# Patient Record
Sex: MALE | Race: WHITE | NOT HISPANIC OR LATINO | Employment: OTHER | URBAN - METROPOLITAN AREA
[De-identification: names, ages, dates, MRNs, and addresses within clinical notes are randomized per-mention and may not be internally consistent; named-entity substitution may affect disease eponyms.]

---

## 2017-01-24 ENCOUNTER — AMBULATORY - HEALTHEAST (OUTPATIENT)
Dept: CARDIOLOGY | Facility: CLINIC | Age: 62
End: 2017-01-24

## 2017-01-24 DIAGNOSIS — E78.00 HYPERCHOLESTEROLEMIA: ICD-10-CM

## 2017-01-24 LAB
CHOLEST SERPL-MCNC: 121 MG/DL
FASTING STATUS PATIENT QL REPORTED: YES
HDLC SERPL-MCNC: 44 MG/DL
LDLC SERPL CALC-MCNC: 51 MG/DL
TRIGL SERPL-MCNC: 129 MG/DL

## 2017-01-26 ENCOUNTER — COMMUNICATION - HEALTHEAST (OUTPATIENT)
Dept: FAMILY MEDICINE | Facility: CLINIC | Age: 62
End: 2017-01-26

## 2017-01-26 DIAGNOSIS — I10 ESSENTIAL HYPERTENSION: ICD-10-CM

## 2017-03-01 ENCOUNTER — OFFICE VISIT - HEALTHEAST (OUTPATIENT)
Dept: CARDIOLOGY | Facility: CLINIC | Age: 62
End: 2017-03-01

## 2017-03-01 DIAGNOSIS — I10 ESSENTIAL HYPERTENSION WITH GOAL BLOOD PRESSURE LESS THAN 140/90: ICD-10-CM

## 2017-03-01 DIAGNOSIS — E78.00 PURE HYPERCHOLESTEROLEMIA: ICD-10-CM

## 2017-03-01 DIAGNOSIS — I50.22 CHRONIC SYSTOLIC CONGESTIVE HEART FAILURE (H): ICD-10-CM

## 2017-03-01 DIAGNOSIS — I42.8 OTHER PRIMARY CARDIOMYOPATHIES: ICD-10-CM

## 2017-03-06 ENCOUNTER — COMMUNICATION - HEALTHEAST (OUTPATIENT)
Dept: FAMILY MEDICINE | Facility: CLINIC | Age: 62
End: 2017-03-06

## 2017-03-06 DIAGNOSIS — G47.00 INSOMNIA: ICD-10-CM

## 2017-04-03 ENCOUNTER — HOSPITAL ENCOUNTER (OUTPATIENT)
Dept: CARDIOLOGY | Facility: CLINIC | Age: 62
Discharge: HOME | End: 2017-04-03
Attending: INTERNAL MEDICINE

## 2017-04-03 ENCOUNTER — COMMUNICATION - HEALTHEAST (OUTPATIENT)
Dept: FAMILY MEDICINE | Facility: CLINIC | Age: 62
End: 2017-04-03

## 2017-04-03 DIAGNOSIS — I42.8 OTHER PRIMARY CARDIOMYOPATHIES: ICD-10-CM

## 2017-04-03 LAB
AORTIC ROOT: 3.4 CM
BSA FOR ECHO PROCEDURE: 2.41 M2
CV BLOOD PRESSURE: NORMAL MMHG
CV ECHO HEIGHT: 66 IN
CV ECHO WEIGHT: 275 LBS
DOP CALC LVOT AREA: 3.8 CM2
DOP CALC LVOT DIAMETER: 2.2 CM
DOP CALC LVOT PEAK VEL: 78.2 CM/S
DOP CALC LVOT STROKE VOLUME: 64.6 CM3
DOP CALCLVOT PEAK VEL VTI: 17 CM
EJECTION FRACTION: 52 % (ref 55–75)
FRACTIONAL SHORTENING: 32 % (ref 28–44)
INTERVENTRICULAR SEPTUM IN END DIASTOLE: 0.99 CM (ref 0.6–1)
IVS/PW RATIO: 0.7
LA AREA 1: 17 CM2
LA AREA 2: 21 CM2
LA AREA 2: 33.7 CM2
LEFT ATRIUM LENGTH: 5.54 CM
LEFT ATRIUM SIZE: 4.9 CM
LEFT ATRIUM TO AORTIC ROOT RATIO: 1.44 NO UNITS
LEFT VENTRICLE DIASTOLIC VOLUME INDEX: 62.7 CM3/M2 (ref 34–74)
LEFT VENTRICLE DIASTOLIC VOLUME: 151 CM3 (ref 62–150)
LEFT VENTRICLE MASS INDEX: 120.8 G/M2
LEFT VENTRICLE SYSTOLIC VOLUME INDEX: 30.3 CM3/M2 (ref 11–31)
LEFT VENTRICLE SYSTOLIC VOLUME: 73 CM3 (ref 21–61)
LEFT VENTRICULAR INTERNAL DIMENSION IN DIASTOLE: 5.6 CM (ref 4.2–5.8)
LEFT VENTRICULAR INTERNAL DIMENSION IN SYSTOLE: 3.81 CM (ref 2.5–4)
LEFT VENTRICULAR MASS: 291.1 G
LEFT VENTRICULAR OUTFLOW TRACT MEAN GRADIENT: 1 MMHG
LEFT VENTRICULAR OUTFLOW TRACT MEAN VELOCITY: 51.6 CM/S
LEFT VENTRICULAR OUTFLOW TRACT PEAK GRADIENT: 2 MMHG
LEFT VENTRICULAR POSTERIOR WALL IN END DIASTOLE: 1.48 CM (ref 0.6–1)
LV STROKE VOLUME INDEX: 26.8 ML/M2
MV E'TISSUE VEL-LAT: 9.16 CM/S
MV E'TISSUE VEL-MED: 7.12 CM/S
NUC REST DIASTOLIC VOLUME INDEX: 4400 LBS
NUC REST SYSTOLIC VOLUME INDEX: 66 IN
TRICUSPID REGURGITATION PEAK PRESSURE GRADIENT: 24 MMHG
TRICUSPID VALVE ANULAR PLANE SYSTOLIC EXCURSION: 2.9 CM
TRICUSPID VALVE PEAK REGURGITANT VELOCITY: 245 CM/S

## 2017-04-03 ASSESSMENT — MIFFLIN-ST. JEOR: SCORE: 1980.14

## 2017-04-05 ENCOUNTER — COMMUNICATION - HEALTHEAST (OUTPATIENT)
Dept: CARDIOLOGY | Facility: CLINIC | Age: 62
End: 2017-04-05

## 2017-04-05 DIAGNOSIS — E78.00 HYPERCHOLESTEROLEMIA: ICD-10-CM

## 2017-04-17 ENCOUNTER — OFFICE VISIT - HEALTHEAST (OUTPATIENT)
Dept: FAMILY MEDICINE | Facility: CLINIC | Age: 62
End: 2017-04-17

## 2017-04-17 DIAGNOSIS — Z01.818 PREOP GENERAL PHYSICAL EXAM: ICD-10-CM

## 2017-04-17 DIAGNOSIS — Z97.8 PRESENCE OF INTRATHECAL BACLOFEN PUMP: ICD-10-CM

## 2017-04-17 DIAGNOSIS — M54.50 LUMBAGO: ICD-10-CM

## 2017-04-17 ASSESSMENT — MIFFLIN-ST. JEOR: SCORE: 1988.99

## 2017-04-24 RX ORDER — DULOXETIN HYDROCHLORIDE 30 MG/1
30 CAPSULE, DELAYED RELEASE ORAL 2 TIMES DAILY
COMMUNITY
End: 2022-01-12

## 2017-04-24 RX ORDER — GABAPENTIN 300 MG/1
600 CAPSULE ORAL 3 TIMES DAILY
COMMUNITY
End: 2021-09-20

## 2017-04-24 RX ORDER — TRAZODONE HYDROCHLORIDE 100 MG/1
200 TABLET ORAL AT BEDTIME
COMMUNITY
End: 2021-10-06

## 2017-04-25 ENCOUNTER — HOSPITAL ENCOUNTER (OUTPATIENT)
Facility: CLINIC | Age: 62
Discharge: HOME | End: 2017-04-25
Attending: PHYSICAL MEDICINE & REHABILITATION | Admitting: PHYSICAL MEDICINE & REHABILITATION
Payer: OTHER MISCELLANEOUS

## 2017-04-25 ENCOUNTER — ANESTHESIA (OUTPATIENT)
Dept: SURGERY | Facility: CLINIC | Age: 62
End: 2017-04-25
Payer: OTHER MISCELLANEOUS

## 2017-04-25 ENCOUNTER — ANESTHESIA EVENT (OUTPATIENT)
Dept: SURGERY | Facility: CLINIC | Age: 62
End: 2017-04-25
Payer: OTHER MISCELLANEOUS

## 2017-04-25 ENCOUNTER — APPOINTMENT (OUTPATIENT)
Dept: GENERAL RADIOLOGY | Facility: CLINIC | Age: 62
End: 2017-04-25
Attending: PHYSICAL MEDICINE & REHABILITATION
Payer: OTHER MISCELLANEOUS

## 2017-04-25 VITALS
WEIGHT: 271.1 LBS | BODY MASS INDEX: 41.09 KG/M2 | OXYGEN SATURATION: 95 % | TEMPERATURE: 98.2 F | SYSTOLIC BLOOD PRESSURE: 120 MMHG | DIASTOLIC BLOOD PRESSURE: 68 MMHG | RESPIRATION RATE: 16 BRPM | HEIGHT: 68 IN

## 2017-04-25 PROCEDURE — 37000008 ZZH ANESTHESIA TECHNICAL FEE, 1ST 30 MIN: Performed by: PHYSICAL MEDICINE & REHABILITATION

## 2017-04-25 PROCEDURE — 27210794 ZZH OR GENERAL SUPPLY STERILE: Performed by: PHYSICAL MEDICINE & REHABILITATION

## 2017-04-25 PROCEDURE — C1772 INFUSION PUMP, PROGRAMMABLE: HCPCS | Performed by: PHYSICAL MEDICINE & REHABILITATION

## 2017-04-25 PROCEDURE — 25000128 H RX IP 250 OP 636: Performed by: NURSE ANESTHETIST, CERTIFIED REGISTERED

## 2017-04-25 PROCEDURE — 36000065 ZZH SURGERY LEVEL 4 W FLUORO 1ST 30 MIN: Performed by: PHYSICAL MEDICINE & REHABILITATION

## 2017-04-25 PROCEDURE — 25000125 ZZHC RX 250: Performed by: PHYSICAL MEDICINE & REHABILITATION

## 2017-04-25 PROCEDURE — 40000277 XR SURGERY CARM FLUORO LESS THAN 5 MIN W STILLS

## 2017-04-25 PROCEDURE — 71000013 ZZH RECOVERY PHASE 1 LEVEL 1 EA ADDTL HR: Performed by: PHYSICAL MEDICINE & REHABILITATION

## 2017-04-25 PROCEDURE — 71000012 ZZH RECOVERY PHASE 1 LEVEL 1 FIRST HR: Performed by: PHYSICAL MEDICINE & REHABILITATION

## 2017-04-25 PROCEDURE — 27210995 ZZH RX 272: Performed by: PHYSICAL MEDICINE & REHABILITATION

## 2017-04-25 PROCEDURE — 25000128 H RX IP 250 OP 636: Performed by: PHYSICAL MEDICINE & REHABILITATION

## 2017-04-25 PROCEDURE — 27810169 ZZH OR IMPLANT GENERAL: Performed by: PHYSICAL MEDICINE & REHABILITATION

## 2017-04-25 PROCEDURE — 37000009 ZZH ANESTHESIA TECHNICAL FEE, EACH ADDTL 15 MIN: Performed by: PHYSICAL MEDICINE & REHABILITATION

## 2017-04-25 PROCEDURE — 25000125 ZZHC RX 250: Performed by: NURSE ANESTHETIST, CERTIFIED REGISTERED

## 2017-04-25 PROCEDURE — 25000566 ZZH SEVOFLURANE, EA 15 MIN: Performed by: PHYSICAL MEDICINE & REHABILITATION

## 2017-04-25 PROCEDURE — C1755 CATHETER, INTRASPINAL: HCPCS | Performed by: PHYSICAL MEDICINE & REHABILITATION

## 2017-04-25 PROCEDURE — 40000170 ZZH STATISTIC PRE-PROCEDURE ASSESSMENT II: Performed by: PHYSICAL MEDICINE & REHABILITATION

## 2017-04-25 PROCEDURE — 36000063 ZZH SURGERY LEVEL 4 EA 15 ADDTL MIN: Performed by: PHYSICAL MEDICINE & REHABILITATION

## 2017-04-25 PROCEDURE — C1787 PATIENT PROGR, NEUROSTIM: HCPCS | Performed by: PHYSICAL MEDICINE & REHABILITATION

## 2017-04-25 PROCEDURE — 25800025 ZZH RX 258: Performed by: NURSE ANESTHETIST, CERTIFIED REGISTERED

## 2017-04-25 PROCEDURE — 71000027 ZZH RECOVERY PHASE 2 EACH 15 MINS: Performed by: PHYSICAL MEDICINE & REHABILITATION

## 2017-04-25 DEVICE — IMP CATH ASCENDA W/86CM SPINAL SEGMENT 8780: Type: IMPLANTABLE DEVICE | Site: BACK | Status: FUNCTIONAL

## 2017-04-25 DEVICE — IMP ENVELOPE MEDT TYRX ABS ANTIBACTERIAL LG NMRM6133: Type: IMPLANTABLE DEVICE | Site: BACK | Status: FUNCTIONAL

## 2017-04-25 DEVICE — PUMP SYNCHROMED II 40ML 8637-40: Type: IMPLANTABLE DEVICE | Site: BACK | Status: FUNCTIONAL

## 2017-04-25 RX ORDER — NALOXONE HYDROCHLORIDE 0.4 MG/ML
.1-.4 INJECTION, SOLUTION INTRAMUSCULAR; INTRAVENOUS; SUBCUTANEOUS
Status: DISCONTINUED | OUTPATIENT
Start: 2017-04-25 | End: 2017-04-25 | Stop reason: HOSPADM

## 2017-04-25 RX ORDER — LIDOCAINE HYDROCHLORIDE AND EPINEPHRINE 10; 10 MG/ML; UG/ML
INJECTION, SOLUTION INFILTRATION; PERINEURAL PRN
Status: DISCONTINUED | OUTPATIENT
Start: 2017-04-25 | End: 2017-04-25 | Stop reason: HOSPADM

## 2017-04-25 RX ORDER — PROPOFOL 10 MG/ML
INJECTION, EMULSION INTRAVENOUS PRN
Status: DISCONTINUED | OUTPATIENT
Start: 2017-04-25 | End: 2017-04-25

## 2017-04-25 RX ORDER — BUPIVACAINE HYDROCHLORIDE AND EPINEPHRINE 2.5; 5 MG/ML; UG/ML
INJECTION, SOLUTION INFILTRATION; PERINEURAL PRN
Status: DISCONTINUED | OUTPATIENT
Start: 2017-04-25 | End: 2017-04-25 | Stop reason: HOSPADM

## 2017-04-25 RX ORDER — CEFAZOLIN SODIUM 1 G/3ML
INJECTION, POWDER, FOR SOLUTION INTRAMUSCULAR; INTRAVENOUS PRN
Status: DISCONTINUED | OUTPATIENT
Start: 2017-04-25 | End: 2017-04-25

## 2017-04-25 RX ORDER — ONDANSETRON 4 MG/1
4 TABLET, ORALLY DISINTEGRATING ORAL EVERY 30 MIN PRN
Status: DISCONTINUED | OUTPATIENT
Start: 2017-04-25 | End: 2017-04-25 | Stop reason: HOSPADM

## 2017-04-25 RX ORDER — MEPERIDINE HYDROCHLORIDE 25 MG/ML
12.5 INJECTION INTRAMUSCULAR; INTRAVENOUS; SUBCUTANEOUS
Status: DISCONTINUED | OUTPATIENT
Start: 2017-04-25 | End: 2017-04-25 | Stop reason: HOSPADM

## 2017-04-25 RX ORDER — CEFAZOLIN SODIUM 1 G/50ML
3 SOLUTION INTRAVENOUS
Status: COMPLETED | OUTPATIENT
Start: 2017-04-25 | End: 2017-04-25

## 2017-04-25 RX ORDER — LIDOCAINE HYDROCHLORIDE AND EPINEPHRINE 10; 10 MG/ML; UG/ML
INJECTION, SOLUTION INFILTRATION; PERINEURAL
Status: DISCONTINUED
Start: 2017-04-25 | End: 2017-04-25 | Stop reason: HOSPADM

## 2017-04-25 RX ORDER — GLYCOPYRROLATE 0.2 MG/ML
INJECTION, SOLUTION INTRAMUSCULAR; INTRAVENOUS PRN
Status: DISCONTINUED | OUTPATIENT
Start: 2017-04-25 | End: 2017-04-25

## 2017-04-25 RX ORDER — FENTANYL CITRATE 50 UG/ML
25-50 INJECTION, SOLUTION INTRAMUSCULAR; INTRAVENOUS
Status: DISCONTINUED | OUTPATIENT
Start: 2017-04-25 | End: 2017-04-25 | Stop reason: HOSPADM

## 2017-04-25 RX ORDER — ONDANSETRON 2 MG/ML
INJECTION INTRAMUSCULAR; INTRAVENOUS PRN
Status: DISCONTINUED | OUTPATIENT
Start: 2017-04-25 | End: 2017-04-25

## 2017-04-25 RX ORDER — FENTANYL CITRATE 50 UG/ML
25-50 INJECTION, SOLUTION INTRAMUSCULAR; INTRAVENOUS EVERY 5 MIN PRN
Status: DISCONTINUED | OUTPATIENT
Start: 2017-04-25 | End: 2017-04-25 | Stop reason: HOSPADM

## 2017-04-25 RX ORDER — LIDOCAINE 40 MG/G
CREAM TOPICAL
Status: DISCONTINUED | OUTPATIENT
Start: 2017-04-25 | End: 2017-04-25 | Stop reason: HOSPADM

## 2017-04-25 RX ORDER — SODIUM CHLORIDE, SODIUM LACTATE, POTASSIUM CHLORIDE, CALCIUM CHLORIDE 600; 310; 30; 20 MG/100ML; MG/100ML; MG/100ML; MG/100ML
INJECTION, SOLUTION INTRAVENOUS CONTINUOUS PRN
Status: DISCONTINUED | OUTPATIENT
Start: 2017-04-25 | End: 2017-04-25

## 2017-04-25 RX ORDER — ONDANSETRON 2 MG/ML
4 INJECTION INTRAMUSCULAR; INTRAVENOUS EVERY 30 MIN PRN
Status: DISCONTINUED | OUTPATIENT
Start: 2017-04-25 | End: 2017-04-25 | Stop reason: HOSPADM

## 2017-04-25 RX ORDER — LIDOCAINE HYDROCHLORIDE 20 MG/ML
INJECTION, SOLUTION INFILTRATION; PERINEURAL PRN
Status: DISCONTINUED | OUTPATIENT
Start: 2017-04-25 | End: 2017-04-25

## 2017-04-25 RX ORDER — BUPIVACAINE HYDROCHLORIDE AND EPINEPHRINE 2.5; 5 MG/ML; UG/ML
INJECTION, SOLUTION EPIDURAL; INFILTRATION; INTRACAUDAL; PERINEURAL
Status: DISCONTINUED
Start: 2017-04-25 | End: 2017-04-25 | Stop reason: HOSPADM

## 2017-04-25 RX ORDER — SODIUM CHLORIDE, SODIUM LACTATE, POTASSIUM CHLORIDE, CALCIUM CHLORIDE 600; 310; 30; 20 MG/100ML; MG/100ML; MG/100ML; MG/100ML
INJECTION, SOLUTION INTRAVENOUS CONTINUOUS
Status: DISCONTINUED | OUTPATIENT
Start: 2017-04-25 | End: 2017-04-25 | Stop reason: HOSPADM

## 2017-04-25 RX ORDER — EPHEDRINE SULFATE 50 MG/ML
INJECTION, SOLUTION INTRAMUSCULAR; INTRAVENOUS; SUBCUTANEOUS PRN
Status: DISCONTINUED | OUTPATIENT
Start: 2017-04-25 | End: 2017-04-25

## 2017-04-25 RX ORDER — CEFAZOLIN SODIUM 1 G/3ML
1 INJECTION, POWDER, FOR SOLUTION INTRAMUSCULAR; INTRAVENOUS SEE ADMIN INSTRUCTIONS
Status: DISCONTINUED | OUTPATIENT
Start: 2017-04-25 | End: 2017-04-25 | Stop reason: HOSPADM

## 2017-04-25 RX ORDER — FENTANYL CITRATE 50 UG/ML
INJECTION, SOLUTION INTRAMUSCULAR; INTRAVENOUS PRN
Status: DISCONTINUED | OUTPATIENT
Start: 2017-04-25 | End: 2017-04-25

## 2017-04-25 RX ORDER — DEXAMETHASONE SODIUM PHOSPHATE 4 MG/ML
INJECTION, SOLUTION INTRA-ARTICULAR; INTRALESIONAL; INTRAMUSCULAR; INTRAVENOUS; SOFT TISSUE PRN
Status: DISCONTINUED | OUTPATIENT
Start: 2017-04-25 | End: 2017-04-25

## 2017-04-25 RX ADMIN — PROPOFOL 200 MG: 10 INJECTION, EMULSION INTRAVENOUS at 07:38

## 2017-04-25 RX ADMIN — PHENYLEPHRINE HYDROCHLORIDE 100 MCG: 10 INJECTION, SOLUTION INTRAMUSCULAR; INTRAVENOUS; SUBCUTANEOUS at 07:57

## 2017-04-25 RX ADMIN — LIDOCAINE HYDROCHLORIDE 60 MG: 20 INJECTION, SOLUTION INFILTRATION; PERINEURAL at 07:38

## 2017-04-25 RX ADMIN — DEXMEDETOMIDINE 8 MCG: 100 INJECTION, SOLUTION, CONCENTRATE INTRAVENOUS at 09:17

## 2017-04-25 RX ADMIN — Medication 5 MG: at 08:50

## 2017-04-25 RX ADMIN — DEXAMETHASONE SODIUM PHOSPHATE 4 MG: 4 INJECTION, SOLUTION INTRA-ARTICULAR; INTRALESIONAL; INTRAMUSCULAR; INTRAVENOUS; SOFT TISSUE at 07:59

## 2017-04-25 RX ADMIN — GLYCOPYRROLATE 0.2 MG: 0.2 INJECTION, SOLUTION INTRAMUSCULAR; INTRAVENOUS at 08:04

## 2017-04-25 RX ADMIN — Medication 1 G: at 08:30

## 2017-04-25 RX ADMIN — CEFAZOLIN 2 G: 1 INJECTION, POWDER, FOR SOLUTION INTRAMUSCULAR; INTRAVENOUS at 07:47

## 2017-04-25 RX ADMIN — SODIUM CHLORIDE, POTASSIUM CHLORIDE, SODIUM LACTATE AND CALCIUM CHLORIDE: 600; 310; 30; 20 INJECTION, SOLUTION INTRAVENOUS at 07:36

## 2017-04-25 RX ADMIN — ROCURONIUM BROMIDE 40 MG: 10 INJECTION INTRAVENOUS at 07:38

## 2017-04-25 RX ADMIN — DEXMEDETOMIDINE 8 MCG: 100 INJECTION, SOLUTION, CONCENTRATE INTRAVENOUS at 08:25

## 2017-04-25 RX ADMIN — FENTANYL CITRATE 50 MCG: 50 INJECTION, SOLUTION INTRAMUSCULAR; INTRAVENOUS at 07:36

## 2017-04-25 RX ADMIN — PHENYLEPHRINE HYDROCHLORIDE 200 MCG: 10 INJECTION, SOLUTION INTRAMUSCULAR; INTRAVENOUS; SUBCUTANEOUS at 08:17

## 2017-04-25 RX ADMIN — MIDAZOLAM HYDROCHLORIDE 2 MG: 1 INJECTION, SOLUTION INTRAMUSCULAR; INTRAVENOUS at 07:36

## 2017-04-25 RX ADMIN — SODIUM CHLORIDE, POTASSIUM CHLORIDE, SODIUM LACTATE AND CALCIUM CHLORIDE: 600; 310; 30; 20 INJECTION, SOLUTION INTRAVENOUS at 09:07

## 2017-04-25 RX ADMIN — Medication 5 MG: at 08:01

## 2017-04-25 RX ADMIN — Medication 5 MG: at 08:07

## 2017-04-25 RX ADMIN — Medication 5 MG: at 07:52

## 2017-04-25 RX ADMIN — PROPOFOL 30 MG: 10 INJECTION, EMULSION INTRAVENOUS at 08:26

## 2017-04-25 RX ADMIN — Medication 5 MG: at 08:17

## 2017-04-25 RX ADMIN — Medication 5 MG: at 07:57

## 2017-04-25 RX ADMIN — FENTANYL CITRATE 50 MCG: 50 INJECTION, SOLUTION INTRAMUSCULAR; INTRAVENOUS at 07:37

## 2017-04-25 RX ADMIN — PROPOFOL 30 MG: 10 INJECTION, EMULSION INTRAVENOUS at 09:22

## 2017-04-25 RX ADMIN — ROCURONIUM BROMIDE 10 MG: 10 INJECTION INTRAVENOUS at 08:02

## 2017-04-25 RX ADMIN — PHENYLEPHRINE HYDROCHLORIDE 100 MCG: 10 INJECTION, SOLUTION INTRAMUSCULAR; INTRAVENOUS; SUBCUTANEOUS at 08:01

## 2017-04-25 RX ADMIN — ONDANSETRON 4 MG: 2 INJECTION INTRAMUSCULAR; INTRAVENOUS at 09:12

## 2017-04-25 RX ADMIN — PHENYLEPHRINE HYDROCHLORIDE 200 MCG: 10 INJECTION, SOLUTION INTRAMUSCULAR; INTRAVENOUS; SUBCUTANEOUS at 08:07

## 2017-04-25 RX ADMIN — PHENYLEPHRINE HYDROCHLORIDE 100 MCG: 10 INJECTION, SOLUTION INTRAMUSCULAR; INTRAVENOUS; SUBCUTANEOUS at 08:50

## 2017-04-25 ASSESSMENT — ENCOUNTER SYMPTOMS: DYSRHYTHMIAS: 1

## 2017-04-25 NOTE — IP AVS SNAPSHOT
MRN:[id number]                      After Visit Summary   4/25/2017    Onur Barr    MRN: [id number]           Thank you!     Thank you for choosing [facility] for your care. Our goal is always to provide you with excellent care. Hearing back from our patients is one way we can continue to improve our services. Please take a few minutes to complete the written survey that you may receive in the mail after you visit with us. Thank you!        Patient Information     Date Of Birth          1955        About your hospital stay     You were admitted on:  April 25, 2017 You last received care in the:  [facility] Same Day [facility]    You were discharged on:  April 25, 2017       Who to Call     For medical emergencies, please call 911.  For non-urgent questions about your medical care, please call your primary care provider or clinic, [phone]  For questions related to your surgery, please call your surgery clinic        Attending Provider     Provider Specialty    Will, Anthony WEBER [facility]       Primary Care Provider Office Phone # Fax #    Ositadinma VIBHA Ab [phone]       [address]   [facility] [address]        Further instructions from your care team       Same Day Surgery Discharge Instructions for  Sedation and General Anesthesia       It's not unusual to feel dizzy, light-headed or faint for up to 24 hours after surgery or while taking pain medication.  If you have these symptoms: sit for a few minutes before standing and have someone assist you when you get up to walk or use the bathroom.      You should rest and relax for the next 24 hours. We recommend you make arrangements to have an adult stay with you for at least 24 hours after your discharge.  Avoid hazardous and strenuous activity.      DO NOT DRIVE any vehicle or operate mechanical equipment for 24 hours following the end of your surgery.  Even though you may feel normal,  your reactions may be affected by the medication you have received.      Do not drink alcoholic beverages for 24 hours following surgery.       Slowly progress to your regular diet as you feel able. It's not unusual to feel nauseated and/or vomit after receiving anesthesia.  If you develop these symptoms, drink clear liquids (apple juice, ginger ale, broth, 7-up, etc. ) until you feel better.  If your nausea and vomiting persists for 24 hours, please notify your surgeon.        All narcotic pain medications, along with inactivity and anesthesia, can cause constipation. Drinking plenty of liquids and increasing fiber intake will help.      For any questions of a medical nature, call your surgeon.      Do not make important decisions for 24 hours.      If you had general anesthesia, you may have a sore throat for a couple of days related to the breathing tube used during surgery.  You may use Cepacol lozenges to help with this discomfort.  If it worsens or if you develop a fever, contact your surgeon.       If you feel your pain is not well managed with the pain medications prescribed by your surgeon, please contact your surgeon's office to let them know so they can address your concerns.       [facility]  Opioid Pain Pump Implant  Discharge Instructions    Diet and Medications   * Continue with your regular diet   * Resume your regular pain medication as written in your medication discharge sheet   * You may notice a need to decrease your pain medications   * You will be taking an oral antibiotic (Keflex) for 10 days after the Implant   * DO NOT drink alcoholic beverage    Activity and Lead Site Care   * Please resume light activities as tolerated    Site Care   * Check your procedure site daily.  Keep sites clean and dry.  Leave the dressings in place.   * No shower, tub bath, swimming or whirlpools while catheter in place.  Sponge bath only.    Call Dr. Maloney if you develop   *  Sign of an infection:  "fevers, chills, increased pain, drainage, redness and swelling from the insertion site   *  Headache persisting for more than 48 hours   * Unusual changes in or stopping of sensation   * Painful sensations:  Call the Doctor's Office [phone] or call Dr. Maloney on his cell phone after hours [phone]    Emergency situations-Go to the Emergency Department or call 911:   *  Sudden severe back pain   *  Sudden onset of leg weakness and spasms   *  Loss of bladder control and/or bowel function      ___ [facility] [phone]    Pending Results     No orders found from 2017 to 2017.            Admission Information     Date & Time Provider Department Dept. Phone    2017 Anthony Maloney MD [facility] Same Day Surgery [phone]      Your Vitals Were     Blood Pressure Temperature Respirations Height Weight Pulse Oximetry    113/62 98.2  F (36.8  C) (Temporal) 12 1.727 m (5' 8\") 123 kg (271 lb 1.6 oz) 92%    BMI (Body Mass Index)                   41.22 kg/m2           [employer] Information     [employer] lets you send messages to your doctor, view your test results, renew your prescriptions, schedule appointments and more. To sign up, go to [email or web address] . Click on \"Log in\" on the left side of the screen, which will take you to the Welcome page. Then click on \"Sign up Now\" on the right side of the page.     You will be asked to enter the access code listed below, as well as some personal information. Please follow the directions to create your username and password.     Your access code is: [email or web address]  Expires: 2017 10:40 AM     Your access code will  in 90 days. If you need help or a new code, please call your [facility] clinic or [phone].        Care EveryWhere ID     This is your Care EveryWhere ID. This could be used by other organizations to access your [facility] medical records  [id number]           Review of your medicines      UNREVIEWED medicines. " Ask your doctor about these medicines        Dose / Directions    ALEVE 220 MG capsule   Generic drug:  naproxen sodium        Dose:  220 mg   Take 220 mg by mouth 2 times daily (with meals).   Refills:  0       ASPIRIN EC PO        Dose:  81 mg   Take 81 mg by mouth daily   Refills:  0       COREG CR PO        Dose:  20 mg   Take 20 mg by mouth daily   Refills:  0       CYMBALTA PO        Dose:  30 mg   Take 30 mg by mouth daily   Refills:  0       LASIX PO        Dose:  20 mg   Take 20 mg by mouth daily   Refills:  0       LIPITOR PO        Dose:  40 mg   Take 40 mg by mouth daily   Refills:  0       LISINOPRIL PO        Dose:  20 mg   Take 20 mg by mouth daily   Refills:  0       MORPHINE SULFATE        PUMP   Refills:  0       NEURONTIN PO        Dose:  300 mg   Take 300 mg by mouth 3 times daily   Refills:  0       PRILOSEC PO        Dose:  40 mg   Take 40 mg by mouth daily   Refills:  0       TRAZODONE HCL PO        Dose:  200 mg   Take 200 mg by mouth At Bedtime   Refills:  0                Protect others around you: Learn how to safely use, store and throw away your medicines at www.disposemymeds.org.             Medication List: This is a list of all your medications and when to take them. Check marks below indicate your daily home schedule. Keep this list as a reference.      Medications           Morning Afternoon Evening Bedtime As Needed    ALEVE 220 MG capsule   Take 220 mg by mouth 2 times daily (with meals).   Generic drug:  naproxen sodium                                ASPIRIN EC PO   Take 81 mg by mouth daily                                COREG CR PO   Take 20 mg by mouth daily                                CYMBALTA PO   Take 30 mg by mouth daily                                LASIX PO   Take 20 mg by mouth daily                                LIPITOR PO   Take 40 mg by mouth daily                                LISINOPRIL PO   Take 20 mg by mouth daily                                MORPHINE  SULFATE   PUMP                                NEURONTIN PO   Take 300 mg by mouth 3 times daily                                PRILOSEC PO   Take 40 mg by mouth daily                                TRAZODONE HCL PO   Take 200 mg by mouth At Bedtime

## 2017-04-25 NOTE — ANESTHESIA PREPROCEDURE EVALUATION
Anesthesia Evaluation     . Pt has had prior anesthetic. Type: General (post MI)           ROS/MED HX    ENT/Pulmonary:     (+)sleep apnea (rersolved after tonsillectomy and weight loss), doesn't use CPAP , . .    Neurologic:       Cardiovascular:     (+) hypertension--CAD, -past MI,-. : . CHF . . :. dysrhythmias (PMH of A. Fib; no evidence for in several years) a-fib, .      (-) angina and angina   METS/Exercise Tolerance:     Hematologic:         Musculoskeletal:   (+) , , other musculoskeletal- chronic low back pain      GI/Hepatic: Comment: S/p gastric bypass    (+) Other GI/Hepatic (S/P gastric bypass)       Renal/Genitourinary:     (+) Nephrolithiasis ,    (-) renal disease   Endo:     (+) type II DM (diabetes resolved S/P gastric bypass) Obesity, .   (-) Type I DM   Psychiatric:         Infectious Disease:         Malignancy:         Other: Comment: Intrathecal morphine pump   (+) H/O Chronic Pain,H/O chronic opiod use ,                    Physical Exam  Normal systems: cardiovascular, pulmonary and dental    Airway   Mallampati: III  TM distance: >3 FB  Neck ROM: limited    Dental     Cardiovascular   Rhythm and rate: regular and normal      Pulmonary    breath sounds clear to auscultation                    Anesthesia Plan      History & Physical Review  History and physical reviewed and following examination; no interval change.    ASA Status:  3 .    NPO Status:  > 8 hours    Plan for General and ETT with Propofol induction. Maintenance will be Inhalation.    PONV prophylaxis:  Ondansetron (or other 5HT-3) and Dexamethasone or Solumedrol  Additional equipment: Videolaryngoscope      Postoperative Care  Postoperative pain management:  IV analgesics and Oral pain medications.      Consents  Anesthetic plan, risks, benefits and alternatives discussed with:  Patient..                          .  DPreop diagnosis: POST LAMINECTOMY SYNDROME  Procedure(s):  REPLACE INTRATHECAL PAIN PUMP  Allergies   Allergen  Reactions     Hydrocodone-Acetaminophen        No current facility-administered medications on file prior to encounter.   Current Outpatient Prescriptions on File Prior to Encounter:  Naproxen Sodium (ALEVE) 220 MG capsule Take 220 mg by mouth 2 times daily (with meals).   MORPHINE SULFATE PUMP     No results found for: HGB, INR, POTASSIUM

## 2017-04-25 NOTE — DISCHARGE INSTRUCTIONS
Same Day Surgery Discharge Instructions for  Sedation and General Anesthesia       It's not unusual to feel dizzy, light-headed or faint for up to 24 hours after surgery or while taking pain medication.  If you have these symptoms: sit for a few minutes before standing and have someone assist you when you get up to walk or use the bathroom.      You should rest and relax for the next 24 hours. We recommend you make arrangements to have an adult stay with you for at least 24 hours after your discharge.  Avoid hazardous and strenuous activity.      DO NOT DRIVE any vehicle or operate mechanical equipment for 24 hours following the end of your surgery.  Even though you may feel normal, your reactions may be affected by the medication you have received.      Do not drink alcoholic beverages for 24 hours following surgery.       Slowly progress to your regular diet as you feel able. It's not unusual to feel nauseated and/or vomit after receiving anesthesia.  If you develop these symptoms, drink clear liquids (apple juice, ginger ale, broth, 7-up, etc. ) until you feel better.  If your nausea and vomiting persists for 24 hours, please notify your surgeon.        All narcotic pain medications, along with inactivity and anesthesia, can cause constipation. Drinking plenty of liquids and increasing fiber intake will help.      For any questions of a medical nature, call your surgeon.      Do not make important decisions for 24 hours.      If you had general anesthesia, you may have a sore throat for a couple of days related to the breathing tube used during surgery.  You may use Cepacol lozenges to help with this discomfort.  If it worsens or if you develop a fever, contact your surgeon.       If you feel your pain is not well managed with the pain medications prescribed by your surgeon, please contact your surgeon's office to let them know so they can address your concerns.       [facility]  Opioid Pain Pump  Implant  Discharge Instructions    Diet and Medications   * Continue with your regular diet   * Resume your regular pain medication as written in your medication discharge sheet   * You may notice a need to decrease your pain medications   * You will be taking an oral antibiotic (Keflex) for 10 days after the Implant   * DO NOT drink alcoholic beverage    Activity and Lead Site Care   * Please resume light activities as tolerated    Site Care   * Check your procedure site daily.  Keep sites clean and dry.  Leave the dressings in place.   * No shower, tub bath, swimming or whirlpools while catheter in place.  Sponge bath only.    Call Dr. Maloney if you develop   *  Sign of an infection: fevers, chills, increased pain, drainage, redness and swelling from the insertion site   *  Headache persisting for more than 48 hours   * Unusual changes in or stopping of sensation   * Painful sensations:  Call the Doctor's Office [phone] or call Dr. Maloney on his cell phone after hours [phone]    Emergency situations-Go to the Emergency Department or call 911:   *  Sudden severe back pain   *  Sudden onset of leg weakness and spasms   *  Loss of bladder control and/or bowel function      ___ [facility] [phone]

## 2017-04-25 NOTE — OR NURSING
Per Dr. Maloney  And the Medtronic Rep:  Medication removed from old pump was Morphine 45mg/ml, Clonidine 56mcg/ml, and Baclofen 112.5mcg/ml.  18ml of solution removed from old pump and injected into newly implanted pump.    The device was set to dispense Morphine 2.167mg/day, Clonipine 2.696mcg/day and Baclofen 5.42mcg/day

## 2017-04-25 NOTE — IP AVS SNAPSHOT
[facility] Same Day Surgery    [address]    [place] [address]    Phone:  [phone]    Fax:  [phone]                                       After Visit Summary   4/25/2017    Onur Barr    MRN: [id number]           After Visit Summary Signature Page     I have received my discharge instructions, and my questions have been answered. I have discussed any challenges I see with this plan with the nurse or doctor.    ..........................................................................................................................................  Patient/Patient Representative Signature      ..........................................................................................................................................  Patient Representative Print Name and Relationship to Patient    ..................................................               ................................................  Date                                            Time    ..........................................................................................................................................  Reviewed by Signature/Title    ...................................................              ..............................................  Date                                                            Time

## 2017-04-25 NOTE — ANESTHESIA CARE TRANSFER NOTE
Patient: Onur Barr    Procedure(s):  INTRATHECAL PAIN PUMP CATHETER REPLACEMENT AND PUMP REPLACEMENT - Wound Class: I-Clean   - Wound Class: I-Clean    Diagnosis: POST LAMINECTOMY SYNDROME  Diagnosis Additional Information: No value filed.    Anesthesia Type:   General, ETT     Note:  Airway :Face Mask  Patient transferred to:PACU  Comments: Pt exhibits spont resps, all monitors and alarms on in pacu, report given to RN, vss.      Vitals: (Last set prior to Anesthesia Care Transfer)    CRNA VITALS  4/25/2017 0900 - 4/25/2017 0937      4/25/2017             NIBP: (!)  149/98    Pulse: 76    NIBP Mean: 111    SpO2: 100 %    Resp Rate (observed): 18                Electronically Signed By: YAMILETH Faith CRNA  April 25, 2017  9:37 AM

## 2017-04-25 NOTE — ANESTHESIA POSTPROCEDURE EVALUATION
Patient: Onur Barr    Procedure(s):  INTRATHECAL PAIN PUMP CATHETER REPLACEMENT AND PUMP REPLACEMENT - Wound Class: I-Clean   - Wound Class: I-Clean    Diagnosis:POST LAMINECTOMY SYNDROME  Diagnosis Additional Information: No value filed.    Anesthesia Type:  General, ETT    Note:  Anesthesia Post Evaluation    Patient location during evaluation: PACU  Patient participation: Able to fully participate in evaluation  Level of consciousness: awake  Pain management: adequate  Airway patency: patent  Cardiovascular status: acceptable  Respiratory status: acceptable  Hydration status: acceptable  PONV: none     Anesthetic complications: None          Last vitals:  Vitals:    04/25/17 0950 04/25/17 1000 04/25/17 1015   BP: 142/83 129/76 113/62   Resp: 16 16 12   Temp:      SpO2: 100% 94% 92%         Electronically Signed By: Serafin Najera MD  April 25, 2017  10:50 AM

## 2017-05-03 ENCOUNTER — RECORDS - HEALTHEAST (OUTPATIENT)
Dept: ADMINISTRATIVE | Facility: OTHER | Age: 62
End: 2017-05-03

## 2017-05-17 ENCOUNTER — RECORDS - HEALTHEAST (OUTPATIENT)
Dept: ADMINISTRATIVE | Facility: OTHER | Age: 62
End: 2017-05-17

## 2017-05-30 NOTE — PROCEDURES
"Name:      Onur Barr   :     1955  Procedure date:   2017  Surgeon:    Anthony Maloney MD  Procedure:   Intrathecal Pump & Catheter Replacement- 40ml  Preoperative Diagnosis:  Postlaminectomy syndrome, not elsewhere classified,  M96.1      Anesthesia:   General    Implants: All implants were Medtronic products.  The catheter kit number used was [id number], and was lot number [id number]. The 40ml SynchroMed II pump was serial number: [id number].  A YOOSE TYRX envelope was used for this procedure and was lot number [id number], expiration date: 03/10/17.    Description of Procedure: The procedure, indications, risks and alternatives to therapy wee discussed with the patient.  Written informed consent was obtained.  The pocket sites and insertion site was marked in the preoperative area.  A prophylactic prescription was given for the patient to use for the next 10 days.  The patient was brought into the operating room and positioned supine on the operating table taking care to relieve all pressure points and place extremities in a comfortable position.  General anesthesia was induced.  The operative field was prepped and draped in normal sterile fashion. A \"pause for the cause\" was then initiated by Dr. Maloney stating the patients name, date of birth, procedure to be performed, and allergies. The skin was then anesthetized with Xylocaine over the existing scar on the right abdomen. The pump was then disconnected, and the existing catheter was tied off. The incision was then irrigated copious with normal saline. The subcutaneous tissue was then closed in the normal fashion with inverted mattress sutures using 0 Vicryl. The subcuticular layer was then closed with 4-0 Vicryl. Steri-strips and a sterile dressing was then applied. The patient was then flipped to prone position on the operating table.  The operative field was then re-prepped and draped in normal sterile fashion. A second \"pause for the cause\" " was then initiated by Dr. Maloney stating the patients name, date of birth, procedure to be performed, and allergies. The skin was anesthetized with Xylocaine at the appropriate spinal level as identified fluoroscopically.  A small incision was made and a Touhy needle was inserted into the intrathecal space under fluoroscopic guidance through the L2/L3 space with efflux of CSF.  The catheter was then passed to approximately the T7 level.  The needle and stylet were withdrawn as 1 piece.  The catheter position was then confirmed at approximately the T7 vertebral level.   An incision was then made in the left back and the pump was opened and primed.  The sterile saline was removed and filled with 18ml Morphine 45mg/ml, Clonidine 56mcg/ml, and Baclofen 112.5mcg/ml preservative free.  The pump incision was opened and a pouch was developed.  A tunneler was passed from the back incision to the gluteal incision and the catheter was passed forward.  The catheter was secured in the back fascia with a suture and a bi wing anchor.  A string loop was placed and the wound was irrigated with antibiotic saline solution and the subcutaneous tissue was closed with interrupted inverted 0 Vicryl and 4-0 Vicryl for the subcuticular layer. The skin was then closed with Steri-strips. The catheter was connected to the pump, which was set with a priming bolus over 19 minutes.   The starting dose was programmed to 2.167 mg per day of Morphine.  The pump was placed into the pocket and fit nicely.  It was turned medially and an additional catheter material was coiled behind the pump.  It was secured to the fascia with silks suture.  The wound was irrigated copiously with antibiotic saline solution.  The subcutaneous tissue was closed with interrupted inverted 0 and 4-0 continuous Vicryl and the skin with Steri-strips.  Sterile dressings were applied and patient was transferred to the recovery room in stable condition.  Catheter length is 77.8cm.        Anthony Maloney MD [facility]   April 25, 2017

## 2017-06-14 ENCOUNTER — RECORDS - HEALTHEAST (OUTPATIENT)
Dept: ADMINISTRATIVE | Facility: OTHER | Age: 62
End: 2017-06-14

## 2017-08-03 ENCOUNTER — COMMUNICATION - HEALTHEAST (OUTPATIENT)
Dept: FAMILY MEDICINE | Facility: CLINIC | Age: 62
End: 2017-08-03

## 2017-08-03 DIAGNOSIS — K21.9 ACID REFLUX DISEASE: ICD-10-CM

## 2017-08-09 ENCOUNTER — COMMUNICATION - HEALTHEAST (OUTPATIENT)
Dept: FAMILY MEDICINE | Facility: CLINIC | Age: 62
End: 2017-08-09

## 2017-08-09 DIAGNOSIS — I10 HYPERTENSION: ICD-10-CM

## 2017-08-31 ENCOUNTER — COMMUNICATION - HEALTHEAST (OUTPATIENT)
Dept: FAMILY MEDICINE | Facility: CLINIC | Age: 62
End: 2017-08-31

## 2017-08-31 DIAGNOSIS — G47.00 INSOMNIA: ICD-10-CM

## 2017-09-17 ENCOUNTER — COMMUNICATION - HEALTHEAST (OUTPATIENT)
Dept: FAMILY MEDICINE | Facility: CLINIC | Age: 62
End: 2017-09-17

## 2017-09-17 DIAGNOSIS — I10 ESSENTIAL HYPERTENSION: ICD-10-CM

## 2017-09-19 ENCOUNTER — OFFICE VISIT - HEALTHEAST (OUTPATIENT)
Dept: FAMILY MEDICINE | Facility: CLINIC | Age: 62
End: 2017-09-19

## 2017-09-19 DIAGNOSIS — M54.50 LUMBAGO: ICD-10-CM

## 2017-09-19 DIAGNOSIS — M25.559 HIP PAIN, CHRONIC, UNSPECIFIED LATERALITY: ICD-10-CM

## 2017-09-19 DIAGNOSIS — G89.29 HIP PAIN, CHRONIC, UNSPECIFIED LATERALITY: ICD-10-CM

## 2017-09-19 DIAGNOSIS — R00.1 BRADYCARDIA: ICD-10-CM

## 2017-09-19 DIAGNOSIS — G47.00 INSOMNIA, UNSPECIFIED TYPE: ICD-10-CM

## 2017-09-19 DIAGNOSIS — Z12.11 SCREEN FOR COLON CANCER: ICD-10-CM

## 2017-09-19 DIAGNOSIS — M54.2 NECK PAIN ON RIGHT SIDE: ICD-10-CM

## 2017-09-19 LAB
ATRIAL RATE - MUSE: 47 BPM
DIASTOLIC BLOOD PRESSURE - MUSE: NORMAL MMHG
INTERPRETATION ECG - MUSE: NORMAL
P AXIS - MUSE: 44 DEGREES
PR INTERVAL - MUSE: 224 MS
QRS DURATION - MUSE: 86 MS
QT - MUSE: 460 MS
QTC - MUSE: 407 MS
R AXIS - MUSE: 25 DEGREES
SYSTOLIC BLOOD PRESSURE - MUSE: NORMAL MMHG
T AXIS - MUSE: 40 DEGREES
VENTRICULAR RATE- MUSE: 47 BPM

## 2017-09-22 ENCOUNTER — AMBULATORY - HEALTHEAST (OUTPATIENT)
Dept: FAMILY MEDICINE | Facility: CLINIC | Age: 62
End: 2017-09-22

## 2017-09-25 ENCOUNTER — AMBULATORY - HEALTHEAST (OUTPATIENT)
Dept: CARDIOLOGY | Facility: CLINIC | Age: 62
End: 2017-09-25

## 2017-09-25 ENCOUNTER — RECORDS - HEALTHEAST (OUTPATIENT)
Dept: ADMINISTRATIVE | Facility: OTHER | Age: 62
End: 2017-09-25

## 2017-09-27 ENCOUNTER — OFFICE VISIT - HEALTHEAST (OUTPATIENT)
Dept: CARDIOLOGY | Facility: CLINIC | Age: 62
End: 2017-09-27

## 2017-09-27 DIAGNOSIS — E78.00 PURE HYPERCHOLESTEROLEMIA: ICD-10-CM

## 2017-09-27 DIAGNOSIS — R00.1 BRADYCARDIA: ICD-10-CM

## 2017-09-27 DIAGNOSIS — I42.9 CARDIOMYOPATHY (H): ICD-10-CM

## 2017-09-27 DIAGNOSIS — I25.10 ATHEROSCLEROSIS OF NATIVE CORONARY ARTERY OF NATIVE HEART WITHOUT ANGINA PECTORIS: ICD-10-CM

## 2017-09-27 DIAGNOSIS — I25.89 OTHER SPECIFIED FORMS OF CHRONIC ISCHEMIC HEART DISEASE: ICD-10-CM

## 2017-09-27 ASSESSMENT — MIFFLIN-ST. JEOR: SCORE: 2004.86

## 2017-10-03 ENCOUNTER — RECORDS - HEALTHEAST (OUTPATIENT)
Dept: ADMINISTRATIVE | Facility: OTHER | Age: 62
End: 2017-10-03

## 2017-10-04 ENCOUNTER — HOSPITAL ENCOUNTER (OUTPATIENT)
Dept: CARDIOLOGY | Facility: CLINIC | Age: 62
Discharge: HOME | End: 2017-10-04
Attending: INTERNAL MEDICINE

## 2017-10-04 DIAGNOSIS — R00.1 BRADYCARDIA: ICD-10-CM

## 2017-10-12 ENCOUNTER — COMMUNICATION - HEALTHEAST (OUTPATIENT)
Dept: CARDIOLOGY | Facility: CLINIC | Age: 62
End: 2017-10-12

## 2017-10-12 DIAGNOSIS — E78.00 HYPERCHOLESTEROLEMIA: ICD-10-CM

## 2017-10-30 ENCOUNTER — COMMUNICATION - HEALTHEAST (OUTPATIENT)
Dept: FAMILY MEDICINE | Facility: CLINIC | Age: 62
End: 2017-10-30

## 2017-10-30 DIAGNOSIS — K21.9 ACID REFLUX DISEASE: ICD-10-CM

## 2017-11-28 ENCOUNTER — RECORDS - HEALTHEAST (OUTPATIENT)
Dept: ADMINISTRATIVE | Facility: OTHER | Age: 62
End: 2017-11-28

## 2017-12-07 ENCOUNTER — COMMUNICATION - HEALTHEAST (OUTPATIENT)
Dept: FAMILY MEDICINE | Facility: CLINIC | Age: 62
End: 2017-12-07

## 2017-12-07 DIAGNOSIS — G47.00 INSOMNIA: ICD-10-CM

## 2018-02-05 ENCOUNTER — AMBULATORY - HEALTHEAST (OUTPATIENT)
Dept: FAMILY MEDICINE | Facility: CLINIC | Age: 63
End: 2018-02-05

## 2018-02-05 DIAGNOSIS — Z79.899 MEDICATION MANAGEMENT: ICD-10-CM

## 2018-02-13 ENCOUNTER — OFFICE VISIT - HEALTHEAST (OUTPATIENT)
Dept: NURSING | Facility: CLINIC | Age: 63
End: 2018-02-13

## 2018-02-13 DIAGNOSIS — M54.5 LOW BACK PAIN, UNSPECIFIED BACK PAIN LATERALITY, UNSPECIFIED CHRONICITY, WITH SCIATICA PRESENCE UNSPECIFIED: ICD-10-CM

## 2018-02-13 DIAGNOSIS — K21.9 GASTROESOPHAGEAL REFLUX DISEASE, ESOPHAGITIS PRESENCE NOT SPECIFIED: ICD-10-CM

## 2018-02-13 DIAGNOSIS — E78.00 PURE HYPERCHOLESTEROLEMIA: ICD-10-CM

## 2018-02-13 DIAGNOSIS — F51.01 PRIMARY INSOMNIA: ICD-10-CM

## 2018-02-13 DIAGNOSIS — I25.10 ATHEROSCLEROSIS OF NATIVE CORONARY ARTERY OF NATIVE HEART WITHOUT ANGINA PECTORIS: ICD-10-CM

## 2018-02-13 LAB
ALBUMIN SERPL-MCNC: 3.4 G/DL (ref 3.5–5)
ALP SERPL-CCNC: 77 U/L (ref 45–120)
ALT SERPL W P-5'-P-CCNC: 16 U/L (ref 0–45)
ANION GAP SERPL CALCULATED.3IONS-SCNC: 5 MMOL/L (ref 5–18)
AST SERPL W P-5'-P-CCNC: 25 U/L (ref 0–40)
BILIRUB SERPL-MCNC: 0.4 MG/DL (ref 0–1)
BUN SERPL-MCNC: 17 MG/DL (ref 8–22)
CALCIUM SERPL-MCNC: 9.2 MG/DL (ref 8.5–10.5)
CHLORIDE BLD-SCNC: 105 MMOL/L (ref 98–107)
CO2 SERPL-SCNC: 32 MMOL/L (ref 22–31)
CREAT SERPL-MCNC: 0.94 MG/DL (ref 0.7–1.3)
GFR SERPL CREATININE-BSD FRML MDRD: >60 ML/MIN/1.73M2
GLUCOSE BLD-MCNC: 85 MG/DL (ref 70–125)
HBA1C MFR BLD: 5.6 % (ref 3.5–6.1)
LDLC SERPL CALC-MCNC: 69 MG/DL
POTASSIUM BLD-SCNC: 4.6 MMOL/L (ref 3.5–5)
PROT SERPL-MCNC: 7.4 G/DL (ref 6–8)
SODIUM SERPL-SCNC: 142 MMOL/L (ref 136–145)

## 2018-02-14 ENCOUNTER — COMMUNICATION - HEALTHEAST (OUTPATIENT)
Dept: FAMILY MEDICINE | Facility: CLINIC | Age: 63
End: 2018-02-14

## 2018-02-19 ENCOUNTER — COMMUNICATION - HEALTHEAST (OUTPATIENT)
Dept: FAMILY MEDICINE | Facility: CLINIC | Age: 63
End: 2018-02-19

## 2018-02-21 ENCOUNTER — COMMUNICATION - HEALTHEAST (OUTPATIENT)
Dept: NURSING | Facility: CLINIC | Age: 63
End: 2018-02-21

## 2018-03-04 ENCOUNTER — COMMUNICATION - HEALTHEAST (OUTPATIENT)
Dept: FAMILY MEDICINE | Facility: CLINIC | Age: 63
End: 2018-03-04

## 2018-03-04 DIAGNOSIS — G47.00 INSOMNIA: ICD-10-CM

## 2018-03-14 ENCOUNTER — COMMUNICATION - HEALTHEAST (OUTPATIENT)
Dept: ADMINISTRATIVE | Facility: CLINIC | Age: 63
End: 2018-03-14

## 2018-04-23 ENCOUNTER — RECORDS - HEALTHEAST (OUTPATIENT)
Dept: ADMINISTRATIVE | Facility: OTHER | Age: 63
End: 2018-04-23

## 2018-05-02 ENCOUNTER — COMMUNICATION - HEALTHEAST (OUTPATIENT)
Dept: FAMILY MEDICINE | Facility: CLINIC | Age: 63
End: 2018-05-02

## 2018-05-02 DIAGNOSIS — I10 HYPERTENSION: ICD-10-CM

## 2018-05-08 ENCOUNTER — OFFICE VISIT - HEALTHEAST (OUTPATIENT)
Dept: CARDIOLOGY | Facility: CLINIC | Age: 63
End: 2018-05-08

## 2018-05-08 DIAGNOSIS — R40.0 SLEEPINESS: ICD-10-CM

## 2018-05-08 DIAGNOSIS — I25.10 ATHEROSCLEROSIS OF NATIVE CORONARY ARTERY OF NATIVE HEART WITHOUT ANGINA PECTORIS: ICD-10-CM

## 2018-05-08 DIAGNOSIS — I10 ESSENTIAL HYPERTENSION: ICD-10-CM

## 2018-05-08 DIAGNOSIS — Z98.84 BARIATRIC SURGERY STATUS: ICD-10-CM

## 2018-05-08 DIAGNOSIS — E78.00 PURE HYPERCHOLESTEROLEMIA: ICD-10-CM

## 2018-05-08 DIAGNOSIS — I48.0 PAROXYSMAL ATRIAL FIBRILLATION (H): ICD-10-CM

## 2018-05-08 ASSESSMENT — MIFFLIN-ST. JEOR: SCORE: 2160.45

## 2018-07-02 ENCOUNTER — OFFICE VISIT - HEALTHEAST (OUTPATIENT)
Dept: SLEEP MEDICINE | Facility: CLINIC | Age: 63
End: 2018-07-02

## 2018-07-02 DIAGNOSIS — G47.10 HYPERSOMNIA: ICD-10-CM

## 2018-07-02 DIAGNOSIS — G47.8 SLEEP DYSFUNCTION WITH SLEEP STAGE DISTURBANCE: ICD-10-CM

## 2018-07-02 DIAGNOSIS — R03.0 ELEVATED BLOOD PRESSURE READING: ICD-10-CM

## 2018-07-02 DIAGNOSIS — R06.83 SNORING: ICD-10-CM

## 2018-07-02 ASSESSMENT — MIFFLIN-ST. JEOR: SCORE: 2186.76

## 2018-07-05 ENCOUNTER — COMMUNICATION - HEALTHEAST (OUTPATIENT)
Dept: FAMILY MEDICINE | Facility: CLINIC | Age: 63
End: 2018-07-05

## 2018-07-05 DIAGNOSIS — I10 ESSENTIAL HYPERTENSION: ICD-10-CM

## 2018-07-09 ENCOUNTER — OFFICE VISIT - HEALTHEAST (OUTPATIENT)
Dept: FAMILY MEDICINE | Facility: CLINIC | Age: 63
End: 2018-07-09

## 2018-07-09 DIAGNOSIS — S31.109A OPEN WOUND OF ANTERIOR ABDOMINAL WALL, INITIAL ENCOUNTER: ICD-10-CM

## 2018-07-09 DIAGNOSIS — Z12.11 SCREEN FOR COLON CANCER: ICD-10-CM

## 2018-07-09 DIAGNOSIS — I73.9 CLAUDICATION OF LOWER EXTREMITY (H): ICD-10-CM

## 2018-07-09 DIAGNOSIS — I10 HYPERTENSION: ICD-10-CM

## 2018-07-10 ENCOUNTER — AMBULATORY - HEALTHEAST (OUTPATIENT)
Dept: VASCULAR SURGERY | Facility: CLINIC | Age: 63
End: 2018-07-10

## 2018-07-10 ENCOUNTER — AMBULATORY - HEALTHEAST (OUTPATIENT)
Dept: FAMILY MEDICINE | Facility: CLINIC | Age: 63
End: 2018-07-10

## 2018-07-10 DIAGNOSIS — I73.9 CLAUDICATION (H): ICD-10-CM

## 2018-07-10 DIAGNOSIS — S31.109A OPEN WOUND OF ANTERIOR ABDOMINAL WALL, INITIAL ENCOUNTER: ICD-10-CM

## 2018-07-10 DIAGNOSIS — M79.606 LEG PAIN: ICD-10-CM

## 2018-07-11 ENCOUNTER — COMMUNICATION - HEALTHEAST (OUTPATIENT)
Dept: FAMILY MEDICINE | Facility: CLINIC | Age: 63
End: 2018-07-11

## 2018-07-12 LAB — BACTERIA SPEC CULT: ABNORMAL

## 2018-07-16 ENCOUNTER — RECORDS - HEALTHEAST (OUTPATIENT)
Dept: ADMINISTRATIVE | Facility: OTHER | Age: 63
End: 2018-07-16

## 2018-07-16 ENCOUNTER — RECORDS - HEALTHEAST (OUTPATIENT)
Dept: SLEEP MEDICINE | Age: 63
End: 2018-07-16

## 2018-07-16 DIAGNOSIS — G47.8 OTHER SLEEP DISORDERS: ICD-10-CM

## 2018-07-16 DIAGNOSIS — R06.83 SNORING: ICD-10-CM

## 2018-07-16 DIAGNOSIS — G47.10 HYPERSOMNIA, UNSPECIFIED: ICD-10-CM

## 2018-07-17 ENCOUNTER — RECORDS - HEALTHEAST (OUTPATIENT)
Dept: ADMINISTRATIVE | Facility: OTHER | Age: 63
End: 2018-07-17

## 2018-07-23 ENCOUNTER — COMMUNICATION - HEALTHEAST (OUTPATIENT)
Dept: FAMILY MEDICINE | Facility: CLINIC | Age: 63
End: 2018-07-23

## 2018-07-23 ENCOUNTER — COMMUNICATION - HEALTHEAST (OUTPATIENT)
Dept: CARE COORDINATION | Facility: CLINIC | Age: 63
End: 2018-07-23

## 2018-07-23 DIAGNOSIS — Z12.11 SPECIAL SCREENING FOR MALIGNANT NEOPLASMS, COLON: ICD-10-CM

## 2018-07-24 ENCOUNTER — COMMUNICATION - HEALTHEAST (OUTPATIENT)
Dept: SLEEP MEDICINE | Facility: CLINIC | Age: 63
End: 2018-07-24

## 2018-07-24 DIAGNOSIS — G47.10 HYPERSOMNIA: ICD-10-CM

## 2018-07-24 DIAGNOSIS — G47.33 OSA (OBSTRUCTIVE SLEEP APNEA): ICD-10-CM

## 2018-07-24 DIAGNOSIS — G47.31 CENTRAL SLEEP APNEA: ICD-10-CM

## 2018-07-27 ENCOUNTER — COMMUNICATION - HEALTHEAST (OUTPATIENT)
Dept: SLEEP MEDICINE | Facility: CLINIC | Age: 63
End: 2018-07-27

## 2018-07-30 ENCOUNTER — RECORDS - HEALTHEAST (OUTPATIENT)
Dept: VASCULAR ULTRASOUND | Facility: CLINIC | Age: 63
End: 2018-07-30

## 2018-07-30 ENCOUNTER — OFFICE VISIT - HEALTHEAST (OUTPATIENT)
Dept: VASCULAR SURGERY | Facility: CLINIC | Age: 63
End: 2018-07-30

## 2018-07-30 DIAGNOSIS — I73.9 PERIPHERAL VASCULAR DISEASE, UNSPECIFIED (H): ICD-10-CM

## 2018-07-30 DIAGNOSIS — I73.9 CLAUDICATION OF LOWER EXTREMITY (H): ICD-10-CM

## 2018-07-30 ASSESSMENT — MIFFLIN-ST. JEOR: SCORE: 2188.79

## 2018-07-31 ENCOUNTER — COMMUNICATION - HEALTHEAST (OUTPATIENT)
Dept: FAMILY MEDICINE | Facility: CLINIC | Age: 63
End: 2018-07-31

## 2018-08-01 ENCOUNTER — OFFICE VISIT - HEALTHEAST (OUTPATIENT)
Dept: FAMILY MEDICINE | Facility: CLINIC | Age: 63
End: 2018-08-01

## 2018-08-01 DIAGNOSIS — M54.2 NECK PAIN ON RIGHT SIDE: ICD-10-CM

## 2018-08-01 DIAGNOSIS — E66.01 MORBID OBESITY (H): ICD-10-CM

## 2018-08-01 DIAGNOSIS — S31.109D OPEN WOUND OF ANTERIOR ABDOMINAL WALL, SUBSEQUENT ENCOUNTER: ICD-10-CM

## 2018-08-01 DIAGNOSIS — I73.9 CLAUDICATION OF LOWER EXTREMITY (H): ICD-10-CM

## 2018-08-02 ENCOUNTER — RECORDS - HEALTHEAST (OUTPATIENT)
Dept: ADMINISTRATIVE | Facility: OTHER | Age: 63
End: 2018-08-02

## 2018-08-02 ENCOUNTER — RECORDS - HEALTHEAST (OUTPATIENT)
Dept: SLEEP MEDICINE | Age: 63
End: 2018-08-02

## 2018-08-02 DIAGNOSIS — G47.10 HYPERSOMNIA, UNSPECIFIED: ICD-10-CM

## 2018-08-02 DIAGNOSIS — G47.31 PRIMARY CENTRAL SLEEP APNEA: ICD-10-CM

## 2018-08-02 DIAGNOSIS — G47.33 OBSTRUCTIVE SLEEP APNEA (ADULT) (PEDIATRIC): ICD-10-CM

## 2018-08-09 ENCOUNTER — COMMUNICATION - HEALTHEAST (OUTPATIENT)
Dept: FAMILY MEDICINE | Facility: CLINIC | Age: 63
End: 2018-08-09

## 2018-08-09 DIAGNOSIS — G47.00 INSOMNIA: ICD-10-CM

## 2018-08-10 ENCOUNTER — COMMUNICATION - HEALTHEAST (OUTPATIENT)
Dept: SLEEP MEDICINE | Facility: CLINIC | Age: 63
End: 2018-08-10

## 2018-08-10 DIAGNOSIS — G47.33 OSA (OBSTRUCTIVE SLEEP APNEA): ICD-10-CM

## 2018-08-10 DIAGNOSIS — G47.31 CENTRAL SLEEP APNEA: ICD-10-CM

## 2018-08-13 ENCOUNTER — COMMUNICATION - HEALTHEAST (OUTPATIENT)
Dept: SLEEP MEDICINE | Facility: CLINIC | Age: 63
End: 2018-08-13

## 2018-08-15 ENCOUNTER — COMMUNICATION - HEALTHEAST (OUTPATIENT)
Dept: SLEEP MEDICINE | Facility: CLINIC | Age: 63
End: 2018-08-15

## 2018-08-16 ENCOUNTER — AMBULATORY - HEALTHEAST (OUTPATIENT)
Dept: SLEEP MEDICINE | Facility: CLINIC | Age: 63
End: 2018-08-16

## 2018-08-20 ENCOUNTER — OFFICE VISIT - HEALTHEAST (OUTPATIENT)
Dept: VASCULAR SURGERY | Facility: CLINIC | Age: 63
End: 2018-08-20

## 2018-08-20 DIAGNOSIS — L30.4 INTERTRIGO: ICD-10-CM

## 2018-08-20 DIAGNOSIS — E66.9 OBESITY: ICD-10-CM

## 2018-08-20 ASSESSMENT — MIFFLIN-ST. JEOR: SCORE: 2143.43

## 2018-09-17 ENCOUNTER — AMBULATORY - HEALTHEAST (OUTPATIENT)
Dept: FAMILY MEDICINE | Facility: CLINIC | Age: 63
End: 2018-09-17

## 2018-09-17 ENCOUNTER — OFFICE VISIT - HEALTHEAST (OUTPATIENT)
Dept: VASCULAR SURGERY | Facility: CLINIC | Age: 63
End: 2018-09-17

## 2018-09-17 DIAGNOSIS — E66.9 OBESITY: ICD-10-CM

## 2018-09-17 DIAGNOSIS — L30.4 INTERTRIGO: ICD-10-CM

## 2018-09-17 DIAGNOSIS — L08.9 CHRONIC WOUND INFECTION OF ABDOMEN, INITIAL ENCOUNTER: ICD-10-CM

## 2018-09-17 DIAGNOSIS — S31.109A CHRONIC WOUND INFECTION OF ABDOMEN, INITIAL ENCOUNTER: ICD-10-CM

## 2018-09-17 ASSESSMENT — MIFFLIN-ST. JEOR: SCORE: 2188.79

## 2018-09-19 ENCOUNTER — RECORDS - HEALTHEAST (OUTPATIENT)
Dept: ADMINISTRATIVE | Facility: OTHER | Age: 63
End: 2018-09-19

## 2018-09-20 ENCOUNTER — RECORDS - HEALTHEAST (OUTPATIENT)
Dept: ADMINISTRATIVE | Facility: OTHER | Age: 63
End: 2018-09-20

## 2018-09-20 LAB
BACTERIA SPEC CULT: ABNORMAL
BACTERIA SPEC CULT: ABNORMAL
GRAM STAIN RESULT: ABNORMAL

## 2018-09-21 ENCOUNTER — AMBULATORY - HEALTHEAST (OUTPATIENT)
Dept: VASCULAR SURGERY | Facility: CLINIC | Age: 63
End: 2018-09-21

## 2018-09-21 ENCOUNTER — COMMUNICATION - HEALTHEAST (OUTPATIENT)
Dept: VASCULAR SURGERY | Facility: CLINIC | Age: 63
End: 2018-09-21

## 2018-09-21 DIAGNOSIS — B95.8 STAPHYLOCOCCAL INFECTION OF SKIN: ICD-10-CM

## 2018-09-21 DIAGNOSIS — L08.9 STAPHYLOCOCCAL INFECTION OF SKIN: ICD-10-CM

## 2018-10-02 ENCOUNTER — OFFICE VISIT - HEALTHEAST (OUTPATIENT)
Dept: SLEEP MEDICINE | Facility: CLINIC | Age: 63
End: 2018-10-02

## 2018-10-02 DIAGNOSIS — G47.8 SLEEP DYSFUNCTION WITH SLEEP STAGE DISTURBANCE: ICD-10-CM

## 2018-10-02 DIAGNOSIS — G47.31 CENTRAL SLEEP APNEA: ICD-10-CM

## 2018-10-02 DIAGNOSIS — G47.33 OBSTRUCTIVE SLEEP APNEA: ICD-10-CM

## 2018-10-02 DIAGNOSIS — G47.10 HYPERSOMNIA: ICD-10-CM

## 2018-10-20 ENCOUNTER — COMMUNICATION - HEALTHEAST (OUTPATIENT)
Dept: CARDIOLOGY | Facility: CLINIC | Age: 63
End: 2018-10-20

## 2018-10-20 DIAGNOSIS — I42.9 CARDIOMYOPATHY (H): ICD-10-CM

## 2018-11-13 ENCOUNTER — COMMUNICATION - HEALTHEAST (OUTPATIENT)
Dept: CARDIOLOGY | Facility: CLINIC | Age: 63
End: 2018-11-13

## 2018-11-13 DIAGNOSIS — E78.00 HYPERCHOLESTEROLEMIA: ICD-10-CM

## 2018-11-28 ENCOUNTER — OFFICE VISIT - HEALTHEAST (OUTPATIENT)
Dept: SLEEP MEDICINE | Facility: CLINIC | Age: 63
End: 2018-11-28

## 2018-11-28 DIAGNOSIS — R03.0 ELEVATED BLOOD PRESSURE READING: ICD-10-CM

## 2018-11-28 DIAGNOSIS — G47.33 OBSTRUCTIVE SLEEP APNEA: ICD-10-CM

## 2018-11-28 DIAGNOSIS — G47.10 HYPERSOMNIA: ICD-10-CM

## 2018-11-28 DIAGNOSIS — G47.31 CENTRAL SLEEP APNEA: ICD-10-CM

## 2019-01-31 ENCOUNTER — COMMUNICATION - HEALTHEAST (OUTPATIENT)
Dept: FAMILY MEDICINE | Facility: CLINIC | Age: 64
End: 2019-01-31

## 2019-01-31 DIAGNOSIS — G47.00 INSOMNIA: ICD-10-CM

## 2019-04-08 ENCOUNTER — COMMUNICATION - HEALTHEAST (OUTPATIENT)
Dept: FAMILY MEDICINE | Facility: CLINIC | Age: 64
End: 2019-04-08

## 2019-04-08 DIAGNOSIS — I10 HYPERTENSION: ICD-10-CM

## 2019-05-07 ENCOUNTER — COMMUNICATION - HEALTHEAST (OUTPATIENT)
Dept: CARDIOLOGY | Facility: CLINIC | Age: 64
End: 2019-05-07

## 2019-05-07 DIAGNOSIS — E78.00 HYPERCHOLESTEROLEMIA: ICD-10-CM

## 2019-07-03 ENCOUNTER — COMMUNICATION - HEALTHEAST (OUTPATIENT)
Dept: FAMILY MEDICINE | Facility: CLINIC | Age: 64
End: 2019-07-03

## 2019-07-03 DIAGNOSIS — I10 HYPERTENSION: ICD-10-CM

## 2019-07-07 ENCOUNTER — COMMUNICATION - HEALTHEAST (OUTPATIENT)
Dept: CARDIOLOGY | Facility: CLINIC | Age: 64
End: 2019-07-07

## 2019-07-07 DIAGNOSIS — I42.9 CARDIOMYOPATHY (H): ICD-10-CM

## 2019-07-08 ENCOUNTER — RECORDS - HEALTHEAST (OUTPATIENT)
Dept: CARDIOLOGY | Facility: CLINIC | Age: 64
End: 2019-07-08

## 2019-07-09 ENCOUNTER — COMMUNICATION - HEALTHEAST (OUTPATIENT)
Dept: ADMINISTRATIVE | Facility: CLINIC | Age: 64
End: 2019-07-09

## 2019-07-25 ENCOUNTER — COMMUNICATION - HEALTHEAST (OUTPATIENT)
Dept: FAMILY MEDICINE | Facility: CLINIC | Age: 64
End: 2019-07-25

## 2019-07-25 DIAGNOSIS — G47.00 INSOMNIA: ICD-10-CM

## 2019-08-05 ENCOUNTER — COMMUNICATION - HEALTHEAST (OUTPATIENT)
Dept: FAMILY MEDICINE | Facility: CLINIC | Age: 64
End: 2019-08-05

## 2019-08-05 DIAGNOSIS — E78.00 HYPERCHOLESTEROLEMIA: ICD-10-CM

## 2019-08-13 ENCOUNTER — RECORDS - HEALTHEAST (OUTPATIENT)
Dept: ADMINISTRATIVE | Facility: OTHER | Age: 64
End: 2019-08-13

## 2019-10-03 ENCOUNTER — COMMUNICATION - HEALTHEAST (OUTPATIENT)
Dept: FAMILY MEDICINE | Facility: CLINIC | Age: 64
End: 2019-10-03

## 2019-10-03 DIAGNOSIS — I10 HYPERTENSION: ICD-10-CM

## 2019-10-21 ENCOUNTER — COMMUNICATION - HEALTHEAST (OUTPATIENT)
Dept: FAMILY MEDICINE | Facility: CLINIC | Age: 64
End: 2019-10-21

## 2019-10-21 DIAGNOSIS — G47.00 INSOMNIA: ICD-10-CM

## 2019-11-02 ENCOUNTER — HEALTH MAINTENANCE LETTER (OUTPATIENT)
Age: 64
End: 2019-11-02

## 2019-11-03 ENCOUNTER — COMMUNICATION - HEALTHEAST (OUTPATIENT)
Dept: FAMILY MEDICINE | Facility: CLINIC | Age: 64
End: 2019-11-03

## 2019-11-03 DIAGNOSIS — E78.00 HYPERCHOLESTEROLEMIA: ICD-10-CM

## 2019-11-18 ENCOUNTER — OFFICE VISIT - HEALTHEAST (OUTPATIENT)
Dept: CARDIOLOGY | Facility: CLINIC | Age: 64
End: 2019-11-18

## 2019-11-18 DIAGNOSIS — I25.10 CORONARY ARTERY DISEASE INVOLVING NATIVE CORONARY ARTERY OF NATIVE HEART WITHOUT ANGINA PECTORIS: ICD-10-CM

## 2019-11-18 ASSESSMENT — MIFFLIN-ST. JEOR: SCORE: 2324.87

## 2019-11-20 ENCOUNTER — OFFICE VISIT - HEALTHEAST (OUTPATIENT)
Dept: FAMILY MEDICINE | Facility: CLINIC | Age: 64
End: 2019-11-20

## 2019-11-20 DIAGNOSIS — E78.00 PURE HYPERCHOLESTEROLEMIA: ICD-10-CM

## 2019-11-20 DIAGNOSIS — I10 BENIGN ESSENTIAL HYPERTENSION: ICD-10-CM

## 2019-11-20 DIAGNOSIS — Z23 NEED FOR TETANUS, DIPHTHERIA, AND ACELLULAR PERTUSSIS (TDAP) VACCINE IN PATIENT OF ADOLESCENT AGE OR OLDER: ICD-10-CM

## 2019-11-20 DIAGNOSIS — E66.01 MORBID OBESITY (H): ICD-10-CM

## 2019-11-20 DIAGNOSIS — I48.0 PAROXYSMAL ATRIAL FIBRILLATION (H): ICD-10-CM

## 2019-11-20 DIAGNOSIS — I73.9 CLAUDICATION OF LOWER EXTREMITY (H): ICD-10-CM

## 2019-11-20 LAB
ALBUMIN SERPL-MCNC: 3.6 G/DL (ref 3.5–5)
ALP SERPL-CCNC: 95 U/L (ref 45–120)
ALT SERPL W P-5'-P-CCNC: 16 U/L (ref 0–45)
ANION GAP SERPL CALCULATED.3IONS-SCNC: 9 MMOL/L (ref 5–18)
AST SERPL W P-5'-P-CCNC: 22 U/L (ref 0–40)
BILIRUB SERPL-MCNC: 0.5 MG/DL (ref 0–1)
BUN SERPL-MCNC: 12 MG/DL (ref 8–22)
CALCIUM SERPL-MCNC: 9.1 MG/DL (ref 8.5–10.5)
CHLORIDE BLD-SCNC: 104 MMOL/L (ref 98–107)
CHOLEST SERPL-MCNC: 131 MG/DL
CO2 SERPL-SCNC: 27 MMOL/L (ref 22–31)
CREAT SERPL-MCNC: 1.02 MG/DL (ref 0.7–1.3)
FASTING STATUS PATIENT QL REPORTED: YES
GFR SERPL CREATININE-BSD FRML MDRD: >60 ML/MIN/1.73M2
GLUCOSE BLD-MCNC: 96 MG/DL (ref 70–125)
HDLC SERPL-MCNC: 36 MG/DL
LDLC SERPL CALC-MCNC: 52 MG/DL
POTASSIUM BLD-SCNC: 4.3 MMOL/L (ref 3.5–5)
PROT SERPL-MCNC: 7.5 G/DL (ref 6–8)
SODIUM SERPL-SCNC: 140 MMOL/L (ref 136–145)
TRIGL SERPL-MCNC: 217 MG/DL

## 2019-11-26 ENCOUNTER — HOSPITAL ENCOUNTER (OUTPATIENT)
Dept: CARDIOLOGY | Facility: CLINIC | Age: 64
Discharge: HOME | End: 2019-11-26
Attending: INTERNAL MEDICINE

## 2019-11-26 ENCOUNTER — HOSPITAL ENCOUNTER (OUTPATIENT)
Dept: NUCLEAR MEDICINE | Facility: CLINIC | Age: 64
Discharge: HOME | End: 2019-11-26
Attending: INTERNAL MEDICINE

## 2019-11-26 DIAGNOSIS — I25.10 CORONARY ARTERY DISEASE INVOLVING NATIVE CORONARY ARTERY OF NATIVE HEART WITHOUT ANGINA PECTORIS: ICD-10-CM

## 2019-11-27 ENCOUNTER — HOSPITAL ENCOUNTER (OUTPATIENT)
Dept: NUCLEAR MEDICINE | Facility: CLINIC | Age: 64
Discharge: HOME | End: 2019-11-27
Attending: INTERNAL MEDICINE

## 2019-11-27 LAB
CV STRESS CURRENT BP HE: NORMAL
CV STRESS CURRENT HR HE: 65
CV STRESS CURRENT HR HE: 68
CV STRESS CURRENT HR HE: 69
CV STRESS CURRENT HR HE: 72
CV STRESS CURRENT HR HE: 72
CV STRESS CURRENT HR HE: 73
CV STRESS CURRENT HR HE: 74
CV STRESS CURRENT HR HE: 78
CV STRESS CURRENT HR HE: 79
CV STRESS CURRENT HR HE: 80
CV STRESS CURRENT HR HE: 81
CV STRESS CURRENT HR HE: 82
CV STRESS DEVIATION TIME HE: NORMAL
CV STRESS ECHO PERCENT HR HE: NORMAL
CV STRESS EXERCISE STAGE HE: NORMAL
CV STRESS FINAL RESTING BP HE: NORMAL
CV STRESS FINAL RESTING HR HE: 68
CV STRESS MAX HR HE: 83
CV STRESS MAX TREADMILL GRADE HE: 0
CV STRESS MAX TREADMILL SPEED HE: 0
CV STRESS PEAK DIA BP HE: NORMAL
CV STRESS PEAK SYS BP HE: NORMAL
CV STRESS PHASE HE: NORMAL
CV STRESS PROTOCOL HE: NORMAL
CV STRESS RESTING PT POSITION HE: NORMAL
CV STRESS ST DEVIATION AMOUNT HE: NORMAL
CV STRESS ST DEVIATION ELEVATION HE: NORMAL
CV STRESS ST EVELATION AMOUNT HE: NORMAL
CV STRESS TEST TYPE HE: NORMAL
CV STRESS TOTAL STAGE TIME MIN 1 HE: NORMAL
NUC REST EJECTION FRACTION: 61 %
RATE PRESSURE PRODUCT: NORMAL
STRESS ECHO BASELINE DIASTOLIC HE: 87
STRESS ECHO BASELINE HR: 68
STRESS ECHO BASELINE SYSTOLIC BP: 137
STRESS ECHO CALCULATED PERCENT HR: 53 %
STRESS ECHO LAST STRESS DIASTOLIC BP: 72
STRESS ECHO LAST STRESS HR: 81
STRESS ECHO LAST STRESS SYSTOLIC BP: 125
STRESS ECHO TARGET HR: 157

## 2019-12-03 ENCOUNTER — COMMUNICATION - HEALTHEAST (OUTPATIENT)
Dept: CARDIOLOGY | Facility: CLINIC | Age: 64
End: 2019-12-03

## 2020-01-16 ENCOUNTER — COMMUNICATION - HEALTHEAST (OUTPATIENT)
Dept: FAMILY MEDICINE | Facility: CLINIC | Age: 65
End: 2020-01-16

## 2020-01-16 DIAGNOSIS — G47.00 INSOMNIA: ICD-10-CM

## 2020-01-24 ENCOUNTER — OFFICE VISIT - HEALTHEAST (OUTPATIENT)
Dept: FAMILY MEDICINE | Facility: CLINIC | Age: 65
End: 2020-01-24

## 2020-01-24 DIAGNOSIS — M96.1 POSTLAMINECTOMY SYNDROME, LUMBAR REGION: ICD-10-CM

## 2020-01-24 DIAGNOSIS — I10 HYPERTENSION: ICD-10-CM

## 2020-01-24 DIAGNOSIS — B35.1 ONYCHOMYCOSIS: ICD-10-CM

## 2020-01-24 DIAGNOSIS — I10 ESSENTIAL HYPERTENSION: ICD-10-CM

## 2020-01-24 DIAGNOSIS — E66.01 MORBID OBESITY (H): ICD-10-CM

## 2020-01-24 DIAGNOSIS — R40.0 SLEEPINESS: ICD-10-CM

## 2020-01-29 ENCOUNTER — COMMUNICATION - HEALTHEAST (OUTPATIENT)
Dept: FAMILY MEDICINE | Facility: CLINIC | Age: 65
End: 2020-01-29

## 2020-01-29 DIAGNOSIS — E78.00 HYPERCHOLESTEROLEMIA: ICD-10-CM

## 2020-03-23 ENCOUNTER — COMMUNICATION - HEALTHEAST (OUTPATIENT)
Dept: CARDIOLOGY | Facility: CLINIC | Age: 65
End: 2020-03-23

## 2020-03-23 DIAGNOSIS — I42.9 CARDIOMYOPATHY (H): ICD-10-CM

## 2020-07-14 ENCOUNTER — RECORDS - HEALTHEAST (OUTPATIENT)
Dept: ADMINISTRATIVE | Facility: OTHER | Age: 65
End: 2020-07-14

## 2020-07-15 ENCOUNTER — OFFICE VISIT - HEALTHEAST (OUTPATIENT)
Dept: FAMILY MEDICINE | Facility: CLINIC | Age: 65
End: 2020-07-15

## 2020-07-15 ENCOUNTER — COMMUNICATION - HEALTHEAST (OUTPATIENT)
Dept: FAMILY MEDICINE | Facility: CLINIC | Age: 65
End: 2020-07-15

## 2020-07-15 ENCOUNTER — AMBULATORY - HEALTHEAST (OUTPATIENT)
Dept: FAMILY MEDICINE | Facility: CLINIC | Age: 65
End: 2020-07-15

## 2020-07-15 DIAGNOSIS — B35.1 ONYCHOMYCOSIS: ICD-10-CM

## 2020-08-05 ENCOUNTER — OFFICE VISIT - HEALTHEAST (OUTPATIENT)
Dept: PODIATRY | Facility: CLINIC | Age: 65
End: 2020-08-05

## 2020-08-05 DIAGNOSIS — L60.2 ONYCHAUXIS: ICD-10-CM

## 2020-08-05 DIAGNOSIS — B35.1 NAIL FUNGUS: ICD-10-CM

## 2020-09-14 ENCOUNTER — COMMUNICATION - HEALTHEAST (OUTPATIENT)
Dept: FAMILY MEDICINE | Facility: CLINIC | Age: 65
End: 2020-09-14

## 2020-09-14 ENCOUNTER — COMMUNICATION - HEALTHEAST (OUTPATIENT)
Dept: CARDIOLOGY | Facility: CLINIC | Age: 65
End: 2020-09-14

## 2020-09-14 DIAGNOSIS — I42.9 CARDIOMYOPATHY (H): ICD-10-CM

## 2020-09-14 DIAGNOSIS — I10 HYPERTENSION: ICD-10-CM

## 2020-09-25 ENCOUNTER — COMMUNICATION - HEALTHEAST (OUTPATIENT)
Dept: FAMILY MEDICINE | Facility: CLINIC | Age: 65
End: 2020-09-25

## 2020-09-25 ENCOUNTER — RECORDS - HEALTHEAST (OUTPATIENT)
Dept: ADMINISTRATIVE | Facility: OTHER | Age: 65
End: 2020-09-25

## 2020-09-28 ENCOUNTER — OFFICE VISIT - HEALTHEAST (OUTPATIENT)
Dept: FAMILY MEDICINE | Facility: CLINIC | Age: 65
End: 2020-09-28

## 2020-09-28 DIAGNOSIS — I73.9 CLAUDICATION OF LOWER EXTREMITY (H): ICD-10-CM

## 2020-09-28 DIAGNOSIS — E66.01 MORBID OBESITY (H): ICD-10-CM

## 2020-09-28 DIAGNOSIS — M96.1 POSTLAMINECTOMY SYNDROME, LUMBAR REGION: ICD-10-CM

## 2020-11-14 ENCOUNTER — HEALTH MAINTENANCE LETTER (OUTPATIENT)
Age: 65
End: 2020-11-14

## 2020-11-30 ENCOUNTER — COMMUNICATION - HEALTHEAST (OUTPATIENT)
Dept: SCHEDULING | Facility: CLINIC | Age: 65
End: 2020-11-30

## 2020-11-30 ENCOUNTER — OFFICE VISIT - HEALTHEAST (OUTPATIENT)
Dept: FAMILY MEDICINE | Facility: CLINIC | Age: 65
End: 2020-11-30

## 2020-11-30 DIAGNOSIS — K43.9 VENTRAL HERNIA WITHOUT OBSTRUCTION OR GANGRENE: ICD-10-CM

## 2020-11-30 DIAGNOSIS — L03.119 CELLULITIS OF LOWER EXTREMITY, UNSPECIFIED LATERALITY: ICD-10-CM

## 2020-12-01 ENCOUNTER — COMMUNICATION - HEALTHEAST (OUTPATIENT)
Dept: FAMILY MEDICINE | Facility: CLINIC | Age: 65
End: 2020-12-01

## 2020-12-06 ENCOUNTER — COMMUNICATION - HEALTHEAST (OUTPATIENT)
Dept: FAMILY MEDICINE | Facility: CLINIC | Age: 65
End: 2020-12-06

## 2020-12-06 DIAGNOSIS — G47.00 INSOMNIA: ICD-10-CM

## 2020-12-07 ENCOUNTER — OFFICE VISIT - HEALTHEAST (OUTPATIENT)
Dept: CARDIOLOGY | Facility: CLINIC | Age: 65
End: 2020-12-07

## 2020-12-07 DIAGNOSIS — E78.00 PURE HYPERCHOLESTEROLEMIA: ICD-10-CM

## 2020-12-07 DIAGNOSIS — E78.00 HYPERCHOLESTEREMIA: ICD-10-CM

## 2020-12-07 DIAGNOSIS — I25.10 CORONARY ATHEROSCLEROSIS: ICD-10-CM

## 2020-12-07 LAB
ALT SERPL W P-5'-P-CCNC: 15 U/L (ref 0–45)
CHOLEST SERPL-MCNC: 118 MG/DL
FASTING STATUS PATIENT QL REPORTED: YES
HDLC SERPL-MCNC: 35 MG/DL
LDLC SERPL CALC-MCNC: 48 MG/DL
TRIGL SERPL-MCNC: 174 MG/DL

## 2020-12-07 ASSESSMENT — MIFFLIN-ST. JEOR: SCORE: 2285.99

## 2020-12-11 ENCOUNTER — RECORDS - HEALTHEAST (OUTPATIENT)
Dept: ADMINISTRATIVE | Facility: OTHER | Age: 65
End: 2020-12-11

## 2020-12-13 ENCOUNTER — HOSPITAL ENCOUNTER (OUTPATIENT)
Dept: CT IMAGING | Facility: CLINIC | Age: 65
Discharge: HOME | End: 2020-12-13
Attending: SURGERY

## 2020-12-13 DIAGNOSIS — R10.12 LUQ PAIN: ICD-10-CM

## 2021-01-05 ENCOUNTER — OFFICE VISIT - HEALTHEAST (OUTPATIENT)
Dept: FAMILY MEDICINE | Facility: CLINIC | Age: 66
End: 2021-01-05

## 2021-01-05 DIAGNOSIS — Z98.84 BARIATRIC SURGERY STATUS: ICD-10-CM

## 2021-01-05 LAB
ANION GAP SERPL CALCULATED.3IONS-SCNC: 9 MMOL/L (ref 5–18)
BUN SERPL-MCNC: 11 MG/DL (ref 8–22)
CALCIUM SERPL-MCNC: 8.9 MG/DL (ref 8.5–10.5)
CHLORIDE BLD-SCNC: 105 MMOL/L (ref 98–107)
CO2 SERPL-SCNC: 26 MMOL/L (ref 22–31)
CREAT SERPL-MCNC: 0.94 MG/DL (ref 0.7–1.3)
ERYTHROCYTE [DISTWIDTH] IN BLOOD BY AUTOMATED COUNT: 17.2 % (ref 11–14.5)
FOLATE SERPL-MCNC: 5.4 NG/ML
GFR SERPL CREATININE-BSD FRML MDRD: >60 ML/MIN/1.73M2
GLUCOSE BLD-MCNC: 93 MG/DL (ref 70–125)
HCT VFR BLD AUTO: 33.4 % (ref 40–54)
HGB BLD-MCNC: 10.2 G/DL (ref 14–18)
MCH RBC QN AUTO: 20.6 PG (ref 27–34)
MCHC RBC AUTO-ENTMCNC: 30.5 G/DL (ref 32–36)
MCV RBC AUTO: 68 FL (ref 80–100)
PLATELET # BLD AUTO: 222 THOU/UL (ref 140–440)
PMV BLD AUTO: 8.7 FL (ref 7–10)
POTASSIUM BLD-SCNC: 4.3 MMOL/L (ref 3.5–5)
RBC # BLD AUTO: 4.94 MILL/UL (ref 4.4–6.2)
SODIUM SERPL-SCNC: 140 MMOL/L (ref 136–145)
VIT B12 SERPL-MCNC: 755 PG/ML (ref 213–816)
WBC: 5 THOU/UL (ref 4–11)

## 2021-01-06 ENCOUNTER — COMMUNICATION - HEALTHEAST (OUTPATIENT)
Dept: FAMILY MEDICINE | Facility: CLINIC | Age: 66
End: 2021-01-06

## 2021-01-06 LAB
25(OH)D3 SERPL-MCNC: 17.1 NG/ML (ref 30–80)
25(OH)D3 SERPL-MCNC: 17.1 NG/ML (ref 30–80)

## 2021-01-07 ENCOUNTER — AMBULATORY - HEALTHEAST (OUTPATIENT)
Dept: FAMILY MEDICINE | Facility: CLINIC | Age: 66
End: 2021-01-07

## 2021-01-07 ENCOUNTER — COMMUNICATION - HEALTHEAST (OUTPATIENT)
Dept: FAMILY MEDICINE | Facility: CLINIC | Age: 66
End: 2021-01-07

## 2021-01-07 DIAGNOSIS — D64.9 ANEMIA, UNSPECIFIED TYPE: ICD-10-CM

## 2021-01-07 DIAGNOSIS — E55.9 VITAMIN D DEFICIENCY: ICD-10-CM

## 2021-01-07 LAB — ZINC SERPL-MCNC: 78.2 UG/DL (ref 60–120)

## 2021-01-09 LAB — VIT B1 PYROPHOSHATE BLD-SCNC: 96 NMOL/L (ref 70–180)

## 2021-02-20 ENCOUNTER — COMMUNICATION - HEALTHEAST (OUTPATIENT)
Dept: CARDIOLOGY | Facility: CLINIC | Age: 66
End: 2021-02-20

## 2021-02-20 DIAGNOSIS — I42.9 CARDIOMYOPATHY (H): ICD-10-CM

## 2021-03-03 ENCOUNTER — COMMUNICATION - HEALTHEAST (OUTPATIENT)
Dept: FAMILY MEDICINE | Facility: CLINIC | Age: 66
End: 2021-03-03

## 2021-03-03 DIAGNOSIS — E55.9 VITAMIN D DEFICIENCY: ICD-10-CM

## 2021-03-19 ENCOUNTER — IMMUNIZATION (OUTPATIENT)
Dept: NURSING | Facility: CLINIC | Age: 66
End: 2021-03-19
Payer: COMMERCIAL

## 2021-03-19 PROCEDURE — 0011A PR COVID VAC MODERNA 100 MCG/0.5 ML IM: CPT

## 2021-03-19 PROCEDURE — 91301 PR COVID VAC MODERNA 100 MCG/0.5 ML IM: CPT

## 2021-04-16 ENCOUNTER — IMMUNIZATION (OUTPATIENT)
Dept: NURSING | Facility: CLINIC | Age: 66
End: 2021-04-16
Attending: INTERNAL MEDICINE
Payer: COMMERCIAL

## 2021-04-16 PROCEDURE — 0012A PR COVID VAC MODERNA 100 MCG/0.5 ML IM: CPT

## 2021-04-16 PROCEDURE — 91301 PR COVID VAC MODERNA 100 MCG/0.5 ML IM: CPT

## 2021-04-20 ENCOUNTER — COMMUNICATION - HEALTHEAST (OUTPATIENT)
Dept: ADMINISTRATIVE | Facility: CLINIC | Age: 66
End: 2021-04-20

## 2021-04-20 DIAGNOSIS — E78.00 HYPERCHOLESTEROLEMIA: ICD-10-CM

## 2021-05-14 ENCOUNTER — OFFICE VISIT - HEALTHEAST (OUTPATIENT)
Dept: FAMILY MEDICINE | Facility: CLINIC | Age: 66
End: 2021-05-14

## 2021-05-14 DIAGNOSIS — E66.01 MORBID OBESITY (H): ICD-10-CM

## 2021-05-14 DIAGNOSIS — M25.551 HIP PAIN, RIGHT: ICD-10-CM

## 2021-05-19 ENCOUNTER — RECORDS - HEALTHEAST (OUTPATIENT)
Dept: ADMINISTRATIVE | Facility: OTHER | Age: 66
End: 2021-05-19

## 2021-05-26 ENCOUNTER — RECORDS - HEALTHEAST (OUTPATIENT)
Dept: ADMINISTRATIVE | Facility: CLINIC | Age: 66
End: 2021-05-26

## 2021-05-27 ENCOUNTER — RECORDS - HEALTHEAST (OUTPATIENT)
Dept: ADMINISTRATIVE | Facility: CLINIC | Age: 66
End: 2021-05-27

## 2021-05-27 VITALS
DIASTOLIC BLOOD PRESSURE: 98 MMHG | OXYGEN SATURATION: 95 % | BODY MASS INDEX: 52.03 KG/M2 | SYSTOLIC BLOOD PRESSURE: 144 MMHG | HEART RATE: 82 BPM | WEIGHT: 315 LBS

## 2021-05-27 VITALS — RESPIRATION RATE: 18 BRPM

## 2021-05-27 NOTE — TELEPHONE ENCOUNTER
RN cannot approve Refill Request    RN can NOT refill this medication overdue for office visits and/or labs.    Pan Kang, [facility] Triage/Med Refill 4/9/2019    Requested Prescriptions   Pending Prescriptions Disp Refills     lisinopril (PRINIVIL,ZESTRIL) 20 MG tablet [Pharmacy Med Name: LISINOPRIL 20 MG TABLET] 90 tablet 2     Sig: TAKE 1 TABLET BY MOUTH EVERY DAY       [employer] Inhibitors Refill Protocol Failed - 4/8/2019  9:33 AM        Failed - Serum Potassium in past 12 months     No results found for: LN-POTASSIUM          Failed - Serum Creatinine in past 12 months     Creatinine   Date Value Ref Range Status   02/13/2018 0.94 0.70 - 1.30 mg/dL Final             Passed - PCP or prescribing provider visit in past 12 months       Last office visit with prescriber/PCP: 8/1/2018 Luann Berger MD OR same dept: 8/1/2018 Luann Berger MD OR same specialty: 8/1/2018 Luann Berger MD  Last physical: 4/17/2017 Last MTM visit: Visit date not found   Next visit within 3 mo: Visit date not found  Next physical within 3 mo: Visit date not found  Prescriber OR PCP: Luann Berger MD  Last diagnosis associated with med order: 1. Hypertension  - lisinopril (PRINIVIL,ZESTRIL) 20 MG tablet [Pharmacy Med Name: LISINOPRIL 20 MG TABLET]; TAKE 1 TABLET BY MOUTH EVERY DAY  Dispense: 90 tablet; Refill: 2    If protocol passes may refill for 12 months if within 3 months of last provider visit (or a total of 15 months).             Passed - Blood pressure filed in past 12 months     BP Readings from Last 1 Encounters:   11/28/18 134/72

## 2021-05-28 ENCOUNTER — RECORDS - HEALTHEAST (OUTPATIENT)
Dept: ADMINISTRATIVE | Facility: CLINIC | Age: 66
End: 2021-05-28

## 2021-05-29 ENCOUNTER — RECORDS - HEALTHEAST (OUTPATIENT)
Dept: ADMINISTRATIVE | Facility: CLINIC | Age: 66
End: 2021-05-29

## 2021-05-30 ENCOUNTER — RECORDS - HEALTHEAST (OUTPATIENT)
Dept: ADMINISTRATIVE | Facility: CLINIC | Age: 66
End: 2021-05-30

## 2021-05-30 VITALS — WEIGHT: 275 LBS | BODY MASS INDEX: 44.2 KG/M2 | HEIGHT: 66 IN

## 2021-05-30 VITALS — WEIGHT: 268.2 LBS | HEIGHT: 69 IN | BODY MASS INDEX: 39.72 KG/M2

## 2021-05-30 VITALS — BODY MASS INDEX: 34.37 KG/M2 | WEIGHT: 275 LBS

## 2021-05-30 NOTE — TELEPHONE ENCOUNTER
RN cannot approve Refill Request    RN can NOT refill this medication PCP messaged that patient is overdue for Labs. Last office visit: 8/1/2018 Luann Berger MD Last Physical: 4/17/2017 Last MTM visit: Visit date not found Last visit same specialty: 8/1/2018 Luann Berger MD.  Next visit within 3 mo: Visit date not found  Next physical within 3 mo: Visit date not found      Kimberly HERNANDEZ Wade, [facility] Triage/Med Refill 7/3/2019    Requested Prescriptions   Pending Prescriptions Disp Refills     lisinopril (PRINIVIL,ZESTRIL) 20 MG tablet [Pharmacy Med Name: LISINOPRIL 20 MG TABLET] 90 tablet 0     Sig: TAKE 1 TABLET BY MOUTH EVERY DAY *NEED FOLLOW UP FOR MORE REFILLS*       Ace Inhibitors Refill Protocol Failed - 7/3/2019  9:33 AM        Failed - Serum Potassium in past 12 months     No results found for: LN-POTASSIUM          Failed - Serum Creatinine in past 12 months     Creatinine   Date Value Ref Range Status   02/13/2018 0.94 0.70 - 1.30 mg/dL Final             Passed - PCP or prescribing provider visit in past 12 months       Last office visit with prescriber/PCP: 8/1/2018 Luann Berger MD OR same dept: 8/1/2018 Luann Berger MD OR same specialty: 8/1/2018 Luann Berger MD  Last physical: 4/17/2017 Last MTM visit: Visit date not found   Next visit within 3 mo: Visit date not found  Next physical within 3 mo: Visit date not found  Prescriber OR PCP: Luann Berger MD  Last diagnosis associated with med order: 1. Hypertension  - lisinopril (PRINIVIL,ZESTRIL) 20 MG tablet [Pharmacy Med Name: LISINOPRIL 20 MG TABLET]; TAKE 1 TABLET BY MOUTH EVERY DAY *NEED FOLLOW UP FOR MORE REFILLS*  Dispense: 90 tablet; Refill: 0    If protocol passes may refill for 12 months if within 3 months of last provider visit (or a total of 15 months).             Passed - Blood pressure filed in past 12 months     BP Readings from Last 1  Encounters:   11/28/18 134/72

## 2021-05-30 NOTE — TELEPHONE ENCOUNTER
----- Message from Charleecarmen Nicholson sent at 7/8/2019  1:23 PM CDT -----  1 YR F/U W WTZ W.ECHO 1 WK PRIOR PER 8-23-18 OV Pt   I will be needing an echo order for this patient Please.          Chart encounter created in error. -[facility] [place]

## 2021-05-30 NOTE — TELEPHONE ENCOUNTER
Refill Approved    Rx renewed per Medication Renewal Policy. Medication was last renewed on 1/31/2019.  Last OV 8/1/2018.    Kelly Salmon, [facility] Triage/Med Refill 7/25/2019     Requested Prescriptions   Pending Prescriptions Disp Refills     traZODone (DESYREL) 100 MG tablet [Pharmacy Med Name: TRAZODONE 100 MG TABLET] 180 tablet 1     Sig: TAKE 2 TABLETS (200 MG TOTAL) BY MOUTH AT BEDTIME.       Tricyclics/Misc Antidepressant/Antianxiety Meds Refill Protocol Passed - 7/25/2019  1:31 AM        Passed - PCP or prescribing provider visit in last year     Last office visit with prescriber/PCP: 8/1/2018 Luann Berger MD OR same dept: 8/1/2018 Luann Berger MD OR same specialty: 8/1/2018 Luann Berger MD  Last physical: 4/17/2017 Last MTM visit: Visit date not found   Next visit within 3 mo: Visit date not found  Next physical within 3 mo: Visit date not found  Prescriber OR PCP: Luann Berger MD  Last diagnosis associated with med order: 1. Insomnia  - traZODone (DESYREL) 100 MG tablet [Pharmacy Med Name: TRAZODONE 100 MG TABLET]; TAKE 2 TABLETS (200 MG TOTAL) BY MOUTH AT BEDTIME.  Dispense: 180 tablet; Refill: 1    If protocol passes may refill for 12 months if within 3 months of last provider visit (or a total of 15 months).

## 2021-05-31 VITALS — HEIGHT: 69 IN | WEIGHT: 271.7 LBS | BODY MASS INDEX: 40.24 KG/M2

## 2021-05-31 NOTE — TELEPHONE ENCOUNTER
RN cannot approve Refill Request    RN can NOT refill this medication PCP messaged that patient is overdue for Office Visit. Last office visit: 8/1/2018 Luann Berger MD Last Physical: 4/17/2017 Last MTM visit: Visit date not found Last visit same specialty: 8/1/2018 Luann Berger MD.  Next visit within 3 mo: Visit date not found  Next physical within 3 mo: Visit date not found      Fidel Cash, [facility] Triage/Med Refill 8/5/2019    Requested Prescriptions   Pending Prescriptions Disp Refills     atorvastatin (LIPITOR) 40 MG tablet 90 tablet 0     Sig: Take 1 tablet (40 mg total) by mouth at bedtime.       Statins Refill Protocol (Hmg CoA Reductase Inhibitors) Failed - 8/5/2019 10:22 AM        Failed - PCP or prescribing provider visit in past 12 months      Last office visit with prescriber/PCP: 8/1/2018 Luann Berger MD OR same dept: Visit date not found OR same specialty: 8/1/2018 Luann Berger MD  Last physical: 4/17/2017 Last MTM visit: Visit date not found   Next visit within 3 mo: Visit date not found  Next physical within 3 mo: Visit date not found  Prescriber OR PCP: Luann Berger MD  Last diagnosis associated with med order: 1. Hypercholesterolemia  - atorvastatin (LIPITOR) 40 MG tablet; Take 1 tablet (40 mg total) by mouth at bedtime.  Dispense: 90 tablet; Refill: 0    If protocol passes may refill for 12 months if within 3 months of last provider visit (or a total of 15 months).

## 2021-06-01 ENCOUNTER — RECORDS - HEALTHEAST (OUTPATIENT)
Dept: ADMINISTRATIVE | Facility: CLINIC | Age: 66
End: 2021-06-01

## 2021-06-01 VITALS — WEIGHT: 311.6 LBS | BODY MASS INDEX: 42.26 KG/M2

## 2021-06-01 VITALS — BODY MASS INDEX: 39.28 KG/M2 | WEIGHT: 290 LBS | HEIGHT: 72 IN

## 2021-06-01 VITALS — WEIGHT: 311.8 LBS | HEIGHT: 69 IN | BODY MASS INDEX: 46.18 KG/M2

## 2021-06-01 VITALS — WEIGHT: 309.9 LBS | BODY MASS INDEX: 46.43 KG/M2

## 2021-06-01 VITALS — HEIGHT: 69 IN | BODY MASS INDEX: 45.32 KG/M2 | WEIGHT: 306 LBS

## 2021-06-01 VITALS — BODY MASS INDEX: 40.63 KG/M2 | WEIGHT: 300 LBS | HEIGHT: 72 IN

## 2021-06-02 VITALS — WEIGHT: 296.5 LBS | BODY MASS INDEX: 40.21 KG/M2

## 2021-06-02 VITALS — WEIGHT: 300 LBS | HEIGHT: 72 IN | BODY MASS INDEX: 40.63 KG/M2

## 2021-06-02 VITALS — BODY MASS INDEX: 40.14 KG/M2 | WEIGHT: 296 LBS

## 2021-06-02 NOTE — TELEPHONE ENCOUNTER
Refill NOT  Given  Refill NOT Given> 15 months since last office visit  Refill given per Policy, patient informed they are overdue for Office Visit   OV 8/1/18    Jailene Whitmore, [facility] Triage/Med Refill 11/3/2019    Requested Prescriptions   Pending Prescriptions Disp Refills     atorvastatin (LIPITOR) 40 MG tablet [Pharmacy Med Name: ATORVASTATIN 40MG TABLETS] 90 tablet 0     Sig: TAKE 1 TABLET(40 MG) BY MOUTH AT BEDTIME       Statins Refill Protocol (Hmg CoA Reductase Inhibitors) Failed - 11/3/2019  5:10 AM        Failed - PCP or prescribing provider visit in past 12 months      Last office visit with prescriber/PCP: 8/1/2018 Luann Berger MD OR same dept: Visit date not found OR same specialty: 8/1/2018 Luann Berger MD  Last physical: 4/17/2017 Last MTM visit: Visit date not found   Next visit within 3 mo: Visit date not found  Next physical within 3 mo: Visit date not found  Prescriber OR PCP: Luann Berger MD  Last diagnosis associated with med order: 1. Hypercholesterolemia  - atorvastatin (LIPITOR) 40 MG tablet [Pharmacy Med Name: ATORVASTATIN 40MG TABLETS]; TAKE 1 TABLET(40 MG) BY MOUTH AT BEDTIME  Dispense: 90 tablet; Refill: 0    If protocol passes may refill for 12 months if within 3 months of last provider visit (or a total of 15 months).

## 2021-06-02 NOTE — TELEPHONE ENCOUNTER
RN cannot approve Refill Request    RN can NOT refill this medication Protocol failed and NO refill given.         Lilli Frank, [facility] Triage/Med Refill 10/21/2019    Requested Prescriptions   Pending Prescriptions Disp Refills     traZODone (DESYREL) 100 MG tablet [Pharmacy Med Name: TRAZODONE 100 MG TABLET] 180 tablet 3     Sig: TAKE 2 TABLETS (200 MG TOTAL) BY MOUTH AT BEDTIME. ** DUE FOR MED CHECK FOR FUTURE FILLS       Tricyclics/Misc Antidepressant/Antianxiety Meds Refill Protocol Failed - 10/21/2019  1:34 AM        Failed - PCP or prescribing provider visit in last year     Last office visit with prescriber/PCP: 8/1/2018 Luann Berger MD OR same dept: Visit date not found OR same specialty: 8/1/2018 Luann Berger MD  Last physical: 4/17/2017 Last MTM visit: Visit date not found   Next visit within 3 mo: Visit date not found  Next physical within 3 mo: Visit date not found  Prescriber OR PCP: Luann Berger MD  Last diagnosis associated with med order: 1. Insomnia  - traZODone (DESYREL) 100 MG tablet [Pharmacy Med Name: TRAZODONE 100 MG TABLET]; TAKE 2 TABLETS (200 MG TOTAL) BY MOUTH AT BEDTIME. ** DUE FOR MED CHECK FOR FUTURE FILLS  Dispense: 180 tablet; Refill: 0    If protocol passes may refill for 12 months if within 3 months of last provider visit (or a total of 15 months).

## 2021-06-02 NOTE — TELEPHONE ENCOUNTER
RN cannot approve Refill Request    RN can NOT refill this medication Protocol failed and NO refill given.         Lilli Frank, [facility] Triage/Med Refill 10/3/2019    Requested Prescriptions   Pending Prescriptions Disp Refills     lisinopril (PRINIVIL,ZESTRIL) 20 MG tablet [Pharmacy Med Name: LISINOPRIL 20 MG TABLET] 90 tablet 3     Sig: TAKE 1 TABLET BY MOUTH EVERY DAY *NEED FOLLOW UP FOR MORE REFILLS*       Ace Inhibitors Refill Protocol Failed - 10/3/2019  2:16 AM        Failed - PCP or prescribing provider visit in past 12 months       Last office visit with prescriber/PCP: 8/1/2018 Luann Berger MD OR same dept: Visit date not found OR same specialty: 8/1/2018 Luann Berger MD  Last physical: 4/17/2017 Last MTM visit: Visit date not found   Next visit within 3 mo: Visit date not found  Next physical within 3 mo: Visit date not found  Prescriber OR PCP: Luann Berger MD  Last diagnosis associated with med order: 1. Hypertension  - lisinopril (PRINIVIL,ZESTRIL) 20 MG tablet [Pharmacy Med Name: LISINOPRIL 20 MG TABLET]; TAKE 1 TABLET BY MOUTH EVERY DAY *NEED FOLLOW UP FOR MORE REFILLS*  Dispense: 90 tablet; Refill: 0    If protocol passes may refill for 12 months if within 3 months of last provider visit (or a total of 15 months).             Failed - Serum Potassium in past 12 months     No results found for: LN-POTASSIUM          Failed - Serum Creatinine in past 12 months     Creatinine   Date Value Ref Range Status   02/13/2018 0.94 0.70 - 1.30 mg/dL Final             Passed - Blood pressure filed in past 12 months     BP Readings from Last 1 Encounters:   11/28/18 134/72

## 2021-06-03 ENCOUNTER — RECORDS - HEALTHEAST (OUTPATIENT)
Dept: ADMINISTRATIVE | Facility: CLINIC | Age: 66
End: 2021-06-03

## 2021-06-03 VITALS
RESPIRATION RATE: 18 BRPM | HEIGHT: 72 IN | BODY MASS INDEX: 42.66 KG/M2 | DIASTOLIC BLOOD PRESSURE: 122 MMHG | WEIGHT: 315 LBS | HEART RATE: 68 BPM | SYSTOLIC BLOOD PRESSURE: 190 MMHG

## 2021-06-03 VITALS
HEART RATE: 70 BPM | WEIGHT: 315 LBS | OXYGEN SATURATION: 97 % | DIASTOLIC BLOOD PRESSURE: 90 MMHG | SYSTOLIC BLOOD PRESSURE: 140 MMHG | BODY MASS INDEX: 44.81 KG/M2

## 2021-06-03 NOTE — PROGRESS NOTES
ASSESSMENT:  1. Benign essential hypertension  - Comprehensive Metabolic Panel    2. Hypercholesterolemia  - Lipid Cascade    3. Claudication of lower extremity (H)    4. Paroxysmal atrial fibrillation (H)    5. Morbid obesity (H)  6. Need for tetanus, diphtheria, and acellular pertussis (Tdap) vaccine in patient of adolescent age or older  - Tdap vaccine,  6yo or older,  IM      PLAN:  His labs were ordered today.  He does not need any refills.  He does have a follow-up with the cardiologist as well as having a plan to get his nuclear stress test.  We will follow-up with that at this time.  Encouraged to continue to use his blood pressure medication and be active and watchful regarding his diet.  We will follow-up with him as needed.    Orders Placed This Encounter   Procedures     Tdap vaccine,  6yo or older,  IM     Lipid Cascade     Order Specific Question:   Fasting is required?     Answer:   No     Comprehensive Metabolic Panel     There are no discontinued medications.    No follow-ups on file.      CHIEF COMPLAINT:  Chief Complaint   Patient presents with     Hyperlipidemia     has stress test next week        HISTORY OF PRESENT ILLNESS:  Onur is a 64 y.o. male presenting to the clinic today for follow-up.  He has a history of hypertension for which he is on lisinopril and carvedilol.  He also takes furosemide.  He does have a history of coronary arthrosclerosis and has had prior cardiovascular stenting.  He also has a history of hyperlipidemia for which he takes medication.  He wants to have his lab drawn today though he is not fasted at his neurologist 2 days ago and is scheduled to have a stress test next week.  He noted having had intermittent chest pain/pressure and has been feeling weak and tired consistently.  Denied having any swelling to his lower extremity, does not have any cough, no shortness of breath though he is not very active.  He has a history of obstructive sleep apnea but is unable to  use the mask for CPAP.  Unfortunately also has low back/lumbar problems.  Which is making it difficult for him to be physically active exercise.    REVIEW OF SYSTEMS:   Review of systems as above.  He does have lower extremity pain as well as numbness and neuropathy his extremities.  He otherwise feels well and rest of review systems negative.  All other systems are negative.    PFSH:  Reviewed, as below.    Social History     Tobacco Use   Smoking Status Former Smoker     Years: 10.00     Types: Cigars   Smokeless Tobacco Never Used       Family History   Problem Relation Age of Onset     Stroke Mother      Heart disease Father      Hodgkin's lymphoma Brother        Social History     Socioeconomic History     Marital status:      Spouse name: Not on file     Number of children: Not on file     Years of education: Not on file     Highest education level: Not on file   Occupational History     Not on file   Social Needs     Financial resource strain: Not on file     Food insecurity:     Worry: Not on file     Inability: Not on file     Transportation needs:     Medical: Not on file     Non-medical: Not on file   Tobacco Use     Smoking status: Former Smoker     Years: 10.00     Types: Cigars     Smokeless tobacco: Never Used   Substance and Sexual Activity     Alcohol use: No     Drug use: No     Comment: formerly used     Sexual activity: Not on file   Lifestyle     Physical activity:     Days per week: Not on file     Minutes per session: Not on file     Stress: Not on file   Relationships     Social connections:     Talks on phone: Not on file     Gets together: Not on file     Attends Confucianist service: Not on file     Active member of club or organization: Not on file     Attends meetings of clubs or organizations: Not on file     Relationship status: Not on file     Intimate partner violence:     Fear of current or ex partner: Not on file     Emotionally abused: Not on file     Physically abused: Not on  file     Forced sexual activity: Not on file   Other Topics Concern     Not on file   Social History Narrative     Not on file       Past Surgical History:   Procedure Laterality Date     CT ARTHRODESIS ANT INTERBODY MIN DISCECTOMY,LUMBAR      Description: Lumbar Vertebral Fusion;  Recorded: 06/26/2009;  Comments: before 200 see [facility] consult under old records     CT EXCISE EXCESS SKIN TISSUE,ABDOMEN  11/13/2012    Description: Panniculectomy;  Proc Date: 11/13/2012;  Comments: 3.5 Lb Pannus was removed at the [facility] By Dr. Shon Green     CT GASTRIC BYPASS,OBESE<150CM LORA-EN-Y  2008    Description: Gastric Surgery For Morbid Obesity Bypass With Lora-en-Y;  Recorded: 06/26/2009;  Comments: dr green 2008     CT REMOVAL OF TONSILS,<12 Y/O      Description: Tonsillectomy;  Recorded: 03/23/2012;  Comments: for obstructive sleep apnea     CT REPAIR INCISIONAL HERNIA,REDUCIBLE  11/13/2012    Description: Incisional Hernia Repair;  Proc Date: 11/13/2012;  Comments: incisional hernia repair and abdominal panniculectomy by Dr Shon Green at the [facility].       Allergies   Allergen Reactions     Hydrocodone Other (See Comments)     SNEEZES       Active Ambulatory Problems     Diagnosis Date Noted     Osteoarthritis      Hypercholesterolemia      Obstructive Sleep Apnea      Hypertension      Coronary Arteriosclerosis      Lower Back Pain Chronic      Insomnia      Paroxysmal Atrial Fibrillation      Post-gastric Bypass For Obesity      Nephrolithiasis      Acid reflux disease 10/31/2017     Sleepiness 05/08/2018     Morbid obesity (H) 08/01/2018     Claudication of lower extremity (H) 11/20/2019     Resolved Ambulatory Problems     Diagnosis Date Noted     Classic Migraine (With Aura)      Congestive heart failure (H)      Ventral hernia      Ischemic Cardiomyopathy      Morbid obesity (H)      Acute myocardial infarction (H)      Lumbar Canal Stenosis With Neurogenic Claudication      Bradycardia 09/27/2017      No Additional Past Medical History       Current Outpatient Medications   Medication Sig Dispense Refill     aspirin 81 MG EC tablet Take 81 mg by mouth daily.       atorvastatin (LIPITOR) 40 MG tablet TAKE 1 TABLET(40 MG) BY MOUTH AT BEDTIME 90 tablet 0     carvedilol (COREG CR) 10 MG 24 hr capsule TAKE 1 CAPSULE (10 MG TOTAL) BY MOUTH DAILY. 90 capsule 2     DULoxetine (CYMBALTA) 30 MG capsule Take 90 mg by mouth daily.  1     furosemide (LASIX) 20 MG tablet TAKE 1 TABLET BY MOUTH EVERY DAY 90 tablet 0     gabapentin (NEURONTIN) 300 MG capsule 300 mg 3 (three) times a day.        lisinopril (PRINIVIL,ZESTRIL) 20 MG tablet TAKE 1 TABLET BY MOUTH EVERY DAY *NEED FOLLOW UP FOR MORE REFILLS* 90 tablet 3     nitroglycerin (NITROSTAT) 0.4 MG SL tablet Place 0.4 mg under the tongue.       omeprazole (PRILOSEC) 40 MG capsule Take 40 mg by mouth.       traZODone (DESYREL) 100 MG tablet TAKE 2 TABLETS (200 MG TOTAL) BY MOUTH AT BEDTIME. ** DUE FOR MED CHECK FOR FUTURE FILLS 180 tablet 0     UNABLE TO FIND Med Name: Morphine Sulfate COURTNEY       FLUZONE QUAD [id number], PF, 60 mcg (15 mcg x 4)/0.5 mL Syrg injection        No current facility-administered medications for this visit.        VITALS:  Vitals:    11/20/19 1121 11/20/19 1156   BP: (!) 142/92 140/90   Pulse: 70    SpO2: 97%    Weight: (!) 330 lb 6 oz (149.9 kg)      Wt Readings from Last 3 Encounters:   11/20/19 (!) 330 lb 6 oz (149.9 kg)   11/18/19 (!) 330 lb (149.7 kg)   11/28/18 (!) 296 lb (134.3 kg)     Body mass index is 44.81 kg/m .    PHYSICAL EXAM:  General Appearance: Alert, cooperative, no distress, appears stated age but morbidly obese.  HEENT: Pupils are equal and reactive, extraocular motions is normal.  Oropharynx is moist.  Neck is supple no notable thyromegaly.  External ears are normal.  Lungs: Clear to auscultation bilaterally, respirations unlabored  Heart: Regular rhythm and normal rate,S1 and S2 normal.  Abdomen: Soft  Musculoskeletal: Normal  lower extremities but he does walk hunched over.  Neurologic:  Alert and oriented times 3.  Psychiatric: Normal mood and affect.    MEDICATIONS:  Current Outpatient Medications   Medication Sig Dispense Refill     aspirin 81 MG EC tablet Take 81 mg by mouth daily.       atorvastatin (LIPITOR) 40 MG tablet TAKE 1 TABLET(40 MG) BY MOUTH AT BEDTIME 90 tablet 0     carvedilol (COREG CR) 10 MG 24 hr capsule TAKE 1 CAPSULE (10 MG TOTAL) BY MOUTH DAILY. 90 capsule 2     DULoxetine (CYMBALTA) 30 MG capsule Take 90 mg by mouth daily.  1     furosemide (LASIX) 20 MG tablet TAKE 1 TABLET BY MOUTH EVERY DAY 90 tablet 0     gabapentin (NEURONTIN) 300 MG capsule 300 mg 3 (three) times a day.        lisinopril (PRINIVIL,ZESTRIL) 20 MG tablet TAKE 1 TABLET BY MOUTH EVERY DAY *NEED FOLLOW UP FOR MORE REFILLS* 90 tablet 3     nitroglycerin (NITROSTAT) 0.4 MG SL tablet Place 0.4 mg under the tongue.       omeprazole (PRILOSEC) 40 MG capsule Take 40 mg by mouth.       traZODone (DESYREL) 100 MG tablet TAKE 2 TABLETS (200 MG TOTAL) BY MOUTH AT BEDTIME. ** DUE FOR MED CHECK FOR FUTURE FILLS 180 tablet 0     UNABLE TO FIND Med Name: Morphine Sulfate COURTNEY       FLUZONE QUAD [id number], PF, 60 mcg (15 mcg x 4)/0.5 mL Syrg injection        No current facility-administered medications for this visit.

## 2021-06-04 VITALS
WEIGHT: 315 LBS | SYSTOLIC BLOOD PRESSURE: 160 MMHG | HEART RATE: 77 BPM | BODY MASS INDEX: 44.89 KG/M2 | OXYGEN SATURATION: 96 % | DIASTOLIC BLOOD PRESSURE: 94 MMHG

## 2021-06-05 VITALS
DIASTOLIC BLOOD PRESSURE: 90 MMHG | SYSTOLIC BLOOD PRESSURE: 136 MMHG | HEART RATE: 72 BPM | BODY MASS INDEX: 45.03 KG/M2 | WEIGHT: 315 LBS

## 2021-06-05 VITALS
BODY MASS INDEX: 47.27 KG/M2 | HEART RATE: 88 BPM | SYSTOLIC BLOOD PRESSURE: 136 MMHG | DIASTOLIC BLOOD PRESSURE: 86 MMHG | WEIGHT: 315 LBS

## 2021-06-05 VITALS
DIASTOLIC BLOOD PRESSURE: 100 MMHG | HEART RATE: 92 BPM | RESPIRATION RATE: 12 BRPM | SYSTOLIC BLOOD PRESSURE: 150 MMHG | HEIGHT: 72 IN | WEIGHT: 315 LBS | BODY MASS INDEX: 42.66 KG/M2

## 2021-06-05 VITALS
BODY MASS INDEX: 45.6 KG/M2 | OXYGEN SATURATION: 97 % | HEART RATE: 91 BPM | SYSTOLIC BLOOD PRESSURE: 134 MMHG | WEIGHT: 315 LBS | DIASTOLIC BLOOD PRESSURE: 82 MMHG

## 2021-06-05 NOTE — PROGRESS NOTES
ASSESSMENT:  1. Morbid obesity (H)  2. Sleepiness    3. Postlaminectomy syndrome, lumbar region    4. Hypertension    5. Hypertension  - lisinopril (PRINIVIL,ZESTRIL) 20 MG tablet; Take 1.5 tablets (30 mg total) by mouth daily.  Dispense: 90 tablet; Refill: 3    6. Onychomycosis  - ciclopirox (PENLAC) 8 % solution; Apply over nail and surrounding skin. Apply daily over previous coat. After seven (6) days, may remove with alcohol and continue cycle.  Dispense: 6.6 mL; Refill: 3      PLAN:  I will treat with him different medications and modalities of treatment for insomnia, he is on a good dose of the trazodone and as well takes gabapentin I think that that could be helpful but will possibly consider hydroxyzine as an adjunct.  I also discussed with him that the need for him to be on his CPAP machine consistently.  He does not like to use the masks but I encouraged him to use the nasal prongs which he noted that he has.  He will try to use that.  I did explain to him the possible complications of not using his CPAP machine.  He will try to get the nasal prong to use them.  I did put in a solution for the fungal infection.  His blood pressure does seem to be high and a review of previous notes due to the it has been not very well controlled.  He is on medications that included carvedilol, furosemide and lisinopril.  I am going to have him increase the dose of lisinopril to 30 mg daily and if that does not control it we will have him consider other management modalities.  We will have him follow-up as needed.  He does need a follow-up with his pain physician and will discuss other modalities of insomnia and lower back pain.    No orders of the defined types were placed in this encounter.    Medications Discontinued During This Encounter   Medication Reason     lisinopril (PRINIVIL,ZESTRIL) 20 MG tablet Reorder       No follow-ups on file.      CHIEF COMPLAINT:  Chief Complaint   Patient presents with     Pain     back  and legs      Insomnia       HISTORY OF PRESENT ILLNESS:  Onur is a 64 y.o. male presenting to the clinic today for follow-up.  He is a patient with a history of chronic back pain with postlaminectomy syndrome of the lumbar region.  Because of this he is almost doubled over at his waist and does work like that.  He has had previous laminectomy and that was unsuccessful.  He is currently on morphine pump that was implanted and does use morphine.  He noted recently seen his pain physician.  Noted that he is having a lot more pain in his low back to the extent that it appears to be affecting him with sleep.  He does have a history of insomnia as well as obstructive sleep apnea, for the MARLEEN he has actually not been using his CPAP machine because he is uncomfortable with it.  Because of it it appears that he does not sleep well and feels very sleepy during the day.  He does use trazodone for sleep,  But noted that he still is unable to sleep well.  He has gabapentin as well.  He is hoping to get another medication that might be helpful for him for sleep.    REVIEW OF SYSTEMS:   Review of systems as in the history.  He denies having any other major concerns.  All other systems are negative.    PFSH:  Reviewed, as below.    Social History     Tobacco Use   Smoking Status Former Smoker     Years: 10.00     Types: Cigars   Smokeless Tobacco Never Used       Family History   Problem Relation Age of Onset     Stroke Mother      Heart disease Father      Hodgkin's lymphoma Brother        Social History     Socioeconomic History     Marital status:      Spouse name: Not on file     Number of children: Not on file     Years of education: Not on file     Highest education level: Not on file   Occupational History     Not on file   Social Needs     Financial resource strain: Not on file     Food insecurity:     Worry: Not on file     Inability: Not on file     Transportation needs:     Medical: Not on file     Non-medical:  Not on file   Tobacco Use     Smoking status: Former Smoker     Years: 10.00     Types: Cigars     Smokeless tobacco: Never Used   Substance and Sexual Activity     Alcohol use: No     Drug use: No     Comment: formerly used     Sexual activity: Not on file   Lifestyle     Physical activity:     Days per week: Not on file     Minutes per session: Not on file     Stress: Not on file   Relationships     Social connections:     Talks on phone: Not on file     Gets together: Not on file     Attends Synagogue service: Not on file     Active member of club or organization: Not on file     Attends meetings of clubs or organizations: Not on file     Relationship status: Not on file     Intimate partner violence:     Fear of current or ex partner: Not on file     Emotionally abused: Not on file     Physically abused: Not on file     Forced sexual activity: Not on file   Other Topics Concern     Not on file   Social History Narrative     Not on file       Past Surgical History:   Procedure Laterality Date     ME ARTHRODESIS ANT INTERBODY MIN DISCECTOMY,LUMBAR      Description: Lumbar Vertebral Fusion;  Recorded: 06/26/2009;  Comments: before 200 see [facility] consult under old records     ME EXCISE EXCESS SKIN TISSUE,ABDOMEN  11/13/2012    Description: Panniculectomy;  Proc Date: 11/13/2012;  Comments: 3.5 Lb Pannus was removed at the [facility] By Dr. Shon Green     ME GASTRIC BYPASS,OBESE<150CM LORA-EN-Y  2008    Description: Gastric Surgery For Morbid Obesity Bypass With Lora-en-Y;  Recorded: 06/26/2009;  Comments: dr green 2008     ME REMOVAL OF TONSILS,<14 Y/O      Description: Tonsillectomy;  Recorded: 03/23/2012;  Comments: for obstructive sleep apnea     ME REPAIR INCISIONAL HERNIA,REDUCIBLE  11/13/2012    Description: Incisional Hernia Repair;  Proc Date: 11/13/2012;  Comments: incisional hernia repair and abdominal panniculectomy by Dr Shon Green at the [facility].       Allergies   Allergen Reactions      Hydrocodone Other (See Comments)     SNEEZES       Active Ambulatory Problems     Diagnosis Date Noted     Osteoarthritis      Hypercholesterolemia      Obstructive Sleep Apnea      Hypertension      Coronary Arteriosclerosis      Lower Back Pain Chronic      Insomnia      Paroxysmal Atrial Fibrillation      Post-gastric Bypass For Obesity      Nephrolithiasis      Acid reflux disease 10/31/2017     Sleepiness 05/08/2018     Morbid obesity (H) 08/01/2018     Claudication of lower extremity (H) 11/20/2019     Postlaminectomy syndrome, lumbar region 01/24/2020     Resolved Ambulatory Problems     Diagnosis Date Noted     Classic Migraine (With Aura)      Congestive heart failure (H)      Ventral hernia      Ischemic Cardiomyopathy      Morbid obesity (H)      Acute myocardial infarction (H)      Lumbar Canal Stenosis With Neurogenic Claudication      Bradycardia 09/27/2017     No Additional Past Medical History       Current Outpatient Medications   Medication Sig Dispense Refill     aspirin 81 MG EC tablet Take 81 mg by mouth daily.       atorvastatin (LIPITOR) 40 MG tablet TAKE 1 TABLET(40 MG) BY MOUTH AT BEDTIME 90 tablet 0     carvedilol (COREG CR) 10 MG 24 hr capsule TAKE 1 CAPSULE (10 MG TOTAL) BY MOUTH DAILY. 90 capsule 2     DULoxetine (CYMBALTA) 30 MG capsule Take 90 mg by mouth daily.  1     furosemide (LASIX) 20 MG tablet TAKE 1 TABLET BY MOUTH EVERY DAY 90 tablet 0     gabapentin (NEURONTIN) 300 MG capsule 300 mg 3 (three) times a day.        lisinopril (PRINIVIL,ZESTRIL) 20 MG tablet Take 1.5 tablets (30 mg total) by mouth daily. 90 tablet 3     nitroglycerin (NITROSTAT) 0.4 MG SL tablet Place 0.4 mg under the tongue.       omeprazole (PRILOSEC) 40 MG capsule Take 40 mg by mouth.       traZODone (DESYREL) 100 MG tablet TAKE 2 TABLETS (200MG TOTAL) BY MOUTH AT BEDTIME 180 tablet 3     UNABLE TO FIND Med Name: Morphine Sulfate COURTNEY       ciclopirox (PENLAC) 8 % solution Apply over nail and surrounding skin.  Apply daily over previous coat. After seven (6) days, may remove with alcohol and continue cycle. 6.6 mL 3     FLUZONE QUAD 7080-4295, PF, 60 mcg (15 mcg x 4)/0.5 mL Syrg injection        No current facility-administered medications for this visit.        VITALS:  Vitals:    01/24/20 1426 01/24/20 1512   BP: (!) 164/98 (!) 160/94   Pulse: 77    SpO2: 96%    Weight: (!) 331 lb (150.1 kg)      Wt Readings from Last 3 Encounters:   01/24/20 (!) 331 lb (150.1 kg)   11/20/19 (!) 330 lb 6 oz (149.9 kg)   11/18/19 (!) 330 lb (149.7 kg)     Body mass index is 44.89 kg/m .    PHYSICAL EXAM:  General Appearance: Alert, cooperative, no distress, appears stated age and morbidly obese.  HEENT: Pupils are equal and reactive, extraocular motions is normal.  Oropharynx is moist.  Neck is supple no notable thyromegaly.  External ears are normal.  Lungs: Clear to auscultation bilaterally, respirations unlabored  Heart: Regular rhythm and normal rate,S1 and S2 normal, no murmur, rub, or gallop  Abdomen: Soft  Skin: He does have on the left big toe onychomycosis/fungal infection.  Musculoskeletal: She does have an antalgic gait, and walks stooped over on his waist.  I does seem to have a lot of discomfort with that walking.  Neurologic:  Alert and oriented times 3.   Psychiatric: He does not look depressed or anxious but does look slightly perea probably because of pain.  He also looks mildly drowsy.    MEDICATIONS:  Current Outpatient Medications   Medication Sig Dispense Refill     aspirin 81 MG EC tablet Take 81 mg by mouth daily.       atorvastatin (LIPITOR) 40 MG tablet TAKE 1 TABLET(40 MG) BY MOUTH AT BEDTIME 90 tablet 0     carvedilol (COREG CR) 10 MG 24 hr capsule TAKE 1 CAPSULE (10 MG TOTAL) BY MOUTH DAILY. 90 capsule 2     DULoxetine (CYMBALTA) 30 MG capsule Take 90 mg by mouth daily.  1     furosemide (LASIX) 20 MG tablet TAKE 1 TABLET BY MOUTH EVERY DAY 90 tablet 0     gabapentin (NEURONTIN) 300 MG capsule 300 mg 3 (three)  times a day.        lisinopril (PRINIVIL,ZESTRIL) 20 MG tablet Take 1.5 tablets (30 mg total) by mouth daily. 90 tablet 3     nitroglycerin (NITROSTAT) 0.4 MG SL tablet Place 0.4 mg under the tongue.       omeprazole (PRILOSEC) 40 MG capsule Take 40 mg by mouth.       traZODone (DESYREL) 100 MG tablet TAKE 2 TABLETS (200MG TOTAL) BY MOUTH AT BEDTIME 180 tablet 3     UNABLE TO FIND Med Name: Morphine Sulfate COURTNEY       ciclopirox (PENLAC) 8 % solution Apply over nail and surrounding skin. Apply daily over previous coat. After seven (6) days, may remove with alcohol and continue cycle. 6.6 mL 3     FLUZONE QUAD 4672-1542, PF, 60 mcg (15 mcg x 4)/0.5 mL Syrg injection        No current facility-administered medications for this visit.

## 2021-06-05 NOTE — TELEPHONE ENCOUNTER
Refill Approved    Rx renewed per Medication Renewal Policy. Medication was last renewed on 10/21/2019 for 180/0.  Last OV 11/20/2019  Jennifer Ace, [facility] Triage/Med Refill 1/18/2020     Requested Prescriptions   Pending Prescriptions Disp Refills     traZODone (DESYREL) 100 MG tablet [Pharmacy Med Name: TRAZODONE 100 MG TABLET] 180 tablet 0     Sig: TAKE 2 TABLETS (200 MG TOTAL) BY MOUTH AT BEDTIME. ** DUE FOR MED CHECK FOR FUTURE FILLS       Tricyclics/Misc Antidepressant/Antianxiety Meds Refill Protocol Passed - 1/16/2020  1:43 PM        Passed - PCP or prescribing provider visit in last year     Last office visit with prescriber/PCP: 11/20/2019 Luann Berger MD OR same dept: 11/20/2019 Luann Berger MD OR same specialty: 11/20/2019 Luann Berger MD  Last physical: 4/17/2017 Last MTM visit: Visit date not found   Next visit within 3 mo: Visit date not found  Next physical within 3 mo: Visit date not found  Prescriber OR PCP: Luann Berger MD  Last diagnosis associated with med order: 1. Insomnia  - traZODone (DESYREL) 100 MG tablet [Pharmacy Med Name: TRAZODONE 100 MG TABLET]; TAKE 2 TABLETS (200 MG TOTAL) BY MOUTH AT BEDTIME. ** DUE FOR MED CHECK FOR FUTURE FILLS  Dispense: 180 tablet; Refill: 0    If protocol passes may refill for 12 months if within 3 months of last provider visit (or a total of 15 months).

## 2021-06-05 NOTE — TELEPHONE ENCOUNTER
Refill Approved    Rx renewed per Medication Renewal Policy. Medication was last renewed on 11/3/19.    Lilli Frank, [facility] Triage/Med Refill 1/29/2020     Requested Prescriptions   Pending Prescriptions Disp Refills     atorvastatin (LIPITOR) 40 MG tablet [Pharmacy Med Name: ATORVASTATIN 40MG TABLETS] 90 tablet 0     Sig: TAKE 1 TABLET BY MOUTH AT BEDTIME       Statins Refill Protocol (Hmg CoA Reductase Inhibitors) Passed - 1/29/2020  5:41 AM        Passed - PCP or prescribing provider visit in past 12 months      Last office visit with prescriber/PCP: 1/24/2020 Luann Berger MD OR same dept: 1/24/2020 Luann Berger MD OR same specialty: 1/24/2020 Luann Berger MD  Last physical: 4/17/2017 Last MTM visit: Visit date not found   Next visit within 3 mo: Visit date not found  Next physical within 3 mo: Visit date not found  Prescriber OR PCP: Luann Berger MD  Last diagnosis associated with med order: 1. Hypercholesterolemia  - atorvastatin (LIPITOR) 40 MG tablet [Pharmacy Med Name: ATORVASTATIN 40MG TABLETS]; TAKE 1 TABLET BY MOUTH AT BEDTIME  Dispense: 90 tablet; Refill: 0    If protocol passes may refill for 12 months if within 3 months of last provider visit (or a total of 15 months).

## 2021-06-09 ENCOUNTER — RECORDS - HEALTHEAST (OUTPATIENT)
Dept: ADMINISTRATIVE | Facility: CLINIC | Age: 66
End: 2021-06-09

## 2021-06-09 NOTE — PROGRESS NOTES
"Onur Barr is a 64 y.o. male who is being evaluated via a billable telephone visit.      The patient has been notified of following:     \"This telephone visit will be conducted via a call between you and your physician/provider. We have found that certain health care needs can be provided without the need for a physical exam.  This service lets us provide the care you need with a short phone conversation.  If a prescription is necessary we can send it directly to your pharmacy.  If lab work is needed we can place an order for that and you can then stop by our lab to have the test done at a later time.    Telephone visits are billed at different rates depending on your insurance coverage. During this emergency period, for some insurers they may be billed the same as an in-person visit.  Please reach out to your insurance provider with any questions.    If during the course of the call the physician/provider feels a telephone visit is not appropriate, you will not be charged for this service.\"    Patient has given verbal consent to a Telephone visit? Yes    What phone number would you like to be contacted at? [phone]    Patient would like to receive their AVS by AVS Preference: Buck.    Additional provider notes:   He comes in today with concerns of having had toenail fungus last time that he was seen for which we had started him on ciclopirox.  He noted that he has been using that since January 2020 and has had no changes or improvement.  He noted that it appears to be getting a lot more painful at this time.  Because of that he is wondering what to do at this time.  He does have some pain  but no redness in the toe.  He is able to walk on it.  Denies having any other concerns regarding that.  Review of system: Negative.    Assessment/Plan:  1. Onychomycosis left big toe.  At this time it looks like the fungal infection had failed treatment with topical medication.  I will go ahead and have him sending a " picture of that so that we can review that if family is unable to send a picture or if there is worsening of the toenail fungus will probably have him come in or will consider sending in oral medication.  I did review the picture that was sent I think that it is necessary for him to be seen by podiatry for possible removal of the nail.  He did get a referral at this time.      Phone call duration:  5 minutes    Luann Berger MD

## 2021-06-09 NOTE — PROGRESS NOTES
[facility]   Outpatient Follow-up evaluation.     Current Outpatient Prescriptions:      aspirin 81 MG EC tablet, Take 81 mg by mouth daily., Disp: , Rfl:      carvedilol (COREG CR) 20 MG 24 hr capsule, Take 1 capsule (20 mg total) by mouth daily. (Patient taking differently: Take 10 mg by mouth daily. ), Disp: 90 capsule, Rfl: 2     furosemide (LASIX) 20 MG tablet, TAKE 1 TABLET BY MOUTH EVERY DAY, Disp: 90 tablet, Rfl: 0     gabapentin (NEURONTIN) 300 MG capsule, 300 mg 3 (three) times a day. , Disp: , Rfl:      lisinopril (PRINIVIL,ZESTRIL) 20 MG tablet, Take 1 tablet (20 mg total) by mouth daily., Disp: 90 tablet, Rfl: 3     sulfacetamide sodium-sulfur 10-5 % (w/w) Clsr, Wash by topical route every day on the affected area(s), Disp: 170 g, Rfl: 3     traZODone (DESYREL) 100 MG tablet, TAKE 2 TABLETS BY MOUTH AT BEDTIME, Disp: 180 tablet, Rfl: 1     UNABLE TO FIND, Med Name: Morphine Sulfate COURTNEY, Disp: , Rfl:      atorvastatin (LIPITOR) 40 MG tablet, TAKE 1 TABLET BY MOUTH AT BEDTIME, Disp: 90 tablet, Rfl: 1     clindamycin (CLINDAGEL) 1 % gel, , Disp: , Rfl:      DULoxetine (CYMBALTA) 30 MG capsule, TK 1 C PO QD, Disp: , Rfl: 1     omeprazole (PRILOSEC) 40 MG capsule, Take 1 capsule (40 mg total) by mouth daily., Disp: 90 capsule, Rfl: 3        Onur WEBER Cortney is a 61 y.o. Male    Chief Complaint   Patient presents with     Follow-up       Diagnoses: History of cardiomyopathy, coronary artery disease, hyperlipidemia, hypertension.    We will recommend that we repeat a cardiac echo at this time.  Is not having any symptomatic manifestations suggest recurrent ischemic heart disease.  For now continue with the current medical therapy.  Unable to perform significant exercise but will encourage this once his pain is under better control.      Subjective:   Patient returns for routine follow-up.  Continues to have significant lower back pain.  Apparently his morphine pump is not working as effectively  as it has in the past and is under consideration to have this repaired.  He is not having chest pain pressure or tightness heaviness or angina.  No significant change in breathing pattern.  Level physical activity is reduced he walks with a cane.  No dizziness syncope.  He has had a previous echo that showed severe cardiomyopathy but I most recent nuclear study had shown marked improvement.  He is tolerating all medical therapy.    No past medical history on file.  Past Surgical History:   Procedure Laterality Date     MS ARTHRODESIS ANT INTERBODY MIN DISCECTOMY,LUMBAR      Description: Lumbar Vertebral Fusion;  Recorded: 06/26/2009;  Comments: before 200 see [facility] consult under old records     MS EXCISE EXCESS SKIN TISSUE,ABDOMEN      Description: Panniculectomy;  Proc Date: 11/13/2012;  Comments: 3.5 Lb Pannus was removed at the [facility] By Dr. Shon Green     MS GASTRIC BYPASS,OBESE<150CM LORA-EN-Y  2008    Description: Gastric Surgery For Morbid Obesity Bypass With Lora-en-Y;  Recorded: 06/26/2009;  Comments: dr green 2008     MS REMOVAL OF TONSILS,<12 Y/O      Description: Tonsillectomy;  Recorded: 03/23/2012;  Comments: for obstructive sleep apnea     MS REPAIR INCISIONAL HERNIA,REDUCIBLE      Description: Incisional Hernia Repair;  Proc Date: 11/13/2012;  Comments: incisional hernia repair and  abdominal panniculectomy by Dr Shon Green at the [facility].     Allergies   Allergen Reactions     Hydrocodone      Family History   Problem Relation Age of Onset     Stroke Mother      Heart disease Father      Hodgkin's lymphoma Brother       Social History     Social History     Marital status:      Spouse name: N/A     Number of children: N/A     Years of education: N/A     Occupational History     Not on file.     Social History Main Topics     Smoking status: Never Smoker     Smokeless tobacco: Never Used     Alcohol use Not on file     Drug use: Not on file     Sexual activity: Not on file      Other Topics Concern     Not on file     Social History Narrative     Family history not pertinent to chief complaint or presenting problem    Current Outpatient Prescriptions:      aspirin 81 MG EC tablet, Take 81 mg by mouth daily., Disp: , Rfl:      carvedilol (COREG CR) 20 MG 24 hr capsule, Take 1 capsule (20 mg total) by mouth daily. (Patient taking differently: Take 10 mg by mouth daily. ), Disp: 90 capsule, Rfl: 2     furosemide (LASIX) 20 MG tablet, TAKE 1 TABLET BY MOUTH EVERY DAY, Disp: 90 tablet, Rfl: 0     gabapentin (NEURONTIN) 300 MG capsule, 300 mg 3 (three) times a day. , Disp: , Rfl:      lisinopril (PRINIVIL,ZESTRIL) 20 MG tablet, Take 1 tablet (20 mg total) by mouth daily., Disp: 90 tablet, Rfl: 3     sulfacetamide sodium-sulfur 10-5 % (w/w) Clsr, Wash by topical route every day on the affected area(s), Disp: 170 g, Rfl: 3     traZODone (DESYREL) 100 MG tablet, TAKE 2 TABLETS BY MOUTH AT BEDTIME, Disp: 180 tablet, Rfl: 1     UNABLE TO FIND, Med Name: Morphine Sulfate COURTNEY, Disp: , Rfl:      atorvastatin (LIPITOR) 40 MG tablet, TAKE 1 TABLET BY MOUTH AT BEDTIME, Disp: 90 tablet, Rfl: 1     clindamycin (CLINDAGEL) 1 % gel, , Disp: , Rfl:      DULoxetine (CYMBALTA) 30 MG capsule, TK 1 C PO QD, Disp: , Rfl: 1     omeprazole (PRILOSEC) 40 MG capsule, Take 1 capsule (40 mg total) by mouth daily., Disp: 90 capsule, Rfl: 3      Objective:   Visit Vitals     /86 (Patient Site: Left Arm, Patient Position: Sitting, Cuff Size: Adult Large)     Pulse 60     Resp 18     Wt (!) 275 lb (124.7 kg)     (!) 275 lb (124.7 kg)   Wt Readings from Last 3 Encounters:   03/01/17 (!) 275 lb (124.7 kg)   08/19/16 (!) 267 lb 6.4 oz (121.3 kg)   05/18/16 (!) 265 lb 6.4 oz (120.4 kg)     BP Readings from Last 3 Encounters:   03/01/17 140/86   08/19/16 138/88   05/18/16 138/90     Pulse Readings from Last 3 Encounters:   03/01/17 60   08/19/16 60   05/18/16 78     General appearance: alert, appears stated age and  cooperative  Head: Normocephalic, without obvious abnormality, atraumatic  Eyes: Normal external exam without jaundice.  Ears: Normal external auricular exam.  Nose: Normal external exam.  Lungs: clear to auscultation bilaterally  Chest wall: no tenderness  Heart: regular rate and rhythm, S1, S2 normal, no murmur, click, rub or gallop normal exam  Pulses: 2+ and symmetric  Skin: Skin color, texture, turgor normal.   Neurologic: Grossly normal, no focal neurologic findings.    Review of Systems:   General: Weight Gain  Cardiographics: Reviewed in clinic.    Lab Results:  Lab Results: Personally reviewed  Lab Results   Component Value Date    CHOL 121 01/24/2017    TRIG 129 01/24/2017    HDL 44 01/24/2017    LDLDIRECT 74.0 04/09/2013       Clinical evaluation time today including exam 30 minutes.  At least 50% of clinic evaluation time involved in assessment and patient counseling.  Part of this chart was created using a dictation software.  Typographic errors, word substitutions, and grammatical errors may unintentionally occur.    Claudio Stephens M.D.  [facility]

## 2021-06-10 NOTE — PATIENT INSTRUCTIONS - HE
POSTOPERATIVE INSTRUCTIONS AFTER CHEMICAL NAIL REMOVAL SURGERY    What to expect after surgery:  Your toe will be numb for around 2-8 hours after your nail procedure due to the shots that were given.  Expect some degree of soreness in your toe later today when the numbness wears off.  Rest, elevation and ice applied at the ankle will help ease the pain. Your bandage was wrapped snug to cut down on bleeding.    This may feel tight when the numbness wears off.  Please remove the bandage tomorrow morning and begin the foot soaks described below.  Warm water will feel good and helps to ease the pain  How to Care for Your Toe After Surgery  One daily foot soak will be necessary to heal properly.  Chemicals were used to kill the root of the nail.  Expect local redness, drainage and tenderness , this will last for 6-8 weeks.  Soaking helps loosen the scab and dried drainage.  Failure to soak leads to a hard scab that blocks drainage.  Back up drainage increases the pain and the scab may need to be removed with another office procedure.  You will heal much quicker and be more comfortable if you are consistent with local wound care and foot soaks.  Soak one time every day for 2 weeks.  Soaks should last 10 minutes.  Soak after taking a shower to get the germs out.  Soak in warm water with hydrogen peroxide 5 parts water to 1 part hydrogen peroxide.  A small amount of bleeding may be noted which is normal.   Clean the surgical site with a Q-tip during each soak.  Dip the Q-tip in the water and gently clean away any crusted drainage.  The area may be tender but you will not harm anything with the Q-tip.  Pat the area dry and allow a few minutes to air dry before applying any bandages.  Flexible fabric style band aids work best.  In general, the area will be wet from drainage.  A small dab of antibiotic ointment is okay but watch out for excessive moisture.  White and wrinkled skin is a sign of too much moisture and that the  skin needs to be dried out. It is ok to allow the toe some air by removing the band aid as needed.  Activity  Feel free to do normal activities as tolerated on the following day.  Wear open toe sandals if regular shoes are not comfortable.  Avoid shoes that are tight on the toe.  Medications   Finish any antibiotics if they have been prescribed.  Tylenol or ibuprofen may be used as needed for pain.  Icing and elevation also help with pain and swelling.  Risks  Watch for signs of infection.  It is normal to see redness, local tenderness, and drainage around the nail area for up to 8 weeks after permanent nail removal surgery.  Call the clinic at [phone] if you see red streaks spreading up the toe, foot, or leg.  Fever and chills are also concerning and you should notify the clinic if you are having these symptoms.  If these symptoms occur when the clinic is closed, please go to urgent care.  How Well Does Permanent Nail Surgery Work?  Permanent nail surgery means that we intend that the nail problem will not come back.  In a small percentage of patients, nail problems return in about 6 months to a year.  We would like to see you back in the office if you are experiencing problems in the future.  Please call [phone] with any additional questions.

## 2021-06-10 NOTE — PROGRESS NOTES
FOOT AND ANKLE SURGERY/PODIATRY CONSULT NOTE         ASSESSMENT:   Onychomycosis left great toenail  Onychauxis left great toenail      TREATMENT:  Performed total nail excision and matrixectomy of the left great toenail today under local anesthesia of 2% lidocaine plain via the phenol and alcohol technique.  The patient tolerated the procedure and anesthesia well and was discharged in good condition.  He was given both written and verbal postoperative instructions.  He is to return to the clinic as needed.        HPI: I was asked to see Onur Barr today to evaluate and treat a painful thick toenail on the left great toe.  The patient indicated that this toenail has been thick and discolored for several years.  It is no longer manageable.  It is aggravated by shoe gear.  He denies any trauma to the left great toe.  The pain is moderate to severe.  The pain is only relieved after removing his shoe gear.  He has not had any redness, swelling, drainage or bleeding surrounding the nail.  He denies any other previous treatment.  It is an aching type pain and pressure.  The patient was seen in consultation at the request of Luann Berger MD for evaluation and treatment of painful thick toenail left great toe.     Past Medical History:   Diagnosis Date     Acid reflux disease 10/31/2017     Coronary Arteriosclerosis     Created by Conversion  Replacement Utility updated for latest IMO load     Hypercholesterolemia     Created by Conversion      Hypertension     Created by [facility] Annotation: Apr 6 2012 10:16AM - Zack Gutierrez: Lisinipril,  coreg  Replacement Utility updated for latest IMO load     Insomnia     Created by [facility] Annotation: Sep 11 2013  9:56AM - Zack Gutierrez: Trouble  maintaining sleep-Trazodone-minimal helpzolpidem-      Lower Back Pain Chronic     Created by [facility] Annotation: Apr 6 2012 10:20AM - Anh Dickson: Morphine pump      Morbid  obesity (H) 8/1/2018     Nephrolithiasis      Obstructive Sleep Apnea     Created by Conversion      Osteoarthritis     Created by Conversion [facility] Annotation: Jun 26 2009  [id number] - Zack Gutierrez: failed lumbar  fusion/morphine pump dr putnam  Replacement Utility updated for latest IMO load     Paroxysmal Atrial Fibrillation     Created by Conversion      Post-gastric Bypass For Obesity     Created by Conversion      Sleepiness 5/8/2018       Past Surgical History:   Procedure Laterality Date     AL ARTHRODESIS ANT INTERBODY MIN DISCECTOMY,LUMBAR      Description: Lumbar Vertebral Fusion;  Recorded: 06/26/2009;  Comments: before 200 see [facility] consult under old records     AL EXCISE EXCESS SKIN TISSUE,ABDOMEN  11/13/2012    Description: Panniculectomy;  Proc Date: 11/13/2012;  Comments: 3.5 Lb Pannus was removed at the [facility] By Dr. Shon Green     AL GASTRIC BYPASS,OBESE<150CM SUSY-EN-Y  2008    Description: Gastric Surgery For Morbid Obesity Bypass With Susy-en-Y;  Recorded: 06/26/2009;  Comments: dr green 2008     AL REMOVAL OF TONSILS,<12 Y/O      Description: Tonsillectomy;  Recorded: 03/23/2012;  Comments: for obstructive sleep apnea     AL REPAIR INCISIONAL HERNIA,REDUCIBLE  11/13/2012    Description: Incisional Hernia Repair;  Proc Date: 11/13/2012;  Comments: incisional hernia repair and abdominal panniculectomy by Dr Shon Green at the [facility].       Allergies   Allergen Reactions     Hydrocodone Other (See Comments)     SNEEZES         Current Outpatient Medications:      aspirin 81 MG EC tablet, Take 81 mg by mouth daily., Disp: , Rfl:      atorvastatin (LIPITOR) 40 MG tablet, TAKE 1 TABLET BY MOUTH AT BEDTIME, Disp: 90 tablet, Rfl: 3     baclofen (LIORESAL) 10 MG tablet, Take 10 mg by mouth 3 (three) times a day., Disp: , Rfl:      carvedilol (COREG CR) 10 MG 24 hr capsule, TAKE 1 CAPSULE (10 MG TOTAL) BY MOUTH DAILY., Disp: 90 capsule, Rfl: 1     DULoxetine (CYMBALTA) 30 MG  capsule, Take 90 mg by mouth daily., Disp: , Rfl: 1     FLUZONE QUAD 2909-0032, PF, 60 mcg (15 mcg x 4)/0.5 mL Syrg injection, , Disp: , Rfl:      furosemide (LASIX) 20 MG tablet, TAKE 1 TABLET BY MOUTH EVERY DAY, Disp: 90 tablet, Rfl: 0     gabapentin (NEURONTIN) 300 MG capsule, 300 mg 3 (three) times a day. , Disp: , Rfl:      lisinopril (PRINIVIL,ZESTRIL) 20 MG tablet, Take 1.5 tablets (30 mg total) by mouth daily., Disp: 90 tablet, Rfl: 3     omeprazole (PRILOSEC) 40 MG capsule, Take 40 mg by mouth., Disp: , Rfl:      traZODone (DESYREL) 100 MG tablet, TAKE 2 TABLETS (200MG TOTAL) BY MOUTH AT BEDTIME, Disp: 180 tablet, Rfl: 3     UNABLE TO FIND, Med Name: Morphine Sulfate COURTNEY, Disp: , Rfl:      nitroglycerin (NITROSTAT) 0.4 MG SL tablet, Place 0.4 mg under the tongue., Disp: , Rfl:     Family History   Problem Relation Age of Onset     Stroke Mother      Heart disease Father      Hodgkin's lymphoma Brother        Social History     Socioeconomic History     Marital status:      Spouse name: Not on file     Number of children: Not on file     Years of education: Not on file     Highest education level: Not on file   Occupational History     Not on file   Social Needs     Financial resource strain: Not on file     Food insecurity     Worry: Not on file     Inability: Not on file     Transportation needs     Medical: Not on file     Non-medical: Not on file   Tobacco Use     Smoking status: Former Smoker     Years: 10.00     Types: Cigars     Smokeless tobacco: Never Used   Substance and Sexual Activity     Alcohol use: No     Drug use: No     Comment: formerly used     Sexual activity: Not on file   Lifestyle     Physical activity     Days per week: Not on file     Minutes per session: Not on file     Stress: Not on file   Relationships     Social connections     Talks on phone: Not on file     Gets together: Not on file     Attends Zoroastrianism service: Not on file     Active member of club or organization:  Not on file     Attends meetings of clubs or organizations: Not on file     Relationship status: Not on file     Intimate partner violence     Fear of current or ex partner: Not on file     Emotionally abused: Not on file     Physically abused: Not on file     Forced sexual activity: Not on file   Other Topics Concern     Not on file   Social History Narrative     Not on file       Review of Systems - Patient denies fever, chills, rash, wound, stiffness, limping, numbness, weakness, heart burn, blood in stool, chest pain with activity, calf pain when walking, shortness of breath with activity, chronic cough, easy bleeding/bruising, swelling of ankles, excessive thirst, fatigue, depression, anxiety.  Patient admits to painful thick left great toenail.      OBJECTIVE:  Appearance: alert, well appearing, and in no distress and oriented to person, place, and time.    Vitals:    08/05/20 1255   Resp: 18       BMI= There is no height or weight on file to calculate BMI.    General appearance: Patient is alert and fully cooperative with history & exam.  No sign of distress is noted during the visit.  Psychiatric: Affect is pleasant & appropriate.  Patient appears motivated to improve health.  Respiratory: Breathing is regular & unlabored while sitting.  HEENT: Hearing is intact to spoken word.  Speech is clear.  No gross evidence of visual impairment that would impact ambulation.    Vascular: Dorsalis pedis and posterior tibial pulses are palpable. There is no pedal hair growth bilaterally.  CFT < 3 sec from anterior tibial surface to distal digits bilaterally. There is no appreciable edema noted.  Dermatologic: Long thick discolored dystrophic left great toenail.  This particular nail is 3-4 times normal thickness.  There is pain on direct compression of the nail plate left great toe.  Turgor and texture are within normal limits. No coloration or temperature changes. No primary or secondary lesions noted.  Neurologic: All  epicritic and proprioceptive sensations are grossly intact bilaterally.  Musculoskeletal: All active and passive ankle, subtalar, midtarsal, and 1st MPJ range of motion are grossly intact without pain or crepitus, with the exception of none. Manual muscle strength is within normal limits bilaterally. All dorsiflexors, plantarflexors, invertors, evertors are intact bilaterally. Tenderness present to the left great toenail on palpation.  No tenderness to the left great toe with range of motion. Calf is soft/non-tender without warmth/induration    Imaging:     No results found.       Garrett Lowery; PAT  [facility]/Podiatry

## 2021-06-10 NOTE — PROGRESS NOTES
ASSESSMENT:  1. Preop general physical exam  - Basic Metabolic Panel  - Hemoglobin    2. Presence of intrathecal baclofen pump    3. Lower Back Pain Chronic       61 y.o. male with planned surgery for replacement of morphine pump used for chronic low back pain.  Known risk factors for perioperative complications: He has a history of chronic coronary artery disease but this is stable at this time.  He is on medications and last echocardiogram was good.  Difficulty with intubation is not anticipated.    Cardiac Risk Estimation: As noted he has a prior history of cardiovascular disease but he is on medications and this is stable.  He also has hypertension that is controlled.  Secondary to his chronic lower back pain he is not physically very active.  But the surgery is low to moderate risk, and he has no contraindications for that.  Surgery can be carried out with appropriate anesthesia.    Current medications which may produce withdrawal symptoms if withheld perioperatively: His medications were discussed, he will call surgeon with regards to aspirin which I encouraged him to stop, but I did encourage him to take his blood pressure medication including carvedilol and lisinopril the morning of his surgery with a sip of water.      PLAN:  There are no Patient Instructions on file for this visit.    Orders Placed This Encounter   Procedures     Basic Metabolic Panel     Hemoglobin     Medications Discontinued During This Encounter   Medication Reason     sulfacetamide sodium-sulfur 10-5 % (w/w) Clsr Therapy completed     clindamycin (CLINDAGEL) 1 % gel Therapy completed       No Follow-up on file.       1. Preoperative workup as follows: hemoglobin, Basic Metabolic Panel.   2. Change in medication regimen before surgery: This was discussed with patient..  3. Prophylaxis for cardiac events with perioperative beta-blockers: He is on carvedilol..  4. Invasive hemodynamic monitoring perioperatively: at the discretion of  anesthesiologist.  5. Deep vein thrombosis prophylaxis postoperatively:regimen to be chosen by surgical team if needed.     Mallampati score: III (soft and hard palate and base of uvula visible)    Dentition: No chipped, loose, or missing teeth.    CHIEF COMPLAINT:  Chief Complaint   Patient presents with     Pre-op Exam     DOS: 4/24/17, Replace Morphine pump, Dr Maloney, [facility]       HISTORY OF PRESENT ILLNESS:  Onur is a 61 y.o. male here for a pre-operative consultation. The exam is requested by Dr. Maloney in preparation for morphine pump replacement to be performed at [facility] on April 24, 2017. Today s examination on 4/17/2017 is done to review the underlying surgical condition of presence of intrathecal baclofen pump, clear for anesthesia, and review medical problems with appropriate changes in medications.    Onur has tolerated previous surgeries well without bleeding or anesthesia difficulty. He reports that he had a heart attack while having narcotic pump placement surgery on 3/29/2012.     He has had a morphine pump for about 10 years for lower back pain. His current morphine pump is not working properly. His lower extremities feel tingly often.     Sleep Apnea: He no longer sleeps with a CPAP because he feels as if he is suffocating when he uses it.  .     REVIEW OF SYSTEMS:   He does not have migraines. He denies hearing difficulty and dizziness. He does not have problems with swallowing. He has neck pain. He denies chest pain. He does not have abdominal pain. He denies lower extremity swelling. All other systems are negative.    PFSH:  Reviewed, as below.     History   Smoking Status     Never Smoker   Smokeless Tobacco     Never Used       Family History   Problem Relation Age of Onset     Stroke Mother      Heart disease Father      Hodgkin's lymphoma Brother        Past Surgical History:   Procedure Laterality Date     MD ARTHRODESIS ANT INTERBODY MIN DISCECTOMY,LUMBAR       Description: Lumbar Vertebral Fusion;  Recorded: 06/26/2009;  Comments: before 200 see [facility] consult under old records     NM EXCISE EXCESS SKIN TISSUE,ABDOMEN  11/13/2012    Description: Panniculectomy;  Proc Date: 11/13/2012;  Comments: 3.5 Lb Pannus was removed at the [facility] By Dr. Shon Green     NM GASTRIC BYPASS,OBESE<150CM LORA-EN-Y  2008    Description: Gastric Surgery For Morbid Obesity Bypass With Lora-en-Y;  Recorded: 06/26/2009;  Comments: dr green 2008     NM REMOVAL OF TONSILS,<14 Y/O      Description: Tonsillectomy;  Recorded: 03/23/2012;  Comments: for obstructive sleep apnea     NM REPAIR INCISIONAL HERNIA,REDUCIBLE  11/13/2012    Description: Incisional Hernia Repair;  Proc Date: 11/13/2012;  Comments: incisional hernia repair and abdominal panniculectomy by Dr Shon Green at the [facility].       Allergies   Allergen Reactions     Hydrocodone        Active Ambulatory Problems     Diagnosis Date Noted     Osteoarthritis      Hypercholesterolemia      Obstructive Sleep Apnea      Congestive Heart Failure      Ischemic Cardiomyopathy      Cardiomyopathy      Hypertension      Coronary Arteriosclerosis      Lower Back Pain Chronic      Insomnia      Paroxysmal Atrial Fibrillation      Post-gastric Bypass For Obesity      Nephrolithiasis      Resolved Ambulatory Problems     Diagnosis Date Noted     Classic Migraine (With Aura)      Ventral hernia      Type 2 Diabetes Mellitus With Complication      Morbid obesity      Acute myocardial infarction      Lumbar Canal Stenosis With Neurogenic Claudication      No Additional Past Medical History       Current Outpatient Prescriptions   Medication Sig Dispense Refill     aspirin 81 MG EC tablet Take 81 mg by mouth daily.       atorvastatin (LIPITOR) 40 MG tablet TAKE 1 TABLET BY MOUTH AT BEDTIME 90 tablet 1     carvedilol (COREG CR) 20 MG 24 hr capsule Take 1 capsule (20 mg total) by mouth daily. (Patient taking differently: Take 10 mg by  "mouth daily. ) 90 capsule 2     DULoxetine (CYMBALTA) 30 MG capsule TK 1 C PO QD  1     furosemide (LASIX) 20 MG tablet TAKE 1 TABLET BY MOUTH EVERY DAY 90 tablet 0     gabapentin (NEURONTIN) 300 MG capsule 300 mg 3 (three) times a day.        lisinopril (PRINIVIL,ZESTRIL) 20 MG tablet Take 1 tablet (20 mg total) by mouth daily. 90 tablet 3     omeprazole (PRILOSEC) 40 MG capsule Take 1 capsule (40 mg total) by mouth daily. 90 capsule 3     traZODone (DESYREL) 100 MG tablet Take 2 tablets (200 mg total) by mouth bedtime. 180 tablet 1     UNABLE TO FIND Med Name: Morphine Sulfate COURTNEY       No current facility-administered medications for this visit.        VITALS:  Vitals:    04/17/17 1120   BP: 124/84   Pulse: 82   Temp: 98  F (36.7  C)   TempSrc: Oral   SpO2: 98%   Weight: (!) 268 lb 3.2 oz (121.7 kg)   Height: 5' 8.5\" (1.74 m)     Wt Readings from Last 3 Encounters:   04/17/17 (!) 268 lb 3.2 oz (121.7 kg)   04/03/17 (!) 275 lb (124.7 kg)   03/01/17 (!) 275 lb (124.7 kg)     Body mass index is 40.19 kg/(m^2).    PHYSICAL EXAM:  General Appearance: Alert, cooperative, no distress, appears stated age  Head: Normocephalic, without obvious abnormality, atraumatic  Eyes: Pupils equal, symmetric  Ears: Normal TMs and external ear canals, both ears  Nose: Nares normal, septum midline, mucosa normal, no drainage  Throat: Lips, mucosa, and tongue normal; teeth and gums normal  Neck: Supple, symmetrical, trachea midline, no adenopathy;  thyroid: not enlarged, symmetric, no tenderness/mass/nodules  Lungs: Clear to auscultation bilaterally, respirations unlabored  Heart: Regular rate and rhythm, S1 and S2 normal, no murmur, rub, or gallop  Abdomen: Truncal obesity. Soft, non-tender, bowel sounds active all four quadrants, no masses, no organomegaly  Musculoskeletal: Hunched over at his waist and ambulates that way. Tenderness to lower back.   Extremities normal, atraumatic, no cyanosis or edema  Lymph nodes: Cervical nodes " normal  Neurologic:  Alert and oriented times 3. Normal reflexes. Cranial nerves II-XII intact.   Psychiatric: Normal mood and affect.      ADDITIONAL HISTORY SUMMARIZED (2): None.  DECISION TO OBTAIN EXTRA INFORMATION (1): None.   RADIOLOGY TESTS (1): None.  LABS (1): Ordered labs.   MEDICINE TESTS (1): Reviewed 4/3/2017 Echo report, indication: cardiomyopathy.   INDEPENDENT REVIEW (2 each): None.       The visit lasted a total of 18 minutes face to face with the patient. Over 50% of the time was spent counseling and educating the patient about baclofen pump and preoperative measures.     Arturo REBOLLAR, am scribing for and in the presence of, Dr. Berger.    IDr. Berger, personally performed the services described in this documentation, as scribed by Arturo Ann in my presence, and it is both accurate and complete.    MEDICATIONS:  Current Outpatient Prescriptions   Medication Sig Dispense Refill     aspirin 81 MG EC tablet Take 81 mg by mouth daily.       atorvastatin (LIPITOR) 40 MG tablet TAKE 1 TABLET BY MOUTH AT BEDTIME 90 tablet 1     carvedilol (COREG CR) 20 MG 24 hr capsule Take 1 capsule (20 mg total) by mouth daily. (Patient taking differently: Take 10 mg by mouth daily. ) 90 capsule 2     DULoxetine (CYMBALTA) 30 MG capsule TK 1 C PO QD  1     furosemide (LASIX) 20 MG tablet TAKE 1 TABLET BY MOUTH EVERY DAY 90 tablet 0     gabapentin (NEURONTIN) 300 MG capsule 300 mg 3 (three) times a day.        lisinopril (PRINIVIL,ZESTRIL) 20 MG tablet Take 1 tablet (20 mg total) by mouth daily. 90 tablet 3     omeprazole (PRILOSEC) 40 MG capsule Take 1 capsule (40 mg total) by mouth daily. 90 capsule 3     traZODone (DESYREL) 100 MG tablet Take 2 tablets (200 mg total) by mouth bedtime. 180 tablet 1     UNABLE TO FIND Med Name: Morphine Sulfate COURTNEY       No current facility-administered medications for this visit.        Total data points: 2

## 2021-06-11 NOTE — TELEPHONE ENCOUNTER
Per Dr. Berger, I called pt and let him know that he does need to be seen to address ppwk with Dr. Berger. Pt is scheduled for Monday 9/28/20 at 10:20am

## 2021-06-11 NOTE — TELEPHONE ENCOUNTER
Forms Request  Name of form/paperwork: Other:  Form for work  Have you been seen for this request: N/A  Do we have the form: Yes- Patient dropped off at clinic on 09/21/20  When is form needed by: As soon as possible   How would you like the form returned:    Patient Notified form requests are processed in 3-5 business days: Yes    Okay to leave a detailed message? No      FYI: Patient states he dropped off From for work to fill it out if it ready please call patient and inform .

## 2021-06-11 NOTE — PROGRESS NOTES
Assessment/Plan:        1. Postlaminectomy syndrome, lumbar region    2. Claudication of lower extremity (H)    3. Morbid obesity (H)  Discussed with him the necessity of the form. Form filled and copy made. He was given the original.          Subjective:    Patient ID: Onur Barr is a 64 y.o. male.    64 years old male who has a chronic history of lower back pain as well as a shoulder pain that is a following an injury that he sustained in late 1980s.  Since then he has undergone different surgeries and evaluations and management.  He has been on disability since 2000, but comes in today with disability form needing to be filled out.  He is currently having pain around the neck and shoulder, around the lower backs.  This makes him walk in a stooped position.  He does follow-up with pain clinic where he has a pain pump.  The form today is for but the recertification of disability.  He noted no major concerns today pain had not really been changed to that extent.  But he is having more pain he noted that he will be seeing his pain physician.  He has wanted to have the form filled out today.      The following portions of the patient's history were reviewed and updated as appropriate: allergies, current medications, past family history, past medical history, past social history, past surgical history and problem list.    Review of Systems   Constitutional: Negative.    HENT: Negative.    Respiratory: Negative.    Musculoskeletal: Positive for arthralgias, back pain, gait problem, neck pain and neck stiffness.   Neurological: Positive for headaches. Negative for light-headedness and numbness.   Psychiatric/Behavioral: Negative for dysphoric mood. The patient is not nervous/anxious.      Vitals:    09/28/20 1055   BP: 136/90   Patient Site: Left Arm   Patient Position: Sitting   Cuff Size: Adult Large   Pulse: 72   Weight: (!) 332 lb (150.6 kg)             Objective:    Physical Exam   Constitutional: He is oriented  to person, place, and time. He appears well-developed and well-nourished.   Cardiovascular: Intact distal pulses.   Pulmonary/Chest: Effort normal.   Musculoskeletal:      Comments: He sits and walks in a stooped position. Some discomfort to the neck region.   Neurological: He is alert and oriented to person, place, and time.   Psychiatric: He has a normal mood and affect. His behavior is normal.

## 2021-06-13 NOTE — TELEPHONE ENCOUNTER
Patient states his left leg has been swollen for a couple of weeks.  Initially was just at his ankle and now extends to just below his knee.  Transferred to [facility] for same day office visit.    Denise Perdomo RN  [facility] Triage Nurse Advisor    Reason for Disposition    Patient wants to be seen    Additional Information    Negative: Sounds like a life-threatening emergency to the triager    Negative: Chest pain    Negative: Small area of swelling and followed an insect bite to the area    Negative: Followed a knee injury    Negative: Ankle or foot injury    Negative: Pregnant with leg swelling or edema    Negative: Difficulty breathing at rest    Negative: Entire foot is cool or blue in comparison to other side    Negative: SEVERE swelling (e.g., swelling extends above knee, entire leg is swollen, weeping fluid)    Negative: Thigh or calf pain and only 1 side and present > 1 hour    Negative: Thigh, calf, or ankle swelling in only one leg    Negative: Thigh, calf, or ankle swelling in both legs, but one side is definitely more swollen    Negative: Cast on leg or ankle and has increasing pain    Negative: Can't walk or can barely stand (new onset)    Negative: Fever and red area (or area very tender to touch)    Negative: Patient sounds very sick or weak to the triager    Negative: Swelling of face, arm or hands (Exception: slight puffiness of fingers during hot weather)    Negative: Pregnant > 20 weeks and sudden weight gain (i.e., > 2 lbs, 1 kg in one week)    Negative: MODERATE swelling of both ankles (e.g., swelling extends up to the knees) AND new onset or worsening    Negative: Difficulty breathing with exertion AND worsening or new onset    Negative: Looks like a boil, infected sore, deep ulcer, or other infected rash (spreading redness, pus)    Protocols used: LEG SWELLING AND EDEMA-A-OH

## 2021-06-13 NOTE — TELEPHONE ENCOUNTER
Refill Approved    Rx renewed per Medication Renewal Policy. Medication was last renewed on 1/18/20.    Lilli Frank, [facility] Triage/Med Refill 12/7/2020     Requested Prescriptions   Pending Prescriptions Disp Refills     traZODone (DESYREL) 100 MG tablet [Pharmacy Med Name: TRAZODONE 100 MG TABLET] 180 tablet 3     Sig: TAKE 2 TABLETS (200MG TOTAL) BY MOUTH AT BEDTIME       Tricyclics/Misc Antidepressant/Antianxiety Meds Refill Protocol Passed - 12/6/2020  1:31 PM        Passed - PCP or prescribing provider visit in last year     Last office visit with prescriber/PCP: 9/28/2020 Luann Berger MD OR same dept: 11/30/2020 Rahul Harrington MD OR same specialty: 11/30/2020 Rahul Harrington MD  Last physical: 4/17/2017 Last MTM visit: Visit date not found   Next visit within 3 mo: Visit date not found  Next physical within 3 mo: Visit date not found  Prescriber OR PCP: Luann Berger MD  Last diagnosis associated with med order: 1. Insomnia  - traZODone (DESYREL) 100 MG tablet [Pharmacy Med Name: TRAZODONE 100 MG TABLET]; TAKE 2 TABLETS (200MG TOTAL) BY MOUTH AT BEDTIME  Dispense: 180 tablet; Refill: 3    If protocol passes may refill for 12 months if within 3 months of last provider visit (or a total of 15 months).

## 2021-06-13 NOTE — PROGRESS NOTES
ASSESSMENT:  1. Cellulitis of lower extremity, unspecified laterality    Antibiotics given as listed out below.  I think it is a mild cellulitis of anything and mostly it is some vascular congestion and edema.  I also asked him to increase his furosemide up to twice a day for a while, probably about 10 to 14 days, and then he can follow-up with his primary care provider after that to make sure that he is improving.  If his redness or swelling gets worse or progresses up the leg or if he gets fevers or chills or other symptoms he will be sure to let us know.      - cephalexin (KEFLEX) 500 MG capsule; Take 1 capsule (500 mg total) by mouth 3 (three) times a day for 7 days.  Dispense: 21 capsule; Refill: 0    2. Ventral hernia without obstruction or gangrene    Because he feels he has this ventral hernia there similar to when he had one before I will send him back to the surgeons for further evaluation on this.      - Ambulatory referral to General Surgery          PLAN:  There are no Patient Instructions on file for this visit.    Orders Placed This Encounter   Procedures     Ambulatory referral to General Surgery     Referral Priority:   Routine     Referral Type:   Surgical     Referral Reason:   Evaluation and Treatment     Requested Specialty:   General Surgery     Number of Visits Requested:   1     There are no discontinued medications.    No follow-ups on file.    CHIEF COMPLAINT:  Chief Complaint   Patient presents with     Leg Swelling     Lt leg swelling - started with ankle and now has moved up to just below the knee. Now Rt leg is a little red as well. Had gastric bypass and swelling went away but as he has gained wt is back. Has been to vein clinic for this as well.       HISTORY OF PRESENT ILLNESS:  Onur is a 65 y.o. male presenting to the clinic today was here today with some leg swelling and redness.  He is a patient of a partner of mine who is out.  He has some ongoing issues with his legs including  some swelling and vascular insufficiency.  He reports that the redness and swelling is gotten worse over the last couple of weeks.  He has more pain in them as well.  The left is a bit worse than the right is approaching the knee with the redness.  It is tender to touch as well.  It also hurts to walk on it at times.  He does take some furosemide already at 20 mg once daily.  We reviewed his other medications and problem list today.    REVIEW OF SYSTEMS:     All other systems are negative.    PFSH:    Reviewed      TOBACCO USE:  Social History     Tobacco Use   Smoking Status Former Smoker     Years: 10.00     Types: Cigars   Smokeless Tobacco Never Used       VITALS:  Vitals:    11/30/20 1011   BP: 134/82   Patient Site: Left Arm   Patient Position: Sitting   Cuff Size: Adult Large   Pulse: 91   SpO2: 97%   Weight: (!) 336 lb 3.2 oz (152.5 kg)     Wt Readings from Last 3 Encounters:   11/30/20 (!) 336 lb 3.2 oz (152.5 kg)   09/28/20 (!) 332 lb (150.6 kg)   01/24/20 (!) 331 lb (150.1 kg)     Body mass index is 45.6 kg/m .    PHYSICAL EXAM:  Constitutional:  Well appearing patient in no acute distress.  Vitals:  Per nursing notes.    HEENT:  Normocephalic, atraumatic.  Ears are clear bilaterally, with no fluid or redness, and landmarks visible.  Pupils are equal and reactive to light, extraocular muscles intact, visual fields are full.  Nose is normal, and oropharynx is clear without redness.    Neck is without lymphadenopathy.    Lungs:  Clear to auscultation bilaterally without wheezes, rales or rhonchi.   Cardiac:  Regular rate and rhythm without murmurs, rubs, or gallops.     Legs show swelling to about a 1-2+ of the mid calf bilaterally.  He has redness a little worse on the left than the right.  It is somewhat pitting edema.  There is certainly signs of vascular congestion with the skin color changes.      MEDICATIONS:  Current Outpatient Medications   Medication Sig Dispense Refill     aspirin 81 MG EC tablet  Take 81 mg by mouth daily.       atorvastatin (LIPITOR) 40 MG tablet TAKE 1 TABLET BY MOUTH AT BEDTIME 90 tablet 3     baclofen (LIORESAL) 10 MG tablet Take 10 mg by mouth 3 (three) times a day.       carvedilol (COREG CR) 10 MG 24 hr capsule TAKE 1 CAPSULE BY MOUTH EVERY DAY 90 capsule 1     DULoxetine (CYMBALTA) 30 MG capsule Take 90 mg by mouth daily.  1     FLUZONE QUAD 4060-5172, PF, 60 mcg (15 mcg x 4)/0.5 mL Syrg injection        furosemide (LASIX) 20 MG tablet TAKE 1 TABLET BY MOUTH EVERY DAY 90 tablet 0     gabapentin (NEURONTIN) 300 MG capsule 300 mg 3 (three) times a day.        lisinopriL (PRINIVIL,ZESTRIL) 20 MG tablet Take 1.5 tablets (30 mg total) by mouth daily. 135 tablet 3     nitroglycerin (NITROSTAT) 0.4 MG SL tablet Place 0.4 mg under the tongue.       omeprazole (PRILOSEC) 40 MG capsule Take 40 mg by mouth.       traZODone (DESYREL) 100 MG tablet TAKE 2 TABLETS (200MG TOTAL) BY MOUTH AT BEDTIME 180 tablet 3     UNABLE TO FIND Med Name: Morphine Sulfate COURTNEY       cephalexin (KEFLEX) 500 MG capsule Take 1 capsule (500 mg total) by mouth 3 (three) times a day for 7 days. 21 capsule 0     No current facility-administered medications for this visit.

## 2021-06-13 NOTE — PROGRESS NOTES
[facility]   Outpatient Follow-up evaluation.      Current Outpatient Prescriptions:      aspirin 81 MG EC tablet, Take 81 mg by mouth daily., Disp: , Rfl:      atorvastatin (LIPITOR) 40 MG tablet, TAKE 1 TABLET BY MOUTH AT BEDTIME, Disp: 90 tablet, Rfl: 1     carvedilol (COREG CR) 10 MG 24 hr capsule, Take 1 capsule (10 mg total) by mouth daily., Disp: 90 capsule, Rfl: 3     DULoxetine (CYMBALTA) 30 MG capsule, TK 1 C PO QD, Disp: , Rfl: 1     furosemide (LASIX) 20 MG tablet, TAKE 1 TABLET BY MOUTH EVERY DAY, Disp: 90 tablet, Rfl: 2     gabapentin (NEURONTIN) 300 MG capsule, 300 mg 3 (three) times a day. , Disp: , Rfl:      lisinopril (PRINIVIL,ZESTRIL) 20 MG tablet, Take 1 tablet (20 mg total) by mouth daily., Disp: 90 tablet, Rfl: 1     omeprazole (PRILOSEC) 40 MG capsule, Take 1 capsule (40 mg total) by mouth daily., Disp: 90 capsule, Rfl: 0     traZODone (DESYREL) 100 MG tablet, Take 2 tablets (200 mg total) by mouth at bedtime., Disp: 180 tablet, Rfl: 0     UNABLE TO FIND, Med Name: Morphine Sulfate COURTNEY, Disp: , Rfl:         Onur Barr is a 61 y.o. Male    Chief Complaint   Patient presents with     Follow-up       Diagnoses:  See Problem list     Recommendations:    We will arrange for a 24 hour Holter monitor in 1 week to assess his heart rate and any associate arrhythmia.  I have asked him to change to Coreg CR 10 mg daily from his 20 mg dose for now and in 1 week we will see what type of heart rate response he has.  At issue may be whether or not he will be able to continue with Coreg therapy and given his history of cardiomyopathy I would like to maintain this medication.  Whether not a pacemaker might be required however might change my view of this.  For now all of the medications be kept the same.  He is advised to monitor his blood pressure at home if we get higher readings then adjustment in the lisinopril can be made to improve control.      Subjective:   Over the past year he  has noticed increased fatigue and sluggishness.  He does remain active physically even though he is very limited by his back.  He mows the lawn does jobs around the house she is able to do these things generally well without dyspnea.  He has not had any dizziness or presyncopal events no chest pain or pressure.  ECG was obtained at his physician's office this week and found a heart rate of 47.  This is much lower than we have ever seen in the past.  His heart rate is 52 today.                    No past medical history on file.  Past Surgical History:   Procedure Laterality Date     IL ARTHRODESIS ANT INTERBODY MIN DISCECTOMY,LUMBAR      Description: Lumbar Vertebral Fusion;  Recorded: 06/26/2009;  Comments: before 200 see [facility] consult under old records     IL EXCISE EXCESS SKIN TISSUE,ABDOMEN  11/13/2012    Description: Panniculectomy;  Proc Date: 11/13/2012;  Comments: 3.5 Lb Pannus was removed at the [facility] By Dr. Shon Green     IL GASTRIC BYPASS,OBESE<150CM LORA-EN-Y  2008    Description: Gastric Surgery For Morbid Obesity Bypass With Lora-en-Y;  Recorded: 06/26/2009;  Comments: dr green 2008     IL REMOVAL OF TONSILS,<14 Y/O      Description: Tonsillectomy;  Recorded: 03/23/2012;  Comments: for obstructive sleep apnea     IL REPAIR INCISIONAL HERNIA,REDUCIBLE  11/13/2012    Description: Incisional Hernia Repair;  Proc Date: 11/13/2012;  Comments: incisional hernia repair and abdominal panniculectomy by Dr Shon Green at the [facility].     Allergies   Allergen Reactions     Hydrocodone      Family History   Problem Relation Age of Onset     Stroke Mother      Heart disease Father      Hodgkin's lymphoma Brother       Social History     Social History     Marital status:      Spouse name: N/A     Number of children: N/A     Years of education: N/A     Occupational History     Not on file.     Social History Main Topics     Smoking status: Never Smoker     Smokeless tobacco: Never Used      "Alcohol use Not on file     Drug use: Not on file     Sexual activity: Not on file     Other Topics Concern     Not on file     Social History Narrative     Family history not pertinent to chief complaint or presenting problem    Current Outpatient Prescriptions:      aspirin 81 MG EC tablet, Take 81 mg by mouth daily., Disp: , Rfl:      atorvastatin (LIPITOR) 40 MG tablet, TAKE 1 TABLET BY MOUTH AT BEDTIME, Disp: 90 tablet, Rfl: 1     carvedilol (COREG CR) 10 MG 24 hr capsule, Take 1 capsule (10 mg total) by mouth daily., Disp: 90 capsule, Rfl: 3     DULoxetine (CYMBALTA) 30 MG capsule, TK 1 C PO QD, Disp: , Rfl: 1     furosemide (LASIX) 20 MG tablet, TAKE 1 TABLET BY MOUTH EVERY DAY, Disp: 90 tablet, Rfl: 2     gabapentin (NEURONTIN) 300 MG capsule, 300 mg 3 (three) times a day. , Disp: , Rfl:      lisinopril (PRINIVIL,ZESTRIL) 20 MG tablet, Take 1 tablet (20 mg total) by mouth daily., Disp: 90 tablet, Rfl: 1     omeprazole (PRILOSEC) 40 MG capsule, Take 1 capsule (40 mg total) by mouth daily., Disp: 90 capsule, Rfl: 0     traZODone (DESYREL) 100 MG tablet, Take 2 tablets (200 mg total) by mouth at bedtime., Disp: 180 tablet, Rfl: 0     UNABLE TO FIND, Med Name: Morphine Sulfate COURTNEY, Disp: , Rfl:       Objective:   /90 (Patient Site: Left Arm, Patient Position: Sitting, Cuff Size: Adult Large)  Pulse (!) 52  Resp 16  Ht 5' 8.5\" (1.74 m)  Wt (!) 271 lb 11.2 oz (123.2 kg)  BMI 40.71 kg/m2  (!) 271 lb 11.2 oz (123.2 kg)   Wt Readings from Last 3 Encounters:   09/27/17 (!) 271 lb 11.2 oz (123.2 kg)   04/17/17 (!) 268 lb 3.2 oz (121.7 kg)   04/03/17 (!) 275 lb (124.7 kg)     BP Readings from Last 3 Encounters:   09/27/17 142/90   09/19/17 132/78   04/26/17 115/55     Pulse Readings from Last 3 Encounters:   09/27/17 (!) 52   09/19/17 (!) 52   04/26/17 60     General appearance: alert, appears stated age and cooperative  Head: Normocephalic, without obvious abnormality, atraumatic  Eyes: Normal external exam " without jaundice.  Ears: Normal external auricular exam.  Nose: Normal external exam.  Lungs: clear to auscultation bilaterally  Chest wall: no tenderness  Heart: regular rate and rhythm, S1, S2 normal, no murmur, click, rub or gallop heart rate as noted  Pulses: 2+ and symmetric  Skin: Skin color, texture, turgor normal.   Neurologic: Grossly normal, no focal neurologic findings.    Review of Systems:   General: Weight Gain  Cardiographics: Reviewed in clinic.    Lab Results:  Lab Results: Personally reviewed  Office Visit on 09/19/2017   Component Date Value     VENTRICULAR RATE 09/19/2017 47      ATRIAL RATE 09/19/2017 47      P-R INTERVAL 09/19/2017 224      QRS DURATION 09/19/2017 86      Q-T INTERVAL 09/19/2017 460      QTC CALCULATION (BEZET) 09/19/2017 407      P Axis 09/19/2017 44      R AXIS 09/19/2017 25      T AXIS 09/19/2017 40      MUSE DIAGNOSIS 09/19/2017                      Value:Sinus bradycardia with 1st degree A-V block  Otherwise normal ECG  When compared with ECG of 08-APR-2012 12:09,  VT interval has increased  T wave inversion no longer evident in Inferior leads  T wave inversion no longer evident in Lateral leads  QT has shortened  Confirmed by ROZINA GONZALEZ MD LOC:[facility] ([id number]) on 9/19/2017 4:25:51 PM         Clinical evaluation time today including exam 30 minutes.  At least 50% of clinic evaluation time involved in assessment and patient counseling.  Part of this chart was created using a dictation software.  Typographic errors, word substitutions, and grammatical errors may unintentionally occur.    Claudio Stephens M.D.  [facility]

## 2021-06-13 NOTE — PROGRESS NOTES
ASSESSMENT:  1. Screen for colon cancer   He may get a colonoscopy but wants to deal with the pain currently.  2. Insomnia, unspecified type  - Ambulatory referral to Sleep Medicine  He did deny having any snoring but I do think that because he is not getting enough sleep but is a reason why he feels so sleepy at this point.  It is possible that he might be having a lot of limb movement when he is sleeping which is making it difficult for him to have a restful sleep.  Unfortunately also he is on a lot of medications that would depress the CNS, so it is difficult to prescribe any other sleep aid.  I did refer him to sleep clinic hopefully he will get another sleep study.  3. Hip pain, chronic, unspecified laterality  At this point will hold off of doing any imaging of the hip pain because he thinks that it could be associated with the fact that he spends so much while he is ambulating this is because of his back.  4. Lower Back Pain Chronic  Form will be filled out for his chronic low back pain.  5. Neck pain on right side  - XR Cervical Spine 2 - 3 VWS; Future  I think that with the pain in the right hand as well as twitches this may be coming up from the neck.  Because of that we will consider getting an x-ray of the neck but he said that he will need to get an authorization from workers compensation such will follow up with the pain clinic who will do that for him.  The x-ray was ordered and he will do that in time.  6. Bradycardia  - Electrocardiogram Perform and Read   His EKG to my review does show bradycardia with first-degree AV block and we will await cadiologist read.  I think that because this is a symptomatic I will have him see the cardiologist again.  He did see them earlier this year.  He went need another echocardiogram at least a review.      PLAN:  Plan is as outlined with a different diagnosis.    Orders Placed This Encounter   Procedures     XR Cervical Spine 2 - 3 VWS     Standing Status:    Future     Standing Expiration Date:   9/19/2018     Order Specific Question:   Can the procedure be changed per Radiologist protocol?     Answer:   Yes     Ambulatory referral to Sleep Medicine     Referral Priority:   Routine     Referral Type:   Consultation     Referral Reason:   Evaluation and Treatment     Requested Specialty:   Sleep Medicine     Number of Visits Requested:   1     Electrocardiogram Perform and Read     There are no discontinued medications.    No Follow-up on file.      CHIEF COMPLAINT:  Chief Complaint   Patient presents with     Follow-up     back f/u needs paperwork filled out, pt states that he has been SOB, dropping things lately, falling asleep, but not sleeping at night       HISTORY OF PRESENT ILLNESS:  Onur is a 61 y.o. male presenting to the clinic today with concerns of having continuing low back pain for which he has had prior surgeries and currently being followed [facility].  He is currently on morphine pump which appears to control the pain up to about 40% according to the office visit notes.  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  Is also noting increasing sleepiness during the day.  Notes that he certainly will follow sleep and will be dropping what he is holding.  He noted that his leg to sleep very well at night usually wakes up in the middle of the night finding himself of the foot of his bed. He does have a history of sleep apnea but is unable to use the CPAP machine.  He no longer uses it now.  He denies snoring currently.  He does use trazodone 200 mg for sleep.  He is also noticing some weakness to his upper extremity as well as some muscle twitching.  He denied having any significant neck pain but noted intermittent pain in the neck.  He is also been having some shortness of breath that is noted with physical activity.  Denied any chest pain no dizziness, no swelling to his lower  extremity.  He does have the history of cardiovascular disease and has been on Coreg as well as lisinopril.  Heart rate was noted to be at 52 today.    REVIEW OF SYSTEMS:   As noted he does not have any dizziness and no lightheadedness.  He does not have any swelling of his lower extremity.  He denies having any ringing in his ear and does not have any headaches.  There is no numbness to extremities.  He denies having any palpitations.  Does not recheck his heart rate so she cannot tell if there is any irregularity.  He continues to have low back pain which appears to be controlled with morphine but not to the extent that he can tolerate.  All other systems are negative.    PFSH:  Reviewed, as below.    History   Smoking Status     Never Smoker   Smokeless Tobacco     Never Used       Family History   Problem Relation Age of Onset     Stroke Mother      Heart disease Father      Hodgkin's lymphoma Brother        Social History     Social History     Marital status:      Spouse name: N/A     Number of children: N/A     Years of education: N/A     Occupational History     Not on file.     Social History Main Topics     Smoking status: Never Smoker     Smokeless tobacco: Never Used     Alcohol use Not on file     Drug use: Not on file     Sexual activity: Not on file     Other Topics Concern     Not on file     Social History Narrative       Past Surgical History:   Procedure Laterality Date     MI ARTHRODESIS ANT INTERBODY MIN DISCECTOMY,LUMBAR      Description: Lumbar Vertebral Fusion;  Recorded: 06/26/2009;  Comments: before 200 see [facility] consult under old records     MI EXCISE EXCESS SKIN TISSUE,ABDOMEN  11/13/2012    Description: Panniculectomy;  Proc Date: 11/13/2012;  Comments: 3.5 Lb Pannus was removed at the [facility] By Dr. Shon Green     MI GASTRIC BYPASS,OBESE<150CM LORA-EN-Y  2008    Description: Gastric Surgery For Morbid Obesity Bypass With Lora-en-Y;  Recorded: 06/26/2009;  Comments:   sarah 2008     TN REMOVAL OF TONSILS,<11 Y/O      Description: Tonsillectomy;  Recorded: 03/23/2012;  Comments: for obstructive sleep apnea     TN REPAIR INCISIONAL HERNIA,REDUCIBLE  11/13/2012    Description: Incisional Hernia Repair;  Proc Date: 11/13/2012;  Comments: incisional hernia repair and abdominal panniculectomy by Dr Shon Green at the [facility].       Allergies   Allergen Reactions     Hydrocodone        Active Ambulatory Problems     Diagnosis Date Noted     Osteoarthritis      Hypercholesterolemia      Obstructive Sleep Apnea      Congestive Heart Failure      Ischemic Cardiomyopathy      Cardiomyopathy      Hypertension      Coronary Arteriosclerosis      Lower Back Pain Chronic      Insomnia      Paroxysmal Atrial Fibrillation      Post-gastric Bypass For Obesity      Nephrolithiasis      Resolved Ambulatory Problems     Diagnosis Date Noted     Classic Migraine (With Aura)      Ventral hernia      Type 2 Diabetes Mellitus With Complication      Morbid obesity      Acute myocardial infarction      Lumbar Canal Stenosis With Neurogenic Claudication      No Additional Past Medical History       Current Outpatient Prescriptions   Medication Sig Dispense Refill     aspirin 81 MG EC tablet Take 81 mg by mouth daily.       atorvastatin (LIPITOR) 40 MG tablet TAKE 1 TABLET BY MOUTH AT BEDTIME 90 tablet 1     carvedilol (COREG CR) 20 MG 24 hr capsule Take 1 capsule (20 mg total) by mouth daily. (Patient taking differently: Take 10 mg by mouth daily. ) 90 capsule 2     DULoxetine (CYMBALTA) 30 MG capsule TK 1 C PO QD  1     furosemide (LASIX) 20 MG tablet TAKE 1 TABLET BY MOUTH EVERY DAY 90 tablet 2     gabapentin (NEURONTIN) 300 MG capsule 300 mg 3 (three) times a day.        lisinopril (PRINIVIL,ZESTRIL) 20 MG tablet Take 1 tablet (20 mg total) by mouth daily. 90 tablet 1     omeprazole (PRILOSEC) 40 MG capsule Take 1 capsule (40 mg total) by mouth daily. 90 capsule 0     traZODone (DESYREL) 100 MG  tablet Take 2 tablets (200 mg total) by mouth at bedtime. 180 tablet 0     UNABLE TO FIND Med Name: Morphine Sulfate COURTNEY       No current facility-administered medications for this visit.        VITALS:  Vitals:    09/19/17 1253   BP: 132/78   Pulse: (!) 52     Wt Readings from Last 3 Encounters:   04/17/17 (!) 268 lb 3.2 oz (121.7 kg)   04/03/17 (!) 275 lb (124.7 kg)   03/01/17 (!) 275 lb (124.7 kg)     There is no height or weight on file to calculate BMI.    PHYSICAL EXAM:  General Appearance: Alert, cooperative, in pain distress, appears stated age but obese.  HEENT: Neck is held at a slight flexion, but there is no tenderness to palpation to appears to have some mild muscle spasm.  Back: He does have deep lordosis noted on sitting down as well as when he is walking and he is almost stooped over with ambulation.  He walks with a walking stick.  Lungs: Clear to auscultation bilaterally, respirations unlabored  Heart: Regular rate and rhythm, S1 and S2 normal, no murmur, rub, or gallop  Abdomen: Soft, non-tender  Musculoskeletal: He does have an antalgic gait with a walking stick and a very stooped over.  Neurologic:  Alert and oriented times 3.   Psychiatric: Normal mood and affect.        MEDICATIONS:  Current Outpatient Prescriptions   Medication Sig Dispense Refill     aspirin 81 MG EC tablet Take 81 mg by mouth daily.       atorvastatin (LIPITOR) 40 MG tablet TAKE 1 TABLET BY MOUTH AT BEDTIME 90 tablet 1     carvedilol (COREG CR) 20 MG 24 hr capsule Take 1 capsule (20 mg total) by mouth daily. (Patient taking differently: Take 10 mg by mouth daily. ) 90 capsule 2     DULoxetine (CYMBALTA) 30 MG capsule TK 1 C PO QD  1     furosemide (LASIX) 20 MG tablet TAKE 1 TABLET BY MOUTH EVERY DAY 90 tablet 2     gabapentin (NEURONTIN) 300 MG capsule 300 mg 3 (three) times a day.        lisinopril (PRINIVIL,ZESTRIL) 20 MG tablet Take 1 tablet (20 mg total) by mouth daily. 90 tablet 1     omeprazole (PRILOSEC) 40 MG  capsule Take 1 capsule (40 mg total) by mouth daily. 90 capsule 0     traZODone (DESYREL) 100 MG tablet Take 2 tablets (200 mg total) by mouth at bedtime. 180 tablet 0     UNABLE TO FIND Med Name: Morphine Sulfate COURTNEY       No current facility-administered medications for this visit.          ADDITIONAL HISTORY SUMMARIZED (2): 2 emergency room visit of April 2017.  DECISION TO OBTAIN EXTRA INFORMATION (1): None.   RADIOLOGY TESTS (1): 1  LABS (1): None.  MEDICINE TESTS (1): 1  INDEPENDENT REVIEW (2 each): 2    Total Data Points:6

## 2021-06-14 NOTE — PROGRESS NOTES
ASSESSMENT:  1. Post-gastric Bypass For Obesity  - Vitamin D, Total (25-Hydroxy)  - Folate, Serum  - Vitamin B12  - HM2(CBC w/o Differential)  - Basic Metabolic Panel  - Thiamin (Vitamin B1), Whole Blood  - Zinc, Serum      PLAN:  A review of his previous labs showed the labs that he has had in the past for his gastric bypass.  It also shows that he has had a low vitamin D levels in the past.  He has had anemia as well in the past.  Because of that we will check all his labs for him and inform him of the lab results.    Orders Placed This Encounter   Procedures     Vitamin D, Total (25-Hydroxy)     Folate, Serum     Vitamin B12     HM2(CBC w/o Differential)     Basic Metabolic Panel     Thiamin (Vitamin B1), Whole Blood     Zinc, Serum     There are no discontinued medications.    No follow-ups on file.      CHIEF COMPLAINT:  Chief Complaint   Patient presents with     Office Visit     would like blood work to check Vitamin levels, no other concerns       HISTORY OF PRESENT ILLNESS:  Onur is a 65 y.o. male presenting to the clinic today needing to have some lab done for him.  He is a patient with a past history of gastric bypass surgery.  He noted that he had continued to feel fatigued and tired which has been for a long time now.  He would like to have his vitamin levels checked as well as some other lab.      REVIEW OF SYSTEMS:   He does not have any fevers any chills.  Noted no acute symptoms at this visit.  All other systems are negative.    PFSH:  Reviewed, as below.    Social History     Tobacco Use   Smoking Status Former Smoker     Years: 10.00     Types: Cigars   Smokeless Tobacco Never Used       Family History   Problem Relation Age of Onset     Stroke Mother      Heart disease Father      Hodgkin's lymphoma Brother        Social History     Socioeconomic History     Marital status:      Spouse name: Not on file     Number of children: Not on file     Years of education: Not on file     Highest  education level: Not on file   Occupational History     Not on file   Social Needs     Financial resource strain: Not on file     Food insecurity     Worry: Not on file     Inability: Not on file     Transportation needs     Medical: Not on file     Non-medical: Not on file   Tobacco Use     Smoking status: Former Smoker     Years: 10.00     Types: Cigars     Smokeless tobacco: Never Used   Substance and Sexual Activity     Alcohol use: No     Drug use: No     Comment: formerly used     Sexual activity: Not on file   Lifestyle     Physical activity     Days per week: Not on file     Minutes per session: Not on file     Stress: Not on file   Relationships     Social connections     Talks on phone: Not on file     Gets together: Not on file     Attends Hoahaoism service: Not on file     Active member of club or organization: Not on file     Attends meetings of clubs or organizations: Not on file     Relationship status: Not on file     Intimate partner violence     Fear of current or ex partner: Not on file     Emotionally abused: Not on file     Physically abused: Not on file     Forced sexual activity: Not on file   Other Topics Concern     Not on file   Social History Narrative     Not on file       Past Surgical History:   Procedure Laterality Date     RI ARTHRODESIS ANT INTERBODY MIN DISCECTOMY,LUMBAR      Description: Lumbar Vertebral Fusion;  Recorded: 06/26/2009;  Comments: before 200 see [facility] consult under old records     RI EXCISE EXCESS SKIN TISSUE,ABDOMEN  11/13/2012    Description: Panniculectomy;  Proc Date: 11/13/2012;  Comments: 3.5 Lb Pannus was removed at the [facility] By Dr. Shon Green     RI GASTRIC BYPASS,OBESE<150CM LORA-EN-Y  2008    Description: Gastric Surgery For Morbid Obesity Bypass With Lora-en-Y;  Recorded: 06/26/2009;  Comments: dr green 2008     RI REMOVAL OF TONSILS,<12 Y/O      Description: Tonsillectomy;  Recorded: 03/23/2012;  Comments: for obstructive sleep apnea     RI  REPAIR INCISIONAL HERNIA,REDUCIBLE  11/13/2012    Description: Incisional Hernia Repair;  Proc Date: 11/13/2012;  Comments: incisional hernia repair and abdominal panniculectomy by Dr Shon Green at the [facility].       Allergies   Allergen Reactions     Hydrocodone Other (See Comments)     SNEEZES       Active Ambulatory Problems     Diagnosis Date Noted     Osteoarthritis      Hypercholesterolemia      Obstructive Sleep Apnea      Hypertension      Coronary Arteriosclerosis      Lower Back Pain Chronic      Insomnia      Paroxysmal Atrial Fibrillation      Post-gastric Bypass For Obesity      Nephrolithiasis      Acid reflux disease 10/31/2017     Sleepiness 05/08/2018     Morbid obesity (H) 08/01/2018     Claudication of lower extremity (H) 11/20/2019     Postlaminectomy syndrome, lumbar region 01/24/2020     Resolved Ambulatory Problems     Diagnosis Date Noted     Classic Migraine (With Aura)      Congestive heart failure (H)      Ventral hernia      Ischemic Cardiomyopathy      Morbid obesity (H)      Acute myocardial infarction (H)      Lumbar Canal Stenosis With Neurogenic Claudication      Bradycardia 09/27/2017     No Additional Past Medical History       Current Outpatient Medications   Medication Sig Dispense Refill     aspirin 81 MG EC tablet Take 81 mg by mouth daily.       baclofen (LIORESAL) 10 MG tablet Take 10 mg by mouth 3 (three) times a day.       carvedilol (COREG CR) 10 MG 24 hr capsule TAKE 1 CAPSULE BY MOUTH EVERY DAY 90 capsule 1     DULoxetine (CYMBALTA) 30 MG capsule Take 90 mg by mouth daily.  1     furosemide (LASIX) 20 MG tablet TAKE 1 TABLET BY MOUTH EVERY DAY 90 tablet 0     gabapentin (NEURONTIN) 300 MG capsule 300 mg 3 (three) times a day.        lisinopriL (PRINIVIL,ZESTRIL) 20 MG tablet Take 1.5 tablets (30 mg total) by mouth daily. 135 tablet 3     nitroglycerin (NITROSTAT) 0.4 MG SL tablet Place 0.4 mg under the tongue.       omeprazole (PRILOSEC) 40 MG capsule Take 40  mg by mouth.       traZODone (DESYREL) 100 MG tablet TAKE 2 TABLETS (200MG TOTAL) BY MOUTH AT BEDTIME 180 tablet 3     UNABLE TO FIND Med Name: Morphine Sulfate COURTNEY       atorvastatin (LIPITOR) 40 MG tablet TAKE 1 TABLET BY MOUTH AT BEDTIME 90 tablet 3     FLUZONE QUAD 4163-5789, PF, 60 mcg (15 mcg x 4)/0.5 mL Syrg injection        No current facility-administered medications for this visit.        VITALS:  Vitals:    01/05/21 1546   BP: 136/86   Patient Site: Left Arm   Patient Position: Sitting   Cuff Size: Adult Large   Pulse: 88   Weight: (!) 348 lb 9.6 oz (158.1 kg)     Wt Readings from Last 3 Encounters:   01/05/21 (!) 348 lb 9.6 oz (158.1 kg)   12/07/20 (!) 321 lb 6.4 oz (145.8 kg)   11/30/20 (!) 336 lb 3.2 oz (152.5 kg)     Body mass index is 47.27 kg/m .    PHYSICAL EXAM:  General Appearance: Alert, cooperative, no distress, appears stated age, and obese.  HEENT: Pupils are equal and reactive, extraocular motions is normal. Neck is supple no notable thyromegaly.  External ears are normal.  Lungs: Clear to auscultation bilaterally, respirations unlabored  Heart: Regular rhythm and normal rate,S1 and S2 normal,   Abdomen: Soft  Musculoskeletal: He does have mild swelling to lower extremity.  No joint swelling.  And he walks almost doubled over because of vertebral pain.  Neurologic:  Alert and oriented times 3.   Psychiatric: Normal mood and affect.    MEDICATIONS:  Current Outpatient Medications   Medication Sig Dispense Refill     aspirin 81 MG EC tablet Take 81 mg by mouth daily.       baclofen (LIORESAL) 10 MG tablet Take 10 mg by mouth 3 (three) times a day.       carvedilol (COREG CR) 10 MG 24 hr capsule TAKE 1 CAPSULE BY MOUTH EVERY DAY 90 capsule 1     DULoxetine (CYMBALTA) 30 MG capsule Take 90 mg by mouth daily.  1     furosemide (LASIX) 20 MG tablet TAKE 1 TABLET BY MOUTH EVERY DAY 90 tablet 0     gabapentin (NEURONTIN) 300 MG capsule 300 mg 3 (three) times a day.        lisinopriL  (PRINIVIL,ZESTRIL) 20 MG tablet Take 1.5 tablets (30 mg total) by mouth daily. 135 tablet 3     nitroglycerin (NITROSTAT) 0.4 MG SL tablet Place 0.4 mg under the tongue.       omeprazole (PRILOSEC) 40 MG capsule Take 40 mg by mouth.       traZODone (DESYREL) 100 MG tablet TAKE 2 TABLETS (200MG TOTAL) BY MOUTH AT BEDTIME 180 tablet 3     UNABLE TO FIND Med Name: Morphine Sulfate COURTNEY       atorvastatin (LIPITOR) 40 MG tablet TAKE 1 TABLET BY MOUTH AT BEDTIME 90 tablet 3     FLUZONE QUAD [id number], PF, 60 mcg (15 mcg x 4)/0.5 mL Syrg injection        No current facility-administered medications for this visit.

## 2021-06-15 NOTE — TELEPHONE ENCOUNTER
RN cannot approve Refill Request    RN can NOT refill this medication med is not covered by policy/route to provider. Last office visit: 1/5/2021 Lunan Berger MD Last Physical: 4/17/2017 Last MTM visit: Visit date not found Last visit same specialty: 1/5/2021 Luann Berger MD.  Next visit within 3 mo: Visit date not found  Next physical within 3 mo: Visit date not found      Aminata Zhao, [facility] [place] Connection Triage/Med Refill 3/3/2021    Requested Prescriptions   Pending Prescriptions Disp Refills     ergocalciferol (ERGOCALCIFEROL) 1,250 mcg (50,000 unit) capsule [Pharmacy Med Name: VITAMIN D2 1.25MG(50,000 UNIT)] 12 capsule 0     Sig: TAKE 1 CAPSULE (50,000 UNITS TOTAL) BY MOUTH ONCE A WEEK FOR 12 DOSES       There is no refill protocol information for this order

## 2021-06-16 NOTE — PROGRESS NOTES
MTM Encounter    ASSESSMENT AND PLAN    1. CAD/Cardiomyopathy  Blood pressure is well controlled and meeting goal of <140/90 mm Hg per JNC-8 hypertension guidelines. He does continue to have swelling and did agree with plan to try compression stockings. If that is not sufficient, could consider increase in dose of furosemide.     2. Hypercholesterolemia  Appropriately on a high intensity statin given cardiac history per ACC/AHA guidelines. Last LDL of 51 mg/dl.     3. Insomnia  Adequately controlled on trazodone.     4. Low back pain  Managed by [facility]. Possible opportunities for medication adjustment include optimizing dose of gabapentin, especially at bedtime for neuropathy symptoms in his feet, and also considering taper and discontinuation of duloxetine as he has not seen any significant benefit when it was added a year ago. Duloxetine could also potentially be contributing to his fatigue. He will discuss with his pain management provider at follow-up.       Because the patient was not familiar with what many of his medications were for, I created a simplified medication chart including indication for each medication.   Medication Instructions Reason   Coreg CR (carvedilol) 10 mg Take 1 capsule daily.  Blood pressure/heart rate   Lisinopril 20 mg Take 1 tablet daily. Blood pressure   Furosemide 20 mg Take 1 tablet every morning. Diuretic/Water pill   Aspirin 81 mg Take 1 tablet daily.  Heart health/blood thinner   Atorvastatin 40 mg  Take 1 tablet daily. Cholesterol   Trazodone 100 mg Take 2 tablets at bedtime.  Sleep   Gabapentin 300 mg  Take 1 tablet three times a day. Pain/Neuropathy   Cymbalta 30 mg Take 3 capsules daily. Pain/Depression   morphine 20 mg/ml, baclofen 42.5 mcg/ml, clonidine 21.1 mcg/ml Pain Pump         SUBJECTIVE AND OBJECTIVE  Onur Barr is a 62 y.o. male here for a medication therapy management (MTM) appointment. He does use a pillbox to help him organize his  "medications and denies any missed doses.     1. CAD/Cardiomyopathy  Onur is taking aspirin 81 mg daily, carvedilol CR 10mg daily, furosemide 20 mg daily, and lisinopril 20 mg daily. He tends to have more swelling in his lower right leg. He's looking into compression stockings. He does see a cardiologist. His dose of Coreg was lowered to see if that would help with his fatigue, but he didn't notice much change. Cardiology would like to keep him on a beta blocker given his cardiomyopathy. Last EF of 55%.     BP Readings from Last 3 Encounters:   02/13/18 122/82   09/27/17 142/90   09/19/17 132/78     Lab Results   Component Value Date    HGBA1C 5.1 11/19/2014       2. Hypercholesterolemia  Onur is taking atorvastatin 40 mg daily. He is not sure what this medication is for.     3. Insomnia  Onur is taking trazodone 200 mg at bedtime to help him fall asleep and stay asleep. He sleeps 4-5 hours a night, which he thinks is sufficient.     4. Low back pain  Onur is seen at the [facility]. He is taking gabapentin 300 mg TID. This also helps his restless feet. He has an intrathecal pain pump containing morphine 20 mg/ml, baclofen 42.5 mcg/ml, clonidine 21.1 mcg/ml. He's been on the pump for about 10 years. He says it helps \"a little bit.\" Denies constipation, but does feel fatigued during the day. He describes his pain as sharp, aching that can radiate down right leg.  He had an injury at work in the 80s and hurt his back in the 90s. He's had multiple surgeries. He is also taking duloxetine 90 mg daily, which was started last year. He hasn't noticed much change in his symptoms.           Onur's medication list was reviewed with them, discussing reason for use, directions for use, and potential side effects of each medication as needed. Indication, safety, efficacy, and convenience was assessed for all medications addressed above.  No environmental factors were noted currently affecting patient.  This care " plan was communicated via EMR with his primary care provider, Luann Berger MD, who is the authorizing prescriber for this visit.  Direct supervision was available by either the patient's PCP or other available provider.    Time Spent: 40 minutes  Time and complexity billing metrics are included in the doc-flowsheet linked to this visit.       Fabienne Casas, PharmD, BCACP    Current Outpatient Prescriptions   Medication Sig Dispense Refill     aspirin 81 MG EC tablet Take 81 mg by mouth daily.       atorvastatin (LIPITOR) 40 MG tablet TAKE 1 TABLET BY MOUTH AT BEDTIME 90 tablet 3     carvedilol (COREG CR) 10 MG 24 hr capsule Take 1 capsule (10 mg total) by mouth daily. 90 capsule 3     DULoxetine (CYMBALTA) 30 MG capsule TK 1 C PO QD  1     furosemide (LASIX) 20 MG tablet TAKE 1 TABLET BY MOUTH EVERY DAY 90 tablet 2     gabapentin (NEURONTIN) 300 MG capsule 300 mg 3 (three) times a day.        lisinopril (PRINIVIL,ZESTRIL) 20 MG tablet Take 1 tablet (20 mg total) by mouth daily. 90 tablet 1     omeprazole (PRILOSEC) 40 MG capsule TAKE 1 CAPSULE (40 MG TOTAL) BY MOUTH DAILY. 90 capsule 0     traZODone (DESYREL) 100 MG tablet Take 2 tablets (200 mg total) by mouth at bedtime. 180 tablet 0     UNABLE TO FIND Med Name: Morphine Sulfate COURTNEY       No current facility-administered medications for this visit.

## 2021-06-17 NOTE — PROGRESS NOTES
"    Assessment & Plan     Hip pain, right    He has had pretty significant arthritis there in that right hip joint a little bit worse than the left.  The x-ray is read by me showing this arthritis audiologist agrees.    We talked about plans of what to do for this and I would send him to the orthopedist as he may very well need either an injection or eventually some sort of a procedure but with his obesity that may be a bit of a challenge.    In the meantime he has chronic pain medication which should hopefully help some of his pain if he needs any changes in that he would have to talk to his pain clinic.    He has a history of claudication by his problem list, but I do not think that that is what this is as it does not really seem to be exertional and seems to be more coming from the hip radiating down the front of the leg rather than mostly into the calf as I would expect for claudication.      - XR Hip Right 2 or More VWS  - Ambulatory referral to Orthopedics    Morbid obesity (H)    Obviously needs to lose weight so we talked about trying even with this chronic pain, to get a bit of exercise whenever in however he can which would be beneficial.  We also talked about trying to change a bit weight needs which would be helpful as well.            Review of the result(s) of each unique test - xray hip  Ordering of each unique test  :[id number]}     BMI:   Estimated body mass index is 46.4 kg/m  as calculated from the following:    Height as of 12/7/20: 6' 0.01\" (1.829 m).    Weight as of this encounter: 342 lb 3.2 oz (155.2 kg).   The following high BMI interventions were performed this visit: encouragement to exercise, weight monitoring and lifestyle education regarding diet    No follow-ups on file.    Rahul Harrington MD  [facility]   Onur Barr is 65 y.o. and presents today for the following health issues   HPI     Patient here today with some right leg pain.  When " pressed really it seems to be coming more from the groin and the hip joint area rather than anywhere else.  Patient also obviously has chronic low back pain and chronic claudication which complicates the picture somewhat.  He has pain pump that he gets from the pain clinic but even with that, he is having pain in his groin especially when he moves his hip joint around or when he walks or goes up and down stairs.  The pain is really high rather than claudication pain which I would expect the lower into the leg.  He may certainly have some back issues as well but the pain that he describes in his exam would lead me to lead to think more about his hip rather than other etiologies at this time.    Review of Systems          Objective    BP (!) 144/98 (Patient Site: Left Arm, Patient Position: Sitting, Cuff Size: Adult Large)   Pulse 82   Wt (!) 342 lb 3.2 oz (155.2 kg)   SpO2 95%   BMI 46.40 kg/m    Body mass index is 46.4 kg/m .  Physical Exam    He walks slowly and with a wide-based arthritic type gait and seems to be having the more of a problem walking to use that right hip rather than the left hip.  He has pain with internal and external rotation of the hip and because of his body habitus he really cannot even do figure-of-four testing or any other significant hip lamination I can do with him today.    Xr Hip Right 2 Or More Vws    Result Date: 5/14/2021  EXAM: XR HIP RIGHT 2 OR MORE VWS LOCATION: [facility] DATE/TIME: 5/14/2021 10:50 AM INDICATION: Right hip pain x 2 months COMPARISON: CT 12/13/2020.     Narrowing right hip joint with osteophyte formation. Moderate-sized right femoral head subchondral cyst, also seen on the prior CT. These findings represent moderate to advanced osteoarthritis. No fracture or dislocation. A battery pack projects over the left buttock. Lower lumbar intervertebral disc prostheses. Caval filter.

## 2021-06-17 NOTE — PROGRESS NOTES
[facility]   Outpatient Follow-up evaluation.  Congestive heart failure hyperlipidemia    Current Outpatient Prescriptions:      aspirin 81 MG EC tablet, Take 81 mg by mouth daily., Disp: , Rfl:      atorvastatin (LIPITOR) 40 MG tablet, TAKE 1 TABLET BY MOUTH AT BEDTIME, Disp: 90 tablet, Rfl: 3     carvedilol (COREG CR) 10 MG 24 hr capsule, Take 1 capsule (10 mg total) by mouth daily., Disp: 90 capsule, Rfl: 3     DULoxetine (CYMBALTA) 30 MG capsule, Take 90 mg by mouth daily., Disp: , Rfl: 1     furosemide (LASIX) 20 MG tablet, TAKE 1 TABLET BY MOUTH EVERY DAY, Disp: 90 tablet, Rfl: 2     gabapentin (NEURONTIN) 300 MG capsule, 300 mg 3 (three) times a day. , Disp: , Rfl:      lisinopril (PRINIVIL,ZESTRIL) 20 MG tablet, Take 1 tablet (20 mg total) by mouth daily., Disp: 90 tablet, Rfl: 2     traZODone (DESYREL) 100 MG tablet, TAKE 2 TABLETS (200 MG TOTAL) BY MOUTH AT BEDTIME., Disp: 180 tablet, Rfl: 1     UNABLE TO FIND, Med Name: Morphine Sulfate COURTNEY, Disp: , Rfl:         Onur Barr is a 62 y.o. Male    Chief Complaint   Patient presents with     Follow-up       Diagnoses:(See Problem list)       Hypercholesterolemia  - Primary E78.00    Ischemic Cardiomyopathy  I25.89    Atherosclerosis of native coronary artery of native heart without angina pectoris  I25.10    Bradycardia  R00.1    Cardiomyopathy  I42.9            Recommendations:    For now continue with current medical therapy.  The adjustment Coreg seems to improve the bradycardia.  The sleepiness is less clear to me and I would suspect a possible sleep apnea.  Will refer to sleep clinic for further evaluation      Subjective:   Main symptom concern is difficulty with sleeping at night waking up and then daytime sleepiness.  Had been tested for sleep apnea many years ago at least 10 at the time no recommendations were made.  No chest pain or pressure breathing is otherwise stable mildly with right lower extremities but otherwise no edema  "weight continues to be elevated      .  LDL at 69.              No past medical history on file.  Past Surgical History:   Procedure Laterality Date     OR ARTHRODESIS ANT INTERBODY MIN DISCECTOMY,LUMBAR      Description: Lumbar Vertebral Fusion;  Recorded: 06/26/2009;  Comments: before 200 see [facility] consult under old records     OR EXCISE EXCESS SKIN TISSUE,ABDOMEN  11/13/2012    Description: Panniculectomy;  Proc Date: 11/13/2012;  Comments: 3.5 Lb Pannus was removed at the [facility] By Dr. Shon Green     OR GASTRIC BYPASS,OBESE<150CM LORA-EN-Y  2008    Description: Gastric Surgery For Morbid Obesity Bypass With Lora-en-Y;  Recorded: 06/26/2009;  Comments: dr green 2008     OR REMOVAL OF TONSILS,<11 Y/O      Description: Tonsillectomy;  Recorded: 03/23/2012;  Comments: for obstructive sleep apnea     OR REPAIR INCISIONAL HERNIA,REDUCIBLE  11/13/2012    Description: Incisional Hernia Repair;  Proc Date: 11/13/2012;  Comments: incisional hernia repair and abdominal panniculectomy by Dr Shon Green at the [facility].     Allergies   Allergen Reactions     Hydrocodone      Family History   Problem Relation Age of Onset     Stroke Mother      Heart disease Father      Hodgkin's lymphoma Brother       Social History     Social History     Marital status:      Spouse name: N/A     Number of children: N/A     Years of education: N/A     Occupational History     Not on file.     Social History Main Topics     Smoking status: Former Smoker     Types: Cigars     Smokeless tobacco: Never Used     Alcohol use Not on file     Drug use: No      Comment: formerly used     Sexual activity: Not on file     Other Topics Concern     Not on file     Social History Narrative         Objective:   /80 (Patient Site: Left Arm, Patient Position: Sitting, Cuff Size: Adult Large)  Pulse 72  Resp 16  Ht 5' 8.5\" (1.74 m)  Wt (!) 306 lb (138.8 kg)  BMI 45.85 kg/m2  (!) 306 lb (138.8 kg)   Wt Readings from Last 3 " Encounters:   05/08/18 (!) 306 lb (138.8 kg)   09/27/17 (!) 271 lb 11.2 oz (123.2 kg)   04/17/17 (!) 268 lb 3.2 oz (121.7 kg)     BP Readings from Last 3 Encounters:   05/08/18 138/80   02/13/18 122/82   09/27/17 142/90     Pulse Readings from Last 3 Encounters:   05/08/18 72   09/27/17 (!) 52   09/19/17 (!) 52     General appearance: alert, appears stated age and cooperative  Head: Normocephalic, without obvious abnormality, atraumatic  Eyes: Normal external exam without jaundice.  Ears: Normal external auricular exam.  Nose: Normal external exam.  Lungs: clear to auscultation bilaterally  Chest wall: no tenderness  Heart: regular rate and rhythm, S1, S2 normal, no murmur, click, rub or gallop   Pulses: 2+ and symmetric  Skin: Skin color, texture, turgor normal.   Neurologic: Grossly normal, no focal neurologic findings.    Review of Systems:   General: Weight Gain  Cardiographics: Reviewed in clinic.    Lab Results:  Lab Results: Personally reviewed  Lab Results   Component Value Date    CHOL 121 01/24/2017    TRIG 129 01/24/2017    HDL 44 01/24/2017    LDLDIRECT 69 02/13/2018       Clinical evaluation time today including exam 25 minutes.  At least 50% of clinic evaluation time involved in assessment and patient counseling.  Part of this chart was created using a dictation software.  Typographic errors, word substitutions, and grammatical errors may unintentionally occur.    Claudio Stephens M.D.  [facility]

## 2021-06-18 ENCOUNTER — OFFICE VISIT - HEALTHEAST (OUTPATIENT)
Dept: FAMILY MEDICINE | Facility: CLINIC | Age: 66
End: 2021-06-18

## 2021-06-18 DIAGNOSIS — M16.11 PRIMARY OSTEOARTHRITIS OF RIGHT HIP: ICD-10-CM

## 2021-06-18 DIAGNOSIS — I10 BENIGN ESSENTIAL HYPERTENSION: ICD-10-CM

## 2021-06-18 DIAGNOSIS — I87.2 VENOUS INSUFFICIENCY, PERIPHERAL: ICD-10-CM

## 2021-06-19 NOTE — PROGRESS NOTES
ASSESSMENT:  1. Open wound of anterior abdominal wall, subsequent encounter  - Ambulatory referral to Wound Clinic    2. Morbid obesity (H)  Unfortunately cannot exercise but discussed dietary changes.    3. Neck pain on right side    4. Claudication of lower extremity (H)  Diagnosed and nerve related and not vascular.May be associated with the lower back issues.      PLAN:  Wound was cleaned and dressed and I think he needs to be seen by the wound care clinic for continuing care. Referred to Wound care.Reviewed with the Vascular consult with him.Will have to wear compression stocking for the venous insufficiency.  Will await approval of the neck from the Labor department.  Meanwhile he will do the sleep study.      Orders Placed This Encounter   Procedures     Ambulatory referral to Wound Clinic     Referral Priority:   Routine     Referral Type:   Consultation     Referral Reason:   Evaluation and Treatment     Requested Specialty:   Wound Care     Number of Visits Requested:   1     There are no discontinued medications.    No Follow-up on file.      CHIEF COMPLAINT:  Chief Complaint   Patient presents with     Follow-up     f/u for sore in abdomen        HISTORY OF PRESENT ILLNESS:  Onur is a 62 y.o. male presenting to the clinic today for follow. He has an abdominal wall ulcer which was evaluated about 4 weeks ago.Was treated with antibiotics and recommended to do daily dressing.Noted some improvement after some time on the antibiotic and it seem to be drying up. But then started to ooze again.It is also smelly but not painful. Comes in for dressing and further dressing.  He was referred to see the Vascular doc for lower legs claudication.Reviewed the notes,he was told he has Venous insufficiency and the Claudication is probably nerve in origin.Will be doing another Sleep Study.Has an appointment for that.      REVIEW OF SYSTEMS:   Continues to have severe back pain and sees the Pain clinic.Also having neck  pain with popping noise and awaiting to have neck xray approved.He denied any fever or chills.Denied any other constitutional symptoms. All other systems are negative.    PFSH:  Reviewed, as below.    History   Smoking Status     Former Smoker     Types: Cigars   Smokeless Tobacco     Never Used       Family History   Problem Relation Age of Onset     Stroke Mother      Heart disease Father      Hodgkin's lymphoma Brother        Social History     Social History     Marital status:      Spouse name: N/A     Number of children: N/A     Years of education: N/A     Occupational History     Not on file.     Social History Main Topics     Smoking status: Former Smoker     Types: Cigars     Smokeless tobacco: Never Used     Alcohol use Not on file     Drug use: No      Comment: formerly used     Sexual activity: Not on file     Other Topics Concern     Not on file     Social History Narrative       Past Surgical History:   Procedure Laterality Date     [place] ARTHRODESIS ANT INTERBODY MIN DISCECTOMY,LUMBAR      Description: Lumbar Vertebral Fusion;  Recorded: 06/26/2009;  Comments: before 200 see [facility] consult under old records     [place] EXCISE EXCESS SKIN TISSUE,ABDOMEN  11/13/2012    Description: Panniculectomy;  Proc Date: 11/13/2012;  Comments: 3.5 Lb Pannus was removed at the [facility] By Dr. Shon Green     [place] GASTRIC BYPASS,OBESE<150CM LORA-EN-Y  2008    Description: Gastric Surgery For Morbid Obesity Bypass With Lora-en-Y;  Recorded: 06/26/2009;  Comments: dr green 2008     [place] REMOVAL OF TONSILS,<13 Y/O      Description: Tonsillectomy;  Recorded: 03/23/2012;  Comments: for obstructive sleep apnea     [place] REPAIR INCISIONAL HERNIA,REDUCIBLE  11/13/2012    Description: Incisional Hernia Repair;  Proc Date: 11/13/2012;  Comments: incisional hernia repair and abdominal panniculectomy by Dr Shon Green at the [facility].       Allergies   Allergen Reactions     Hydrocodone        Active Ambulatory Problems      Diagnosis Date Noted     Osteoarthritis      Hypercholesterolemia      Obstructive Sleep Apnea      Hypertension      Coronary Arteriosclerosis      Lower Back Pain Chronic      Insomnia      Paroxysmal Atrial Fibrillation      Post-gastric Bypass For Obesity      Nephrolithiasis      Acid reflux disease 10/31/2017     Sleepiness 05/08/2018     Morbid obesity (H) 08/01/2018     Resolved Ambulatory Problems     Diagnosis Date Noted     Classic Migraine (With Aura)      Congestive heart failure (H)      Ventral hernia      Ischemic Cardiomyopathy      Morbid obesity (H)      Acute myocardial infarction      Lumbar Canal Stenosis With Neurogenic Claudication      Bradycardia 09/27/2017     No Additional Past Medical History       Current Outpatient Prescriptions   Medication Sig Dispense Refill     aspirin 81 MG EC tablet Take 81 mg by mouth daily.       atorvastatin (LIPITOR) 40 MG tablet TAKE 1 TABLET BY MOUTH AT BEDTIME 90 tablet 3     carvedilol (COREG CR) 10 MG 24 hr capsule Take 1 capsule (10 mg total) by mouth daily. 90 capsule 3     DULoxetine (CYMBALTA) 30 MG capsule Take 90 mg by mouth daily.  1     furosemide (LASIX) 20 MG tablet TAKE 1 TABLET BY MOUTH EVERY DAY 90 tablet 0     gabapentin (NEURONTIN) 300 MG capsule 300 mg 3 (three) times a day.        lisinopril (PRINIVIL,ZESTRIL) 20 MG tablet Take 1 tablet (20 mg total) by mouth daily. 90 tablet 2     traZODone (DESYREL) 100 MG tablet TAKE 2 TABLETS (200 MG TOTAL) BY MOUTH AT BEDTIME. 180 tablet 1     UNABLE TO FIND Med Name: Morphine Sulfate COURTNEY       No current facility-administered medications for this visit.        VITALS:  Vitals:    08/01/18 1146 08/01/18 1215   BP: 148/80 132/88   Patient Site: Left Arm    Patient Position: Sitting    Cuff Size: Adult Large    Pulse: 76    Temp: 97.8  F (36.6  C)    TempSrc: Oral    Weight: (!) 311 lb 9.6 oz (141.3 kg)      Wt Readings from Last 3 Encounters:   08/01/18 (!) 311 lb 9.6 oz (141.3 kg)   07/30/18 (!)  300 lb (136.1 kg)   07/09/18 (!) 309 lb 14.4 oz (140.6 kg)     Body mass index is 42.26 kg/(m^2).    PHYSICAL EXAM:  General Appearance: Alert, cooperative, in obvious pain distress, appears stated age, walks with a stooped posture due to back pain and discomfort.  HEENT: Pupils are equal and reactive, extraocular motions is normal.  Oropharynx is moist.  Neck is supple no notable thyromegaly.  External ears are normal.  Lungs: Respiration is unlabored  Heart: Normal peripheral pulsation.    Abdomen: Soft obese but nontender.  He does have some pannus and has a lower abdominal wall midline ulcer. The Ulcer slightly red, not draining any abnormal fluid but does have an odor to it.  It is nontender.  This is located on the middle of his abdominal pannus.  Musculoskeletal: Normal range of motion. No joint swelling or deformity.   He does have evidence of venous insufficiency in the lower extremities.  Neurologic:  Alert and oriented times 3.   Psychiatric: Normal mood and affect.    MEDICATIONS:  Current Outpatient Prescriptions   Medication Sig Dispense Refill     aspirin 81 MG EC tablet Take 81 mg by mouth daily.       atorvastatin (LIPITOR) 40 MG tablet TAKE 1 TABLET BY MOUTH AT BEDTIME 90 tablet 3     carvedilol (COREG CR) 10 MG 24 hr capsule Take 1 capsule (10 mg total) by mouth daily. 90 capsule 3     DULoxetine (CYMBALTA) 30 MG capsule Take 90 mg by mouth daily.  1     furosemide (LASIX) 20 MG tablet TAKE 1 TABLET BY MOUTH EVERY DAY 90 tablet 0     gabapentin (NEURONTIN) 300 MG capsule 300 mg 3 (three) times a day.        lisinopril (PRINIVIL,ZESTRIL) 20 MG tablet Take 1 tablet (20 mg total) by mouth daily. 90 tablet 2     traZODone (DESYREL) 100 MG tablet TAKE 2 TABLETS (200 MG TOTAL) BY MOUTH AT BEDTIME. 180 tablet 1     UNABLE TO FIND Med Name: Morphine Sulfate COURTNEY       No current facility-administered medications for this visit.

## 2021-06-19 NOTE — PROGRESS NOTES
Dear Dr. Claudio Stephens Md  [address] [place] [address]    Thank you for the opportunity to participate in the care of Mr. Onur Barr.    He is a 62 y.o. male who comes to the clinic with a chief complaint of excessive daytime sleepiness that is been going on for approximately 10 years.  He actually was diagnosed with obstructive sleep apnea here in [facility] in 2008 and was initiated on CPAP therapy.  However he could not tolerate the mass that he was given and therefore discontinued it on his own.  As a result he continues to have frequent nocturnal awakenings followed by loud snoring.  He also feels excessively tired during the day despite adequate hours of sleep.  To complicate matters further the patient has a pain pump in his back and is in constant pain which may occasionally wake him up from sleep.  He has had family members tell him that he would wake up in his sleep and sleep while sitting up.  The patient's review of system is otherwise unremarkable.     Ideal Sleep-Wake Cycle(devoid of societal pressure):    Patient would try to initiate sleep at around 11 PM with a sleep latency of less than 5 minutes. The patient would have 5-6 nocturnal awakening. Final wake up time is around 5:30 AM.    Past Medical History  History reviewed. No pertinent past medical history.     Past Surgical History  Past Surgical History:   Procedure Laterality Date     [place] ARTHRODESIS ANT INTERBODY MIN DISCECTOMY,LUMBAR      Description: Lumbar Vertebral Fusion;  Recorded: 06/26/2009;  Comments: before 200 see [facility] consult under old records     OK EXCISE EXCESS SKIN TISSUE,ABDOMEN  11/13/2012    Description: Panniculectomy;  Proc Date: 11/13/2012;  Comments: 3.5 Lb Pannus was removed at the [facility] By Dr. Shon Green     OK GASTRIC BYPASS,OBESE<150CM SUSY-EN-Y  2008    Description: Gastric Surgery For Morbid Obesity Bypass With Susy-en-Y;  Recorded: 06/26/2009;  Comments: dr green 2008     OK REMOVAL OF  TONSILS,<14 Y/O      Description: Tonsillectomy;  Recorded: 03/23/2012;  Comments: for obstructive sleep apnea     [place] REPAIR INCISIONAL HERNIA,REDUCIBLE  11/13/2012    Description: Incisional Hernia Repair;  Proc Date: 11/13/2012;  Comments: incisional hernia repair and abdominal panniculectomy by Dr Shon Green at the [facility].        Meds  Current Outpatient Prescriptions   Medication Sig Dispense Refill     aspirin 81 MG EC tablet Take 81 mg by mouth daily.       atorvastatin (LIPITOR) 40 MG tablet TAKE 1 TABLET BY MOUTH AT BEDTIME 90 tablet 3     carvedilol (COREG CR) 10 MG 24 hr capsule Take 1 capsule (10 mg total) by mouth daily. 90 capsule 3     DULoxetine (CYMBALTA) 30 MG capsule Take 90 mg by mouth daily.  1     furosemide (LASIX) 20 MG tablet TAKE 1 TABLET BY MOUTH EVERY DAY 90 tablet 2     gabapentin (NEURONTIN) 300 MG capsule 300 mg 3 (three) times a day.        lisinopril (PRINIVIL,ZESTRIL) 20 MG tablet Take 1 tablet (20 mg total) by mouth daily. 90 tablet 2     traZODone (DESYREL) 100 MG tablet TAKE 2 TABLETS (200 MG TOTAL) BY MOUTH AT BEDTIME. 180 tablet 1     UNABLE TO FIND Med Name: Morphine Sulfate COURTNEY       No current facility-administered medications for this visit.         Allergies  Hydrocodone     Social History  Social History     Social History     Marital status:      Spouse name: N/A     Number of children: N/A     Years of education: N/A     Occupational History     Not on file.     Social History Main Topics     Smoking status: Former Smoker     Types: Cigars     Smokeless tobacco: Never Used     Alcohol use Not on file     Drug use: No      Comment: formerly used     Sexual activity: Not on file     Other Topics Concern     Not on file     Social History Narrative        Family History  Family History   Problem Relation Age of Onset     Stroke Mother      Heart disease Father      Hodgkin's lymphoma Brother         Review of Systems:  Constitutional: Negative except as  "noted in HPI.   Eyes: Negative except as noted in HPI.   ENT: Negative except as noted in HPI.   Cardiovascular: Negative except as noted in HPI.   Respiratory: Negative except as noted in HPI.   Gastrointestinal: Negative except as noted in HPI.   Genitourinary: Negative except as noted in HPI.   Musculoskeletal: Negative except as noted in HPI.   Integumentary: Negative except as noted in HPI.   Neurological: Negative except as noted in HPI.   Psychiatric: Negative except as noted in HPI.   Endocrine: Negative except as noted in HPI.   Hematologic/Lymphatic: Negative except as noted in HPI.      STOP BANG 7/2/2018   Do you snore loudly (louder than talking or loud enough to be heard through closed doors)? 0   Do you often feel tired, fatigued, or sleepy during daytime? 1   Has anyone observed you stop breathing in your sleep? 0   Do you have or are you being treated for high blood pressure? 1   BMI more than 35 kg/m2 1   Age over 50 years old? 1   Neck circumference greater than 16 inches? 1   Gender male? 1   Total Score 6   Epworths Sleepiness Scale 7/2/2018   Sitting and reading 3   Watching TV 3   Sitting, inactive in a public place (e.g. a theatre or a meeting) 3   As a passenger in a car for an hour without a break 2   Lying down to rest in the afternoon when circumstances permit 2   Sitting and talking to someone 1   Sitting quietly after a lunch without alcohol 0   In a car, while stopped for a few minutes in traffic 0   Total score 14   Rooming 7/2/2018   Usual bedtime 11 pm   Sleep Latency right away   Awakenings 5-6   Wake Up Time 530   Energy Drinks 0   Coffee 1-2   Cola 0   Difficulty falling asleep No   Difficulty staying asleep Yes   Excessive daytime tiredness Yes   Excessive daytime sleepiness Yes   Dozing off while driving No   Shift Worker No   Sleep Walking? No   Sleep Talking? No   Kicking or punching? No   Restless legs symptoms Yes       Physical Exam:  BP (!) 162/91  Pulse 62  Ht 5' 8.5\" " "(1.74 m)  Wt (!) 311 lb 12.8 oz (141.4 kg)  SpO2 95%  BMI 46.72 kg/m2  BMI:Body mass index is 46.72 kg/(m^2).   GEN: NAD, obese  Head: Normocephalic.  EYES: PERRLA, EOMI  ENT: Oropharynx is clear, mallampatti class 4+ airway.  Nasal mucosa is moist without erythema  Neck : Thyroid is within normal limits. Neck circ 19\"  CV: Regular rate and rhythm, S1 & S2 positive.  LUNGS: Bilateral breathsounds heard.   ABDOMEN: Positive bowel sounds in all quadrants, soft, no rebound or guarding  MUSCULOSKELETAL: Bilateral trace leg swelling  SKIN: warm, dry, no rashes  Neurological: Alert, oriented to time, place, and person.  Psych: normal mood, normal affect     Labs/Studies:     Lab Results   Component Value Date    WBC 4.2 05/18/2016    HGB 11.1 (L) 04/17/2017    HCT 36.1 (L) 05/18/2016    MCV 67 (L) 05/18/2016     05/18/2016         Chemistry        Component Value Date/Time     02/13/2018 1351    K 4.6 02/13/2018 1351     02/13/2018 1351    CO2 32 (H) 02/13/2018 1351    BUN 17 02/13/2018 1351    CREATININE 0.94 02/13/2018 1351    GLU 85 02/13/2018 1351        Component Value Date/Time    CALCIUM 9.2 02/13/2018 1351    ALKPHOS 77 02/13/2018 1351    AST 25 02/13/2018 1351    ALT 16 02/13/2018 1351    BILITOT 0.4 02/13/2018 1351            Lab Results   Component Value Date    FERRITIN 12 (L) 10/01/2012     No results found for: TSH      Assessment and Plan:  In summary Onur Barr is a 62 y.o. year old male here for sleep disturbance.  1.  Hypersomnia   Mr. Onur Barr has high risk for obstructive sleep apnea based on the history of hypersomnia, snoring and a crowded airway. I educated the patient on the underlying pathophysiology of obstructive sleep apnea. We reviewed the risks associated with sleep apnea, including increased cardiovascular risk and overall death. We talked about treatments briefly. I recommend getting an split-night nocturnal polysomnography. The patient should return to the " clinic to discuss results and treatment option in a patient-centered approach.  2.  Snoring  3.  Other sleep disturbance  4.  Elevated blood pressure reading  Please follow up with your primary care provider to check your blood pressure in one week if clinically indicated.    Patient verbalized understanding of these issues, agrees with the plan and all questions were answered today. Patient was given an opportuntity to voice any other symptoms or concerns not listed above. Patient did not have any other symptoms or concerns.      Patient told to return in one week after the sleep study is interpreted.      Jarvis Latham DO  Board Certified in Internal Medicine and Sleep Medicine  [facility].    (Note created with Dragon voice recognition and unintended spelling errors and word substitutions may occur)

## 2021-06-19 NOTE — LETTER
Letter by Claudio Stephens MD at      Author: Claudio Stephens MD Service: -- Author Type: --    Filed:  Encounter Date: 7/9/2019 Status: (Other)         Onur Barr  [address] [facility] [address]      July 9, 2019      Dear Onur,    This letter is to remind you that you will be due for your follow up appointment with Dr. Claudio Stephens . To help ensure you are in the best health possible, a regular follow-up with your cardiologist is essential.     Please call our Patient Scheduling Line at [phone] to schedule your appointment at your earliest convenience.  If you have recently scheduled an appointment, please disregard this letter.    We look forward to seeing you again. As always, we are available at the number  above for any questions or concerns you may have.      Sincerely,     The Physicians and Staff of [facility]

## 2021-06-19 NOTE — PROGRESS NOTES
ASSESSMENT:  1. Open wound of anterior abdominal wall, initial encounter  - Culture, Wound    2. Screen for colon cancer  - Ambulatory referral for Colonoscopy    3. Hypertension  - lisinopril (PRINIVIL,ZESTRIL) 20 MG tablet; Take 1 tablet (20 mg total) by mouth daily.  Dispense: 90 tablet; Refill: 2    4. Claudication of lower extremity (H)  - Ambulatory referral to Vascular Center      PLAN:  He does have a wound/ulcer on the mid part of the lower abdomen around where he has the redundant skin/pannus.  I did clean the ulcer wound and dressed with nonstick gauze.  Was covered with a Tagaderm.  He will continue to change dressing daily and if there is no improvement within the next week I think he will need to be seen by the wound care.  I did refill his medication for lisinopril.  I think that because of the pain in the lower extremity is a claudication and I did refer him to see the vascular physicians.  I discussed health maintenance he does not want to do colonoscopy but would rather do a colorguard which was ordered.    Orders Placed This Encounter   Procedures     Culture, Wound     Ambulatory referral for Colonoscopy     Referral Priority:   Routine     Referral Type:   Colonoscopy     Referral Reason:   Evaluation and Treatment     Requested Specialty:   Gastroenterology     Number of Visits Requested:   1     Ambulatory referral to Vascular Center     Referral Priority:   Routine     Referral Type:   Vascular     Referral Reason:   Evaluation and Treatment     Number of Visits Requested:   1     Medications Discontinued During This Encounter   Medication Reason     lisinopril (PRINIVIL,ZESTRIL) 20 MG tablet Reorder       No Follow-up on file.      CHIEF COMPLAINT:  Chief Complaint   Patient presents with     Follow-up     Tummy tuck in 2012- sore at incision site x6 months.      Medication Refill     Lisinopril- pt states that he has been out of medication x1 week.        HISTORY OF PRESENT ILLNESS:  Onur  is a 62 y.o. male presenting to the clinic today to have an evaluation done on the lower abdominal wall.  He had gastric bypass as well as having had a tummy tuck in 2012.  Noted having had some complications with that.  Comes in because he had noticed some mild bleeding and also that developed around the incision site in the middle.  This has been slightly painful appears to be increasing and the wife is very concerned about it.  He has had no constitutional symptoms.  He also wants to have a refill of his lisinopril for blood pressure he noted that he had missed taking medication for about 1 week.  He is also noticing some worsening of his back pain as well as some pain on walking on the upper legs and thighs.  He noted that when he walks for various brief period, he will have significant pain on the thigh bilaterally which improves on resting.  Noted that this appears to have been going on and he has also had an increase in his lower extremity swellings.    REVIEW OF SYSTEMS:   Review of systems as noted in the history.  Continues to have back pain.  Denies any chest pain or shortness of breath.  No dizziness.  He does not have any fevers or any chills and no urinary symptoms.  He denied any notable discoloration to his lower extremities.  All other systems are negative.    PFSH:  Reviewed, as below.    History   Smoking Status     Former Smoker     Types: Cigars   Smokeless Tobacco     Never Used       Family History   Problem Relation Age of Onset     Stroke Mother      Heart disease Father      Hodgkin's lymphoma Brother        Social History     Social History     Marital status:      Spouse name: N/A     Number of children: N/A     Years of education: N/A     Occupational History     Not on file.     Social History Main Topics     Smoking status: Former Smoker     Types: Cigars     Smokeless tobacco: Never Used     Alcohol use Not on file     Drug use: No      Comment: formerly used     Sexual  activity: Not on file     Other Topics Concern     Not on file     Social History Narrative       Past Surgical History:   Procedure Laterality Date     [place] ARTHRODESIS ANT INTERBODY MIN DISCECTOMY,LUMBAR      Description: Lumbar Vertebral Fusion;  Recorded: 06/26/2009;  Comments: before 200 see [facility] consult under old records     [place] EXCISE EXCESS SKIN TISSUE,ABDOMEN  11/13/2012    Description: Panniculectomy;  Proc Date: 11/13/2012;  Comments: 3.5 Lb Pannus was removed at the [facility] By Dr. Shon Green     [place] GASTRIC BYPASS,OBESE<150CM LORA-EN-Y  2008    Description: Gastric Surgery For Morbid Obesity Bypass With Lora-en-Y;  Recorded: 06/26/2009;  Comments: dr green 2008     [place] REMOVAL OF TONSILS,<12 Y/O      Description: Tonsillectomy;  Recorded: 03/23/2012;  Comments: for obstructive sleep apnea     [place] REPAIR INCISIONAL HERNIA,REDUCIBLE  11/13/2012    Description: Incisional Hernia Repair;  Proc Date: 11/13/2012;  Comments: incisional hernia repair and abdominal panniculectomy by Dr Shon Green at the [facility].       Allergies   Allergen Reactions     Hydrocodone        Active Ambulatory Problems     Diagnosis Date Noted     Osteoarthritis      Hypercholesterolemia      Obstructive Sleep Apnea      Hypertension      Coronary Arteriosclerosis      Lower Back Pain Chronic      Insomnia      Paroxysmal Atrial Fibrillation      Post-gastric Bypass For Obesity      Nephrolithiasis      Acid reflux disease 10/31/2017     Sleepiness 05/08/2018     Resolved Ambulatory Problems     Diagnosis Date Noted     Classic Migraine (With Aura)      Congestive heart failure (H)      Ventral hernia      Ischemic Cardiomyopathy      Type 2 Diabetes Mellitus With Complication      Morbid obesity (H)      Acute myocardial infarction      Lumbar Canal Stenosis With Neurogenic Claudication      Bradycardia 09/27/2017     No Additional Past Medical History       Current Outpatient Prescriptions   Medication Sig Dispense  Refill     aspirin 81 MG EC tablet Take 81 mg by mouth daily.       atorvastatin (LIPITOR) 40 MG tablet TAKE 1 TABLET BY MOUTH AT BEDTIME 90 tablet 3     carvedilol (COREG CR) 10 MG 24 hr capsule Take 1 capsule (10 mg total) by mouth daily. 90 capsule 3     DULoxetine (CYMBALTA) 30 MG capsule Take 90 mg by mouth daily.  1     furosemide (LASIX) 20 MG tablet TAKE 1 TABLET BY MOUTH EVERY DAY 90 tablet 0     gabapentin (NEURONTIN) 300 MG capsule 300 mg 3 (three) times a day.        lisinopril (PRINIVIL,ZESTRIL) 20 MG tablet Take 1 tablet (20 mg total) by mouth daily. 90 tablet 2     traZODone (DESYREL) 100 MG tablet TAKE 2 TABLETS (200 MG TOTAL) BY MOUTH AT BEDTIME. 180 tablet 1     UNABLE TO FIND Med Name: Morphine Sulfate COURTNEY       No current facility-administered medications for this visit.        VITALS:  Vitals:    07/09/18 1101 07/09/18 1151   BP: 160/90 130/80   Patient Site: Left Arm Right Arm   Patient Position: Sitting Sitting   Cuff Size: Adult Large Adult Large   Pulse: 63    SpO2: 95%    Weight: (!) 309 lb 14.4 oz (140.6 kg)      Wt Readings from Last 3 Encounters:   07/09/18 (!) 309 lb 14.4 oz (140.6 kg)   07/02/18 (!) 311 lb 12.8 oz (141.4 kg)   05/08/18 (!) 306 lb (138.8 kg)     Body mass index is 46.43 kg/(m^2).    PHYSICAL EXAM:  General Appearance: Alert, cooperative, in some pain distress, afebrile and not pale.  Obese and working stooped.  HEENT: Pupils are equal and reactive, extraocular motions is normal.  External ears are normal.  Lungs: Respiration is unlabored  Heart: Normal peripheral pulsation.  Abdomen: Soft obese with skin folds.  Abdominal wall on the lower suprapubic region when it was  did show a midline superficial ulcer this is notably on the upper aspect of a previous surgical scar that appears to extend into the wound.  Wound is fresh but not bleeding.  Mild discomfort.  There is no drainages of discharge is noted.  The ulcer is measures about the size of a quarter  dollar.  Musculoskeletal: As noted he is almost bent double because of back pain and back issues.  The lower extremity does edematous especially on the right side.  Neurologic:  Alert and oriented times 3.   Psychiatric: Normal mood and affect.    MEDICATIONS:  Current Outpatient Prescriptions   Medication Sig Dispense Refill     aspirin 81 MG EC tablet Take 81 mg by mouth daily.       atorvastatin (LIPITOR) 40 MG tablet TAKE 1 TABLET BY MOUTH AT BEDTIME 90 tablet 3     carvedilol (COREG CR) 10 MG 24 hr capsule Take 1 capsule (10 mg total) by mouth daily. 90 capsule 3     DULoxetine (CYMBALTA) 30 MG capsule Take 90 mg by mouth daily.  1     furosemide (LASIX) 20 MG tablet TAKE 1 TABLET BY MOUTH EVERY DAY 90 tablet 0     gabapentin (NEURONTIN) 300 MG capsule 300 mg 3 (three) times a day.        lisinopril (PRINIVIL,ZESTRIL) 20 MG tablet Take 1 tablet (20 mg total) by mouth daily. 90 tablet 2     traZODone (DESYREL) 100 MG tablet TAKE 2 TABLETS (200 MG TOTAL) BY MOUTH AT BEDTIME. 180 tablet 1     UNABLE TO FIND Med Name: Morphine Sulfate COURTNEY       No current facility-administered medications for this visit.

## 2021-06-19 NOTE — PROGRESS NOTES
"US BEFORE APPT-CONSULT Referred by Dr. Berger. Lower Extremity pain with walking improves with rest. Able to walk about 30 ft without pain. C/O \"pins and needles\" in LE's while laying. He has also had an increase in his RLE swelling. HTN. ASA, statin.  "

## 2021-06-19 NOTE — PROGRESS NOTES
VASCULAR SURGERY CLINIC CONSULTATION    VASCULAR SURGEON: Jere Robin MD    LOCATION:  [facility]    Onur Barr   Medical Record #:  [id number]  YOB: 1955  Age:  62 y.o.     Date of Service: 7/30/2018    PRIMARY CARE PROVIDER: Luann Berger MD      Reason for visit:  Bilateral anterior thigh pain and chronic low back issues.    IMPRESSION:  61 yo male with probable neurogenic claudication, vs musculoskeletal pain from poor posture, and venous insufficiency.    RECOMMENDATION:   I've recommended he wear compression hose, thigh high, 20-30 mmHg compression to help with swelling in both legs.  He also plans to see a spine specialist to discuss reoperation on his spine after 4 previous surgeries and severe kyphosis of his spine.  His ROHIT's are normal and he has no evidence of significant peripheral arterial disease.  He will see me back in clinic only PRN in the future if he develops issues.  We discussed for a long time that his disease process may be pectoral and a component of neurogenic claudication as well as venous insufficiency.  Review of his ROHIT suggests that this is not peripheral arterial disease related.    HPI:  Onur Barr is a 62 y.o. male who was seen today in consultation by Dr. Kong regarding bilateral lower extremity fatigue and weakness after walking short distances.  Patient walks with a cane and has severe postural issues including kyphosis of the spine with multiple back surgeries ×4 in the past.  He notices that he has to sit frequently to relieve the pain in the anterior thighs bilaterally and I think this is related to his postural issues and supporting the weight of his upper body.  He really has no discrete distance that he walks with cramping pain in lower extremities and this would be more consistent with claudication if he had the symptoms.  He is working to see a spine specialist as previous spine surgeons have offered him extensive  surgery with limited success of repair.  He also has some chronic swelling of bilateral lower extremities which is suspicious for venous disease and venous insufficiency.  This definitely gets worse with prolonged standing and better with elevation of his legs.    PHH:  No past medical history on file.     Past Surgical History:   Procedure Laterality Date     TN ARTHRODESIS ANT INTERBODY MIN DISCECTOMY,LUMBAR      Description: Lumbar Vertebral Fusion;  Recorded: 06/26/2009;  Comments: before 200 see [facility] consult under old records     TN EXCISE EXCESS SKIN TISSUE,ABDOMEN  11/13/2012    Description: Panniculectomy;  Proc Date: 11/13/2012;  Comments: 3.5 Lb Pannus was removed at the [facility] By Dr. Shon Green     TN GASTRIC BYPASS,OBESE<150CM LORA-EN-Y  2008    Description: Gastric Surgery For Morbid Obesity Bypass With Lora-en-Y;  Recorded: 06/26/2009;  Comments: dr green 2008     TN REMOVAL OF TONSILS,<11 Y/O      Description: Tonsillectomy;  Recorded: 03/23/2012;  Comments: for obstructive sleep apnea     TN REPAIR INCISIONAL HERNIA,REDUCIBLE  11/13/2012    Description: Incisional Hernia Repair;  Proc Date: 11/13/2012;  Comments: incisional hernia repair and abdominal panniculectomy by Dr Shon Green at the [facility].       ALLERGIES:  Hydrocodone    MEDS:    Current Outpatient Prescriptions:      aspirin 81 MG EC tablet, Take 81 mg by mouth daily., Disp: , Rfl:      atorvastatin (LIPITOR) 40 MG tablet, TAKE 1 TABLET BY MOUTH AT BEDTIME, Disp: 90 tablet, Rfl: 3     carvedilol (COREG CR) 10 MG 24 hr capsule, Take 1 capsule (10 mg total) by mouth daily., Disp: 90 capsule, Rfl: 3     DULoxetine (CYMBALTA) 30 MG capsule, Take 90 mg by mouth daily., Disp: , Rfl: 1     furosemide (LASIX) 20 MG tablet, TAKE 1 TABLET BY MOUTH EVERY DAY, Disp: 90 tablet, Rfl: 0     gabapentin (NEURONTIN) 300 MG capsule, 300 mg 3 (three) times a day. , Disp: , Rfl:      lisinopril (PRINIVIL,ZESTRIL) 20 MG tablet, Take 1  tablet (20 mg total) by mouth daily., Disp: 90 tablet, Rfl: 2     traZODone (DESYREL) 100 MG tablet, TAKE 2 TABLETS (200 MG TOTAL) BY MOUTH AT BEDTIME., Disp: 180 tablet, Rfl: 1     UNABLE TO FIND, Med Name: Morphine Sulfate COURTNEY, Disp: , Rfl:     SOCIAL HABITS:    History   Smoking Status     Former Smoker     Types: Cigars   Smokeless Tobacco     Never Used       History   Alcohol use Not on file       History   Drug Use No     Comment: formerly used       FAMILY HISTORY:    Family History   Problem Relation Age of Onset     Stroke Mother      Heart disease Father      Hodgkin's lymphoma Brother        REVIEW OF SYSTEMS:    A 12 point ROS was reviewed and except for what is listed in the HPI above, all others are negative    PE:  /70 (Patient Site: Right Arm, Patient Position: Sitting)  Pulse 60  Temp 98.5  F (36.9  C) (Oral)   Resp 18  Ht 6' (1.829 m)  Wt (!) 300 lb (136.1 kg)  BMI 40.69 kg/m2  Wt Readings from Last 1 Encounters:   07/30/18 (!) 300 lb (136.1 kg)     Body mass index is 40.69 kg/(m^2).    EXAM:  GENERAL: This is a well-developed 62 y.o. male who appears his stated age  EYES: Grossly normal.  MOUTH: Buccal mucosa normal   CARDIAC:  NS1 S2, No Murmur  CHEST/LUNG:  Clear lung fields bilaterally   GASTROINTESINAL (ABDOMEN): Soft, non-tender, B/S present, no pulsatile mass  MUSCULOSKELETAL: Grossly normal and both lower extremities are intact.  HEME/LYMPH: No lymphedema  NEUROLOGIC: Focally intact, Alert and oriented x 3.   PSYCH: appropriate affect  INTEGUMENT: Bilateral lower extremity swelling noted especially at the ankles.  He has got some small sores in the skin that look like bug bites that are slow to heal the anterior tibial region and below the knee the anterior surface of the lower legs bilaterally.  At the gaiter region bilaterally has some skin pigmentation changes consistent with chronic swelling and venous insufficiency.    Pulse Exam:     Radial: Left +2   Right   +2    Femoral: Left +2   Right  +2     DP: Left +2   Right  +2     PT:   Left +2   Right  +2          DIAGNOSTIC STUDIES:     Images:  No results found.    I personally reviewed the images and my interpretation is normal resting ROHIT's bilaterally with biphasic/triphasic lower leg waveforms.     LABS:      Sodium   Date Value Ref Range Status   02/13/2018 142 136 - 145 mmol/L Final   04/17/2017 140 136 - 145 mmol/L Final   05/18/2016 140 136 - 145 mmol/L Final     Potassium   Date Value Ref Range Status   02/13/2018 4.6 3.5 - 5.0 mmol/L Final   04/17/2017 4.1 3.5 - 5.0 mmol/L Final   05/18/2016 4.5 3.5 - 5.0 mmol/L Final     Chloride   Date Value Ref Range Status   02/13/2018 105 98 - 107 mmol/L Final   04/17/2017 107 98 - 107 mmol/L Final   05/18/2016 105 98 - 107 mmol/L Final     BUN   Date Value Ref Range Status   02/13/2018 17 8 - 22 mg/dL Final   04/17/2017 13 8 - 22 mg/dL Final   05/18/2016 14 8 - 22 mg/dL Final     Creatinine   Date Value Ref Range Status   02/13/2018 0.94 0.70 - 1.30 mg/dL Final   04/17/2017 0.98 0.70 - 1.30 mg/dL Final   05/18/2016 1.00 0.70 - 1.30 mg/dL Final     Hemoglobin   Date Value Ref Range Status   04/17/2017 11.1 (L) 14.0 - 18.0 g/dL Final   05/18/2016 11.5 (L) 14.0 - 18.0 g/dL Final   01/30/2013 11.5 (L) 14.0 - 18.0 g/dL Final     Platelets   Date Value Ref Range Status   05/18/2016 193 140 - 440 thou/uL Final   01/30/2013 174 140 - 440 thou/uL Final   11/06/2012 169 140 - 440 thou/uL Final     BNP   Date Value Ref Range Status   04/09/2012 120 (H) <47 pg/mL Final   04/03/2012 526 (H) <47 pg/mL Final   04/02/2012 3717 (H) <47 pg/mL Final     INR   Date Value Ref Range Status   04/10/2012 1.31 (H) 0.90 - 1.10 Final     Comment:                                                     INR Therapeutic Ranges                                                  Mech. Valve 2.5-3.5                                                  Post surg.  2.0-3.0                                                   DVT/PE      2.0-3.0   04/09/2012 2.09 (H) 0.90 - 1.10 Final     Comment:                                                     INR Therapeutic Ranges                                                  Mech. Valve 2.5-3.5                                                  Post surg.  2.0-3.0                                                  DVT/PE      2.0-3.0   04/08/2012 3.72 (H) 0.90 - 1.10 Final     Comment:                                                     INR Therapeutic Ranges                                                  Mech. Valve 2.5-3.5                                                  Post surg.  2.0-3.0                                                  DVT/PE      2.0-3.0         Jere Robin MD  VASCULAR SURGERY

## 2021-06-19 NOTE — PROGRESS NOTES
Order for Durable Medical Equipment was processed and equipment ordered.     DME provider: [facility]    Date Faxed: 8/16/18    Ordering Provider: Dr. Weiss    Equipment ordered: CPAP

## 2021-06-19 NOTE — PROGRESS NOTES
[facility] Consult Note    Date of Service:  8/20/2018    Requesting Provider: Dr. Luann Berger    Chief Complaint: abdominal wound    History of Present Illness: Onur Barr is being seen at the Vascular Clinic today regarding abdominal wound. They arrive to the clinic today alone. The patient reports that he underwent gastric bypass in 2008 initially lost 180 pounds; he has recently regained 45 pounds; believes this was JUDITH; just received his cpap 4 days ago; is hoping this will help. He noted the wound on the abdomen 6 months ago; this began to smell; he went to pcp and was prescribed antibiotics; Bactrim; culture grew out staph; this helped the odor. Was previously using bandaid to the area; no creams, ointments or powders. No itch. Reports pain of 0/10 in the wound areas; currently using nothing for wound pain; does have a morphine pump for back pain.  Denies any fevers, chills, or generalized ill feeling. Denies history of cancer. Sleeps in a bed with legs elevated.Never smoker; no longer working.     Review of Systems:   Constitutional:  negative  for fever, chills or night sweats  EENTM: negative for glasses;  negative [place]  GI:  negative for nausea/vomiting;  negative for constipation  negative diarrhea;  negative for fecal incontinence negative weight loss  :  negative dysuria, negative incontinence  MS: patient is ambulatory;  does use assistive devices  Cardiac:  positive afib; this is controlled with medications; no blood thinners currently  Respiratory:  negative shortness of breath; +judith; just recently being treated  Endocrine:  negative diabetes  Psych:  negative depression/anxiety    Past Medical History:    Past Medical History:   Diagnosis Date     Acid reflux disease 10/31/2017     Coronary Arteriosclerosis     Created by Conversion  Replacement Utility updated for latest IMO load     Hypercholesterolemia     Created by Conversion      Hypertension     Created by Conversion  [facility] Annotation: Apr 6 2012 10:16AM Zack Brewer: Lisinipril,  coreg  Replacement Utility updated for latest IMO load     Insomnia     Created by Conversion [facility] Annotation: Sep 11 2013  9:56AM Zack Brewer: Trouble  maintaining sleep-Trazodone-minimal helpzolpidem-      Lower Back Pain Chronic     Created by Conversion [facility] Annotation: Apr 6 2012 10:20AM Anh Ramos: Morphine pump      Morbid obesity (H) 8/1/2018     Nephrolithiasis      Obstructive Sleep Apnea     Created by Conversion      Osteoarthritis     Created by Conversion [facility] Annotation: Jun 26 2009  9:53AM Zack Brewer: failed lumbar  fusion/morphine pump dr putnam  Replacement Utility updated for latest IMO load     Paroxysmal Atrial Fibrillation     Created by Conversion      Post-gastric Bypass For Obesity     Created by Conversion      Sleepiness 5/8/2018        Surgical History:   Past Surgical History:   Procedure Laterality Date     AK ARTHRODESIS ANT INTERBODY MIN DISCECTOMY,LUMBAR      Description: Lumbar Vertebral Fusion;  Recorded: 06/26/2009;  Comments: before 200 see [facility] consult under old records     AK EXCISE EXCESS SKIN TISSUE,ABDOMEN  11/13/2012    Description: Panniculectomy;  Proc Date: 11/13/2012;  Comments: 3.5 Lb Pannus was removed at the [facility] By Dr. Shon Green     AK GASTRIC BYPASS,OBESE<150CM SUSY-EN-Y  2008    Description: Gastric Surgery For Morbid Obesity Bypass With Susy-en-Y;  Recorded: 06/26/2009;  Comments: dr green 2008     AK REMOVAL OF TONSILS,<12 Y/O      Description: Tonsillectomy;  Recorded: 03/23/2012;  Comments: for obstructive sleep apnea     AK REPAIR INCISIONAL HERNIA,REDUCIBLE  11/13/2012    Description: Incisional Hernia Repair;  Proc Date: 11/13/2012;  Comments: incisional hernia repair and abdominal panniculectomy by Dr Shon Green at the [facility].        Medications:    Current Outpatient Prescriptions:      aspirin 81 MG EC tablet,  Take 81 mg by mouth daily., Disp: , Rfl:      atorvastatin (LIPITOR) 40 MG tablet, TAKE 1 TABLET BY MOUTH AT BEDTIME, Disp: 90 tablet, Rfl: 3     carvedilol (COREG CR) 10 MG 24 hr capsule, Take 1 capsule (10 mg total) by mouth daily., Disp: 90 capsule, Rfl: 3     DULoxetine (CYMBALTA) 30 MG capsule, Take 90 mg by mouth daily., Disp: , Rfl: 1     furosemide (LASIX) 20 MG tablet, TAKE 1 TABLET BY MOUTH EVERY DAY, Disp: 90 tablet, Rfl: 0     gabapentin (NEURONTIN) 300 MG capsule, 300 mg 3 (three) times a day. , Disp: , Rfl:      lisinopril (PRINIVIL,ZESTRIL) 20 MG tablet, Take 1 tablet (20 mg total) by mouth daily., Disp: 90 tablet, Rfl: 2     traZODone (DESYREL) 100 MG tablet, TAKE 2 TABLETS (200 MG TOTAL) BY MOUTH AT BEDTIME., Disp: 180 tablet, Rfl: 1     UNABLE TO FIND, Med Name: Morphine Sulfate COURTNEY, Disp: , Rfl:     Current Facility-Administered Medications:      lidocaine 2 % jelly (XYLOCAINE), , Topical, PRN, Lilian Llanes NP, 1 application at 08/20/18 1127    Allergies:   Allergies   Allergen Reactions     Hydrocodone Other (See Comments)     SNEEZES       Family history:   Family History   Problem Relation Age of Onset     Stroke Mother      Heart disease Father      Hodgkin's lymphoma Brother         Social History:   Social History     Social History     Marital status:      Spouse name: N/A     Number of children: N/A     Years of education: N/A     Occupational History     Not on file.     Social History Main Topics     Smoking status: Former Smoker     Years: 10.00     Types: Cigars     Smokeless tobacco: Never Used     Alcohol use No     Drug use: No      Comment: formerly used     Sexual activity: Not on file     Other Topics Concern     Not on file     Social History Narrative        Physical Exam  Vitals: Blood pressure 128/78, pulse 60, temperature 97.5  F (36.4  C), temperature source Oral, resp. rate 18, height 6' (1.829 m), weight (!) 290 lb (131.5 kg).  General: This is a 62 y.o. male  who appears their reported age, not in acute distress; when walking into the clinic noted to be bent over; walking with cane this is his baseline  Head: normocephalic, Atraumatic; not wearing glasses; non-icteric sclera; no exudate; mild hearing loss   Respiratory: Clear throughout all lung fields; unlabored breathing; no cough   Cardiac: Regular, Rate and Rhythm, no murmurs appreciated   Skin: Uniformly warm and dry  Psych: Alert and oriented x4; calm and cooperative throughout exam  Abdomen: just under the umbilicus there is a 4x4cm area of scar tissue; skin intact; no induration; no erythema; large pannus    Circumferential volume measures:    No flowsheet data found.    Ulceration(s)/Wound(s):     Urethral Catheter Latex 16 Fr. (Active)       Wound 08/20/18 abdominal (Active)       Laboratory studies:   No results found for: SEDRATE  Lab Results   Component Value Date    CREATININE 0.94 02/13/2018     Lab Results   Component Value Date    HGBA1C 5.6 02/13/2018     Lab Results   Component Value Date    BUN 17 02/13/2018     Lab Results   Component Value Date    ALBUMIN 3.4 (L) 02/13/2018     Vitamin D, Total (25-Hydroxy)   Date Value Ref Range Status   10/01/2012 28.0 (L) 30.0 - 80.0 ng/mL Final     Comment:     Deficiency              <10.0 ng/mL               Insufficiency       10.0-29.9 ng/mL               Sufficiency         30.0-80.0 ng/mL               Toxicity (possible)    >100.0 ng/mL             Impression:   Intertrigo  Obesity with bmi 39      Assessment/Plan:  1. Excisional debridement of all the ulcer(s) was not recommended today as the skin was intact    2. Edema. na    3. Treatment educated him on the cause of intertrigo and treatment options; will provide him with a sample of InterDry AG; provided instructions on how to use; will send rx for nystatin powder to his pharmacy    4. Offloading: na    5. Nutrition: we spoke about his weight; weight gain; recommend follow up with bariatrics and excess  weight will make the intertrigo worse; hopefully the cpap will also help with this    Patient to return to clinic PRN  for re-evaluation. They were instructed to call the clinic sooner with any further questions or concerns. Answered all questions.    Lilian Llanes DNP, RN, CNP, [facility] [place]  [phone]      This note was electronically signed by Lilian Llanes

## 2021-06-20 NOTE — PROGRESS NOTES
Dear Dr. Jarvis Latham, DO  [address] [facility].  [place] [address] [place] [id number],    Thank you for the opportunity to participate in the care of Onur Barr.     He is a 63 y.o.  male patient who comes to the sleep medicine clinic for review of his sleep studies. The split-night study was completed on 07/16/18 which showed the the patient had severe obstructive sleep apnea with an apnea hypopnea index of 81.7 events per hour with the lowest O2 sat of 84%.  More than 50% of the patient's respiratory events were central in nature.  The patient was informed of the results and offered the option of getting in lab titration study which he agreed to.  On 08/02/18 to the patient's in lab titration study showed central sleep apnea as well as REM sleep without atonia.  Optimal pressure was obtained with adaptive servo ventilation.  The patient was offered the option of getting the paperwork started on receiving treatment and he agreed to.  Unfortunately he did not bring his machine today.  However he states that since starting pressure therapy again he feels less sleepy during the day and has slightly more energy.  However he still waking up every so often in the middle the night and has difficulty reinitiating sleep.    Current Outpatient Prescriptions   Medication Sig Dispense Refill     aspirin 81 MG EC tablet Take 81 mg by mouth daily.       atorvastatin (LIPITOR) 40 MG tablet TAKE 1 TABLET BY MOUTH AT BEDTIME 90 tablet 3     carvedilol (COREG CR) 10 MG 24 hr capsule Take 1 capsule (10 mg total) by mouth daily. 90 capsule 3     DULoxetine (CYMBALTA) 30 MG capsule Take 90 mg by mouth daily.  1     furosemide (LASIX) 20 MG tablet TAKE 1 TABLET BY MOUTH EVERY DAY 90 tablet 0     gabapentin (NEURONTIN) 300 MG capsule 300 mg 3 (three) times a day.        lisinopril (PRINIVIL,ZESTRIL) 20 MG tablet Take 1 tablet (20 mg total) by mouth daily. 90 tablet 2     mupirocin (BACTROBAN) 2 % ointment Apply topically to wound every day  22 g 0     nystatin (MYCOSTATIN) powder Apply topically 2 (two) times a day. Sprinkle the nystatin powder to the affected areas 30 g 3     traZODone (DESYREL) 100 MG tablet TAKE 2 TABLETS (200 MG TOTAL) BY MOUTH AT BEDTIME. 180 tablet 1     UNABLE TO FIND Med Name: Morphine Sulfate COURTNEY       Current Facility-Administered Medications   Medication Dose Route Frequency Provider Last Rate Last Dose     lidocaine 2 % jelly (XYLOCAINE)   Topical PRN Lilian Llanes, IDANIA   1 application at 09/17/18 0857       Allergies   Allergen Reactions     Hydrocodone Other (See Comments)     SNEEZES       Physical Exam:  /76  Pulse 62  Wt (!) 296 lb 8 oz (134.5 kg)  SpO2 96%  BMI 40.21 kg/m2  BMI:Body mass index is 40.21 kg/(m^2).   GEN: NAD, obese  Psych: normal mood, normal affect     Labs/Studies:  - We reviewed the results of the overnight PSG as described on the HPI.     Lab Results   Component Value Date    WBC 4.2 05/18/2016    HGB 11.1 (L) 04/17/2017    HCT 36.1 (L) 05/18/2016    MCV 67 (L) 05/18/2016     05/18/2016         Chemistry        Component Value Date/Time     02/13/2018 1351    K 4.6 02/13/2018 1351     02/13/2018 1351    CO2 32 (H) 02/13/2018 1351    BUN 17 02/13/2018 1351    CREATININE 0.94 02/13/2018 1351    GLU 85 02/13/2018 1351        Component Value Date/Time    CALCIUM 9.2 02/13/2018 1351    ALKPHOS 77 02/13/2018 1351    AST 25 02/13/2018 1351    ALT 16 02/13/2018 1351    BILITOT 0.4 02/13/2018 1351            Lab Results   Component Value Date    FERRITIN 12 (L) 10/01/2012     Assessment and Plan:  In summary Onur Barr is a 63 y.o. year old male here for review of his sleep study.  1.  Central sleep apnea/obstructive Sleep Apnea  I reviewed the results of the sleep studies with the patient in detail.  I will also write a prescription asked the patient's durable medical equipment company [employer] to assign his account over to me so I can review his download data.  I  also strongly recommended patient bring his machine with him on the next clinical visit in case he was not assigned to me.  2.  Hypersomnia  Slight improvement  3.  Other sleep disturbance       Patient verbalized understanding of these issues, agrees with the plan and all questions were answered today. Patient was given an opportuntity to voice any other symptoms or concerns not listed above. Patient did not have any other symptoms or concerns.        Jarvis Latham DO  Board Certified in Internal Medicine and Sleep Medicine  [facility].    We spent a total of 15 minutes of face-to-face encounter and more than 50% of the encounter was used for counseling or coordination of care.    (Note created with Dragon voice recognition and unintended spelling errors and word substitutions may occur)

## 2021-06-22 NOTE — PROGRESS NOTES
Dear Dr. Luann Berger MD  [address] [place] [address],    Thank you for the opportunity to participate in the care of Onur Barr.     He is a 63 y.o. y/o male patient who comes to the sleep medicine clinic for follow up.  Since the patient's last clinical visit with me he has not been using his CPAP machine as much as he should due to an upper respiratory viral infection.  He also states that he would rip his mask off during sleep and he does not know why he is doing so.    Compliance Download data for 30 days:  Pressure setting: ASV  Residual AHI: 33 events per hour  Leak: Large   compliance: 23.3%  Mask Tolerance: Good  Skin irritation: None      Past Medical History:   Diagnosis Date     Acid reflux disease 10/31/2017     Coronary Arteriosclerosis     Created by Conversion  Replacement Utility updated for latest IMO load     Hypercholesterolemia     Created by Conversion      Hypertension     Created by [facility] Annotation: Apr 6 2012 10:16Zack Harris: Lisinipril,  coreg  Replacement Utility updated for latest IMO load     Insomnia     Created by [facility] Annotation: Sep 11 2013  9:56Zack Harris: Trouble  maintaining sleep-Trazodone-minimal helpzolpidem-      Lower Back Pain Chronic     Created by [facility] Annotation: Apr 6 2012 10:20Anh Ford: Morphine pump      Morbid obesity (H) 8/1/2018     Nephrolithiasis      Obstructive Sleep Apnea     Created by Conversion      Osteoarthritis     Created by [facility] Annotation: Jun 26 2009  9:53Zack Harris: failed lumbar  fusion/morphine pump dr putnam  Replacement Utility updated for latest IMO load     Paroxysmal Atrial Fibrillation     Created by Conversion      Post-gastric Bypass For Obesity     Created by Conversion      Sleepiness 5/8/2018       Past Surgical History:   Procedure Laterality Date     DE ARTHRODESIS ANT INTERBODY MIN  DISCECTOMY,LUMBAR      Description: Lumbar Vertebral Fusion;  Recorded: 06/26/2009;  Comments: before 200 see [facility] consult under old records     DE EXCISE EXCESS SKIN TISSUE,ABDOMEN  11/13/2012    Description: Panniculectomy;  Proc Date: 11/13/2012;  Comments: 3.5 Lb Pannus was removed at the [facility] By Dr. Shon Green     DE GASTRIC BYPASS,OBESE<150CM LORA-EN-Y  2008    Description: Gastric Surgery For Morbid Obesity Bypass With Lora-en-Y;  Recorded: 06/26/2009;  Comments: dr green 2008     DE REMOVAL OF TONSILS,<14 Y/O      Description: Tonsillectomy;  Recorded: 03/23/2012;  Comments: for obstructive sleep apnea     DE REPAIR INCISIONAL HERNIA,REDUCIBLE  11/13/2012    Description: Incisional Hernia Repair;  Proc Date: 11/13/2012;  Comments: incisional hernia repair and abdominal panniculectomy by Dr Shon Green at the [facility].       Social History     Socioeconomic History     Marital status:      Spouse name: Not on file     Number of children: Not on file     Years of education: Not on file     Highest education level: Not on file   Social Needs     Financial resource strain: Not on file     Food insecurity - worry: Not on file     Food insecurity - inability: Not on file     Transportation needs - medical: Not on file     Transportation needs - non-medical: Not on file   Occupational History     Not on file   Tobacco Use     Smoking status: Former Smoker     Years: 10.00     Types: Cigars     Smokeless tobacco: Never Used   Substance and Sexual Activity     Alcohol use: No     Drug use: No     Comment: formerly used     Sexual activity: Not on file   Other Topics Concern     Not on file   Social History Narrative     Not on file         Current Outpatient Medications   Medication Sig Dispense Refill     aspirin 81 MG EC tablet Take 81 mg by mouth daily.       atorvastatin (LIPITOR) 40 MG tablet TAKE 1 TABLET BY MOUTH AT BEDTIME 90 tablet 1     carvedilol (COREG CR) 10 MG 24 hr capsule  TAKE 1 CAPSULE (10 MG TOTAL) BY MOUTH DAILY. 90 capsule 2     DULoxetine (CYMBALTA) 30 MG capsule Take 90 mg by mouth daily.  1     FLUZONE QUAD 7275-5824, PF, 60 mcg (15 mcg x 4)/0.5 mL Syrg injection        furosemide (LASIX) 20 MG tablet TAKE 1 TABLET BY MOUTH EVERY DAY 90 tablet 0     gabapentin (NEURONTIN) 300 MG capsule 300 mg 3 (three) times a day.        lisinopril (PRINIVIL,ZESTRIL) 20 MG tablet Take 1 tablet (20 mg total) by mouth daily. 90 tablet 2     nitroglycerin (NITROSTAT) 0.4 MG SL tablet Place 0.4 mg under the tongue.       omeprazole (PRILOSEC) 40 MG capsule Take 40 mg by mouth.       traZODone (DESYREL) 100 MG tablet TAKE 2 TABLETS (200 MG TOTAL) BY MOUTH AT BEDTIME. 180 tablet 1     UNABLE TO FIND Med Name: Morphine Sulfate COURTNEY       Current Facility-Administered Medications   Medication Dose Route Frequency Provider Last Rate Last Dose     lidocaine 2 % jelly (XYLOCAINE)   Topical PRN Lilian Llanes NP   1 application at 09/17/18 0857       Allergies   Allergen Reactions     Hydrocodone Other (See Comments)     SNEEZES       Physical Exam:  /72 (Patient Site: Right Arm, Patient Position: Sitting, Cuff Size: Adult Large)   Pulse 60   Wt (!) 296 lb (134.3 kg)   SpO2 97%   BMI 40.14 kg/m    BMI:Body mass index is 40.14 kg/m .   GEN: NAD, obese  Psych: normal mood, normal affect    Labs/Studies:     I reviewed the efficacy and compliance report from his device. Data summarized on the HPI and the compliance flow sheet.     Lab Results   Component Value Date    WBC 4.2 05/18/2016    HGB 11.1 (L) 04/17/2017    HCT 36.1 (L) 05/18/2016    MCV 67 (L) 05/18/2016     05/18/2016         Chemistry        Component Value Date/Time     02/13/2018 1351    K 4.6 02/13/2018 1351     02/13/2018 1351    CO2 32 (H) 02/13/2018 1351    BUN 17 02/13/2018 1351    CREATININE 0.94 02/13/2018 1351    GLU 85 02/13/2018 1351        Component Value Date/Time    CALCIUM 9.2 02/13/2018 1359     ALKPHOS 77 02/13/2018 1351    AST 25 02/13/2018 1351    ALT 16 02/13/2018 1351    BILITOT 0.4 02/13/2018 1351            Lab Results   Component Value Date    FERRITIN 12 (L) 10/01/2012            Assessment and Plan:  In summary Onur Barr is a 63 y.o. year old male who is here for compliance download.    1.  Obstructive/central sleep apnea  I will have the patient undergo pressure desensitization protocol.  I described the procedure and will give the patient handout on this topic.  There is not enough hours of usage for me to make any adjustments at a pressure setting at this point in time.  Strongly advised the patient to bring his ASV on the next visit.  2.  Hypersomnia  3.  Elevated blood pressure reading  I will have the patient's blood pressure readings repeated during this clinic visit. I strongly advised the patient to follow up with PCP in one week.       Patient verbalized understanding of these issues, agrees with the plan and all questions were answered today. Patient was given an opportuntity to voice any other symptoms or concerns not listed above. Patient did not have any other symptoms or concerns.      Patient told to return in 6 weeks. Patient instructed to stop at  to schedule appointment before leaving today.    Jarvis Latham DO  Board Certified in Internal Medicine and Sleep Medicine  [facility].    I spent a total of 5 minutes of face-to-face encounter and more than 50% of the encounter was used for counseling or coordination of care.    (Note created with Dragon voice recognition and unintended spelling errors and word substitutions may occur)

## 2021-06-23 NOTE — TELEPHONE ENCOUNTER
Refill Approved    Rx renewed per Medication Renewal Policy. Medication was last renewed on 8/12/18.    Lilli Frank, [facility] Triage/Med Refill 1/31/2019     Requested Prescriptions   Pending Prescriptions Disp Refills     traZODone (DESYREL) 100 MG tablet [Pharmacy Med Name: TRAZODONE 100 MG TABLET] 180 tablet 1     Sig: TAKE 2 TABLETS (200 MG TOTAL) BY MOUTH AT BEDTIME.    Tricyclics/Misc Antidepressant/Antianxiety Meds Refill Protocol Passed - 1/31/2019  7:31 AM       Passed - PCP or prescribing provider visit in last year    Last office visit with prescriber/PCP: 8/1/2018 Luann Berger MD OR same dept: 8/1/2018 Luann Berger MD OR same specialty: 8/1/2018 Luann Berger MD  Last physical: 4/17/2017 Last MTM visit: Visit date not found   Next visit within 3 mo: Visit date not found  Next physical within 3 mo: Visit date not found  Prescriber OR PCP: Luann Berger MD  Last diagnosis associated with med order: 1. Insomnia  - traZODone (DESYREL) 100 MG tablet [Pharmacy Med Name: TRAZODONE 100 MG TABLET]; TAKE 2 TABLETS (200 MG TOTAL) BY MOUTH AT BEDTIME.  Dispense: 180 tablet; Refill: 1    If protocol passes may refill for 12 months if within 3 months of last provider visit (or a total of 15 months).

## 2021-06-26 NOTE — PROGRESS NOTES
Progress Notes by Lilian Llanes NP at 9/17/2018  9:00 AM     Author: Lilian Llanes NP Service: -- Author Type: Nurse Practitioner    Filed: 9/17/2018  9:20 AM Encounter Date: 9/17/2018 Status: Signed    : Lilian Llanes NP (Nurse Practitioner)       Follow up Vascular Visit       Date of Service:9/17/2018    Date Last Seen: 8/20/2018; 8/20/2018    Chief Complaint: new abdominal wound    History:   Past Medical History:   Diagnosis Date   ? Acid reflux disease 10/31/2017   ? Coronary Arteriosclerosis     Created by Conversion  Replacement Utility updated for latest IMO load   ? Hypercholesterolemia     Created by Conversion    ? Hypertension     Created by [facility] Annotation: Apr 6 2012 10:16AM Zack Brewer: Lisinipril,  coreg  Replacement Utility updated for latest IMO load   ? Insomnia     Created by [facility] Annotation: Sep 11 2013  9:56AM Zack Brewer: Trouble  maintaining sleep-Trazodone-minimal helpzolpidem-    ? Lower Back Pain Chronic     Created by [facility] Annotation: Apr 6 2012 10:20AM Anh Ramos: Morphine pump    ? Morbid obesity (H) 8/1/2018   ? Nephrolithiasis    ? Obstructive Sleep Apnea     Created by Conversion    ? Osteoarthritis     Created by [facility] Annotation: Jun 26 2009  9:53AM Zack Brewer: failed lumbar  fusion/morphine pump dr putnam  Replacement Utility updated for latest IMO load   ? Paroxysmal Atrial Fibrillation     Created by Conversion    ? Post-gastric Bypass For Obesity     Created by Conversion    ? Sleepiness 5/8/2018       Pt returns to the Vascular clinic with regards to their new abdominal wound. Pt arrives alone today. He was seen 1 month ago and he had  No wounds at that time just evidence of Intertrigo; he used nystatin and interdry sheet and this helped minimally. Reports that he gets this wound every other day; then forms scab; then heals and then recurs. He did not call  bariatric to report his weight gain. He continues on his cpap; this is hard to get used to but he is using is not feeling more rested upon waking. Denies fevers, chills. Minimal pain in the area. Wife urged him to get seen because of the bleeding.      Allergies: Hydrocodone    Physical Exam:    /86  Pulse 68  Temp 98.3  F (36.8  C) (Oral)   Resp 16  Ht 6' (1.829 m) Comment: per pt report  Wt (!) 300 lb (136.1 kg) Comment: per pt report  BMI 40.69 kg/m2    General:  Patient presents to clinic in no apparent distress.  Head: normocephalic atraumatic  Psychiatric:  Alert and oriented x3.   Respiratory: unlabored breathing; no cough  Integumentary:  Skin is uniformly warm, dry and pink.    Wound #1 Location: right abdomen in skin fold  Size: 0.5L x 1.6W x 0.1depth.  No sinus tract present, Wound base: fibrinous covering this was debrided to reveal 100% viable wound; slightly mushy boggy wound bed  No undermining present. Periwound: no denudement, erythema, induration, maceration or warmth.      Circumferential volume measures:    No flowsheet data found.    Ulceration(s)/Wound(s):     Urethral Catheter Latex 16 Fr. (Active)       Wound 08/20/18 abdominal (Active)   Pre Size Length 0.5 9/17/2018  8:00 AM   Pre Size Width 1.6 9/17/2018  8:00 AM   Pre Size Depth 0.1 9/17/2018  8:00 AM   Pre Total Sq cm 0.8 9/17/2018  8:00 AM        Lab Values    No results found for: SEDRATE  Lab Results   Component Value Date    CREATININE 0.94 02/13/2018     Lab Results   Component Value Date    HGBA1C 5.6 02/13/2018     Lab Results   Component Value Date    BUN 17 02/13/2018     Lab Results   Component Value Date    ALBUMIN 3.4 (L) 02/13/2018     Vitamin D, Total (25-Hydroxy)   Date Value Ref Range Status   10/01/2012 28.0 (L) 30.0 - 80.0 ng/mL Final     Comment:     Deficiency              <10.0 ng/mL               Insufficiency       10.0-29.9 ng/mL               Sufficiency         30.0-80.0 ng/mL               Toxicity  (possible)    >100.0 ng/mL             Impression:  1. Intertrigo  Culture/Gram Stain: Wound   2. Obesity  Culture/Gram Stain: Wound   3. Chronic wound infection of abdomen, initial encounter  Culture/Gram Stain: Wound       9/17/18 right abdomen         8/20/18 right abdomen skin fold            Are any of these wounds new today: Yes; Location: abdomen    Assessment/Plan:          1. Debridement: Excisional debridement of the ulcer(s) was recommended today, after consent was obtained and 2% Xylocaine was applied using a sterile curet the epidermal, dermal and down into the subcutaneous tissue was sharply debrided for a total square cm of 0.8. The non-viable and necrotic tissue was removed and the wounds appeared much  afterwards.             2. Edema: na.            3.  Wound treatment: wound treatment will include irrigation and dressings to promote autolytic debridement which will include: silvercel; cover with mepilex border; change every 2 days; wife can assist we will order supplies; this wound will be difficult to heal and keep healed due to location and body habitus; if the skin fold is lifted to harshly this cause the fragile area to split open again; care needs to be taken when providing cares; there could be excessive bacterial overgrowth; I obtained an aerobic culture utlizing Childress technique; will await C&S before prescribing antibiotics; will attempt to treat topically as systemic antibiotics may increase the yeast. Issue. When the wound is healed will go back to nystatin or InterDry AG.            4. Nutrition: needs to follow up with Bariatrics for his recent 40 pound weight gain           5. Offloading: na     Patient will follow up with me in 3 weeks for reevaluation. They were instructed to call the clinic sooner with any signs or symptoms of infection or any further questions/concerns. Answered all questions.    Lilian Llanes DNP, RN, CNP, CWOCN  [facility]  [place]  [phone]        This note was electronically signed by Lilian Llanes

## 2021-06-26 NOTE — PROGRESS NOTES
Assessment & Plan     Benign essential hypertension  - lisinopriL-hydrochlorothiazide (PRINZIDE,ZESTORETIC) 20-25 mg per tablet  Dispense: 180 tablet; Refill: 1    Primary osteoarthritis of right hip  - nabumetone (RELAFEN) 500 MG tablet  Dispense: 180 tablet; Refill: 0    Venous insufficiency, peripheral  - sulfamethoxazole-trimethoprim (SEPTRA DS) 800-160 mg per tablet  Dispense: 20 tablet; Refill: 0  His blood pressure is really very high.  This could be as a result of the pain that he is going through pain of the right hip osteoarthritis.  He does still use morphine pump, but noted that he still has a lot of pain going from the hip to the small of the back into the lower extremities.  I did give him a prescription for nabumetone which hopefully will be helpful for the pain and had him add 1 more tablet of gabapentin in the evening making it 2 tablets of 300 mg gabapentin in the evening.  I did also change his blood pressure medicine to lisinopril hydrochlorothiazide which he can take to the same time.  He was on 45mg daily and does take some furosemide as needed.  I will have him follow-up in about 6 months or less and have him keep an eye on his blood pressure.    No follow-ups on file.    Luann Berger MD  [facility]   Onur Barr is 65 y.o. and presents today for the following health issues   HPI   65-year-old gentleman who comes in today for medication management and follow-up.  He has a history of hypertension.  He also has a history of postlaminectomy lumbar pain.  He takes morphine by pump.  He was recently seen for right-sided hip pain and was found to have moderate to severe osteoarthritis of the hip joint.  Was seen by the orthopedic doctors and did get a shot.  He noted that he did not have any relief of his pain from the shot.  He is having a lot of pain that extends from the hip into the leg and feet.  His wife is also worried about  the redness that is noted on his lower leg and feet with some swelling.  He did note that there has been not much of a change because he has had this in the past as it is.  He does not have any signs of infection though he has a small wound/ulcer on the right side.  Past Medical History:   Diagnosis Date     Acid reflux disease 10/31/2017     Coronary Arteriosclerosis     Created by Conversion  Replacement Utility updated for latest IMO load     Hypercholesterolemia     Created by Conversion      Hypertension     Created by [facility] Annotation: Apr 6 2012 10:16Zack Harris: Lisinipril,  coreg  Replacement Utility updated for latest IMO load     Insomnia     Created by [facility] Annotation: Sep 11 2013  9:56AM Zack Brewer: Trouble  maintaining sleep-Trazodone-minimal helpzolpidem-      Lower Back Pain Chronic     Created by [facility] Annotation: Apr 6 2012 10:20Anh Ford: Morphine pump      Morbid obesity (H) 8/1/2018     Nephrolithiasis      Obstructive Sleep Apnea     Created by Conversion      Osteoarthritis     Created by [facility] Annotation: Jun 26 2009  9:53aZck Harris: failed lumbar  fusion/morphine pump dr putnam  Replacement Utility updated for latest IMO load     Paroxysmal Atrial Fibrillation     Created by Conversion      Post-gastric Bypass For Obesity     Created by Conversion      Sleepiness 5/8/2018     Past Surgical History:   Procedure Laterality Date     [place] ARTHRODESIS ANT INTERBODY MIN DISCECTOMY,LUMBAR      Description: Lumbar Vertebral Fusion;  Recorded: 06/26/2009;  Comments: before 200 see [facility] consult under old records     [place] EXCISE EXCESS SKIN TISSUE,ABDOMEN  11/13/2012    Description: Panniculectomy;  Proc Date: 11/13/2012;  Comments: 3.5 Lb Pannus was removed at the [facility] By Dr. Shon Green     [place] GASTRIC BYPASS,OBESE<150CM SUSY-EN-Y  2008    Description: Gastric Surgery For Morbid Obesity  Bypass With Lora-en-Y;  Recorded: 06/26/2009;  Comments: dr green 2008     [place] REMOVAL OF TONSILS,<12 Y/O      Description: Tonsillectomy;  Recorded: 03/23/2012;  Comments: for obstructive sleep apnea     [place] REPAIR INCISIONAL HERNIA,REDUCIBLE  11/13/2012    Description: Incisional Hernia Repair;  Proc Date: 11/13/2012;  Comments: incisional hernia repair and abdominal panniculectomy by Dr Shon Green at the [facility].     Social History     Socioeconomic History     Marital status:      Spouse name: None     Number of children: None     Years of education: None     Highest education level: None   Occupational History     None   Social Needs     Financial resource strain: None     Food insecurity     Worry: None     Inability: None     Transportation needs     Medical: None     Non-medical: None   Tobacco Use     Smoking status: Former Smoker     Years: 10.00     Types: Cigars     Smokeless tobacco: Never Used   Substance and Sexual Activity     Alcohol use: No     Drug use: No     Comment: formerly used     Sexual activity: None   Lifestyle     Physical activity     Days per week: None     Minutes per session: None     Stress: None   Relationships     Social connections     Talks on phone: None     Gets together: None     Attends Worship service: None     Active member of club or organization: None     Attends meetings of clubs or organizations: None     Relationship status: None     Intimate partner violence     Fear of current or ex partner: None     Emotionally abused: None     Physically abused: None     Forced sexual activity: None   Other Topics Concern     None   Social History Narrative     None     Family History   Problem Relation Age of Onset     Stroke Mother      Heart disease Father      Hodgkin's lymphoma Brother      Allergies   Allergen Reactions     Hydrocodone Other (See Comments)     SNEEZES     Current Outpatient Medications   Medication Sig Dispense Refill     aspirin 81 MG EC  tablet Take 81 mg by mouth daily.       atorvastatin (LIPITOR) 40 MG tablet Take 1 tablet (40 mg total) by mouth at bedtime. 90 tablet 3     baclofen (LIORESAL) 10 MG tablet Take 10 mg by mouth 3 (three) times a day.       carvedilol (COREG CR) 10 MG 24 hr capsule TAKE 1 CAPSULE BY MOUTH EVERY DAY 90 capsule 3     DULoxetine (CYMBALTA) 30 MG capsule Take 90 mg by mouth daily.  1     ferrous gluconate 236 mg (27 mg iron) Tab Take 1 tablet by mouth daily. 90 tablet 2     FLUZONE QUAD 0377-1201, PF, 60 mcg (15 mcg x 4)/0.5 mL Syrg injection        furosemide (LASIX) 20 MG tablet TAKE 1 TABLET BY MOUTH EVERY DAY 90 tablet 0     gabapentin (NEURONTIN) 300 MG capsule 300 mg 3 (three) times a day.        nitroglycerin (NITROSTAT) 0.4 MG SL tablet Place 0.4 mg under the tongue.       omeprazole (PRILOSEC) 40 MG capsule Take 40 mg by mouth.       traZODone (DESYREL) 100 MG tablet TAKE 2 TABLETS (200MG TOTAL) BY MOUTH AT BEDTIME 180 tablet 3     UNABLE TO FIND Med Name: Morphine Sulfate COURTNEY       lisinopriL-hydrochlorothiazide (PRINZIDE,ZESTORETIC) 20-25 mg per tablet Take 2 tablets by mouth daily. 180 tablet 1     nabumetone (RELAFEN) 500 MG tablet Take 1 tablet (500 mg total) by mouth 2 (two) times a day. 180 tablet 0     sulfamethoxazole-trimethoprim (SEPTRA DS) 800-160 mg per tablet Take 1 tablet by mouth 2 (two) times a day for 10 days. 20 tablet 0     No current facility-administered medications for this visit.          Review of Systems   Constitutional: Positive for fatigue.        He does note getting fatigued and tired easily.  Sleeps and when he wakes up in the middle of the night he does not find it easy going back to sleep again.   Cardiovascular: Negative for chest pain.   Musculoskeletal: Positive for arthralgias, back pain and gait problem. Negative for myalgias and neck pain.         Objective    /84 (Patient Site: Right Arm, Patient Position: Sitting, Cuff Size: Adult Large)   Pulse 94   Wt (!) 338  lb (153.3 kg)   BMI 45.83 kg/m    Body mass index is 45.83 kg/m .  Physical Exam   Constitutional: He is oriented to person, place, and time. He appears well-developed and well-nourished. No distress.   Cardiovascular: Normal rate and regular rhythm.   Pulmonary/Chest: Effort normal and breath sounds normal.   Neurological: He is alert and oriented to person, place, and time.   Skin:   There is redness noted from the distal one third of the leg on both sides into the ankle joint as well as mildly into the feet.  Right side does show some pedal pitting edema.  There is no tenderness noted on the left side but mild discomfort on the right foot which could be from the hip.  He does have small ulceration noted on the medial aspect of the right lower leg.

## 2021-06-28 NOTE — PROGRESS NOTES
Progress Notes by Claudio Stephens MD at 11/18/2019 10:10 AM     Author: Claudio Stephens MD Service: -- Author Type: Physician    Filed: 11/18/2019 10:49 AM Encounter Date: 11/18/2019 Status: Signed    : Claudio Stephens MD (Physician)           Thank you, Dr. Berger, for asking the [facility] Care team to see Mr. Onur Barr to evaluate heartburn symptoms.      Assessment/Recommendations   Assessment:    1.  History of obesity status post gastric bypass surgery  2.  Paroxysmal atrial fibrillation  3.  Coronary atherosclerosis LAD stent 2012             NXT 2014 apical ischemia/infarct small EF 51%  4.  Essential hypertension  5.  Hypercholesterolemia  6.  Asymptomatic bradycardia  7.  Cardiomyopathy ischemic    Plan:  1.  In view of his heartburn symptoms we will arrange for nuclear test as it is been sometime to reassess whether ischemic disease is present.  2.  Reassess blood pressure during his stress test.  Pressure elevated today but in view of acute pain syndrome and difficulty getting into our clinic I suspect these are factors but if still elevated I think more aggressive blood pressure control would be in play  3.  Check lipids today       History of Present Illness/Subjective    Mr. Onur Barr is a 64 y.o. male with coronary artery disease history and history of multi-risk factors.  Patient denies any anginal type pains but says he has had chronic heartburn symptoms.  He does very little exertion because of back pain and leg pains.  He has been gaining weight again.  Does not check his blood pressure at home states he has been on his medications.  No dizziness or lightheadedness.  No palpitations or irregular heartbeats.    ECG: Holter monitor 2017 with average heart rate 59 no pauses or severe bradycardia.  No atrial fibrillation    ECHOCARDIOGRAM:      Physical Examination Review of Systems   There were no vitals filed for this visit.  There is no height or weight on file  to calculate BMI.  Wt Readings from Last 5 Encounters:   11/28/18 (!) 296 lb (134.3 kg)   10/02/18 (!) 296 lb 8 oz (134.5 kg)   09/17/18 (!) 300 lb (136.1 kg)   08/20/18 (!) 290 lb (131.5 kg)   08/01/18 (!) 311 lb 9.6 oz (141.3 kg)     BP Readings from Last 5 Encounters:   11/28/18 134/72   10/02/18 135/76   09/17/18 130/86   08/20/18 128/78   08/01/18 132/88     Pulse Readings from Last 5 Encounters:   11/28/18 60   10/02/18 62   09/17/18 68   08/20/18 60   08/01/18 76       General:   Alert, appears stated age and cooperative  normocephalic, without obvious abnormality   ENT/Mouth: Membranes moist, no oral lesions or bleeding gums.      EYES:  No scleral icterus, normal conjunctivae   Neck: No carotid bruits or thyromegaly   Chest/Lungs:   Lungs are clear to auscultation, no rales or wheezing,    Cardiovascular:   Regular. Normal first and second heart sounds with no murmurs, rubs, or gallops; No edema bilaterally    Abdomen:  Normal bowel tones   Extremities: No cyanosis or clubbing, pulses intact   Skin: Color, texture normal.    Neurologic: No focal neurologic deficits                                                    Medical History  Surgical History Family History Social History   Past Medical History:   Diagnosis Date   ? Acid reflux disease 10/31/2017   ? Coronary Arteriosclerosis     Created by Conversion  Replacement Utility updated for latest IMO load   ? Hypercholesterolemia     Created by Conversion    ? Hypertension     Created by [facility] Annotation: Apr 6 2012 10:16AM - Zack Gutierrez: Lisinipril,  coreg  Replacement Utility updated for latest IMO load   ? Insomnia     Created by [facility] Annotation: Sep 11 2013  9:56AM Zack Brewer: Trouble  maintaining sleep-Trazodone-minimal helpzolpidem-    ? Lower Back Pain Chronic     Created by [facility] Annotation: Apr 6 2012 10:20AM - Anh Dickson: Morphine pump    ? Morbid obesity (H) 8/1/2018   ?  Nephrolithiasis    ? Obstructive Sleep Apnea     Created by Conversion    ? Osteoarthritis     Created by Conversion [facility] Annotation: Jun 26 2009  [id number] - Zack Gutierrez: failed lumbar  fusion/morphine pump dr putnam  Replacement Utility updated for latest IMO load   ? Paroxysmal Atrial Fibrillation     Created by Conversion    ? Post-gastric Bypass For Obesity     Created by Conversion    ? Sleepiness 5/8/2018    Past Surgical History:   Procedure Laterality Date   ? [place] ARTHRODESIS ANT INTERBODY MIN DISCECTOMY,LUMBAR      Description: Lumbar Vertebral Fusion;  Recorded: 06/26/2009;  Comments: before 200 see [facility] consult under old records   ? FL EXCISE EXCESS SKIN TISSUE,ABDOMEN  11/13/2012    Description: Panniculectomy;  Proc Date: 11/13/2012;  Comments: 3.5 Lb Pannus was removed at the [facility] By Dr. Shon Green   ? [place] GASTRIC BYPASS,OBESE<150CM SUSY-EN-Y  2008    Description: Gastric Surgery For Morbid Obesity Bypass With Susy-en-Y;  Recorded: 06/26/2009;  Comments: dr green 2008   ? FL REMOVAL OF TONSILS,<12 Y/O      Description: Tonsillectomy;  Recorded: 03/23/2012;  Comments: for obstructive sleep apnea   ? FL REPAIR INCISIONAL HERNIA,REDUCIBLE  11/13/2012    Description: Incisional Hernia Repair;  Proc Date: 11/13/2012;  Comments: incisional hernia repair and abdominal panniculectomy by Dr Shon Green at the [facility].    Family History   Problem Relation Age of Onset   ? Stroke Mother    ? Heart disease Father    ? Hodgkin's lymphoma Brother     Social History     Socioeconomic History   ? Marital status:      Spouse name: Not on file   ? Number of children: Not on file   ? Years of education: Not on file   ? Highest education level: Not on file   Occupational History   ? Not on file   Social Needs   ? Financial resource strain: Not on file   ? Food insecurity:     Worry: Not on file     Inability: Not on file   ? Transportation needs:     Medical: Not on file     Non-medical:  Not on file   Tobacco Use   ? Smoking status: Former Smoker     Years: 10.00     Types: Cigars   ? Smokeless tobacco: Never Used   Substance and Sexual Activity   ? Alcohol use: No   ? Drug use: No     Comment: formerly used   ? Sexual activity: Not on file   Lifestyle   ? Physical activity:     Days per week: Not on file     Minutes per session: Not on file   ? Stress: Not on file   Relationships   ? Social connections:     Talks on phone: Not on file     Gets together: Not on file     Attends Taoist service: Not on file     Active member of club or organization: Not on file     Attends meetings of clubs or organizations: Not on file     Relationship status: Not on file   ? Intimate partner violence:     Fear of current or ex partner: Not on file     Emotionally abused: Not on file     Physically abused: Not on file     Forced sexual activity: Not on file   Other Topics Concern   ? Not on file   Social History Narrative   ? Not on file          Medications  Allergies   Current Outpatient Medications   Medication Sig Dispense Refill   ? aspirin 81 MG EC tablet Take 81 mg by mouth daily.     ? atorvastatin (LIPITOR) 40 MG tablet TAKE 1 TABLET(40 MG) BY MOUTH AT BEDTIME 90 tablet 0   ? carvedilol (COREG CR) 10 MG 24 hr capsule TAKE 1 CAPSULE (10 MG TOTAL) BY MOUTH DAILY. 90 capsule 2   ? DULoxetine (CYMBALTA) 30 MG capsule Take 90 mg by mouth daily.  1   ? FLUZONE QUAD [id number], PF, 60 mcg (15 mcg x 4)/0.5 mL Syrg injection      ? furosemide (LASIX) 20 MG tablet TAKE 1 TABLET BY MOUTH EVERY DAY 90 tablet 0   ? gabapentin (NEURONTIN) 300 MG capsule 300 mg 3 (three) times a day.      ? lisinopril (PRINIVIL,ZESTRIL) 20 MG tablet TAKE 1 TABLET BY MOUTH EVERY DAY *NEED FOLLOW UP FOR MORE REFILLS* 90 tablet 3   ? nitroglycerin (NITROSTAT) 0.4 MG SL tablet Place 0.4 mg under the tongue.     ? omeprazole (PRILOSEC) 40 MG capsule Take 40 mg by mouth.     ? traZODone (DESYREL) 100 MG tablet TAKE 2 TABLETS (200 MG TOTAL) BY  MOUTH AT BEDTIME. ** DUE FOR MED CHECK FOR FUTURE FILLS 180 tablet 0   ? UNABLE TO FIND Med Name: Morphine Sulfate COURTNEY       No current facility-administered medications for this visit.     Allergies   Allergen Reactions   ? Hydrocodone Other (See Comments)     SNEEZES         Lab Results    Chemistry/lipid CBC Cardiac Enzymes/BNP/TSH/INR   Lab Results   Component Value Date    CHOL 121 01/24/2017    HDL 44 01/24/2017    LDLCALC 51 01/24/2017    TRIG 129 01/24/2017    CREATININE 0.94 02/13/2018    BUN 17 02/13/2018    K 4.6 02/13/2018     02/13/2018     02/13/2018    CO2 32 (H) 02/13/2018    Lab Results   Component Value Date    WBC 4.2 05/18/2016    HGB 11.1 (L) 04/17/2017    HCT 36.1 (L) 05/18/2016    MCV 67 (L) 05/18/2016     05/18/2016    Lab Results   Component Value Date    CKMB 3 04/08/2012    TROPONINI 0.24 04/08/2012     (H) 04/09/2012    INR 1.31 (H) 04/10/2012         Clinical evaluation time today including exam 30 minutes.  At least 50% of clinic evaluation time involved in assessment and patient counseling.  Part of this chart was created using a dictation software.  Typographic errors, word substitutions, and grammatical errors may unintentionally occur.    Claudio Stephens M.D.  [facility]

## 2021-06-30 NOTE — PROGRESS NOTES
Progress Notes by Claudio Stephens MD at 12/7/2020 10:10 AM     Author: Claudio tSephens MD Service: -- Author Type: Physician    Filed: 12/7/2020 10:25 AM Encounter Date: 12/7/2020 Status: Signed    : Claudio Stephens MD (Physician)           Mr. Onur Barr is referred by Dr. Berger to the [facility] Heart Care team to evaluate Hx cad.      Assessment/Recommendations   Assessment:    1.  History of obesity status post gastric bypass surgery  2.  Paroxysmal atrial fibrillation  3.  Coronary atherosclerosis LAD stent 2012             NXT 2014 apical ischemia/infarct small EF 51%             NXT 2019 small NTMI no ischemia 61%  4.  Essential hypertension  5.  Hypercholesterolemia  6.  Asymptomatic bradycardia  7.  Cardiomyopathy ischemic    Plan:  1.  Continue with current medical therapy.  2.  We will check a lipid profile today as he is fasting.  3.  No additional studies at this time       History of Present Illness/Subjective    Mr. Onur Barr is a 65 y.o. male with history of hypercholesterolemia.  His level of activity has markedly decreased.  He is having severe low back pain which is limited his activity required use with a cane.  His weight continues to be high.  He is not able to do much in the way of activity other than short walks.  No orthopnea but he does have chronic edema of the lower extremities and is on a diuretic.  Believe this is most likely related to obesity and diminished return.  His cholesterol numbers last year were excellent with an LDL cholesterol of 52 will recheck today.  No chest pain pressure tightness or heaviness.  Mild elevation of blood pressure today continue to monitor this at home.      ECHOCARDIOGRAM:      Physical Examination Review of Systems   Vitals:    12/07/20 1008   BP: (!) 150/100   Pulse: 92   Resp: 12     Body mass index is 43.58 kg/m .  Wt Readings from Last 5 Encounters:   12/07/20 (!) 321 lb 6.4 oz (145.8 kg)   11/30/20 (!) 336 lb 3.2 oz  (152.5 kg)   09/28/20 (!) 332 lb (150.6 kg)   01/24/20 (!) 331 lb (150.1 kg)   11/20/19 (!) 330 lb 6 oz (149.9 kg)     BP Readings from Last 5 Encounters:   12/07/20 (!) 150/100   11/30/20 134/82   09/28/20 136/90   01/24/20 (!) 160/94   11/20/19 140/90     Pulse Readings from Last 5 Encounters:   12/07/20 92   11/30/20 91   09/28/20 72   01/24/20 77   11/20/19 70       General:   Alert, appears stated age and cooperative  normocephalic, without obvious abnormality   ENT/Mouth: Membranes moist, no oral lesions or bleeding gums.      EYES:  No scleral icterus, normal conjunctivae   Neck: No carotid bruits or thyromegaly   Chest/Lungs:   Lungs are clear to auscultation, no rales or wheezing,    Cardiovascular:   Regular. Normal first and second heart sounds with no murmurs, rubs, or gallops; No edema bilaterally    Abdomen:  Normal bowel tones   Extremities: No cyanosis or clubbing, pulses intact   Skin: Color, texture normal.    Neurologic: No focal neurologic deficits          General: Weight Gain  Eyes: WNL  Ears/Nose/Throat: WNL  Lungs: Shortness of Breath  Heart: Shortness of Breath with activity, Leg Swelling  Stomach: WNL  Bladder: WNL  Muscle/Joints: Joint Pain, Muscle Weakness, Muscle Pain  Skin: Poor Wound Healing  Nervous System: Daytime Sleepiness  Mental Health: Depression     Blood: Easy Bruising     Medical History  Surgical History Family History Social History   Past Medical History:   Diagnosis Date   ? Acid reflux disease 10/31/2017   ? Coronary Arteriosclerosis     Created by Conversion  Replacement Utility updated for latest IMO load   ? Hypercholesterolemia     Created by Conversion    ? Hypertension     Created by [facility] Annotation: Apr 6 2012 10:16Zack Harris: Lisinipril,  coreg  Replacement Utility updated for latest IMO load   ? Insomnia     Created by [facility] Annotation: Sep 11 2013  9:56AM Zack Brewer: Trouble  maintaining  sleep-Trazodone-minimal helpzolpidem-    ? Lower Back Pain Chronic     Created by Conversion [facility] Annotation: Apr 6 2012 10:20AM Anh Ramos: Morphine pump    ? Morbid obesity (H) 8/1/2018   ? Nephrolithiasis    ? Obstructive Sleep Apnea     Created by Conversion    ? Osteoarthritis     Created by Conversion [facility] Annotation: Jun 26 2009  9:53AM Zack Brewer: failed lumbar  fusion/morphine pump dr putnam  Replacement Utility updated for latest IMO load   ? Paroxysmal Atrial Fibrillation     Created by Conversion    ? Post-gastric Bypass For Obesity     Created by Conversion    ? Sleepiness 5/8/2018    Past Surgical History:   Procedure Laterality Date   ? [place] ARTHRODESIS ANT INTERBODY MIN DISCECTOMY,LUMBAR      Description: Lumbar Vertebral Fusion;  Recorded: 06/26/2009;  Comments: before 200 see [facility] consult under old records   ? SC EXCISE EXCESS SKIN TISSUE,ABDOMEN  11/13/2012    Description: Panniculectomy;  Proc Date: 11/13/2012;  Comments: 3.5 Lb Pannus was removed at the [facility] By Dr. Shon Green   ? [place] GASTRIC BYPASS,OBESE<150CM SUSY-EN-Y  2008    Description: Gastric Surgery For Morbid Obesity Bypass With Susy-en-Y;  Recorded: 06/26/2009;  Comments: dr green 2008   ? SC REMOVAL OF TONSILS,<14 Y/O      Description: Tonsillectomy;  Recorded: 03/23/2012;  Comments: for obstructive sleep apnea   ? SC REPAIR INCISIONAL HERNIA,REDUCIBLE  11/13/2012    Description: Incisional Hernia Repair;  Proc Date: 11/13/2012;  Comments: incisional hernia repair and abdominal panniculectomy by Dr Shon Green at the [facility].    Family History   Problem Relation Age of Onset   ? Stroke Mother    ? Heart disease Father    ? Hodgkin's lymphoma Brother     Social History     Socioeconomic History   ? Marital status:      Spouse name: Not on file   ? Number of children: Not on file   ? Years of education: Not on file   ? Highest education level: Not on file   Occupational History   ? Not  on file   Social Needs   ? Financial resource strain: Not on file   ? Food insecurity     Worry: Not on file     Inability: Not on file   ? Transportation needs     Medical: Not on file     Non-medical: Not on file   Tobacco Use   ? Smoking status: Former Smoker     Years: 10.00     Types: Cigars   ? Smokeless tobacco: Never Used   Substance and Sexual Activity   ? Alcohol use: No   ? Drug use: No     Comment: formerly used   ? Sexual activity: Not on file   Lifestyle   ? Physical activity     Days per week: Not on file     Minutes per session: Not on file   ? Stress: Not on file   Relationships   ? Social connections     Talks on phone: Not on file     Gets together: Not on file     Attends Oriental orthodox service: Not on file     Active member of club or organization: Not on file     Attends meetings of clubs or organizations: Not on file     Relationship status: Not on file   ? Intimate partner violence     Fear of current or ex partner: Not on file     Emotionally abused: Not on file     Physically abused: Not on file     Forced sexual activity: Not on file   Other Topics Concern   ? Not on file   Social History Narrative   ? Not on file          Medications  Allergies   Current Outpatient Medications   Medication Sig Dispense Refill   ? aspirin 81 MG EC tablet Take 81 mg by mouth daily.     ? atorvastatin (LIPITOR) 40 MG tablet TAKE 1 TABLET BY MOUTH AT BEDTIME 90 tablet 3   ? baclofen (LIORESAL) 10 MG tablet Take 10 mg by mouth 3 (three) times a day.     ? carvedilol (COREG CR) 10 MG 24 hr capsule TAKE 1 CAPSULE BY MOUTH EVERY DAY 90 capsule 1   ? cephalexin (KEFLEX) 500 MG capsule Take 1 capsule (500 mg total) by mouth 3 (three) times a day for 7 days. 21 capsule 0   ? DULoxetine (CYMBALTA) 30 MG capsule Take 90 mg by mouth daily.  1   ? furosemide (LASIX) 20 MG tablet TAKE 1 TABLET BY MOUTH EVERY DAY 90 tablet 0   ? gabapentin (NEURONTIN) 300 MG capsule 300 mg 3 (three) times a day.      ? lisinopriL  (PRINIVIL,ZESTRIL) 20 MG tablet Take 1.5 tablets (30 mg total) by mouth daily. 135 tablet 3   ? nitroglycerin (NITROSTAT) 0.4 MG SL tablet Place 0.4 mg under the tongue.     ? omeprazole (PRILOSEC) 40 MG capsule Take 40 mg by mouth.     ? traZODone (DESYREL) 100 MG tablet TAKE 2 TABLETS (200MG TOTAL) BY MOUTH AT BEDTIME 180 tablet 3   ? UNABLE TO FIND Med Name: Morphine Sulfate COURTNEY     ? FLUZONE QUAD [id number], PF, 60 mcg (15 mcg x 4)/0.5 mL Syrg injection        No current facility-administered medications for this visit.     Allergies   Allergen Reactions   ? Hydrocodone Other (See Comments)     SNEEZES         Lab Results    Chemistry/lipid CBC Cardiac Enzymes/BNP/TSH/INR   Lab Results   Component Value Date    CHOL 131 11/20/2019    HDL 36 (L) 11/20/2019    LDLCALC 52 11/20/2019    TRIG 217 (H) 11/20/2019    CREATININE 1.02 11/20/2019    BUN 12 11/20/2019    K 4.3 11/20/2019     11/20/2019     11/20/2019    CO2 27 11/20/2019    Lab Results   Component Value Date    WBC 4.2 05/18/2016    HGB 11.1 (L) 04/17/2017    HCT 36.1 (L) 05/18/2016    MCV 67 (L) 05/18/2016     05/18/2016    Lab Results   Component Value Date    CKMB 3 04/08/2012    TROPONINI 0.24 04/08/2012     (H) 04/09/2012    INR 1.31 (H) 04/10/2012         Clinical evaluation time today including exam 20 minutes.  At least 50% of clinic evaluation time involved in assessment and patient counseling.  Part of this chart was created using a dictation software.  Typographic errors, word substitutions, and grammatical errors may unintentionally occur.    Claudio Stephens M.D.  [facility]

## 2021-07-06 VITALS
HEART RATE: 94 BPM | WEIGHT: 315 LBS | DIASTOLIC BLOOD PRESSURE: 84 MMHG | SYSTOLIC BLOOD PRESSURE: 170 MMHG | BODY MASS INDEX: 45.83 KG/M2

## 2021-07-07 ENCOUNTER — COMMUNICATION - HEALTHEAST (OUTPATIENT)
Dept: FAMILY MEDICINE | Facility: CLINIC | Age: 66
End: 2021-07-07

## 2021-07-07 DIAGNOSIS — I87.2 VENOUS INSUFFICIENCY, PERIPHERAL: ICD-10-CM

## 2021-07-07 NOTE — TELEPHONE ENCOUNTER
Telephone Encounter by Jair Prasad RN at 2021  3:22 PM     Author: Jair Prasad RN Service: -- Author Type: Registered Nurse    Filed: 2021  3:23 PM Encounter Date: 2021 Status: Signed    : Jair Prasad RN (Registered Nurse)       sulfamethoxazole-trimethoprim (SEPTRA DS) 800-160 mg per tablet [[id number]]    Electronically signed by: Luann Berger MD on 21 Status:    Ordering user: Luann Berger MD 21 Authorized by: Luann Berger MD   Frequency: BID 21 - 10  days   Diagnoses   Venous insufficiency, peripheral [I87.2]     RN cannot approve Refill Request    RN can NOT refill this medication med is not covered by policy/route to provider and medication not on med list. Last office visit: 2021 Luann Berger MD Last Physical: Visit date not found Last MTM visit: Visit date not found Last visit same specialty: 2021 Luann Berger MD.  Next visit within 3 mo: Visit date not found  Next physical within 3 mo: Visit date not found      Jair Prasad, [facility] Connection Triage/Med Refill 2021    Requested Prescriptions   Pending Prescriptions Disp Refills   ? sulfamethoxazole-trimethoprim (SEPTRA DS) 800-160 mg per tablet [Pharmacy Med Name: SULFAMETHOXAZOLE-TMP DS TABLET] 20 tablet 0     Sig: TAKE 1 TABLET BY MOUTH 2 (TWO) TIMES A DAY FOR 10 DAYS.       There is no refill protocol information for this order

## 2021-07-12 ENCOUNTER — COMMUNICATION - HEALTHEAST (OUTPATIENT)
Dept: SCHEDULING | Facility: CLINIC | Age: 66
End: 2021-07-12

## 2021-07-12 ENCOUNTER — NURSE TRIAGE (OUTPATIENT)
Dept: NURSING | Facility: CLINIC | Age: 66
End: 2021-07-12

## 2021-07-12 NOTE — TELEPHONE ENCOUNTER
Duplicate charting , for this [facility] Pt = due to Epic merging from [facility] to [facility].   FNA triage call :  Fully Covid vaccined-  march 2021 .   Presenting problem :  Pt called. Seen on 6/18/21 , given Septra Dx and has been taking lasix previously . Using Morphine pump and increased  Gabapenin at this visit =  for chronic  back and shoulder pain for  20 years . Has bilateral leg swelling and redness for 6 months but getting worse from 6/18/21 visit with swelling up to Knees and water blisters , and had sore on R before but now about 10 sores on both legs .  Currently : 1&0 &  eating are ok , walk with cane and homebound activity of kitchen , bathroom and bed .   Guideline used : Leg swelling and edema A Oh.   Disposition and recommendations : FNA recommended someone drive Pt to ED but Pt refuses , only wants office visit  And sent to .   Caller verbalizes understanding and denies further questions and will call back if further symptoms to triage or questions  . Cristin Harrell RN  - [place] Nurse Advisor   COVID 19 Nurse Triage Plan/Patient Instructions    Please be aware that novel coronavirus (COVID-19) may be circulating in the community. If you develop symptoms such as fever, cough, or SOB or if you have concerns about the presence of another infection including coronavirus (COVID-19), please contact your health care provider or visit [email or web address].     Disposition/Instructions    ED Visit recommended. Follow protocol based instructions.     Bring Your Own Device:  Please also bring your smart device(s) (smart phones, tablets, laptops) and their charging cables for your personal use and to communicate with your care team during your visit.    Thank you for taking steps to prevent the spread of this virus.  o Limit your contact with others.  o Wear a simple mask to cover your cough.  o Wash your hands well and often.    Reason for Disposition    Entire foot is  cool or blue in comparison to other side    Additional Information    Negative: Sounds like a life-threatening emergency to the triager    Negative: Chest pain    Negative: Small area of swelling and followed an insect bite to the area    Negative: Followed a knee injury    Negative: Ankle or foot injury    Negative: Pregnant with leg swelling or edema    Negative: Difficulty breathing at rest    Protocols used: LEG SWELLING AND EDEMA-A-OH

## 2021-07-12 NOTE — TELEPHONE ENCOUNTER
Telephone Encounter by Erin Harrell RN at 7/12/2021 11:19 AM     Author: Erin Harrell RN Service: -- Author Type: Registered Nurse    Filed: 7/12/2021 11:28 AM Encounter Date: 7/12/2021 Status: Signed    : Erin Harrell RN (Registered Nurse)       [facility] triage call :  Fully Covid vaccined-  march 2021 .   Presenting problem :  Pt called. Seen on 6/18/21 , given Septra Dx and has been taking lasix previously . Using Morphine pump and increased  Gabapenin at this visit =  for chronic  back and shoulder pain for  20 years . Has bilateral leg swelling and redness for 6 months but getting worse from 6/18/21 visit with swelling up to Knees and water blisters , and had sore on R before but now about 10 sores on both legs .  Currently : 1&0 &  eating are ok , walk with cane and homebound activity of kitchen , bathroom and bed .   Guideline used : Leg swelling and edema A Oh.   Disposition and recommendations : [facility] recommended someone drive Pt to ED but Pt refuses , only wants office visit  And sent to .   Caller verbalizes understanding and denies further questions and will call back if further symptoms to triage or questions  . Cristin Harrell RN  - [facility] Nurse Advisor   COVID 19 Nurse Triage Plan/Patient Instructions    Please be aware that novel coronavirus (COVID-19) may be circulating in the community. If you develop symptoms such as fever, cough, or SOB or if you have concerns about the presence of another infection including coronavirus (COVID-19), please contact your health care provider or visit  [email or web address].    Disposition/Instructions    ED Visit recommended. Follow protocol based instructions.      Bring Your Own Device:  Please also bring your smart device(s) (smart phones, tablets, laptops) and their charging cables for your personal use and to communicate with your care team during your visit.        Reason for Disposition  ? Entire foot is cool or blue in comparison to other  side    Additional Information  ? Negative: Sounds like a life-threatening emergency to the triager  ? Negative: Chest pain  ? Negative: Small area of swelling and followed an insect bite to the area  ? Negative: Followed a knee injury  ? Negative: Ankle or foot injury  ? Negative: Pregnant with leg swelling or edema  ? Negative: Difficulty breathing at rest    Protocols used: LEG SWELLING AND EDEMA-A-OH

## 2021-07-13 ENCOUNTER — HOSPITAL ENCOUNTER (OUTPATIENT)
Facility: CLINIC | Age: 66
Setting detail: OBSERVATION
Discharge: HOME | End: 2021-07-14
Attending: EMERGENCY MEDICINE | Admitting: INTERNAL MEDICINE
Payer: COMMERCIAL

## 2021-07-13 ENCOUNTER — APPOINTMENT (OUTPATIENT)
Dept: ULTRASOUND IMAGING | Facility: CLINIC | Age: 66
End: 2021-07-13
Attending: EMERGENCY MEDICINE
Payer: COMMERCIAL

## 2021-07-13 ENCOUNTER — OFFICE VISIT (OUTPATIENT)
Dept: FAMILY MEDICINE | Facility: CLINIC | Age: 66
End: 2021-07-13
Payer: COMMERCIAL

## 2021-07-13 VITALS
TEMPERATURE: 98.3 F | DIASTOLIC BLOOD PRESSURE: 104 MMHG | WEIGHT: 315 LBS | SYSTOLIC BLOOD PRESSURE: 140 MMHG | HEART RATE: 53 BPM | BODY MASS INDEX: 47.49 KG/M2

## 2021-07-13 DIAGNOSIS — I87.2 VENOUS INSUFFICIENCY, PERIPHERAL: Primary | ICD-10-CM

## 2021-07-13 DIAGNOSIS — I51.89 DIASTOLIC DYSFUNCTION: ICD-10-CM

## 2021-07-13 DIAGNOSIS — I73.9 PERIPHERAL VASCULAR DISEASE (H): Primary | ICD-10-CM

## 2021-07-13 DIAGNOSIS — R60.0 BILATERAL LEG EDEMA: ICD-10-CM

## 2021-07-13 DIAGNOSIS — I50.32 CHRONIC DIASTOLIC HEART FAILURE (H): ICD-10-CM

## 2021-07-13 DIAGNOSIS — L03.115 BILATERAL CELLULITIS OF LOWER LEG: ICD-10-CM

## 2021-07-13 DIAGNOSIS — L03.116 BILATERAL CELLULITIS OF LOWER LEG: ICD-10-CM

## 2021-07-13 DIAGNOSIS — M79.89 LEG SWELLING: ICD-10-CM

## 2021-07-13 DIAGNOSIS — E66.01 MORBID OBESITY (H): ICD-10-CM

## 2021-07-13 LAB
ANION GAP SERPL CALCULATED.3IONS-SCNC: 3 MMOL/L (ref 3–14)
ANION GAP SERPL CALCULATED.3IONS-SCNC: 9 MMOL/L (ref 3–14)
BASOPHILS # BLD AUTO: 0.1 10E3/UL (ref 0–0.2)
BASOPHILS NFR BLD AUTO: 1 %
BUN SERPL-MCNC: 15 MG/DL (ref 7–30)
BUN SERPL-MCNC: 16 MG/DL (ref 7–30)
CALCIUM SERPL-MCNC: 8.9 MG/DL (ref 8.5–10.1)
CALCIUM SERPL-MCNC: 9 MG/DL (ref 8.5–10.1)
CHLORIDE BLD-SCNC: 101 MMOL/L (ref 94–109)
CHLORIDE BLD-SCNC: 105 MMOL/L (ref 94–109)
CO2 SERPL-SCNC: 22 MMOL/L (ref 20–32)
CO2 SERPL-SCNC: 32 MMOL/L (ref 20–32)
CREAT SERPL-MCNC: 1.1 MG/DL (ref 0.66–1.25)
CREAT SERPL-MCNC: 1.17 MG/DL (ref 0.66–1.25)
EOSINOPHIL # BLD AUTO: 0.2 10E3/UL (ref 0–0.7)
EOSINOPHIL NFR BLD AUTO: 3 %
ERYTHROCYTE [DISTWIDTH] IN BLOOD BY AUTOMATED COUNT: 15.9 % (ref 10–15)
GFR SERPL CREATININE-BSD FRML MDRD: 65 ML/MIN/1.73M2
GFR SERPL CREATININE-BSD FRML MDRD: 71 ML/MIN/1.73M2
GLUCOSE BLD-MCNC: 130 MG/DL (ref 70–99)
GLUCOSE BLD-MCNC: 94 MG/DL (ref 70–99)
HCT VFR BLD AUTO: 40.3 % (ref 40–53)
HGB BLD-MCNC: 12.5 G/DL (ref 13.3–17.7)
HOLD SPECIMEN: NORMAL
IMM GRANULOCYTES # BLD: 0 10E3/UL
IMM GRANULOCYTES NFR BLD: 0 %
LYMPHOCYTES # BLD AUTO: 2.1 10E3/UL (ref 0.8–5.3)
LYMPHOCYTES NFR BLD AUTO: 33 %
MCH RBC QN AUTO: 26.8 PG (ref 26.5–33)
MCHC RBC AUTO-ENTMCNC: 31 G/DL (ref 31.5–36.5)
MCV RBC AUTO: 86 FL (ref 78–100)
MONOCYTES # BLD AUTO: 0.7 10E3/UL (ref 0–1.3)
MONOCYTES NFR BLD AUTO: 10 %
NEUTROPHILS # BLD AUTO: 3.3 10E3/UL (ref 1.6–8.3)
NEUTROPHILS NFR BLD AUTO: 53 %
NRBC # BLD AUTO: 0 10E3/UL
NRBC BLD AUTO-RTO: 0 /100
NT-PROBNP SERPL-MCNC: 58 PG/ML (ref 0–900)
PLATELET # BLD AUTO: 163 10E3/UL (ref 150–450)
POTASSIUM BLD-SCNC: 4.1 MMOL/L (ref 3.4–5.3)
POTASSIUM BLD-SCNC: 5.7 MMOL/L (ref 3.4–5.3)
RBC # BLD AUTO: 4.67 10E6/UL (ref 4.4–5.9)
SARS-COV-2 RNA RESP QL NAA+PROBE: NEGATIVE
SODIUM SERPL-SCNC: 136 MMOL/L (ref 133–144)
SODIUM SERPL-SCNC: 136 MMOL/L (ref 133–144)
TROPONIN I SERPL-MCNC: <0.015 UG/L (ref 0–0.04)
WBC # BLD AUTO: 6.3 10E3/UL (ref 4–11)

## 2021-07-13 PROCEDURE — 99285 EMERGENCY DEPT VISIT HI MDM: CPT | Mod: 25

## 2021-07-13 PROCEDURE — G0378 HOSPITAL OBSERVATION PER HR: HCPCS

## 2021-07-13 PROCEDURE — 36592 COLLECT BLOOD FROM PICC: CPT | Performed by: EMERGENCY MEDICINE

## 2021-07-13 PROCEDURE — 250N000011 HC RX IP 250 OP 636: Performed by: EMERGENCY MEDICINE

## 2021-07-13 PROCEDURE — 87635 SARS-COV-2 COVID-19 AMP PRB: CPT | Performed by: EMERGENCY MEDICINE

## 2021-07-13 PROCEDURE — 250N000013 HC RX MED GY IP 250 OP 250 PS 637: Performed by: INTERNAL MEDICINE

## 2021-07-13 PROCEDURE — 99214 OFFICE O/P EST MOD 30 MIN: CPT | Performed by: FAMILY MEDICINE

## 2021-07-13 PROCEDURE — 36415 COLL VENOUS BLD VENIPUNCTURE: CPT | Performed by: INTERNAL MEDICINE

## 2021-07-13 PROCEDURE — 96365 THER/PROPH/DIAG IV INF INIT: CPT

## 2021-07-13 PROCEDURE — 93005 ELECTROCARDIOGRAM TRACING: CPT

## 2021-07-13 PROCEDURE — 80048 BASIC METABOLIC PNL TOTAL CA: CPT | Performed by: EMERGENCY MEDICINE

## 2021-07-13 PROCEDURE — 258N000003 HC RX IP 258 OP 636: Performed by: EMERGENCY MEDICINE

## 2021-07-13 PROCEDURE — 99220 PR INITIAL OBSERVATION CARE,LEVEL III: CPT | Performed by: INTERNAL MEDICINE

## 2021-07-13 PROCEDURE — 84484 ASSAY OF TROPONIN QUANT: CPT | Performed by: EMERGENCY MEDICINE

## 2021-07-13 PROCEDURE — 83880 ASSAY OF NATRIURETIC PEPTIDE: CPT | Performed by: EMERGENCY MEDICINE

## 2021-07-13 PROCEDURE — 85025 COMPLETE CBC W/AUTO DIFF WBC: CPT | Performed by: EMERGENCY MEDICINE

## 2021-07-13 PROCEDURE — 36415 COLL VENOUS BLD VENIPUNCTURE: CPT | Performed by: EMERGENCY MEDICINE

## 2021-07-13 PROCEDURE — 93970 EXTREMITY STUDY: CPT

## 2021-07-13 PROCEDURE — 84145 PROCALCITONIN (PCT): CPT | Performed by: INTERNAL MEDICINE

## 2021-07-13 PROCEDURE — C9803 HOPD COVID-19 SPEC COLLECT: HCPCS

## 2021-07-13 PROCEDURE — 96366 THER/PROPH/DIAG IV INF ADDON: CPT

## 2021-07-13 PROCEDURE — 80048 BASIC METABOLIC PNL TOTAL CA: CPT | Performed by: INTERNAL MEDICINE

## 2021-07-13 PROCEDURE — 96375 TX/PRO/DX INJ NEW DRUG ADDON: CPT

## 2021-07-13 RX ORDER — FUROSEMIDE 10 MG/ML
40 INJECTION INTRAMUSCULAR; INTRAVENOUS
Status: DISCONTINUED | OUTPATIENT
Start: 2021-07-14 | End: 2021-07-14 | Stop reason: HOSPADM

## 2021-07-13 RX ORDER — SULFAMETHOXAZOLE/TRIMETHOPRIM 800-160 MG
1 TABLET ORAL 2 TIMES DAILY
Status: ON HOLD | COMMUNITY
Start: 2021-07-09 | End: 2021-07-14

## 2021-07-13 RX ORDER — CARVEDILOL PHOSPHATE 10 MG/1
10 CAPSULE, EXTENDED RELEASE ORAL DAILY
Status: DISCONTINUED | OUTPATIENT
Start: 2021-07-14 | End: 2021-07-14 | Stop reason: HOSPADM

## 2021-07-13 RX ORDER — GABAPENTIN 600 MG/1
600 TABLET ORAL 3 TIMES DAILY
Status: DISCONTINUED | OUTPATIENT
Start: 2021-07-13 | End: 2021-07-14 | Stop reason: HOSPADM

## 2021-07-13 RX ORDER — LISINOPRIL AND HYDROCHLOROTHIAZIDE 20; 25 MG/1; MG/1
2 TABLET ORAL DAILY
Status: DISCONTINUED | OUTPATIENT
Start: 2021-07-14 | End: 2021-07-14 | Stop reason: HOSPADM

## 2021-07-13 RX ORDER — NABUMETONE 500 MG/1
500 TABLET, FILM COATED ORAL 2 TIMES DAILY
Status: DISCONTINUED | OUTPATIENT
Start: 2021-07-13 | End: 2021-07-14 | Stop reason: HOSPADM

## 2021-07-13 RX ORDER — POLYETHYLENE GLYCOL 3350 17 G/17G
17 POWDER, FOR SOLUTION ORAL DAILY PRN
Status: DISCONTINUED | OUTPATIENT
Start: 2021-07-13 | End: 2021-07-14 | Stop reason: HOSPADM

## 2021-07-13 RX ORDER — MORPHINE SULFATE 2 MG/ML
2 INJECTION, SOLUTION INTRAMUSCULAR; INTRAVENOUS
Status: DISCONTINUED | OUTPATIENT
Start: 2021-07-13 | End: 2021-07-14 | Stop reason: HOSPADM

## 2021-07-13 RX ORDER — ONDANSETRON 2 MG/ML
4 INJECTION INTRAMUSCULAR; INTRAVENOUS EVERY 6 HOURS PRN
Status: DISCONTINUED | OUTPATIENT
Start: 2021-07-13 | End: 2021-07-14 | Stop reason: HOSPADM

## 2021-07-13 RX ORDER — NABUMETONE 500 MG/1
500 TABLET, FILM COATED ORAL 2 TIMES DAILY
Status: ON HOLD | COMMUNITY
Start: 2021-06-18 | End: 2021-09-13

## 2021-07-13 RX ORDER — ONDANSETRON 4 MG/1
4 TABLET, ORALLY DISINTEGRATING ORAL EVERY 6 HOURS PRN
Status: DISCONTINUED | OUTPATIENT
Start: 2021-07-13 | End: 2021-07-14 | Stop reason: HOSPADM

## 2021-07-13 RX ORDER — LISINOPRIL AND HYDROCHLOROTHIAZIDE 20; 25 MG/1; MG/1
2 TABLET ORAL DAILY
Status: ON HOLD | COMMUNITY
Start: 2021-06-18 | End: 2021-07-14

## 2021-07-13 RX ORDER — TRAZODONE HYDROCHLORIDE 100 MG/1
200 TABLET ORAL AT BEDTIME
Status: DISCONTINUED | OUTPATIENT
Start: 2021-07-13 | End: 2021-07-14 | Stop reason: HOSPADM

## 2021-07-13 RX ORDER — NALOXONE HYDROCHLORIDE 0.4 MG/ML
0.4 INJECTION, SOLUTION INTRAMUSCULAR; INTRAVENOUS; SUBCUTANEOUS
Status: DISCONTINUED | OUTPATIENT
Start: 2021-07-13 | End: 2021-07-14 | Stop reason: HOSPADM

## 2021-07-13 RX ORDER — ATORVASTATIN CALCIUM 40 MG/1
40 TABLET, FILM COATED ORAL AT BEDTIME
Status: DISCONTINUED | OUTPATIENT
Start: 2021-07-13 | End: 2021-07-14 | Stop reason: HOSPADM

## 2021-07-13 RX ORDER — AMOXICILLIN 250 MG
1 CAPSULE ORAL 2 TIMES DAILY PRN
Status: DISCONTINUED | OUTPATIENT
Start: 2021-07-13 | End: 2021-07-14 | Stop reason: HOSPADM

## 2021-07-13 RX ORDER — FUROSEMIDE 10 MG/ML
40 INJECTION INTRAMUSCULAR; INTRAVENOUS ONCE
Status: COMPLETED | OUTPATIENT
Start: 2021-07-13 | End: 2021-07-13

## 2021-07-13 RX ORDER — FERROUS GLUCONATE 324(38)MG
324 TABLET ORAL DAILY
Status: DISCONTINUED | OUTPATIENT
Start: 2021-07-14 | End: 2021-07-14 | Stop reason: HOSPADM

## 2021-07-13 RX ORDER — NALOXONE HYDROCHLORIDE 0.4 MG/ML
0.2 INJECTION, SOLUTION INTRAMUSCULAR; INTRAVENOUS; SUBCUTANEOUS
Status: DISCONTINUED | OUTPATIENT
Start: 2021-07-13 | End: 2021-07-14 | Stop reason: HOSPADM

## 2021-07-13 RX ORDER — OMEPRAZOLE 40 MG/1
40 CAPSULE, DELAYED RELEASE ORAL DAILY
Status: ON HOLD | COMMUNITY
End: 2021-07-13

## 2021-07-13 RX ORDER — AMOXICILLIN 250 MG
2 CAPSULE ORAL 2 TIMES DAILY PRN
Status: DISCONTINUED | OUTPATIENT
Start: 2021-07-13 | End: 2021-07-14 | Stop reason: HOSPADM

## 2021-07-13 RX ADMIN — TRAZODONE HYDROCHLORIDE 200 MG: 100 TABLET ORAL at 22:05

## 2021-07-13 RX ADMIN — CEFTRIAXONE 3 G: 1 INJECTION, POWDER, FOR SOLUTION INTRAMUSCULAR; INTRAVENOUS at 17:08

## 2021-07-13 RX ADMIN — FUROSEMIDE 40 MG: 10 INJECTION, SOLUTION INTRAVENOUS at 17:07

## 2021-07-13 RX ADMIN — ATORVASTATIN CALCIUM 40 MG: 40 TABLET, FILM COATED ORAL at 22:05

## 2021-07-13 RX ADMIN — GABAPENTIN 600 MG: 600 TABLET, FILM COATED ORAL at 22:05

## 2021-07-13 ASSESSMENT — ENCOUNTER SYMPTOMS
SHORTNESS OF BREATH: 0
DIFFICULTY URINATING: 0
ABDOMINAL PAIN: 0
COLOR CHANGE: 1
UNEXPECTED WEIGHT CHANGE: 1
FEVER: 0
DYSURIA: 0
BACK PAIN: 1

## 2021-07-13 NOTE — H&P
[facility]  History and Physical  Hospitalist - Ej Craig DO       Date of Admission:  7/13/2021    Chief Complaint   Bilateral leg swelling    History is obtained from the patient, the patient's wife at bedside, the emergency department physician, as well as the electronic medical record.    History of Present Illness   Onur Barr is a 65 year old male with past medical history of hypertension, coronary artery disease status post PCI to LAD in 2012, ischemic cardiomyopathy, obstructive sleep apnea without CPAP, diet-controlled type 2 diabetes mellitus, hypercholesterolemia, lymphedema, morbid obesity status post gastric bypass, chronic low back pain with morphine for thecal pump who presented on 7/13/2021 with chief complaint of bilateral leg swelling.  The patient states that over the past several weeks he has noticed increasing swelling in his lower extremities.  He also reports redness on his lower extremities.  He has seen his primary care doctor several times and has been on several courses of Lasix and Bactrim without much improvement.  Most recently, the patient was provided Bactrim approximately 2 weeks ago for 10 days.  The patient then was represcribed Bactrim approximately 2 days ago.  The patient has also been on Lasix 20 mg once daily without much improvement in his swelling.  The patient denies any fevers or chills but does admit that his legs hurt.  He does admit to some shortness of breath with exertion.  He also admits to some intermittent lightheadedness.  He denies any chest pain.  He denies any orthopnea or paroxysmal nocturnal dyspnea.  He typically sleeps on his side with 2 pillows.  He has a history of obstructive sleep apnea and attempted a CPAP twice but states that he would always knock it off when he was sleeping so he stopped wearing it.  He has seen a lymphedema specialist before approximately 2 years ago.    In the emergency department the patient had a  proBNP of 58, troponin less than 0.015, WBC 6.3.  The patient had bilateral lower extremity Dopplers that were negative for acute DVT.  The patient was given 1 dose of ceftriaxone and 1 dose of 40 mg IV Lasix.  The patient was admitted to observation for lymphedema consult and echocardiogram.    ASSESSMENT/PLAN    1.  Bilateral Lower Extremity Edema  - Would suspect a large component of lymphedema given his history of such.  Consult Lymphedema.  - Doubt cellulitis as there has been minimal improvement with an extended course of bactrim and no leukocytosis or fevers.  Will hold off on antibiotics for now.  Check Procalcitonin.  - Possibly some component of CHF given pt's cardiac history of an MI with stent to LAD in 2012 and ischemic cardiomyopathy, hypertension, dyspnea with exertion.  Will check echocardiogram  - Lasix 40 mg IV BID - for 2 doses  - Daily weight, Strict I/O  - Telemetry  - Elevate BLE    2.  Coronary Artery Disease s/p PCI in 2012 to LAD  - Follows with Cardiology.  Last seen in 2020.  Normally not on lasix but was added approximatly 2 weeks ago.   - Resume ASA once confirmed by pharmacy    3.  Hypertension  - Resume home antihypertensives once confirmed by pharmacy    4.  Chronic Low Back Pain on Intrathecal Morphine Pump  - Continue home pain pump once confirmed by pharmacy    5.  Diet Controlled Type 2 Diabetes Mellitus  - Last A1C was 5.6 in 2/2018  - Controlled    6.  Ischemic Cardiomyopathy  - Last Echo was in 4/2017 and showed EF 55% with small area of distal anterior/apical hypokinesis, mild LVH.  - Lasix as above  - Check Echo    7.  Hypercholesterolemia  - Resume statin    8.  Mild Hyperkalemia  - K+ 5.7  - Repeat BMP    9.  Chronic Kidney Disease Stage 2  - Baseline Cr = 0.9-1.0  - Likely secondary to hypertension and DM2    Chronic Medical Problems:  Morbid Obesity s/p Gastric Bypass - BMI 47.49  Hx of Nephrolithiasis  Osteoarthritis  Hx of Paroxysmal Atrial Fibrillation - not on  OAC  MARLEEN without CPAP  GERD    PLAN: Resume home medications as appropriate once confirmed by pharmacy.     DVT Prophylaxis: Enoxaparin (Lovenox) SQ  Code Status: Full Code  Discharge Plan: Expected discharge: 1-2 days recommended to prior living arrangement once swelling improving.      Ej Craig DO    Primary Care Physician   Luann Berger    -----------------------------------------------------------------------------------------------------------------------------------------------------------------------------------------------------    Past Medical History    I have reviewed this patient's medical history and updated it with pertinent information if needed.   Past Medical History:   Diagnosis Date     Acid reflux disease 10/31/2017     Arrhythmia     paroxysmal atrial fibrillation     Arthritis      Atrial fibrillation (H)     Created by Conversion      Bariatric surgery status     Created by Conversion      CHF (congestive heart failure) (H)      Coronary artery disease      Coronary atherosclerosis     Created by Conversion  Replacement Utility updated for latest IMO load     Diabetes (H)     typr II resloved     Essential hypertension     Created by [facility] Annotation: Apr 6 2012 10:16AM - Zack Gutierrez: Lisinipril,  coreg  Replacement Utility updated for latest IMO load     H/O gastric bypass      Heart attack (H)      Hypercholesteremia      Hypertension      Insomnia      Insomnia, unspecified     Created by [facility] Annotation: Sep 11 2013  9:56AM Zack Brewer: Trouble  maintaining sleep-Trazodone-minimal helpzolpidem-      Ischemic cardiomyopathy      Low back pain      Lumbago     Created by [facility] Annotation: Apr 6 2012 10:20AM - Anh Dickson: Morphine pump      Morbid obesity (H) 8/1/2018     Nephrolithiasis      Nephrolithiasis      Obstructive sleep apnea (adult) (pediatric)     Created by Conversion      Osteoarthritis      Created by [facility] Annotation: Jun 26 2009  9:[id number] - Zack Gutierrez: failed lumbar  fusion/morphine pump dr putnam  Replacement Utility updated for latest IMO load     Pure hypercholesterolemia     Created by Conversion      Sleep apnea      Sleepiness 5/8/2018       Past Surgical History   I have reviewed this patient's surgical history and updated it with pertinent information if needed.  Past Surgical History:   Procedure Laterality Date     BACK SURGERY       BYPASS GASTRIC DUODENAL SWITCH       C GASTRIC BYPASS,OBESE<100CM SUSY-EN-Y  2008    Description: Gastric Surgery For Morbid Obesity Bypass With Susy-en-Y;  Recorded: 06/26/2009;  Comments: dr green 2008     COSMETIC SURGERY      pannicullectomy     ENT SURGERY      tonsillectomy     EXTRACORPOREAL SHOCK WAVE LITHOTRIPSY, CYSTOSCOPY, INSERT STENT URETER(S), COMBINED  8/23/11     HC REMOVAL OF TONSILS,<11 Y/O      Description: Tonsillectomy;  Recorded: 03/23/2012;  Comments: for obstructive sleep apnea     HC REPAIR INCISIONAL HERNIA,REDUCIBLE  11/13/2012    Description: Incisional Hernia Repair;  Proc Date: 11/13/2012;  Comments: incisional hernia repair and abdominal panniculectomy by Dr Shon Green at the [facility].     HERNIA REPAIR       IR MISCELLANEOUS PROCEDURE  7/29/2011     IR MISCELLANEOUS PROCEDURE  8/5/2011     IR MISCELLANEOUS PROCEDURE  8/23/2011     IR NEPHROSTOMY TUBE CHANGE BILATERAL  8/5/2011     ORTHOPEDIC SURGERY      arthrodesis ant discectomy, lumbar     SC ARTHRODESIS ANT INTERBODY MIN DISCECTOMY,LUMBAR      Description: Lumbar Vertebral Fusion;  Recorded: 06/26/2009;  Comments: before 200 see [facility] consult under old records     SC EXCISE EXCESS SKIN TISSUE,ABDOMEN  11/13/2012    Description: Panniculectomy;  Proc Date: 11/13/2012;  Comments: 3.5 Lb Pannus was removed at the [facility] By Dr. Shon Green     REPLACE INTRATHECAL PAIN PUMP N/A 4/25/2017    Procedure: REPLACE INTRATHECAL PAIN PUMP;  INTRATHECAL  PAIN PUMP CATHETER REPLACEMENT AND PUMP REPLACEMENT;  Surgeon: Anthony Maloney MD;  Location: [facility]     REVISE CATHETER INTRATHECAL N/A 4/25/2017    Procedure: REVISE CATHETER INTRATHECAL;;  Surgeon: Anthony Maloney MD;  Location: [facility]     SHOULDER SURGERY         Prior to Admission Medications   Prior to Admission Medications   Prescriptions Last Dose Informant Patient Reported? Taking?   ASPIRIN EC PO   Yes No   Sig: Take 81 mg by mouth daily   Atorvastatin Calcium (LIPITOR PO)   Yes No   Sig: Take 40 mg by mouth daily   Carvedilol Phosphate (COREG CR PO)   Yes No   Sig: Take 20 mg by mouth daily   DULoxetine HCl (CYMBALTA PO)   Yes No   Sig: Take 30 mg by mouth daily   Furosemide (LASIX PO)   Yes No   Sig: Take 20 mg by mouth daily   Gabapentin (NEURONTIN PO)   Yes No   Sig: Take 300 mg by mouth 3 times daily   LISINOPRIL PO   Yes No   Sig: Take 20 mg by mouth daily   MORPHINE SULFATE   Yes No   Sig: PUMP   Naproxen Sodium (ALEVE) 220 MG capsule   Yes No   Sig: Take 220 mg by mouth 2 times daily (with meals).   Omeprazole (PRILOSEC PO)   Yes No   Sig: Take 40 mg by mouth daily   TRAZODONE HCL PO   Yes No   Sig: Take 200 mg by mouth At Bedtime   sulfamethoxazole-trimethoprim (BACTRIM DS) 800-160 MG tablet   Yes Yes   Sig: Take 1 tablet by mouth      Facility-Administered Medications: None     Allergies   Allergies   Allergen Reactions     Hydrocodone-Acetaminophen        Social History   I have reviewed this patient's social history and updated it with pertinent information if needed. Onur GUS Barr  reports that he has quit smoking. His smoking use included cigars and cigars. He quit after 10.00 years of use. He has never used smokeless tobacco. He reports that he does not drink alcohol and does not use drugs.    Family History   I have reviewed this patient's family history and updated it with pertinent information if needed.   Family History   Problem Relation Age of Onset     Cerebrovascular Disease Mother       Heart Disease Father      Hodgkin's lymphoma Brother        -----------------------------------------------------------------------------------------------------------------------------------------------------------------------------------------------------    Review of Systems   The 10 point Review of Systems is negative other than noted in the HPI or here.     Physical Exam   Temp: 97.9  F (36.6  C) Temp src: Oral BP: (!) 153/84 Pulse: 81   Resp: 20 SpO2: 94 % O2 Device: None (Room air)    Vital Signs with Ranges  Temp:  [97.9  F (36.6  C)-98.3  F (36.8  C)] 97.9  F (36.6  C)  Pulse:  [53-81] 81  Resp:  [20] 20  BP: (140-196)/() 153/84  SpO2:  [94 %-99 %] 94 %  0 lbs 0 oz    Constitutional: Awake, alert, cooperative, no apparent distress.  Eyes: Conjunctiva and pupils examined and normal.  HEENT: Moist mucous membranes, normal dentition.  Respiratory: Clear to auscultation bilaterally, no crackles or wheezing.  Cardiovascular: Regular rate and rhythm, normal S1 and S2, and no murmur noted.  GI: Soft, non-distended, non-tender, normal bowel sounds.  Lymph/Hematologic: No anterior cervical or supraclavicular adenopathy.  Skin: Erythema and 2+ edema of BLE  Musculoskeletal: No joint swelling, erythema or tenderness.  Neurologic: Cranial nerves 2-12 intact, normal strength and sensation.  Psychiatric: Alert, oriented to person, place and time, no obvious anxiety or depression.     Data     Recent Labs   Lab 07/13/21  1536   WBC 6.3   HGB 12.5*   MCV 86         POTASSIUM 5.7*   CHLORIDE 105   CO2 22   BUN 16   CR 1.10   ANIONGAP 9   RATNA 8.9   GLC 94   TROPONIN <0.015       Recent Results (from the past 24 hour(s))   US Lower Extremity Venous Duplex Bilateral    Narrative    EXAM: US LOWER EXTREMITY VENOUS DUPLEX BILATERAL  LOCATION: [facility]  DATE/TIME: 7/13/2021 5:33 PM    INDICATION: Bilateral leg swelling and redness. Concern for cellulitis vs bilateral DVT vs CHF  COMPARISON:  04/21/2006  TECHNIQUE: Venous Duplex ultrasound of bilateral lower extremities with and without compression, augmentation and duplex. Color flow and spectral Doppler with waveform analysis performed.    FINDINGS: Exam includes the common femoral, femoral, popliteal veins as well as segmentally visualized deep calf veins and greater saphenous vein.     RIGHT: No deep vein thrombosis. No superficial thrombophlebitis. No popliteal cyst.    LEFT: No deep vein thrombosis. No superficial thrombophlebitis. No popliteal cyst.      Impression    IMPRESSION:  1.  No deep venous thrombosis in the bilateral lower extremities although portions of the calves and mid to distal femoral veins are obscured from body habitus.

## 2021-07-13 NOTE — ED PROVIDER NOTES
History   Chief Complaint:  Leg Pain       HPI   Onur Barr is a 65 year old male with history of CHF, CAD, MI, hypertension, diabetes, and morbid obesity who presents with leg swelling. He provides that for the last 3-4 months he has had redness on his legs and over the last 2 weeks he has developed bilateral leg edema. He has had leg edema before but says this is different. He provides that he takes Lasix everyday and denies any recent medication changes. He additionally believes he has gained weight recently. He also provides that he has chronic back pain that restricts him from laying flat and for which he has a morphine pump. He denies any recent fevers, chest pain, shortness of breath, abdominal pain, dysuria, difficulty urinating. He further denies any falls or traumas.    Review of Systems   Constitutional: Positive for unexpected weight change. Negative for fever.   Respiratory: Negative for shortness of breath.    Cardiovascular: Positive for leg swelling. Negative for chest pain.   Gastrointestinal: Negative for abdominal pain.   Genitourinary: Negative for difficulty urinating and dysuria.   Musculoskeletal: Positive for back pain.   Skin: Positive for color change (redness on lower legs).   All other systems reviewed and are negative.    Allergies:  Hydrocodone-Acetaminophen    Medications:  Bactrim  Lipitor  Coreg  Cymbalta  Lasix  Neurontin  Lisinopril  Morphine pump  Aleve  Prilosec  Trazodone  Septra FLORESITA    Past Medical History:    GERD  Arrhythmia  Arthritis  Atrial fibrillation  CHF  CAD  Coronary atherosclerosis  Diabetes  Hypertension  MI  Hypercholesterolemia  Insomnia  Lumbago  Morbid obesity  Nephrolithiasis  MARLEEN  Osteoarthritis     Past Surgical History:    Gastric bypass  Back surgery  Panniculectomy  Tonsillectomy  Lithotripsy  Incisional hernia repair  Nephrostomy  Arthrodesis and discectomy  Lumbar vertebral fusion  Shoulder surgery    Family History:    Stroke  Heart  disease  Hodgkin's lymphoma    Social History:  Patient came from home.  Patient was unaccompanied in the ED.    Physical Exam     Patient Vitals for the past 24 hrs:   BP Temp Temp src Pulse Resp SpO2 Height Weight   07/13/21 2309 112/41 98  F (36.7  C) Oral 64 20 93 % -- --   07/13/21 2016 (!) 164/93 98.1  F (36.7  C) Oral 55 20 97 % -- --   07/13/21 2011 -- -- -- -- -- -- 1.829 m (6') --   07/13/21 2008 -- -- -- -- -- -- -- (!) 163.8 kg (361 lb 3.2 oz)   07/13/21 1715 (!) 146/78 -- -- 58 20 98 % -- --   07/13/21 1712 -- -- -- -- -- 98 % -- --   07/13/21 1711 (!) 146/79 -- -- 57 -- -- -- --   07/13/21 1631 -- -- -- -- -- 99 % -- --   07/13/21 1630 (!) 151/84 -- -- 59 -- 97 % -- --   07/13/21 1620 (!) 153/84 -- -- -- -- 94 % -- --   07/13/21 1611 (!) 162/72 -- -- -- -- 96 % -- --   07/13/21 1346 (!) 196/116 97.9  F (36.6  C) Oral 81 20 99 % -- --       Physical Exam  General: Alert, appears well-developed and well-nourished. Cooperative.     In mild distress  HEENT:  Head:  Atraumatic  Ears:  External ears are normal  Mouth/Throat:  Oropharynx is without erythema or exudate and mucous membranes are moist.   Eyes:   Conjunctivae normal and EOM are normal. No scleral icterus.  CV:  Normal rate, regular rhythm, normal heart sounds and radial and dorsalis pedis pulses are 2+ and symmetric.  No murmur.  Resp:  Breath sounds are clear bilaterally    Non-labored, no retractions or accessory muscle use  GI:  Abdomen is soft, no distension, no tenderness. No rebound or guarding.  MS:  Normal range of motion. Bilateral LE 1+ pitting edema with overlying erythema and skin breakdown.    Normal strength in all 4 extremities.     Back atraumatic although chronic surgical scars and morphine pump.    Midline lumbar tenderness.  Skin:  Warm and dry.  Erythema and mild skin break down with blisters to bilateral lower extremities. Warm to touch.   Neuro: Alert. Normal strength.  Sensation intact in all 4 extremities. GCS:  15  Psych:  Normal mood and affect.    Emergency Department Course     ECG  ECG taken at 1652, ECG read at 1655  Sinus bradycardia with 1st degree AV block  Otherwise normal ECG   Rate 58 bpm. ME interval 214 ms. QRS duration 94 ms. QT/QTc 456/447 ms. P-R-T axes 76 7 39.     Imaging:    US Lower Extremity Venous Duplex Bilateral  1.  No deep venous thrombosis in the bilateral lower extremities although portions of the calves and mid to distal femoral veins are obscured from body habitus.  Reading per radiology    Laboratory:    CBC: WBC 6.3, HGB 12.5 (L),      BMP: Potassium 5.7 (H), o/w WNL (Creatinine 1.10)     Troponin (Collected 1536): <0.015    BNP: 58    Asymptomatic SARS-CoV-2 COVID-19 Virus by PCR: Negative    Emergency Department Course:    Reviewed:  I reviewed nursing notes, vitals, past medical history and care everywhere    Assessments:  1620 I obtained history and examined the patient as noted above.    I rechecked the patient and explained findings.     Consults:   1758 I consulted with Dr. Craig of the hospitalist service and discussed patient admission. They accepted care of the patient.    Interventions:  1707 Lasix 40 mg IV  1708 Rocephin 3 g IV    Disposition:  The patient was admitted to the hospital under the care of Dr. Craig.     Impression & Plan   Medical Decision Making:  Onur Barr is a 65-year-old male with a complex past medical history who presents with bilateral lower extremity swelling and erythema to the skin of the lower legs, worsening over recent days.  Patient does have a history of ischemic cardiomyopathy and CHF, as well as CAD.  He also has a history of diabetes and hypercholesterolemia.  Certainly, his lower leg swelling may be secondary to mild CHF exacerbation, but reassuringly he is having no shortness of breath or need for oxygen.  I do think he would benefit from diuresis with IV furosemide at this time.  He was given 40 mg IV here in the emergency department, as  he takes 20 mg orally daily.  Patient has a normal BNP.  Overlying erythema is concerning for potential cellulitis especially with skin breakdown.  Due to his chronic co-morbidities, concern that this will take significant time to heal, particularly with his history of diabetes.  I would treat for potential overlying cellulitis in the setting of bilateral lower extremity edema.  Thankfully, ultrasound shows no evidence of DVT or abscess.  There was morbid obesity, which makes some portions of the calves and the mid to distal femoral veins challenging to see on ultrasound.  Spoke with Dr. Craig of the Hospitalist Service who agreed to observation admission at this time for further diuresis and antibiosis.    Covid-19  Onur Barr was evaluated during a global COVID-19 pandemic, which necessitated consideration that the patient might be at risk for infection with the SARS-CoV-2 virus that causes COVID-19.   Applicable protocols for evaluation were followed during the patient's care.   COVID-19 was considered as part of the patient's evaluation. The plan for testing is:  a test was obtained during this visit.    Diagnosis:    ICD-10-CM    1. Bilateral leg edema  R60.0    2. Bilateral cellulitis of lower leg  L03.116     L03.115      Discharge Medications:  Current Discharge Medication List        Scribe Disclosure:  I, Mulugeta Bonds, am serving as a scribe at 4:03 PM on 7/13/2021 to document services personally performed by Jarod Olvera MD based on my observations and the provider's statements to me.          Jarod Olvera MD  07/13/21 2904

## 2021-07-13 NOTE — PROGRESS NOTES
"    Assessment & Plan     Venous insufficiency, peripheral    Morbid obesity (H)    Leg swelling    His legs are very swollen and quite red and tender and tender in the calf.  His weight is up from before, and, although I did see him the last visit, by looking at his last note from my partner, he is significantly worsening was the last time he was seen.  I am concerned he might have a DVT in 1 or both legs, or he may have significant CHF give him the swelling, or he could just be cellulitis or venous insufficiency but the differential is pretty long and include some things that I am not comfortable with treating him as an outpatient, somata send him to the emergency room.  He prefers to go to [facility] as he lives very nearby to their, and I am think he is completely safe to drive there, but he will be going straight there and I would anticipate that he may need some blood work, possibly IV possibly to IV diuretics, possibly an ultrasound but I will leave it to my esteemed colleagues in the ED at [facility] to determine what the best work-up for him would be, but I think he would be much better served by being seen in the ED that here.        30 minutes spent on the date of the encounter doing chart review, review of outside records, review of test results, interpretation of tests, patient visit and documentation        BMI:   Estimated body mass index is 47.49 kg/m  as calculated from the following:    Height as of 12/7/20: 1.829 m (6' 0.01\").    Weight as of this encounter: 158.8 kg (350 lb 3.2 oz).     Rahul Harrington MD  [facility]BRISSA Astudillo is a 65 year old who presents for the following health issues     HPI     Patient is here today with a recurrence of leg swelling and redness.  He was seen here by my partner a few weeks ago and treated with antibiotics but he has gotten worse again with increasing redness and swelling in his lower extremities.  It is much " more red than it was last time he was in he recorded the patient.  Swelling is worse and the redness and swelling is higher up onto the lower extremity than it was last time.  He now has pain swelling and redness and warmth up almost to the knee.  He has noticed it getting progressively worse for the last couple of days.  No fevers or chills.  He has been a bit short of breath recently.  It hurts to walk on it at this point.  He has tried elevating his feet which is not all that helpful.  He is concerned about a possible infection again.    Review of Systems         Objective    BP (!) 140/104 (BP Location: Left arm, Patient Position: Sitting, Cuff Size: Adult Large)   Pulse 53   Temp 98.3  F (36.8  C) (Oral)   Wt (!) 158.8 kg (350 lb 3.2 oz)   BMI 47.49 kg/m    Body mass index is 47.49 kg/m .  Physical Exam     He appears well and in no acute respiratory distress.  Vital signs as noted.  He is afebrile but his blood pressure is very elevated.  Chest clear to auscultation.  Heart regular rate and rhythm.  Extremities are significantly swollen and hot with redness and swelling and warmth almost all the way up to the popliteal fossae bilaterally.  Color is very warm and it is almost impossible even to grasp of the calf to see if there is a Homans' sign there or not.  His pulses are good distally.

## 2021-07-13 NOTE — ED NOTES
[facility]  ED Nurse Handoff Report    Onur Barr is a 65 year old male   ED Chief complaint: Leg Pain  . ED Diagnosis:   Final diagnoses:   Bilateral leg edema   Bilateral cellulitis of lower leg     Allergies:   Allergies   Allergen Reactions     Hydrocodone-Acetaminophen        Code Status: Full Code  Activity level - Baseline/Home:  Independent. Activity Level - Current:   Stand by Assist. Lift room needed: No. Bariatric: No   Needed: No   Isolation: No. Infection: Not Applicable.   Vital Signs:   Vitals:    07/13/21 1631 07/13/21 1711 07/13/21 1712 07/13/21 1715   BP:  (!) 146/79  (!) 146/78   Pulse:  57  58   Resp:    20   Temp:       TempSrc:       SpO2: 99%  98% 98%     Cardiac Rhythm:  ,      Pain level:    Patient confused: No. Patient Falls Risk: Yes.   Elimination Status: Has voided   Patient Report / Focused Assessment:    Musculoskeletal - Musculoskeletal WDL: .WDL except  Pain Body Location: back  CMS Intact: Yes  Musculoskeletal Comment: chronic hip & back pain, on morphine pump. Pain in bilateral LE unreleived with morphine at home.   Skin - Skin WDL: .WDL except  Skin Color: other (see comments) (reddness & swelling in bilateral calves x3-4 months, skin blistering the last few days, increased pain.) Skin Temperature: warm  Skin Inspection: Focused inspection (identify areas inspected)   Tests Performed / Abnormal Results:    US Lower Extremity Venous Duplex Bilateral   Final Result   IMPRESSION:   1.  No deep venous thrombosis in the bilateral lower extremities although portions of the calves and mid to distal femoral veins are obscured from body habitus.        Labs Ordered and Resulted from Time of ED Arrival Up to the Time of Departure from the ED   BASIC METABOLIC PANEL - Abnormal; Notable for the following components:       Result Value    Potassium 5.7 (*)     All other components within normal limits   CBC WITH PLATELETS AND DIFFERENTIAL - Abnormal; Notable for  the following components:    Hemoglobin 12.5 (*)     MCHC 31.0 (*)     RDW 15.9 (*)     All other components within normal limits   TROPONIN I - Normal   NT PROBNP INPATIENT - Normal   SARS-COV2 (COVID-19) VIRUS RT-PCR - Normal    Narrative:     Testing was performed using the katja  SARS-CoV-2 & Influenza A/B Assay on the katja  [employer]erie  System.  This test should be ordered for the detection of SARS-COV-2 in individuals who meet SARS-CoV-2 clinical and/or epidemiological criteria. Test performance is unknown in asymptomatic patients.  This test is for in vitro diagnostic use under the FDA EUA for laboratories certified under CLIA to perform moderate and/or high complexity testing. This test has not been FDA cleared or approved.  A negative test does not rule out the presence of PCR inhibitors in the specimen or target RNA in concentration below the limit of detection for the assay. The possibility of a false negative should be considered if the patient's recent exposure or clinical presentation suggests COVID-19.  [facility] are certified under the Clinical Laboratory Improvement Amendments of 1988 (CLIA-88) as qualified to perform moderate and/or high complexity laboratory testing.   EXTRA RED TOP TUBE   EXTRA GREEN TOP (LITHIUM HEPARIN) TUBE   EXTRA PURPLE TOP TUBE   EXTRA BLUE TOP TUBE   PROCALCITONIN   COVID-19 VIRUS (CORONAVIRUS) BY PCR    Narrative:     The following orders were created for panel order Asymptomatic COVID-19 Virus (Coronavirus) by PCR Nasopharyngeal.  Procedure                               Abnormality         Status                     ---------                               -----------         ------                     SARS-COV2 (COVID-19) Vir...[[id number]]  Normal              Final result                 Please view results for these tests on the individual orders.   CBC WITH PLATELETS & DIFFERENTIAL    Narrative:     The following orders were created for panel order CBC  + differential.  Procedure                               Abnormality         Status                     ---------                               -----------         ------                     CBC with platelets and d...[[id number]]  Abnormal            Final result                 Please view results for these tests on the individual orders.   EXTRA TUBE    Narrative:     The following orders were created for panel order [place] Draw.  Procedure                               Abnormality         Status                     ---------                               -----------         ------                     Extra Blue Top Tube[[id number]]                                                         Extra Red Top Tube[[id number]]                               Final result               Extra Green Top (Lithium...[[id number]]                      Final result               Extra Purple Top Tube[[id number]]                            Final result                 Please view results for these tests on the individual orders.     Treatments provided: PIV, LABS, US, MEDS, MONITORING, COVID, ABX  Family Comments: AT BEDSIDE  OBS brochure/video discussed/provided to patient:  No video available, gave pt observation paper.  ED Medications:   Medications   furosemide (LASIX) injection 40 mg (40 mg Intravenous Given 7/13/21 1707)   cefTRIAXone (ROCEPHIN) 3 g in sodium chloride 0.9 % 100 mL intermittent infusion (0 g Intravenous Stopped 7/13/21 1837)     Drips infusing:  No  For the majority of the shift, the patient's behavior Green. Interventions performed were yes.    Sepsis treatment initiated: No     Patient tested for COVID 19 prior to admission: YES    ED Nurse Name/Phone Number: Jatin Williamson RN, [phone]  6:38 PM    RECEIVING UNIT ED HANDOFF REVIEW    Above ED Nurse Handoff Report was reviewed: Yes  Reviewed by: Parvin Wolfe RN on July 13, 2021 at 7:45 PM

## 2021-07-14 ENCOUNTER — APPOINTMENT (OUTPATIENT)
Dept: CARDIOLOGY | Facility: CLINIC | Age: 66
End: 2021-07-14
Attending: INTERNAL MEDICINE
Payer: COMMERCIAL

## 2021-07-14 ENCOUNTER — TELEPHONE (OUTPATIENT)
Dept: OTHER | Facility: CLINIC | Age: 66
End: 2021-07-14

## 2021-07-14 ENCOUNTER — APPOINTMENT (OUTPATIENT)
Dept: OCCUPATIONAL THERAPY | Facility: CLINIC | Age: 66
End: 2021-07-14
Attending: INTERNAL MEDICINE
Payer: COMMERCIAL

## 2021-07-14 VITALS
SYSTOLIC BLOOD PRESSURE: 165 MMHG | BODY MASS INDEX: 42.66 KG/M2 | OXYGEN SATURATION: 95 % | WEIGHT: 315 LBS | RESPIRATION RATE: 16 BRPM | DIASTOLIC BLOOD PRESSURE: 85 MMHG | TEMPERATURE: 98.1 F | HEART RATE: 81 BPM | HEIGHT: 72 IN

## 2021-07-14 DIAGNOSIS — L97.509 ULCER OF FOOT DUE TO DIABETES (H): Primary | ICD-10-CM

## 2021-07-14 DIAGNOSIS — E11.621 ULCER OF FOOT DUE TO DIABETES (H): Primary | ICD-10-CM

## 2021-07-14 PROBLEM — R00.1 BRADYCARDIA: Status: RESOLVED | Noted: 2017-09-27 | Resolved: 2018-05-08

## 2021-07-14 LAB
ANION GAP SERPL CALCULATED.3IONS-SCNC: 2 MMOL/L (ref 3–14)
ATRIAL RATE - MUSE: 58 BPM
BUN SERPL-MCNC: 16 MG/DL (ref 7–30)
CALCIUM SERPL-MCNC: 8.9 MG/DL (ref 8.5–10.1)
CHLORIDE BLD-SCNC: 101 MMOL/L (ref 94–109)
CO2 SERPL-SCNC: 34 MMOL/L (ref 20–32)
CREAT SERPL-MCNC: 1.09 MG/DL (ref 0.66–1.25)
DIASTOLIC BLOOD PRESSURE - MUSE: NORMAL MMHG
ERYTHROCYTE [DISTWIDTH] IN BLOOD BY AUTOMATED COUNT: 15.9 % (ref 10–15)
GFR SERPL CREATININE-BSD FRML MDRD: 71 ML/MIN/1.73M2
GLUCOSE BLD-MCNC: 112 MG/DL (ref 70–99)
HCT VFR BLD AUTO: 39.4 % (ref 40–53)
HGB BLD-MCNC: 12.5 G/DL (ref 13.3–17.7)
INTERPRETATION ECG - MUSE: NORMAL
LVEF ECHO: NORMAL
MCH RBC QN AUTO: 27.1 PG (ref 26.5–33)
MCHC RBC AUTO-ENTMCNC: 31.7 G/DL (ref 31.5–36.5)
MCV RBC AUTO: 85 FL (ref 78–100)
P AXIS - MUSE: 76 DEGREES
PLATELET # BLD AUTO: 137 10E3/UL (ref 150–450)
POTASSIUM BLD-SCNC: 3.8 MMOL/L (ref 3.4–5.3)
PR INTERVAL - MUSE: 214 MS
PROCALCITONIN SERPL-MCNC: <0.05 NG/ML
QRS DURATION - MUSE: 94 MS
QT - MUSE: 456 MS
QTC - MUSE: 447 MS
R AXIS - MUSE: 7 DEGREES
RBC # BLD AUTO: 4.62 10E6/UL (ref 4.4–5.9)
SODIUM SERPL-SCNC: 137 MMOL/L (ref 133–144)
SYSTOLIC BLOOD PRESSURE - MUSE: NORMAL MMHG
T AXIS - MUSE: 39 DEGREES
VENTRICULAR RATE- MUSE: 58 BPM
WBC # BLD AUTO: 4.8 10E3/UL (ref 4–11)

## 2021-07-14 PROCEDURE — 93306 TTE W/DOPPLER COMPLETE: CPT | Mod: 26 | Performed by: INTERNAL MEDICINE

## 2021-07-14 PROCEDURE — 97165 OT EVAL LOW COMPLEX 30 MIN: CPT | Mod: GO

## 2021-07-14 PROCEDURE — 80048 BASIC METABOLIC PNL TOTAL CA: CPT | Performed by: INTERNAL MEDICINE

## 2021-07-14 PROCEDURE — 250N000013 HC RX MED GY IP 250 OP 250 PS 637: Performed by: INTERNAL MEDICINE

## 2021-07-14 PROCEDURE — G0378 HOSPITAL OBSERVATION PER HR: HCPCS

## 2021-07-14 PROCEDURE — 99217 PR OBSERVATION CARE DISCHARGE: CPT | Performed by: PHYSICIAN ASSISTANT

## 2021-07-14 PROCEDURE — 36415 COLL VENOUS BLD VENIPUNCTURE: CPT | Performed by: INTERNAL MEDICINE

## 2021-07-14 PROCEDURE — 85027 COMPLETE CBC AUTOMATED: CPT | Performed by: INTERNAL MEDICINE

## 2021-07-14 PROCEDURE — 999N000208 ECHOCARDIOGRAM COMPLETE

## 2021-07-14 PROCEDURE — 96376 TX/PRO/DX INJ SAME DRUG ADON: CPT

## 2021-07-14 PROCEDURE — 255N000002 HC RX 255 OP 636: Performed by: INTERNAL MEDICINE

## 2021-07-14 PROCEDURE — 250N000011 HC RX IP 250 OP 636: Performed by: INTERNAL MEDICINE

## 2021-07-14 RX ORDER — FUROSEMIDE 40 MG
40 TABLET ORAL 2 TIMES DAILY
Qty: 60 TABLET | Refills: 1 | Status: ON HOLD | OUTPATIENT
Start: 2021-07-14 | End: 2021-10-07

## 2021-07-14 RX ORDER — CARVEDILOL PHOSPHATE 20 MG/1
20 CAPSULE, EXTENDED RELEASE ORAL DAILY
Qty: 30 CAPSULE | Refills: 3 | Status: ON HOLD | OUTPATIENT
Start: 2021-07-14 | End: 2021-09-14

## 2021-07-14 RX ORDER — CARVEDILOL 3.12 MG/1
3.12 TABLET ORAL 2 TIMES DAILY WITH MEALS
Status: CANCELLED | OUTPATIENT
Start: 2021-07-14

## 2021-07-14 RX ORDER — LISINOPRIL 40 MG/1
40 TABLET ORAL DAILY
Qty: 30 TABLET | Refills: 3 | Status: ON HOLD | OUTPATIENT
Start: 2021-07-14 | End: 2021-09-14

## 2021-07-14 RX ORDER — CARVEDILOL 3.12 MG/1
1.56 TABLET, FILM COATED ORAL 2 TIMES DAILY WITH MEALS
Status: CANCELLED | OUTPATIENT
Start: 2021-07-14

## 2021-07-14 RX ORDER — POTASSIUM CHLORIDE 1500 MG/1
20 TABLET, EXTENDED RELEASE ORAL 2 TIMES DAILY
Qty: 60 TABLET | Refills: 1 | Status: ON HOLD | OUTPATIENT
Start: 2021-07-14 | End: 2021-09-14

## 2021-07-14 RX ADMIN — FUROSEMIDE 40 MG: 10 INJECTION, SOLUTION INTRAVENOUS at 08:48

## 2021-07-14 RX ADMIN — LISINOPRIL AND HYDROCHLOROTHIAZIDE 2 TABLET: 25; 20 TABLET ORAL at 08:48

## 2021-07-14 RX ADMIN — DULOXETINE HYDROCHLORIDE 90 MG: 60 CAPSULE, DELAYED RELEASE ORAL at 08:48

## 2021-07-14 RX ADMIN — NABUMETONE 500 MG: 500 TABLET ORAL at 10:17

## 2021-07-14 RX ADMIN — FERROUS GLUCONATE 324 MG: 324 TABLET ORAL at 08:48

## 2021-07-14 RX ADMIN — OMEPRAZOLE 40 MG: 20 CAPSULE, DELAYED RELEASE ORAL at 08:48

## 2021-07-14 RX ADMIN — HUMAN ALBUMIN MICROSPHERES AND PERFLUTREN 2 ML: 10; .22 INJECTION, SOLUTION INTRAVENOUS at 07:31

## 2021-07-14 RX ADMIN — GABAPENTIN 600 MG: 600 TABLET, FILM COATED ORAL at 08:48

## 2021-07-14 ASSESSMENT — MIFFLIN-ST. JEOR: SCORE: 2451.88

## 2021-07-14 NOTE — PLAN OF CARE
ROOM # 205    Living Situation (if not independent, order SW consult): thomas with wife  Facility name:  : [facility] [phone]    Activity level at baseline: IND with cane  Activity level on admit: SBA      Patient registered to observation; given Patient Bill of Rights; given the opportunity to ask questions about observation status and their plan of care.  Patient has been oriented to the observation room, bathroom and call light is in place.    Discussed discharge goals and expectations with patient/family.

## 2021-07-14 NOTE — DISCHARGE SUMMARY
[facility]  Discharge Summary  Hospitalist    Date of Admission:  7/13/2021  Date of Discharge:  7/14/2021    Discharging Provider: Kylie Kirkland PAC    Discharge Diagnoses   1. Bilateral lower extremity lymphedema and erythema.  Clinically suggestive of venous stasis with stasis dermatitis; low suspicion for cellulitis.  2. Diastolic CHF, chronic  3. MARLEEN, does not use CPAP  4. Suspected PVD  5. Hypertension  6. Dyslipidemia    Discharge Disposition     Discharged to home    Condition at Discharge:  Stable    History of Present Illness   Onur Barr is a 65 year old male with ASCVD s/p PCI to LAD in 2012, ICM, MARLEEN without CPAP, diet-controlled DM2, DLD, lymphedema, morbid obesity s/p gastric bypass, chronic low back pain with intrathecal morphine pump who presented to [facility] ED on 7/13/2021 with chief complaint of bilateral leg swelling.  The patient reported increased swelling in his lower extremities and redness over the past several weeks.  He has seen his primary care doctor several times and was placed on several courses of Lasix and Bactrim without much improvement.  He provided Bactrim 2 weeks ago for 10 days.  The patient then was represcribed Bactrim approximately 2 days ago.  The patient has also been on Lasix 20 mg once daily without much improvement in his swelling.  The patient denies any fevers, chills, chest pain, or PND but does admit to some shortness of breath with exertion and some intermittent lightheadedness.  He denies any chest pain.  He typically sleeps on his side with 2 pillows.  He has a history of obstructive sleep apnea and attempted a CPAP twice but states that he would always knock it off when he was sleeping so he stopped wearing it.  He has seen a lymphedema specialist before approximately 2 years ago.     In the ED, proBNP 58, troponin < 0.015, and WBC 6.3.  BLE Duplex U/S negative for acute DVT.  The patient was given 1 dose of ceftriaxone and 1 dose of 40  mg IV Lasix.  The patient was admitted to observation for lymphedema consult and echocardiogram.    Patient did well overnight.  Afebrile.  Physical exam argues against cellulitis or a soft tissue infection.  Rather, suspect redness related to lymphedema and chronic venous stasis dermatitis/changes.  Echocardiogram revealed EF 55-60% with G2LVDD, increased LV filling pressures, mildly dilated aortic root and ascending thoracic aorta, no significant valvular dysfunction, and inability to assess RV function.  Recommend improved BP control, daily diuretics, and evaluation for sleep apnea.  Patient asked to keep legs elevated as much as possible and wear compression stockings.  His wife just ordered some new compression stockings and they should be here in the next few days.  Lasix dose increased from 20 >> 40 BID.  HCTZ discontinued so had to send a new script for lisinopril as a stand-alone pill.  Coreg dose doubled.  Patient referred to Lymphedema therapy, Cardiology for heart failure, Vascular Medicine due to concerns for PVD, and for a Sleep Study.      Patient should stop all Abx.  He is agreeable to discharge at this time.     Kylie Kirkland, PAC      Code Status   Full Code       Primary Care Physician   Luann Berger    Consultations This Hospital Stay   LYMPHEDEMA THERAPY IP CONSULT  CARE MANAGEMENT / SOCIAL WORK IP CONSULT  PHARMACY IP CONSULT    Time Spent on this Encounter   I, MENDY Valdez, personally saw the patient today and spent greater than 30 minutes discharging this patient.    Allergies   Allergies   Allergen Reactions     Hydrocodone-Acetaminophen        Physical Exam   Temp: 97.8  F (36.6  C) Temp src: Oral BP: (!) 171/88 (MENDY dorsey aware- home medication(coreg) not stocked in our ) Pulse: 69   Resp: 16 SpO2: 96 % O2 Device: None (Room air)    Vitals:    07/13/21 2008 07/14/21 0842   Weight: (!) 163.8 kg (361 lb 3.2 oz) (!) 162.4 kg (358 lb)     Vital Signs with Ranges  Temp:   [97.7  F (36.5  C)-98.3  F (36.8  C)] 97.8  F (36.6  C)  Pulse:  [53-81] 69  Resp:  [16-20] 16  BP: (112-196)/() 171/88  SpO2:  [93 %-99 %] 96 %  I/O last 3 completed shifts:  In: -   Out: 1100 [Urine:1100]      GENERAL:  Pleasant, cooperative, alert. Well developed, well nourished.  Sitting on side of bed.  No distress.   HEENT: Normocephalic, atraumatic.  Extra occular mm intact.  Sclera clear. PERRL.  Mucous membranes moist.     PULMONOLOGY: Clear   CARDIAC: Regular rate and rhythm.  No appreciated murmur.     MUSCULOSKELETAL:  Moving x 4 spontaneously with CMS intact x4.  BLE venous stasis with anterior erythema, poorly demarcated.  No warmth, fluctuance, or tenderness.  No open wounds, sores, bullae, or exudate.  Feet are cool and purple bilaterally with palpable pedal pulses.   NEURO: Alert and oriented x3.  CN II-XII grossly intact and symmetric.  No ataxia or tremor.  Nonfocal    Discharge Medications   Current Discharge Medication List      START taking these medications    Details   lisinopril (ZESTRIL) 40 MG tablet Take 1 tablet (40 mg) by mouth daily (take in place of Zestoretic, stopping hydrochlorothiazide)  Qty: 30 tablet, Refills: 3    Associated Diagnoses: Diastolic dysfunction      potassium chloride ER (KLOR-CON M) 20 MEQ CR tablet Take 1 tablet (20 mEq) by mouth 2 times daily (take with furosemide/Lasix)  Qty: 60 tablet, Refills: 1    Associated Diagnoses: Diastolic dysfunction         CONTINUE these medications which have CHANGED    Details   carvedilol ER (COREG CR) 20 MG 24 hr capsule Take 1 capsule (20 mg) by mouth daily  Qty: 30 capsule, Refills: 3    Associated Diagnoses: Diastolic dysfunction      furosemide (LASIX) 40 MG tablet Take 1 tablet (40 mg) by mouth 2 times daily Take in morning and early afternoon.  Take with potassium.  Qty: 60 tablet, Refills: 1    Associated Diagnoses: Bilateral leg edema; Diastolic dysfunction         CONTINUE these medications which have NOT CHANGED     Details   ASPIRIN EC PO Take 81 mg by mouth daily      Atorvastatin Calcium (LIPITOR PO) Take 40 mg by mouth daily      DULoxetine HCl (CYMBALTA PO) Take 90 mg by mouth daily       Ferrous Gluconate (IRON) 240 (27 Fe) MG TABS Take 1 tablet by mouth daily      Gabapentin (NEURONTIN PO) Take 600 mg by mouth 3 times daily       MORPHINE SULFATE IT pump:    Medications in Pump:   Clinic Responsible for pump medications:   Conc:    Rate:   Pump Last Fill Date:  Pump Refill Date:      nabumetone (RELAFEN) 500 MG tablet Take 500 mg by mouth 2 times daily      Naproxen Sodium (ALEVE) 220 MG capsule Take 220 mg by mouth 2 times daily (with meals).      Omeprazole (PRILOSEC PO) Take 40 mg by mouth daily      TRAZODONE HCL PO Take 200 mg by mouth At Bedtime         STOP taking these medications       lisinopril-hydrochlorothiazide (ZESTORETIC) 20-25 MG tablet Comments:   Reason for Stopping:         sulfamethoxazole-trimethoprim (BACTRIM DS) 800-160 MG tablet Comments:   Reason for Stopping:               Data   Most Recent 3 CBC's:Recent Labs   Lab Test 07/14/21  0537 07/13/21  1536 01/05/21  1613   WBC 4.8 6.3 5.0   HGB 12.5* 12.5* 10.2*   MCV 85 86 68*   * 163 222      Most Recent 3 BMP's:  Recent Labs   Lab Test 07/14/21  0537 07/13/21 2013 07/13/21  1536    136 136   POTASSIUM 3.8 4.1 5.7*   CHLORIDE 101 101 105   CO2 34* 32 22   BUN 16 15 16   CR 1.09 1.17 1.10   ANIONGAP 2* 3 9   RATNA 8.9 9.0 8.9   * 130* 94     Most Recent 2 LFT's:  Recent Labs   Lab Test 12/07/20  1034 11/20/19  1202 02/13/18  1351   AST  --  22 25   ALT 15 16 16   ALKPHOS  --  95 77   BILITOTAL  --  0.5 0.4     Most Recent INR's and Anticoagulation Dosing History:  Anticoagulation Dose History    There is no flowsheet data to display.       Most Recent 3 Troponin's:  Recent Labs   Lab Test 07/13/21  1536   TROPONIN <0.015     Most Recent Cholesterol Panel:  Recent Labs   Lab Test 12/07/20  1034   CHOL 118   LDL 48   HDL 35*    TRIG 174*     Most Recent 6 Bacteria Isolates From Any Culture (See EPIC Reports for Culture Details):No lab results found.  Most Recent TSH, T4 and A1c Labs:  Recent Labs   Lab Test 18  1351   A1C 5.6     Results for orders placed or performed during the hospital encounter of 21   US Lower Extremity Venous Duplex Bilateral    Narrative    EXAM: US LOWER EXTREMITY VENOUS DUPLEX BILATERAL  LOCATION: [facility]  DATE/TIME: 2021 5:33 PM    INDICATION: Bilateral leg swelling and redness. Concern for cellulitis vs bilateral DVT vs CHF  COMPARISON: 2006  TECHNIQUE: Venous Duplex ultrasound of bilateral lower extremities with and without compression, augmentation and duplex. Color flow and spectral Doppler with waveform analysis performed.    FINDINGS: Exam includes the common femoral, femoral, popliteal veins as well as segmentally visualized deep calf veins and greater saphenous vein.     RIGHT: No deep vein thrombosis. No superficial thrombophlebitis. No popliteal cyst.    LEFT: No deep vein thrombosis. No superficial thrombophlebitis. No popliteal cyst.      Impression    IMPRESSION:  1.  No deep venous thrombosis in the bilateral lower extremities although portions of the calves and mid to distal femoral veins are obscured from body habitus.   Echocardiogram Complete     Value    LVEF  55-60%    Narrative    [id number]  [id number]  [id number]  [id number]^LIZ^HUBERT^ELIZA     [facility]  Echocardiography Laboratory  [address] [place] [address]     Name: KARMEN ERICKSON  MRN: [id number]  : 1955  Study Date: 2021 07:09 AM  Age: 65 yrs  Gender: Male  Patient Location: [facility]  Reason For Study: Edema  Ordering Physician: HUBERT HILL  Referring Physician: Luann Berger  Performed By: Altagracia Harrington RDCS     BSA: 2.7 m2  Height: 72 in  Weight: 361 lb  HR: 62  BP: 146/78  mmHg  ______________________________________________________________________________  Procedure  Complete Portable Echo Adult. Optison (NDC #[id number]) given intravenously.  ______________________________________________________________________________  Interpretation Summary     1.The study was technically limited.  2. The left ventricle is normal in size. Proximal septal thickening is noted.  Left ventricular systolic function is normal. The visual ejection fraction is  55-60%. Grade II or moderate diastolic dysfunction. Diastolic Doppler findings  (E/E' ratio and/or other parameters) suggest left ventricular filling  pressures are increased. No regional wall motion abnormalities noted. There is  no thrombus seen in the left ventricle.  3. Right ventricular function cannot be assessed due to poor image quality.  4. Trace mitral and tricuspid regurgitation.  5. Mild dilatation of the aortic root and ascending thoracic aorta.  6. No pericardial effusion.  7. No previous study for comparison.  ______________________________________________________________________________  Left Ventricle  The left ventricle is normal in size. Proximal septal thickening is noted.  Left ventricular systolic function is normal. The visual ejection fraction is  55-60%. Grade II or moderate diastolic dysfunction. Diastolic Doppler findings  (E/E' ratio and/or other parameters) suggest left ventricular filling  pressures are increased. No regional wall motion abnormalities noted. There is  no thrombus seen in the left ventricle.     Right Ventricle  Right ventricular function cannot be assessed due to poor image quality.     Atria  The left atrium is moderately dilated. Right atrium not well visualized. There  is no color Doppler evidence of an atrial shunt.     Mitral Valve  There is trace mitral regurgitation.     Tricuspid Valve  There is trace tricuspid regurgitation. Right ventricular systolic pressure  could not be approximated due to  inadequate tricuspid regurgitation.     Aortic Valve  There is mild trileaflet aortic sclerosis. No aortic regurgitation is present.  No aortic stenosis is present.     Pulmonic Valve  There is no pulmonic valvular stenosis.     Vessels  Mild aortic root dilatation. The ascending aorta is Mildly dilated. Descending  aortic velocity normal. Inferior vena cava not well visualized for estimation  of right atrial pressure.     Pericardium  There is no pericardial effusion.     Rhythm  Sinus rhythm was noted.  ______________________________________________________________________________  MMode/2D Measurements & Calculations  IVSd: 1.3 cm     LVIDd: 5.3 cm  LVIDs: 2.7 cm  LVPWd: 1.0 cm  FS: 48.4 %  LV mass(C)d: 245.0 grams  LV mass(C)dI: 89.5 grams/m2  Ao root diam: 3.9 cm  LA dimension: 4.4 cm  asc Aorta Diam: 4.0 cm  LA/Ao: 1.1  RWT: 0.39     Doppler Measurements & Calculations  MV E max elsie: 62.7 cm/sec  MV A max elsie: 64.8 cm/sec  MV E/A: 0.97  MV max P.6 mmHg  MV mean P.1 mmHg  MV V2 VTI: 27.0 cm  MV dec time: 0.23 sec  E/E' avg: 10.8  Lateral E/e': 6.0  Medial E/e': 15.5     ______________________________________________________________________________  Report approved by: Leyla Blanco 2021 11:00 AM               Discharge Orders      Physical Therapy Referral      Vascular Medicine Referral      Lymphedema Therapy Referral      Cardiology Eval Adult Referral      SLEEP EVALUATION & MANAGEMENT REFERRAL - ADULT -      Activity    Your activity upon discharge: Keep legs elevated. Wear compression stockings.     Reason for your hospital stay    You were brought in for bilateral lower extremity swelling and redness.  It does not appear to be a cellulitis, or a soft tissue infection, but rather redness related to increased water retention in the legs called lymphedema.  Lymphedema can have many causes including heart issues, obesity, positioning (lots of sitting which compresses the lymph drainage), etc.   While possible, it is rare to have a cellulitis in both legs.  Bilateral leg redness and swelling is more consistent with lymphedema.  Also, cellulitis usually is hot, painful, and causes a fever and other signs of infection.  Echocardiogram shows you have something called diastolic heart failure, a form of CHF.  We treat this with blood pressure control, diuretics, and evaluation for sleep apnea.  Please keep your legs elevated as much as possible and wear compression stockings.  Your lasix dose has been increased from 20 to 40 twice a day.  Please take your diuretics daily.  Also, because your lasix dose was increased, you were taken off of hydrochlorothiazide, which is another water pill and was in a combo pill with your lisinopril.  You should stay on Lisinopril, a new prescription was provided for you.  Your BP was elevated, so your Coreg dose was doubled and a new prescription was provided as well.  You are being referred to Lymphedema therapy (through the Physical Therapy department), to Cardiology for heart failure, and also to Vascular Medicine due to concerns for inadequate blood flow to your toes (they are purple and cool when you have your legs down on the floor).  Please follow closely with your primary provider, Cardiologist, and Vascular Medicine team.

## 2021-07-14 NOTE — PROGRESS NOTES
PRIMARY DIAGNOSIS: BILATERAL LEG EDEMA  OUTPATIENT/OBSERVATION GOALS TO BE MET BEFORE DISCHARGE:  1. Activity and level of assistance: Up with standby assistance.     2. Pain status: Improved-controlled with oral pain medications.     3. Return to near baseline physical activity: Yes                Discharge Planner Nurse   Safe discharge environment identified: Yes  Barriers to discharge: Yes       Entered by: Erica Moreno 0800   Please review provider order for any additional goals.   Nurse to notify provider when observation goals have been met and patient is ready for discharge.     Patient alert and oriented x4. Has low back pain and bilateral leg pain. Back pain worse than leg pain. Has chronic pain pump in back. PT saw for lymphedema. SL. On IV lasix. Daily weight done. Echo done.  Lives with spouse ind. Up SBA. On strict I and Os. On tele- SR.

## 2021-07-14 NOTE — PROGRESS NOTES
PRIMARY DIAGNOSIS: BILATERAL LEG EDEMA  OUTPATIENT/OBSERVATION GOALS TO BE MET BEFORE DISCHARGE:  1. Activity and level of assistance: Up with standby assistance.     2. Pain status: Improved-controlled with oral pain medications.     3. Return to near baseline physical activity: Yes               Discharge Planner Nurse   Safe discharge environment identified: Yes  Barriers to discharge: Yes       Entered by: Erica Moreno 0800   Please review provider order for any additional goals.   Nurse to notify provider when observation goals have been met and patient is ready for discharge.     Patient alert and oriented x4. Has low back pain and bilateral leg pain. Back pain worse than leg pain. Has chronic pain pump in back. PT to see for lymphedema. SL. On IV lasix. Daily weight done. Echo pending. Lives with spouse ind.

## 2021-07-14 NOTE — TELEPHONE ENCOUNTER
Referred to [facility] by MENDY Infante for bilateral leg edema and peripheral vascular disease.    Patient was seen at  ED 7/13/21 for bilateral edema and possible cellulitis.  Per Discharge Notes:    Referral to Vascular Medicine due to concerns for inadequate blood flow to your toes (they are purple and cool when you have your legs down on the floor).      Pt needs to be scheduled for ROHIT with exercise and consult with Zelda Ward PA-C.  Will route to scheduling to coordinate an appointment at next available.    Mckayla Zuluaga, DURGAN, RN  [facility]

## 2021-07-14 NOTE — PLAN OF CARE
PRIMARY DIAGNOSIS: BILATERAL LEG EDEMA  OUTPATIENT/OBSERVATION GOALS TO BE MET BEFORE DISCHARGE:  1. Activity and level of assistance: Up with standby assistance.    2. Pain status: Improved-controlled with oral pain medications.    3. Return to near baseline physical activity: Yes     /43 (BP Location: Right arm)   Pulse 67   Temp 97.7  F (36.5  C) (Oral)   Resp 16   Ht 1.829 m (6')   Wt (!) 163.8 kg (361 lb 3.2 oz)   SpO2 94%   BMI 48.99 kg/m      Discharge Planner Nurse   Safe discharge environment identified: Yes  Barriers to discharge: Yes       Entered by: Parvin Wolfe 07/14/2021 5:10 AM     Please review provider order for any additional goals.   Nurse to notify provider when observation goals have been met and patient is ready for discharge.    Patient alert and oriented x4. Reports some BLE burning/aching and chronic back pain - pain pump in place, BLE elevated, resting comfortably. Up SBA. IV SL. 2 gram sodium diet. Strict I&O. Telemetry monitoring, reading SB with HR 50s per telemetry tech. Lymphedema therapy and SW consulted. Will continue with plan of care.

## 2021-07-14 NOTE — PLAN OF CARE
PRIMARY DIAGNOSIS: BILATERAL LEG EDEMA  OUTPATIENT/OBSERVATION GOALS TO BE MET BEFORE DISCHARGE:  Activity and level of assistance: Up with standby assistance.    Pain status: Improved-controlled with oral pain medications.    Return to near baseline physical activity: Yes     /41 (BP Location: Right arm)   Pulse 64   Temp 98  F (36.7  C) (Oral)   Resp 20   Ht 1.829 m (6')   Wt (!) 163.8 kg (361 lb 3.2 oz)   SpO2 93%   BMI 48.99 kg/m      Discharge Planner Nurse   Safe discharge environment identified: Yes  Barriers to discharge: Yes       Entered by: Parvin Wolfe 07/14/2021 2:06 AM     Please review provider order for any additional goals.   Nurse to notify provider when observation goals have been met and patient is ready for discharge.    Patient alert and oriented x4. Reports some BLE burning/aching and chronic back pain - pain pump in place, BLE elevated, resting comfortably. Up SBA. IV SL. 2 gram sodium diet. Strict I&O. Telemetry monitoring, reading SB with HR 50s per telemetry tech. Lymphedema therapy and SW consulted. Will continue with plan of care.

## 2021-07-14 NOTE — PROGRESS NOTES
Patient's After Visit Summary was reviewed with patient   Patient verbalized understanding of After Visit Summary, recommended follow up and was given an opportunity to ask questions.   Discharge medications sent home with patient/family: No [facility] to [facility]  Discharged with spouse    OBSERVATION patient END time: 1207

## 2021-07-14 NOTE — PROGRESS NOTES
07/14/21 1100   Quick Adds   Quick Adds Edema   Living Environment   People in home spouse   Self-Care   Usual Activity Tolerance moderate   Current Activity Tolerance moderate   General Information   Onset of Illness/Injury or Date of Surgery 07/13/21   Referring Physician Ej Craig DO   Patient/Family Therapy Goal Statement (OT) Pt's goal is to d/c home   Additional Occupational Profile Info/Pertinent History of Current Problem Per chart: Pt is a 65 year old male admitted with Bilateral Lower Extremity Edema.    Performance Patterns (Routines, Roles, Habits) Indep in ADLs, mobility tasks at baseline    Existing Precautions/Restrictions fall   Pain Assessment   Patient Currently in Pain Yes, see Vital Sign flowsheet   Edema General Information   Onset of Edema   (~1 month ago)   Affected Body Part(s) Left LE;Right LE   Edema Etiology Unknown   Edema Examination/Assessment   Skin Condition Non-pitting;Hemosiderin deposits;Dryness   Stemmer Sign Positive   Transfers   Transfer Comments Independent transfers/mobility    Clinical Impression   Criteria for Skilled Therapeutic Interventions Met (OT) yes;meets criteria;skilled treatment is necessary   OT Diagnosis Impaired mobility    Edema: Patient Presentation Stage 2 Lymphedema   OT Problem List-Impairments impacting ADL problems related to;activity tolerance impaired;pain   Assessment of Occupational Performance 1-3 Performance Deficits   Planned Therapy Interventions (OT) ADL retraining;transfer training;home program guidelines;progressive activity/exercise   Edema: Planned Interventions Edema exercises;Education   Clinical Decision Making Complexity (OT) low complexity   Therapy Frequency (OT) 1x eval   Risk & Benefits of therapy have been explained evaluation/treatment results reviewed;care plan/treatment goals reviewed;risks/benefits reviewed;current/potential barriers reviewed;participants voiced agreement with care plan;participants  included;patient   OT Discharge Planning    OT Discharge Recommendation (DC Rec) home with outpatient occupational therapy  (OP lymphedema)   OT Rationale for DC Rec Recommend OP lymphedema evaluation for optimal mgmt of new onset of BLE edema. Educated pt/spouse on edema mgmt in home environment including elevation and exercises.    Total Evaluation Time (Minutes)   Total Evaluation Time (Minutes) 18

## 2021-07-14 NOTE — CONSULTS
Care Management Discharge Note    Discharge Date: 07/14/2021       Discharge Disposition: Home    Discharge Services:  Outpatient PT/OT    Handoff Referral Completed: No    Additional Information:  CM acknowledges consult. No discharge needs identified.  CM has completed consult at this time.       Kylie Rodriguez, RN      Kylie Rodriguez RN Case Manager  Inpatient Care Coordination  [facility]   [phone]

## 2021-07-14 NOTE — PHARMACY-ADMISSION MEDICATION HISTORY
Admission medication history interview status for this patient is complete. See [facility] admission navigator for allergy information, prior to admission medications and immunization status.     Medication history interview done, indicate source(s): Patient  Medication history resources (including written lists, pill bottles, clinic record):SureScripts, Care Everywhere  Pharmacy: [facility]/pharmacy #[id number] [place], [place] - [address]    Changes made to PTA medication list:  Added: nabumetone, lisinopril-hctz  Deleted: lisinopril 30 mg daily  Changed:   - Carvedilol 20 mg -> 10 mg daily  - Duloxetine 30 mg -> 90 mg daily  - Gabapentin 300 mg TID -> 600 mg TID  - Updated instructions for Bactrim DS    Actions taken by pharmacist (provider contacted, etc): left a sticky note for MD     Additional medication history information:None    Medication reconciliation/reorder completed by provider prior to medication history?  Y    Prior to Admission medications    Medication Sig Last Dose Taking? Auth Provider   ASPIRIN EC PO Take 81 mg by mouth daily 7/13/2021 at AM Yes Reported, Patient   Atorvastatin Calcium (LIPITOR PO) Take 40 mg by mouth daily 7/12/2021 at HS Yes Reported, Patient   Carvedilol Phosphate (COREG CR PO) Take 10 mg by mouth daily  7/13/2021 at AM Yes Reported, Patient   DULoxetine HCl (CYMBALTA PO) Take 90 mg by mouth daily  7/13/2021 at Unknown time Yes Reported, Patient   Ferrous Gluconate (IRON) 240 (27 Fe) MG TABS Take 1 tablet by mouth daily 7/13/2021 at Unknown time Yes Unknown, Entered By History   Furosemide (LASIX PO) Take 20 mg by mouth daily 7/13/2021 at Unknown time Yes Reported, Patient   Gabapentin (NEURONTIN PO) Take 600 mg by mouth 3 times daily  7/13/2021 at AM Yes Reported, Patient   lisinopril-hydrochlorothiazide (ZESTORETIC) 20-25 MG tablet Take 2 tablets by mouth daily 7/13/2021 at AM Yes Unknown, Entered By History   MORPHINE SULFATE IT pump:    Medications in Pump:   Clinic  Responsible for pump medications:   Conc:    Rate:   Pump Last Fill Date:  Pump Refill Date:  Yes Reported, Patient   nabumetone (RELAFEN) 500 MG tablet Take 500 mg by mouth 2 times daily 7/13/2021 at AM Yes Unknown, Entered By History   Naproxen Sodium (ALEVE) 220 MG capsule Take 220 mg by mouth 2 times daily (with meals). 7/13/2021 at AM Yes Reported, Patient   Omeprazole (PRILOSEC PO) Take 40 mg by mouth daily 7/13/2021 at AM Yes Reported, Patient   sulfamethoxazole-trimethoprim (BACTRIM DS) 800-160 MG tablet Take 1 tablet by mouth 2 times daily  7/13/2021 at AM Yes Reported, Patient   TRAZODONE HCL PO Take 200 mg by mouth At Bedtime 7/12/2021 at HS Yes Reported, Patient

## 2021-07-15 ENCOUNTER — PATIENT OUTREACH (OUTPATIENT)
Dept: CARE COORDINATION | Facility: CLINIC | Age: 66
End: 2021-07-15

## 2021-07-15 ENCOUNTER — TELEPHONE (OUTPATIENT)
Dept: OTHER | Facility: CLINIC | Age: 66
End: 2021-07-15

## 2021-07-15 DIAGNOSIS — Z71.89 OTHER SPECIFIED COUNSELING: ICD-10-CM

## 2021-07-15 NOTE — PROGRESS NOTES
Clinic Care Coordination Contact  Community Health Worker Initial Outreach            Patient accepts CC: No, Wants to think about it.   Will let Clinic know if they decide on CCC.      Priscilla MEEKS

## 2021-07-15 NOTE — TELEPHONE ENCOUNTER
Called Baudilio to let him know that the ROHIT with exercise for which he is scheduled is different from the Venous US done 7/13/21 at [facility].    Mckayla Zuluaga RN BSN  [facility]  [phone]

## 2021-07-15 NOTE — TELEPHONE ENCOUNTER
7/15/2021 LMTCB and schedule    Pt needs to be scheduled for ROHIT with exercise and consult with Zelda Ward PA-C.     Appt Notes: Referred to [facility] by MENDY Infante for bilateral leg edema and peripheral vascular disease.      Beronica WILKINS

## 2021-07-15 NOTE — TELEPHONE ENCOUNTER
[facility]    Who is the name of the provider?:  Sylvia      What is the location you see this provider at?: [place]    Reason for call:  US Scheduled 7/22.  Had US while inpt at [facility] 7/13.  Is this the same US?    Can we leave a detailed message on this number?  YES

## 2021-07-15 NOTE — PROGRESS NOTES
Clinic Care Coordination Contact  [facility]: Post-Discharge Note  SITUATION                                                      Admission:    Admission Date: 07/13/21   Reason for Admission: Bilateral lower extremity lymphedema and erythema.  Clinically suggestive of venous stasis with stasis dermatitis; low suspicion for cellulitis  Discharge:   Discharge Date: 07/14/21  Discharge Diagnosis: Bilateral lower extremity lymphedema and erythema.  Clinically suggestive of venous stasis with stasis dermatitis; low suspicion for cellulitis    BACKGROUND                                                      bilateral lower extremity swelling and  redness. It does not appear to be a cellulitis, or a soft tissue  infection, but rather redness related to increased water  retention in the legs called lymphedema. Lymphedema can  have many causes including heart issues, obesity, positioning  (lots of sitting which compresses the lymph drainage), etc.  While possible, it is rare to have a cellulitis in both legs.  Bilateral leg redness and swelling is more consistent with  lymphedema. Also, cellulitis usually is hot, painful, and  causes a fever and other signs of infection. Echocardiogram  shows you have something called diastolic heart failure, a  form of CHF. We treat this with blood pressure control,  diuretics, and evaluation for sleep apnea. Please keep your  legs elevated as much as possible and wear compression  stockings. Your lasix dose has been increased from 20 to 40  twice a day. Please take your diuretics daily. Also, because  your lasix dose was increased, you were taken off of  hydrochlorothiazide, which is another water pill and was in a  combo pill with your lisinopril. You should stay on Lisinopril,  a new prescription was provided for you. Your BP was  elevated, so your Coreg dose was doubled and a new  prescription was provided as well. You are being referred to  Lymphedema therapy (through the  Physical Therapy  department), to Cardiology for heart failure, and also to  Vascular Medicine due to concerns for inadequate blood flow  to your toes (they are purple and cool when you have your  legs down on the floor). Please follow closely with your  primary provider, Cardiologist, and Vascular Medicine team    ASSESSMENT      Discharge Assessment  How are you doing now that you are home?: feels better, swelling getting better  How are your symptoms? (Red Flag symptoms escalate to triage hotline per guidelines): Improved  Do you feel your condition is stable enough to be safe at home until your provider visit?: Yes  Does the patient have their discharge instructions? : Yes  Does the patient have questions regarding their discharge instructions? : No  Were you started on any new medications or were there changes to any of your previous medications? :  (Zestril)  Does the patient have all of their medications?: Yes  Do you have questions regarding any of your medications? : No  Discharge follow-up appointment scheduled within 14 calendar days? : Yes  Discharge Follow Up Appointment Scheduled with?: Specialty Care Provider    Post-op  Did the patient have surgery or a procedure: No  Fever: No  Chills: No  Eating & Drinking: eating and drinking without complaints/concerns  PO Intake: regular diet  Bowel Function: normal  Date of last BM: 07/15/21  Urinary Status: voiding without complaint/concerns        PLAN                                                      Outpatient Plan:  You are being referred to  Lymphedema therapy (through the Physical Therapy  department), to Cardiology for heart failure, and also to  Vascular Medicine due to concerns for inadequate blood flow  to your toes (they are purple and cool when you have your  legs down on the floor). Please follow closely with your  primary provider, Cardiologist, and Vascular Medicine team.    No future appointments.      For any urgent concerns, please contact  our 24 hour nurse triage line: [phone] ([phone])         Maci Blackwell MA

## 2021-07-15 NOTE — TELEPHONE ENCOUNTER
7/15/2021     ROHIT order not in. Please put in and route back to me for scheduling. Thank you!      Beronica WILKINS

## 2021-07-18 ENCOUNTER — HEALTH MAINTENANCE LETTER (OUTPATIENT)
Age: 66
End: 2021-07-18

## 2021-07-22 ENCOUNTER — HOSPITAL ENCOUNTER (OUTPATIENT)
Dept: ULTRASOUND IMAGING | Facility: CLINIC | Age: 66
End: 2021-07-22
Attending: PHYSICIAN ASSISTANT
Payer: COMMERCIAL

## 2021-07-22 ENCOUNTER — OFFICE VISIT (OUTPATIENT)
Dept: OTHER | Facility: CLINIC | Age: 66
End: 2021-07-22
Attending: PHYSICIAN ASSISTANT
Payer: COMMERCIAL

## 2021-07-22 VITALS
BODY MASS INDEX: 46.59 KG/M2 | DIASTOLIC BLOOD PRESSURE: 80 MMHG | OXYGEN SATURATION: 97 % | TEMPERATURE: 95.7 F | SYSTOLIC BLOOD PRESSURE: 110 MMHG | WEIGHT: 315 LBS | HEART RATE: 70 BPM

## 2021-07-22 DIAGNOSIS — I73.9 PERIPHERAL VASCULAR DISEASE (H): ICD-10-CM

## 2021-07-22 DIAGNOSIS — R60.0 BILATERAL LEG EDEMA: ICD-10-CM

## 2021-07-22 DIAGNOSIS — E11.621 ULCER OF FOOT DUE TO DIABETES (H): ICD-10-CM

## 2021-07-22 DIAGNOSIS — L97.509 ULCER OF FOOT DUE TO DIABETES (H): ICD-10-CM

## 2021-07-22 PROCEDURE — 93924 LWR XTR VASC STDY BILAT: CPT

## 2021-07-22 PROCEDURE — 99204 OFFICE O/P NEW MOD 45 MIN: CPT | Performed by: PHYSICIAN ASSISTANT

## 2021-07-22 PROCEDURE — G0463 HOSPITAL OUTPT CLINIC VISIT: HCPCS

## 2021-07-22 NOTE — PATIENT INSTRUCTIONS
1. Continue to utilize compression stockings.     2. Go see Lymphedema therapy. Call them and set up an appointment. If they say they are unable to see a referral, please call us at [phone] and we will send one through right away.     3. Your arterial supply to your legs looks good.     4. Follow up with us as needed. Please call us with any questions or concerns ([phone]).

## 2021-07-22 NOTE — PROGRESS NOTES
[facility]        Patient is here for a  consult  to discuss bilateral leg edema.    Pt is currently taking Aspirin and Statin.    Patient's condition is stable.    /80 (BP Location: Left arm, Patient Position: Chair, Cuff Size: Adult Large)   Pulse 70   Temp (!) 95.7  F (35.4  C) (Temporal)   Wt (!) 343 lb 8 oz (155.8 kg)   SpO2 97%   BMI 46.59 kg/m      The provider has been notified that the patient has no concerns.     Questions patient would like addressed today are: N/A.    Refills are needed: No    Has homecare services and agency name:  Dorinda Cheng MA

## 2021-07-22 NOTE — PROGRESS NOTES
[facility] INITIAL VASCULAR MEDICINE CONSULT      PRIMARY HEALTH CARE PROVIDER:  Luann Berger      REFERRING HEALTH CARE PROVIDER;  Kylie Kirkland      REASON FOR CONSULT:  Bilateral lower extremity edema, color changes, rule out PAD.       HPI: Onur Barr is a pleasant 65 year old non-smoking male with a history of CAD s/p coronary stenting, CHF, diabetes, hyperlipidemia, hypertension, chronic low back pain with morphine pump, MARLEEN unable to tolerate CPAP, and morbid obesity with prior history of gastric bypass who was evaluated back in 2018 by Dr. Robin of Vascular Surgery for bilateral lower extremity fatigue and weakness after walking. At that time, ROHIT's were normal and it was felt that he likely had a component of neurologic claudication as well as venous insufficiency. He was prescribed compression stockings and had planned to follow up with a spine specialist.     He has continued to wear compression stockings (knee-high). He feels the swelling in his legs has worsened over the last few months. He was just recently hospitalized at [facility] from 7/13/21-7/14/21 for lymphedema, venous stasis dermatitis and CHF. His diuretic was increased and he was referred to lymphedema therapy, Cardiology, sleep medicine for a sleep study, and us (Vascular Medicine) due to color changes in his feet and to rule out PAD.     He denies any claudication, non-healing wounds, or rest pain in his legs. His legs feel heavy and tired and are worse when in the dependent position. His symptoms significantly improve with leg elevation. His compression socks also help. In the past he has had some blisters, which just drain clear fluid. He presently does not have any blisters.  ABIs were undertaken today. These were normal. His left TBI is only mildly abnormal, indicating possible microvascular disease.     PAST MEDICAL HISTORY  Past Medical History:   Diagnosis Date     Acid reflux  disease 10/31/2017     Arrhythmia     paroxysmal atrial fibrillation     Arthritis      Atrial fibrillation (H)     Created by Conversion      Bariatric surgery status     Created by Conversion      CHF (congestive heart failure) (H)      Coronary artery disease      Coronary atherosclerosis     Created by Conversion  Replacement Utility updated for latest IMO load     Diabetes (H)     typr II resloved     Essential hypertension     Created by [facility] Annotation: Apr 6 2012 10:16Zack Harris: Lisinipril,  coreg  Replacement Utility updated for latest IMO load     H/O gastric bypass      Heart attack (H)      Hypercholesteremia      Hypertension      Insomnia      Insomnia, unspecified     Created by [facility] Annotation: Sep 11 2013  9:56AM Zack Brewer: Trouble  maintaining sleep-Trazodone-minimal helpzolpidem-      Ischemic cardiomyopathy      Low back pain      Lumbago     Created by [facility] Annotation: Apr 6 2012 10:20AM Anh Ramos: Morphine pump      Morbid obesity (H) 8/1/2018     Nephrolithiasis      Nephrolithiasis      Obstructive sleep apnea (adult) (pediatric)     Created by Conversion      Osteoarthritis     Created by [facility] Annotation: Jun 26 2009  9:53AM Zack Brewer: failed lumbar  fusion/morphine pump dr putnam  Replacement Utility updated for latest IMO load     Pure hypercholesterolemia     Created by Conversion      Sleep apnea      Sleepiness 5/8/2018       CURRENT MEDICATIONS  ASPIRIN EC PO, Take 81 mg by mouth daily  Atorvastatin Calcium (LIPITOR PO), Take 40 mg by mouth daily  carvedilol ER (COREG CR) 20 MG 24 hr capsule, Take 1 capsule (20 mg) by mouth daily  DULoxetine HCl (CYMBALTA PO), Take 90 mg by mouth daily   Ferrous Gluconate (IRON) 240 (27 Fe) MG TABS, Take 1 tablet by mouth daily  furosemide (LASIX) 40 MG tablet, Take 1 tablet (40 mg) by mouth 2 times daily Take in morning and early afternoon.   Take with potassium.  Gabapentin (NEURONTIN PO), Take 600 mg by mouth 3 times daily   lisinopril (ZESTRIL) 40 MG tablet, Take 1 tablet (40 mg) by mouth daily (take in place of Zestoretic, stopping hydrochlorothiazide)  MORPHINE SULFATE, IT pump:updated 7/14  Medications in Pump:   [facility] Responsible for pump medications:   Conc:  Morphine 20mg/ml(3.95 mg/day), clonidine 21.1mcg/ml (4.168 mcg/day), baclofen 42.5mcg/ml (8.395mcg/day)  Rate:   Pump Last Fill Date: 2/21  Pump Refill Date:8/20/21  nabumetone (RELAFEN) 500 MG tablet, Take 500 mg by mouth 2 times daily  Naproxen Sodium (ALEVE) 220 MG capsule, Take 220 mg by mouth 2 times daily (with meals).  Omeprazole (PRILOSEC PO), Take 40 mg by mouth daily  potassium chloride ER (KLOR-CON M) 20 MEQ CR tablet, Take 1 tablet (20 mEq) by mouth 2 times daily (take with furosemide/Lasix)  TRAZODONE HCL PO, Take 200 mg by mouth At Bedtime    No current facility-administered medications on file prior to visit.      PAST SURGICAL HISTORY:  Past Surgical History:   Procedure Laterality Date     BACK SURGERY       BYPASS GASTRIC DUODENAL SWITCH       C GASTRIC BYPASS,OBESE<100CM LORA-EN-Y  2008    Description: Gastric Surgery For Morbid Obesity Bypass With Lora-en-Y;  Recorded: 06/26/2009;  Comments: dr green 2008     COSMETIC SURGERY      pannicullectomy     ENT SURGERY      tonsillectomy     EXTRACORPOREAL SHOCK WAVE LITHOTRIPSY, CYSTOSCOPY, INSERT STENT URETER(S), COMBINED  8/23/11     HC REMOVAL OF TONSILS,<13 Y/O      Description: Tonsillectomy;  Recorded: 03/23/2012;  Comments: for obstructive sleep apnea     HC REPAIR INCISIONAL HERNIA,REDUCIBLE  11/13/2012    Description: Incisional Hernia Repair;  Proc Date: 11/13/2012;  Comments: incisional hernia repair and abdominal panniculectomy by Dr Shon Green at the [facility].     HERNIA REPAIR       IR MISCELLANEOUS PROCEDURE  7/29/2011     IR MISCELLANEOUS PROCEDURE  8/5/2011     IR MISCELLANEOUS PROCEDURE   8/23/2011     IR NEPHROSTOMY TUBE CHANGE BILATERAL  8/5/2011     ORTHOPEDIC SURGERY      arthrodesis ant discectomy, lumbar     DC ARTHRODESIS ANT INTERBODY MIN DISCECTOMY,LUMBAR      Description: Lumbar Vertebral Fusion;  Recorded: 06/26/2009;  Comments: before 200 see [facility] consult under old records     DC EXCISE EXCESS SKIN TISSUE,ABDOMEN  11/13/2012    Description: Panniculectomy;  Proc Date: 11/13/2012;  Comments: 3.5 Lb Pannus was removed at the [facility] By Dr. Shon Green     REPLACE INTRATHECAL PAIN PUMP N/A 4/25/2017    Procedure: REPLACE INTRATHECAL PAIN PUMP;  INTRATHECAL PAIN PUMP CATHETER REPLACEMENT AND PUMP REPLACEMENT;  Surgeon: Anthony Maloney MD;  Location: [facility]     REVISE CATHETER INTRATHECAL N/A 4/25/2017    Procedure: REVISE CATHETER INTRATHECAL;;  Surgeon: Anthony Maloney MD;  Location: [facility]     SHOULDER SURGERY         ALLERGIES     Allergies   Allergen Reactions     Hydrocodone-Acetaminophen        FAMILY HISTORY  Family History   Problem Relation Age of Onset     Cerebrovascular Disease Mother      Heart Disease Father      Hodgkin's lymphoma Brother        SOCIAL HISTORY  Former smoker. Does not drink.  and lives with wife.        ROS:   General: No change, sleep or appetite.  Normal energy.  No fever or chills. Has gained weight.   Eyes: Negative for vision changes or eye problems  ENT: No problems with ears, nose or throat.  No difficulty swallowing.  Resp: No coughing, wheezing. Some SOB with activity.   CV: No chest pains or palpitations  GI: No nausea, vomiting,  heartburn, abdominal pain, diarrhea, constipation or change in bowel habits  : No urinary frequency or dysuria, bladder or kidney problems  Musculoskeletal: No significant muscle or joint pains other than chronic back pain.   Neurologic: No headaches, numbness, tingling, weakness, problems with balance or coordination  Psychiatric: No problems with anxiety, depression or mental health  Heme/immune/allergy:  No history of bleeding or clotting problems or anemia.  No allergies or immune system problems  Endocrine: No history of thyroid disease, diabetes or other endocrine disorders  Skin: No rashes,worrisome lesions or skin problems  Vascular:  No claudication, lifestyle limiting or otherwise; no ischemic rest pain; no non-healing ulcers. No weakness, No loss of sensation     EXAM:  /80 (BP Location: Left arm, Patient Position: Chair, Cuff Size: Adult Large)   Pulse 70   Temp (!) 95.7  F (35.4  C) (Temporal)   Wt (!) 155.8 kg (343 lb 8 oz)   SpO2 97%   BMI 46.59 kg/m    In general, the patient is a pleasant male in no apparent distress.    HEENT: NC/AT.  PERRLA.  EOMI.  Sclerae white, not injected.   Neck: Carotids +2/2 bilaterally without bruits.    Heart: RRR. Normal S1, S2 splits physiologically. No murmur, rub, click, or gallop.  Lungs: CTA.  No ronchi, wheezes, rales.     Abdomen: Soft, nontender, nondistended. Obese  Extremities: + Kyphosis. No clubbing, cyanosis. + Stemmer sign. Bilateral lower extremity pitting edema with skin changes consistent with chronic venous stasis. No wounds but old scars from healed blisters on lower legs.       Labs:  LIPID RESULTS:  Lab Results   Component Value Date    CHOL 118 12/07/2020    HDL 35 (L) 12/07/2020    LDL 48 12/07/2020    LDL 69 02/13/2018    TRIG 174 (H) 12/07/2020       LIVER ENZYME RESULTS:  Lab Results   Component Value Date    AST 22 11/20/2019    ALT 15 12/07/2020       CBC RESULTS:  Lab Results   Component Value Date    WBC 4.8 07/14/2021    RBC 4.62 07/14/2021    HGB 12.5 (L) 07/14/2021    HCT 39.4 (L) 07/14/2021    MCV 85 07/14/2021    MCH 27.1 07/14/2021    MCHC 31.7 07/14/2021    RDW 15.9 (H) 07/14/2021     (L) 07/14/2021       BMP RESULTS:  Lab Results   Component Value Date     07/14/2021    POTASSIUM 3.8 07/14/2021    CHLORIDE 101 07/14/2021    CO2 34 (H) 07/14/2021    ANIONGAP 2 (L) 07/14/2021     (H) 07/14/2021    BUN 16  07/14/2021    CR 1.09 07/14/2021    GFRESTIMATED 71 07/14/2021    GFRESTIMATED >60 01/05/2021    GFRESTBLACK >60 01/05/2021    RATNA 8.9 07/14/2021        A1C RESULTS:  Lab Results   Component Value Date    A1C 5.6 02/13/2018         Procedures:   EXAM: US LOWER EXTREMITY VENOUS DUPLEX BILATERAL  LOCATION: [facility]  DATE/TIME: 7/13/2021 5:33 PM     INDICATION: Bilateral leg swelling and redness. Concern for cellulitis vs bilateral DVT vs CHF  COMPARISON: 04/21/2006  TECHNIQUE: Venous Duplex ultrasound of bilateral lower extremities with and without compression, augmentation and duplex. Color flow and spectral Doppler with waveform analysis performed.     FINDINGS: Exam includes the common femoral, femoral, popliteal veins as well as segmentally visualized deep calf veins and greater saphenous vein.      RIGHT: No deep vein thrombosis. No superficial thrombophlebitis. No popliteal cyst.     LEFT: No deep vein thrombosis. No superficial thrombophlebitis. No popliteal cyst.                                                                      IMPRESSION:  1.  No deep venous thrombosis in the bilateral lower extremities although portions of the calves and mid to distal femoral veins are obscured from body habitus.        US ROHIT DOPPLER WITH EXERCISE BILATERAL  7/22/2021 10:20 AM     CLINICAL HISTORY: with TBI; Ulcer of foot due to diabetes (H); Ulcer  of foot due to diabetes (H), bilateral edema, history of coronary  artery disease with concern for peripheral arterial disease     COMPARISON: 8/13/2018     ROHIT FINDINGS:      RIGHT(mmHg)  Brachial: 141  Ankle (PT): 152; Index 1.08  Ankle (DP): 155; Index 1.1  Digit: 92; Index 0.65     LEFT (mmHg)  Brachial: 138  Ankle (PT): 147; Index 1.04  Ankle (DP): 130; Index 0.92  Digit: 73; Index 0.52     The patient was exercised on a treadmill at 1 mph at a 10% incline for  5 minutes total. Throbbing legs after exercise.     Resting and immediate postexercise ABIs are  1.10 and 1.25 on the  right.     Resting and immediate postexercise ABIs are 1.04 and 1.14 on the left.     WAVEFORMS: The dorsalis pedis and posterior tibial arteries show  normal triphasic vascular waveforms bilaterally.                                                                      IMPRESSION:  1. RIGHT LOWER EXTREMITY: ROHIT at rest is normal with a normal response  to exercise.      2. LEFT LOWER EXTREMITY: ROHIT at rest is normal with a normal response  to exercise. TBI is mildly abnormal suggesting microvascular disease.      Assessment and Plan:   Secondary lymphedema with likely underlying venous insufficiency and known obesity    He has been troubled for a long time with bilateral lower extremity edema and dependent color changes to his feet and lower legs. He has a positive stemmer sign. Compression hosiery does help his symptoms.     He noted his lower extremity edema worsened with weight gain.     ABIs do not indicate any significant PAD as a cause of his color changes to his feet and he denies any claudication, rest pain, or non-healing wounds.     Plan:     He will benefit from going to Lymphedema therapy. A referral had already been placed for him while he was in the hospital, but he has not heard from them to schedule anything. I again provided their phone number and told him to given them a call. If they are unable to see the order for the referral, he was directed to call us so we could send a new referral through for him.     He should continue to wear his compression stockings for now and elevate his legs when sitting.     Advised that he work on losing weight as this has significantly impacted his symptoms.     No need for further follow-up with Vascular Medicine unless he develops claudication, rest pain, or non-healing wounds.           Zelda Ward PA-C      45 minutes spent on the date of the encounter doing chart review, history and exam, documentation, and further activities as noted  above.

## 2021-07-26 ENCOUNTER — ALLIED HEALTH/NURSE VISIT (OUTPATIENT)
Dept: CARDIOLOGY | Facility: CLINIC | Age: 66
End: 2021-07-26

## 2021-07-26 ENCOUNTER — OFFICE VISIT (OUTPATIENT)
Dept: CARDIOLOGY | Facility: CLINIC | Age: 66
End: 2021-07-26
Payer: COMMERCIAL

## 2021-07-26 VITALS
SYSTOLIC BLOOD PRESSURE: 128 MMHG | WEIGHT: 315 LBS | BODY MASS INDEX: 43.94 KG/M2 | RESPIRATION RATE: 20 BRPM | HEART RATE: 76 BPM | DIASTOLIC BLOOD PRESSURE: 76 MMHG

## 2021-07-26 DIAGNOSIS — G47.33 OSA (OBSTRUCTIVE SLEEP APNEA): ICD-10-CM

## 2021-07-26 DIAGNOSIS — I50.32 CHRONIC HEART FAILURE WITH PRESERVED EJECTION FRACTION (H): ICD-10-CM

## 2021-07-26 DIAGNOSIS — I25.119 CORONARY ARTERY DISEASE INVOLVING NATIVE CORONARY ARTERY OF NATIVE HEART WITH ANGINA PECTORIS (H): ICD-10-CM

## 2021-07-26 DIAGNOSIS — E66.01 MORBID OBESITY (H): Primary | ICD-10-CM

## 2021-07-26 DIAGNOSIS — I50.32 CHRONIC DIASTOLIC HEART FAILURE (H): Primary | ICD-10-CM

## 2021-07-26 LAB
ANION GAP SERPL CALCULATED.3IONS-SCNC: 10 MMOL/L (ref 5–18)
BUN SERPL-MCNC: 26 MG/DL (ref 8–22)
CALCIUM SERPL-MCNC: 9.3 MG/DL (ref 8.5–10.5)
CHLORIDE BLD-SCNC: 104 MMOL/L (ref 98–107)
CO2 SERPL-SCNC: 27 MMOL/L (ref 22–31)
CREAT SERPL-MCNC: 1.18 MG/DL (ref 0.7–1.3)
GFR SERPL CREATININE-BSD FRML MDRD: 65 ML/MIN/1.73M2
GLUCOSE BLD-MCNC: 108 MG/DL (ref 70–125)
POTASSIUM BLD-SCNC: 4.5 MMOL/L (ref 3.5–5)
SODIUM SERPL-SCNC: 141 MMOL/L (ref 136–145)

## 2021-07-26 PROCEDURE — 36415 COLL VENOUS BLD VENIPUNCTURE: CPT | Performed by: NURSE PRACTITIONER

## 2021-07-26 PROCEDURE — 80048 BASIC METABOLIC PNL TOTAL CA: CPT | Performed by: NURSE PRACTITIONER

## 2021-07-26 PROCEDURE — 99214 OFFICE O/P EST MOD 30 MIN: CPT | Performed by: NURSE PRACTITIONER

## 2021-07-26 PROCEDURE — 99207 PR NO CHARGE NURSE ONLY: CPT

## 2021-07-26 NOTE — NURSING NOTE
Talked with pt about using his meds doing daily wt. Keeping his log and taking BP and writing them in the log. Reviewed the Green, yellow and Red stop light page. He is to call the Heart Failure line with questions and concerns. He will monitor Na on labels and follow up as instructed.

## 2021-07-26 NOTE — PATIENT INSTRUCTIONS
Onur Barr,    It was a pleasure to see you today at [facility].     My recommendations after this visit include:  - Please follow up with Dr Stephens in 6 weeks  - Please follow up with Omayra Vallecillo in 3 months  - I have checked labs and will contact you with results  - Continue current medications    Omayra Vallecillo CNP      What is the [facility]?     The [facility] Failure Program is a heart failure specialty clinic within Heart Care.  You will work with your cardiologist, nurse practitioner, and nurses to carefully adjust medications and learn how to live with heart failure.  The Heart Failure Program will help you:      Better understand your chronic heart condition    Feel better and avoid hospital stays    Monitoring for Symptoms      Call the Heart Failure Phone Line ([phone]) if you have any of these symptoms:     Increased shortness of breath/shortness of breath at rest    Waking up at night with difficulty breathing    Unable to lie down for sleep due to symptoms or needing to sit upright for sleep    Weight gain of 2 pounds a day for 2 days in a row OR 5 pounds in 1 week    Increased swelling in your ankles or legs    Dizziness or lightheadedness    Medications       Take your medications as prescribed    Bring all your medications in their original bottles to every appointment    Avoid non-steroidal anti-inflammatory medications (Advil, Aleve, Ibuprofen, Naprosyn, Naproxen, Celebrex)    Do not stop taking your medications or begin taking over-the-counter or herbal medications without first talking to your doctor or nurse practitioner    Diet and Lifestyle       Limit sodium/salt to 2000 mg daily   o Read food labels for sodium content  o Do not add salt when cooking or add salt at the table    Weigh yourself every day and record in your daily weight log   o Call if you gain 2 pounds a day for 2 days in a row OR 5 pounds in 1  week  o Bring daily weight log to every appointment    Stay active, pace yourself, listen to your body, and rest when tired    Elevate your legs if they are swollen. Ask about using compression/support stockings    Stop smoking    Lose weight if you are overweight    Avoid drinking alcohol or limit amount    Stay updated on your immunizations including flu and pneumonia vaccines

## 2021-07-26 NOTE — LETTER
7/26/2021    Luann Berger MD  [id number] [facility] [id number]    RE: Onur Barr       Dear Colleague,    I had the pleasure of seeing Onur Barr in the [facility].          Assessment/Recommendations   Assessment:    1.  Heart failure with preserved ejection fraction, NYHA class III: Compensated.  His lower extremity edema has improved since hospitalization.  He continues to have fatigue and dyspnea on exertion.  He denies orthopnea or PND.  He met with the nurse clinician for further education.    2.  Morbid obesity: History of gastric bypass in the past.  He has a difficulty time exercising due to back pain and dyspnea on exertion.    Plan:  1.  BMP pending  2.  Continue current medications  3.  Start monitoring daily weights and low-sodium diet  4.  Continue compression socks    Onur Barr will follow up with Dr. Stephens in 6 weeks and in the heart failure clinic in 3 months.     History of Present Illness/Subjective    Mr. Onur Barr is a 65 year old male seen at [facility] heart failure clinic today for continued follow-up.  He follows up for heart failure with preserved ejection fraction.  He was recently hospitalized at [facility] July 13 to July 14 with edema.  He was given diuretics and it improved.  He also had an echocardiogram which showed ejection fraction of 55 to 60% with grade 2 diastolic dysfunction.  He has a past medical history significant for CAD with PCI to LAD in 2012, MARLEEN not on CPAP, morbid obesity, lymphedema.    Today, he states his symptoms have improved since hospitalization.  His edema has improved and he is wearing compression socks.  He has chronic fatigue and dyspnea on exertion.  He denies orthopnea, PND or chest pain.  He denies shortness of breath, orthopnea, PND, palpitations, chest pain and abdominal fullness/bloating.      He is not onitoring home weights on a daily basis. He is following a low  sodium diet.      ECHOCARDIOGRAM: 7/13/2021  Interpretation Summary     1.The study was technically limited.  2. The left ventricle is normal in size. Proximal septal thickening is noted.  Left ventricular systolic function is normal. The visual ejection fraction is  55-60%. Grade II or moderate diastolic dysfunction. Diastolic Doppler findings  (E/E' ratio and/or other parameters) suggest left ventricular filling  pressures are increased. No regional wall motion abnormalities noted. There is  no thrombus seen in the left ventricle.  3. Right ventricular function cannot be assessed due to poor image quality.  4. Trace mitral and tricuspid regurgitation.  5. Mild dilatation of the aortic root and ascending thoracic aorta.  6. No pericardial effusion.  7. No previous study for comparison.     Physical Examination Review of Systems   /76 (BP Location: Left arm, Patient Position: Sitting, Cuff Size: Adult Large)   Pulse 76   Resp 20   Wt 147 kg (324 lb)   BMI 43.94 kg/m    Body mass index is 43.94 kg/m .  Wt Readings from Last 3 Encounters:   07/26/21 147 kg (324 lb)   07/22/21 (!) 155.8 kg (343 lb 8 oz)   07/14/21 (!) 162.4 kg (358 lb)       General Appearance:   no acute distress   ENT/Mouth: membranes moist, no oral lesions or bleeding gums.      EYES:  no scleral icterus, normal conjunctivae   Neck: no carotid bruits or thyromegaly   Chest/Lungs:   lungs are clear to auscultation, no rales or wheezing, equal chest wall expansion    Cardiovascular:   Regular. Normal first and second heart sounds with no murmurs, rubs, or gallops; JVP is difficult to assess due to the patient's obesity and body habitus, trace edema bilaterally    Abdomen:  Obese, no organomegaly, masses, bruits, or tenderness; bowel sounds are present   Extremities: no cyanosis or clubbing   Skin: no xanthelasma, warm.    Neurologic: normal  bilateral, no tremors     Psychiatric: alert and oriented x3    Enc Vitals  BP: 128/76  Pulse:  76  Resp: 20  Weight: 147 kg (324 lb)                                         Medical History  Surgical History Family History Social History   Past Medical History:   Diagnosis Date     Acid reflux disease 10/31/2017     Arrhythmia     paroxysmal atrial fibrillation     Arthritis      Atrial fibrillation (H)     Created by Conversion      Bariatric surgery status     Created by Conversion      CHF (congestive heart failure) (H)      Coronary artery disease      Coronary atherosclerosis     Created by Conversion  Replacement Utility updated for latest IMO load     Diabetes (H)     typr II resloved     Essential hypertension     Created by [facility] Annotation: Apr 6 2012 10:16Zack Harris: Lisinipril,  coreg  Replacement Utility updated for latest IMO load     H/O gastric bypass      Heart attack (H)      Hypercholesteremia      Hypertension      Insomnia      Insomnia, unspecified     Created by [facility] Annotation: Sep 11 2013  9:56AM Zack Brewer: Trouble  maintaining sleep-Trazodone-minimal helpzolpidem-      Ischemic cardiomyopathy      Low back pain      Lumbago     Created by [facility] Annotation: Apr 6 2012 10:20Anh Ford: Morphine pump      Morbid obesity (H) 8/1/2018     Nephrolithiasis      Nephrolithiasis      Obstructive sleep apnea (adult) (pediatric)     Created by Conversion      Osteoarthritis     Created by [facility] Annotation: Jun 26 2009  9:53AM Zack Brewer: failed lumbar  fusion/morphine pump dr putnam  Replacement Utility updated for latest IMO load     Pure hypercholesterolemia     Created by Conversion      Sleep apnea      Sleepiness 5/8/2018    Past Surgical History:   Procedure Laterality Date     BACK SURGERY       BYPASS GASTRIC DUODENAL SWITCH       C GASTRIC BYPASS,OBESE<100CM LORA-EN-Y  2008    Description: Gastric Surgery For Morbid Obesity Bypass With Lora-en-Y;  Recorded: 06/26/2009;  Comments:   sarah 2008     COSMETIC SURGERY      pannicullectomy     ENT SURGERY      tonsillectomy     EXTRACORPOREAL SHOCK WAVE LITHOTRIPSY, CYSTOSCOPY, INSERT STENT URETER(S), COMBINED  8/23/11     HC REMOVAL OF TONSILS,<12 Y/O      Description: Tonsillectomy;  Recorded: 03/23/2012;  Comments: for obstructive sleep apnea     HC REPAIR INCISIONAL HERNIA,REDUCIBLE  11/13/2012    Description: Incisional Hernia Repair;  Proc Date: 11/13/2012;  Comments: incisional hernia repair and abdominal panniculectomy by Dr Shon Green at the [facility].     HERNIA REPAIR       IR MISCELLANEOUS PROCEDURE  7/29/2011     IR MISCELLANEOUS PROCEDURE  8/5/2011     IR MISCELLANEOUS PROCEDURE  8/23/2011     IR NEPHROSTOMY TUBE CHANGE BILATERAL  8/5/2011     ORTHOPEDIC SURGERY      arthrodesis ant discectomy, lumbar     NC ARTHRODESIS ANT INTERBODY MIN DISCECTOMY,LUMBAR      Description: Lumbar Vertebral Fusion;  Recorded: 06/26/2009;  Comments: before 200 see [facility] consult under old records     NC EXCISE EXCESS SKIN TISSUE,ABDOMEN  11/13/2012    Description: Panniculectomy;  Proc Date: 11/13/2012;  Comments: 3.5 Lb Pannus was removed at the [facility] By Dr. Shon Green     REPLACE INTRATHECAL PAIN PUMP N/A 4/25/2017    Procedure: REPLACE INTRATHECAL PAIN PUMP;  INTRATHECAL PAIN PUMP CATHETER REPLACEMENT AND PUMP REPLACEMENT;  Surgeon: Anthony Maloney MD;  Location: [facility]     REVISE CATHETER INTRATHECAL N/A 4/25/2017    Procedure: REVISE CATHETER INTRATHECAL;;  Surgeon: Anthony Maloney MD;  Location: [facility]     SHOULDER SURGERY      Family History   Problem Relation Age of Onset     Cerebrovascular Disease Mother      Heart Disease Father      Hodgkin's lymphoma Brother     Social History     Socioeconomic History     Marital status:      Spouse name: Not on file     Number of children: Not on file     Years of education: Not on file     Highest education level: Not on file   Occupational History     Not on file   Tobacco Use      Smoking status: Former Smoker     Years: 10.00     Types: Cigars, Cigars     Smokeless tobacco: Never Used   Substance and Sexual Activity     Alcohol use: No     Drug use: No     Comment: Drug use: formerly used     Sexual activity: Not on file   Other Topics Concern     Not on file   Social History Narrative     Not on file     Social Determinants of Health     Financial Resource Strain:      Difficulty of Paying Living Expenses:    Food Insecurity:      Worried About Running Out of Food in the Last Year:      Ran Out of Food in the Last Year:    Transportation Needs:      Lack of Transportation (Medical):      Lack of Transportation (Non-Medical):    Physical Activity:      Days of Exercise per Week:      Minutes of Exercise per Session:    Stress:      Feeling of Stress :    Social Connections:      Frequency of Communication with Friends and Family:      Frequency of Social Gatherings with Friends and Family:      Attends [employer] Services:      Active Member of Clubs or Organizations:      Attends Club or Organization Meetings:      Marital Status:    Intimate Partner Violence:      Fear of Current or Ex-Partner:      Emotionally Abused:      Physically Abused:      Sexually Abused:           Medications  Allergies   Current Outpatient Medications   Medication Sig Dispense Refill     ASPIRIN EC PO Take 81 mg by mouth daily       Atorvastatin Calcium (LIPITOR PO) Take 40 mg by mouth daily       carvedilol ER (COREG CR) 20 MG 24 hr capsule Take 1 capsule (20 mg) by mouth daily 30 capsule 3     DULoxetine HCl (CYMBALTA PO) Take 90 mg by mouth daily        Ferrous Gluconate (IRON) 240 (27 Fe) MG TABS Take 1 tablet by mouth daily       furosemide (LASIX) 40 MG tablet Take 1 tablet (40 mg) by mouth 2 times daily Take in morning and early afternoon.  Take with potassium. 60 tablet 1     Gabapentin (NEURONTIN PO) Take 600 mg by mouth 3 times daily        lisinopril (ZESTRIL) 40 MG tablet Take 1 tablet (40 mg) by  mouth daily (take in place of Zestoretic, stopping hydrochlorothiazide) 30 tablet 3     MORPHINE SULFATE IT pump:updated 7/14  Medications in Pump:   [facility] Responsible for pump medications:   Conc:  Morphine 20mg/ml(3.95 mg/day), clonidine 21.1mcg/ml (4.168 mcg/day), baclofen 42.5mcg/ml (8.395mcg/day)  Rate:   Pump Last Fill Date: 2/21  Pump Refill Date:8/20/21       nabumetone (RELAFEN) 500 MG tablet Take 500 mg by mouth 2 times daily       Naproxen Sodium (ALEVE) 220 MG capsule Take 220 mg by mouth 2 times daily (with meals).       Omeprazole (PRILOSEC PO) Take 40 mg by mouth daily       potassium chloride ER (KLOR-CON M) 20 MEQ CR tablet Take 1 tablet (20 mEq) by mouth 2 times daily (take with furosemide/Lasix) 60 tablet 1     TRAZODONE HCL PO Take 200 mg by mouth At Bedtime      Allergies   Allergen Reactions     Hydrocodone-Acetaminophen          Lab Results    Chemistry/lipid CBC Cardiac Enzymes/BNP/TSH/INR   Lab Results   Component Value Date    CHOL 118 12/07/2020    HDL 35 (L) 12/07/2020    TRIG 174 (H) 12/07/2020    BUN 16 07/14/2021     07/14/2021    CO2 34 (H) 07/14/2021    Lab Results   Component Value Date    WBC 4.8 07/14/2021    HGB 12.5 (L) 07/14/2021    HCT 39.4 (L) 07/14/2021    MCV 85 07/14/2021     (L) 07/14/2021    No results found for: CKTOTAL, CKMB, TROPONINI, BNP, TSH, INR          This note has been dictated using voice recognition software. Any grammatical, typographical, or context distortions are unintentional and inherent to the software    30 minutes spent on the date of encounter doing chart review, review of outside records, review of test results, interpretation with above tests, patient visit and documentation.                  Thank you for allowing me to participate in the care of your patient.      Sincerely,     Omayra Vallecillo, YAMILETH [facility]  cc:   Luann Berger MD  [id number]  [place] [address]

## 2021-07-26 NOTE — PROGRESS NOTES
Assessment/Recommendations   Assessment:    1.  Heart failure with preserved ejection fraction, NYHA class III: Compensated.  His lower extremity edema has improved since hospitalization.  He continues to have fatigue and dyspnea on exertion.  He denies orthopnea or PND.  He met with the nurse clinician for further education.    2.  Morbid obesity: History of gastric bypass in the past.  He has a difficulty time exercising due to back pain and dyspnea on exertion.    Plan:  1.  BMP pending  2.  Continue current medications  3.  Start monitoring daily weights and low-sodium diet  4.  Continue compression socks    Onur Barr will follow up with Dr. Stephens in 6 weeks and in the heart failure clinic in 3 months.     History of Present Illness/Subjective    Mr. Onur Barr is a 65 year old male seen at [facility] heart failure clinic today for continued follow-up.  He follows up for heart failure with preserved ejection fraction.  He was recently hospitalized at [facility] July 13 to July 14 with edema.  He was given diuretics and it improved.  He also had an echocardiogram which showed ejection fraction of 55 to 60% with grade 2 diastolic dysfunction.  He has a past medical history significant for CAD with PCI to LAD in 2012, MARLEEN not on CPAP, morbid obesity, lymphedema.    Today, he states his symptoms have improved since hospitalization.  His edema has improved and he is wearing compression socks.  He has chronic fatigue and dyspnea on exertion.  He denies orthopnea, PND or chest pain.  He denies shortness of breath, orthopnea, PND, palpitations, chest pain and abdominal fullness/bloating.      He is not onitoring home weights on a daily basis. He is following a low sodium diet.      ECHOCARDIOGRAM: 7/13/2021  Interpretation Summary     1.The study was technically limited.  2. The left ventricle is normal in size. Proximal septal thickening is noted.  Left ventricular systolic function is normal. The  visual ejection fraction is  55-60%. Grade II or moderate diastolic dysfunction. Diastolic Doppler findings  (E/E' ratio and/or other parameters) suggest left ventricular filling  pressures are increased. No regional wall motion abnormalities noted. There is  no thrombus seen in the left ventricle.  3. Right ventricular function cannot be assessed due to poor image quality.  4. Trace mitral and tricuspid regurgitation.  5. Mild dilatation of the aortic root and ascending thoracic aorta.  6. No pericardial effusion.  7. No previous study for comparison.     Physical Examination Review of Systems   /76 (BP Location: Left arm, Patient Position: Sitting, Cuff Size: Adult Large)   Pulse 76   Resp 20   Wt 147 kg (324 lb)   BMI 43.94 kg/m    Body mass index is 43.94 kg/m .  Wt Readings from Last 3 Encounters:   07/26/21 147 kg (324 lb)   07/22/21 (!) 155.8 kg (343 lb 8 oz)   07/14/21 (!) 162.4 kg (358 lb)       General Appearance:   no acute distress   ENT/Mouth: membranes moist, no oral lesions or bleeding gums.      EYES:  no scleral icterus, normal conjunctivae   Neck: no carotid bruits or thyromegaly   Chest/Lungs:   lungs are clear to auscultation, no rales or wheezing, equal chest wall expansion    Cardiovascular:   Regular. Normal first and second heart sounds with no murmurs, rubs, or gallops; JVP is difficult to assess due to the patient's obesity and body habitus, trace edema bilaterally    Abdomen:  Obese, no organomegaly, masses, bruits, or tenderness; bowel sounds are present   Extremities: no cyanosis or clubbing   Skin: no xanthelasma, warm.    Neurologic: normal  bilateral, no tremors     Psychiatric: alert and oriented x3    Enc Vitals  BP: 128/76  Pulse: 76  Resp: 20  Weight: 147 kg (324 lb)                                         Medical History  Surgical History Family History Social History   Past Medical History:   Diagnosis Date     Acid reflux disease 10/31/2017     Arrhythmia      paroxysmal atrial fibrillation     Arthritis      Atrial fibrillation (H)     Created by Conversion      Bariatric surgery status     Created by Conversion      CHF (congestive heart failure) (H)      Coronary artery disease      Coronary atherosclerosis     Created by Conversion  Replacement Utility updated for latest IMO load     Diabetes (H)     typr II resloved     Essential hypertension     Created by Conversion [facility] Annotation: Apr 6 2012 10:16Zack Harris: Lisinipril,  coreg  Replacement Utility updated for latest IMO load     H/O gastric bypass      Heart attack (H)      Hypercholesteremia      Hypertension      Insomnia      Insomnia, unspecified     Created by [facility] Annotation: Sep 11 2013  9:56AM Zack Brewer: Trouble  maintaining sleep-Trazodone-minimal helpzolpidem-      Ischemic cardiomyopathy      Low back pain      Lumbago     Created by [facility] Annotation: Apr 6 2012 10:20Anh Ford: Morphine pump      Morbid obesity (H) 8/1/2018     Nephrolithiasis      Nephrolithiasis      Obstructive sleep apnea (adult) (pediatric)     Created by Conversion      Osteoarthritis     Created by Conversion [facility] Annotation: Jun 26 2009  9:53Zack Harris: failed lumbar  fusion/morphine pump dr putnam  Replacement Utility updated for latest IMO load     Pure hypercholesterolemia     Created by Conversion      Sleep apnea      Sleepiness 5/8/2018    Past Surgical History:   Procedure Laterality Date     BACK SURGERY       BYPASS GASTRIC DUODENAL SWITCH       C GASTRIC BYPASS,OBESE<100CM LORA-EN-Y  2008    Description: Gastric Surgery For Morbid Obesity Bypass With Lora-en-Y;  Recorded: 06/26/2009;  Comments: dr smith 2008     COSMETIC SURGERY      pannicullectomy     ENT SURGERY      tonsillectomy     EXTRACORPOREAL SHOCK WAVE LITHOTRIPSY, CYSTOSCOPY, INSERT STENT URETER(S), COMBINED  8/23/11     HC REMOVAL OF TONSILS,<14 Y/O      Description:  Tonsillectomy;  Recorded: 03/23/2012;  Comments: for obstructive sleep apnea     HC REPAIR INCISIONAL HERNIA,REDUCIBLE  11/13/2012    Description: Incisional Hernia Repair;  Proc Date: 11/13/2012;  Comments: incisional hernia repair and abdominal panniculectomy by Dr Shon Green at the [facility].     HERNIA REPAIR       IR MISCELLANEOUS PROCEDURE  7/29/2011     IR MISCELLANEOUS PROCEDURE  8/5/2011     IR MISCELLANEOUS PROCEDURE  8/23/2011     IR NEPHROSTOMY TUBE CHANGE BILATERAL  8/5/2011     ORTHOPEDIC SURGERY      arthrodesis ant discectomy, lumbar     HI ARTHRODESIS ANT INTERBODY MIN DISCECTOMY,LUMBAR      Description: Lumbar Vertebral Fusion;  Recorded: 06/26/2009;  Comments: before 200 see [facility] consult under old records     HI EXCISE EXCESS SKIN TISSUE,ABDOMEN  11/13/2012    Description: Panniculectomy;  Proc Date: 11/13/2012;  Comments: 3.5 Lb Pannus was removed at the [facility] By Dr. Shon Green     REPLACE INTRATHECAL PAIN PUMP N/A 4/25/2017    Procedure: REPLACE INTRATHECAL PAIN PUMP;  INTRATHECAL PAIN PUMP CATHETER REPLACEMENT AND PUMP REPLACEMENT;  Surgeon: Anthony Maloney MD;  Location: [facility]     REVISE CATHETER INTRATHECAL N/A 4/25/2017    Procedure: REVISE CATHETER INTRATHECAL;;  Surgeon: Anthony Maloney MD;  Location: [facility]     SHOULDER SURGERY      Family History   Problem Relation Age of Onset     Cerebrovascular Disease Mother      Heart Disease Father      Hodgkin's lymphoma Brother     Social History     Socioeconomic History     Marital status:      Spouse name: Not on file     Number of children: Not on file     Years of education: Not on file     Highest education level: Not on file   Occupational History     Not on file   Tobacco Use     Smoking status: Former Smoker     Years: 10.00     Types: Cigars, Cigars     Smokeless tobacco: Never Used   Substance and Sexual Activity     Alcohol use: No     Drug use: No     Comment: Drug use: formerly used     Sexual activity:  Not on file   Other Topics Concern     Not on file   Social History Narrative     Not on file     Social Determinants of Health     Financial Resource Strain:      Difficulty of Paying Living Expenses:    Food Insecurity:      Worried About Running Out of Food in the Last Year:      Ran Out of Food in the Last Year:    Transportation Needs:      Lack of Transportation (Medical):      Lack of Transportation (Non-Medical):    Physical Activity:      Days of Exercise per Week:      Minutes of Exercise per Session:    Stress:      Feeling of Stress :    Social Connections:      Frequency of Communication with Friends and Family:      Frequency of Social Gatherings with Friends and Family:      Attends Protestant Services:      Active Member of Clubs or Organizations:      Attends Club or Organization Meetings:      Marital Status:    Intimate Partner Violence:      Fear of Current or Ex-Partner:      Emotionally Abused:      Physically Abused:      Sexually Abused:           Medications  Allergies   Current Outpatient Medications   Medication Sig Dispense Refill     ASPIRIN EC PO Take 81 mg by mouth daily       Atorvastatin Calcium (LIPITOR PO) Take 40 mg by mouth daily       carvedilol ER (COREG CR) 20 MG 24 hr capsule Take 1 capsule (20 mg) by mouth daily 30 capsule 3     DULoxetine HCl (CYMBALTA PO) Take 90 mg by mouth daily        Ferrous Gluconate (IRON) 240 (27 Fe) MG TABS Take 1 tablet by mouth daily       furosemide (LASIX) 40 MG tablet Take 1 tablet (40 mg) by mouth 2 times daily Take in morning and early afternoon.  Take with potassium. 60 tablet 1     Gabapentin (NEURONTIN PO) Take 600 mg by mouth 3 times daily        lisinopril (ZESTRIL) 40 MG tablet Take 1 tablet (40 mg) by mouth daily (take in place of Zestoretic, stopping hydrochlorothiazide) 30 tablet 3     MORPHINE SULFATE IT pump:updated 7/14  Medications in Pump:   [facility] Responsible for pump medications:   Conc:  Morphine 20mg/ml(3.95 mg/day),  clonidine 21.1mcg/ml (4.168 mcg/day), baclofen 42.5mcg/ml (8.395mcg/day)  Rate:   Pump Last Fill Date: 2/21  Pump Refill Date:8/20/21       nabumetone (RELAFEN) 500 MG tablet Take 500 mg by mouth 2 times daily       Naproxen Sodium (ALEVE) 220 MG capsule Take 220 mg by mouth 2 times daily (with meals).       Omeprazole (PRILOSEC PO) Take 40 mg by mouth daily       potassium chloride ER (KLOR-CON M) 20 MEQ CR tablet Take 1 tablet (20 mEq) by mouth 2 times daily (take with furosemide/Lasix) 60 tablet 1     TRAZODONE HCL PO Take 200 mg by mouth At Bedtime      Allergies   Allergen Reactions     Hydrocodone-Acetaminophen          Lab Results    Chemistry/lipid CBC Cardiac Enzymes/BNP/TSH/INR   Lab Results   Component Value Date    CHOL 118 12/07/2020    HDL 35 (L) 12/07/2020    TRIG 174 (H) 12/07/2020    BUN 16 07/14/2021     07/14/2021    CO2 34 (H) 07/14/2021    Lab Results   Component Value Date    WBC 4.8 07/14/2021    HGB 12.5 (L) 07/14/2021    HCT 39.4 (L) 07/14/2021    MCV 85 07/14/2021     (L) 07/14/2021    No results found for: CKTOTAL, CKMB, TROPONINI, BNP, TSH, INR          This note has been dictated using voice recognition software. Any grammatical, typographical, or context distortions are unintentional and inherent to the software    30 minutes spent on the date of encounter doing chart review, review of outside records, review of test results, interpretation with above tests, patient visit and documentation.

## 2021-07-28 ENCOUNTER — TELEPHONE (OUTPATIENT)
Dept: CARDIOLOGY | Facility: CLINIC | Age: 66
End: 2021-07-28

## 2021-07-28 NOTE — TELEPHONE ENCOUNTER
----- Message from YAMILETH Vizcaino CNP sent at 7/28/2021  8:54 AM CDT -----  Regarding: RE: Omayra Pt  Never gave him a list of clinics.   ----- Message -----  From: Caridad Chaudhary RN  Sent: 7/28/2021   8:49 AM CDT  To: YAMILETH Vizcaino CNP  Subject: FW: Omayra Cutler, do you remember this list?  ----- Message -----  From: Fallon Lama  Sent: 7/28/2021   8:39 AM CDT  To: Caridad Chaudhary RN  Subject: Omayra Cordova                                      General phone call:    Caller: Onur   Primary cardiologist: Omayra  Detailed reason for call: Omayra gave him a list of lymphedema clinics and he lost it.  Could he get ths list?    Best phone number: [phone]  Best time to contact: any  Ok to leave a detailed message? yes  Device? no    Additional Info:

## 2021-07-28 NOTE — TELEPHONE ENCOUNTER
Called and left detailed msg with number to call for lymphedema clinic locations [phone]. -kcl

## 2021-07-29 ENCOUNTER — HOSPITAL ENCOUNTER (OUTPATIENT)
Dept: OCCUPATIONAL THERAPY | Facility: CLINIC | Age: 66
Setting detail: THERAPIES SERIES
End: 2021-07-29
Attending: INTERNAL MEDICINE
Payer: COMMERCIAL

## 2021-07-29 DIAGNOSIS — R60.0 BILATERAL LEG EDEMA: ICD-10-CM

## 2021-07-29 PROCEDURE — 97140 MANUAL THERAPY 1/> REGIONS: CPT | Mod: GO

## 2021-07-29 PROCEDURE — 97165 OT EVAL LOW COMPLEX 30 MIN: CPT | Mod: GO

## 2021-07-30 NOTE — PROGRESS NOTES
07/29/21 1700   Rehab Discipline   Discipline OT   Type of Visit   Type of visit Initial Edema Evaluation   General Information   Start of care 07/29/21   Referring physician Ej Craig   Orders Evaluate and treat as indicated   Order date 07/22/21   Medical diagnosis lymphedema   Onset of illness / date of surgery 07/22/21  (chronic)   Edema onset 07/22/21  (chronic)   Affected body parts LLE;RLE  (RLE>LLE)   Edema etiology Chronic Venous Insufficiency  (cardiac; obese; inactive 2/2 back pain)   Edema etiology comments Pts BLE lymphedema  is related mainly to cardiac based swelling and CVI of BLE's. He also is obese and inactive 2/2 LBP and walks with a heavy flexion at waist bent over using SEC.   Pertinent history of current problem (PT: include personal factors and/or comorbidities that impact the POC; OT: include additional occupational profile info) PMH significant for CAD and stenting, CHF, HTN, hyperlipidemia, LBP on morphine pump, neuropathy, pt repports was diabetic and no longer is, and weakness/energy loss reported.   Surgical / medical history reviewed Yes   Prior level of functional mobility Mod I  (SEC)   Prior treatment Compression garments;Elevation   Community support Family / friend caregiver   Patient role / employment history Disabled   Living environment Apartment / condo   Current assistive devices   (SEC)   Fall Risk Screen   Fall screen completed by OT   Have you fallen 2 or more times in the past year? No   Have you fallen and had an injury in the past year? No   Is patient a fall risk? No   Abuse Screen (yes response referral indicated)   Feels Unsafe at Home or Work/School no   Feels Threatened by Someone no   Does Anyone Try to Keep You From Having Contact with Others or Doing Things Outside Your Home? no   Physical Signs of Abuse Present no   System Outcome Measures   Lymphedema Life Impact Scale (score range 0-72). A higher score indicates greater impairment.   (pt did not  cmplete; will when returns for POC)   Subjective Report   Patient report of symptoms blister; increased swelling;    Patient / Family Goals   Patient / family goals statement to reduce swelling in legs   Pain   Patient currently in pain Yes;No   Pain location LBP-chronic on morphine pump   Pain comments Pt states his pain pump masks other pains he may get   Cognitive Status   Orientation Orientation to person, place and time   Level of consciousness Alert   Follows commands and answers questions 100% of the time   Personal safety and judgement Intact   Edema Exam / Assessment   Skin condition Pitting;Dryness;Hemosiderin deposits   Skin condition comments RLE 2+ pitting with silver dollar sized blister medial/posterior calf   Pitting 1+;2+   Pitting location BLE; RLE 2+ and LLE 1+   Capillary refill Symmetrical   Dorsal pedal pulse comments unable to palpate-no signs ischemia   Stemmer sign Positive   Girth Measurements   Girth Measurements   (none taken-may obtain upon return)   Range of Motion   ROM comments WFL   Strength   Strength comments NT'd   Activities of Daily Living   Activities of Daily Living I-Min A   Bed Mobility   Bed mobility I   Transfers   Transfers I   Gait / Locomotion   Gait / Locomotion Mod I   Sensory   Sensory perception comments neuropathy reported BLE   Vascular Assessment   Vascular Assessment Comments known CVI   Coordination   Coordination Gross motor coordination appropriate   Muscle Tone   Muscle tone No deficits were identified   Planned Edema Interventions   Planned edema interventions Gradient compression bandaging;Fit for compression garment;Exercises;Precautions to prevent infection / exacerbation;Skin care / precautions;Home management program development   Clinical Impression   Criteria for skilled therapeutic intervention met Yes   Therapy diagnosis lymphedema   Influenced by the following impairments / conditions Stage 2;Phlebolymphedema   Assessment of Occupational  Performance 3-5 Performance Deficits   Identified Performance Deficits infection risk; decrease dfunctional mobility; decreased I and fit LB clothing; decreased stability on feet   Clinical Decision Making (Complexity) Low complexity   Treatment Frequency 3x/week   Treatment duration 3x/wk x 2 weeks, then 0x/wk x 3 weeks, then 1x/wk x 1 week   Patient / family and/or staff in agreement with plan of care Yes   Risks and benefits of therapy have been explained Yes   Clinical impression comments Pt will benefit from skilled lymphedema services tor educe BLE lymphedema with focus to RLE for infection risk reduciton, promote better fit LB clothing, and help heal and attend to blistering 2/2 stress on skin.   Goals   Edema Eval Goals 1;2;3;4;5   Goal 1   Goal identifier ED   Goal description Pt will report understanding s/s lymphedema and s/s skin infections for safety and I with home management BLE lymphedema   Target date 10/22/21   Goal 2   Goal identifier GCB Wearing   Goal description Tolerate gradient compression bandaging/wearing compression garments 23 hrs/day to prevent re-acccumulation of extracellular fluid for max reductions in BLE lymphedema neeeded to reduce risk skin infections and promote more ease with functional mobility   Target date 10/22/21   Goal 3   Goal identifier GCB Donning   Goal description Demonstrate independence in applying gradient compression bandages to build I with home management of BLE lymphedema needed to reduce risk skin infections and promote best fit LB clothing   Target date 10/22/21   Goal 4   Goal identifier HEP/MLD   Goal description Demonstrate independence in performing prescribed exercises to facilate the lymph system and muscle pumping systems for max reductions needed for infection prevention and reduce stress with walking   Target date 10/22/21   Goal 5   Goal identifier Garments   Goal description Be independent in donning/doffing, wearing schedule, and care of  compression garments to build I with home management of BLE lymphedema needed to reduce risk skin infections and promote best fit LB clothing    Target date 10/22/21   Total Evaluation Time   OT [place], Low Complexity Minutes (49249) 10

## 2021-08-09 ENCOUNTER — TRANSFERRED RECORDS (OUTPATIENT)
Dept: HEALTH INFORMATION MANAGEMENT | Facility: CLINIC | Age: 66
End: 2021-08-09

## 2021-08-09 LAB — PHQ9 SCORE: 11

## 2021-08-10 ENCOUNTER — NURSE TRIAGE (OUTPATIENT)
Dept: NURSING | Facility: CLINIC | Age: 66
End: 2021-08-10

## 2021-08-10 NOTE — TELEPHONE ENCOUNTER
Lmtcb: please relya below message and help schedule appointment.    Melissa ARNOLD CMA ([facility])

## 2021-08-10 NOTE — TELEPHONE ENCOUNTER
He has been on this medication for a long time. Not sure why he is having the symptoms now. Will need to be evaluated at the clinic to see what his blood pressure will be like.

## 2021-08-19 ENCOUNTER — HOSPITAL ENCOUNTER (OUTPATIENT)
Dept: OCCUPATIONAL THERAPY | Facility: CLINIC | Age: 66
Setting detail: THERAPIES SERIES
End: 2021-08-19
Attending: INTERNAL MEDICINE
Payer: COMMERCIAL

## 2021-08-19 PROCEDURE — 97140 MANUAL THERAPY 1/> REGIONS: CPT | Mod: GO

## 2021-08-20 ENCOUNTER — HOSPITAL ENCOUNTER (OUTPATIENT)
Dept: OCCUPATIONAL THERAPY | Facility: CLINIC | Age: 66
Setting detail: THERAPIES SERIES
End: 2021-08-20
Attending: INTERNAL MEDICINE
Payer: COMMERCIAL

## 2021-08-20 PROCEDURE — 97140 MANUAL THERAPY 1/> REGIONS: CPT | Mod: GO

## 2021-08-26 ENCOUNTER — HOSPITAL ENCOUNTER (OUTPATIENT)
Dept: OCCUPATIONAL THERAPY | Facility: CLINIC | Age: 66
Setting detail: THERAPIES SERIES
End: 2021-08-26
Attending: INTERNAL MEDICINE
Payer: COMMERCIAL

## 2021-08-26 DIAGNOSIS — I89.0 LYMPHEDEMA: Primary | ICD-10-CM

## 2021-08-26 PROCEDURE — 97140 MANUAL THERAPY 1/> REGIONS: CPT | Mod: GO

## 2021-08-26 NOTE — PROGRESS NOTES
Outpatient Occupational Therapy Discharge Note     Patient: Onur Barr  : 1955    Beginning/End Dates of Reporting Period:  21 to 21    Referring Provider: Ej Aguila Diagnosis: lymphedema    Client Self Report:      Objective Measurements: see flowsheet                                                        Outcome Measures (most recent score): not scored       Goals:   Goal Identifier ED   Goal Description Pt will report understanding s/s lymphedema and s/s skin infections for safety and I with home management BLE lymphedema   Target Date 10/22/21   Date Met  21   Progress (detail required for progress note):     Goal Identifier GCB Wearing   Goal Description Tolerate gradient compression bandaging/wearing compression garments 23 hrs/day to prevent re-acccumulation of extracellular fluid for max reductions in BLE lymphedema neeeded to reduce risk skin infections and promote more ease with functional mobility   Target Date 10/22/21   Date Met  21   Progress (detail required for progress note):     Goal Identifier GCB Donning   Goal Description Demonstrate independence in applying gradient compression bandages to build I with home management of BLE lymphedema needed to reduce risk skin infections and promote best fit LB clothing   Target Date 10/22/21   Date Met  21   Progress (detail required for progress note):     Goal Identifier HEP/MLD   Goal Description Demonstrate independence in performing prescribed exercises to facilate the lymph system and muscle pumping systems for max reductions needed for infection prevention and reduce stress with walking   Target Date 10/22/21   Date Met  21   Progress (detail required for progress note):     Goal Identifier Garments   Goal Description Be independent in donning/doffing, wearing schedule, and care of compression garments to build I with home management of BLE lymphedema needed to reduce risk skin infections and  promote best fit LB clothing    Target Date 10/22/21   Date Met      Progress (detail required for progress note):     Goal Identifier     Goal Description     Target Date     Date Met      Progress (detail required for progress note):     Goal Identifier     Goal Description     Target Date     Date Met      Progress (detail required for progress note):     Goal Identifier     Goal Description     Target Date     Date Met      Progress (detail required for progress note):       Plan:  Discharge from therapy. Pt and spouse I with GCB use and have reduced BLE lymphedema. Pt needs to wait for skin to fully heal before transitioning to stocking daily use and velcro nightly use. Pt has received all ed on what compression to obtain and how to use. Limitations within therapy regarding MLD and HEP 2/2 pts chronic LBP and extreme flexion at waist making positioning and ex execution unattainable. Pt I with home management.    Discharge:    Reason for Discharge: Patient has met all goals.  No further expectation of progress.    Equipment Issued:     Discharge Plan: Patient to continue home program.

## 2021-09-09 ENCOUNTER — OFFICE VISIT (OUTPATIENT)
Dept: CARDIOLOGY | Facility: CLINIC | Age: 66
End: 2021-09-09
Payer: COMMERCIAL

## 2021-09-09 VITALS
RESPIRATION RATE: 20 BRPM | SYSTOLIC BLOOD PRESSURE: 118 MMHG | WEIGHT: 315 LBS | DIASTOLIC BLOOD PRESSURE: 90 MMHG | HEART RATE: 76 BPM | BODY MASS INDEX: 43.81 KG/M2

## 2021-09-09 DIAGNOSIS — R07.89 CHEST TIGHTNESS: Primary | ICD-10-CM

## 2021-09-09 LAB
ANION GAP SERPL CALCULATED.3IONS-SCNC: 11 MMOL/L (ref 5–18)
BUN SERPL-MCNC: 20 MG/DL (ref 8–22)
CALCIUM SERPL-MCNC: 9.6 MG/DL (ref 8.5–10.5)
CHLORIDE BLD-SCNC: 106 MMOL/L (ref 98–107)
CHOLEST SERPL-MCNC: 171 MG/DL
CO2 SERPL-SCNC: 25 MMOL/L (ref 22–31)
CREAT SERPL-MCNC: 1.24 MG/DL (ref 0.7–1.3)
FASTING STATUS PATIENT QL REPORTED: NO
GFR SERPL CREATININE-BSD FRML MDRD: 61 ML/MIN/1.73M2
GLUCOSE BLD-MCNC: 103 MG/DL (ref 70–125)
HDLC SERPL-MCNC: 31 MG/DL
POTASSIUM BLD-SCNC: 4.2 MMOL/L (ref 3.5–5)
SODIUM SERPL-SCNC: 142 MMOL/L (ref 136–145)

## 2021-09-09 PROCEDURE — 99214 OFFICE O/P EST MOD 30 MIN: CPT | Performed by: INTERNAL MEDICINE

## 2021-09-09 PROCEDURE — 83718 ASSAY OF LIPOPROTEIN: CPT | Performed by: INTERNAL MEDICINE

## 2021-09-09 PROCEDURE — 82465 ASSAY BLD/SERUM CHOLESTEROL: CPT | Performed by: INTERNAL MEDICINE

## 2021-09-09 PROCEDURE — 36415 COLL VENOUS BLD VENIPUNCTURE: CPT | Performed by: INTERNAL MEDICINE

## 2021-09-09 PROCEDURE — 80048 BASIC METABOLIC PNL TOTAL CA: CPT | Performed by: INTERNAL MEDICINE

## 2021-09-09 RX ORDER — POTASSIUM CHLORIDE 750 MG/1
1 TABLET, EXTENDED RELEASE ORAL EVERY 24 HOURS
Status: ON HOLD | COMMUNITY
End: 2021-09-11 | Stop reason: ALTCHOICE

## 2021-09-09 NOTE — LETTER
2021    Luann Berger MD  [address] [facility] [place] [address]    RE: Onur Chavezandrei       Dear Colleague,    I had the pleasure of seeing Onur Barr in the [facility].           Onur Barr,  1955, MRN [id number]    PCP: Luann Berger, [phone]    Assessment:   1.  History of obesity status post gastric bypass surgery  2.  Paroxysmal atrial fibrillation  3.  Coronary atherosclerosis LAD stent 2012             NXT 2014 apical ischemia/infarct small EF 51%             NXT 2019 small NTMI no ischemia 61%  4.  Essential hypertension  5.  Hypercholesterolemia  6.  Asymptomatic bradycardia  7.  Hx Cardiomyopathy ischemic   Echo  EF 55 to 60% with diastolic impairment.  8.  Chronic lower extremity lymphedema.  Chronic venous stasis.    Recommendations: It is difficult to know if the symptoms represent angina but given his history I be inclined to arrange for pharmacologic nuclear scan to exclude ischemia.  Continue with all current medical therapy for now.  We will check electrolytes labs and cholesterol as is nonfasting.    Chief Complaint: Chest tightness    HPI:  We have been requested by Dr. Berger to evaluate Onur Barr for consultation who is a  65 year old year old male for above chief complaint.    Hx: Patient returns for follow-up.  Since her last evaluation he says that he has had a mild persistently recurring chest tightness.  He does seem to notice it with activity but can occur at other times.  Does strictly have an exertional component.  Some mild center of the chest light feeling.  Not mostly associated with dyspnea.  He does not have issues with dizziness or presyncope.  He had difficult problem with his lymphedema.  His level of activity is markedly reduced.  He cannot even walk 1 block up.  Struggles to go up the stairs but is able to do this.  We does this more vigorous activity he occasion does feel a  tightness it does not seem to be more prominent than at other times.    Ultrasound Doppler of the lower extremities.  Right lower extremity normal.  Left lower extremity normal with suggestion of microvascular disease    Current Outpatient Medications:      ASPIRIN EC PO, Take 81 mg by mouth daily, Disp: , Rfl:      Atorvastatin Calcium (LIPITOR PO), Take 40 mg by mouth daily, Disp: , Rfl:      carvedilol ER (COREG CR) 20 MG 24 hr capsule, Take 1 capsule (20 mg) by mouth daily, Disp: 30 capsule, Rfl: 3     DULoxetine HCl (CYMBALTA PO), Take 90 mg by mouth daily , Disp: , Rfl:      Ferrous Gluconate (IRON) 240 (27 Fe) MG TABS, Take 1 tablet by mouth daily, Disp: , Rfl:      furosemide (LASIX) 40 MG tablet, Take 1 tablet (40 mg) by mouth 2 times daily Take in morning and early afternoon.  Take with potassium., Disp: 60 tablet, Rfl: 1     Gabapentin (NEURONTIN PO), Take 600 mg by mouth 3 times daily , Disp: , Rfl:      lisinopril (ZESTRIL) 40 MG tablet, Take 1 tablet (40 mg) by mouth daily (take in place of Zestoretic, stopping hydrochlorothiazide), Disp: 30 tablet, Rfl: 3     MORPHINE SULFATE, IT pump:updated 7/14 Medications in Pump:  [facility] Responsible for pump medications:  Conc:  Morphine 20mg/ml(3.95 mg/day), clonidine 21.1mcg/ml (4.168 mcg/day), baclofen 42.5mcg/ml (8.395mcg/day) Rate:  Pump Last Fill Date: 2/21 Pump Refill Date:8/20/21, Disp: , Rfl:      nabumetone (RELAFEN) 500 MG tablet, Take 500 mg by mouth 2 times daily, Disp: , Rfl:      Naproxen Sodium (ALEVE) 220 MG capsule, Take 220 mg by mouth 2 times daily (with meals)., Disp: , Rfl:      Omeprazole (PRILOSEC PO), Take 40 mg by mouth daily, Disp: , Rfl:      potassium chloride ER (KLOR-CON M) 20 MEQ CR tablet, Take 1 tablet (20 mEq) by mouth 2 times daily (take with furosemide/Lasix), Disp: 60 tablet, Rfl: 1     potassium chloride ER (KLOR-CON) 10 MEQ CR tablet, Take 1 tablet by mouth every 24 hours, Disp: , Rfl:      TRAZODONE HCL PO, Take  200 mg by mouth At Bedtime, Disp: , Rfl:     Medical History  Past Medical History:   Diagnosis Date     Acid reflux disease 10/31/2017     Arrhythmia     paroxysmal atrial fibrillation     Arthritis      Atrial fibrillation (H)     Created by Conversion      Bariatric surgery status     Created by Conversion      CHF (congestive heart failure) (H)      Coronary artery disease      Coronary atherosclerosis     Created by Conversion  Replacement Utility updated for latest IMO load     Diabetes (H)     typr II resloved     Essential hypertension     Created by [facility] Annotation: Apr 6 2012 10:16Zack Harris: Lisinipril,  coreg  Replacement Utility updated for latest IMO load     H/O gastric bypass      Heart attack (H)      Hypercholesteremia      Hypertension      Insomnia      Insomnia, unspecified     Created by [facility] Annotation: Sep 11 2013  9:56AM Zack Brewer: Trouble  maintaining sleep-Trazodone-minimal helpzolpidem-      Ischemic cardiomyopathy      Low back pain      Lumbago     Created by [facility] Annotation: Apr 6 2012 10:20AM Anh Ramos: Morphine pump      Morbid obesity (H) 8/1/2018     Nephrolithiasis      Nephrolithiasis      Obstructive sleep apnea (adult) (pediatric)     Created by Conversion      Osteoarthritis     Created by [facility] Annotation: Jun 26 2009  9:53AM Zack Brewer: failed lumbar  fusion/morphine pump dr putnam  Replacement Utility updated for latest IMO load     Pure hypercholesterolemia     Created by Conversion      Sleep apnea      Sleepiness 5/8/2018      Past Medical History:   Diagnosis Date     Acid reflux disease 10/31/2017     Arrhythmia     paroxysmal atrial fibrillation     Arthritis      Atrial fibrillation (H)     Created by Conversion      Bariatric surgery status     Created by Conversion      CHF (congestive heart failure) (H)      Coronary artery disease      Coronary  atherosclerosis     Created by Conversion  Replacement Utility updated for latest IMO load     Diabetes (H)     typr II resloved     Essential hypertension     Created by [facility] Annotation: Apr 6 2012 10:16Zack Harris: Lisinipril,  coreg  Replacement Utility updated for latest IMO load     H/O gastric bypass      Heart attack (H)      Hypercholesteremia      Hypertension      Insomnia      Insomnia, unspecified     Created by [facility] Annotation: Sep 11 2013  9:56Zack Harris: Trouble  maintaining sleep-Trazodone-minimal helpzolpidem-      Ischemic cardiomyopathy      Low back pain      Lumbago     Created by [facility] Annotation: Apr 6 2012 10:20Anh Ford: Morphine pump      Morbid obesity (H) 8/1/2018     Nephrolithiasis      Nephrolithiasis      Obstructive sleep apnea (adult) (pediatric)     Created by Conversion      Osteoarthritis     Created by [facility] Annotation: Jun 26 2009  9:53AM Zack Brewer: failed lumbar  fusion/morphine pump dr putnam  Replacement Utility updated for latest IMO load     Pure hypercholesterolemia     Created by Conversion      Sleep apnea      Sleepiness 5/8/2018      PAST MEDICAL HISTORY:   Past Medical History:   Diagnosis Date     Acid reflux disease 10/31/2017     Arrhythmia     paroxysmal atrial fibrillation     Arthritis      Atrial fibrillation (H)     Created by Conversion      Bariatric surgery status     Created by Conversion      CHF (congestive heart failure) (H)      Coronary artery disease      Coronary atherosclerosis     Created by Conversion  Replacement Utility updated for latest IMO load     Diabetes (H)     typr II resloved     Essential hypertension     Created by [facility] Annotation: Apr 6 2012 10:16Zack Harris: Lisinipril,  coreg  Replacement Utility updated for latest IMO load     H/O gastric bypass      Heart attack (H)      Hypercholesteremia       Hypertension      Insomnia      Insomnia, unspecified     Created by [facility] Annotation: Sep 11 2013  9:56AM Zack Brewer: Trouble  maintaining sleep-Trazodone-minimal helpzolpidem-      Ischemic cardiomyopathy      Low back pain      Lumbago     Created by [facility] Annotation: Apr 6 2012 10:20AM Anh Ramos: Morphine pump      Morbid obesity (H) 8/1/2018     Nephrolithiasis      Nephrolithiasis      Obstructive sleep apnea (adult) (pediatric)     Created by Conversion      Osteoarthritis     Created by [facility] Annotation: Jun 26 2009  9:53AM Zack Brewer: failed lumbar  fusion/morphine pump dr putnam  Replacement Utility updated for latest IMO load     Pure hypercholesterolemia     Created by Conversion      Sleep apnea      Sleepiness 5/8/2018       PAST SURGICAL HISTORY:   Past Surgical History:   Procedure Laterality Date     BACK SURGERY       BYPASS GASTRIC DUODENAL SWITCH       C GASTRIC BYPASS,OBESE<100CM SUSY-EN-Y  2008    Description: Gastric Surgery For Morbid Obesity Bypass With Susy-en-Y;  Recorded: 06/26/2009;  Comments: dr green 2008     COSMETIC SURGERY      pannicullectomy     ENT SURGERY      tonsillectomy     EXTRACORPOREAL SHOCK WAVE LITHOTRIPSY, CYSTOSCOPY, INSERT STENT URETER(S), COMBINED  8/23/11     HC REMOVAL OF TONSILS,<13 Y/O      Description: Tonsillectomy;  Recorded: 03/23/2012;  Comments: for obstructive sleep apnea     HC REPAIR INCISIONAL HERNIA,REDUCIBLE  11/13/2012    Description: Incisional Hernia Repair;  Proc Date: 11/13/2012;  Comments: incisional hernia repair and abdominal panniculectomy by Dr Shon Green at the [facility].     HERNIA REPAIR       IR MISCELLANEOUS PROCEDURE  7/29/2011     IR MISCELLANEOUS PROCEDURE  8/5/2011     IR MISCELLANEOUS PROCEDURE  8/23/2011     IR NEPHROSTOMY TUBE CHANGE BILATERAL  8/5/2011     ORTHOPEDIC SURGERY      arthrodesis ant discectomy, lumbar     PA ARTHRODESIS ANT INTERBODY  MIN DISCECTOMY,LUMBAR      Description: Lumbar Vertebral Fusion;  Recorded: 06/26/2009;  Comments: before 200 see [facility] consult under old records     CO EXCISE EXCESS SKIN TISSUE,ABDOMEN  11/13/2012    Description: Panniculectomy;  Proc Date: 11/13/2012;  Comments: 3.5 Lb Pannus was removed at the [facility] By Dr. Shon Green     REPLACE INTRATHECAL PAIN PUMP N/A 4/25/2017    Procedure: REPLACE INTRATHECAL PAIN PUMP;  INTRATHECAL PAIN PUMP CATHETER REPLACEMENT AND PUMP REPLACEMENT;  Surgeon: Anthony Maloney MD;  Location: [facility]     REVISE CATHETER INTRATHECAL N/A 4/25/2017    Procedure: REVISE CATHETER INTRATHECAL;;  Surgeon: Anthony Maloney MD;  Location: [facility]     SHOULDER SURGERY         FAMILY HISTORY:   Family History   Problem Relation Age of Onset     Cerebrovascular Disease Mother      Heart Disease Father      Hodgkin's lymphoma Brother        SOCIAL HISTORY:   Social History     Tobacco Use     Smoking status: Former Smoker     Years: 10.00     Types: Cigars, Cigars     Smokeless tobacco: Never Used   Substance Use Topics     Alcohol use: No      Surgical History  He  has a past surgical history that includes Extracorporeal shock wave lithotripsy, cystoscopy, insert stent ureter(s), combined (8/23/11); Bypass gastric duodenal switch; back surgery; shoulder surgery; orthopedic surgery; Cosmetic surgery; ENT surgery; hernia repair; Replace Intrathecal Pain Pump (N/A, 4/25/2017); Revise catheter intrathecal (N/A, 4/25/2017); IR Miscellaneous Procedure (7/29/2011); IR Nephrostomy Tube Change Bilateral (8/5/2011); IR Miscellaneous Procedure (8/5/2011); IR Miscellaneous Procedure (8/23/2011); GASTRIC BYPASS,OBESE<100CM SUSY-EN-Y (2008); Pr Arthrodesis Ant Interbody Min Discectomy,Lumbar; REMOVAL OF TONSILS,<11 Y/O; Pr Excise Excess Skin Tissue,Abdomen (11/13/2012); and REPAIR INCISIONAL HERNIA,REDUCIBLE (11/13/2012).    Social History  Reviewed, and he  reports that he has quit smoking. His smoking use  included cigars and cigars. He quit after 10.00 years of use. He has never used smokeless tobacco. He reports that he does not drink alcohol and does not use drugs.  Smoking status reviewed.  Social history othrwise not contributory to HPI.  Allergies  Allergies   Allergen Reactions     Hydrocodone-Acetaminophen        Family History  Reviewed, and family history includes Cerebrovascular Disease in his mother; Heart Disease in his father; Hodgkin's lymphoma in his brother.  Extended Emergency Contact Information  Primary Emergency Contact: Krystal Brar  Address: [address] [place] [address] [facility]  Mobile Phone: [phone]  Relation: Spouse  Secondary Emergency Contact: Maxine De La O   [place]  Mobile Phone: [phone]  Relation: Daughter  Family history otherwise negative or not conributory to HPI.    Psychosocial Needs  Social History     Social History Narrative     Not on file     Additional psychosocial needs reviewed per nursing assessment.    Prior to Admission Medications  (Not in a hospital admission)      Review of Systems:  A comprehensive review of systems was negative.  Review of systems is negative except for HPI  Physical Exam:    BP Readings from Last 1 Encounters:   09/09/21 (!) 118/90     Pulse Readings from Last 1 Encounters:   09/09/21 76     Wt Readings from Last 1 Encounters:   09/09/21 146.5 kg (323 lb)     Ht Readings from Last 1 Encounters:   07/13/21 1.829 m (6')     Estimated body mass index is 43.81 kg/m  as calculated from the following:    Height as of 7/13/21: 1.829 m (6').    Weight as of this encounter: 146.5 kg (323 lb).    Head and neck without focal cranial neurologic defects.  JVD not distended.  Carotid upstroke normal without bruit.  External eye exam normal without icterus.  External ear exam normal.  Neck without cervical lymphadenopathy or thyromegaly.  Cardiac: S1-S2 distinct and regular without extra sounds  Lungs: Clear  Abdomen with normal  bowel tones.  Skin without rash, ecchymosis, lesions.  Neuromuscular tone normal.  Peripheral pulse intact and equal.  Joints without swelling or erythema.    Cholesterol   Date Value Ref Range Status   12/07/2020 118 <=199 mg/dL Final   11/20/2019 131 <=199 mg/dL Final     Direct Measure HDL   Date Value Ref Range Status   12/07/2020 35 (L) >=40 mg/dL Final   11/20/2019 36 (L) >=40 mg/dL Final     LDL Cholesterol Calculated   Date Value Ref Range Status   12/07/2020 48 <=129 mg/dL Final   11/20/2019 52 <=129 mg/dL Final     LDL Cholesterol Direct   Date Value Ref Range Status   02/13/2018 69 <=129 mg/dl Final   04/09/2013 74.0 <130.1 mg/dL Final     Triglycerides   Date Value Ref Range Status   12/07/2020 174 (H) <=149 mg/dL Final   11/20/2019 217 (H) <=149 mg/dL Final     No results found for: CHOLHDLRATIO Last Comprehensive Metabolic Panel:  Sodium   Date Value Ref Range Status   07/26/2021 141 136 - 145 mmol/L Final     Potassium   Date Value Ref Range Status   07/26/2021 4.5 3.5 - 5.0 mmol/L Final     Chloride   Date Value Ref Range Status   07/26/2021 104 98 - 107 mmol/L Final     Carbon Dioxide (CO2)   Date Value Ref Range Status   07/26/2021 27 22 - 31 mmol/L Final     Anion Gap   Date Value Ref Range Status   07/26/2021 10 5 - 18 mmol/L Final     Glucose   Date Value Ref Range Status   07/26/2021 108 70 - 125 mg/dL Final     Urea Nitrogen   Date Value Ref Range Status   07/26/2021 26 (H) 8 - 22 mg/dL Final     Creatinine   Date Value Ref Range Status   07/26/2021 1.18 0.70 - 1.30 mg/dL Final     GFR Estimate   Date Value Ref Range Status   07/26/2021 65 >60 mL/min/1.73m2 Final     Comment:     As of July 11, 2021, eGFR is calculated by the CKD-EPI creatinine equation, without race adjustment. eGFR can be influenced by muscle mass, exercise, and diet. The reported eGFR is an estimation only and is only applicable if the renal function is stable.   01/05/2021 >60 >60 mL/min/1.73m2 Final     Calcium   Date  Value Ref Range Status   07/26/2021 9.3 8.5 - 10.5 mg/dL Final                                  Thank you for allowing me to participate in the care of your patient.      Sincerely,     Claudio Stephens MD     [facility]  cc:   No referring provider defined for this encounter.

## 2021-09-09 NOTE — PROGRESS NOTES
Onur Barr,  1955, MRN [id number]    PCP: Luann Berger, [phone]    Assessment:   1.  History of obesity status post gastric bypass surgery  2.  Paroxysmal atrial fibrillation  3.  Coronary atherosclerosis LAD stent              NXT  apical ischemia/infarct small EF 51%             NXT 2019 small NTMI no ischemia 61%  4.  Essential hypertension  5.  Hypercholesterolemia  6.  Asymptomatic bradycardia  7.  Hx Cardiomyopathy ischemic   Echo  EF 55 to 60% with diastolic impairment.  8.  Chronic lower extremity lymphedema.  Chronic venous stasis.    Recommendations: It is difficult to know if the symptoms represent angina but given his history I be inclined to arrange for pharmacologic nuclear scan to exclude ischemia.  Continue with all current medical therapy for now.  We will check electrolytes labs and cholesterol as is nonfasting.    Chief Complaint: Chest tightness    HPI:  We have been requested by Dr. Berger to evaluate Onur Barr for consultation who is a  65 year old year old male for above chief complaint.    Hx: Patient returns for follow-up.  Since her last evaluation he says that he has had a mild persistently recurring chest tightness.  He does seem to notice it with activity but can occur at other times.  Does strictly have an exertional component.  Some mild center of the chest light feeling.  Not mostly associated with dyspnea.  He does not have issues with dizziness or presyncope.  He had difficult problem with his lymphedema.  His level of activity is markedly reduced.  He cannot even walk 1 block up.  Struggles to go up the stairs but is able to do this.  We does this more vigorous activity he occasion does feel a tightness it does not seem to be more prominent than at other times.    Ultrasound Doppler of the lower extremities.  Right lower extremity normal.  Left lower extremity normal with suggestion of microvascular disease    Current Outpatient  Medications:      ASPIRIN EC PO, Take 81 mg by mouth daily, Disp: , Rfl:      Atorvastatin Calcium (LIPITOR PO), Take 40 mg by mouth daily, Disp: , Rfl:      carvedilol ER (COREG CR) 20 MG 24 hr capsule, Take 1 capsule (20 mg) by mouth daily, Disp: 30 capsule, Rfl: 3     DULoxetine HCl (CYMBALTA PO), Take 90 mg by mouth daily , Disp: , Rfl:      Ferrous Gluconate (IRON) 240 (27 Fe) MG TABS, Take 1 tablet by mouth daily, Disp: , Rfl:      furosemide (LASIX) 40 MG tablet, Take 1 tablet (40 mg) by mouth 2 times daily Take in morning and early afternoon.  Take with potassium., Disp: 60 tablet, Rfl: 1     Gabapentin (NEURONTIN PO), Take 600 mg by mouth 3 times daily , Disp: , Rfl:      lisinopril (ZESTRIL) 40 MG tablet, Take 1 tablet (40 mg) by mouth daily (take in place of Zestoretic, stopping hydrochlorothiazide), Disp: 30 tablet, Rfl: 3     MORPHINE SULFATE, IT pump:updated 7/14 Medications in Pump:  [facility] Responsible for pump medications:  Conc:  Morphine 20mg/ml(3.95 mg/day), clonidine 21.1mcg/ml (4.168 mcg/day), baclofen 42.5mcg/ml (8.395mcg/day) Rate:  Pump Last Fill Date: 2/21 Pump Refill Date:8/20/21, Disp: , Rfl:      nabumetone (RELAFEN) 500 MG tablet, Take 500 mg by mouth 2 times daily, Disp: , Rfl:      Naproxen Sodium (ALEVE) 220 MG capsule, Take 220 mg by mouth 2 times daily (with meals)., Disp: , Rfl:      Omeprazole (PRILOSEC PO), Take 40 mg by mouth daily, Disp: , Rfl:      potassium chloride ER (KLOR-CON M) 20 MEQ CR tablet, Take 1 tablet (20 mEq) by mouth 2 times daily (take with furosemide/Lasix), Disp: 60 tablet, Rfl: 1     potassium chloride ER (KLOR-CON) 10 MEQ CR tablet, Take 1 tablet by mouth every 24 hours, Disp: , Rfl:      TRAZODONE HCL PO, Take 200 mg by mouth At Bedtime, Disp: , Rfl:     Medical History  Past Medical History:   Diagnosis Date     Acid reflux disease 10/31/2017     Arrhythmia     paroxysmal atrial fibrillation     Arthritis      Atrial fibrillation (H)     Created  by Conversion      Bariatric surgery status     Created by Conversion      CHF (congestive heart failure) (H)      Coronary artery disease      Coronary atherosclerosis     Created by Conversion  Replacement Utility updated for latest IMO load     Diabetes (H)     typr II resloved     Essential hypertension     Created by [facility] Annotation: Apr 6 2012 10:16Zack Harris: Lisinipril,  coreg  Replacement Utility updated for latest IMO load     H/O gastric bypass      Heart attack (H)      Hypercholesteremia      Hypertension      Insomnia      Insomnia, unspecified     Created by [facility] Annotation: Sep 11 2013  9:56Zack Harris: Trouble  maintaining sleep-Trazodone-minimal helpzolpidem-      Ischemic cardiomyopathy      Low back pain      Lumbago     Created by [facility] Annotation: Apr 6 2012 10:20nAh Ford: Morphine pump      Morbid obesity (H) 8/1/2018     Nephrolithiasis      Nephrolithiasis      Obstructive sleep apnea (adult) (pediatric)     Created by Conversion      Osteoarthritis     Created by [facility] Annotation: Jun 26 2009  9:53Zack Harris: failed lumbar  fusion/morphine pump dr [place]  Replacement Utility updated for latest IMO load     Pure hypercholesterolemia     Created by Conversion      Sleep apnea      Sleepiness 5/8/2018      Past Medical History:   Diagnosis Date     Acid reflux disease 10/31/2017     Arrhythmia     paroxysmal atrial fibrillation     Arthritis      Atrial fibrillation (H)     Created by Conversion      Bariatric surgery status     Created by Conversion      CHF (congestive heart failure) (H)      Coronary artery disease      Coronary atherosclerosis     Created by Conversion  Replacement Utility updated for latest IMO load     Diabetes (H)     typr II resloved     Essential hypertension     Created by [facility] Annotation: Apr 6 2012 10:16Zack Harris:  Lisinipril,  coreg  Replacement Utility updated for latest IMO load     H/O gastric bypass      Heart attack (H)      Hypercholesteremia      Hypertension      Insomnia      Insomnia, unspecified     Created by [facility] Annotation: Sep 11 2013  9:56Zack Harris: Trouble  maintaining sleep-Trazodone-minimal helpzolpidem-      Ischemic cardiomyopathy      Low back pain      Lumbago     Created by [facility] Annotation: Apr 6 2012 10:20AM Anh Ramos: Morphine pump      Morbid obesity (H) 8/1/2018     Nephrolithiasis      Nephrolithiasis      Obstructive sleep apnea (adult) (pediatric)     Created by Conversion      Osteoarthritis     Created by [facility] Annotation: Jun 26 2009  9:53AM Zack Brewer: failed lumbar  fusion/morphine pump dr [place]  Replacement Utility updated for latest IMO load     Pure hypercholesterolemia     Created by Conversion      Sleep apnea      Sleepiness 5/8/2018      PAST MEDICAL HISTORY:   Past Medical History:   Diagnosis Date     Acid reflux disease 10/31/2017     Arrhythmia     paroxysmal atrial fibrillation     Arthritis      Atrial fibrillation (H)     Created by Conversion      Bariatric surgery status     Created by Conversion      CHF (congestive heart failure) (H)      Coronary artery disease      Coronary atherosclerosis     Created by Conversion  Replacement Utility updated for latest IMO load     Diabetes (H)     typr II resloved     Essential hypertension     Created by [facility] Annotation: Apr 6 2012 10:16Zack Harris: Lisinipril,  coreg  Replacement Utility updated for latest IMO load     H/O gastric bypass      Heart attack (H)      Hypercholesteremia      Hypertension      Insomnia      Insomnia, unspecified     Created by [facility] Annotation: Sep 11 2013  9:56Zack Harris: Trouble  maintaining sleep-Trazodone-minimal helpzolpidem-      Ischemic cardiomyopathy      Low  back pain      Lumbago     Created by [facility] Annotation: Apr 6 2012 10:20AM - Anh Dickson: Morphine pump      Morbid obesity (H) 8/1/2018     Nephrolithiasis      Nephrolithiasis      Obstructive sleep apnea (adult) (pediatric)     Created by Conversion      Osteoarthritis     Created by [facility] Annotation: Jun 26 2009  9:53AM - Zack Gutierrez: failed lumbar  fusion/morphine pump dr putnam  Replacement Utility updated for latest IMO load     Pure hypercholesterolemia     Created by Conversion      Sleep apnea      Sleepiness 5/8/2018       PAST SURGICAL HISTORY:   Past Surgical History:   Procedure Laterality Date     BACK SURGERY       BYPASS GASTRIC DUODENAL SWITCH       C GASTRIC BYPASS,OBESE<100CM SUSY-EN-Y  2008    Description: Gastric Surgery For Morbid Obesity Bypass With Susy-en-Y;  Recorded: 06/26/2009;  Comments: dr green 2008     COSMETIC SURGERY      pannicullectomy     ENT SURGERY      tonsillectomy     EXTRACORPOREAL SHOCK WAVE LITHOTRIPSY, CYSTOSCOPY, INSERT STENT URETER(S), COMBINED  8/23/11     HC REMOVAL OF TONSILS,<11 Y/O      Description: Tonsillectomy;  Recorded: 03/23/2012;  Comments: for obstructive sleep apnea     HC REPAIR INCISIONAL HERNIA,REDUCIBLE  11/13/2012    Description: Incisional Hernia Repair;  Proc Date: 11/13/2012;  Comments: incisional hernia repair and abdominal panniculectomy by Dr Shon Green at the [facility].     HERNIA REPAIR       IR MISCELLANEOUS PROCEDURE  7/29/2011     IR MISCELLANEOUS PROCEDURE  8/5/2011     IR MISCELLANEOUS PROCEDURE  8/23/2011     IR NEPHROSTOMY TUBE CHANGE BILATERAL  8/5/2011     ORTHOPEDIC SURGERY      arthrodesis ant discectomy, lumbar     DE ARTHRODESIS ANT INTERBODY MIN DISCECTOMY,LUMBAR      Description: Lumbar Vertebral Fusion;  Recorded: 06/26/2009;  Comments: before 200 see [facility] consult under old records     DE EXCISE EXCESS SKIN TISSUE,ABDOMEN  11/13/2012    Description: Panniculectomy;  Proc Date:  11/13/2012;  Comments: 3.5 Lb Pannus was removed at the [facility] By Dr. Shon Green     REPLACE INTRATHECAL PAIN PUMP N/A 4/25/2017    Procedure: REPLACE INTRATHECAL PAIN PUMP;  INTRATHECAL PAIN PUMP CATHETER REPLACEMENT AND PUMP REPLACEMENT;  Surgeon: Anthony Maloney MD;  Location: [facility]     REVISE CATHETER INTRATHECAL N/A 4/25/2017    Procedure: REVISE CATHETER INTRATHECAL;;  Surgeon: Anthony Maloney MD;  Location: [facility]     SHOULDER SURGERY         FAMILY HISTORY:   Family History   Problem Relation Age of Onset     Cerebrovascular Disease Mother      Heart Disease Father      Hodgkin's lymphoma Brother        SOCIAL HISTORY:   Social History     Tobacco Use     Smoking status: Former Smoker     Years: 10.00     Types: Cigars, Cigars     Smokeless tobacco: Never Used   Substance Use Topics     Alcohol use: No      Surgical History  He  has a past surgical history that includes Extracorporeal shock wave lithotripsy, cystoscopy, insert stent ureter(s), combined (8/23/11); Bypass gastric duodenal switch; back surgery; shoulder surgery; orthopedic surgery; Cosmetic surgery; ENT surgery; hernia repair; Replace Intrathecal Pain Pump (N/A, 4/25/2017); Revise catheter intrathecal (N/A, 4/25/2017); IR Miscellaneous Procedure (7/29/2011); IR Nephrostomy Tube Change Bilateral (8/5/2011); IR Miscellaneous Procedure (8/5/2011); IR Miscellaneous Procedure (8/23/2011); GASTRIC BYPASS,OBESE<100CM SUSY-EN-Y (2008); Pr Arthrodesis Ant Interbody Min Discectomy,Lumbar; REMOVAL OF TONSILS,<11 Y/O; Pr Excise Excess Skin Tissue,Abdomen (11/13/2012); and REPAIR INCISIONAL HERNIA,REDUCIBLE (11/13/2012).    Social History  Reviewed, and he  reports that he has quit smoking. His smoking use included cigars and cigars. He quit after 10.00 years of use. He has never used smokeless tobacco. He reports that he does not drink alcohol and does not use drugs.  Smoking status reviewed.  Social history othrwise not contributory to  HPI.  Allergies  Allergies   Allergen Reactions     Hydrocodone-Acetaminophen        Family History  Reviewed, and family history includes Cerebrovascular Disease in his mother; Heart Disease in his father; Hodgkin's lymphoma in his brother.  Extended Emergency Contact Information  Primary Emergency Contact: Krystal Barr  Address: [address] [place] [address] [facility]  Mobile Phone: [phone]  Relation: Spouse  Secondary Emergency Contact: Maxine De La O   [place]  Mobile Phone: [phone]  Relation: Daughter  Family history otherwise negative or not conributory to HPI.    Psychosocial Needs  Social History     Social History Narrative     Not on file     Additional psychosocial needs reviewed per nursing assessment.    Prior to Admission Medications  (Not in a hospital admission)      Review of Systems:  A comprehensive review of systems was negative.  Review of systems is negative except for HPI  Physical Exam:    BP Readings from Last 1 Encounters:   09/09/21 (!) 118/90     Pulse Readings from Last 1 Encounters:   09/09/21 76     Wt Readings from Last 1 Encounters:   09/09/21 146.5 kg (323 lb)     Ht Readings from Last 1 Encounters:   07/13/21 1.829 m (6')     Estimated body mass index is 43.81 kg/m  as calculated from the following:    Height as of 7/13/21: 1.829 m (6').    Weight as of this encounter: 146.5 kg (323 lb).    Head and neck without focal cranial neurologic defects.  JVD not distended.  Carotid upstroke normal without bruit.  External eye exam normal without icterus.  External ear exam normal.  Neck without cervical lymphadenopathy or thyromegaly.  Cardiac: S1-S2 distinct and regular without extra sounds  Lungs: Clear  Abdomen with normal bowel tones.  Skin without rash, ecchymosis, lesions.  Neuromuscular tone normal.  Peripheral pulse intact and equal.  Joints without swelling or erythema.    Cholesterol   Date Value Ref Range Status   12/07/2020 118 <=199 mg/dL Final    11/20/2019 131 <=199 mg/dL Final     Direct Measure HDL   Date Value Ref Range Status   12/07/2020 35 (L) >=40 mg/dL Final   11/20/2019 36 (L) >=40 mg/dL Final     LDL Cholesterol Calculated   Date Value Ref Range Status   12/07/2020 48 <=129 mg/dL Final   11/20/2019 52 <=129 mg/dL Final     LDL Cholesterol Direct   Date Value Ref Range Status   02/13/2018 69 <=129 mg/dl Final   04/09/2013 74.0 <130.1 mg/dL Final     Triglycerides   Date Value Ref Range Status   12/07/2020 174 (H) <=149 mg/dL Final   11/20/2019 217 (H) <=149 mg/dL Final     No results found for: CHOLHDLRATIO Last Comprehensive Metabolic Panel:  Sodium   Date Value Ref Range Status   07/26/2021 141 136 - 145 mmol/L Final     Potassium   Date Value Ref Range Status   07/26/2021 4.5 3.5 - 5.0 mmol/L Final     Chloride   Date Value Ref Range Status   07/26/2021 104 98 - 107 mmol/L Final     Carbon Dioxide (CO2)   Date Value Ref Range Status   07/26/2021 27 22 - 31 mmol/L Final     Anion Gap   Date Value Ref Range Status   07/26/2021 10 5 - 18 mmol/L Final     Glucose   Date Value Ref Range Status   07/26/2021 108 70 - 125 mg/dL Final     Urea Nitrogen   Date Value Ref Range Status   07/26/2021 26 (H) 8 - 22 mg/dL Final     Creatinine   Date Value Ref Range Status   07/26/2021 1.18 0.70 - 1.30 mg/dL Final     GFR Estimate   Date Value Ref Range Status   07/26/2021 65 >60 mL/min/1.73m2 Final     Comment:     As of July 11, 2021, eGFR is calculated by the CKD-EPI creatinine equation, without race adjustment. eGFR can be influenced by muscle mass, exercise, and diet. The reported eGFR is an estimation only and is only applicable if the renal function is stable.   01/05/2021 >60 >60 mL/min/1.73m2 Final     Calcium   Date Value Ref Range Status   07/26/2021 9.3 8.5 - 10.5 mg/dL Final

## 2021-09-10 DIAGNOSIS — I25.119 CORONARY ARTERY DISEASE INVOLVING NATIVE CORONARY ARTERY OF NATIVE HEART WITH ANGINA PECTORIS (H): Primary | ICD-10-CM

## 2021-09-10 RX ORDER — ATORVASTATIN CALCIUM 80 MG/1
80 TABLET, FILM COATED ORAL DAILY
Qty: 30 TABLET | Refills: 6 | Status: ON HOLD | OUTPATIENT
Start: 2021-09-10 | End: 2021-09-14

## 2021-09-11 ENCOUNTER — HOSPITAL ENCOUNTER (EMERGENCY)
Facility: CLINIC | Age: 66
Discharge: ANOTHER HOSPITAL | End: 2021-09-11
Attending: EMERGENCY MEDICINE | Admitting: EMERGENCY MEDICINE
Payer: COMMERCIAL

## 2021-09-11 ENCOUNTER — ANESTHESIA (OUTPATIENT)
Dept: CARDIOLOGY | Facility: HOSPITAL | Age: 66
End: 2021-09-11

## 2021-09-11 ENCOUNTER — APPOINTMENT (OUTPATIENT)
Dept: RADIOLOGY | Facility: CLINIC | Age: 66
End: 2021-09-11
Attending: EMERGENCY MEDICINE
Payer: COMMERCIAL

## 2021-09-11 ENCOUNTER — HOSPITAL ENCOUNTER (EMERGENCY)
Facility: HOSPITAL | Age: 66
End: 2021-09-11
Attending: EMERGENCY MEDICINE
Payer: COMMERCIAL

## 2021-09-11 ENCOUNTER — HOSPITAL ENCOUNTER (INPATIENT)
Facility: HOSPITAL | Age: 66
LOS: 3 days | Discharge: HOME HEALTH | End: 2021-09-14
Attending: INTERNAL MEDICINE | Admitting: INTERNAL MEDICINE
Payer: COMMERCIAL

## 2021-09-11 ENCOUNTER — ANESTHESIA EVENT (OUTPATIENT)
Dept: CARDIOLOGY | Facility: HOSPITAL | Age: 66
End: 2021-09-11

## 2021-09-11 VITALS
SYSTOLIC BLOOD PRESSURE: 169 MMHG | DIASTOLIC BLOOD PRESSURE: 78 MMHG | HEART RATE: 62 BPM | RESPIRATION RATE: 15 BRPM | OXYGEN SATURATION: 97 %

## 2021-09-11 DIAGNOSIS — I21.19 ACUTE MYOCARDIAL INFARCTION OF INFERIOR WALL (H): ICD-10-CM

## 2021-09-11 DIAGNOSIS — I21.3 ST ELEVATION MI (STEMI) (H): ICD-10-CM

## 2021-09-11 DIAGNOSIS — K59.00 CONSTIPATION, UNSPECIFIED CONSTIPATION TYPE: ICD-10-CM

## 2021-09-11 DIAGNOSIS — M62.81 GENERALIZED MUSCLE WEAKNESS: ICD-10-CM

## 2021-09-11 DIAGNOSIS — M54.50 LOW BACK PAIN, UNSPECIFIED BACK PAIN LATERALITY, UNSPECIFIED CHRONICITY, UNSPECIFIED WHETHER SCIATICA PRESENT: ICD-10-CM

## 2021-09-11 DIAGNOSIS — I25.119 CORONARY ARTERY DISEASE INVOLVING NATIVE CORONARY ARTERY OF NATIVE HEART WITH ANGINA PECTORIS (H): Primary | ICD-10-CM

## 2021-09-11 DIAGNOSIS — I51.89 DIASTOLIC DYSFUNCTION: ICD-10-CM

## 2021-09-11 DIAGNOSIS — G47.00 INSOMNIA, UNSPECIFIED TYPE: ICD-10-CM

## 2021-09-11 DIAGNOSIS — I24.9: ICD-10-CM

## 2021-09-11 PROBLEM — I48.91 ATRIAL FIBRILLATION (H): Status: ACTIVE | Noted: 2021-09-11

## 2021-09-11 PROBLEM — Z98.61 PERCUTANEOUS TRANSLUMINAL CORONARY ANGIOPLASTY STATUS: Status: ACTIVE | Noted: 2021-09-11

## 2021-09-11 PROBLEM — K21.9 GASTROESOPHAGEAL REFLUX DISEASE WITHOUT ESOPHAGITIS: Status: ACTIVE | Noted: 2021-09-11

## 2021-09-11 LAB
ACT BLD: 405 SECONDS (ref 74–150)
ANION GAP SERPL CALCULATED.3IONS-SCNC: 11 MMOL/L (ref 5–18)
APTT PPP: 30 SECONDS (ref 22–38)
BASOPHILS # BLD AUTO: 0 10E3/UL (ref 0–0.2)
BASOPHILS NFR BLD AUTO: 0 %
BNP SERPL-MCNC: 134 PG/ML (ref 0–57)
BUN SERPL-MCNC: 23 MG/DL (ref 8–22)
CALCIUM SERPL-MCNC: 10 MG/DL (ref 8.5–10.5)
CHLORIDE BLD-SCNC: 102 MMOL/L (ref 98–107)
CO2 SERPL-SCNC: 28 MMOL/L (ref 22–31)
CREAT SERPL-MCNC: 1.29 MG/DL (ref 0.7–1.3)
EOSINOPHIL # BLD AUTO: 0.2 10E3/UL (ref 0–0.7)
EOSINOPHIL NFR BLD AUTO: 2 %
ERYTHROCYTE [DISTWIDTH] IN BLOOD BY AUTOMATED COUNT: 14.8 % (ref 10–15)
GFR SERPL CREATININE-BSD FRML MDRD: 58 ML/MIN/1.73M2
GLUCOSE BLD-MCNC: 110 MG/DL (ref 70–125)
HCT VFR BLD AUTO: 41.9 % (ref 40–53)
HGB BLD-MCNC: 13.8 G/DL (ref 13.3–17.7)
HOLD SPECIMEN: NORMAL
IMM GRANULOCYTES # BLD: 0 10E3/UL
IMM GRANULOCYTES NFR BLD: 0 %
INR PPP: 1 (ref 0.85–1.15)
LYMPHOCYTES # BLD AUTO: 3.6 10E3/UL (ref 0.8–5.3)
LYMPHOCYTES NFR BLD AUTO: 47 %
MCH RBC QN AUTO: 28.6 PG (ref 26.5–33)
MCHC RBC AUTO-ENTMCNC: 32.9 G/DL (ref 31.5–36.5)
MCV RBC AUTO: 87 FL (ref 78–100)
MONOCYTES # BLD AUTO: 0.7 10E3/UL (ref 0–1.3)
MONOCYTES NFR BLD AUTO: 9 %
NEUTROPHILS # BLD AUTO: 3.3 10E3/UL (ref 1.6–8.3)
NEUTROPHILS NFR BLD AUTO: 42 %
NRBC # BLD AUTO: 0 10E3/UL
NRBC BLD AUTO-RTO: 0 /100
PLATELET # BLD AUTO: 218 10E3/UL (ref 150–450)
POTASSIUM BLD-SCNC: 4.7 MMOL/L (ref 3.5–5)
RBC # BLD AUTO: 4.83 10E6/UL (ref 4.4–5.9)
SARS-COV-2 RNA RESP QL NAA+PROBE: NEGATIVE
SODIUM SERPL-SCNC: 141 MMOL/L (ref 136–145)
TROPONIN I SERPL-MCNC: 0.48 NG/ML (ref 0–0.29)
WBC # BLD AUTO: 7.9 10E3/UL (ref 4–11)

## 2021-09-11 PROCEDURE — 250N000011 HC RX IP 250 OP 636: Performed by: EMERGENCY MEDICINE

## 2021-09-11 PROCEDURE — 99223 1ST HOSP IP/OBS HIGH 75: CPT | Performed by: INTERNAL MEDICINE

## 2021-09-11 PROCEDURE — 92941 PRQ TRLML REVSC TOT OCCL AMI: CPT | Mod: RC | Performed by: INTERNAL MEDICINE

## 2021-09-11 PROCEDURE — 93458 L HRT ARTERY/VENTRICLE ANGIO: CPT | Performed by: INTERNAL MEDICINE

## 2021-09-11 PROCEDURE — 200N000001 HC R&B ICU

## 2021-09-11 PROCEDURE — C1887 CATHETER, GUIDING: HCPCS | Performed by: INTERNAL MEDICINE

## 2021-09-11 PROCEDURE — 96374 THER/PROPH/DIAG INJ IV PUSH: CPT

## 2021-09-11 PROCEDURE — 93005 ELECTROCARDIOGRAM TRACING: CPT | Performed by: INTERNAL MEDICINE

## 2021-09-11 PROCEDURE — 93458 L HRT ARTERY/VENTRICLE ANGIO: CPT | Mod: 26 | Performed by: INTERNAL MEDICINE

## 2021-09-11 PROCEDURE — 80048 BASIC METABOLIC PNL TOTAL CA: CPT | Performed by: EMERGENCY MEDICINE

## 2021-09-11 PROCEDURE — 84484 ASSAY OF TROPONIN QUANT: CPT | Performed by: EMERGENCY MEDICINE

## 2021-09-11 PROCEDURE — 250N000009 HC RX 250: Performed by: INTERNAL MEDICINE

## 2021-09-11 PROCEDURE — 255N000002 HC RX 255 OP 636: Performed by: INTERNAL MEDICINE

## 2021-09-11 PROCEDURE — 93010 ELECTROCARDIOGRAM REPORT: CPT | Performed by: INTERNAL MEDICINE

## 2021-09-11 PROCEDURE — 36415 COLL VENOUS BLD VENIPUNCTURE: CPT | Performed by: EMERGENCY MEDICINE

## 2021-09-11 PROCEDURE — 93005 ELECTROCARDIOGRAM TRACING: CPT

## 2021-09-11 PROCEDURE — 99152 MOD SED SAME PHYS/QHP 5/>YRS: CPT | Performed by: INTERNAL MEDICINE

## 2021-09-11 PROCEDURE — 85025 COMPLETE CBC W/AUTO DIFF WBC: CPT | Performed by: EMERGENCY MEDICINE

## 2021-09-11 PROCEDURE — C1757 CATH, THROMBECTOMY/EMBOLECT: HCPCS | Performed by: INTERNAL MEDICINE

## 2021-09-11 PROCEDURE — 85347 COAGULATION TIME ACTIVATED: CPT

## 2021-09-11 PROCEDURE — C1769 GUIDE WIRE: HCPCS | Performed by: INTERNAL MEDICINE

## 2021-09-11 PROCEDURE — 71045 X-RAY EXAM CHEST 1 VIEW: CPT

## 2021-09-11 PROCEDURE — 85730 THROMBOPLASTIN TIME PARTIAL: CPT | Performed by: EMERGENCY MEDICINE

## 2021-09-11 PROCEDURE — 87635 SARS-COV-2 COVID-19 AMP PRB: CPT | Performed by: EMERGENCY MEDICINE

## 2021-09-11 PROCEDURE — 250N000013 HC RX MED GY IP 250 OP 250 PS 637: Performed by: INTERNAL MEDICINE

## 2021-09-11 PROCEDURE — 99223 1ST HOSP IP/OBS HIGH 75: CPT | Mod: 25 | Performed by: INTERNAL MEDICINE

## 2021-09-11 PROCEDURE — 250N000013 HC RX MED GY IP 250 OP 250 PS 637: Performed by: EMERGENCY MEDICINE

## 2021-09-11 PROCEDURE — C1894 INTRO/SHEATH, NON-LASER: HCPCS | Performed by: INTERNAL MEDICINE

## 2021-09-11 PROCEDURE — 250N000011 HC RX IP 250 OP 636: Performed by: INTERNAL MEDICINE

## 2021-09-11 PROCEDURE — B211YZZ FLUOROSCOPY OF MULTIPLE CORONARY ARTERIES USING OTHER CONTRAST: ICD-10-PCS | Performed by: INTERNAL MEDICINE

## 2021-09-11 PROCEDURE — C1725 CATH, TRANSLUMIN NON-LASER: HCPCS | Performed by: INTERNAL MEDICINE

## 2021-09-11 PROCEDURE — 272N000001 HC OR GENERAL SUPPLY STERILE: Performed by: INTERNAL MEDICINE

## 2021-09-11 PROCEDURE — 258N000003 HC RX IP 258 OP 636: Performed by: INTERNAL MEDICINE

## 2021-09-11 PROCEDURE — 02C03ZZ EXTIRPATION OF MATTER FROM CORONARY ARTERY, ONE ARTERY, PERCUTANEOUS APPROACH: ICD-10-PCS | Performed by: INTERNAL MEDICINE

## 2021-09-11 PROCEDURE — 027035Z DILATION OF CORONARY ARTERY, ONE ARTERY WITH TWO DRUG-ELUTING INTRALUMINAL DEVICES, PERCUTANEOUS APPROACH: ICD-10-PCS | Performed by: INTERNAL MEDICINE

## 2021-09-11 PROCEDURE — 99291 CRITICAL CARE FIRST HOUR: CPT | Mod: 25

## 2021-09-11 PROCEDURE — 999N000157 HC STATISTIC RCP TIME EA 10 MIN

## 2021-09-11 PROCEDURE — C9803 HOPD COVID-19 SPEC COLLECT: HCPCS

## 2021-09-11 PROCEDURE — 85610 PROTHROMBIN TIME: CPT | Performed by: EMERGENCY MEDICINE

## 2021-09-11 PROCEDURE — 83880 ASSAY OF NATRIURETIC PEPTIDE: CPT | Performed by: EMERGENCY MEDICINE

## 2021-09-11 PROCEDURE — C1874 STENT, COATED/COV W/DEL SYS: HCPCS | Performed by: INTERNAL MEDICINE

## 2021-09-11 PROCEDURE — C9600 PERC DRUG-EL COR STENT SING: HCPCS | Performed by: INTERNAL MEDICINE

## 2021-09-11 PROCEDURE — C9606 PERC D-E COR REVASC W AMI S: HCPCS | Performed by: INTERNAL MEDICINE

## 2021-09-11 PROCEDURE — 99153 MOD SED SAME PHYS/QHP EA: CPT | Performed by: INTERNAL MEDICINE

## 2021-09-11 DEVICE — STENT CORONARY DES SYNERGY XD MR US 3.50X16MM H7493941816350: Type: IMPLANTABLE DEVICE | Site: CORONARY | Status: FUNCTIONAL

## 2021-09-11 DEVICE — IMPLANTABLE DEVICE: Type: IMPLANTABLE DEVICE | Site: CORONARY | Status: FUNCTIONAL

## 2021-09-11 RX ORDER — ONDANSETRON 4 MG/1
4 TABLET, ORALLY DISINTEGRATING ORAL EVERY 6 HOURS PRN
Status: DISCONTINUED | OUTPATIENT
Start: 2021-09-11 | End: 2021-09-11

## 2021-09-11 RX ORDER — FENTANYL CITRATE 50 UG/ML
INJECTION, SOLUTION INTRAMUSCULAR; INTRAVENOUS
Status: DISCONTINUED | OUTPATIENT
Start: 2021-09-11 | End: 2021-09-11 | Stop reason: HOSPADM

## 2021-09-11 RX ORDER — CARVEDILOL 12.5 MG/1
12.5 TABLET ORAL 2 TIMES DAILY WITH MEALS
Status: DISCONTINUED | OUTPATIENT
Start: 2021-09-11 | End: 2021-09-11

## 2021-09-11 RX ORDER — ROSUVASTATIN CALCIUM 40 MG/1
40 TABLET, COATED ORAL DAILY
Status: DISCONTINUED | OUTPATIENT
Start: 2021-09-11 | End: 2021-09-14 | Stop reason: HOSPADM

## 2021-09-11 RX ORDER — ASPIRIN 81 MG/1
81 TABLET ORAL DAILY
Status: DISCONTINUED | OUTPATIENT
Start: 2021-09-12 | End: 2021-09-14 | Stop reason: HOSPADM

## 2021-09-11 RX ORDER — LIDOCAINE 40 MG/G
CREAM TOPICAL
Status: DISCONTINUED | OUTPATIENT
Start: 2021-09-11 | End: 2021-09-14 | Stop reason: HOSPADM

## 2021-09-11 RX ORDER — OXYCODONE HYDROCHLORIDE 5 MG/1
5-10 TABLET ORAL EVERY 4 HOURS PRN
Status: DISCONTINUED | OUTPATIENT
Start: 2021-09-11 | End: 2021-09-14 | Stop reason: HOSPADM

## 2021-09-11 RX ORDER — FUROSEMIDE 40 MG
40 TABLET ORAL 2 TIMES DAILY
Status: DISCONTINUED | OUTPATIENT
Start: 2021-09-12 | End: 2021-09-14 | Stop reason: HOSPADM

## 2021-09-11 RX ORDER — METOPROLOL TARTRATE 1 MG/ML
5-10 INJECTION, SOLUTION INTRAVENOUS
Status: DISCONTINUED | OUTPATIENT
Start: 2021-09-11 | End: 2021-09-14 | Stop reason: HOSPADM

## 2021-09-11 RX ORDER — TRAZODONE HYDROCHLORIDE 50 MG/1
200 TABLET, FILM COATED ORAL AT BEDTIME
Status: DISCONTINUED | OUTPATIENT
Start: 2021-09-11 | End: 2021-09-14 | Stop reason: HOSPADM

## 2021-09-11 RX ORDER — AMOXICILLIN 250 MG
2 CAPSULE ORAL 2 TIMES DAILY PRN
Status: DISCONTINUED | OUTPATIENT
Start: 2021-09-11 | End: 2021-09-14 | Stop reason: HOSPADM

## 2021-09-11 RX ORDER — DULOXETIN HYDROCHLORIDE 30 MG/1
30 CAPSULE, DELAYED RELEASE ORAL EVERY MORNING
Status: DISCONTINUED | OUTPATIENT
Start: 2021-09-12 | End: 2021-09-14 | Stop reason: HOSPADM

## 2021-09-11 RX ORDER — BACLOFEN 10 MG/1
10 TABLET ORAL 2 TIMES DAILY PRN
Status: DISCONTINUED | OUTPATIENT
Start: 2021-09-11 | End: 2021-09-14 | Stop reason: HOSPADM

## 2021-09-11 RX ORDER — ROSUVASTATIN CALCIUM 40 MG/1
40 TABLET, COATED ORAL DAILY
Qty: 90 TABLET | Refills: 3 | Status: SHIPPED | OUTPATIENT
Start: 2021-09-11 | End: 2022-08-09

## 2021-09-11 RX ORDER — NITROGLYCERIN 0.4 MG/1
TABLET SUBLINGUAL
Status: DISCONTINUED | OUTPATIENT
Start: 2021-09-11 | End: 2021-09-11 | Stop reason: HOSPADM

## 2021-09-11 RX ORDER — NALOXONE HYDROCHLORIDE 0.4 MG/ML
0.4 INJECTION, SOLUTION INTRAMUSCULAR; INTRAVENOUS; SUBCUTANEOUS
Status: DISCONTINUED | OUTPATIENT
Start: 2021-09-11 | End: 2021-09-14 | Stop reason: HOSPADM

## 2021-09-11 RX ORDER — LISINOPRIL 20 MG/1
20 TABLET ORAL DAILY
Status: DISCONTINUED | OUTPATIENT
Start: 2021-09-12 | End: 2021-09-12

## 2021-09-11 RX ORDER — IODIXANOL 320 MG/ML
INJECTION, SOLUTION INTRAVASCULAR
Status: DISCONTINUED | OUTPATIENT
Start: 2021-09-11 | End: 2021-09-11 | Stop reason: HOSPADM

## 2021-09-11 RX ORDER — MORPHINE SULFATE 4 MG/ML
4 INJECTION, SOLUTION INTRAMUSCULAR; INTRAVENOUS
Status: DISCONTINUED | OUTPATIENT
Start: 2021-09-11 | End: 2021-09-11 | Stop reason: HOSPADM

## 2021-09-11 RX ORDER — SODIUM CHLORIDE 9 MG/ML
INJECTION, SOLUTION INTRAVENOUS CONTINUOUS
Status: ACTIVE | OUTPATIENT
Start: 2021-09-11 | End: 2021-09-11

## 2021-09-11 RX ORDER — NALOXONE HYDROCHLORIDE 0.4 MG/ML
0.2 INJECTION, SOLUTION INTRAMUSCULAR; INTRAVENOUS; SUBCUTANEOUS
Status: DISCONTINUED | OUTPATIENT
Start: 2021-09-11 | End: 2021-09-14 | Stop reason: HOSPADM

## 2021-09-11 RX ORDER — ACETAMINOPHEN 325 MG/1
650 TABLET ORAL EVERY 6 HOURS PRN
Status: DISCONTINUED | OUTPATIENT
Start: 2021-09-11 | End: 2021-09-14 | Stop reason: HOSPADM

## 2021-09-11 RX ORDER — AMOXICILLIN 250 MG
1 CAPSULE ORAL 2 TIMES DAILY PRN
Status: DISCONTINUED | OUTPATIENT
Start: 2021-09-11 | End: 2021-09-14 | Stop reason: HOSPADM

## 2021-09-11 RX ORDER — NITROGLYCERIN 0.4 MG/1
0.4 TABLET SUBLINGUAL EVERY 5 MIN PRN
Status: COMPLETED | OUTPATIENT
Start: 2021-09-11 | End: 2021-09-11

## 2021-09-11 RX ORDER — NITROGLYCERIN 0.4 MG/1
0.4 TABLET SUBLINGUAL EVERY 5 MIN PRN
Status: DISCONTINUED | OUTPATIENT
Start: 2021-09-11 | End: 2021-09-14 | Stop reason: HOSPADM

## 2021-09-11 RX ORDER — HYDRALAZINE HYDROCHLORIDE 20 MG/ML
10 INJECTION INTRAMUSCULAR; INTRAVENOUS EVERY 4 HOURS PRN
Status: DISCONTINUED | OUTPATIENT
Start: 2021-09-11 | End: 2021-09-14 | Stop reason: HOSPADM

## 2021-09-11 RX ORDER — BACLOFEN 10 MG/1
10 TABLET ORAL 4 TIMES DAILY PRN
Status: DISCONTINUED | OUTPATIENT
Start: 2021-09-11 | End: 2021-09-11

## 2021-09-11 RX ORDER — ONDANSETRON 4 MG/1
4 TABLET, ORALLY DISINTEGRATING ORAL EVERY 6 HOURS PRN
Status: DISCONTINUED | OUTPATIENT
Start: 2021-09-11 | End: 2021-09-14 | Stop reason: HOSPADM

## 2021-09-11 RX ORDER — CARVEDILOL 12.5 MG/1
12.5 TABLET ORAL 2 TIMES DAILY WITH MEALS
Status: DISCONTINUED | OUTPATIENT
Start: 2021-09-11 | End: 2021-09-13

## 2021-09-11 RX ORDER — ASPIRIN 81 MG/1
81 TABLET, CHEWABLE ORAL ONCE
Status: DISCONTINUED | OUTPATIENT
Start: 2021-09-11 | End: 2021-09-11 | Stop reason: CLARIF

## 2021-09-11 RX ORDER — OXYCODONE HYDROCHLORIDE 5 MG/1
5 TABLET ORAL EVERY 4 HOURS PRN
Status: DISCONTINUED | OUTPATIENT
Start: 2021-09-11 | End: 2021-09-11

## 2021-09-11 RX ORDER — DULOXETIN HYDROCHLORIDE 60 MG/1
60 CAPSULE, DELAYED RELEASE ORAL EVERY EVENING
Status: DISCONTINUED | OUTPATIENT
Start: 2021-09-11 | End: 2021-09-14 | Stop reason: HOSPADM

## 2021-09-11 RX ORDER — ONDANSETRON 2 MG/ML
4 INJECTION INTRAMUSCULAR; INTRAVENOUS EVERY 6 HOURS PRN
Status: DISCONTINUED | OUTPATIENT
Start: 2021-09-11 | End: 2021-09-11

## 2021-09-11 RX ORDER — EPTIFIBATIDE 2 MG/ML
INJECTION, SOLUTION INTRAVENOUS
Status: DISCONTINUED | OUTPATIENT
Start: 2021-09-11 | End: 2021-09-11 | Stop reason: HOSPADM

## 2021-09-11 RX ORDER — MORPHINE SULFATE 4 MG/ML
4 INJECTION, SOLUTION INTRAMUSCULAR; INTRAVENOUS
Status: DISCONTINUED | OUTPATIENT
Start: 2021-09-11 | End: 2021-09-14 | Stop reason: HOSPADM

## 2021-09-11 RX ORDER — ONDANSETRON 2 MG/ML
4 INJECTION INTRAMUSCULAR; INTRAVENOUS EVERY 6 HOURS PRN
Status: DISCONTINUED | OUTPATIENT
Start: 2021-09-11 | End: 2021-09-14 | Stop reason: HOSPADM

## 2021-09-11 RX ORDER — PANTOPRAZOLE SODIUM 20 MG/1
40 TABLET, DELAYED RELEASE ORAL
Status: DISCONTINUED | OUTPATIENT
Start: 2021-09-12 | End: 2021-09-14 | Stop reason: HOSPADM

## 2021-09-11 RX ORDER — ACETAMINOPHEN 650 MG/1
650 SUPPOSITORY RECTAL EVERY 6 HOURS PRN
Status: DISCONTINUED | OUTPATIENT
Start: 2021-09-11 | End: 2021-09-14 | Stop reason: HOSPADM

## 2021-09-11 RX ORDER — GABAPENTIN 300 MG/1
600 CAPSULE ORAL 3 TIMES DAILY
Status: DISCONTINUED | OUTPATIENT
Start: 2021-09-11 | End: 2021-09-14 | Stop reason: HOSPADM

## 2021-09-11 RX ORDER — LISINOPRIL 20 MG/1
40 TABLET ORAL DAILY
Status: DISCONTINUED | OUTPATIENT
Start: 2021-09-12 | End: 2021-09-11

## 2021-09-11 RX ORDER — ASPIRIN 81 MG/1
324 TABLET, CHEWABLE ORAL ONCE
Status: COMPLETED | OUTPATIENT
Start: 2021-09-11 | End: 2021-09-11

## 2021-09-11 RX ADMIN — TRAZODONE HYDROCHLORIDE 200 MG: 50 TABLET ORAL at 21:48

## 2021-09-11 RX ADMIN — SODIUM CHLORIDE: 9 INJECTION, SOLUTION INTRAVENOUS at 16:30

## 2021-09-11 RX ADMIN — ASPIRIN 324 MG: 81 TABLET, CHEWABLE ORAL at 13:26

## 2021-09-11 RX ADMIN — ROSUVASTATIN CALCIUM 40 MG: 40 TABLET, FILM COATED ORAL at 17:31

## 2021-09-11 RX ADMIN — NITROGLYCERIN 0.4 MG: 0.4 TABLET SUBLINGUAL at 13:33

## 2021-09-11 RX ADMIN — DULOXETINE HYDROCHLORIDE 60 MG: 60 CAPSULE, DELAYED RELEASE ORAL at 20:25

## 2021-09-11 RX ADMIN — NITROGLYCERIN 0.4 MG: 0.4 TABLET SUBLINGUAL at 13:25

## 2021-09-11 RX ADMIN — OXYCODONE HYDROCHLORIDE 5 MG: 5 TABLET ORAL at 20:25

## 2021-09-11 RX ADMIN — TICAGRELOR 180 MG: 90 TABLET ORAL at 13:30

## 2021-09-11 RX ADMIN — GABAPENTIN 600 MG: 300 CAPSULE ORAL at 20:25

## 2021-09-11 RX ADMIN — NITROGLYCERIN 0.4 MG: 0.4 TABLET SUBLINGUAL at 13:39

## 2021-09-11 RX ADMIN — ACETAMINOPHEN 650 MG: 325 TABLET ORAL at 20:25

## 2021-09-11 RX ADMIN — HEPARIN SODIUM 10000 UNITS: 1000 INJECTION INTRAVENOUS; SUBCUTANEOUS at 13:38

## 2021-09-11 RX ADMIN — TICAGRELOR 90 MG: 90 TABLET ORAL at 20:26

## 2021-09-11 ASSESSMENT — ACTIVITIES OF DAILY LIVING (ADL): EQUIPMENT_CURRENTLY_USED_AT_HOME: OTHER (SEE COMMENTS)

## 2021-09-11 ASSESSMENT — ENCOUNTER SYMPTOMS
SHORTNESS OF BREATH: 1
DIAPHORESIS: 1

## 2021-09-11 NOTE — ED PROVIDER NOTES
"Emergency Department Encounter      NAME: Onur Barr  AGE: 65 year old male  YOB: 1955  MRN: [id number]  EVALUATION DATE & TIME: 2021  1:23 PM    PCP: Luann Berger    ED PROVIDER: Abdoul Paige M.D.      Chief Complaint   Patient presents with     Chest Pain     Arm Pain         FINAL IMPRESSION:  1. Acute myocardial infarction of inferior wall (H)        MEDICAL DECISION MAKIN. PM I saw patient in triage  1:23 PM I called a STEMI.  1:25 PM I met with the patient, obtained history, performed an initial exam, and discussed options and plan for diagnostics and treatment here in the ED.   Pertinent Labs & Imaging studies reviewed. (See chart for details)     This patient is a 65-year-old male with a history of diabetes, hypertension, hypercholesterolemia, CHF, MI and coronary artery disease who presents with chest pain.  The patient has had intermittent chest pain since 10 PM last night which he thought was initially acid reflux.  However at 1 PM today he began to have severe worsening of the substernal chest pressure.  He rated the pain 10 out of 10 when he arrived in the ER.  He said the pain radiated to his left arm and was accompanied by diaphoresis and shortness of breath.  I was called emergently to the triage area because of the appearance of the patient and his symptoms.  An EKG was done initially that showed a inferior MI.  He was started on the code MI protocol.  His chest x-ray was reviewed and the ER and did not show any mediastinal widening or obvious sign of aortic dissection but there was the appearance of CHF.  A BNP was added to his lab work.  His lungs were clear and he did not have any abnormal breath sound characteristic of a CHF exacerbation. His calf and ankles were found with a Ace wrap for his \"lymphedema\".  He had a did not have any calf pain.  The patient was given morphine and nitro for his pain.  Received aspirin heparin and ticagrelor.  He was " transported to [facility] for treatment of his inferior MI.  The patient did coincidently see his cardiologist, Dr. Potts on Tuesday.    The importance of close follow up was discussed. We reviewed warning signs and symptoms, and I instructed Mr. Barr to return to the emergency department immediately if he develops any new or worsening symptoms. I provided additional verbal discharge instructions. Mr. Barr expressed understanding and agreement with this plan of care, his questions were answered, and he was discharged in stable condition.       35 minutes of critical care time    MEDICATIONS GIVEN IN THE EMERGENCY:  Medications   aspirin (ASA) chewable tablet 324 mg (324 mg Oral Given 9/11/21 1326)   ticagrelor (BRILINTA) tablet 180 mg (180 mg Oral Given 9/11/21 1330)   heparin (porcine) injection 1000 units/mL (rounds in 500 unit increments) (10,000 Units Intravenous Given 9/11/21 1338)   nitroGLYcerin (NITROSTAT) sublingual tablet 0.4 mg (0.4 mg Sublingual Given 9/11/21 1339)       NEW PRESCRIPTIONS STARTED AT TODAY'S ER VISIT:  Discharge Medication List as of 9/11/2021  1:49 PM             =================================================================    HPI    Patient information was obtained from: Wife    Use of : N/A      Onur Barr is a 65 year old male with a past medical history of diabetes, HTN, hypercholesterolemia, chronic back pain (on morphine)CAD, CHF, A-fib, heart attack (s/p catheterization) who presents for evaluation of chest pain and arm pain.    Wife reports last night around 2200 (~15 hours ago) patient had sudden onset of chest pain that he associated with heart burn. He took Tums and was able to fall asleep. However, when patient awoke this morning around 0400 (~9 hours ago) the pain was still present, so he took more TUMS. About 20 minutes prior to arrival (1300), patient and wife were driving when patient reported sudden worsening of pain with radiation to left  "arm, pain was a 10/10 in severity. He stated \"he couldn't move\", so they immediately presented to the ED. Patient mentions associated diaphoresis and shortness of breath.    Of note, patient was seen by cardiology earlier this week and they thought the heart looked good, but wanted the patient to get a stress test.       REVIEW OF SYSTEMS   Review of Systems   Constitutional: Positive for diaphoresis.   Respiratory: Positive for shortness of breath.    Cardiovascular: Positive for chest pain.   Musculoskeletal:        Positive for left arm pain.   All other systems reviewed and are negative.       PAST MEDICAL HISTORY:  Past Medical History:   Diagnosis Date     Acid reflux disease 10/31/2017     Arrhythmia     paroxysmal atrial fibrillation     Arthritis      Atrial fibrillation (H)     Created by Conversion      Bariatric surgery status     Created by Conversion      CHF (congestive heart failure) (H)      Coronary artery disease      Coronary atherosclerosis     Created by Conversion  Replacement Utility updated for latest IMO load     Diabetes (H)     typr II resloved     Essential hypertension     Created by [facility] Annotation: Apr 6 2012 10:16AM - Zack Gutierrez: Lisinipril,  coreg  Replacement Utility updated for latest IMO load     H/O gastric bypass      Heart attack (H)      Hypercholesteremia      Hypertension      Insomnia      Insomnia, unspecified     Created by [facility] Annotation: Sep 11 2013  9:56AM - Zack Gutierrez: Trouble  maintaining sleep-Trazodone-minimal helpzolpidem-      Ischemic cardiomyopathy      Low back pain      Lumbago     Created by [facility] Annotation: Apr 6 2012 10:20AM - Anh Dickson: Morphine pump      Morbid obesity (H) 8/1/2018     Nephrolithiasis      Nephrolithiasis      Obstructive sleep apnea (adult) (pediatric)     Created by Conversion      Osteoarthritis     Created by [facility] Annotation: Jun 26 2009  " 9:53Zack Harris: failed lumbar  fusion/morphine pump dr putnam  Replacement Utility updated for latest IMO load     Pure hypercholesterolemia     Created by Conversion      Sleep apnea      Sleepiness 5/8/2018       PAST SURGICAL HISTORY:  Past Surgical History:   Procedure Laterality Date     BACK SURGERY       BYPASS GASTRIC DUODENAL SWITCH       C GASTRIC BYPASS,OBESE<100CM SUSY-EN-Y  2008    Description: Gastric Surgery For Morbid Obesity Bypass With Susy-en-Y;  Recorded: 06/26/2009;  Comments: dr green 2008     COSMETIC SURGERY      pannicullectomy     ENT SURGERY      tonsillectomy     EXTRACORPOREAL SHOCK WAVE LITHOTRIPSY, CYSTOSCOPY, INSERT STENT URETER(S), COMBINED  8/23/11     HC REMOVAL OF TONSILS,<11 Y/O      Description: Tonsillectomy;  Recorded: 03/23/2012;  Comments: for obstructive sleep apnea     HC REPAIR INCISIONAL HERNIA,REDUCIBLE  11/13/2012    Description: Incisional Hernia Repair;  Proc Date: 11/13/2012;  Comments: incisional hernia repair and abdominal panniculectomy by Dr Shon Green at the [facility].     HERNIA REPAIR       IR MISCELLANEOUS PROCEDURE  7/29/2011     IR MISCELLANEOUS PROCEDURE  8/5/2011     IR MISCELLANEOUS PROCEDURE  8/23/2011     IR NEPHROSTOMY TUBE CHANGE BILATERAL  8/5/2011     ORTHOPEDIC SURGERY      arthrodesis ant discectomy, lumbar     CA ARTHRODESIS ANT INTERBODY MIN DISCECTOMY,LUMBAR      Description: Lumbar Vertebral Fusion;  Recorded: 06/26/2009;  Comments: before 200 see [facility] consult under old records     CA EXCISE EXCESS SKIN TISSUE,ABDOMEN  11/13/2012    Description: Panniculectomy;  Proc Date: 11/13/2012;  Comments: 3.5 Lb Pannus was removed at the [facility] By Dr. Shon Green     REPLACE INTRATHECAL PAIN PUMP N/A 4/25/2017    Procedure: REPLACE INTRATHECAL PAIN PUMP;  INTRATHECAL PAIN PUMP CATHETER REPLACEMENT AND PUMP REPLACEMENT;  Surgeon: Anthony Maloney MD;  Location: [facility]     REVISE CATHETER INTRATHECAL N/A 4/25/2017    Procedure:  REVISE CATHETER INTRATHECAL;;  Surgeon: Anthony Maloney MD;  Location: [facility]     SHOULDER SURGERY         CURRENT MEDICATIONS:    No current facility-administered medications for this encounter.  No current outpatient medications on file.    Facility-Administered Medications Ordered in Other Encounters:      eptifibatide (INTEGRILIN) 2 mg/mL loading dose, , , Once PRN, Noris Ozuna MD, 26,370 mcg at 09/11/21 1457     fentaNYL (PF) (SUBLIMAZE) injection, , , Once PRN, Noris Ozuna MD, 25 mcg at 09/11/21 1503     iodixanol (VISIPAQUE 320) injection, , , Once PRN, Noris Ozuna MD, 265 mL at 09/11/21 1514     lidocaine 1 %, , , Once PRN, Noris Ozuna MD, 2 mL at 09/11/21 1432     midazolam (VERSED) injection, , , Once PRN, Noris Ozuna MD, 0.5 mg at 09/11/21 1504     nitroGLYcerin (NITROSTAT) sublingual tablet, , , Once PRN, Noris Ozuna MD, 0.4 mg at 09/11/21 1430     radial artery cocktail for CV lab, , , Once PRN, Noris Ozuna MD, 5 mL at 09/11/21 1433    ALLERGIES:  Allergies   Allergen Reactions     Hydrocodone-Acetaminophen        FAMILY HISTORY:  Family History   Problem Relation Age of Onset     Cerebrovascular Disease Mother      Heart Disease Father      Hodgkin's lymphoma Brother        SOCIAL HISTORY:   Social History     Socioeconomic History     Marital status:      Spouse name: Not on file     Number of children: Not on file     Years of education: Not on file     Highest education level: Not on file   Occupational History     Not on file   Tobacco Use     Smoking status: Former Smoker     Years: 10.00     Types: Cigars, Cigars     Smokeless tobacco: Never Used   Substance and Sexual Activity     Alcohol use: No     Drug use: No     Comment: Drug use: formerly used     Sexual activity: Not on file   Other Topics Concern     Not on file   Social History Narrative     Not on file     Social Determinants of Health     Financial Resource Strain:      Difficulty of Paying  Living Expenses:    Food Insecurity:      Worried About Running Out of Food in the Last Year:      Ran Out of Food in the Last Year:    Transportation Needs:      Lack of Transportation (Medical):      Lack of Transportation (Non-Medical):    Physical Activity:      Days of Exercise per Week:      Minutes of Exercise per Session:    Stress:      Feeling of Stress :    Social Connections:      Frequency of Communication with Friends and Family:      Frequency of Social Gatherings with Friends and Family:      Attends Yazdanism Services:      Active Member of Clubs or Organizations:      Attends Club or Organization Meetings:      Marital Status:    Intimate Partner Violence:      Fear of Current or Ex-Partner:      Emotionally Abused:      Physically Abused:      Sexually Abused:        PHYSICAL EXAM:    Vitals: BP (!) 169/78   Pulse 62   Resp 15   SpO2 97%    Constitutional: Well developed, well nourished. Obese, diaphoretic male in severe distress.   HENT: Normocephalic, atraumatic, mucous membranes moist, nose normal.   Neck- Supple, gross ROM intact.  No JVD.  Eyes: Pupils mid-range, sclera white, no discharge  Respiratory: Clear to auscultation bilaterally, no respiratory distress, no wheezing, speaks full sentences easily.  Cardiovascular: Normal heart rate, regular rhythm, no murmurs. No lower extremity edema, 2+ DP pulses.   GI: Soft, no tenderness to deep palpation in all quadrants, no masses.  Musculoskeletal: Moving all 4 extremities intentionally and without pain. No obvious deformity. Lower legs are covered in ace wrap, no calf tenderness.  Skin: Warm, dry, no rash.  Neurologic: Alert & oriented x 3, speech clear, moving all extremities spontaneously   Psychiatric: Affect normal, cooperative.     LAB:  All pertinent labs reviewed and interpreted.  Labs Ordered and Resulted from Time of ED Arrival Up to the Time of Departure from the ED   CBC WITH PLATELETS & DIFFERENTIAL    Narrative:     The  following orders were created for panel order CBC with platelets differential.  Procedure                               Abnormality         Status                     ---------                               -----------         ------                     CBC with platelets and d...[[id number]]                      Final result                 Please view results for these tests on the individual orders.   CBC WITH PLATELETS AND DIFFERENTIAL   PULSE OXIMETRY NURSING   CARDIAC CONTINUOUS MONITORING   CALL   CALL   MEASURE WEIGHT   PATIENT CARE ORDER   PATIENT CARE ORDER   PERIPHERAL IV CATHETER   IV ACCESS   VITAL SIGNS       RADIOLOGY:  XR Chest Port 1 View   Final Result   IMPRESSION:       Shallow lung inflation. Left coronary stents. Left ventricular heart border is well-defined. Mildly tortuous descending thoracic aorta.      The lungs are symmetrically inflated. Vessels around the hilum are well-defined and there are a few subtle interstitial opacities in the lower lungs suggestive of lung edema. No findings to suggest alveolar edema. No pleural fluid.          EKG:   September 11, 2021 at 1:21 PM.  Normal sinus rhythm, 71 bpm.  There is ST elevation noted in the inferior leads.  There are PVCs noted.  QRS duration 122 ms.  QTC duration 436 ms.  There is 1 mm of ST depression noted in lead V2, possible right ventricular involvement with the acute inferior infarct.  I have independently reviewed and interpreted the EKG(s) documented above.     I, Omaira Luu, am serving as a scribe to document services personally performed by Dr. Abdoul Paige based on my observation and the provider's statements to me. IAbdoul M.D. attest that Omaira Luu is acting in a scribe capacity, has observed my performance of the services and has documented them in accordance with my direction.      Abdoul Paige M.D.  Emergency Medicine  [facility] EMERGENCY  ROOM  [address] [place] [id number]  [phone]  Dept: [phone]       Abdoul Paige MD  09/11/21 4415       Abdoul Paige MD  09/11/21 8278

## 2021-09-11 NOTE — Clinical Note
The first balloon was inserted into the right coronary artery and distal right coronary artery.Max pressure = 13 chaparro. Total duration = 20 seconds.     Max pressure = 13 chaparro. Total duration = 8 seconds.    Balloon reinflated a second time: Max pressure = 13 chaparro. Total duration = 8 seconds.  Balloon reinflated a third time: Max pressure = 15 chaparro. Total duration = 11 seconds.

## 2021-09-11 NOTE — CONSULTS
HEART CARE CONSULTATON NOTE        Assessment/Recommendations   Assessment/Plan:    Inferior ST elevation myocardial infarction: Status post PCI of the proximal and distal RCA with synergy drug-eluting stents.  Recommend dual antiplatelet therapy with aspirin and ticagrelor for a minimum of 1 year and longer if tolerated  Aggressive risk factor modification with high-dose statin therapy  Continue his current dose of carvedilol  Echocardiogram prior to discharge to assess left ventricular systolic function and rule out other structural heart disease    Coronary artery disease: Has residual disease in the proximal LAD, with 70% in-stent restenosis, which will likely benefit from staged PCI in the next 2 to 4 weeks.       History of Present Illness/Subjective    HPI: Onur Barr is a 65 year old male with established coronary artery disease having had PCI of his proximal LAD in 2012, and also a documented history of obesity status post prior gastric bypass surgery, hypertension, dyslipidemia, chronic lower extremity lymphedema.    He was seen recently by his primary cardiologist, Dr. Stephens because of symptoms suggestive of angina, and a stress test was scheduled.  However he had increasing chest pain today, prompting a visit to the emergency department where an EKG showed inferior ST elevation.  This led to activation of the STEMI protocol and urgent transfer to [facility].    Urgent coronary angiography showed thrombotic occlusion of the RCA, treated successfully with thrombectomy and drug-eluting stent deployment.  Please see Cath Lab report for details.  He does have residual stenosis in the LAD secondary to in-stent restenosis of his prior stent.           Physical Examination  Review of Systems   VITALS: Resp 14   BMI: There is no height or weight on file to calculate BMI.  Wt Readings from Last 3 Encounters:   09/09/21 146.5 kg (323 lb)   07/26/21 147 kg (324 lb)   07/22/21 (!) 155.8 kg (343 lb 8 oz)     No  intake or output data in the 24 hours ending 09/11/21 1542  General Appearance:   no distress, normal body habitus   ENT/Mouth: membranes moist, no oral lesions or bleeding gums.      EYES:  no scleral icterus, normal conjunctivae   Neck: no carotid bruits or thyromegaly   Chest/Lungs:   lungs are clear to auscultation, no rales or wheezing,    Cardiovascular:   Regular. Normal first and second heart sounds with no murmurs; the carotid, radial and posterior tibial pulses are intact, 2-3+ edema bilaterally    Abdomen:  no organomegaly, masses, bruits, or tenderness; bowel sounds are present   Extremities: no cyanosis or clubbing   Skin: no xanthelasma, warm.    Neurologic: normal  bilateral, no tremors     Psychiatric: alert and oriented x3, calm     Review Of Systems  Skin: negative  Eyes: negative  Ears/Nose/Throat: negative  Respiratory: No shortness of breath, dyspnea on exertion, cough, or hemoptysis  Cardiovascular: positive for chest pain  Gastrointestinal: negative  Genitourinary: negative  Musculoskeletal: negative  Neurologic: negative  Psychiatric: negative  Hematologic/Lymphatic/Immunologic: known lymphedema  Endocrine: negative          Lab Results    Chemistry/lipid CBC Cardiac Enzymes/BNP/TSH/INR   Recent Labs   Lab Test 09/09/21  1118 12/07/20  1034   CHOL 171 118   HDL 31* 35*   LDL  --  48   TRIG  --  174*     Recent Labs   Lab Test 12/07/20  1034 11/20/19  1202 02/13/18  1351   LDL 48 52 69     Recent Labs   Lab Test 09/11/21  1329      POTASSIUM 4.7   CHLORIDE 102   CO2 28      BUN 23*   CR 1.29   GFRESTIMATED 58*   RATNA 10.0     Recent Labs   Lab Test 09/11/21  1329 09/09/21  1118 07/26/21  1112   CR 1.29 1.24 1.18     Recent Labs   Lab Test 02/13/18  1351 11/19/14  1026 06/03/14  1218   A1C 5.6 5.1 5.1          Recent Labs   Lab Test 09/11/21  1329   WBC 7.9   HGB 13.8   HCT 41.9   MCV 87        Recent Labs   Lab Test 09/11/21  1329 07/14/21  0537 07/13/21  1536   HGB 13.8  12.5* 12.5*    Recent Labs   Lab Test 09/11/21  1329   TROPONINI 0.48*     Recent Labs   Lab Test 09/11/21  1329 07/13/21  1536   *  --    NTBNPI  --  58     No results for input(s): TSH in the last 75046 hours.  Recent Labs   Lab Test 09/11/21  1329   INR 1.00        Medical History  Surgical History Family History Social History   Past Medical History:   Diagnosis Date     Acid reflux disease 10/31/2017     Arrhythmia     paroxysmal atrial fibrillation     Arthritis      Atrial fibrillation (H)     Created by Conversion      Bariatric surgery status     Created by Conversion      CHF (congestive heart failure) (H)      Coronary artery disease      Coronary atherosclerosis     Created by Conversion  Replacement Utility updated for latest IMO load     Diabetes (H)     typr II resloved     Essential hypertension     Created by [facility] Annotation: Apr 6 2012 10:16Zack Harris: Lisinipril,  coreg  Replacement Utility updated for latest IMO load     H/O gastric bypass      Heart attack (H)      Hypercholesteremia      Hypertension      Insomnia      Insomnia, unspecified     Created by [facility] Annotation: Sep 11 2013  9:56Zack Harris: Trouble  maintaining sleep-Trazodone-minimal helpzolpidem-      Ischemic cardiomyopathy      Low back pain      Lumbago     Created by [facility] Annotation: Apr 6 2012 10:20Anh Ford: Morphine pump      Morbid obesity (H) 8/1/2018     Nephrolithiasis      Nephrolithiasis      Obstructive sleep apnea (adult) (pediatric)     Created by Conversion      Osteoarthritis     Created by [facility] Annotation: Jun 26 2009  9:53Zack Harris: failed lumbar  fusion/morphine pump dr [place]  Replacement Utility updated for latest IMO load     Pure hypercholesterolemia     Created by Conversion      Sleep apnea      Sleepiness 5/8/2018     Past Surgical History:   Procedure Laterality Date     BACK SURGERY        BYPASS GASTRIC DUODENAL SWITCH       C GASTRIC BYPASS,OBESE<100CM LORA-EN-Y  2008    Description: Gastric Surgery For Morbid Obesity Bypass With Lora-en-Y;  Recorded: 06/26/2009;  Comments: dr green 2008     COSMETIC SURGERY      pannicullectomy     ENT SURGERY      tonsillectomy     EXTRACORPOREAL SHOCK WAVE LITHOTRIPSY, CYSTOSCOPY, INSERT STENT URETER(S), COMBINED  8/23/11     HC REMOVAL OF TONSILS,<11 Y/O      Description: Tonsillectomy;  Recorded: 03/23/2012;  Comments: for obstructive sleep apnea     HC REPAIR INCISIONAL HERNIA,REDUCIBLE  11/13/2012    Description: Incisional Hernia Repair;  Proc Date: 11/13/2012;  Comments: incisional hernia repair and abdominal panniculectomy by Dr Shon Green at the [facility].     HERNIA REPAIR       IR MISCELLANEOUS PROCEDURE  7/29/2011     IR MISCELLANEOUS PROCEDURE  8/5/2011     IR MISCELLANEOUS PROCEDURE  8/23/2011     IR NEPHROSTOMY TUBE CHANGE BILATERAL  8/5/2011     ORTHOPEDIC SURGERY      arthrodesis ant discectomy, lumbar     WA ARTHRODESIS ANT INTERBODY MIN DISCECTOMY,LUMBAR      Description: Lumbar Vertebral Fusion;  Recorded: 06/26/2009;  Comments: before 200 see [facility] consult under old records     WA EXCISE EXCESS SKIN TISSUE,ABDOMEN  11/13/2012    Description: Panniculectomy;  Proc Date: 11/13/2012;  Comments: 3.5 Lb Pannus was removed at the [facility] By Dr. Shon Green     REPLACE INTRATHECAL PAIN PUMP N/A 4/25/2017    Procedure: REPLACE INTRATHECAL PAIN PUMP;  INTRATHECAL PAIN PUMP CATHETER REPLACEMENT AND PUMP REPLACEMENT;  Surgeon: Anthony Maloney MD;  Location: [facility]     REVISE CATHETER INTRATHECAL N/A 4/25/2017    Procedure: REVISE CATHETER INTRATHECAL;;  Surgeon: Anthony Maloney MD;  Location: [facility]     SHOULDER SURGERY       Family History   Problem Relation Age of Onset     Cerebrovascular Disease Mother      Heart Disease Father      Hodgkin's lymphoma Brother         Social History     Socioeconomic History     Marital status:       Spouse name: Not on file     Number of children: Not on file     Years of education: Not on file     Highest education level: Not on file   Occupational History     Not on file   Tobacco Use     Smoking status: Former Smoker     Years: 10.00     Types: Cigars, Cigars     Smokeless tobacco: Never Used   Substance and Sexual Activity     Alcohol use: No     Drug use: No     Comment: Drug use: formerly used     Sexual activity: Not on file   Other Topics Concern     Not on file   Social History Narrative     Not on file     Social Determinants of Health     Financial Resource Strain:      Difficulty of Paying Living Expenses:    Food Insecurity:      Worried About Running Out of Food in the Last Year:      Ran Out of Food in the Last Year:    Transportation Needs:      Lack of Transportation (Medical):      Lack of Transportation (Non-Medical):    Physical Activity:      Days of Exercise per Week:      Minutes of Exercise per Session:    Stress:      Feeling of Stress :    Social Connections:      Frequency of Communication with Friends and Family:      Frequency of Social Gatherings with Friends and Family:      Attends Buddhism Services:      Active Member of Clubs or Organizations:      Attends Club or Organization Meetings:      Marital Status:    Intimate Partner Violence:      Fear of Current or Ex-Partner:      Emotionally Abused:      Physically Abused:      Sexually Abused:          Medications  Allergies   No current outpatient medications on file.        Allergies   Allergen Reactions     Hydrocodone-Acetaminophen          Noris Ozuna MD

## 2021-09-11 NOTE — Clinical Note
Stent deployed in the distal right coronary artery. Max pressure = 15 chaparro. Total duration = 20 seconds.

## 2021-09-11 NOTE — H&P
"[facility]    History and Physical - Hospitalist Service       Date of Admission:  9/11/2021    Assessment & Plan   Summary: 65 year old male with hx of CAD, paroxysmal afib, MARLEEN, Htn, GERD, dyslipidemia, chronic diastolic CHF, morbid obesity, chronic back pain with implanted pain med pump, chronic LE lymphedema  presented with intermittent chest pain since last pm, increased severity on day of admission around 1 PM to 10/10 pain, substernal, radiating to left arm accompanied by diaphoresis and shortness of breath.     Principal Problem:    ST elevation MI (STEMI): acute inferior ST elevation MI, first troponin = 0.48, taken immediately to cath lab, \"RCA is large and dominant with CATINA 0 flow, thrombotically occluded in the proximal portion.  Following restoration of flow in the RCA with wire passage and thrombectomy, the proximal vessel was seen to have a 90% stenosis and there was another 90-95% distal RCA stenosis.   LVEDP 17 mmHg  Successful PCI of the proximal and distal RCA with thrombectomy, followed by stenting with a 4.5 x 24 mm Synergy drug-eluting stent to the proximal RCA, and 3.5 x 16 mm Synergy drug-eluting stent to the distal RCA.  The distal RCA stent was postdilated to 4 mm.  There was 0% residual stenosis with CATINA-3 flow.  There was diffuse atherosclerosis in the vessel, with ectatic segments.  Echo in AM, cardiac rehab in AM  Resume carvedilol tonight with holding parameters  Resume lisinopril tomorrow at half dose his previous dose with holding parameters and titrate up as tolerated    In-stent stenosis of proximal LAD stent: planning for staged PCI in next several weeks.  Active Problems:    Lumbago: Implanted pain pump in place, last refilled 2 weeks ago    Morbid obesity: History of gastric bypass, history of pannus resection    Bilateral leg edema, chronic: Leg wraps in place, patient states that his legs are in baseline healthy state.    Chronic diastolic heart " failure: Not in exacerbation.  Resume patient's home dose Lasix.    MARLEEN (obstructive sleep apnea: Resume CPAP at night    Coronary artery disease involving native coronary artery of native heart with angina pectoris (H)    Paroxysmal atrial fibrillation: Currently in normal sinus rhythm    Essential hypertension: Cardiology managing    Mixed hyperlipidemia: Rosuvastatin ordered    Gastroesophageal reflux disease without esophagitis: Resume PPI    Code Status:  Full Code      Diet: Orders Placed This Encounter      Low Saturated Fat Na <2400 mg      DVT prophylaxis:    Medical:  early ambulation    Mechanical:  Lower extremity condition precludes SCD's    Bran Catheter: Not present  Central Lines/Port-a-cath: Not present  Drains: Not present    Disposition Plan   Expected discharge: 09/12/2021   recommended to Home once cardiac treatment plan established.    The patient's care was discussed with the Bedside Nurse and Patient.    Evan Santoyo MD  [facility]  Securely message with the Vocera Web Console (learn more here)  Text page via [employer] Paging/Directory         Clinically Significant Risk Factors Present on Admission        _____________________________________________________________________    Chief Complaint   Chest pain    History is obtained from the patient    History of Present Illness   65 year old male with known CAD, previous PCI and stents, recent evaluation in cardiology clinic for atypical chest pain and scheduled for outpatient stress test. Noted onset of intermittent chest  Pain night prior to admission, was eating chilli, thought it was heart burn. Awakened am of admission day still with some intermittent discomfort but then suddenly 10/10 around 1 pm while driving his car with his wife.    Review of Systems    The 5 point Review of Systems is negative other than noted in the HPI.    Medical History  I have reviewed this patient's medical history and updated it with  pertinent information if needed.  Past Medical History:   Diagnosis Date     Acid reflux disease 10/31/2017     Arrhythmia     paroxysmal atrial fibrillation     Arthritis      Atrial fibrillation (H)     Created by Conversion      Bariatric surgery status     Created by Conversion      CHF (congestive heart failure) (H)      Coronary artery disease      Coronary atherosclerosis     Created by Conversion  Replacement Utility updated for latest IMO load     Diabetes (H)     typr II resloved     Essential hypertension     Created by [facility] Annotation: Apr 6 2012 10:16Zack Harris: Lisinipril,  coreg  Replacement Utility updated for latest IMO load     H/O gastric bypass      Heart attack (H)      Hypercholesteremia      Hypertension      Insomnia      Insomnia, unspecified     Created by [facility] Annotation: Sep 11 2013  9:56AM Zack Brewer: Trouble  maintaining sleep-Trazodone-minimal helpzolpidem-      Ischemic cardiomyopathy      Low back pain      Lumbago     Created by [facility] Annotation: Apr 6 2012 10:20AM Anh Ramos: Morphine pump      Morbid obesity (H) 8/1/2018     Nephrolithiasis      Nephrolithiasis      Obstructive sleep apnea (adult) (pediatric)     Created by Conversion      Osteoarthritis     Created by [facility] Annotation: Jun 26 2009  9:53AM Zack Brewer: failed lumbar  fusion/morphine pump dr putnam  Replacement Utility updated for latest IMO load     Pure hypercholesterolemia     Created by Conversion      Sleep apnea      Sleepiness 5/8/2018       Surgical History   I have reviewed this patient's surgical history and updated it with pertinent information if needed.  Past Surgical History:   Procedure Laterality Date     BACK SURGERY       BYPASS GASTRIC DUODENAL SWITCH       C GASTRIC BYPASS,OBESE<100CM LORA-EN-Y  2008    Description: Gastric Surgery For Morbid Obesity Bypass With Lora-en-Y;  Recorded: 06/26/2009;   Comments: dr green 2008     COSMETIC SURGERY      pannicullectomy     ENT SURGERY      tonsillectomy     EXTRACORPOREAL SHOCK WAVE LITHOTRIPSY, CYSTOSCOPY, INSERT STENT URETER(S), COMBINED  8/23/11     HC REMOVAL OF TONSILS,<11 Y/O      Description: Tonsillectomy;  Recorded: 03/23/2012;  Comments: for obstructive sleep apnea     HC REPAIR INCISIONAL HERNIA,REDUCIBLE  11/13/2012    Description: Incisional Hernia Repair;  Proc Date: 11/13/2012;  Comments: incisional hernia repair and abdominal panniculectomy by Dr Shon Green at the [facility].     HERNIA REPAIR       IR MISCELLANEOUS PROCEDURE  7/29/2011     IR MISCELLANEOUS PROCEDURE  8/5/2011     IR MISCELLANEOUS PROCEDURE  8/23/2011     IR NEPHROSTOMY TUBE CHANGE BILATERAL  8/5/2011     ORTHOPEDIC SURGERY      arthrodesis ant discectomy, lumbar     CT ARTHRODESIS ANT INTERBODY MIN DISCECTOMY,LUMBAR      Description: Lumbar Vertebral Fusion;  Recorded: 06/26/2009;  Comments: before 200 see [facility] consult under old records     CT EXCISE EXCESS SKIN TISSUE,ABDOMEN  11/13/2012    Description: Panniculectomy;  Proc Date: 11/13/2012;  Comments: 3.5 Lb Pannus was removed at the [facility] By Dr. Shon Green     REPLACE INTRATHECAL PAIN PUMP N/A 4/25/2017    Procedure: REPLACE INTRATHECAL PAIN PUMP;  INTRATHECAL PAIN PUMP CATHETER REPLACEMENT AND PUMP REPLACEMENT;  Surgeon: Anthony Maloney MD;  Location: [facility]     REVISE CATHETER INTRATHECAL N/A 4/25/2017    Procedure: REVISE CATHETER INTRATHECAL;;  Surgeon: Anhtony Maloney MD;  Location: [facility]     SHOULDER SURGERY         Social History   I have reviewed this patient's social history and updated it with pertinent information if needed.  Social History     Tobacco Use     Smoking status: Former Smoker     Years: 10.00     Types: Cigars, Cigars     Smokeless tobacco: Never Used   Substance Use Topics     Alcohol use: No     Drug use: No     Comment: Drug use: formerly used       Family History   I have reviewed  this patient's family history and updated it with pertinent information if needed.  Family History   Problem Relation Age of Onset     Cerebrovascular Disease Mother      Heart Disease Father      Hodgkin's lymphoma Brother        Prior to Admission Medications   [COMPLETED] aspirin (ASA) chewable tablet 324 mg  [COMPLETED] heparin (porcine) injection 1000 units/mL (rounds in 500 unit increments)  [COMPLETED] nitroGLYcerin (NITROSTAT) sublingual tablet 0.4 mg  [COMPLETED] ticagrelor (BRILINTA) tablet 180 mg    ASPIRIN EC PO, Take 81 mg by mouth daily  atorvastatin (LIPITOR) 80 MG tablet, Take 1 tablet (80 mg) by mouth daily  carvedilol ER (COREG CR) 20 MG 24 hr capsule, Take 1 capsule (20 mg) by mouth daily  DULoxetine (CYMBALTA) 30 MG capsule, Take by mouth 2 times daily 30 mg in AM  60 mg in PM  Ferrous Gluconate (IRON) 240 (27 Fe) MG TABS, Take 1 tablet by mouth daily  furosemide (LASIX) 40 MG tablet, Take 1 tablet (40 mg) by mouth 2 times daily Take in morning and early afternoon.  Take with potassium.  gabapentin (NEURONTIN) 300 MG capsule, Take 600 mg by mouth 3 times daily   lisinopril (ZESTRIL) 40 MG tablet, Take 1 tablet (40 mg) by mouth daily (take in place of Zestoretic, stopping hydrochlorothiazide)  MORPHINE SULFATE, IT pump:updated 7/14  Medications in Pump:   [facility] Responsible for pump medications:   Conc:  Morphine 20mg/ml(3.95 mg/day), clonidine 21.1mcg/ml (4.168 mcg/day), baclofen 42.5mcg/ml (8.395mcg/day)  Rate:   Pump Last Fill Date: 2/21  Pump Refill Date:8/20/21  nabumetone (RELAFEN) 500 MG tablet, Take 500 mg by mouth 2 times daily  Omeprazole (PRILOSEC PO), Take 40 mg by mouth daily  potassium chloride ER (KLOR-CON M) 20 MEQ CR tablet, Take 1 tablet (20 mEq) by mouth 2 times daily (take with furosemide/Lasix)  traZODone (DESYREL) 100 MG tablet, Take 200 mg by mouth At Bedtime         Allergies      Allergies   Allergen Reactions     Hydrocodone-Acetaminophen        Physical Exam    Vital signs:  Temp: 98.3  F (36.8  C) Temp src: Oral BP: 139/85 Pulse: 55   Resp: 14 SpO2: 98 % O2 Device: Nasal cannula Oxygen Delivery: 4 LPM      Estimated body mass index is 43.81 kg/m  as calculated from the following:    Height as of 7/13/21: 1.829 m (6').    Weight as of 9/9/21: 146.5 kg (323 lb).    Constitutional: awake, alert, cooperative, no apparent distress, and appears stated age  ENT: normocepalic, without obvious abnormality, atramatic  Respiratory: No increased work of breathing, good air exchange, clear to auscultation bilaterally, no crackles or wheezing  Cardiovascular: Normal apical impulse, regular rate and rhythm, normal S1 and S2, no S3 or S4, and no murmur noted  GI: normal bowel sounds, soft and non-distended  Neurologic: Mental Status Exam:  Level of Alertness:   awake  Orientation:   person, place, time  Memory:   normal  Motor Exam:  moves all extremities well and symmetrically  Sensory:  Sensory intact    Data   Data reviewed today: I reviewed all medications, new labs and imaging results over the last 24 hours.  Recent Labs   Lab 09/11/21  1329 09/09/21  1118   WBC 7.9  --    HGB 13.8  --    MCV 87  --      --    INR 1.00  --     142   POTASSIUM 4.7 4.2   CHLORIDE 102 106   CO2 28 25   BUN 23* 20   CR 1.29 1.24   ANIONGAP 11 11   RATNA 10.0 9.6    103     Recent Results (from the past 24 hour(s))   XR Chest Port 1 View    Narrative    EXAM: XR CHEST PORT 1 VIEW  LOCATION: [facility]  DATE/TIME: 9/11/2021 1:39 PM    INDICATION: Acute myocardial infarction  COMPARISON: Portable AP view of the chest 04/08/2012      Impression    IMPRESSION:     Shallow lung inflation. Left coronary stents. Left ventricular heart border is well-defined. Mildly tortuous descending thoracic aorta.    The lungs are symmetrically inflated. Vessels around the hilum are well-defined and there are a few subtle interstitial opacities in the lower lungs suggestive of  lung edema. No findings to suggest alveolar edema. No pleural fluid.   Cardiac Catheterization    Narrative    65-year-old male with established coronary disease, having had remote PCI   of the proximal LAD, presenting today with chest pain and an inferior ST   elevation myocardial infarction.    Urgent coronary angiography showed:    Left main with mild disease  LAD with 70 to 75% in-stent restenosis of the proximal LAD stent, mild mid   to distal LAD disease  Left circumflex gives 1 large obtuse marginal that has mild disease  RCA is large and dominant with CATINA 0 flow, thrombotically occluded in the   proximal portion.  Following restoration of flow in the RCA with wire   passage and thrombectomy, the proximal vessel was seen to have a 90%   stenosis and there was another 90-95% distal RCA stenosis.    LVEDP 17 mmHg    Successful PCI of the proximal and distal RCA with thrombectomy, followed   by stenting with a 4.5 x 24 mm Synergy drug-eluting stent to the proximal   RCA, and 3.5 x 16 mm Synergy drug-eluting stent to the distal RCA.  The   distal RCA stent was postdilated to 4 mm.    There was 0% residual stenosis with CATINA-3 flow.  There was diffuse   atherosclerosis in the vessel, with ectatic segments.    (Increased complexity of the case due to difficulty with visualization due   to body habitus, ectatic vessel and need for GuideLiner support for stent   delivery)

## 2021-09-11 NOTE — Clinical Note
Stent deployed in the proximal right coronary artery. Max pressure = 15 chaparro. Total duration = 21 seconds.

## 2021-09-11 NOTE — ED PROVIDER NOTES
EMERGENCY DEPARTMENT ENCOUNTER            IMPRESSION:  STEMI      MEDICAL DECISION MAKING:  Patient transferred from [facility] emergency department for acute coronary syndrome.  The Cath Lab has been notified.  Patient is having ongoing chest pain.  Level One activated.    Patient appears moderately ill.  He is diaphoretic.  Heart is tachycardic and lungs are clear    Patient placed in the resuscitation room.  He is placed on cardiac monitor    Cath Lab staff quickly arrived and transported patient to the angio suite      =================================================================  CHIEF COMPLAINT:  No chief complaint on file.        HPI  Onur Barr is a 65 year old male seen at [facility] earlier today for chest pain.  He was diagnosed with ST elevation MI and Level One initiated.  Patient transferred to [facility] for cardiac interventional care.        REVIEW OF SYSTEMS   Respiratory: shortness of breath    Cardiovascular: Chest      PAST MEDICAL HISTORY:  Past Medical History:   Diagnosis Date     Acid reflux disease 10/31/2017     Arrhythmia     paroxysmal atrial fibrillation     Arthritis      Atrial fibrillation (H)     Created by Conversion      Bariatric surgery status     Created by Conversion      CHF (congestive heart failure) (H)      Coronary artery disease      Coronary atherosclerosis     Created by Conversion  Replacement Utility updated for latest IMO load     Diabetes (H)     typr II resloved     Essential hypertension     Created by [facility] Annotation: Apr 6 2012 10:16AM Zack Brewer: Lisinipril,  coreg  Replacement Utility updated for latest IMO load     H/O gastric bypass      Heart attack (H)      Hypercholesteremia      Hypertension      Insomnia      Insomnia, unspecified     Created by [facility] Annotation: Sep 11 2013  9:56AM Zack Brewer: Trouble  maintaining sleep-Trazodone-minimal helpzolpidem-      Ischemic cardiomyopathy      Low back  pain      Lumbago     Created by [facility] Annotation: Apr 6 2012 10:20AM - Anh Dickson: Morphine pump      Morbid obesity (H) 8/1/2018     Nephrolithiasis      Nephrolithiasis      Obstructive sleep apnea (adult) (pediatric)     Created by Conversion      Osteoarthritis     Created by [facility] Annotation: Jun 26 2009  9:53AM - Zack Gutierrez: failed lumbar  fusion/morphine pump dr putnam  Replacement Utility updated for latest IMO load     Pure hypercholesterolemia     Created by Conversion      Sleep apnea      Sleepiness 5/8/2018       PAST SURGICAL HISTORY:  Past Surgical History:   Procedure Laterality Date     BACK SURGERY       BYPASS GASTRIC DUODENAL SWITCH       C GASTRIC BYPASS,OBESE<100CM SUSY-EN-Y  2008    Description: Gastric Surgery For Morbid Obesity Bypass With Susy-en-Y;  Recorded: 06/26/2009;  Comments: dr green 2008     COSMETIC SURGERY      pannicullectomy     ENT SURGERY      tonsillectomy     EXTRACORPOREAL SHOCK WAVE LITHOTRIPSY, CYSTOSCOPY, INSERT STENT URETER(S), COMBINED  8/23/11     HC REMOVAL OF TONSILS,<13 Y/O      Description: Tonsillectomy;  Recorded: 03/23/2012;  Comments: for obstructive sleep apnea     HC REPAIR INCISIONAL HERNIA,REDUCIBLE  11/13/2012    Description: Incisional Hernia Repair;  Proc Date: 11/13/2012;  Comments: incisional hernia repair and abdominal panniculectomy by Dr Shon Green at the [facility].     HERNIA REPAIR       IR MISCELLANEOUS PROCEDURE  7/29/2011     IR MISCELLANEOUS PROCEDURE  8/5/2011     IR MISCELLANEOUS PROCEDURE  8/23/2011     IR NEPHROSTOMY TUBE CHANGE BILATERAL  8/5/2011     ORTHOPEDIC SURGERY      arthrodesis ant discectomy, lumbar     PA ARTHRODESIS ANT INTERBODY MIN DISCECTOMY,LUMBAR      Description: Lumbar Vertebral Fusion;  Recorded: 06/26/2009;  Comments: before 200 see [facility] consult under old records     PA EXCISE EXCESS SKIN TISSUE,ABDOMEN  11/13/2012    Description: Panniculectomy;  Proc Date:  11/13/2012;  Comments: 3.5 Lb Pannus was removed at the [facility] By Dr. Shon Green     REPLACE INTRATHECAL PAIN PUMP N/A 4/25/2017    Procedure: REPLACE INTRATHECAL PAIN PUMP;  INTRATHECAL PAIN PUMP CATHETER REPLACEMENT AND PUMP REPLACEMENT;  Surgeon: Anthony Maloney MD;  Location: [facility]     REVISE CATHETER INTRATHECAL N/A 4/25/2017    Procedure: REVISE CATHETER INTRATHECAL;;  Surgeon: Anthony Maloney MD;  Location: [facility]     SHOULDER SURGERY           CURRENT MEDICATIONS:    No current outpatient medications on file.      ALLERGIES:  Allergies   Allergen Reactions     Hydrocodone-Acetaminophen        FAMILY HISTORY:  Family History   Problem Relation Age of Onset     Cerebrovascular Disease Mother      Heart Disease Father      Hodgkin's lymphoma Brother        SOCIAL HISTORY:   Social History     Socioeconomic History     Marital status:      Spouse name: Not on file     Number of children: Not on file     Years of education: Not on file     Highest education level: Not on file   Occupational History     Not on file   Tobacco Use     Smoking status: Former Smoker     Years: 10.00     Types: Cigars, Cigars     Smokeless tobacco: Never Used   Substance and Sexual Activity     Alcohol use: No     Drug use: No     Comment: Drug use: formerly used     Sexual activity: Not on file   Other Topics Concern     Not on file   Social History Narrative     Not on file     Social Determinants of Health     Financial Resource Strain:      Difficulty of Paying Living Expenses:    Food Insecurity:      Worried About Running Out of Food in the Last Year:      Ran Out of Food in the Last Year:    Transportation Needs:      Lack of Transportation (Medical):      Lack of Transportation (Non-Medical):    Physical Activity:      Days of Exercise per Week:      Minutes of Exercise per Session:    Stress:      Feeling of Stress :    Social Connections:      Frequency of Communication with Friends and Family:      Frequency  of Social Gatherings with Friends and Family:      Attends Worship Services:      Active Member of Clubs or Organizations:      Attends Club or Organization Meetings:      Marital Status:    Intimate Partner Violence:      Fear of Current or Ex-Partner:      Emotionally Abused:      Physically Abused:      Sexually Abused:        PHYSICAL EXAM:    There were no vitals taken for this visit.    Constitutional: He appears uncomfortable  Respiratory: Somewhat labored  Cardiovascular: Tachycardic  GI: Abdomen soft, non-tender to tenderness.    Neurologic: Alert & oriented x 3. Normal speech..      NEW PRESCRIPTIONS STARTED AT TODAY'S ER VISIT:  New Prescriptions    No medications on file          FINAL DIAGNOSIS:    ICD-10-CM    1. Acute coronary syndromes (H)  I24.9             At the conclusion of the encounter I discussed the results of all of the tests and the disposition. The questions were answered. The patient or family acknowledged understanding and was agreeable with the care plan.     NAME: Onur Barr  AGE: 65 year old male  YOB: 1955  MRN: [id number]  EVALUATION DATE & TIME: No admission date for patient encounter.    PCP: Luann Berger    ED PROVIDER: BLANK Hutchinson M.D.  Emergency Medicine  [facility] EMERGENCY DEPARTMENT  [facility] [facility] [id number] [phone]  Dept: [phone]  9/11/2021       Beto Rdz MD  09/11/21 1157

## 2021-09-11 NOTE — PHARMACY-ADMISSION MEDICATION HISTORY
"Pharmacy Note - Admission Medication History    Pertinent Provider Information: Per nurse triage note on 8/10/21, patient expressed concern about lisinopril and having \"episodes of blurry vision, increased forgetfulness and feeling out of it.\" Fill history shows that lisinopril 20 mg tablet was dispensed on 9/9/21. Called [facility] to clarify if this was picked up but they confirmed that this prescription is not seen in their system. Patient reports taking lisinopril as indicated below. ______________________________________________________________________    Prior To Admission (PTA) med list completed and updated in EMR.       PTA Med List   Medication Sig Last Dose     ASPIRIN EC PO Take 81 mg by mouth daily 9/11/2021 at Unknown time     atorvastatin (LIPITOR) 80 MG tablet Take 1 tablet (80 mg) by mouth daily 9/11/2021 at Unknown time     carvedilol ER (COREG CR) 20 MG 24 hr capsule Take 1 capsule (20 mg) by mouth daily 9/11/2021 at Unknown time     DULoxetine (CYMBALTA) 30 MG capsule Take by mouth 2 times daily 30 mg in AM  60 mg in PM 9/11/2021 at Unknown time     Ferrous Gluconate (IRON) 240 (27 Fe) MG TABS Take 1 tablet by mouth daily 9/11/2021 at Unknown time     furosemide (LASIX) 40 MG tablet Take 1 tablet (40 mg) by mouth 2 times daily Take in morning and early afternoon.  Take with potassium. 9/11/2021 at Unknown time     gabapentin (NEURONTIN) 300 MG capsule Take 600 mg by mouth 3 times daily  9/11/2021 at Unknown time     lisinopril (ZESTRIL) 40 MG tablet Take 1 tablet (40 mg) by mouth daily (take in place of Zestoretic, stopping hydrochlorothiazide) 9/11/2021 at Unknown time     MORPHINE SULFATE IT pump:updated 7/14  Medications in Pump:    [facility] Responsible for pump medications:   Conc:  Morphine 20mg/ml(3.95 mg/day), clonidine 21.1mcg/ml (4.168 mcg/day), baclofen 42.5mcg/ml (8.395mcg/day)  Rate:   Pump Last Fill Date: 2/21  Pump Refill Date:8/20/21 Unknown at Unknown time     nabumetone (RELAFEN) " 500 MG tablet Take 500 mg by mouth 2 times daily 9/11/2021 at Unknown time     Omeprazole (PRILOSEC PO) Take 40 mg by mouth daily 9/11/2021 at Unknown time     potassium chloride ER (KLOR-CON M) 20 MEQ CR tablet Take 1 tablet (20 mEq) by mouth 2 times daily (take with furosemide/Lasix) 9/11/2021 at Unknown time     rosuvastatin (CRESTOR) 40 MG tablet Take 1 tablet (40 mg) by mouth daily      [START ON 9/12/2021] ticagrelor (BRILINTA) 90 MG tablet Take 1 tablet (90 mg) by mouth 2 times daily Dose to start tomorrow morning.      traZODone (DESYREL) 100 MG tablet Take 200 mg by mouth At Bedtime  9/10/2021 at Unknown time       Information source(s): Patient and CareEverywhere/SureScripts  Method of interview communication: in-person    Summary of Changes to PTA Med List  New: None  Discontinued: naproxen, potassium 10 mEq  Changed: duloxetine dose and frequency    Patient was asked about OTC/herbal products specifically.  PTA med list reflects this.    In the past week, patient estimated taking medication this percent of the time:  greater than 90%.    Allergies were reviewed, assessed, and updated with the patient.      Patient does not use any multi-dose medications prior to admission.    The information provided in this note is only as accurate as the sources available at the time of the update(s).    Thank you for the opportunity to participate in the care of this patient.    Debbie Stevesnon [facility]  9/11/2021 5:34 PM

## 2021-09-12 ENCOUNTER — HEALTH MAINTENANCE LETTER (OUTPATIENT)
Age: 66
End: 2021-09-12

## 2021-09-12 ENCOUNTER — APPOINTMENT (OUTPATIENT)
Dept: CARDIOLOGY | Facility: HOSPITAL | Age: 66
End: 2021-09-12
Attending: INTERNAL MEDICINE
Payer: COMMERCIAL

## 2021-09-12 ENCOUNTER — APPOINTMENT (OUTPATIENT)
Dept: OCCUPATIONAL THERAPY | Facility: HOSPITAL | Age: 66
End: 2021-09-12
Attending: INTERNAL MEDICINE
Payer: COMMERCIAL

## 2021-09-12 ENCOUNTER — APPOINTMENT (OUTPATIENT)
Dept: OCCUPATIONAL THERAPY | Facility: HOSPITAL | Age: 66
End: 2021-09-12
Attending: HOSPITALIST
Payer: COMMERCIAL

## 2021-09-12 LAB
ANION GAP SERPL CALCULATED.3IONS-SCNC: 8 MMOL/L (ref 5–18)
ATRIAL RATE - MUSE: 57 BPM
BUN SERPL-MCNC: 18 MG/DL (ref 8–22)
CALCIUM SERPL-MCNC: 9.2 MG/DL (ref 8.5–10.5)
CHLORIDE BLD-SCNC: 107 MMOL/L (ref 98–107)
CHOLEST SERPL-MCNC: 155 MG/DL
CO2 SERPL-SCNC: 25 MMOL/L (ref 22–31)
CREAT SERPL-MCNC: 0.98 MG/DL (ref 0.7–1.3)
DIASTOLIC BLOOD PRESSURE - MUSE: NORMAL MMHG
GFR SERPL CREATININE-BSD FRML MDRD: 81 ML/MIN/1.73M2
GLUCOSE BLD-MCNC: 118 MG/DL (ref 70–125)
HDLC SERPL-MCNC: 26 MG/DL
HGB BLD-MCNC: 11.8 G/DL (ref 13.3–17.7)
INTERPRETATION ECG - MUSE: NORMAL
LDLC SERPL CALC-MCNC: 54 MG/DL
LVEF ECHO: NORMAL
P AXIS - MUSE: 23 DEGREES
POTASSIUM BLD-SCNC: 3.9 MMOL/L (ref 3.5–5)
PR INTERVAL - MUSE: 210 MS
QRS DURATION - MUSE: 86 MS
QT - MUSE: 416 MS
QTC - MUSE: 404 MS
R AXIS - MUSE: -13 DEGREES
SODIUM SERPL-SCNC: 140 MMOL/L (ref 136–145)
SYSTOLIC BLOOD PRESSURE - MUSE: NORMAL MMHG
T AXIS - MUSE: 9 DEGREES
TRIGL SERPL-MCNC: 374 MG/DL
TROPONIN I SERPL-MCNC: 15.04 NG/ML (ref 0–0.29)
VENTRICULAR RATE- MUSE: 57 BPM

## 2021-09-12 PROCEDURE — 93306 TTE W/DOPPLER COMPLETE: CPT | Mod: 26 | Performed by: INTERNAL MEDICINE

## 2021-09-12 PROCEDURE — 999N000208 ECHOCARDIOGRAM COMPLETE

## 2021-09-12 PROCEDURE — 36415 COLL VENOUS BLD VENIPUNCTURE: CPT | Performed by: INTERNAL MEDICINE

## 2021-09-12 PROCEDURE — 80048 BASIC METABOLIC PNL TOTAL CA: CPT | Performed by: INTERNAL MEDICINE

## 2021-09-12 PROCEDURE — 97165 OT EVAL LOW COMPLEX 30 MIN: CPT | Mod: GO

## 2021-09-12 PROCEDURE — 82465 ASSAY BLD/SERUM CHOLESTEROL: CPT | Performed by: INTERNAL MEDICINE

## 2021-09-12 PROCEDURE — 85018 HEMOGLOBIN: CPT | Performed by: INTERNAL MEDICINE

## 2021-09-12 PROCEDURE — 200N000001 HC R&B ICU

## 2021-09-12 PROCEDURE — 99233 SBSQ HOSP IP/OBS HIGH 50: CPT | Performed by: INTERNAL MEDICINE

## 2021-09-12 PROCEDURE — 99232 SBSQ HOSP IP/OBS MODERATE 35: CPT | Performed by: HOSPITALIST

## 2021-09-12 PROCEDURE — 97535 SELF CARE MNGMENT TRAINING: CPT | Mod: GO

## 2021-09-12 PROCEDURE — C8929 TTE W OR WO FOL WCON,DOPPLER: HCPCS

## 2021-09-12 PROCEDURE — 250N000013 HC RX MED GY IP 250 OP 250 PS 637: Performed by: INTERNAL MEDICINE

## 2021-09-12 PROCEDURE — 84484 ASSAY OF TROPONIN QUANT: CPT | Performed by: INTERNAL MEDICINE

## 2021-09-12 PROCEDURE — 93005 ELECTROCARDIOGRAM TRACING: CPT | Performed by: INTERNAL MEDICINE

## 2021-09-12 PROCEDURE — 255N000002 HC RX 255 OP 636: Performed by: INTERNAL MEDICINE

## 2021-09-12 RX ORDER — LISINOPRIL 20 MG/1
40 TABLET ORAL DAILY
Status: DISCONTINUED | OUTPATIENT
Start: 2021-09-13 | End: 2021-09-12

## 2021-09-12 RX ORDER — LISINOPRIL 20 MG/1
20 TABLET ORAL DAILY
Status: DISCONTINUED | OUTPATIENT
Start: 2021-09-13 | End: 2021-09-14 | Stop reason: HOSPADM

## 2021-09-12 RX ADMIN — ASPIRIN 81 MG: 81 TABLET, COATED ORAL at 09:20

## 2021-09-12 RX ADMIN — OXYCODONE HYDROCHLORIDE 10 MG: 5 TABLET ORAL at 16:23

## 2021-09-12 RX ADMIN — ROSUVASTATIN CALCIUM 40 MG: 40 TABLET, FILM COATED ORAL at 09:20

## 2021-09-12 RX ADMIN — GABAPENTIN 600 MG: 300 CAPSULE ORAL at 14:03

## 2021-09-12 RX ADMIN — PANTOPRAZOLE SODIUM 40 MG: 20 TABLET, DELAYED RELEASE ORAL at 09:21

## 2021-09-12 RX ADMIN — ACETAMINOPHEN 650 MG: 325 TABLET ORAL at 12:59

## 2021-09-12 RX ADMIN — DULOXETINE HYDROCHLORIDE 60 MG: 60 CAPSULE, DELAYED RELEASE ORAL at 20:20

## 2021-09-12 RX ADMIN — PANTOPRAZOLE SODIUM 40 MG: 20 TABLET, DELAYED RELEASE ORAL at 16:23

## 2021-09-12 RX ADMIN — TICAGRELOR 90 MG: 90 TABLET ORAL at 09:21

## 2021-09-12 RX ADMIN — DULOXETINE 30 MG: 30 CAPSULE, DELAYED RELEASE ORAL at 09:20

## 2021-09-12 RX ADMIN — PERFLUTREN 3 ML: 6.52 INJECTION, SUSPENSION INTRAVENOUS at 08:40

## 2021-09-12 RX ADMIN — LISINOPRIL 20 MG: 20 TABLET ORAL at 09:20

## 2021-09-12 RX ADMIN — FUROSEMIDE 40 MG: 40 TABLET ORAL at 20:21

## 2021-09-12 RX ADMIN — FUROSEMIDE 40 MG: 40 TABLET ORAL at 09:21

## 2021-09-12 RX ADMIN — GABAPENTIN 600 MG: 300 CAPSULE ORAL at 09:21

## 2021-09-12 RX ADMIN — TICAGRELOR 90 MG: 90 TABLET ORAL at 20:20

## 2021-09-12 RX ADMIN — TRAZODONE HYDROCHLORIDE 200 MG: 50 TABLET ORAL at 20:20

## 2021-09-12 ASSESSMENT — ACTIVITIES OF DAILY LIVING (ADL): DEPENDENT_IADLS:: INDEPENDENT

## 2021-09-12 NOTE — PLAN OF CARE
Problem: Adult Inpatient Plan of Care  Goal: Readiness for Transition of Care  Outcome: Improving     Problem: Arrhythmia/Dysrhythmia (Cardiac Catheterization)  Goal: Stable Heart Rate and Rhythm  Outcome: Improving

## 2021-09-12 NOTE — CONSULTS
"Care Management Initial Consult    General Information  Assessment completed with: Baudilio Martin via phone  Type of CM/SW Visit: Initial Assessment    Primary Care Provider verified and updated as needed: Yes   Readmission within the last 30 days: no previous admission in last 30 days      Reason for Consult: discharge planning  Advance Care Planning: Advance Care Planning Reviewed: other (comment) (\"I don't have one\")          Communication Assessment  Patient's communication style: spoken language (English or Bilingual)    Hearing Difficulty or Deaf: no   Wear Glasses or Blind: no    Cognitive  Cognitive/Neuro/Behavioral: WDL  Level of Consciousness: alert  Arousal Level: arouses to voice                Living Environment:   People in home: spouse  Krystal  Current living Arrangements: house      Able to return to prior arrangements: yes       Family/Social Support:  Care provided by: self  Provides care for: no one  Marital Status:   Wife  Krystal       Description of Support System: Supportive, Involved    Support Assessment: Adequate family and caregiver support, Adequate social supports, Patient communicates needs well met    Current Resources:   Patient receiving home care services: No     Community Resources: None  Equipment currently used at home: cane, straight  Supplies currently used at home: Oxygen Tubing/Supplies, Other (\"CPAP, pain pump\")    Employment/Financial:  Employment Status:          Financial Concerns:     Referral to Financial Counselor: No       Lifestyle & Psychosocial Needs:  Social Determinants of Health     Tobacco Use: Medium Risk     Smoking Tobacco Use: Former Smoker     Smokeless Tobacco Use: Never Used   Alcohol Use:      Frequency of Alcohol Consumption:      Average Number of Drinks:      Frequency of Binge Drinking:    Financial Resource Strain:      Difficulty of Paying Living Expenses:    Food Insecurity:      Worried About Running Out of Food in the Last Year:      Ran " Out of Food in the Last Year:    Transportation Needs:      Lack of Transportation (Medical):      Lack of Transportation (Non-Medical):    Physical Activity:      Days of Exercise per Week:      Minutes of Exercise per Session:    Stress:      Feeling of Stress :    Social Connections:      Frequency of Communication with Friends and Family:      Frequency of Social Gatherings with Friends and Family:      Attends Druze Services:      Active Member of Clubs or Organizations:      Attends Club or Organization Meetings:      Marital Status:    Intimate Partner Violence:      Fear of Current or Ex-Partner:      Emotionally Abused:      Physically Abused:      Sexually Abused:    Depression: Not at risk     PHQ-2 Score: 0   Housing Stability:      Unable to Pay for Housing in the Last Year:      Number of Places Lived in the Last Year:      Unstable Housing in the Last Year:        Functional Status:  Prior to admission patient needed assistance:   Dependent ADLs:: Ambulation-cane  Dependent IADLs:: Independent       Mental Health Status:          Chemical Dependency Status:          Values/Beliefs:  Spiritual, Cultural Beliefs, Druze Practices, Values that affect care:                 Additional Information:  Baudilio lives in a house with his wife. He is independent with ADLs at baseline. Likely no discharge needs at this time.    Walks with a cane for mobility.    Wife to transport at discharge.    Erin Wolfe RN

## 2021-09-12 NOTE — PROGRESS NOTES
Cardiac Rehab       09/12/21 1100   Quick Adds   Type of Visit Initial Occupational Therapy Evaluation   Living Environment   People in home spouse   Current Living Arrangements house   Home Accessibility stairs to enter home   Living Environment Comments Patient was independent prior.   Self-Care   Equipment Currently Used at Home cane, straight   General Information   Onset of Illness/Injury or Date of Surgery 09/11/21   Referring Physician Noris Ozuna   Patient/Family Therapy Goal Statement (OT) none stated   Cognitive Status Examination   Orientation Status orientation to person, place and time   Bed Mobility   Bed Mobility sit-supine   Sit-Supine [place] (Bed Mobility) minimum assist (75% patient effort)   Comment (Bed Mobility) Patient getting up with OT entered room, patient able to stand from chair with CGA, patient ambulated to bed with CGA. Patient needed Min A to get back into bed.    Transfers   Transfers bed-chair transfer   Transfer Skill: Bed to Chair/Chair to Bed   Bed-Chair [place] (Transfers) contact guard   Clinical Impression   Criteria for Skilled Therapeutic Interventions Met (OT) yes;meets criteria;skilled treatment is necessary   OT Diagnosis Decreasd endurance for ADLs   OT Problem List-Impairments impacting ADL problems related to;activity tolerance impaired;strength;post-surgical precautions   Assessment of Occupational Performance 1-3 Performance Deficits   Identified Performance Deficits Endurance for ADLs   Planned Therapy Interventions (OT) ADL retraining;home program guidelines;progressive activity/exercise   Clinical Decision Making Complexity (OT) low complexity   Therapy Frequency (OT) Daily   Predicted Duration of Therapy 4 days   Risk & Benefits of therapy have been explained evaluation/treatment results reviewed   OT Discharge Planning    OT Discharge Recommendation (DC Rec) home with outpatient cardiac rehab   Total Evaluation Time (Minutes)   Total Evaluation  Time (Minutes) 10

## 2021-09-12 NOTE — CONSULTS
CLINICAL NUTRITION SERVICES - EDUCATION NOTE    Received diet education consult for heart healthy diet     NUTRITION HISTORY:  Pt with hx of gastric bypass in 2009.  He lost 185 lb but has gained some back.  Pt reports that he eats small portion meals yet.  Pt lives with spouse, they eat a general diet.    ?  NUTRITION DIAGNOSIS:  Food- and nutrition-related knowledge deficit related to heart disease as evidenced by need for therapeutic diet      INTERVENTIONS:  Provided instruction on cardiac diet.  Encouraged pt to increase intake of vegetables at meals.  Also encouraged pt to look for low sugar coffee drink that he enjoys daily.  Pt does not drink soda.      Provided  the following handouts: Heart Health Guidelines.    Goals:  Patient participated in diet education.      Follow Up:  Patient to ask any further nutrition-related questions before discharge.   Nutrition education is also a part of cardiac rehab as outpatient.

## 2021-09-12 NOTE — PROGRESS NOTES
HEART CARE CONSULTATON NOTE        Assessment/Recommendations   Assessment:  1.  Inferior STEMI status post PCI to RCA with residual disease in his LAD  2.  Hypertension  3.  Dyslipidemia  4.  Morbid obesity  5.  Chronic back pain  6.  MARLEEN  7.  Lymphedema    Plan:  1.  Cardiac rehab  2.  Will return in 2 to 4 weeks for staged PCI to LAD  3.  Continue on carvedilol and lisinopril and high-dose statin therapy  4.  Dual antiplatelet therapy with aspirin and Brilinta 90 mg twice daily         History of Present Illness/Subjective    Patient feeling well without recurrent chest pain    Echocardiogram 9/11/2021  Interpretation Summary     Left ventricular function is normal.The ejection fraction is 55-60%.  Regional wall motion abnormalities cannot be excluded due to limited  visualization.  No significant valve abnormalities were seen.     Physical Examination  Review of Systems   VITALS: BP (!) 154/110   Pulse 61   Temp 98  F (36.7  C) (Oral)   Resp 18   SpO2 95%   BMI: There is no height or weight on file to calculate BMI.  Wt Readings from Last 3 Encounters:   09/09/21 146.5 kg (323 lb)   07/26/21 147 kg (324 lb)   07/22/21 (!) 155.8 kg (343 lb 8 oz)       Intake/Output Summary (Last 24 hours) at 9/12/2021 1155  Last data filed at 9/12/2021 0900  Gross per 24 hour   Intake 520 ml   Output 1950 ml   Net -1430 ml     General Appearance:   no distress, normal body habitus   ENT/Mouth: membranes moist, no oral lesions or bleeding gums.      EYES:  no scleral icterus, normal conjunctivae   Neck: no carotid bruits or thyromegaly   Chest/Lungs:   lungs are clear to auscultation    Cardiovascular:   Regular. Normal first and second heart sounds with no murmurs  ++ edema bilaterally    Abdomen:  no organomegaly, masses, bruits, or tenderness; bowel sounds are present   Extremities: no cyanosis or clubbing   Skin: no xanthelasma, warm.    Neurologic: normal  bilateral, no tremors     Psychiatric: alert and oriented  x3, calm     Review Of Systems  Skin: negative  Eyes: negative  Ears/Nose/Throat: negative  Respiratory: No shortness of breath, dyspnea on exertion, cough, or hemoptysis  Cardiovascular: negative  Gastrointestinal: negative  Genitourinary: negative  Musculoskeletal: negative  Neurologic: negative  Psychiatric: negative  Hematologic/Lymphatic/Immunologic: negative  Endocrine: negative          Lab Results    Chemistry/lipid CBC Cardiac Enzymes/BNP/TSH/INR   Recent Labs   Lab Test 09/12/21  0450   CHOL 155   HDL 26*   LDL 54   TRIG 374*     Recent Labs   Lab Test 09/12/21  0450 12/07/20  1034 11/20/19  1202   LDL 54 48 52     Recent Labs   Lab Test 09/12/21  0450      POTASSIUM 3.9   CHLORIDE 107   CO2 25      BUN 18   CR 0.98   GFRESTIMATED 81   RATNA 9.2     Recent Labs   Lab Test 09/12/21  0450 09/11/21  1329 09/09/21  1118   CR 0.98 1.29 1.24     Recent Labs   Lab Test 02/13/18  1351 11/19/14  1026 06/03/14  1218   A1C 5.6 5.1 5.1          Recent Labs   Lab Test 09/12/21  0450 09/11/21  1329   WBC  --  7.9   HGB 11.8* 13.8   HCT  --  41.9   MCV  --  87   PLT  --  218     Recent Labs   Lab Test 09/12/21  0450 09/11/21  1329 07/14/21  0537   HGB 11.8* 13.8 12.5*    Recent Labs   Lab Test 09/12/21  0450 09/11/21  1329   TROPONINI 15.04* 0.48*     Recent Labs   Lab Test 09/11/21  1329 07/13/21  1536   *  --    NTBNPI  --  58     No results for input(s): TSH in the last 29243 hours.  Recent Labs   Lab Test 09/11/21  1329   INR 1.00        Medical History  Surgical History Family History Social History   Past Medical History:   Diagnosis Date     Acid reflux disease 10/31/2017     Arrhythmia     paroxysmal atrial fibrillation     Arthritis      Atrial fibrillation (H)     Created by Conversion      Bariatric surgery status     Created by Conversion      CHF (congestive heart failure) (H)      Coronary artery disease      Coronary atherosclerosis     Created by Conversion  Replacement Utility updated  for latest IMO load     Diabetes (H)     typr II resloved     Essential hypertension     Created by [facility] Annotation: Apr 6 2012 10:16AM Zack Brewer: Lisinipril,  coreg  Replacement Utility updated for latest IMO load     H/O gastric bypass      Heart attack (H)      Hypercholesteremia      Hypertension      Insomnia      Insomnia, unspecified     Created by [facility] Annotation: Sep 11 2013  9:56AM Zack Brewer: Trouble  maintaining sleep-Trazodone-minimal helpzolpidem-      Ischemic cardiomyopathy      Low back pain      Lumbago     Created by [facility] Annotation: Apr 6 2012 10:20AM Anh Ramos: Morphine pump      Morbid obesity (H) 8/1/2018     Nephrolithiasis      Nephrolithiasis      Obstructive sleep apnea (adult) (pediatric)     Created by Conversion      Osteoarthritis     Created by [facility] Annotation: Jun 26 2009  9:53AM Zack Brewer: failed lumbar  fusion/morphine pump dr putnam  Replacement Utility updated for latest IMO load     Pure hypercholesterolemia     Created by Conversion      Sleep apnea      Sleepiness 5/8/2018     Past Surgical History:   Procedure Laterality Date     BACK SURGERY       BYPASS GASTRIC DUODENAL SWITCH       C GASTRIC BYPASS,OBESE<100CM SUSY-EN-Y  2008    Description: Gastric Surgery For Morbid Obesity Bypass With Susy-en-Y;  Recorded: 06/26/2009;  Comments: dr green 2008     COSMETIC SURGERY      pannicullectomy     ENT SURGERY      tonsillectomy     EXTRACORPOREAL SHOCK WAVE LITHOTRIPSY, CYSTOSCOPY, INSERT STENT URETER(S), COMBINED  8/23/11     HC REMOVAL OF TONSILS,<12 Y/O      Description: Tonsillectomy;  Recorded: 03/23/2012;  Comments: for obstructive sleep apnea     HC REPAIR INCISIONAL HERNIA,REDUCIBLE  11/13/2012    Description: Incisional Hernia Repair;  Proc Date: 11/13/2012;  Comments: incisional hernia repair and abdominal panniculectomy by Dr Shon Green at the [facility].      HERNIA REPAIR       IR MISCELLANEOUS PROCEDURE  7/29/2011     IR MISCELLANEOUS PROCEDURE  8/5/2011     IR MISCELLANEOUS PROCEDURE  8/23/2011     IR NEPHROSTOMY TUBE CHANGE BILATERAL  8/5/2011     ORTHOPEDIC SURGERY      arthrodesis ant discectomy, lumbar     ID ARTHRODESIS ANT INTERBODY MIN DISCECTOMY,LUMBAR      Description: Lumbar Vertebral Fusion;  Recorded: 06/26/2009;  Comments: before 200 see [facility] consult under old records     ID EXCISE EXCESS SKIN TISSUE,ABDOMEN  11/13/2012    Description: Panniculectomy;  Proc Date: 11/13/2012;  Comments: 3.5 Lb Pannus was removed at the [facility] By Dr. Shon Green     REPLACE INTRATHECAL PAIN PUMP N/A 4/25/2017    Procedure: REPLACE INTRATHECAL PAIN PUMP;  INTRATHECAL PAIN PUMP CATHETER REPLACEMENT AND PUMP REPLACEMENT;  Surgeon: Anthony Maloney MD;  Location: [facility]     REVISE CATHETER INTRATHECAL N/A 4/25/2017    Procedure: REVISE CATHETER INTRATHECAL;;  Surgeon: Anthony Maloney MD;  Location: [facility]     SHOULDER SURGERY       Family History   Problem Relation Age of Onset     Cerebrovascular Disease Mother      Heart Disease Father      Hodgkin's lymphoma Brother         Social History     Socioeconomic History     Marital status:      Spouse name: Not on file     Number of children: Not on file     Years of education: Not on file     Highest education level: Not on file   Occupational History     Not on file   Tobacco Use     Smoking status: Former Smoker     Years: 10.00     Types: Cigars, Cigars     Smokeless tobacco: Never Used   Substance and Sexual Activity     Alcohol use: No     Drug use: No     Comment: Drug use: formerly used     Sexual activity: Not on file   Other Topics Concern     Not on file   Social History Narrative     Not on file     Social Determinants of Health     Financial Resource Strain:      Difficulty of Paying Living Expenses:    Food Insecurity:      Worried About Running Out of Food in the Last Year:      Ran Out of Food in the  Last Year:    Transportation Needs:      Lack of Transportation (Medical):      Lack of Transportation (Non-Medical):    Physical Activity:      Days of Exercise per Week:      Minutes of Exercise per Session:    Stress:      Feeling of Stress :    Social Connections:      Frequency of Communication with Friends and Family:      Frequency of Social Gatherings with Friends and Family:      Attends Mosque Services:      Active Member of Clubs or Organizations:      Attends Club or Organization Meetings:      Marital Status:    Intimate Partner Violence:      Fear of Current or Ex-Partner:      Emotionally Abused:      Physically Abused:      Sexually Abused:          Medications  Allergies   Current Outpatient Medications   Medication Sig Dispense Refill     rosuvastatin (CRESTOR) 40 MG tablet Take 1 tablet (40 mg) by mouth daily 90 tablet 3     ticagrelor (BRILINTA) 90 MG tablet Take 1 tablet (90 mg) by mouth 2 times daily Dose to start tomorrow morning. 180 tablet 3        Allergies   Allergen Reactions     Hydrocodone-Acetaminophen          Paty Ochoa MD

## 2021-09-12 NOTE — PLAN OF CARE
Patient with stable VSS, on 4LNC sating in upper 90s. Denies chest pain but has chronic back pain for which the pt has an implanted pain med pump and the provider ordered PRN analgesics. Pt has frequent reperfusion ectopy but BP is stable. Pt had a small hematoma at the angio site, which has since resolved. Will keep monitoring pt. Ganga Harrington RN     Problem: Contrast-Induced Injury Risk (Cardiac Catheterization)  Goal: Absence of Contrast-Induced Injury  Outcome: No Change     Problem: Embolism (Cardiac Catheterization)  Goal: Absence of Embolism Signs and Symptoms  Outcome: No Change     Problem: Arrhythmia/Dysrhythmia (Cardiac Catheterization)  Goal: Stable Heart Rate and Rhythm  Outcome: Improving     Problem: Bleeding (Cardiac Catheterization)  Goal: Absence of Bleeding  Outcome: Improving     Problem: Pain (Cardiac Catheterization)  Goal: Acceptable Pain Control  Outcome: Improving     Problem: Vascular Access Protection (Cardiac Catheterization)  Goal: Absence of Vascular Access Complication  Outcome: Improving     Problem: Adjustment to Illness (Heart Failure)  Goal: Optimal Coping  Outcome: Improving     Problem: Cardiac Output Decreased (Heart Failure)  Goal: Optimal Cardiac Output  Outcome: Improving     Problem: Dysrhythmia (Heart Failure)  Goal: Stable Heart Rate and Rhythm  Outcome: Improving     Problem: Hypertension Acute  Goal: Blood Pressure Within Desired Range  Outcome: Improving

## 2021-09-12 NOTE — PROVIDER NOTIFICATION
Pt with chronic back pain. Tylenol & oxycodone given earlier but didn't much. Pt wondering if he can have Baclofen & something stronger like Morphine. Ganga Harrington RN

## 2021-09-12 NOTE — PROVIDER NOTIFICATION
Call into to Dr Ochoa regarding troponin 15.04 and HR gideon 39-42, BP stable, no complaints of chest pain.     Continue to monitor.    Arianna Bryant RN

## 2021-09-12 NOTE — PROGRESS NOTES
[facility] Hospitalist Progress Note  [facility]  Summary:    65M with hx of CAD s/p PCI LAD in 2012, paroxysmal afib, MARLEEN, Htn, GERD, dyslipidemia, chronic diastolic CHF, morbid obesity, chronic back pain with implanted pain med pump, chronic LE lymphedema who presented with chest pain and found to have inferior STEMI s/p CRYSTAL to RCA with residual prox LAD 70% in stent stenosis    A/P:    #ST elevation MI (STEMI) s/p CRYSTAL to RCA  #Residual In-stent stenosis of proximal LAD stent:   -planning for staged PCI in next several weeks.  -asa, brilinta, crestor    Essential hypertension:   -coreg (hasn't been getting due to bradycardia, resume as HR recovers from IMI), lisinopril increase to 40, lasix      Lumbago: Implanted pain pump in place, last refilled 2 weeks ago    Morbid obesity: History of gastric bypass, history of pannus resection    Bilateral leg edema, chronic: Leg wraps in place, patient states that his legs are in baseline healthy state.    Chronic diastolic heart failure: Not in exacerbation.  Resume patient's home dose Lasix.    MARLEEN (obstructive sleep apnea: Resume CPAP at night    Paroxysmal atrial fibrillation: Currently in normal sinus rhythm, coreg, no on AC prior to admission.    Mixed hyperlipidemia: Rosuvastatin ordered    Gastroesophageal reflux disease without esophagitis: Resume PPI      Checklist:  Code Status: Full Code    Diet: Low Saturated Fat Na <2400 mg    Bran Catheter: Not present  Central Lines: None      Overnight Events/Subjective/Notable results:    No CP or SOB  Discussed importance of DAPT compliance for stent patency    4 point ROS otherwise negative    Objective    I personally reviewed tele with devonte    Vital signs in last 24 hours  Temp:  [97.8  F (36.6  C)-98.6  F (37  C)] 97.8  F (36.6  C)  Pulse:  [52-97] 68  Resp:  [8-27] 10  BP: (119-179)/(58-96) 129/66  SpO2:  [89 %-99 %] 95 % O2 Device: None (Room air)    Weight:   0 lbs 0 oz    Intake/Output last 3  shifts  I/O last 3 completed shifts:  In: 520 [P.O.:520]  Out: 1500 [Urine:1500]  There is no height or weight on file to calculate BMI.    Physical Exam  General:  Alert, cooperative, no distress,  Appears stated age  Neurologic:  oriented, facialsymmetry preserved, fluent speech.   Psych: calm, mood and affect appropriate to situation  HEENT:  Anicteric, MMM, unremarkable dentition  CV: RRR no MRG, normal S1 and S2, no edema.  Wrist vascular access site with ecchymosis.  Per ICU RN hasn't increased since last night.  Good distal perfusion.  Lungs: CTAB.  Easyrespirations  Abd: soft, obese, NT, normoactive BS  Skin: no rashes noted on exposed skin. Color and turgor normal  Central Lines and Tubes: None (no lewis, CVC, feeding tubes)      I have reviewed all labs, medications, imaging studies in the last 24 hours.  Pertinent findings&changes discussed above.    Data     Discussed with: TAE Camarena MD  Internal Medicine Hospitalist

## 2021-09-13 ENCOUNTER — TELEPHONE (OUTPATIENT)
Dept: FAMILY MEDICINE | Facility: CLINIC | Age: 66
End: 2021-09-13

## 2021-09-13 ENCOUNTER — APPOINTMENT (OUTPATIENT)
Dept: OCCUPATIONAL THERAPY | Facility: HOSPITAL | Age: 66
End: 2021-09-13
Attending: INTERNAL MEDICINE
Payer: COMMERCIAL

## 2021-09-13 LAB
ANION GAP SERPL CALCULATED.3IONS-SCNC: 10 MMOL/L (ref 5–18)
ATRIAL RATE - MUSE: 182 BPM
BUN SERPL-MCNC: 21 MG/DL (ref 8–22)
CALCIUM SERPL-MCNC: 9.8 MG/DL (ref 8.5–10.5)
CHLORIDE BLD-SCNC: 105 MMOL/L (ref 98–107)
CO2 SERPL-SCNC: 25 MMOL/L (ref 22–31)
CREAT SERPL-MCNC: 1.21 MG/DL (ref 0.7–1.3)
DIASTOLIC BLOOD PRESSURE - MUSE: NORMAL MMHG
GFR SERPL CREATININE-BSD FRML MDRD: 63 ML/MIN/1.73M2
GLUCOSE BLD-MCNC: 138 MG/DL (ref 70–125)
HOLD SPECIMEN: NORMAL
INTERPRETATION ECG - MUSE: NORMAL
P AXIS - MUSE: NORMAL DEGREES
POTASSIUM BLD-SCNC: 3.7 MMOL/L (ref 3.5–5)
PR INTERVAL - MUSE: NORMAL MS
QRS DURATION - MUSE: 98 MS
QT - MUSE: 384 MS
QTC - MUSE: 456 MS
R AXIS - MUSE: -13 DEGREES
SODIUM SERPL-SCNC: 140 MMOL/L (ref 136–145)
SYSTOLIC BLOOD PRESSURE - MUSE: NORMAL MMHG
T AXIS - MUSE: -52 DEGREES
VENTRICULAR RATE- MUSE: 85 BPM

## 2021-09-13 PROCEDURE — 99233 SBSQ HOSP IP/OBS HIGH 50: CPT | Performed by: INTERNAL MEDICINE

## 2021-09-13 PROCEDURE — 97535 SELF CARE MNGMENT TRAINING: CPT | Mod: GO

## 2021-09-13 PROCEDURE — 99233 SBSQ HOSP IP/OBS HIGH 50: CPT | Performed by: HOSPITALIST

## 2021-09-13 PROCEDURE — 80048 BASIC METABOLIC PNL TOTAL CA: CPT | Performed by: HOSPITALIST

## 2021-09-13 PROCEDURE — 250N000013 HC RX MED GY IP 250 OP 250 PS 637: Performed by: INTERNAL MEDICINE

## 2021-09-13 PROCEDURE — 250N000013 HC RX MED GY IP 250 OP 250 PS 637: Performed by: HOSPITALIST

## 2021-09-13 PROCEDURE — 120N000013 HC R&B IMCU

## 2021-09-13 PROCEDURE — 93005 ELECTROCARDIOGRAM TRACING: CPT

## 2021-09-13 PROCEDURE — 36415 COLL VENOUS BLD VENIPUNCTURE: CPT | Performed by: HOSPITALIST

## 2021-09-13 PROCEDURE — 93005 ELECTROCARDIOGRAM TRACING: CPT | Performed by: HOSPITALIST

## 2021-09-13 PROCEDURE — 93010 ELECTROCARDIOGRAM REPORT: CPT | Performed by: INTERNAL MEDICINE

## 2021-09-13 PROCEDURE — 36415 COLL VENOUS BLD VENIPUNCTURE: CPT | Performed by: INTERNAL MEDICINE

## 2021-09-13 RX ORDER — METOPROLOL TARTRATE 25 MG/1
50 TABLET, FILM COATED ORAL 2 TIMES DAILY
Status: DISCONTINUED | OUTPATIENT
Start: 2021-09-13 | End: 2021-09-14

## 2021-09-13 RX ORDER — CLOPIDOGREL BISULFATE 75 MG/1
75 TABLET ORAL DAILY
Status: DISCONTINUED | OUTPATIENT
Start: 2021-09-14 | End: 2021-09-14 | Stop reason: HOSPADM

## 2021-09-13 RX ORDER — METOPROLOL TARTRATE 25 MG/1
25 TABLET, FILM COATED ORAL 2 TIMES DAILY
Status: DISCONTINUED | OUTPATIENT
Start: 2021-09-13 | End: 2021-09-13

## 2021-09-13 RX ORDER — CLOPIDOGREL 300 MG/1
300 TABLET, FILM COATED ORAL ONCE
Status: COMPLETED | OUTPATIENT
Start: 2021-09-13 | End: 2021-09-13

## 2021-09-13 RX ORDER — METOPROLOL TARTRATE 25 MG/1
25 TABLET, FILM COATED ORAL ONCE
Status: DISCONTINUED | OUTPATIENT
Start: 2021-09-13 | End: 2021-09-14

## 2021-09-13 RX ADMIN — DULOXETINE 30 MG: 30 CAPSULE, DELAYED RELEASE ORAL at 08:05

## 2021-09-13 RX ADMIN — FUROSEMIDE 40 MG: 40 TABLET ORAL at 09:11

## 2021-09-13 RX ADMIN — DULOXETINE HYDROCHLORIDE 60 MG: 60 CAPSULE, DELAYED RELEASE ORAL at 20:30

## 2021-09-13 RX ADMIN — PANTOPRAZOLE SODIUM 40 MG: 20 TABLET, DELAYED RELEASE ORAL at 08:01

## 2021-09-13 RX ADMIN — APIXABAN 5 MG: 5 TABLET, FILM COATED ORAL at 11:34

## 2021-09-13 RX ADMIN — ROSUVASTATIN CALCIUM 40 MG: 40 TABLET, FILM COATED ORAL at 09:12

## 2021-09-13 RX ADMIN — CLOPIDOGREL BISULFATE 300 MG: 300 TABLET, FILM COATED ORAL at 19:27

## 2021-09-13 RX ADMIN — CARVEDILOL 12.5 MG: 12.5 TABLET, FILM COATED ORAL at 08:04

## 2021-09-13 RX ADMIN — APIXABAN 5 MG: 5 TABLET, FILM COATED ORAL at 20:30

## 2021-09-13 RX ADMIN — Medication 1 MG: at 21:40

## 2021-09-13 RX ADMIN — LISINOPRIL 20 MG: 20 TABLET ORAL at 09:11

## 2021-09-13 RX ADMIN — TRAZODONE HYDROCHLORIDE 200 MG: 50 TABLET ORAL at 20:30

## 2021-09-13 RX ADMIN — PANTOPRAZOLE SODIUM 40 MG: 20 TABLET, DELAYED RELEASE ORAL at 17:26

## 2021-09-13 RX ADMIN — ASPIRIN 81 MG: 81 TABLET, COATED ORAL at 09:11

## 2021-09-13 RX ADMIN — TICAGRELOR 90 MG: 90 TABLET ORAL at 08:03

## 2021-09-13 RX ADMIN — FUROSEMIDE 40 MG: 40 TABLET ORAL at 20:30

## 2021-09-13 RX ADMIN — DOCUSATE SODIUM AND SENNOSIDES 1 TABLET: 8.6; 5 TABLET, FILM COATED ORAL at 11:28

## 2021-09-13 NOTE — TELEPHONE ENCOUNTER
Reason for Call:  Appointment today/WC Form     Detailed comments: Krystal, wife calling to let us know that Baudilio had a heart attack on Saturday. However, hoping to be released today to make appointment with Dr. Berger. However, if unable to make appointment would we be able to assist with WC form? Please call wife and advise as this is time sensitive.     Phone Number Patient can be reached at: [phone]    Best Time: ANY    Can we leave a detailed message on this number? YES    Call taken on 9/13/2021 at 9:12 AM by JOSHUA Salcedo

## 2021-09-13 NOTE — TELEPHONE ENCOUNTER
FORM DROPPED OFF AT , PUT IN CMT FOLDER AND PUT ON DR. HERNANDEZ'S DESK    NEEDS TO BE DONE WITHIN THE NEXT HOUR OR TWO.    CALL JUDE(WIFE) [phone]

## 2021-09-13 NOTE — PROGRESS NOTES
Acknowledge CPAP/Bipap order. Pt reports no use of CPAP device at home, and has decline use of hospital unit. RT will be available as needed.

## 2021-09-13 NOTE — PROGRESS NOTES
Care Management Follow Up    Length of Stay (days): 2        Patient plan of care discussed at interdisciplinary rounds: No     Expected Discharge Date:   9/13 or 9/14/21       Concerns to be Addressed / Barriers to Discharge: medical management of STEMI, new atrial fibrilation     Anticipated Discharge Disposition: home with outpatient cardiac rehab (OP CR)     Additional Information:    9/13/2021 .Per RN progress note 9/13/2021, patient went into atrial fibrillation and Cardiology was updated. Per cardiac rehab, patient cleared to discharge to home with outpatient cardiac rehab (OP CR).     Assessment History: From home with wife, Krystal. Was independent with Activities of daily living (ADLs).

## 2021-09-13 NOTE — TELEPHONE ENCOUNTER
Form has been completed and at the  for . Left a message for Krystal that the form is ready.  We cancelled his appointment for today since he is still inpatient at [facility] for a heart attack.

## 2021-09-13 NOTE — PROGRESS NOTES
HEART CARE CONSULTATON NOTE        Assessment/Recommendations   Assessment:  1.  Inferior STEMI status post PCI to RCA with residual disease in his LAD  2.  Hypertension  3.  Dyslipidemia  4.  Morbid obesity  5.  Chronic back pain  6.  MARLEEN  7.  Lymphedema  8.  Atrial fibrillation: History of paroxysmal atrial fibrillation.  Patient went into persistent atrial fibrillation overnight.  Rate is mildly elevated and asymptomatic.  9.  [id number] score of 3 for coronary disease, hypertension, heart failure and recommend anticoagulation     Plan:  1.  Cardiac rehab  2.  Will return in 2 to 4 weeks for staged PCI to LAD  3.  Continue on lisinopril and high-dose statin therapy  4.    Continue aspirin.  Patient's wife checked and Brilinta would be very expensive for them.  Will load with Plavix today and change to 75 mg of Plavix daily starting tomorrow.  We will continue aspirin for a month along with the Plavix and then will stop aspirin to avoid long-term triple therapy.  5.  Start Eliquis 5 mg twice daily for anticoagulation.  Possible discharge either today or tomorrow depending on clinical course today     History of Present Illness/Subjective    No further chest pain or breathing difficulty.  Patient went into atrial fibrillation persistent with mildly elevated ventricular response last night.       Physical Examination  Review of Systems   VITALS: /72   Pulse 105   Temp 97.8  F (36.6  C) (Oral)   Resp 17   SpO2 95%   BMI: There is no height or weight on file to calculate BMI.  Wt Readings from Last 3 Encounters:   09/09/21 146.5 kg (323 lb)   07/26/21 147 kg (324 lb)   07/22/21 (!) 155.8 kg (343 lb 8 oz)       Intake/Output Summary (Last 24 hours) at 9/13/2021 1236  Last data filed at 9/13/2021 1000  Gross per 24 hour   Intake 1140 ml   Output 2175 ml   Net -1035 ml     General Appearance:   no distress, normal body habitus   ENT/Mouth: membranes moist, no oral lesions or bleeding gums.      EYES:  no  scleral icterus, normal conjunctivae   Neck: no carotid bruits or thyromegaly   Chest/Lungs:   lungs are clear to auscultation   Cardiovascular:   irregular. Normal first and second heart sounds with no murmurs  + edema bilaterally    Abdomen:  no organomegaly, masses, bruits, or tenderness; bowel sounds are present   Extremities: no cyanosis or clubbing   Skin: no xanthelasma, warm.    Neurologic: normal  bilateral, no tremors     Psychiatric: alert and oriented x3, calm     Review Of Systems  Skin: negative  Eyes: negative  Ears/Nose/Throat: negative  Respiratory: No shortness of breath, dyspnea on exertion, cough, or hemoptysis  Cardiovascular: negative  Gastrointestinal: negative  Genitourinary: negative  Musculoskeletal: negative  Neurologic: negative  Psychiatric: negative  Hematologic/Lymphatic/Immunologic: negative  Endocrine: negative          Lab Results    Chemistry/lipid CBC Cardiac Enzymes/BNP/TSH/INR   Recent Labs   Lab Test 09/12/21  0450   CHOL 155   HDL 26*   LDL 54   TRIG 374*     Recent Labs   Lab Test 09/12/21  0450 12/07/20  1034 11/20/19  1202   LDL 54 48 52     Recent Labs   Lab Test 09/13/21  0833      POTASSIUM 3.7   CHLORIDE 105   CO2 25   *   BUN 21   CR 1.21   GFRESTIMATED 63   RATNA 9.8     Recent Labs   Lab Test 09/13/21  0833 09/12/21  0450 09/11/21  1329   CR 1.21 0.98 1.29     Recent Labs   Lab Test 02/13/18  1351 11/19/14  1026 06/03/14  1218   A1C 5.6 5.1 5.1          Recent Labs   Lab Test 09/12/21  0450 09/11/21  1329   WBC  --  7.9   HGB 11.8* 13.8   HCT  --  41.9   MCV  --  87   PLT  --  218     Recent Labs   Lab Test 09/12/21  0450 09/11/21  1329 07/14/21  0537   HGB 11.8* 13.8 12.5*    Recent Labs   Lab Test 09/12/21  0450 09/11/21  1329   TROPONINI 15.04* 0.48*     Recent Labs   Lab Test 09/11/21  1329 07/13/21  1536   *  --    NTBNPI  --  58     No results for input(s): TSH in the last 57737 hours.  Recent Labs   Lab Test 09/11/21  1329   INR 1.00         Medical History  Surgical History Family History Social History   Past Medical History:   Diagnosis Date     Acid reflux disease 10/31/2017     Arrhythmia     paroxysmal atrial fibrillation     Arthritis      Atrial fibrillation (H)     Created by Conversion      Bariatric surgery status     Created by Conversion      CHF (congestive heart failure) (H)      Coronary artery disease      Coronary atherosclerosis     Created by Conversion  Replacement Utility updated for latest IMO load     Diabetes (H)     typr II resloved     Essential hypertension     Created by [facility] Annotation: Apr 6 2012 10:16Zack Harris: Lisinipril,  coreg  Replacement Utility updated for latest IMO load     H/O gastric bypass      Heart attack (H)      Hypercholesteremia      Hypertension      Insomnia      Insomnia, unspecified     Created by [facility] Annotation: Sep 11 2013  9:56AM Zack Brewer: Trouble  maintaining sleep-Trazodone-minimal helpzolpidem-      Ischemic cardiomyopathy      Low back pain      Lumbago     Created by [facility] Annotation: Apr 6 2012 10:20Anh Ford: Morphine pump      Morbid obesity (H) 8/1/2018     Nephrolithiasis      Nephrolithiasis      Obstructive sleep apnea (adult) (pediatric)     Created by Conversion      Osteoarthritis     Created by [facility] Annotation: Jun 26 2009  9:53AM Zack Brewer: failed lumbar  fusion/morphine pump dr putnam  Replacement Utility updated for latest IMO load     Pure hypercholesterolemia     Created by Conversion      Sleep apnea      Sleepiness 5/8/2018     Past Surgical History:   Procedure Laterality Date     BACK SURGERY       BYPASS GASTRIC DUODENAL SWITCH       C GASTRIC BYPASS,OBESE<100CM LORA-EN-Y  2008    Description: Gastric Surgery For Morbid Obesity Bypass With Lora-en-Y;  Recorded: 06/26/2009;  Comments: dr smith 2008     COSMETIC SURGERY      pannicullectomy     ENT SURGERY       tonsillectomy     EXTRACORPOREAL SHOCK WAVE LITHOTRIPSY, CYSTOSCOPY, INSERT STENT URETER(S), COMBINED  8/23/11     HC REMOVAL OF TONSILS,<12 Y/O      Description: Tonsillectomy;  Recorded: 03/23/2012;  Comments: for obstructive sleep apnea     HC REPAIR INCISIONAL HERNIA,REDUCIBLE  11/13/2012    Description: Incisional Hernia Repair;  Proc Date: 11/13/2012;  Comments: incisional hernia repair and abdominal panniculectomy by Dr Shon Green at the [facility].     HERNIA REPAIR       IR MISCELLANEOUS PROCEDURE  7/29/2011     IR MISCELLANEOUS PROCEDURE  8/5/2011     IR MISCELLANEOUS PROCEDURE  8/23/2011     IR NEPHROSTOMY TUBE CHANGE BILATERAL  8/5/2011     ORTHOPEDIC SURGERY      arthrodesis ant discectomy, lumbar     LA ARTHRODESIS ANT INTERBODY MIN DISCECTOMY,LUMBAR      Description: Lumbar Vertebral Fusion;  Recorded: 06/26/2009;  Comments: before 200 see [facility] consult under old records     LA EXCISE EXCESS SKIN TISSUE,ABDOMEN  11/13/2012    Description: Panniculectomy;  Proc Date: 11/13/2012;  Comments: 3.5 Lb Pannus was removed at the [facility] By Dr. Shon Green     REPLACE INTRATHECAL PAIN PUMP N/A 4/25/2017    Procedure: REPLACE INTRATHECAL PAIN PUMP;  INTRATHECAL PAIN PUMP CATHETER REPLACEMENT AND PUMP REPLACEMENT;  Surgeon: Anthony Maloney MD;  Location: [facility]     REVISE CATHETER INTRATHECAL N/A 4/25/2017    Procedure: REVISE CATHETER INTRATHECAL;;  Surgeon: Anthony Maloney MD;  Location: [facility]     SHOULDER SURGERY       Family History   Problem Relation Age of Onset     Cerebrovascular Disease Mother      Heart Disease Father      Hodgkin's lymphoma Brother         Social History     Socioeconomic History     Marital status:      Spouse name: Not on file     Number of children: Not on file     Years of education: Not on file     Highest education level: Not on file   Occupational History     Not on file   Tobacco Use     Smoking status: Former Smoker     Years: 10.00     Types: Cigars,  Cigars     Smokeless tobacco: Never Used   Substance and Sexual Activity     Alcohol use: No     Drug use: No     Comment: Drug use: formerly used     Sexual activity: Not on file   Other Topics Concern     Not on file   Social History Narrative     Not on file     Social Determinants of Health     Financial Resource Strain:      Difficulty of Paying Living Expenses:    Food Insecurity:      Worried About Running Out of Food in the Last Year:      Ran Out of Food in the Last Year:    Transportation Needs:      Lack of Transportation (Medical):      Lack of Transportation (Non-Medical):    Physical Activity:      Days of Exercise per Week:      Minutes of Exercise per Session:    Stress:      Feeling of Stress :    Social Connections:      Frequency of Communication with Friends and Family:      Frequency of Social Gatherings with Friends and Family:      Attends [employer] Services:      Active Member of Clubs or Organizations:      Attends Club or Organization Meetings:      Marital Status:    Intimate Partner Violence:      Fear of Current or Ex-Partner:      Emotionally Abused:      Physically Abused:      Sexually Abused:          Medications  Allergies   Current Outpatient Medications   Medication Sig Dispense Refill     rosuvastatin (CRESTOR) 40 MG tablet Take 1 tablet (40 mg) by mouth daily 90 tablet 3        Allergies   Allergen Reactions     Hydrocodone-Acetaminophen          Paty Ochoa MD

## 2021-09-13 NOTE — PROGRESS NOTES
[facility]    Medicine Progress Note - Hospitalist Service       Date of Admission:  9/11/2021    Assessment & Plan           Onur Barr is a 65 year old male admitted on 9/11/2021. He has history of CAD s/p PCI LAD in 2012, paroxysmal afib, MARLEEN, Htn, GERD, dyslipidemia, chronic diastolic CHF, morbid obesity, chronic back pain with implanted pain med pump, chronic LE lymphedema who presented with chest pain and found to have inferior STEMI s/p CRYSTAL to RCA with residual prox LAD 70% in stent stenosis     A/P:     #ST elevation MI (STEMI) s/p CRYSTAL to RCA  #Residual In-stent stenosis of proximal LAD stent:   -planning for staged PCI in next 2-4 weeks.  -asa (DAPT x1 month then stop ASA), plavix (changed from initial Brilinta due to cost), crestor.   -Starting Eliquis today  -Cardiac rehab     Essential hypertension:   -home Coreg changed to metoprolol, lisinopril increased to 40, lasix    A fib RVR  Nonsustained V tach  Bigeminy and PVCs  Suspect excessive ectopy either reperfusion related or due to untreated MARLEEN  Continue monitor on tele for now       Lumbago: Implanted pain pump in place, last refilled 2 weeks ago. Continue home Cymbalta    Morbid obesity: History of gastric bypass, history of pannus resection    Bilateral leg edema, chronic: Leg wraps in place, chronic    Chronic diastolic heart failure: Not in exacerbation.  Resume patient's home dose Lasix.    MARLEEN (obstructive sleep apnea: Noncompliant CPAP. Would recommend see sleep clinic next able    Paroxysmal atrial fibrillation: A fib RVR at times on montior, home coreg changed to metoprolol, cardiology starting him on Eliquis    Mixed hyperlipidemia: Rosuvastatin ordered    Gastroesophageal reflux disease without esophagitis: Resume PPI         Diet: Low Saturated Fat Na <2400 mg    DVT Prophylaxis: High risk. Eliquis   Bran Catheter: Not present  Central Lines: None  Code Status: Full Code      Disposition Plan   Expected  discharge: 09/14/2021   recommended to prior living arrangement once cleared by cardiology, dispo concerns from nfamily addressed.     The patient's care was discussed with the Patient.    Luz Marina Smith MD  Hospitalist Service  [facility]  Text page via [facility] Paging/Directory      Clinically Significant Risk Factors Present on Admission               ____________        Physical Exam   Vital Signs: Temp: 98.2  F (36.8  C) Temp src: Oral BP: 107/74 Pulse: 71   Resp: 25 SpO2: (!) 86 % O2 Device: None (Room air)    Weight: 0 lbs 0 oz  General: in no apparent distress, non-toxic and alert morbidlyt obese male sitting in bedside chair oriented x3  HEENT: Head normocephalic atraumatic, oral mucosa moist. Sclerae anicteric  CV: Regular rhythm, normal rate, no murmurs  Resp: No wheezes, no rales or rhonchi, no focal consolidations  GI: Belly soft, nondistended, nontender, bowel sounds present  Skin: No rashes or lesions  Extremities: Legs in wraps  Psych: Normal affect, mood dysthymic  Neuro: CNII-XII grossly intact, moving all 4 extremities      Data   Recent Results (from the past 24 hour(s))   ECG 12-LEAD WITH MUSE (LHE)    Collection Time: 09/13/21  1:26 AM   Result Value Ref Range    Systolic Blood Pressure  mmHg    Diastolic Blood Pressure  mmHg    Ventricular Rate 85 BPM    Atrial Rate 182 BPM    ME Interval  ms    QRS Duration 98 ms     ms    QTc 456 ms    P Axis  degrees    R AXIS -13 degrees    T Axis -52 degrees    Interpretation ECG       Atrial fibrillation with premature ventricular or aberrantly conducted complexes  Inferior infarct (cited on or before 11-SEP-2021)  Possible Anterior infarct (cited on or before 11-SEP-2021)  Abnormal ECG  When compared with ECG of 11-SEP-2021 19:34,  Atrial fibrillation has replaced Sinus rhythm  Vent. rate has increased BY  28 BPM  Serial changes of Anterior infarct Present  Confirmed by GÓMEZ TIERNEY MD LOC:[facility] ([id number]) on 9/13/2021 2:31:40  PM     Basic metabolic panel    Collection Time: 09/13/21  8:33 AM   Result Value Ref Range    Sodium 140 136 - 145 mmol/L    Potassium 3.7 3.5 - 5.0 mmol/L    Chloride 105 98 - 107 mmol/L    Carbon Dioxide (CO2) 25 22 - 31 mmol/L    Anion Gap 10 5 - 18 mmol/L    Urea Nitrogen 21 8 - 22 mg/dL    Creatinine 1.21 0.70 - 1.30 mg/dL    Calcium 9.8 8.5 - 10.5 mg/dL    Glucose 138 (H) 70 - 125 mg/dL    GFR Estimate 63 >60 mL/min/1.73m2   Extra Purple Top Tube    Collection Time: 09/13/21  8:33 AM   Result Value Ref Range    Hold Specimen JIC      ____________  Interval History   Data reviewed today: I reviewed all medications, new labs and imaging results over the last 24 hours. I personally reviewed no images or EKG's today.  patient states doing fine today and would like to go home. denies chest pain or dyspnea. having a lot of ectopy on monitor, A fib RVR, bigeminy, PVCs, and nonsustained V tach. could be due to recent intervention or maybe untreated MARLEEN. cardiac rehab to see today. patient having issues affording meds that were sent to pharmacy. cardiology may need to choose alternate meds. (patient did not recall which one). patient declined for me to call and update any family members.

## 2021-09-14 ENCOUNTER — APPOINTMENT (OUTPATIENT)
Dept: OCCUPATIONAL THERAPY | Facility: HOSPITAL | Age: 66
End: 2021-09-14
Attending: INTERNAL MEDICINE
Payer: COMMERCIAL

## 2021-09-14 VITALS
HEART RATE: 50 BPM | RESPIRATION RATE: 9 BRPM | TEMPERATURE: 98 F | SYSTOLIC BLOOD PRESSURE: 107 MMHG | DIASTOLIC BLOOD PRESSURE: 69 MMHG | OXYGEN SATURATION: 94 %

## 2021-09-14 LAB
ANION GAP SERPL CALCULATED.3IONS-SCNC: 9 MMOL/L (ref 5–18)
BUN SERPL-MCNC: 25 MG/DL (ref 8–22)
CALCIUM SERPL-MCNC: 9.1 MG/DL (ref 8.5–10.5)
CHLORIDE BLD-SCNC: 105 MMOL/L (ref 98–107)
CO2 SERPL-SCNC: 27 MMOL/L (ref 22–31)
CREAT SERPL-MCNC: 1.18 MG/DL (ref 0.7–1.3)
GFR SERPL CREATININE-BSD FRML MDRD: 65 ML/MIN/1.73M2
GLUCOSE BLD-MCNC: 111 MG/DL (ref 70–125)
MAGNESIUM SERPL-MCNC: 2.1 MG/DL (ref 1.8–2.6)
POTASSIUM BLD-SCNC: 3.3 MMOL/L (ref 3.5–5)
POTASSIUM BLD-SCNC: 3.5 MMOL/L (ref 3.5–5)
SODIUM SERPL-SCNC: 141 MMOL/L (ref 136–145)

## 2021-09-14 PROCEDURE — 36415 COLL VENOUS BLD VENIPUNCTURE: CPT | Performed by: FAMILY MEDICINE

## 2021-09-14 PROCEDURE — 250N000013 HC RX MED GY IP 250 OP 250 PS 637: Performed by: FAMILY MEDICINE

## 2021-09-14 PROCEDURE — 97535 SELF CARE MNGMENT TRAINING: CPT | Mod: GO

## 2021-09-14 PROCEDURE — 250N000013 HC RX MED GY IP 250 OP 250 PS 637: Performed by: INTERNAL MEDICINE

## 2021-09-14 PROCEDURE — 99233 SBSQ HOSP IP/OBS HIGH 50: CPT | Performed by: INTERNAL MEDICINE

## 2021-09-14 PROCEDURE — 80048 BASIC METABOLIC PNL TOTAL CA: CPT | Performed by: HOSPITALIST

## 2021-09-14 PROCEDURE — 250N000013 HC RX MED GY IP 250 OP 250 PS 637: Performed by: HOSPITALIST

## 2021-09-14 PROCEDURE — 83735 ASSAY OF MAGNESIUM: CPT | Performed by: INTERNAL MEDICINE

## 2021-09-14 PROCEDURE — 36415 COLL VENOUS BLD VENIPUNCTURE: CPT | Performed by: INTERNAL MEDICINE

## 2021-09-14 PROCEDURE — 36415 COLL VENOUS BLD VENIPUNCTURE: CPT | Performed by: HOSPITALIST

## 2021-09-14 PROCEDURE — 99239 HOSP IP/OBS DSCHRG MGMT >30: CPT | Performed by: FAMILY MEDICINE

## 2021-09-14 PROCEDURE — 84132 ASSAY OF SERUM POTASSIUM: CPT | Performed by: FAMILY MEDICINE

## 2021-09-14 RX ORDER — AMOXICILLIN 250 MG
1 CAPSULE ORAL 2 TIMES DAILY PRN
Qty: 60 TABLET | Refills: 0 | Status: SHIPPED | OUTPATIENT
Start: 2021-09-14 | End: 2022-02-28

## 2021-09-14 RX ORDER — PANTOPRAZOLE SODIUM 40 MG/1
40 TABLET, DELAYED RELEASE ORAL
Status: CANCELLED | OUTPATIENT
Start: 2021-09-14

## 2021-09-14 RX ORDER — POTASSIUM CHLORIDE 1500 MG/1
40 TABLET, EXTENDED RELEASE ORAL ONCE
Status: COMPLETED | OUTPATIENT
Start: 2021-09-14 | End: 2021-09-14

## 2021-09-14 RX ORDER — CLOPIDOGREL BISULFATE 75 MG/1
75 TABLET ORAL DAILY
Qty: 30 TABLET | Refills: 0 | Status: SHIPPED | OUTPATIENT
Start: 2021-09-15 | End: 2022-02-04

## 2021-09-14 RX ORDER — LISINOPRIL 20 MG/1
20 TABLET ORAL DAILY
Qty: 30 TABLET | Refills: 0 | Status: SHIPPED | OUTPATIENT
Start: 2021-09-15 | End: 2022-01-12

## 2021-09-14 RX ORDER — BACLOFEN 10 MG/1
10 TABLET ORAL 2 TIMES DAILY PRN
Qty: 30 TABLET | Refills: 0 | Status: SHIPPED | OUTPATIENT
Start: 2021-09-14 | End: 2022-05-31

## 2021-09-14 RX ORDER — METOPROLOL SUCCINATE 50 MG/1
50 TABLET, EXTENDED RELEASE ORAL DAILY
Status: DISCONTINUED | OUTPATIENT
Start: 2021-09-15 | End: 2021-09-14 | Stop reason: HOSPADM

## 2021-09-14 RX ORDER — POTASSIUM CHLORIDE 1500 MG/1
20 TABLET, EXTENDED RELEASE ORAL DAILY
Qty: 60 TABLET | Refills: 1 | Status: ON HOLD | OUTPATIENT
Start: 2021-09-14 | End: 2022-01-25

## 2021-09-14 RX ORDER — METOPROLOL SUCCINATE 50 MG/1
50 TABLET, EXTENDED RELEASE ORAL DAILY
Qty: 30 TABLET | Refills: 0 | Status: SHIPPED | OUTPATIENT
Start: 2021-09-15 | End: 2021-10-19

## 2021-09-14 RX ADMIN — LISINOPRIL 20 MG: 20 TABLET ORAL at 08:00

## 2021-09-14 RX ADMIN — ASPIRIN 81 MG: 81 TABLET, COATED ORAL at 08:00

## 2021-09-14 RX ADMIN — METOPROLOL TARTRATE 50 MG: 25 TABLET, FILM COATED ORAL at 08:01

## 2021-09-14 RX ADMIN — CLOPIDOGREL BISULFATE 75 MG: 75 TABLET ORAL at 08:01

## 2021-09-14 RX ADMIN — ROSUVASTATIN CALCIUM 40 MG: 40 TABLET, FILM COATED ORAL at 08:00

## 2021-09-14 RX ADMIN — FUROSEMIDE 40 MG: 40 TABLET ORAL at 08:01

## 2021-09-14 RX ADMIN — POTASSIUM CHLORIDE 40 MEQ: 20 TABLET, EXTENDED RELEASE ORAL at 11:26

## 2021-09-14 RX ADMIN — DULOXETINE 30 MG: 30 CAPSULE, DELAYED RELEASE ORAL at 07:57

## 2021-09-14 RX ADMIN — PANTOPRAZOLE SODIUM 40 MG: 20 TABLET, DELAYED RELEASE ORAL at 06:37

## 2021-09-14 RX ADMIN — APIXABAN 5 MG: 5 TABLET, FILM COATED ORAL at 08:01

## 2021-09-14 NOTE — PLAN OF CARE
Occupational Therapy Discharge Summary    Reason for therapy discharge:    Discharged to home with outpatient therapy.    Progress towards therapy goal(s). See goals on Care Plan in [facility] electronic health record for goal details.  Goals partially met.  Barriers to achieving goals:   limited tolerance for therapy.    Therapy recommendation(s):    Continued therapy is recommended.  Rationale/Recommendations:  Outpt. cardiac rehab.

## 2021-09-14 NOTE — PLAN OF CARE
Lymphedema Discharge Summary    Reason for therapy discharge:    Discharged to home.    Progress towards therapy goal(s). See goals on Care Plan in [facility] electronic health record for goal details.  Goals met    Therapy recommendation(s):    No further therapy is recommended.

## 2021-09-14 NOTE — PLAN OF CARE
Problem: Risk for Delirium  Goal: Improved Sleep  Outcome: Improving     Problem: Pain (Cardiac Catheterization)  Goal: Acceptable Pain Control  Outcome: Improving  Intervention: Prevent or Manage Pain  Recent Flowsheet Documentation  Taken 9/13/2021 2336 by Zahira Hannah RN  Pain Management Interventions: declines     Bradycardic at rest, sats intermittently drop to upper 80's while asleep, short periods of apnea noted, initiated 2L O2 via nasal cannula.  Denies SOB or chest pain, BP's stable.  Tele: sinus gideon, frequent PVC's, bigeminy, asymptomatic.  Endorses exhaustion, cares clustered to promote adequate rest.

## 2021-09-14 NOTE — PROGRESS NOTES
Care Management Discharge Note    Discharge Date: 09/14/2021       Discharge Disposition:  home    Discharge Services:  Plan for home with outpatient cardiac rehab (OP CR)      Discharge Transportation: wife

## 2021-09-14 NOTE — DISCHARGE SUMMARY
[facility] MEDICINE  DISCHARGE SUMMARY     Primary Care Physician: Luann Berger  Admission Date: 9/11/2021   Discharge Provider: Donna La MD Discharge Date: 9/14/2021   Diet:   Active Diet and Nourishment Order   Procedures     Low Saturated Fat Na <2400 mg     Diet       Code Status: Full Code   Activity: activity per cardiology        Condition at Discharge: Stable     REASON FOR PRESENTATION(See Admission Note for Details)   Please refer to H&P    PRINCIPAL & ACTIVE DISCHARGE DIAGNOSES     Principal Problem:    ST elevation MI (STEMI) (H)  Active Problems:    Lumbago    Morbid obesity (H)    Bilateral leg edema    Chronic diastolic heart failure (H)    MARLEEN (obstructive sleep apnea)    Coronary artery disease involving native coronary artery of native heart with angina pectoris (H)    Percutaneous transluminal coronary angioplasty status    Paroxysmal atrial fibrillation (H)    Essential hypertension    Mixed hyperlipidemia    Gastroesophageal reflux disease without esophagitis      PENDING LABS     Unresulted Labs Ordered in the Past 30 Days of this Admission     No orders found from 8/12/2021 to 9/12/2021.            PROCEDURES ( this hospitalization only)      Procedure(s):  Coronary Angiogram  Left Heart Cath  Percutaneous Coronary Intervention  Percutaneous Coronary Intervention Aspiration Thrombectomy    RECOMMENDATIONS TO OUTPATIENT PROVIDER FOR F/U VISIT     Follow-up Appointments     Follow-up and recommended labs and tests       1. Follow-up with primary care doctor (Luann Berger) in 2-3 days   for hospital follow-up. Monitor potassium level, hemoglobin level.   2.  continue with Cardiac rehab on discharge  3.  Will return in 2 to 4 weeks for staged PCI to LAD with cardiology  4.  Continue aspirin. Continue aspirin for a month and then stop and once   Plavix treatment has ended after a year post stent may resume aspirin.       5. Follow-up in  post intervention clinic in about a week and may follow-up   with Dr. Paty Ochoa in 4 to 6 weeks                 DISPOSITION     Home    SUMMARY OF HOSPITAL COURSE:      #ST elevation MI (STEMI) s/p CRYSTAL to RCA  #Residual In-stent stenosis of proximal LAD stent:   -planning for staged PCI in next 2-4 weeks.  -Patient will be discharged with asa (DAPT x1 month then stop ASA and then continue plavix for the remainder of the year. After a year of plavix, stop plavix and resume aspirin.) and plavix (changed from initial Brilinta due to cost), and crestor. Patient will stop taking lipitor on discharge.   -started on eliquis during this admission which patient will be discharged with. Different AC options were discussed with patient and his wife prior to discharge including using warfarin which would be cheaper; however, wife and patient wanted patient to be on eliquis instead of warfarin.   -patient to have cardiac rehab on discharge  - Follow-up in post intervention clinic in about a week and may follow-up   with Dr. Paty Ochoa in 4 to 6 weeks        #Essential hypertension:   -home Coreg changed to metoprolol which patient will c/w on discharge  - lisinopril decreased to 20 mg PO every day which patient will c/w on discharge  -patient will c/w home lasix on discharge  -PCP should monitor BP in OP setting and adjust BP regimen PRN.        #A fib RVR  Nonsustained V tach  Bigeminy and PVCs  Suspect excessive ectopy either reperfusion related or due to untreated MARLEEN  C/w eliquis on discharge        #Lumbago: Implanted pain pump in place, last refilled 2 weeks ago. Started on baclofen during this admission which patient will c/w on discharge. C/w home cymbalta on discharge.       #Hypokalemia  -PCP should monitor K in OP setting.       #Anemia  -PCP should monitor Hgb in OP setting.       Patient was found to be medically stable for discharge on 9/14/2021 to home. Patient will continue with all his home meds  except for the medication changes listed above.       Discharge Medications with Med changes:     Current Discharge Medication List      START taking these medications    Details   apixaban ANTICOAGULANT (ELIQUIS) 5 MG tablet Take 1 tablet (5 mg) by mouth 2 times daily  Qty: 60 tablet, Refills: 0    Associated Diagnoses: Coronary artery disease involving native coronary artery of native heart with angina pectoris (H)      baclofen (LIORESAL) 10 MG tablet Take 1 tablet (10 mg) by mouth 2 times daily as needed for muscle spasms  Qty: 30 tablet, Refills: 0    Associated Diagnoses: Low back pain, unspecified back pain laterality, unspecified chronicity, unspecified whether sciatica present      clopidogrel (PLAVIX) 75 MG tablet Take 1 tablet (75 mg) by mouth daily  Qty: 30 tablet, Refills: 0    Associated Diagnoses: Coronary artery disease involving native coronary artery of native heart with angina pectoris (H)      melatonin 1 MG TABS tablet Take 1 tablet (1 mg) by mouth nightly as needed for sleep  Qty: 30 tablet, Refills: 0    Associated Diagnoses: Insomnia, unspecified type      metoprolol succinate ER (TOPROL-XL) 50 MG 24 hr tablet Take 1 tablet (50 mg) by mouth daily  Qty: 30 tablet, Refills: 0    Associated Diagnoses: Coronary artery disease involving native coronary artery of native heart with angina pectoris (H)      rosuvastatin (CRESTOR) 40 MG tablet Take 1 tablet (40 mg) by mouth daily  Qty: 90 tablet, Refills: 3    Associated Diagnoses: Coronary artery disease involving native coronary artery of native heart with angina pectoris (H)      senna-docusate (SENOKOT-S/PERICOLACE) 8.6-50 MG tablet Take 1 tablet by mouth 2 times daily as needed for constipation  Qty: 60 tablet, Refills: 0    Associated Diagnoses: Constipation, unspecified constipation type         CONTINUE these medications which have CHANGED    Details   lisinopril (ZESTRIL) 20 MG tablet Take 1 tablet (20 mg) by mouth daily  Qty: 30 tablet,  Refills: 0    Associated Diagnoses: Coronary artery disease involving native coronary artery of native heart with angina pectoris (H)      potassium chloride ER (KLOR-CON M) 20 MEQ CR tablet Take 1 tablet (20 mEq) by mouth daily (take with furosemide/Lasix)  Qty: 60 tablet, Refills: 1    Associated Diagnoses: Diastolic dysfunction         CONTINUE these medications which have NOT CHANGED    Details   ASPIRIN EC PO Take 81 mg by mouth daily      DULoxetine (CYMBALTA) 30 MG capsule Take by mouth 2 times daily 30 mg in AM  60 mg in PM      Ferrous Gluconate (IRON) 240 (27 Fe) MG TABS Take 1 tablet by mouth daily      furosemide (LASIX) 40 MG tablet Take 1 tablet (40 mg) by mouth 2 times daily Take in morning and early afternoon.  Take with potassium.  Qty: 60 tablet, Refills: 1    Associated Diagnoses: Bilateral leg edema; Diastolic dysfunction      gabapentin (NEURONTIN) 300 MG capsule Take 600 mg by mouth 3 times daily       MORPHINE SULFATE IT pump:updated 7/14  Medications in Pump:   [facility] Responsible for pump medications:   Conc:  Morphine 20mg/ml(3.95 mg/day), clonidine 21.1mcg/ml (4.168 mcg/day), baclofen 42.5mcg/ml (8.395mcg/day)  Rate:   Pump Last Fill Date: 2/21  Pump Refill Date:8/20/21      Omeprazole (PRILOSEC PO) Take 40 mg by mouth daily      traZODone (DESYREL) 100 MG tablet Take 200 mg by mouth At Bedtime          STOP taking these medications       atorvastatin (LIPITOR) 80 MG tablet Comments:   Reason for Stopping:         carvedilol ER (COREG CR) 20 MG 24 hr capsule Comments:   Reason for Stopping:                     Rationale for medication changes:      Please refer to Summary of Hospital Course section         Consults       NUTRITION SERVICES ADULT IP CONSULT  CARDIAC REHAB IP CONSULT  PHARMACY IP CONSULT  PHARMACY IP CONSULT  HOSPITALIST IP CONSULT  LYMPHEDEMA THERAPY IP CONSULT  SOCIAL WORK IP CONSULT  CARDIAC REHAB IP CONSULT  PHARMACY TO DOSE WARFARIN  CARDIAC REHAB IP  CONSULT  SMOKING CESSATION PROGRAM IP CONSULT    Immunizations given this encounter     Most Recent Immunizations   Administered Date(s) Administered     COVID-19,PF,Moderna 04/16/2021     FLU 6-35 months 09/24/2012     [id number] Novel Flu 09/24/2012     Influenza (H1N1) 09/24/2012     Influenza (IIV3) PF 10/23/2017     Influenza Vaccine IM > 6 months Valent IIV4 (Alfuria,Fluzone) 09/25/2020     Pneumococcal 23 valent 06/26/2009     TD (ADULT, 7+) 04/01/1999     Tdap (Adacel,Boostrix) 11/20/2019     Tdap (Adult) Unspecified Formulation 12/04/2008           Anticoagulation Information      Recent INR results:   Recent Labs   Lab 09/11/21  1329   INR 1.00     Warfarin doses (if applicable) or name of other anticoagulant: eliquis      SIGNIFICANT IMAGING FINDINGS     Results for orders placed or performed during the hospital encounter of 09/11/21   Echocardiogram Complete   Result Value Ref Range    LVEF  55-60%        SIGNIFICANT LABORATORY FINDINGS     Most Recent 3 BMP's:Recent Labs   Lab Test 09/14/21  1001 09/14/21  0433 09/13/21  0833 09/12/21  0450   NA  --  141 140 140   POTASSIUM 3.5 3.3* 3.7 3.9   CHLORIDE  --  105 105 107   CO2  --  27 25 25   BUN  --  25* 21 18   CR  --  1.18 1.21 0.98   ANIONGAP  --  9 10 8   RATNA  --  9.1 9.8 9.2   GLC  --  111 138* 118     Most Recent 3 Troponin's:Recent Labs   Lab Test 07/13/21  1536   TROPONIN <0.015         Discharge Orders        Ambulatory CARDIAC REHAB REFERRAL      Home Care PT Referral for Hospital Discharge      Home Care OT Referral for Hospital Discharge      Cardiac Rehab Referral      Reason for your hospital stay    Onur Barr is a 65 year old male admitted on 9/11/2021. He has history of CAD s/p PCI LAD in 2012, paroxysmal afib, MARLEEN, Htn, GERD, dyslipidemia, chronic diastolic CHF, morbid obesity, chronic back pain with implanted pain med pump, chronic LE lymphedema who presented with chest pain and found to have inferior STEMI s/p CRYSTAL to RCA with residual  prox LAD 70% in stent stenosis     Follow-up and recommended labs and tests     1. Follow-up with primary care doctor (Luann Berger) in 2-3 days for hospital follow-up. Monitor potassium level, hemoglobin level.   2.  continue with Cardiac rehab on discharge  3.  Will return in 2 to 4 weeks for staged PCI to LAD with cardiology  4.  Continue aspirin. Continue aspirin for a month and then stop and once Plavix treatment has ended after a year post stent may resume aspirin.       5. Follow-up in post intervention clinic in about a week and may follow-up with Dr. Paty Ochoa in 4 to 6 weeks     Activity    Your activity upon discharge: activity per cardiology recommendations     Diet    Follow this diet upon discharge: Orders Placed This Encounter      Low Saturated Fat Na <2400 mg       Examination   Physical Exam   Temp:  [98  F (36.7  C)-98.3  F (36.8  C)] 98  F (36.7  C)  Pulse:  [47-85] 71  Resp:  [9-20] 9  BP: (107-133)/(61-81) 112/67  SpO2:  [86 %-97 %] 94 %  Wt Readings from Last 1 Encounters:   09/09/21 146.5 kg (323 lb)       General Appearance:  Alert, cooperative, no distress, appears stated age. Satting at 94% on room air.    Head:  Normocephalic, without obvious abnormality, atraumatic   Eyes:  conjunctiva/corneas clear   Ears:  Normal both ears   Nose: Nares normal, septum midline    Throat: Lips and mucosa normal   Neck: Supple, symmetrical, trachea midline   Lungs:   Clear to auscultation bilaterally, respirations unlabored   Heart:  Regular rate and rhythm, S1 and S2 normal, no murmur, rub, or gallop   Abdomen:   Soft, non-tender, bowel sounds active all four quadrants   Extremities: Extremities normal, atraumatic           Please see EMR for more detailed significant labs, imaging, consultant notes etc.    IDonna MD, personally saw the patient today and spent greater than 30 minutes discharging this patient.    Donna La MD  [facility]  [facility]    CC:Luann Berger

## 2021-09-14 NOTE — PROGRESS NOTES
HEART CARE CONSULTATON NOTE        Assessment/Recommendations   Assessment:  1.  Inferior STEMI status post PCI to RCA with residual disease in his LAD  2.  Hypertension  3.  Dyslipidemia  4.  Morbid obesity  5.  Chronic back pain  6.  MARLEEN  7.  Lymphedema  8.  Atrial fibrillation: History of paroxysmal atrial fibrillation.    Paroxysmal atrial fibrillation converted to normal sinus rhythm yesterday.  Asymptomatic.  9.  [id number] score of 3 for coronary disease, hypertension, heart failure and recommend anticoagulation     Plan:  1.  Cardiac rehab  2.  Will return in 2 to 4 weeks for staged PCI to LAD  3.  Continue on lisinopril and high-dose statin therapy  4.  Continue aspirin.  Patient's wife has checked and Brilinta and Eliquis cost is high for them.  Changed to Plavix with a low since yesterday and will change Eliquis to Coumadin for anticoagulation.  Continue aspirin for a month and then stop and once Plavix treatment has ended after a year post stent may resume aspirin.  Coumadin will be long-term therapy.    Patient may be discharged today.  Follow-up in post intervention clinic in about a week and may follow-up with me in 4 to 6 weeks     History of Present Illness/Subjective    Feeling well today.  Converted to normal sinus rhythm since yesterday with periods of bradycardia.  No chest pain or breathing difficulty.       Physical Examination  Review of Systems   VITALS: /67   Pulse 71   Temp 98  F (36.7  C)   Resp 9   SpO2 94%   BMI: There is no height or weight on file to calculate BMI.  Wt Readings from Last 3 Encounters:   09/09/21 146.5 kg (323 lb)   07/26/21 147 kg (324 lb)   07/22/21 (!) 155.8 kg (343 lb 8 oz)       Intake/Output Summary (Last 24 hours) at 9/14/2021 1012  Last data filed at 9/14/2021 0900  Gross per 24 hour   Intake 880 ml   Output 1350 ml   Net -470 ml     General Appearance:   no distress, normal body habitus   ENT/Mouth: membranes moist, no oral lesions or bleeding  gums.      EYES:  no scleral icterus, normal conjunctivae   Neck: no carotid bruits or thyromegaly   Chest/Lungs:   lungs are clear to auscultation   Cardiovascular:   Regular. Normal first and second heart sounds with no murmurs   Abdomen:  no organomegaly, masses, bruits, or tenderness; bowel sounds are present   Extremities: no cyanosis or clubbing   Skin: no xanthelasma, warm.    Neurologic: normal  bilateral, no tremors     Psychiatric: alert and oriented x3, calm     Review Of Systems  Skin: negative  Eyes: negative  Ears/Nose/Throat: negative  Respiratory: No shortness of breath, dyspnea on exertion, cough, or hemoptysis  Cardiovascular: negative  Gastrointestinal: negative  Genitourinary: negative  Musculoskeletal: negative  Neurologic: negative  Psychiatric: negative  Hematologic/Lymphatic/Immunologic: negative  Endocrine: negative          Lab Results    Chemistry/lipid CBC Cardiac Enzymes/BNP/TSH/INR   Recent Labs   Lab Test 09/12/21  0450   CHOL 155   HDL 26*   LDL 54   TRIG 374*     Recent Labs   Lab Test 09/12/21  0450 12/07/20  1034 11/20/19  1202   LDL 54 48 52     Recent Labs   Lab Test 09/14/21  0433      POTASSIUM 3.3*   CHLORIDE 105   CO2 27      BUN 25*   CR 1.18   GFRESTIMATED 65   RATNA 9.1     Recent Labs   Lab Test 09/14/21  0433 09/13/21  0833 09/12/21  0450   CR 1.18 1.21 0.98     Recent Labs   Lab Test 02/13/18  1351 11/19/14  1026 06/03/14  1218   A1C 5.6 5.1 5.1          Recent Labs   Lab Test 09/12/21  0450 09/11/21  1329   WBC  --  7.9   HGB 11.8* 13.8   HCT  --  41.9   MCV  --  87   PLT  --  218     Recent Labs   Lab Test 09/12/21  0450 09/11/21  1329 07/14/21  0537   HGB 11.8* 13.8 12.5*    Recent Labs   Lab Test 09/12/21  0450 09/11/21  1329   TROPONINI 15.04* 0.48*     Recent Labs   Lab Test 09/11/21  1329 07/13/21  1536   *  --    NTBNPI  --  58     No results for input(s): TSH in the last 33789 hours.  Recent Labs   Lab Test 09/11/21  1329   INR 1.00         Medical History  Surgical History Family History Social History   Past Medical History:   Diagnosis Date     Acid reflux disease 10/31/2017     Arrhythmia     paroxysmal atrial fibrillation     Arthritis      Atrial fibrillation (H)     Created by Conversion      Bariatric surgery status     Created by Conversion      CHF (congestive heart failure) (H)      Coronary artery disease      Coronary atherosclerosis     Created by Conversion  Replacement Utility updated for latest IMO load     Diabetes (H)     typr II resloved     Essential hypertension     Created by [facility] Annotation: Apr 6 2012 10:16Zack Harris: Lisinipril,  coreg  Replacement Utility updated for latest IMO load     H/O gastric bypass      Heart attack (H)      Hypercholesteremia      Hypertension      Insomnia      Insomnia, unspecified     Created by [facility] Annotation: Sep 11 2013  9:56AM Zack Brewer: Trouble  maintaining sleep-Trazodone-minimal helpzolpidem-      Ischemic cardiomyopathy      Low back pain      Lumbago     Created by [facility] Annotation: Apr 6 2012 10:20Anh Ford: Morphine pump      Morbid obesity (H) 8/1/2018     Nephrolithiasis      Nephrolithiasis      Obstructive sleep apnea (adult) (pediatric)     Created by Conversion      Osteoarthritis     Created by [facility] Annotation: Jun 26 2009  9:53AM Zack Brewer: failed lumbar  fusion/morphine pump dr putnam  Replacement Utility updated for latest IMO load     Pure hypercholesterolemia     Created by Conversion      Sleep apnea      Sleepiness 5/8/2018     Past Surgical History:   Procedure Laterality Date     BACK SURGERY       BYPASS GASTRIC DUODENAL SWITCH       C GASTRIC BYPASS,OBESE<100CM LORA-EN-Y  2008    Description: Gastric Surgery For Morbid Obesity Bypass With Lora-en-Y;  Recorded: 06/26/2009;  Comments: dr smith 2008     COSMETIC SURGERY      pannicullectomy     ENT SURGERY       tonsillectomy     EXTRACORPOREAL SHOCK WAVE LITHOTRIPSY, CYSTOSCOPY, INSERT STENT URETER(S), COMBINED  8/23/11     HC REMOVAL OF TONSILS,<13 Y/O      Description: Tonsillectomy;  Recorded: 03/23/2012;  Comments: for obstructive sleep apnea     HC REPAIR INCISIONAL HERNIA,REDUCIBLE  11/13/2012    Description: Incisional Hernia Repair;  Proc Date: 11/13/2012;  Comments: incisional hernia repair and abdominal panniculectomy by Dr Shon Green at the [facility].     HERNIA REPAIR       IR MISCELLANEOUS PROCEDURE  7/29/2011     IR MISCELLANEOUS PROCEDURE  8/5/2011     IR MISCELLANEOUS PROCEDURE  8/23/2011     IR NEPHROSTOMY TUBE CHANGE BILATERAL  8/5/2011     ORTHOPEDIC SURGERY      arthrodesis ant discectomy, lumbar     MI ARTHRODESIS ANT INTERBODY MIN DISCECTOMY,LUMBAR      Description: Lumbar Vertebral Fusion;  Recorded: 06/26/2009;  Comments: before 200 see [facility] consult under old records     MI EXCISE EXCESS SKIN TISSUE,ABDOMEN  11/13/2012    Description: Panniculectomy;  Proc Date: 11/13/2012;  Comments: 3.5 Lb Pannus was removed at the [facility] By Dr. Shon Green     REPLACE INTRATHECAL PAIN PUMP N/A 4/25/2017    Procedure: REPLACE INTRATHECAL PAIN PUMP;  INTRATHECAL PAIN PUMP CATHETER REPLACEMENT AND PUMP REPLACEMENT;  Surgeon: Anthony Maloney MD;  Location: [facility]     REVISE CATHETER INTRATHECAL N/A 4/25/2017    Procedure: REVISE CATHETER INTRATHECAL;;  Surgeon: Anthony Maloney MD;  Location: [facility]     SHOULDER SURGERY       Family History   Problem Relation Age of Onset     Cerebrovascular Disease Mother      Heart Disease Father      Hodgkin's lymphoma Brother         Social History     Socioeconomic History     Marital status:      Spouse name: Not on file     Number of children: Not on file     Years of education: Not on file     Highest education level: Not on file   Occupational History     Not on file   Tobacco Use     Smoking status: Former Smoker     Years: 10.00     Types: Cigars,  Cigars     Smokeless tobacco: Never Used   Substance and Sexual Activity     Alcohol use: No     Drug use: No     Comment: Drug use: formerly used     Sexual activity: Not on file   Other Topics Concern     Not on file   Social History Narrative     Not on file     Social Determinants of Health     Financial Resource Strain:      Difficulty of Paying Living Expenses:    Food Insecurity:      Worried About Running Out of Food in the Last Year:      Ran Out of Food in the Last Year:    Transportation Needs:      Lack of Transportation (Medical):      Lack of Transportation (Non-Medical):    Physical Activity:      Days of Exercise per Week:      Minutes of Exercise per Session:    Stress:      Feeling of Stress :    Social Connections:      Frequency of Communication with Friends and Family:      Frequency of Social Gatherings with Friends and Family:      Attends Church Services:      Active Member of Clubs or Organizations:      Attends Club or Organization Meetings:      Marital Status:    Intimate Partner Violence:      Fear of Current or Ex-Partner:      Emotionally Abused:      Physically Abused:      Sexually Abused:          Medications  Allergies   Current Outpatient Medications   Medication Sig Dispense Refill     rosuvastatin (CRESTOR) 40 MG tablet Take 1 tablet (40 mg) by mouth daily 90 tablet 3        Allergies   Allergen Reactions     Hydrocodone-Acetaminophen          Paty Ochoa MD

## 2021-09-15 ENCOUNTER — PATIENT OUTREACH (OUTPATIENT)
Dept: CARE COORDINATION | Facility: CLINIC | Age: 66
End: 2021-09-15

## 2021-09-15 ENCOUNTER — TELEPHONE (OUTPATIENT)
Dept: FAMILY MEDICINE | Facility: CLINIC | Age: 66
End: 2021-09-15

## 2021-09-15 DIAGNOSIS — Z71.89 OTHER SPECIFIED COUNSELING: ICD-10-CM

## 2021-09-15 DIAGNOSIS — I25.119 CORONARY ARTERY DISEASE INVOLVING NATIVE CORONARY ARTERY OF NATIVE HEART WITH ANGINA PECTORIS (H): Primary | ICD-10-CM

## 2021-09-15 RX ORDER — NITROGLYCERIN 0.4 MG/1
TABLET SUBLINGUAL
Qty: 30 TABLET | Refills: 1 | Status: SHIPPED | OUTPATIENT
Start: 2021-09-15

## 2021-09-15 NOTE — TELEPHONE ENCOUNTER
Pt informed that med is not on med list and that PCP is checking with cardiology. He is fine with this plan and is fine waiting for meds to be filled.

## 2021-09-15 NOTE — TELEPHONE ENCOUNTER
Please inform the patient that I do not see any nitroglycerin in his medication list, and I am going to ask the cardiologist if you should be on nitroglycerin at this time.  Once the anxiety is gotten I will send in a prescription for him.

## 2021-09-15 NOTE — PROGRESS NOTES
"Clinic Care Coordination Contact  Community Health Worker Initial Outreach            Patient accepts CC: Patient stated he was doing good and had no question or concerns and not intersted in CC at this time. He stated the only thing he needed was his nitroglycerin Tabs for he didn't have any so routing request to PCP.    Anh Ward  [phone]  Care           [facility]: Post-Discharge Note  SITUATION                                                      Admission:    Admission Date: 09/11/21   Reason for Admission: ST elevation MI  Discharge:   Discharge Date: 09/14/21  Discharge Diagnosis: ST elevation MI    BACKGROUND                                                      Onur Barr is a 65 year old male admitted on 9/11/2021. He has history of CAD s/p PCI LAD in 2012, paroxysmal afib, MARLEEN, Htn, GERD, dyslipidemia, chronic diastolic CHF, morbid obesity, chronic back pain with implanted pain med pump, chronic LE lymphedema who presented with chest pain and found to have inferior STEMI s/p CRYSTAL to RCA with residual prox LAD 70% in stent stenosis    ASSESSMENT      Enrollment  Primary Care Care Coordination Status: Declined    Discharge Assessment  How are you doing now that you are home?: \" Good\"  How are your symptoms? (Red Flag symptoms escalate to triage hotline per guidelines): Improved  Do you feel your condition is stable enough to be safe at home until your provider visit?: Yes  Does the patient have their discharge instructions? : Yes  Does the patient have questions regarding their discharge instructions? : No  Were you started on any new medications or were there changes to any of your previous medications? : Yes  Does the patient have all of their medications?: No (see comment) (Nitroglycerin pills dont have any)  Do you have questions regarding any of your medications? : No  Do you have all of your needed medical supplies or equipment (DME)?  (i.e. oxygen tank, CPAP, " cane, etc.): Yes  Discharge follow-up appointment scheduled within 14 calendar days? : Yes  Discharge Follow Up Appointment Date: 09/17/21  Discharge Follow Up Appointment Scheduled with?: Specialty Care Provider    Post-op ([facility] Only)  If the patient had a surgery or procedure, do they have any questions for a nurse?: No             PLAN                                                      Outpatient Plan:  1. Follow-up with primary care doctor (Luann Berger) in 2-3 days for hospital follow-up. Monitor potassium level, hemoglobin level.   2.  continue with Cardiac rehab on discharge  3.  Will return in 2 to 4 weeks for staged PCI to LAD with cardiology  4.  Continue aspirin. Continue aspirin for a month and then stop and once Plavix treatment has ended after a year post stent may resume aspirin.       5. Follow-up in post intervention clinic in about a week and may follow-up with Dr. Paty Ochoa in 4 to 6 weeks          Activity     Your activity upon discharge: activity per cardiology recommendations          Diet     Follow this diet upon discharge: Orders Placed This Encounter      Low Saturated Fat Na <2400 mg         Future Appointments   Date Time Provider Department Center   9/20/2021 10:00 AM Luann Berger MD [id number]         For any urgent concerns, please contact our 24 hour nurse triage line: [phone] ([phone])         Anh Ward MA

## 2021-09-19 LAB
ATRIAL RATE - MUSE: 76 BPM
DIASTOLIC BLOOD PRESSURE - MUSE: 77 MMHG
INTERPRETATION ECG - MUSE: NORMAL
P AXIS - MUSE: 52 DEGREES
PR INTERVAL - MUSE: 188 MS
QRS DURATION - MUSE: 92 MS
QT - MUSE: 398 MS
QTC - MUSE: 447 MS
R AXIS - MUSE: 65 DEGREES
SYSTOLIC BLOOD PRESSURE - MUSE: 152 MMHG
T AXIS - MUSE: 80 DEGREES
VENTRICULAR RATE- MUSE: 76 BPM

## 2021-09-20 ENCOUNTER — OFFICE VISIT (OUTPATIENT)
Dept: FAMILY MEDICINE | Facility: CLINIC | Age: 66
End: 2021-09-20
Payer: COMMERCIAL

## 2021-09-20 VITALS
WEIGHT: 315 LBS | HEART RATE: 58 BPM | SYSTOLIC BLOOD PRESSURE: 126 MMHG | DIASTOLIC BLOOD PRESSURE: 80 MMHG | BODY MASS INDEX: 43.63 KG/M2 | OXYGEN SATURATION: 95 %

## 2021-09-20 DIAGNOSIS — Z09 HOSPITAL DISCHARGE FOLLOW-UP: Primary | ICD-10-CM

## 2021-09-20 DIAGNOSIS — M54.41 CHRONIC MIDLINE LOW BACK PAIN WITH BILATERAL SCIATICA: ICD-10-CM

## 2021-09-20 DIAGNOSIS — G89.29 CHRONIC MIDLINE LOW BACK PAIN WITH BILATERAL SCIATICA: ICD-10-CM

## 2021-09-20 DIAGNOSIS — I25.119 CORONARY ARTERY DISEASE INVOLVING NATIVE CORONARY ARTERY OF NATIVE HEART WITH ANGINA PECTORIS (H): ICD-10-CM

## 2021-09-20 DIAGNOSIS — M54.42 CHRONIC MIDLINE LOW BACK PAIN WITH BILATERAL SCIATICA: ICD-10-CM

## 2021-09-20 DIAGNOSIS — I25.2 HX OF ST ELEVATION MYOCARDIAL INFARCTION: ICD-10-CM

## 2021-09-20 LAB
ANION GAP SERPL CALCULATED.3IONS-SCNC: 12 MMOL/L (ref 5–18)
BUN SERPL-MCNC: 28 MG/DL (ref 8–22)
CALCIUM SERPL-MCNC: 9.1 MG/DL (ref 8.5–10.5)
CHLORIDE BLD-SCNC: 105 MMOL/L (ref 98–107)
CHOLEST SERPL-MCNC: 126 MG/DL
CO2 SERPL-SCNC: 23 MMOL/L (ref 22–31)
CREAT SERPL-MCNC: 1.3 MG/DL (ref 0.7–1.3)
ERYTHROCYTE [DISTWIDTH] IN BLOOD BY AUTOMATED COUNT: 13.4 % (ref 10–15)
FASTING STATUS PATIENT QL REPORTED: ABNORMAL
GFR SERPL CREATININE-BSD FRML MDRD: 58 ML/MIN/1.73M2
GLUCOSE BLD-MCNC: 107 MG/DL (ref 70–125)
HCT VFR BLD AUTO: 39.7 % (ref 40–53)
HDLC SERPL-MCNC: 29 MG/DL
HGB BLD-MCNC: 13.5 G/DL (ref 13.3–17.7)
LDLC SERPL CALC-MCNC: 49 MG/DL
MCH RBC QN AUTO: 29 PG (ref 26.5–33)
MCHC RBC AUTO-ENTMCNC: 34 G/DL (ref 31.5–36.5)
MCV RBC AUTO: 85 FL (ref 78–100)
PLATELET # BLD AUTO: 224 10E3/UL (ref 150–450)
POTASSIUM BLD-SCNC: 4.3 MMOL/L (ref 3.5–5)
RBC # BLD AUTO: 4.66 10E6/UL (ref 4.4–5.9)
SODIUM SERPL-SCNC: 140 MMOL/L (ref 136–145)
TRIGL SERPL-MCNC: 242 MG/DL
WBC # BLD AUTO: 6.4 10E3/UL (ref 4–11)

## 2021-09-20 PROCEDURE — 80061 LIPID PANEL: CPT | Performed by: FAMILY MEDICINE

## 2021-09-20 PROCEDURE — 99495 TRANSJ CARE MGMT MOD F2F 14D: CPT | Performed by: FAMILY MEDICINE

## 2021-09-20 PROCEDURE — 85027 COMPLETE CBC AUTOMATED: CPT | Performed by: FAMILY MEDICINE

## 2021-09-20 PROCEDURE — 80048 BASIC METABOLIC PNL TOTAL CA: CPT | Performed by: FAMILY MEDICINE

## 2021-09-20 PROCEDURE — 36415 COLL VENOUS BLD VENIPUNCTURE: CPT | Performed by: FAMILY MEDICINE

## 2021-09-20 NOTE — PROGRESS NOTES
Assessment & Plan     Hospital discharge follow-up    Hx of ST elevation myocardial infarction  - Basic metabolic panel  (Ca, Cl, CO2, Creat, Gluc, K, Na, BUN)  - CBC with platelets  - Basic metabolic panel  (Ca, Cl, CO2, Creat, Gluc, K, Na, BUN)  - CBC with platelets    Coronary artery disease involving native coronary artery of native heart with angina pectoris (H)  - Lipid panel reflex to direct LDL Fasting    Chronic midline low back pain with bilateral sciatica  I did order the necessary labs.  He noted that he has not started doing the cardiac rehabilitation.  I did print out the order for him and hope that they will be able to call him sooner.  I did put in labs for him to have done.  At this point he is doing well we will inform you of the lab results.     BMI:   Estimated body mass index is 43.63 kg/m  as calculated from the following:    Height as of 7/13/21: 1.829 m (6').    Weight as of this encounter: 145.9 kg (321 lb 11.2 oz).   Weight management plan: We discussed about diet.  Patient unable to exercise because of chronic low back pain.  But currently working on diet and having small success.        No follow-ups on file.    Luann Berger MD  [facility] JULIAN    Sushil Astudillo is a 65 year old who presents for the following health issues  HPI   He is here for hospital follow-up.  He was in the hospital and found to have ST elevated myocardial infarction.  Evaluated and managed.  He had a percutaneous coronary intervention with aspiration thrombectomy.  But he still does have residual proximal LAD 70% in stent stenosis.  He is go back to see the cardiologist for staged PCI.  He is currently on anticoagulation.  He comes in today for follow-up not having any concerns today.  He feels better.  Noted no chest pains with activity or without activity.  Noted no swellings although he has a peripheral edema being managed by the vascular physicians.      Hospital  Follow-up Visit:    Hospital/Nursing Home/IP Rehab Facility: [facility]  Date of Admission: September 11  Date of Discharge: September 14  Reason(s) for Admission: ST elevated MI.      Was your hospitalization related to COVID-19? No   Problems taking medications regularly:  None  Medication changes since discharge: None  Problems adhering to non-medication therapy:  None    Summary of hospitalization:  [facility] discharge summary reviewed  Diagnostic Tests/Treatments reviewed.  Follow up needed: none  Other Healthcare Providers Involved in Patient s Care:         Hemoglobin and potassium needs to be followed.  Update since discharge: improved.   Post Discharge Medication Reconciliation: discharge medications reconciled, continue medications without change.  Plan of care communicated with patient            Review of Systems   Constitutional, HEENT, cardiovascular, pulmonary, GI, , musculoskeletal, neuro, skin, endocrine and psych systems are negative, except as otherwise noted.      Objective    /80   Pulse 58   Wt 145.9 kg (321 lb 11.2 oz)   SpO2 95%   BMI 43.63 kg/m    Body mass index is 43.63 kg/m .  Physical Exam   GENERAL: He is morbidly obese.  But he is in no respiratory or pain distress.  NECK: no adenopathy, no asymmetry, masses, or scars and thyroid normal to palpation  RESP: lungs clear to auscultation - no rales, rhonchi or wheezes  CV: regular rate and rhythm, normal S1 S2, no S3 or S4, no murmur, click or rub, no peripheral edema and peripheral pulses strong  ABDOMEN: soft, nontender, no hepatosplenomegaly, no masses and bowel sounds normal  MS: Has a severe lower back problems and ambulates almost doubled over.  Currently not in any pain to this time.  He does have a bilateral lower extremity taping  NEURO: Mentation intact and speech normal  PSYCH: mentation appears normal, affect normal/bright

## 2021-09-24 ENCOUNTER — PREP FOR PROCEDURE (OUTPATIENT)
Dept: CARDIOLOGY | Facility: CLINIC | Age: 66
End: 2021-09-24

## 2021-09-24 ENCOUNTER — OFFICE VISIT (OUTPATIENT)
Dept: CARDIOLOGY | Facility: CLINIC | Age: 66
End: 2021-09-24
Payer: COMMERCIAL

## 2021-09-24 ENCOUNTER — TELEPHONE (OUTPATIENT)
Dept: CARDIOLOGY | Facility: CLINIC | Age: 66
End: 2021-09-24

## 2021-09-24 ENCOUNTER — HOSPITAL ENCOUNTER (OUTPATIENT)
Facility: HOSPITAL | Age: 66
End: 2021-09-24
Attending: INTERNAL MEDICINE | Admitting: INTERNAL MEDICINE
Payer: COMMERCIAL

## 2021-09-24 VITALS
DIASTOLIC BLOOD PRESSURE: 60 MMHG | RESPIRATION RATE: 18 BRPM | WEIGHT: 310 LBS | BODY MASS INDEX: 42.04 KG/M2 | SYSTOLIC BLOOD PRESSURE: 92 MMHG | HEART RATE: 50 BPM

## 2021-09-24 DIAGNOSIS — I10 ESSENTIAL HYPERTENSION: ICD-10-CM

## 2021-09-24 DIAGNOSIS — I48.0 PAROXYSMAL ATRIAL FIBRILLATION (H): ICD-10-CM

## 2021-09-24 DIAGNOSIS — E78.2 MIXED HYPERLIPIDEMIA: ICD-10-CM

## 2021-09-24 DIAGNOSIS — I21.3 ST ELEVATION MI (STEMI) (H): ICD-10-CM

## 2021-09-24 DIAGNOSIS — I25.119 CORONARY ARTERY DISEASE INVOLVING NATIVE CORONARY ARTERY OF NATIVE HEART WITH ANGINA PECTORIS (H): ICD-10-CM

## 2021-09-24 DIAGNOSIS — I25.119 CORONARY ARTERY DISEASE INVOLVING NATIVE CORONARY ARTERY OF NATIVE HEART WITH ANGINA PECTORIS (H): Primary | ICD-10-CM

## 2021-09-24 DIAGNOSIS — I21.3 ST ELEVATION MYOCARDIAL INFARCTION (STEMI), UNSPECIFIED ARTERY (H): ICD-10-CM

## 2021-09-24 DIAGNOSIS — I50.32 CHRONIC DIASTOLIC HEART FAILURE (H): Primary | ICD-10-CM

## 2021-09-24 DIAGNOSIS — Z11.59 ENCOUNTER FOR SCREENING FOR OTHER VIRAL DISEASES: ICD-10-CM

## 2021-09-24 DIAGNOSIS — I21.3 ST ELEVATION MI (STEMI) (H): Primary | ICD-10-CM

## 2021-09-24 PROCEDURE — 99214 OFFICE O/P EST MOD 30 MIN: CPT | Performed by: INTERNAL MEDICINE

## 2021-09-24 RX ORDER — LIDOCAINE 40 MG/G
CREAM TOPICAL
Status: CANCELLED | OUTPATIENT
Start: 2021-09-24

## 2021-09-24 RX ORDER — ASPIRIN 325 MG
325 TABLET ORAL ONCE
Status: CANCELLED | OUTPATIENT
Start: 2021-10-07 | End: 2021-09-24

## 2021-09-24 RX ORDER — DIAZEPAM 5 MG
10 TABLET ORAL
Status: CANCELLED | OUTPATIENT
Start: 2021-10-07

## 2021-09-24 RX ORDER — FENTANYL CITRATE 50 UG/ML
25 INJECTION, SOLUTION INTRAMUSCULAR; INTRAVENOUS
Status: DISCONTINUED | OUTPATIENT
Start: 2021-09-24 | End: 2021-09-24 | Stop reason: HOSPADM

## 2021-09-24 RX ORDER — ASPIRIN 81 MG/1
243 TABLET, CHEWABLE ORAL ONCE
Status: CANCELLED | OUTPATIENT
Start: 2021-10-07

## 2021-09-24 RX ORDER — SODIUM CHLORIDE 9 MG/ML
INJECTION, SOLUTION INTRAVENOUS CONTINUOUS
Status: CANCELLED | OUTPATIENT
Start: 2021-10-07

## 2021-09-24 NOTE — H&P (VIEW-ONLY)
Onur Barr,  1955, MRN [id number]    PCP: Luann Berger, [phone]    Assessment:   1.  History of obesity status post gastric bypass surgery  2.  Paroxysmal atrial fibrillation  3.  Coronary atherosclerosis LAD stent              NXT  apical ischemia/infarct small EF 51%             NXT 2019 small NTMI no ischemia 61%   Recent urgent PCI of occluded RCA peak troponin 15  4.  Essential hypertension  5.  Hypercholesterolemia  6.  Asymptomatic bradycardia  7.  Hx Cardiomyopathy ischemic              Echo  EF 55 to 60% with diastolic impairment.  8.  Chronic lower extremity lymphedema.  Chronic venous stasis.      cardiac echo with normal left ventricular function  atrial fibrillation on Eliquis, recent medication change from Coreg to metoprolol   hypercholesterolemia recent medication change from atorvastatin to rosuvastatin        Recommendations:    I discussed with him that given his supply of atorvastatin and Coreg at home that he could return to utilizing these medications and complete the prescription and then he could change to the new formulation.    The patient is a scheduled to return for a planned PCI of his LAD will see if we can go ahead and facilitate that arrangement.  Plan will be to continue with aspirin until 2 weeks after the second PCI.  Discontinue Eliquis 3 days before the procedure.  His heart rate is stable at this time and likely maintaining sinus rhythm.      Chief Complaint: Follow-up from recent admission and PCI    HPI:  We have been requested by Dr. Berger to evaluate Onur Barr for consultation who is a  65 year old year old male for above chief complaint.    Hx: Patient returns for reevaluation with seen recently for recurring chest pains and we arrange for stress test however the pain recurred again and persisted as per instructions he came to the ER for evaluation was brought to the cardiac catheterization lab where coronary intervention was  performed.      Current Outpatient Medications:      apixaban ANTICOAGULANT (ELIQUIS) 5 MG tablet, Take 1 tablet (5 mg) by mouth 2 times daily, Disp: 60 tablet, Rfl: 0     ASPIRIN EC PO, Take 81 mg by mouth daily, Disp: , Rfl:      baclofen (LIORESAL) 10 MG tablet, Take 1 tablet (10 mg) by mouth 2 times daily as needed for muscle spasms, Disp: 30 tablet, Rfl: 0     clopidogrel (PLAVIX) 75 MG tablet, Take 1 tablet (75 mg) by mouth daily, Disp: 30 tablet, Rfl: 0     DULoxetine (CYMBALTA) 30 MG capsule, Take by mouth 2 times daily 30 mg in AM 60 mg in PM, Disp: , Rfl:      Ferrous Gluconate (IRON) 240 (27 Fe) MG TABS, Take 1 tablet by mouth daily, Disp: , Rfl:      furosemide (LASIX) 40 MG tablet, Take 1 tablet (40 mg) by mouth 2 times daily Take in morning and early afternoon.  Take with potassium., Disp: 60 tablet, Rfl: 1     lisinopril (ZESTRIL) 20 MG tablet, Take 1 tablet (20 mg) by mouth daily, Disp: 30 tablet, Rfl: 0     melatonin 1 MG TABS tablet, Take 1 tablet (1 mg) by mouth nightly as needed for sleep, Disp: 30 tablet, Rfl: 0     metoprolol succinate ER (TOPROL-XL) 50 MG 24 hr tablet, Take 1 tablet (50 mg) by mouth daily, Disp: 30 tablet, Rfl: 0     MORPHINE SULFATE, IT pump:updated 7/14 Medications in Pump:   [facility] Responsible for pump medications:  Conc:  Morphine 20mg/ml(3.95 mg/day), clonidine 21.1mcg/ml (4.168 mcg/day), baclofen 42.5mcg/ml (8.395mcg/day) Rate:  Pump Last Fill Date: 2/21 Pump Refill Date:8/20/21, Disp: , Rfl:      nitroGLYcerin (NITROSTAT) 0.4 MG sublingual tablet, For chest pain place 1 tablet under the tongue every 5 minutes for 3 doses. If symptoms persist 5 minutes after 1st dose call 911., Disp: 30 tablet, Rfl: 1     Omeprazole (PRILOSEC PO), Take 40 mg by mouth daily, Disp: , Rfl:      potassium chloride ER (KLOR-CON M) 20 MEQ CR tablet, Take 1 tablet (20 mEq) by mouth daily (take with furosemide/Lasix), Disp: 60 tablet, Rfl: 1     rosuvastatin (CRESTOR) 40 MG tablet, Take 1  tablet (40 mg) by mouth daily, Disp: 90 tablet, Rfl: 3     senna-docusate (SENOKOT-S/PERICOLACE) 8.6-50 MG tablet, Take 1 tablet by mouth 2 times daily as needed for constipation, Disp: 60 tablet, Rfl: 0     traZODone (DESYREL) 100 MG tablet, Take 200 mg by mouth At Bedtime , Disp: , Rfl:     Medical History  Past Medical History:   Diagnosis Date     Acid reflux disease 10/31/2017     Arrhythmia     paroxysmal atrial fibrillation     Arthritis      Atrial fibrillation (H)     Created by Conversion      Bariatric surgery status     Created by Conversion      CHF (congestive heart failure) (H)      Coronary artery disease      Coronary atherosclerosis     Created by Conversion  Replacement Utility updated for latest IMO load     Diabetes (H)     typr II resloved     Essential hypertension     Created by [facility] Annotation: Apr 6 2012 10:16Zack Harris: Lisinipril,  coreg  Replacement Utility updated for latest IMO load     H/O gastric bypass      Heart attack (H)      Hypercholesteremia      Hypertension      Insomnia      Insomnia, unspecified     Created by [facility] Annotation: Sep 11 2013  9:56Zack Harris: Trouble  maintaining sleep-Trazodone-minimal helpzolpidem-      Ischemic cardiomyopathy      Low back pain      Lumbago     Created by [facility] Annotation: Apr 6 2012 10:20Anh Ford: Morphine pump      Morbid obesity (H) 8/1/2018     Nephrolithiasis      Nephrolithiasis      Obstructive sleep apnea (adult) (pediatric)     Created by Conversion      Osteoarthritis     Created by [facility] Annotation: Jun 26 2009  9:53Zack Harris: failed lumbar  fusion/morphine pump dr putnam  Replacement Utility updated for latest IMO load     Pure hypercholesterolemia     Created by Conversion      Sleep apnea      Sleepiness 5/8/2018      Past Medical History:   Diagnosis Date     Acid reflux disease 10/31/2017     Arrhythmia      paroxysmal atrial fibrillation     Arthritis      Atrial fibrillation (H)     Created by Conversion      Bariatric surgery status     Created by Conversion      CHF (congestive heart failure) (H)      Coronary artery disease      Coronary atherosclerosis     Created by Conversion  Replacement Utility updated for latest IMO load     Diabetes (H)     typr II resloved     Essential hypertension     Created by Conversion [facility] Annotation: Apr 6 2012 10:16Zack Harris: Lisinipril,  coreg  Replacement Utility updated for latest IMO load     H/O gastric bypass      Heart attack (H)      Hypercholesteremia      Hypertension      Insomnia      Insomnia, unspecified     Created by [facility] Annotation: Sep 11 2013  9:56AM Zack Brewer: Trouble  maintaining sleep-Trazodone-minimal helpzolpidem-      Ischemic cardiomyopathy      Low back pain      Lumbago     Created by [facility] Annotation: Apr 6 2012 10:20Anh Ford: Morphine pump      Morbid obesity (H) 8/1/2018     Nephrolithiasis      Nephrolithiasis      Obstructive sleep apnea (adult) (pediatric)     Created by Conversion      Osteoarthritis     Created by [facility] Annotation: Jun 26 2009  9:53Zack Harris: failed lumbar  fusion/morphine pump dr putnam  Replacement Utility updated for latest IMO load     Pure hypercholesterolemia     Created by Conversion      Sleep apnea      Sleepiness 5/8/2018      PAST MEDICAL HISTORY:   Past Medical History:   Diagnosis Date     Acid reflux disease 10/31/2017     Arrhythmia     paroxysmal atrial fibrillation     Arthritis      Atrial fibrillation (H)     Created by Conversion      Bariatric surgery status     Created by Conversion      CHF (congestive heart failure) (H)      Coronary artery disease      Coronary atherosclerosis     Created by Conversion  Replacement Utility updated for latest IMO load     Diabetes (H)     typr II resloved     Essential  hypertension     Created by [facility] Annotation: Apr 6 2012 10:16AM Zack Brewer: Lisinipril,  coreg  Replacement Utility updated for latest IMO load     H/O gastric bypass      Heart attack (H)      Hypercholesteremia      Hypertension      Insomnia      Insomnia, unspecified     Created by [facility] Annotation: Sep 11 2013  9:56AM Zack Brewer: Trouble  maintaining sleep-Trazodone-minimal helpzolpidem-      Ischemic cardiomyopathy      Low back pain      Lumbago     Created by [facility] Annotation: Apr 6 2012 10:20AM Anh Ramos: Morphine pump      Morbid obesity (H) 8/1/2018     Nephrolithiasis      Nephrolithiasis      Obstructive sleep apnea (adult) (pediatric)     Created by Conversion      Osteoarthritis     Created by [facility] Annotation: Jun 26 2009  9:53AM Zack Brewer: failed lumbar  fusion/morphine pump dr putnam  Replacement Utility updated for latest IMO load     Pure hypercholesterolemia     Created by Conversion      Sleep apnea      Sleepiness 5/8/2018   Clinical evaluation time is 22 minutes    PAST SURGICAL HISTORY:   Past Surgical History:   Procedure Laterality Date     BACK SURGERY       BYPASS GASTRIC DUODENAL SWITCH       C GASTRIC BYPASS,OBESE<100CM SUSY-EN-Y  2008    Description: Gastric Surgery For Morbid Obesity Bypass With Susy-en-Y;  Recorded: 06/26/2009;  Comments: dr smith 2008     COSMETIC SURGERY      pannicullectomy     CV CORONARY ANGIOGRAM N/A 9/11/2021    Procedure: Coronary Angiogram;  Surgeon: Noris Ozuna MD;  Location: [facility] CATH LAB CV     CV LEFT HEART CATH N/A 9/11/2021    Procedure: Left Heart Cath;  Surgeon: Noris Ozuna MD;  Location: [facility] CATH LAB CV     CV PCI N/A 9/11/2021    Procedure: Percutaneous Coronary Intervention;  Surgeon: Noris Ozuna MD;  Location: [facility] CATH LAB CV     CV PCI ASPIRATION THROMECTOMY N/A 9/11/2021    Procedure: Percutaneous Coronary  Intervention Aspiration Thrombectomy;  Surgeon: Noris Ozuna MD;  Location: [facility] LAB      ENT SURGERY      tonsillectomy     EXTRACORPOREAL SHOCK WAVE LITHOTRIPSY, CYSTOSCOPY, INSERT STENT URETER(S), COMBINED  8/23/11     HC REMOVAL OF TONSILS,<13 Y/O      Description: Tonsillectomy;  Recorded: 03/23/2012;  Comments: for obstructive sleep apnea     HC REPAIR INCISIONAL HERNIA,REDUCIBLE  11/13/2012    Description: Incisional Hernia Repair;  Proc Date: 11/13/2012;  Comments: incisional hernia repair and abdominal panniculectomy by Dr Shon Green at the [facility].     HERNIA REPAIR       IR MISCELLANEOUS PROCEDURE  7/29/2011     IR MISCELLANEOUS PROCEDURE  8/5/2011     IR MISCELLANEOUS PROCEDURE  8/23/2011     IR NEPHROSTOMY TUBE CHANGE BILATERAL  8/5/2011     ORTHOPEDIC SURGERY      arthrodesis ant discectomy, lumbar     IN ARTHRODESIS ANT INTERBODY MIN DISCECTOMY,LUMBAR      Description: Lumbar Vertebral Fusion;  Recorded: 06/26/2009;  Comments: before 200 see [facility] consult under old records     IN EXCISE EXCESS SKIN TISSUE,ABDOMEN  11/13/2012    Description: Panniculectomy;  Proc Date: 11/13/2012;  Comments: 3.5 Lb Pannus was removed at the [facility] By Dr. Shon Green     REPLACE INTRATHECAL PAIN PUMP N/A 4/25/2017    Procedure: REPLACE INTRATHECAL PAIN PUMP;  INTRATHECAL PAIN PUMP CATHETER REPLACEMENT AND PUMP REPLACEMENT;  Surgeon: Anthony Maloney MD;  Location: [facility]     REVISE CATHETER INTRATHECAL N/A 4/25/2017    Procedure: REVISE CATHETER INTRATHECAL;;  Surgeon: Anthony Maloney MD;  Location: [facility]     SHOULDER SURGERY         FAMILY HISTORY:   Family History   Problem Relation Age of Onset     Cerebrovascular Disease Mother      Heart Disease Father      Hodgkin's lymphoma Brother        SOCIAL HISTORY:   Social History     Tobacco Use     Smoking status: Former Smoker     Years: 10.00     Types: Cigars, Cigars     Smokeless tobacco: Never Used   Substance Use Topics     Alcohol  use: No      Surgical History  He  has a past surgical history that includes Extracorporeal shock wave lithotripsy, cystoscopy, insert stent ureter(s), combined (8/23/11); Bypass gastric duodenal switch; back surgery; shoulder surgery; orthopedic surgery; Cosmetic surgery; ENT surgery; hernia repair; Replace Intrathecal Pain Pump (N/A, 4/25/2017); Revise catheter intrathecal (N/A, 4/25/2017); IR Miscellaneous Procedure (7/29/2011); IR Nephrostomy Tube Change Bilateral (8/5/2011); IR Miscellaneous Procedure (8/5/2011); IR Miscellaneous Procedure (8/23/2011); GASTRIC BYPASS,OBESE<100CM SUSY-EN-Y (2008); Pr Arthrodesis Ant Interbody Min Discectomy,Lumbar; REMOVAL OF TONSILS,<13 Y/O; Pr Excise Excess Skin Tissue,Abdomen (11/13/2012); REPAIR INCISIONAL HERNIA,REDUCIBLE (11/13/2012); Coronary Angiogram (N/A, 9/11/2021); Left Heart Cath (N/A, 9/11/2021); Percutaneous Coronary Intervention (N/A, 9/11/2021); and Percutaneous Coronary Intervention Aspiration Thrombectomy (N/A, 9/11/2021).    Social History  Reviewed, and he  reports that he has quit smoking. His smoking use included cigars and cigars. He quit after 10.00 years of use. He has never used smokeless tobacco. He reports that he does not drink alcohol and does not use drugs.  Smoking status reviewed.  Social history othrwise not contributory to HPI.  Allergies  Allergies   Allergen Reactions     Hydrocodone-Acetaminophen        Family History  Reviewed, and family history includes Cerebrovascular Disease in his mother; Heart Disease in his father; Hodgkin's lymphoma in his brother.  Extended Emergency Contact Information  Primary Emergency Contact: Krystal Barr  Address: [address], [place] [id number] [place] [address] [employer]  Mobile Phone: [phone]  Relation: Spouse  Secondary Emergency Contact: Maxine De La O   [employer]  Mobile Phone: [phone]  Relation: Daughter  Family history otherwise negative or not conributory to HPI.    Psychosocial  Needs  Social History     Social History Narrative     Not on file     Additional psychosocial needs reviewed per nursing assessment.    Prior to Admission Medications  (Not in a hospital admission)      Review of Systems:  Pertinent items are noted in HPI.  Review of systems is negative except for HPI  Physical Exam:    BP Readings from Last 1 Encounters:   09/24/21 92/60     Pulse Readings from Last 1 Encounters:   09/24/21 50     Wt Readings from Last 1 Encounters:   09/24/21 140.6 kg (310 lb)     Ht Readings from Last 1 Encounters:   07/13/21 1.829 m (6')     Estimated body mass index is 42.04 kg/m  as calculated from the following:    Height as of 7/13/21: 1.829 m (6').    Weight as of this encounter: 140.6 kg (310 lb).    Head and neck without focal cranial neurologic defects.  JVD not distended.  Carotid upstroke normal without bruit.  External eye exam normal without icterus.  External ear exam normal.  Neck without cervical lymphadenopathy or thyromegaly.  Cardiac: S1-S2 distinct and regular without extra sound stable regular heart rhythm  Lungs: Clear  Abdomen with normal bowel tones.  Skin without rash, ecchymosis, lesions.  Neuromuscular tone normal.  Peripheral pulse intact and equal.  Joints without swelling or erythema.    Cholesterol   Date Value Ref Range Status   09/20/2021 126 <=199 mg/dL Final   09/12/2021 155 <=199 mg/dL Final     Direct Measure HDL   Date Value Ref Range Status   09/20/2021 29 (L) >=40 mg/dL Final     Comment:     HDL Cholesterol Reference Range:     0-2 years:   No reference ranges established for patients under 2 years old  at [employer] for lipid analytes.    2-8 years:  Greater than 45 mg/dL     18 years and older:   Female: Greater than or equal to 50 mg/dL   Male:   Greater than or equal to 40 mg/dL   09/12/2021 26 (L) >=40 mg/dL Final     Comment:     HDL Cholesterol Reference Range:     0-2 years:   No reference ranges established for patients under 2 years  old  at [email or web address] for lipid analytes.    2-8 years:  Greater than 45 mg/dL     18 years and older:   Female: Greater than or equal to 50 mg/dL   Male:   Greater than or equal to 40 mg/dL     LDL Cholesterol Calculated   Date Value Ref Range Status   09/20/2021 49 <=129 mg/dL Final   09/12/2021 54 <=129 mg/dL Final     LDL Cholesterol Direct   Date Value Ref Range Status   02/13/2018 69 <=129 mg/dl Final   04/09/2013 74.0 <130.1 mg/dL Final     Triglycerides   Date Value Ref Range Status   09/20/2021 242 (H) <=149 mg/dL Final   09/12/2021 374 (H) <=149 mg/dL Final     No results found for: CHOLHDLRATIO Last Comprehensive Metabolic Panel:  Sodium   Date Value Ref Range Status   09/20/2021 140 136 - 145 mmol/L Final     Potassium   Date Value Ref Range Status   09/20/2021 4.3 3.5 - 5.0 mmol/L Final     Chloride   Date Value Ref Range Status   09/20/2021 105 98 - 107 mmol/L Final     Carbon Dioxide (CO2)   Date Value Ref Range Status   09/20/2021 23 22 - 31 mmol/L Final     Anion Gap   Date Value Ref Range Status   09/20/2021 12 5 - 18 mmol/L Final     Glucose   Date Value Ref Range Status   09/20/2021 107 70 - 125 mg/dL Final     Urea Nitrogen   Date Value Ref Range Status   09/20/2021 28 (H) 8 - 22 mg/dL Final     Creatinine   Date Value Ref Range Status   09/20/2021 1.30 0.70 - 1.30 mg/dL Final     GFR Estimate   Date Value Ref Range Status   09/20/2021 58 (L) >60 mL/min/1.73m2 Final     Comment:     As of July 11, 2021, eGFR is calculated by the CKD-EPI creatinine equation, without race adjustment. eGFR can be influenced by muscle mass, exercise, and diet. The reported eGFR is an estimation only and is only applicable if the renal function is stable.   01/05/2021 >60 >60 mL/min/1.73m2 Final     Calcium   Date Value Ref Range Status   09/20/2021 9.1 8.5 - 10.5 mg/dL Final

## 2021-09-24 NOTE — TELEPHONE ENCOUNTER
Onur WEBER Skaar  [address] [facility] [address]  [phone] (home)     Primary cardiologist:  Dr. Stephens or Dr. Ochoa  PCP:  Luann Berger  Procedure:  Staged intervention to LAD  H&P completed by:  PTK 9/24/2021  Case MD:  ADAL or CRISTY  Admit date and time:  0630  Case start time:  TBD  Ordering MD:  Dr. Stephens  Diagnosis:  STEMI  Anticoagulation: Eliquis - hold 3 days prior   CPAP: No- but has Fabio  Bypass Grafts: No  Renal Issues: No  Allergies:   Allergies   Allergen Reactions     Hydrocodone-Acetaminophen      Diabetic?: No  Device?: No      Angiogram Teaching    Reason for Visit:  Patient seen for pre-procedure education in preparation for: staged percutaneous cardiac intervention       Procedure Prep:  Cardiologist note dated: 9/24  EKG results obtained, dated: on admission   Pertinent test results obtained - Viewable in Epic, dated: admission for STEMI 9/11  Hemogram results obtained: on admission   Basic Metabolic Panel results obtained: on admission   Lipid Profile results obtained: on admission   COVID 19 Test results obtained: 10/6 [facility]      Pre-procedure instructions  Patient instructed to be NPO after midnight.  Patient instructed to arrange for transportation home following procedure.  Patient instructed to have a responsible adult with them for 24 hours post-procedure.  Post-procedure follow up process.  Conscious sedation discussed.  The patient was sent the pre-procedure letter (If requested) on 9/24    Pre-procedure medication instructions  Patient instructed on antiplatelet medication.  Continue medications as scheduled, with a small amount of water on the day of the procedure unless indicated.  Patient instructed to take 325 mg of Aspirin am of procedure: yes  Other medication: instructed to hold all except 325 mg aspirin (x4 81 mg asa tabs), plavix cymbalta lisinopril and metoprolol the a.m. of the procedure. He was instructed to notify nurse if he took any prn meds.  Pt has an implanted pain pump in his back with morphine ; hold eliquis 3 days prior     *PATIENTS RECORDS AVAILABLE IN [facility] UNLESS OTHERWISE INDICATED*    *Order set was entered on this date: 9/24      Patient Active Problem List   Diagnosis     Lumbago     Morbid obesity (H)     Bilateral leg edema     Bilateral cellulitis of lower leg     Chronic diastolic heart failure (H)     MARLEEN (obstructive sleep apnea)     Coronary artery disease involving native coronary artery of native heart with angina pectoris (H)     ST elevation MI (STEMI) (H)     Percutaneous transluminal coronary angioplasty status     Paroxysmal atrial fibrillation (H)     Essential hypertension     Mixed hyperlipidemia     Gastroesophageal reflux disease without esophagitis       Current Outpatient Medications   Medication Sig Dispense Refill     apixaban ANTICOAGULANT (ELIQUIS) 5 MG tablet Take 1 tablet (5 mg) by mouth 2 times daily 60 tablet 0     ASPIRIN EC PO Take 81 mg by mouth daily       baclofen (LIORESAL) 10 MG tablet Take 1 tablet (10 mg) by mouth 2 times daily as needed for muscle spasms 30 tablet 0     clopidogrel (PLAVIX) 75 MG tablet Take 1 tablet (75 mg) by mouth daily 30 tablet 0     DULoxetine (CYMBALTA) 30 MG capsule Take by mouth 2 times daily 30 mg in AM  60 mg in PM       Ferrous Gluconate (IRON) 240 (27 Fe) MG TABS Take 1 tablet by mouth daily       furosemide (LASIX) 40 MG tablet Take 1 tablet (40 mg) by mouth 2 times daily Take in morning and early afternoon.  Take with potassium. 60 tablet 1     lisinopril (ZESTRIL) 20 MG tablet Take 1 tablet (20 mg) by mouth daily 30 tablet 0     melatonin 1 MG TABS tablet Take 1 tablet (1 mg) by mouth nightly as needed for sleep 30 tablet 0     metoprolol succinate ER (TOPROL-XL) 50 MG 24 hr tablet Take 1 tablet (50 mg) by mouth daily 30 tablet 0     MORPHINE SULFATE IT pump:updated 7/14  Medications in Pump:   [facility] Responsible for pump medications:   Conc:  Morphine  20mg/ml(3.95 mg/day), clonidine 21.1mcg/ml (4.168 mcg/day), baclofen 42.5mcg/ml (8.395mcg/day)  Rate:   Pump Last Fill Date: 2/21  Pump Refill Date:8/20/21       nitroGLYcerin (NITROSTAT) 0.4 MG sublingual tablet For chest pain place 1 tablet under the tongue every 5 minutes for 3 doses. If symptoms persist 5 minutes after 1st dose call 911. 30 tablet 1     Omeprazole (PRILOSEC PO) Take 40 mg by mouth daily       potassium chloride ER (KLOR-CON M) 20 MEQ CR tablet Take 1 tablet (20 mEq) by mouth daily (take with furosemide/Lasix) 60 tablet 1     rosuvastatin (CRESTOR) 40 MG tablet Take 1 tablet (40 mg) by mouth daily 90 tablet 3     senna-docusate (SENOKOT-S/PERICOLACE) 8.6-50 MG tablet Take 1 tablet by mouth 2 times daily as needed for constipation 60 tablet 0     traZODone (DESYREL) 100 MG tablet Take 200 mg by mouth At Bedtime          Allergies   Allergen Reactions     Hydrocodone-Acetaminophen        Plan  Pt's wife will be his ; He has chronic back pain and 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 He will need a bariatric bed if admitted overnight. -[facility]   Patient ready for procedure and verbalized understanding of instructions provided. Patient ready to proceed.     TAE Omalley RN

## 2021-09-24 NOTE — PROGRESS NOTES
Onur Barr,  1955, MRN [id number]    PCP: Luann Berger, [phone]    Assessment:   1.  History of obesity status post gastric bypass surgery  2.  Paroxysmal atrial fibrillation  3.  Coronary atherosclerosis LAD stent              NXT  apical ischemia/infarct small EF 51%             NXT 2019 small NTMI no ischemia 61%   Recent urgent PCI of occluded RCA peak troponin 15  4.  Essential hypertension  5.  Hypercholesterolemia  6.  Asymptomatic bradycardia  7.  Hx Cardiomyopathy ischemic              Echo  EF 55 to 60% with diastolic impairment.  8.  Chronic lower extremity lymphedema.  Chronic venous stasis.      cardiac echo with normal left ventricular function  atrial fibrillation on Eliquis, recent medication change from Coreg to metoprolol   hypercholesterolemia recent medication change from atorvastatin to rosuvastatin        Recommendations:    I discussed with him that given his supply of atorvastatin and Coreg at home that he could return to utilizing these medications and complete the prescription and then he could change to the new formulation.    The patient is a scheduled to return for a planned PCI of his LAD will see if we can go ahead and facilitate that arrangement.  Plan will be to continue with aspirin until 2 weeks after the second PCI.  Discontinue Eliquis 3 days before the procedure.  His heart rate is stable at this time and likely maintaining sinus rhythm.      Chief Complaint: Follow-up from recent admission and PCI    HPI:  We have been requested by Dr. Berger to evaluate Onur Barr for consultation who is a  65 year old year old male for above chief complaint.    Hx: Patient returns for reevaluation with seen recently for recurring chest pains and we arrange for stress test however the pain recurred again and persisted as per instructions he came to the ER for evaluation was brought to the cardiac catheterization lab where coronary intervention was  performed.      Current Outpatient Medications:      apixaban ANTICOAGULANT (ELIQUIS) 5 MG tablet, Take 1 tablet (5 mg) by mouth 2 times daily, Disp: 60 tablet, Rfl: 0     ASPIRIN EC PO, Take 81 mg by mouth daily, Disp: , Rfl:      baclofen (LIORESAL) 10 MG tablet, Take 1 tablet (10 mg) by mouth 2 times daily as needed for muscle spasms, Disp: 30 tablet, Rfl: 0     clopidogrel (PLAVIX) 75 MG tablet, Take 1 tablet (75 mg) by mouth daily, Disp: 30 tablet, Rfl: 0     DULoxetine (CYMBALTA) 30 MG capsule, Take by mouth 2 times daily 30 mg in AM 60 mg in PM, Disp: , Rfl:      Ferrous Gluconate (IRON) 240 (27 Fe) MG TABS, Take 1 tablet by mouth daily, Disp: , Rfl:      furosemide (LASIX) 40 MG tablet, Take 1 tablet (40 mg) by mouth 2 times daily Take in morning and early afternoon.  Take with potassium., Disp: 60 tablet, Rfl: 1     lisinopril (ZESTRIL) 20 MG tablet, Take 1 tablet (20 mg) by mouth daily, Disp: 30 tablet, Rfl: 0     melatonin 1 MG TABS tablet, Take 1 tablet (1 mg) by mouth nightly as needed for sleep, Disp: 30 tablet, Rfl: 0     metoprolol succinate ER (TOPROL-XL) 50 MG 24 hr tablet, Take 1 tablet (50 mg) by mouth daily, Disp: 30 tablet, Rfl: 0     MORPHINE SULFATE, IT pump:updated 7/14 Medications in Pump:   [facility] Responsible for pump medications:  Conc:  Morphine 20mg/ml(3.95 mg/day), clonidine 21.1mcg/ml (4.168 mcg/day), baclofen 42.5mcg/ml (8.395mcg/day) Rate:  Pump Last Fill Date: 2/21 Pump Refill Date:8/20/21, Disp: , Rfl:      nitroGLYcerin (NITROSTAT) 0.4 MG sublingual tablet, For chest pain place 1 tablet under the tongue every 5 minutes for 3 doses. If symptoms persist 5 minutes after 1st dose call 911., Disp: 30 tablet, Rfl: 1     Omeprazole (PRILOSEC PO), Take 40 mg by mouth daily, Disp: , Rfl:      potassium chloride ER (KLOR-CON M) 20 MEQ CR tablet, Take 1 tablet (20 mEq) by mouth daily (take with furosemide/Lasix), Disp: 60 tablet, Rfl: 1     rosuvastatin (CRESTOR) 40 MG tablet, Take 1  tablet (40 mg) by mouth daily, Disp: 90 tablet, Rfl: 3     senna-docusate (SENOKOT-S/PERICOLACE) 8.6-50 MG tablet, Take 1 tablet by mouth 2 times daily as needed for constipation, Disp: 60 tablet, Rfl: 0     traZODone (DESYREL) 100 MG tablet, Take 200 mg by mouth At Bedtime , Disp: , Rfl:     Medical History  Past Medical History:   Diagnosis Date     Acid reflux disease 10/31/2017     Arrhythmia     paroxysmal atrial fibrillation     Arthritis      Atrial fibrillation (H)     Created by Conversion      Bariatric surgery status     Created by Conversion      CHF (congestive heart failure) (H)      Coronary artery disease      Coronary atherosclerosis     Created by Conversion  Replacement Utility updated for latest IMO load     Diabetes (H)     typr II resloved     Essential hypertension     Created by [facility] Annotation: Apr 6 2012 10:16Zack Harris: Lisinipril,  coreg  Replacement Utility updated for latest IMO load     H/O gastric bypass      Heart attack (H)      Hypercholesteremia      Hypertension      Insomnia      Insomnia, unspecified     Created by [facility] Annotation: Sep 11 2013  9:56Zack Harris: Trouble  maintaining sleep-Trazodone-minimal helpzolpidem-      Ischemic cardiomyopathy      Low back pain      Lumbago     Created by [facility] Annotation: Apr 6 2012 10:20Anh Ford: Morphine pump      Morbid obesity (H) 8/1/2018     Nephrolithiasis      Nephrolithiasis      Obstructive sleep apnea (adult) (pediatric)     Created by Conversion      Osteoarthritis     Created by [facility] Annotation: Jun 26 2009  9:53Zack Harris: failed lumbar  fusion/morphine pump dr putnam  Replacement Utility updated for latest IMO load     Pure hypercholesterolemia     Created by Conversion      Sleep apnea      Sleepiness 5/8/2018      Past Medical History:   Diagnosis Date     Acid reflux disease 10/31/2017     Arrhythmia      paroxysmal atrial fibrillation     Arthritis      Atrial fibrillation (H)     Created by Conversion      Bariatric surgery status     Created by Conversion      CHF (congestive heart failure) (H)      Coronary artery disease      Coronary atherosclerosis     Created by Conversion  Replacement Utility updated for latest IMO load     Diabetes (H)     typr II resloved     Essential hypertension     Created by Conversion [facility] Annotation: Apr 6 2012 10:16Zack Harris: Lisinipril,  coreg  Replacement Utility updated for latest IMO load     H/O gastric bypass      Heart attack (H)      Hypercholesteremia      Hypertension      Insomnia      Insomnia, unspecified     Created by [facility] Annotation: Sep 11 2013  9:56AM Zack Brewer: Trouble  maintaining sleep-Trazodone-minimal helpzolpidem-      Ischemic cardiomyopathy      Low back pain      Lumbago     Created by [facility] Annotation: Apr 6 2012 10:20Anh Ford: Morphine pump      Morbid obesity (H) 8/1/2018     Nephrolithiasis      Nephrolithiasis      Obstructive sleep apnea (adult) (pediatric)     Created by Conversion      Osteoarthritis     Created by [facility] Annotation: Jun 26 2009  9:53Zack Harris: failed lumbar  fusion/morphine pump dr putnam  Replacement Utility updated for latest IMO load     Pure hypercholesterolemia     Created by Conversion      Sleep apnea      Sleepiness 5/8/2018      PAST MEDICAL HISTORY:   Past Medical History:   Diagnosis Date     Acid reflux disease 10/31/2017     Arrhythmia     paroxysmal atrial fibrillation     Arthritis      Atrial fibrillation (H)     Created by Conversion      Bariatric surgery status     Created by Conversion      CHF (congestive heart failure) (H)      Coronary artery disease      Coronary atherosclerosis     Created by Conversion  Replacement Utility updated for latest IMO load     Diabetes (H)     typr II resloved     Essential  hypertension     Created by [facility] Annotation: Apr 6 2012 10:16AM Zack Brewer: Lisinipril,  coreg  Replacement Utility updated for latest IMO load     H/O gastric bypass      Heart attack (H)      Hypercholesteremia      Hypertension      Insomnia      Insomnia, unspecified     Created by [facility] Annotation: Sep 11 2013  9:56AM Zack Brewer: Trouble  maintaining sleep-Trazodone-minimal helpzolpidem-      Ischemic cardiomyopathy      Low back pain      Lumbago     Created by [facility] Annotation: Apr 6 2012 10:20AM Anh Ramos: Morphine pump      Morbid obesity (H) 8/1/2018     Nephrolithiasis      Nephrolithiasis      Obstructive sleep apnea (adult) (pediatric)     Created by Conversion      Osteoarthritis     Created by [facility] Annotation: Jun 26 2009  9:53AM Zack Brewer: failed lumbar  fusion/morphine pump dr putnam  Replacement Utility updated for latest IMO load     Pure hypercholesterolemia     Created by Conversion      Sleep apnea      Sleepiness 5/8/2018   Clinical evaluation time is 22 minutes    PAST SURGICAL HISTORY:   Past Surgical History:   Procedure Laterality Date     BACK SURGERY       BYPASS GASTRIC DUODENAL SWITCH       C GASTRIC BYPASS,OBESE<100CM SUSY-EN-Y  2008    Description: Gastric Surgery For Morbid Obesity Bypass With Susy-en-Y;  Recorded: 06/26/2009;  Comments: dr smith 2008     COSMETIC SURGERY      pannicullectomy     CV CORONARY ANGIOGRAM N/A 9/11/2021    Procedure: Coronary Angiogram;  Surgeon: Nrois Ozuna MD;  Location: [facility] CATH LAB CV     CV LEFT HEART CATH N/A 9/11/2021    Procedure: Left Heart Cath;  Surgeon: Noris Ozuna MD;  Location: [facility] CATH LAB CV     CV PCI N/A 9/11/2021    Procedure: Percutaneous Coronary Intervention;  Surgeon: Noris Ozuna MD;  Location: [facility] CATH LAB CV     CV PCI ASPIRATION THROMECTOMY N/A 9/11/2021    Procedure: Percutaneous Coronary  Intervention Aspiration Thrombectomy;  Surgeon: Noris Ozuna MD;  Location: [facility] LAB      ENT SURGERY      tonsillectomy     EXTRACORPOREAL SHOCK WAVE LITHOTRIPSY, CYSTOSCOPY, INSERT STENT URETER(S), COMBINED  8/23/11     HC REMOVAL OF TONSILS,<13 Y/O      Description: Tonsillectomy;  Recorded: 03/23/2012;  Comments: for obstructive sleep apnea     HC REPAIR INCISIONAL HERNIA,REDUCIBLE  11/13/2012    Description: Incisional Hernia Repair;  Proc Date: 11/13/2012;  Comments: incisional hernia repair and abdominal panniculectomy by Dr Shon Green at the [facility].     HERNIA REPAIR       IR MISCELLANEOUS PROCEDURE  7/29/2011     IR MISCELLANEOUS PROCEDURE  8/5/2011     IR MISCELLANEOUS PROCEDURE  8/23/2011     IR NEPHROSTOMY TUBE CHANGE BILATERAL  8/5/2011     ORTHOPEDIC SURGERY      arthrodesis ant discectomy, lumbar     NH ARTHRODESIS ANT INTERBODY MIN DISCECTOMY,LUMBAR      Description: Lumbar Vertebral Fusion;  Recorded: 06/26/2009;  Comments: before 200 see [facility] consult under old records     NH EXCISE EXCESS SKIN TISSUE,ABDOMEN  11/13/2012    Description: Panniculectomy;  Proc Date: 11/13/2012;  Comments: 3.5 Lb Pannus was removed at the [facility] By Dr. Shon Green     REPLACE INTRATHECAL PAIN PUMP N/A 4/25/2017    Procedure: REPLACE INTRATHECAL PAIN PUMP;  INTRATHECAL PAIN PUMP CATHETER REPLACEMENT AND PUMP REPLACEMENT;  Surgeon: Anthony Maloney MD;  Location: [facility]     REVISE CATHETER INTRATHECAL N/A 4/25/2017    Procedure: REVISE CATHETER INTRATHECAL;;  Surgeon: Anthony Maloney MD;  Location: [facility]     SHOULDER SURGERY         FAMILY HISTORY:   Family History   Problem Relation Age of Onset     Cerebrovascular Disease Mother      Heart Disease Father      Hodgkin's lymphoma Brother        SOCIAL HISTORY:   Social History     Tobacco Use     Smoking status: Former Smoker     Years: 10.00     Types: Cigars, Cigars     Smokeless tobacco: Never Used   Substance Use Topics     Alcohol  use: No      Surgical History  He  has a past surgical history that includes Extracorporeal shock wave lithotripsy, cystoscopy, insert stent ureter(s), combined (8/23/11); Bypass gastric duodenal switch; back surgery; shoulder surgery; orthopedic surgery; Cosmetic surgery; ENT surgery; hernia repair; Replace Intrathecal Pain Pump (N/A, 4/25/2017); Revise catheter intrathecal (N/A, 4/25/2017); IR Miscellaneous Procedure (7/29/2011); IR Nephrostomy Tube Change Bilateral (8/5/2011); IR Miscellaneous Procedure (8/5/2011); IR Miscellaneous Procedure (8/23/2011); GASTRIC BYPASS,OBESE<100CM SUSY-EN-Y (2008); Pr Arthrodesis Ant Interbody Min Discectomy,Lumbar; REMOVAL OF TONSILS,<11 Y/O; Pr Excise Excess Skin Tissue,Abdomen (11/13/2012); REPAIR INCISIONAL HERNIA,REDUCIBLE (11/13/2012); Coronary Angiogram (N/A, 9/11/2021); Left Heart Cath (N/A, 9/11/2021); Percutaneous Coronary Intervention (N/A, 9/11/2021); and Percutaneous Coronary Intervention Aspiration Thrombectomy (N/A, 9/11/2021).    Social History  Reviewed, and he  reports that he has quit smoking. His smoking use included cigars and cigars. He quit after 10.00 years of use. He has never used smokeless tobacco. He reports that he does not drink alcohol and does not use drugs.  Smoking status reviewed.  Social history othrwise not contributory to HPI.  Allergies  Allergies   Allergen Reactions     Hydrocodone-Acetaminophen        Family History  Reviewed, and family history includes Cerebrovascular Disease in his mother; Heart Disease in his father; Hodgkin's lymphoma in his brother.  Extended Emergency Contact Information  Primary Emergency Contact: Krystal Barr  Address: [address], [place] [id number] [place] [address] [place]  Mobile Phone: [phone]  Relation: Spouse  Secondary Emergency Contact: Maxine De La O   [employer] [place]  Mobile Phone: [phone]  Relation: Daughter  Family history otherwise negative or not conributory to HPI.    Psychosocial  Needs  Social History     Social History Narrative     Not on file     Additional psychosocial needs reviewed per nursing assessment.    Prior to Admission Medications  (Not in a hospital admission)      Review of Systems:  Pertinent items are noted in HPI.  Review of systems is negative except for HPI  Physical Exam:    BP Readings from Last 1 Encounters:   09/24/21 92/60     Pulse Readings from Last 1 Encounters:   09/24/21 50     Wt Readings from Last 1 Encounters:   09/24/21 140.6 kg (310 lb)     Ht Readings from Last 1 Encounters:   07/13/21 1.829 m (6')     Estimated body mass index is 42.04 kg/m  as calculated from the following:    Height as of 7/13/21: 1.829 m (6').    Weight as of this encounter: 140.6 kg (310 lb).    Head and neck without focal cranial neurologic defects.  JVD not distended.  Carotid upstroke normal without bruit.  External eye exam normal without icterus.  External ear exam normal.  Neck without cervical lymphadenopathy or thyromegaly.  Cardiac: S1-S2 distinct and regular without extra sound stable regular heart rhythm  Lungs: Clear  Abdomen with normal bowel tones.  Skin without rash, ecchymosis, lesions.  Neuromuscular tone normal.  Peripheral pulse intact and equal.  Joints without swelling or erythema.    Cholesterol   Date Value Ref Range Status   09/20/2021 126 <=199 mg/dL Final   09/12/2021 155 <=199 mg/dL Final     Direct Measure HDL   Date Value Ref Range Status   09/20/2021 29 (L) >=40 mg/dL Final     Comment:     HDL Cholesterol Reference Range:     0-2 years:   No reference ranges established for patients under 2 years old  at [employer] for lipid analytes.    2-8 years:  Greater than 45 mg/dL     18 years and older:   Female: Greater than or equal to 50 mg/dL   Male:   Greater than or equal to 40 mg/dL   09/12/2021 26 (L) >=40 mg/dL Final     Comment:     HDL Cholesterol Reference Range:     0-2 years:   No reference ranges established for patients under 2 years  old  at [employer] for lipid analytes.    2-8 years:  Greater than 45 mg/dL     18 years and older:   Female: Greater than or equal to 50 mg/dL   Male:   Greater than or equal to 40 mg/dL     LDL Cholesterol Calculated   Date Value Ref Range Status   09/20/2021 49 <=129 mg/dL Final   09/12/2021 54 <=129 mg/dL Final     LDL Cholesterol Direct   Date Value Ref Range Status   02/13/2018 69 <=129 mg/dl Final   04/09/2013 74.0 <130.1 mg/dL Final     Triglycerides   Date Value Ref Range Status   09/20/2021 242 (H) <=149 mg/dL Final   09/12/2021 374 (H) <=149 mg/dL Final     No results found for: CHOLHDLRATIO Last Comprehensive Metabolic Panel:  Sodium   Date Value Ref Range Status   09/20/2021 140 136 - 145 mmol/L Final     Potassium   Date Value Ref Range Status   09/20/2021 4.3 3.5 - 5.0 mmol/L Final     Chloride   Date Value Ref Range Status   09/20/2021 105 98 - 107 mmol/L Final     Carbon Dioxide (CO2)   Date Value Ref Range Status   09/20/2021 23 22 - 31 mmol/L Final     Anion Gap   Date Value Ref Range Status   09/20/2021 12 5 - 18 mmol/L Final     Glucose   Date Value Ref Range Status   09/20/2021 107 70 - 125 mg/dL Final     Urea Nitrogen   Date Value Ref Range Status   09/20/2021 28 (H) 8 - 22 mg/dL Final     Creatinine   Date Value Ref Range Status   09/20/2021 1.30 0.70 - 1.30 mg/dL Final     GFR Estimate   Date Value Ref Range Status   09/20/2021 58 (L) >60 mL/min/1.73m2 Final     Comment:     As of July 11, 2021, eGFR is calculated by the CKD-EPI creatinine equation, without race adjustment. eGFR can be influenced by muscle mass, exercise, and diet. The reported eGFR is an estimation only and is only applicable if the renal function is stable.   01/05/2021 >60 >60 mL/min/1.73m2 Final     Calcium   Date Value Ref Range Status   09/20/2021 9.1 8.5 - 10.5 mg/dL Final

## 2021-09-24 NOTE — LETTER
2021    Luann Berger MD  [address] [facility] [address]    RE: Onur Barr       Dear Colleague,    I had the pleasure of seeing Onur Barr in the [facility].           Onur Barr,  1955, MRN [id number]    PCP: Luann Berger, [phone]    Assessment:   1.  History of obesity status post gastric bypass surgery  2.  Paroxysmal atrial fibrillation  3.  Coronary atherosclerosis LAD stent 2012             NXT 2014 apical ischemia/infarct small EF 51%             NXT 2019 small NTMI no ischemia 61%   Recent urgent PCI of occluded RCA peak troponin 15  4.  Essential hypertension  5.  Hypercholesterolemia  6.  Asymptomatic bradycardia  7.  Hx Cardiomyopathy ischemic              Echo  EF 55 to 60% with diastolic impairment.  8.  Chronic lower extremity lymphedema.  Chronic venous stasis.      cardiac echo with normal left ventricular function  atrial fibrillation on Eliquis, recent medication change from Coreg to metoprolol   hypercholesterolemia recent medication change from atorvastatin to rosuvastatin        Recommendations:    I discussed with him that given his supply of atorvastatin and Coreg at home that he could return to utilizing these medications and complete the prescription and then he could change to the new formulation.    The patient is a scheduled to return for a planned PCI of his LAD will see if we can go ahead and facilitate that arrangement.  Plan will be to continue with aspirin until 2 weeks after the second PCI.  Discontinue Eliquis 3 days before the procedure.  His heart rate is stable at this time and likely maintaining sinus rhythm.      Chief Complaint: Follow-up from recent admission and PCI    HPI:  We have been requested by Dr. Berger to evaluate Onur Barr for consultation who is a  65 year old year old male for above chief complaint.    Hx: Patient returns for reevaluation with seen  recently for recurring chest pains and we arrange for stress test however the pain recurred again and persisted as per instructions he came to the ER for evaluation was brought to the cardiac catheterization lab where coronary intervention was performed.      Current Outpatient Medications:      apixaban ANTICOAGULANT (ELIQUIS) 5 MG tablet, Take 1 tablet (5 mg) by mouth 2 times daily, Disp: 60 tablet, Rfl: 0     ASPIRIN EC PO, Take 81 mg by mouth daily, Disp: , Rfl:      baclofen (LIORESAL) 10 MG tablet, Take 1 tablet (10 mg) by mouth 2 times daily as needed for muscle spasms, Disp: 30 tablet, Rfl: 0     clopidogrel (PLAVIX) 75 MG tablet, Take 1 tablet (75 mg) by mouth daily, Disp: 30 tablet, Rfl: 0     DULoxetine (CYMBALTA) 30 MG capsule, Take by mouth 2 times daily 30 mg in AM 60 mg in PM, Disp: , Rfl:      Ferrous Gluconate (IRON) 240 (27 Fe) MG TABS, Take 1 tablet by mouth daily, Disp: , Rfl:      furosemide (LASIX) 40 MG tablet, Take 1 tablet (40 mg) by mouth 2 times daily Take in morning and early afternoon.  Take with potassium., Disp: 60 tablet, Rfl: 1     lisinopril (ZESTRIL) 20 MG tablet, Take 1 tablet (20 mg) by mouth daily, Disp: 30 tablet, Rfl: 0     melatonin 1 MG TABS tablet, Take 1 tablet (1 mg) by mouth nightly as needed for sleep, Disp: 30 tablet, Rfl: 0     metoprolol succinate ER (TOPROL-XL) 50 MG 24 hr tablet, Take 1 tablet (50 mg) by mouth daily, Disp: 30 tablet, Rfl: 0     MORPHINE SULFATE, IT pump:updated 7/14 Medications in Pump:  [facility] Responsible for pump medications:  Conc:  Morphine 20mg/ml(3.95 mg/day), clonidine 21.1mcg/ml (4.168 mcg/day), baclofen 42.5mcg/ml (8.395mcg/day) Rate:  Pump Last Fill Date: 2/21 Pump Refill Date:8/20/21, Disp: , Rfl:      nitroGLYcerin (NITROSTAT) 0.4 MG sublingual tablet, For chest pain place 1 tablet under the tongue every 5 minutes for 3 doses. If symptoms persist 5 minutes after 1st dose call 911., Disp: 30 tablet, Rfl: 1     Omeprazole  (PRILOSEC PO), Take 40 mg by mouth daily, Disp: , Rfl:      potassium chloride ER (KLOR-CON M) 20 MEQ CR tablet, Take 1 tablet (20 mEq) by mouth daily (take with furosemide/Lasix), Disp: 60 tablet, Rfl: 1     rosuvastatin (CRESTOR) 40 MG tablet, Take 1 tablet (40 mg) by mouth daily, Disp: 90 tablet, Rfl: 3     senna-docusate (SENOKOT-S/PERICOLACE) 8.6-50 MG tablet, Take 1 tablet by mouth 2 times daily as needed for constipation, Disp: 60 tablet, Rfl: 0     traZODone (DESYREL) 100 MG tablet, Take 200 mg by mouth At Bedtime , Disp: , Rfl:     Medical History  Past Medical History:   Diagnosis Date     Acid reflux disease 10/31/2017     Arrhythmia     paroxysmal atrial fibrillation     Arthritis      Atrial fibrillation (H)     Created by Conversion      Bariatric surgery status     Created by Conversion      CHF (congestive heart failure) (H)      Coronary artery disease      Coronary atherosclerosis     Created by Conversion  Replacement Utility updated for latest IMO load     Diabetes (H)     typr II resloved     Essential hypertension     Created by [facility] Annotation: Apr 6 2012 10:16Zack Harris: Lisinipril,  coreg  Replacement Utility updated for latest IMO load     H/O gastric bypass      Heart attack (H)      Hypercholesteremia      Hypertension      Insomnia      Insomnia, unspecified     Created by [facility] Annotation: Sep 11 2013  9:56Zack Harris: Trouble  maintaining sleep-Trazodone-minimal helpzolpidem-      Ischemic cardiomyopathy      Low back pain      Lumbago     Created by [facility] Annotation: Apr 6 2012 10:20Anh Ford: Morphine pump      Morbid obesity (H) 8/1/2018     Nephrolithiasis      Nephrolithiasis      Obstructive sleep apnea (adult) (pediatric)     Created by Conversion      Osteoarthritis     Created by [facility] Annotation: Jun 26 2009  9:53Zack Harris: failed lumbar  fusion/morphine pump   jer[place]  Replacement Utility updated for latest IMO load     Pure hypercholesterolemia     Created by Conversion      Sleep apnea      Sleepiness 5/8/2018      Past Medical History:   Diagnosis Date     Acid reflux disease 10/31/2017     Arrhythmia     paroxysmal atrial fibrillation     Arthritis      Atrial fibrillation (H)     Created by Conversion      Bariatric surgery status     Created by Conversion      CHF (congestive heart failure) (H)      Coronary artery disease      Coronary atherosclerosis     Created by Conversion  Replacement Utility updated for latest IMO load     Diabetes (H)     typr II resloved     Essential hypertension     Created by [facility] Annotation: Apr 6 2012 10:16Zack Harris: Lisinipril,  coreg  Replacement Utility updated for latest IMO load     H/O gastric bypass      Heart attack (H)      Hypercholesteremia      Hypertension      Insomnia      Insomnia, unspecified     Created by [facility] Annotation: Sep 11 2013  9:56AM Zack Brewer: Trouble  maintaining sleep-Trazodone-minimal helpzolpidem-      Ischemic cardiomyopathy      Low back pain      Lumbago     Created by [facility] Annotation: Apr 6 2012 10:20Anh Ford: Morphine pump      Morbid obesity (H) 8/1/2018     Nephrolithiasis      Nephrolithiasis      Obstructive sleep apnea (adult) (pediatric)     Created by Conversion      Osteoarthritis     Created by [facility] Annotation: Jun 26 2009  9:53Zack Harris: failed lumbar  fusion/morphine pump dr putnam  Replacement Utility updated for latest IMO load     Pure hypercholesterolemia     Created by Conversion      Sleep apnea      Sleepiness 5/8/2018      PAST MEDICAL HISTORY:   Past Medical History:   Diagnosis Date     Acid reflux disease 10/31/2017     Arrhythmia     paroxysmal atrial fibrillation     Arthritis      Atrial fibrillation (H)     Created by Conversion      Bariatric surgery status      Created by Conversion      CHF (congestive heart failure) (H)      Coronary artery disease      Coronary atherosclerosis     Created by Conversion  Replacement Utility updated for latest IMO load     Diabetes (H)     typr II resloved     Essential hypertension     Created by [facility] Annotation: Apr 6 2012 10:16Zack Harris: Lisinipril,  coreg  Replacement Utility updated for latest IMO load     H/O gastric bypass      Heart attack (H)      Hypercholesteremia      Hypertension      Insomnia      Insomnia, unspecified     Created by [facility] Annotation: Sep 11 2013  9:56AM Zack Brewer: Trouble  maintaining sleep-Trazodone-minimal helpzolpidem-      Ischemic cardiomyopathy      Low back pain      Lumbago     Created by [facility] Annotation: Apr 6 2012 10:20AM Anh Ramos: Morphine pump      Morbid obesity (H) 8/1/2018     Nephrolithiasis      Nephrolithiasis      Obstructive sleep apnea (adult) (pediatric)     Created by Conversion      Osteoarthritis     Created by [facility] Annotation: Jun 26 2009  9:53AM Zack Brewer: failed lumbar  fusion/morphine pump dr putnam  Replacement Utility updated for latest IMO load     Pure hypercholesterolemia     Created by Conversion      Sleep apnea      Sleepiness 5/8/2018   Clinical evaluation time is 22 minutes    PAST SURGICAL HISTORY:   Past Surgical History:   Procedure Laterality Date     BACK SURGERY       BYPASS GASTRIC DUODENAL SWITCH       C GASTRIC BYPASS,OBESE<100CM SUSY-EN-Y  2008    Description: Gastric Surgery For Morbid Obesity Bypass With Susy-en-Y;  Recorded: 06/26/2009;  Comments: dr smith 2008     COSMETIC SURGERY      pannicullectomy     CV CORONARY ANGIOGRAM N/A 9/11/2021    Procedure: Coronary Angiogram;  Surgeon: Noris Ozuna MD;  Location: [facility] CATH LAB CV     CV LEFT HEART CATH N/A 9/11/2021    Procedure: Left Heart Cath;  Surgeon: Noris Ozuna MD;  Location:   [facility] CV     CV PCI N/A 9/11/2021    Procedure: Percutaneous Coronary Intervention;  Surgeon: Noris Ozuna MD;  Location: [facility] CV     CV PCI ASPIRATION THROMECTOMY N/A 9/11/2021    Procedure: Percutaneous Coronary Intervention Aspiration Thrombectomy;  Surgeon: Noris Ozuna MD;  Location: [facility]     ENT SURGERY      tonsillectomy     EXTRACORPOREAL SHOCK WAVE LITHOTRIPSY, CYSTOSCOPY, INSERT STENT URETER(S), COMBINED  8/23/11     HC REMOVAL OF TONSILS,<11 Y/O      Description: Tonsillectomy;  Recorded: 03/23/2012;  Comments: for obstructive sleep apnea     HC REPAIR INCISIONAL HERNIA,REDUCIBLE  11/13/2012    Description: Incisional Hernia Repair;  Proc Date: 11/13/2012;  Comments: incisional hernia repair and abdominal panniculectomy by Dr Shon Green at the [facility].     HERNIA REPAIR       IR MISCELLANEOUS PROCEDURE  7/29/2011     IR MISCELLANEOUS PROCEDURE  8/5/2011     IR MISCELLANEOUS PROCEDURE  8/23/2011     IR NEPHROSTOMY TUBE CHANGE BILATERAL  8/5/2011     ORTHOPEDIC SURGERY      arthrodesis ant discectomy, lumbar     HI ARTHRODESIS ANT INTERBODY MIN DISCECTOMY,LUMBAR      Description: Lumbar Vertebral Fusion;  Recorded: 06/26/2009;  Comments: before 200 see [facility] consult under old records     HI EXCISE EXCESS SKIN TISSUE,ABDOMEN  11/13/2012    Description: Panniculectomy;  Proc Date: 11/13/2012;  Comments: 3.5 Lb Pannus was removed at the [facility] By Dr. Shon Green     REPLACE INTRATHECAL PAIN PUMP N/A 4/25/2017    Procedure: REPLACE INTRATHECAL PAIN PUMP;  INTRATHECAL PAIN PUMP CATHETER REPLACEMENT AND PUMP REPLACEMENT;  Surgeon: Anthony Maloney MD;  Location: [facility]     REVISE CATHETER INTRATHECAL N/A 4/25/2017    Procedure: REVISE CATHETER INTRATHECAL;;  Surgeon: Anthony Maloney MD;  Location: [facility]     SHOULDER SURGERY         FAMILY HISTORY:   Family History   Problem Relation Age of Onset     Cerebrovascular Disease Mother      Heart Disease  Father      Hodgkin's lymphoma Brother        SOCIAL HISTORY:   Social History     Tobacco Use     Smoking status: Former Smoker     Years: 10.00     Types: Cigars, Cigars     Smokeless tobacco: Never Used   Substance Use Topics     Alcohol use: No      Surgical History  He  has a past surgical history that includes Extracorporeal shock wave lithotripsy, cystoscopy, insert stent ureter(s), combined (8/23/11); Bypass gastric duodenal switch; back surgery; shoulder surgery; orthopedic surgery; Cosmetic surgery; ENT surgery; hernia repair; Replace Intrathecal Pain Pump (N/A, 4/25/2017); Revise catheter intrathecal (N/A, 4/25/2017); IR Miscellaneous Procedure (7/29/2011); IR Nephrostomy Tube Change Bilateral (8/5/2011); IR Miscellaneous Procedure (8/5/2011); IR Miscellaneous Procedure (8/23/2011); GASTRIC BYPASS,OBESE<100CM SUSY-EN-Y (2008); Pr Arthrodesis Ant Interbody Min Discectomy,Lumbar; REMOVAL OF TONSILS,<11 Y/O; Pr Excise Excess Skin Tissue,Abdomen (11/13/2012); REPAIR INCISIONAL HERNIA,REDUCIBLE (11/13/2012); Coronary Angiogram (N/A, 9/11/2021); Left Heart Cath (N/A, 9/11/2021); Percutaneous Coronary Intervention (N/A, 9/11/2021); and Percutaneous Coronary Intervention Aspiration Thrombectomy (N/A, 9/11/2021).    Social History  Reviewed, and he  reports that he has quit smoking. His smoking use included cigars and cigars. He quit after 10.00 years of use. He has never used smokeless tobacco. He reports that he does not drink alcohol and does not use drugs.  Smoking status reviewed.  Social history othrwise not contributory to HPI.  Allergies  Allergies   Allergen Reactions     Hydrocodone-Acetaminophen        Family History  Reviewed, and family history includes Cerebrovascular Disease in his mother; Heart Disease in his father; Hodgkin's lymphoma in his brother.  Extended Emergency Contact Information  Primary Emergency Contact: Krystal Barr  Address: [address]  [facility]  Mobile Phone: [phone]  Relation: Spouse  Secondary Emergency Contact: Maxine De La O   [place]  Mobile Phone: [phone]  Relation: Daughter  Family history otherwise negative or not conributory to HPI.    Psychosocial Needs  Social History     Social History Narrative     Not on file     Additional psychosocial needs reviewed per nursing assessment.    Prior to Admission Medications  (Not in a hospital admission)      Review of Systems:  Pertinent items are noted in HPI.  Review of systems is negative except for HPI  Physical Exam:    BP Readings from Last 1 Encounters:   09/24/21 92/60     Pulse Readings from Last 1 Encounters:   09/24/21 50     Wt Readings from Last 1 Encounters:   09/24/21 140.6 kg (310 lb)     Ht Readings from Last 1 Encounters:   07/13/21 1.829 m (6')     Estimated body mass index is 42.04 kg/m  as calculated from the following:    Height as of 7/13/21: 1.829 m (6').    Weight as of this encounter: 140.6 kg (310 lb).    Head and neck without focal cranial neurologic defects.  JVD not distended.  Carotid upstroke normal without bruit.  External eye exam normal without icterus.  External ear exam normal.  Neck without cervical lymphadenopathy or thyromegaly.  Cardiac: S1-S2 distinct and regular without extra sound stable regular heart rhythm  Lungs: Clear  Abdomen with normal bowel tones.  Skin without rash, ecchymosis, lesions.  Neuromuscular tone normal.  Peripheral pulse intact and equal.  Joints without swelling or erythema.    Cholesterol   Date Value Ref Range Status   09/20/2021 126 <=199 mg/dL Final   09/12/2021 155 <=199 mg/dL Final     Direct Measure HDL   Date Value Ref Range Status   09/20/2021 29 (L) >=40 mg/dL Final     Comment:     HDL Cholesterol Reference Range:     0-2 years:   No reference ranges established for patients under 2 years old  at [facility] for lipid analytes.    2-8 years:  Greater than 45 mg/dL     18 years and older:   Female:  Greater than or equal to 50 mg/dL   Male:   Greater than or equal to 40 mg/dL   09/12/2021 26 (L) >=40 mg/dL Final     Comment:     HDL Cholesterol Reference Range:     0-2 years:   No reference ranges established for patients under 2 years old  at [facility] for lipid analytes.    2-8 years:  Greater than 45 mg/dL     18 years and older:   Female: Greater than or equal to 50 mg/dL   Male:   Greater than or equal to 40 mg/dL     LDL Cholesterol Calculated   Date Value Ref Range Status   09/20/2021 49 <=129 mg/dL Final   09/12/2021 54 <=129 mg/dL Final     LDL Cholesterol Direct   Date Value Ref Range Status   02/13/2018 69 <=129 mg/dl Final   04/09/2013 74.0 <130.1 mg/dL Final     Triglycerides   Date Value Ref Range Status   09/20/2021 242 (H) <=149 mg/dL Final   09/12/2021 374 (H) <=149 mg/dL Final     No results found for: CHOLHDLRATIO Last Comprehensive Metabolic Panel:  Sodium   Date Value Ref Range Status   09/20/2021 140 136 - 145 mmol/L Final     Potassium   Date Value Ref Range Status   09/20/2021 4.3 3.5 - 5.0 mmol/L Final     Chloride   Date Value Ref Range Status   09/20/2021 105 98 - 107 mmol/L Final     Carbon Dioxide (CO2)   Date Value Ref Range Status   09/20/2021 23 22 - 31 mmol/L Final     Anion Gap   Date Value Ref Range Status   09/20/2021 12 5 - 18 mmol/L Final     Glucose   Date Value Ref Range Status   09/20/2021 107 70 - 125 mg/dL Final     Urea Nitrogen   Date Value Ref Range Status   09/20/2021 28 (H) 8 - 22 mg/dL Final     Creatinine   Date Value Ref Range Status   09/20/2021 1.30 0.70 - 1.30 mg/dL Final     GFR Estimate   Date Value Ref Range Status   09/20/2021 58 (L) >60 mL/min/1.73m2 Final     Comment:     As of July 11, 2021, eGFR is calculated by the CKD-EPI creatinine equation, without race adjustment. eGFR can be influenced by muscle mass, exercise, and diet. The reported eGFR is an estimation only and is only applicable if the renal function is stable.   01/05/2021  >60 >60 mL/min/1.73m2 Final     Calcium   Date Value Ref Range Status   09/20/2021 9.1 8.5 - 10.5 mg/dL Final                                  Thank you for allowing me to participate in the care of your patient.      Sincerely,     Claudio Stephens MD     [facility]  cc:   No referring provider defined for this encounter.

## 2021-10-05 ENCOUNTER — HOSPITAL ENCOUNTER (OUTPATIENT)
Dept: CARDIAC REHAB | Facility: CLINIC | Age: 66
End: 2021-10-05
Attending: FAMILY MEDICINE
Payer: COMMERCIAL

## 2021-10-05 ENCOUNTER — TELEPHONE (OUTPATIENT)
Dept: CARDIOLOGY | Facility: CLINIC | Age: 66
End: 2021-10-05

## 2021-10-05 DIAGNOSIS — I25.119 CORONARY ARTERY DISEASE INVOLVING NATIVE CORONARY ARTERY OF NATIVE HEART WITH ANGINA PECTORIS (H): ICD-10-CM

## 2021-10-05 PROCEDURE — 93798 PHYS/QHP OP CAR RHAB W/ECG: CPT | Performed by: OCCUPATIONAL THERAPIST

## 2021-10-05 PROCEDURE — 999N000109 HC STATISTIC OP CR VISIT: Performed by: OCCUPATIONAL THERAPIST

## 2021-10-05 PROCEDURE — 999N000108 HC STATISTIC OP CARDIAC VISIT #2: Performed by: OCCUPATIONAL THERAPIST

## 2021-10-05 PROCEDURE — 93797 PHYS/QHP OP CAR RHAB WO ECG: CPT | Mod: XU | Performed by: OCCUPATIONAL THERAPIST

## 2021-10-05 NOTE — TELEPHONE ENCOUNTER
Dr. Stephens, please review. Any recommendations? -ejb  --------------------------------------------------------------------------      Received call from cardiac rehab at [facility]. Patient had short run of NSVT today during a 6 min walk. Patient is currently asymptomatic at rest and vitals reported as stable. Staff if looking for guidance whether it is safe to send patient home. Advised caller it would be hard to provide advice as unable to physically assess patient and questioned what established protocols would dictate. Will forward to Dr. Stephens for review and advice.  Patient is scheduled for staged PCI on 10/7/21.

## 2021-10-06 ENCOUNTER — TELEPHONE (OUTPATIENT)
Dept: CARDIOLOGY | Facility: CLINIC | Age: 66
End: 2021-10-06

## 2021-10-06 ENCOUNTER — LAB (OUTPATIENT)
Dept: LAB | Facility: CLINIC | Age: 66
End: 2021-10-06
Payer: COMMERCIAL

## 2021-10-06 DIAGNOSIS — Z11.59 ENCOUNTER FOR SCREENING FOR OTHER VIRAL DISEASES: ICD-10-CM

## 2021-10-06 DIAGNOSIS — G47.00 INSOMNIA, UNSPECIFIED TYPE: ICD-10-CM

## 2021-10-06 DIAGNOSIS — G47.00 INSOMNIA: Primary | ICD-10-CM

## 2021-10-06 PROCEDURE — U0003 INFECTIOUS AGENT DETECTION BY NUCLEIC ACID (DNA OR RNA); SEVERE ACUTE RESPIRATORY SYNDROME CORONAVIRUS 2 (SARS-COV-2) (CORONAVIRUS DISEASE [COVID-19]), AMPLIFIED PROBE TECHNIQUE, MAKING USE OF HIGH THROUGHPUT TECHNOLOGIES AS DESCRIBED BY CMS-2020-01-R: HCPCS

## 2021-10-06 PROCEDURE — U0005 INFEC AGEN DETEC AMPLI PROBE: HCPCS

## 2021-10-06 NOTE — TELEPHONE ENCOUNTER
" Disp Refills Start End OSIRIS   traZODone (DESYREL) 100 MG tablet 180 tablet 3 12/7/2020  No   Sig: TAKE 2 TABLETS (200MG TOTAL) BY MOUTH AT BEDTIME   Sent to pharmacy as: traZODone 100 mg tablet (DESYREL)   E-Prescribing Status: Receipt confirmed by pharmacy (12/7/2020  1:27 PM CST)       traZODone (DESYREL) 100 MG tablet [[id number]]    Electronically signed by: Lilli Frank RN on 12/07/20 1327 Status: Active   Ordering user: Lilli Frank RN 12/07/20 1327 Ordering provider: Luann Berger MD   Authorized by: Luann Berger MD   Frequency:  12/07/20 - Until Discontinued Released by: Lilli Frank RN 12/07/20 1327   Diagnoses  Insomnia [G47.00]       Routing refill request to provider for review/approval because:  Drug interaction warning    Last Written Prescription Date:  12/7/2020  Last Fill Quantity: 180,  # refills: 3   Last office visit provider:  9/20/21     Requested Prescriptions   Pending Prescriptions Disp Refills     traZODone (DESYREL) 100 MG tablet [Pharmacy Med Name: TRAZODONE 100 MG TABLET] 180 tablet 3     Sig: TAKE 2 TABLETS (200MG TOTAL) BY MOUTH AT BEDTIME       Serotonin Modulators Passed - 10/6/2021  2:44 PM        Passed - Recent (12 mo) or future (30 days) visit within the authorizing provider's specialty     Patient has had an office visit with the authorizing provider or a provider within the authorizing providers department within the previous 12 mos or has a future within next 30 days. See \"Patient Info\" tab in inbasket, or \"Choose Columns\" in Meds & Orders section of the refill encounter.              Passed - Medication is active on med list        Passed - Patient is age 18 or older             Cornell Evans RN 10/06/21 3:49 PM  "

## 2021-10-07 ENCOUNTER — HOSPITAL ENCOUNTER (OUTPATIENT)
Facility: HOSPITAL | Age: 66
Discharge: HOME | End: 2021-10-07
Attending: INTERNAL MEDICINE | Admitting: INTERNAL MEDICINE
Payer: COMMERCIAL

## 2021-10-07 VITALS
OXYGEN SATURATION: 96 % | SYSTOLIC BLOOD PRESSURE: 131 MMHG | HEART RATE: 58 BPM | RESPIRATION RATE: 20 BRPM | BODY MASS INDEX: 42.66 KG/M2 | DIASTOLIC BLOOD PRESSURE: 78 MMHG | WEIGHT: 315 LBS | TEMPERATURE: 97.1 F | HEIGHT: 72 IN

## 2021-10-07 DIAGNOSIS — I21.3 ST ELEVATION MI (STEMI) (H): ICD-10-CM

## 2021-10-07 DIAGNOSIS — I25.119 CORONARY ARTERY DISEASE INVOLVING NATIVE CORONARY ARTERY OF NATIVE HEART WITH ANGINA PECTORIS (H): ICD-10-CM

## 2021-10-07 DIAGNOSIS — R60.0 BILATERAL LEG EDEMA: ICD-10-CM

## 2021-10-07 DIAGNOSIS — I51.89 DIASTOLIC DYSFUNCTION: ICD-10-CM

## 2021-10-07 LAB
ACT BLD: 284 SECONDS (ref 74–150)
ANION GAP SERPL CALCULATED.3IONS-SCNC: 7 MMOL/L (ref 5–18)
BUN SERPL-MCNC: 13 MG/DL (ref 8–22)
CALCIUM SERPL-MCNC: 8.7 MG/DL (ref 8.5–10.5)
CHLORIDE BLD-SCNC: 110 MMOL/L (ref 98–107)
CHOLEST SERPL-MCNC: 104 MG/DL
CO2 SERPL-SCNC: 26 MMOL/L (ref 22–31)
CREAT SERPL-MCNC: 0.9 MG/DL (ref 0.7–1.3)
ERYTHROCYTE [DISTWIDTH] IN BLOOD BY AUTOMATED COUNT: 13.2 % (ref 10–15)
FASTING STATUS PATIENT QL REPORTED: YES
GFR SERPL CREATININE-BSD FRML MDRD: 89 ML/MIN/1.73M2
GLUCOSE BLD-MCNC: 103 MG/DL (ref 70–125)
GLUCOSE BLDC GLUCOMTR-MCNC: 105 MG/DL (ref 70–99)
HCT VFR BLD AUTO: 33.9 % (ref 40–53)
HDLC SERPL-MCNC: 27 MG/DL
HGB BLD-MCNC: 11.3 G/DL (ref 13.3–17.7)
LDLC SERPL CALC-MCNC: 38 MG/DL
MCH RBC QN AUTO: 29.2 PG (ref 26.5–33)
MCHC RBC AUTO-ENTMCNC: 33.3 G/DL (ref 31.5–36.5)
MCV RBC AUTO: 88 FL (ref 78–100)
PLATELET # BLD AUTO: 124 10E3/UL (ref 150–450)
POTASSIUM BLD-SCNC: 3.8 MMOL/L (ref 3.5–5)
RBC # BLD AUTO: 3.87 10E6/UL (ref 4.4–5.9)
SARS-COV-2 RNA RESP QL NAA+PROBE: NEGATIVE
SODIUM SERPL-SCNC: 143 MMOL/L (ref 136–145)
TRIGL SERPL-MCNC: 196 MG/DL
WBC # BLD AUTO: 5 10E3/UL (ref 4–11)

## 2021-10-07 PROCEDURE — 250N000009 HC RX 250: Performed by: INTERNAL MEDICINE

## 2021-10-07 PROCEDURE — 80048 BASIC METABOLIC PNL TOTAL CA: CPT | Performed by: INTERNAL MEDICINE

## 2021-10-07 PROCEDURE — 999N000127 HC STATISTIC PERIPHERAL IV START W US GUIDANCE

## 2021-10-07 PROCEDURE — 258N000003 HC RX IP 258 OP 636: Performed by: INTERNAL MEDICINE

## 2021-10-07 PROCEDURE — 99152 MOD SED SAME PHYS/QHP 5/>YRS: CPT | Performed by: INTERNAL MEDICINE

## 2021-10-07 PROCEDURE — 250N000011 HC RX IP 250 OP 636: Performed by: INTERNAL MEDICINE

## 2021-10-07 PROCEDURE — 93010 ELECTROCARDIOGRAM REPORT: CPT | Performed by: GENERAL ACUTE CARE HOSPITAL

## 2021-10-07 PROCEDURE — 85027 COMPLETE CBC AUTOMATED: CPT | Performed by: INTERNAL MEDICINE

## 2021-10-07 PROCEDURE — 250N000013 HC RX MED GY IP 250 OP 250 PS 637: Performed by: INTERNAL MEDICINE

## 2021-10-07 PROCEDURE — C1725 CATH, TRANSLUMIN NON-LASER: HCPCS | Performed by: INTERNAL MEDICINE

## 2021-10-07 PROCEDURE — 93010 ELECTROCARDIOGRAM REPORT: CPT | Mod: 76 | Performed by: GENERAL ACUTE CARE HOSPITAL

## 2021-10-07 PROCEDURE — C1894 INTRO/SHEATH, NON-LASER: HCPCS | Performed by: INTERNAL MEDICINE

## 2021-10-07 PROCEDURE — C9600 PERC DRUG-EL COR STENT SING: HCPCS | Performed by: INTERNAL MEDICINE

## 2021-10-07 PROCEDURE — 255N000002 HC RX 255 OP 636: Performed by: INTERNAL MEDICINE

## 2021-10-07 PROCEDURE — 93005 ELECTROCARDIOGRAM TRACING: CPT

## 2021-10-07 PROCEDURE — 85347 COAGULATION TIME ACTIVATED: CPT

## 2021-10-07 PROCEDURE — 99153 MOD SED SAME PHYS/QHP EA: CPT | Performed by: INTERNAL MEDICINE

## 2021-10-07 PROCEDURE — C1874 STENT, COATED/COV W/DEL SYS: HCPCS | Performed by: INTERNAL MEDICINE

## 2021-10-07 PROCEDURE — 92928 PRQ TCAT PLMT NTRAC ST 1 LES: CPT | Mod: LD | Performed by: INTERNAL MEDICINE

## 2021-10-07 PROCEDURE — 272N000001 HC OR GENERAL SUPPLY STERILE: Performed by: INTERNAL MEDICINE

## 2021-10-07 PROCEDURE — 80061 LIPID PANEL: CPT | Performed by: INTERNAL MEDICINE

## 2021-10-07 PROCEDURE — C1769 GUIDE WIRE: HCPCS | Performed by: INTERNAL MEDICINE

## 2021-10-07 PROCEDURE — 999N000054 HC STATISTIC EKG NON-CHARGEABLE

## 2021-10-07 PROCEDURE — C1887 CATHETER, GUIDING: HCPCS | Performed by: INTERNAL MEDICINE

## 2021-10-07 PROCEDURE — 82962 GLUCOSE BLOOD TEST: CPT

## 2021-10-07 PROCEDURE — 36415 COLL VENOUS BLD VENIPUNCTURE: CPT | Performed by: INTERNAL MEDICINE

## 2021-10-07 DEVICE — STENT CORONARY DES SYNERGY XD MR US 3.50X38MM H7493941838350: Type: IMPLANTABLE DEVICE | Status: FUNCTIONAL

## 2021-10-07 RX ORDER — FUROSEMIDE 40 MG
40 TABLET ORAL 2 TIMES DAILY
Qty: 60 TABLET | Refills: 1 | Status: SHIPPED | OUTPATIENT
Start: 2021-10-07 | End: 2021-11-01

## 2021-10-07 RX ORDER — NITROGLYCERIN 0.4 MG/1
TABLET SUBLINGUAL
Qty: 25 TABLET | Refills: 3 | Status: SHIPPED | OUTPATIENT
Start: 2021-10-07 | End: 2021-10-19

## 2021-10-07 RX ORDER — IODIXANOL 320 MG/ML
INJECTION, SOLUTION INTRAVASCULAR
Status: DISCONTINUED | OUTPATIENT
Start: 2021-10-07 | End: 2021-10-07 | Stop reason: HOSPADM

## 2021-10-07 RX ORDER — CLOPIDOGREL BISULFATE 75 MG/1
TABLET ORAL
Status: DISCONTINUED | OUTPATIENT
Start: 2021-10-07 | End: 2021-10-07 | Stop reason: HOSPADM

## 2021-10-07 RX ORDER — NALOXONE HYDROCHLORIDE 0.4 MG/ML
0.4 INJECTION, SOLUTION INTRAMUSCULAR; INTRAVENOUS; SUBCUTANEOUS
Status: DISCONTINUED | OUTPATIENT
Start: 2021-10-07 | End: 2021-10-07 | Stop reason: HOSPADM

## 2021-10-07 RX ORDER — ONDANSETRON 4 MG/1
4 TABLET, ORALLY DISINTEGRATING ORAL EVERY 6 HOURS PRN
Status: DISCONTINUED | OUTPATIENT
Start: 2021-10-07 | End: 2021-10-07 | Stop reason: HOSPADM

## 2021-10-07 RX ORDER — HYDRALAZINE HYDROCHLORIDE 20 MG/ML
10 INJECTION INTRAMUSCULAR; INTRAVENOUS EVERY 4 HOURS PRN
Status: DISCONTINUED | OUTPATIENT
Start: 2021-10-07 | End: 2021-10-07 | Stop reason: HOSPADM

## 2021-10-07 RX ORDER — TRAZODONE HYDROCHLORIDE 100 MG/1
TABLET ORAL
Qty: 180 TABLET | Refills: 3 | Status: SHIPPED | OUTPATIENT
Start: 2021-10-07 | End: 2022-09-02

## 2021-10-07 RX ORDER — FLUMAZENIL 0.1 MG/ML
0.2 INJECTION, SOLUTION INTRAVENOUS
Status: DISCONTINUED | OUTPATIENT
Start: 2021-10-07 | End: 2021-10-07 | Stop reason: HOSPADM

## 2021-10-07 RX ORDER — ASPIRIN 81 MG/1
81 TABLET ORAL DAILY
Status: DISCONTINUED | OUTPATIENT
Start: 2021-10-08 | End: 2021-10-07 | Stop reason: HOSPADM

## 2021-10-07 RX ORDER — HEPARIN SODIUM 1000 [USP'U]/ML
INJECTION, SOLUTION INTRAVENOUS; SUBCUTANEOUS
Status: DISCONTINUED | OUTPATIENT
Start: 2021-10-07 | End: 2021-10-07 | Stop reason: HOSPADM

## 2021-10-07 RX ORDER — ASPIRIN 81 MG/1
243 TABLET, CHEWABLE ORAL ONCE
Status: COMPLETED | OUTPATIENT
Start: 2021-10-07 | End: 2021-10-07

## 2021-10-07 RX ORDER — ONDANSETRON 2 MG/ML
4 INJECTION INTRAMUSCULAR; INTRAVENOUS EVERY 6 HOURS PRN
Status: DISCONTINUED | OUTPATIENT
Start: 2021-10-07 | End: 2021-10-07 | Stop reason: HOSPADM

## 2021-10-07 RX ORDER — ACETAMINOPHEN 325 MG/1
650 TABLET ORAL EVERY 4 HOURS PRN
Status: DISCONTINUED | OUTPATIENT
Start: 2021-10-07 | End: 2021-10-07 | Stop reason: HOSPADM

## 2021-10-07 RX ORDER — NALOXONE HYDROCHLORIDE 0.4 MG/ML
0.2 INJECTION, SOLUTION INTRAMUSCULAR; INTRAVENOUS; SUBCUTANEOUS
Status: DISCONTINUED | OUTPATIENT
Start: 2021-10-07 | End: 2021-10-07 | Stop reason: HOSPADM

## 2021-10-07 RX ORDER — ATROPINE SULFATE 0.1 MG/ML
0.5 INJECTION INTRAVENOUS
Status: DISCONTINUED | OUTPATIENT
Start: 2021-10-07 | End: 2021-10-07 | Stop reason: HOSPADM

## 2021-10-07 RX ORDER — NITROGLYCERIN 0.4 MG/1
0.4 TABLET SUBLINGUAL EVERY 5 MIN PRN
Status: DISCONTINUED | OUTPATIENT
Start: 2021-10-07 | End: 2021-10-07 | Stop reason: HOSPADM

## 2021-10-07 RX ORDER — METOPROLOL TARTRATE 1 MG/ML
5-10 INJECTION, SOLUTION INTRAVENOUS
Status: DISCONTINUED | OUTPATIENT
Start: 2021-10-07 | End: 2021-10-07 | Stop reason: HOSPADM

## 2021-10-07 RX ORDER — FENTANYL CITRATE 50 UG/ML
INJECTION, SOLUTION INTRAMUSCULAR; INTRAVENOUS
Status: DISCONTINUED | OUTPATIENT
Start: 2021-10-07 | End: 2021-10-07 | Stop reason: HOSPADM

## 2021-10-07 RX ORDER — FENTANYL CITRATE 50 UG/ML
25 INJECTION, SOLUTION INTRAMUSCULAR; INTRAVENOUS
Status: DISCONTINUED | OUTPATIENT
Start: 2021-10-07 | End: 2021-10-07 | Stop reason: HOSPADM

## 2021-10-07 RX ORDER — SODIUM CHLORIDE 9 MG/ML
INJECTION, SOLUTION INTRAVENOUS CONTINUOUS
Status: DISCONTINUED | OUTPATIENT
Start: 2021-10-07 | End: 2021-10-07 | Stop reason: HOSPADM

## 2021-10-07 RX ORDER — ASPIRIN 325 MG
325 TABLET ORAL ONCE
Status: COMPLETED | OUTPATIENT
Start: 2021-10-07 | End: 2021-10-07

## 2021-10-07 RX ORDER — DIAZEPAM 5 MG
10 TABLET ORAL
Status: COMPLETED | OUTPATIENT
Start: 2021-10-07 | End: 2021-10-07

## 2021-10-07 RX ORDER — OXYCODONE HYDROCHLORIDE 5 MG/1
5 TABLET ORAL EVERY 4 HOURS PRN
Status: DISCONTINUED | OUTPATIENT
Start: 2021-10-07 | End: 2021-10-07 | Stop reason: HOSPADM

## 2021-10-07 RX ORDER — ASPIRIN 81 MG/1
81 TABLET, CHEWABLE ORAL ONCE
Status: DISCONTINUED | OUTPATIENT
Start: 2021-10-07 | End: 2021-10-07 | Stop reason: HOSPADM

## 2021-10-07 RX ORDER — NITROGLYCERIN 0.4 MG/1
TABLET SUBLINGUAL
Status: DISCONTINUED | OUTPATIENT
Start: 2021-10-07 | End: 2021-10-07 | Stop reason: HOSPADM

## 2021-10-07 RX ORDER — NITROGLYCERIN 5 MG/ML
VIAL (ML) INTRAVENOUS
Status: DISCONTINUED | OUTPATIENT
Start: 2021-10-07 | End: 2021-10-07 | Stop reason: HOSPADM

## 2021-10-07 RX ORDER — LIDOCAINE 40 MG/G
CREAM TOPICAL
Status: DISCONTINUED | OUTPATIENT
Start: 2021-10-07 | End: 2021-10-07 | Stop reason: HOSPADM

## 2021-10-07 RX ORDER — OXYCODONE HYDROCHLORIDE 5 MG/1
10 TABLET ORAL EVERY 4 HOURS PRN
Status: DISCONTINUED | OUTPATIENT
Start: 2021-10-07 | End: 2021-10-07 | Stop reason: HOSPADM

## 2021-10-07 RX ORDER — SODIUM CHLORIDE 9 MG/ML
INJECTION, SOLUTION INTRAVENOUS CONTINUOUS
Status: ACTIVE | OUTPATIENT
Start: 2021-10-07 | End: 2021-10-07

## 2021-10-07 RX ADMIN — DIAZEPAM 10 MG: 5 TABLET ORAL at 08:00

## 2021-10-07 RX ADMIN — SODIUM CHLORIDE: 9 INJECTION, SOLUTION INTRAVENOUS at 09:49

## 2021-10-07 RX ADMIN — ACETAMINOPHEN 650 MG: 325 TABLET ORAL at 10:40

## 2021-10-07 RX ADMIN — ASPIRIN 243 MG: 81 TABLET, CHEWABLE ORAL at 08:00

## 2021-10-07 RX ADMIN — SODIUM CHLORIDE: 9 INJECTION, SOLUTION INTRAVENOUS at 08:01

## 2021-10-07 ASSESSMENT — MIFFLIN-ST. JEOR: SCORE: 2278.13

## 2021-10-07 NOTE — PRE-PROCEDURE
GENERAL PRE-PROCEDURE:   Procedure:  Staged LAD PCI  Date/Time:  10/7/2021 7:22 AM    Written consent obtained?: Yes    Risks and benefits: Risks, benefits and alternatives were discussed    Consent given by:  Patient  Patient states understanding of procedure being performed: Yes    Patient's understanding of procedure matches consent: Yes    Procedure consent matches procedure scheduled: Yes    Expected level of sedation:  Moderate  Appropriately NPO:  Yes  ASA Class:  3 (CAD; s/p PCI, ICM, HTN, HLD, MARLEEN, Class III obesity; BMI 43.49kg/m2, hx of gastric bypass, PAF, bradycardia, chronic lymphadema)  Mallampati  :  Grade 3- soft palate visible, posterior pharyngeal wall not visible  Lungs:  Lungs clear with good breath sounds bilaterally  Heart:  Normal heart sounds and rate  History & Physical reviewed:  History and physical reviewed and no updates needed  Statement of review:  I have reviewed the lab findings, diagnostic data, medications, and the plan for sedation

## 2021-10-07 NOTE — Clinical Note
Stent deployed in the proximal left anterior descending. Max pressure = 12 chaparro. Total duration = 30 seconds.

## 2021-10-07 NOTE — Clinical Note
The first balloon was inserted into the left anterior descending and proximal left anterior descending.Max pressure = 18 chaparro. Total duration = 20 seconds.     Max pressure = 18 chaparro. Total duration = 20 seconds.    Balloon reinflated a second time: Max pressure = 18 chaparro. Total duration = 20 seconds.  Balloon reinflated a third time: Max pressure = 20 chaparro. Total duration = 30 seconds.

## 2021-10-07 NOTE — DISCHARGE INSTRUCTIONS
Interventional Cardiology  Coronary Angiogram/Angioplasty/Stent/Atherectomy Discharge  Instructions -   Radial (wrist) Approach     The instructions below are to help you understand how to take care of yourself. There is also information about when to call the doctor or emergency services.    **Do not stop your aspirin or platelet inhibitor unless directed by your Cardiologist.  These medications help to prevent platelets in your blood from sticking together and forming a clot.   Examples of these medications are: Ticagrelor (Brilinta), Clopidogrel (Plavix), Prasugrel (Effient)    For 24 hours after procedure:    Do not subject hand/arm to any forceful movements for 24 hours, such as supporting weight when rising from a chair or bed.    Do not drive a car for 24 hours.    The dressing on the puncture site may be removed after 24 hours and left open to air. If minor oozing, you may apply a Band-Aid and remove after 12 hours.     You may shower on the day after your procedure. Do not take a tub bath or wash dishes (no soaking wrist) with the puncture site in water for 3 days after the procedure.    For 48 hours following the procedure:    Do not operate a lawnmower, motorcycle, chainsaw or all-terrain vehicle.    Do not lift anything heavier than 5-10 pounds with affected arm for 5 days.    Avoid excessive bending (flexion/extension) wrist movement.    Do not engage in vigorous exercise (i.e. tennis, golf) using the affected arm for 5 days after discharge.    You may return to work in 72 hours if no complications and no heavy lifting.    If bleeding should occur following discharge:    Sit down and apply firm pressure with your thumb against the puncture site and fingers against back of wrist for 10 minutes.    If the bleeding stops, continue to rest, keeping your wrist still for 2 hours. Notify your doctor as soon as possible.    If bleeding does not stop after 10 minutes or if there is a large amount of bleeding or  spurting, call 911 immediately. Do not drive yourself to the hospital.           Contact the Heart Clinic at [phone] if you develop:    Fever over 100.4, that lasts more than one day    Redness, heat, or pus at the puncture site    Change in color or temperature of the hand or arm    Expect mild tingling of hand and tenderness at the puncture site for up to 3 days. You may take Tylenol or a pain medicine recommended by your doctor.                       Our Cardiac Rehab staff may visit briefly with you while your in the hospital.   If they miss you, someone will contact you after you are home.  You are encouraged to enroll in an Outpatient Cardiac Rehab Program     No elective dental work for 6 weeks after having a stent    If you are on metformin, ActoPlus Met , Glucophage , Glucovance , Avandamet , Fortamet , Glumetza , Janumet , Riomet , PrandiMet, OR Metaglip , resume this medication only after your kidney function test is done and you are told to do so by your primary care provider.      Your Procedural Physician was: Dr. Mckayla Dan  the phone number is: [phone]    [facility]:  [phone]  If you are calling after hours, please listen to the entire voicemail, a live  will answer at the end of the message

## 2021-10-07 NOTE — PLAN OF CARE
Patient was kept comfortable during post-procedure stay.VSS. Initially very sleepy upon arrival from his procedure. C/O back pain which is somewhat relieved by repositioning. Patient stated feeling better after sitting up on the recliner. Patient has chronic back pain. Radial site remains dry & free from signs of bleeding. Appointments made & included in AVS. Dr. Dan was able to speak with patient's wife during recovery. Post-op instructions given to patient. Able to ask questions. Verbalized no concerns. Belongings returned. Discharged in stable condition.

## 2021-10-09 LAB
ATRIAL RATE - MUSE: 50 BPM
ATRIAL RATE - MUSE: 52 BPM
DIASTOLIC BLOOD PRESSURE - MUSE: NORMAL MMHG
DIASTOLIC BLOOD PRESSURE - MUSE: NORMAL MMHG
INTERPRETATION ECG - MUSE: NORMAL
INTERPRETATION ECG - MUSE: NORMAL
P AXIS - MUSE: 30 DEGREES
P AXIS - MUSE: 44 DEGREES
PR INTERVAL - MUSE: 216 MS
PR INTERVAL - MUSE: 228 MS
QRS DURATION - MUSE: 86 MS
QRS DURATION - MUSE: 96 MS
QT - MUSE: 448 MS
QT - MUSE: 458 MS
QTC - MUSE: 408 MS
QTC - MUSE: 425 MS
R AXIS - MUSE: 2 DEGREES
R AXIS - MUSE: 9 DEGREES
SYSTOLIC BLOOD PRESSURE - MUSE: NORMAL MMHG
SYSTOLIC BLOOD PRESSURE - MUSE: NORMAL MMHG
T AXIS - MUSE: -14 DEGREES
T AXIS - MUSE: -21 DEGREES
VENTRICULAR RATE- MUSE: 50 BPM
VENTRICULAR RATE- MUSE: 52 BPM

## 2021-10-19 ENCOUNTER — OFFICE VISIT (OUTPATIENT)
Dept: CARDIOLOGY | Facility: CLINIC | Age: 66
End: 2021-10-19
Payer: COMMERCIAL

## 2021-10-19 ENCOUNTER — HOSPITAL ENCOUNTER (OUTPATIENT)
Dept: CARDIAC REHAB | Facility: CLINIC | Age: 66
End: 2021-10-19
Attending: FAMILY MEDICINE
Payer: COMMERCIAL

## 2021-10-19 ENCOUNTER — TELEPHONE (OUTPATIENT)
Dept: CARDIOLOGY | Facility: CLINIC | Age: 66
End: 2021-10-19

## 2021-10-19 VITALS
HEIGHT: 72 IN | HEART RATE: 100 BPM | WEIGHT: 310 LBS | SYSTOLIC BLOOD PRESSURE: 100 MMHG | DIASTOLIC BLOOD PRESSURE: 70 MMHG | BODY MASS INDEX: 41.99 KG/M2

## 2021-10-19 DIAGNOSIS — I48.0 PAROXYSMAL ATRIAL FIBRILLATION (H): ICD-10-CM

## 2021-10-19 DIAGNOSIS — I25.10 CORONARY ARTERY DISEASE INVOLVING NATIVE CORONARY ARTERY OF NATIVE HEART WITHOUT ANGINA PECTORIS: Primary | ICD-10-CM

## 2021-10-19 DIAGNOSIS — I49.3 PVC'S (PREMATURE VENTRICULAR CONTRACTIONS): ICD-10-CM

## 2021-10-19 DIAGNOSIS — E78.2 MIXED HYPERLIPIDEMIA: ICD-10-CM

## 2021-10-19 DIAGNOSIS — I50.32 CHRONIC DIASTOLIC HEART FAILURE (H): ICD-10-CM

## 2021-10-19 DIAGNOSIS — I25.119 CORONARY ARTERY DISEASE INVOLVING NATIVE CORONARY ARTERY OF NATIVE HEART WITH ANGINA PECTORIS (H): ICD-10-CM

## 2021-10-19 DIAGNOSIS — I10 ESSENTIAL HYPERTENSION: ICD-10-CM

## 2021-10-19 DIAGNOSIS — I21.3 ST ELEVATION MYOCARDIAL INFARCTION (STEMI), UNSPECIFIED ARTERY (H): ICD-10-CM

## 2021-10-19 LAB
ANION GAP SERPL CALCULATED.3IONS-SCNC: 12 MMOL/L (ref 5–18)
BUN SERPL-MCNC: 35 MG/DL (ref 8–22)
CALCIUM SERPL-MCNC: 10.9 MG/DL (ref 8.5–10.5)
CHLORIDE BLD-SCNC: 103 MMOL/L (ref 98–107)
CO2 SERPL-SCNC: 27 MMOL/L (ref 22–31)
CREAT SERPL-MCNC: 1.53 MG/DL (ref 0.7–1.3)
GFR SERPL CREATININE-BSD FRML MDRD: 47 ML/MIN/1.73M2
GLUCOSE BLD-MCNC: 117 MG/DL (ref 70–125)
MAGNESIUM SERPL-MCNC: 2.2 MG/DL (ref 1.8–2.6)
POTASSIUM BLD-SCNC: 3.5 MMOL/L (ref 3.5–5)
SODIUM SERPL-SCNC: 142 MMOL/L (ref 136–145)

## 2021-10-19 PROCEDURE — 83735 ASSAY OF MAGNESIUM: CPT | Performed by: NURSE PRACTITIONER

## 2021-10-19 PROCEDURE — 93798 PHYS/QHP OP CAR RHAB W/ECG: CPT

## 2021-10-19 PROCEDURE — 80048 BASIC METABOLIC PNL TOTAL CA: CPT | Performed by: NURSE PRACTITIONER

## 2021-10-19 PROCEDURE — 36415 COLL VENOUS BLD VENIPUNCTURE: CPT | Performed by: NURSE PRACTITIONER

## 2021-10-19 PROCEDURE — 99215 OFFICE O/P EST HI 40 MIN: CPT | Performed by: NURSE PRACTITIONER

## 2021-10-19 RX ORDER — LISINOPRIL AND HYDROCHLOROTHIAZIDE 20; 25 MG/1; MG/1
1 TABLET ORAL 2 TIMES DAILY
COMMUNITY
Start: 2021-10-07 | End: 2021-10-19

## 2021-10-19 RX ORDER — METOPROLOL SUCCINATE 50 MG/1
75 TABLET, EXTENDED RELEASE ORAL DAILY
Qty: 45 TABLET | Refills: 2 | Status: SHIPPED | OUTPATIENT
Start: 2021-10-19 | End: 2021-11-11

## 2021-10-19 RX ORDER — GABAPENTIN 300 MG/1
1 CAPSULE ORAL
COMMUNITY
Start: 2021-10-04 | End: 2021-10-19

## 2021-10-19 ASSESSMENT — MIFFLIN-ST. JEOR: SCORE: 2229.15

## 2021-10-19 NOTE — TELEPHONE ENCOUNTER
----- Message from Kylie Beaver sent at 10/19/2021  9:32 AM CDT -----  Regarding: María pt  General phone call:    Caller: Tameka with Cardiac Rehab  Primary cardiologist: María  Detailed reason for call: NAMRATA [place] Tameka is calling and states that the pt was there today and she would like María to listen to his lung sounds and look at his rhythm at his appt today. Tameka states that the pt was not having any symptoms. Tameka states that all of this should be in Epic. She also states that his PHQ-9 scored high  New or active symptoms? N/A  Best phone number: [phone]  Best time to contact: anytime  Ok to leave a detailedmessage? yes  Device? no    Additional Info:

## 2021-10-19 NOTE — LETTER
10/19/2021    Luann Berger MD  [address] [facility] [address]    RE: Onur Barr       Dear Colleague,    I had the pleasure of seeing Onur Barr in the [facility].    HEART CARE PROGRESS NOTE      [facility]  [phone]      Assessment/Recommendations   1.  Coronary artery disease: He had a STEMI September 11, 2021 with PCI to proximal and distal RCA.  He returned for staged PCI on October 7, 2021 with drug-eluting stent to proximal LAD in-stent restenosis.  He is on dual antiplatelet therapy with aspirin and Plavix.  Aspirin can be discontinued after 4 weeks and he will continue on Plavix and Eliquis.  Puncture site is soft with no hematoma.      Risk factor modification and lifestyle management topics were discussed including managing comorbidities, weight loss, heart healthy diet and exercise.  He is participating in cardiac rehab.    2.  Dyslipidemia: Onur Barr is on high intensity statin therapy with rosuvastatin 40 mg daily.  LDL is 38.  We discussed a diet low in saturated fat, weight loss, and exercise along with medication for better control of cholesterol.     3.  Hypertension: His blood pressure is 100/70.  He denies lightheadedness in clinic.    4.  Paroxysmal atrial fibrillation: Atrial fibrillation noted during cardiac rehab earlier today.  Heart rate remains irregular on exam today.  Initial heart rate 115 bpm but decreased to 100/min after sitting for 5 minutes.  He continues to take Eliquis.  Increase metoprolol to 75 mg daily.  I will discuss with Dr. Stephens.     5.  PVCs: PVCs and 5 beat run VT noted at cardiac rehab today.  Asymptomatic.  BMP and magnesium pending    6.  Depression: He has a history of depression and is on Cymbalta.  He feels more depressed recently due to health concerns.  He denies any suicidal thoughts.  I encouraged him to see his primary care provider.     Baudilio will follow up  with Dr. Stephens on November 23.        History of Present Illness/Subjective    Onur Barr is seen at [facility] for post coronary intervention follow up.  He has a history of paroxysmal atrial fibrillation, coronary artery disease, hypertension, dyslipidemia, ischemic cardiomyopathy heart failure with preserved ejection fraction, lymphedema, and obesity.  He had a STEMI September 11, 2021 with PCI to proximal and distal RCA.  He returned for staged PCI on October 7, 2021 with drug-eluting stent to proximal LAD in-stent restenosis.  He is on dual antiplatelet therapy with aspirin and Plavix.  Aspirin can be discontinued after 4 weeks and he will continue on Plavix and Eliquis.  Echocardiogram on September 12, 2021 showed ejection fraction of 55 to 60%.    He was at cardiac rehab today and report reviewed.  He had a 5 beat run of VT, PVCs, and noted to be in atrial fibrillation.  He has chronic back pain and lymphedema.  Dyspnea on exertion.    He continues to have shortness of breath with activity.  He denies any improvement since PCI.  He states that he has racing heart rates with activity.  A. fib today at cardiac rehab and now during clinic appointment.  He has no lower extremity edema and wears compression stockings daily.  He denies chest pain.        Echo 9/12/2021:   Interpretation Summary     Left ventricular function is normal.The ejection fraction is 55-60%.  Regional wall motion abnormalities cannot be excluded due to limited  visualization.  No significant valve abnormalities were seen.    Cardiac Catheterization    Addendum Date: 10/7/2021      Planned staged PCI post STEMI.    Proximal LAD severe ISR stented with a Synergy 3.5 CRYSTAL with good   angiographic results.       Physical Examination Review of Systems   /70 (BP Location: Left arm, Patient Position: Sitting, Cuff Size: Adult Large)   Pulse 100   Ht 1.829 m (6')   Wt 140.6 kg (310 lb)   BMI 42.04 kg/m    Body mass  index is 42.04 kg/m .  Wt Readings from Last 3 Encounters:   10/19/21 140.6 kg (310 lb)   10/07/21 145.5 kg (320 lb 12.3 oz)   09/24/21 140.6 kg (310 lb)       General Appearance:     Alert, cooperative and in no acute distress.   ENT/Mouth: membranes moist, no oral lesions or bleeding gums.      EYES:  no scleral icterus, normal conjunctivae   Chest/Lungs:   lungs are clear to auscultation, no rales or wheezing, respirations unlabored   Cardiovascular:   Irregular. Normal first and second heart sounds, no edema bilateral lower extremities    Abdomen:   Obese, soft, nontender, nondistended, bowel sounds present   Extremities: no cyanosis or clubbing   Skin:  Neurologic: warm, dry.  mood and affect are appropriate, alert and oriented x3     Puncture Site: Right radial site is soft with no bruising.  Radial pulses intact and symmetrical.  CMS intact.           Please refer above for cardiac ROS details.      Medical History  Surgical History Family History Social History   Past Medical History:   Diagnosis Date     Acid reflux disease 10/31/2017     Arrhythmia     paroxysmal atrial fibrillation     Arthritis      Atrial fibrillation (H)     Created by Conversion      Bariatric surgery status     Created by Conversion      CHF (congestive heart failure) (H)      Coronary artery disease      Coronary atherosclerosis     Created by Conversion  Replacement Utility updated for latest IMO load     Diabetes (H)     typr II resloved     Essential hypertension     Created by [facility] Annotation: Apr 6 2012 10:16AM Zack Brewer: Lisinipril,  coreg  Replacement Utility updated for latest IMO load     H/O gastric bypass      Heart attack (H)      Hypercholesteremia      Hypertension      Insomnia      Insomnia, unspecified     Created by [facility] Annotation: Sep 11 2013  9:56Zack Harris: Trouble  maintaining sleep-Trazodone-minimal helpzolpidem-      Ischemic cardiomyopathy      Low  back pain      Lumbago     Created by Conversion [facility] Annotation: Apr 6 2012 10:20AM Anh Ramos: Morphine pump      Morbid obesity (H) 8/1/2018     Nephrolithiasis      Nephrolithiasis      Obstructive sleep apnea (adult) (pediatric)     Created by Conversion      Osteoarthritis     Created by Conversion [facility] Annotation: Jun 26 2009  9:53AM Zack Brewer: failed lumbar  fusion/morphine pump dr putnam  Replacement Utility updated for latest IMO load     Pure hypercholesterolemia     Created by Conversion      Sleep apnea      Sleepiness 5/8/2018     Past Surgical History:   Procedure Laterality Date     BACK SURGERY       BYPASS GASTRIC DUODENAL SWITCH       C GASTRIC BYPASS,OBESE<100CM SUSY-EN-Y  2008    Description: Gastric Surgery For Morbid Obesity Bypass With Susy-en-Y;  Recorded: 06/26/2009;  Comments: dr smith 2008     COSMETIC SURGERY      pannicullectomy     CV CORONARY ANGIOGRAM N/A 9/11/2021    Procedure: Coronary Angiogram;  Surgeon: Noris Ozuna MD;  Location: [facility] CATH LAB CV     CV LEFT HEART CATH N/A 9/11/2021    Procedure: Left Heart Cath;  Surgeon: Noris Ozuna MD;  Location: [facility] CATH LAB CV     CV PCI N/A 9/11/2021    Procedure: Percutaneous Coronary Intervention;  Surgeon: Noris Ozuna MD;  Location: [facility] CATH LAB CV     CV PCI N/A 10/7/2021    Procedure: Percutaneous Coronary Intervention;  Surgeon: Mckayla Dan MD;  Location: [facility] CATH LAB CV     CV PCI ASPIRATION THROMECTOMY N/A 9/11/2021    Procedure: Percutaneous Coronary Intervention Aspiration Thrombectomy;  Surgeon: Noris Ozuna MD;  Location: [facility] CATH LAB CV     ENT SURGERY      tonsillectomy     EXTRACORPOREAL SHOCK WAVE LITHOTRIPSY, CYSTOSCOPY, INSERT STENT URETER(S), COMBINED  8/23/11     HC REMOVAL OF TONSILS,<13 Y/O      Description: Tonsillectomy;  Recorded: 03/23/2012;  Comments: for obstructive sleep apnea     HC REPAIR INCISIONAL HERNIA,REDUCIBLE  11/13/2012     Description: Incisional Hernia Repair;  Proc Date: 11/13/2012;  Comments: incisional hernia repair and abdominal panniculectomy by Dr Shon Green at the [facility].     HERNIA REPAIR       IR MISCELLANEOUS PROCEDURE  7/29/2011     IR MISCELLANEOUS PROCEDURE  8/5/2011     IR MISCELLANEOUS PROCEDURE  8/23/2011     IR NEPHROSTOMY TUBE CHANGE BILATERAL  8/5/2011     ORTHOPEDIC SURGERY      arthrodesis ant discectomy, lumbar     SC ARTHRODESIS ANT INTERBODY MIN DISCECTOMY,LUMBAR      Description: Lumbar Vertebral Fusion;  Recorded: 06/26/2009;  Comments: before 200 see [facility] consult under old records     SC EXCISE EXCESS SKIN TISSUE,ABDOMEN  11/13/2012    Description: Panniculectomy;  Proc Date: 11/13/2012;  Comments: 3.5 Lb Pannus was removed at the [facility] By Dr. Shon Green     REPLACE INTRATHECAL PAIN PUMP N/A 4/25/2017    Procedure: REPLACE INTRATHECAL PAIN PUMP;  INTRATHECAL PAIN PUMP CATHETER REPLACEMENT AND PUMP REPLACEMENT;  Surgeon: Anthony Maloney MD;  Location: [facility]     REVISE CATHETER INTRATHECAL N/A 4/25/2017    Procedure: REVISE CATHETER INTRATHECAL;;  Surgeon: Anthony Maloney MD;  Location: [facility]     SHOULDER SURGERY       Family History   Problem Relation Age of Onset     Cerebrovascular Disease Mother      Heart Disease Father      Hodgkin's lymphoma Brother     Social History     Socioeconomic History     Marital status:      Spouse name: Not on file     Number of children: Not on file     Years of education: Not on file     Highest education level: Not on file   Occupational History     Not on file   Tobacco Use     Smoking status: Former Smoker     Years: 10.00     Types: Cigars, Cigars     Smokeless tobacco: Never Used   Substance and Sexual Activity     Alcohol use: No     Drug use: No     Comment: Drug use: formerly used     Sexual activity: Not on file   Other Topics Concern     Not on file   Social History Narrative     Not on file     Social Determinants of Health      Financial Resource Strain:      Difficulty of Paying Living Expenses:    Food Insecurity:      Worried About Running Out of Food in the Last Year:      Ran Out of Food in the Last Year:    Transportation Needs:      Lack of Transportation (Medical):      Lack of Transportation (Non-Medical):    Physical Activity:      Days of Exercise per Week:      Minutes of Exercise per Session:    Stress:      Feeling of Stress :    Social Connections:      Frequency of Communication with Friends and Family:      Frequency of Social Gatherings with Friends and Family:      Attends Baptism Services:      Active Member of Clubs or Organizations:      Attends Club or Organization Meetings:      Marital Status:    Intimate Partner Violence:      Fear of Current or Ex-Partner:      Emotionally Abused:      Physically Abused:      Sexually Abused:           Medications  Allergies   Current Outpatient Medications   Medication Sig Dispense Refill     apixaban ANTICOAGULANT (ELIQUIS) 5 MG tablet Take 1 tablet (5 mg) by mouth 2 times daily 60 tablet 0     aspirin (ASA) 81 MG EC tablet Take 1 tablet (81 mg) by mouth daily Start tomorrow. 30 tablet 3     baclofen (LIORESAL) 10 MG tablet Take 1 tablet (10 mg) by mouth 2 times daily as needed for muscle spasms 30 tablet 0     clopidogrel (PLAVIX) 75 MG tablet Take 1 tablet (75 mg) by mouth daily 30 tablet 0     DULoxetine (CYMBALTA) 30 MG capsule Take 30 mg by mouth 2 times daily 30 mg in AM  60 mg in PM       Ferrous Gluconate (IRON) 240 (27 Fe) MG TABS Take 1 tablet by mouth daily       furosemide (LASIX) 40 MG tablet Take 1 tablet (40 mg) by mouth 2 times daily Take in morning and early afternoon.  Take with potassium. 60 tablet 1     lisinopril (ZESTRIL) 20 MG tablet Take 1 tablet (20 mg) by mouth daily 30 tablet 0     metoprolol succinate ER (TOPROL-XL) 50 MG 24 hr tablet Take 1.5 tablets (75 mg) by mouth daily 45 tablet 2     MORPHINE SULFATE IT pump:updated 7/14  Medications in  Pump:    [facility] Responsible for pump medications:   Conc:  Morphine 20mg/ml(3.95 mg/day), clonidine 21.1mcg/ml (4.168 mcg/day), baclofen 42.5mcg/ml (8.395mcg/day)  Rate:   Pump Last Fill Date: 2/21  Pump Refill Date:8/20/21       nitroGLYcerin (NITROSTAT) 0.4 MG sublingual tablet For chest pain place 1 tablet under the tongue every 5 minutes for 3 doses. If symptoms persist 5 minutes after 1st dose call 911. 30 tablet 1     Omeprazole (PRILOSEC PO) Take 40 mg by mouth daily       potassium chloride ER (KLOR-CON M) 20 MEQ CR tablet Take 1 tablet (20 mEq) by mouth daily (take with furosemide/Lasix) 60 tablet 1     rosuvastatin (CRESTOR) 40 MG tablet Take 1 tablet (40 mg) by mouth daily 90 tablet 3     senna-docusate (SENOKOT-S/PERICOLACE) 8.6-50 MG tablet Take 1 tablet by mouth 2 times daily as needed for constipation 60 tablet 0     traZODone (DESYREL) 100 MG tablet TAKE 2 TABLETS (200MG TOTAL) BY MOUTH AT BEDTIME 180 tablet 3    Allergies   Allergen Reactions     Hydrocodone-Acetaminophen          Lab Results    Chemistry/lipid CBC Cardiac Enzymes/BNP/TSH/INR   Recent Labs   Lab Test 10/07/21  0707   CHOL 104   HDL 27*   LDL 38   TRIG 196*     Recent Labs   Lab Test 10/07/21  0707 09/20/21  1030 09/12/21  0450   LDL 38 49 54     Recent Labs   Lab Test 10/07/21  1036 10/07/21  0707   NA  --  143   POTASSIUM  --  3.8   CHLORIDE  --  110*   CO2  --  26   * 103   BUN  --  13   CR  --  0.90   GFRESTIMATED  --  89   RATNA  --  8.7     Recent Labs   Lab Test 10/07/21  0707 09/20/21  1030 09/14/21  0433   CR 0.90 1.30 1.18     Recent Labs   Lab Test 02/13/18  1351 11/19/14  1026 06/03/14  1218   A1C 5.6 5.1 5.1    Recent Labs   Lab Test 10/07/21  0707   WBC 5.0   HGB 11.3*   HCT 33.9*   MCV 88   *     Recent Labs   Lab Test 10/07/21  0707 09/20/21  1030 09/12/21  0450   HGB 11.3* 13.5 11.8*    Recent Labs   Lab Test 09/12/21  0450 09/11/21  1329   TROPONINI 15.04* 0.48*     Recent Labs   Lab Test  09/11/21  1329 07/13/21  1536   *  --    NTBNPI  --  58     No results for input(s): TSH in the last 93526 hours.  Recent Labs   Lab Test 09/11/21  1329   INR 1.00        45 minutes spent on the date of encounter doing chart review, review of outside records, review of test results, interpretation with above tests, patient visit, documentation and discussion with other provider.                                             Thank you for allowing me to participate in the care of your patient.      Sincerely,     María Duval NP     [facility]  cc:   Mckayla Dan MD  [address] [place] [address]

## 2021-10-19 NOTE — PROGRESS NOTES
HEART CARE PROGRESS NOTE      [facility]  [phone]      Assessment/Recommendations   1.  Coronary artery disease: He had a STEMI September 11, 2021 with PCI to proximal and distal RCA.  He returned for staged PCI on October 7, 2021 with drug-eluting stent to proximal LAD in-stent restenosis.  He is on dual antiplatelet therapy with aspirin and Plavix.  Aspirin can be discontinued after 4 weeks and he will continue on Plavix and Eliquis.  Puncture site is soft with no hematoma.      Risk factor modification and lifestyle management topics were discussed including managing comorbidities, weight loss, heart healthy diet and exercise.  He is participating in cardiac rehab.    2.  Dyslipidemia: Onur Barr is on high intensity statin therapy with rosuvastatin 40 mg daily.  LDL is 38.  We discussed a diet low in saturated fat, weight loss, and exercise along with medication for better control of cholesterol.     3.  Hypertension: His blood pressure is 100/70.  He denies lightheadedness in clinic.    4.  Paroxysmal atrial fibrillation: Atrial fibrillation noted during cardiac rehab earlier today.  Heart rate remains irregular on exam today.  Initial heart rate 115 bpm but decreased to 100/min after sitting for 5 minutes.  He continues to take Eliquis.  Increase metoprolol to 75 mg daily.  I will discuss with Dr. Stephens.     5.  PVCs: PVCs and 5 beat run VT noted at cardiac rehab today.  Asymptomatic.  BMP and magnesium pending    6.  Depression: He has a history of depression and is on Cymbalta.  He feels more depressed recently due to health concerns.  He denies any suicidal thoughts.  I encouraged him to see his primary care provider.     Baudilio will follow up with Dr. Stephens on November 23.        History of Present Illness/Subjective    Onur Barr is seen at [facility] for post coronary intervention follow up.  He has a history of paroxysmal atrial fibrillation, coronary  artery disease, hypertension, dyslipidemia, ischemic cardiomyopathy heart failure with preserved ejection fraction, lymphedema, and obesity.  He had a STEMI September 11, 2021 with PCI to proximal and distal RCA.  He returned for staged PCI on October 7, 2021 with drug-eluting stent to proximal LAD in-stent restenosis.  He is on dual antiplatelet therapy with aspirin and Plavix.  Aspirin can be discontinued after 4 weeks and he will continue on Plavix and Eliquis.  Echocardiogram on September 12, 2021 showed ejection fraction of 55 to 60%.    He was at cardiac rehab today and report reviewed.  He had a 5 beat run of VT, PVCs, and noted to be in atrial fibrillation.  He has chronic back pain and lymphedema.  Dyspnea on exertion.    He continues to have shortness of breath with activity.  He denies any improvement since PCI.  He states that he has racing heart rates with activity.  A. fib today at cardiac rehab and now during clinic appointment.  He has no lower extremity edema and wears compression stockings daily.  He denies chest pain.        Echo 9/12/2021:   Interpretation Summary     Left ventricular function is normal.The ejection fraction is 55-60%.  Regional wall motion abnormalities cannot be excluded due to limited  visualization.  No significant valve abnormalities were seen.    Cardiac Catheterization    Addendum Date: 10/7/2021      Planned staged PCI post STEMI.    Proximal LAD severe ISR stented with a Synergy 3.5 CRYSTAL with good   angiographic results.       Physical Examination Review of Systems   /70 (BP Location: Left arm, Patient Position: Sitting, Cuff Size: Adult Large)   Pulse 100   Ht 1.829 m (6')   Wt 140.6 kg (310 lb)   BMI 42.04 kg/m    Body mass index is 42.04 kg/m .  Wt Readings from Last 3 Encounters:   10/19/21 140.6 kg (310 lb)   10/07/21 145.5 kg (320 lb 12.3 oz)   09/24/21 140.6 kg (310 lb)       General Appearance:     Alert, cooperative and in no acute distress.    ENT/Mouth: membranes moist, no oral lesions or bleeding gums.      EYES:  no scleral icterus, normal conjunctivae   Chest/Lungs:   lungs are clear to auscultation, no rales or wheezing, respirations unlabored   Cardiovascular:   Irregular. Normal first and second heart sounds, no edema bilateral lower extremities    Abdomen:   Obese, soft, nontender, nondistended, bowel sounds present   Extremities: no cyanosis or clubbing   Skin:  Neurologic: warm, dry.  mood and affect are appropriate, alert and oriented x3     Puncture Site: Right radial site is soft with no bruising.  Radial pulses intact and symmetrical.  CMS intact.           Please refer above for cardiac ROS details.      Medical History  Surgical History Family History Social History   Past Medical History:   Diagnosis Date     Acid reflux disease 10/31/2017     Arrhythmia     paroxysmal atrial fibrillation     Arthritis      Atrial fibrillation (H)     Created by Conversion      Bariatric surgery status     Created by Conversion      CHF (congestive heart failure) (H)      Coronary artery disease      Coronary atherosclerosis     Created by Conversion  Replacement Utility updated for latest IMO load     Diabetes (H)     typr II resloved     Essential hypertension     Created by [employer] [facility] Annotation: Apr 6 2012 10:16AM Zack Brewer: Lisinipril,  coreg  Replacement Utility updated for latest IMO load     H/O gastric bypass      Heart attack (H)      Hypercholesteremia      Hypertension      Insomnia      Insomnia, unspecified     Created by [employer] [facility] Annotation: Sep 11 2013  9:56AM Zack Brewer: Trouble  maintaining sleep-Trazodone-minimal helpzolpidem-      Ischemic cardiomyopathy      Low back pain      Lumbago     Created by [employer] [facility] Annotation: Apr 6 2012 10:20AM - Anh Dickson: Morphine pump      Morbid obesity (H) 8/1/2018     Nephrolithiasis      Nephrolithiasis      Obstructive sleep apnea  (adult) (pediatric)     Created by Conversion      Osteoarthritis     Created by [facility] Annotation: Jun 26 2009  9:MANISHA - Zack Gutierrez: failed lumbar  fusion/morphine pump dr putnam  Replacement Utility updated for latest IMO load     Pure hypercholesterolemia     Created by Conversion      Sleep apnea      Sleepiness 5/8/2018     Past Surgical History:   Procedure Laterality Date     BACK SURGERY       BYPASS GASTRIC DUODENAL SWITCH       C GASTRIC BYPASS,OBESE<100CM SUSY-EN-Y  2008    Description: Gastric Surgery For Morbid Obesity Bypass With Susy-en-Y;  Recorded: 06/26/2009;  Comments: dr green 2008     COSMETIC SURGERY      pannicullectomy     CV CORONARY ANGIOGRAM N/A 9/11/2021    Procedure: Coronary Angiogram;  Surgeon: Noris Ozuna MD;  Location: [facility] CATH LAB CV     CV LEFT HEART CATH N/A 9/11/2021    Procedure: Left Heart Cath;  Surgeon: Noris Ozuna MD;  Location: [facility] CATH LAB CV     CV PCI N/A 9/11/2021    Procedure: Percutaneous Coronary Intervention;  Surgeon: Noris Ozuna MD;  Location: [facility] CATH LAB CV     CV PCI N/A 10/7/2021    Procedure: Percutaneous Coronary Intervention;  Surgeon: Mckayla Dan MD;  Location: [facility] CATH LAB CV     CV PCI ASPIRATION THROMECTOMY N/A 9/11/2021    Procedure: Percutaneous Coronary Intervention Aspiration Thrombectomy;  Surgeon: Noris Ozuna MD;  Location: [facility] CATH LAB CV     ENT SURGERY      tonsillectomy     EXTRACORPOREAL SHOCK WAVE LITHOTRIPSY, CYSTOSCOPY, INSERT STENT URETER(S), COMBINED  8/23/11     HC REMOVAL OF TONSILS,<11 Y/O      Description: Tonsillectomy;  Recorded: 03/23/2012;  Comments: for obstructive sleep apnea     HC REPAIR INCISIONAL HERNIA,REDUCIBLE  11/13/2012    Description: Incisional Hernia Repair;  Proc Date: 11/13/2012;  Comments: incisional hernia repair and abdominal panniculectomy by Dr Shon Green at the [facility].     HERNIA REPAIR       IR MISCELLANEOUS PROCEDURE  7/29/2011      IR MISCELLANEOUS PROCEDURE  8/5/2011     IR MISCELLANEOUS PROCEDURE  8/23/2011     IR NEPHROSTOMY TUBE CHANGE BILATERAL  8/5/2011     ORTHOPEDIC SURGERY      arthrodesis ant discectomy, lumbar     DE ARTHRODESIS ANT INTERBODY MIN DISCECTOMY,LUMBAR      Description: Lumbar Vertebral Fusion;  Recorded: 06/26/2009;  Comments: before 200 see [facility] consult under old records     DE EXCISE EXCESS SKIN TISSUE,ABDOMEN  11/13/2012    Description: Panniculectomy;  Proc Date: 11/13/2012;  Comments: 3.5 Lb Pannus was removed at the [facility] By Dr. Shon Green     REPLACE INTRATHECAL PAIN PUMP N/A 4/25/2017    Procedure: REPLACE INTRATHECAL PAIN PUMP;  INTRATHECAL PAIN PUMP CATHETER REPLACEMENT AND PUMP REPLACEMENT;  Surgeon: Anthony Maloney MD;  Location: [facility]     REVISE CATHETER INTRATHECAL N/A 4/25/2017    Procedure: REVISE CATHETER INTRATHECAL;;  Surgeon: Anthony Maloney MD;  Location: [facility]     SHOULDER SURGERY       Family History   Problem Relation Age of Onset     Cerebrovascular Disease Mother      Heart Disease Father      Hodgkin's lymphoma Brother     Social History     Socioeconomic History     Marital status:      Spouse name: Not on file     Number of children: Not on file     Years of education: Not on file     Highest education level: Not on file   Occupational History     Not on file   Tobacco Use     Smoking status: Former Smoker     Years: 10.00     Types: Cigars, Cigars     Smokeless tobacco: Never Used   Substance and Sexual Activity     Alcohol use: No     Drug use: No     Comment: Drug use: formerly used     Sexual activity: Not on file   Other Topics Concern     Not on file   Social History Narrative     Not on file     Social Determinants of Health     Financial Resource Strain:      Difficulty of Paying Living Expenses:    Food Insecurity:      Worried About Running Out of Food in the Last Year:      Ran Out of Food in the Last Year:    Transportation Needs:      Lack of  Transportation (Medical):      Lack of Transportation (Non-Medical):    Physical Activity:      Days of Exercise per Week:      Minutes of Exercise per Session:    Stress:      Feeling of Stress :    Social Connections:      Frequency of Communication with Friends and Family:      Frequency of Social Gatherings with Friends and Family:      Attends [employer] Services:      Active Member of Clubs or Organizations:      Attends Club or Organization Meetings:      Marital Status:    Intimate Partner Violence:      Fear of Current or Ex-Partner:      Emotionally Abused:      Physically Abused:      Sexually Abused:           Medications  Allergies   Current Outpatient Medications   Medication Sig Dispense Refill     apixaban ANTICOAGULANT (ELIQUIS) 5 MG tablet Take 1 tablet (5 mg) by mouth 2 times daily 60 tablet 0     aspirin (ASA) 81 MG EC tablet Take 1 tablet (81 mg) by mouth daily Start tomorrow. 30 tablet 3     baclofen (LIORESAL) 10 MG tablet Take 1 tablet (10 mg) by mouth 2 times daily as needed for muscle spasms 30 tablet 0     clopidogrel (PLAVIX) 75 MG tablet Take 1 tablet (75 mg) by mouth daily 30 tablet 0     DULoxetine (CYMBALTA) 30 MG capsule Take 30 mg by mouth 2 times daily 30 mg in AM  60 mg in PM       Ferrous Gluconate (IRON) 240 (27 Fe) MG TABS Take 1 tablet by mouth daily       furosemide (LASIX) 40 MG tablet Take 1 tablet (40 mg) by mouth 2 times daily Take in morning and early afternoon.  Take with potassium. 60 tablet 1     lisinopril (ZESTRIL) 20 MG tablet Take 1 tablet (20 mg) by mouth daily 30 tablet 0     metoprolol succinate ER (TOPROL-XL) 50 MG 24 hr tablet Take 1.5 tablets (75 mg) by mouth daily 45 tablet 2     MORPHINE SULFATE IT pump:updated 7/14  Medications in Pump:   [facility] Responsible for pump medications:   Conc:  Morphine 20mg/ml(3.95 mg/day), clonidine 21.1mcg/ml (4.168 mcg/day), baclofen 42.5mcg/ml (8.395mcg/day)  Rate:   Pump Last Fill Date: 2/21  Pump Refill  Date:8/20/21       nitroGLYcerin (NITROSTAT) 0.4 MG sublingual tablet For chest pain place 1 tablet under the tongue every 5 minutes for 3 doses. If symptoms persist 5 minutes after 1st dose call 911. 30 tablet 1     Omeprazole (PRILOSEC PO) Take 40 mg by mouth daily       potassium chloride ER (KLOR-CON M) 20 MEQ CR tablet Take 1 tablet (20 mEq) by mouth daily (take with furosemide/Lasix) 60 tablet 1     rosuvastatin (CRESTOR) 40 MG tablet Take 1 tablet (40 mg) by mouth daily 90 tablet 3     senna-docusate (SENOKOT-S/PERICOLACE) 8.6-50 MG tablet Take 1 tablet by mouth 2 times daily as needed for constipation 60 tablet 0     traZODone (DESYREL) 100 MG tablet TAKE 2 TABLETS (200MG TOTAL) BY MOUTH AT BEDTIME 180 tablet 3    Allergies   Allergen Reactions     Hydrocodone-Acetaminophen          Lab Results    Chemistry/lipid CBC Cardiac Enzymes/BNP/TSH/INR   Recent Labs   Lab Test 10/07/21  0707   CHOL 104   HDL 27*   LDL 38   TRIG 196*     Recent Labs   Lab Test 10/07/21  0707 09/20/21  1030 09/12/21  0450   LDL 38 49 54     Recent Labs   Lab Test 10/07/21  1036 10/07/21  0707   NA  --  143   POTASSIUM  --  3.8   CHLORIDE  --  110*   CO2  --  26   * 103   BUN  --  13   CR  --  0.90   GFRESTIMATED  --  89   RATNA  --  8.7     Recent Labs   Lab Test 10/07/21  0707 09/20/21  1030 09/14/21  0433   CR 0.90 1.30 1.18     Recent Labs   Lab Test 02/13/18  1351 11/19/14  1026 06/03/14  1218   A1C 5.6 5.1 5.1    Recent Labs   Lab Test 10/07/21  0707   WBC 5.0   HGB 11.3*   HCT 33.9*   MCV 88   *     Recent Labs   Lab Test 10/07/21  0707 09/20/21  1030 09/12/21  0450   HGB 11.3* 13.5 11.8*    Recent Labs   Lab Test 09/12/21  0450 09/11/21  1329   TROPONINI 15.04* 0.48*     Recent Labs   Lab Test 09/11/21  1329 07/13/21  1536   *  --    NTBNPI  --  58     No results for input(s): TSH in the last 22181 hours.  Recent Labs   Lab Test 09/11/21  1329   INR 1.00        45 minutes spent on the date of encounter doing  chart review, review of outside records, review of test results, interpretation with above tests, patient visit, documentation and discussion with other provider.

## 2021-10-19 NOTE — PATIENT INSTRUCTIONS
Onur Barr,    It was a pleasure to see you today at the [facility].     My recommendations after this visit include:  - Increase metoprolol to 75 mg daily.    - Your labs will be on MyChart.   - I will discuss with Dr. Stephens and call if he has any further recommendations.   - If you have any questions or concerns, please call [phone] to talk with my nurse.    María Cedillo, CNP

## 2021-10-19 NOTE — TELEPHONE ENCOUNTER
Attempted to call Tameka from Cardiac rehab to get more details but was unable to reach her. Pt has appt with María today at 2:50 pm.     Routing to Maraí as FYI.     Melissa Borden RN

## 2021-10-20 ENCOUNTER — TELEPHONE (OUTPATIENT)
Dept: CARDIOLOGY | Facility: CLINIC | Age: 66
End: 2021-10-20

## 2021-10-20 DIAGNOSIS — I50.32 CHRONIC DIASTOLIC HEART FAILURE (H): Primary | ICD-10-CM

## 2021-10-20 NOTE — TELEPHONE ENCOUNTER
----- Message from María Duval NP sent at 10/20/2021  6:16 AM CDT -----  Renal function has worsened.  Please have him decrease lasix to 40 mg in the morning and 20 mg in the afternoon.  Please make sure he is drinking 60 ounces of fluid daily.  Recheck BMP in 1 week.

## 2021-10-20 NOTE — TELEPHONE ENCOUNTER
----- Message from YAMILETH Vizcaino CNP sent at 10/20/2021  9:47 AM CDT -----  Melissa,    Will you please inform patient of Dr Stephens's recommendations on María's patient about being seen in AFIB clinic. Thank you    Omayra  ----- Message -----  From: Claudio Stephens MD  Sent: 10/20/2021   9:44 AM CDT  To: María Duval NP    Unfortunately Mr. Mathis having a tough year with cardiac issues.  But yes would suggest having him seen atrial fibrillation clinic.  Agree with metoprolol adjustment.  ----- Message -----  From: María Duval NP  Sent: 10/19/2021   3:23 PM CDT  To: Claudio Stephens MD    I saw Baudilio today for post PCI today. He had a STEMI September 11, 2021 with PCI to proximal and distal RCA.  He returned for staged PCI on October 7, 2021 with drug-eluting stent to proximal LAD in-stent restenosis.    At cardiac rehab this morning, he was in afib (history of parox afib and on eliquis) and 5 beat run of VT.  He is still in afib during clinic visit today,  bpm at rest.  I increased metoprolol.  He says shortness of breath has not improved since PCI.     BMP and Mg pending.    Do you have any further recommendations for afib?  You see him on 11/23. Do you want him seen in afib clinic sooner?    Thanks

## 2021-10-20 NOTE — TELEPHONE ENCOUNTER
Called patient and left message to call back clinic to discuss being seen in Afib clinic sooner than scheduled 11-23-21 appt.     Melissa Borden RN

## 2021-10-20 NOTE — TELEPHONE ENCOUNTER
Called pt and left message to call back clinic to review lab results and medication changes.     Melissa Borden RN

## 2021-10-21 ENCOUNTER — HOSPITAL ENCOUNTER (OUTPATIENT)
Dept: CARDIAC REHAB | Facility: CLINIC | Age: 66
End: 2021-10-21
Attending: INTERNAL MEDICINE
Payer: COMMERCIAL

## 2021-10-21 PROCEDURE — 93798 PHYS/QHP OP CAR RHAB W/ECG: CPT

## 2021-10-21 NOTE — TELEPHONE ENCOUNTER
Called pt and reviewed labs along with María's recommendations. Pt will take Lasix 40 mg in the morning and 20 mg in the afternoon and increase fluid intake to full 60 oz per day. Pt will have repeat BMP drawn at [facility] walk in lab next Wednesday 10-27-21. Order placed.     Melissa Borden RN

## 2021-10-21 NOTE — TELEPHONE ENCOUNTER
Called pt and relayed María's message that he should see Dr. Stephens sooner than existing appointment on 11-23-21. Pt agreeable to being seen sooner; will have Fallon from scheduling reach out to patient to schedule first available appt with Dr. Stephens.     Melissa Borden RN

## 2021-10-25 ENCOUNTER — TELEPHONE (OUTPATIENT)
Dept: CARDIOLOGY | Facility: CLINIC | Age: 66
End: 2021-10-25

## 2021-10-25 NOTE — TELEPHONE ENCOUNTER
Called pt and left message to call heart clinic to schedule appointment this week with practitioner from Afib clinic.     Melissa Borden RN

## 2021-10-25 NOTE — TELEPHONE ENCOUNTER
----- Message from María Duval NP sent at 10/25/2021  8:26 AM CDT -----  I see that you had his appointment moved up with Dr. Stephens but Dr. Stephens wants referral to afib clinic to manage afib.  Can you please explain this to the patient and have him seen by Adonis Coates or Anni (APPs) this week if available?  If they are not available, the Dr. Stephens appointment next week will be ok.     Thanks   ----- Message -----  From: Claudio Stephens MD  Sent: 10/20/2021   9:44 AM CDT  To: María Duval NP    Unfortunately Mr. Mathis having a tough year with cardiac issues.  But yes would suggest having him seen atrial fibrillation clinic.  Agree with metoprolol adjustment.  ----- Message -----  From: María Duval NP  Sent: 10/19/2021   3:23 PM CDT  To: Claudio Stephens MD    I saw Baudilio today for post PCI today. He had a STEMI September 11, 2021 with PCI to proximal and distal RCA.  He returned for staged PCI on October 7, 2021 with drug-eluting stent to proximal LAD in-stent restenosis.    At cardiac rehab this morning, he was in afib (history of parox afib and on eliquis) and 5 beat run of VT.  He is still in afib during clinic visit today,  bpm at rest.  I increased metoprolol.  He says shortness of breath has not improved since PCI.     BMP and Mg pending.    Do you have any further recommendations for afib?  You see him on 11/23. Do you want him seen in afib clinic sooner?    Thanks

## 2021-10-26 ENCOUNTER — HOSPITAL ENCOUNTER (OUTPATIENT)
Dept: CARDIAC REHAB | Facility: CLINIC | Age: 66
End: 2021-10-26
Attending: INTERNAL MEDICINE
Payer: COMMERCIAL

## 2021-10-26 ENCOUNTER — TELEPHONE (OUTPATIENT)
Dept: CARDIOLOGY | Facility: CLINIC | Age: 66
End: 2021-10-26

## 2021-10-26 DIAGNOSIS — I25.119 CORONARY ARTERY DISEASE INVOLVING NATIVE CORONARY ARTERY OF NATIVE HEART WITH ANGINA PECTORIS (H): ICD-10-CM

## 2021-10-26 PROCEDURE — 93798 PHYS/QHP OP CAR RHAB W/ECG: CPT | Performed by: REHABILITATION PRACTITIONER

## 2021-10-26 NOTE — TELEPHONE ENCOUNTER
----- Message -----  From: April Sarmiento RN  Sent: 11/5/2021   9:20 AM CDT  To: Zahira Mehta RN  Subject: dental form                                      PTK filled out dental form as requested and gave it back to patient - did not get copy to scan to chart - sorry!  mg    ----- Message from Fallon Lama sent at 10/26/2021  8:28 AM CDT -----  Regarding: PTK pt  General phone call:    Caller: Onur  Primary cardiologist: DUANE pt  Detailed reason for call: His dentist said he has to have an okay to get a crown put on.  Not sure if he has to stop his blood thinner   Best phone number: [phone]   Best time to contact: any  Ok to leave a detailedmessage? yes  Device? no    Additional Info:

## 2021-10-27 DIAGNOSIS — I25.119 CORONARY ARTERY DISEASE INVOLVING NATIVE CORONARY ARTERY OF NATIVE HEART WITH ANGINA PECTORIS (H): ICD-10-CM

## 2021-10-28 ENCOUNTER — HOSPITAL ENCOUNTER (OUTPATIENT)
Dept: CARDIAC REHAB | Facility: CLINIC | Age: 66
End: 2021-10-28
Attending: INTERNAL MEDICINE
Payer: COMMERCIAL

## 2021-10-28 PROCEDURE — 93798 PHYS/QHP OP CAR RHAB W/ECG: CPT

## 2021-10-29 NOTE — TELEPHONE ENCOUNTER
No lab results noted from 10-27-21; called pt and left message to please have labs drawn at [facility] walk in lab asap. Call clinic for questions.     Melissa Borden RN

## 2021-10-29 NOTE — TELEPHONE ENCOUNTER
Multiple messages left for pt to call and schedule with Afib clinic; no call back. Pt has appt with Dr. Stephens on 11-5-21.     Routing to María OTT.     Melissa Borden RN

## 2021-11-01 NOTE — TELEPHONE ENCOUNTER
2nd attempt:     Called pt and left message to please have labs drawn ASAP at [facility] walk in lab. Call the clinic back with any questions.     Melissa Borden RN

## 2021-11-02 NOTE — TELEPHONE ENCOUNTER
3rd attempt    Called and left messages on both home and cell phones that pt needs to have repeat labs drawn at [facility] in lab. Please call the clinic back to confirm that he has received my messages.     Pt called right back; states he will go to [facility] today.     Routing to María as NAMRATA.     Melissa Borden RN

## 2021-11-03 ENCOUNTER — TELEPHONE (OUTPATIENT)
Dept: CARDIOLOGY | Facility: CLINIC | Age: 66
End: 2021-11-03

## 2021-11-03 ENCOUNTER — LAB (OUTPATIENT)
Dept: LAB | Facility: CLINIC | Age: 66
End: 2021-11-03
Payer: COMMERCIAL

## 2021-11-03 DIAGNOSIS — I51.89 DIASTOLIC DYSFUNCTION: ICD-10-CM

## 2021-11-03 DIAGNOSIS — I50.32 CHRONIC DIASTOLIC HEART FAILURE (H): ICD-10-CM

## 2021-11-03 DIAGNOSIS — R60.0 BILATERAL LEG EDEMA: ICD-10-CM

## 2021-11-03 LAB
ANION GAP SERPL CALCULATED.3IONS-SCNC: 6 MMOL/L (ref 3–14)
BUN SERPL-MCNC: 34 MG/DL (ref 7–30)
CALCIUM SERPL-MCNC: 10 MG/DL (ref 8.5–10.1)
CHLORIDE BLD-SCNC: 108 MMOL/L (ref 94–109)
CO2 SERPL-SCNC: 27 MMOL/L (ref 20–32)
CREAT SERPL-MCNC: 1.34 MG/DL (ref 0.66–1.25)
GFR SERPL CREATININE-BSD FRML MDRD: 55 ML/MIN/1.73M2
GLUCOSE BLD-MCNC: 139 MG/DL (ref 70–99)
POTASSIUM BLD-SCNC: 4.1 MMOL/L (ref 3.4–5.3)
SODIUM SERPL-SCNC: 141 MMOL/L (ref 133–144)

## 2021-11-03 PROCEDURE — 36415 COLL VENOUS BLD VENIPUNCTURE: CPT

## 2021-11-03 PROCEDURE — 80048 BASIC METABOLIC PNL TOTAL CA: CPT

## 2021-11-03 RX ORDER — FUROSEMIDE 20 MG
TABLET ORAL
Qty: 270 TABLET | Refills: 2 | Status: ON HOLD | OUTPATIENT
Start: 2021-11-03 | End: 2022-01-25

## 2021-11-03 NOTE — TELEPHONE ENCOUNTER
Pt called asking where to go to have labs drawn; reminded pt to go to [facility] walk in lab. Pt states he will go this morning.     Melissa Borden RN

## 2021-11-03 NOTE — TELEPHONE ENCOUNTER
----- Message from María Duval NP sent at 11/3/2021 11:29 AM CDT -----  Renal function has improved since decreasing diuretic.  Continue on lower dose as long as he is not having HF symptoms.

## 2021-11-03 NOTE — TELEPHONE ENCOUNTER
Called pt and relayed lab results; he denies any SOB, swelling or difficulty sleeping. Pt states his weight this AM was #319. His last weight in clinic was #310. Pt states his weight is always around #320; reviewed recent Cardiac rehab progress note and weight was #320.     Instructed pt to continue taking Lasix 40 mg in the AM and 20 mg in the afternoon; reviewed s/s of HF to call RN line with; pt verbalized understanding. Updated Lasix in med in Epic.     Melissa Borden RN

## 2021-11-05 ENCOUNTER — OFFICE VISIT (OUTPATIENT)
Dept: CARDIOLOGY | Facility: CLINIC | Age: 66
End: 2021-11-05
Payer: COMMERCIAL

## 2021-11-05 VITALS
BODY MASS INDEX: 42.04 KG/M2 | RESPIRATION RATE: 18 BRPM | HEART RATE: 92 BPM | DIASTOLIC BLOOD PRESSURE: 80 MMHG | SYSTOLIC BLOOD PRESSURE: 116 MMHG | WEIGHT: 310 LBS

## 2021-11-05 DIAGNOSIS — E66.01 MORBID OBESITY (H): Primary | ICD-10-CM

## 2021-11-05 DIAGNOSIS — I10 ESSENTIAL HYPERTENSION: ICD-10-CM

## 2021-11-05 DIAGNOSIS — I48.0 PAROXYSMAL ATRIAL FIBRILLATION (H): ICD-10-CM

## 2021-11-05 DIAGNOSIS — E78.2 MIXED HYPERLIPIDEMIA: ICD-10-CM

## 2021-11-05 DIAGNOSIS — I21.3 ST ELEVATION MYOCARDIAL INFARCTION (STEMI), UNSPECIFIED ARTERY (H): ICD-10-CM

## 2021-11-05 DIAGNOSIS — I50.32 CHRONIC DIASTOLIC HEART FAILURE (H): ICD-10-CM

## 2021-11-05 DIAGNOSIS — I25.10 CORONARY ARTERY DISEASE INVOLVING NATIVE CORONARY ARTERY OF NATIVE HEART WITHOUT ANGINA PECTORIS: ICD-10-CM

## 2021-11-05 PROCEDURE — 99214 OFFICE O/P EST MOD 30 MIN: CPT | Performed by: INTERNAL MEDICINE

## 2021-11-05 NOTE — CONFIDENTIAL NOTE
Onur GUS Barr,  1955, MRN [id number]    PCP: Luann Berger, [phone]    Assessment:   1.  History of obesity status post gastric bypass surgery  2.  Paroxysmal atrial fibrillation  3.  Coronary atherosclerosis LAD stent              NXT  apical ischemia/infarct small EF 51%             NXT 2019 small NTMI no ischemia 61%              Recent urgent PCI of occluded RCA peak troponin 15  4.  Essential hypertension  5.  Hypercholesterolemia  6.  Asymptomatic bradycardia  7.  Hx Cardiomyopathy ischemic              Echo  EF 55 to 60% with diastolic impairment.  8.  Chronic lower extremity lymphedema.  Chronic venous stasis.        Recommendations: Possible viral syndrome   History of obesity with gastric bypass surgery  Paroxysmal atrial fibrillation chronic persistent on Eliquis  Coronary artery disease.  LAD stent   Recent staged PCI of RCA and LAD   Essential hypertension  Hypercholesterolemia  Asymptomatic bradycardia  Diastolic LV impairment with preserved systolic function  Chronic lower extremity lymphedema chronic venous stasis     Chief Complaint: Fatigue    HPI:  We have been requested by  returns for follow-up from [facility] to evaluate Onur WEBER Cortney for consultation who is a  66 year old year old male for above chief complaint.    Hx and nonproductive cough.  Feels cold sweats at times.  Not clear if he has had fevers..  He patient states that since last weekend he has developed fatigue weakness try to go to cardiac rehab but felt very fatigued.  Believes he has the flu.  Did receive Covid vaccination.  Has not had influenza vaccination.  No chest pain pressure tightness heaviness or anginal symptoms.  Breathing generally has been stable.  He is not having productive cough.  Very fatigued but no more presyncopal or orthostatic change.  No significant edema.  Patient recently seen in clinic for follow-up after presentation with acute non-STEMI.  Patient  underwent successful coronary intervention.  Underwent staged PCI with success.  He is on Eliquis therapy for chronic atrial fibrillation.  Last visit was on September 24 of this year.  In October underwent PCI of the proximal LAD.  Currently on combination of therapy Plavix and Eliquis with aspirin being withheld.  Cardiac echo in September showed left atrial function to remain normal.       Current Outpatient Medications:      apixaban ANTICOAGULANT (ELIQUIS) 5 MG tablet, Take 1 tablet (5 mg) by mouth every 12 hours, Disp: 180 tablet, Rfl: 1     ASPIRIN LOW DOSE 81 MG EC tablet, TAKE 1 TABLET (81 MG) BY MOUTH DAILY START TOMORROW., Disp: 90 tablet, Rfl: 1     baclofen (LIORESAL) 10 MG tablet, Take 1 tablet (10 mg) by mouth 2 times daily as needed for muscle spasms, Disp: 30 tablet, Rfl: 0     clopidogrel (PLAVIX) 75 MG tablet, Take 1 tablet (75 mg) by mouth daily, Disp: 30 tablet, Rfl: 0     DULoxetine (CYMBALTA) 30 MG capsule, Take 30 mg by mouth 2 times daily 30 mg in AM 60 mg in PM, Disp: , Rfl:      Ferrous Gluconate (IRON) 240 (27 Fe) MG TABS, Take 1 tablet by mouth daily, Disp: , Rfl:      furosemide (LASIX) 20 MG tablet, TAKE 2 TABLET (40 MG) BY MOUTH  IN THE AM AND 1 TABLET (20 MG) IN EARLY AFTERNOON. TAKE WITH POTASSIUM, Disp: 270 tablet, Rfl: 2     lisinopril (ZESTRIL) 20 MG tablet, Take 1 tablet (20 mg) by mouth daily, Disp: 30 tablet, Rfl: 0     metoprolol succinate ER (TOPROL-XL) 50 MG 24 hr tablet, Take 1.5 tablets (75 mg) by mouth daily, Disp: 45 tablet, Rfl: 2     MORPHINE SULFATE, IT pump:updated 7/14 Medications in Pump:   [facility] Responsible for pump medications:  Conc:  Morphine 20mg/ml(3.95 mg/day), clonidine 21.1mcg/ml (4.168 mcg/day), baclofen 42.5mcg/ml (8.395mcg/day) Rate:  Pump Last Fill Date: 2/21 Pump Refill Date:8/20/21, Disp: , Rfl:      nitroGLYcerin (NITROSTAT) 0.4 MG sublingual tablet, For chest pain place 1 tablet under the tongue every 5 minutes for 3 doses. If symptoms  persist 5 minutes after 1st dose call 911., Disp: 30 tablet, Rfl: 1     Omeprazole (PRILOSEC PO), Take 40 mg by mouth daily, Disp: , Rfl:      potassium chloride ER (KLOR-CON M) 20 MEQ CR tablet, Take 1 tablet (20 mEq) by mouth daily (take with furosemide/Lasix), Disp: 60 tablet, Rfl: 1     rosuvastatin (CRESTOR) 40 MG tablet, Take 1 tablet (40 mg) by mouth daily, Disp: 90 tablet, Rfl: 3     senna-docusate (SENOKOT-S/PERICOLACE) 8.6-50 MG tablet, Take 1 tablet by mouth 2 times daily as needed for constipation, Disp: 60 tablet, Rfl: 0     traZODone (DESYREL) 100 MG tablet, TAKE 2 TABLETS (200MG TOTAL) BY MOUTH AT BEDTIME, Disp: 180 tablet, Rfl: 3    Medical History  Past Medical History:   Diagnosis Date     Acid reflux disease 10/31/2017     Arrhythmia     paroxysmal atrial fibrillation     Arthritis      Atrial fibrillation (H)     Created by Conversion      Bariatric surgery status     Created by Conversion      CHF (congestive heart failure) (H)      Coronary artery disease      Coronary atherosclerosis     Created by Conversion  Replacement Utility updated for latest IMO load     Diabetes (H)     typr II resloved     Essential hypertension     Created by [employer] [facility] Annotation: Apr 6 2012 10:16AM - Zack Gutierrez: Lisinipril,  coreg  Replacement Utility updated for latest IMO load     H/O gastric bypass      Heart attack (H)      Hypercholesteremia      Hypertension      Insomnia      Insomnia, unspecified     Created by [employer] [facility] Annotation: Sep 11 2013  9:56AM Zack Brewer: Trouble  maintaining sleep-Trazodone-minimal helpzolpidem-      Ischemic cardiomyopathy      Low back pain      Lumbago     Created by [employer] [facility] Annotation: Apr 6 2012 10:20AM - Anh Dickson: Morphine pump      Morbid obesity (H) 8/1/2018     Nephrolithiasis      Nephrolithiasis      Obstructive sleep apnea (adult) (pediatric)     Created by Conversion      Osteoarthritis     Created by  [facility] Annotation: Jun 26 2009  9:53Zack Harris: failed lumbar  fusion/morphine pump dr putnam  Replacement Utility updated for latest IMO load     Pure hypercholesterolemia     Created by Conversion      Sleep apnea      Sleepiness 5/8/2018      Past Medical History:   Diagnosis Date     Acid reflux disease 10/31/2017     Arrhythmia     paroxysmal atrial fibrillation     Arthritis      Atrial fibrillation (H)     Created by Conversion      Bariatric surgery status     Created by Conversion      CHF (congestive heart failure) (H)      Coronary artery disease      Coronary atherosclerosis     Created by Conversion  Replacement Utility updated for latest IMO load     Diabetes (H)     typr II resloved     Essential hypertension     Created by [facility] Annotation: Apr 6 2012 10:16Zack Harris: Lisinipril,  coreg  Replacement Utility updated for latest IMO load     H/O gastric bypass      Heart attack (H)      Hypercholesteremia      Hypertension      Insomnia      Insomnia, unspecified     Created by [facility] Annotation: Sep 11 2013  9:56Zack Harris: Trouble  maintaining sleep-Trazodone-minimal helpzolpidem-      Ischemic cardiomyopathy      Low back pain      Lumbago     Created by [facility] Annotation: Apr 6 2012 10:20Anh Ford: Morphine pump      Morbid obesity (H) 8/1/2018     Nephrolithiasis      Nephrolithiasis      Obstructive sleep apnea (adult) (pediatric)     Created by Conversion      Osteoarthritis     Created by [facility] Annotation: Jun 26 2009  9:53Zack Harris: failed lumbar  fusion/morphine pump dr putnam  Replacement Utility updated for latest IMO load     Pure hypercholesterolemia     Created by Conversion      Sleep apnea      Sleepiness 5/8/2018      PAST MEDICAL HISTORY:   Past Medical History:   Diagnosis Date     Acid reflux disease 10/31/2017     Arrhythmia     paroxysmal atrial  fibrillation     Arthritis      Atrial fibrillation (H)     Created by Conversion      Bariatric surgery status     Created by Conversion      CHF (congestive heart failure) (H)      Coronary artery disease      Coronary atherosclerosis     Created by Conversion  Replacement Utility updated for latest IMO load     Diabetes (H)     typr II resloved     Essential hypertension     Created by Conversion [facility] Annotation: Apr 6 2012 10:16Zack Harris: Lisinipril,  coreg  Replacement Utility updated for latest IMO load     H/O gastric bypass      Heart attack (H)      Hypercholesteremia      Hypertension      Insomnia      Insomnia, unspecified     Created by [facility] Annotation: Sep 11 2013  9:56AM Zack Brewer: Trouble  maintaining sleep-Trazodone-minimal helpzolpidem-      Ischemic cardiomyopathy      Low back pain      Lumbago     Created by Conversion [facility] Annotation: Apr 6 2012 10:20Anh Ford: Morphine pump      Morbid obesity (H) 8/1/2018     Nephrolithiasis      Nephrolithiasis      Obstructive sleep apnea (adult) (pediatric)     Created by Conversion      Osteoarthritis     Created by [facility] Annotation: Jun 26 2009  9:53Zack Harris: failed lumbar  fusion/morphine pump dr putnam  Replacement Utility updated for latest IMO load     Pure hypercholesterolemia     Created by Conversion      Sleep apnea      Sleepiness 5/8/2018       PAST SURGICAL HISTORY:   Past Surgical History:   Procedure Laterality Date     BACK SURGERY       BYPASS GASTRIC DUODENAL SWITCH       C GASTRIC BYPASS,OBESE<100CM SUSY-EN-Y  2008    Description: Gastric Surgery For Morbid Obesity Bypass With Susy-en-Y;  Recorded: 06/26/2009;  Comments: dr smith 2008     COSMETIC SURGERY      pannicullectomy     CV CORONARY ANGIOGRAM N/A 9/11/2021    Procedure: Coronary Angiogram;  Surgeon: Noris Ozuna MD;  Location: [facility] CATH LAB CV     CV LEFT HEART CATH N/A 9/11/2021     Procedure: Left Heart Cath;  Surgeon: Noris Ozuna MD;  Location: [facility] CV     CV PCI N/A 9/11/2021    Procedure: Percutaneous Coronary Intervention;  Surgeon: Noris Ozuna MD;  Location: [facility] CV     CV PCI N/A 10/7/2021    Procedure: Percutaneous Coronary Intervention;  Surgeon: Mckayla Dan MD;  Location: [facility]     CV PCI ASPIRATION THROMECTOMY N/A 9/11/2021    Procedure: Percutaneous Coronary Intervention Aspiration Thrombectomy;  Surgeon: Noris Ozuna MD;  Location: [facility]     ENT SURGERY      tonsillectomy     EXTRACORPOREAL SHOCK WAVE LITHOTRIPSY, CYSTOSCOPY, INSERT STENT URETER(S), COMBINED  8/23/11     HC REMOVAL OF TONSILS,<11 Y/O      Description: Tonsillectomy;  Recorded: 03/23/2012;  Comments: for obstructive sleep apnea     HC REPAIR INCISIONAL HERNIA,REDUCIBLE  11/13/2012    Description: Incisional Hernia Repair;  Proc Date: 11/13/2012;  Comments: incisional hernia repair and abdominal panniculectomy by Dr Shon Green at the [facility].     HERNIA REPAIR       IR MISCELLANEOUS PROCEDURE  7/29/2011     IR MISCELLANEOUS PROCEDURE  8/5/2011     IR MISCELLANEOUS PROCEDURE  8/23/2011     IR NEPHROSTOMY TUBE CHANGE BILATERAL  8/5/2011     ORTHOPEDIC SURGERY      arthrodesis ant discectomy, lumbar     ME ARTHRODESIS ANT INTERBODY MIN DISCECTOMY,LUMBAR      Description: Lumbar Vertebral Fusion;  Recorded: 06/26/2009;  Comments: before 200 see [facility] consult under old records     ME EXCISE EXCESS SKIN TISSUE,ABDOMEN  11/13/2012    Description: Panniculectomy;  Proc Date: 11/13/2012;  Comments: 3.5 Lb Pannus was removed at the [facility] By Dr. Shon Green     REPLACE INTRATHECAL PAIN PUMP N/A 4/25/2017    Procedure: REPLACE INTRATHECAL PAIN PUMP;  INTRATHECAL PAIN PUMP CATHETER REPLACEMENT AND PUMP REPLACEMENT;  Surgeon: Anthony Maloney MD;  Location: [facility]     REVISE CATHETER INTRATHECAL N/A 4/25/2017    Procedure: REVISE  CATHETER INTRATHECAL;;  Surgeon: Anthony Maloney MD;  Location: [facility]     SHOULDER SURGERY         FAMILY HISTORY:   Family History   Problem Relation Age of Onset     Cerebrovascular Disease Mother      Heart Disease Father      Hodgkin's lymphoma Brother        SOCIAL HISTORY:   Social History     Tobacco Use     Smoking status: Former Smoker     Years: 10.00     Types: Cigars, Cigars     Smokeless tobacco: Never Used   Substance Use Topics     Alcohol use: No      Surgical History  He  has a past surgical history that includes Extracorporeal shock wave lithotripsy, cystoscopy, insert stent ureter(s), combined (8/23/11); Bypass gastric duodenal switch; back surgery; shoulder surgery; orthopedic surgery; Cosmetic surgery; ENT surgery; hernia repair; Replace Intrathecal Pain Pump (N/A, 4/25/2017); Revise catheter intrathecal (N/A, 4/25/2017); IR Miscellaneous Procedure (7/29/2011); IR Nephrostomy Tube Change Bilateral (8/5/2011); IR Miscellaneous Procedure (8/5/2011); IR Miscellaneous Procedure (8/23/2011); GASTRIC BYPASS,OBESE<100CM SUSY-EN-Y (2008); Pr Arthrodesis Ant Interbody Min Discectomy,Lumbar; REMOVAL OF TONSILS,<13 Y/O; Pr Excise Excess Skin Tissue,Abdomen (11/13/2012); REPAIR INCISIONAL HERNIA,REDUCIBLE (11/13/2012); Coronary Angiogram (N/A, 9/11/2021); Left Heart Cath (N/A, 9/11/2021); Percutaneous Coronary Intervention (N/A, 9/11/2021); Percutaneous Coronary Intervention Aspiration Thrombectomy (N/A, 9/11/2021); and Percutaneous Coronary Intervention (N/A, 10/7/2021).    Social History  Reviewed, and he  reports that he has quit smoking. His smoking use included cigars and cigars. He quit after 10.00 years of use. He has never used smokeless tobacco. He reports that he does not drink alcohol and does not use drugs.  Smoking status reviewed.  Social history othrwise not contributory to HPI.  Allergies  Allergies   Allergen Reactions     Hydrocodone-Acetaminophen        Family History  Reviewed, and  family history includes Cerebrovascular Disease in his mother; Heart Disease in his father; Hodgkin's lymphoma in his brother.  Extended Emergency Contact Information  Primary Emergency Contact: Krystal Barr  Address: [address] [place] [id number] [facility]  Mobile Phone: [phone]  Relation: Spouse  Secondary Emergency Contact: Maxine De La O   [place]  Mobile Phone: [phone]  Relation: Daughter  Family history otherwise negative or not conributory to HPI.    Psychosocial Needs  Social History     Social History Narrative     Not on file     Additional psychosocial needs reviewed per nursing assessment.    Prior to Admission Medications  (Not in a hospital admission)      Review of Systems:  Pertinent items are noted in HPI.  Review of systems is negative except for HPI  Physical Exam:    BP Readings from Last 1 Encounters:   11/05/21 116/80     Pulse Readings from Last 1 Encounters:   11/05/21 92     Wt Readings from Last 1 Encounters:   11/05/21 140.6 kg (310 lb)     Ht Readings from Last 1 Encounters:   10/19/21 1.829 m (6')     Estimated body mass index is 42.04 kg/m  as calculated from the following:    Height as of 10/19/21: 1.829 m (6').    Weight as of this encounter: 140.6 kg (310 lb).    Head and neck without focal cranial neurologic defects.  JVD not distended.  Carotid upstroke normal without bruit.  External eye exam normal without icterus.  External ear exam normal.  Neck without cervical lymphadenopathy or thyromegaly.  Cardiac: S1-S2 distinct irregular rapid  Lungs: Clear in all fields  Abdomen with normal bowel tones.  Skin without rash, ecchymosis, lesions.  Neuromuscular tone normal.  Peripheral pulse intact and equal.  Joints without swelling or erythema.    Cholesterol   Date Value Ref Range Status   10/07/2021 104 <=199 mg/dL Final   09/20/2021 126 <=199 mg/dL Final     Direct Measure HDL   Date Value Ref Range Status   10/07/2021 27 (L) >=40 mg/dL Final      Comment:     HDL Cholesterol Reference Range:     0-2 years:   No reference ranges established for patients under 2 years old  at [facility] for lipid analytes.    2-8 years:  Greater than 45 mg/dL     18 years and older:   Female: Greater than or equal to 50 mg/dL   Male:   Greater than or equal to 40 mg/dL   09/20/2021 29 (L) >=40 mg/dL Final     Comment:     HDL Cholesterol Reference Range:     0-2 years:   No reference ranges established for patients under 2 years old  at [facility] for lipid analytes.    2-8 years:  Greater than 45 mg/dL     18 years and older:   Female: Greater than or equal to 50 mg/dL   Male:   Greater than or equal to 40 mg/dL     LDL Cholesterol Calculated   Date Value Ref Range Status   10/07/2021 38 <=129 mg/dL Final   09/20/2021 49 <=129 mg/dL Final     LDL Cholesterol Direct   Date Value Ref Range Status   02/13/2018 69 <=129 mg/dl Final   04/09/2013 74.0 <130.1 mg/dL Final     Triglycerides   Date Value Ref Range Status   10/07/2021 196 (H) <=149 mg/dL Final   09/20/2021 242 (H) <=149 mg/dL Final     No results found for: CHOLHDLRATIO Last Comprehensive Metabolic Panel:  Sodium   Date Value Ref Range Status   11/03/2021 141 133 - 144 mmol/L Final     Potassium   Date Value Ref Range Status   11/03/2021 4.1 3.4 - 5.3 mmol/L Final     Chloride   Date Value Ref Range Status   11/03/2021 108 94 - 109 mmol/L Final     Carbon Dioxide (CO2)   Date Value Ref Range Status   11/03/2021 27 20 - 32 mmol/L Final     Anion Gap   Date Value Ref Range Status   11/03/2021 6 3 - 14 mmol/L Final     Glucose   Date Value Ref Range Status   11/03/2021 139 (H) 70 - 99 mg/dL Final     Urea Nitrogen   Date Value Ref Range Status   11/03/2021 34 (H) 7 - 30 mg/dL Final     Creatinine   Date Value Ref Range Status   11/03/2021 1.34 (H) 0.66 - 1.25 mg/dL Final     GFR Estimate   Date Value Ref Range Status   11/03/2021 55 (L) >60 mL/min/1.73m2 Final     Comment:     As of July 11, 2021,  eGFR is calculated by the CKD-EPI creatinine equation, without race adjustment. eGFR can be influenced by muscle mass, exercise, and diet. The reported eGFR is an estimation only and is only applicable if the renal function is stable.   01/05/2021 >60 >60 mL/min/1.73m2 Final     Calcium   Date Value Ref Range Status   11/03/2021 10.0 8.5 - 10.1 mg/dL Final

## 2021-11-05 NOTE — LETTER
11/5/2021    Luann Berger MD  [address] [facility] [address]    RE: Onur Barr       Dear Colleague,    I had the pleasure of seeing Onur Barr in the [facility].    No notes on file    Thank you for allowing me to participate in the care of your patient.      Sincerely,     Claudio Stephens MD     [facility]  cc:   Mckayla Dan MD  [address] [place] [id number]

## 2021-11-07 ENCOUNTER — HEALTH MAINTENANCE LETTER (OUTPATIENT)
Age: 66
End: 2021-11-07

## 2021-11-08 ASSESSMENT — PATIENT HEALTH QUESTIONNAIRE - PHQ9
SUM OF ALL RESPONSES TO PHQ QUESTIONS 1-9: 14
SUM OF ALL RESPONSES TO PHQ QUESTIONS 1-9: 14
10. IF YOU CHECKED OFF ANY PROBLEMS, HOW DIFFICULT HAVE THESE PROBLEMS MADE IT FOR YOU TO DO YOUR WORK, TAKE CARE OF THINGS AT HOME, OR GET ALONG WITH OTHER PEOPLE: SOMEWHAT DIFFICULT

## 2021-11-09 ASSESSMENT — PATIENT HEALTH QUESTIONNAIRE - PHQ9: SUM OF ALL RESPONSES TO PHQ QUESTIONS 1-9: 14

## 2021-11-11 DIAGNOSIS — I25.119 CORONARY ARTERY DISEASE INVOLVING NATIVE CORONARY ARTERY OF NATIVE HEART WITH ANGINA PECTORIS (H): ICD-10-CM

## 2021-11-11 DIAGNOSIS — I48.0 PAROXYSMAL ATRIAL FIBRILLATION (H): ICD-10-CM

## 2021-11-11 RX ORDER — METOPROLOL SUCCINATE 50 MG/1
75 TABLET, EXTENDED RELEASE ORAL DAILY
Qty: 135 TABLET | Refills: 3 | Status: ON HOLD | OUTPATIENT
Start: 2021-11-11 | End: 2022-01-25

## 2021-11-15 ENCOUNTER — OFFICE VISIT (OUTPATIENT)
Dept: FAMILY MEDICINE | Facility: CLINIC | Age: 66
End: 2021-11-15
Payer: COMMERCIAL

## 2021-11-15 VITALS
SYSTOLIC BLOOD PRESSURE: 110 MMHG | WEIGHT: 309 LBS | OXYGEN SATURATION: 100 % | BODY MASS INDEX: 41.91 KG/M2 | HEART RATE: 69 BPM | DIASTOLIC BLOOD PRESSURE: 80 MMHG

## 2021-11-15 DIAGNOSIS — S30.811A ABDOMINAL WALL ABRASION, INITIAL ENCOUNTER: Primary | ICD-10-CM

## 2021-11-15 PROCEDURE — 87070 CULTURE OTHR SPECIMN AEROBIC: CPT | Performed by: FAMILY MEDICINE

## 2021-11-15 PROCEDURE — 90662 IIV NO PRSV INCREASED AG IM: CPT | Performed by: FAMILY MEDICINE

## 2021-11-15 PROCEDURE — 87077 CULTURE AEROBIC IDENTIFY: CPT | Performed by: FAMILY MEDICINE

## 2021-11-15 PROCEDURE — 87186 SC STD MICRODIL/AGAR DIL: CPT | Performed by: FAMILY MEDICINE

## 2021-11-15 PROCEDURE — 99213 OFFICE O/P EST LOW 20 MIN: CPT | Mod: 25 | Performed by: FAMILY MEDICINE

## 2021-11-15 PROCEDURE — 90471 IMMUNIZATION ADMIN: CPT | Performed by: FAMILY MEDICINE

## 2021-11-15 RX ORDER — SULFAMETHOXAZOLE/TRIMETHOPRIM 800-160 MG
1 TABLET ORAL 2 TIMES DAILY
Qty: 14 TABLET | Refills: 0 | Status: SHIPPED | OUTPATIENT
Start: 2021-11-15 | End: 2022-01-04

## 2021-11-15 NOTE — PROGRESS NOTES
Assessment & Plan     Abdominal wall abrasion, initial encounter  - sulfamethoxazole-trimethoprim (BACTRIM DS) 800-160 MG tablet  Dispense: 14 tablet; Refill: 0  - Swab Aerobic Bacterial Culture Routine  The cause of the drainage which is a slightly foul-smelling is like an abrasion/irritation caused by the friction between the skin of his pannus and scar of previous surgery. There is no ulcer noted but the area is slightly red and looks raw. I did take a swab for culture but started him on antibiotics prophylactically. Encouraged him to get back to powder that he can put on the area. If he continues to have any problems he will let us know. If there is a growth I will let you know if we need to change antibiotics to that.  Depression Screening Follow Up    PHQ 11/8/2021   PHQ-9 Total Score 14   Q9: Thoughts of better off dead/self-harm past 2 weeks Not at all     Last PHQ-9 11/8/2021   1.  Little interest or pleasure in doing things 2   2.  Feeling down, depressed, or hopeless 2   3.  Trouble falling or staying asleep, or sleeping too much 2   4.  Feeling tired or having little energy 2   5.  Poor appetite or overeating 2   6.  Feeling bad about yourself 2   7.  Trouble concentrating 0   8.  Moving slowly or restless 2   Q9: Thoughts of better off dead/self-harm past 2 weeks 0   PHQ-9 Total Score 14       Follow Up Actions Taken  PHQ-9 score is not current. He is doing well at this time.    No follow-ups on file.    Luann Berger MD  [facility]    Sushil Astudillo is a 66 year old who presents for the following health issues    HPI   He comes in because of notable drainage that he sees on the abdominal wall. Noted that time he wakes up in the morning and he will have some yellowish smelly discharge from around the abdominal wall in the middle. He noted no fevers and no chills. Noted some discomfort but not in overt pain. He is wondering if he has any infection and wanted to  get that checked.  He also wants to get his flu shot.    Family History   Problem Relation Age of Onset     Cerebrovascular Disease Mother      Heart Disease Father      Hodgkin's lymphoma Brother       Social History     Socioeconomic History     Marital status:      Spouse name: Not on file     Number of children: Not on file     Years of education: Not on file     Highest education level: Not on file   Occupational History     Not on file   Tobacco Use     Smoking status: Former Smoker     Years: 10.00     Types: Cigars, Cigars     Smokeless tobacco: Never Used   Substance and Sexual Activity     Alcohol use: No     Drug use: No     Comment: Drug use: formerly used     Sexual activity: Not on file   Other Topics Concern     Not on file   Social History Narrative     Not on file     Social Determinants of Health     Financial Resource Strain: Not on file   Food Insecurity: Not on file   Transportation Needs: Not on file   Physical Activity: Not on file   Stress: Not on file   Social Connections: Not on file   Intimate Partner Violence: Not on file   Housing Stability: Not on file      Past Surgical History:   Procedure Laterality Date     BACK SURGERY       BYPASS GASTRIC DUODENAL SWITCH       C GASTRIC BYPASS,OBESE<100CM LORA-EN-Y  2008    Description: Gastric Surgery For Morbid Obesity Bypass With Lora-en-Y;  Recorded: 06/26/2009;  Comments: dr smith 2008     COSMETIC SURGERY      pannicullectomy     CV CORONARY ANGIOGRAM N/A 9/11/2021    Procedure: Coronary Angiogram;  Surgeon: Noris Ozuna MD;  Location: [facility] CATH LAB CV     CV LEFT HEART CATH N/A 9/11/2021    Procedure: Left Heart Cath;  Surgeon: Noris Ozuna MD;  Location: [facility] CATH LAB CV     CV PCI N/A 9/11/2021    Procedure: Percutaneous Coronary Intervention;  Surgeon: Noris Ozuna MD;  Location: [facility] CATH LAB CV     CV PCI N/A 10/7/2021    Procedure: Percutaneous Coronary Intervention;  Surgeon: Mckayla Dan MD;   Location: [facility] CV     CV PCI ASPIRATION THROMECTOMY N/A 9/11/2021    Procedure: Percutaneous Coronary Intervention Aspiration Thrombectomy;  Surgeon: Noris Ozuna MD;  Location: [facility]     ENT SURGERY      tonsillectomy     EXTRACORPOREAL SHOCK WAVE LITHOTRIPSY, CYSTOSCOPY, INSERT STENT URETER(S), COMBINED  8/23/11     HC REMOVAL OF TONSILS,<11 Y/O      Description: Tonsillectomy;  Recorded: 03/23/2012;  Comments: for obstructive sleep apnea     HC REPAIR INCISIONAL HERNIA,REDUCIBLE  11/13/2012    Description: Incisional Hernia Repair;  Proc Date: 11/13/2012;  Comments: incisional hernia repair and abdominal panniculectomy by Dr Shon Green at the [facility].     HERNIA REPAIR       IR MISCELLANEOUS PROCEDURE  7/29/2011     IR MISCELLANEOUS PROCEDURE  8/5/2011     IR MISCELLANEOUS PROCEDURE  8/23/2011     IR NEPHROSTOMY TUBE CHANGE BILATERAL  8/5/2011     ORTHOPEDIC SURGERY      arthrodesis ant discectomy, lumbar     MN ARTHRODESIS ANT INTERBODY MIN DISCECTOMY,LUMBAR      Description: Lumbar Vertebral Fusion;  Recorded: 06/26/2009;  Comments: before 200 see [facility] consult under old records     MN EXCISE EXCESS SKIN TISSUE,ABDOMEN  11/13/2012    Description: Panniculectomy;  Proc Date: 11/13/2012;  Comments: 3.5 Lb Pannus was removed at the [facility] By Dr. Shon Green     REPLACE INTRATHECAL PAIN PUMP N/A 4/25/2017    Procedure: REPLACE INTRATHECAL PAIN PUMP;  INTRATHECAL PAIN PUMP CATHETER REPLACEMENT AND PUMP REPLACEMENT;  Surgeon: Anthony Maloney MD;  Location: [facility]     REVISE CATHETER INTRATHECAL N/A 4/25/2017    Procedure: REVISE CATHETER INTRATHECAL;;  Surgeon: Anthony Maloney MD;  Location: [facility]     SHOULDER SURGERY        Past Medical History:   Diagnosis Date     Acid reflux disease 10/31/2017     Arrhythmia     paroxysmal atrial fibrillation     Arthritis      Atrial fibrillation (H)     Created by Conversion      Bariatric surgery status     Created by  Conversion      CHF (congestive heart failure) (H)      Coronary artery disease      Coronary atherosclerosis     Created by Conversion  Replacement Utility updated for latest IMO load     Diabetes (H)     typr II resloved     Essential hypertension     Created by [facility] Annotation: Apr 6 2012 10:16Zack Harris: Lisinipril,  coreg  Replacement Utility updated for latest IMO load     H/O gastric bypass      Heart attack (H)      Hypercholesteremia      Hypertension      Insomnia      Insomnia, unspecified     Created by [facility] Annotation: Sep 11 2013  9:56AM Zack Brewer: Trouble  maintaining sleep-Trazodone-minimal helpzolpidem-      Ischemic cardiomyopathy      Low back pain      Lumbago     Created by [facility] Annotation: Apr 6 2012 10:20AM Anh Ramos: Morphine pump      Morbid obesity (H) 8/1/2018     Nephrolithiasis      Nephrolithiasis      Obstructive sleep apnea (adult) (pediatric)     Created by Conversion      Osteoarthritis     Created by [facility] Annotation: Jun 26 2009  9:53AM Zack Brewer: failed lumbar  fusion/morphine pump dr putnam  Replacement Utility updated for latest IMO load     Pure hypercholesterolemia     Created by Conversion      Sleep apnea      Sleepiness 5/8/2018        Review of Systems   Constitutional, HEENT, cardiovascular, pulmonary, gi and gu systems are negative, except as otherwise noted.      Objective    /80 (BP Location: Left arm, Patient Position: Sitting, Cuff Size: Adult Large)   Pulse 69   Wt 140.2 kg (309 lb)   SpO2 100%   BMI 41.91 kg/m    Body mass index is 41.91 kg/m .  Physical Exam   GENERAL: healthy, alert and no distress  EYES: Eyes grossly normal to inspection  NECK: no asymmetry, masses, or scars  RESP: lungs clear to auscultation - no rales, rhonchi or wheezes  CV: regular rate and rhythm, normal S1 S2, no S3 or S4, no murmur, click or rub, no peripheral edema and  peripheral pulses strong  ABDOMEN: soft, nontender, without hepatosplenomegaly or masses  SKIN: Skin that is underneath the pannus as well as is suprapubic region shows an old scalp surgery and surrounding the area is some skin abrasions. Skin is macerated, has some exudation of reddish blood as well as colorless fluid. It is.  PSYCH: mentation appears normal, affect normal/bright    Answers for HPI/ROS submitted by the patient on 11/8/2021  If you checked off any problems, how difficult have these problems made it for you to do your work, take care of things at home, or get along with other people?: Somewhat difficult  PHQ9 TOTAL SCORE: 14

## 2021-11-16 ENCOUNTER — HOSPITAL ENCOUNTER (OUTPATIENT)
Dept: CARDIAC REHAB | Facility: CLINIC | Age: 66
End: 2021-11-16
Attending: INTERNAL MEDICINE
Payer: COMMERCIAL

## 2021-11-16 PROCEDURE — 93798 PHYS/QHP OP CAR RHAB W/ECG: CPT

## 2021-11-18 ENCOUNTER — HOSPITAL ENCOUNTER (OUTPATIENT)
Dept: CARDIAC REHAB | Facility: CLINIC | Age: 66
End: 2021-11-18
Attending: INTERNAL MEDICINE
Payer: COMMERCIAL

## 2021-11-18 ENCOUNTER — MYC MEDICAL ADVICE (OUTPATIENT)
Dept: FAMILY MEDICINE | Facility: CLINIC | Age: 66
End: 2021-11-18
Payer: COMMERCIAL

## 2021-11-18 LAB
BACTERIA SPEC CULT: ABNORMAL

## 2021-11-18 PROCEDURE — 93798 PHYS/QHP OP CAR RHAB W/ECG: CPT

## 2021-11-18 NOTE — TELEPHONE ENCOUNTER
Left a message to call back. Please ask patient if he is having any discomfort and would like to start antibiotics. Than route to the covering provider.

## 2021-11-19 DIAGNOSIS — S30.811A ABDOMINAL WALL ABRASION, INITIAL ENCOUNTER: Primary | ICD-10-CM

## 2021-11-19 RX ORDER — AMOXICILLIN 500 MG/1
500 CAPSULE ORAL 2 TIMES DAILY
Qty: 20 CAPSULE | Refills: 0 | Status: SHIPPED | OUTPATIENT
Start: 2021-11-19 | End: 2022-01-04

## 2021-11-22 ENCOUNTER — HOSPITAL ENCOUNTER (OUTPATIENT)
Dept: CARDIAC REHAB | Facility: CLINIC | Age: 66
End: 2021-11-22
Attending: INTERNAL MEDICINE
Payer: COMMERCIAL

## 2021-11-22 PROCEDURE — 93798 PHYS/QHP OP CAR RHAB W/ECG: CPT

## 2021-11-24 ENCOUNTER — HOSPITAL ENCOUNTER (OUTPATIENT)
Dept: CARDIAC REHAB | Facility: CLINIC | Age: 66
End: 2021-11-24
Attending: INTERNAL MEDICINE
Payer: COMMERCIAL

## 2021-11-24 PROCEDURE — 93798 PHYS/QHP OP CAR RHAB W/ECG: CPT | Performed by: REHABILITATION PRACTITIONER

## 2021-11-29 ENCOUNTER — HOSPITAL ENCOUNTER (OUTPATIENT)
Dept: CARDIAC REHAB | Facility: CLINIC | Age: 66
End: 2021-11-29
Attending: INTERNAL MEDICINE
Payer: COMMERCIAL

## 2021-11-29 PROCEDURE — 93798 PHYS/QHP OP CAR RHAB W/ECG: CPT

## 2021-11-30 ENCOUNTER — TELEPHONE (OUTPATIENT)
Dept: CARDIOLOGY | Facility: CLINIC | Age: 66
End: 2021-11-30
Payer: COMMERCIAL

## 2021-11-30 NOTE — TELEPHONE ENCOUNTER
Return call to [facility]. Advised that form was received on 10/26 and given to Dr. Lewis yan 11/5 to address at office visit with patient. Form was not returned to writer after patient was seen in clinic. If new form needs to be completed it should be refaxed. -ejb    ----- Message from Fallon Lama sent at 11/30/2021  2:02 PM CST -----  Regarding: [facility] pt  General phone call:    Caller: Dr. Zack Rivera - [facility]   Primary cardiologist: [facility] pt  Detailed reason for call: They faxed a form for Dr. Stephens to fill out about one month ago to [phone] - patient needs more treatment and they would like a response   Best phone number: [phone] - dental office   Best time to contact: any  Ok to leave a detailedmessage? yes

## 2021-12-02 ENCOUNTER — TELEPHONE (OUTPATIENT)
Dept: CARDIOLOGY | Facility: CLINIC | Age: 66
End: 2021-12-02
Payer: COMMERCIAL

## 2021-12-02 NOTE — TELEPHONE ENCOUNTER
Return call to patient. Form was completed yesterday and faxed back to [facility]. Form is scanned into chart. Patient verbalized understanding. -ejb    ----- Message from Kylie Beaver sent at 12/2/2021  3:23 PM CST -----  Regarding: [facility]  General phone call:    Caller: [facility]  Primary cardiologist: [facility]  Detailed reason for call: Pt is calling and states he lost the letter from [facility] with the okay to have his teeth worked on. Pt is wondering if the letter can be faxed to [facility] at [phone]    Best phone number: [phone]  Best time to contact: anytime  Ok to leave a detailedmessage? yes  Device? no    Additional Info:

## 2021-12-29 ENCOUNTER — E-VISIT (OUTPATIENT)
Dept: FAMILY MEDICINE | Facility: CLINIC | Age: 66
End: 2021-12-29
Payer: COMMERCIAL

## 2021-12-29 DIAGNOSIS — Z53.9 ERRONEOUS ENCOUNTER--DISREGARD: Primary | ICD-10-CM

## 2022-01-04 ENCOUNTER — OFFICE VISIT (OUTPATIENT)
Dept: FAMILY MEDICINE | Facility: CLINIC | Age: 67
End: 2022-01-04
Payer: COMMERCIAL

## 2022-01-04 VITALS
SYSTOLIC BLOOD PRESSURE: 132 MMHG | WEIGHT: 304.31 LBS | BODY MASS INDEX: 41.27 KG/M2 | OXYGEN SATURATION: 98 % | DIASTOLIC BLOOD PRESSURE: 84 MMHG | HEART RATE: 89 BPM

## 2022-01-04 DIAGNOSIS — L30.4 INTERTRIGO: ICD-10-CM

## 2022-01-04 DIAGNOSIS — R22.0 TONGUE SWELLING: Primary | ICD-10-CM

## 2022-01-04 LAB
BASOPHILS # BLD AUTO: 0 10E3/UL (ref 0–0.2)
BASOPHILS NFR BLD AUTO: 1 %
DEPRECATED S PYO AG THROAT QL EIA: NEGATIVE
EOSINOPHIL # BLD AUTO: 0.2 10E3/UL (ref 0–0.7)
EOSINOPHIL NFR BLD AUTO: 3 %
ERYTHROCYTE [DISTWIDTH] IN BLOOD BY AUTOMATED COUNT: 12.8 % (ref 10–15)
ERYTHROCYTE [SEDIMENTATION RATE] IN BLOOD BY WESTERGREN METHOD: 26 MM/HR (ref 0–15)
HCT VFR BLD AUTO: 41.6 % (ref 40–53)
HGB BLD-MCNC: 14 G/DL (ref 13.3–17.7)
IMM GRANULOCYTES # BLD: 0 10E3/UL
IMM GRANULOCYTES NFR BLD: 0 %
LYMPHOCYTES # BLD AUTO: 2.4 10E3/UL (ref 0.8–5.3)
LYMPHOCYTES NFR BLD AUTO: 30 %
MCH RBC QN AUTO: 28.6 PG (ref 26.5–33)
MCHC RBC AUTO-ENTMCNC: 33.7 G/DL (ref 31.5–36.5)
MCV RBC AUTO: 85 FL (ref 78–100)
MONOCYTES # BLD AUTO: 0.7 10E3/UL (ref 0–1.3)
MONOCYTES NFR BLD AUTO: 9 %
NEUTROPHILS # BLD AUTO: 4.6 10E3/UL (ref 1.6–8.3)
NEUTROPHILS NFR BLD AUTO: 58 %
PLATELET # BLD AUTO: 180 10E3/UL (ref 150–450)
RBC # BLD AUTO: 4.89 10E6/UL (ref 4.4–5.9)
WBC # BLD AUTO: 8 10E3/UL (ref 4–11)

## 2022-01-04 PROCEDURE — 36415 COLL VENOUS BLD VENIPUNCTURE: CPT | Performed by: FAMILY MEDICINE

## 2022-01-04 PROCEDURE — 87651 STREP A DNA AMP PROBE: CPT | Performed by: FAMILY MEDICINE

## 2022-01-04 PROCEDURE — 99214 OFFICE O/P EST MOD 30 MIN: CPT | Performed by: FAMILY MEDICINE

## 2022-01-04 PROCEDURE — 85652 RBC SED RATE AUTOMATED: CPT | Performed by: FAMILY MEDICINE

## 2022-01-04 PROCEDURE — 85025 COMPLETE CBC W/AUTO DIFF WBC: CPT | Performed by: FAMILY MEDICINE

## 2022-01-04 RX ORDER — DEXAMETHASONE 2 MG/1
2 TABLET ORAL 2 TIMES DAILY WITH MEALS
Qty: 10 TABLET | Refills: 0 | Status: ON HOLD | OUTPATIENT
Start: 2022-01-04 | End: 2022-01-25

## 2022-01-04 RX ORDER — NYSTATIN 100000 [USP'U]/G
POWDER TOPICAL
Qty: 60 G | Refills: 1 | Status: SHIPPED | OUTPATIENT
Start: 2022-01-04 | End: 2022-06-28

## 2022-01-04 NOTE — PROGRESS NOTES
ASSESSMENT/PLAN:  Onur was seen today for swollen tongue.    Diagnoses and all orders for this visit:    Tongue swelling  Cause is unclear at this time  We will give him dexamethasone for 5 days  Check a CBC and sed rate  Check a rapid strep as well  Follow-up with primary care provider in 2 weeks  -     CBC with platelets and differential; Future  -     ESR: Erythrocyte sedimentation rate; Future  -     Streptococcus A Rapid Scr w Reflx to PCR - Lab Collect; Future  -     dexamethasone (DECADRON) 2 MG tablet; Take 1 tablet (2 mg) by mouth 2 times daily (with meals)    Intertrigo  Advised keeping the area clean and dry  Advised weightbearing activities  We will give him nystatin powder to treat the Candida infection  -     nystatin (MYCOSTATIN) 723250 UNIT/GM external powder; Apply to lower abdominal area twice daily      SUBJECTIVE:    Onur Barr is a 66 year old male who came in today     This is a pleasant 66-year-old gentleman who comes in today for tongue swelling of 2 weeks duration.  There is some discomfort along his tongue but no pain to swallowing or sore throat.  He states that gargling with salt water sometimes irritates the tongue.  There has been no new trigger.  There has been no new food.  He does not have any fever.  States that his appetite is good and his taste buds are okay.  He has no shortness of breath.  No facial swelling.  No rash elsewhere.  No swelling elsewhere in his body    He has longstanding intertrigo of the lower abdominal area.  He has been treated for this in the past.  States that it is wet at times down there.  He tried his best to keep the area clean and dry.  It is not itchy.    Review of Systems (except those mentioned above)  Constitutional: Negative.   HENT: Negative.   Eyes: Negative.   Respiratory: Negative.   Cardiovascular: Negative.   Gastrointestinal: Negative.   Endocrine: Negative.   Genitourinary: Negative.   Musculoskeletal: Negative.   Skin: Negative.    Allergic/Immunologic: Negative.   Neurological: Negative.   Hematological: Negative.   Psychiatric/Behavioral: Negative.     Patient Active Problem List    Diagnosis Date Noted     Percutaneous transluminal coronary angioplasty status 09/11/2021     Priority: Medium     Paroxysmal atrial fibrillation (H) 09/11/2021     Priority: Medium     Formatting of this note might be different from the original.  Created by Conversion       Gastroesophageal reflux disease without esophagitis 09/11/2021     Priority: Medium     ST elevation MI (STEMI) (H)      Priority: Medium     Chronic diastolic heart failure (H) 07/26/2021     Priority: Medium     MARLEEN (obstructive sleep apnea) 07/26/2021     Priority: Medium     Coronary artery disease involving native coronary artery of native heart without angina pectoris 07/26/2021     Priority: Medium     Morbid obesity (H) 07/13/2021     Priority: Medium     Bilateral leg edema 07/13/2021     Priority: Medium     Bilateral cellulitis of lower leg 07/13/2021     Priority: Medium     Lumbago 10/18/2011     Priority: Medium     Essential hypertension 06/23/2008     Priority: Medium     Formatting of this note might be different from the original.  Created by Conversion  [facility] Annotation: Apr 6 2012 10:Zack Cutler: Lisinipril,   coreg    Replacement Utility updated for latest IMO load       Mixed hyperlipidemia 06/23/2008     Priority: Medium     Allergies   Allergen Reactions     Hydrocodone-Acetaminophen      Current Outpatient Medications   Medication Sig Dispense Refill     dexamethasone (DECADRON) 2 MG tablet Take 1 tablet (2 mg) by mouth 2 times daily (with meals) 10 tablet 0     nystatin (MYCOSTATIN) 420582 UNIT/GM external powder Apply to lower abdominal area twice daily 60 g 1     apixaban ANTICOAGULANT (ELIQUIS) 5 MG tablet Take 1 tablet (5 mg) by mouth every 12 hours 180 tablet 1     ASPIRIN LOW DOSE 81 MG EC tablet TAKE 1 TABLET (81 MG) BY MOUTH DAILY START  TOMORROW. 90 tablet 1     baclofen (LIORESAL) 10 MG tablet Take 1 tablet (10 mg) by mouth 2 times daily as needed for muscle spasms 30 tablet 0     clopidogrel (PLAVIX) 75 MG tablet Take 1 tablet (75 mg) by mouth daily 30 tablet 0     DULoxetine (CYMBALTA) 30 MG capsule Take 30 mg by mouth 2 times daily 30 mg in AM  60 mg in PM       Ferrous Gluconate (IRON) 240 (27 Fe) MG TABS Take 1 tablet by mouth daily       furosemide (LASIX) 20 MG tablet TAKE 2 TABLET (40 MG) BY MOUTH  IN THE AM AND 1 TABLET (20 MG) IN EARLY AFTERNOON. TAKE WITH POTASSIUM 270 tablet 2     lisinopril (ZESTRIL) 20 MG tablet Take 1 tablet (20 mg) by mouth daily 30 tablet 0     metoprolol succinate ER (TOPROL-XL) 50 MG 24 hr tablet Take 1.5 tablets (75 mg) by mouth daily 135 tablet 3     MORPHINE SULFATE IT pump:updated 7/14  Medications in Pump:    [facility] Responsible for pump medications:   Conc:  Morphine 20mg/ml(3.95 mg/day), clonidine 21.1mcg/ml (4.168 mcg/day), baclofen 42.5mcg/ml (8.395mcg/day)  Rate:   Pump Last Fill Date: 2/21  Pump Refill Date:8/20/21       nitroGLYcerin (NITROSTAT) 0.4 MG sublingual tablet For chest pain place 1 tablet under the tongue every 5 minutes for 3 doses. If symptoms persist 5 minutes after 1st dose call 911. 30 tablet 1     Omeprazole (PRILOSEC PO) Take 40 mg by mouth daily       potassium chloride ER (KLOR-CON M) 20 MEQ CR tablet Take 1 tablet (20 mEq) by mouth daily (take with furosemide/Lasix) 60 tablet 1     rosuvastatin (CRESTOR) 40 MG tablet Take 1 tablet (40 mg) by mouth daily 90 tablet 3     senna-docusate (SENOKOT-S/PERICOLACE) 8.6-50 MG tablet Take 1 tablet by mouth 2 times daily as needed for constipation 60 tablet 0     traZODone (DESYREL) 100 MG tablet TAKE 2 TABLETS (200MG TOTAL) BY MOUTH AT BEDTIME 180 tablet 3     Past Medical History:   Diagnosis Date     Acid reflux disease 10/31/2017     Arrhythmia     paroxysmal atrial fibrillation     Arthritis      Atrial fibrillation (H)      Created by Conversion      Bariatric surgery status     Created by Conversion      CHF (congestive heart failure) (H)      Coronary artery disease      Coronary atherosclerosis     Created by Conversion  Replacement Utility updated for latest IMO load     Diabetes (H)     typr II resloved     Essential hypertension     Created by Conversion [facility] Annotation: Apr 6 2012 10:16Zack Harris: Lisinipril,  coreg  Replacement Utility updated for latest IMO load     H/O gastric bypass      Heart attack (H)      Hypercholesteremia      Hypertension      Insomnia      Insomnia, unspecified     Created by [facility] Annotation: Sep 11 2013  9:56AM Zack Brewer: Trouble  maintaining sleep-Trazodone-minimal helpzolpidem-      Ischemic cardiomyopathy      Low back pain      Lumbago     Created by [facility] Annotation: Apr 6 2012 10:20AM Anh Ramos: Morphine pump      Morbid obesity (H) 8/1/2018     Nephrolithiasis      Nephrolithiasis      Obstructive sleep apnea (adult) (pediatric)     Created by Conversion      Osteoarthritis     Created by Conversion [facility] Annotation: Jun 26 2009  9:53AM Zack Brewer: failed lumbar  fusion/morphine pump dr putnam  Replacement Utility updated for latest IMO load     Pure hypercholesterolemia     Created by Conversion      Sleep apnea      Sleepiness 5/8/2018     Past Surgical History:   Procedure Laterality Date     BACK SURGERY       BYPASS GASTRIC DUODENAL SWITCH       COSMETIC SURGERY      pannicullectomy     CV CORONARY ANGIOGRAM N/A 9/11/2021    Procedure: Coronary Angiogram;  Surgeon: Noris Ozuna MD;  Location: [facility] CATH LAB CV     CV LEFT HEART CATH N/A 9/11/2021    Procedure: Left Heart Cath;  Surgeon: Noris Ozuna MD;  Location: [facility] CATH LAB CV     CV PCI N/A 9/11/2021    Procedure: Percutaneous Coronary Intervention;  Surgeon: Noris Ozuna MD;  Location: [facility] CATH LAB CV     CV PCI N/A 10/7/2021     Procedure: Percutaneous Coronary Intervention;  Surgeon: Mckayla Dan MD;  Location: [facility]     CV PCI ASPIRATION THROMECTOMY N/A 9/11/2021    Procedure: Percutaneous Coronary Intervention Aspiration Thrombectomy;  Surgeon: Noris Ozuna MD;  Location: [facility]     ENT SURGERY      tonsillectomy     EXTRACORPOREAL SHOCK WAVE LITHOTRIPSY, CYSTOSCOPY, INSERT STENT URETER(S), COMBINED  8/23/11     HC REMOVAL OF TONSILS,<11 Y/O      Description: Tonsillectomy;  Recorded: 03/23/2012;  Comments: for obstructive sleep apnea     HC REPAIR INCISIONAL HERNIA,REDUCIBLE  11/13/2012    Description: Incisional Hernia Repair;  Proc Date: 11/13/2012;  Comments: incisional hernia repair and abdominal panniculectomy by Dr Shon Green at the [facility].     HERNIA REPAIR       IR MISCELLANEOUS PROCEDURE  7/29/2011     IR MISCELLANEOUS PROCEDURE  8/5/2011     IR MISCELLANEOUS PROCEDURE  8/23/2011     IR NEPHROSTOMY TUBE CHANGE BILATERAL  8/5/2011     ORTHOPEDIC SURGERY      arthrodesis ant discectomy, lumbar     MO ARTHRODESIS ANT INTERBODY MIN DISCECTOMY,LUMBAR      Description: Lumbar Vertebral Fusion;  Recorded: 06/26/2009;  Comments: before 200 see [facility] consult under old records     MO EXCISE EXCESS SKIN TISSUE,ABDOMEN  11/13/2012    Description: Panniculectomy;  Proc Date: 11/13/2012;  Comments: 3.5 Lb Pannus was removed at the [facility] By Dr. Shon Green     REPLACE INTRATHECAL PAIN PUMP N/A 4/25/2017    Procedure: REPLACE INTRATHECAL PAIN PUMP;  INTRATHECAL PAIN PUMP CATHETER REPLACEMENT AND PUMP REPLACEMENT;  Surgeon: Anthony Maloney MD;  Location: [facility]     REVISE CATHETER INTRATHECAL N/A 4/25/2017    Procedure: REVISE CATHETER INTRATHECAL;;  Surgeon: Anthony Maloney MD;  Location: [facility]     SHOULDER SURGERY       ZZC GASTRIC BYPASS,OBESE<100CM LORA-EN-Y  2008    Description: Gastric Surgery For Morbid Obesity Bypass With Lora-en-Y;  Recorded: 06/26/2009;  Comments: dr green  2008     Social History     Socioeconomic History     Marital status:      Spouse name: None     Number of children: None     Years of education: None     Highest education level: None   Occupational History     None   Tobacco Use     Smoking status: Former Smoker     Years: 10.00     Types: Cigars, Cigars     Smokeless tobacco: Never Used   Substance and Sexual Activity     Alcohol use: No     Drug use: No     Comment: Drug use: formerly used     Sexual activity: None   Other Topics Concern     None   Social History Narrative     None     Social Determinants of Health     Financial Resource Strain: Not on file   Food Insecurity: Not on file   Transportation Needs: Not on file   Physical Activity: Not on file   Stress: Not on file   Social Connections: Not on file   Intimate Partner Violence: Not on file   Housing Stability: Not on file     Family History   Problem Relation Age of Onset     Cerebrovascular Disease Mother      Heart Disease Father      Hodgkin's lymphoma Brother          OBJECTIVE:    Vitals:    01/04/22 0940   BP: (!) 144/92   Pulse: 89   SpO2: 98%   Weight: 138 kg (304 lb 5 oz)     Body mass index is 41.27 kg/m .    Physical Exam:  Constitutional: Patient is oriented to person, place, and time. Patient appears well-developed and well-nourished. No distress.   Head: Normocephalic and atraumatic.   Right Ear: External ear normal.   Left Ear: External ear normal.   Eyes: Conjunctivae and EOM are normal. Right eye exhibits no discharge. Left eye exhibits no discharge. No scleral icterus.   Tongue: Mildly elevated but no Candida or white patches.  No sores or lesion on his tongue, inside the mouth, or along the peritonsillar area  No cervical lymphadenopathy  Neurological: Patient is alert and oriented to person, place, and time.  Skin: Erythema along the intertrigo folds of his lower abdominal area

## 2022-01-05 LAB — GROUP A STREP BY PCR: NOT DETECTED

## 2022-01-10 DIAGNOSIS — I10 ESSENTIAL (PRIMARY) HYPERTENSION: ICD-10-CM

## 2022-01-10 DIAGNOSIS — M54.41 CHRONIC MIDLINE LOW BACK PAIN WITH BILATERAL SCIATICA: Primary | ICD-10-CM

## 2022-01-10 DIAGNOSIS — D50.8 OTHER IRON DEFICIENCY ANEMIA: Primary | ICD-10-CM

## 2022-01-10 DIAGNOSIS — G89.29 CHRONIC MIDLINE LOW BACK PAIN WITH BILATERAL SCIATICA: Primary | ICD-10-CM

## 2022-01-10 DIAGNOSIS — M54.42 CHRONIC MIDLINE LOW BACK PAIN WITH BILATERAL SCIATICA: Primary | ICD-10-CM

## 2022-01-12 ENCOUNTER — APPOINTMENT (OUTPATIENT)
Dept: CARDIOLOGY | Facility: CLINIC | Age: 67
End: 2022-01-12
Attending: EMERGENCY MEDICINE
Payer: COMMERCIAL

## 2022-01-12 ENCOUNTER — APPOINTMENT (OUTPATIENT)
Dept: GENERAL RADIOLOGY | Facility: CLINIC | Age: 67
End: 2022-01-12
Attending: EMERGENCY MEDICINE
Payer: COMMERCIAL

## 2022-01-12 ENCOUNTER — HOSPITAL ENCOUNTER (INPATIENT)
Facility: CLINIC | Age: 67
LOS: 13 days | Discharge: HOME HEALTH | End: 2022-01-25
Attending: EMERGENCY MEDICINE | Admitting: HOSPITALIST
Payer: COMMERCIAL

## 2022-01-12 DIAGNOSIS — R07.9 CHEST PAIN, UNSPECIFIED TYPE: ICD-10-CM

## 2022-01-12 DIAGNOSIS — K21.9 GASTROESOPHAGEAL REFLUX DISEASE WITHOUT ESOPHAGITIS: ICD-10-CM

## 2022-01-12 DIAGNOSIS — I47.29 PAROXYSMAL VENTRICULAR TACHYCARDIA (H): ICD-10-CM

## 2022-01-12 DIAGNOSIS — I24.9 ACS (ACUTE CORONARY SYNDROME) (H): ICD-10-CM

## 2022-01-12 DIAGNOSIS — I25.10 CORONARY ARTERY DISEASE INVOLVING NATIVE CORONARY ARTERY OF NATIVE HEART WITHOUT ANGINA PECTORIS: Primary | ICD-10-CM

## 2022-01-12 LAB
ANION GAP SERPL CALCULATED.3IONS-SCNC: 7 MMOL/L (ref 3–14)
ATRIAL RATE - MUSE: 267 BPM
ATRIAL RATE - MUSE: 357 BPM
BASOPHILS # BLD AUTO: 0.1 10E3/UL (ref 0–0.2)
BASOPHILS NFR BLD AUTO: 1 %
BUN SERPL-MCNC: 16 MG/DL (ref 7–30)
CALCIUM SERPL-MCNC: 7.5 MG/DL (ref 8.5–10.1)
CHLORIDE BLD-SCNC: 112 MMOL/L (ref 94–109)
CO2 SERPL-SCNC: 22 MMOL/L (ref 20–32)
CREAT SERPL-MCNC: 0.74 MG/DL (ref 0.66–1.25)
DIASTOLIC BLOOD PRESSURE - MUSE: NORMAL MMHG
DIASTOLIC BLOOD PRESSURE - MUSE: NORMAL MMHG
EOSINOPHIL # BLD AUTO: 0.2 10E3/UL (ref 0–0.7)
EOSINOPHIL NFR BLD AUTO: 1 %
ERYTHROCYTE [DISTWIDTH] IN BLOOD BY AUTOMATED COUNT: 13.1 % (ref 10–15)
GFR SERPL CREATININE-BSD FRML MDRD: >90 ML/MIN/1.73M2
GLUCOSE BLD-MCNC: 111 MG/DL (ref 70–99)
HCT VFR BLD AUTO: 45 % (ref 40–53)
HGB BLD-MCNC: 14.7 G/DL (ref 13.3–17.7)
IMM GRANULOCYTES # BLD: 0.1 10E3/UL
IMM GRANULOCYTES NFR BLD: 1 %
INTERPRETATION ECG - MUSE: NORMAL
INTERPRETATION ECG - MUSE: NORMAL
LVEF ECHO: NORMAL
LYMPHOCYTES # BLD AUTO: 3.7 10E3/UL (ref 0.8–5.3)
LYMPHOCYTES NFR BLD AUTO: 31 %
MCH RBC QN AUTO: 27.8 PG (ref 26.5–33)
MCHC RBC AUTO-ENTMCNC: 32.7 G/DL (ref 31.5–36.5)
MCV RBC AUTO: 85 FL (ref 78–100)
MONOCYTES # BLD AUTO: 0.9 10E3/UL (ref 0–1.3)
MONOCYTES NFR BLD AUTO: 7 %
NEUTROPHILS # BLD AUTO: 7.1 10E3/UL (ref 1.6–8.3)
NEUTROPHILS NFR BLD AUTO: 59 %
NRBC # BLD AUTO: 0 10E3/UL
NRBC BLD AUTO-RTO: 0 /100
P AXIS - MUSE: NORMAL DEGREES
P AXIS - MUSE: NORMAL DEGREES
PLATELET # BLD AUTO: 211 10E3/UL (ref 150–450)
POTASSIUM BLD-SCNC: 3.3 MMOL/L (ref 3.4–5.3)
PR INTERVAL - MUSE: NORMAL MS
PR INTERVAL - MUSE: NORMAL MS
QRS DURATION - MUSE: 92 MS
QRS DURATION - MUSE: 92 MS
QT - MUSE: 368 MS
QT - MUSE: 380 MS
QTC - MUSE: 462 MS
QTC - MUSE: 487 MS
R AXIS - MUSE: 24 DEGREES
R AXIS - MUSE: 44 DEGREES
RBC # BLD AUTO: 5.28 10E6/UL (ref 4.4–5.9)
SARS-COV-2 RNA RESP QL NAA+PROBE: NEGATIVE
SODIUM SERPL-SCNC: 141 MMOL/L (ref 133–144)
SYSTOLIC BLOOD PRESSURE - MUSE: NORMAL MMHG
SYSTOLIC BLOOD PRESSURE - MUSE: NORMAL MMHG
T AXIS - MUSE: 19 DEGREES
T AXIS - MUSE: 43 DEGREES
TROPONIN I SERPL HS-MCNC: 1874 NG/L
TROPONIN I SERPL HS-MCNC: 33 NG/L
VENTRICULAR RATE- MUSE: 95 BPM
VENTRICULAR RATE- MUSE: 99 BPM
WBC # BLD AUTO: 12 10E3/UL (ref 4–11)

## 2022-01-12 PROCEDURE — 255N000002 HC RX 255 OP 636: Performed by: EMERGENCY MEDICINE

## 2022-01-12 PROCEDURE — 71046 X-RAY EXAM CHEST 2 VIEWS: CPT

## 2022-01-12 PROCEDURE — C9803 HOPD COVID-19 SPEC COLLECT: HCPCS

## 2022-01-12 PROCEDURE — 99291 CRITICAL CARE FIRST HOUR: CPT | Mod: 25

## 2022-01-12 PROCEDURE — 36415 COLL VENOUS BLD VENIPUNCTURE: CPT | Performed by: EMERGENCY MEDICINE

## 2022-01-12 PROCEDURE — 93325 DOPPLER ECHO COLOR FLOW MAPG: CPT | Mod: 26 | Performed by: INTERNAL MEDICINE

## 2022-01-12 PROCEDURE — 80048 BASIC METABOLIC PNL TOTAL CA: CPT | Performed by: EMERGENCY MEDICINE

## 2022-01-12 PROCEDURE — 99292 CRITICAL CARE ADDL 30 MIN: CPT

## 2022-01-12 PROCEDURE — 210N000002 HC R&B HEART CARE

## 2022-01-12 PROCEDURE — 99222 1ST HOSP IP/OBS MODERATE 55: CPT | Mod: AI | Performed by: HOSPITALIST

## 2022-01-12 PROCEDURE — 96366 THER/PROPH/DIAG IV INF ADDON: CPT

## 2022-01-12 PROCEDURE — 250N000011 HC RX IP 250 OP 636: Performed by: HOSPITALIST

## 2022-01-12 PROCEDURE — 96368 THER/DIAG CONCURRENT INF: CPT

## 2022-01-12 PROCEDURE — 96365 THER/PROPH/DIAG IV INF INIT: CPT | Mod: 59

## 2022-01-12 PROCEDURE — 250N000013 HC RX MED GY IP 250 OP 250 PS 637: Performed by: EMERGENCY MEDICINE

## 2022-01-12 PROCEDURE — 85025 COMPLETE CBC W/AUTO DIFF WBC: CPT | Performed by: EMERGENCY MEDICINE

## 2022-01-12 PROCEDURE — 250N000011 HC RX IP 250 OP 636: Performed by: EMERGENCY MEDICINE

## 2022-01-12 PROCEDURE — 93005 ELECTROCARDIOGRAM TRACING: CPT | Mod: 76

## 2022-01-12 PROCEDURE — 93321 DOPPLER ECHO F-UP/LMTD STD: CPT | Mod: 26 | Performed by: INTERNAL MEDICINE

## 2022-01-12 PROCEDURE — 93005 ELECTROCARDIOGRAM TRACING: CPT

## 2022-01-12 PROCEDURE — 96375 TX/PRO/DX INJ NEW DRUG ADDON: CPT

## 2022-01-12 PROCEDURE — 93308 TTE F-UP OR LMTD: CPT | Mod: 26 | Performed by: INTERNAL MEDICINE

## 2022-01-12 PROCEDURE — 87635 SARS-COV-2 COVID-19 AMP PRB: CPT | Performed by: EMERGENCY MEDICINE

## 2022-01-12 PROCEDURE — 93325 DOPPLER ECHO COLOR FLOW MAPG: CPT

## 2022-01-12 PROCEDURE — 258N000003 HC RX IP 258 OP 636: Performed by: EMERGENCY MEDICINE

## 2022-01-12 PROCEDURE — 84484 ASSAY OF TROPONIN QUANT: CPT | Performed by: EMERGENCY MEDICINE

## 2022-01-12 RX ORDER — POTASSIUM CHLORIDE 1.5 G/1.58G
40 POWDER, FOR SOLUTION ORAL ONCE
Status: COMPLETED | OUTPATIENT
Start: 2022-01-12 | End: 2022-01-12

## 2022-01-12 RX ORDER — NITROGLYCERIN 0.4 MG/1
0.4 TABLET SUBLINGUAL ONCE
Status: COMPLETED | OUTPATIENT
Start: 2022-01-12 | End: 2022-01-12

## 2022-01-12 RX ORDER — HEPARIN SODIUM 10000 [USP'U]/100ML
0-5000 INJECTION, SOLUTION INTRAVENOUS CONTINUOUS
Status: DISCONTINUED | OUTPATIENT
Start: 2022-01-12 | End: 2022-01-12

## 2022-01-12 RX ORDER — MORPHINE SULFATE 2 MG/ML
2 INJECTION, SOLUTION INTRAMUSCULAR; INTRAVENOUS
Status: DISCONTINUED | OUTPATIENT
Start: 2022-01-12 | End: 2022-01-12

## 2022-01-12 RX ORDER — ASPIRIN 81 MG/1
81 TABLET ORAL DAILY
Status: DISCONTINUED | OUTPATIENT
Start: 2022-01-13 | End: 2022-01-19

## 2022-01-12 RX ORDER — MORPHINE SULFATE 2 MG/ML
2-4 INJECTION, SOLUTION INTRAMUSCULAR; INTRAVENOUS
Status: DISCONTINUED | OUTPATIENT
Start: 2022-01-12 | End: 2022-01-14

## 2022-01-12 RX ORDER — HEPARIN SODIUM 10000 [USP'U]/100ML
0-5000 INJECTION, SOLUTION INTRAVENOUS CONTINUOUS
Status: DISCONTINUED | OUTPATIENT
Start: 2022-01-12 | End: 2022-01-13

## 2022-01-12 RX ORDER — DEXAMETHASONE 2 MG/1
2 TABLET ORAL 2 TIMES DAILY WITH MEALS
Status: DISCONTINUED | OUTPATIENT
Start: 2022-01-13 | End: 2022-01-16

## 2022-01-12 RX ORDER — DULOXETIN HYDROCHLORIDE 30 MG/1
30 CAPSULE, DELAYED RELEASE ORAL EVERY MORNING
COMMUNITY
End: 2022-08-18

## 2022-01-12 RX ORDER — BACLOFEN 10 MG/1
10 TABLET ORAL 2 TIMES DAILY PRN
Status: DISCONTINUED | OUTPATIENT
Start: 2022-01-12 | End: 2022-01-19

## 2022-01-12 RX ORDER — DULOXETIN HYDROCHLORIDE 30 MG/1
60 CAPSULE, DELAYED RELEASE ORAL EVERY EVENING
COMMUNITY
End: 2023-08-23

## 2022-01-12 RX ORDER — TRAZODONE HYDROCHLORIDE 50 MG/1
200 TABLET, FILM COATED ORAL AT BEDTIME
Status: DISCONTINUED | OUTPATIENT
Start: 2022-01-12 | End: 2022-01-19

## 2022-01-12 RX ORDER — NITROGLYCERIN 20 MG/100ML
10-200 INJECTION INTRAVENOUS CONTINUOUS
Status: DISCONTINUED | OUTPATIENT
Start: 2022-01-12 | End: 2022-01-13

## 2022-01-12 RX ORDER — LISINOPRIL AND HYDROCHLOROTHIAZIDE 20; 25 MG/1; MG/1
TABLET ORAL
Qty: 180 TABLET | Refills: 1 | Status: ON HOLD | OUTPATIENT
Start: 2022-01-12 | End: 2022-01-25

## 2022-01-12 RX ORDER — NABUMETONE 500 MG/1
TABLET, FILM COATED ORAL
Qty: 180 TABLET | Refills: 1 | Status: ON HOLD | OUTPATIENT
Start: 2022-01-12 | End: 2022-01-25

## 2022-01-12 RX ORDER — DULOXETIN HYDROCHLORIDE 60 MG/1
60 CAPSULE, DELAYED RELEASE ORAL EVERY EVENING
Status: DISCONTINUED | OUTPATIENT
Start: 2022-01-12 | End: 2022-01-19

## 2022-01-12 RX ADMIN — POTASSIUM CHLORIDE 40 MEQ: 1.5 POWDER, FOR SOLUTION ORAL at 18:12

## 2022-01-12 RX ADMIN — NITROGLYCERIN 10 MCG/MIN: 20 INJECTION INTRAVENOUS at 18:16

## 2022-01-12 RX ADMIN — HUMAN ALBUMIN MICROSPHERES AND PERFLUTREN 9 ML: 10; .22 INJECTION, SOLUTION INTRAVENOUS at 21:08

## 2022-01-12 RX ADMIN — NITROGLYCERIN 0.4 MG: 0.4 TABLET SUBLINGUAL at 17:18

## 2022-01-12 RX ADMIN — MORPHINE SULFATE 2 MG: 2 INJECTION, SOLUTION INTRAMUSCULAR; INTRAVENOUS at 20:23

## 2022-01-12 RX ADMIN — HEPARIN SODIUM 1200 UNITS/HR: 1000 INJECTION INTRAVENOUS; SUBCUTANEOUS at 18:12

## 2022-01-12 ASSESSMENT — ENCOUNTER SYMPTOMS
CHILLS: 0
FEVER: 0
BACK PAIN: 0
ABDOMINAL PAIN: 0

## 2022-01-12 ASSESSMENT — ACTIVITIES OF DAILY LIVING (ADL)
ADLS_ACUITY_SCORE: 4

## 2022-01-12 NOTE — ED NOTES
MD Notification    Notified Person: MD    Notified Person Name: Shah    Notification Date/Time: January 12, 2022 5:34 PM    Notification Interaction: face to face    Purpose of Notification: K 3.3, bigeminal PVCs prior to nitroglycerin, increased pain post nitroglycerin pill,     Orders Received: 40 of oral K, nitroglycerin gtt, and heparin gtt    Comments:

## 2022-01-12 NOTE — ED TRIAGE NOTES
Pt BIBA from home. Pt went down stairs to bed and began to have chest pain. Pt laid down in bed there was no improvement. Went upstairs to sit on couch and called 911. Pt has hx of stemi and stents. Pt received 2 sl NTG and 324 asa. Pt diaphoretic and had ?syncopal episode with pain.

## 2022-01-12 NOTE — Clinical Note
Throughout case thus far pt having mild CP, but having moderate to severe acute on chronic back pain

## 2022-01-12 NOTE — ED NOTES
Bed: ED10  Expected date: 1/12/22  Expected time: 3:35 PM  Means of arrival: Ambulance  Comments:  YAJAIRA 66m CP, no stemi ETA 1555

## 2022-01-12 NOTE — Clinical Note
Tested the right ventricular lead. See vendor printout/MD dictation. 64CM RELIANCE DF4 LEAD, MODEL 0276, UNCOATED DUAL COIL, ACTIVE HELIX FIXATION Implanted  [id number]

## 2022-01-12 NOTE — Clinical Note
The first balloon was inserted into the left anterior descending.Max pressure = 6 chaparro. Total duration = 25 seconds.     Max pressure = 6 chaparro. Total duration = 25 seconds.    Balloon reinflated a second time: Max pressure = 6 chaparro. Total duration = 25 seconds.

## 2022-01-12 NOTE — PHARMACY-ADMISSION MEDICATION HISTORY
Pharmacy Medication History  Admission medication history interview status for the 1/12/2022  admission is complete. See EPIC admission navigator for prior to admission medications     Location of Interview: Patient room  Medication history sources: Patient and Patient's family/friend (Spouse)    Significant changes made to the medication list:  Removed: lisinopril    In the past week, patient estimated taking medication this percent of the time: greater than 90%    Additional medication history information:   Patient able to confirm morphine pump info. They believe his pump was last filled in September and is due in mid-February, but were not entirely confident. He fills the pump at [facility] if needing to confirm.    Medication reconciliation completed by provider prior to medication history? No    Time spent in this activity: 15 mins    Prior to Admission medications    Medication Sig Last Dose Taking? Auth Provider   apixaban ANTICOAGULANT (ELIQUIS) 5 MG tablet Take 1 tablet (5 mg) by mouth every 12 hours 1/12/2022 at AM Yes Claudio Stephens MD   ASPIRIN LOW DOSE 81 MG EC tablet TAKE 1 TABLET (81 MG) BY MOUTH DAILY START TOMORROW. 1/12/2022 at AM Yes Claudio Stephens MD   baclofen (LIORESAL) 10 MG tablet Take 1 tablet (10 mg) by mouth 2 times daily as needed for muscle spasms  at PRN Yes Donna La MD   clopidogrel (PLAVIX) 75 MG tablet Take 1 tablet (75 mg) by mouth daily 1/12/2022 at AM Yes Donna La MD   dexamethasone (DECADRON) 2 MG tablet Take 1 tablet (2 mg) by mouth 2 times daily (with meals) 1/10/2022 at PM Yes Lanny Sequeira MD   DULoxetine (CYMBALTA) 30 MG capsule Take 30 mg by mouth every morning 1/12/2022 at AM Yes Unknown, Entered By History   DULoxetine (CYMBALTA) 30 MG capsule Take 60 mg by mouth every evening 1/11/2022 at PM Yes Unknown, Entered By History   Ferrous Gluconate (IRON) 240 (27 Fe) MG TABS Take 1 tablet by mouth daily 1/12/2022 at  AM Yes Luann Berger MD   furosemide (LASIX) 20 MG tablet TAKE 2 TABLET (40 MG) BY MOUTH  IN THE AM AND 1 TABLET (20 MG) IN EARLY AFTERNOON. TAKE WITH POTASSIUM 1/12/2022 at AM Yes María Duval NP   lisinopril-hydrochlorothiazide (ZESTORETIC) 20-25 MG tablet TAKE 2 TABLETS BY MOUTH EVERY DAY 1/12/2022 at AM Yes Luann Berger MD   metoprolol succinate ER (TOPROL-XL) 50 MG 24 hr tablet Take 1.5 tablets (75 mg) by mouth daily 1/12/2022 at AM Yes Claudio Stephens MD   MORPHINE SULFATE IT pump:updated 1/12/22  Medications in Pump:   [facility] Responsible for pump medications:   Conc:  Morphine 20mg/ml(3.95 mg/day), clonidine 21.1mcg/ml (4.168 mcg/day), baclofen 42.5mcg/ml (8.395mcg/day)  Rate:   Pump Last Fill Date: 9/2021  Pump Refill Date: 2/2022 1/12/2022 at Unknown time Yes Reported, Patient   nabumetone (RELAFEN) 500 MG tablet TAKE 1 TABLET BY MOUTH TWICE A DAY 1/12/2022 at AM Yes Luann Berger MD   nitroGLYcerin (NITROSTAT) 0.4 MG sublingual tablet For chest pain place 1 tablet under the tongue every 5 minutes for 3 doses. If symptoms persist 5 minutes after 1st dose call 911.  at PRN Yes Luann Berger MD   nystatin (MYCOSTATIN) 078293 UNIT/GM external powder Apply to lower abdominal area twice daily 1/12/2022 at AM Yes Lanny Sequeira MD   Omeprazole (PRILOSEC PO) Take 40 mg by mouth daily 1/12/2022 at AM Yes Reported, Patient   potassium chloride ER (KLOR-CON M) 20 MEQ CR tablet Take 1 tablet (20 mEq) by mouth daily (take with furosemide/Lasix) 1/12/2022 at AM Yes Donna La MD   rosuvastatin (CRESTOR) 40 MG tablet Take 1 tablet (40 mg) by mouth daily 1/11/2022 at PM Yes Noris Ozuna MD senna-docusate (SENOKOT-S/PERICOLACE) 8.6-50 MG tablet Take 1 tablet by mouth 2 times daily as needed for constipation  at PRN Yes Donna La MD   traZODone (DESYREL) 100 MG tablet TAKE 2 TABLETS (200MG TOTAL) BY MOUTH AT BEDTIME 1/11/2022 at HS  Yes Luann Berger MD       The information provided in this note is only as accurate as the sources available at the time of update(s)     Kylie Galarza PharmD

## 2022-01-12 NOTE — Clinical Note
The first balloon was inserted into the left anterior descending.Max pressure = 10 chaparro. Total duration = 15 seconds.

## 2022-01-12 NOTE — Clinical Note
Potential access sites were evaluated for patency using ultrasound.   The left sunclavian vein was selected. Access was obtained under with Fluoroscopic guidance using a standard 18 guage needle with direct visualization of needle entry.

## 2022-01-12 NOTE — Clinical Note
The first balloon was inserted into the left anterior descending.Max pressure = 10 chaparro. Total duration = 20 seconds.     Max pressure = 10 chaparro. Total duration = 20 seconds.    Balloon reinflated a second time: Max pressure = 10 chaparro. Total duration = 20 seconds.

## 2022-01-12 NOTE — Clinical Note
The first balloon was inserted into the left anterior descending.Max pressure = 16 chaparro. Total duration = 13 seconds.

## 2022-01-12 NOTE — ED PROVIDER NOTES
History   Chief Complaint:  Chest Pain     The history is provided by the patient.      Onur Barr is a 66 year old male with history of complicated past medical history including atrial fibrillation, on Eliquis, who presents via EMS for evaluation of chest pain. The patient was seen in September and October of 2021 with STEMI and cath placement. He comes in today with new chest pain that started around 1445 after walking down the stairs. He describes this pain as being in the mid chest that feels like a pressure sensation that radiates into his left arm. He tried resting but this did not improve the pain. He rates the pain a 5/10 in severity at this time and states it feels like it is staying about the same. He called EMS and was thus brought here for further care. En route to ED he was given 2 nitroglycerin and one full dose aspirin. Upon evaluation here patient notes he has been feeling more sweaty the last few days and has been sleeping more than normal. He states this chest pressure pain does get a bit worse with deep breaths. He denies any jaw pain, back pain, fever, chills, abdominal pain, new leg swelling, and any other known symptoms at this time.     Review of Systems   Constitutional: Negative for chills and fever.   HENT:        Negative jaw pain   Cardiovascular: Positive for chest pain. Negative for leg swelling.   Gastrointestinal: Negative for abdominal pain.   Musculoskeletal: Negative for back pain.   All other systems reviewed and are negative.    Allergies:  Hydrocodone-Acetaminophen    Medications:  Eliquis  Aspirin 81 mg  Plavix  Duloxetine  Lasix  Lisinopril  Zestoretic  Metoprolol succinate  Nitrostat  Potassium Chloride  Prilosec  Rosuvastatin  Senna-docusate  Trazodone    Past Medical History:    Acid reflux disease   Arrhythmia   Arthritis   Atrial fibrillation   Bariatric surgery status   CHF   Coronary artery disease   Coronary atherosclerosis   Diabetes   Essential  hypertension   Heart attack    Hypercholesteremia   Hypertension   Insomnia   Insomnia, unspecified   Ischemic cardiomyopathy   Low back pain   Lumbago   Morbid obesity   Nephrolithiasis   Nephrolithiasis   Obstructive sleep apnea  Osteoarthritis   Pure hypercholesterolemia   Sleep apnea   Sleepiness   Percutaneous transluminal coronary angioplasty status  GERD     Past Surgical History:    Back Surgery  Bypass Gastric Duodenal Switch  Panniculectomy  CV Coronary Angiogram  CV Left heart Cath  CV PCI x3  Tonsillectomy  Lithotripsy  Hernia Repair  Nephorostomy Tube  Arthrodesis any discectomy, lumbar  RI arthrodesis ant interbody, discectomy  Excise excess skin tissue, abdomen  Replace intrathecal pain pump  Gastric bypass     Family History:    Heart Disease  Hodgkin's Lymphoma  Cerebrovascular disease    Social History:  The patient presents to the ED alone.    Physical Exam     Patient Vitals for the past 24 hrs:   BP Pulse Resp SpO2 Weight   01/12/22 2130 120/86 70 20 95 % --   01/12/22 2115 114/76 73 14 96 % --   01/12/22 2100 112/71 76 9 95 % --   01/12/22 2045 112/79 84 10 94 % --   01/12/22 2030 106/76 67 17 94 % --   01/12/22 2015 96/59 85 15 97 % --   01/12/22 2000 106/58 85 14 97 % --   01/12/22 1945 97/75 91 9 97 % --   01/12/22 1930 95/74 95 17 96 % --   01/12/22 1915 96/51 86 21 95 % --   01/12/22 1914 96/51 97 15 95 % --   01/12/22 1910 (!) 76/54 92 11 96 % --   01/12/22 1900 95/65 81 9 96 % --   01/12/22 1855 106/67 96 15 95 % --   01/12/22 1850 (!) 83/49 97 11 94 % --   01/12/22 1840 102/72 93 21 95 % --   01/12/22 1835 115/69 102 27 96 % --   01/12/22 1830 121/79 96 8 97 % --   01/12/22 1825 111/68 93 21 98 % --   01/12/22 1820 126/89 97 8 98 % --   01/12/22 1815 (!) 128/95 98 20 98 % --   01/12/22 1801 -- -- -- -- 142.2 kg (313 lb 6.4 oz)   01/12/22 1730 (!) 121/108 98 11 96 % --   01/12/22 1715 (!) 146/105 98 8 97 % --   01/12/22 1700 (!) 144/118 92 21 98 % --   01/12/22 1645 (!) 157/113 87 9 98 %  --   01/12/22 1630 (!) 145/109 85 11 98 % --   01/12/22 1615 (!) 158/127 93 10 100 % --   01/12/22 1600 (!) 146/119 96 11 100 % --       Physical Exam  SKIN:  Warm, dry.  HEMATOLOGIC/IMMUNOLOGIC/LYMPHATIC:  Bilateral lower extremity edema.  HENT: No jugular venous distention.  EYES:  Conjunctivae normal.  CARDIOVASCULAR:  Regular rate with irregularly irregular rhythm.  No murmur.  No rub.  RESPIRATORY:  No respiratory distress, breath sounds equal and normal.  GASTROINTESTINAL:  Soft, nontender abdomen.  MUSCULOSKELETAL: Obese body habitus.  NEUROLOGIC:  Alert, conversant.  PSYCHIATRIC:  Normal mood.    Emergency Department Course     ECG:  Indication: Chest Pain  Completed at 1602.  Read at 1610.   Atrial fibrillation. Anteroseptal infarct age undetermined. Abnormal ECG. No significant changes from prior ECG (10/7/21).   Rate 95 bpm. KS interval * . QRS duration 92. QT/QTc 368/462. P-R-T axes * 44 19.    Indication: Repeat  Completed at 1847.  Read at 1849.   Atrial fibrillation with PVCs or aberrantly conducted complexes. Anteroseptal infarct, age undetermined Abnormal ECG.   Rate 99 bpm. KS interval *. QRS duration 92. QT/QTc 380/487. P-R-T axes * 24 43.    Imaging:  Echocardiogram Limited   Final Result      XR Chest 2 Views   Final Result   IMPRESSION: No acute findings. The lungs are clear and there are no pleural effusions. Upper normal heart size. Stable central vascular prominence.      Report per radiology     Laboratory:  Labs Ordered and Resulted from Time of ED Arrival to Time of ED Departure   BASIC METABOLIC PANEL - Abnormal       Result Value    Sodium 141      Potassium 3.3 (*)     Chloride 112 (*)     Carbon Dioxide (CO2) 22      Anion Gap 7      Urea Nitrogen 16      Creatinine 0.74      Calcium 7.5 (*)     Glucose 111 (*)     GFR Estimate >90     CBC WITH PLATELETS AND DIFFERENTIAL - Abnormal    WBC Count 12.0 (*)     RBC Count 5.28      Hemoglobin 14.7      Hematocrit 45.0      MCV 85      MCH  27.8      MCHC 32.7      RDW 13.1      Platelet Count 211      % Neutrophils 59      % Lymphocytes 31      % Monocytes 7      % Eosinophils 1      % Basophils 1      % Immature Granulocytes 1      NRBCs per 100 WBC 0      Absolute Neutrophils 7.1      Absolute Lymphocytes 3.7      Absolute Monocytes 0.9      Absolute Eosinophils 0.2      Absolute Basophils 0.1      Absolute Immature Granulocytes 0.1      Absolute NRBCs 0.0     TROPONIN I - Abnormal    Troponin I High Sensitivity 1,874 (*)    TROPONIN I - Normal    Troponin I High Sensitivity 33     COVID-19 VIRUS (CORONAVIRUS) BY PCR - Normal    SARS CoV2 PCR Negative       Emergency Department Course:  Reviewed:  I reviewed nursing notes, vitals and past medical history    Assessments:  1604 I performed a physical exam of the patient. Findings as above.     1646 Patient rechecked.     1728 Patient rechecked and updated. Plan of care discussed and questions answered.     1841 Patient rechecked and updated.     2117 Updated on echocardiogram.     Consults:   1719 I spoke with Dr. Gao of the Hospitalist service regarding patient's presentation, findings, and plan of care.    1954 I spoke with Dr. Boyd of the Cardiology service regarding patient's presentation, findings, and plan of care.    2137 I spoke with Dr. Boyd of the Cardiology service regarding patient's presentation, findings, and plan of care.    Interventions:  1718 Nitrostat 0.4 mg Sublingual   1812 Heparin Infusion 1,200 units/hr IV  1812 Potassium Chloride 40 mEq Oral  1816 Nitroglycerin 50 mg in D5W 250 mL 10 mcg/min IV  1827 Nitroglycerin 50 mg in D5W 250 mL rate change 20 mcg/min IV  1832 Nitroglycerin 50 mg in D5W 250 mL rate change 30 mcg/min IV  1842 Nitroglycerin 50 mg in D5W 250 mL rate change 40 mcg/min IV  1853 Nitroglycerin 50 mg in D5W 250 mL rate change 20 mcg/min IV  1916 Nitroglycerin 50 mg in D5W 250 mL rate change 0 mcg/min IV  2023 Morphine 2 mg IV    Disposition:  The  patient was admitted to the hospital under the care of Dr. Gao.     Impression & Plan   Medical Decision Making:  Onur Barr is a 66 year old male who presents with chest pain.  The patient was quite concerned as this is very reminiscent of what he experienced before he suffered to his STEMI in September 2021.  Prearrival EMS EKGs did not reveal any obvious signs of ischemia.  Our EKGs also did not reveal ischemic findings.  Initial troponin was slightly elevated and repeat troponin ruled in for myocardial injury.  Clearly suffering NSTEMI.  Nitroglycerin drip was initiated to control the patient's pain but it was ineffective and his blood pressure dropped into the 70s so the drip was discontinued.  He was bolused a liter of saline and his blood pressure recovered.  I consulted cardiology who recommended bedside echocardiogram which did reveal wall motion abnormality.  The cardiologist is going to perform consultation at the bedside and he anticipates urgent cardiac catheterization.    Critical care time:    Critical care time 75 minutes given acute coronary syndrome requiring titratable nitroglycerin and urgent intervention.    Diagnosis:    ICD-10-CM    1. ACS (acute coronary syndrome) (H)  I24.9      Scribe Disclosure:  I, Mulugeta Colon, am serving as a scribe at 3:59 PM on 1/12/2022 to document services personally performed by Neri Zhang MD based on my observations and the provider's statements to me.     [facility]         Neri Zhang MD  01/12/22 [id number]

## 2022-01-12 NOTE — H&P
[facility]    History and Physical - Hospitalist Service       Date of Admission:  1/12/2022    Assessment & Plan      Onur Barr is a 66 year old male admitted on 1/12/2022.     Chest pain concerning for unstable angina  Coronary artery disease status post LAD stent in 2012, PCI of occluded RCA in 2021 with later staged intervention  Acute onset of chest pain exactly the same type of pain suffered during his last MI   Pain improved with nitroglycerin from 10/10 to 6/10 but did not resolve entirely so the ED is starting heparin and nitroglycerin infusions  Most likely related to CAD, but may be too early for tropoinin elevation (lab was 40 minutes after start of pain)  CXR with no acute abnormality  Plan  - admit to inpatient  - Continue nitroglycerin infusion  - agree with heparin without boluses  - Hold Eliquis  - continue home aspirin and plavix  - Continue home metoprolol  - Cardiology consultation  - Telemetry  - Echocardiogram  - Hemoglobin A1c and lipid panel  - Continue his statin  - prn morphine  - assuming ACS, may need plavix testing    Update:  Patient ruled in for MI. Did not tolerate nitroglycerin due to hypotension. Appreciate Dr. Zhang calling cardiology d/t uncontrolled pain despite efforts so far. Given morphine. Cardiology advising stat echo which showed new wall motion and 40% EF. Still with pain, cardiology is coming in to assess and may activate the cath lab.    Paroxysmal atrial fibrillation on Eliquis  Plan  - We will hold Eliquis  - continue metoprolol    Chronic medical conditions  Hypertension: continue lisinopril, hold HCTZ  Hyperlipidemia: on   History of diastolic impairment: continue home lasix  History of gastric bypass  Chronic pain: continue baclofen, has indwelling pain pump  Recent episode of tongue swelling: continue prednisone     Diet:   cardiac diet then npo at midnight  DVT Prophylaxis: Heparin SQ  Bran Catheter: Not present  Central Lines:  None  Code Status:   full code status    Clinically Significant Risk Factors Present on Admission             # Coagulation Defect: home medication list includes an anticoagulant medication  # Platelet Defect: home medication list includes an antiplatelet medication       Disposition Plan   Expected Discharge:  2-3 days   Anticipated discharge location:  Awaiting care coordination huddle  Delays:            The patient's care was discussed with the Patient and Patient's Family.    Buddy Coleman [facility]  Securely message with the Vocera Web Console (learn more here)  Text page via [employer] Paging/Directory        ______________________________________________________________________    Chief Complaint   Chest pain    History is obtained from the patient    History of Present Illness   Onur Barr is a 66 year old male with past medical history of coronary artery disease with LAD stenting in late 2021, morbid obesity status post gastric bypass, chronic pain with an indwelling morphine pump, hypertension, hyperlipidemia, obstructive sleep apnea who presents via EMS for acute onset of chest pain.    He reports chest pain that started around 2:45 PM this morning while sitting down.  The pain was worse after walking down some stairs.  The pain is located centrally in the chest and feels like a pressure that radiates into his left arm.  It is accompanied by sweating but no shortness of breath or nausea.  He states this is exact pain that happened during his heart attack last year.  He reports compliance with his medications including Plavix, Eliquis, and aspirin.    Apparently EMS was pretty much directly there and picked him up immediately after being called.  He was giving 2 nitroglycerin and 1 full dose aspirin.  Initially with nitroglycerin his pain worsened but then did improve from a 10 out of 10 down to 6 out of 10.  He is still complaining about 6 out of 10 pain.    In the  emergency department he is complaining of continued chest pain 6 out of 10 as well as sweating.  EKG and chest x-ray were done without obvious ST elevation.  His last dose of Eliquis was this morning but he is due for this evening's dose.    Review of Systems    The 10 point Review of Systems is negative other than noted in the HPI or here.     Past Medical History    I have reviewed this patient's medical history and updated it with pertinent information if needed.   Past Medical History:   Diagnosis Date     Acid reflux disease 10/31/2017     Arrhythmia     paroxysmal atrial fibrillation     Arthritis      Atrial fibrillation (H)     Created by Conversion      Bariatric surgery status     Created by Conversion      CHF (congestive heart failure) (H)      Coronary artery disease      Coronary atherosclerosis     Created by Conversion  Replacement Utility updated for latest IMO load     Diabetes (H)     typr II resloved     Essential hypertension     Created by [facility] Annotation: Apr 6 2012 10:16AM Zack Brewer: Lisinipril,  coreg  Replacement Utility updated for latest IMO load     H/O gastric bypass      Heart attack (H)      Hypercholesteremia      Hypertension      Insomnia      Insomnia, unspecified     Created by [facility] Annotation: Sep 11 2013  9:56AM Zack Brewer: Trouble  maintaining sleep-Trazodone-minimal helpzolpidem-      Ischemic cardiomyopathy      Low back pain      Lumbago     Created by [facility] Annotation: Apr 6 2012 10:20AM Anh Ramos: Morphine pump      Morbid obesity (H) 8/1/2018     Nephrolithiasis      Nephrolithiasis      Obstructive sleep apnea (adult) (pediatric)     Created by Conversion      Osteoarthritis     Created by [facility] Annotation: Jun 26 2009  9:53AM Zack Brewer: failed lumbar  fusion/morphine pump dr putnam  Replacement Utility updated for latest IMO load     Pure hypercholesterolemia      Created by Conversion      Sleep apnea      Sleepiness 5/8/2018       Past Surgical History   I have reviewed this patient's surgical history and updated it with pertinent information if needed.  Past Surgical History:   Procedure Laterality Date     BACK SURGERY       BYPASS GASTRIC DUODENAL SWITCH       COSMETIC SURGERY      pannicullectomy     CV CORONARY ANGIOGRAM N/A 9/11/2021    Procedure: Coronary Angiogram;  Surgeon: Noris Ozuna MD;  Location: [facility] CV     CV LEFT HEART CATH N/A 9/11/2021    Procedure: Left Heart Cath;  Surgeon: Noris Ozuna MD;  Location: [facility] CATH LAB CV     CV PCI N/A 9/11/2021    Procedure: Percutaneous Coronary Intervention;  Surgeon: Noris Ozuna MD;  Location: [facility] CATH LAB CV     CV PCI N/A 10/7/2021    Procedure: Percutaneous Coronary Intervention;  Surgeon: Mckayla Dan MD;  Location: [facility] CV     CV PCI ASPIRATION THROMECTOMY N/A 9/11/2021    Procedure: Percutaneous Coronary Intervention Aspiration Thrombectomy;  Surgeon: Noris Ozuna MD;  Location: [facility]     ENT SURGERY      tonsillectomy     EXTRACORPOREAL SHOCK WAVE LITHOTRIPSY, CYSTOSCOPY, INSERT STENT URETER(S), COMBINED  8/23/11     HC REMOVAL OF TONSILS,<13 Y/O      Description: Tonsillectomy;  Recorded: 03/23/2012;  Comments: for obstructive sleep apnea     HC REPAIR INCISIONAL HERNIA,REDUCIBLE  11/13/2012    Description: Incisional Hernia Repair;  Proc Date: 11/13/2012;  Comments: incisional hernia repair and abdominal panniculectomy by Dr Shon Green at the [facility].     HERNIA REPAIR       IR MISCELLANEOUS PROCEDURE  7/29/2011     IR MISCELLANEOUS PROCEDURE  8/5/2011     IR MISCELLANEOUS PROCEDURE  8/23/2011     IR NEPHROSTOMY TUBE CHANGE BILATERAL  8/5/2011     ORTHOPEDIC SURGERY      arthrodesis ant discectomy, lumbar     KS ARTHRODESIS ANT INTERBODY MIN DISCECTOMY,LUMBAR      Description: Lumbar Vertebral Fusion;  Recorded: 06/26/2009;   Comments: before 200 see [facility] consult under old records     IA EXCISE EXCESS SKIN TISSUE,ABDOMEN  11/13/2012    Description: Panniculectomy;  Proc Date: 11/13/2012;  Comments: 3.5 Lb Pannus was removed at the [facility] By Dr. Shon Green     REPLACE INTRATHECAL PAIN PUMP N/A 4/25/2017    Procedure: REPLACE INTRATHECAL PAIN PUMP;  INTRATHECAL PAIN PUMP CATHETER REPLACEMENT AND PUMP REPLACEMENT;  Surgeon: Anthony Maloney MD;  Location: [facility]     REVISE CATHETER INTRATHECAL N/A 4/25/2017    Procedure: REVISE CATHETER INTRATHECAL;;  Surgeon: Anthony Maloney MD;  Location: [facility]     SHOULDER SURGERY       ZZC GASTRIC BYPASS,OBESE<100CM LORA-EN-Y  2008    Description: Gastric Surgery For Morbid Obesity Bypass With Lora-en-Y;  Recorded: 06/26/2009;  Comments: dr green 2008       Social History   I have reviewed this patient's social history and updated it with pertinent information if needed.  Social History     Tobacco Use     Smoking status: Former Smoker     Years: 10.00     Types: Cigars, Cigars     Smokeless tobacco: Never Used   Substance Use Topics     Alcohol use: No     Drug use: No     Comment: Drug use: formerly used       Family History   I have reviewed this patient's family history and updated it with pertinent information if needed.  Family History   Problem Relation Age of Onset     Cerebrovascular Disease Mother      Heart Disease Father      Hodgkin's lymphoma Brother        Prior to Admission Medications   Prior to Admission Medications   Prescriptions Last Dose Informant Patient Reported? Taking?   ASPIRIN LOW DOSE 81 MG EC tablet 1/12/2022 at AM Self No Yes   Sig: TAKE 1 TABLET (81 MG) BY MOUTH DAILY START TOMORROW.   DULoxetine (CYMBALTA) 30 MG capsule 1/12/2022 at AM Self Yes Yes   Sig: Take 30 mg by mouth every morning   DULoxetine (CYMBALTA) 30 MG capsule 1/11/2022 at PM Self Yes Yes   Sig: Take 60 mg by mouth every evening   Ferrous Gluconate (IRON) 240 (27 Fe) MG TABS 1/12/2022 at AM Self  No Yes   Sig: Take 1 tablet by mouth daily   MORPHINE SULFATE 1/12/2022 at Unknown time Self Yes Yes   Sig: IT pump:updated 1/12/22  Medications in Pump:   [facility] Responsible for pump medications:   Conc:  Morphine 20mg/ml(3.95 mg/day), clonidine 21.1mcg/ml (4.168 mcg/day), baclofen 42.5mcg/ml (8.395mcg/day)  Rate:   Pump Last Fill Date: 9/2021  Pump Refill Date: 2/2022   Omeprazole (PRILOSEC PO) 1/12/2022 at AM Self Yes Yes   Sig: Take 40 mg by mouth daily   apixaban ANTICOAGULANT (ELIQUIS) 5 MG tablet 1/12/2022 at AM Self No Yes   Sig: Take 1 tablet (5 mg) by mouth every 12 hours   baclofen (LIORESAL) 10 MG tablet  at PRN Self No Yes   Sig: Take 1 tablet (10 mg) by mouth 2 times daily as needed for muscle spasms   clopidogrel (PLAVIX) 75 MG tablet 1/12/2022 at AM Self No Yes   Sig: Take 1 tablet (75 mg) by mouth daily   dexamethasone (DECADRON) 2 MG tablet 1/10/2022 at PM Self No Yes   Sig: Take 1 tablet (2 mg) by mouth 2 times daily (with meals)   furosemide (LASIX) 20 MG tablet 1/12/2022 at AM Self No Yes   Sig: TAKE 2 TABLET (40 MG) BY MOUTH  IN THE AM AND 1 TABLET (20 MG) IN EARLY AFTERNOON. TAKE WITH POTASSIUM   lisinopril-hydrochlorothiazide (ZESTORETIC) 20-25 MG tablet 1/12/2022 at AM Self No Yes   Sig: TAKE 2 TABLETS BY MOUTH EVERY DAY   metoprolol succinate ER (TOPROL-XL) 50 MG 24 hr tablet 1/12/2022 at AM Self No Yes   Sig: Take 1.5 tablets (75 mg) by mouth daily   nabumetone (RELAFEN) 500 MG tablet 1/12/2022 at AM Self No Yes   Sig: TAKE 1 TABLET BY MOUTH TWICE A DAY   nitroGLYcerin (NITROSTAT) 0.4 MG sublingual tablet  at PRN Self No Yes   Sig: For chest pain place 1 tablet under the tongue every 5 minutes for 3 doses. If symptoms persist 5 minutes after 1st dose call 911.   nystatin (MYCOSTATIN) 119171 UNIT/GM external powder 1/12/2022 at AM Self No Yes   Sig: Apply to lower abdominal area twice daily   potassium chloride ER (KLOR-CON M) 20 MEQ CR tablet 1/12/2022 at AM Self No Yes    Sig: Take 1 tablet (20 mEq) by mouth daily (take with furosemide/Lasix)   rosuvastatin (CRESTOR) 40 MG tablet 1/11/2022 at PM Self No Yes   Sig: Take 1 tablet (40 mg) by mouth daily   senna-docusate (SENOKOT-S/PERICOLACE) 8.6-50 MG tablet  at PRN Self No Yes   Sig: Take 1 tablet by mouth 2 times daily as needed for constipation   traZODone (DESYREL) 100 MG tablet 1/11/2022 at HS Self No Yes   Sig: TAKE 2 TABLETS (200MG TOTAL) BY MOUTH AT BEDTIME      Facility-Administered Medications: None     Allergies   Allergies   Allergen Reactions     Hydrocodone-Acetaminophen        Physical Exam   Vital Signs:     BP: (!) 145/109 Pulse: 85   Resp: 11 SpO2: 98 %      Weight: 0 lbs 0 oz  Gen: Mild distress appears diaphoretic.  Morbidly obese  SKIN:  Warm, dry.  HEMATOLOGIC/IMMUNOLOGIC/LYMPHATIC:  Bilateral lower extremity edema.  HENT: No jugular venous distention.  EYES:  Conjunctivae normal.  CARDIOVASCULAR:  Regular rate with irregularly irregular rhythm.  No murmur.  No rub.  RESPIRATORY:  No respiratory distress, breath sounds equal and normal.  GASTROINTESTINAL:  Soft, nontender abdomen.  MUSCULOSKELETAL: Obese body habitus.  Range of motion intact bilateral extremities  NEUROLOGIC:  Alert, conversant.  PSYCHIATRIC:  Normal mood.    Data   Data reviewed today: I reviewed all medications, new labs and imaging results over the last 24 hours. I personally reviewed   EKG: Atrial fibrillation with controlled ventricular rate  Chest x-ray: No acute change noted

## 2022-01-12 NOTE — TELEPHONE ENCOUNTER
Outpatient Medication Detail     Disp Refills Start End OSIRIS   lisinopriL-hydrochlorothiazide (PRINZIDE,ZESTORETIC) 20-25 mg per tablet 180 tablet 1 6/18/2021  --   Sig - Route: Take 2 tablets by mouth daily. - Oral   Sent to pharmacy as: lisinopril 20 mg-hydrochlorothiazide 25 mg tablet (PRINZIDE,ZESTORETIC)   E-Prescribing Status: Receipt confirmed by pharmacy (6/18/2021  2:36 PM CDT)       lisinopriL-hydrochlorothiazide (PRINZIDE,ZESTORETIC) 20-25 mg per tablet [[id number]]    Electronically signed by: Luann Berger MD on 06/18/21 1436 Status: Active   Ordering user: Luann Berger MD 06/18/21 1436 Authorized by: Luann Berger MD   Frequency: DAILY 06/18/21 - Until Discontinued   Diagnoses  Benign essential hypertension [I10]   Outpatient Medication Detail     Disp Refills Start End OSIRIS   nabumetone (RELAFEN) 500 MG tablet 180 tablet 0 6/18/2021  --   Sig - Route: Take 1 tablet (500 mg total) by mouth 2 (two) times a day. - Oral   Sent to pharmacy as: nabumetone 500 mg tablet (Relafen)   E-Prescribing Status: Receipt confirmed by pharmacy (6/18/2021  2:36 PM CDT)       nabumetone (RELAFEN) 500 MG tablet [[id number]]    Electronically signed by: Luann Berger MD on 06/18/21 1436 Status: Active   Ordering user: Luann Berger MD 06/18/21 1436 Authorized by: Luann Berger MD   Frequency: BID 06/18/21 - Until Discontinued   Diagnoses  Primary osteoarthritis of right hip [M16.11]       Routing refill request to provider for review/approval because:  Labs out of range:  Creatinine   A break in medication    Last office visit provider:  1/4/22     Requested Prescriptions   Pending Prescriptions Disp Refills     lisinopril-hydrochlorothiazide (ZESTORETIC) 20-25 MG tablet [Pharmacy Med Name: LISINOPRIL-HCTZ 20-25 MG TAB] 180 tablet 1     Sig: TAKE 2 TABLETS BY MOUTH EVERY DAY       Diuretics (Including Combos) Protocol Failed -  "1/10/2022 10:03 AM        Failed - Medication is active on med list        Failed - Normal serum creatinine on file in past 12 months     Recent Labs   Lab Test 11/03/21  1018   CR 1.34*              Passed - Blood pressure under 140/90 in past 12 months     BP Readings from Last 3 Encounters:   01/04/22 132/84   11/15/21 110/80   11/05/21 116/80                 Passed - Recent (12 mo) or future (30 days) visit within the authorizing provider's specialty     Patient has had an office visit with the authorizing provider or a provider within the authorizing providers department within the previous 12 mos or has a future within next 30 days. See \"Patient Info\" tab in inbasket, or \"Choose Columns\" in Meds & Orders section of the refill encounter.              Passed - Patient is age 18 or older        Passed - Normal serum potassium on file in past 12 months     Recent Labs   Lab Test 11/03/21  1018   POTASSIUM 4.1                    Passed - Normal serum sodium on file in past 12 months     Recent Labs   Lab Test 11/03/21  1018                ACE Inhibitors (Including Combos) Protocol Failed - 1/10/2022 10:03 AM        Failed - Medication is active on med list        Failed - Normal serum creatinine on file in past 12 months     Recent Labs   Lab Test 11/03/21  1018   CR 1.34*       Ok to refill medication if creatinine is low          Passed - Blood pressure under 140/90 in past 12 months     BP Readings from Last 3 Encounters:   01/04/22 132/84   11/15/21 110/80   11/05/21 116/80                 Passed - Recent (12 mo) or future (30 days) visit within the authorizing provider's specialty     Patient has had an office visit with the authorizing provider or a provider within the authorizing providers department within the previous 12 mos or has a future within next 30 days. See \"Patient Info\" tab in inbasket, or \"Choose Columns\" in Meds & Orders section of the refill encounter.              Passed - Patient is " "age 18 or older        Passed - Normal serum potassium on file in past 12 months     Recent Labs   Lab Test 11/03/21  1018   POTASSIUM 4.1                nabumetone (RELAFEN) 500 MG tablet [Pharmacy Med Name: NABUMETONE 500 MG TABLET] 180 tablet      Sig: TAKE 1 TABLET BY MOUTH TWICE A DAY       NSAID Medications Failed - 1/10/2022 10:03 AM        Failed - Normal ALT on file in past 12 months     Recent Labs   Lab Test 12/07/20  1034   ALT 15             Failed - Normal AST on file in past 12 months     Recent Labs   Lab Test 11/20/19  1202   AST 22             Failed - Patient is age 6-64 years        Failed - Medication is active on med list        Failed - Normal serum creatinine on file in past 12 months     Recent Labs   Lab Test 11/03/21  1018   CR 1.34*       Ok to refill medication if creatinine is low          Passed - Blood pressure under 140/90 in past 12 months     BP Readings from Last 3 Encounters:   01/04/22 132/84   11/15/21 110/80   11/05/21 116/80                 Passed - Recent (12 mo) or future (30 days) visit within the authorizing provider's specialty     Patient has had an office visit with the authorizing provider or a provider within the authorizing providers department within the previous 12 mos or has a future within next 30 days. See \"Patient Info\" tab in inbasket, or \"Choose Columns\" in Meds & Orders section of the refill encounter.              Passed - Normal CBC on file in past 12 months     Recent Labs   Lab Test 01/04/22  1027   WBC 8.0   RBC 4.89   HGB 14.0   HCT 41.6                         Jair Prasad RN 01/12/22 12:41 PM  "

## 2022-01-13 LAB
ACT BLD: 331 SECONDS (ref 74–150)
ACT BLD: 335 SECONDS (ref 74–150)
ACT BLD: 373 SECONDS (ref 74–150)
ALBUMIN SERPL-MCNC: 2.6 G/DL (ref 3.4–5)
ALP SERPL-CCNC: 68 U/L (ref 40–150)
ALT SERPL W P-5'-P-CCNC: 39 U/L (ref 0–70)
ANION GAP SERPL CALCULATED.3IONS-SCNC: 2 MMOL/L (ref 3–14)
APTT PPP: 41 SECONDS (ref 22–38)
APTT PPP: 43 SECONDS (ref 22–38)
AST SERPL W P-5'-P-CCNC: 66 U/L (ref 0–45)
BASOPHILS # BLD AUTO: 0 10E3/UL (ref 0–0.2)
BASOPHILS NFR BLD AUTO: 0 %
BILIRUB SERPL-MCNC: 0.8 MG/DL (ref 0.2–1.3)
BUN SERPL-MCNC: 16 MG/DL (ref 7–30)
CALCIUM SERPL-MCNC: 8.2 MG/DL (ref 8.5–10.1)
CHLORIDE BLD-SCNC: 109 MMOL/L (ref 94–109)
CO2 SERPL-SCNC: 28 MMOL/L (ref 20–32)
CREAT SERPL-MCNC: 0.89 MG/DL (ref 0.66–1.25)
EOSINOPHIL # BLD AUTO: 0.2 10E3/UL (ref 0–0.7)
EOSINOPHIL NFR BLD AUTO: 3 %
ERYTHROCYTE [DISTWIDTH] IN BLOOD BY AUTOMATED COUNT: 13.1 % (ref 10–15)
GFR SERPL CREATININE-BSD FRML MDRD: >90 ML/MIN/1.73M2
GLUCOSE BLD-MCNC: 96 MG/DL (ref 70–99)
GLUCOSE BLDC GLUCOMTR-MCNC: 119 MG/DL (ref 70–99)
GLUCOSE BLDC GLUCOMTR-MCNC: 130 MG/DL (ref 70–99)
GLUCOSE BLDC GLUCOMTR-MCNC: 96 MG/DL (ref 70–99)
HCT VFR BLD AUTO: 41.3 % (ref 40–53)
HGB BLD-MCNC: 13.4 G/DL (ref 13.3–17.7)
IMM GRANULOCYTES # BLD: 0 10E3/UL
IMM GRANULOCYTES NFR BLD: 0 %
LYMPHOCYTES # BLD AUTO: 3.1 10E3/UL (ref 0.8–5.3)
LYMPHOCYTES NFR BLD AUTO: 41 %
MCH RBC QN AUTO: 28.2 PG (ref 26.5–33)
MCHC RBC AUTO-ENTMCNC: 32.4 G/DL (ref 31.5–36.5)
MCV RBC AUTO: 87 FL (ref 78–100)
MONOCYTES # BLD AUTO: 0.6 10E3/UL (ref 0–1.3)
MONOCYTES NFR BLD AUTO: 8 %
NEUTROPHILS # BLD AUTO: 3.7 10E3/UL (ref 1.6–8.3)
NEUTROPHILS NFR BLD AUTO: 48 %
NRBC # BLD AUTO: 0 10E3/UL
NRBC BLD AUTO-RTO: 0 /100
NT-PROBNP SERPL-MCNC: 2463 PG/ML (ref 0–900)
PLAT MORPH BLD: NORMAL
PLATELET # BLD AUTO: 113 10E3/UL (ref 150–450)
POTASSIUM BLD-SCNC: 4.6 MMOL/L (ref 3.4–5.3)
PROT SERPL-MCNC: 6.3 G/DL (ref 6.8–8.8)
RBC # BLD AUTO: 4.75 10E6/UL (ref 4.4–5.9)
RBC MORPH BLD: NORMAL
SODIUM SERPL-SCNC: 139 MMOL/L (ref 133–144)
TROPONIN I SERPL HS-MCNC: 4155 NG/L
TROPONIN I SERPL HS-MCNC: ABNORMAL NG/L
TROPONIN I SERPL HS-MCNC: ABNORMAL NG/L
WBC # BLD AUTO: 7.7 10E3/UL (ref 4–11)

## 2022-01-13 PROCEDURE — 250N000011 HC RX IP 250 OP 636: Performed by: EMERGENCY MEDICINE

## 2022-01-13 PROCEDURE — 36415 COLL VENOUS BLD VENIPUNCTURE: CPT | Performed by: INTERNAL MEDICINE

## 2022-01-13 PROCEDURE — 36415 COLL VENOUS BLD VENIPUNCTURE: CPT | Performed by: EMERGENCY MEDICINE

## 2022-01-13 PROCEDURE — 02703ZZ DILATION OF CORONARY ARTERY, ONE ARTERY, PERCUTANEOUS APPROACH: ICD-10-PCS | Performed by: INTERNAL MEDICINE

## 2022-01-13 PROCEDURE — C1887 CATHETER, GUIDING: HCPCS | Performed by: INTERNAL MEDICINE

## 2022-01-13 PROCEDURE — 92920 PRQ TRLUML C ANGIOP 1ART&/BR: CPT | Mod: LD

## 2022-01-13 PROCEDURE — 80053 COMPREHEN METABOLIC PANEL: CPT | Performed by: HOSPITALIST

## 2022-01-13 PROCEDURE — 85730 THROMBOPLASTIN TIME PARTIAL: CPT | Performed by: INTERNAL MEDICINE

## 2022-01-13 PROCEDURE — 250N000013 HC RX MED GY IP 250 OP 250 PS 637: Performed by: HOSPITALIST

## 2022-01-13 PROCEDURE — 272N000001 HC OR GENERAL SUPPLY STERILE: Performed by: INTERNAL MEDICINE

## 2022-01-13 PROCEDURE — 93005 ELECTROCARDIOGRAM TRACING: CPT

## 2022-01-13 PROCEDURE — 99232 SBSQ HOSP IP/OBS MODERATE 35: CPT | Performed by: INTERNAL MEDICINE

## 2022-01-13 PROCEDURE — 99152 MOD SED SAME PHYS/QHP 5/>YRS: CPT | Performed by: INTERNAL MEDICINE

## 2022-01-13 PROCEDURE — 250N000011 HC RX IP 250 OP 636: Performed by: NURSE PRACTITIONER

## 2022-01-13 PROCEDURE — 99233 SBSQ HOSP IP/OBS HIGH 50: CPT | Mod: 25 | Performed by: INTERNAL MEDICINE

## 2022-01-13 PROCEDURE — 93454 CORONARY ARTERY ANGIO S&I: CPT

## 2022-01-13 PROCEDURE — 999N000128 HC STATISTIC PERIPHERAL IV START W/O US GUIDANCE

## 2022-01-13 PROCEDURE — 250N000009 HC RX 250: Performed by: INTERNAL MEDICINE

## 2022-01-13 PROCEDURE — B2111ZZ FLUOROSCOPY OF MULTIPLE CORONARY ARTERIES USING LOW OSMOLAR CONTRAST: ICD-10-PCS | Performed by: INTERNAL MEDICINE

## 2022-01-13 PROCEDURE — 258N000003 HC RX IP 258 OP 636: Performed by: INTERNAL MEDICINE

## 2022-01-13 PROCEDURE — 85347 COAGULATION TIME ACTIVATED: CPT

## 2022-01-13 PROCEDURE — 92921 HC PRQ TRLUML CORONARY ANGIOPLASTY ADDL BRANCH: CPT | Mod: LD

## 2022-01-13 PROCEDURE — 250N000011 HC RX IP 250 OP 636

## 2022-01-13 PROCEDURE — 92978 ENDOLUMINL IVUS OCT C 1ST: CPT | Mod: LD

## 2022-01-13 PROCEDURE — 250N000011 HC RX IP 250 OP 636: Performed by: HOSPITALIST

## 2022-01-13 PROCEDURE — C1753 CATH, INTRAVAS ULTRASOUND: HCPCS | Performed by: INTERNAL MEDICINE

## 2022-01-13 PROCEDURE — 83880 ASSAY OF NATRIURETIC PEPTIDE: CPT | Performed by: NURSE PRACTITIONER

## 2022-01-13 PROCEDURE — 250N000009 HC RX 250

## 2022-01-13 PROCEDURE — C1894 INTRO/SHEATH, NON-LASER: HCPCS | Performed by: INTERNAL MEDICINE

## 2022-01-13 PROCEDURE — 84484 ASSAY OF TROPONIN QUANT: CPT | Performed by: INTERNAL MEDICINE

## 2022-01-13 PROCEDURE — B240ZZ3 ULTRASONOGRAPHY OF SINGLE CORONARY ARTERY, INTRAVASCULAR: ICD-10-PCS | Performed by: INTERNAL MEDICINE

## 2022-01-13 PROCEDURE — 85025 COMPLETE CBC W/AUTO DIFF WBC: CPT | Performed by: HOSPITALIST

## 2022-01-13 PROCEDURE — 85730 THROMBOPLASTIN TIME PARTIAL: CPT | Performed by: EMERGENCY MEDICINE

## 2022-01-13 PROCEDURE — 36415 COLL VENOUS BLD VENIPUNCTURE: CPT | Performed by: HOSPITALIST

## 2022-01-13 PROCEDURE — C1769 GUIDE WIRE: HCPCS | Performed by: INTERNAL MEDICINE

## 2022-01-13 PROCEDURE — 210N000002 HC R&B HEART CARE

## 2022-01-13 PROCEDURE — 250N000013 HC RX MED GY IP 250 OP 250 PS 637

## 2022-01-13 PROCEDURE — 250N000011 HC RX IP 250 OP 636: Performed by: INTERNAL MEDICINE

## 2022-01-13 PROCEDURE — 258N000003 HC RX IP 258 OP 636: Performed by: NURSE PRACTITIONER

## 2022-01-13 PROCEDURE — 99153 MOD SED SAME PHYS/QHP EA: CPT | Performed by: INTERNAL MEDICINE

## 2022-01-13 PROCEDURE — 84484 ASSAY OF TROPONIN QUANT: CPT | Performed by: HOSPITALIST

## 2022-01-13 PROCEDURE — C1725 CATH, TRANSLUMIN NON-LASER: HCPCS | Performed by: INTERNAL MEDICINE

## 2022-01-13 PROCEDURE — 258N000003 HC RX IP 258 OP 636: Performed by: HOSPITALIST

## 2022-01-13 RX ORDER — METOPROLOL TARTRATE 1 MG/ML
5 INJECTION, SOLUTION INTRAVENOUS
Status: DISCONTINUED | OUTPATIENT
Start: 2022-01-13 | End: 2022-01-19

## 2022-01-13 RX ORDER — ASPIRIN 81 MG/1
243 TABLET, CHEWABLE ORAL ONCE
Status: DISCONTINUED | OUTPATIENT
Start: 2022-01-13 | End: 2022-01-13 | Stop reason: HOSPADM

## 2022-01-13 RX ORDER — HEPARIN SODIUM 10000 [USP'U]/100ML
0-5000 INJECTION, SOLUTION INTRAVENOUS CONTINUOUS
Status: DISCONTINUED | OUTPATIENT
Start: 2022-01-13 | End: 2022-01-19

## 2022-01-13 RX ORDER — NITROGLYCERIN 5 MG/ML
VIAL (ML) INTRAVENOUS
Status: DISCONTINUED | OUTPATIENT
Start: 2022-01-13 | End: 2022-01-13 | Stop reason: HOSPADM

## 2022-01-13 RX ORDER — CLOPIDOGREL BISULFATE 75 MG/1
TABLET ORAL
Status: DISCONTINUED | OUTPATIENT
Start: 2022-01-13 | End: 2022-01-13 | Stop reason: HOSPADM

## 2022-01-13 RX ORDER — NALOXONE HYDROCHLORIDE 0.4 MG/ML
0.2 INJECTION, SOLUTION INTRAMUSCULAR; INTRAVENOUS; SUBCUTANEOUS
Status: DISCONTINUED | OUTPATIENT
Start: 2022-01-13 | End: 2022-01-19

## 2022-01-13 RX ORDER — NALOXONE HYDROCHLORIDE 0.4 MG/ML
0.4 INJECTION, SOLUTION INTRAMUSCULAR; INTRAVENOUS; SUBCUTANEOUS
Status: DISCONTINUED | OUTPATIENT
Start: 2022-01-13 | End: 2022-01-13

## 2022-01-13 RX ORDER — FLUMAZENIL 0.1 MG/ML
0.2 INJECTION, SOLUTION INTRAVENOUS
Status: ACTIVE | OUTPATIENT
Start: 2022-01-13 | End: 2022-01-13

## 2022-01-13 RX ORDER — VERAPAMIL HYDROCHLORIDE 2.5 MG/ML
INJECTION, SOLUTION INTRAVENOUS
Status: DISCONTINUED | OUTPATIENT
Start: 2022-01-13 | End: 2022-01-13 | Stop reason: HOSPADM

## 2022-01-13 RX ORDER — HYDRALAZINE HYDROCHLORIDE 20 MG/ML
10 INJECTION INTRAMUSCULAR; INTRAVENOUS EVERY 4 HOURS PRN
Status: DISCONTINUED | OUTPATIENT
Start: 2022-01-13 | End: 2022-01-13

## 2022-01-13 RX ORDER — NYSTATIN 100000 [USP'U]/G
POWDER TOPICAL DAILY
Status: DISCONTINUED | OUTPATIENT
Start: 2022-01-13 | End: 2022-01-13

## 2022-01-13 RX ORDER — DULOXETIN HYDROCHLORIDE 30 MG/1
30 CAPSULE, DELAYED RELEASE ORAL EVERY MORNING
Status: DISCONTINUED | OUTPATIENT
Start: 2022-01-13 | End: 2022-01-19

## 2022-01-13 RX ORDER — ASPIRIN 81 MG/1
81 TABLET ORAL DAILY
Status: DISCONTINUED | OUTPATIENT
Start: 2022-01-14 | End: 2022-01-13

## 2022-01-13 RX ORDER — AMOXICILLIN 250 MG
1 CAPSULE ORAL 2 TIMES DAILY PRN
Status: DISCONTINUED | OUTPATIENT
Start: 2022-01-13 | End: 2022-01-16

## 2022-01-13 RX ORDER — HYDRALAZINE HYDROCHLORIDE 20 MG/ML
10 INJECTION INTRAMUSCULAR; INTRAVENOUS EVERY 4 HOURS PRN
Status: DISCONTINUED | OUTPATIENT
Start: 2022-01-13 | End: 2022-01-19

## 2022-01-13 RX ORDER — CLOPIDOGREL BISULFATE 75 MG/1
75 TABLET ORAL DAILY
Status: DISCONTINUED | OUTPATIENT
Start: 2022-01-13 | End: 2022-01-13

## 2022-01-13 RX ORDER — POTASSIUM CHLORIDE 1500 MG/1
20 TABLET, EXTENDED RELEASE ORAL
Status: DISCONTINUED | OUTPATIENT
Start: 2022-01-13 | End: 2022-01-13 | Stop reason: HOSPADM

## 2022-01-13 RX ORDER — NALOXONE HYDROCHLORIDE 0.4 MG/ML
0.2 INJECTION, SOLUTION INTRAMUSCULAR; INTRAVENOUS; SUBCUTANEOUS
Status: DISCONTINUED | OUTPATIENT
Start: 2022-01-13 | End: 2022-01-13

## 2022-01-13 RX ORDER — ASPIRIN 81 MG/1
81 TABLET, CHEWABLE ORAL ONCE
Status: DISCONTINUED | OUTPATIENT
Start: 2022-01-13 | End: 2022-01-13

## 2022-01-13 RX ORDER — LIDOCAINE 40 MG/G
CREAM TOPICAL
Status: DISCONTINUED | OUTPATIENT
Start: 2022-01-13 | End: 2022-01-19

## 2022-01-13 RX ORDER — HEPARIN SODIUM 1000 [USP'U]/ML
INJECTION, SOLUTION INTRAVENOUS; SUBCUTANEOUS
Status: DISCONTINUED | OUTPATIENT
Start: 2022-01-13 | End: 2022-01-13 | Stop reason: HOSPADM

## 2022-01-13 RX ORDER — NABUMETONE 500 MG/1
500 TABLET, FILM COATED ORAL 2 TIMES DAILY
Status: DISCONTINUED | OUTPATIENT
Start: 2022-01-13 | End: 2022-01-13

## 2022-01-13 RX ORDER — LIDOCAINE 40 MG/G
CREAM TOPICAL
Status: DISCONTINUED | OUTPATIENT
Start: 2022-01-13 | End: 2022-01-13

## 2022-01-13 RX ORDER — ACETAMINOPHEN 325 MG/1
650 TABLET ORAL EVERY 6 HOURS PRN
Status: DISCONTINUED | OUTPATIENT
Start: 2022-01-13 | End: 2022-01-19

## 2022-01-13 RX ORDER — SODIUM CHLORIDE 9 MG/ML
INJECTION, SOLUTION INTRAVENOUS CONTINUOUS
Status: ACTIVE | OUTPATIENT
Start: 2022-01-13 | End: 2022-01-13

## 2022-01-13 RX ORDER — ACETAMINOPHEN 650 MG/1
650 SUPPOSITORY RECTAL EVERY 6 HOURS PRN
Status: DISCONTINUED | OUTPATIENT
Start: 2022-01-13 | End: 2022-01-19

## 2022-01-13 RX ORDER — SODIUM CHLORIDE 9 MG/ML
INJECTION, SOLUTION INTRAVENOUS CONTINUOUS
Status: DISCONTINUED | OUTPATIENT
Start: 2022-01-13 | End: 2022-01-13 | Stop reason: HOSPADM

## 2022-01-13 RX ORDER — FUROSEMIDE 40 MG
40 TABLET ORAL DAILY
Status: DISCONTINUED | OUTPATIENT
Start: 2022-01-13 | End: 2022-01-19

## 2022-01-13 RX ORDER — LORAZEPAM 2 MG/ML
0.5 INJECTION INTRAMUSCULAR
Status: DISCONTINUED | OUTPATIENT
Start: 2022-01-13 | End: 2022-01-13 | Stop reason: HOSPADM

## 2022-01-13 RX ORDER — LORAZEPAM 0.5 MG/1
0.5 TABLET ORAL
Status: DISCONTINUED | OUTPATIENT
Start: 2022-01-13 | End: 2022-01-13 | Stop reason: HOSPADM

## 2022-01-13 RX ORDER — ASPIRIN 81 MG/1
81 TABLET, CHEWABLE ORAL DAILY
Qty: 30 TABLET | Refills: 3 | Status: SHIPPED | OUTPATIENT
Start: 2022-01-14 | End: 2022-01-25

## 2022-01-13 RX ORDER — ASPIRIN 325 MG
325 TABLET ORAL ONCE
Status: DISCONTINUED | OUTPATIENT
Start: 2022-01-13 | End: 2022-01-13 | Stop reason: HOSPADM

## 2022-01-13 RX ORDER — ATROPINE SULFATE 0.1 MG/ML
0.5 INJECTION INTRAVENOUS
Status: ACTIVE | OUTPATIENT
Start: 2022-01-13 | End: 2022-01-13

## 2022-01-13 RX ORDER — NITROGLYCERIN 0.4 MG/1
0.4 TABLET SUBLINGUAL EVERY 5 MIN PRN
Status: DISCONTINUED | OUTPATIENT
Start: 2022-01-13 | End: 2022-01-19

## 2022-01-13 RX ORDER — ONDANSETRON 2 MG/ML
4 INJECTION INTRAMUSCULAR; INTRAVENOUS EVERY 6 HOURS PRN
Status: DISCONTINUED | OUTPATIENT
Start: 2022-01-13 | End: 2022-01-19

## 2022-01-13 RX ORDER — ROSUVASTATIN CALCIUM 20 MG/1
40 TABLET, COATED ORAL DAILY
Status: DISCONTINUED | OUTPATIENT
Start: 2022-01-13 | End: 2022-01-25 | Stop reason: HOSPADM

## 2022-01-13 RX ORDER — NALOXONE HYDROCHLORIDE 0.4 MG/ML
0.4 INJECTION, SOLUTION INTRAMUSCULAR; INTRAVENOUS; SUBCUTANEOUS
Status: DISCONTINUED | OUTPATIENT
Start: 2022-01-13 | End: 2022-01-19

## 2022-01-13 RX ORDER — FENTANYL CITRATE 50 UG/ML
25 INJECTION, SOLUTION INTRAMUSCULAR; INTRAVENOUS
Status: DISCONTINUED | OUTPATIENT
Start: 2022-01-13 | End: 2022-01-19

## 2022-01-13 RX ORDER — METOPROLOL TARTRATE 1 MG/ML
INJECTION, SOLUTION INTRAVENOUS
Status: DISCONTINUED | OUTPATIENT
Start: 2022-01-13 | End: 2022-01-13 | Stop reason: HOSPADM

## 2022-01-13 RX ORDER — FENTANYL CITRATE 50 UG/ML
INJECTION, SOLUTION INTRAMUSCULAR; INTRAVENOUS
Status: DISCONTINUED | OUTPATIENT
Start: 2022-01-13 | End: 2022-01-13 | Stop reason: HOSPADM

## 2022-01-13 RX ORDER — ONDANSETRON 4 MG/1
4 TABLET, ORALLY DISINTEGRATING ORAL EVERY 6 HOURS PRN
Status: DISCONTINUED | OUTPATIENT
Start: 2022-01-13 | End: 2022-01-13

## 2022-01-13 RX ORDER — ONDANSETRON 2 MG/ML
4 INJECTION INTRAMUSCULAR; INTRAVENOUS EVERY 6 HOURS PRN
Status: DISCONTINUED | OUTPATIENT
Start: 2022-01-13 | End: 2022-01-13

## 2022-01-13 RX ORDER — ONDANSETRON 4 MG/1
4 TABLET, ORALLY DISINTEGRATING ORAL EVERY 6 HOURS PRN
Status: DISCONTINUED | OUTPATIENT
Start: 2022-01-13 | End: 2022-01-19

## 2022-01-13 RX ADMIN — NITROGLYCERIN 40 MCG/MIN: 20 INJECTION INTRAVENOUS at 12:15

## 2022-01-13 RX ADMIN — ASPIRIN 81 MG: 81 TABLET, COATED ORAL at 08:50

## 2022-01-13 RX ADMIN — METOPROLOL SUCCINATE 75 MG: 50 TABLET, EXTENDED RELEASE ORAL at 08:49

## 2022-01-13 RX ADMIN — NITROGLYCERIN 100 MCG/MIN: 20 INJECTION INTRAVENOUS at 12:29

## 2022-01-13 RX ADMIN — NITROGLYCERIN 60 MCG/MIN: 20 INJECTION INTRAVENOUS at 12:18

## 2022-01-13 RX ADMIN — SODIUM CHLORIDE: 9 INJECTION, SOLUTION INTRAVENOUS at 11:11

## 2022-01-13 RX ADMIN — TRAZODONE HYDROCHLORIDE 200 MG: 100 TABLET ORAL at 01:33

## 2022-01-13 RX ADMIN — MORPHINE SULFATE 2 MG: 2 INJECTION, SOLUTION INTRAMUSCULAR; INTRAVENOUS at 14:23

## 2022-01-13 RX ADMIN — NITROGLYCERIN 120 MCG/MIN: 20 INJECTION INTRAVENOUS at 12:35

## 2022-01-13 RX ADMIN — HEPARIN SODIUM 1350 UNITS/HR: 1000 INJECTION INTRAVENOUS; SUBCUTANEOUS at 09:07

## 2022-01-13 RX ADMIN — DEXAMETHASONE 2 MG: 2 TABLET ORAL at 08:50

## 2022-01-13 RX ADMIN — OMEPRAZOLE 40 MG: 20 CAPSULE, DELAYED RELEASE ORAL at 08:50

## 2022-01-13 RX ADMIN — DEXAMETHASONE 2 MG: 2 TABLET ORAL at 18:56

## 2022-01-13 RX ADMIN — TRAZODONE HYDROCHLORIDE 200 MG: 100 TABLET ORAL at 21:33

## 2022-01-13 RX ADMIN — ROSUVASTATIN CALCIUM 40 MG: 20 TABLET, FILM COATED ORAL at 08:50

## 2022-01-13 RX ADMIN — ACETAMINOPHEN 650 MG: 325 TABLET, FILM COATED ORAL at 03:51

## 2022-01-13 RX ADMIN — DULOXETINE 30 MG: 30 CAPSULE, DELAYED RELEASE ORAL at 08:50

## 2022-01-13 RX ADMIN — NITROGLYCERIN 80 MCG/MIN: 20 INJECTION INTRAVENOUS at 12:24

## 2022-01-13 RX ADMIN — HEPARIN SODIUM 1350 UNITS/HR: 1000 INJECTION INTRAVENOUS; SUBCUTANEOUS at 21:33

## 2022-01-13 RX ADMIN — CLOPIDOGREL BISULFATE 75 MG: 75 TABLET ORAL at 08:50

## 2022-01-13 RX ADMIN — SODIUM CHLORIDE: 9 INJECTION, SOLUTION INTRAVENOUS at 15:47

## 2022-01-13 RX ADMIN — DULOXETINE 60 MG: 60 CAPSULE, DELAYED RELEASE ORAL at 20:07

## 2022-01-13 RX ADMIN — HYDRALAZINE HYDROCHLORIDE 10 MG: 20 INJECTION INTRAMUSCULAR; INTRAVENOUS at 09:37

## 2022-01-13 RX ADMIN — DULOXETINE 60 MG: 60 CAPSULE, DELAYED RELEASE ORAL at 01:33

## 2022-01-13 ASSESSMENT — ACTIVITIES OF DAILY LIVING (ADL)
ADLS_ACUITY_SCORE: 4
ADLS_ACUITY_SCORE: 6
ADLS_ACUITY_SCORE: 4
ADLS_ACUITY_SCORE: 6
ADLS_ACUITY_SCORE: 4
ADLS_ACUITY_SCORE: 4
ADLS_ACUITY_SCORE: 6

## 2022-01-13 NOTE — PLAN OF CARE
A&OX4, RA, denies CP/SOB. Tele- Afib CVR/Flutter with PVCs. Up with assist of 1. Heparin infusing at 1350 units/hr. BG 96, NPO since arrival. Tylenol given for back pain. Pt slept between cares.

## 2022-01-13 NOTE — PROGRESS NOTES
[facility]    Cardiology Progress Note     Assessment   Onur Barr is a 66 year old male who was admitted on 1/12/2022 for non-STEMI    1. Non-STEMI troponin peaked at 20,000.    2. History of inferior MI September 2021 with PCI to the RCA and staged PCI to in-stent restenosis of the LAD in October 2021  3. Ischemic cardiomyopathy, newly reduced ejection fraction 40 to 45% with severe apical wall hypokinesis  4. Atrial fibrillation  5. Obesity  6. Dyslipidemia  7. Type 2 diabetes    Plan  Continue Crestor, metoprolol, daily aspirin, clopidogrel 75 mg p.o. daily  Hold apixaban for cath  Angiogram with possible PCI today      Risks and benefits of the procedure were discussed with the patient in detail that includes but not limited to the risk of stroke, heart attack, death, cardiac or vascular perforation, emergent stenting or bypass, contrast induced allergic reaction, renal dysfunction (including risks of temporary or permanent dialysis), risk of respiratory depression with sedation, and vascular complications (including bleeding and transfusion). Patient denies any major active bleeding issues and is willing to take and comply with antiplatelet therapy plus anticoagulant as he has been doing at home previously and understands associated bleeding risks. Patient understands the overall risks of the procedure and wishes to proceed.    Overnight his symptoms resolved and this morning he is comfortable.  He actually preferred to wait for his cath until today.  No chest pain or dyspnea this morning.    Liz Pearl MD    Interval History     Cath Lab was activated overnight for this patient for non-STEMI however he subsequently had resolution of his chest pain and angiogram was deferred until today.  He is on a heparin drip and apixaban is being held.    Physical Exam   Temp: 98.1  F (36.7  C) Temp src: Oral BP: 126/87 Pulse: 86   Resp: 16 SpO2: 95 % O2 Device: None (Room air)    Vitals:     01/12/22 1801 01/13/22 0314   Weight: 142.2 kg (313 lb 6.4 oz) 143.3 kg (316 lb)     Vital Signs with Ranges  Temp:  [98.1  F (36.7  C)] 98.1  F (36.7  C)  Pulse:  [] 86  Resp:  [8-32] 16  BP: ()/() 126/87  SpO2:  [92 %-100 %] 95 %  No intake/output data recorded.  Patient Active Problem List   Diagnosis     Lumbago     Morbid obesity (H)     Bilateral leg edema     Bilateral cellulitis of lower leg     Chronic diastolic heart failure (H)     MARLEEN (obstructive sleep apnea)     Coronary artery disease involving native coronary artery of native heart without angina pectoris     ST elevation MI (STEMI) (H)     Percutaneous transluminal coronary angioplasty status     Paroxysmal atrial fibrillation (H)     Essential hypertension     Mixed hyperlipidemia     Gastroesophageal reflux disease without esophagitis     Chest pain, unspecified type       Constitutional: Appears comfortable and nondistressed.  Obese male sitting up in bed  Eyes: Clear sclera  ENT: Moist membranes   Respiratory: clear to auscultation bilaterally  Cardiovascular: Irregularly irregular no murmur appreciated distant heart tones  GI: Obese bowel sounds present  Skin: No edema  Musculoskeletal: Grossly normal muscle bulk and tone  Neurologic: Awake alert moves all extremities face is grossly symmetric  Psychiatric: Normal affect    Medications     heparin 1,350 Units/hr (01/13/22 0318)     nitroGLYcerin Stopped (01/12/22 1916)       aspirin  81 mg Oral Daily     [Held by provider] clopidogrel  75 mg Oral Daily     dexamethasone  2 mg Oral BID w/meals     DULoxetine  30 mg Oral QAM     DULoxetine  60 mg Oral QPM     furosemide  40 mg Oral Daily     metoprolol succinate ER  75 mg Oral Daily     miconazole   Topical Daily     nabumetone  500 mg Oral BID     omeprazole  40 mg Oral Daily     rosuvastatin  40 mg Oral Daily     sodium chloride (PF)  3 mL Intracatheter Q8H     traZODone  200 mg Oral At Bedtime       Data   Results for  orders placed or performed during the hospital encounter of 01/12/22 (from the past 24 hour(s))   EKG 12-lead, tracing only   Result Value Ref Range    Systolic Blood Pressure  mmHg    Diastolic Blood Pressure  mmHg    Ventricular Rate 95 BPM    Atrial Rate 357 BPM    IA Interval  ms    QRS Duration 92 ms     ms    QTc 462 ms    P Axis  degrees    R AXIS 44 degrees    T Axis 19 degrees    Interpretation ECG       Atrial fibrillation  Anteroseptal infarct , age undetermined  Abnormal ECG  When compared with ECG of 07-OCT-2021 10:15,  Atrial fibrillation has replaced Sinus rhythm  Vent. rate has increased BY  43 BPM  Anteroseptal infarct is now Present  Nonspecific T wave abnormality no longer evident in Lateral leads  Confirmed by GENERATED REPORT, COMPUTER ([id number]),  NARESH TRINIDAD ([id number]) on 1/12/2022 4:08:51 PM     CBC with platelets differential    Narrative    The following orders were created for panel order CBC with platelets differential.  Procedure                               Abnormality         Status                     ---------                               -----------         ------                     CBC with platelets and d...[[id number]]  Abnormal            Final result                 Please view results for these tests on the individual orders.   Basic metabolic panel   Result Value Ref Range    Sodium 141 133 - 144 mmol/L    Potassium 3.3 (L) 3.4 - 5.3 mmol/L    Chloride 112 (H) 94 - 109 mmol/L    Carbon Dioxide (CO2) 22 20 - 32 mmol/L    Anion Gap 7 3 - 14 mmol/L    Urea Nitrogen 16 7 - 30 mg/dL    Creatinine 0.74 0.66 - 1.25 mg/dL    Calcium 7.5 (L) 8.5 - 10.1 mg/dL    Glucose 111 (H) 70 - 99 mg/dL    GFR Estimate >90 >60 mL/min/1.73m2   Troponin I   Result Value Ref Range    Troponin I High Sensitivity 33 <79 ng/L   CBC with platelets and differential   Result Value Ref Range    WBC Count 12.0 (H) 4.0 - 11.0 10e3/uL    RBC Count 5.28 4.40 - 5.90 10e6/uL    Hemoglobin 14.7 13.3 -  17.7 g/dL    Hematocrit 45.0 40.0 - 53.0 %    MCV 85 78 - 100 fL    MCH 27.8 26.5 - 33.0 pg    MCHC 32.7 31.5 - 36.5 g/dL    RDW 13.1 10.0 - 15.0 %    Platelet Count 211 150 - 450 10e3/uL    % Neutrophils 59 %    % Lymphocytes 31 %    % Monocytes 7 %    % Eosinophils 1 %    % Basophils 1 %    % Immature Granulocytes 1 %    NRBCs per 100 WBC 0 <1 /100    Absolute Neutrophils 7.1 1.6 - 8.3 10e3/uL    Absolute Lymphocytes 3.7 0.8 - 5.3 10e3/uL    Absolute Monocytes 0.9 0.0 - 1.3 10e3/uL    Absolute Eosinophils 0.2 0.0 - 0.7 10e3/uL    Absolute Basophils 0.1 0.0 - 0.2 10e3/uL    Absolute Immature Granulocytes 0.1 <=0.4 10e3/uL    Absolute NRBCs 0.0 10e3/uL   Asymptomatic COVID-19 Virus (Coronavirus) by PCR Nasopharyngeal    Specimen: Nasopharyngeal; Swab   Result Value Ref Range    SARS CoV2 PCR Negative Negative    Narrative    Testing was performed using the katja  SARS-CoV-2 & Influenza A/B Assay on the katja  Valerie  System.  This test should be ordered for the detection of SARS-COV-2 in individuals who meet SARS-CoV-2 clinical and/or epidemiological criteria. Test performance is unknown in asymptomatic patients.  This test is for in vitro diagnostic use under the FDA EUA for laboratories certified under CLIA to perform moderate and/or high complexity testing. This test has not been FDA cleared or approved.  A negative test does not rule out the presence of PCR inhibitors in the specimen or target RNA in concentration below the limit of detection for the assay. The possibility of a false negative should be considered if the patient's recent exposure or clinical presentation suggests COVID-19.  [facility] are certified under the Clinical Laboratory Improvement Amendments of 1988 (CLIA-88) as qualified to perform moderate and/or high complexity laboratory testing.   XR Chest 2 Views    Narrative    EXAM: XR CHEST 2 VW  LOCATION: [facility]  DATE/TIME: 1/12/2022 5:29  PM    INDICATION: chest pain  COMPARISON: Chest x-ray 2021      Impression    IMPRESSION: No acute findings. The lungs are clear and there are no pleural effusions. Upper normal heart size. Stable central vascular prominence.   EKG 12 lead   Result Value Ref Range    Systolic Blood Pressure  mmHg    Diastolic Blood Pressure  mmHg    Ventricular Rate 99 BPM    Atrial Rate 267 BPM    OR Interval  ms    QRS Duration 92 ms     ms    QTc 487 ms    P Axis  degrees    R AXIS 24 degrees    T Axis 43 degrees    Interpretation ECG       Atrial fibrillation with premature ventricular or aberrantly conducted complexes  Anteroseptal infarct (cited on or before 2022)  Abnormal ECG  When compared with ECG of 2022 16:02,  No significant change was found  Confirmed by GENERATED REPORT, COMPUTER ([id number]),  Aida Wyman ([id number]) on 2022 10:08:16 PM     Troponin I (now)   Result Value Ref Range    Troponin I High Sensitivity 1,874 (HH) <79 ng/L   Echocardiogram Limited   Result Value Ref Range    LVEF  40-45%     Narrative    [id number]  [id number]  [id number]  [id number]^LAURA^FAUSTINA^FRANKO     [facility]  Echocardiography Laboratory  [address] [place] [address]     Name: KARMEN ERICKSON  MRN: [id number]  : 1955  Study Date: 2022 08:52 PM  Age: 66 yrs  Gender: Male  Patient Location: [facility]  Reason For Study: Chest Pain  Ordering Physician: FAUSTINA SANCHEZ  Referring Physician: Luann Berger  Performed By: Jarod George RDCS     BSA: 2.6 m2  Height: 72 in  Weight: 313 lb  HR: 82  BP: 96/51 mmHg  ______________________________________________________________________________  Procedure  Limited Portable Echo Adult. Optison (NDC #[id number]) given intravenously.  ______________________________________________________________________________  Interpretation Summary     1. Left ventricular systolic function is mildly reduced. The Biplane ejection  fraction is 40-45%. There  is severe apical wall hypokinesis.  2. Valves not well seen on this technically limited study.  3. Rhythm is atrial fibrillation.  Compared to the prior study on 2021, there are new wall motion  abnormalities and the EF is lower.  Results discussed with Dr. Zhang from ED at 9.39pm.  ______________________________________________________________________________  Left Ventricle  The left ventricle is normal in size. Left ventricular systolic function is  mildly reduced. The visual ejection fraction is 40-45%. There is severe apical  wall hypokinesis.     Right Ventricle  The right ventricle is mildly dilated. RV not well seen but grossly normal RV  systolic function.     Pericardium  Trivial pericardial effusion.     Rhythm  The rhythm was atrial fibrillation.     ______________________________________________________________________________  MMode/2D Measurements & Calculations  IVSd: 1.5 cm  LVIDd: 4.5 cm  LVIDs: 2.7 cm  LVPWd: 1.5 cm  FS: 40.5 %     LV mass(C)d: 280.9 grams  LV mass(C)dI: 109.0 grams/m2  Ao root diam: 3.9 cm  LA dimension: 4.2 cm  asc Aorta Diam: 3.6 cm  LA/Ao: 1.1  LA Volume (BP): 106.0 ml  LA Volume Index (BP): 41.1 ml/m2  RWT: 0.67     Doppler Measurements & Calculations  MV E max elsie: 74.4 cm/sec     MV dec time: 0.22 sec  PA acc time: 0.13 sec  E/E' av.2  Lateral E/e': 5.8  Medial E/e': 10.5     ______________________________________________________________________________  Report approved by: Leyla Tipton 2022 09:40 PM         Cardiology IP Consult: Patient to be seen: Routine - within 24 hours; CP; Consultant may enter orders: Yes; Requesting provider? Attending physician    Narrative    Kendrick Boyd MD     2022 10:25 PM  [facility]  Cardiology Consultation     Date of Admission:  2022    Assessment & Plan   Onur Barr is a 66 year old male with    1.  Coronary disease status post PCI of proximal, distal RCA in   the setting of  inferior STEMI in 09/2021 followed by staged PCI   of in-stent restenosis of proximal LAD in 10/2021,   2.  NSTEMI  3.  Mild ischemic cardiomyopathy-EF 45%  4.  Type 2 diabetes  5.  Hyperlipidemia  6.  Obesity  7.  Atrial fibrillation-on apixaban, last dose this morning    Recommendation:   1.  We initially activated the Cath Lab because on my initial   evaluation the patient reported continued chest pain that has   been ongoing even after sublingual nitroglycerin and   nitroglycerin drip.  However when I went in again to obtain   consent for coronary angiogram the patient told me that the pain   has completely subsided and hence we deactivated the Cath Lab.  2.  Now that the pain has completely subsided and this is NSTEMI   I believe it is appropriate to wait until tomorrow morning to   perform coronary angiogram.  3.  I will continue the heparin and nitro drip to keep him   pain-free.  4.  Consent has been obtained for coronary angiogram tomorrow   morning.  Please keep NPO.  4.  Continue to hold apixaban.  Continue all other medications.    Kendrick Boyd    Code Status    Prior    Reason for Consult   Reason for consult: Chest pain    Primary Care Physician   Luann Berger    Chief Complaint   Chest pain    History of Present Illness   Onur Barr is a 66 year old male with past medical history of   diabetes, hypertension, hyperlipidemia and obesity and coronary   disease status post PCI of proximal and distal RCA after inferior   STEMI in 09/2021 followed by staged PCI of in-stent restenosis of   proximal LAD who presents to the hospital for complaints of chest   pain that is reminiscent of myocardial infarction 3 months ago.    The patient was in usual state of health until 3:30 PM this   afternoon when he is developed substernal, pressure-like, chest   pain which felt exactly like the pain he experienced in September   of last year at the time of inferior STEMI.  He quickly called   EMS and came to the  hospital.  He was given 2 sublingual   nitroglycerin during his way to the hospital which only reduce   the pain mildly.  In the hospital an EKG was performed which   showed atrial fibrillation but no ST elevation.  He was started   on a heparin and nitro drip but continued to have chest pain   although, the pain improved in severity from 8/10-3/10.  He then   developed hypotension and nitroglycerin drip had to be   de-escalated.  Initial troponin was 33 but the second 1 went up   significantly to 1800. Blood work and CBC were otherwise   unremarkable. Transthoracic echocardiogram showed ejection   fraction of 45% with apical wall motion abnormality which is new   when compared to the prior echocardiogram..    Patient Active Problem List   Diagnosis     Lumbago     Morbid obesity (H)     Bilateral leg edema     Bilateral cellulitis of lower leg     Chronic diastolic heart failure (H)     MARLEEN (obstructive sleep apnea)     Coronary artery disease involving native coronary artery of   native heart without angina pectoris     ST elevation MI (STEMI) (H)     Percutaneous transluminal coronary angioplasty status     Paroxysmal atrial fibrillation (H)     Essential hypertension     Mixed hyperlipidemia     Gastroesophageal reflux disease without esophagitis     Chest pain, unspecified type       Past Medical History   I have reviewed this patient's medical history and updated it   with pertinent information if needed.   Past Medical History:   Diagnosis Date     Acid reflux disease 10/31/2017     Arrhythmia     paroxysmal atrial fibrillation     Arthritis      Atrial fibrillation (H)     Created by Conversion      Bariatric surgery status     Created by Conversion      CHF (congestive heart failure) (H)      Coronary artery disease      Coronary atherosclerosis     Created by Conversion  Replacement Utility updated for latest   IMO load     Diabetes (H)     typr II resloved     Essential hypertension     Created by  [facility] Annotation: Apr 6 2012   10:16AM Zack Brewer: Lisinipril,  coreg  Replacement   Utility updated for latest IMO load     H/O gastric bypass      Heart attack (H)      Hypercholesteremia      Hypertension      Insomnia      Insomnia, unspecified     Created by [facility] Annotation: Sep 11 2013    9:56AM Zack Brewer: Trouble  maintaining   sleep-Trazodone-minimal helpzolpidem-      Ischemic cardiomyopathy      Low back pain      Lumbago     Created by [facility] Annotation: Apr 6 2012   10:20AM Anh Ramos: Morphine pump      Morbid obesity (H) 8/1/2018     Nephrolithiasis      Nephrolithiasis      Obstructive sleep apnea (adult) (pediatric)     Created by Conversion      Osteoarthritis     Created by [facility] Annotation: Jun 26 2009    9:53AM Zack Brewer: failed lumbar  fusion/morphine pump   dr jersey  Replacement Utility updated for latest IMO load     Pure hypercholesterolemia     Created by Conversion      Sleep apnea      Sleepiness 5/8/2018       Past Surgical History   I have reviewed this patient's surgical history and updated it   with pertinent information if needed.  Past Surgical History:   Procedure Laterality Date     BACK SURGERY       BYPASS GASTRIC DUODENAL SWITCH       COSMETIC SURGERY      pannicullectomy     CV CORONARY ANGIOGRAM N/A 9/11/2021    Procedure: Coronary Angiogram;  Surgeon: Noris Ozuna MD;    Location: [facility] CATH LAB CV     CV LEFT HEART CATH N/A 9/11/2021    Procedure: Left Heart Cath;  Surgeon: Noris Ozuna MD;    Location: [facility] CATH LAB CV     CV PCI N/A 9/11/2021    Procedure: Percutaneous Coronary Intervention;  Surgeon: Noris Ozuna MD;  Location: [facility] CATH LAB CV     CV PCI N/A 10/7/2021    Procedure: Percutaneous Coronary Intervention;  Surgeon:   Mckayla Dan MD;  Location: [facility] CATH LAB CV     CV PCI ASPIRATION THROMECTOMY N/A 9/11/2021    Procedure:  Percutaneous Coronary Intervention Aspiration   Thrombectomy;  Surgeon: Noris Ozuna MD;  Location: [facility]     ENT SURGERY      tonsillectomy     EXTRACORPOREAL SHOCK WAVE LITHOTRIPSY, CYSTOSCOPY, INSERT STENT   URETER(S), COMBINED  8/23/11     HC REMOVAL OF TONSILS,<13 Y/O      Description: Tonsillectomy;  Recorded: 03/23/2012;  Comments:   for obstructive sleep apnea     HC REPAIR INCISIONAL HERNIA,REDUCIBLE  11/13/2012    Description: Incisional Hernia Repair;  Proc Date: 11/13/2012;    Comments: incisional hernia repair and abdominal panniculectomy   by Dr Shon Green at the [facility].     HERNIA REPAIR       IR MISCELLANEOUS PROCEDURE  7/29/2011     IR MISCELLANEOUS PROCEDURE  8/5/2011     IR MISCELLANEOUS PROCEDURE  8/23/2011     IR NEPHROSTOMY TUBE CHANGE BILATERAL  8/5/2011     ORTHOPEDIC SURGERY      arthrodesis ant discectomy, lumbar     WY ARTHRODESIS ANT INTERBODY MIN DISCECTOMY,LUMBAR      Description: Lumbar Vertebral Fusion;  Recorded: 06/26/2009;    Comments: before 200 see [facility] consult under old records     WY EXCISE EXCESS SKIN TISSUE,ABDOMEN  11/13/2012    Description: Panniculectomy;  Proc Date: 11/13/2012;  Comments:   3.5 Lb Pannus was removed at the [facility] By Dr. Shon Green     REPLACE INTRATHECAL PAIN PUMP N/A 4/25/2017    Procedure: REPLACE INTRATHECAL PAIN PUMP;  INTRATHECAL PAIN PUMP   CATHETER REPLACEMENT AND PUMP REPLACEMENT;  Surgeon: Anthony Maloney MD;  Location: [facility]     REVISE CATHETER INTRATHECAL N/A 4/25/2017    Procedure: REVISE CATHETER INTRATHECAL;;  Surgeon: Anthony Maloney MD;  Location: [facility]     SHOULDER SURGERY       ZC GASTRIC BYPASS,OBESE<100CM SUSY-EN-Y  2008    Description: Gastric Surgery For Morbid Obesity Bypass With   Susy-en-Y;  Recorded: 06/26/2009;  Comments: dr green 2008       Prior to Admission Medications   Prior to Admission Medications   Prescriptions Last Dose Informant Patient Reported? Taking?   ASPIRIN LOW  DOSE 81 MG EC tablet 1/12/2022 at AM Self No Yes   Sig: TAKE 1 TABLET (81 MG) BY MOUTH DAILY START TOMORROW.   DULoxetine (CYMBALTA) 30 MG capsule 1/12/2022 at AM Self Yes Yes   Sig: Take 30 mg by mouth every morning   DULoxetine (CYMBALTA) 30 MG capsule 1/11/2022 at PM Self Yes Yes   Sig: Take 60 mg by mouth every evening   Ferrous Gluconate (IRON) 240 (27 Fe) MG TABS 1/12/2022 at AM Self   No Yes   Sig: Take 1 tablet by mouth daily   MORPHINE SULFATE 1/12/2022 at Unknown time Self Yes Yes   Sig: IT pump:updated 1/12/22  Medications in Pump:   [facility] Responsible for pump medications:   Conc:  Morphine 20mg/ml(3.95 mg/day), clonidine 21.1mcg/ml (4.168   mcg/day), baclofen 42.5mcg/ml (8.395mcg/day)  Rate:   Pump Last Fill Date: 9/2021  Pump Refill Date: 2/2022   Omeprazole (PRILOSEC PO) 1/12/2022 at AM Self Yes Yes   Sig: Take 40 mg by mouth daily   apixaban ANTICOAGULANT (ELIQUIS) 5 MG tablet 1/12/2022 at AM Self   No Yes   Sig: Take 1 tablet (5 mg) by mouth every 12 hours   baclofen (LIORESAL) 10 MG tablet  at PRN Self No Yes   Sig: Take 1 tablet (10 mg) by mouth 2 times daily as needed for   muscle spasms   clopidogrel (PLAVIX) 75 MG tablet 1/12/2022 at AM Self No Yes   Sig: Take 1 tablet (75 mg) by mouth daily   dexamethasone (DECADRON) 2 MG tablet 1/10/2022 at PM Self No Yes   Sig: Take 1 tablet (2 mg) by mouth 2 times daily (with meals)   furosemide (LASIX) 20 MG tablet 1/12/2022 at AM Self No Yes   Sig: TAKE 2 TABLET (40 MG) BY MOUTH  IN THE AM AND 1 TABLET (20   MG) IN EARLY AFTERNOON. TAKE WITH POTASSIUM   lisinopril-hydrochlorothiazide (ZESTORETIC) 20-25 MG tablet   1/12/2022 at AM Self No Yes   Sig: TAKE 2 TABLETS BY MOUTH EVERY DAY   metoprolol succinate ER (TOPROL-XL) 50 MG 24 hr tablet 1/12/2022   at AM Self No Yes   Sig: Take 1.5 tablets (75 mg) by mouth daily   nabumetone (RELAFEN) 500 MG tablet 1/12/2022 at AM Self No Yes   Sig: TAKE 1 TABLET BY MOUTH TWICE A DAY   nitroGLYcerin  (NITROSTAT) 0.4 MG sublingual tablet  at PRN Self   No Yes   Sig: For chest pain place 1 tablet under the tongue every 5   minutes for 3 doses. If symptoms persist 5 minutes after 1st dose   call 911.   nystatin (MYCOSTATIN) 760596 UNIT/GM external powder 1/12/2022 at   AM Self No Yes   Sig: Apply to lower abdominal area twice daily   potassium chloride ER (KLOR-CON M) 20 MEQ CR tablet 1/12/2022 at   AM Self No Yes   Sig: Take 1 tablet (20 mEq) by mouth daily (take with   furosemide/Lasix)   rosuvastatin (CRESTOR) 40 MG tablet 1/11/2022 at PM Self No Yes   Sig: Take 1 tablet (40 mg) by mouth daily   senna-docusate (SENOKOT-S/PERICOLACE) 8.6-50 MG tablet  at PRN   Self No Yes   Sig: Take 1 tablet by mouth 2 times daily as needed for   constipation   traZODone (DESYREL) 100 MG tablet 1/11/2022 at HS Self No Yes   Sig: TAKE 2 TABLETS (200MG TOTAL) BY MOUTH AT BEDTIME      Facility-Administered Medications: None     Current Facility-Administered Medications   Medication Dose Route Frequency     Current Facility-Administered Medications   Medication Last Rate     heparin 1,200 Units/hr (01/12/22 1812)     nitroGLYcerin Stopped (01/12/22 1916)     Allergies   Allergies   Allergen Reactions     Hydrocodone-Acetaminophen        Social History    reports that he has quit smoking. His smoking use included   cigars and cigars. He quit after 10.00 years of use. He has never   used smokeless tobacco. He reports that he does not drink alcohol   and does not use drugs.    Family History   Family History   Problem Relation Age of Onset     Cerebrovascular Disease Mother      Heart Disease Father      Hodgkin's lymphoma Brother        Review of Systems   The comprehensive 10 point Review of Systems is negative other   than noted in the HPI or here.     Physical Exam       BP: 127/73 Pulse: 93   Resp: 13 SpO2: 96 %      Vital Signs with Ranges  Pulse:  [] 93  Resp:  [8-27] 13  BP: ()/() 127/73  SpO2:  [94 %-100 %]  96 %  313 lbs 6.4 oz    Constitutional: Alert, mild distress initially but comfortable   later    Lungs: Normal bilateral breath sounds   Cardiovascular: irregular rate and rhythm, normal S1 and S2, and   no murmur   Lymphm node  Neck No enlargement  No jugular vein extension or carotid bruit   Skin: Normal   Extremity: No edema       Data   Results for orders placed or performed during the hospital   encounter of 01/12/22 (from the past 24 hour(s))   EKG 12-lead, tracing only   Result Value Ref Range    Systolic Blood Pressure  mmHg    Diastolic Blood Pressure  mmHg    Ventricular Rate 95 BPM    Atrial Rate 357 BPM    RI Interval  ms    QRS Duration 92 ms     ms    QTc 462 ms    P Axis  degrees    R AXIS 44 degrees    T Axis 19 degrees    Interpretation ECG       Atrial fibrillation  Anteroseptal infarct , age undetermined  Abnormal ECG  When compared with ECG of 07-OCT-2021 10:15,  Atrial fibrillation has replaced Sinus rhythm  Vent. rate has increased BY  43 BPM  Anteroseptal infarct is now Present  Nonspecific T wave abnormality no longer evident in Lateral leads  Confirmed by GENERATED REPORT, COMPUTER ([id number]),  NARESH TRINIDAD ([id number]) on 1/12/2022 4:08:51 PM     CBC with platelets differential    Narrative    The following orders were created for panel order CBC with   platelets differential.  Procedure                               Abnormality           Status                     ---------                               -----------           ------                     CBC with platelets and d...[[id number]]  Abnormal            Final   result                 Please view results for these tests on the individual orders.   Basic metabolic panel   Result Value Ref Range    Sodium 141 133 - 144 mmol/L    Potassium 3.3 (L) 3.4 - 5.3 mmol/L    Chloride 112 (H) 94 - 109 mmol/L    Carbon Dioxide (CO2) 22 20 - 32 mmol/L    Anion Gap 7 3 - 14 mmol/L    Urea Nitrogen 16 7 - 30 mg/dL    Creatinine 0.74 0.66 -  1.25 mg/dL    Calcium 7.5 (L) 8.5 - 10.1 mg/dL    Glucose 111 (H) 70 - 99 mg/dL    GFR Estimate >90 >60 mL/min/1.73m2   Troponin I   Result Value Ref Range    Troponin I High Sensitivity 33 <79 ng/L   CBC with platelets and differential   Result Value Ref Range    WBC Count 12.0 (H) 4.0 - 11.0 10e3/uL    RBC Count 5.28 4.40 - 5.90 10e6/uL    Hemoglobin 14.7 13.3 - 17.7 g/dL    Hematocrit 45.0 40.0 - 53.0 %    MCV 85 78 - 100 fL    MCH 27.8 26.5 - 33.0 pg    MCHC 32.7 31.5 - 36.5 g/dL    RDW 13.1 10.0 - 15.0 %    Platelet Count 211 150 - 450 10e3/uL    % Neutrophils 59 %    % Lymphocytes 31 %    % Monocytes 7 %    % Eosinophils 1 %    % Basophils 1 %    % Immature Granulocytes 1 %    NRBCs per 100 WBC 0 <1 /100    Absolute Neutrophils 7.1 1.6 - 8.3 10e3/uL    Absolute Lymphocytes 3.7 0.8 - 5.3 10e3/uL    Absolute Monocytes 0.9 0.0 - 1.3 10e3/uL    Absolute Eosinophils 0.2 0.0 - 0.7 10e3/uL    Absolute Basophils 0.1 0.0 - 0.2 10e3/uL    Absolute Immature Granulocytes 0.1 <=0.4 10e3/uL    Absolute NRBCs 0.0 10e3/uL   Asymptomatic COVID-19 Virus (Coronavirus) by PCR Nasopharyngeal    Specimen: Nasopharyngeal; Swab   Result Value Ref Range    SARS CoV2 PCR Negative Negative    Narrative    Testing was performed using the katja  SARS-CoV-2 & Influenza   A/B Assay on the katja  Valerie  System.  This test should be   ordered for the detection of SARS-COV-2 in individuals who meet   SARS-CoV-2 clinical and/or epidemiological criteria. Test   performance is unknown in asymptomatic patients.  This test is   for in vitro diagnostic use under the FDA EUA for laboratories   certified under CLIA to perform moderate and/or high complexity   testing. This test has not been FDA cleared or approved.  A   negative test does not rule out the presence of PCR inhibitors in   the specimen or target RNA in concentration below the limit of   detection for the assay. The possibility of a false negative   should be considered if the patient's  recent exposure or clinical   presentation suggests COVID-19.  [facility]   are certified under the Clinical Laboratory Improvement   Amendments of 1988 (CLIA-88) as qualified to perform moderate   and/or high complexity laboratory testing.   XR Chest 2 Views    Narrative    EXAM: XR CHEST 2 VW  LOCATION: [facility]  DATE/TIME: 2022 5:29 PM    INDICATION: chest pain  COMPARISON: Chest x-ray 2021      Impression    IMPRESSION: No acute findings. The lungs are clear and there are   no pleural effusions. Upper normal heart size. Stable central   vascular prominence.   EKG 12 lead   Result Value Ref Range    Systolic Blood Pressure  mmHg    Diastolic Blood Pressure  mmHg    Ventricular Rate 99 BPM    Atrial Rate 267 BPM    PA Interval  ms    QRS Duration 92 ms     ms    QTc 487 ms    P Axis  degrees    R AXIS 24 degrees    T Axis 43 degrees    Interpretation ECG       Atrial fibrillation with premature ventricular or aberrantly   conducted complexes  Anteroseptal infarct (cited on or before 2022)  Abnormal ECG  When compared with ECG of 2022 16:02,  No significant change was found  Confirmed by GENERATED REPORT, COMPUTER ([id number]),  Aida Wyman   ([id number]) on 2022 10:08:16 PM     Troponin I (now)   Result Value Ref Range    Troponin I High Sensitivity 1,874 (HH) <79 ng/L   Echocardiogram Limited   Result Value Ref Range    LVEF  40-45%     Narrative    [id number]  [id number]  [id number]  [id number]^LAURA^FAUSTINA^FRANKO     [facility]  Echocardiography Laboratory  [address] [place] [address]     Name: KARMEN ERICKSON  MRN: [id number]  : 1955  Study Date: 2022 08:52 PM  Age: 66 yrs  Gender: Male  Patient Location: [facility]  Reason For Study: Chest Pain  Ordering Physician: FAUSTINA SANCHEZ  Referring Physician: Luann Berger  Performed By: Jarod George RDCS     BSA: 2.6 m2  Height: 72 in  Weight: 313 lb  HR:  82  BP: 96/51 mmHg  __________________________________________________________________  ____________  Procedure  Limited Portable Echo Adult. [employer] (NDC #[id number]) given   intravenously.  __________________________________________________________________  ____________  Interpretation Summary     1. Left ventricular systolic function is mildly reduced. The   Biplane ejection  fraction is 40-45%. There is severe apical wall hypokinesis.  2. Valves not well seen on this technically limited study.  3. Rhythm is atrial fibrillation.  Compared to the prior study on 2021, there are new wall   motion  abnormalities and the EF is lower.  Results discussed with Dr. Zhang from ED at 9.39pm.  __________________________________________________________________  ____________  Left Ventricle  The left ventricle is normal in size. Left ventricular systolic   function is  mildly reduced. The visual ejection fraction is 40-45%. There is   severe apical  wall hypokinesis.     Right Ventricle  The right ventricle is mildly dilated. RV not well seen but   grossly normal RV  systolic function.     Pericardium  Trivial pericardial effusion.     Rhythm  The rhythm was atrial fibrillation.     __________________________________________________________________  ____________  MMode/2D Measurements & Calculations  IVSd: 1.5 cm  LVIDd: 4.5 cm  LVIDs: 2.7 cm  LVPWd: 1.5 cm  FS: 40.5 %     LV mass(C)d: 280.9 grams  LV mass(C)dI: 109.0 grams/m2  Ao root diam: 3.9 cm  LA dimension: 4.2 cm  asc Aorta Diam: 3.6 cm  LA/Ao: 1.1  LA Volume (BP): 106.0 ml  LA Volume Index (BP): 41.1 ml/m2  RWT: 0.67     Doppler Measurements & Calculations  MV E max elsie: 74.4 cm/sec     MV dec time: 0.22 sec  PA acc time: 0.13 sec  E/E' av.2  Lateral E/e': 5.8  Medial E/e': 10.5     __________________________________________________________________  ____________  Report approved by: Leyla Tipton 2022 09:40 PM              Partial thromboplastin time    Result Value Ref Range    aPTT 41 (H) 22 - 38 Seconds   Troponin I   Result Value Ref Range    Troponin I High Sensitivity 20,280 (HH) <79 ng/L   Glucose by meter   Result Value Ref Range    GLUCOSE BY METER POCT 96 70 - 99 mg/dL   Comprehensive metabolic panel   Result Value Ref Range    Sodium 139 133 - 144 mmol/L    Potassium 4.6 3.4 - 5.3 mmol/L    Chloride 109 94 - 109 mmol/L    Carbon Dioxide (CO2) 28 20 - 32 mmol/L    Anion Gap 2 (L) 3 - 14 mmol/L    Urea Nitrogen 16 7 - 30 mg/dL    Creatinine 0.89 0.66 - 1.25 mg/dL    Calcium 8.2 (L) 8.5 - 10.1 mg/dL    Glucose 96 70 - 99 mg/dL    Alkaline Phosphatase 68 40 - 150 U/L    AST 66 (H) 0 - 45 U/L    ALT 39 0 - 70 U/L    Protein Total 6.3 (L) 6.8 - 8.8 g/dL    Albumin 2.6 (L) 3.4 - 5.0 g/dL    Bilirubin Total 0.8 0.2 - 1.3 mg/dL    GFR Estimate >90 >60 mL/min/1.73m2   CBC with platelets differential    Narrative    The following orders were created for panel order CBC with platelets differential.  Procedure                               Abnormality         Status                     ---------                               -----------         ------                     CBC with platelets and d...[[id number]]  Abnormal            Final result               RBC and Platelet Morphology[[id number]]                      Final result                 Please view results for these tests on the individual orders.   Troponin I   Result Value Ref Range    Troponin I High Sensitivity 18,620 (HH) <79 ng/L   CBC with platelets and differential   Result Value Ref Range    WBC Count 7.7 4.0 - 11.0 10e3/uL    RBC Count 4.75 4.40 - 5.90 10e6/uL    Hemoglobin 13.4 13.3 - 17.7 g/dL    Hematocrit 41.3 40.0 - 53.0 %    MCV 87 78 - 100 fL    MCH 28.2 26.5 - 33.0 pg    MCHC 32.4 31.5 - 36.5 g/dL    RDW 13.1 10.0 - 15.0 %    Platelet Count 113 (L) 150 - 450 10e3/uL    % Neutrophils 48 %    % Lymphocytes 41 %    % Monocytes 8 %    % Eosinophils 3 %    % Basophils 0 %    % Immature  Granulocytes 0 %    NRBCs per 100 WBC 0 <1 /100    Absolute Neutrophils 3.7 1.6 - 8.3 10e3/uL    Absolute Lymphocytes 3.1 0.8 - 5.3 10e3/uL    Absolute Monocytes 0.6 0.0 - 1.3 10e3/uL    Absolute Eosinophils 0.2 0.0 - 0.7 10e3/uL    Absolute Basophils 0.0 0.0 - 0.2 10e3/uL    Absolute Immature Granulocytes 0.0 <=0.4 10e3/uL    Absolute NRBCs 0.0 10e3/uL   RBC and Platelet Morphology   Result Value Ref Range    Platelet Assessment  Automated Count Confirmed. Platelet morphology is normal.     Automated Count Confirmed. Platelet morphology is normal.    RBC Morphology Confirmed RBC Indices      Cath 10/7/21    Planned staged PCI post STEMI.    Proximal LAD severe ISR stented with a Synergy 3.5 CRYSTAL with good angiographic results.          Cath 9/11/21  Urgent coronary angiography showed:  Left main with mild disease  LAD with 70 to 75% in-stent restenosis of the proximal LAD stent, mild mid to distal LAD disease  Left circumflex gives 1 large obtuse marginal that has mild disease  RCA is large and dominant with CATINA 0 flow, thrombotically occluded in the proximal portion.  Following restoration of flow in the RCA with wire passage and thrombectomy, the proximal vessel was seen to have a 90% stenosis and there was another 90-95% distal RCA stenosis.     LVEDP 17 mmHg     Successful PCI of the proximal and distal RCA with thrombectomy, followed by stenting with a 4.5 x 24 mm Synergy drug-eluting stent to the proximal RCA, and 3.5 x 16 mm Synergy drug-eluting stent to the distal RCA.  The distal RCA stent was postdilated to 4 mm.        Echo yesterday  1. Left ventricular systolic function is mildly reduced. The Biplane ejection  fraction is 40-45%. There is severe apical wall hypokinesis.  2. Valves not well seen on this technically limited study.  3. Rhythm is atrial fibrillation.  Compared to the prior study on 9/12/2021, there are new wall motion  abnormalities and the EF is lower.  Results discussed with Dr. Zhang from  ED at 9.39pm.

## 2022-01-13 NOTE — PROGRESS NOTES
RECEIVING UNIT ED HANDOFF REVIEW    ED Nurse Handoff Report was reviewed by: Jamia Sy RN on January 13, 2022 at 2:21 AM

## 2022-01-13 NOTE — PHARMACY
Writer discussed heparin orders with Dr. Coleman. Plan to continue heparin infusion with PTT monitoring due to Eliquis PTA, without any boluses at this time due to triple anti-thrombotic therapy. Can re-assess in the morning if boluses are required.     Donna Rosen, PharmD, BCPS

## 2022-01-13 NOTE — PLAN OF CARE
Vitals: HTN and CP during shift. Became hypotensive on nitroglycerin gtt post angio. Stopped upon transfer to CCU.   Lungs: WNL  CV: Afib/flultter CVR. PVCs noted post Angio.   CP resolved post angio while nitroglycerin gtt infusion. Stopped due to pressures. Monitor closely for reoccurrence now that nitroglycerin gtt is off.   V.O. no heparin or plavix at this time due to perforation.   GI: Obese, rounded, taut.   Uro: Refused AM lasix. BNP elevated. Needs education post Angio for need for Lasix.   CMS: Weak dusky pedal pulses. Trace edema bilaterally.   Right radial site hematoma started to form. Informed for AV fistula perforated.   V.O. Dr. Pearl ice, elevated, and pressure manual first then with BP cuff. Hematoma resolving. Monitoring CMS closely   Neuro: no deficits noted.   Skin: Dusky/Ambrosio LE. Ecchymotic to arms.   Psych: flat.   MSK: SBA. Bedrest post Angio.   Pain: Chronic back pain. Large amount of sedation during procedure and IV morphine x1 without any relief. Improvement with reposition to recliner as pt. Kept setting off bed alarm to sit up.   Monitoring for CP reoccurrence post angio.    Labs: trop 20,000 K 4.6 BNP 2400   Tests/Procedures: TTE done. Angio done without stenting.   Other:  CV surgery consulted - pt. Drowsy due to pain and sedation medication. WIll meet again to go over risk/benefits tomorrow.  Transfer to CCU for closer monitoring.

## 2022-01-13 NOTE — CONSULTS
Cardiothoracic Surgery Consult Note    Onur Barr MRN# [id number]   Age: 66 year old YOB: 1955     Date of Admission:  1/12/2022    Date of Consult:   1/12/22    Reason for consult: CAD, in-stent restenosis of LAD               Chief Complaint:   Chest pain         History of Present Illness:   65 yo male who was brought in to [facility] ER by EMS for sudden onset chest pain yesterday and diagnosed to have NSTEMI on further evaluation. Patient hs a h/o CAD since 2012 when he underwent LAD stent placement. He had Inferior STEMI in Sept 2021 when he got PCI and RCA CRYSTAL placement followed by staged PCI and CRYSTAL placement fot In-stent restenosis of prior LAD stent. He underwent coronary angiogram today and was found to have ISRS of LAD. Cardiac surgery was consulted for consideration of CAB.  Patient denies any chest pain at this time. He is on Nitroglycerine drip. No c/o shortness of breath, altered sensorium. No GI or urinary complaints.  Of note, he has a small right forearm AV fistula from multiple prior radial interventions and small hematoma of right forearm post-angiography today. He denies any numbness of right hand or difficulty in hand movements.          Past Medical History:   CAD with multiple interventions as above  Ischemic cardiomyopathy  HTN  Hyperlipidemia  Morbid obesity s/p Gastric bypass in 2008, lost 185 lbs after bypass  DM, not on any meds after bypass  Paroxysmal Atrial fibrillation, on Apixaban  MARLEEN  Chronic back pain, has baclofen/morphine pump in lower back    Past Medical History:   Diagnosis Date     Acid reflux disease 10/31/2017     Arrhythmia     paroxysmal atrial fibrillation     Arthritis      Atrial fibrillation (H)     Created by Conversion      Bariatric surgery status     Created by Conversion      CHF (congestive heart failure) (H)      Coronary artery disease      Coronary atherosclerosis     Created by Conversion  Replacement Utility updated for latest IMO load      Diabetes (H)     typr II resloved     Essential hypertension     Created by [facility] Annotation: Apr 6 2012 10:16Zack Harris: Lisinipril,  coreg  Replacement Utility updated for latest IMO load     H/O gastric bypass      Heart attack (H)      Hypercholesteremia      Hypertension      Insomnia      Insomnia, unspecified     Created by [facility] Annotation: Sep 11 2013  9:56AM Zack Brewer: Trouble  maintaining sleep-Trazodone-minimal helpzolpidem-      Ischemic cardiomyopathy      Low back pain      Lumbago     Created by [facility] Annotation: Apr 6 2012 10:20AM Anh Ramos: Morphine pump      Morbid obesity (H) 8/1/2018     Nephrolithiasis      Nephrolithiasis      Obstructive sleep apnea (adult) (pediatric)     Created by Conversion      Osteoarthritis     Created by [facility] Annotation: Jun 26 2009  9:53AM Zack Brewer: failed lumbar  fusion/morphine pump dr putnam  Replacement Utility updated for latest IMO load     Pure hypercholesterolemia     Created by Conversion      Sleep apnea      Sleepiness 5/8/2018          Past Surgical History:   Gastric bypass  Back surgery    Past Surgical History:   Procedure Laterality Date     BACK SURGERY       BYPASS GASTRIC DUODENAL SWITCH       COSMETIC SURGERY      pannicullectomy     CV CORONARY ANGIOGRAM N/A 9/11/2021    Procedure: Coronary Angiogram;  Surgeon: Noris Ozuna MD;  Location: [facility] CATH LAB CV     CV LEFT HEART CATH N/A 9/11/2021    Procedure: Left Heart Cath;  Surgeon: Noris Ozuna MD;  Location: [facility] CATH LAB CV     CV PCI N/A 9/11/2021    Procedure: Percutaneous Coronary Intervention;  Surgeon: Noris Ozuna MD;  Location: [facility] CATH LAB CV     CV PCI N/A 10/7/2021    Procedure: Percutaneous Coronary Intervention;  Surgeon: Mckayla Dan MD;  Location: [facility] CATH LAB CV     CV PCI ASPIRATION THROMECTOMY N/A 9/11/2021    Procedure: Percutaneous  Coronary Intervention Aspiration Thrombectomy;  Surgeon: Noris Ozuna MD;  Location: [facility]     ENT SURGERY      tonsillectomy     EXTRACORPOREAL SHOCK WAVE LITHOTRIPSY, CYSTOSCOPY, INSERT STENT URETER(S), COMBINED  8/23/11     HC REMOVAL OF TONSILS,<13 Y/O      Description: Tonsillectomy;  Recorded: 03/23/2012;  Comments: for obstructive sleep apnea     HC REPAIR INCISIONAL HERNIA,REDUCIBLE  11/13/2012    Description: Incisional Hernia Repair;  Proc Date: 11/13/2012;  Comments: incisional hernia repair and abdominal panniculectomy by Dr Shon Green at the [facility].     HERNIA REPAIR       IR MISCELLANEOUS PROCEDURE  7/29/2011     IR MISCELLANEOUS PROCEDURE  8/5/2011     IR MISCELLANEOUS PROCEDURE  8/23/2011     IR NEPHROSTOMY TUBE CHANGE BILATERAL  8/5/2011     ORTHOPEDIC SURGERY      arthrodesis ant discectomy, lumbar     MN ARTHRODESIS ANT INTERBODY MIN DISCECTOMY,LUMBAR      Description: Lumbar Vertebral Fusion;  Recorded: 06/26/2009;  Comments: before 200 see [facility] consult under old records     MN EXCISE EXCESS SKIN TISSUE,ABDOMEN  11/13/2012    Description: Panniculectomy;  Proc Date: 11/13/2012;  Comments: 3.5 Lb Pannus was removed at the [facility] By Dr. Shon Green     REPLACE INTRATHECAL PAIN PUMP N/A 4/25/2017    Procedure: REPLACE INTRATHECAL PAIN PUMP;  INTRATHECAL PAIN PUMP CATHETER REPLACEMENT AND PUMP REPLACEMENT;  Surgeon: Anthony Maloney MD;  Location: [facility]     REVISE CATHETER INTRATHECAL N/A 4/25/2017    Procedure: REVISE CATHETER INTRATHECAL;;  Surgeon: Anthony Maloney MD;  Location: [facility]     SHOULDER SURGERY       Z GASTRIC BYPASS,OBESE<100CM SUSY-EN-Y  2008    Description: Gastric Surgery For Morbid Obesity Bypass With Susy-en-Y;  Recorded: 06/26/2009;  Comments: dr green 2008          Social History:   Former smoker    Social History     Tobacco Use     Smoking status: Former Smoker     Years: 10.00     Types: Cigars, Cigars     Smokeless tobacco: Never Used    Substance Use Topics     Alcohol use: No          Family History:     Family History   Problem Relation Age of Onset     Cerebrovascular Disease Mother      Heart Disease Father      Hodgkin's lymphoma Brother           Allergies:     Allergies   Allergen Reactions     Hydrocodone-Acetaminophen             Medications:     Current Facility-Administered Medications   Medication     acetaminophen (TYLENOL) tablet 650 mg    Or     acetaminophen (TYLENOL) Suppository 650 mg     aspirin EC tablet 81 mg     baclofen (LIORESAL) tablet 10 mg     clopidogrel (PLAVIX) tablet 75 mg     dexamethasone (DECADRON) tablet 2 mg     DULoxetine (CYMBALTA) DR capsule 30 mg     DULoxetine (CYMBALTA) DR capsule 60 mg     furosemide (LASIX) tablet 40 mg     heparin infusion 25,000 units in D5W 250 mL ANTICOAGULANT     hydrALAZINE (APRESOLINE) injection 10 mg     lidocaine (LMX4) cream     lidocaine 1 % 0.1-1 mL     melatonin tablet 1 mg     metoprolol succinate ER (TOPROL-XL) 24 hr tablet 75 mg     miconazole (MICATIN) 2 % powder     morphine (PF) injection 2-4 mg     naloxone (NARCAN) injection 0.2 mg    Or     naloxone (NARCAN) injection 0.4 mg    Or     naloxone (NARCAN) injection 0.2 mg    Or     naloxone (NARCAN) injection 0.4 mg     nitroGLYcerin 50 mg in D5W 250 mL (adult std) infusion CENTRAL     omeprazole (priLOSEC) CR capsule 40 mg     ondansetron (ZOFRAN-ODT) ODT tab 4 mg    Or     ondansetron (ZOFRAN) injection 4 mg     rosuvastatin (CRESTOR) tablet 40 mg     senna-docusate (SENOKOT-S/PERICOLACE) 8.6-50 MG per tablet 1 tablet     sodium chloride (PF) 0.9% PF flush 3 mL     sodium chloride (PF) 0.9% PF flush 3 mL     traZODone (DESYREL) tablet 200 mg          Review of Systems:    ROS: 10 point ROS neg other than the symptoms noted above in the HPI.           Physical Exam:   All vitals have been reviewed  Temp:  [97.7  F (36.5  C)-98.1  F (36.7  C)] 97.7  F (36.5  C)  Pulse:  [] 80  Resp:  [8-32] 20  BP:  ()/() 119/78  SpO2:  [92 %-100 %] 95 %    Physical Exam:  GENERAL: appears well, in no acute distress, obese  HEENT: within normal limits  NECK:  supple, no lymphadenopathy  CARDIOVASCULAR:  irregular rate and rhythm  LUNGS: Clear bilaterally  ABDOMEN:  Soft, nontender, non-distended  EXTREMITIES:  Negative for edema, cyanosis or clubbing, pedal pulses palpable; right forearm T-band in place, intact sensory and motor function of right hand  NEUROLOGIC:  Alert and oriented x 3 with no focal deficits         Data:   All laboratory data reviewed    Results:  BMP  Recent Labs   Lab 01/13/22  0546 01/13/22  0315 01/12/22  1622     --  141   POTASSIUM 4.6  --  3.3*   CHLORIDE 109  --  112*   CO2 28  --  22   BUN 16  --  16   CR 0.89  --  0.74   GLC 96 96 111*     CBC  Recent Labs   Lab 01/13/22  0546 01/12/22  1622   WBC 7.7 12.0*   HGB 13.4 14.7   * 211     LFT  Recent Labs   Lab 01/13/22  0546   AST 66*   ALT 39   ALKPHOS 68   BILITOTAL 0.8   ALBUMIN 2.6*     Recent Labs   Lab 01/13/22  0546 01/13/22  0315 01/12/22  1622   GLC 96 96 111*       Imaging:  ECHO (1/12/2022):  1. Left ventricular systolic function is mildly reduced. The Biplane ejection  fraction is 40-45%. There is severe apical wall hypokinesis.  2. Valves not well seen on this technically limited study.  3. Rhythm is atrial fibrillation.  Compared to the prior study on 9/12/2021, there are new wall motion  abnormalities and the EF is lower.    Coronary Angiogram (1/13/2022):  Left Anterior Descending   Prox LAD to Mid LAD lesion is 99% stenosed. The lesion is type C - high risk. The lesion was previously treated using a drug-eluting stent. Previous treatment took place 1-5 months ago. The stenosis was measured by a visual reading.   Left Circumflex   The vessel is moderate in size.   Right Coronary Artery   Prox RCA-1 lesion is 40% stenosed.   0% stenosed Prox RCA-2 lesion was previously treated. The lesion is type C - high risk  and thrombotic.   0% stenosed Dist RCA lesion was previously treated. The lesion is type C - high risk and thrombotic.   Right Posterior Descending Artery   RPDA lesion is 50% stenosed.   First Right Posterolateral Branch   1st RPL lesion is 50% stenosed.             Assessment and Plan:   Assessment:   67 yo male with CAD and h/o prior multiple catheter interventions with ISRS, failed PCI today. Patient also has Ischemic cardiomyopathy with reduced EF of 40-45%. He is on Eliquis for Paroxysmal Afib and Plavix for CRYSTAL, last dose of Plavix today AM, last dose of Eliquis yesterday AM. Other risk factors include Morbid obesity (BMI 42.8), HTN and Hyperlipidemia.    STS risk for CABG:  Risk of Mortality: 2.358%  Renal Failure: 2.019%  Permanent Stroke: 0.917%  Prolonged Ventilation: 13.040%  DSW Infection: 0.427%  Reoperation: 2.794%  Morbidity or Mortality: 18.484%  Short Length of Stay: 29.334%  Long Length of Stay: 7.766%      Plan:   The patient will benefit from coronary artery bypass grafting. He stated that he understood the need for surgical revascularization and agreed to proceed with CABG.  Will schedule him for CAB after Plavix washout, tentatively early next week, exact timing TBD based on OR and ICU bed availability. May need urgent CAB if chest pain occurs and not controlled with medical treatment.  Please continue medical management.  Please start Heparin drip once deemed safe with regards to right forearm hematoma.  Will order preop work up for CAB- CT chest, venous duplex lower extremities, Carotid duplex.  Cardiac surgery will follow.    Discussed with staff, Dr. Dumont.     Thanks for the opportunity to take care of this patient.    SUN GERMAIN MD  Fellow, Cardiothoracic Surgery  Pager # [phone]

## 2022-01-13 NOTE — PROVIDER NOTIFICATION
MD Notification    Notified Person: MD    Notified Person Name:Dr. Everett    Notification Date/Time:01/13/21 0436    Notification Interaction:text/page    Purpose of Notification:Troponin 20,280 denies CP. On heparin gtt infusing. Plan for angio today. Jamia    Orders Received:    Comments:     No

## 2022-01-13 NOTE — ED NOTES
Cardiologist in to see pt. Cardiac cath lab activated. Now telling MD his pain is 0. Cath lab deferred until morning.

## 2022-01-13 NOTE — CONSULTS
[facility]  Cardiology Consultation     Date of Admission:  1/12/2022    Assessment & Plan   Onur Barr is a 66 year old male with    1.  Coronary disease status post PCI of proximal, distal RCA in the setting of inferior STEMI in 09/2021 followed by staged PCI of in-stent restenosis of proximal LAD in 10/2021,   2.  NSTEMI  3.  Mild ischemic cardiomyopathy-EF 45%  4.  Type 2 diabetes  5.  Hyperlipidemia  6.  Obesity  7.  Atrial fibrillation-on apixaban, last dose this morning    Recommendation:   1.  We initially activated the Cath Lab because on my initial evaluation the patient reported continued chest pain that has been ongoing even after sublingual nitroglycerin and nitroglycerin drip.  However when I went in again to obtain consent for coronary angiogram the patient told me that the pain has completely subsided and hence we deactivated the Cath Lab.  2.  Now that the pain has completely subsided and this is NSTEMI I believe it is appropriate to wait until tomorrow morning to perform coronary angiogram.  3.  I will continue the heparin and nitro drip to keep him pain-free.  4.  Consent has been obtained for coronary angiogram tomorrow morning.  Please keep NPO.  4.  Continue to hold apixaban.  Continue all other medications.    Kendrick Boyd    Code Status    Prior    Reason for Consult   Reason for consult: Chest pain    Primary Care Physician   Luann Berger    Chief Complaint   Chest pain    History of Present Illness   Onur Barr is a 66 year old male with past medical history of diabetes, hypertension, hyperlipidemia and obesity and coronary disease status post PCI of proximal and distal RCA after inferior STEMI in 09/2021 followed by staged PCI of in-stent restenosis of proximal LAD who presents to the hospital for complaints of chest pain that is reminiscent of myocardial infarction 3 months ago.    The patient was in usual state of health until 3:30 PM this afternoon  when he is developed substernal, pressure-like, chest pain which felt exactly like the pain he experienced in September of last year at the time of inferior STEMI.  He quickly called EMS and came to the hospital.  He was given 2 sublingual nitroglycerin during his way to the hospital which only reduce the pain mildly.  In the hospital an EKG was performed which showed atrial fibrillation but no ST elevation.  He was started on a heparin and nitro drip but continued to have chest pain although, the pain improved in severity from 8/10-3/10.  He then developed hypotension and nitroglycerin drip had to be de-escalated.  Initial troponin was 33 but the second 1 went up significantly to 1800. Blood work and CBC were otherwise unremarkable. Transthoracic echocardiogram showed ejection fraction of 45% with apical wall motion abnormality which is new when compared to the prior echocardiogram..    Patient Active Problem List   Diagnosis     Lumbago     Morbid obesity (H)     Bilateral leg edema     Bilateral cellulitis of lower leg     Chronic diastolic heart failure (H)     MARLEEN (obstructive sleep apnea)     Coronary artery disease involving native coronary artery of native heart without angina pectoris     ST elevation MI (STEMI) (H)     Percutaneous transluminal coronary angioplasty status     Paroxysmal atrial fibrillation (H)     Essential hypertension     Mixed hyperlipidemia     Gastroesophageal reflux disease without esophagitis     Chest pain, unspecified type       Past Medical History   I have reviewed this patient's medical history and updated it with pertinent information if needed.   Past Medical History:   Diagnosis Date     Acid reflux disease 10/31/2017     Arrhythmia     paroxysmal atrial fibrillation     Arthritis      Atrial fibrillation (H)     Created by Conversion      Bariatric surgery status     Created by Conversion      CHF (congestive heart failure) (H)      Coronary artery disease      Coronary  atherosclerosis     Created by Conversion  Replacement Utility updated for latest IMO load     Diabetes (H)     typr II resloved     Essential hypertension     Created by [facility] Annotation: Apr 6 2012 10:16AM Zack Brewer: Lisinipril,  coreg  Replacement Utility updated for latest IMO load     H/O gastric bypass      Heart attack (H)      Hypercholesteremia      Hypertension      Insomnia      Insomnia, unspecified     Created by [facility] Annotation: Sep 11 2013  9:56AM Zack Brewer: Trouble  maintaining sleep-Trazodone-minimal helpzolpidem-      Ischemic cardiomyopathy      Low back pain      Lumbago     Created by [facility] Annotation: Apr 6 2012 10:20AM Anh Ramos: Morphine pump      Morbid obesity (H) 8/1/2018     Nephrolithiasis      Nephrolithiasis      Obstructive sleep apnea (adult) (pediatric)     Created by Conversion      Osteoarthritis     Created by [facility] Annotation: Jun 26 2009  9:53AM Zack Brewer: failed lumbar  fusion/morphine pump dr putnam  Replacement Utility updated for latest IMO load     Pure hypercholesterolemia     Created by Conversion      Sleep apnea      Sleepiness 5/8/2018       Past Surgical History   I have reviewed this patient's surgical history and updated it with pertinent information if needed.  Past Surgical History:   Procedure Laterality Date     BACK SURGERY       BYPASS GASTRIC DUODENAL SWITCH       COSMETIC SURGERY      pannicullectomy     CV CORONARY ANGIOGRAM N/A 9/11/2021    Procedure: Coronary Angiogram;  Surgeon: Noris Ozuna MD;  Location: [facility] CATH LAB CV     CV LEFT HEART CATH N/A 9/11/2021    Procedure: Left Heart Cath;  Surgeon: Noris Ozuna MD;  Location: [facility] CATH LAB CV     CV PCI N/A 9/11/2021    Procedure: Percutaneous Coronary Intervention;  Surgeon: Noris Ozuna MD;  Location: [facility] CATH LAB CV     CV PCI N/A 10/7/2021    Procedure: Percutaneous  Coronary Intervention;  Surgeon: Mckayla Dan MD;  Location: [facility]     CV PCI ASPIRATION THROMECTOMY N/A 9/11/2021    Procedure: Percutaneous Coronary Intervention Aspiration Thrombectomy;  Surgeon: Noris Ozuna MD;  Location: [facility]     ENT SURGERY      tonsillectomy     EXTRACORPOREAL SHOCK WAVE LITHOTRIPSY, CYSTOSCOPY, INSERT STENT URETER(S), COMBINED  8/23/11     HC REMOVAL OF TONSILS,<13 Y/O      Description: Tonsillectomy;  Recorded: 03/23/2012;  Comments: for obstructive sleep apnea     HC REPAIR INCISIONAL HERNIA,REDUCIBLE  11/13/2012    Description: Incisional Hernia Repair;  Proc Date: 11/13/2012;  Comments: incisional hernia repair and abdominal panniculectomy by Dr Shon Green at the [facility].     HERNIA REPAIR       IR MISCELLANEOUS PROCEDURE  7/29/2011     IR MISCELLANEOUS PROCEDURE  8/5/2011     IR MISCELLANEOUS PROCEDURE  8/23/2011     IR NEPHROSTOMY TUBE CHANGE BILATERAL  8/5/2011     ORTHOPEDIC SURGERY      arthrodesis ant discectomy, lumbar     ID ARTHRODESIS ANT INTERBODY MIN DISCECTOMY,LUMBAR      Description: Lumbar Vertebral Fusion;  Recorded: 06/26/2009;  Comments: before 200 see [facility] consult under old records     ID EXCISE EXCESS SKIN TISSUE,ABDOMEN  11/13/2012    Description: Panniculectomy;  Proc Date: 11/13/2012;  Comments: 3.5 Lb Pannus was removed at the [facility] By Dr. Shon Green     REPLACE INTRATHECAL PAIN PUMP N/A 4/25/2017    Procedure: REPLACE INTRATHECAL PAIN PUMP;  INTRATHECAL PAIN PUMP CATHETER REPLACEMENT AND PUMP REPLACEMENT;  Surgeon: Anthony Maloney MD;  Location: [facility]     REVISE CATHETER INTRATHECAL N/A 4/25/2017    Procedure: REVISE CATHETER INTRATHECAL;;  Surgeon: Anthony Maloney MD;  Location: [facility]     SHOULDER SURGERY       ZZC GASTRIC BYPASS,OBESE<100CM LORA-EN-Y  2008    Description: Gastric Surgery For Morbid Obesity Bypass With Lora-en-Y;  Recorded: 06/26/2009;  Comments: dr green 2008       Prior to Admission  Medications   Prior to Admission Medications   Prescriptions Last Dose Informant Patient Reported? Taking?   ASPIRIN LOW DOSE 81 MG EC tablet 1/12/2022 at AM Self No Yes   Sig: TAKE 1 TABLET (81 MG) BY MOUTH DAILY START TOMORROW.   DULoxetine (CYMBALTA) 30 MG capsule 1/12/2022 at AM Self Yes Yes   Sig: Take 30 mg by mouth every morning   DULoxetine (CYMBALTA) 30 MG capsule 1/11/2022 at PM Self Yes Yes   Sig: Take 60 mg by mouth every evening   Ferrous Gluconate (IRON) 240 (27 Fe) MG TABS 1/12/2022 at AM Self No Yes   Sig: Take 1 tablet by mouth daily   MORPHINE SULFATE 1/12/2022 at Unknown time Self Yes Yes   Sig: IT pump:updated 1/12/22  Medications in Pump:   [facility] Responsible for pump medications:   Conc:  Morphine 20mg/ml(3.95 mg/day), clonidine 21.1mcg/ml (4.168 mcg/day), baclofen 42.5mcg/ml (8.395mcg/day)  Rate:   Pump Last Fill Date: 9/2021  Pump Refill Date: 2/2022   Omeprazole (PRILOSEC PO) 1/12/2022 at AM Self Yes Yes   Sig: Take 40 mg by mouth daily   apixaban ANTICOAGULANT (ELIQUIS) 5 MG tablet 1/12/2022 at AM Self No Yes   Sig: Take 1 tablet (5 mg) by mouth every 12 hours   baclofen (LIORESAL) 10 MG tablet  at PRN Self No Yes   Sig: Take 1 tablet (10 mg) by mouth 2 times daily as needed for muscle spasms   clopidogrel (PLAVIX) 75 MG tablet 1/12/2022 at AM Self No Yes   Sig: Take 1 tablet (75 mg) by mouth daily   dexamethasone (DECADRON) 2 MG tablet 1/10/2022 at PM Self No Yes   Sig: Take 1 tablet (2 mg) by mouth 2 times daily (with meals)   furosemide (LASIX) 20 MG tablet 1/12/2022 at AM Self No Yes   Sig: TAKE 2 TABLET (40 MG) BY MOUTH  IN THE AM AND 1 TABLET (20 MG) IN EARLY AFTERNOON. TAKE WITH POTASSIUM   lisinopril-hydrochlorothiazide (ZESTORETIC) 20-25 MG tablet 1/12/2022 at AM Self No Yes   Sig: TAKE 2 TABLETS BY MOUTH EVERY DAY   metoprolol succinate ER (TOPROL-XL) 50 MG 24 hr tablet 1/12/2022 at AM Self No Yes   Sig: Take 1.5 tablets (75 mg) by mouth daily   nabumetone  (RELAFEN) 500 MG tablet 1/12/2022 at AM Self No Yes   Sig: TAKE 1 TABLET BY MOUTH TWICE A DAY   nitroGLYcerin (NITROSTAT) 0.4 MG sublingual tablet  at PRN Self No Yes   Sig: For chest pain place 1 tablet under the tongue every 5 minutes for 3 doses. If symptoms persist 5 minutes after 1st dose call 911.   nystatin (MYCOSTATIN) 991142 UNIT/GM external powder 1/12/2022 at AM Self No Yes   Sig: Apply to lower abdominal area twice daily   potassium chloride ER (KLOR-CON M) 20 MEQ CR tablet 1/12/2022 at AM Self No Yes   Sig: Take 1 tablet (20 mEq) by mouth daily (take with furosemide/Lasix)   rosuvastatin (CRESTOR) 40 MG tablet 1/11/2022 at PM Self No Yes   Sig: Take 1 tablet (40 mg) by mouth daily   senna-docusate (SENOKOT-S/PERICOLACE) 8.6-50 MG tablet  at PRN Self No Yes   Sig: Take 1 tablet by mouth 2 times daily as needed for constipation   traZODone (DESYREL) 100 MG tablet 1/11/2022 at HS Self No Yes   Sig: TAKE 2 TABLETS (200MG TOTAL) BY MOUTH AT BEDTIME      Facility-Administered Medications: None     Current Facility-Administered Medications   Medication Dose Route Frequency     Current Facility-Administered Medications   Medication Last Rate     heparin 1,200 Units/hr (01/12/22 1812)     nitroGLYcerin Stopped (01/12/22 1916)     Allergies   Allergies   Allergen Reactions     Hydrocodone-Acetaminophen        Social History    reports that he has quit smoking. His smoking use included cigars and cigars. He quit after 10.00 years of use. He has never used smokeless tobacco. He reports that he does not drink alcohol and does not use drugs.    Family History   Family History   Problem Relation Age of Onset     Cerebrovascular Disease Mother      Heart Disease Father      Hodgkin's lymphoma Brother        Review of Systems   The comprehensive 10 point Review of Systems is negative other than noted in the HPI or here.     Physical Exam       BP: 127/73 Pulse: 93   Resp: 13 SpO2: 96 %      Vital Signs with  Ranges  Pulse:  [] 93  Resp:  [8-27] 13  BP: ()/() 127/73  SpO2:  [94 %-100 %] 96 %  313 lbs 6.4 oz    Constitutional: Alert, mild distress initially but comfortable later    Lungs: Normal bilateral breath sounds   Cardiovascular: irregular rate and rhythm, normal S1 and S2, and no murmur   Lymphm node  Neck No enlargement  No jugular vein extension or carotid bruit   Skin: Normal   Extremity: No edema       Data   Results for orders placed or performed during the hospital encounter of 01/12/22 (from the past 24 hour(s))   EKG 12-lead, tracing only   Result Value Ref Range    Systolic Blood Pressure  mmHg    Diastolic Blood Pressure  mmHg    Ventricular Rate 95 BPM    Atrial Rate 357 BPM    AZ Interval  ms    QRS Duration 92 ms     ms    QTc 462 ms    P Axis  degrees    R AXIS 44 degrees    T Axis 19 degrees    Interpretation ECG       Atrial fibrillation  Anteroseptal infarct , age undetermined  Abnormal ECG  When compared with ECG of 07-OCT-2021 10:15,  Atrial fibrillation has replaced Sinus rhythm  Vent. rate has increased BY  43 BPM  Anteroseptal infarct is now Present  Nonspecific T wave abnormality no longer evident in Lateral leads  Confirmed by GENERATED REPORT, COMPUTER ([id number]),  NARESH TRINIDAD ([id number]) on 1/12/2022 4:08:51 PM     CBC with platelets differential    Narrative    The following orders were created for panel order CBC with platelets differential.  Procedure                               Abnormality         Status                     ---------                               -----------         ------                     CBC with platelets and d...[[id number]]  Abnormal            Final result                 Please view results for these tests on the individual orders.   Basic metabolic panel   Result Value Ref Range    Sodium 141 133 - 144 mmol/L    Potassium 3.3 (L) 3.4 - 5.3 mmol/L    Chloride 112 (H) 94 - 109 mmol/L    Carbon Dioxide (CO2) 22 20 - 32 mmol/L    Anion  Gap 7 3 - 14 mmol/L    Urea Nitrogen 16 7 - 30 mg/dL    Creatinine 0.74 0.66 - 1.25 mg/dL    Calcium 7.5 (L) 8.5 - 10.1 mg/dL    Glucose 111 (H) 70 - 99 mg/dL    GFR Estimate >90 >60 mL/min/1.73m2   Troponin I   Result Value Ref Range    Troponin I High Sensitivity 33 <79 ng/L   CBC with platelets and differential   Result Value Ref Range    WBC Count 12.0 (H) 4.0 - 11.0 10e3/uL    RBC Count 5.28 4.40 - 5.90 10e6/uL    Hemoglobin 14.7 13.3 - 17.7 g/dL    Hematocrit 45.0 40.0 - 53.0 %    MCV 85 78 - 100 fL    MCH 27.8 26.5 - 33.0 pg    MCHC 32.7 31.5 - 36.5 g/dL    RDW 13.1 10.0 - 15.0 %    Platelet Count 211 150 - 450 10e3/uL    % Neutrophils 59 %    % Lymphocytes 31 %    % Monocytes 7 %    % Eosinophils 1 %    % Basophils 1 %    % Immature Granulocytes 1 %    NRBCs per 100 WBC 0 <1 /100    Absolute Neutrophils 7.1 1.6 - 8.3 10e3/uL    Absolute Lymphocytes 3.7 0.8 - 5.3 10e3/uL    Absolute Monocytes 0.9 0.0 - 1.3 10e3/uL    Absolute Eosinophils 0.2 0.0 - 0.7 10e3/uL    Absolute Basophils 0.1 0.0 - 0.2 10e3/uL    Absolute Immature Granulocytes 0.1 <=0.4 10e3/uL    Absolute NRBCs 0.0 10e3/uL   Asymptomatic COVID-19 Virus (Coronavirus) by PCR Nasopharyngeal    Specimen: Nasopharyngeal; Swab   Result Value Ref Range    SARS CoV2 PCR Negative Negative    Narrative    Testing was performed using the katja  SARS-CoV-2 & Influenza A/B Assay on the katja  Valerie  System.  This test should be ordered for the detection of SARS-COV-2 in individuals who meet SARS-CoV-2 clinical and/or epidemiological criteria. Test performance is unknown in asymptomatic patients.  This test is for in vitro diagnostic use under the FDA EUA for laboratories certified under CLIA to perform moderate and/or high complexity testing. This test has not been FDA cleared or approved.  A negative test does not rule out the presence of PCR inhibitors in the specimen or target RNA in concentration below the limit of detection for the assay. The possibility of  a false negative should be considered if the patient's recent exposure or clinical presentation suggests COVID-19.  [facility] are certified under the Clinical Laboratory Improvement Amendments of 1988 (CLIA-88) as qualified to perform moderate and/or high complexity laboratory testing.   XR Chest 2 Views    Narrative    EXAM: XR CHEST 2 VW  LOCATION: [facility]  DATE/TIME: 2022 5:29 PM    INDICATION: chest pain  COMPARISON: Chest x-ray 2021      Impression    IMPRESSION: No acute findings. The lungs are clear and there are no pleural effusions. Upper normal heart size. Stable central vascular prominence.   EKG 12 lead   Result Value Ref Range    Systolic Blood Pressure  mmHg    Diastolic Blood Pressure  mmHg    Ventricular Rate 99 BPM    Atrial Rate 267 BPM    AR Interval  ms    QRS Duration 92 ms     ms    QTc 487 ms    P Axis  degrees    R AXIS 24 degrees    T Axis 43 degrees    Interpretation ECG       Atrial fibrillation with premature ventricular or aberrantly conducted complexes  Anteroseptal infarct (cited on or before 2022)  Abnormal ECG  When compared with ECG of 2022 16:02,  No significant change was found  Confirmed by GENERATED REPORT, COMPUTER ([id number]),  Aida Wyman ([id number]) on 2022 10:08:16 PM     Troponin I (now)   Result Value Ref Range    Troponin I High Sensitivity 1,874 (HH) <79 ng/L   Echocardiogram Limited   Result Value Ref Range    LVEF  40-45%     Narrative    [id number]  [id number]  [id number]  [id number]^LAURA^FAUSTINA^FRANKO     [facility]  Echocardiography Laboratory  [address] [place] [address]     Name: KARMEN ERICKSON  MRN: [id number]  : 1955  Study Date: 2022 08:52 PM  Age: 66 yrs  Gender: Male  Patient Location: [facility]  Reason For Study: Chest Pain  Ordering Physician: FAUSTINA SANCHEZ  Referring Physician: Luann Berger  Performed By: Jarod George RDCS     BSA: 2.6  m2  Height: 72 in  Weight: 313 lb  HR: 82  BP: 96/51 mmHg  ______________________________________________________________________________  Procedure  Limited Portable Echo Adult. Optison (NDC #[id number]) given intravenously.  ______________________________________________________________________________  Interpretation Summary     1. Left ventricular systolic function is mildly reduced. The Biplane ejection  fraction is 40-45%. There is severe apical wall hypokinesis.  2. Valves not well seen on this technically limited study.  3. Rhythm is atrial fibrillation.  Compared to the prior study on 2021, there are new wall motion  abnormalities and the EF is lower.  Results discussed with Dr. Zhang from ED at 9.39pm.  ______________________________________________________________________________  Left Ventricle  The left ventricle is normal in size. Left ventricular systolic function is  mildly reduced. The visual ejection fraction is 40-45%. There is severe apical  wall hypokinesis.     Right Ventricle  The right ventricle is mildly dilated. RV not well seen but grossly normal RV  systolic function.     Pericardium  Trivial pericardial effusion.     Rhythm  The rhythm was atrial fibrillation.     ______________________________________________________________________________  MMode/2D Measurements & Calculations  IVSd: 1.5 cm  LVIDd: 4.5 cm  LVIDs: 2.7 cm  LVPWd: 1.5 cm  FS: 40.5 %     LV mass(C)d: 280.9 grams  LV mass(C)dI: 109.0 grams/m2  Ao root diam: 3.9 cm  LA dimension: 4.2 cm  asc Aorta Diam: 3.6 cm  LA/Ao: 1.1  LA Volume (BP): 106.0 ml  LA Volume Index (BP): 41.1 ml/m2  RWT: 0.67     Doppler Measurements & Calculations  MV E max elsie: 74.4 cm/sec     MV dec time: 0.22 sec  PA acc time: 0.13 sec  E/E' av.2  Lateral E/e': 5.8  Medial E/e': 10.5     ______________________________________________________________________________  Report approved by: Leyla Tipton 2022 09:40 PM

## 2022-01-13 NOTE — PROGRESS NOTES
[facility]  Cardiology Progress Note    Date of Service (when I saw the patient): 01/13/2022     Assessment & Plan   Onur Barr is a 66 year old male who was admitted on 1/12/2022. He carries a PMH significant for diabetes, hypertension, hyperlipidemia, obesity and coronary disease status post PCI of proximal and distal RCA after inferior STEMI in 09/2021 followed by staged PCI of in-stent restenosis of proximal LAD (10/2021).     1.  : NSTEMI   - Troponin peaked at > 20,000, trending down   - Hx of remote PCI to LAD, PCI to proximal and distal RCA (9/2021), PCI to LAD severe ISR on 10/7/2021.   - Coronary angiogram today. Risks and benefits of coronary angiogram discussed today including, bleeding, bruising, infection, allergic reaction, kidney damage (including need for dialysis), stroke, heart attack, vascular damage, emergency open heart surgery, up to and including death.  Patient indicates understanding and is agreeable to proceed. Consent signed.   - Chest pain free  - Nitroglycerin drip off d/t hypotension  - Clopidogrel and aspirin  - Heparin drip  - PTA metoprolol  - PTA statin     2.  : Ischemic cardiomyopathy   -Echcardiogram showed mildly reduced EF 40-45%, down from previous 55-60% (9/2021). Severe new apical wall hypokinesis.   - Lasix 40 mg daily  - Metoprolol   - Daily weights  - Strict I/Os  - BNP      3.  : Paroxysmal Atrial Fibrillation   - Rate controlled  - PTA Eliquis ( on hold for cath)    4. Hypertension   - Elevated   - PRN hydralazine    5. Hyperlipidemia  - On statin   6. Diabetes  7. Obesity    Plan   1. Coronary angiogram today  2. EF reduced 40-45%, Wall motion abnormalities  3. Continue PTA metoprolol  4. Resume PTA lasix  5. Resume Anticoagulation post cath   6. Start ACE tomorrow  7. PRN hydralazine for blood pressure    YAMILETH Melton CNP  Text Page  (M-F, 7:30 am - 4:00 pm)    Interval History   Denies chest pain, shortness of breath,  palpitations, orthopnea, presyncope. Troponin trending down . A-fib with controlled rates. Hypertensive this AM, PRN hydralazine. Cath today.         I spent > 10 minutes face-to-face and/or coordinating care. Over 50% of our time on the unit was spent counseling the patient and/or coordinating care.     Physical Exam   Temp: 97.7  F (36.5  C) Temp src: Oral BP: (!) 153/103 (cardiology aware ) Pulse: 82   Resp: 16 SpO2: 95 % O2 Device: None (Room air)    Vitals:    01/12/22 1801 01/13/22 0314   Weight: 142.2 kg (313 lb 6.4 oz) 143.3 kg (316 lb)     Vital Signs with Ranges  Temp:  [97.7  F (36.5  C)-98.1  F (36.7  C)] 97.7  F (36.5  C)  Pulse:  [] 82  Resp:  [8-32] 16  BP: ()/() 153/103  SpO2:  [92 %-100 %] 95 %  No intake/output data recorded.    GEN:  In general, this is a obese male in no acute distress.  Patient ambulatory.  HEENT:  Pupils equal, round. Sclerae nonicteric. Clear oropharynx. Mucous membranes moist.  NECK: Supple, no masses appreciated. Trachea midline. Difficult to assess JVD   C/V:  Regular rate and irregular rhythm, no murmur, rub or gallop. No S3 or RV heave.   RESP: Respirations are unlabored. No use of accessory muscles. Clear to auscultation bilaterally without wheezing, rales, or rhonchi.  GI: Abdomen soft, nontender, nondistended. No HSM appreciated.   EXTREM: Trace bilalteral  LE edema. No cyanosis or clubbing.  NEURO: Alert and oriented, cooperative. . No obvious focal deficits.   PSYCH: Normal affect.  SKIN: Warm and dry. No rashes or petechiae appreciated.       Medications     heparin 1,350 Units/hr (01/13/22 0907)     nitroGLYcerin Stopped (01/12/22 1916)       aspirin  81 mg Oral Daily     clopidogrel  75 mg Oral Daily     dexamethasone  2 mg Oral BID w/meals     DULoxetine  30 mg Oral QAM     DULoxetine  60 mg Oral QPM     furosemide  40 mg Oral Daily     metoprolol succinate ER  75 mg Oral Daily     miconazole   Topical Daily     omeprazole  40 mg Oral Daily      rosuvastatin  40 mg Oral Daily     sodium chloride (PF)  3 mL Intracatheter Q8H     traZODone  200 mg Oral At Bedtime       Data   Reviewed       Lily Kapadia, APRN CNP 1/13/2022

## 2022-01-13 NOTE — PROGRESS NOTES
[facility]    Medicine Progress Note - Hospitalist Service       Date of Admission:  1/12/2022  Assessment & Plan   Onur Barr is a 66 year old male with hx of CAD s/p PCI, chronic a-fib on chronic anticoagulation with apixaban, CHF, HTN, and chronic back pain s/p indwelling pain pump who was admitted on 1/12/2022 for NSTEMI     NSTEMI  History of CAD s/p PCI (LAD, 2012; RCA with later staged intervention, 2021)  Essential hypertension  * PTA: aspirin 81 mg daily, Plavix, metoprolol XL 75 mg daily, lisinopril/HCTZ 40-50 mg daily, Lasix 40 mg daily, rosuvastatin 40 mg daily  * Presented with progressive chest pain reminiscent of previous MI; relieved with nitro. In the ED, he was initiated on IV heparin and nitro infusions, and Cardiology was consulted  * TTE (1/12): EF 40-45% with severe apical wall hypokinesis  - Coronary angiogram today, 1/13  - Continues on IV heparin  - Holding PTA lisinopril-HCTZ, but continues on other PTA meds  - Cardiac rehab  - Cardiology following    Chronic combined systolic and diastolic CHF  TTE findings noted above  - Continues on PTA Lasix    Chronic atrial fibrillation  - Rate-controlled on PTA metoprolol  - Holding PTA apixaban for angiogram; resume once cleared by Cardiology    GERD  - Continues on PTA omeprazole    Chronic low back pain  [PTA: morphine per indwelling pain pump, nabumetone 500 mg BID, duloxetine 30 mg qAM, 60 mg qhs; baclofen bid prn]  - Hold PTA nabumetone in setting of NSTEMI; will have Cardiology weight in on resumption  - Continues on other PTA meds with pain pump in place     Diet: NPO for Medical/Clinical Reasons Except for: Meds, Ice Chips    DVT Prophylaxis: IV heparin  Bran Catheter: Not present  Code Status: Full Code         Disposition: Expected discharge once cleared by Cardiology and cardiac rehab, likely tomorrow (1/14)    The patient's care was discussed with the Patient.    Coleen Concepcion MD  Hospitalist  [facility]  Contact information available via [facility] Paging/Directory    ______________________________________________________________________    Interval History   Admitted yesterday evening. Now off nitro gtt. Denies recurrent chest pain. Denies shortness of breath or dizziness/lightheadedness. Angiogram today    Data reviewed today: I reviewed all medications, new labs and imaging results over the last 24 hours. I personally reviewed the TTE image(s) showing EF 40-45% with severe apical wall hypokinesis.    Physical Exam   Vital Signs: Temp: 97.7  F (36.5  C) Temp src: Oral BP: (!) 132/106 Pulse: 82   Resp: 16 SpO2: 95 % O2 Device: None (Room air)    Weight: 316 lbs 0 oz  Constitutional: Appears comfortable  HEENT: Sclera white, MMM  Respiratory: Breathing non-labored. Lungs CTAB - no wheezes, crackles, or rhonchi  Cardiovascular: Heart irregular rhythm, normal rate. 1+ BLE edema  GI: +BS, abd obese but soft/NT  Skin/Integument: No rash  Musculoskeletal: Normal muscle bulk and tone  Neuro: Alert and appropriate, SLAUGHTER  Psych: Calm and cooperative    Data   Recent Labs   Lab 01/13/22  0546 01/13/22  0315 01/12/22  1622   WBC 7.7  --  12.0*   HGB 13.4  --  14.7   MCV 87  --  85   *  --  211     --  141   POTASSIUM 4.6  --  3.3*   CHLORIDE 109  --  112*   CO2 28  --  22   BUN 16  --  16   CR 0.89  --  0.74   ANIONGAP 2*  --  7   RATNA 8.2*  --  7.5*   GLC 96 96 111*   ALBUMIN 2.6*  --   --    PROTTOTAL 6.3*  --   --    BILITOTAL 0.8  --   --    ALKPHOS 68  --   --    ALT 39  --   --    AST 66*  --   --          Recent Results (from the past 24 hour(s))   XR Chest 2 Views    Narrative    EXAM: XR CHEST 2 VW  LOCATION: [facility]  DATE/TIME: 1/12/2022 5:29 PM    INDICATION: chest pain  COMPARISON: Chest x-ray 09/11/2021      Impression    IMPRESSION: No acute findings. The lungs are clear and there are no pleural effusions. Upper normal heart size.  Stable central vascular prominence.   Echocardiogram Limited   Result Value    LVEF  40-45%    Narrative    [id number]  [id number]  [id number]  [id number]^LAURA^FAUSTINA^FRANKO     [facility]  Echocardiography Laboratory  [address] [place] [address]     Name: KARMEN ERICKSON  MRN: [id number]  : 1955  Study Date: 2022 08:52 PM  Age: 66 yrs  Gender: Male  Patient Location: [facility]  Reason For Study: Chest Pain  Ordering Physician: FAUSTINA ZHANG  Referring Physician: Luann Berger  Performed By: Jarod George RDCS     BSA: 2.6 m2  Height: 72 in  Weight: 313 lb  HR: 82  BP: 96/51 mmHg  ______________________________________________________________________________  Procedure  Limited Portable Echo Adult. Optison (NDC #1105-8169) given intravenously.  ______________________________________________________________________________  Interpretation Summary     1. Left ventricular systolic function is mildly reduced. The Biplane ejection  fraction is 40-45%. There is severe apical wall hypokinesis.  2. Valves not well seen on this technically limited study.  3. Rhythm is atrial fibrillation.  Compared to the prior study on 2021, there are new wall motion  abnormalities and the EF is lower.  Results discussed with Dr. Zhang from ED at 9.39pm.  ______________________________________________________________________________  Left Ventricle  The left ventricle is normal in size. Left ventricular systolic function is  mildly reduced. The visual ejection fraction is 40-45%. There is severe apical  wall hypokinesis.     Right Ventricle  The right ventricle is mildly dilated. RV not well seen but grossly normal RV  systolic function.     Pericardium  Trivial pericardial effusion.     Rhythm  The rhythm was atrial fibrillation.     ______________________________________________________________________________  MMode/2D Measurements & Calculations  IVSd: 1.5 cm  LVIDd: 4.5 cm  LVIDs: 2.7 cm  LVPWd:  1.5 cm  FS: 40.5 %     LV mass(C)d: 280.9 grams  LV mass(C)dI: 109.0 grams/m2  Ao root diam: 3.9 cm  LA dimension: 4.2 cm  asc Aorta Diam: 3.6 cm  LA/Ao: 1.1  LA Volume (BP): 106.0 ml  LA Volume Index (BP): 41.1 ml/m2  RWT: 0.67     Doppler Measurements & Calculations  MV E max elsie: 74.4 cm/sec     MV dec time: 0.22 sec  PA acc time: 0.13 sec  E/E' av.2  Lateral E/e': 5.8  Medial E/e': 10.5     ______________________________________________________________________________  Report approved by: Leyla Tipton 2022 09:40 PM             Medications     heparin 1,350 Units/hr (22)     nitroGLYcerin Stopped (22)       aspirin  81 mg Oral Daily     clopidogrel  75 mg Oral Daily     dexamethasone  2 mg Oral BID w/meals     DULoxetine  30 mg Oral QAM     DULoxetine  60 mg Oral QPM     furosemide  40 mg Oral Daily     metoprolol succinate ER  75 mg Oral Daily     miconazole   Topical Daily     nabumetone  500 mg Oral BID     omeprazole  40 mg Oral Daily     rosuvastatin  40 mg Oral Daily     sodium chloride (PF)  3 mL Intracatheter Q8H     traZODone  200 mg Oral At Bedtime

## 2022-01-14 ENCOUNTER — PREP FOR PROCEDURE (OUTPATIENT)
Dept: CARDIOLOGY | Facility: CLINIC | Age: 67
End: 2022-01-14
Payer: COMMERCIAL

## 2022-01-14 ENCOUNTER — APPOINTMENT (OUTPATIENT)
Dept: ULTRASOUND IMAGING | Facility: CLINIC | Age: 67
End: 2022-01-14
Attending: SURGERY
Payer: COMMERCIAL

## 2022-01-14 ENCOUNTER — APPOINTMENT (OUTPATIENT)
Dept: CT IMAGING | Facility: CLINIC | Age: 67
End: 2022-01-14
Attending: SURGERY
Payer: COMMERCIAL

## 2022-01-14 DIAGNOSIS — I25.10 CAD (CORONARY ARTERY DISEASE): Primary | ICD-10-CM

## 2022-01-14 LAB
ANION GAP SERPL CALCULATED.3IONS-SCNC: 5 MMOL/L (ref 3–14)
APTT PPP: 37 SECONDS (ref 22–38)
APTT PPP: 42 SECONDS (ref 22–38)
APTT PPP: 55 SECONDS (ref 22–38)
BUN SERPL-MCNC: 14 MG/DL (ref 7–30)
CALCIUM SERPL-MCNC: 8.6 MG/DL (ref 8.5–10.1)
CHLORIDE BLD-SCNC: 110 MMOL/L (ref 94–109)
CHOLEST SERPL-MCNC: 105 MG/DL
CO2 SERPL-SCNC: 23 MMOL/L (ref 20–32)
CREAT SERPL-MCNC: 0.83 MG/DL (ref 0.66–1.25)
ERYTHROCYTE [DISTWIDTH] IN BLOOD BY AUTOMATED COUNT: 13 % (ref 10–15)
GFR SERPL CREATININE-BSD FRML MDRD: >90 ML/MIN/1.73M2
GLUCOSE BLD-MCNC: 142 MG/DL (ref 70–99)
GLUCOSE BLDC GLUCOMTR-MCNC: 109 MG/DL (ref 70–99)
GLUCOSE BLDC GLUCOMTR-MCNC: 153 MG/DL (ref 70–99)
HCT VFR BLD AUTO: 39.7 % (ref 40–53)
HDLC SERPL-MCNC: 44 MG/DL
HGB BLD-MCNC: 13 G/DL (ref 13.3–17.7)
LACTATE SERPL-SCNC: 1.5 MMOL/L (ref 0.7–2)
LDLC SERPL CALC-MCNC: 38 MG/DL
MCH RBC QN AUTO: 28.1 PG (ref 26.5–33)
MCHC RBC AUTO-ENTMCNC: 32.7 G/DL (ref 31.5–36.5)
MCV RBC AUTO: 86 FL (ref 78–100)
NONHDLC SERPL-MCNC: 61 MG/DL
PLATELET # BLD AUTO: 164 10E3/UL (ref 150–450)
POTASSIUM BLD-SCNC: 3.6 MMOL/L (ref 3.4–5.3)
RBC # BLD AUTO: 4.62 10E6/UL (ref 4.4–5.9)
SODIUM SERPL-SCNC: 138 MMOL/L (ref 133–144)
TRIGL SERPL-MCNC: 116 MG/DL
TROPONIN I SERPL HS-MCNC: 3270 NG/L
WBC # BLD AUTO: 9.1 10E3/UL (ref 4–11)

## 2022-01-14 PROCEDURE — 250N000013 HC RX MED GY IP 250 OP 250 PS 637: Performed by: HOSPITALIST

## 2022-01-14 PROCEDURE — 250N000011 HC RX IP 250 OP 636: Performed by: INTERNAL MEDICINE

## 2022-01-14 PROCEDURE — 85730 THROMBOPLASTIN TIME PARTIAL: CPT | Performed by: INTERNAL MEDICINE

## 2022-01-14 PROCEDURE — 83605 ASSAY OF LACTIC ACID: CPT | Performed by: HOSPITALIST

## 2022-01-14 PROCEDURE — 250N000011 HC RX IP 250 OP 636: Performed by: HOSPITALIST

## 2022-01-14 PROCEDURE — 210N000002 HC R&B HEART CARE

## 2022-01-14 PROCEDURE — 99232 SBSQ HOSP IP/OBS MODERATE 35: CPT | Mod: 25 | Performed by: INTERNAL MEDICINE

## 2022-01-14 PROCEDURE — 93005 ELECTROCARDIOGRAM TRACING: CPT

## 2022-01-14 PROCEDURE — 36415 COLL VENOUS BLD VENIPUNCTURE: CPT | Performed by: INTERNAL MEDICINE

## 2022-01-14 PROCEDURE — 93970 EXTREMITY STUDY: CPT

## 2022-01-14 PROCEDURE — 36415 COLL VENOUS BLD VENIPUNCTURE: CPT | Performed by: HOSPITALIST

## 2022-01-14 PROCEDURE — 99232 SBSQ HOSP IP/OBS MODERATE 35: CPT | Performed by: INTERNAL MEDICINE

## 2022-01-14 PROCEDURE — 80061 LIPID PANEL: CPT | Performed by: HOSPITALIST

## 2022-01-14 PROCEDURE — 71250 CT THORAX DX C-: CPT

## 2022-01-14 PROCEDURE — 84484 ASSAY OF TROPONIN QUANT: CPT | Performed by: INTERNAL MEDICINE

## 2022-01-14 PROCEDURE — 82310 ASSAY OF CALCIUM: CPT | Performed by: INTERNAL MEDICINE

## 2022-01-14 PROCEDURE — 85730 THROMBOPLASTIN TIME PARTIAL: CPT | Performed by: HOSPITALIST

## 2022-01-14 PROCEDURE — 85027 COMPLETE CBC AUTOMATED: CPT | Performed by: INTERNAL MEDICINE

## 2022-01-14 PROCEDURE — 93880 EXTRACRANIAL BILAT STUDY: CPT

## 2022-01-14 RX ORDER — MORPHINE SULFATE 15 MG/1
15 TABLET ORAL EVERY 8 HOURS PRN
Status: DISCONTINUED | OUTPATIENT
Start: 2022-01-14 | End: 2022-01-19

## 2022-01-14 RX ADMIN — METOPROLOL SUCCINATE 75 MG: 50 TABLET, EXTENDED RELEASE ORAL at 08:13

## 2022-01-14 RX ADMIN — TRAZODONE HYDROCHLORIDE 200 MG: 100 TABLET ORAL at 21:50

## 2022-01-14 RX ADMIN — ACETAMINOPHEN 650 MG: 325 TABLET, FILM COATED ORAL at 03:09

## 2022-01-14 RX ADMIN — DEXAMETHASONE 2 MG: 2 TABLET ORAL at 08:14

## 2022-01-14 RX ADMIN — HYDRALAZINE HYDROCHLORIDE 10 MG: 20 INJECTION INTRAMUSCULAR; INTRAVENOUS at 02:19

## 2022-01-14 RX ADMIN — OMEPRAZOLE 40 MG: 20 CAPSULE, DELAYED RELEASE ORAL at 08:13

## 2022-01-14 RX ADMIN — ASPIRIN 81 MG: 81 TABLET, COATED ORAL at 08:14

## 2022-01-14 RX ADMIN — DULOXETINE 60 MG: 60 CAPSULE, DELAYED RELEASE ORAL at 21:49

## 2022-01-14 RX ADMIN — MORPHINE SULFATE 2 MG: 2 INJECTION, SOLUTION INTRAMUSCULAR; INTRAVENOUS at 03:53

## 2022-01-14 RX ADMIN — DULOXETINE 30 MG: 30 CAPSULE, DELAYED RELEASE ORAL at 08:13

## 2022-01-14 RX ADMIN — Medication 1 MG: at 00:00

## 2022-01-14 RX ADMIN — ROSUVASTATIN CALCIUM 40 MG: 20 TABLET, FILM COATED ORAL at 08:13

## 2022-01-14 RX ADMIN — DEXAMETHASONE 2 MG: 2 TABLET ORAL at 18:42

## 2022-01-14 ASSESSMENT — ACTIVITIES OF DAILY LIVING (ADL)
ADLS_ACUITY_SCORE: 6

## 2022-01-14 NOTE — PLAN OF CARE
A/O x4, VSS RA., PRN hydralazine given for Elevated BP, Tele Afib CVR, denies chest pain. Headache and chronic back pain manged with tylenol and morphine, right radial site CMS intact, Ax1, CAB pending next week

## 2022-01-14 NOTE — PROGRESS NOTES
[facility]    Medicine Progress Note - Hospitalist Service       Date of Admission:  1/12/2022  Assessment & Plan   Onur Barr is a 66 year old male with hx of CAD s/p PCI, chronic a-fib on chronic anticoagulation with apixaban, CHF, HTN, and chronic back pain s/p indwelling pain pump who was admitted on 1/12/2022 for NSTEMI. He was found to have severe in-stent re-stenosis of mid-LAD and severe stenosis distal to previously stents, and is currently awaiting CABG    NSTEMI  History of CAD s/p PCI (LAD, 2012; RCA with later staged intervention, 2021)  Essential hypertension  * PTA: aspirin 81 mg daily, Plavix, metoprolol XL 75 mg daily, lisinopril/HCTZ 40-50 mg daily, Lasix 40 mg daily, rosuvastatin 40 mg daily  * Presented with progressive chest pain reminiscent of previous MI; relieved with nitro. In the ED, he was initiated on IV heparin and nitro infusions, and Cardiology was consulted  * TTE (1/12) showed EF 40-45% with severe apical wall hypokinesis  * Coronary angiogram (1/13) showed LAD disease with severe in-stent re-stenosis of mid-LAD and severe stenosis distal to previously stents; balloon angioplasties of stenoses were attempted, but with sub-optimal results  * CV Surgery was consulted following angiogram findings  - CABG tentatively planned on Mon, 1/17  - Continues on IV heparin  - Holding PTA Plavix for CABG   - Holding PTA lisinopril-HCTZ, but continues on other PTA meds  - Cardiology following    Chronic combined systolic and diastolic CHF  TTE findings noted above  - Continues on PTA Lasix    Chronic atrial fibrillation  - Rate-controlled on PTA metoprolol  - Holding PTA apixaban for CABG    GERD  - Continues on PTA omeprazole    Chronic low back pain  [PTA: morphine per indwelling pain pump, nabumetone 500 mg BID, duloxetine 30 mg qAM, 60 mg qhs; APAP prn; baclofen bid prn]  - Hold PTA nabumetone in setting of NSTEMI  - Continues on other PTA meds with pain pump in  place     Diet: Low Saturated Fat Na <2400 mg    DVT Prophylaxis: IV heparin  Bran Catheter: Not present  Code Status: Full Code         Disposition: Expected discharge once cleared by CV Surgery, 4-7 more days    The patient's care was discussed with the Patient.    Coleen Concepcion MD  Hospitalist Service  [facility]  Contact information available via [facility] Paging/Directory    ______________________________________________________________________    Interval History   No events overnight. Angiogram results noted. Denies recurrent chest pain. Denies shortness of breath or dizziness/lightheadedness. Awaiting CABG    Data reviewed today: I reviewed all medications, new labs and imaging results over the last 24 hours. I personally reviewed the coronary angiogram image(s) showing severe in-stent re-stenosis of mid-LAD and severe stenosis distal to previously stents.    Physical Exam   Vital Signs: Temp: 97.9  F (36.6  C) Temp src: Oral BP: (!) 133/97 Pulse: 73   Resp: 18 SpO2: 96 % O2 Device: None (Room air) Oxygen Delivery: 20 LPM  Weight: 313 lbs 8 oz  Constitutional: Appears comfortable  HEENT: Sclera white, MMM  Respiratory: Breathing non-labored. Lungs CTAB - no wheezes, crackles, or rhonchi  Cardiovascular: Heart irregular rhythm, normal rate. 1+ BLE edema  GI: +BS, abd obese but soft/NT  Skin/Integument: No rash  Musculoskeletal: Normal muscle bulk and tone  Neuro: Alert and appropriate, SLAUGHTER  Psych: Calm and cooperative    Data   Recent Labs   Lab 01/14/22  0731 01/14/22  0438 01/13/22  2109 01/13/22  1658 01/13/22  0546 01/13/22  0315 01/12/22  1622   WBC  --  9.1  --   --  7.7  --  12.0*   HGB  --  13.0*  --   --  13.4  --  14.7   MCV  --  86  --   --  87  --  85   PLT  --  164  --   --  113*  --  211   NA  --  138  --   --  139  --  141   POTASSIUM  --  3.6  --   --  4.6  --  3.3*   CHLORIDE  --  110*  --   --  109  --  112*   CO2  --  23  --   --  28  --  22   BUN  --  14  --    --  16  --  16   CR  --  0.83  --   --  0.89  --  0.74   ANIONGAP  --  5  --   --  2*  --  7   RATNA  --  8.6  --   --  8.2*  --  7.5*   * 142* 119*   < > 96   < > 111*   ALBUMIN  --   --   --   --  2.6*  --   --    PROTTOTAL  --   --   --   --  6.3*  --   --    BILITOTAL  --   --   --   --  0.8  --   --    ALKPHOS  --   --   --   --  68  --   --    ALT  --   --   --   --  39  --   --    AST  --   --   --   --  66*  --   --     < > = values in this interval not displayed.         Recent Results (from the past 24 hour(s))   Cardiac Catheterization    Narrative    1. Severe in-stent restenosis of the mid LAD and severe stenosis distal to   the previously placed stents.  2.  Patent stents in the right coronary artery with moderate stenosis of   the large RPDA and RPL a branches  3.  Balloon angioplasty and Cutting Balloon angioplasty of the in-stent   restenosis in mid LAD stenosis with suboptimal result due to very severe   stenosis with multiple layers of stent which were underexpanded due to   severe disease.    4.  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    This fistula was mildly disrupted causing a perforation in the forearm.    The radial artery was able to be accessed proximal to the lesion which was   compressed manually.  5..  Difficult procedure due to patient with severe back pain and   difficult imaging due to body habitus with morbid obesity.         Medications     - MEDICATION INSTRUCTIONS -       - MEDICATION INSTRUCTIONS -       heparin 1,650 Units/hr (01/14/22 0540)     nitroGLYcerin 50 mg in D5W 250 mL       Percutaneous Coronary Intervention orders placed (this is information for BPA alerting)       Reason antiplatelet medication not selected         aspirin  81 mg Oral Daily     dexamethasone  2 mg Oral BID w/meals     DULoxetine  30 mg Oral QAM     DULoxetine  60 mg Oral QPM     furosemide  40 mg Oral Daily     metoprolol succinate ER  75 mg Oral Daily      miconazole   Topical Daily     omeprazole  40 mg Oral Daily     rosuvastatin  40 mg Oral Daily     sodium chloride (PF)  3 mL Intracatheter Q8H     traZODone  200 mg Oral At Bedtime

## 2022-01-14 NOTE — CONSULTS
"Consult received - \"Dietitian to see for Heart Healthy Diet education\"  - Noted pt is awaiting CABG and recently received education in Sept 2021. Defer ed review as needed until post op.     Allison Colon RD, LD  [facility] [place], [facility], Ortho, Ortho Spine  Pager: [phone]  Weekend Pager: [phone]    "

## 2022-01-14 NOTE — PLAN OF CARE
Pt is A&Ox4, denies CP and Nitroglycerine gtt held, VSS and on tele controlled A-flutter with PVCs, Heparin gtt re-started at 1350 units/hr, on RA and LS clear and fine crackles in LLL noted, R radial site C/D/I and ecchymotic, up with assist of 1, plan for CAB next week - Plavix discontinued per Dr. Jamey MEZA.

## 2022-01-14 NOTE — PROGRESS NOTES
[facility]    Cardiology Progress Note    Assessment  Onur Barr is a 66 year old male who was admitted on 1/12/2022 with non-STEMI    1. Severe instent restenosis of LAD s/p temporizing balloon angioplasty   2. Ischemic cardiomyopathy ejection fraction 40 to 45% with severe apical ischemia prior to balloon angioplasty   3. right radial hematoma with small AV fistula (presumed due to multiple previous access in the right radial artery). Hematoma resolved. Mild tenderness on exam, but no bruit, forearm is soft.  4. Hypertension  5. Dyslipidemia on rosuvastatin  6. Afib, on BB and heparin (usually on apixaban)  7. DM2  8. Obesity  9. Reviewed chronic back pain    Plan  1. awaiting CABG which is tentatively planned for Wed 1/19/21  2. Continue heparin drip.   3. No additional imaging or evaluation of the right forearm recommended unless there is clinical change.  Reviewed procedural images with interventional radiology.  4. Continue aspirin, metoprolol, rosuvastatin, furosemide.  5. As needed nitroglycerin drip for recurrence of chest pain or significant hypertension.  6. Plavix discontinued prior to surgery.  7. Cangrelor drip: The patient was given a Plavix load within the last 24 hours so I do not believe this needs to be initiated today, but would recommend starting it tomorrow or for symptoms of recurrent ischemia. Dose is 0.75 mg/kg/h and should be discontinued 1 to 6 hours prior to CV surgery and there is an order built into epic.  Discussed with pharmacy.            Liz Pearl MD    Interval History   Did well overnight.  Heparin was not ordered postprocedure due to concern for forearm hematoma, but was resumed overnight by on-call physician and fortunately he has done well.  Nitroglycerin was discontinued due to a brief episode of asymptomatic hypotension also post procedure but a as needed order for nitroglycerin is in place as he was significantly hypertensive  yesterday.    Was seen by CV surgery and plan is for surgery next week.  I reviewed the plan and test results with the patient in detail and his questions were answered.  He is feeling well and has no chest pain.  He does have exertional dyspnea which is probably a bit worse than his baseline when walking in the hallway.  He has no edema and right forearm is tender but not painful.    Physical Exam   Temp: 98.1  F (36.7  C) Temp src: Oral BP: 119/76 Pulse: 67   Resp: 18 SpO2: 96 % O2 Device: None (Room air) Oxygen Delivery: 20 LPM  Vitals:    01/12/22 1801 01/13/22 0314 01/14/22 0335   Weight: 142.2 kg (313 lb 6.4 oz) 143.3 kg (316 lb) 142.2 kg (313 lb 8 oz)     Vital Signs with Ranges  Temp:  [96.6  F (35.9  C)-99.2  F (37.3  C)] 98.1  F (36.7  C)  Pulse:  [] 67  Resp:  [10-26] 18  BP: ()/() 119/76  SpO2:  [95 %-100 %] 96 %  I/O last 3 completed shifts:  In: 1240 [P.O.:240; I.V.:1000]  Out: 1675 [Urine:1675]      Constitutional: NAD  Eyes: clear sclera  ENT: Mucous membranes are moist.   Respiratory: clear bilat  Cardiovascular: regular, distant  GI: BS abdomen bowel sounds present  Skin: No edema.  Right forearm access site with tenderness to palpation but no bruit, no hematoma.  Right forearm appears mildly swollen with mild ecchymosis but is soft, clean, dry and intact.  Psychiatric: Well affect    Medications     - MEDICATION INSTRUCTIONS -       - MEDICATION INSTRUCTIONS -       heparin 1,650 Units/hr (01/14/22 1022)     nitroGLYcerin 50 mg in D5W 250 mL       Percutaneous Coronary Intervention orders placed (this is information for BPA alerting)       Reason antiplatelet medication not selected         aspirin  81 mg Oral Daily     dexamethasone  2 mg Oral BID w/meals     DULoxetine  30 mg Oral QAM     DULoxetine  60 mg Oral QPM     furosemide  40 mg Oral Daily     metoprolol succinate ER  75 mg Oral Daily     miconazole   Topical Daily     omeprazole  40 mg Oral Daily     rosuvastatin  40  "mg Oral Daily     sodium chloride (PF)  3 mL Intracatheter Q8H     traZODone  200 mg Oral At Bedtime       Data   Results for orders placed or performed during the hospital encounter of 01/12/22 (from the past 24 hour(s))   Activated clotting time celite, POCT   Result Value Ref Range    Activated Clotting Time (Celite) POCT 331 (H) 74 - 150 seconds   Cardiac Catheterization    Narrative    1. Severe in-stent restenosis of the mid LAD and severe stenosis distal to   the previously placed stents.  2.  Patent stents in the right coronary artery with moderate stenosis of   the large RPDA and RPL a branches  3.  Balloon angioplasty and Cutting Balloon angioplasty of the in-stent   restenosis in mid LAD stenosis with suboptimal result due to very severe   stenosis with multiple layers of stent which were underexpanded due to   severe disease.    4.  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    This fistula was mildly disrupted causing a perforation in the forearm.    The radial artery was able to be accessed proximal to the lesion which was   compressed manually.  5..  Difficult procedure due to patient with severe back pain and   difficult imaging due to body habitus with morbid obesity.     Nutrition Services Adult IP Consult    Narrative    Allison Colon RD, MATEUS     1/14/2022 10:29 AM  Consult received - \"Dietitian to see for Heart Healthy Diet   education\"  - Noted pt is awaiting CABG and recently received education in   Sept 2021. Defer ed review as needed until post op.     Allison Colon RD, MATEUS  [facility], [facility]o, Ortho Spine  Pager: [phone]  Weekend Pager: [phone]     EKG 12-lead, tracing only   Result Value Ref Range    Systolic Blood Pressure  mmHg    Diastolic Blood Pressure  mmHg    Ventricular Rate 83 BPM    Atrial Rate 79 BPM    ME Interval  ms    QRS Duration 96 ms     ms    QTc 561 ms    P Axis  degrees    R AXIS -74 degrees    T Axis 150 degrees    " Interpretation ECG       Atrial fibrillation with premature ventricular or aberrantly conducted complexes  Left anterior fascicular block  Possible Inferior infarct , age undetermined  Anteroseptal infarct (cited on or before 12-JAN-2022)  T wave abnormality, consider lateral ischemia  Prolonged QT  Abnormal ECG  When compared with ECG of 13-JAN-2022 10:29, (unconfirmed)  Left anterior fascicular block is now Present  Borderline criteria for Inferior infarct are now Present  Serial changes of Anteroseptal infarct Present     Glucose by meter   Result Value Ref Range    GLUCOSE BY METER POCT 130 (H) 70 - 99 mg/dL   Troponin I   Result Value Ref Range    Troponin I High Sensitivity 4,155 (HH) <79 ng/L   Glucose by meter   Result Value Ref Range    GLUCOSE BY METER POCT 119 (H) 70 - 99 mg/dL   CBC with platelets   Result Value Ref Range    WBC Count 9.1 4.0 - 11.0 10e3/uL    RBC Count 4.62 4.40 - 5.90 10e6/uL    Hemoglobin 13.0 (L) 13.3 - 17.7 g/dL    Hematocrit 39.7 (L) 40.0 - 53.0 %    MCV 86 78 - 100 fL    MCH 28.1 26.5 - 33.0 pg    MCHC 32.7 31.5 - 36.5 g/dL    RDW 13.0 10.0 - 15.0 %    Platelet Count 164 150 - 450 10e3/uL   Partial thromboplastin time   Result Value Ref Range    aPTT 37 22 - 38 Seconds   Basic metabolic panel   Result Value Ref Range    Sodium 138 133 - 144 mmol/L    Potassium 3.6 3.4 - 5.3 mmol/L    Chloride 110 (H) 94 - 109 mmol/L    Carbon Dioxide (CO2) 23 20 - 32 mmol/L    Anion Gap 5 3 - 14 mmol/L    Urea Nitrogen 14 7 - 30 mg/dL    Creatinine 0.83 0.66 - 1.25 mg/dL    Calcium 8.6 8.5 - 10.1 mg/dL    Glucose 142 (H) 70 - 99 mg/dL    GFR Estimate >90 >60 mL/min/1.73m2   Troponin I   Result Value Ref Range    Troponin I High Sensitivity 3,270 (HH) <79 ng/L   Lipid Profile   Result Value Ref Range    Cholesterol 105 <200 mg/dL    Triglycerides 116 <150 mg/dL    Direct Measure HDL 44 >=40 mg/dL    LDL Cholesterol Calculated 38 <=100 mg/dL    Non HDL Cholesterol 61 <130 mg/dL    Narrative     Cholesterol  Desirable:  <200 mg/dL    Triglycerides  Normal:  Less than 150 mg/dL  Borderline High:  150-199 mg/dL  High:  200-499 mg/dL  Very High:  Greater than or equal to 500 mg/dL    Direct Measure HDL  Female:  Greater than or equal to 50 mg/dL   Male:  Greater than or equal to 40 mg/dL    LDL Cholesterol  Desirable:  <100mg/dL  Above Desirable:  100-129 mg/dL   Borderline High:  130-159 mg/dL   High:  160-189 mg/dL   Very High:  >= 190 mg/dL    Non HDL Cholesterol  Desirable:  130 mg/dL  Above Desirable:  130-159 mg/dL  Borderline High:  160-189 mg/dL  High:  190-219 mg/dL  Very High:  Greater than or equal to 220 mg/dL   Lactic Acid STAT   Result Value Ref Range    Lactic Acid 1.5 0.7 - 2.0 mmol/L   EKG 12-lead, tracing only   Result Value Ref Range    Systolic Blood Pressure  mmHg    Diastolic Blood Pressure  mmHg    Ventricular Rate 81 BPM    Atrial Rate 250 BPM    SD Interval  ms    QRS Duration 100 ms     ms    QTc 580 ms    P Axis  degrees    R AXIS 0 degrees    T Axis 205 degrees    Interpretation ECG       Atrial fibrillation  Anteroseptal infarct (cited on or before 12-JAN-2022)  T wave abnormality, consider inferolateral ischemia  Prolonged QT  Abnormal ECG  When compared with ECG of 13-JAN-2022 14:53, (unconfirmed)  Left anterior fascicular block is no longer Present  Borderline criteria for Inferior infarct are no longer Present  Serial changes of Anteroseptal infarct Present     Glucose by meter   Result Value Ref Range    GLUCOSE BY METER POCT 109 (H) 70 - 99 mg/dL   Partial thromboplastin time   Result Value Ref Range    aPTT 55 (H) 22 - 38 Seconds   Glucose by meter   Result Value Ref Range    GLUCOSE BY METER POCT 153 (H) 70 - 99 mg/dL

## 2022-01-14 NOTE — PROGRESS NOTES
SPIRITUAL HEALTH SERVICES Progress Note    Introduced [facility] to patient, he declined visit at this time. Patient is aware of how to request a visit in the future, if desired.    Plan: [facility] remains available for support.    Marilyn Patton   Intern   Pager:[phone]

## 2022-01-14 NOTE — PROVIDER NOTIFICATION
Get TORB from Dr. Concepcion to restart heparin gtt at 1350 units/hr without bolus and order RN managed order set. Will continue to monitor.    2147: paged Dr. Concepcion due to pharmacy noted that pt has plan for CAB and order for Plavix, get TORB from Dr. Concepcion to discontinue Plavix.

## 2022-01-15 PROBLEM — R07.9 CHEST PAIN, UNSPECIFIED TYPE: Status: RESOLVED | Noted: 2022-01-12 | Resolved: 2022-01-15

## 2022-01-15 PROBLEM — I50.32 CHRONIC DIASTOLIC HEART FAILURE (H): Status: ACTIVE | Noted: 2021-07-26

## 2022-01-15 PROBLEM — I48.0 PAROXYSMAL ATRIAL FIBRILLATION (H): Status: ACTIVE | Noted: 2021-09-11

## 2022-01-15 PROBLEM — E66.01 MORBID OBESITY (H): Status: ACTIVE | Noted: 2021-07-13

## 2022-01-15 PROBLEM — G47.33 OSA (OBSTRUCTIVE SLEEP APNEA): Status: ACTIVE | Noted: 2021-07-26

## 2022-01-15 LAB
ALBUMIN UR-MCNC: NEGATIVE MG/DL
ANION GAP SERPL CALCULATED.3IONS-SCNC: 7 MMOL/L (ref 3–14)
APPEARANCE UR: CLEAR
APTT PPP: 52 SECONDS (ref 22–38)
APTT PPP: 58 SECONDS (ref 22–38)
APTT PPP: 58 SECONDS (ref 22–38)
BILIRUB UR QL STRIP: NEGATIVE
BUN SERPL-MCNC: 16 MG/DL (ref 7–30)
CALCIUM SERPL-MCNC: 8.8 MG/DL (ref 8.5–10.1)
CHLORIDE BLD-SCNC: 107 MMOL/L (ref 94–109)
CO2 SERPL-SCNC: 24 MMOL/L (ref 20–32)
COLOR UR AUTO: ABNORMAL
CREAT SERPL-MCNC: 0.9 MG/DL (ref 0.66–1.25)
ERYTHROCYTE [DISTWIDTH] IN BLOOD BY AUTOMATED COUNT: 13.2 % (ref 10–15)
GFR SERPL CREATININE-BSD FRML MDRD: >90 ML/MIN/1.73M2
GLUCOSE BLD-MCNC: 136 MG/DL (ref 70–99)
GLUCOSE UR STRIP-MCNC: NEGATIVE MG/DL
HBA1C MFR BLD: 5.4 % (ref 0–5.6)
HCT VFR BLD AUTO: 42 % (ref 40–53)
HGB BLD-MCNC: 13.3 G/DL (ref 13.3–17.7)
HGB UR QL STRIP: NEGATIVE
KETONES UR STRIP-MCNC: NEGATIVE MG/DL
LEUKOCYTE ESTERASE UR QL STRIP: NEGATIVE
MCH RBC QN AUTO: 28.2 PG (ref 26.5–33)
MCHC RBC AUTO-ENTMCNC: 31.7 G/DL (ref 31.5–36.5)
MCV RBC AUTO: 89 FL (ref 78–100)
MUCOUS THREADS #/AREA URNS LPF: PRESENT /LPF
NITRATE UR QL: NEGATIVE
PH UR STRIP: 6 [PH] (ref 5–7)
PLATELET # BLD AUTO: 166 10E3/UL (ref 150–450)
POTASSIUM BLD-SCNC: 3.9 MMOL/L (ref 3.4–5.3)
RBC # BLD AUTO: 4.71 10E6/UL (ref 4.4–5.9)
RBC URINE: <1 /HPF
SODIUM SERPL-SCNC: 138 MMOL/L (ref 133–144)
SP GR UR STRIP: 1.02 (ref 1–1.03)
TROPONIN I SERPL HS-MCNC: 1859 NG/L
UROBILINOGEN UR STRIP-MCNC: NORMAL MG/DL
WBC # BLD AUTO: 9.2 10E3/UL (ref 4–11)
WBC URINE: 1 /HPF

## 2022-01-15 PROCEDURE — 250N000011 HC RX IP 250 OP 636: Performed by: INTERNAL MEDICINE

## 2022-01-15 PROCEDURE — 85027 COMPLETE CBC AUTOMATED: CPT | Performed by: EMERGENCY MEDICINE

## 2022-01-15 PROCEDURE — 99233 SBSQ HOSP IP/OBS HIGH 50: CPT | Performed by: INTERNAL MEDICINE

## 2022-01-15 PROCEDURE — 36415 COLL VENOUS BLD VENIPUNCTURE: CPT | Performed by: INTERNAL MEDICINE

## 2022-01-15 PROCEDURE — 81001 URINALYSIS AUTO W/SCOPE: CPT | Performed by: SURGERY

## 2022-01-15 PROCEDURE — 93005 ELECTROCARDIOGRAM TRACING: CPT

## 2022-01-15 PROCEDURE — 85730 THROMBOPLASTIN TIME PARTIAL: CPT | Performed by: INTERNAL MEDICINE

## 2022-01-15 PROCEDURE — 250N000011 HC RX IP 250 OP 636: Performed by: HOSPITALIST

## 2022-01-15 PROCEDURE — 258N000003 HC RX IP 258 OP 636: Performed by: INTERNAL MEDICINE

## 2022-01-15 PROCEDURE — C9460 INJECTION, CANGRELOR: HCPCS | Performed by: INTERNAL MEDICINE

## 2022-01-15 PROCEDURE — 82310 ASSAY OF CALCIUM: CPT | Performed by: INTERNAL MEDICINE

## 2022-01-15 PROCEDURE — 83036 HEMOGLOBIN GLYCOSYLATED A1C: CPT | Performed by: SURGERY

## 2022-01-15 PROCEDURE — 84484 ASSAY OF TROPONIN QUANT: CPT | Performed by: INTERNAL MEDICINE

## 2022-01-15 PROCEDURE — 85730 THROMBOPLASTIN TIME PARTIAL: CPT | Performed by: HOSPITALIST

## 2022-01-15 PROCEDURE — 210N000002 HC R&B HEART CARE

## 2022-01-15 PROCEDURE — 250N000013 HC RX MED GY IP 250 OP 250 PS 637: Performed by: HOSPITALIST

## 2022-01-15 PROCEDURE — 36415 COLL VENOUS BLD VENIPUNCTURE: CPT | Performed by: HOSPITALIST

## 2022-01-15 PROCEDURE — 99232 SBSQ HOSP IP/OBS MODERATE 35: CPT | Performed by: INTERNAL MEDICINE

## 2022-01-15 RX ADMIN — DULOXETINE 60 MG: 60 CAPSULE, DELAYED RELEASE ORAL at 21:32

## 2022-01-15 RX ADMIN — OMEPRAZOLE 40 MG: 20 CAPSULE, DELAYED RELEASE ORAL at 09:02

## 2022-01-15 RX ADMIN — TRAZODONE HYDROCHLORIDE 200 MG: 100 TABLET ORAL at 21:32

## 2022-01-15 RX ADMIN — SENNOSIDES AND DOCUSATE SODIUM 1 TABLET: 50; 8.6 TABLET ORAL at 09:02

## 2022-01-15 RX ADMIN — HEPARIN SODIUM 1800 UNITS/HR: 1000 INJECTION INTRAVENOUS; SUBCUTANEOUS at 00:54

## 2022-01-15 RX ADMIN — SENNOSIDES AND DOCUSATE SODIUM 1 TABLET: 50; 8.6 TABLET ORAL at 16:41

## 2022-01-15 RX ADMIN — DEXAMETHASONE 2 MG: 2 TABLET ORAL at 16:41

## 2022-01-15 RX ADMIN — DEXAMETHASONE 2 MG: 2 TABLET ORAL at 09:02

## 2022-01-15 RX ADMIN — SENNOSIDES AND DOCUSATE SODIUM 1 TABLET: 50; 8.6 TABLET ORAL at 21:32

## 2022-01-15 RX ADMIN — METOPROLOL SUCCINATE 75 MG: 50 TABLET, EXTENDED RELEASE ORAL at 09:02

## 2022-01-15 RX ADMIN — ROSUVASTATIN CALCIUM 40 MG: 20 TABLET, FILM COATED ORAL at 09:02

## 2022-01-15 RX ADMIN — CANGRELOR 0.75 MCG/KG/MIN: 50 INJECTION, POWDER, LYOPHILIZED, FOR SOLUTION INTRAVENOUS at 21:32

## 2022-01-15 RX ADMIN — ASPIRIN 81 MG: 81 TABLET, COATED ORAL at 09:02

## 2022-01-15 RX ADMIN — HEPARIN SODIUM 1850 UNITS/HR: 1000 INJECTION INTRAVENOUS; SUBCUTANEOUS at 09:15

## 2022-01-15 RX ADMIN — CANGRELOR 0.75 MCG/KG/MIN: 50 INJECTION, POWDER, LYOPHILIZED, FOR SOLUTION INTRAVENOUS at 13:34

## 2022-01-15 RX ADMIN — HEPARIN SODIUM 1950 UNITS/HR: 1000 INJECTION INTRAVENOUS; SUBCUTANEOUS at 13:35

## 2022-01-15 RX ADMIN — DULOXETINE 30 MG: 30 CAPSULE, DELAYED RELEASE ORAL at 09:02

## 2022-01-15 ASSESSMENT — ACTIVITIES OF DAILY LIVING (ADL)
ADLS_ACUITY_SCORE: 6

## 2022-01-15 NOTE — PLAN OF CARE
Pt denies chest pain or SOB, endorses chronic back pain, up independently, showered today, good PO intake, VSS, Heparin gtt 1650 units/hr, Tele A-fib CVR. Plan for CABG on Tuesday.

## 2022-01-15 NOTE — PLAN OF CARE
Patient alert, steady with ind ambulation. VSS but diastolic BP more elevated this am. Continues in a controlled afib. Heparin drip at 1800u/hr, PTT at 0830 other labs to be drawn then too. Pt denies pain all night. Called nurse in at 0645 with mid sternal chest pressure 5/10 with 5/10 headache, O2 on VS done and chest pain subsided to 1/10 within minutes. EKG done, afib no sig change.Report to oncoming RN.

## 2022-01-15 NOTE — PROGRESS NOTES
[facility]    Medicine Progress Note - Hospitalist Service       Date of Admission:  1/12/2022  Assessment & Plan   66 year old male with hx of CAD s/p PCI, chronic a-fib on chronic anticoagulation with apixaban, CHF, HTN, and chronic back pain s/p indwelling pain pump who was admitted on 1/12/2022 for NSTEMI. He was found to have severe in-stent re-stenosis of mid-LAD and severe stenosis distal to previously stents, and is currently awaiting CABG:    NSTEMI  History of CAD s/p PCI (LAD, 2012; RCA with later staged intervention, 2021)  Essential hypertension  * PTA: aspirin 81 mg daily, Plavix, metoprolol XL 75 mg daily, lisinopril/HCTZ 40-50 mg daily, Lasix 40 mg daily, rosuvastatin 40 mg daily  * Presented with progressive chest pain reminiscent of previous MI; relieved with nitro. In the ED, he was initiated on IV heparin and nitro infusions, and Cardiology was consulted  * TTE (1/12) showed EF 40-45% with severe apical wall hypokinesis  * Coronary angiogram (1/13) showed LAD disease with severe in-stent re-stenosis of mid-LAD and severe stenosis distal to previously stents; balloon angioplasties of stenoses were attempted, but with sub-optimal results  * CV Surgery was consulted following angiogram findings  - CABG tentatively planned on Mon, 1/17, and continues on IV heparin    Chronic combined systolic and diastolic CHF  TTE findings noted above  - Continues on PTA Lasix    Chronic atrial fibrillation  - Rate-controlled on PTA metoprolol  - Holding PTA apixaban for CABG    GERD  - Continues on PTA omeprazole    Chronic low back pain  [PTA: morphine per indwelling pain pump, nabumetone 500 mg BID, duloxetine 30 mg qAM, 60 mg qhs; APAP prn; baclofen bid prn]  - Hold PTA nabumetone in setting of NSTEMI  - Continues on other PTA meds with pain pump in place     Diet: Low Saturated Fat Na <2400 mg    DVT Prophylaxis: IV heparin  Bran Catheter: Not present  Code Status: Full Code          Disposition: Expected discharge after CABG surgery -- probably home 1/23/22    The patient's care was discussed with the Patient.    Ej Keane MD  Hospitalist Service  [facility]  Contact information available via [facility] Paging/Directory    ______________________________________________________________________    Interval History   Slight chest pain yesterday, none today, continues on IV heparin.    Physical Exam   Vital Signs: Temp: 97.9  F (36.6  C) Temp src: Oral BP: (!) 145/89 Pulse: 73   Resp: 22 SpO2: 95 % O2 Device: None (Room air) Oxygen Delivery: 2 LPM  Weight: 309 lbs 14.4 oz  Constitutional: Appears comfortable  Respiratory: Breathing non-labored. Lungs CTAB - no wheezes, crackles, or rhonchi  Cardiovascular: Heart irregular rhythm, normal rate. 1+ BLE edema  GI: +BS, abd obese but soft/NT  Skin/Integument: No rash  Musculoskeletal: Normal muscle bulk and tone  Neuro: Alert and appropriate, no focal deficits    Data   Recent Labs   Lab 01/15/22  0832 01/14/22  1229 01/14/22  0731 01/14/22  0438 01/13/22  1658 01/13/22  0546   WBC 9.2  --   --  9.1  --  7.7   HGB 13.3  --   --  13.0*  --  13.4   MCV 89  --   --  86  --  87     --   --  164  --  113*     --   --  138  --  139   POTASSIUM 3.9  --   --  3.6  --  4.6   CHLORIDE 107  --   --  110*  --  109   CO2 24  --   --  23  --  28   BUN 16  --   --  14  --  16   CR 0.90  --   --  0.83  --  0.89   ANIONGAP 7  --   --  5  --  2*   RATNA 8.8  --   --  8.6  --  8.2*   * 153* 109* 142*   < > 96   ALBUMIN  --   --   --   --   --  2.6*   PROTTOTAL  --   --   --   --   --  6.3*   BILITOTAL  --   --   --   --   --  0.8   ALKPHOS  --   --   --   --   --  68   ALT  --   --   --   --   --  39   AST  --   --   --   --   --  66*    < > = values in this interval not displayed.         No results found for this or any previous visit (from the past 24 hour(s)).    Medications     cangrelor (KENGREAL) infusion ADULT  for BRIDGING 0.75 mcg/kg/min (01/15/22 9006)     - MEDICATION INSTRUCTIONS -       - MEDICATION INSTRUCTIONS -       heparin 1,950 Units/hr (01/15/22 1332)     nitroGLYcerin 50 mg in D5W 250 mL       Percutaneous Coronary Intervention orders placed (this is information for BPA alerting)       Reason antiplatelet medication not selected         aspirin  81 mg Oral Daily     dexamethasone  2 mg Oral BID w/meals     DULoxetine  30 mg Oral QAM     DULoxetine  60 mg Oral QPM     furosemide  40 mg Oral Daily     metoprolol succinate ER  75 mg Oral Daily     miconazole   Topical Daily     omeprazole  40 mg Oral Daily     rosuvastatin  40 mg Oral Daily     sodium chloride (PF)  3 mL Intracatheter Q8H     traZODone  200 mg Oral At Bedtime

## 2022-01-15 NOTE — PROGRESS NOTES
[facility]    Cardiology Progress Note    Assessment  Onur Barr is a 66 year old male who was admitted on 1/12/2022 with non-STEMI    1. Severe instent restenosis of LAD s/p temporizing balloon angioplasty   2. Ischemic cardiomyopathy ejection fraction 40 to 45% with severe apical ischemia prior to balloon angioplasty   3. right radial hematoma with small AV fistula (presumed due to multiple previous access in the right radial artery). Hematoma resolved. Mild tenderness on exam, but no bruit, forearm is soft.  4. Hypertension  5. Dyslipidemia on rosuvastatin  6. Afib, on BB and heparin (usually on apixaban)  7. DM2  8. Obesity  9. Reviewed chronic back pain    Plan  1. awaiting CABG which is tentatively planned for Wed 1/19/21  2. Continue heparin drip.   3. Stable is stable No additional imaging or evaluation of the right forearm recommended unless there is clinical change.  Reviewed procedural images with interventional radiology.  4. Continue aspirin, metoprolol, rosuvastatin, furosemide.  5. As needed nitroglycerin drip for recurrence of chest pain or significant hypertension.  6. Plavix discontinued prior to surgery.    Arm is stable will start Cangrelor drip (per Dr. Pearl note):  Dose is 0.75 mg/kg/h and should be discontinued 1 to 6 hours prior to CV surgery            Neri Osorio MD    Interval History   Did well overnight with only a brief episode of chest pain.  Arm is stable.  Awaiting CABG date    Physical Exam   Temp: 97.3  F (36.3  C) Temp src: Oral BP: 110/81 Pulse: 73   Resp: 22 SpO2: 95 % O2 Device: None (Room air) Oxygen Delivery: 2 LPM  Vitals:    01/13/22 0314 01/14/22 0335 01/15/22 0553   Weight: 143.3 kg (316 lb) 142.2 kg (313 lb 8 oz) 140.6 kg (309 lb 14.4 oz)     Vital Signs with Ranges  Temp:  [97  F (36.1  C)-98.1  F (36.7  C)] 97.3  F (36.3  C)  Pulse:  [56-73] 73  Resp:  [15-22] 22  BP: (110-149)/() 110/81  SpO2:  [95 %-100 %] 95 %  I/O  last 3 completed shifts:  In: 480 [P.O.:480]  Out: 1950 [Urine:1950]      Constitutional: NAD  Eyes: clear sclera  ENT: Mucous membranes are moist.   Respiratory: clear bilat  Cardiovascular: regular, distant  GI: BS abdomen bowel sounds present  Skin: No edema.  Right forearm access site with tenderness to palpation but no bruit, no hematoma.  Right forearm appears mildly swollen with mild ecchymosis but is soft, clean, dry and intact.  Neuro on hand is intact.  Psychiatric: Well affect    Medications     - MEDICATION INSTRUCTIONS -       - MEDICATION INSTRUCTIONS -       heparin 1,850 Units/hr (01/15/22 0915)     nitroGLYcerin 50 mg in D5W 250 mL       Percutaneous Coronary Intervention orders placed (this is information for BPA alerting)       Reason antiplatelet medication not selected         aspirin  81 mg Oral Daily     dexamethasone  2 mg Oral BID w/meals     DULoxetine  30 mg Oral QAM     DULoxetine  60 mg Oral QPM     furosemide  40 mg Oral Daily     metoprolol succinate ER  75 mg Oral Daily     miconazole   Topical Daily     omeprazole  40 mg Oral Daily     rosuvastatin  40 mg Oral Daily     sodium chloride (PF)  3 mL Intracatheter Q8H     traZODone  200 mg Oral At Bedtime       Data   Results for orders placed or performed during the hospital encounter of 01/12/22 (from the past 24 hour(s))   US Carotid Bilateral    Narrative    BILATERAL CAROTID ULTRASOUND   1/14/2022 12:47 PM     HISTORY: 66-year-old patient with coronary artery disease, request  made for evaluation of carotid arterial disease.    COMPARISON: None.    RIGHT CAROTID FINDINGS:  There is minimal atherosclerotic plaque at  the carotid bifurcation and proximal internal carotid artery.  Right ICA PSV:  81  cm/sec.  Right ICA EDV:  23 cm/sec.  Right ICA/CCA PSV Ratio:  0.77    These indicate less than 50% diameter stenosis of the right ICA.    Right Vertebral: Antegrade flow.   Right ECA: Antegrade flow.     LEFT CAROTID FINDINGS:  There is  minimal atherosclerotic plaque in the  carotid bifurcation and proximal internal carotid artery.  Left ICA PSV:  124  cm/sec.  Left ICA EDV:  42 cm/sec.  Left ICA/CCA PSV Ratio:  1.56    These indicate less than 50% diameter stenosis of the left ICA.    Left Vertebral: Antegrade flow.   Left ECA: Antegrade flow.     Causes of Decreased Accuracy:   None.       Impression    IMPRESSION:    1. Less than 50% diameter stenosis of the right ICA relative to the  distal ICA diameter.  2. Less than 50% diameter stenosis of the left ICA relative to the  distal ICA diameter.    CYNTHIA SMART MD         SYSTEM ID:  [id number]   US Lower Extremity Venous Mapping Bilateral    Narrative    US LOWER EXTREMITY VENOUS MAPPING BILATERAL 1/14/2022 12:47 PM    HISTORY: 66-year-old patient with coronary artery disease, request  made for preoperative evaluation prior to CABG.    COMPARISON: None    FINDINGS: The left greater saphenous vein ranges from 2.1 to 4.7 mm  throughout the thigh and 0.7 to 2.3 mm throughout the lower leg.    The right greater saphenous vein ranges from 1.8 to 4.8 mm throughout  the thigh and 1 to 1.9 mm below the knee.      Impression    IMPRESSION: Bilateral greater saphenous vein measurements. Veins are  small in caliber.    CYNTHIA SMART MD         SYSTEM ID:  [id number]   CT Chest w/o Contrast    Narrative    CT CHEST WITHOUT CONTRAST January 14, 2022 1:12 PM     HISTORY: Coronary artery disease.    TECHNIQUE: CT scan of the chest was performed without IV contrast.  Multiplanar reformats were obtained. Dose reduction techniques were  used.  CONTRAST: None.    COMPARISON: None.    FINDINGS:     LUNGS AND PLEURA: Eventration or elevation of the posterior left  hemidiaphragm with adjacent atelectasis. The lungs are otherwise  clear. No pleural effusion.    MEDIASTINUM/AXILLAE: Coronary artery calcification with stents in the  LAD and right coronary artery. Normal caliber thoracic aorta. No  ascending aortic  calcification. No lymphadenopathy.    UPPER ABDOMEN: Cholelithiasis.    MUSCULOSKELETAL: Mild compression fractures of L2, L1, and T12, likely  chronic.      Impression    IMPRESSION:  1.  Coronary artery calcification and stents.  2.  No acute findings in the chest.    YONATHAN MARTINEZ MD         SYSTEM ID:  [id number]   Partial thromboplastin time   Result Value Ref Range    aPTT 42 (H) 22 - 38 Seconds   Partial thromboplastin time   Result Value Ref Range    aPTT 58 (H) 22 - 38 Seconds   UA with Microscopic   Result Value Ref Range    Color Urine Light Yellow Colorless, Straw, Light Yellow, Yellow    Appearance Urine Clear Clear    Glucose Urine Negative Negative mg/dL    Bilirubin Urine Negative Negative    Ketones Urine Negative Negative mg/dL    Specific Gravity Urine 1.018 1.003 - 1.035    Blood Urine Negative Negative    pH Urine 6.0 5.0 - 7.0    Protein Albumin Urine Negative Negative mg/dL    Urobilinogen Urine Normal Normal, 2.0 mg/dL    Nitrite Urine Negative Negative    Leukocyte Esterase Urine Negative Negative    Mucus Urine Present (A) None Seen /LPF    RBC Urine <1 <=2 /HPF    WBC Urine 1 <=5 /HPF   EKG 12-lead, tracing only   Result Value Ref Range    Systolic Blood Pressure  mmHg    Diastolic Blood Pressure  mmHg    Ventricular Rate 71 BPM    Atrial Rate 75 BPM    CA Interval  ms    QRS Duration 88 ms     ms    QTc 508 ms    P Axis  degrees    R AXIS -19 degrees    T Axis 162 degrees    Interpretation ECG       Atrial fibrillation  Septal infarct (cited on or before 12-JAN-2022)  T wave abnormality, consider inferior ischemia  T wave abnormality, consider anterolateral ischemia  Prolonged QT  Abnormal ECG  When compared with ECG of 14-JAN-2022 07:10, (unconfirmed)  Serial changes of Septal infarct Present     Basic metabolic panel   Result Value Ref Range    Sodium 138 133 - 144 mmol/L    Potassium 3.9 3.4 - 5.3 mmol/L    Chloride 107 94 - 109 mmol/L    Carbon Dioxide (CO2) 24 20 - 32 mmol/L     Anion Gap 7 3 - 14 mmol/L    Urea Nitrogen 16 7 - 30 mg/dL    Creatinine 0.90 0.66 - 1.25 mg/dL    Calcium 8.8 8.5 - 10.1 mg/dL    Glucose 136 (H) 70 - 99 mg/dL    GFR Estimate >90 >60 mL/min/1.73m2   Hemoglobin A1c   Result Value Ref Range    Hemoglobin A1C 5.4 0.0 - 5.6 %   Troponin I   Result Value Ref Range    Troponin I High Sensitivity 1,859 (HH) <79 ng/L   Partial thromboplastin time   Result Value Ref Range    aPTT 52 (H) 22 - 38 Seconds   CBC with platelets   Result Value Ref Range    WBC Count 9.2 4.0 - 11.0 10e3/uL    RBC Count 4.71 4.40 - 5.90 10e6/uL    Hemoglobin 13.3 13.3 - 17.7 g/dL    Hematocrit 42.0 40.0 - 53.0 %    MCV 89 78 - 100 fL    MCH 28.2 26.5 - 33.0 pg    MCHC 31.7 31.5 - 36.5 g/dL    RDW 13.2 10.0 - 15.0 %    Platelet Count 166 150 - 450 10e3/uL

## 2022-01-16 PROBLEM — L03.116 BILATERAL CELLULITIS OF LOWER LEG: Status: RESOLVED | Noted: 2021-07-13 | Resolved: 2022-01-16

## 2022-01-16 PROBLEM — L03.115 BILATERAL CELLULITIS OF LOWER LEG: Status: RESOLVED | Noted: 2021-07-13 | Resolved: 2022-01-16

## 2022-01-16 PROBLEM — Z98.61 PERCUTANEOUS TRANSLUMINAL CORONARY ANGIOPLASTY STATUS: Status: RESOLVED | Noted: 2021-09-11 | Resolved: 2022-01-16

## 2022-01-16 PROBLEM — G89.29 CHRONIC PAIN: Status: ACTIVE | Noted: 2022-01-16

## 2022-01-16 PROBLEM — I25.10 CORONARY ARTERY DISEASE INVOLVING NATIVE CORONARY ARTERY OF NATIVE HEART WITHOUT ANGINA PECTORIS: Status: RESOLVED | Noted: 2021-07-26 | Resolved: 2022-01-16

## 2022-01-16 LAB
ANION GAP SERPL CALCULATED.3IONS-SCNC: 3 MMOL/L (ref 3–14)
APTT PPP: 61 SECONDS (ref 22–38)
BUN SERPL-MCNC: 18 MG/DL (ref 7–30)
CALCIUM SERPL-MCNC: 9 MG/DL (ref 8.5–10.1)
CHLORIDE BLD-SCNC: 106 MMOL/L (ref 94–109)
CO2 SERPL-SCNC: 27 MMOL/L (ref 20–32)
CREAT SERPL-MCNC: 0.96 MG/DL (ref 0.66–1.25)
ERYTHROCYTE [DISTWIDTH] IN BLOOD BY AUTOMATED COUNT: 13.2 % (ref 10–15)
GFR SERPL CREATININE-BSD FRML MDRD: 88 ML/MIN/1.73M2
GLUCOSE BLD-MCNC: 102 MG/DL (ref 70–99)
HCT VFR BLD AUTO: 43.7 % (ref 40–53)
HGB BLD-MCNC: 14.2 G/DL (ref 13.3–17.7)
MCH RBC QN AUTO: 28.4 PG (ref 26.5–33)
MCHC RBC AUTO-ENTMCNC: 32.5 G/DL (ref 31.5–36.5)
MCV RBC AUTO: 87 FL (ref 78–100)
PLATELET # BLD AUTO: 175 10E3/UL (ref 150–450)
POTASSIUM BLD-SCNC: 3.6 MMOL/L (ref 3.4–5.3)
RBC # BLD AUTO: 5 10E6/UL (ref 4.4–5.9)
SODIUM SERPL-SCNC: 136 MMOL/L (ref 133–144)
WBC # BLD AUTO: 11.1 10E3/UL (ref 4–11)

## 2022-01-16 PROCEDURE — 99233 SBSQ HOSP IP/OBS HIGH 50: CPT | Performed by: INTERNAL MEDICINE

## 2022-01-16 PROCEDURE — 258N000003 HC RX IP 258 OP 636: Performed by: INTERNAL MEDICINE

## 2022-01-16 PROCEDURE — 250N000013 HC RX MED GY IP 250 OP 250 PS 637: Performed by: INTERNAL MEDICINE

## 2022-01-16 PROCEDURE — 36415 COLL VENOUS BLD VENIPUNCTURE: CPT | Performed by: INTERNAL MEDICINE

## 2022-01-16 PROCEDURE — 250N000011 HC RX IP 250 OP 636: Performed by: INTERNAL MEDICINE

## 2022-01-16 PROCEDURE — 85014 HEMATOCRIT: CPT | Performed by: INTERNAL MEDICINE

## 2022-01-16 PROCEDURE — 93005 ELECTROCARDIOGRAM TRACING: CPT

## 2022-01-16 PROCEDURE — 210N000002 HC R&B HEART CARE

## 2022-01-16 PROCEDURE — 99232 SBSQ HOSP IP/OBS MODERATE 35: CPT | Performed by: INTERNAL MEDICINE

## 2022-01-16 PROCEDURE — C9460 INJECTION, CANGRELOR: HCPCS | Performed by: INTERNAL MEDICINE

## 2022-01-16 PROCEDURE — 250N000011 HC RX IP 250 OP 636: Performed by: HOSPITALIST

## 2022-01-16 PROCEDURE — 36415 COLL VENOUS BLD VENIPUNCTURE: CPT | Performed by: HOSPITALIST

## 2022-01-16 PROCEDURE — 250N000013 HC RX MED GY IP 250 OP 250 PS 637: Performed by: HOSPITALIST

## 2022-01-16 PROCEDURE — 82310 ASSAY OF CALCIUM: CPT | Performed by: INTERNAL MEDICINE

## 2022-01-16 PROCEDURE — 85730 THROMBOPLASTIN TIME PARTIAL: CPT | Performed by: HOSPITALIST

## 2022-01-16 RX ORDER — POLYETHYLENE GLYCOL 3350 17 G/17G
34 POWDER, FOR SOLUTION ORAL 2 TIMES DAILY PRN
Status: DISCONTINUED | OUTPATIENT
Start: 2022-01-16 | End: 2022-01-19

## 2022-01-16 RX ORDER — SENNOSIDES 8.6 MG
2 TABLET ORAL 2 TIMES DAILY
Status: DISCONTINUED | OUTPATIENT
Start: 2022-01-16 | End: 2022-01-19

## 2022-01-16 RX ORDER — METOPROLOL TARTRATE 50 MG
50 TABLET ORAL 2 TIMES DAILY
Status: DISCONTINUED | OUTPATIENT
Start: 2022-01-16 | End: 2022-01-18

## 2022-01-16 RX ADMIN — METOPROLOL TARTRATE 50 MG: 50 TABLET, FILM COATED ORAL at 21:11

## 2022-01-16 RX ADMIN — HEPARIN SODIUM 1950 UNITS/HR: 1000 INJECTION INTRAVENOUS; SUBCUTANEOUS at 03:18

## 2022-01-16 RX ADMIN — DEXAMETHASONE 2 MG: 2 TABLET ORAL at 08:50

## 2022-01-16 RX ADMIN — ROSUVASTATIN CALCIUM 40 MG: 20 TABLET, FILM COATED ORAL at 08:50

## 2022-01-16 RX ADMIN — CANGRELOR 0.75 MCG/KG/MIN: 50 INJECTION, POWDER, LYOPHILIZED, FOR SOLUTION INTRAVENOUS at 06:30

## 2022-01-16 RX ADMIN — DULOXETINE 60 MG: 60 CAPSULE, DELAYED RELEASE ORAL at 21:11

## 2022-01-16 RX ADMIN — METOPROLOL TARTRATE 50 MG: 50 TABLET, FILM COATED ORAL at 09:01

## 2022-01-16 RX ADMIN — CANGRELOR 0.75 MCG/KG/MIN: 50 INJECTION, POWDER, LYOPHILIZED, FOR SOLUTION INTRAVENOUS at 13:55

## 2022-01-16 RX ADMIN — MORPHINE SULFATE 15 MG: 15 TABLET ORAL at 19:24

## 2022-01-16 RX ADMIN — HEPARIN SODIUM 1950 UNITS/HR: 1000 INJECTION INTRAVENOUS; SUBCUTANEOUS at 17:57

## 2022-01-16 RX ADMIN — CANGRELOR 0.75 MCG/KG/MIN: 50 INJECTION, POWDER, LYOPHILIZED, FOR SOLUTION INTRAVENOUS at 22:42

## 2022-01-16 RX ADMIN — SENNOSIDES 2 TABLET: 8.6 TABLET, FILM COATED ORAL at 09:01

## 2022-01-16 RX ADMIN — OMEPRAZOLE 40 MG: 20 CAPSULE, DELAYED RELEASE ORAL at 08:50

## 2022-01-16 RX ADMIN — ASPIRIN 81 MG: 81 TABLET, COATED ORAL at 08:50

## 2022-01-16 RX ADMIN — TRAZODONE HYDROCHLORIDE 200 MG: 100 TABLET ORAL at 21:11

## 2022-01-16 RX ADMIN — DULOXETINE 30 MG: 30 CAPSULE, DELAYED RELEASE ORAL at 08:50

## 2022-01-16 RX ADMIN — MICONAZOLE NITRATE: 2 POWDER TOPICAL at 09:01

## 2022-01-16 ASSESSMENT — ACTIVITIES OF DAILY LIVING (ADL)
ADLS_ACUITY_SCORE: 6

## 2022-01-16 NOTE — PROGRESS NOTES
[facility]    Medicine Progress Note - Hospitalist Service       Date of Admission:  1/12/2022  Assessment & Plan   66 year old male with hx of CAD s/p PCI, chronic a-fib on chronic anticoagulation with apixaban, CHF, HTN, and chronic back pain s/p indwelling pain pump who was admitted on 1/12/2022 for NSTEMI. He was found to have severe in-stent re-stenosis of mid-LAD and severe stenosis distal to previously stents, and is currently awaiting CABG:    NSTEMI  History of CAD s/p PCI (LAD, 2012; RCA with later staged intervention, 2021)  Essential hypertension  * PTA: aspirin 81 mg daily, Plavix, metoprolol XL 75 mg daily, lisinopril/HCTZ 40-50 mg daily, Lasix 40 mg daily, rosuvastatin 40 mg daily  * Presented with progressive chest pain reminiscent of previous MI; relieved with nitro. In the ED, he was initiated on IV heparin and nitro infusions, and Cardiology was consulted  * TTE (1/12) showed EF 40-45% with severe apical wall hypokinesis  * Coronary angiogram (1/13) showed LAD disease with severe in-stent re-stenosis of mid-LAD and severe stenosis distal to previously stents; balloon angioplasties of stenoses were attempted, but with sub-optimal results  * CV Surgery was consulted following angiogram findings  - CABG tentatively planned on Mon, 1/17, and continues on IV heparin  - Will increase Metoprolol to 50 mg bid    Chronic combined systolic and diastolic CHF  TTE findings noted above  - Continues on PTA Lasix    Chronic atrial fibrillation  - Rate-controlled on PTA metoprolol  - Holding PTA apixaban for CABG    GERD  - Continues on PTA omeprazole    Chronic low back pain  [PTA: morphine per indwelling pain pump, nabumetone 500 mg BID, duloxetine 30 mg qAM, 60 mg qhs; APAP prn; baclofen bid prn]  - Hold PTA nabumetone in setting of NSTEMI  - Continues on other PTA meds with pain pump in place    Resolved Tongue swelling  -- stop Decadron     Diet: Low Saturated Fat Na <2400 mg    DVT  Prophylaxis: IV heparin  Bran Catheter: Not present  Code Status: Full Code         Disposition: Expected discharge after CABG surgery -- probably home 1/25/22    The patient's care was discussed with the Patient.    Ej Keane MD  Hospitalist Service  [facility]  Contact information available via [facility] Paging/Directory    ______________________________________________________________________    Interval History   No chest pain since yesterday.  His tongue swelled up 2 weeks ago, is back to normal, but still on Decadron 2 mg bid. .    Physical Exam   Vital Signs: Temp: 97.5  F (36.4  C) Temp src: Axillary BP: (!) 150/94 Pulse: 63   Resp: 18 SpO2: 99 % O2 Device: None (Room air)    Weight: 309 lbs 1.6 oz  Constitutional: Appears comfortable  Respiratory: Breathing non-labored. Lungs CTAB - no wheezes, crackles, or rhonchi  Cardiovascular: Heart irregular rhythm, normal rate. 1+ BLE edema  GI: +BS, abd obese but soft/NT  Skin/Integument: No rash  Musculoskeletal: Normal muscle bulk and tone  Neuro: Alert and appropriate, no focal deficits    Data   Recent Labs   Lab 01/16/22  0621 01/15/22  0832 01/14/22  1229 01/14/22  0731 01/14/22  0438 01/13/22  1658 01/13/22  0546   WBC 11.1* 9.2  --   --  9.1  --  7.7   HGB 14.2 13.3  --   --  13.0*  --  13.4   MCV 87 89  --   --  86  --  87    166  --   --  164  --  113*    138  --   --  138  --  139   POTASSIUM 3.6 3.9  --   --  3.6  --  4.6   CHLORIDE 106 107  --   --  110*  --  109   CO2 27 24  --   --  23  --  28   BUN 18 16  --   --  14  --  16   CR 0.96 0.90  --   --  0.83  --  0.89   ANIONGAP 3 7  --   --  5  --  2*   RATNA 9.0 8.8  --   --  8.6  --  8.2*   * 136* 153*   < > 142*   < > 96   ALBUMIN  --   --   --   --   --   --  2.6*   PROTTOTAL  --   --   --   --   --   --  6.3*   BILITOTAL  --   --   --   --   --   --  0.8   ALKPHOS  --   --   --   --   --   --  68   ALT  --   --   --   --   --   --  39   AST  --    --   --   --   --   --  66*    < > = values in this interval not displayed.         No results found for this or any previous visit (from the past 24 hour(s)).    Medications     cangrelor (KENGREAL) infusion ADULT for BRIDGING 0.75 mcg/kg/min (01/16/22 0630)     - MEDICATION INSTRUCTIONS -       - MEDICATION INSTRUCTIONS -       heparin 1,950 Units/hr (01/16/22 0318)     nitroGLYcerin 50 mg in D5W 250 mL       Percutaneous Coronary Intervention orders placed (this is information for BPA alerting)       Reason antiplatelet medication not selected         aspirin  81 mg Oral Daily     dexamethasone  2 mg Oral BID w/meals     DULoxetine  30 mg Oral QAM     DULoxetine  60 mg Oral QPM     furosemide  40 mg Oral Daily     metoprolol succinate ER  75 mg Oral Daily     miconazole   Topical Daily     omeprazole  40 mg Oral Daily     rosuvastatin  40 mg Oral Daily     sodium chloride (PF)  3 mL Intracatheter Q8H     traZODone  200 mg Oral At Bedtime

## 2022-01-16 NOTE — PROGRESS NOTES
CV Surgery Brief Progress Note    Onur Barr is a 66 year old male with a PMH of CAD with history of multiple PCI, including PCI of proximal, distal RCA in the setting of inferior STEMI in 09/2021 followed by staged PCI of in-stent restenosis of proximal LAD in 10/2021, atrial fibrillation, hypertension, morbid obesity s/p gastric bypass, chronic back pain with intrathecal pain pump, and chronic abdominal intertrigo who was admitted to [facility] 01/12 with NSTEMI and found to have severe in-stent re-stenosis of mid-LAD and severe stenosis distal to previously stents. Underwent balloon angioplasty of LAD in-stent stenoses on 01/12, but had residual 70% stenosis. CV surgery consulted for consideration of surgical revascularization.     - Chest pain free at present. Off IV nitroglycerin since 01/13.  - PTA Plavix and apixaban held for surgery. Continues on cangrelor and heparin infusion.   - Plan for CABG, PVI/JUSTO clip 01/19 after Plavix/apixaban washout.   - Note that was started on steroids 01/04 by PCP for tongue swelling of unknown etiology. If resolved would discontinue prior to surgery. Will discuss need for possible stress-dose steroids for surgery with anesthesia.   - Has intrathecal pain pump. Will touch base with [facility] tomorrow to see if special considerations for surgery.   - Met with patient. Questions answered.    Misti Jimenez PA-C  Cardiothoracic Surgery  Pager [phone]

## 2022-01-16 NOTE — PLAN OF CARE
Pt has denied chest pain, has complained of SOTOMAYOR with activity. Pt refused lasix today, up independently, good PO intake, Is on a Heparin and Cangrelor gtts, Tele A-fib CVR, Plan for CABG next week.

## 2022-01-16 NOTE — PROGRESS NOTES
[facility]    Cardiology Progress Note    Assessment  Onur Barr is a 66 year old male who was admitted on 1/12/2022 with non-STEMI    1. Severe instent restenosis of LAD s/p temporizing balloon angioplasty   2. Ischemic cardiomyopathy ejection fraction 40 to 45% with severe apical ischemia prior to balloon angioplasty   3. right radial hematoma with small AV fistula (presumed due to multiple previous access in the right radial artery). Hematoma resolved. Mild tenderness on exam, but no bruit, forearm is soft.  4. Hypertension  5. Dyslipidemia on rosuvastatin  6. Afib, on BB and heparin (usually on apixaban)  7. DM2  8. Obesity  9. Reviewed chronic back pain    Interval history: No chest pain, limited runs of NSVT    Plan  1. awaiting CABG which is tentatively planned for Wed 1/19/21  2. Continue heparin drip and cangrelor infusion.    3. Armis stable No additional imaging or evaluation of the right forearm recommended unless there is clinical change.   4. Continue aspirin, metoprolol, rosuvastatin, furosemide. (agree with increasing metoprolol dose today given elevated BP and NSVT)  5. As needed nitroglycerin drip for recurrence of chest pain or significant hypertension.  6. Plavix discontinued prior to surgery.    Started Cangrelor drip on 1/15/2022 (per Dr. Pearl note):  Dose is 0.75 mg/kg/h and should be discontinued 1 to 6 hours prior to CV surgery        Neri Osorio MD    Physical Exam   Temp: 97.5  F (36.4  C) Temp src: Axillary BP: (!) 150/94 Pulse: 63   Resp: 18 SpO2: 99 % O2 Device: None (Room air)    Vitals:    01/14/22 0335 01/15/22 0553 01/16/22 0522   Weight: 142.2 kg (313 lb 8 oz) 140.6 kg (309 lb 14.4 oz) 140.2 kg (309 lb 1.6 oz)     Vital Signs with Ranges  Temp:  [97.5  F (36.4  C)-97.9  F (36.6  C)] 97.5  F (36.4  C)  Pulse:  [63-78] 63  Resp:  [18-20] 18  BP: (132-150)/(89-98) 150/94  SpO2:  [97 %-99 %] 99 %  I/O last 3 completed shifts:  In: 1560  [P.O.:1020; I.V.:540]  Out: 1600 [Urine:1600]      Constitutional: NAD  Eyes: clear sclera  ENT: Mucous membranes are moist.   Respiratory: clear bilat  Cardiovascular: regular, distant  GI: BS abdomen bowel sounds present  Skin: No edema.  Right forearm access site with tenderness to palpation but no bruit, no hematoma.  Right forearm appears mildly swollen with mild ecchymosis but is soft, clean, dry and intact.  Neuro on hand is intact.  Psychiatric: Well affect    Medications     cangrelor (KENGREAL) infusion ADULT for BRIDGING 0.75 mcg/kg/min (01/16/22 0839)     - MEDICATION INSTRUCTIONS -       - MEDICATION INSTRUCTIONS -       heparin 1,950 Units/hr (01/16/22 0839)     nitroGLYcerin 50 mg in D5W 250 mL       Percutaneous Coronary Intervention orders placed (this is information for BPA alerting)       Reason antiplatelet medication not selected         aspirin  81 mg Oral Daily     DULoxetine  30 mg Oral QAM     DULoxetine  60 mg Oral QPM     furosemide  40 mg Oral Daily     metoprolol tartrate  50 mg Oral BID     miconazole   Topical Daily     omeprazole  40 mg Oral Daily     rosuvastatin  40 mg Oral Daily     sennosides  2 tablet Oral BID     sodium chloride (PF)  3 mL Intracatheter Q8H     traZODone  200 mg Oral At Bedtime       Data   Results for orders placed or performed during the hospital encounter of 01/12/22 (from the past 24 hour(s))   Partial thromboplastin time   Result Value Ref Range    aPTT 58 (H) 22 - 38 Seconds   Partial thromboplastin time   Result Value Ref Range    aPTT 61 (H) 22 - 38 Seconds   CBC with platelets   Result Value Ref Range    WBC Count 11.1 (H) 4.0 - 11.0 10e3/uL    RBC Count 5.00 4.40 - 5.90 10e6/uL    Hemoglobin 14.2 13.3 - 17.7 g/dL    Hematocrit 43.7 40.0 - 53.0 %    MCV 87 78 - 100 fL    MCH 28.4 26.5 - 33.0 pg    MCHC 32.5 31.5 - 36.5 g/dL    RDW 13.2 10.0 - 15.0 %    Platelet Count 175 150 - 450 10e3/uL   Basic metabolic panel   Result Value Ref Range    Sodium 136  133 - 144 mmol/L    Potassium 3.6 3.4 - 5.3 mmol/L    Chloride 106 94 - 109 mmol/L    Carbon Dioxide (CO2) 27 20 - 32 mmol/L    Anion Gap 3 3 - 14 mmol/L    Urea Nitrogen 18 7 - 30 mg/dL    Creatinine 0.96 0.66 - 1.25 mg/dL    Calcium 9.0 8.5 - 10.1 mg/dL    Glucose 102 (H) 70 - 99 mg/dL    GFR Estimate 88 >60 mL/min/1.73m2   EKG 12-lead, tracing only   Result Value Ref Range    Systolic Blood Pressure  mmHg    Diastolic Blood Pressure  mmHg    Ventricular Rate 87 BPM    Atrial Rate 87 BPM    ME Interval  ms    QRS Duration 100 ms     ms    QTc 519 ms    P Axis  degrees    R AXIS 41 degrees    T Axis 121 degrees    Interpretation ECG       Atrial fibrillation with premature ventricular or aberrantly conducted complexes  Septal infarct (cited on or before 12-JAN-2022)  T wave abnormality, consider lateral ischemia  Abnormal ECG  When compared with ECG of 15-SHANEL-2022 07:01, (unconfirmed)  Serial changes of Septal infarct Present

## 2022-01-16 NOTE — PLAN OF CARE
Pt denies chest pain or SOB, up independently, BM today, Heparin and Cangrelor gtts infusing, Tele S-fib CVR, Plan for CABG 1/19/22   Detail Level: Detailed Doxycycline Counseling:  Patient counseled regarding possible photosensitivity and increased risk for sunburn.  Patient instructed to avoid sunlight, if possible.  When exposed to sunlight, patients should wear protective clothing, sunglasses, and sunscreen.  The patient was instructed to call the office immediately if the following severe adverse effects occur:  hearing changes, easy bruising/bleeding, severe headache, or vision changes.  The patient verbalized understanding of the proper use and possible adverse effects of doxycycline.  All of the patient's questions and concerns were addressed. Detail Level: Zone Birth Control Pills Counseling: Birth Control Pill Counseling: I discussed with the patient the potential side effects of OCPs including but not limited to increased risk of stroke, heart attack, thrombophlebitis, deep venous thrombosis, hepatic adenomas, breast changes, GI upset, headaches, and depression.  The patient verbalized understanding of the proper use and possible adverse effects of OCPs. All of the patient's questions and concerns were addressed. Dapsone Counseling: I discussed with the patient the risks of dapsone including but not limited to hemolytic anemia, agranulocytosis, rashes, methemoglobinemia, kidney failure, peripheral neuropathy, headaches, GI upset, and liver toxicity.  Patients who start dapsone require monitoring including baseline LFTs and weekly CBCs for the first month, then every month thereafter.  The patient verbalized understanding of the proper use and possible adverse effects of dapsone.  All of the patient's questions and concerns were addressed. Benzoyl Peroxide Counseling: Patient counseled that medicine may cause skin irritation and bleach clothing.  In the event of skin irritation, the patient was advised to reduce the amount of the drug applied or use it less frequently.   The patient verbalized understanding of the proper use and possible adverse effects of benzoyl peroxide.  All of the patient's questions and concerns were addressed. Use Enhanced Medication Counseling?: No Azithromycin Pregnancy And Lactation Text: This medication is considered safe during pregnancy and is also secreted in breast milk. Topical Sulfur Applications Counseling: Topical Sulfur Counseling: Patient counseled that this medication may cause skin irritation or allergic reactions.  In the event of skin irritation, the patient was advised to reduce the amount of the drug applied or use it less frequently.   The patient verbalized understanding of the proper use and possible adverse effects of topical sulfur application.  All of the patient's questions and concerns were addressed. High Dose Vitamin A Counseling: Side effects reviewed, pt to contact office should one occur. Tetracycline Pregnancy And Lactation Text: This medication is Pregnancy Category D and not consider safe during pregnancy. It is also excreted in breast milk. High Dose Vitamin A Pregnancy And Lactation Text: High dose vitamin A therapy is contraindicated during pregnancy and breast feeding. Minocycline Counseling: Patient advised regarding possible photosensitivity and discoloration of the teeth, skin, lips, tongue and gums.  Patient instructed to avoid sunlight, if possible.  When exposed to sunlight, patients should wear protective clothing, sunglasses, and sunscreen.  The patient was instructed to call the office immediately if the following severe adverse effects occur:  hearing changes, easy bruising/bleeding, severe headache, or vision changes.  The patient verbalized understanding of the proper use and possible adverse effects of minocycline.  All of the patient's questions and concerns were addressed. Topical Clindamycin Counseling: Patient counseled that this medication may cause skin irritation or allergic reactions.  In the event of skin irritation, the patient was advised to reduce the amount of the drug applied or use it less frequently.   The patient verbalized understanding of the proper use and possible adverse effects of clindamycin.  All of the patient's questions and concerns were addressed. Topical Retinoid Pregnancy And Lactation Text: This medication is Pregnancy Category C. It is unknown if this medication is excreted in breast milk. Spironolactone Pregnancy And Lactation Text: This medication can cause feminization of the male fetus and should be avoided during pregnancy. The active metabolite is also found in breast milk. Bactrim Counseling:  I discussed with the patient the risks of sulfa antibiotics including but not limited to GI upset, allergic reaction, drug rash, diarrhea, dizziness, photosensitivity, and yeast infections.  Rarely, more serious reactions can occur including but not limited to aplastic anemia, agranulocytosis, methemoglobinemia, blood dyscrasias, liver or kidney failure, lung infiltrates or desquamative/blistering drug rashes. Doxycycline Pregnancy And Lactation Text: This medication is Pregnancy Category D and not consider safe during pregnancy. It is also excreted in breast milk but is considered safe for shorter treatment courses. Tazorac Counseling:  Patient advised that medication is irritating and drying.  Patient may need to apply sparingly and wash off after an hour before eventually leaving it on overnight.  The patient verbalized understanding of the proper use and possible adverse effects of tazorac.  All of the patient's questions and concerns were addressed. Topical Sulfur Applications Pregnancy And Lactation Text: This medication is Pregnancy Category C and has an unknown safety profile during pregnancy. It is unknown if this topical medication is excreted in breast milk. Dapsone Pregnancy And Lactation Text: This medication is Pregnancy Category C and is not considered safe during pregnancy or breast feeding. Benzoyl Peroxide Pregnancy And Lactation Text: This medication is Pregnancy Category C. It is unknown if benzoyl peroxide is excreted in breast milk. Erythromycin Pregnancy And Lactation Text: This medication is Pregnancy Category B and is considered safe during pregnancy. It is also excreted in breast milk. Birth Control Pills Pregnancy And Lactation Text: This medication should be avoided if pregnant and for the first 30 days post-partum. Tazorac Pregnancy And Lactation Text: This medication is not safe during pregnancy. It is unknown if this medication is excreted in breast milk. Topical Clindamycin Pregnancy And Lactation Text: This medication is Pregnancy Category B and is considered safe during pregnancy. It is unknown if it is excreted in breast milk. Topical Retinoid counseling:  Patient advised to apply a pea-sized amount only at bedtime and wait 30 minutes after washing their face before applying.  If too drying, patient may add a non-comedogenic moisturizer. The patient verbalized understanding of the proper use and possible adverse effects of retinoids.  All of the patient's questions and concerns were addressed. Isotretinoin Pregnancy And Lactation Text: This medication is Pregnancy Category X and is considered extremely dangerous during pregnancy. It is unknown if it is excreted in breast milk. Tetracycline Counseling: Patient counseled regarding possible photosensitivity and increased risk for sunburn.  Patient instructed to avoid sunlight, if possible.  When exposed to sunlight, patients should wear protective clothing, sunglasses, and sunscreen.  The patient was instructed to call the office immediately if the following severe adverse effects occur:  hearing changes, easy bruising/bleeding, severe headache, or vision changes.  The patient verbalized understanding of the proper use and possible adverse effects of tetracycline.  All of the patient's questions and concerns were addressed. Patient understands to avoid pregnancy while on therapy due to potential birth defects. Erythromycin Counseling:  I discussed with the patient the risks of erythromycin including but not limited to GI upset, allergic reaction, drug rash, diarrhea, increase in liver enzymes, and yeast infections. Spironolactone Counseling: Patient advised regarding risks of diarrhea, abdominal pain, hyperkalemia, birth defects (for female patients), liver toxicity and renal toxicity. The patient may need blood work to monitor liver and kidney function and potassium levels while on therapy. The patient verbalized understanding of the proper use and possible adverse effects of spironolactone.  All of the patient's questions and concerns were addressed. Azithromycin Counseling:  I discussed with the patient the risks of azithromycin including but not limited to GI upset, allergic reaction, drug rash, diarrhea, and yeast infections. Isotretinoin Counseling: Patient should get monthly blood tests, not donate blood, not drive at night if vision affected, not share medication, and not undergo elective surgery for 6 months after tx completed. Side effects reviewed, pt to contact office should one occur. Bactrim Pregnancy And Lactation Text: This medication is Pregnancy Category D and is known to cause fetal risk.  It is also excreted in breast milk. Sarecycline Counseling: Patient advised regarding possible photosensitivity and discoloration of the teeth, skin, lips, tongue and gums.  Patient instructed to avoid sunlight, if possible.  When exposed to sunlight, patients should wear protective clothing, sunglasses, and sunscreen.  The patient was instructed to call the office immediately if the following severe adverse effects occur:  hearing changes, easy bruising/bleeding, severe headache, or vision changes.  The patient verbalized understanding of the proper use and possible adverse effects of sarecycline.  All of the patient's questions and concerns were addressed.

## 2022-01-16 NOTE — PLAN OF CARE
Patient alert, ind with ambulation.No CP, some SOTOMAYOR, occ at rest. Sat's wnl on RA. LS clear, dim. Heparin and Cangrelor drips. Await CAB, early next week.

## 2022-01-17 LAB
ABO/RH(D): NORMAL
ALBUMIN SERPL-MCNC: 3.1 G/DL (ref 3.4–5)
ALP SERPL-CCNC: 70 U/L (ref 40–150)
ALT SERPL W P-5'-P-CCNC: 38 U/L (ref 0–70)
ANTIBODY SCREEN: NEGATIVE
APTT PPP: 61 SECONDS (ref 22–38)
AST SERPL W P-5'-P-CCNC: 22 U/L (ref 0–45)
ATRIAL RATE - MUSE: 250 BPM
ATRIAL RATE - MUSE: 79 BPM
ATRIAL RATE - MUSE: 96 BPM
BILIRUB DIRECT SERPL-MCNC: 0.1 MG/DL (ref 0–0.2)
BILIRUB SERPL-MCNC: 1 MG/DL (ref 0.2–1.3)
DIASTOLIC BLOOD PRESSURE - MUSE: NORMAL MMHG
INTERPRETATION ECG - MUSE: NORMAL
P AXIS - MUSE: NORMAL DEGREES
PR INTERVAL - MUSE: NORMAL MS
PROT SERPL-MCNC: 7.2 G/DL (ref 6.8–8.8)
QRS DURATION - MUSE: 100 MS
QRS DURATION - MUSE: 94 MS
QRS DURATION - MUSE: 96 MS
QT - MUSE: 462 MS
QT - MUSE: 478 MS
QT - MUSE: 500 MS
QTC - MUSE: 522 MS
QTC - MUSE: 561 MS
QTC - MUSE: 580 MS
R AXIS - MUSE: -74 DEGREES
R AXIS - MUSE: 0 DEGREES
R AXIS - MUSE: 26 DEGREES
SPECIMEN EXPIRATION DATE: NORMAL
SYSTOLIC BLOOD PRESSURE - MUSE: NORMAL MMHG
T AXIS - MUSE: 137 DEGREES
T AXIS - MUSE: 150 DEGREES
T AXIS - MUSE: 205 DEGREES
VENTRICULAR RATE- MUSE: 77 BPM
VENTRICULAR RATE- MUSE: 81 BPM
VENTRICULAR RATE- MUSE: 83 BPM

## 2022-01-17 PROCEDURE — 250N000013 HC RX MED GY IP 250 OP 250 PS 637: Performed by: INTERNAL MEDICINE

## 2022-01-17 PROCEDURE — 250N000013 HC RX MED GY IP 250 OP 250 PS 637: Performed by: HOSPITALIST

## 2022-01-17 PROCEDURE — 258N000003 HC RX IP 258 OP 636: Performed by: SURGERY

## 2022-01-17 PROCEDURE — 86923 COMPATIBILITY TEST ELECTRIC: CPT | Performed by: HOSPITALIST

## 2022-01-17 PROCEDURE — 250N000011 HC RX IP 250 OP 636: Performed by: INTERNAL MEDICINE

## 2022-01-17 PROCEDURE — 258N000003 HC RX IP 258 OP 636: Performed by: INTERNAL MEDICINE

## 2022-01-17 PROCEDURE — 99232 SBSQ HOSP IP/OBS MODERATE 35: CPT | Performed by: INTERNAL MEDICINE

## 2022-01-17 PROCEDURE — 210N000002 HC R&B HEART CARE

## 2022-01-17 PROCEDURE — 86901 BLOOD TYPING SEROLOGIC RH(D): CPT | Performed by: SURGERY

## 2022-01-17 PROCEDURE — 85730 THROMBOPLASTIN TIME PARTIAL: CPT | Performed by: INTERNAL MEDICINE

## 2022-01-17 PROCEDURE — 80076 HEPATIC FUNCTION PANEL: CPT | Performed by: SURGERY

## 2022-01-17 PROCEDURE — 250N000011 HC RX IP 250 OP 636: Performed by: SURGERY

## 2022-01-17 PROCEDURE — C9460 INJECTION, CANGRELOR: HCPCS | Performed by: SURGERY

## 2022-01-17 PROCEDURE — 36415 COLL VENOUS BLD VENIPUNCTURE: CPT | Performed by: SURGERY

## 2022-01-17 PROCEDURE — C9460 INJECTION, CANGRELOR: HCPCS | Performed by: INTERNAL MEDICINE

## 2022-01-17 RX ADMIN — CANGRELOR 0.75 MCG/KG/MIN: 50 INJECTION, POWDER, LYOPHILIZED, FOR SOLUTION INTRAVENOUS at 15:14

## 2022-01-17 RX ADMIN — METOPROLOL TARTRATE 50 MG: 50 TABLET, FILM COATED ORAL at 08:59

## 2022-01-17 RX ADMIN — DULOXETINE 30 MG: 30 CAPSULE, DELAYED RELEASE ORAL at 09:00

## 2022-01-17 RX ADMIN — SENNOSIDES 2 TABLET: 8.6 TABLET, FILM COATED ORAL at 20:45

## 2022-01-17 RX ADMIN — HEPARIN SODIUM 1950 UNITS/HR: 1000 INJECTION INTRAVENOUS; SUBCUTANEOUS at 09:14

## 2022-01-17 RX ADMIN — DULOXETINE 60 MG: 60 CAPSULE, DELAYED RELEASE ORAL at 20:45

## 2022-01-17 RX ADMIN — ASPIRIN 81 MG: 81 TABLET, COATED ORAL at 08:59

## 2022-01-17 RX ADMIN — HEPARIN SODIUM 1950 UNITS/HR: 1000 INJECTION INTRAVENOUS; SUBCUTANEOUS at 23:46

## 2022-01-17 RX ADMIN — OMEPRAZOLE 40 MG: 20 CAPSULE, DELAYED RELEASE ORAL at 09:00

## 2022-01-17 RX ADMIN — CANGRELOR 0.75 MCG/KG/MIN: 50 INJECTION, POWDER, LYOPHILIZED, FOR SOLUTION INTRAVENOUS at 23:46

## 2022-01-17 RX ADMIN — ROSUVASTATIN CALCIUM 40 MG: 20 TABLET, FILM COATED ORAL at 08:59

## 2022-01-17 RX ADMIN — CANGRELOR 0.75 MCG/KG/MIN: 50 INJECTION, POWDER, LYOPHILIZED, FOR SOLUTION INTRAVENOUS at 07:27

## 2022-01-17 RX ADMIN — MORPHINE SULFATE 15 MG: 15 TABLET ORAL at 23:47

## 2022-01-17 RX ADMIN — MORPHINE SULFATE 15 MG: 15 TABLET ORAL at 15:48

## 2022-01-17 RX ADMIN — MICONAZOLE NITRATE: 2 POWDER TOPICAL at 09:15

## 2022-01-17 RX ADMIN — TRAZODONE HYDROCHLORIDE 200 MG: 100 TABLET ORAL at 20:46

## 2022-01-17 RX ADMIN — MORPHINE SULFATE 15 MG: 15 TABLET ORAL at 07:26

## 2022-01-17 ASSESSMENT — ACTIVITIES OF DAILY LIVING (ADL)
ADLS_ACUITY_SCORE: 6

## 2022-01-17 NOTE — PROGRESS NOTES
Date: January 16, 2022  Shift: [id number]  Name: Onur Barr  Age: 66 year old  YOB: 1955    Reason for Admission: ACS (acute coronary syndrome) (H) [I24.9]  Chest pain, unspecified type [R07.9]     Cognitive/Mentation: A&Ox4  Neuros/CMS: intact    VS: Stable on RA  Cardiac: Afib CVR  GI: +BS, +flatus, BM today  : Voiding in BR  Pulmonary: LS clear  Pain: denies     Drains: L PIV  Skin: intact  Activity: Ind    Diet: cardiac diet     Stop Light: Green  Therapies recs: None  Discharge: pending     Shift summary: Tentative plan for CABG on 1/19. Heparin gtt going at 1950, recheck in AM. Cangrelor gtt infusing.

## 2022-01-17 NOTE — PLAN OF CARE
VSS on RA. Some SOTOMAYOR, denies SOB at rest. Chronic back pain, PRN morphine given X1 with improvement. Heparin and cangrelor drips infusing per order instructions. Awaiting CABG this week, possibly on Wednesday. No new complaints.

## 2022-01-17 NOTE — PLAN OF CARE
Pt is A&Ox4, independent in the room, tele Afib w/ CVR. Waiting for CABG and ICU bed availability for after procedure, will likely be Wednesday morning. Heparin running at 1,950 Hep 10a redraw scheduled for tomorrow AM. Morphine given x1 this AM for chronic back pain.

## 2022-01-17 NOTE — PROGRESS NOTES
[facility]    Medicine Progress Note - Hospitalist Service       Date of Admission:  1/12/2022  Assessment & Plan   66 year old male with hx of CAD s/p PCI, chronic a-fib on chronic anticoagulation with apixaban, CHF, HTN, and chronic back pain s/p indwelling pain pump who was admitted on 1/12/2022 for NSTEMI. He was found to have severe in-stent re-stenosis of mid-LAD and severe stenosis distal to previously stents, and is currently awaiting CABG:    NSTEMI  History of CAD s/p PCI (LAD, 2012; RCA with later staged intervention, 2021)  Essential hypertension  * PTA: aspirin 81 mg daily, Plavix, metoprolol XL 75 mg daily, lisinopril/HCTZ 40-50 mg daily, Lasix 40 mg daily, rosuvastatin 40 mg daily  * Presented with progressive chest pain reminiscent of previous MI; relieved with nitro. In the ED, he was initiated on IV heparin and nitro infusions, and Cardiology was consulted  * TTE (1/12) showed EF 40-45% with severe apical wall hypokinesis  * Coronary angiogram (1/13) showed LAD disease with severe in-stent re-stenosis of mid-LAD and severe stenosis distal to previously stents; balloon angioplasties of stenoses were attempted, but with sub-optimal results  * CV Surgery was consulted following angiogram findings  - CABG tentatively planned on Mon, 1/17, and continues on IV heparin  - Metoprolol to 50 mg bid and BP and HR better    Chronic combined systolic and diastolic CHF  TTE findings noted above  - Continues on PTA Lasix    Chronic atrial fibrillation -- remains in Afib on monitor  - Rate-controlled on PTA metoprolol  - Holding PTA apixaban for CABG    GERD  - Continues on PTA omeprazole    Chronic low back pain  [PTA: morphine per indwelling pain pump, nabumetone 500 mg BID, duloxetine 30 mg qAM, 60 mg qhs; APAP prn; baclofen bid prn]  - Hold PTA nabumetone in setting of NSTEMI  - Continues on other PTA meds with pain pump in place    Resolved Tongue swelling  -- stopped Decadron  yesterday, no problems noted.      Diet: Low Saturated Fat Na <2400 mg    DVT Prophylaxis: IV heparin  Bran Catheter: Not present  Code Status: Full Code         Disposition: Expected discharge after CABG surgery on Wed -- probably home on Monday,1/25/22    The patient's care was discussed with the Patient.    Ej Keane MD  Hospitalist Service  [facility]  Contact information available via [facility] Paging/Directory    ______________________________________________________________________    Interval History   Slight chest pain yesterday AM, no further pain since Metoprolol increased.     Physical Exam   Vital Signs: Temp: 98  F (36.7  C) Temp src: Oral BP: (!) 141/90 Pulse: 66   Resp: 16 SpO2: 98 % O2 Device: None (Room air)    Weight: 306 lbs 14.4 oz  Constitutional: Appears comfortable  Respiratory: Breathing non-labored. Lungs CTAB - no wheezes, crackles, or rhonchi  Cardiovascular: Heart irregular rhythm, trace bilateral ankle edema  GI: +BS, abd obese but soft/NT  Musculoskeletal: Normal muscle bulk and tone  Neuro: Alert and appropriate, no focal deficits    Data   Recent Labs   Lab 01/16/22  0621 01/15/22  0832 01/14/22  1229 01/14/22  0731 01/14/22  0438 01/13/22  1658 01/13/22  0546   WBC 11.1* 9.2  --   --  9.1  --  7.7   HGB 14.2 13.3  --   --  13.0*  --  13.4   MCV 87 89  --   --  86  --  87    166  --   --  164  --  113*    138  --   --  138  --  139   POTASSIUM 3.6 3.9  --   --  3.6  --  4.6   CHLORIDE 106 107  --   --  110*  --  109   CO2 27 24  --   --  23  --  28   BUN 18 16  --   --  14  --  16   CR 0.96 0.90  --   --  0.83  --  0.89   ANIONGAP 3 7  --   --  5  --  2*   RATNA 9.0 8.8  --   --  8.6  --  8.2*   * 136* 153*   < > 142*   < > 96   ALBUMIN  --   --   --   --   --   --  2.6*   PROTTOTAL  --   --   --   --   --   --  6.3*   BILITOTAL  --   --   --   --   --   --  0.8   ALKPHOS  --   --   --   --   --   --  68   ALT  --   --   --   --    --   --  39   AST  --   --   --   --   --   --  66*    < > = values in this interval not displayed.         No results found for this or any previous visit (from the past 24 hour(s)).    Medications     cangrelor (KENGREAL) infusion ADULT for BRIDGING 0.75 mcg/kg/min (01/17/22 0788)     - MEDICATION INSTRUCTIONS -       - MEDICATION INSTRUCTIONS -       heparin 1,950 Units/hr (01/17/22 09)     nitroGLYcerin 50 mg in D5W 250 mL       Percutaneous Coronary Intervention orders placed (this is information for BPA alerting)       Reason antiplatelet medication not selected         aspirin  81 mg Oral Daily     DULoxetine  30 mg Oral QAM     DULoxetine  60 mg Oral QPM     furosemide  40 mg Oral Daily     metoprolol tartrate  50 mg Oral BID     miconazole   Topical Daily     omeprazole  40 mg Oral Daily     rosuvastatin  40 mg Oral Daily     sennosides  2 tablet Oral BID     sodium chloride (PF)  3 mL Intracatheter Q8H     traZODone  200 mg Oral At Bedtime

## 2022-01-17 NOTE — PROGRESS NOTES
CV Surgery    Patient seen and examined this am with Dr. Dumont. Had some chest pain last night that did resolve eventually. Encouraged patient to discuss with nursing staff any further chest pain as wouldn't be elective anymore. Surgery tentatively discussed/planned for Wednesday at 0720 with Dr. Dumont. Will add PVI/JUSTO clip with CABG. Will discuss with our nurse to change consent. Stop Cangrelor 6 hrs before surgery.     Mckayla Armendariz PA-C

## 2022-01-17 NOTE — PROGRESS NOTES
[facility]  Cardiology Progress Note  Date of Service: 01/17/2022  Primary Cardiologist: Dr. Stephens ([facility])    Assessment & Plan    Onur Barr is a 66 year old male admitted on 1/12/2022 with NSTEMI.     Assessment:  1. NSTEMI - troponin HS peaked 20,280.   2. Coronary artery disease -  History of multiple PCI intervention, including PCI to proximal and distal RCA in setting of inferior STEMI 9/2021, followed by staged PCI of in-stent restenosis of proximal LAD in 10/2021.    - Now with severe in-stent restenosis of LAD s/p  Temporizing balloon angioplasty 1/13/2022  3. Ischemic cardiomyopathy/heart failure with reduced ejection fraction - EF 40-45%, severe apical wall hypokinesis.   4. Hypertension  5. Hyperlipidemia  6. Atrial fibrillation   7. DM type 2   8. Obesity  9. Right radial hematoma with small AV fistula (presumed due to multiple previous access in the right radial artery). Hematoma resolved.  10. Chronic back pain with intrathecal pain pump    I saw Baudilio at bedside. He is doing okay from cardiovascular standpoint. He shares that he had an mild episode of chest pain last night, no recurrence, no pain currently. He is tentatively schedule for CABG on Wednesday. From a heart failure standpoint he appears euvolemic, though his body habitus makes exam difficult. His weight is coming down since admission 313# -> 306# today. Kidney function stable, creatinine 0.96 yesterday.     Plan:   1. Awaiting CABG, PVI/JUSTO clip, tentatively scheduled for Wednesday 1/19/2021  2. Continue heparin and cangrelor infusion (Cangrelor to be stopped 6hrs before surgery).   3. Continue aspirin  4. Continue metoprolol tartrate 50mg BID  5. Continue furosemide 40mg daily  6. Continue rosuvastatin 40mg daily     YAMILETH Francois [facility]  Pager: [phone]    ATTESTATION:  Mr. Barr was seen and examined. Agree with note and plan of care.   CHECO Kendall MD     Interval History    Seen at bedside. No acute events overnight. Reports mild episode of chest pain last night, no recurrence and no chest pain currently. Complaints of chronic shortness of breath more prominent with exertion.     Physical Exam   Temp: 98  F (36.7  C) Temp src: Oral BP: (!) 141/90 Pulse: 66   Resp: 16 SpO2: 98 % O2 Device: None (Room air)    Vitals:    01/15/22 0553 01/16/22 0522 01/17/22 0503   Weight: 140.6 kg (309 lb 14.4 oz) 140.2 kg (309 lb 1.6 oz) 139.2 kg (306 lb 14.4 oz)   GEN: well nourished, in no acute distress.  HEENT:  Pupils equal, round. Sclerae nonicteric.   NECK: Supple, no masses appreciated. JVP appears normal.   C/V:  Irregularly irregular rate and rhythm, no murmur, rub or gallop.   RESP: Respirations are unlabored. Clear to auscultation bilaterally without wheezing, rales, or rhonchi.  GI: Abdomen soft,obese, nontender.  EXTREM: No LE edema. Right radial access with ecchymosis, no hematoma, nontender, no bruit.   NEURO: Alert and oriented, cooperative.  SKIN: Warm and dry.     Medications     cangrelor (KENGREAL) infusion ADULT for BRIDGING 0.75 mcg/kg/min (01/17/22 0727)     - MEDICATION INSTRUCTIONS -       - MEDICATION INSTRUCTIONS -       heparin 1,950 Units/hr (01/17/22 0914)     nitroGLYcerin 50 mg in D5W 250 mL       Percutaneous Coronary Intervention orders placed (this is information for BPA alerting)       Reason antiplatelet medication not selected         aspirin  81 mg Oral Daily     DULoxetine  30 mg Oral QAM     DULoxetine  60 mg Oral QPM     furosemide  40 mg Oral Daily     metoprolol tartrate  50 mg Oral BID     miconazole   Topical Daily     omeprazole  40 mg Oral Daily     rosuvastatin  40 mg Oral Daily     sennosides  2 tablet Oral BID     sodium chloride (PF)  3 mL Intracatheter Q8H     traZODone  200 mg Oral At Bedtime       Data   Last 24 hours labs reviewed     Imaging: CT chest 1/14/2022  1.  Coronary artery calcification and stents.  2.  No acute findings in the  chest.    Tele: atrial fibrillation     Echo: 1/12/2022  1. Left ventricular systolic function is mildly reduced. The Biplane ejection  fraction is 40-45%. There is severe apical wall hypokinesis.  2. Valves not well seen on this technically limited study.  3. Rhythm is atrial fibrillation.  Compared to the prior study on 9/12/2021, there are new wall motion  abnormalities and the EF is lower.  Results discussed with Dr. Zhang from ED at 9.39pm.    Last ischemic eval: cardiac catheterization 1/13/2022  1. Severe in-stent restenosis of the mid LAD and severe stenosis distal to the previously placed stents.  2.  Patent stents in the right coronary artery with moderate stenosis of the large RPDA and RPL a branches  3.  Balloon angioplasty and Cutting Balloon angioplasty of the in-stent restenosis in mid LAD stenosis with suboptimal result due to very severe stenosis with multiple layers of stent which were underexpanded due to severe disease.    4.  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  This fistula was mildly disrupted causing a perforation in the forearm.  The radial artery was able to be accessed proximal to the lesion which was compressed manually.  5..  Difficult procedure due to patient with severe back pain and difficult imaging due to body habitus with morbid obesity.

## 2022-01-18 ENCOUNTER — ANESTHESIA EVENT (OUTPATIENT)
Dept: SURGERY | Facility: CLINIC | Age: 67
End: 2022-01-18
Payer: COMMERCIAL

## 2022-01-18 LAB
ANION GAP SERPL CALCULATED.3IONS-SCNC: 6 MMOL/L (ref 3–14)
APTT PPP: 66 SECONDS (ref 22–38)
ATRIAL RATE - MUSE: 75 BPM
ATRIAL RATE - MUSE: 87 BPM
BLD PROD TYP BPU: NORMAL
BLOOD COMPONENT TYPE: NORMAL
BUN SERPL-MCNC: 14 MG/DL (ref 7–30)
CALCIUM SERPL-MCNC: 8.9 MG/DL (ref 8.5–10.1)
CHLORIDE BLD-SCNC: 108 MMOL/L (ref 94–109)
CO2 SERPL-SCNC: 23 MMOL/L (ref 20–32)
CODING SYSTEM: NORMAL
CREAT SERPL-MCNC: 0.89 MG/DL (ref 0.66–1.25)
CROSSMATCH: NORMAL
DIASTOLIC BLOOD PRESSURE - MUSE: NORMAL MMHG
DIASTOLIC BLOOD PRESSURE - MUSE: NORMAL MMHG
GFR SERPL CREATININE-BSD FRML MDRD: >90 ML/MIN/1.73M2
GLUCOSE BLD-MCNC: 106 MG/DL (ref 70–99)
INTERPRETATION ECG - MUSE: NORMAL
INTERPRETATION ECG - MUSE: NORMAL
ISSUE DATE AND TIME: NORMAL
ISSUE DATE AND TIME: NORMAL
MRSA DNA SPEC QL NAA+PROBE: NEGATIVE
P AXIS - MUSE: NORMAL DEGREES
P AXIS - MUSE: NORMAL DEGREES
POTASSIUM BLD-SCNC: 3.5 MMOL/L (ref 3.4–5.3)
PR INTERVAL - MUSE: NORMAL MS
PR INTERVAL - MUSE: NORMAL MS
QRS DURATION - MUSE: 100 MS
QRS DURATION - MUSE: 88 MS
QT - MUSE: 432 MS
QT - MUSE: 468 MS
QTC - MUSE: 508 MS
QTC - MUSE: 519 MS
R AXIS - MUSE: -19 DEGREES
R AXIS - MUSE: 41 DEGREES
SA TARGET DNA: NEGATIVE
SODIUM SERPL-SCNC: 137 MMOL/L (ref 133–144)
SYSTOLIC BLOOD PRESSURE - MUSE: NORMAL MMHG
SYSTOLIC BLOOD PRESSURE - MUSE: NORMAL MMHG
T AXIS - MUSE: 121 DEGREES
T AXIS - MUSE: 162 DEGREES
UNIT ABO/RH: NORMAL
UNIT NUMBER: NORMAL
UNIT STATUS: NORMAL
UNIT TYPE ISBT: 5100
VENTRICULAR RATE- MUSE: 71 BPM
VENTRICULAR RATE- MUSE: 87 BPM

## 2022-01-18 PROCEDURE — 99232 SBSQ HOSP IP/OBS MODERATE 35: CPT | Performed by: INTERNAL MEDICINE

## 2022-01-18 PROCEDURE — C9460 INJECTION, CANGRELOR: HCPCS | Performed by: SURGERY

## 2022-01-18 PROCEDURE — 250N000013 HC RX MED GY IP 250 OP 250 PS 637: Performed by: INTERNAL MEDICINE

## 2022-01-18 PROCEDURE — 87641 MR-STAPH DNA AMP PROBE: CPT | Performed by: SURGERY

## 2022-01-18 PROCEDURE — 250N000011 HC RX IP 250 OP 636: Performed by: SURGERY

## 2022-01-18 PROCEDURE — 250N000011 HC RX IP 250 OP 636: Performed by: INTERNAL MEDICINE

## 2022-01-18 PROCEDURE — 258N000003 HC RX IP 258 OP 636: Performed by: INTERNAL MEDICINE

## 2022-01-18 PROCEDURE — 80048 BASIC METABOLIC PNL TOTAL CA: CPT | Performed by: INTERNAL MEDICINE

## 2022-01-18 PROCEDURE — 36415 COLL VENOUS BLD VENIPUNCTURE: CPT | Performed by: INTERNAL MEDICINE

## 2022-01-18 PROCEDURE — 210N000002 HC R&B HEART CARE

## 2022-01-18 PROCEDURE — 258N000003 HC RX IP 258 OP 636: Performed by: SURGERY

## 2022-01-18 PROCEDURE — 999N000156 HC STATISTIC RCP CONSULT EA 30 MIN

## 2022-01-18 PROCEDURE — 999N000157 HC STATISTIC RCP TIME EA 10 MIN

## 2022-01-18 PROCEDURE — 85730 THROMBOPLASTIN TIME PARTIAL: CPT | Performed by: INTERNAL MEDICINE

## 2022-01-18 PROCEDURE — 250N000013 HC RX MED GY IP 250 OP 250 PS 637: Performed by: HOSPITALIST

## 2022-01-18 RX ORDER — FAMOTIDINE 20 MG/1
20 TABLET, FILM COATED ORAL
Status: COMPLETED | OUTPATIENT
Start: 2022-01-19 | End: 2022-01-19

## 2022-01-18 RX ORDER — CHLORHEXIDINE GLUCONATE ORAL RINSE 1.2 MG/ML
10 SOLUTION DENTAL ONCE
Status: COMPLETED | OUTPATIENT
Start: 2022-01-19 | End: 2022-01-19

## 2022-01-18 RX ORDER — ASPIRIN 81 MG/1
162 TABLET, CHEWABLE ORAL
Status: COMPLETED | OUTPATIENT
Start: 2022-01-19 | End: 2022-01-19

## 2022-01-18 RX ORDER — ASPIRIN 81 MG/1
81 TABLET, CHEWABLE ORAL
Status: COMPLETED | OUTPATIENT
Start: 2022-01-19 | End: 2022-01-19

## 2022-01-18 RX ORDER — CEFAZOLIN SODIUM IN 0.9 % NACL 3 G/100 ML
3 INTRAVENOUS SOLUTION, PIGGYBACK (ML) INTRAVENOUS SEE ADMIN INSTRUCTIONS
Status: DISCONTINUED | OUTPATIENT
Start: 2022-01-19 | End: 2022-01-19 | Stop reason: HOSPADM

## 2022-01-18 RX ORDER — LIDOCAINE 40 MG/G
CREAM TOPICAL
Status: DISCONTINUED | OUTPATIENT
Start: 2022-01-19 | End: 2022-01-19 | Stop reason: HOSPADM

## 2022-01-18 RX ORDER — METOPROLOL TARTRATE 50 MG
50 TABLET ORAL 2 TIMES DAILY
Status: DISCONTINUED | OUTPATIENT
Start: 2022-01-18 | End: 2022-01-19

## 2022-01-18 RX ORDER — CEFAZOLIN SODIUM IN 0.9 % NACL 3 G/100 ML
3 INTRAVENOUS SOLUTION, PIGGYBACK (ML) INTRAVENOUS
Status: COMPLETED | OUTPATIENT
Start: 2022-01-19 | End: 2022-01-19

## 2022-01-18 RX ORDER — POTASSIUM CHLORIDE 1500 MG/1
40 TABLET, EXTENDED RELEASE ORAL ONCE
Status: COMPLETED | OUTPATIENT
Start: 2022-01-18 | End: 2022-01-18

## 2022-01-18 RX ADMIN — POTASSIUM CHLORIDE 40 MEQ: 1500 TABLET, EXTENDED RELEASE ORAL at 10:10

## 2022-01-18 RX ADMIN — MORPHINE SULFATE 15 MG: 15 TABLET ORAL at 15:23

## 2022-01-18 RX ADMIN — MORPHINE SULFATE 15 MG: 15 TABLET ORAL at 07:06

## 2022-01-18 RX ADMIN — OMEPRAZOLE 40 MG: 20 CAPSULE, DELAYED RELEASE ORAL at 15:23

## 2022-01-18 RX ADMIN — CANGRELOR 0.75 MCG/KG/MIN: 50 INJECTION, POWDER, LYOPHILIZED, FOR SOLUTION INTRAVENOUS at 07:35

## 2022-01-18 RX ADMIN — CANGRELOR 0.75 MCG/KG/MIN: 50 INJECTION, POWDER, LYOPHILIZED, FOR SOLUTION INTRAVENOUS at 16:10

## 2022-01-18 RX ADMIN — FUROSEMIDE 40 MG: 40 TABLET ORAL at 08:04

## 2022-01-18 RX ADMIN — DULOXETINE 30 MG: 30 CAPSULE, DELAYED RELEASE ORAL at 08:03

## 2022-01-18 RX ADMIN — ACETAMINOPHEN 650 MG: 325 TABLET, FILM COATED ORAL at 20:59

## 2022-01-18 RX ADMIN — METOPROLOL TARTRATE 50 MG: 50 TABLET, FILM COATED ORAL at 08:04

## 2022-01-18 RX ADMIN — TRAZODONE HYDROCHLORIDE 200 MG: 100 TABLET ORAL at 20:59

## 2022-01-18 RX ADMIN — SENNOSIDES 1 TABLET: 8.6 TABLET, FILM COATED ORAL at 08:03

## 2022-01-18 RX ADMIN — ROSUVASTATIN CALCIUM 40 MG: 20 TABLET, FILM COATED ORAL at 08:03

## 2022-01-18 RX ADMIN — METOPROLOL TARTRATE 50 MG: 50 TABLET, FILM COATED ORAL at 20:58

## 2022-01-18 RX ADMIN — ASPIRIN 81 MG: 81 TABLET, COATED ORAL at 08:03

## 2022-01-18 RX ADMIN — HEPARIN SODIUM 1950 UNITS/HR: 1000 INJECTION INTRAVENOUS; SUBCUTANEOUS at 13:28

## 2022-01-18 RX ADMIN — SENNOSIDES 2 TABLET: 8.6 TABLET, FILM COATED ORAL at 20:58

## 2022-01-18 RX ADMIN — DULOXETINE 60 MG: 60 CAPSULE, DELAYED RELEASE ORAL at 20:58

## 2022-01-18 ASSESSMENT — ACTIVITIES OF DAILY LIVING (ADL)
ADLS_ACUITY_SCORE: 6
ADLS_ACUITY_SCORE: 5
ADLS_ACUITY_SCORE: 5
ADLS_ACUITY_SCORE: 6
ADLS_ACUITY_SCORE: 5
ADLS_ACUITY_SCORE: 5
ADLS_ACUITY_SCORE: 7
ADLS_ACUITY_SCORE: 5
ADLS_ACUITY_SCORE: 6
ADLS_ACUITY_SCORE: 6
ADLS_ACUITY_SCORE: 7
ADLS_ACUITY_SCORE: 6
ADLS_ACUITY_SCORE: 7
ADLS_ACUITY_SCORE: 5
ADLS_ACUITY_SCORE: 6
ADLS_ACUITY_SCORE: 6
ADLS_ACUITY_SCORE: 5

## 2022-01-18 ASSESSMENT — ENCOUNTER SYMPTOMS: DYSRHYTHMIAS: 1

## 2022-01-18 ASSESSMENT — LIFESTYLE VARIABLES: TOBACCO_USE: 1

## 2022-01-18 NOTE — PROGRESS NOTES
[facility]    Medicine Progress Note - Hospitalist Service       Date of Admission:  1/12/2022  Assessment & Plan   66 year old male with hx of CAD s/p PCI, chronic a-fib on chronic anticoagulation with apixaban, CHF, HTN, and chronic back pain s/p indwelling pain pump who was admitted on 1/12/2022 for NSTEMI. He was found to have severe in-stent re-stenosis of mid-LAD and severe stenosis distal to previously stents, and is currently awaiting CABG:    NSTEMI  History of CAD s/p PCI (LAD, 2012; RCA with later staged intervention, 2021)  Essential hypertension  * PTA: aspirin 81 mg daily, Plavix, metoprolol XL 75 mg daily, lisinopril/HCTZ 40-50 mg daily, Lasix 40 mg daily, rosuvastatin 40 mg daily  * Presented with progressive chest pain reminiscent of previous MI; relieved with nitro. In the ED, he was initiated on IV heparin and nitro infusions, and Cardiology was consulted  * TTE (1/12) showed EF 40-45% with severe apical wall hypokinesis  * Coronary angiogram (1/13) showed LAD disease with severe in-stent re-stenosis of mid-LAD and severe stenosis distal to previously stents; balloon angioplasties of stenoses were attempted, but with sub-optimal results  * CV Surgery was consulted following angiogram findings  - CABG tentatively planned on Mon, 1/17, and continues on IV heparin  - Metoprolol to 50 mg bid and BP and HR better -- except dose held last night and CP this AM     Chronic combined systolic and diastolic CHF  TTE findings noted above  - Continues on PTA Lasix    Chronic atrial fibrillation -- remains in Afib on monitor  - Rate-controlled on PTA metoprolol  - Holding PTA apixaban for CABG    GERD  - Continues on PTA omeprazole    Chronic low back pain  [PTA: morphine per indwelling pain pump, nabumetone 500 mg BID, duloxetine 30 mg qAM, 60 mg qhs; APAP prn; baclofen bid prn]  - Hold PTA nabumetone in setting of NSTEMI  - Continues on other PTA meds with pain pump in  place    Resolved Tongue swelling  -- stopped Decadron and no problems      Diet: Low Saturated Fat Na <2400 mg    DVT Prophylaxis: IV heparin  Bran Catheter: Not present  Code Status: Full Code         Disposition: Expected discharge after CABG surgery on Wed -- probably home on Monday,1/25/22    The patient's care was discussed with the Patient.    Ej Keane MD  Hospitalist Service  [facility]  Contact information available via [facility] Paging/Directory    ______________________________________________________________________    Interval History   Slight chest pain this AM, but Metoprolol held last night because HR 54.     Physical Exam   Vital Signs: Temp: 97.8  F (36.6  C) Temp src: Oral BP: (!) 158/84 Pulse: 103   Resp: 18 SpO2: 97 % O2 Device: None (Room air)    Weight: 305 lbs 6.4 oz  Constitutional: Appears comfortable  Respiratory: Breathing non-labored. Lungs CTAB - no wheezes, crackles, or rhonchi  Cardiovascular: Heart irregular rhythm, trace bilateral ankle edema  GI: +BS, abd obese but soft/NT  Musculoskeletal: trace ankle edema bilaterally  Neuro: Alert and appropriate, no focal deficits    Data   Recent Labs   Lab 01/18/22  0645 01/17/22  1123 01/16/22  0621 01/15/22  0832 01/14/22  0731 01/14/22  0438 01/13/22  1658 01/13/22  0546   WBC  --   --  11.1* 9.2  --  9.1  --  7.7   HGB  --   --  14.2 13.3  --  13.0*  --  13.4   MCV  --   --  87 89  --  86  --  87   PLT  --   --  175 166  --  164  --  113*     --  136 138  --  138  --  139   POTASSIUM 3.5  --  3.6 3.9  --  3.6  --  4.6   CHLORIDE 108  --  106 107  --  110*  --  109   CO2 23  --  27 24  --  23  --  28   BUN 14  --  18 16  --  14  --  16   CR 0.89  --  0.96 0.90  --  0.83  --  0.89   ANIONGAP 6  --  3 7  --  5  --  2*   RATNA 8.9  --  9.0 8.8  --  8.6  --  8.2*   *  --  102* 136*   < > 142*   < > 96   ALBUMIN  --  3.1*  --   --   --   --   --  2.6*   PROTTOTAL  --  7.2  --   --   --   --   --   6.3*   BILITOTAL  --  1.0  --   --   --   --   --  0.8   ALKPHOS  --  70  --   --   --   --   --  68   ALT  --  38  --   --   --   --   --  39   AST  --  22  --   --   --   --   --  66*    < > = values in this interval not displayed.         No results found for this or any previous visit (from the past 24 hour(s)).    Medications     cangrelor (KENGREAL) infusion ADULT for BRIDGING 0.75 mcg/kg/min (01/18/22 0735)     - MEDICATION INSTRUCTIONS -       - MEDICATION INSTRUCTIONS -       heparin 1,950 Units/hr (01/18/22 0736)     nitroGLYcerin 50 mg in D5W 250 mL       Percutaneous Coronary Intervention orders placed (this is information for BPA alerting)       Reason antiplatelet medication not selected         aspirin  81 mg Oral Daily     DULoxetine  30 mg Oral QAM     DULoxetine  60 mg Oral QPM     furosemide  40 mg Oral Daily     metoprolol tartrate  50 mg Oral BID     miconazole   Topical Daily     omeprazole  40 mg Oral Daily     rosuvastatin  40 mg Oral Daily     sennosides  2 tablet Oral BID     sodium chloride (PF)  3 mL Intracatheter Q8H     traZODone  200 mg Oral At Bedtime

## 2022-01-18 NOTE — ANESTHESIA PREPROCEDURE EVALUATION
Anesthesia Pre-Procedure Evaluation    Patient: Onur Barr   MRN: [id number] : 1955        Preoperative Diagnosis: CAD (coronary artery disease) [I25.10]    Procedure : Procedure(s):  CORONARY ARTERY BYPASS GRAFT (CABG)          Past Medical History:   Diagnosis Date     Acid reflux disease 10/31/2017     Arrhythmia     paroxysmal atrial fibrillation     Arthritis      Atrial fibrillation (H)     Created by Conversion      Bariatric surgery status     Created by Conversion      CHF (congestive heart failure) (H)      Coronary artery disease      Coronary atherosclerosis     Created by Conversion  Replacement Utility updated for latest IMO load     Diabetes (H)     typr II resloved     Essential hypertension     Created by [facility] Annotation: 2012 10:16Zack Harris: Lisinipril,  coreg  Replacement Utility updated for latest IMO load     H/O gastric bypass      Heart attack (H)      Hypercholesteremia      Hypertension      Insomnia      Insomnia, unspecified     Created by [facility] Annotation: Sep 11 2013  9:56AM Zack Brewer: Trouble  maintaining sleep-Trazodone-minimal helpzolpidem-      Ischemic cardiomyopathy      Low back pain      Lumbago     Created by [facility] Annotation: 2012 10:20Anh Ford: Morphine pump      Morbid obesity (H) 2018     Nephrolithiasis      Nephrolithiasis      Obstructive sleep apnea (adult) (pediatric)     Created by Conversion      Osteoarthritis     Created by [facility] Annotation: 2009  9:53Zack Harris: failed lumbar  fusion/morphine pump dr [place]  Replacement Utility updated for latest IMO load     Pure hypercholesterolemia     Created by Conversion      Sleep apnea      Sleepiness 2018      Past Surgical History:   Procedure Laterality Date     BACK SURGERY       BYPASS GASTRIC DUODENAL SWITCH       COSMETIC SURGERY      pannicullectomy     CV CORONARY  ANGIOGRAM N/A 9/11/2021    Procedure: Coronary Angiogram;  Surgeon: Noris Ozuna MD;  Location: [facility]     CV HEART CATHETERIZATION WITH POSSIBLE INTERVENTION N/A 1/13/2022    Procedure: Heart Catheterization with Possible Intervention;  Surgeon: Liz Pearl MD;  Location: [facility] CARDIAC CATH LAB     CV LEFT HEART CATH N/A 9/11/2021    Procedure: Left Heart Cath;  Surgeon: Noris Ozuna MD;  Location: [facility] CV     CV PCI N/A 9/11/2021    Procedure: Percutaneous Coronary Intervention;  Surgeon: Noris Ozuna MD;  Location: [facility] CV     CV PCI N/A 10/7/2021    Procedure: Percutaneous Coronary Intervention;  Surgeon: Mckayla Dan MD;  Location: [facility]     CV PCI ASPIRATION THROMECTOMY N/A 9/11/2021    Procedure: Percutaneous Coronary Intervention Aspiration Thrombectomy;  Surgeon: Noris Ozuna MD;  Location: [facility]     ENT SURGERY      tonsillectomy     EXTRACORPOREAL SHOCK WAVE LITHOTRIPSY, CYSTOSCOPY, INSERT STENT URETER(S), COMBINED  8/23/11     HC REMOVAL OF TONSILS,<11 Y/O      Description: Tonsillectomy;  Recorded: 03/23/2012;  Comments: for obstructive sleep apnea     HC REPAIR INCISIONAL HERNIA,REDUCIBLE  11/13/2012    Description: Incisional Hernia Repair;  Proc Date: 11/13/2012;  Comments: incisional hernia repair and abdominal panniculectomy by Dr Shon Green at the [facility].     HERNIA REPAIR       IR MISCELLANEOUS PROCEDURE  7/29/2011     IR MISCELLANEOUS PROCEDURE  8/5/2011     IR MISCELLANEOUS PROCEDURE  8/23/2011     IR NEPHROSTOMY TUBE CHANGE BILATERAL  8/5/2011     ORTHOPEDIC SURGERY      arthrodesis ant discectomy, lumbar     DC ARTHRODESIS ANT INTERBODY MIN DISCECTOMY,LUMBAR      Description: Lumbar Vertebral Fusion;  Recorded: 06/26/2009;  Comments: before 200 see [facility] consult under old records     DC EXCISE EXCESS SKIN TISSUE,ABDOMEN  11/13/2012    Description: Panniculectomy;  Proc Date:  11/13/2012;  Comments: 3.5 Lb Pannus was removed at the [facility] By Dr. Shon Green     REPLACE INTRATHECAL PAIN PUMP N/A 4/25/2017    Procedure: REPLACE INTRATHECAL PAIN PUMP;  INTRATHECAL PAIN PUMP CATHETER REPLACEMENT AND PUMP REPLACEMENT;  Surgeon: Anthony Maloney MD;  Location: [facility]     REVISE CATHETER INTRATHECAL N/A 4/25/2017    Procedure: REVISE CATHETER INTRATHECAL;;  Surgeon: Anthony Maloney MD;  Location: [facility]     SHOULDER SURGERY       ZZC GASTRIC BYPASS,OBESE<100CM LORA-EN-Y  2008    Description: Gastric Surgery For Morbid Obesity Bypass With Lora-en-Y;  Recorded: 06/26/2009;  Comments: dr green 2008      Allergies   Allergen Reactions     Hydrocodone-Acetaminophen       Social History     Tobacco Use     Smoking status: Former Smoker     Years: 10.00     Types: Cigars, Cigars     Smokeless tobacco: Never Used   Substance Use Topics     Alcohol use: No      Wt Readings from Last 1 Encounters:   01/18/22 138.5 kg (305 lb 6.4 oz)        Anesthesia Evaluation   Pt has had prior anesthetic.         ROS/MED HX  ENT/Pulmonary:     (+) sleep apnea, tobacco use, Past use,     Neurologic:       Cardiovascular:     (+) Dyslipidemia hypertension--CAD angina-change in symptoms. past MI -stent-2 CHF etiology: ICM dysrhythmias, a-fib, Previous cardiac testing   Echo: Date: Results:  1. Left ventricular systolic function is mildly reduced. The Biplane ejection  fraction is 40-45%. There is severe apical wall hypokinesis.  2. Valves not well seen on this technically limited study.  3. Rhythm is atrial fibrillation.  Compared to the prior study on 9/12/2021, there are new wall motion  abnormalities and the EF is lower.  Stress Test: Date: Results:    ECG Reviewed: Date: Results:    Cath: Date: Results:  1. Severe in-stent restenosis of the mid LAD and severe stenosis distal to the previously placed stents.  2.  Patent stents in the right coronary artery with moderate stenosis of the large RPDA and RPL a  branches  3.  Balloon angioplasty and Cutting Balloon angioplasty of the in-stent restenosis in mid LAD stenosis with suboptimal result due to very severe stenosis with multiple layers of stent which were underexpanded due to severe disease.    4.  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  This fistula was mildly disrupted causing a perforation in the forearm.  The radial artery was able to be accessed proximal to the lesion which was compressed manually.  5..  Difficult procedure due to patient with severe back pain and difficult imaging due to body habitus with morbid obesity      METS/Exercise Tolerance:     Hematologic:       Musculoskeletal:   (+) arthritis,     GI/Hepatic:     (+) GERD,     Renal/Genitourinary:     (+) Nephrolithiasis ,     Endo:     (+) type II DM, Obesity,     Psychiatric/Substance Use:     (+) H/O chronic opiod use .     Infectious Disease:       Malignancy:       Other:      (+) , H/O Chronic Pain,        Physical Exam    Airway        Mallampati: III   TM distance: > 3 FB   Neck ROM: full   Mouth opening: > 3 cm    Respiratory Devices and Support         Dental  no notable dental history         Cardiovascular   cardiovascular exam normal          Pulmonary   pulmonary exam normal                OUTSIDE LABS:  CBC:   Lab Results   Component Value Date    WBC 11.1 (H) 01/16/2022    WBC 9.2 01/15/2022    HGB 14.2 01/16/2022    HGB 13.3 01/15/2022    HCT 43.7 01/16/2022    HCT 42.0 01/15/2022     01/16/2022     01/15/2022     BMP:   Lab Results   Component Value Date     01/18/2022     01/16/2022    POTASSIUM 3.5 01/18/2022    POTASSIUM 3.6 01/16/2022    CHLORIDE 108 01/18/2022    CHLORIDE 106 01/16/2022    CO2 23 01/18/2022    CO2 27 01/16/2022    BUN 14 01/18/2022    BUN 18 01/16/2022    CR 0.89 01/18/2022    CR 0.96 01/16/2022     (H) 01/18/2022     (H) 01/16/2022     COAGS:   Lab Results   Component Value Date    PTT 66 (H)  01/18/2022    INR 1.00 09/11/2021     POC: No results found for: BGM, HCG, HCGS  HEPATIC:   Lab Results   Component Value Date    ALBUMIN 3.1 (L) 01/17/2022    PROTTOTAL 7.2 01/17/2022    ALT 38 01/17/2022    AST 22 01/17/2022    ALKPHOS 70 01/17/2022    BILITOTAL 1.0 01/17/2022     OTHER:   Lab Results   Component Value Date    LACT 1.5 01/14/2022    A1C 5.4 01/15/2022    RATNA 8.9 01/18/2022    MAG 2.2 10/19/2021    SED 26 (H) 01/04/2022       Anesthesia Plan    ASA Status:  4   NPO Status:  NPO Appropriate    Anesthesia Type: General.     - Airway: ETT   Induction: Intravenous.   Maintenance: Balanced.   Techniques and Equipment:     - Airway: Video-Laryngoscope     - Lines/Monitors: Arterial Line, Central Line, TATIANA, PAC, CVP            TATIANA Absolute Contra-indication: NONE            TATIANA Relative Contra-indication: NONE     - Blood: Blood in Room     - Drips/Meds: Dexmed. infusion     Consents    Anesthesia Plan(s) and associated risks, benefits, and realistic alternatives discussed. Questions answered and patient/representative(s) expressed understanding.     - Discussed: Risks, Benefits and Alternatives for the PROCEDURE were discussed     - Discussed with:  Patient      - Extended Intubation/Ventilatory Support Discussed: Yes.      - Patient is DNR/DNI Status: No    Use of blood products discussed: Yes.     - Discussed with: Patient.     - Consented: consented to blood products            Reason for refusal: other.     Postoperative Care    Pain management: Multi-modal analgesia.        Comments:    Other Comments: Induction with Lidocaine, Versed, Fentanyl, Propofol, and Ant  Precedex gtt after Induction  50mL bag of NS and micro-dripper for Protamine admin  Propofol gtt for transport            Rissa Simons MD

## 2022-01-18 NOTE — PROGRESS NOTES
Patient seen today.  Denies chest pain.  No new events.  Spoke to him about planned CAB, PVI and JUSTO ligation, answered all questions; patient demonstrated understanding and agreed with the plan.    Plan:  NPO after midnight.  Pre-op orders in place.  Stop Cangrelor 6 hrs prior to Surgery.  Hold Heparin on call to OR.  Continue current care.

## 2022-01-18 NOTE — PROGRESS NOTES
Pre-op heart/IS instruct completed. Pt achieved 1750 ml on IS with good pt effort.  Will cont to follow after surgery.  1/18/2022  Coral Ambrocio, RT

## 2022-01-18 NOTE — PROGRESS NOTES
[facility]  Cardiology Progress Note  Date of Service: 01/18/2022  Primary Cardiologist: Dr. Stephens ([facility])    Assessment & Plan    Onur Barr is a 66 year old male admitted on 1/12/2022 with NSTEMI.     Assessment:  1. NSTEMI - troponin HS peaked 20,280.   2. Coronary artery disease -  History of multiple PCI intervention, including PCI to proximal and distal RCA in setting of inferior STEMI 9/2021, followed by staged PCI of in-stent restenosis of proximal LAD in 10/2021.    - Now with severe in-stent restenosis of LAD s/p  Temporizing balloon angioplasty 1/13/2022  3. Ischemic cardiomyopathy/heart failure with reduced ejection fraction - EF 40-45%, severe apical wall hypokinesis.   4. Hypertension  5. Hyperlipidemia  6. Atrial fibrillation   7. DM type 2   8. Obesity  9. Right radial hematoma with small AV fistula (presumed due to multiple previous access in the right radial artery). Hematoma resolved.  10. Chronic back pain with intrathecal pain pump    I saw Baudilio at bedside. He is doing okay from cardiovascular standpoint. He shares that he continues to have some shortness of breath, he appears compensated and euvolemic on exam, weight continues to decrease since admission 313# -> 305# today. Kidney function stable, creatinine 0.89 yesterday. He is tentatively schedule for CABG on Wednesday.     Plan:   1. Awaiting CABG, PVI/JUSTO clip, tentatively scheduled for Wednesday 1/19/2021  2. Continue heparin and cangrelor infusion (Cangrelor to be stopped 6hrs before surgery).   3. Continue aspirin  4. Continue metoprolol tartrate 50mg BID  5. Continue furosemide 40mg daily  6. Continue rosuvastatin 40mg daily     YAMILETH Francois CNP  [facility]  Pager: [phone]    ATTESTATION:  Mr. Barr was seen and examined.  Agree with note and mutually determined plan of care.   CV surgery tomorrow.   JOSE Kendall MD     Interval History   Seen at bedside. No acute events overnight.  Denies chest pain. Continues to have complaints of chronic shortness of breath more prominent with exertion.     Physical Exam   Temp: 97.8  F (36.6  C) Temp src: Oral BP: (!) 158/84 Pulse: 103   Resp: 18 SpO2: 97 % O2 Device: None (Room air)    Vitals:    01/16/22 0522 01/17/22 0503 01/18/22 0600   Weight: 140.2 kg (309 lb 1.6 oz) 139.2 kg (306 lb 14.4 oz) 138.5 kg (305 lb 6.4 oz)   GEN: well nourished, in no acute distress.  HEENT:  Pupils equal, round. Sclerae nonicteric.   NECK: Supple, no masses appreciated. JVP appears normal.   C/V:  Irregularly irregular rate and rhythm, no murmur, rub or gallop.   RESP: Respirations are unlabored. Clear to auscultation bilaterally without wheezing, rales, or rhonchi.  GI: Abdomen soft,obese, nontender.  EXTREM: No LE edema. Right radial access with ecchymosis, no hematoma, nontender, no bruit.   NEURO: Alert and oriented, cooperative.  SKIN: Warm and dry.     Medications     cangrelor (KENGREAL) infusion ADULT for BRIDGING 0.75 mcg/kg/min (01/18/22 0752)     - MEDICATION INSTRUCTIONS -       - MEDICATION INSTRUCTIONS -       heparin 1,950 Units/hr (01/18/22 0736)     nitroGLYcerin 50 mg in D5W 250 mL       Percutaneous Coronary Intervention orders placed (this is information for BPA alerting)       Reason antiplatelet medication not selected         aspirin  81 mg Oral Daily     DULoxetine  30 mg Oral QAM     DULoxetine  60 mg Oral QPM     furosemide  40 mg Oral Daily     metoprolol tartrate  50 mg Oral BID     miconazole   Topical Daily     omeprazole  40 mg Oral Daily     potassium chloride  40 mEq Oral Once     rosuvastatin  40 mg Oral Daily     sennosides  2 tablet Oral BID     sodium chloride (PF)  3 mL Intracatheter Q8H     traZODone  200 mg Oral At Bedtime       Data   Last 24 hours labs reviewed     Imaging: CT chest 1/14/2022  1.  Coronary artery calcification and stents.  2.  No acute findings in the chest.    Tele: atrial fibrillation     Echo: 1/12/2022  1. Left  ventricular systolic function is mildly reduced. The Biplane ejection  fraction is 40-45%. There is severe apical wall hypokinesis.  2. Valves not well seen on this technically limited study.  3. Rhythm is atrial fibrillation.  Compared to the prior study on 9/12/2021, there are new wall motion  abnormalities and the EF is lower.  Results discussed with Dr. Zhang from ED at 9.39pm.    Last ischemic eval: cardiac catheterization 1/13/2022  1. Severe in-stent restenosis of the mid LAD and severe stenosis distal to the previously placed stents.  2.  Patent stents in the right coronary artery with moderate stenosis of the large RPDA and RPL a branches  3.  Balloon angioplasty and Cutting Balloon angioplasty of the in-stent restenosis in mid LAD stenosis with suboptimal result due to very severe stenosis with multiple layers of stent which were underexpanded due to severe disease.    4.  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  This fistula was mildly disrupted causing a perforation in the forearm.  The radial artery was able to be accessed proximal to the lesion which was compressed manually.  5..  Difficult procedure due to patient with severe back pain and difficult imaging due to body habitus with morbid obesity.

## 2022-01-18 NOTE — PLAN OF CARE
No events this shift. Denies chest pain/pressure. On heparin and cangrelor; 10a check in am.  Reports continued chronic back pain, receiving IR PO Morphine. Tele: a-fib with CVR/SVR with intermittent bigeminy PVCs. HS metoprolol held for slow heart rate. Right radial site is bruised, soft, tender. CMS intact. Waiting for CV surgery, possibly Wednesday.

## 2022-01-18 NOTE — PLAN OF CARE
0653-0413: A&Ox4. VSS on RA. Tele afib CVR. Denies CP. Reports chronic low back pain, given prn oral morphine x2. Cangrelor and heparin infusing. R radial site ecchymotic, tender, puffy, pulses palpable. Up independently. Plan for CAB possibly 1-19.

## 2022-01-19 ENCOUNTER — ANESTHESIA (OUTPATIENT)
Dept: SURGERY | Facility: CLINIC | Age: 67
End: 2022-01-19
Payer: COMMERCIAL

## 2022-01-19 ENCOUNTER — APPOINTMENT (OUTPATIENT)
Dept: GENERAL RADIOLOGY | Facility: CLINIC | Age: 67
End: 2022-01-19
Attending: INTERNAL MEDICINE
Payer: COMMERCIAL

## 2022-01-19 ENCOUNTER — APPOINTMENT (OUTPATIENT)
Dept: GENERAL RADIOLOGY | Facility: CLINIC | Age: 67
End: 2022-01-19
Attending: STUDENT IN AN ORGANIZED HEALTH CARE EDUCATION/TRAINING PROGRAM
Payer: COMMERCIAL

## 2022-01-19 LAB
ALBUMIN SERPL-MCNC: 2.5 G/DL (ref 3.4–5)
ALP SERPL-CCNC: 56 U/L (ref 40–150)
ALT SERPL W P-5'-P-CCNC: 25 U/L (ref 0–70)
ANION GAP SERPL CALCULATED.3IONS-SCNC: 5 MMOL/L (ref 3–14)
ANION GAP SERPL CALCULATED.3IONS-SCNC: 6 MMOL/L (ref 3–14)
APTT PPP: 37 SECONDS (ref 22–38)
APTT PPP: 51 SECONDS (ref 22–38)
APTT PPP: 70 SECONDS (ref 22–38)
AST SERPL W P-5'-P-CCNC: 26 U/L (ref 0–45)
BASE EXCESS BLDA CALC-SCNC: -0.6 MMOL/L (ref -9.6–2)
BASE EXCESS BLDA CALC-SCNC: -0.7 MMOL/L (ref -9.6–2)
BASE EXCESS BLDA CALC-SCNC: -2.5 MMOL/L (ref -9.6–2)
BASE EXCESS BLDA CALC-SCNC: -3.3 MMOL/L (ref -9–1.8)
BASE EXCESS BLDV CALC-SCNC: 0.6 MMOL/L (ref -8.1–1.9)
BILIRUB SERPL-MCNC: 0.8 MG/DL (ref 0.2–1.3)
BUN SERPL-MCNC: 14 MG/DL (ref 7–30)
BUN SERPL-MCNC: 16 MG/DL (ref 7–30)
CA-I BLD-MCNC: 4.2 MG/DL (ref 4.4–5.2)
CA-I BLD-MCNC: 4.4 MG/DL (ref 4.4–5.2)
CA-I BLD-MCNC: 4.5 MG/DL (ref 4.4–5.2)
CA-I BLD-MCNC: 4.8 MG/DL (ref 4.4–5.2)
CA-I BLD-MCNC: 5 MG/DL (ref 4.4–5.2)
CALCIUM SERPL-MCNC: 8.3 MG/DL (ref 8.5–10.1)
CALCIUM SERPL-MCNC: 8.9 MG/DL (ref 8.5–10.1)
CHLORIDE BLD-SCNC: 109 MMOL/L (ref 94–109)
CHLORIDE BLD-SCNC: 110 MMOL/L (ref 94–109)
CO2 SERPL-SCNC: 23 MMOL/L (ref 20–32)
CO2 SERPL-SCNC: 24 MMOL/L (ref 20–32)
CREAT SERPL-MCNC: 0.93 MG/DL (ref 0.66–1.25)
CREAT SERPL-MCNC: 0.94 MG/DL (ref 0.66–1.25)
ERYTHROCYTE [DISTWIDTH] IN BLOOD BY AUTOMATED COUNT: 13.2 % (ref 10–15)
ERYTHROCYTE [DISTWIDTH] IN BLOOD BY AUTOMATED COUNT: 13.2 % (ref 10–15)
FIBRINOGEN PPP-MCNC: 433 MG/DL (ref 170–490)
GFR SERPL CREATININE-BSD FRML MDRD: 90 ML/MIN/1.73M2
GFR SERPL CREATININE-BSD FRML MDRD: >90 ML/MIN/1.73M2
GLUCOSE BLD-MCNC: 126 MG/DL (ref 70–99)
GLUCOSE BLD-MCNC: 128 MG/DL (ref 70–99)
GLUCOSE BLD-MCNC: 138 MG/DL (ref 70–99)
GLUCOSE BLD-MCNC: 138 MG/DL (ref 70–99)
GLUCOSE BLD-MCNC: 147 MG/DL (ref 70–99)
GLUCOSE BLD-MCNC: 149 MG/DL (ref 70–99)
GLUCOSE BLDC GLUCOMTR-MCNC: 106 MG/DL (ref 70–99)
GLUCOSE BLDC GLUCOMTR-MCNC: 108 MG/DL (ref 70–99)
GLUCOSE BLDC GLUCOMTR-MCNC: 132 MG/DL (ref 70–99)
HCO3 BLD-SCNC: 23 MMOL/L (ref 21–28)
HCO3 BLDA-SCNC: 23 MMOL/L (ref 21–28)
HCO3 BLDA-SCNC: 24 MMOL/L (ref 21–28)
HCO3 BLDA-SCNC: 25 MMOL/L (ref 21–28)
HCO3 BLDV-SCNC: 28 MMOL/L (ref 21–28)
HCT VFR BLD AUTO: 33.8 % (ref 40–53)
HCT VFR BLD AUTO: 37.1 % (ref 40–53)
HGB BLD-MCNC: 10.7 G/DL (ref 13.3–17.7)
HGB BLD-MCNC: 11 G/DL (ref 13.3–17.7)
HGB BLD-MCNC: 11.1 G/DL (ref 13.3–17.7)
HGB BLD-MCNC: 11.4 G/DL (ref 13.3–17.7)
HGB BLD-MCNC: 12 G/DL (ref 13.3–17.7)
HGB BLD-MCNC: 14.2 G/DL (ref 13.3–17.7)
INR PPP: 1.31 (ref 0.85–1.15)
INR PPP: 1.57 (ref 0.85–1.15)
LACTATE BLD-SCNC: 0.7 MMOL/L
LACTATE BLD-SCNC: 1.2 MMOL/L
LACTATE SERPL-SCNC: 0.8 MMOL/L (ref 0.7–2)
MAGNESIUM SERPL-MCNC: 2.4 MG/DL (ref 1.6–2.3)
MCH RBC QN AUTO: 28.1 PG (ref 26.5–33)
MCH RBC QN AUTO: 28.4 PG (ref 26.5–33)
MCHC RBC AUTO-ENTMCNC: 32.3 G/DL (ref 31.5–36.5)
MCHC RBC AUTO-ENTMCNC: 32.5 G/DL (ref 31.5–36.5)
MCV RBC AUTO: 86 FL (ref 78–100)
MCV RBC AUTO: 88 FL (ref 78–100)
O2/TOTAL GAS SETTING VFR VENT: 100 %
O2/TOTAL GAS SETTING VFR VENT: 60 %
O2/TOTAL GAS SETTING VFR VENT: 60 %
O2/TOTAL GAS SETTING VFR VENT: 80 %
O2/TOTAL GAS SETTING VFR VENT: 80 %
OXYHGB MFR BLD: 98 % (ref 92–100)
PCO2 BLD: 48 MM HG (ref 35–45)
PCO2 BLDA: 35 MM HG (ref 35–45)
PCO2 BLDA: 46 MM HG (ref 35–45)
PCO2 BLDA: 47 MM HG (ref 35–45)
PCO2 BLDV: 54 MM HG (ref 40–50)
PH BLD: 7.3 [PH] (ref 7.35–7.45)
PH BLDA: 7.32 [PH] (ref 7.35–7.45)
PH BLDA: 7.35 [PH] (ref 7.35–7.45)
PH BLDA: 7.43 [PH] (ref 7.35–7.45)
PH BLDV: 7.32 [PH] (ref 7.32–7.43)
PHOSPHATE SERPL-MCNC: 4.2 MG/DL (ref 2.5–4.5)
PLATELET # BLD AUTO: 110 10E3/UL (ref 150–450)
PLATELET # BLD AUTO: 160 10E3/UL (ref 150–450)
PO2 BLD: 225 MM HG (ref 80–105)
PO2 BLDA: 297 MM HG (ref 80–105)
PO2 BLDA: 386 MM HG (ref 80–105)
PO2 BLDA: 96 MM HG (ref 80–105)
PO2 BLDV: 47 MM HG (ref 25–47)
POTASSIUM BLD-SCNC: 3.9 MMOL/L (ref 3.4–5.3)
POTASSIUM BLD-SCNC: 3.9 MMOL/L (ref 3.5–5)
POTASSIUM BLD-SCNC: 4.1 MMOL/L (ref 3.4–5.3)
POTASSIUM BLD-SCNC: 4.1 MMOL/L (ref 3.5–5)
POTASSIUM BLD-SCNC: 4.2 MMOL/L (ref 3.5–5)
POTASSIUM BLD-SCNC: 4.4 MMOL/L (ref 3.4–5.3)
POTASSIUM BLD-SCNC: 4.4 MMOL/L (ref 3.4–5.3)
POTASSIUM BLD-SCNC: 4.6 MMOL/L (ref 3.5–5)
PROT SERPL-MCNC: 5.9 G/DL (ref 6.8–8.8)
RBC # BLD AUTO: 3.92 10E6/UL (ref 4.4–5.9)
RBC # BLD AUTO: 4.23 10E6/UL (ref 4.4–5.9)
SODIUM BLD-SCNC: 139 MMOL/L (ref 133–144)
SODIUM SERPL-SCNC: 138 MMOL/L (ref 133–144)
SODIUM SERPL-SCNC: 139 MMOL/L (ref 133–144)
WBC # BLD AUTO: 16.2 10E3/UL (ref 4–11)
WBC # BLD AUTO: 17.4 10E3/UL (ref 4–11)

## 2022-01-19 PROCEDURE — 258N000003 HC RX IP 258 OP 636: Performed by: ANESTHESIOLOGY

## 2022-01-19 PROCEDURE — 99291 CRITICAL CARE FIRST HOUR: CPT | Mod: 24 | Performed by: INTERNAL MEDICINE

## 2022-01-19 PROCEDURE — 250N000011 HC RX IP 250 OP 636: Performed by: PHYSICIAN ASSISTANT

## 2022-01-19 PROCEDURE — 999N000141 HC STATISTIC PRE-PROCEDURE NURSING ASSESSMENT: Performed by: SURGERY

## 2022-01-19 PROCEDURE — 82803 BLOOD GASES ANY COMBINATION: CPT | Performed by: SURGERY

## 2022-01-19 PROCEDURE — 02570ZK DESTRUCTION OF LEFT ATRIAL APPENDAGE, OPEN APPROACH: ICD-10-PCS | Performed by: SURGERY

## 2022-01-19 PROCEDURE — 85027 COMPLETE CBC AUTOMATED: CPT | Performed by: STUDENT IN AN ORGANIZED HEALTH CARE EDUCATION/TRAINING PROGRAM

## 2022-01-19 PROCEDURE — 85730 THROMBOPLASTIN TIME PARTIAL: CPT | Performed by: SURGERY

## 2022-01-19 PROCEDURE — 250N000011 HC RX IP 250 OP 636: Performed by: INTERNAL MEDICINE

## 2022-01-19 PROCEDURE — 250N000013 HC RX MED GY IP 250 OP 250 PS 637: Performed by: INTERNAL MEDICINE

## 2022-01-19 PROCEDURE — 250N000011 HC RX IP 250 OP 636

## 2022-01-19 PROCEDURE — 250N000009 HC RX 250: Performed by: ANESTHESIOLOGY

## 2022-01-19 PROCEDURE — 250N000012 HC RX MED GY IP 250 OP 636 PS 637: Performed by: SURGERY

## 2022-01-19 PROCEDURE — 272N000001 HC OR GENERAL SUPPLY STERILE: Performed by: SURGERY

## 2022-01-19 PROCEDURE — 250N000013 HC RX MED GY IP 250 OP 250 PS 637: Performed by: SURGERY

## 2022-01-19 PROCEDURE — 93005 ELECTROCARDIOGRAM TRACING: CPT

## 2022-01-19 PROCEDURE — 410N000006: Performed by: SURGERY

## 2022-01-19 PROCEDURE — P9041 ALBUMIN (HUMAN),5%, 50ML: HCPCS

## 2022-01-19 PROCEDURE — 33257 ABLATE ATRIA LMTD ADD-ON: CPT | Mod: GC | Performed by: SURGERY

## 2022-01-19 PROCEDURE — 5A1221Z PERFORMANCE OF CARDIAC OUTPUT, CONTINUOUS: ICD-10-PCS | Performed by: SURGERY

## 2022-01-19 PROCEDURE — 250N000013 HC RX MED GY IP 250 OP 250 PS 637: Performed by: PHYSICIAN ASSISTANT

## 2022-01-19 PROCEDURE — 258N000003 HC RX IP 258 OP 636: Performed by: INTERNAL MEDICINE

## 2022-01-19 PROCEDURE — 83735 ASSAY OF MAGNESIUM: CPT | Performed by: STUDENT IN AN ORGANIZED HEALTH CARE EDUCATION/TRAINING PROGRAM

## 2022-01-19 PROCEDURE — 410N000005 HC PER-PERFUSION, SH ONLY, 1ST 30 MIN: Performed by: SURGERY

## 2022-01-19 PROCEDURE — 250N000024 HC ISOFLURANE, PER MIN: Performed by: SURGERY

## 2022-01-19 PROCEDURE — 999N000065 XR CHEST PORT 1 VIEW

## 2022-01-19 PROCEDURE — 250N000011 HC RX IP 250 OP 636: Performed by: ANESTHESIOLOGY

## 2022-01-19 PROCEDURE — 82330 ASSAY OF CALCIUM: CPT | Performed by: STUDENT IN AN ORGANIZED HEALTH CARE EDUCATION/TRAINING PROGRAM

## 2022-01-19 PROCEDURE — 94002 VENT MGMT INPAT INIT DAY: CPT

## 2022-01-19 PROCEDURE — 84295 ASSAY OF SERUM SODIUM: CPT | Performed by: STUDENT IN AN ORGANIZED HEALTH CARE EDUCATION/TRAINING PROGRAM

## 2022-01-19 PROCEDURE — 82330 ASSAY OF CALCIUM: CPT | Performed by: SURGERY

## 2022-01-19 PROCEDURE — 250N000011 HC RX IP 250 OP 636: Performed by: STUDENT IN AN ORGANIZED HEALTH CARE EDUCATION/TRAINING PROGRAM

## 2022-01-19 PROCEDURE — 83605 ASSAY OF LACTIC ACID: CPT | Performed by: STUDENT IN AN ORGANIZED HEALTH CARE EDUCATION/TRAINING PROGRAM

## 2022-01-19 PROCEDURE — 85610 PROTHROMBIN TIME: CPT | Performed by: STUDENT IN AN ORGANIZED HEALTH CARE EDUCATION/TRAINING PROGRAM

## 2022-01-19 PROCEDURE — 36415 COLL VENOUS BLD VENIPUNCTURE: CPT | Performed by: INTERNAL MEDICINE

## 2022-01-19 PROCEDURE — 999N000065 XR ABDOMEN PORT 1 VIEWS

## 2022-01-19 PROCEDURE — 250N000011 HC RX IP 250 OP 636: Performed by: SURGERY

## 2022-01-19 PROCEDURE — 84132 ASSAY OF SERUM POTASSIUM: CPT | Performed by: ANESTHESIOLOGY

## 2022-01-19 PROCEDURE — 250N000013 HC RX MED GY IP 250 OP 250 PS 637: Performed by: STUDENT IN AN ORGANIZED HEALTH CARE EDUCATION/TRAINING PROGRAM

## 2022-01-19 PROCEDURE — 200N000001 HC R&B ICU

## 2022-01-19 PROCEDURE — 278N000051 HC OR IMPLANT GENERAL: Performed by: SURGERY

## 2022-01-19 PROCEDURE — 250N000009 HC RX 250

## 2022-01-19 PROCEDURE — 85610 PROTHROMBIN TIME: CPT | Performed by: SURGERY

## 2022-01-19 PROCEDURE — 85730 THROMBOPLASTIN TIME PARTIAL: CPT | Performed by: INTERNAL MEDICINE

## 2022-01-19 PROCEDURE — 250N000009 HC RX 250: Performed by: SURGERY

## 2022-01-19 PROCEDURE — 82805 BLOOD GASES W/O2 SATURATION: CPT | Performed by: STUDENT IN AN ORGANIZED HEALTH CARE EDUCATION/TRAINING PROGRAM

## 2022-01-19 PROCEDURE — 85730 THROMBOPLASTIN TIME PARTIAL: CPT | Performed by: STUDENT IN AN ORGANIZED HEALTH CARE EDUCATION/TRAINING PROGRAM

## 2022-01-19 PROCEDURE — 02100Z9 BYPASS CORONARY ARTERY, ONE ARTERY FROM LEFT INTERNAL MAMMARY, OPEN APPROACH: ICD-10-PCS | Performed by: SURGERY

## 2022-01-19 PROCEDURE — 370N000017 HC ANESTHESIA TECHNICAL FEE, PER MIN: Performed by: SURGERY

## 2022-01-19 PROCEDURE — 84132 ASSAY OF SERUM POTASSIUM: CPT | Performed by: STUDENT IN AN ORGANIZED HEALTH CARE EDUCATION/TRAINING PROGRAM

## 2022-01-19 PROCEDURE — 84132 ASSAY OF SERUM POTASSIUM: CPT | Performed by: SURGERY

## 2022-01-19 PROCEDURE — 85384 FIBRINOGEN ACTIVITY: CPT | Performed by: SURGERY

## 2022-01-19 PROCEDURE — 99232 SBSQ HOSP IP/OBS MODERATE 35: CPT | Performed by: INTERNAL MEDICINE

## 2022-01-19 PROCEDURE — 258N000003 HC RX IP 258 OP 636: Performed by: STUDENT IN AN ORGANIZED HEALTH CARE EDUCATION/TRAINING PROGRAM

## 2022-01-19 PROCEDURE — 85027 COMPLETE CBC AUTOMATED: CPT | Performed by: SURGERY

## 2022-01-19 PROCEDURE — 3E043XZ INTRODUCTION OF VASOPRESSOR INTO CENTRAL VEIN, PERCUTANEOUS APPROACH: ICD-10-PCS | Performed by: SURGERY

## 2022-01-19 PROCEDURE — 360N000079 HC SURGERY LEVEL 6, PER MIN: Performed by: SURGERY

## 2022-01-19 PROCEDURE — 84100 ASSAY OF PHOSPHORUS: CPT | Performed by: STUDENT IN AN ORGANIZED HEALTH CARE EDUCATION/TRAINING PROGRAM

## 2022-01-19 PROCEDURE — 02L70CK OCCLUSION OF LEFT ATRIAL APPENDAGE WITH EXTRALUMINAL DEVICE, OPEN APPROACH: ICD-10-PCS | Performed by: SURGERY

## 2022-01-19 PROCEDURE — 258N000003 HC RX IP 258 OP 636

## 2022-01-19 PROCEDURE — 250N000009 HC RX 250: Performed by: NURSE ANESTHETIST, CERTIFIED REGISTERED

## 2022-01-19 PROCEDURE — 33533 CABG ARTERIAL SINGLE: CPT | Mod: GC | Performed by: SURGERY

## 2022-01-19 PROCEDURE — P9041 ALBUMIN (HUMAN),5%, 50ML: HCPCS | Performed by: STUDENT IN AN ORGANIZED HEALTH CARE EDUCATION/TRAINING PROGRAM

## 2022-01-19 PROCEDURE — 999N000009 HC STATISTIC AIRWAY CARE

## 2022-01-19 PROCEDURE — 999N000157 HC STATISTIC RCP TIME EA 10 MIN

## 2022-01-19 PROCEDURE — 258N000003 HC RX IP 258 OP 636: Performed by: SURGERY

## 2022-01-19 DEVICE — IMP ATRICLIP FLEX V DEVICE LAA EXLUSION 45MM ACHV45: Type: IMPLANTABLE DEVICE | Site: HEART | Status: FUNCTIONAL

## 2022-01-19 RX ORDER — LIDOCAINE HYDROCHLORIDE 10 MG/ML
INJECTION, SOLUTION INFILTRATION; PERINEURAL
Status: DISCONTINUED | OUTPATIENT
Start: 2022-01-19 | End: 2022-01-19

## 2022-01-19 RX ORDER — HEPARIN SODIUM 1000 [USP'U]/ML
INJECTION, SOLUTION INTRAVENOUS; SUBCUTANEOUS PRN
Status: DISCONTINUED | OUTPATIENT
Start: 2022-01-19 | End: 2022-01-19

## 2022-01-19 RX ORDER — AMOXICILLIN 250 MG
1 CAPSULE ORAL 2 TIMES DAILY
Status: DISCONTINUED | OUTPATIENT
Start: 2022-01-19 | End: 2022-01-25 | Stop reason: HOSPADM

## 2022-01-19 RX ORDER — PROPOFOL 10 MG/ML
INJECTION, EMULSION INTRAVENOUS
Status: COMPLETED
Start: 2022-01-19 | End: 2022-01-19

## 2022-01-19 RX ORDER — DEXTROSE MONOHYDRATE 25 G/50ML
25-50 INJECTION, SOLUTION INTRAVENOUS
Status: DISCONTINUED | OUTPATIENT
Start: 2022-01-19 | End: 2022-01-21

## 2022-01-19 RX ORDER — DEXTROSE MONOHYDRATE, SODIUM CHLORIDE, AND POTASSIUM CHLORIDE 50; 1.49; 4.5 G/1000ML; G/1000ML; G/1000ML
INJECTION, SOLUTION INTRAVENOUS CONTINUOUS
Status: DISCONTINUED | OUTPATIENT
Start: 2022-01-19 | End: 2022-01-21

## 2022-01-19 RX ORDER — CALCIUM GLUCONATE 20 MG/ML
2 INJECTION, SOLUTION INTRAVENOUS
Status: DISCONTINUED | OUTPATIENT
Start: 2022-01-19 | End: 2022-01-21

## 2022-01-19 RX ORDER — HYDROMORPHONE HCL IN WATER/PF 6 MG/30 ML
0.4 PATIENT CONTROLLED ANALGESIA SYRINGE INTRAVENOUS
Status: DISCONTINUED | OUTPATIENT
Start: 2022-01-19 | End: 2022-01-19

## 2022-01-19 RX ORDER — FENTANYL CITRATE 50 UG/ML
INJECTION, SOLUTION INTRAMUSCULAR; INTRAVENOUS PRN
Status: DISCONTINUED | OUTPATIENT
Start: 2022-01-19 | End: 2022-01-19

## 2022-01-19 RX ORDER — EPINEPHRINE IN 0.9 % SOD CHLOR 5 MG/250ML
.01-.1 PLASTIC BAG, INJECTION (ML) INTRAVENOUS CONTINUOUS PRN
Status: DISCONTINUED | OUTPATIENT
Start: 2022-01-19 | End: 2022-01-21

## 2022-01-19 RX ORDER — NITROGLYCERIN 20 MG/100ML
10-200 INJECTION INTRAVENOUS CONTINUOUS
Status: DISCONTINUED | OUTPATIENT
Start: 2022-01-19 | End: 2022-01-19

## 2022-01-19 RX ORDER — HYDRALAZINE HYDROCHLORIDE 20 MG/ML
10 INJECTION INTRAMUSCULAR; INTRAVENOUS EVERY 30 MIN PRN
Status: DISCONTINUED | OUTPATIENT
Start: 2022-01-19 | End: 2022-01-21

## 2022-01-19 RX ORDER — MUPIROCIN 20 MG/G
0.5 OINTMENT TOPICAL 2 TIMES DAILY
Status: DISCONTINUED | OUTPATIENT
Start: 2022-01-19 | End: 2022-01-19 | Stop reason: CLARIF

## 2022-01-19 RX ORDER — KETOROLAC TROMETHAMINE 15 MG/ML
30 INJECTION, SOLUTION INTRAMUSCULAR; INTRAVENOUS
Status: COMPLETED | OUTPATIENT
Start: 2022-01-19 | End: 2022-01-19

## 2022-01-19 RX ORDER — OXYCODONE HYDROCHLORIDE 5 MG/1
5-15 TABLET ORAL EVERY 4 HOURS PRN
Status: DISCONTINUED | OUTPATIENT
Start: 2022-01-19 | End: 2022-01-21

## 2022-01-19 RX ORDER — NITROGLYCERIN 20 MG/100ML
INJECTION INTRAVENOUS
Status: COMPLETED
Start: 2022-01-19 | End: 2022-01-19

## 2022-01-19 RX ORDER — NALOXONE HYDROCHLORIDE 0.4 MG/ML
0.2 INJECTION, SOLUTION INTRAMUSCULAR; INTRAVENOUS; SUBCUTANEOUS
Status: DISCONTINUED | OUTPATIENT
Start: 2022-01-19 | End: 2022-01-25 | Stop reason: HOSPADM

## 2022-01-19 RX ORDER — PAPAVERINE HYDROCHLORIDE 30 MG/ML
INJECTION INTRAMUSCULAR; INTRAVENOUS PRN
Status: DISCONTINUED | OUTPATIENT
Start: 2022-01-19 | End: 2022-01-19 | Stop reason: HOSPADM

## 2022-01-19 RX ORDER — OXYCODONE HYDROCHLORIDE 5 MG/1
10 TABLET ORAL EVERY 4 HOURS PRN
Status: DISCONTINUED | OUTPATIENT
Start: 2022-01-19 | End: 2022-01-19

## 2022-01-19 RX ORDER — POLYETHYLENE GLYCOL 3350 17 G/17G
17 POWDER, FOR SOLUTION ORAL DAILY
Status: DISCONTINUED | OUTPATIENT
Start: 2022-01-20 | End: 2022-01-25 | Stop reason: HOSPADM

## 2022-01-19 RX ORDER — PANTOPRAZOLE SODIUM 40 MG/1
40 TABLET, DELAYED RELEASE ORAL DAILY
Status: DISCONTINUED | OUTPATIENT
Start: 2022-01-19 | End: 2022-01-25 | Stop reason: HOSPADM

## 2022-01-19 RX ORDER — AMIODARONE HYDROCHLORIDE 200 MG/1
400 TABLET ORAL 2 TIMES DAILY
Status: DISCONTINUED | OUTPATIENT
Start: 2022-01-19 | End: 2022-01-20

## 2022-01-19 RX ORDER — ONDANSETRON 4 MG/1
4 TABLET, ORALLY DISINTEGRATING ORAL EVERY 6 HOURS PRN
Status: DISCONTINUED | OUTPATIENT
Start: 2022-01-19 | End: 2022-01-25 | Stop reason: HOSPADM

## 2022-01-19 RX ORDER — CEFAZOLIN SODIUM 2 G/100ML
2 INJECTION, SOLUTION INTRAVENOUS EVERY 8 HOURS
Status: COMPLETED | OUTPATIENT
Start: 2022-01-19 | End: 2022-01-20

## 2022-01-19 RX ORDER — SODIUM CHLORIDE, SODIUM LACTATE, POTASSIUM CHLORIDE, CALCIUM CHLORIDE 600; 310; 30; 20 MG/100ML; MG/100ML; MG/100ML; MG/100ML
INJECTION, SOLUTION INTRAVENOUS CONTINUOUS PRN
Status: DISCONTINUED | OUTPATIENT
Start: 2022-01-19 | End: 2022-01-19

## 2022-01-19 RX ORDER — SODIUM CHLORIDE 9 MG/ML
INJECTION, SOLUTION INTRAVENOUS CONTINUOUS PRN
Status: DISCONTINUED | OUTPATIENT
Start: 2022-01-19 | End: 2022-01-19

## 2022-01-19 RX ORDER — GABAPENTIN 100 MG/1
100 CAPSULE ORAL AT BEDTIME
Status: DISCONTINUED | OUTPATIENT
Start: 2022-01-19 | End: 2022-01-25 | Stop reason: HOSPADM

## 2022-01-19 RX ORDER — ALBUMIN, HUMAN INJ 5% 5 %
500-1000 SOLUTION INTRAVENOUS
Status: COMPLETED | OUTPATIENT
Start: 2022-01-19 | End: 2022-01-19

## 2022-01-19 RX ORDER — ASPIRIN 81 MG/1
162 TABLET, CHEWABLE ORAL DAILY
Status: DISCONTINUED | OUTPATIENT
Start: 2022-01-20 | End: 2022-01-23

## 2022-01-19 RX ORDER — NICOTINE POLACRILEX 4 MG
15-30 LOZENGE BUCCAL
Status: DISCONTINUED | OUTPATIENT
Start: 2022-01-19 | End: 2022-01-21

## 2022-01-19 RX ORDER — OXYCODONE HYDROCHLORIDE 5 MG/1
5 TABLET ORAL EVERY 4 HOURS PRN
Status: DISCONTINUED | OUTPATIENT
Start: 2022-01-19 | End: 2022-01-19

## 2022-01-19 RX ORDER — HYDROMORPHONE HCL IN WATER/PF 6 MG/30 ML
0.2 PATIENT CONTROLLED ANALGESIA SYRINGE INTRAVENOUS
Status: DISCONTINUED | OUTPATIENT
Start: 2022-01-19 | End: 2022-01-19

## 2022-01-19 RX ORDER — PROCHLORPERAZINE MALEATE 5 MG
5 TABLET ORAL EVERY 6 HOURS PRN
Status: DISCONTINUED | OUTPATIENT
Start: 2022-01-19 | End: 2022-01-25 | Stop reason: HOSPADM

## 2022-01-19 RX ORDER — NITROGLYCERIN 10 MG/100ML
INJECTION INTRAVENOUS PRN
Status: DISCONTINUED | OUTPATIENT
Start: 2022-01-19 | End: 2022-01-19

## 2022-01-19 RX ORDER — SODIUM CHLORIDE, SODIUM LACTATE, POTASSIUM CHLORIDE, CALCIUM CHLORIDE 600; 310; 30; 20 MG/100ML; MG/100ML; MG/100ML; MG/100ML
INJECTION, SOLUTION INTRAVENOUS CONTINUOUS
Status: DISCONTINUED | OUTPATIENT
Start: 2022-01-19 | End: 2022-01-19 | Stop reason: HOSPADM

## 2022-01-19 RX ORDER — NALOXONE HYDROCHLORIDE 0.4 MG/ML
0.4 INJECTION, SOLUTION INTRAMUSCULAR; INTRAVENOUS; SUBCUTANEOUS
Status: DISCONTINUED | OUTPATIENT
Start: 2022-01-19 | End: 2022-01-25 | Stop reason: HOSPADM

## 2022-01-19 RX ORDER — HEPARIN SODIUM 5000 [USP'U]/.5ML
5000 INJECTION, SOLUTION INTRAVENOUS; SUBCUTANEOUS EVERY 8 HOURS
Status: DISCONTINUED | OUTPATIENT
Start: 2022-01-20 | End: 2022-01-23

## 2022-01-19 RX ORDER — PROPOFOL 10 MG/ML
INJECTION, EMULSION INTRAVENOUS PRN
Status: DISCONTINUED | OUTPATIENT
Start: 2022-01-19 | End: 2022-01-19

## 2022-01-19 RX ORDER — BISACODYL 10 MG
10 SUPPOSITORY, RECTAL RECTAL DAILY PRN
Status: DISCONTINUED | OUTPATIENT
Start: 2022-01-19 | End: 2022-01-25 | Stop reason: HOSPADM

## 2022-01-19 RX ORDER — METHOCARBAMOL 500 MG/1
500 TABLET, FILM COATED ORAL EVERY 6 HOURS PRN
Status: DISCONTINUED | OUTPATIENT
Start: 2022-01-19 | End: 2022-01-25 | Stop reason: HOSPADM

## 2022-01-19 RX ORDER — PROTAMINE SULFATE 10 MG/ML
INJECTION, SOLUTION INTRAVENOUS PRN
Status: DISCONTINUED | OUTPATIENT
Start: 2022-01-19 | End: 2022-01-19

## 2022-01-19 RX ORDER — ACETAMINOPHEN 325 MG/1
975 TABLET ORAL EVERY 8 HOURS
Status: COMPLETED | OUTPATIENT
Start: 2022-01-19 | End: 2022-01-22

## 2022-01-19 RX ORDER — ONDANSETRON 2 MG/ML
4 INJECTION INTRAMUSCULAR; INTRAVENOUS EVERY 6 HOURS PRN
Status: DISCONTINUED | OUTPATIENT
Start: 2022-01-19 | End: 2022-01-25 | Stop reason: HOSPADM

## 2022-01-19 RX ORDER — LIDOCAINE 40 MG/G
CREAM TOPICAL
Status: DISCONTINUED | OUTPATIENT
Start: 2022-01-19 | End: 2022-01-25 | Stop reason: HOSPADM

## 2022-01-19 RX ORDER — PHENYLEPHRINE HCL IN 0.9% NACL 50MG/250ML
.1-4 PLASTIC BAG, INJECTION (ML) INTRAVENOUS CONTINUOUS PRN
Status: DISCONTINUED | OUTPATIENT
Start: 2022-01-19 | End: 2022-01-23

## 2022-01-19 RX ORDER — CALCIUM GLUCONATE 20 MG/ML
1 INJECTION, SOLUTION INTRAVENOUS
Status: DISCONTINUED | OUTPATIENT
Start: 2022-01-19 | End: 2022-01-21

## 2022-01-19 RX ORDER — ACETAMINOPHEN 325 MG/1
650 TABLET ORAL EVERY 4 HOURS PRN
Status: DISCONTINUED | OUTPATIENT
Start: 2022-01-22 | End: 2022-01-25 | Stop reason: HOSPADM

## 2022-01-19 RX ORDER — DEXMEDETOMIDINE HYDROCHLORIDE 4 UG/ML
.2-.7 INJECTION, SOLUTION INTRAVENOUS CONTINUOUS
Status: DISCONTINUED | OUTPATIENT
Start: 2022-01-19 | End: 2022-01-21

## 2022-01-19 RX ORDER — HYDROMORPHONE HCL IN WATER/PF 6 MG/30 ML
.2-.4 PATIENT CONTROLLED ANALGESIA SYRINGE INTRAVENOUS
Status: DISCONTINUED | OUTPATIENT
Start: 2022-01-19 | End: 2022-01-25 | Stop reason: HOSPADM

## 2022-01-19 RX ORDER — NITROGLYCERIN 20 MG/100ML
10-200 INJECTION INTRAVENOUS CONTINUOUS PRN
Status: DISCONTINUED | OUTPATIENT
Start: 2022-01-19 | End: 2022-01-21

## 2022-01-19 RX ADMIN — NITROGLYCERIN 0.4 MG: 0.4 TABLET SUBLINGUAL at 03:39

## 2022-01-19 RX ADMIN — PHENYLEPHRINE HYDROCHLORIDE 50 MCG: 10 INJECTION INTRAVENOUS at 09:23

## 2022-01-19 RX ADMIN — PHENYLEPHRINE HYDROCHLORIDE 50 MCG: 10 INJECTION INTRAVENOUS at 09:13

## 2022-01-19 RX ADMIN — PHENYLEPHRINE HYDROCHLORIDE 100 MCG: 10 INJECTION INTRAVENOUS at 09:27

## 2022-01-19 RX ADMIN — PHENYLEPHRINE HYDROCHLORIDE 50 MCG: 10 INJECTION INTRAVENOUS at 09:20

## 2022-01-19 RX ADMIN — METHOCARBAMOL 500 MG: 500 TABLET ORAL at 13:20

## 2022-01-19 RX ADMIN — PHENYLEPHRINE HYDROCHLORIDE 200 MCG: 10 INJECTION INTRAVENOUS at 09:26

## 2022-01-19 RX ADMIN — PHENYLEPHRINE HYDROCHLORIDE 100 MCG: 10 INJECTION INTRAVENOUS at 09:28

## 2022-01-19 RX ADMIN — HEPARIN SODIUM 50000 UNITS: 1000 INJECTION INTRAVENOUS; SUBCUTANEOUS at 09:06

## 2022-01-19 RX ADMIN — NITROGLYCERIN 10 MCG/MIN: 20 INJECTION INTRAVENOUS at 04:25

## 2022-01-19 RX ADMIN — PHENYLEPHRINE HYDROCHLORIDE 0.5 MCG/KG/MIN: 10 INJECTION INTRAVENOUS at 10:12

## 2022-01-19 RX ADMIN — DEXMEDETOMIDINE HYDROCHLORIDE 0.5 MCG/KG/HR: 100 INJECTION, SOLUTION INTRAVENOUS at 08:05

## 2022-01-19 RX ADMIN — OXYCODONE HYDROCHLORIDE 10 MG: 5 TABLET ORAL at 13:20

## 2022-01-19 RX ADMIN — MIDAZOLAM HYDROCHLORIDE 3 MG: 1 INJECTION, SOLUTION INTRAMUSCULAR; INTRAVENOUS at 07:49

## 2022-01-19 RX ADMIN — SODIUM CHLORIDE, POTASSIUM CHLORIDE, SODIUM LACTATE AND CALCIUM CHLORIDE: 600; 310; 30; 20 INJECTION, SOLUTION INTRAVENOUS at 07:42

## 2022-01-19 RX ADMIN — AMIODARONE HYDROCHLORIDE 400 MG: 200 TABLET ORAL at 13:20

## 2022-01-19 RX ADMIN — METOPROLOL TARTRATE 50 MG: 50 TABLET, FILM COATED ORAL at 05:32

## 2022-01-19 RX ADMIN — MIDAZOLAM HYDROCHLORIDE 1 MG: 1 INJECTION, SOLUTION INTRAMUSCULAR; INTRAVENOUS at 09:31

## 2022-01-19 RX ADMIN — NITROGLYCERIN 10 MCG: 10 INJECTION INTRAVENOUS at 07:47

## 2022-01-19 RX ADMIN — KETOROLAC TROMETHAMINE 30 MG: 15 INJECTION, SOLUTION INTRAMUSCULAR; INTRAVENOUS at 21:14

## 2022-01-19 RX ADMIN — PHENYLEPHRINE HYDROCHLORIDE 50 MCG: 10 INJECTION INTRAVENOUS at 09:18

## 2022-01-19 RX ADMIN — MIDAZOLAM HYDROCHLORIDE 2 MG: 1 INJECTION, SOLUTION INTRAMUSCULAR; INTRAVENOUS at 07:21

## 2022-01-19 RX ADMIN — Medication 40 MG: at 13:21

## 2022-01-19 RX ADMIN — NITROGLYCERIN 0.4 MG: 0.4 TABLET SUBLINGUAL at 03:51

## 2022-01-19 RX ADMIN — PROPOFOL: 10 INJECTION, EMULSION INTRAVENOUS at 14:30

## 2022-01-19 RX ADMIN — PHENYLEPHRINE HYDROCHLORIDE 100 MCG: 10 INJECTION INTRAVENOUS at 11:14

## 2022-01-19 RX ADMIN — FAMOTIDINE 20 MG: 20 TABLET ORAL at 05:28

## 2022-01-19 RX ADMIN — ASPIRIN 81 MG CHEWABLE TABLET 162 MG: 81 TABLET CHEWABLE at 05:27

## 2022-01-19 RX ADMIN — POTASSIUM CHLORIDE, DEXTROSE MONOHYDRATE AND SODIUM CHLORIDE: 150; 5; 450 INJECTION, SOLUTION INTRAVENOUS at 12:16

## 2022-01-19 RX ADMIN — SENNOSIDES AND DOCUSATE SODIUM 1 TABLET: 50; 8.6 TABLET ORAL at 19:55

## 2022-01-19 RX ADMIN — HYDROMORPHONE HYDROCHLORIDE 0.4 MG: 0.2 INJECTION, SOLUTION INTRAMUSCULAR; INTRAVENOUS; SUBCUTANEOUS at 13:17

## 2022-01-19 RX ADMIN — PHENYLEPHRINE HYDROCHLORIDE 100 MCG: 10 INJECTION INTRAVENOUS at 10:30

## 2022-01-19 RX ADMIN — ACETAMINOPHEN 975 MG: 325 TABLET, FILM COATED ORAL at 13:20

## 2022-01-19 RX ADMIN — PHENYLEPHRINE HYDROCHLORIDE 50 MCG: 10 INJECTION INTRAVENOUS at 08:40

## 2022-01-19 RX ADMIN — FENTANYL CITRATE 100 MCG: 50 INJECTION, SOLUTION INTRAMUSCULAR; INTRAVENOUS at 08:31

## 2022-01-19 RX ADMIN — ACETAMINOPHEN 975 MG: 325 TABLET, FILM COATED ORAL at 22:32

## 2022-01-19 RX ADMIN — HEPARIN SODIUM 1950 UNITS/HR: 1000 INJECTION INTRAVENOUS; SUBCUTANEOUS at 04:01

## 2022-01-19 RX ADMIN — HYDROMORPHONE HYDROCHLORIDE 0.4 MG: 0.2 INJECTION, SOLUTION INTRAMUSCULAR; INTRAVENOUS; SUBCUTANEOUS at 15:17

## 2022-01-19 RX ADMIN — FENTANYL CITRATE 250 MCG: 50 INJECTION, SOLUTION INTRAMUSCULAR; INTRAVENOUS at 07:37

## 2022-01-19 RX ADMIN — HYDROMORPHONE HYDROCHLORIDE 0.4 MG: 0.2 INJECTION, SOLUTION INTRAMUSCULAR; INTRAVENOUS; SUBCUTANEOUS at 17:12

## 2022-01-19 RX ADMIN — PHENYLEPHRINE HYDROCHLORIDE 100 MCG: 10 INJECTION INTRAVENOUS at 10:41

## 2022-01-19 RX ADMIN — CEFAZOLIN SODIUM 2 G: 2 INJECTION, SOLUTION INTRAVENOUS at 22:46

## 2022-01-19 RX ADMIN — SODIUM CHLORIDE: 9 INJECTION, SOLUTION INTRAVENOUS at 07:43

## 2022-01-19 RX ADMIN — SODIUM CHLORIDE, POTASSIUM CHLORIDE, SODIUM LACTATE AND CALCIUM CHLORIDE: 600; 310; 30; 20 INJECTION, SOLUTION INTRAVENOUS at 07:20

## 2022-01-19 RX ADMIN — MIDAZOLAM HYDROCHLORIDE 2 MG: 1 INJECTION, SOLUTION INTRAMUSCULAR; INTRAVENOUS at 08:31

## 2022-01-19 RX ADMIN — PHENYLEPHRINE HYDROCHLORIDE 100 MCG: 10 INJECTION INTRAVENOUS at 09:25

## 2022-01-19 RX ADMIN — NITROGLYCERIN 10 MCG: 10 INJECTION INTRAVENOUS at 09:19

## 2022-01-19 RX ADMIN — LIDOCAINE HYDROCHLORIDE 1 ML: 10 INJECTION, SOLUTION INFILTRATION; PERINEURAL at 14:31

## 2022-01-19 RX ADMIN — GABAPENTIN 100 MG: 100 CAPSULE ORAL at 22:32

## 2022-01-19 RX ADMIN — METHOCARBAMOL 500 MG: 500 TABLET ORAL at 19:55

## 2022-01-19 RX ADMIN — EPINEPHRINE 0.03 MCG/KG/MIN: 1 INJECTION INTRAMUSCULAR; INTRAVENOUS; SUBCUTANEOUS at 10:10

## 2022-01-19 RX ADMIN — HYDROMORPHONE HYDROCHLORIDE 0.4 MG: 0.2 INJECTION, SOLUTION INTRAMUSCULAR; INTRAVENOUS; SUBCUTANEOUS at 22:54

## 2022-01-19 RX ADMIN — NITROGLYCERIN 0.4 MG: 0.4 TABLET SUBLINGUAL at 03:45

## 2022-01-19 RX ADMIN — PHENYLEPHRINE HYDROCHLORIDE 100 MCG: 10 INJECTION INTRAVENOUS at 10:19

## 2022-01-19 RX ADMIN — ALBUMIN (HUMAN) 500 ML: 12.5 INJECTION, SOLUTION INTRAVENOUS at 15:16

## 2022-01-19 RX ADMIN — FENTANYL CITRATE 50 MCG: 50 INJECTION, SOLUTION INTRAMUSCULAR; INTRAVENOUS at 11:31

## 2022-01-19 RX ADMIN — OXYCODONE HYDROCHLORIDE 10 MG: 5 TABLET ORAL at 19:55

## 2022-01-19 RX ADMIN — CHLORHEXIDINE GLUCONATE 0.12% ORAL RINSE 10 ML: 1.2 LIQUID ORAL at 05:27

## 2022-01-19 RX ADMIN — FENTANYL CITRATE 50 MCG: 50 INJECTION, SOLUTION INTRAMUSCULAR; INTRAVENOUS at 07:49

## 2022-01-19 RX ADMIN — SODIUM CHLORIDE: 9 INJECTION, SOLUTION INTRAVENOUS at 10:35

## 2022-01-19 RX ADMIN — SUGAMMADEX 200 MG: 100 INJECTION, SOLUTION INTRAVENOUS at 11:32

## 2022-01-19 RX ADMIN — NITROGLYCERIN 10 MCG: 10 INJECTION INTRAVENOUS at 07:57

## 2022-01-19 RX ADMIN — AMINOCAPROIC ACID 5 G/HR: 250 INJECTION, SOLUTION INTRAVENOUS at 08:05

## 2022-01-19 RX ADMIN — PROTAMINE SULFATE 450 MG: 10 INJECTION, SOLUTION INTRAVENOUS at 10:22

## 2022-01-19 RX ADMIN — MIDAZOLAM HYDROCHLORIDE 2 MG: 1 INJECTION, SOLUTION INTRAMUSCULAR; INTRAVENOUS at 10:23

## 2022-01-19 RX ADMIN — ROCURONIUM BROMIDE 30 MG: 50 INJECTION, SOLUTION INTRAVENOUS at 09:37

## 2022-01-19 RX ADMIN — PHENYLEPHRINE HYDROCHLORIDE 100 MCG: 10 INJECTION INTRAVENOUS at 11:08

## 2022-01-19 RX ADMIN — PHENYLEPHRINE HYDROCHLORIDE 50 MCG: 10 INJECTION INTRAVENOUS at 09:09

## 2022-01-19 RX ADMIN — CEFAZOLIN SODIUM 2 G: 2 INJECTION, SOLUTION INTRAVENOUS at 15:26

## 2022-01-19 RX ADMIN — NITROGLYCERIN 10 MCG/MIN: 20 INJECTION INTRAVENOUS at 20:20

## 2022-01-19 RX ADMIN — AMIODARONE HYDROCHLORIDE 400 MG: 200 TABLET ORAL at 19:56

## 2022-01-19 RX ADMIN — FENTANYL CITRATE 50 MCG: 50 INJECTION, SOLUTION INTRAMUSCULAR; INTRAVENOUS at 10:48

## 2022-01-19 RX ADMIN — PROPOFOL 70 MG: 10 INJECTION, EMULSION INTRAVENOUS at 07:37

## 2022-01-19 RX ADMIN — Medication 3 G: at 07:23

## 2022-01-19 RX ADMIN — PHENYLEPHRINE HYDROCHLORIDE 50 MCG: 10 INJECTION INTRAVENOUS at 09:14

## 2022-01-19 RX ADMIN — HYDROMORPHONE HYDROCHLORIDE 0.4 MG: 0.2 INJECTION, SOLUTION INTRAMUSCULAR; INTRAVENOUS; SUBCUTANEOUS at 19:09

## 2022-01-19 RX ADMIN — ROCURONIUM BROMIDE 50 MG: 50 INJECTION, SOLUTION INTRAVENOUS at 07:37

## 2022-01-19 RX ADMIN — NITROGLYCERIN 30 MCG/MIN: 20 INJECTION INTRAVENOUS at 08:12

## 2022-01-19 ASSESSMENT — ACTIVITIES OF DAILY LIVING (ADL)
ADLS_ACUITY_SCORE: 8
ADLS_ACUITY_SCORE: 8
ADLS_ACUITY_SCORE: 7
ADLS_ACUITY_SCORE: 7
ADLS_ACUITY_SCORE: 8
ADLS_ACUITY_SCORE: 8
ADLS_ACUITY_SCORE: 7
ADLS_ACUITY_SCORE: 8
ADLS_ACUITY_SCORE: 7
ADLS_ACUITY_SCORE: 8
ADLS_ACUITY_SCORE: 7
ADLS_ACUITY_SCORE: 8
ADLS_ACUITY_SCORE: 7
ADLS_ACUITY_SCORE: 8

## 2022-01-19 NOTE — PROGRESS NOTES
[facility]    Medicine Progress Note - Hospitalist Service       Date of Admission:  1/12/2022  Assessment & Plan   66 year old male with hx of CAD s/p PCI, chronic a-fib on chronic anticoagulation with apixaban, CHF, HTN, and chronic back pain s/p indwelling pain pump who was admitted on 1/12/2022 for NSTEMI. He was found to have severe in-stent re-stenosis of mid-LAD and severe stenosis distal to previously stents, and underwent CABG today:    Principal Problem:  Principal Problem:    NSTEMI w Unstab Angina -- S/P CABG x 1 (LIMA to LAD) on 1/19/22    -- surgery this AM      Chronic diastolic heart failure (H)      Paroxysmal atrial fib -- S/P Pulm Vein Ablation 1/19/22    Active Problems:    Morbid obesity -- BMI 42.0       MARLEEN (doesn't tolerate CPAP)        Essential hypertension      Chronic Back Pain (IT Pump w Morphine, Clonidine, Baclofen)     Chronic combined systolic and diastolic CHF  TTE findings noted above  - Continues on PTA Lasix    Chronic atrial fibrillation -- remains in Afib on monitor  - Rate-controlled on PTA metoprolol  - Holding PTA apixaban for CABG    GERD  - Continues on PTA omeprazole    Chronic low back pain  [PTA: morphine per indwelling pain pump, nabumetone 500 mg BID, duloxetine 30 mg qAM, 60 mg qhs; APAP prn; baclofen bid prn]  - Hold PTA nabumetone in setting of NSTEMI  - Continues on other PTA meds with pain pump in place    Resolved Tongue swelling  -- stopped Decadron and no problems      Diet: NPO per Anesthesia Guidelines for Procedure/Surgery Except for: Meds    DVT Prophylaxis: IV heparin  Bran Catheter: Not present  Code Status: Full Code         Disposition: Continue postop care --  probably home on Monday,1/25/22    The patient's care was discussed with the Patient.    Ej Keane MD  Hospitalist Service  [facility]  Contact information available via [facility]  Maria [facility]adalupe/Directory    ______________________________________________________________________    Interval History   Intubated.     Physical Exam   Vital Signs: Temp: 97.9  F (36.6  C) Temp src: Oral BP: 103/68 Pulse: 77   Resp: 18 SpO2: 95 % O2 Device: Nasal cannula Oxygen Delivery: 3 LPM  Weight: 304 lbs 9.6 oz  Constitutional: Appears comfortable  Respiratory: Breathing non-labored. Lungs CTAB - no wheezes, crackles, or rhonchi  Cardiovascular: Heart irregular rhythm, trace bilateral ankle edema  GI: +BS, abd obese but soft/NT  Musculoskeletal: trace ankle edema bilaterally  Neuro: Alert and appropriate, no focal deficits    Data   Recent Labs   Lab 01/19/22  0422 01/18/22  0645 01/17/22  1123 01/16/22  0621 01/15/22  0832 01/14/22  0731 01/14/22  0438 01/13/22  1658 01/13/22  0546   WBC  --   --   --  11.1* 9.2  --  9.1  --  7.7   HGB  --   --   --  14.2 13.3  --  13.0*  --  13.4   MCV  --   --   --  87 89  --  86  --  87   PLT  --   --   --  175 166  --  164  --  113*   NA  --  137  --  136 138  --  138  --  139   POTASSIUM 3.9 3.5  --  3.6 3.9  --  3.6  --  4.6   CHLORIDE  --  108  --  106 107  --  110*  --  109   CO2  --  23  --  27 24  --  23  --  28   BUN  --  14  --  18 16  --  14  --  16   CR  --  0.89  --  0.96 0.90  --  0.83  --  0.89   ANIONGAP  --  6  --  3 7  --  5  --  2*   RATNA  --  8.9  --  9.0 8.8  --  8.6  --  8.2*   GLC  --  106*  --  102* 136*   < > 142*   < > 96   ALBUMIN  --   --  3.1*  --   --   --   --   --  2.6*   PROTTOTAL  --   --  7.2  --   --   --   --   --  6.3*   BILITOTAL  --   --  1.0  --   --   --   --   --  0.8   ALKPHOS  --   --  70  --   --   --   --   --  68   ALT  --   --  38  --   --   --   --   --  39   AST  --   --  22  --   --   --   --   --  66*    < > = values in this interval not displayed.         No results found for this or any previous visit (from the past 24 hour(s)).    Medications     BETA BLOCKER NOT PRESCRIBED       [Auto Hold] - MEDICATION INSTRUCTIONS -        [Auto Hold] - MEDICATION INSTRUCTIONS -       heparin 1,950 Units/hr (01/19/22 0401)     insulin regular       lactated ringers 25 mL/hr at 01/19/22 0720     nitroGLYcerin 30 mcg/min (01/19/22 0528)     [Auto Hold] Percutaneous Coronary Intervention orders placed (this is information for BPA alerting)       [Auto Hold] Reason antiplatelet medication not selected         [Auto Hold] aspirin  81 mg Oral Daily     ceFAZolin  3 g Intravenous Pre-Op/Pre-procedure x 1 dose     ceFAZolin  3 g Intravenous See Admin Instructions     [Auto Hold] DULoxetine  30 mg Oral QAM     [Auto Hold] DULoxetine  60 mg Oral QPM     [Auto Hold] furosemide  40 mg Oral Daily     pump prime ([facility] formula) solution   PERFUSION On Call to OR     heparin in saline (CELL SAVER) formula   PERFUSION On Call to OR     hot shot cardioplegia solution   PERFUSION On Call to OR     [Auto Hold] metoprolol tartrate  50 mg Oral BID     [Auto Hold] miconazole   Topical Daily     [Auto Hold] omeprazole  40 mg Oral Daily     low cardioplegia solution   PERFUSION On Call to OR     high cardioplegia solution   PERFUSION On Call to OR     [Auto Hold] rosuvastatin  40 mg Oral Daily     [Auto Hold] sennosides  2 tablet Oral BID     sodium chloride (PF)  3 mL Intracatheter Q8H     [Auto Hold] sodium chloride (PF)  3 mL Intracatheter Q8H     [Auto Hold] traZODone  200 mg Oral At Bedtime

## 2022-01-19 NOTE — OP NOTE
Procedure Date: 01/19/2022    REFERRING CARDIOLOGIST:  Kiera Pearl MD    PREOPERATIVE DIAGNOSES:  Severe in-stent restenosis of the left anterior descending, paroxysmal atrial fibrillation.    POSTOPERATIVE DIAGNOSES:  Severe in-stent restenosis of the left anterior descending, paroxysmal atrial fibrillation.    SURGEON:  William Dumont MD    ASSISTANTS:  Oliver Garcia MD and MENDY Baig    NAME OF OPERATIONS:  Coronary artery bypass grafting x1 with left internal mammary artery to the left anterior descending, bilateral pulmonary vein isolation with the AtriCure bipolar radiofrequency ablation device Synergy, exclusion of the left atrial appendage with the Atricure AtriClip, intraoperative transesophageal echocardiography.    ANESTHESIA:  General endotracheal.    INDICATIONS FOR PROCEDURE:  Mr. Barr is a very pleasant 66-year-old obese gentleman with diabetes, hyperlipidemia and significant coronary artery disease, status post PCI to the RCA for inferior STEMI in 09/2021, followed by staged PCI of in-stent restenosis of the proximal LAD, who was admitted to the hospital last week over recurrent chest pain and aggressive/early in-stent restenosis of the proximal LAD.  He was taken to the operating room today for coronary artery bypass grafting.    OPERATIVE FINDINGS:  The patient had a low normal LV function, although the TATIANA views were difficult to obtain given his history of gastric bypass.  His left internal mammary artery was a good quality conduit with good flow, measuring 2.5 mm in diameter.  The mid to distal LAD had mild disease and the probe size was 1.5 mm.    DESCRIPTION OF PROCEDURE:  After informed consent was obtained, the patient was brought down to the operating room and was placed on the OR table in a supine position.  Intravenous and intra-arterial lines were begun.  While monitoring his blood pressure and EKG tracing, he was anesthetized and intubated using a single lumen  endotracheal tube.  His entire chest, abdomen, both groins and legs were prepped down to the toes using multiple layers of DuraPrep.  He was draped in a sterile field.  A median sternotomy was performed and the left internal mammary artery was taken down.  Prior to clipping the LIMA distally, the patient was fully heparinized.  The sternal edges were retracted laterally and pericardium was opened to suspend the heart in a pericardial cradle.  The ascending aorta and the right atrial appendage were cannulated.  A retrograde cardioplegia catheter was placed into the coronary sinus without difficulty.  An antegrade needle/aortic root vent was placed in the ascending aorta as well.  After appropriate ACT level was achieved, cardiopulmonary bypass was established.  The patient was kept normothermic during the entire operation.  First, the right pulmonary veins were bluntly dissected out and using the Synergy device, 3 circumferential ablation lines were created around the right pulmonary veins.  The aorta was then crossclamped and antegrade cold blood cardioplegia was given to arrest the heart.  The patient went into good diastolic arrest without any LV distention.  Following this, intermittent retrograde cardioplegia doses were given on average of every 15 minutes for myocardial protection while the aorta was crossclamped.      The left pulmonary veins were then bluntly dissected out and the Synergy device was used to create 3 circumferential ablation lines around the left pulmonary veins.  The left atrial appendage was then excluded  using the AtriClip at its base.  Next, the LIMA to LAD anastomosis was performed.  This was done in an end-to-side fashion using running 7-0 Prolene.  This anastomosis was protected by tacking the LIMA pedicle down to the epicardium using interrupted 6-0 Prolene x2.  Retrograde hot shot was given and the aortic crossclamp was removed.  Aortic cross-clamp time was 28 minutes.  The patient  was then weaned off cardiopulmonary bypass with minimal inotropic and vasopressor support.  Total cardiopulmonary bypass time was 46 minutes.  Once the patient remained stable off bypass, venous cannula was removed and protamine was given.  The aortic cannula was subsequently removed as well.  Temporary ventricular pacer wire was placed in the RV muscle, 32-Azeri angled and straight chest tube were placed in mediastinum as well.  These were all brought out percutaneously below the sternotomy incision and secured to the skin using 2-0 Ethibond.  The right pleural space was slightly opened.  The mediastinum was irrigated with antibiotic saline and hemostasis was achieved.  The sternum was reapproximated using multiple interrupted single and double wires.  The incision was closed in layers of running Vicryl suture.  The skin was closed using 3-0 Vicryl and sealed using Dermabond.    There were no intraoperative complications and the patient tolerated the operation well.  No blood products were given intraoperatively.  All sponge counts, needle counts and instrument counts were correct x2 at the end of the operation.    ESTIMATED BLOOD LOSS:  Unknown.    SPECIMEN REMOVED:  None.    The patient was brought to the ICU in hemodynamically stable condition and remained intubated in normal sinus rhythm.    William Dumont MD        D: 2022   T: 2022   MT: [id number]    Name:     KRAMEN ERICKSON  MRN:      [id number]        Account:        [id number]   :      1955           Procedure Date: 2022     Document: [id number]

## 2022-01-19 NOTE — PLAN OF CARE
A/O VSS, no chest pain, up independently.  Surgical teaching done. Planned for 7:20 am.  Cangrelor and heparin infusing.  Cangrelor off at MN, hep off on call to OR.  Continue to monitor.

## 2022-01-19 NOTE — PROGRESS NOTES
Extubation Note    Successful completion of SBT: Yes  Extubation time: 1740    Patient assessment:  Lung sounds: Clear  Stridor Present: No  Patient tolerance: Good    Oxygen device:  Liter flow: 4L  SpO2: 98%     Robles Harrington, RT

## 2022-01-19 NOTE — PLAN OF CARE
Neuro- A&Ox4  Most Recent Vitals- Temp: 97.9  F (36.6  C) Temp src: Oral BP: 116/66 Pulse: 82   Resp: 18 SpO2: 98 % O2 Device: None (Room air)   Tele/Cardiac- A fib CVR with frequent PVC's, denies CP  Resp- Stable on RA, gets SOTOMAYOR/SOB at times, LS diminished  Activity- Independent  Pain- reports chronic back pain, PRN tylenol given x1  Drips- Heparin and Cangrelor   Drains/Tubes- PIV x2  Skin- R wrist very ecchymotic and swollen, good radial pulse and CMS   GI/- WDL  Aggression Color- Green  COVID status- Negative  Plan- 1st case CABG in the AM  Misc- Pre-surgery scrub shower completed tonight    Tomasa Bennett, RN

## 2022-01-19 NOTE — ANESTHESIA PROCEDURE NOTES
Central Line/PA Catheter Placement    Pre-Procedure   Staff -        Anesthesiologist:  Davy Gagnon MD       Performed By: Anesthesiologist       Location: OR       Pre-Anesthestic Checklist: patient identified, IV checked, site marked, risks and benefits discussed, informed consent, monitors and equipment checked, pre-op evaluation and at physician/surgeon's request  Timeout:       Correct Patient: Yes        Correct Procedure: Yes        Correct Site: Yes        Correct Position: Yes        Correct Laterality: Yes     Procedure   Procedure: central line, new line and elective       Diagnosis: CAD       Laterality: right       Insertion Site: right, internal jugular.       Patient Position: Trendelenburg  Sterile Prep        All elements of maximal sterile barrier technique followed       Patient Prep/Sterile Barriers: draped, hand hygiene, gloves , hat , mask , draped, gown, sterile gel and probe cover       Skin prep: Chloraprep  Insertion/Injection        Local skin infiltrated with 5 mL of 1% lidocaine.        Technique: ultrasound guided and Seldinger Technique        1. Ultrasound was used to evaluate the access site.       2. Vein evaluated via ultrasound for patency/adequacy.       3. Using real-time ultrasound the needle/catheter was observed entering the artery/vein.       4. Permanent image was captured and entered into the patient's record.       5. The visualized structures were anatomically normal.       6. There were no apparent abnormal pathologic findings.       Catheter size: 9 Fr Cordis.       PA Catheter Type: Thermodilution         Appropriate RV, RA and PA waveforms noted:  Yes            Withdrawn and Locked at cm: 49  Narrative         Secured by: suture       Tegaderm and Biopatch dressing used.       Complications: None apparent,        blood aspirated from all lumens,        All lumens flushed: Yes       Verification method: Ultrasound, Placement to be verified post-op and X-ray  (CXR in ICU)   Comments:  Ultrasound Interpretation, central venous    1. Ultrasound guidance was used to evaluate potential access sites.  2. Ultrasound was also used to verify the patency of the vessel specified above.   3. Ultrasound was used to visualize the needle entering the vessel.   4. The visualized structures were anatomically normal.  5. There were no apparent abnormal pathological findings.  6. A permanent ultrasound image was saved in the patient's record.

## 2022-01-19 NOTE — ANESTHESIA PROCEDURE NOTES
Airway       Patient location during procedure: OR       Procedure Start/Stop Times: 1/19/2022 7:39 AM  Staff -        Anesthesiologist:  Davy Gagnon MD       CRNA: Kylie Ann APRN CRNA       Other Anesthesia Staff: Onur Garvey RN       Performed By: SRNA  Consent for Airway        Urgency: elective  Indications and Patient Condition       Indications for airway management: jessica-procedural       Induction type:intravenous       Mask difficulty assessment: 1 - vent by mask    Final Airway Details       Final airway type: endotracheal airway       Successful airway: ETT - single  Endotracheal Airway Details        ETT size (mm): 8.0       Cuffed: yes       Successful intubation technique: video laryngoscopy       VL Blade Size: MAC 4       Grade View of Cords: 1       Adjucts: stylet       Position: Right       Measured from: gums/teeth       Secured at (cm): 25       Bite block used: None    Post intubation assessment        Placement verified by: capnometry, equal breath sounds and chest rise        Number of attempts at approach: 1       Number of other approaches attempted: 0       Secured with: pink tape       Ease of procedure: easy       Dentition: Intact and Unchanged

## 2022-01-19 NOTE — ANESTHESIA PROCEDURE NOTES
Arterial Line Procedure Note    Pre-Procedure   Staff -        Anesthesiologist:  Davy Gagnon MD       Performed By: Anesthesiologist       Location: OR       Pre-Anesthestic Checklist: patient identified, IV checked, site marked, risks and benefits discussed, informed consent, monitors and equipment checked, pre-op evaluation and at physician/surgeon's request  Timeout:       Correct Patient: Yes        Correct Procedure: Yes        Correct Site: Yes        Correct Position: Yes   Procedure   Procedure: arterial line       Diagnosis: CAD       Laterality: left       Insertion Site: radial (Radial).  Sterile Prep        All elements of maximal sterile barrier technique followed       Patient Prep/Sterile Barriers: hand hygiene, sterile gloves, hat, mask, draped, sterile gown, draped       Skin prep: Chloraprep  Insertion/Injection        Technique: Seldinger Technique        Catheter Type/Size: 20 gauge, 1.75 in/4.5 cm quick cath (integral wire)  Narrative         Secured by: suture       Tegaderm dressing used.       Arterial waveform: Yes        IBP within 10% of NIBP: Yes

## 2022-01-19 NOTE — PLAN OF CARE
Care plan note:      Recent Vitals:  Temp: 97.9  F (36.6  C) Temp src: Oral BP: 116/66 Pulse: 82   Resp: 18 SpO2: 98 % O2 Device: None (Room air)      Orientation/Neuro: Alert and Oriented x 4  Pain: The patient is not having any pain.   Tele: Atrial fibrillation - controlled, PVC's   IV medications: Heparin and cangrelor, Nitroglycerin   Mobility: St. by assist   Skin: Radial site: Right radial site; bruising   GI: WDL  : WDL     Diet: Tolerating diet:   Well  Orders Placed This Encounter      NPO per Anesthesia Guidelines for Procedure/Surgery Except for: Meds      Safety/Concerns:  None  Aggression Color: Green    Plan: Patient is currently NPO for a potential CABG procedure this AM. Patient Cangrelor drip was discontinued at midnight, and Heparin was left running at 1950 until OR. Patient started having chest pain rating an 8/10, BP was 190/123. Patient was dyspenic and was placed on 3L of O2 and 3 SL nitroglycerin was given 5 minutes apart. Patient continued complaining of chest pain 3/10. Cardiology was paged and a nitroglycerin drip and is now at 20 mcg/min. VSS. Still preparing for CABG procedure but it is unknown if any ICU beds will be available at this time.   Continue to monitor.      Lupe Lama RN

## 2022-01-19 NOTE — ANESTHESIA CARE TRANSFER NOTE
Patient: Onur Barr    Procedure: Procedure(s):  CORONARY ARTERY BYPASS GRAFT (CABG) X1; LIMA -LAD.  PULMONARY VEIN ISOLATION, AND ATRIAL APPENDAGE CLIPPING USING 45MM ACTICURE CLIP.       Diagnosis: CAD (coronary artery disease) [I25.10]  Diagnosis Additional Information: No value filed.    Anesthesia Type:   General     Note:    Oropharynx: endotracheal tube in place  Level of Consciousness: iatrogenic sedation        Dentition: dentition unchanged  Vital Signs Stable: post-procedure vital signs reviewed and stable  Report to RN Given: handoff report given  Patient transferred to: ICU  Comments: Patient connected to SpO2, EKG, and arterial blood pressure transport monitors and accompanied by CRNA, francisco RN, TG, surgeon (fellow) to ICU room. Patient ventilated by CRNA, SRNA with ambu via ETT with O2 at 15 liters per minute during transport.     On arrival to ICU, endotracheal tube position unchanged, equal, bilateral breath sounds auscultated in ICU room, patient placed on ICU ventilator by respiratory therapist, teeth and oral mucosa intact and unchanged at handoff of care. At anesthesia handoff of care, clinical monitors and alarms on and functioning, report on patient's clinical status given to ICU RN, ICU staff questions answered.Stop data collection on anesthesia machine; Continuous monitoring via portable transport monitor through transport and connection to bedside monitor in ICU.  ICU Handoff: Call for PAUSE to initiate/utilize ICU HANDOFF, Identified Patient, Identified Responsible Provider, Reviewed the Pertinent Medical History, Discussed Surgical Course, Reviewed Intra-OP Anesthesia Management and Issues during Anesthesia, Set Expectations for Post Procedure Period and Allowed Opportunity for Questions and Acknowledgement of Understanding      Vitals:  Vitals Value Taken Time   BP     Temp 35.9  C (96.62  F) 01/19/22 1142   Pulse 55 01/19/22 1142   Resp 13 01/19/22 1142   SpO2 100 %  01/19/22 1142   Vitals shown include unvalidated device data.    Electronically Signed By: YAMILETH Musa CRNA  January 19, 2022  11:44 AM

## 2022-01-19 NOTE — PROVIDER NOTIFICATION
Rash and skin excoriation at groin and abdominal creases. rash under scrotum. covered wound to right flank. scattered bruises. skin dry/cracked to bilat. feet.

## 2022-01-19 NOTE — BRIEF OP NOTE
[facility]    Brief Operative Note    Pre-operative diagnosis: CAD (coronary artery disease) [I25.10]  Post-operative diagnosis Same as pre-operative diagnosis    Procedure: Procedure(s):  CORONARY ARTERY BYPASS GRAFT (CABG) X1; LIMA -LAD.  PULMONARY VEIN ISOLATION, AND ATRIAL APPENDAGE CLIPPING USING 45MM ACTICURE CLIP.  Surgeon: Surgeon(s) and Role:     * William Dumont MD - Primary     * Mckayla Armendariz PA-C - Assisting     * Oliver Garcia MD - Fellow - Assisting  Anesthesia: General   Estimated Blood Loss: 500 ml  Drains: 1 med chest tube and 1 left pleural chest tube  Specimens:   ID Type Source Tests Collected by Time Destination   A : STAT HEART LABS  Blood Line, arterial CBC WITH PLATELETS, BASIC METABOLIC PANEL, FIBRINOGEN ACTIVITY, PARTIAL THROMBOPLASTIN TIME, INR Kylie Ann, YAMILETH CRNA 1/19/2022 10:40 AM      Findings:   see op note.  Complications: None.  Implants:   Implant Name Type Inv. Item Serial No.  Lot No. LRB No. Used Action   IMP ATRICLIP FLEX V DEVICE JUSTO EXLUSION 45MM [id number] - [id number] Clip IMP ATRICLIP FLEX V DEVICE JUSTO EXLUSION 45MM [id number]  [employer] [id number] N/A 1 Implanted

## 2022-01-19 NOTE — PLAN OF CARE
Neuro- A&Ox4  Most Recent Vitals- Temp: 97.9  F (36.6  C) Temp src: Oral BP: 116/66 Pulse: 82   Resp: 18 SpO2: 98 % O2 Device: None (Room air)   Tele/Cardiac- 100% A paced, denies CP  Resp- Stable on RA, LS clear, denies SOB  Activity- Independent  Pain- reports mild discomfort at pacemaker incision site, PRN tylenol given x1  Drips- none, SL  Drains/Tubes- PIVx2  Skin- L chest incision-dressing CDI  GI/- WDL  Aggression Color- Green  COVID status- Negative  Plan- CXR and interrogation in AM then likely discharge  Misc- NA    Tomasa Bennett, RN

## 2022-01-19 NOTE — PROGRESS NOTES
Pt was taken down for CABG procedure at 06:10. VSS. Heparin stopped. Nitroglycerin at 30 mcg/min. LR prepped.

## 2022-01-19 NOTE — CONSULTS
[facility]   CRITICAL CARE ADMISSION/CONSULTATION    Onur Barr MRN: [id number]  1955  Date of Admission:1/12/2022          HPI   Onur Barr IS a 66 year old male admitted on 1/12/2022 with significant history for CAD, Afib, RYGB, chronic pain, HTN, hyperlipidemia and MARLEEN who presented to ED with acute onset chest pain  - had LAD stent in 2012 and PCI to RCA in 2021. Remains on ASA and plavix. Has Afib on eliquis and rate control.   - CATH performed on 1/13 demonstrating instent restenosis of LAD stent s/p temporizing balloon angioplasty.   - CTS consulted and planned for CABx1. He is POD#0 for LAD-LIMA    Relevant history  1. Has chronic back pain controlled with intrathecal pump consisting of baclofen, clonidine and morphine  2. S/p RYGB.   3. Has MARLEEN on CPAP            Past Medical History:      Past Medical History:   Diagnosis Date     Acid reflux disease 10/31/2017     Arrhythmia     paroxysmal atrial fibrillation     Arthritis      Atrial fibrillation (H)     Created by Conversion      Bariatric surgery status     Created by Conversion      CHF (congestive heart failure) (H)      Coronary artery disease      Coronary atherosclerosis     Created by Conversion  Replacement Utility updated for latest IMO load     Diabetes (H)     typr II resloved     Essential hypertension     Created by [facility] Annotation: Apr 6 2012 10:16AM - Zack Gutierrez: Lisinipril,  coreg  Replacement Utility updated for latest IMO load     H/O gastric bypass      Heart attack (H)      Hypercholesteremia      Hypertension      Insomnia      Insomnia, unspecified     Created by [facility] Annotation: Sep 11 2013  9:56AM - Zack Gutierrez: Trouble  maintaining sleep-Trazodone-minimal helpzolpidem-      Ischemic cardiomyopathy      Low back pain      Lumbago     Created by [facility] Annotation: Apr 6 2012 10:20AM - Anh Dickson: Morphine pump      Morbid obesity (H)  8/1/2018     Nephrolithiasis      Nephrolithiasis      Obstructive sleep apnea (adult) (pediatric)     Created by Conversion      Osteoarthritis     Created by [facility] Annotation: Jun 26 2009  9:53Zack Harris: failed lumbar  fusion/morphine pump dr putnam  Replacement Utility updated for latest IMO load     Pure hypercholesterolemia     Created by Conversion      Sleep apnea      Sleepiness 5/8/2018             Past Surgical History:      Past Surgical History:   Procedure Laterality Date     BACK SURGERY       BYPASS GASTRIC DUODENAL SWITCH       COSMETIC SURGERY      pannicullectomy     CV CORONARY ANGIOGRAM N/A 9/11/2021    Procedure: Coronary Angiogram;  Surgeon: Noris Ozuna MD;  Location: [facility] CV     CV HEART CATHETERIZATION WITH POSSIBLE INTERVENTION N/A 1/13/2022    Procedure: Heart Catheterization with Possible Intervention;  Surgeon: Liz Pearl MD;  Location:  HEART CARDIAC CATH LAB     CV LEFT HEART CATH N/A 9/11/2021    Procedure: Left Heart Cath;  Surgeon: Noris Ozuna MD;  Location: [facility] LAB CV     CV PCI N/A 9/11/2021    Procedure: Percutaneous Coronary Intervention;  Surgeon: Noris Ozuna MD;  Location: [facility] LAB CV     CV PCI N/A 10/7/2021    Procedure: Percutaneous Coronary Intervention;  Surgeon: Mckayla Dan MD;  Location: [facility] LAB CV     CV PCI ASPIRATION THROMECTOMY N/A 9/11/2021    Procedure: Percutaneous Coronary Intervention Aspiration Thrombectomy;  Surgeon: Noris Ozuna MD;  Location: [facility] LAB CV     ENT SURGERY      tonsillectomy     EXTRACORPOREAL SHOCK WAVE LITHOTRIPSY, CYSTOSCOPY, INSERT STENT URETER(S), COMBINED  8/23/11     HC REMOVAL OF TONSILS,<11 Y/O      Description: Tonsillectomy;  Recorded: 03/23/2012;  Comments: for obstructive sleep apnea     HC REPAIR INCISIONAL HERNIA,REDUCIBLE  11/13/2012    Description: Incisional Hernia Repair;  Proc Date: 11/13/2012;  Comments: incisional  hernia repair and abdominal panniculectomy by Dr Shon Green at the [facility].     HERNIA REPAIR       IR MISCELLANEOUS PROCEDURE  7/29/2011     IR MISCELLANEOUS PROCEDURE  8/5/2011     IR MISCELLANEOUS PROCEDURE  8/23/2011     IR NEPHROSTOMY TUBE CHANGE BILATERAL  8/5/2011     ORTHOPEDIC SURGERY      arthrodesis ant discectomy, lumbar     MI ARTHRODESIS ANT INTERBODY MIN DISCECTOMY,LUMBAR      Description: Lumbar Vertebral Fusion;  Recorded: 06/26/2009;  Comments: before 200 see [facility] consult under old records     MI EXCISE EXCESS SKIN TISSUE,ABDOMEN  11/13/2012    Description: Panniculectomy;  Proc Date: 11/13/2012;  Comments: 3.5 Lb Pannus was removed at the [facility] By Dr. Shon Green     REPLACE INTRATHECAL PAIN PUMP N/A 4/25/2017    Procedure: REPLACE INTRATHECAL PAIN PUMP;  INTRATHECAL PAIN PUMP CATHETER REPLACEMENT AND PUMP REPLACEMENT;  Surgeon: Anthony Maloney MD;  Location: [facility]     REVISE CATHETER INTRATHECAL N/A 4/25/2017    Procedure: REVISE CATHETER INTRATHECAL;;  Surgeon: Anthony Maloney MD;  Location: [facility]     SHOULDER SURGERY       ZZC GASTRIC BYPASS,OBESE<100CM LORA-EN-Y  2008    Description: Gastric Surgery For Morbid Obesity Bypass With Lora-en-Y;  Recorded: 06/26/2009;  Comments: dr green 2008            Social History:     Social History     Tobacco Use     Smoking status: Former Smoker     Years: 10.00     Types: Cigars, Cigars     Smokeless tobacco: Never Used   Substance Use Topics     Alcohol use: No            Family History:     Family History   Problem Relation Age of Onset     Cerebrovascular Disease Mother      Heart Disease Father      Hodgkin's lymphoma Brother              Allergies:   Please see allergy list which was reviewed this admission.         Medications:       aspirin  81 mg Oral Daily     DULoxetine  30 mg Oral QAM     DULoxetine  60 mg Oral QPM     furosemide  40 mg Oral Daily     metoprolol tartrate  50 mg Oral BID     miconazole   Topical Daily      omeprazole  40 mg Oral Daily     rosuvastatin  40 mg Oral Daily     sennosides  2 tablet Oral BID     sodium chloride (PF)  3 mL Intracatheter Q8H     traZODone  200 mg Oral At Bedtime            Review of Systems:   Unable to assess due to critical illness            Physical Examination:   Temp:  [97.4  F (36.3  C)-97.9  F (36.6  C)] 97.9  F (36.6  C)  Pulse:  [56-90] 77  Resp:  [16-18] 18  BP: (103-192)/() 103/68  SpO2:  [94 %-99 %] 95 %    Intake/Output Summary (Last 24 hours) at 1/19/2022 1142  Last data filed at 1/19/2022 1109  Gross per 24 hour   Intake 2888 ml   Output 1235 ml   Net 1653 ml     Wt Readings from Last 4 Encounters:   01/19/22 138.2 kg (304 lb 9.6 oz)   01/04/22 138 kg (304 lb 5 oz)   11/15/21 140.2 kg (309 lb)   11/05/21 140.6 kg (310 lb)     BP - Mean:  [105-147] 105  Resp: 18    Recent Labs   Lab 01/19/22  1047 01/19/22  1003 01/19/22  0941 01/19/22  0825   PH 7.32* 7.35  --  7.43   PCO2 47* 46*  --  35   PO2 96 297*  --  386*   HCO3 24 25  --  23   O2PER 60.0 80.0 80.0 100.0       GEN: no acute distress   HEENT: head ncat, sclera anicteric, OP patent, trachea midline   PULM: unlabored synchronous with vent, clear anteriorly    CV/COR: RRR S1S2 no gallop,  No rub, no murmur  ABD: soft nontender, hypoactive bowel sounds, no mass  EXT:  Edema   warm  NEURO: grossly intact  SKIN: no obvious rash      Assessment and plan :     Neurology/Psychiatry/Pain/Sedation:   1. Sedation for vent synchrony. RASS -5 on propofol and precedex. SAT today   2. Chronic back pain. Pump still active. Will use precedex to help with pain control     Cardiovascular/Hemodynamics/Pulm/Vent/Renal/GI/ID/Endo:   1. Mechanically ventilated and POD#0 CABx1. Easy to oxygenate/ventilate. Wean pressors to MAP>60. On pathway. Trial SBT when awake. UOP non-oliguric   2. Afib. Hold eliquis for now. Metoprolol for rate control  3. CAD s/p PCI. Hold plavix for now. Cont statin, ASA and BB   4. ICU glucose protocol      Disposition/Code Status/Other  1. Critically ill   2. Code: Full    ICU Prophylaxis:   1. DVT: mechanical  2. VAP: HOB 30 degrees, chlorhexidine rinse  3. Stress Ulcer: famotidine  4. Restraints: Nonviolent soft two point restraints required and necessary for patient safety and continued cares and good effect as patient continues to pull at necessary lines, tubes despite education and distraction. Will readdress daily.   5. IV Access - central access required and necessary for continued patient cares  6. Feeding - NPO    I have personally reviewed the daily labs, imaging studies, cultures and discussed the case with referring physician and consulting physicians.     This patient is critically ill and I have provided 30 minutes of critical care time (excluding procedures) on January 19, 2022.     Gerardo Barnett MD, A   of Medicine  Interventional Pulmonary  Department of Pulmonary, Allergy, Critical Care and Sleep Medicine   [facility]  Pager: [phone]   Office: [phone]         Data:   All data and imaging reviewed     ROUTINE ICU LABS (Last four results)  CMP  Recent Labs   Lab 01/19/22  1047 01/19/22  1040 01/19/22  1003 01/19/22  0941 01/19/22  0422 01/18/22  0645 01/17/22  1123 01/16/22  0621 01/15/22  0832 01/13/22  1658 01/13/22  0546    139 139 139   < > 137  --  136 138   < > 139   POTASSIUM 4.1 4.1 4.6 3.9   < > 3.5  --  3.6 3.9   < > 4.6   CHLORIDE  --  109  --   --   --  108  --  106 107   < > 109   CO2  --  24  --   --   --  23  --  27 24   < > 28   ANIONGAP  --  6  --   --   --  6  --  3 7   < > 2*   * 149* 138* 138*   < > 106*  --  102* 136*   < > 96   BUN  --  16  --   --   --  14  --  18 16   < > 16   CR  --  0.94  --   --   --  0.89  --  0.96 0.90   < > 0.89   GFRESTIMATED  --  90  --   --   --  >90  --  88 >90   < > >90   RATNA  --  8.9  --   --   --  8.9  --  9.0 8.8   < > 8.2*   PROTTOTAL  --   --   --   --   --   --  7.2  --   --    --  6.3*   ALBUMIN  --   --   --   --   --   --  3.1*  --   --   --  2.6*   BILITOTAL  --   --   --   --   --   --  1.0  --   --   --  0.8   ALKPHOS  --   --   --   --   --   --  70  --   --   --  68   AST  --   --   --   --   --   --  22  --   --   --  66*   ALT  --   --   --   --   --   --  38  --   --   --  39    < > = values in this interval not displayed.     CBC  Recent Labs   Lab 01/19/22  1047 01/19/22  1040 01/19/22  1003 01/19/22  0941 01/19/22  0825 01/16/22  0621 01/15/22  0832 01/14/22  0438   WBC  --  16.2*  --   --   --  11.1* 9.2 9.1   RBC  --  3.92*  --   --   --  5.00 4.71 4.62   HGB 11.4* 11.0* 10.7* 11.1*   < > 14.2 13.3 13.0*   HCT  --  33.8*  --   --   --  43.7 42.0 39.7*   MCV  --  86  --   --   --  87 89 86   MCH  --  28.1  --   --   --  28.4 28.2 28.1   MCHC  --  32.5  --   --   --  32.5 31.7 32.7   RDW  --  13.2  --   --   --  13.2 13.2 13.0   PLT  --  110*  --   --   --  175 166 164    < > = values in this interval not displayed.     INR  Recent Labs   Lab 01/19/22  1040   INR 1.57*     Arterial Blood Gas  Recent Labs   Lab 01/19/22  1047 01/19/22  1003 01/19/22  0941 01/19/22  0825   PH 7.32* 7.35  --  7.43   PCO2 47* 46*  --  35   PO2 96 297*  --  386*   HCO3 24 25  --  23   O2PER 60.0 80.0 80.0 100.0       All cultures:  No results for input(s): CULT in the last 168 hours.  No results found for this or any previous visit (from the past 24 hour(s)).           Lines, Drains, Airway     .  Peripheral IV 01/12/22 Anterior;Right (Active)   Site Assessment [facility] 01/19/22 0717   Line Status Infusing 01/19/22 0717   Dressing Intervention New dressing  01/19/22 0717   Phlebitis Scale 0-->no symptoms 01/19/22 0717   Infiltration Scale 0 01/19/22 0717   Infiltration Site Treatment Method  None 01/14/22 0000   Number of days: 7       Peripheral IV 01/13/22 Anterior;Left Upper forearm (Active)   Site Assessment [facility] 01/19/22 0400   Line Status Infusing 01/19/22 0400   Dressing Intervention Dressing  reinforced 01/17/22 2347   Phlebitis Scale 0-->no symptoms 01/19/22 0400   Infiltration Scale 0 01/19/22 0400   Number of days: 6       Peripheral IV 01/19/22 Right Lower forearm (Active)   Number of days: 0       Peripheral IV 01/19/22 Left Wrist (Active)   Number of days: 0       Right Radial Interventional Procedure Access (Active)   Site Assessment Ecchymotic 01/19/22 0400   Hemostasis management Unchanged 01/19/22 0400   Radial device volume remaining 0 mL 01/19/22 0400   CMS Right Arm WDL 01/19/22 0400   Radial Pulse - Right Arm Normal 01/19/22 0400   Number of days: 6       ETT Cuffed Single 8 mm (Active)   Number of days: 0       Chest Tube Mediastinal 32 Latvian (Active)   Number of days: 0       Chest Tube Pleural 32 Latvian (Active)   Number of days: 0       Negative Pressure Wound Therapy Sternum Midline (Active)   Wound Type Surgical 01/19/22 1100   Dressing Pieces Applied (# of Each Type) 1 01/19/22 1100   Cycle Continuous 01/19/22 1100   Target Pressure (mmHg) 125 01/19/22 1100   Number of days: 0       Urethral Catheter Latex;Temperature probe;Triple-lumen 16 fr (Active)   Catheter Care Done;Catheter wipes;Soap and water/perineal cleanser 01/19/22 0745   Number of days: 0       Incision/Surgical Site 01/19/22 Midline Sternum (Active)   Incision Assessment L 01/19/22 1056   Closure Approximated;Sutures 01/19/22 1056   Incision Care Therapy - negative pressure 01/19/22 1056   Dressing Intervention Clean, dry, intact;New dressing applied 01/19/22 1056   Number of days: 0

## 2022-01-20 ENCOUNTER — APPOINTMENT (OUTPATIENT)
Dept: OCCUPATIONAL THERAPY | Facility: CLINIC | Age: 67
End: 2022-01-20
Attending: STUDENT IN AN ORGANIZED HEALTH CARE EDUCATION/TRAINING PROGRAM
Payer: COMMERCIAL

## 2022-01-20 ENCOUNTER — APPOINTMENT (OUTPATIENT)
Dept: GENERAL RADIOLOGY | Facility: CLINIC | Age: 67
End: 2022-01-20
Attending: STUDENT IN AN ORGANIZED HEALTH CARE EDUCATION/TRAINING PROGRAM
Payer: COMMERCIAL

## 2022-01-20 LAB
ALBUMIN SERPL-MCNC: 3 G/DL (ref 3.4–5)
ALP SERPL-CCNC: 49 U/L (ref 40–150)
ALT SERPL W P-5'-P-CCNC: 21 U/L (ref 0–70)
ANION GAP SERPL CALCULATED.3IONS-SCNC: 4 MMOL/L (ref 3–14)
AST SERPL W P-5'-P-CCNC: 37 U/L (ref 0–45)
ATRIAL RATE - MUSE: 60 BPM
ATRIAL RATE - MUSE: 89 BPM
BILIRUB SERPL-MCNC: 0.9 MG/DL (ref 0.2–1.3)
BUN SERPL-MCNC: 14 MG/DL (ref 7–30)
CA-I BLD-MCNC: 4.6 MG/DL (ref 4.4–5.2)
CALCIUM SERPL-MCNC: 8.1 MG/DL (ref 8.5–10.1)
CHLORIDE BLD-SCNC: 108 MMOL/L (ref 94–109)
CO2 SERPL-SCNC: 25 MMOL/L (ref 20–32)
CREAT SERPL-MCNC: 0.99 MG/DL (ref 0.66–1.25)
DIASTOLIC BLOOD PRESSURE - MUSE: NORMAL MMHG
DIASTOLIC BLOOD PRESSURE - MUSE: NORMAL MMHG
ERYTHROCYTE [DISTWIDTH] IN BLOOD BY AUTOMATED COUNT: 13.5 % (ref 10–15)
GFR SERPL CREATININE-BSD FRML MDRD: 85 ML/MIN/1.73M2
GLUCOSE BLD-MCNC: 122 MG/DL (ref 70–99)
GLUCOSE BLDC GLUCOMTR-MCNC: 112 MG/DL (ref 70–99)
GLUCOSE BLDC GLUCOMTR-MCNC: 115 MG/DL (ref 70–99)
GLUCOSE BLDC GLUCOMTR-MCNC: 119 MG/DL (ref 70–99)
GLUCOSE BLDC GLUCOMTR-MCNC: 155 MG/DL (ref 70–99)
HCT VFR BLD AUTO: 32.8 % (ref 40–53)
HGB BLD-MCNC: 10.6 G/DL (ref 13.3–17.7)
INTERPRETATION ECG - MUSE: NORMAL
INTERPRETATION ECG - MUSE: NORMAL
MAGNESIUM SERPL-MCNC: 2.2 MG/DL (ref 1.6–2.3)
MCH RBC QN AUTO: 28.1 PG (ref 26.5–33)
MCHC RBC AUTO-ENTMCNC: 32.3 G/DL (ref 31.5–36.5)
MCV RBC AUTO: 87 FL (ref 78–100)
P AXIS - MUSE: 67 DEGREES
P AXIS - MUSE: NORMAL DEGREES
PHOSPHATE SERPL-MCNC: 3.5 MG/DL (ref 2.5–4.5)
PLATELET # BLD AUTO: 139 10E3/UL (ref 150–450)
POTASSIUM BLD-SCNC: 4.2 MMOL/L (ref 3.4–5.3)
PR INTERVAL - MUSE: 218 MS
PR INTERVAL - MUSE: NORMAL MS
PROT SERPL-MCNC: 6 G/DL (ref 6.8–8.8)
QRS DURATION - MUSE: 110 MS
QRS DURATION - MUSE: 92 MS
QT - MUSE: 392 MS
QT - MUSE: 498 MS
QTC - MUSE: 449 MS
QTC - MUSE: 498 MS
R AXIS - MUSE: 76 DEGREES
R AXIS - MUSE: 8 DEGREES
RBC # BLD AUTO: 3.77 10E6/UL (ref 4.4–5.9)
SODIUM SERPL-SCNC: 137 MMOL/L (ref 133–144)
SYSTOLIC BLOOD PRESSURE - MUSE: NORMAL MMHG
SYSTOLIC BLOOD PRESSURE - MUSE: NORMAL MMHG
T AXIS - MUSE: -12 DEGREES
T AXIS - MUSE: 35 DEGREES
VENTRICULAR RATE- MUSE: 60 BPM
VENTRICULAR RATE- MUSE: 79 BPM
WBC # BLD AUTO: 10.1 10E3/UL (ref 4–11)

## 2022-01-20 PROCEDURE — 99232 SBSQ HOSP IP/OBS MODERATE 35: CPT | Performed by: INTERNAL MEDICINE

## 2022-01-20 PROCEDURE — P9041 ALBUMIN (HUMAN),5%, 50ML: HCPCS | Performed by: SURGERY

## 2022-01-20 PROCEDURE — 99291 CRITICAL CARE FIRST HOUR: CPT | Mod: 24 | Performed by: INTERNAL MEDICINE

## 2022-01-20 PROCEDURE — P9041 ALBUMIN (HUMAN),5%, 50ML: HCPCS

## 2022-01-20 PROCEDURE — 97535 SELF CARE MNGMENT TRAINING: CPT | Mod: GO | Performed by: OCCUPATIONAL THERAPIST

## 2022-01-20 PROCEDURE — 250N000013 HC RX MED GY IP 250 OP 250 PS 637: Performed by: PHYSICIAN ASSISTANT

## 2022-01-20 PROCEDURE — 250N000013 HC RX MED GY IP 250 OP 250 PS 637: Performed by: STUDENT IN AN ORGANIZED HEALTH CARE EDUCATION/TRAINING PROGRAM

## 2022-01-20 PROCEDURE — 83735 ASSAY OF MAGNESIUM: CPT | Performed by: STUDENT IN AN ORGANIZED HEALTH CARE EDUCATION/TRAINING PROGRAM

## 2022-01-20 PROCEDURE — 258N000003 HC RX IP 258 OP 636: Performed by: STUDENT IN AN ORGANIZED HEALTH CARE EDUCATION/TRAINING PROGRAM

## 2022-01-20 PROCEDURE — 82040 ASSAY OF SERUM ALBUMIN: CPT | Performed by: STUDENT IN AN ORGANIZED HEALTH CARE EDUCATION/TRAINING PROGRAM

## 2022-01-20 PROCEDURE — 200N000001 HC R&B ICU

## 2022-01-20 PROCEDURE — 97530 THERAPEUTIC ACTIVITIES: CPT | Mod: GO | Performed by: OCCUPATIONAL THERAPIST

## 2022-01-20 PROCEDURE — 80053 COMPREHEN METABOLIC PANEL: CPT | Performed by: STUDENT IN AN ORGANIZED HEALTH CARE EDUCATION/TRAINING PROGRAM

## 2022-01-20 PROCEDURE — 97110 THERAPEUTIC EXERCISES: CPT | Mod: GO | Performed by: OCCUPATIONAL THERAPIST

## 2022-01-20 PROCEDURE — 250N000011 HC RX IP 250 OP 636: Performed by: STUDENT IN AN ORGANIZED HEALTH CARE EDUCATION/TRAINING PROGRAM

## 2022-01-20 PROCEDURE — 250N000011 HC RX IP 250 OP 636: Performed by: PHYSICIAN ASSISTANT

## 2022-01-20 PROCEDURE — 99222 1ST HOSP IP/OBS MODERATE 55: CPT | Performed by: INTERNAL MEDICINE

## 2022-01-20 PROCEDURE — 97167 OT EVAL HIGH COMPLEX 60 MIN: CPT | Mod: GO | Performed by: OCCUPATIONAL THERAPIST

## 2022-01-20 PROCEDURE — 250N000013 HC RX MED GY IP 250 OP 250 PS 637: Performed by: SURGERY

## 2022-01-20 PROCEDURE — 85027 COMPLETE CBC AUTOMATED: CPT | Performed by: STUDENT IN AN ORGANIZED HEALTH CARE EDUCATION/TRAINING PROGRAM

## 2022-01-20 PROCEDURE — 82330 ASSAY OF CALCIUM: CPT | Performed by: STUDENT IN AN ORGANIZED HEALTH CARE EDUCATION/TRAINING PROGRAM

## 2022-01-20 PROCEDURE — 84100 ASSAY OF PHOSPHORUS: CPT | Performed by: STUDENT IN AN ORGANIZED HEALTH CARE EDUCATION/TRAINING PROGRAM

## 2022-01-20 PROCEDURE — 258N000003 HC RX IP 258 OP 636: Performed by: SURGERY

## 2022-01-20 PROCEDURE — 250N000011 HC RX IP 250 OP 636: Performed by: SURGERY

## 2022-01-20 PROCEDURE — 71045 X-RAY EXAM CHEST 1 VIEW: CPT

## 2022-01-20 PROCEDURE — 250N000011 HC RX IP 250 OP 636

## 2022-01-20 RX ORDER — ALBUMIN, HUMAN INJ 5% 5 %
SOLUTION INTRAVENOUS
Status: COMPLETED
Start: 2022-01-20 | End: 2022-01-20

## 2022-01-20 RX ORDER — AMIODARONE HYDROCHLORIDE 200 MG/1
200 TABLET ORAL 2 TIMES DAILY
Status: DISCONTINUED | OUTPATIENT
Start: 2022-01-20 | End: 2022-01-22

## 2022-01-20 RX ORDER — ALBUMIN, HUMAN INJ 5% 5 %
250 SOLUTION INTRAVENOUS ONCE
Status: COMPLETED | OUTPATIENT
Start: 2022-01-20 | End: 2022-01-20

## 2022-01-20 RX ORDER — KETOROLAC TROMETHAMINE 15 MG/ML
15 INJECTION, SOLUTION INTRAMUSCULAR; INTRAVENOUS EVERY 6 HOURS PRN
Status: DISPENSED | OUTPATIENT
Start: 2022-01-20 | End: 2022-01-22

## 2022-01-20 RX ADMIN — AMIODARONE HYDROCHLORIDE 150 MG: 1.5 INJECTION, SOLUTION INTRAVENOUS at 07:43

## 2022-01-20 RX ADMIN — SENNOSIDES AND DOCUSATE SODIUM 1 TABLET: 50; 8.6 TABLET ORAL at 21:21

## 2022-01-20 RX ADMIN — AMIODARONE HYDROCHLORIDE 200 MG: 200 TABLET ORAL at 21:22

## 2022-01-20 RX ADMIN — POLYETHYLENE GLYCOL 3350 17 G: 17 POWDER, FOR SOLUTION ORAL at 09:15

## 2022-01-20 RX ADMIN — HYDROMORPHONE HYDROCHLORIDE 0.4 MG: 0.2 INJECTION, SOLUTION INTRAMUSCULAR; INTRAVENOUS; SUBCUTANEOUS at 04:17

## 2022-01-20 RX ADMIN — ALBUMIN (HUMAN) 250 ML: 12.5 INJECTION, SOLUTION INTRAVENOUS at 09:15

## 2022-01-20 RX ADMIN — OXYCODONE HYDROCHLORIDE 10 MG: 5 TABLET ORAL at 00:01

## 2022-01-20 RX ADMIN — HYDROMORPHONE HYDROCHLORIDE 0.4 MG: 0.2 INJECTION, SOLUTION INTRAMUSCULAR; INTRAVENOUS; SUBCUTANEOUS at 18:00

## 2022-01-20 RX ADMIN — KETOROLAC TROMETHAMINE 15 MG: 15 INJECTION, SOLUTION INTRAMUSCULAR; INTRAVENOUS at 09:16

## 2022-01-20 RX ADMIN — ACETAMINOPHEN 975 MG: 325 TABLET, FILM COATED ORAL at 06:09

## 2022-01-20 RX ADMIN — ACETAMINOPHEN 975 MG: 325 TABLET, FILM COATED ORAL at 14:37

## 2022-01-20 RX ADMIN — ALBUMIN (HUMAN) 25 G: 12.5 INJECTION, SOLUTION INTRAVENOUS at 02:29

## 2022-01-20 RX ADMIN — METHOCARBAMOL 500 MG: 500 TABLET ORAL at 06:09

## 2022-01-20 RX ADMIN — HYDROMORPHONE HYDROCHLORIDE 0.4 MG: 0.2 INJECTION, SOLUTION INTRAMUSCULAR; INTRAVENOUS; SUBCUTANEOUS at 20:46

## 2022-01-20 RX ADMIN — HYDROMORPHONE HYDROCHLORIDE 0.4 MG: 0.2 INJECTION, SOLUTION INTRAMUSCULAR; INTRAVENOUS; SUBCUTANEOUS at 10:44

## 2022-01-20 RX ADMIN — HYDROMORPHONE HYDROCHLORIDE 0.4 MG: 0.2 INJECTION, SOLUTION INTRAMUSCULAR; INTRAVENOUS; SUBCUTANEOUS at 12:17

## 2022-01-20 RX ADMIN — OXYCODONE HYDROCHLORIDE 10 MG: 5 TABLET ORAL at 21:21

## 2022-01-20 RX ADMIN — HEPARIN SODIUM 5000 UNITS: 5000 INJECTION INTRAVENOUS; SUBCUTANEOUS at 14:39

## 2022-01-20 RX ADMIN — METHOCARBAMOL 500 MG: 500 TABLET ORAL at 12:17

## 2022-01-20 RX ADMIN — OXYCODONE HYDROCHLORIDE 10 MG: 5 TABLET ORAL at 09:17

## 2022-01-20 RX ADMIN — PANTOPRAZOLE SODIUM 40 MG: 40 TABLET, DELAYED RELEASE ORAL at 09:16

## 2022-01-20 RX ADMIN — METHOCARBAMOL 500 MG: 500 TABLET ORAL at 19:41

## 2022-01-20 RX ADMIN — ASPIRIN 81 MG CHEWABLE TABLET 162 MG: 81 TABLET CHEWABLE at 09:15

## 2022-01-20 RX ADMIN — KETOROLAC TROMETHAMINE 15 MG: 15 INJECTION, SOLUTION INTRAMUSCULAR; INTRAVENOUS at 19:41

## 2022-01-20 RX ADMIN — HYDROMORPHONE HYDROCHLORIDE 0.4 MG: 0.2 INJECTION, SOLUTION INTRAMUSCULAR; INTRAVENOUS; SUBCUTANEOUS at 02:19

## 2022-01-20 RX ADMIN — ACETAMINOPHEN 975 MG: 325 TABLET, FILM COATED ORAL at 21:22

## 2022-01-20 RX ADMIN — AMIODARONE HYDROCHLORIDE 1 MG/MIN: 50 INJECTION, SOLUTION INTRAVENOUS at 07:58

## 2022-01-20 RX ADMIN — PHENYLEPHRINE HYDROCHLORIDE 0.4 MCG/KG/MIN: 10 INJECTION INTRAVENOUS at 06:06

## 2022-01-20 RX ADMIN — HEPARIN SODIUM 5000 UNITS: 5000 INJECTION INTRAVENOUS; SUBCUTANEOUS at 21:16

## 2022-01-20 RX ADMIN — AMIODARONE HYDROCHLORIDE 200 MG: 200 TABLET ORAL at 10:44

## 2022-01-20 RX ADMIN — CEFAZOLIN SODIUM 2 G: 2 INJECTION, SOLUTION INTRAVENOUS at 06:28

## 2022-01-20 RX ADMIN — HYDROMORPHONE HYDROCHLORIDE 0.4 MG: 0.2 INJECTION, SOLUTION INTRAMUSCULAR; INTRAVENOUS; SUBCUTANEOUS at 07:51

## 2022-01-20 RX ADMIN — ROSUVASTATIN CALCIUM 40 MG: 20 TABLET, FILM COATED ORAL at 09:17

## 2022-01-20 RX ADMIN — OXYCODONE HYDROCHLORIDE 10 MG: 5 TABLET ORAL at 03:58

## 2022-01-20 RX ADMIN — HYDROMORPHONE HYDROCHLORIDE 0.4 MG: 0.2 INJECTION, SOLUTION INTRAMUSCULAR; INTRAVENOUS; SUBCUTANEOUS at 14:47

## 2022-01-20 RX ADMIN — SENNOSIDES AND DOCUSATE SODIUM 1 TABLET: 50; 8.6 TABLET ORAL at 09:17

## 2022-01-20 RX ADMIN — METOPROLOL TARTRATE 12.5 MG: 25 TABLET, FILM COATED ORAL at 21:22

## 2022-01-20 RX ADMIN — OXYCODONE HYDROCHLORIDE 10 MG: 5 TABLET ORAL at 14:37

## 2022-01-20 ASSESSMENT — ACTIVITIES OF DAILY LIVING (ADL)
ADLS_ACUITY_SCORE: 7
ADLS_ACUITY_SCORE: 8
ADLS_ACUITY_SCORE: 8
ADLS_ACUITY_SCORE: 11
ADLS_ACUITY_SCORE: 8
ADLS_ACUITY_SCORE: 8
ADLS_ACUITY_SCORE: 7
ADLS_ACUITY_SCORE: 8
ADLS_ACUITY_SCORE: 7
ADLS_ACUITY_SCORE: 9
ADLS_ACUITY_SCORE: 7
ADLS_ACUITY_SCORE: 8
ADLS_ACUITY_SCORE: 7
ADLS_ACUITY_SCORE: 11
ADLS_ACUITY_SCORE: 8
ADLS_ACUITY_SCORE: 8
PREVIOUS_RESPONSIBILITIES: DRIVING;FINANCES;MEDICATION MANAGEMENT;SHOPPING;LAUNDRY;HOUSEKEEPING;MEAL PREP
ADLS_ACUITY_SCORE: 11
ADLS_ACUITY_SCORE: 8
IADL_COMMENTS: PT IS RETIRED
ADLS_ACUITY_SCORE: 9

## 2022-01-20 NOTE — PROGRESS NOTES
01/20/22 0841   Quick Adds   Type of Visit Initial Occupational Therapy Evaluation   Living Environment   People in home spouse   Current Living Arrangements   (town house)   Home Accessibility stairs to enter home   Number of Stairs, Main Entrance 2   Transportation Anticipated family or friend will provide   Self-Care   Regular Exercise Yes  (did go to OP CR, walk)   Equipment Currently Used at Home cane, straight;grab bar, toilet;grab bar, tub/shower;raised toilet seat;shower chair   Activity/Exercise/Self-Care Comment walk in shower, reports has alot of back pain, thats why he uses a cane, has a hospital bed   Instrumental Activities of Daily Living (IADL)   Previous Responsibilities driving;finances;medication management;shopping;laundry;housekeeping;meal prep   IADL Comments pt is retired   Disability/Function   Change in Functional Status Since Onset of Current Illness/Injury yes   General Information   Onset of Illness/Injury or Date of Surgery 01/19/22   Referring Physician Oliver Garcia MD   Patient/Family Therapy Goal Statement (OT) home   Additional Occupational Profile Info/Pertinent History of Current Problem CORONARY ARTERY BYPASS GRAFT (CABG) X1; LIMA -LAD.  PULMONARY VEIN ISOLATION, AND ATRIAL APPENDAGE CLIPPING USING 45MM ACTICURE CLIP, hx of CAD s/p PCI, chronic a-fib on chronic anticoagulation with apixaban, CHF, HTN, and chronic back pain s/p indwelling pain pump    Existing Precautions/Restrictions fall;cardiac;oxygen therapy device and L/min;sternal  (wound vac, CT and catheter, 6 L O2 NC)   Heart Disease Risk Factors High blood pressure;Overweight;Medical history;Gender;Age   Cognitive Status Examination   Orientation Status orientation to person, place and time   Affect/Mental Status (Cognitive) WNL   Follows Commands WNL   Sensory   Sensory Quick Adds No deficits were identified   Pain Assessment   Patient Currently in Pain   (5/10 sternal pain. )   Range of Motion Comprehensive    Comment, General Range of Motion R shoulder reports limitations due to past shoulder surgeries, AAROM WFL, L shoulder AROM WFL   Strength Comprehensive (MMT)   Comment, General Manual Muscle Testing (MMT) Assessment B UE strength NT due to precautions.    Bed Mobility   Rolling Right [place] (Bed Mobility) moderate assist (50% patient effort);2 person assist   Supine-Sit [place] (Bed Mobility) moderate assist (50% patient effort);2 person assist   Transfer Skill: Bed to Chair/Chair to Bed   Bed-Chair [place] (Transfers) moderate assist (50% patient effort);2 person assist   Transfer Comments pt in hip flexion position, reports this is baseline.    Sit-Stand Transfer   Sit-Stand [place] (Transfers) moderate assist (50% patient effort);2 person assist   Lower Body Dressing Assessment   [place] Level (Lower Body Dressing) maximum assist (25% patient effort)   Clinical Impression   Criteria for Skilled Therapeutic Interventions Met (OT) yes   OT Diagnosis impaired I with ADL's and functional mobiltiy   OT Problem List-Impairments impacting ADL problems related to;activity tolerance impaired;balance;strength;pain;post-surgical precautions   Assessment of Occupational Performance 5 or more Performance Deficits   Identified Performance Deficits impaired I with dressing, toilet and shwoer transfer, driving, shopping, etc   Planned Therapy Interventions (OT) ADL retraining;transfer training;home program guidelines;progressive activity/exercise;risk factor education   Clinical Decision Making Complexity (OT) high complexity   Therapy Frequency (OT) 2x/day   Predicted Duration of Therapy 5 days   Risk & Benefits of therapy have been explained evaluation/treatment results reviewed;care plan/treatment goals reviewed;risks/benefits reviewed;current/potential barriers reviewed;participants voiced agreement with care plan;participants included;patient   OT Discharge Planning    OT Discharge Recommendation  (DC Rec) Transitional Care Facility;Home with assist;home with home care occupational therapy   OT Rationale for DC Rec pt limited due to baseline chronic back pain, impaired balance, strength, pain, sternal precautions and at this time recommend TCU for daily therapy to increase ADL and functional mobiltiy to PLOF, however, pt declines TCU and would like to return home, reports his wife is able to A, at this time would require 24 hr care and A with all ADL/IADL's and functional mobiltiy and home OT and PT.    OT Brief overview of current status  supine to sit with mod A x 2, sit <> stand and transfer to chair with MOD A x 2 and cues for tech. max A for LE ADL's.

## 2022-01-20 NOTE — PROGRESS NOTES
Patient seen and care plan discussed with Dr. Dumont      [facility]  Cardiovascular and Thoracic Surgery Daily Note          Assessment and Plan:   POD#1 s/p Coronary artery bypass grafting x1 with left internal mammary artery to the left anterior descending, bilateral pulmonary vein isolation with the AtriCure bipolar radiofrequency ablation device Synergy, exclusion of left atrial appendage, intraoperative transesophageal echocardiography by Dr. William Dumont  -CVS: HR: 80s-100s. SBP: 80s-110s. Remains on Jose- weaning as able. Telemetry with NSR, frequent PVCs often bigeminal with short nonsustained runs of VT- EP consulted. Pre op EF: 55-60%. ASA, BB on hold due pressor need- will resume when able, statin. Per EP continue amiodarone 200mg BID for one month and will need anticoagulation for paroxysmal AF prior to discharge. Pacing wires capped. Chest tubes to remain in today. Wound vac in place.   -Resp: Extubated within protocol. Saturating well on 6L. Wean oxygen as able. Continue to encourage IS, cough, deep breathing, ambulation.   -Neuro:  Grossly intact. Pain controlled.   -Renal: good UO, up about 5kg from preoperative weight. Will hold diuretics today d/t pressor need.  Creatinine   Date Value Ref Range Status   2022 0.99 0.66 - 1.25 mg/dL Final   ]  -GI:  Tolerating diet. No BM. Continue bowel regimen  -:  Bran in place, d/t limited mobility and need for accurate I&Os  -Endo: pre op A1c: 5.4. minimal insulin infusion requirements. Will transition to sliding scale insulin.   -FEN: replete lytes as needed, ADAT. Na: 137. K: 4.2  Orders Placed This Encounter      Low Saturated Fat Na <2400 mg    -ID: Temp (24hrs), Av.1  F (36.7  C), Min:95.9  F (35.5  C), Max:100.6  F (38.1  C)   Completing perioperative abx  WBC Count   Date Value Ref Range Status   2022 10.1 4.0 - 11.0 10e3/uL Final   ]  -Heme: plt: 139. Acute blood loss anemia and thrombocytopenia related to  surgery  Hemoglobin   Date Value Ref Range Status   01/20/2022 10.6 (L) 13.3 - 17.7 g/dL Final   ],   -Proph: PCD, ASA, statin, PPI, sub q heparin  -Dispo: Continue ICU cares. Continue to wean pressors. Initiate therapies. Continue to encourage IS, cough, deep breathing, ambulation.           Interval History:   Arrhythmia overnight. Extubated within protocol. Saturating well on 6L. Remains on armen. Pain controlled. Tolerating diet no BM.         Medications:       acetaminophen  975 mg Oral Q8H     albumin human  250 mL Intravenous Once     amiodarone  150 mg Intravenous Once     [Held by provider] amiodarone  400 mg Oral BID     aspirin  162 mg Oral or NG Tube Daily     gabapentin  100 mg Oral At Bedtime     heparin ANTICOAGULANT  5,000 Units Subcutaneous Q8H     pantoprazole  40 mg Oral or NG Tube Daily    Or     pantoprazole  40 mg Oral Daily     polyethylene glycol  17 g Oral Daily     rosuvastatin  40 mg Oral Daily     senna-docusate  1 tablet Oral BID     sodium chloride (PF)  3 mL Intracatheter Q8H     [START ON 1/22/2022] acetaminophen, bisacodyl, calcium gluconate, calcium gluconate, calcium gluconate, glucose **OR** dextrose **OR** glucagon, EPINEPHrine, hydrALAZINE, HYDROmorphone, ketorolac, lidocaine 4%, lidocaine (buffered or not buffered), magnesium hydroxide, methocarbamol, naloxone **OR** naloxone **OR** naloxone **OR** naloxone, nitroGLYcerin, ondansetron **OR** ondansetron, oxyCODONE, phenylephrine, prochlorperazine **OR** prochlorperazine          Physical Exam:   Vitals were reviewed  Blood pressure 98/79, pulse 91, temperature 100.4  F (38  C), temperature source Pulmonary Artery, resp. rate (!) 40, weight 143.6 kg (316 lb 9.3 oz), SpO2 94 %.  Rhythm: NSR with frequent PVCs, occasional Vtach    Lungs: diminished bases    Cardiovascular: RRR normal s1 and s2    Abdomen: soft NTND    Extremeties: minimal edema    Incision: wound in place    CT: to suction    Weight:   Vitals:    01/16/22 0522  01/17/22 0503 01/18/22 0600 01/19/22 0600   Weight: 140.2 kg (309 lb 1.6 oz) 139.2 kg (306 lb 14.4 oz) 138.5 kg (305 lb 6.4 oz) 138.2 kg (304 lb 9.6 oz)    01/20/22 0428   Weight: 143.6 kg (316 lb 9.3 oz)            Data:   Labs:   Lab Results   Component Value Date    WBC 10.1 01/20/2022     Lab Results   Component Value Date    RBC 3.77 01/20/2022     Lab Results   Component Value Date    HGB 10.6 01/20/2022     Lab Results   Component Value Date    HCT 32.8 01/20/2022     No components found for: MCT  Lab Results   Component Value Date    MCV 87 01/20/2022     Lab Results   Component Value Date    MCH 28.1 01/20/2022     Lab Results   Component Value Date    MCHC 32.3 01/20/2022     Lab Results   Component Value Date    RDW 13.5 01/20/2022     Lab Results   Component Value Date     01/20/2022       Last Basic Metabolic Panel:  Lab Results   Component Value Date     01/20/2022      Lab Results   Component Value Date    POTASSIUM 4.2 01/20/2022     Lab Results   Component Value Date    CHLORIDE 108 01/20/2022     Lab Results   Component Value Date    RATNA 8.1 01/20/2022     Lab Results   Component Value Date    CO2 25 01/20/2022     Lab Results   Component Value Date    BUN 14 01/20/2022     Lab Results   Component Value Date    CR 0.99 01/20/2022     Lab Results   Component Value Date     01/20/2022     01/20/2022       CXR: 1/20/22    IMPRESSION: Patient has been extubated and the enteric tube removed. Right internal jugular venous [place]-L[place]pez catheter remains with tip in the right main pulmonary artery. Left pleural drain unchanged in position. Poststernotomy. Stable cardiac   silhouette. Small left pleural effusion and bibasilar atelectasis. No pneumothorax.    Tomasa Harrington PA-C  CV Surgery  Pager #[phone]

## 2022-01-20 NOTE — PROGRESS NOTES
Reviewed with linda Alford seen and examined.  Post CABG day 1. Extubated and alert.  Consulted for frequent PVCs and nonsustained VT. Not apparently symptomatic. On low dose iv pressor.  Previous MI with recent EF 40-45%. History of paroxysmal AF.  History of GERD, gastric bypass, MARLEEN, kidney stone, type II diabetes.    Recommend po amiodarone 200 mg daily for 1 month.  Ok to stop iv amiodarone unless VT causes symptoms or becomes sustained.  Timing of iv pressor discontinuation up to the intensive care team.  Anticoagulation for paroxysmal AF can be addressed by the surgical team. Not urgent at this time.  Will follow.

## 2022-01-20 NOTE — PLAN OF CARE
Pt arrived to ICU at 1130. Tried to wean sedation around 1400 but patient was very agitated. Turned pt to his side due to his hx of back pain. Gave pain meds numerous times. Weaned sedation again at 1600. Pt cooperative and able to wean on vent. Extubated at 1740. Jose turned off after extubation. Small chest tube leak - CV surgery is aware. Good urine output with minimal chest tube output. VSS. Wife updated via phone.

## 2022-01-20 NOTE — PROGRESS NOTES
[facility]    Medicine Progress Note - Hospitalist Service       Date of Admission:  1/12/2022  Assessment & Plan   66 year old male with hx of CAD s/p PCI, chronic a-fib on chronic anticoagulation with apixaban, CHF, HTN, and chronic back pain s/p indwelling pain pump who was admitted on 1/12/2022 for NSTEMI. He was found to have severe in-stent re-stenosis of mid-LAD and severe stenosis distal to previously stents, and underwent CABG today:    Principal Problem:  Principal Problem:    NSTEMI w Unstab Angina -- S/P CABG x 1 (LIMA to LAD) on 1/19/22    -- POD #1, doing well       Chronic diastolic heart failure (H)      Paroxysmal atrial fib -- S/P Pulm Vein Ablation 1/19/22      Anemia, acute blood loss    Active Problems:    Morbid obesity -- BMI 42.0       MARLEEN (doesn't tolerate CPAP)        Essential hypertension      Chronic Back Pain (IT Pump w Morphine, Clonidine, Baclofen)     Chronic combined systolic and diastolic CHF  TTE findings noted above  - Continues on PTA Lasix    Chronic atrial fibrillation -- remains in Afib on monitor  - Rate-controlled on PTA metoprolol, on Amiodarone now as well  - on Heparin 5000 subcutaneous tid     GERD  - Continues on PTA omeprazole    Chronic low back pain  [PTA: morphine per indwelling pain pump, nabumetone 500 mg BID, duloxetine 30 mg qAM, 60 mg qhs; APAP prn; baclofen bid prn]  - Hold PTA nabumetone in setting of NSTEMI  - Continues on other PTA meds with pain pump in place    Resolved Tongue swelling  -- stopped Decadron and no problems      Diet: Low Saturated Fat Na <2400 mg    DVT Prophylaxis: IV heparin  Bran Catheter: PRESENT, indication: Strict 1-2 Hour I&O  Code Status: Full Code         Disposition: Continue postop care --  probably home on Monday,1/25/22    The patient's care was discussed with the Patient.    Ej Keane MD  Hospitalist Service  [facility]  Contact information available via  [facility] Paging/Directory    ______________________________________________________________________    Interval History   Intubated.     Physical Exam   Vital Signs: Temp: 99  F (37.2  C) Temp src: Oral BP: 102/70 Pulse: 91   Resp: 11 SpO2: 94 % O2 Device: Nasal cannula Oxygen Delivery: 6 LPM  Weight: 316 lbs 9.29 oz  Constitutional: Appears comfortable  Respiratory: Breathing non-labored. Lungs CTAB - no wheezes, crackles, or rhonchi  Cardiovascular: Heart irregular rhythm, trace bilateral ankle edema  GI: +BS, abd obese but soft/NT  Musculoskeletal: trace ankle edema bilaterally  Neuro: Alert and appropriate, no focal deficits    Data   Recent Labs   Lab 01/20/22  0407 01/20/22  0404 01/20/22  0012 01/19/22  1244 01/19/22  1235 01/19/22  1047 01/19/22  1040   WBC  --  10.1  --   --  17.4*  --  16.2*   HGB  --  10.6*  --   --  12.0* 11.4* 11.0*   MCV  --  87  --   --  88  --  86   PLT  --  139*  --   --  160  --  110*   INR  --   --   --   --  1.31*  --  1.57*   NA  --  137  --   --  138 139 139   POTASSIUM  --  4.2  --   --  4.4  4.4 4.1 4.1   CHLORIDE  --  108  --   --  110*  --  109   CO2  --  25  --   --  23  --  24   BUN  --  14  --   --  14  --  16   CR  --  0.99  --   --  0.93  --  0.94   ANIONGAP  --  4  --   --  5  --  6   RATNA  --  8.1*  --   --  8.3*  --  8.9   * 122* 119*   < > 126* 147* 149*   ALBUMIN  --  3.0*  --   --  2.5*  --   --    PROTTOTAL  --  6.0*  --   --  5.9*  --   --    BILITOTAL  --  0.9  --   --  0.8  --   --    ALKPHOS  --  49  --   --  56  --   --    ALT  --  21  --   --  25  --   --    AST  --  37  --   --  26  --   --     < > = values in this interval not displayed.         Recent Results (from the past 24 hour(s))   XR Chest Port 1 View    Narrative    CHEST ONE VIEW PORTABLE   1/19/2022 1:01 PM     HISTORY: Postop cardiovascular thoracic surgery.    COMPARISON: Chest CT on 1/14/2022      Impression    IMPRESSION: Single AP view of the chest was obtained. Endotracheal  tube tip  projects over lower thoracic trachea approximately 3 cm from  the lorri. Right IJ [place]-López catheter tip projects over main  pulmonary artery. Chest tube/mediastinal drain also noted. Enteric  tube distal portion is not well-seen, better seen on same day  abdominal x-ray. Stable cardiomediastinal silhouette. Bibasilar  pulmonary opacities, likely atelectasis. No significant pleural  effusion or pneumothorax.    CHUYITA ACOSTA MD         SYSTEM ID:  [id number]   XR Abdomen Port 1 View    Narrative    ABDOMEN PORTABLE ONE VIEW   1/19/2022 1:10 PM     HISTORY: OG placement.    COMPARISON: Chest x-ray on same day earlier.      Impression    IMPRESSION: Single supine view of the abdomen was obtained. Enteric  tube tip projects over proximal stomach and sideholes project over  distal esophagus, recommend advancement by at least 5 cm. An IVC  filter is noted.       CHUYITA ACOSTA MD         SYSTEM ID:  [id number]   XR Chest Port 1 View    Narrative    EXAM: XR CHEST PORT 1 VIEW  LOCATION: [facility]  DATE/TIME: 1/20/2022 4:19 AM    INDICATION: Post Op CVTS Surgery  COMPARISON: Yesterday.      Impression    IMPRESSION: Patient has been extubated and the enteric tube removed. Right internal jugular venous [place]-López catheter remains with tip in the right main pulmonary artery. Left pleural drain unchanged in position. Poststernotomy. Stable cardiac   silhouette. Small left pleural effusion and bibasilar atelectasis. No pneumothorax.       Medications     dexmedetomidine       dextrose 5% and 0.45% NaCl + KCl 20 mEq/L 30 mL/hr at 01/20/22 0800     EPINEPHrine       insulin regular       nitroGLYcerin Stopped (01/19/22 2051)     phenylephrine 0.3 mcg/kg/min (01/20/22 1045)       acetaminophen  975 mg Oral Q8H     amiodarone  200 mg Oral BID     aspirin  162 mg Oral or NG Tube Daily     gabapentin  100 mg Oral At Bedtime     heparin ANTICOAGULANT  5,000 Units Subcutaneous Q8H     pantoprazole   40 mg Oral or NG Tube Daily    Or     pantoprazole  40 mg Oral Daily     polyethylene glycol  17 g Oral Daily     rosuvastatin  40 mg Oral Daily     senna-docusate  1 tablet Oral BID     sodium chloride (PF)  3 mL Intracatheter Q8H

## 2022-01-20 NOTE — PROVIDER NOTIFICATION
Pt continues to have bigeminal PVC's with runs of V tac.  Call out to Dr. Garcia for further orders.  Dr. Garcia arrived to unit and discussed current hemodynamics and bedside ECG readings.

## 2022-01-20 NOTE — PLAN OF CARE
Pt A&O.  Pain located primarily in Back(Chronic), side lying only acceptable position but was able to tolerate back sitting upright for a.m. CXR but required 10mg Oxy and . 4 dilaudid to complete.  O2 6L Oxy mask w/ difficulty getting consistent Sat levels t/o shift.  HR 70-90's but freq PVC's to Bigeminal/Trigeminal PVC's with rare runs of V tac self limiting.  Pt hypertensive at start of shift then hypotensive requiring Phenylephrine drip.  Jose now at . 4 mcg/kg/min.  A total of 500ml 5% Albumin given for CVP's of 5. Pt dangled and OOB to chair with  SBA of 2.  I.S. to 750.  Making adequate urine.  Spouse updated via phone and also spoke with pt late evening.

## 2022-01-20 NOTE — PROGRESS NOTES
[facility]   CRITICAL CARE NOTE     Onur Brar MRN: [id number]  1955  Date of Admission:1/12/2022          Problem List:   1. CABx1 POd#1  2. MARLEEN  3. Afib     24-Hour Goals   1. Wean pressors  2. Keep in ICU given ectopy      Overnight Events   No issues overnight. Extubated 1/19       Lines/Tubes/Draines/Devices   .  Peripheral IV 01/12/22 Anterior;Right (Active)   Site Assessment WDL 01/20/22 0400   Line Status Saline locked 01/20/22 0400   Dressing Intervention New dressing  01/19/22 0717   Phlebitis Scale 0-->no symptoms 01/20/22 0400   Infiltration Scale 0 01/20/22 0400   Infiltration Site Treatment Method  None 01/14/22 0000   Number of days: 8       Peripheral IV 01/13/22 Anterior;Left Upper forearm (Active)   Site Assessment WDL 01/20/22 0400   Line Status Saline locked 01/20/22 0400   Dressing Intervention Dressing reinforced 01/17/22 2347   Phlebitis Scale 0-->no symptoms 01/20/22 0400   Infiltration Scale 0 01/20/22 0400   Number of days: 7       Peripheral IV 01/19/22 Right Lower forearm (Active)   Site Assessment WDL 01/20/22 0400   Line Status Saline locked 01/20/22 0400   Phlebitis Scale 0-->no symptoms 01/20/22 0400   Infiltration Scale 0 01/20/22 0400   Number of days: 1       Peripheral IV 01/19/22 Left Wrist (Active)   Site Assessment WDL 01/20/22 0400   Line Status Saline locked 01/20/22 0400   Phlebitis Scale 0-->no symptoms 01/20/22 0400   Infiltration Scale 0 01/20/22 0400   Number of days: 1       Introducer 01/19/22 Internal jugular Right (Active)   Specific Qualities Sullivan in place 01/19/22 2000   (Retired) Dressing Status Clean, dry, intact 01/20/22 0400   Dressing Intervention New dressing 01/20/22 0000   Number of days: 1       Arterial Line 01/19/22 (Active)   Site Assessment WDL Except;Positional 01/20/22 0400   Art Line Waveform Dampened;Square wave test performed 01/20/22 0400   Line Necessity Yes, meets criteria 01/20/22 0400   Dressing Type Transparent  01/20/22 0400   Dressing Status Clean, dry, intact 01/20/22 0000   Number of days: 1       Right Radial Interventional Procedure Access (Active)   Site Assessment Ecchymotic 01/19/22 2000   Hemostasis management Unchanged 01/19/22 1200   Radial device volume remaining 0 mL 01/19/22 0400   CMS Right Arm WDL 01/19/22 2000   Radial Pulse - Right Arm Normal 01/19/22 1200   Number of days: 7       Pulmonary Artery Catheter - Single Lumen 01/19/22 0900 Right internal jugular vein (Active)   Dressing other (see comments) 01/19/22 2000   Securement secured with sutures 01/20/22 0400   PA Catheter Markings (cm) 55 01/20/22 0400   Phlebitis 0-->no symptoms 01/20/22 0400   Infiltration 0-->no symptoms 01/20/22 0400   Waveform normal 01/19/22 2000   Number of days: 1       Chest Tube Mediastinal 32 Korean (Active)   Site Assessment WDL 01/20/22 0400   Suction -20 cm H2O 01/20/22 0400   Chest Tube Airleak Yes 01/20/22 0400   Drainage Description Serosanguinous 01/20/22 0400   Dressing Status Normal: Clean, Dry & Intact 01/20/22 0400   Patency Intervention Tip/Tilt 01/20/22 0400   Chest Tube Clamps at Bedside present 01/20/22 0400   Container Amount 670 01/20/22 0600   Output (ml) 70 ml 01/20/22 0600   Number of days: 1       Chest Tube Pleural 32 Korean (Active)   Site Assessment WDL 01/20/22 0400   Suction -20 cm H2O 01/20/22 0400   Chest Tube Airleak No 01/20/22 0400   Drainage Description Sanguinous 01/20/22 0400   Dressing Status Normal: Clean, Dry & Intact 01/20/22 0400   Number of days: 1       Negative Pressure Wound Therapy Sternum Midline (Active)   Wound Type Surgical 01/20/22 0400   Dressing Pieces Applied (# of Each Type) 1;Black foam 01/19/22 2000   Cycle Continuous;On 01/20/22 0400   Target Pressure (mmHg) 125 01/20/22 0400   Number of days: 1       Urethral Catheter Latex;Temperature probe;Triple-lumen 16 fr (Active)   Tube Description Positional 01/19/22 1600   Catheter Care Done;Catheter wipes 01/19/22 1200    Collection Container Standard 01/20/22 0400   Securement Method Securing device (Describe) 01/20/22 0400   Rationale for Continued Use Strict 1-2 Hour I&O 01/20/22 0400   Urine Output 75 mL 01/20/22 0600   Number of days: 1       Incision/Surgical Site 01/19/22 Midline Sternum (Active)   Incision Assessment UTV;WDL except 01/20/22 0400   Ml-Incision Assessment UTV 01/19/22 2000   Closure Other (Comment) 01/19/22 1800   Incision Drainage Amount None 01/20/22 0400   Incision Care Therapy - negative pressure 01/20/22 0400   Dressing Intervention Clean, dry, intact 01/20/22 0400   Number of days: 1          ICU Prophylaxis:   1. DVT: mechanical  2. VAP: HOB 30 degrees, chlorhexidine rinse  3. Stress Ulcer: famotidine  4. Restraints: Nonviolent soft two point restraints required and necessary for patient safety and continued cares and good effect as patient continues to pull at necessary lines, tubes despite education and distraction. Will readdress daily.   5. IV Access - central access required and necessary for continued patient cares  6. Feeding - NPO      Medications:       acetaminophen  975 mg Oral Q8H     albumin human  250 mL Intravenous Once     amiodarone  150 mg Intravenous Once     amiodarone  400 mg Oral BID     aspirin  162 mg Oral or NG Tube Daily     gabapentin  100 mg Oral At Bedtime     heparin ANTICOAGULANT  5,000 Units Subcutaneous Q8H     pantoprazole  40 mg Oral or NG Tube Daily    Or     pantoprazole  40 mg Oral Daily     polyethylene glycol  17 g Oral Daily     rosuvastatin  40 mg Oral Daily     senna-docusate  1 tablet Oral BID     sodium chloride (PF)  3 mL Intracatheter Q8H         Review of Systems:   Unable to obtain due to critical illness          Physical Exam:   Temp:  [95.9  F (35.5  C)-100.6  F (38.1  C)] 100.4  F (38  C)  Pulse:  [] 91  Resp:  [6-40] 40  BP: ()/(25-85) 98/79  MAP:  [60 mmHg-119 mmHg] 86 mmHg  Arterial Line BP: ()/() 99/73  FiO2 (%):  [3  %-60 %] 3 %  SpO2:  [77 %-100 %] 94 %    Intake/Output Summary (Last 24 hours) at 1/20/2022 0726  Last data filed at 1/20/2022 0700  Gross per 24 hour   Intake 5010.81 ml   Output 2575 ml   Net 2435.81 ml     Wt Readings from Last 4 Encounters:   01/20/22 143.6 kg (316 lb 9.3 oz)   01/04/22 138 kg (304 lb 5 oz)   11/15/21 140.2 kg (309 lb)   11/05/21 140.6 kg (310 lb)     Arterial Line BP: ()/() 99/73  MAP:  [60 mmHg-119 mmHg] 86 mmHg  BP - Mean:  [] 88  CVP:  [0 mmHg-24 mmHg] 5 mmHg  SVO2:  [67 %-75 %] 75 %  Ventilation Mode: CPAP/PS  (Continuous positive airway pressure with Pressure Support)  FiO2 (%): 3 %  Rate Set (breaths/minute): 16 breaths/min  Tidal Volume Set (mL): 470 mL  PEEP (cm H2O): 8 cmH2O  Pressure Support (cm H2O): 6 cmH2O  Oxygen Concentration (%): 40 %  Resp: (!) 40    Recent Labs   Lab 01/19/22  1237 01/19/22  1047 01/19/22  1003 01/19/22  0941 01/19/22  0825   PH 7.30* 7.32* 7.35  --  7.43   PCO2 48* 47* 46*  --  35   PO2 225* 96 297*  --  386*   HCO3 23 24 25  --  23   O2PER 60 60.0 80.0 80.0 100.0       GEN: no acute distress   HEENT: head ncat, sclera anicteric, OP patent, trachea midline   PULM: clear anteriorly . Chest tubes c/d/i  CV/COR: RRR S1S2 no gallop,  No rub, no murmur  ABD: soft nontender, hypoactive bowel sounds, no mass  EXT:  Edema   warm  NEURO: grossly intact  SKIN: no obvious rash      Assessment and plan :     Neurology/Psychiatry/Pain/Sedation:   1. Chronic back pain. Pump still active. Will use precedex to help with pain control      Cardiovascular/Hemodynamics/Pulm/Vent/Renal/GI/ID/Endo:   1. Mechanically ventilated and POD#0 CABx1. Extubated 1/19. Wean SHONA to MAP>60. Having frequent PVC's/bigeminy. Amiodarone being given today. Agree with EP consult. UOP non-oliguric   2. Afib. Hold eliquis for now. Metoprolol on hold.  3. CAD s/p PCI. Hold plavix for now. Cont statin, ASA and BB   4. ICU glucose protocol      Disposition/Code Status/Other  1.  Critically ill   2. Code: Full     I have personally reviewed the daily labs, imaging studies, cultures and discussed the case with referring physician and consulting physicians.     This patient is critically ill and I have provided 30 minutes of critical care time (excluding procedures) on January 20, 2022.     Gerardo Barnett MD, A   of Medicine  Interventional Pulmonary  Department of Pulmonary, Allergy, Critical Care and Sleep Medicine   [facility], [place]  Pager: [phone]   Office: [phone]      Data:   All data and imaging reviewed     ROUTINE ICU LABS (Last four results)  CMP  Recent Labs   Lab 01/20/22  0407 01/20/22  0404 01/20/22  0012 01/19/22 2005 01/19/22  1244 01/19/22  1235 01/19/22  1047 01/19/22  1040 01/19/22  0422 01/18/22  0645 01/17/22  1123   NA  --  137  --   --   --  138 139 139   < > 137  --    POTASSIUM  --  4.2  --   --   --  4.4  4.4 4.1 4.1   < > 3.5  --    CHLORIDE  --  108  --   --   --  110*  --  109  --  108  --    CO2  --  25  --   --   --  23  --  24  --  23  --    ANIONGAP  --  4  --   --   --  5  --  6  --  6  --    * 122* 119* 132*   < > 126* 147* 149*   < > 106*  --    BUN  --  14  --   --   --  14  --  16  --  14  --    CR  --  0.99  --   --   --  0.93  --  0.94  --  0.89  --    GFRESTIMATED  --  85  --   --   --  >90  --  90  --  >90  --    RATNA  --  8.1*  --   --   --  8.3*  --  8.9  --  8.9  --    MAG  --  2.2  --   --   --  2.4*  --   --   --   --   --    PHOS  --  3.5  --   --   --  4.2  --   --   --   --   --    PROTTOTAL  --  6.0*  --   --   --  5.9*  --   --   --   --  7.2   ALBUMIN  --  3.0*  --   --   --  2.5*  --   --   --   --  3.1*   BILITOTAL  --  0.9  --   --   --  0.8  --   --   --   --  1.0   ALKPHOS  --  49  --   --   --  56  --   --   --   --  70   AST  --  37  --   --   --  26  --   --   --   --  22   ALT  --  21  --   --   --  25  --   --   --   --  38    < > = values in this interval not displayed.      CBC  Recent Labs   Lab 01/20/22  0404 01/19/22  1235 01/19/22  1047 01/19/22  1040 01/19/22  0825 01/16/22  0621   WBC 10.1 17.4*  --  16.2*  --  11.1*   RBC 3.77* 4.23*  --  3.92*  --  5.00   HGB 10.6* 12.0* 11.4* 11.0*   < > 14.2   HCT 32.8* 37.1*  --  33.8*  --  43.7   MCV 87 88  --  86  --  87   MCH 28.1 28.4  --  28.1  --  28.4   MCHC 32.3 32.3  --  32.5  --  32.5   RDW 13.5 13.2  --  13.2  --  13.2   * 160  --  110*  --  175    < > = values in this interval not displayed.     INR  Recent Labs   Lab 01/19/22  1235 01/19/22  1040   INR 1.31* 1.57*     Arterial Blood Gas  Recent Labs   Lab 01/19/22  1237 01/19/22  1047 01/19/22  1003 01/19/22  0941 01/19/22  0825   PH 7.30* 7.32* 7.35  --  7.43   PCO2 48* 47* 46*  --  35   PO2 225* 96 297*  --  386*   HCO3 23 24 25  --  23   O2PER 60 60.0 80.0 80.0 100.0       All cultures:  No results for input(s): CULT in the last 168 hours.  Recent Results (from the past 24 hour(s))   XR Chest Port 1 View    Narrative    CHEST ONE VIEW PORTABLE   1/19/2022 1:01 PM     HISTORY: Postop cardiovascular thoracic surgery.    COMPARISON: Chest CT on 1/14/2022      Impression    IMPRESSION: Single AP view of the chest was obtained. Endotracheal  tube tip projects over lower thoracic trachea approximately 3 cm from  the lorri. Right IJ Washington-López catheter tip projects over main  pulmonary artery. Chest tube/mediastinal drain also noted. Enteric  tube distal portion is not well-seen, better seen on same day  abdominal x-ray. Stable cardiomediastinal silhouette. Bibasilar  pulmonary opacities, likely atelectasis. No significant pleural  effusion or pneumothorax.    AHMAD AL-SAMARAEE, MD         SYSTEM ID:  [id number]   XR Abdomen Port 1 View    Narrative    ABDOMEN PORTABLE ONE VIEW   1/19/2022 1:10 PM     HISTORY: OG placement.    COMPARISON: Chest x-ray on same day earlier.      Impression    IMPRESSION: Single supine view of the abdomen was obtained. Enteric  tube tip  projects over proximal stomach and sideholes project over  distal esophagus, recommend advancement by at least 5 cm. An IVC  filter is noted.       CHUYITA ACOSTA MD         SYSTEM ID:  [id number]   XR Chest Port 1 View    Narrative    EXAM: XR CHEST PORT 1 VIEW  LOCATION: [facility]  DATE/TIME: 1/20/2022 4:19 AM    INDICATION: Post Op CVTS Surgery  COMPARISON: Yesterday.      Impression    IMPRESSION: Patient has been extubated and the enteric tube removed. Right internal jugular venous Brady-López catheter remains with tip in the right main pulmonary artery. Left pleural drain unchanged in position. Poststernotomy. Stable cardiac   silhouette. Small left pleural effusion and bibasilar atelectasis. No pneumothorax.

## 2022-01-20 NOTE — CONSULTS
[facility]    Electrophysiology Consultation     Date of Admission:  1/12/2022  Date of Consult (When we saw the patient): 01/20/22    Assessment & Plan   Onur Barr is a 66 year old male who was admitted on 1/12/2022. We were asked to see the patient for PVC, VT.      1. Bigeminal PVCs, NSVT -   - EF had been wnl 55-60% in 9/2021, now down to 40-45% with WMA d/t ISR of LAD and stenosis distal to this.  - S/p  1v CABG (LIMA/LAD) with JUSTO Clipping and PVI 1/19/2022  - Had been on Metoprolol XL, then metoprolol tartrate; has been held d/t hypotension requiring phenylephrine gtt    - Started on amiodarone 400 mg BID 1/19. Got 2 doses, when switched to IV amiodarone bolus with gtt this AM  - Tele continues with frequent PVCs, often bigeminal with short nonsustained runs of VT.  - BPs 80s-110s on cuff  Component      Latest Ref Rng & Units 10/19/2021 1/19/2022 1/20/2022   Magnesium      1.6 - 2.3 mg/dL 2.2 2.4 (H) 2.2     Component      Latest Ref Rng & Units 1/19/2022 1/19/2022 1/20/2022          10:40 AM 12:35 PM    Sodium      133 - 144 mmol/L 139 138 137   Potassium      3.4 - 5.3 mmol/L 4.1 4.4 4.2   Chloride      94 - 109 mmol/L 109 110 (H) 108   Carbon Dioxide      20 - 32 mmol/L 24 23 25   Anion Gap      3 - 14 mmol/L 6 5 4   Urea Nitrogen      7 - 30 mg/dL 16 14 14   Creatinine      0.66 - 1.25 mg/dL 0.94 0.93 0.99   Calcium      8.5 - 10.1 mg/dL 8.9 8.3 (L) 8.1 (L)   Glucose      70 - 99 mg/dL 149 (H) 126 (H) 122 (H)        PLAN:   1. Reviewed ventricular ectopy not unexpected given recent CABG.    2. Continue to keep K >4.0 and Mg >2.0   3. Will use PO Amiodarone (200 mg BID) x 1 month and stop gtt and follow tele closely. Would restart IV amiodarone unless sxs with VT or sustained VT   4. Would add Metoprolol back once BP allows      2. CAD. EF now 40-45% (1/12/2022 echo) -   - S/p pLAD stent placed 2012  - Recently in 9/2021 presented with NSTEMI (peak trop 15) 9/2021. Had  thrombectomy and CRYSTAL to pRCA (4.5x24 mm Synergy) with another dRCA stent (3.5x15 mm Synergy) placed at that time with discovery of ISR of pLAD lesion treated with staged CRYSTAL (3.5x38mm Synergy) on 10/7/2021  - EF on echo 9/2021 was 55-60%    - Came back in with CP and cath 1/13 revealed another ISR of recently placed CRYSTAL to pLAD along with severe stenosis distal to previously placed stents. Had POBA/cutting balloon angioplasty with suboptimal result d/t severe stenosis with multiple layers of stent (underexpanded d/t severe dz). Limited echo 1/12 showed EF down to 40-45% with severe apical wall HK. hsTrop 1874--> 20,280 --> 1859    - Noted to have radial AV fistula likely present from previous access; had mild disruption causing perforation in forearm.    - Now s/p CABG x 1 (LIMA/LAD) with PVI and JUSTO Clipping with 45 mm Atricure Clip 1/19/2022 with Dr. Dumont & Dr. Garcia.     - PTA on Plavix 75 mg daily (for stents placed 9/2021 to RCA and 10/2021 to LAD), metoprolol XL 75 mg daily, lisinopril hydrochlorothiazide 40/50 daily, Lasix 40 mg in AM/20 mg PM & HIST Crestor 40 mg daily  - Had been back on metoprolol starting 1/13, ultimately stopped 1/19 AM d/t hypotension requiring phenylephrine    - LDL 38 mg/dL on 10/2021 and 1/2022  - Carotids <50% bilaterally 1/14/22     PLAN:   1. Per CV Surgery - back on ASA, HIST   2. It does not appear Plavix is to be restarted (based on Gen Card notes prior to CABG). Note now >3 months out from RCA stents placed 9/2021   3. Would recommend repeat echo ~2 months to assess EF post CABG    3. Paroxysmal AFib -   - PTA on Metoprolol XL 75 mg daily & Eliquis 5 mg BID  - Was in AFib on admit 1/12 until 1/19; has been SR since    - Now s/p PVI and JUSTO Clipping with Atricure on 1/19/202    - Currently in SR with frequent PVCs on telemetry on amiodarone (for ventricular ectopy)  - PTA on Eliquis 5mg BID for CHADSVASc 5 (CM, HTN, age, DM, CAD). Now on heparin     PLAN:   1. Resume  metoprolol when hemodynamically appropriate   2. Eliquis will likely be restarted before discharge. Would need imaging to confirm clipping of JUSTO is complete before stopping AC    Ifeoma Treviño PA-C MSPAS    Code Status    Full Code    Reason for Consult   Reason for consult: We were asked by CV Surgery to evaluate for PVCs, VT    Primary Care Physician   Luann Berger    Chief Complaint   NSTEMI, CAD  PVCs, VT    History is obtained from the patient and EPIC chart.    History of Present Illness   Onur Barr is a 66 year old male with h/o pAFib on Eliquis, and CAD (s/p LAD stent 2012 and NSTEMI 9/2021 treated with pRCA thrombectomy and stents and dRCA stents with staged LAD intervention 10/2021 for ISR), .     Past Medical History   I have reviewed this patient's medical history and updated it with pertinent information if needed.   Past Medical History:   Diagnosis Date     Acid reflux disease 10/31/2017     CHF (congestive heart failure) (H)      Coronary artery disease     CABG 1-     Diabetes (H)     typr II resloved     Essential hypertension     Created by [facility] Annotation: Apr 6 2012 10:16AM Zack Brewer: Lisinipril,  coreg  Replacement Utility updated for latest IMO load     H/O gastric bypass      Insomnia      Ischemic cardiomyopathy      Lumbago     Created by [facility] Annotation: Apr 6 2012 10:20AM Anh Ramos: Morphine pump      Morbid obesity (H) 08/01/2018     Nephrolithiasis      Obstructive sleep apnea (adult) (pediatric)     Created by Conversion      Osteoarthritis     Created by [facility] Annotation: Jun 26 2009  9:53AM Zack Brewer: failed lumbar  fusion/morphine pump dr putnam  Replacement Utility updated for latest IMO load     Paroxysmal atrial fibrillation (H)     Created by Conversion        Past Surgical History   I have reviewed this patient's surgical history and updated it with pertinent information if  needed.  Past Surgical History:   Procedure Laterality Date     BACK SURGERY       BYPASS GASTRIC DUODENAL SWITCH       COSMETIC SURGERY      pannicullectomy     CV CORONARY ANGIOGRAM N/A 9/11/2021    Procedure: Coronary Angiogram;  Surgeon: Noris Ozuna MD;  Location: [facility]     CV HEART CATHETERIZATION WITH POSSIBLE INTERVENTION N/A 1/13/2022    Procedure: Heart Catheterization with Possible Intervention;  Surgeon: Liz Pealr MD;  Location:  HEART CARDIAC CATH LAB     CV LEFT HEART CATH N/A 9/11/2021    Procedure: Left Heart Cath;  Surgeon: Noris Ozuna MD;  Location: [facility] CV     CV PCI N/A 9/11/2021    Procedure: Percutaneous Coronary Intervention;  Surgeon: Noris Ozuna MD;  Location: [facility] LAB CV     CV PCI N/A 10/7/2021    Procedure: Percutaneous Coronary Intervention;  Surgeon: Mckayla Dan MD;  Location: [facility]     CV PCI ASPIRATION THROMECTOMY N/A 9/11/2021    Procedure: Percutaneous Coronary Intervention Aspiration Thrombectomy;  Surgeon: Noris Ozuna MD;  Location: [facility]     ENT SURGERY      tonsillectomy     EXTRACORPOREAL SHOCK WAVE LITHOTRIPSY, CYSTOSCOPY, INSERT STENT URETER(S), COMBINED  8/23/11     HC REMOVAL OF TONSILS,<11 Y/O      Description: Tonsillectomy;  Recorded: 03/23/2012;  Comments: for obstructive sleep apnea     HC REPAIR INCISIONAL HERNIA,REDUCIBLE  11/13/2012    Description: Incisional Hernia Repair;  Proc Date: 11/13/2012;  Comments: incisional hernia repair and abdominal panniculectomy by Dr Shon Green at the [facility].     HERNIA REPAIR       IR MISCELLANEOUS PROCEDURE  7/29/2011     IR MISCELLANEOUS PROCEDURE  8/5/2011     IR MISCELLANEOUS PROCEDURE  8/23/2011     IR NEPHROSTOMY TUBE CHANGE BILATERAL  8/5/2011     ORTHOPEDIC SURGERY      arthrodesis ant discectomy, lumbar     IA ARTHRODESIS ANT INTERBODY MIN DISCECTOMY,LUMBAR      Description: Lumbar Vertebral Fusion;  Recorded:  06/26/2009;  Comments: before 200 see [facility] consult under old records     OH EXCISE EXCESS SKIN TISSUE,ABDOMEN  11/13/2012    Description: Panniculectomy;  Proc Date: 11/13/2012;  Comments: 3.5 Lb Pannus was removed at the [facility] By Dr. Shon Green     REPLACE INTRATHECAL PAIN PUMP N/A 4/25/2017    Procedure: REPLACE INTRATHECAL PAIN PUMP;  INTRATHECAL PAIN PUMP CATHETER REPLACEMENT AND PUMP REPLACEMENT;  Surgeon: Anthony Maloney MD;  Location: [facility]     REVISE CATHETER INTRATHECAL N/A 4/25/2017    Procedure: REVISE CATHETER INTRATHECAL;;  Surgeon: Anthony Maloney MD;  Location: [facility]     SHOULDER SURGERY       [facility] GASTRIC BYPASS,OBESE<100CM LORA-EN-Y  2008    Description: Gastric Surgery For Morbid Obesity Bypass With Lora-en-Y;  Recorded: 06/26/2009;  Comments: dr green 2008       Prior to Admission Medications   Prior to Admission Medications   Prescriptions Last Dose Informant Patient Reported? Taking?   ASPIRIN LOW DOSE 81 MG EC tablet 1/12/2022 at AM Self No Yes   Sig: TAKE 1 TABLET (81 MG) BY MOUTH DAILY START TOMORROW.   DULoxetine (CYMBALTA) 30 MG capsule 1/12/2022 at AM Self Yes Yes   Sig: Take 30 mg by mouth every morning   DULoxetine (CYMBALTA) 30 MG capsule 1/11/2022 at PM Self Yes Yes   Sig: Take 60 mg by mouth every evening   Ferrous Gluconate (IRON) 240 (27 Fe) MG TABS 1/12/2022 at AM Self No Yes   Sig: Take 1 tablet by mouth daily   MORPHINE SULFATE 1/12/2022 at Unknown time Self Yes Yes   Sig: IT pump:updated 1/12/22  Medications in Pump:   [facility] Responsible for pump medications:   Conc:  Morphine 20mg/ml(3.95 mg/day), clonidine 21.1mcg/ml (4.168 mcg/day), baclofen 42.5mcg/ml (8.395mcg/day)  Rate:   Pump Last Fill Date: 9/2021  Pump Refill Date: 2/2022   Omeprazole (PRILOSEC PO) 1/12/2022 at AM Self Yes Yes   Sig: Take 40 mg by mouth daily   apixaban ANTICOAGULANT (ELIQUIS) 5 MG tablet 1/12/2022 at AM Self No Yes   Sig: Take 1 tablet (5 mg) by mouth every 12 hours    baclofen (LIORESAL) 10 MG tablet  at PRN Self No Yes   Sig: Take 1 tablet (10 mg) by mouth 2 times daily as needed for muscle spasms   clopidogrel (PLAVIX) 75 MG tablet 1/12/2022 at AM Self No Yes   Sig: Take 1 tablet (75 mg) by mouth daily   dexamethasone (DECADRON) 2 MG tablet 1/10/2022 at PM Self No Yes   Sig: Take 1 tablet (2 mg) by mouth 2 times daily (with meals)   furosemide (LASIX) 20 MG tablet 1/12/2022 at AM Self No Yes   Sig: TAKE 2 TABLET (40 MG) BY MOUTH  IN THE AM AND 1 TABLET (20 MG) IN EARLY AFTERNOON. TAKE WITH POTASSIUM   lisinopril-hydrochlorothiazide (ZESTORETIC) 20-25 MG tablet 1/12/2022 at AM Self No Yes   Sig: TAKE 2 TABLETS BY MOUTH EVERY DAY   metoprolol succinate ER (TOPROL-XL) 50 MG 24 hr tablet 1/12/2022 at AM Self No Yes   Sig: Take 1.5 tablets (75 mg) by mouth daily   nabumetone (RELAFEN) 500 MG tablet 1/12/2022 at AM Self No Yes   Sig: TAKE 1 TABLET BY MOUTH TWICE A DAY   nitroGLYcerin (NITROSTAT) 0.4 MG sublingual tablet  at PRN Self No Yes   Sig: For chest pain place 1 tablet under the tongue every 5 minutes for 3 doses. If symptoms persist 5 minutes after 1st dose call 911.   nystatin (MYCOSTATIN) 193186 UNIT/GM external powder 1/12/2022 at AM Self No Yes   Sig: Apply to lower abdominal area twice daily   potassium chloride ER (KLOR-CON M) 20 MEQ CR tablet 1/12/2022 at AM Self No Yes   Sig: Take 1 tablet (20 mEq) by mouth daily (take with furosemide/Lasix)   rosuvastatin (CRESTOR) 40 MG tablet 1/11/2022 at PM Self No Yes   Sig: Take 1 tablet (40 mg) by mouth daily   senna-docusate (SENOKOT-S/PERICOLACE) 8.6-50 MG tablet  at PRN Self No Yes   Sig: Take 1 tablet by mouth 2 times daily as needed for constipation   traZODone (DESYREL) 100 MG tablet 1/11/2022 at HS Self No Yes   Sig: TAKE 2 TABLETS (200MG TOTAL) BY MOUTH AT BEDTIME      Facility-Administered Medications: None         Medications     amiodarone 1 mg/min (01/20/22 0758)     amiodarone       dexmedetomidine       dextrose  5% and 0.45% NaCl + KCl 20 mEq/L 20 mL/hr at 01/19/22 1941     EPINEPHrine       insulin regular       nitroGLYcerin Stopped (01/19/22 2051)     phenylephrine 0.4 mcg/kg/min (01/20/22 0606)       acetaminophen  975 mg Oral Q8H     albumin human  250 mL Intravenous Once     [Held by provider] amiodarone  400 mg Oral BID     aspirin  162 mg Oral or NG Tube Daily     gabapentin  100 mg Oral At Bedtime     heparin ANTICOAGULANT  5,000 Units Subcutaneous Q8H     pantoprazole  40 mg Oral or NG Tube Daily    Or     pantoprazole  40 mg Oral Daily     polyethylene glycol  17 g Oral Daily     rosuvastatin  40 mg Oral Daily     senna-docusate  1 tablet Oral BID     sodium chloride (PF)  3 mL Intracatheter Q8H       Allergies   Allergies   Allergen Reactions     Hydrocodone-Acetaminophen Other (See Comments)     sneezing       Social History   I have updated and reviewed the following Social History Narrative:   Social History     Social History Narrative     Not on file   Former smoker x 10 years      Family History   I have reviewed this patient's family history and updated it with pertinent information if needed.   Family History   Problem Relation Age of Onset     Cerebrovascular Disease Mother      Heart Disease Father      Hodgkin's lymphoma Brother        Review of Systems   The 5 point Review of Systems is negative other than noted in the HPI or here.       Physical Exam   Temp: 100.4  F (38  C) Temp src: Pulmonary Artery BP: (!) 112/95 (post transfer to chair OT/CR) Pulse: 114   Resp: (!) 40 SpO2: 95 % O2 Device: Nasal cannula Oxygen Delivery: 6 LPM  Vital Signs with Ranges  Temp:  [95.9  F (35.5  C)-100.6  F (38.1  C)] 100.4  F (38  C)  Pulse:  [] 114  Resp:  [6-40] 40  BP: ()/(25-95) 112/95  MAP:  [60 mmHg-119 mmHg] 86 mmHg  Arterial Line BP: ()/() 99/73  FiO2 (%):  [3 %-60 %] 3 %  SpO2:  [77 %-100 %] 95 %  316 lbs 9.29 oz    Telemetry:  SR, Frequent PVCs often in bigeminy    PHYSICAL  EXAM completed by Dr. Crook:  Constitutional: awake, alert, cooperative, no apparent distress, and appears stated age  Eyes: Sclera clear/normal  ENT: Normocephalic  Respiratory: CTA  Cardiovascular: Regular with frequent ectopy  GI: Soft  Skin: Scattered bruises  Musculoskeletal: Edema  Neurologic: Awake, alert    Data   I personally reviewed the EKG tracing showing SR 60 bpm. 1st degree AV Block.  Results for orders placed or performed during the hospital encounter of 01/12/22 (from the past 24 hour(s))   Venous Panel POCT (Lab Only)   Result Value Ref Range    pH Venous POCT 7.32 7.32 - 7.43    pCO2 Venous POCT 54 (H) 40 - 50 mm Hg    pO2 Venous POCT 47 25 - 47 mm Hg    Bicarbonate Venous POCT 28 21 - 28 mmol/L    Sodium POCT 139 133 - 144 mmol/L    Potassium POCT 3.9 3.5 - 5.0 mmol/L    Hemoglobin POCT 11.1 (L) 13.3 - 17.7 g/dL    Glucose Whole Blood POCT 138 (H) 70 - 99 mg/dL    Base Excess/Deficit (+/-) POCT 0.6 -8.1 - 1.9 mmol/L    Calcium, Ionized Whole Blood POCT 4.2 (L) 4.4 - 5.2 mg/dL    Lactic Acid POCT 0.7 <=2.0 mmol/L    FIO2 POCT 80.0 %   Arterial Panel POCT (Lab Only)   Result Value Ref Range    pH Arterial POCT 7.35 7.35 - 7.45    pCO2 Arterial POCT 46 (H) 35 - 45 mm Hg    pO2 Arterial POCT 297 (H) 80 - 105 mm Hg    Bicarbonate Arterial POCT 25 21 - 28 mmol/L    Sodium POCT 139 133 - 144 mmol/L    Potassium POCT 4.6 3.5 - 5.0 mmol/L    Hemoglobin POCT 10.7 (L) 13.3 - 17.7 g/dL    Glucose Whole Blood POCT 138 (H) 70 - 99 mg/dL    Calcium, Ionized Whole Blood POCT 4.4 4.4 - 5.2 mg/dL    Base Excess/Deficit (+/-) POCT -0.7 -9.6 - 2.0 mmol/L    FIO2 POCT 80.0 %    Lactic Acid POCT 0.7 <=2.0 mmol/L   CBC with platelets   Result Value Ref Range    WBC Count 16.2 (H) 4.0 - 11.0 10e3/uL    RBC Count 3.92 (L) 4.40 - 5.90 10e6/uL    Hemoglobin 11.0 (L) 13.3 - 17.7 g/dL    Hematocrit 33.8 (L) 40.0 - 53.0 %    MCV 86 78 - 100 fL    MCH 28.1 26.5 - 33.0 pg    MCHC 32.5 31.5 - 36.5 g/dL    RDW 13.2 10.0 - 15.0 %     Platelet Count 110 (L) 150 - 450 10e3/uL   Basic metabolic panel   Result Value Ref Range    Sodium 139 133 - 144 mmol/L    Potassium 4.1 3.4 - 5.3 mmol/L    Chloride 109 94 - 109 mmol/L    Carbon Dioxide (CO2) 24 20 - 32 mmol/L    Anion Gap 6 3 - 14 mmol/L    Urea Nitrogen 16 7 - 30 mg/dL    Creatinine 0.94 0.66 - 1.25 mg/dL    Calcium 8.9 8.5 - 10.1 mg/dL    Glucose 149 (H) 70 - 99 mg/dL    GFR Estimate 90 >60 mL/min/1.73m2   Fibrinogen activity   Result Value Ref Range    Fibrinogen Activity 433 170 - 490 mg/dL   Partial thromboplastin time   Result Value Ref Range    aPTT 37 22 - 38 Seconds   INR   Result Value Ref Range    INR 1.57 (H) 0.85 - 1.15   Arterial Panel POCT (Lab Only)   Result Value Ref Range    pH Arterial POCT 7.32 (L) 7.35 - 7.45    pCO2 Arterial POCT 47 (H) 35 - 45 mm Hg    pO2 Arterial POCT 96 80 - 105 mm Hg    Bicarbonate Arterial POCT 24 21 - 28 mmol/L    Sodium POCT 139 133 - 144 mmol/L    Potassium POCT 4.1 3.5 - 5.0 mmol/L    Hemoglobin POCT 11.4 (L) 13.3 - 17.7 g/dL    Glucose Whole Blood POCT 147 (H) 70 - 99 mg/dL    Calcium, Ionized Whole Blood POCT 5.0 4.4 - 5.2 mg/dL    Base Excess/Deficit (+/-) POCT -2.5 -9.6 - 2.0 mmol/L    FIO2 POCT 60.0 %    Lactic Acid POCT 1.2 <=2.0 mmol/L   Nutrition Services Adult IP Consult    Narrative    Julia Condon RD, LD     1/19/2022 12:19 PM  CARDIAC SURGERY NUTRITION CONSULT    Received standing order to assess and educate patient.    Will follow and complete assessment once patient is extubated   and/or is transferred to medical unit.    Patient will receive nutrition education during the Outpatient   Cardiac Rehab Program (nutrition classes/dietitian counseling).     EKG 12-lead, tracing only   Result Value Ref Range    Systolic Blood Pressure  mmHg    Diastolic Blood Pressure  mmHg    Ventricular Rate 60 BPM    Atrial Rate 60 BPM    NY Interval 218 ms    QRS Duration 110 ms     ms    QTc 498 ms    P Axis 67 degrees    R AXIS 76  degrees    T Axis 35 degrees    Interpretation ECG       Sinus rhythm with 1st degree A-V block with Premature atrial complexes  Low voltage QRS  T wave abnormality, consider anterior ischemia  Prolonged QT  Abnormal ECG  When compared with ECG of 19-JAN-2022 03:33, (unconfirmed)  Sinus rhythm has replaced Atrial fibrillation  Questionable change in QRS duration  Criteria for Septal infarct are no longer Present  Criteria for Inferior infarct are no longer Present     INR   Result Value Ref Range    INR 1.31 (H) 0.85 - 1.15   Partial thromboplastin time   Result Value Ref Range    aPTT 51 (H) 22 - 38 Seconds   CBC with platelets   Result Value Ref Range    WBC Count 17.4 (H) 4.0 - 11.0 10e3/uL    RBC Count 4.23 (L) 4.40 - 5.90 10e6/uL    Hemoglobin 12.0 (L) 13.3 - 17.7 g/dL    Hematocrit 37.1 (L) 40.0 - 53.0 %    MCV 88 78 - 100 fL    MCH 28.4 26.5 - 33.0 pg    MCHC 32.3 31.5 - 36.5 g/dL    RDW 13.2 10.0 - 15.0 %    Platelet Count 160 150 - 450 10e3/uL   Comprehensive metabolic panel   Result Value Ref Range    Sodium 138 133 - 144 mmol/L    Potassium 4.4 3.4 - 5.3 mmol/L    Chloride 110 (H) 94 - 109 mmol/L    Carbon Dioxide (CO2) 23 20 - 32 mmol/L    Anion Gap 5 3 - 14 mmol/L    Urea Nitrogen 14 7 - 30 mg/dL    Creatinine 0.93 0.66 - 1.25 mg/dL    Calcium 8.3 (L) 8.5 - 10.1 mg/dL    Glucose 126 (H) 70 - 99 mg/dL    Alkaline Phosphatase 56 40 - 150 U/L    AST 26 0 - 45 U/L    ALT 25 0 - 70 U/L    Protein Total 5.9 (L) 6.8 - 8.8 g/dL    Albumin 2.5 (L) 3.4 - 5.0 g/dL    Bilirubin Total 0.8 0.2 - 1.3 mg/dL    GFR Estimate >90 >60 mL/min/1.73m2   Lactic acid whole blood   Result Value Ref Range    Lactic Acid 0.8 0.7 - 2.0 mmol/L   Ionized Calcium   Result Value Ref Range    Calcium Ionized 4.5 4.4 - 5.2 mg/dL   Magnesium   Result Value Ref Range    Magnesium 2.4 (H) 1.6 - 2.3 mg/dL   Phosphorus   Result Value Ref Range    Phosphorus 4.2 2.5 - 4.5 mg/dL   Potassium   Result Value Ref Range    Potassium 4.4 3.4 - 5.3  mmol/L   Blood gas arterial with oxyhemoglobin   Result Value Ref Range    pH Arterial 7.30 (L) 7.35 - 7.45    pCO2 Arterial 48 (H) 35 - 45 mm Hg    pO2 Arterial 225 (H) 80 - 105 mm Hg    Bicarbonate Arterial 23 21 - 28 mmol/L    Oxyhemoglobin Arterial 98 92 - 100 %    Base Excess/Deficit (+/-) -3.3 -9.0 - 1.8 mmol/L    FIO2 60    Glucose by meter   Result Value Ref Range    GLUCOSE BY METER POCT 106 (H) 70 - 99 mg/dL   XR Chest Port 1 View    Narrative    CHEST ONE VIEW PORTABLE   1/19/2022 1:01 PM     HISTORY: Postop cardiovascular thoracic surgery.    COMPARISON: Chest CT on 1/14/2022      Impression    IMPRESSION: Single AP view of the chest was obtained. Endotracheal  tube tip projects over lower thoracic trachea approximately 3 cm from  the lorri. Right IJ [place]-López catheter tip projects over main  pulmonary artery. Chest tube/mediastinal drain also noted. Enteric  tube distal portion is not well-seen, better seen on same day  abdominal x-ray. Stable cardiomediastinal silhouette. Bibasilar  pulmonary opacities, likely atelectasis. No significant pleural  effusion or pneumothorax.    CHUYITA ACOSTA MD         SYSTEM ID:  [id number]   XR Abdomen Port 1 View    Narrative    ABDOMEN PORTABLE ONE VIEW   1/19/2022 1:10 PM     HISTORY: OG placement.    COMPARISON: Chest x-ray on same day earlier.      Impression    IMPRESSION: Single supine view of the abdomen was obtained. Enteric  tube tip projects over proximal stomach and sideholes project over  distal esophagus, recommend advancement by at least 5 cm. An IVC  filter is noted.       CHUYITA ACOSTA MD         SYSTEM ID:  [id number]   Glucose by meter   Result Value Ref Range    GLUCOSE BY METER POCT 108 (H) 70 - 99 mg/dL   Glucose by meter   Result Value Ref Range    GLUCOSE BY METER POCT 132 (H) 70 - 99 mg/dL   Glucose by meter   Result Value Ref Range    GLUCOSE BY METER POCT 119 (H) 70 - 99 mg/dL   Comprehensive metabolic panel   Result Value Ref Range     Sodium 137 133 - 144 mmol/L    Potassium 4.2 3.4 - 5.3 mmol/L    Chloride 108 94 - 109 mmol/L    Carbon Dioxide (CO2) 25 20 - 32 mmol/L    Anion Gap 4 3 - 14 mmol/L    Urea Nitrogen 14 7 - 30 mg/dL    Creatinine 0.99 0.66 - 1.25 mg/dL    Calcium 8.1 (L) 8.5 - 10.1 mg/dL    Glucose 122 (H) 70 - 99 mg/dL    Alkaline Phosphatase 49 40 - 150 U/L    AST 37 0 - 45 U/L    ALT 21 0 - 70 U/L    Protein Total 6.0 (L) 6.8 - 8.8 g/dL    Albumin 3.0 (L) 3.4 - 5.0 g/dL    Bilirubin Total 0.9 0.2 - 1.3 mg/dL    GFR Estimate 85 >60 mL/min/1.73m2   CBC with platelets   Result Value Ref Range    WBC Count 10.1 4.0 - 11.0 10e3/uL    RBC Count 3.77 (L) 4.40 - 5.90 10e6/uL    Hemoglobin 10.6 (L) 13.3 - 17.7 g/dL    Hematocrit 32.8 (L) 40.0 - 53.0 %    MCV 87 78 - 100 fL    MCH 28.1 26.5 - 33.0 pg    MCHC 32.3 31.5 - 36.5 g/dL    RDW 13.5 10.0 - 15.0 %    Platelet Count 139 (L) 150 - 450 10e3/uL   Magnesium   Result Value Ref Range    Magnesium 2.2 1.6 - 2.3 mg/dL   Ionized Calcium   Result Value Ref Range    Calcium Ionized 4.6 4.4 - 5.2 mg/dL   Phosphorus   Result Value Ref Range    Phosphorus 3.5 2.5 - 4.5 mg/dL   Glucose by meter   Result Value Ref Range    GLUCOSE BY METER POCT 112 (H) 70 - 99 mg/dL   XR Chest Port 1 View    Narrative    EXAM: XR CHEST PORT 1 VIEW  LOCATION: [facility]  DATE/TIME: 1/20/2022 4:19 AM    INDICATION: Post Op CVTS Surgery  COMPARISON: Yesterday.      Impression    IMPRESSION: Patient has been extubated and the enteric tube removed. Right internal jugular venous [place]-López catheter remains with tip in the right main pulmonary artery. Left pleural drain unchanged in position. Poststernotomy. Stable cardiac   silhouette. Small left pleural effusion and bibasilar atelectasis. No pneumothorax.

## 2022-01-21 ENCOUNTER — APPOINTMENT (OUTPATIENT)
Dept: OCCUPATIONAL THERAPY | Facility: CLINIC | Age: 67
End: 2022-01-21
Payer: COMMERCIAL

## 2022-01-21 LAB
ANION GAP SERPL CALCULATED.3IONS-SCNC: 4 MMOL/L (ref 3–14)
BUN SERPL-MCNC: 12 MG/DL (ref 7–30)
CA-I BLD-MCNC: 4.4 MG/DL (ref 4.4–5.2)
CALCIUM SERPL-MCNC: 8.4 MG/DL (ref 8.5–10.1)
CHLORIDE BLD-SCNC: 105 MMOL/L (ref 94–109)
CO2 SERPL-SCNC: 25 MMOL/L (ref 20–32)
CREAT SERPL-MCNC: 0.78 MG/DL (ref 0.66–1.25)
ERYTHROCYTE [DISTWIDTH] IN BLOOD BY AUTOMATED COUNT: 13.5 % (ref 10–15)
GFR SERPL CREATININE-BSD FRML MDRD: >90 ML/MIN/1.73M2
GLUCOSE BLD-MCNC: 125 MG/DL (ref 70–99)
GLUCOSE BLDC GLUCOMTR-MCNC: 109 MG/DL (ref 70–99)
GLUCOSE BLDC GLUCOMTR-MCNC: 121 MG/DL (ref 70–99)
GLUCOSE BLDC GLUCOMTR-MCNC: 129 MG/DL (ref 70–99)
GLUCOSE BLDC GLUCOMTR-MCNC: 130 MG/DL (ref 70–99)
HCT VFR BLD AUTO: 31 % (ref 40–53)
HGB BLD-MCNC: 9.9 G/DL (ref 13.3–17.7)
MAGNESIUM SERPL-MCNC: 2.1 MG/DL (ref 1.6–2.3)
MCH RBC QN AUTO: 28.8 PG (ref 26.5–33)
MCHC RBC AUTO-ENTMCNC: 31.9 G/DL (ref 31.5–36.5)
MCV RBC AUTO: 90 FL (ref 78–100)
PHOSPHATE SERPL-MCNC: 1.7 MG/DL (ref 2.5–4.5)
PLATELET # BLD AUTO: 122 10E3/UL (ref 150–450)
POTASSIUM BLD-SCNC: 4.3 MMOL/L (ref 3.4–5.3)
RBC # BLD AUTO: 3.44 10E6/UL (ref 4.4–5.9)
SARS-COV-2 RNA RESP QL NAA+PROBE: NEGATIVE
SODIUM SERPL-SCNC: 134 MMOL/L (ref 133–144)
WBC # BLD AUTO: 8.4 10E3/UL (ref 4–11)

## 2022-01-21 PROCEDURE — 250N000011 HC RX IP 250 OP 636: Performed by: SURGERY

## 2022-01-21 PROCEDURE — 250N000011 HC RX IP 250 OP 636: Performed by: PHYSICIAN ASSISTANT

## 2022-01-21 PROCEDURE — 250N000013 HC RX MED GY IP 250 OP 250 PS 637: Performed by: PHYSICIAN ASSISTANT

## 2022-01-21 PROCEDURE — 250N000011 HC RX IP 250 OP 636: Performed by: INTERNAL MEDICINE

## 2022-01-21 PROCEDURE — 250N000013 HC RX MED GY IP 250 OP 250 PS 637: Performed by: STUDENT IN AN ORGANIZED HEALTH CARE EDUCATION/TRAINING PROGRAM

## 2022-01-21 PROCEDURE — 85027 COMPLETE CBC AUTOMATED: CPT | Performed by: PHYSICIAN ASSISTANT

## 2022-01-21 PROCEDURE — 250N000013 HC RX MED GY IP 250 OP 250 PS 637: Performed by: INTERNAL MEDICINE

## 2022-01-21 PROCEDURE — 97530 THERAPEUTIC ACTIVITIES: CPT | Mod: GO | Performed by: OCCUPATIONAL THERAPIST

## 2022-01-21 PROCEDURE — 99233 SBSQ HOSP IP/OBS HIGH 50: CPT | Performed by: INTERNAL MEDICINE

## 2022-01-21 PROCEDURE — 97535 SELF CARE MNGMENT TRAINING: CPT | Mod: GO | Performed by: OCCUPATIONAL THERAPIST

## 2022-01-21 PROCEDURE — 80048 BASIC METABOLIC PNL TOTAL CA: CPT | Performed by: PHYSICIAN ASSISTANT

## 2022-01-21 PROCEDURE — 250N000013 HC RX MED GY IP 250 OP 250 PS 637: Performed by: SURGERY

## 2022-01-21 PROCEDURE — 87635 SARS-COV-2 COVID-19 AMP PRB: CPT | Performed by: PEDIATRICS

## 2022-01-21 PROCEDURE — 36415 COLL VENOUS BLD VENIPUNCTURE: CPT | Performed by: STUDENT IN AN ORGANIZED HEALTH CARE EDUCATION/TRAINING PROGRAM

## 2022-01-21 PROCEDURE — 82330 ASSAY OF CALCIUM: CPT | Performed by: STUDENT IN AN ORGANIZED HEALTH CARE EDUCATION/TRAINING PROGRAM

## 2022-01-21 PROCEDURE — 120N000001 HC R&B MED SURG/OB

## 2022-01-21 PROCEDURE — 84100 ASSAY OF PHOSPHORUS: CPT | Performed by: INTERNAL MEDICINE

## 2022-01-21 PROCEDURE — 99232 SBSQ HOSP IP/OBS MODERATE 35: CPT | Performed by: INTERNAL MEDICINE

## 2022-01-21 PROCEDURE — 83735 ASSAY OF MAGNESIUM: CPT | Performed by: INTERNAL MEDICINE

## 2022-01-21 PROCEDURE — 250N000011 HC RX IP 250 OP 636: Performed by: STUDENT IN AN ORGANIZED HEALTH CARE EDUCATION/TRAINING PROGRAM

## 2022-01-21 RX ORDER — TRAZODONE HYDROCHLORIDE 100 MG/1
200 TABLET ORAL AT BEDTIME
Status: DISCONTINUED | OUTPATIENT
Start: 2022-01-21 | End: 2022-01-25 | Stop reason: HOSPADM

## 2022-01-21 RX ORDER — HYDRALAZINE HYDROCHLORIDE 20 MG/ML
10 INJECTION INTRAMUSCULAR; INTRAVENOUS EVERY 6 HOURS PRN
Status: DISCONTINUED | OUTPATIENT
Start: 2022-01-21 | End: 2022-01-25 | Stop reason: HOSPADM

## 2022-01-21 RX ORDER — OXYCODONE HYDROCHLORIDE 5 MG/1
5-10 TABLET ORAL EVERY 4 HOURS PRN
Status: DISCONTINUED | OUTPATIENT
Start: 2022-01-21 | End: 2022-01-24

## 2022-01-21 RX ADMIN — AMIODARONE HYDROCHLORIDE 200 MG: 200 TABLET ORAL at 09:21

## 2022-01-21 RX ADMIN — POTASSIUM & SODIUM PHOSPHATES POWDER PACK 280-160-250 MG 2 PACKET: 280-160-250 PACK at 09:21

## 2022-01-21 RX ADMIN — SENNOSIDES AND DOCUSATE SODIUM 1 TABLET: 50; 8.6 TABLET ORAL at 09:21

## 2022-01-21 RX ADMIN — HEPARIN SODIUM 5000 UNITS: 5000 INJECTION INTRAVENOUS; SUBCUTANEOUS at 21:55

## 2022-01-21 RX ADMIN — ACETAMINOPHEN 975 MG: 325 TABLET, FILM COATED ORAL at 13:39

## 2022-01-21 RX ADMIN — PANTOPRAZOLE SODIUM 40 MG: 40 TABLET, DELAYED RELEASE ORAL at 09:22

## 2022-01-21 RX ADMIN — OXYCODONE HYDROCHLORIDE 10 MG: 5 TABLET ORAL at 05:50

## 2022-01-21 RX ADMIN — METOPROLOL TARTRATE 12.5 MG: 25 TABLET, FILM COATED ORAL at 20:15

## 2022-01-21 RX ADMIN — HYDROMORPHONE HYDROCHLORIDE 0.4 MG: 0.2 INJECTION, SOLUTION INTRAMUSCULAR; INTRAVENOUS; SUBCUTANEOUS at 00:04

## 2022-01-21 RX ADMIN — OXYCODONE HYDROCHLORIDE 10 MG: 5 TABLET ORAL at 10:40

## 2022-01-21 RX ADMIN — HEPARIN SODIUM 5000 UNITS: 5000 INJECTION INTRAVENOUS; SUBCUTANEOUS at 13:40

## 2022-01-21 RX ADMIN — ASPIRIN 81 MG CHEWABLE TABLET 162 MG: 81 TABLET CHEWABLE at 09:20

## 2022-01-21 RX ADMIN — AMIODARONE HYDROCHLORIDE 200 MG: 200 TABLET ORAL at 20:14

## 2022-01-21 RX ADMIN — ACETAMINOPHEN 975 MG: 325 TABLET, FILM COATED ORAL at 21:54

## 2022-01-21 RX ADMIN — KETOROLAC TROMETHAMINE 15 MG: 15 INJECTION, SOLUTION INTRAMUSCULAR; INTRAVENOUS at 08:12

## 2022-01-21 RX ADMIN — POLYETHYLENE GLYCOL 3350 17 G: 17 POWDER, FOR SOLUTION ORAL at 09:21

## 2022-01-21 RX ADMIN — HEPARIN SODIUM 5000 UNITS: 5000 INJECTION INTRAVENOUS; SUBCUTANEOUS at 05:51

## 2022-01-21 RX ADMIN — METOPROLOL TARTRATE 12.5 MG: 25 TABLET, FILM COATED ORAL at 09:21

## 2022-01-21 RX ADMIN — POTASSIUM & SODIUM PHOSPHATES POWDER PACK 280-160-250 MG 2 PACKET: 280-160-250 PACK at 16:22

## 2022-01-21 RX ADMIN — METHOCARBAMOL 500 MG: 500 TABLET ORAL at 02:23

## 2022-01-21 RX ADMIN — OXYCODONE HYDROCHLORIDE 15 MG: 5 TABLET ORAL at 01:43

## 2022-01-21 RX ADMIN — HYDROMORPHONE HYDROCHLORIDE 0.4 MG: 0.2 INJECTION, SOLUTION INTRAMUSCULAR; INTRAVENOUS; SUBCUTANEOUS at 04:57

## 2022-01-21 RX ADMIN — ACETAMINOPHEN 975 MG: 325 TABLET, FILM COATED ORAL at 05:51

## 2022-01-21 RX ADMIN — METHOCARBAMOL 500 MG: 500 TABLET ORAL at 20:14

## 2022-01-21 RX ADMIN — TRAZODONE HYDROCHLORIDE 200 MG: 100 TABLET ORAL at 21:55

## 2022-01-21 RX ADMIN — ROSUVASTATIN CALCIUM 40 MG: 20 TABLET, FILM COATED ORAL at 09:21

## 2022-01-21 RX ADMIN — POTASSIUM & SODIUM PHOSPHATES POWDER PACK 280-160-250 MG 2 PACKET: 280-160-250 PACK at 21:56

## 2022-01-21 RX ADMIN — METHOCARBAMOL 500 MG: 500 TABLET ORAL at 09:21

## 2022-01-21 RX ADMIN — SENNOSIDES AND DOCUSATE SODIUM 1 TABLET: 50; 8.6 TABLET ORAL at 20:15

## 2022-01-21 RX ADMIN — KETOROLAC TROMETHAMINE 15 MG: 15 INJECTION, SOLUTION INTRAMUSCULAR; INTRAVENOUS at 21:55

## 2022-01-21 ASSESSMENT — ACTIVITIES OF DAILY LIVING (ADL)
ADLS_ACUITY_SCORE: 10
ADLS_ACUITY_SCORE: 11
ADLS_ACUITY_SCORE: 10
ADLS_ACUITY_SCORE: 10
ADLS_ACUITY_SCORE: 11
ADLS_ACUITY_SCORE: 10
ADLS_ACUITY_SCORE: 11
ADLS_ACUITY_SCORE: 10
ADLS_ACUITY_SCORE: 11
ADLS_ACUITY_SCORE: 10
ADLS_ACUITY_SCORE: 10
ADLS_ACUITY_SCORE: 11
ADLS_ACUITY_SCORE: 10

## 2022-01-21 NOTE — PROGRESS NOTES
Cardiology Progress Note          Assessment and Plan:   No complaints.  Vitals stable.  Frequent PVCs. No VT.  Post CABG day 1. Extubated and alert.  Consulted for frequent PVCs and nonsustained VT. Not apparently symptomatic.   Previous MI with recent EF 40-45%. History of paroxysmal AF.  History of GERD, gastric bypass, MARLEEN, kidney stone, type II diabetes.  Recommend po amiodarone 200 mg daily for 1 month.  Anticoagulation for paroxysmal AF can be addressed by the surgical team. Not urgent at this time.  Sign off  1 month EP follow up ordered.  Ok to transfer out of ICU.  Jannette Crook MD  Cardiology   [phone]                  Physical Exam:   Blood pressure 134/74, pulse 92, temperature 97.9  F (36.6  C), temperature source Oral, resp. rate 17, weight 140.8 kg (310 lb 6.5 oz), SpO2 (!) 89 %.  Wt Readings from Last 4 Encounters:   01/21/22 140.8 kg (310 lb 6.5 oz)   01/04/22 138 kg (304 lb 5 oz)   11/15/21 140.2 kg (309 lb)   11/05/21 140.6 kg (310 lb)      I/O last 3 completed shifts:  In: 1716.67 [P.O.:440; I.V.:1026.67]  Out: 1540 [Urine:970; Chest Tube:570]    Constitutional: Alert, no apparent distress,      Lungs: No crackles or wheezing,    Cardiovascular: Regular rhythm, normal S1 and S2, and no murmur,    Lymphm node  Neck  ENT  Neurologic  Abdomen: No enlargement  No jugular vein extension or carotid bruit  No apparent abnormality  No focal deficit  Normal bowel sounds, soft, no distension, no tender   Skin: No rashes, no cyanosis   Extremity: No edema          Medications:     Current Facility-Administered Medications:      [START ON 1/22/2022] acetaminophen (TYLENOL) tablet 650 mg, 650 mg, Oral, Q4H PRN, Oliver Garcia MD     acetaminophen (TYLENOL) tablet 975 mg, 975 mg, Oral, Q8H, Oliver Garcia MD, 975 mg at 01/21/22 0551     amiodarone (PACERONE) tablet 200 mg, 200 mg, Oral, BID, Ifeoma Treviño PA-C, 200 mg at 01/20/22 2122     aspirin (ASA) chewable tablet 162 mg, 162 mg, Oral or NG  Tube, Daily, Oliver Garcia MD, 162 mg at 01/20/22 0915     bisacodyl (DULCOLAX) Suppository 10 mg, 10 mg, Rectal, Daily PRN, Oliver Garcia MD     calcium gluconate 1 g in 50 mL sodium chloride intermittent infusion (premix), 1 g, Intravenous, Once PRN, Oliver Garcia MD     calcium gluconate 2 g in 100 mL sodium chloride intermittent infusion (premix), 2 g, Intravenous, Once PRN, Oliver Garcia MD     calcium gluconate 3 g in sodium chloride 0.9 % 100 mL intermittent infusion, 3 g, Intravenous, Once PRN, Oliver Garcia MD     dexmedetomidine (PRECEDEX) 400 mcg in 0.9% sodium chloride 100 mL, 0.2-0.7 mcg/kg/hr, Intravenous, Continuous, Oliver Garcia MD     dextrose 5% and 0.45% NaCl + KCl 20 mEq/L infusion, , Intravenous, Continuous, Oliver Garcia MD, Last Rate: 20 mL/hr at 01/20/22 2116, Rate Verify at 01/20/22 2116     glucose gel 15-30 g, 15-30 g, Oral, Q15 Min PRN **OR** dextrose 50 % injection 25-50 mL, 25-50 mL, Intravenous, Q15 Min PRN **OR** glucagon injection 1 mg, 1 mg, Subcutaneous, Q15 Min PRN, Oliver Garcia MD     EPINEPHrine (ADRENALIN) 5 mg in  mL infusion, 0.01-0.1 mcg/kg/min, Intravenous, Continuous PRN, Oliver Garcia MD     gabapentin (NEURONTIN) capsule 100 mg, 100 mg, Oral, At Bedtime, Oliver Garcia MD, 100 mg at 01/19/22 2232     heparin ANTICOAGULANT injection 5,000 Units, 5,000 Units, Subcutaneous, Q8H, Oliver Garcia MD, 5,000 Units at 01/21/22 0551     hydrALAZINE (APRESOLINE) injection 10 mg, 10 mg, Intravenous, Q30 Min PRN, Oliver Garcia MD     HYDROmorphone (DILAUDID) injection 0.2-0.4 mg, 0.2-0.4 mg, Intravenous, Q1H PRN, Mckayla Armendariz PA-C, 0.4 mg at 01/21/22 0457     insulin aspart (NovoLOG) injection (RAPID ACTING), 1-7 Units, Subcutaneous, TID AC, Tomasa Harrington PA-C     insulin aspart (NovoLOG) injection (RAPID ACTING), 1-5 Units, Subcutaneous, At Bedtime, Tomasa Harrington PA-C     ketorolac (TORADOL) injection 15 mg, 15 mg, Intravenous, Q6H PRN, Gerry,  Shakeel Deras MD, 15 mg at 01/21/22 0812     lidocaine (LMX4) cream, , Topical, Q1H PRN, Oliver Garcia MD     lidocaine 1 % 0.1-1 mL, 0.1-1 mL, Other, Q1H PRN, Oliver Garcia MD     magnesium hydroxide (MILK OF MAGNESIA) suspension 30 mL, 30 mL, Oral, Daily PRN, Oliver Garcia MD     methocarbamol (ROBAXIN) tablet 500 mg, 500 mg, Oral, Q6H PRN, Oliver Garcia MD, 500 mg at 01/21/22 0223     metoprolol tartrate (LOPRESSOR) half-tab 12.5 mg, 12.5 mg, Oral, BID, William Dumont MD, 12.5 mg at 01/20/22 2122     naloxone (NARCAN) injection 0.2 mg, 0.2 mg, Intravenous, Q2 Min PRN **OR** naloxone (NARCAN) injection 0.4 mg, 0.4 mg, Intravenous, Q2 Min PRN **OR** naloxone (NARCAN) injection 0.2 mg, 0.2 mg, Intramuscular, Q2 Min PRN **OR** naloxone (NARCAN) injection 0.4 mg, 0.4 mg, Intramuscular, Q2 Min PRN, William Dumont MD     nitroGLYcerin 50 mg in D5W 250 mL (adult std) infusion CENTRAL,  mcg/min, Intravenous, Continuous PRN, Oliver Garcia MD, Stopped at 01/19/22 2051     ondansetron (ZOFRAN-ODT) ODT tab 4 mg, 4 mg, Oral, Q6H PRN **OR** ondansetron (ZOFRAN) injection 4 mg, 4 mg, Intravenous, Q6H PRN, Oliver Garcia MD     oxyCODONE (ROXICODONE) tablet 5-15 mg, 5-15 mg, Oral, Q4H PRN, Mckayla Armendariz PA-C, 10 mg at 01/21/22 0550     pantoprazole (PROTONIX) 2 mg/mL suspension 40 mg, 40 mg, Oral or NG Tube, Daily, 40 mg at 01/19/22 1321 **OR** pantoprazole (PROTONIX) EC tablet 40 mg, 40 mg, Oral, Daily, Oliver Garica MD, 40 mg at 01/20/22 0916     phenylephrine (SHONA-SYNEPHRINE) 50 mg in NaCl 0.9 % 250 mL infusion, 0.1-4 mcg/kg/min, Intravenous, Continuous PRN, Oliver Garcia MD, Stopped at 01/20/22 1545     polyethylene glycol (MIRALAX) Packet 17 g, 17 g, Oral, Daily, Oliver Garcia MD, 17 g at 01/20/22 0915     potassium & sodium phosphates (NEUTRA-PHOS) Packet 2 packet, 2 packet, Oral or Feeding Tube, TID, William Dumont MD     prochlorperazine (COMPAZINE) injection 5 mg, 5 mg,  Intravenous, Q6H PRN **OR** prochlorperazine (COMPAZINE) tablet 5 mg, 5 mg, Oral, Q6H PRN, Oliver Garcia MD     rosuvastatin (CRESTOR) tablet 40 mg, 40 mg, Oral, Daily, Oliver Garcia MD, 40 mg at 01/20/22 0917     senna-docusate (SENOKOT-S/PERICOLACE) 8.6-50 MG per tablet 1 tablet, 1 tablet, Oral, BID, Oliver Garcia MD, 1 tablet at 01/20/22 2121     sodium chloride (PF) 0.9% PF flush 3 mL, 3 mL, Intracatheter, Q8H, Oliver Garcia MD, 3 mL at 01/20/22 1218           Lab results:        Recent Labs   Lab Test 01/21/22  0759 01/21/22  0514 01/21/22  0349 01/20/22  0407 01/20/22  0404 01/19/22  1244 01/19/22  1235   NA  --  134  --   --  137  --  138   POTASSIUM  --  4.3  --   --  4.2  --  4.4  4.4   CHLORIDE  --  105  --   --  108  --  110*   RATNA  --  8.4*  --   --  8.1*  --  8.3*   CO2  --  25  --   --  25  --  23   BUN  --  12  --   --  14  --  14   CR  --  0.78  --   --  0.99  --  0.93   * 125* 130*   < > 122*   < > 126*    < > = values in this interval not displayed.     Recent Labs   Lab Test 01/21/22  0514 01/20/22  0404 01/19/22  1235   HGB 9.9* 10.6* 12.0*   WBC 8.4 10.1 17.4*   * 139* 160     Recent Labs   Lab Test 01/19/22  1235 01/19/22  1040 09/11/21  1329   INR 1.31* 1.57* 1.00     Recent Labs   Lab Test 07/13/21  1536   TROPONIN <0.015

## 2022-01-21 NOTE — PROGRESS NOTES
Iv's dc'd, Borderline output . Bran dc'd, VSS, pain level staying 6 back and chest.  Awaiting for room to transfer.  Minimal CT output.  Afebrile.

## 2022-01-21 NOTE — PLAN OF CARE
Neuro: A&Ox4 lethargic but neuro's intact.   CV: Overnight while sleeping rare PVCs and about 0500 started back with bi and trigeminy PVCs. Blood pressure remains stable.   Resp: weaned off of 5L NC to room air. Dyspnea on exertion.    GI/:  Bran in place, UOP stable. BS hypoactive.  Skin: generalized edema. Chest tube in to -20sx small air leak noted. Pacer wires capped. Wound vac 125 sx  Pain/Gtts: taking IV dilaudid, toradol, PO robaxin, oxycodone and tylenol for pain.

## 2022-01-21 NOTE — CONSULTS
"NUTRITION ASSESSMENT      REASON FOR ASSESSMENT:  Cardiac Surgery Nutrition Consult    CURRENT DIET / INTAKE:  Low Saturated Fat, Na < 2400 mg     Ate dinner last night of nisha food cake, banana, sandwich and skim milk. Breakfast this morning of Yoruba toast, apple juice and 1% milk. Pt states he ate ~75%. Appetite noted to be \"OK\". Declining lunch this afternoon but willing to try an Ensure. Flowsheets indicate 75% intake of dinner last night      ANTHROPOMETRICS:   Ht: 6' 0\"  Wt: 138.2 kg (preop)  BMI: 41.3  IBW: 81 kg   Weight Status: Obesity Grade III BMI >40  %IBW: 171%    MALNUTRITION:  Patient does not meet two of the following criteria necessary for diagnosing malnutrition:  significant weight loss, reduced intake, subcutaneous fat loss, muscle loss or fluid retention. Nutrition Focused Physical Assessment (NFPA) not appropriate at this time.    NUTRITION DIAGNOSIS:   Predicted inadequate nutrient intake (protein-calorie) related to potential for decreased appetite post op     INTERVENTIONS:    Nutrition Prescription:  Continue diet as ordered  Consider liberalizing to Regular pending intakes   Ensure (strawberry) once daily at 2 pm     Implementation:  Nutrition Education (Content):  Discussed the importance of adequate nutrition post-op for healing and energy, emphasizing protein foods, and encouraged patient to order a protein food at each meal.    Goals:  Patient to consume ~75% at meals in the next 3 - 5 days    Follow Up/Monitoring (InPatient):  Food and Fluid intake -  Monitor for adequacy    Follow Up/Monitoring (OutPatient):  Patient will participate in out-patient cardiac rehab and attend nutrition classes during the program    Jacquelyn Restrepo RD, LD    "

## 2022-01-21 NOTE — PROGRESS NOTES
[facility]  Cardiovascular and Thoracic Surgery Daily Note          Assessment and Plan:   POD#2 s/p Coronary artery bypass grafting x1 with left internal mammary artery to the left anterior descending, bilateral pulmonary vein isolation with the AtriCure bipolar radiofrequency ablation device Synergy, exclusion of left atrial appendage, intraoperative transesophageal echocardiography by Dr. William Dumont    -CVS: HR: 80s-100s. SBP: 80s-110s. Remains on Jose- weaning as able. Telemetry with NSR, frequent PVCs often bigeminal with short nonsustained runs of VT- EP consulted. Pre op EF: 55-60%. ASA, BB on hold due pressor need- will resume when able, statin. Per EP continue amiodarone 200mg BID for one month and will need anticoagulation for paroxysmal AF prior to discharge. Pacing wires capped. Chest tubes to remain in today. Wound vac in place.     -Resp: Extubated within protocol. Saturating well on 4L. Wean oxygen as able. Continue to encourage IS, cough, deep breathing, ambulation.     -Neuro:  Grossly intact. Pain controlled.     -Renal: good UO, down 2kg below pre-op weight.     -GI:  Tolerating diet. No BM. Continue bowel regimen    -: Remove Bran    -Endo: pre op A1c: 5.4. minimal insulin infusion requirements. Will transition to sliding scale insulin.     -FEN: replete lytes as needed, ADAT. Na: 134. K: 4.3  Orders Placed This Encounter      Low Saturated Fat Na <2400 mg     -ID: WBC: 8.4, Temp (24hrs), Av  F (36.7  C), Min:97.2  F (36.2  C), Max:98.7  F (37.1  C)   Completed perioperative abx    -Heme: Hgb: 9.9 plt: 122. Acute blood loss anemia and thrombocytopenia related to surgery    -Proph: PCD, ASA, statin, PPI, sub q heparin    -Dispo: Continue ICU cares. Continue to wean pressors. Continue therapies. Continue to encourage IS, cough, deep breathing, ambulation.     Seen and discussed with Dr. Garrett          Interval History:   Sitting up in bed, breathing stable, tolerating  diet, working with rehab, pain controlled         Medications:       acetaminophen  975 mg Oral Q8H     amiodarone  200 mg Oral BID     aspirin  162 mg Oral or NG Tube Daily     gabapentin  100 mg Oral At Bedtime     heparin ANTICOAGULANT  5,000 Units Subcutaneous Q8H     metoprolol tartrate  12.5 mg Oral BID     pantoprazole  40 mg Oral or NG Tube Daily    Or     pantoprazole  40 mg Oral Daily     polyethylene glycol  17 g Oral Daily     potassium & sodium phosphates  2 packet Oral or Feeding Tube TID     rosuvastatin  40 mg Oral Daily     senna-docusate  1 tablet Oral BID     sodium chloride (PF)  3 mL Intracatheter Q8H     [facility]@          Physical Exam:   Vitals were reviewed  Blood pressure 98/52, pulse 87, temperature 98.1  F (36.7  C), resp. rate 13, weight 140.8 kg (310 lb 6.5 oz), SpO2 96 %.  Rhythm: NSR    Lungs: course    Cardiovascular: s1/s2, no m/r/g    Abdomen: s/nt/nd    Extremeties: no LE edema    Incision: cdi    CT: to suction     Weight:   Vitals:    01/17/22 0503 01/18/22 0600 01/19/22 0600 01/20/22 0428   Weight: 139.2 kg (306 lb 14.4 oz) 138.5 kg (305 lb 6.4 oz) 138.2 kg (304 lb 9.6 oz) 143.6 kg (316 lb 9.3 oz)    01/21/22 0600   Weight: 140.8 kg (310 lb 6.5 oz)            Data:   Labs:   Lab Results   Component Value Date    WBC 8.4 01/21/2022     Lab Results   Component Value Date    RBC 3.44 01/21/2022     Lab Results   Component Value Date    HGB 9.9 01/21/2022     Lab Results   Component Value Date    HCT 31.0 01/21/2022     No components found for: MCT  Lab Results   Component Value Date    MCV 90 01/21/2022     Lab Results   Component Value Date    MCH 28.8 01/21/2022     Lab Results   Component Value Date    MCHC 31.9 01/21/2022     Lab Results   Component Value Date    RDW 13.5 01/21/2022     Lab Results   Component Value Date     01/21/2022       Last Basic Metabolic Panel:  Lab Results   Component Value Date     01/21/2022      Lab Results   Component Value Date     POTASSIUM 4.3 01/21/2022     Lab Results   Component Value Date    CHLORIDE 105 01/21/2022     Lab Results   Component Value Date    RATNA 8.4 01/21/2022     Lab Results   Component Value Date    CO2 25 01/21/2022     Lab Results   Component Value Date    BUN 12 01/21/2022     Lab Results   Component Value Date    CR 0.78 01/21/2022     Lab Results   Component Value Date     01/21/2022     01/21/2022       Mckayla Armendariz PA-C  Pager #: [phone]

## 2022-01-21 NOTE — PLAN OF CARE
Pt off of Jose this evening. Pacemaker capped. Up to chair for meals. Toradol, oxy, robaxin, and dilaudid used for pain. SR with PVC's. Amio gtt started this am and switched to Amio oral. EP consult. Will remain in ICU overnight for close monitoring.

## 2022-01-21 NOTE — PROGRESS NOTES
[facility]    Medicine Progress Note - Hospitalist Service       Date of Admission:  1/12/2022  Assessment & Plan   66 year old male with hx of CAD s/p PCI, chronic a-fib on chronic anticoagulation with apixaban, CHF, HTN, and chronic back pain s/p indwelling pain pump who was admitted on 1/12/2022 for NSTEMI. He was found to have severe in-stent re-stenosis of mid-LAD and severe stenosis distal to previously stents, and underwent CABG today:    Principal Problem:  Principal Problem:    NSTEMI w Unstab Angina -- S/P CABG x 1 (LIMA to LAD) on 1/19/22    -- POD #2, doing well       Chronic diastolic heart failure -- stable       Paroxysmal atrial fib -- S/P Pulm Vein Ablation 1/19/22   -- on metoprolol, Amiodarone added   -- in NSR with triplets which area Asymptomatic (had prior to CABG as well)      Anemia, acute blood loss    Thrombocytopenia -- 2nd to CAGB   -- stable     Active Problems:    Morbid obesity -- BMI 42.0    Hx of Gastric Bypass in 2008    Hx of DM type 2 -- now diet controlled since 2008   -- will stop glucometer checks since all <150       MARLEEN (doesn't tolerate CPAP)        Essential hypertension      Chronic Back Pain (IT Pump w Morphine, Clonidine, Baclofen)     Chronic combined systolic and diastolic CHF  TTE findings noted above  - Continues on PTA Lasix    GERD  - Continues on PTA omeprazole    Chronic low back pain  [PTA: morphine per indwelling pain pump, nabumetone 500 mg BID, duloxetine 30 mg qAM, 60 mg qhs; APAP prn; baclofen bid prn]  - Hold PTA nabumetone in setting of NSTEMI  - Continues on other PTA meds with pain pump in place    Resolved Tongue swelling  -- stopped Decadron 1/17/22 and no problems since      Diet: Low Saturated Fat Na <2400 mg    DVT Prophylaxis: IV heparin  Bran Catheter: PRESENT, indication: Strict 1-2 Hour I&O  Code Status: Full Code         Disposition: Continue postop care, will transfer to floor --  probably home on Monday,1/25/22 if  continues to do well.     The patient's care was discussed with the Patient.    Ej Keane MD  Hospitalist Service  [facility]  Contact information available via [facility] Paging/Directory    ______________________________________________________________________    Interval History   Awake, alert, feels OK, pain is manageable.      Physical Exam   Vital Signs: Temp: 98.1  F (36.7  C) Temp src: Oral BP: (!) 138/99 Pulse: 104   Resp: 13 SpO2: 94 % O2 Device: None (Room air) Oxygen Delivery: 2 LPM  Weight: 310 lbs 6.52 oz  Constitutional: Appears comfortable  Respiratory: Breathing non-labored. Lungs CTAB - no wheezes, crackles, or rhonchi  Cardiovascular: Heart irregular rhythm, trace bilateral ankle edema  GI: +BS, abd obese but soft/NT  Musculoskeletal: trace ankle edema bilaterally  Neuro: Alert and appropriate, no focal deficits    Data   Recent Labs   Lab 01/21/22  0759 01/21/22  0514 01/21/22  0349 01/20/22  0407 01/20/22  0404 01/19/22  1244 01/19/22  1235 01/19/22  1047 01/19/22  1040   WBC  --  8.4  --   --  10.1  --  17.4*  --  16.2*   HGB  --  9.9*  --   --  10.6*  --  12.0*   < > 11.0*   MCV  --  90  --   --  87  --  88  --  86   PLT  --  122*  --   --  139*  --  160  --  110*   INR  --   --   --   --   --   --  1.31*  --  1.57*   NA  --  134  --   --  137  --  138   < > 139   POTASSIUM  --  4.3  --   --  4.2  --  4.4  4.4   < > 4.1   CHLORIDE  --  105  --   --  108  --  110*  --  109   CO2  --  25  --   --  25  --  23  --  24   BUN  --  12  --   --  14  --  14  --  16   CR  --  0.78  --   --  0.99  --  0.93  --  0.94   ANIONGAP  --  4  --   --  4  --  5  --  6   RATNA  --  8.4*  --   --  8.1*  --  8.3*  --  8.9   * 125* 130*   < > 122*   < > 126*   < > 149*   ALBUMIN  --   --   --   --  3.0*  --  2.5*  --   --    PROTTOTAL  --   --   --   --  6.0*  --  5.9*  --   --    BILITOTAL  --   --   --   --  0.9  --  0.8  --   --    ALKPHOS  --   --   --   --  49  --  56  --    --    ALT  --   --   --   --  21  --  25  --   --    AST  --   --   --   --  37  --  26  --   --     < > = values in this interval not displayed.         No results found for this or any previous visit (from the past 24 hour(s)).    Medications     phenylephrine Stopped (01/20/22 2945)       acetaminophen  975 mg Oral Q8H     amiodarone  200 mg Oral BID     aspirin  162 mg Oral or NG Tube Daily     gabapentin  100 mg Oral At Bedtime     heparin ANTICOAGULANT  5,000 Units Subcutaneous Q8H     metoprolol tartrate  12.5 mg Oral BID     pantoprazole  40 mg Oral or NG Tube Daily    Or     pantoprazole  40 mg Oral Daily     polyethylene glycol  17 g Oral Daily     potassium & sodium phosphates  2 packet Oral or Feeding Tube TID     rosuvastatin  40 mg Oral Daily     senna-docusate  1 tablet Oral BID     sodium chloride (PF)  3 mL Intracatheter Q8H

## 2022-01-22 ENCOUNTER — APPOINTMENT (OUTPATIENT)
Dept: OCCUPATIONAL THERAPY | Facility: CLINIC | Age: 67
End: 2022-01-22
Payer: COMMERCIAL

## 2022-01-22 LAB
ANION GAP SERPL CALCULATED.3IONS-SCNC: 4 MMOL/L (ref 3–14)
BUN SERPL-MCNC: 13 MG/DL (ref 7–30)
CALCIUM SERPL-MCNC: 8.5 MG/DL (ref 8.5–10.1)
CHLORIDE BLD-SCNC: 104 MMOL/L (ref 94–109)
CO2 SERPL-SCNC: 27 MMOL/L (ref 20–32)
CREAT SERPL-MCNC: 0.84 MG/DL (ref 0.66–1.25)
ERYTHROCYTE [DISTWIDTH] IN BLOOD BY AUTOMATED COUNT: 13.7 % (ref 10–15)
GFR SERPL CREATININE-BSD FRML MDRD: >90 ML/MIN/1.73M2
GLUCOSE BLD-MCNC: 122 MG/DL (ref 70–99)
HCT VFR BLD AUTO: 31.3 % (ref 40–53)
HGB BLD-MCNC: 9.9 G/DL (ref 13.3–17.7)
MCH RBC QN AUTO: 28.5 PG (ref 26.5–33)
MCHC RBC AUTO-ENTMCNC: 31.6 G/DL (ref 31.5–36.5)
MCV RBC AUTO: 90 FL (ref 78–100)
PHOSPHATE SERPL-MCNC: 2.6 MG/DL (ref 2.5–4.5)
PLATELET # BLD AUTO: 127 10E3/UL (ref 150–450)
POTASSIUM BLD-SCNC: 4.1 MMOL/L (ref 3.4–5.3)
RBC # BLD AUTO: 3.47 10E6/UL (ref 4.4–5.9)
SODIUM SERPL-SCNC: 135 MMOL/L (ref 133–144)
WBC # BLD AUTO: 8 10E3/UL (ref 4–11)

## 2022-01-22 PROCEDURE — 99232 SBSQ HOSP IP/OBS MODERATE 35: CPT | Performed by: INTERNAL MEDICINE

## 2022-01-22 PROCEDURE — 93005 ELECTROCARDIOGRAM TRACING: CPT

## 2022-01-22 PROCEDURE — 97530 THERAPEUTIC ACTIVITIES: CPT | Mod: GO | Performed by: OCCUPATIONAL THERAPIST

## 2022-01-22 PROCEDURE — 250N000013 HC RX MED GY IP 250 OP 250 PS 637: Performed by: INTERNAL MEDICINE

## 2022-01-22 PROCEDURE — 84100 ASSAY OF PHOSPHORUS: CPT | Performed by: INTERNAL MEDICINE

## 2022-01-22 PROCEDURE — 250N000011 HC RX IP 250 OP 636: Performed by: INTERNAL MEDICINE

## 2022-01-22 PROCEDURE — 120N000001 HC R&B MED SURG/OB

## 2022-01-22 PROCEDURE — 80048 BASIC METABOLIC PNL TOTAL CA: CPT | Performed by: INTERNAL MEDICINE

## 2022-01-22 PROCEDURE — 250N000009 HC RX 250: Performed by: PHYSICIAN ASSISTANT

## 2022-01-22 PROCEDURE — 258N000003 HC RX IP 258 OP 636: Performed by: PHYSICIAN ASSISTANT

## 2022-01-22 PROCEDURE — 36415 COLL VENOUS BLD VENIPUNCTURE: CPT | Performed by: INTERNAL MEDICINE

## 2022-01-22 PROCEDURE — 97110 THERAPEUTIC EXERCISES: CPT | Mod: GO | Performed by: OCCUPATIONAL THERAPIST

## 2022-01-22 PROCEDURE — 250N000011 HC RX IP 250 OP 636: Performed by: PHYSICIAN ASSISTANT

## 2022-01-22 PROCEDURE — 85041 AUTOMATED RBC COUNT: CPT | Performed by: INTERNAL MEDICINE

## 2022-01-22 PROCEDURE — 250N000013 HC RX MED GY IP 250 OP 250 PS 637: Performed by: PHYSICIAN ASSISTANT

## 2022-01-22 RX ORDER — METOPROLOL SUCCINATE 25 MG/1
25 TABLET, EXTENDED RELEASE ORAL 2 TIMES DAILY
Status: DISCONTINUED | OUTPATIENT
Start: 2022-01-22 | End: 2022-01-23

## 2022-01-22 RX ORDER — METOPROLOL TARTRATE 1 MG/ML
5 INJECTION, SOLUTION INTRAVENOUS ONCE
Status: COMPLETED | OUTPATIENT
Start: 2022-01-22 | End: 2022-01-22

## 2022-01-22 RX ORDER — MAGNESIUM CARB/ALUMINUM HYDROX 105-160MG
148 TABLET,CHEWABLE ORAL ONCE
Status: DISCONTINUED | OUTPATIENT
Start: 2022-01-22 | End: 2022-01-23

## 2022-01-22 RX ORDER — FUROSEMIDE 40 MG
40 TABLET ORAL DAILY
Status: DISCONTINUED | OUTPATIENT
Start: 2022-01-22 | End: 2022-01-23

## 2022-01-22 RX ADMIN — METOPROLOL TARTRATE 12.5 MG: 25 TABLET, FILM COATED ORAL at 08:14

## 2022-01-22 RX ADMIN — METOPROLOL SUCCINATE 25 MG: 25 TABLET, EXTENDED RELEASE ORAL at 20:26

## 2022-01-22 RX ADMIN — SENNOSIDES AND DOCUSATE SODIUM 1 TABLET: 50; 8.6 TABLET ORAL at 20:26

## 2022-01-22 RX ADMIN — OXYCODONE HYDROCHLORIDE 10 MG: 5 TABLET ORAL at 17:14

## 2022-01-22 RX ADMIN — HEPARIN SODIUM 5000 UNITS: 5000 INJECTION INTRAVENOUS; SUBCUTANEOUS at 13:06

## 2022-01-22 RX ADMIN — AMIODARONE HYDROCHLORIDE 200 MG: 200 TABLET ORAL at 08:14

## 2022-01-22 RX ADMIN — ACETAMINOPHEN 975 MG: 325 TABLET, FILM COATED ORAL at 05:45

## 2022-01-22 RX ADMIN — METHOCARBAMOL 500 MG: 500 TABLET ORAL at 15:43

## 2022-01-22 RX ADMIN — METOPROLOL TARTRATE 5 MG: 5 INJECTION INTRAVENOUS at 09:34

## 2022-01-22 RX ADMIN — OXYCODONE HYDROCHLORIDE 5 MG: 5 TABLET ORAL at 08:14

## 2022-01-22 RX ADMIN — GABAPENTIN 100 MG: 100 CAPSULE ORAL at 21:18

## 2022-01-22 RX ADMIN — HYDROMORPHONE HYDROCHLORIDE 0.4 MG: 0.2 INJECTION, SOLUTION INTRAMUSCULAR; INTRAVENOUS; SUBCUTANEOUS at 04:22

## 2022-01-22 RX ADMIN — HEPARIN SODIUM 5000 UNITS: 5000 INJECTION INTRAVENOUS; SUBCUTANEOUS at 05:45

## 2022-01-22 RX ADMIN — HYDROMORPHONE HYDROCHLORIDE 0.4 MG: 0.2 INJECTION, SOLUTION INTRAMUSCULAR; INTRAVENOUS; SUBCUTANEOUS at 00:39

## 2022-01-22 RX ADMIN — PANTOPRAZOLE SODIUM 40 MG: 40 TABLET, DELAYED RELEASE ORAL at 08:14

## 2022-01-22 RX ADMIN — ROSUVASTATIN CALCIUM 40 MG: 20 TABLET, FILM COATED ORAL at 08:14

## 2022-01-22 RX ADMIN — POLYETHYLENE GLYCOL 3350 17 G: 17 POWDER, FOR SOLUTION ORAL at 08:13

## 2022-01-22 RX ADMIN — AMIODARONE HYDROCHLORIDE 150 MG: 1.5 INJECTION, SOLUTION INTRAVENOUS at 09:35

## 2022-01-22 RX ADMIN — METHOCARBAMOL 500 MG: 500 TABLET ORAL at 08:15

## 2022-01-22 RX ADMIN — OXYCODONE HYDROCHLORIDE 10 MG: 5 TABLET ORAL at 13:07

## 2022-01-22 RX ADMIN — TRAZODONE HYDROCHLORIDE 200 MG: 100 TABLET ORAL at 21:18

## 2022-01-22 RX ADMIN — HYDROMORPHONE HYDROCHLORIDE 0.4 MG: 0.2 INJECTION, SOLUTION INTRAMUSCULAR; INTRAVENOUS; SUBCUTANEOUS at 15:43

## 2022-01-22 RX ADMIN — SENNOSIDES AND DOCUSATE SODIUM 1 TABLET: 50; 8.6 TABLET ORAL at 08:14

## 2022-01-22 RX ADMIN — HEPARIN SODIUM 5000 UNITS: 5000 INJECTION INTRAVENOUS; SUBCUTANEOUS at 21:21

## 2022-01-22 RX ADMIN — ASPIRIN 81 MG CHEWABLE TABLET 162 MG: 81 TABLET CHEWABLE at 08:14

## 2022-01-22 RX ADMIN — FUROSEMIDE 40 MG: 40 TABLET ORAL at 13:07

## 2022-01-22 RX ADMIN — AMIODARONE HYDROCHLORIDE 1 MG/MIN: 50 INJECTION, SOLUTION INTRAVENOUS at 09:57

## 2022-01-22 RX ADMIN — HYDROMORPHONE HYDROCHLORIDE 0.4 MG: 0.2 INJECTION, SOLUTION INTRAMUSCULAR; INTRAVENOUS; SUBCUTANEOUS at 20:27

## 2022-01-22 ASSESSMENT — ACTIVITIES OF DAILY LIVING (ADL)
ADLS_ACUITY_SCORE: 11

## 2022-01-22 NOTE — PROGRESS NOTES
[facility]    Medicine Progress Note - Hospitalist Service       Date of Admission:  1/12/2022  Assessment & Plan   66 year old male with hx of CAD s/p PCI, chronic a-fib on chronic anticoagulation with apixaban, CHF, HTN, and chronic back pain s/p indwelling pain pump who was admitted on 1/12/2022 for NSTEMI. He was found to have severe in-stent re-stenosis of mid-LAD and severe stenosis distal to previously stents, and underwent CABG today:    Principal Problem:  Principal Problem:    NSTEMI w Unstab Angina -- S/P CABG x 1 (LIMA to LAD) on 1/19/22    -- POD #2, doing well       Chronic diastolic heart failure -- stable       Paroxysmal atrial fib -- S/P Pulm Vein Ablation 1/19/22    Afib with RVR last PM    -- switched to IV Amio, now in NSR again      Anemia, acute blood loss    Thrombocytopenia -- 2nd to CAGB   -- stable     Active Problems:    Morbid obesity -- BMI 42.0    Hx of Gastric Bypass in 2008    Hx of DM type 2 -- now diet controlled since 2008   -- will stop glucometer checks since all <150       MARLEEN (doesn't tolerate CPAP)        Essential hypertension      Chronic Back Pain (IT Pump w Morphine, Clonidine, Baclofen)     Chronic combined systolic and diastolic CHF  TTE findings noted above  - Continues on PTA Lasix    GERD  - Continues on PTA omeprazole    Chronic low back pain  [PTA: morphine per indwelling pain pump, nabumetone 500 mg BID, duloxetine 30 mg qAM, 60 mg qhs; APAP prn; baclofen bid prn]  - Hold PTA nabumetone in setting of NSTEMI  - Continues on other PTA meds with pain pump in place    Resolved Tongue swelling  -- stopped Decadron 1/17/22 and no problems since      Diet: Low Saturated Fat Na <2400 mg  Snacks/Supplements Adult: Ensure Enlive; Between Meals    DVT Prophylaxis: IV heparin  Bran Catheter: Not present  Code Status: Full Code         Disposition: Continue postop care, will transfer to floor --  probably home on Monday,1/25/22 if continues to do  well.     The patient's care was discussed with the Patient.    Ej Keane MD  Hospitalist Service  [facility]  Contact information available via [facility] Paging/Directory    ______________________________________________________________________    Interval History   Feels OK, moderate back pain, but doing well from surgery.     Physical Exam   Vital Signs: Temp: 97.7  F (36.5  C) Temp src: Oral BP: 139/79 Pulse: 76   Resp: 18 SpO2: 94 % O2 Device: None (Room air)    Weight: 300 lbs 0 oz  Constitutional: Appears comfortable  Respiratory: clear, chest tube out.   Cardiovascular: Heart irregular rhythm, trace bilateral ankle edema  GI: +BS, abd obese but soft/NT  Musculoskeletal: trace ankle edema bilaterally  Neuro: Alert and appropriate, no focal deficits    Data   Recent Labs   Lab 01/22/22  0750 01/21/22  2125 01/21/22  1758 01/21/22  0759 01/21/22  0514 01/20/22  0407 01/20/22  0404 01/19/22  1244 01/19/22  1235 01/19/22  1047 01/19/22  1040   WBC 8.0  --   --   --  8.4  --  10.1  --  17.4*  --  16.2*   HGB 9.9*  --   --   --  9.9*  --  10.6*  --  12.0*   < > 11.0*   MCV 90  --   --   --  90  --  87  --  88  --  86   *  --   --   --  122*  --  139*  --  160  --  110*   INR  --   --   --   --   --   --   --   --  1.31*  --  1.57*     --   --   --  134  --  137  --  138   < > 139   POTASSIUM 4.1  --   --   --  4.3  --  4.2  --  4.4  4.4   < > 4.1   CHLORIDE 104  --   --   --  105  --  108  --  110*  --  109   CO2 27  --   --   --  25  --  25  --  23  --  24   BUN 13  --   --   --  12  --  14  --  14  --  16   CR 0.84  --   --   --  0.78  --  0.99  --  0.93  --  0.94   ANIONGAP 4  --   --   --  4  --  4  --  5  --  6   RATNA 8.5  --   --   --  8.4*  --  8.1*  --  8.3*  --  8.9   * 109* 121*   < > 125*   < > 122*   < > 126*   < > 149*   ALBUMIN  --   --   --   --   --   --  3.0*  --  2.5*  --   --    PROTTOTAL  --   --   --   --   --   --  6.0*  --  5.9*  --   --     BILITOTAL  --   --   --   --   --   --  0.9  --  0.8  --   --    ALKPHOS  --   --   --   --   --   --  49  --  56  --   --    ALT  --   --   --   --   --   --  21  --  25  --   --    AST  --   --   --   --   --   --  37  --  26  --   --     < > = values in this interval not displayed.         No results found for this or any previous visit (from the past 24 hour(s)).    Medications     amiodarone 1 mg/min (01/22/22 0957)     amiodarone       phenylephrine Stopped (01/20/22 9855)       aspirin  162 mg Oral or NG Tube Daily     furosemide  40 mg Oral Daily     gabapentin  100 mg Oral At Bedtime     heparin ANTICOAGULANT  5,000 Units Subcutaneous Q8H     magnesium citrate  148 mL Oral Once     metoprolol succinate ER  25 mg Oral BID     pantoprazole  40 mg Oral or NG Tube Daily    Or     pantoprazole  40 mg Oral Daily     polyethylene glycol  17 g Oral Daily     rosuvastatin  40 mg Oral Daily     senna-docusate  1 tablet Oral BID     sodium chloride (PF)  3 mL Intracatheter Q8H     traZODone  200 mg Oral At Bedtime

## 2022-01-22 NOTE — PROGRESS NOTES
[facility]  Cardiovascular and Thoracic Surgery Daily Note          Assessment and Plan:   POD#3 s/p Coronary artery bypass grafting x1 with left internal mammary artery to the left anterior descending, bilateral pulmonary vein isolation with the AtriCure bipolar radiofrequency ablation device Synergy, exclusion of left atrial appendage, intraoperative transesophageal echocardiography by Dr. William Dumont     -CVS: HR: 80s-100s. SBP: 120s-140ss. Telemetry a.fib this am with conversion to NSR after IV metoprolol and IV amio bolus, prev NSR, frequent PVCs often bigeminal with short nonsustained runs of VT- EP consulted. Pre op EF: 55-60%. ASA, BB on hold due pressor need- will resume when able, statin. Per EP continue amiodarone 200mg BID for one month and will need anticoagulation for paroxysmal AF prior to discharge. Pacing wires capped. Chest tubes to remain in today. Wound vac in place.      -Resp: Extubated within protocol. Saturating well on RA. Continue to encourage IS, cough, deep breathing, ambulation. Chest tubes/wires removed this am, anticoagulation tomorrow     -Neuro:  Grossly intact. Pain controlled.      -Renal: good UO, up 6kg above pre-op weight. Lasix PO today     -GI:  Tolerating diet. No BM. Continue bowel regimen, mag citrate this am     -: voiding on own     -Endo: pre op A1c: 5.4. minimal insulin infusion requirements. Will transition to sliding scale insulin.      -FEN: replete lytes as needed, ADAT. Na: 135. K: 4.1  Orders Placed This Encounter      Low Saturated Fat Na <2400 mg     -ID: WBC: 8.0 .Temp (24hrs), Av.4  F (36.9  C), Min:97.6  F (36.4  C), Max:99.5  F (37.5  C)   Completed perioperative abx     -Heme: Hgb: 9.9 plt: 127. Acute blood loss anemia and thrombocytopenia related to surgery     -Proph: PCD, ASA, statin, PPI, sub q heparin     -Dispo: St 33. Continue therapies. Continue to encourage IS, cough, deep breathing, ambulation.      Seen and discussed  with Dr. Jackson          Interval History:   Sitting up in chair, eating breakfast, pain controlled, tolerating diet, working with therapies, CTs removed this am         Medications:       aspirin  162 mg Oral or NG Tube Daily     gabapentin  100 mg Oral At Bedtime     heparin ANTICOAGULANT  5,000 Units Subcutaneous Q8H     metoprolol tartrate  12.5 mg Oral BID     pantoprazole  40 mg Oral or NG Tube Daily    Or     pantoprazole  40 mg Oral Daily     polyethylene glycol  17 g Oral Daily     rosuvastatin  40 mg Oral Daily     senna-docusate  1 tablet Oral BID     sodium chloride (PF)  3 mL Intracatheter Q8H     traZODone  200 mg Oral At Bedtime     @MEDSPRN-@          Physical Exam:   Vitals were reviewed  Blood pressure 119/72, pulse 76, temperature 97.6  F (36.4  C), temperature source Oral, resp. rate 11, weight 136.1 kg (300 lb), SpO2 95 %.  Rhythm: currently NSR    Lungs: course    Cardiovascular: s1/s2, no m/r/g    Abdomen: s/nt/nd    Extremeties: no LE edema    Incision: cdi    CT: na    Weight:   Vitals:    01/19/22 0600 01/20/22 0428 01/21/22 0600 01/21/22 1738   Weight: 138.2 kg (304 lb 9.6 oz) 143.6 kg (316 lb 9.3 oz) 140.8 kg (310 lb 6.5 oz) 143.2 kg (315 lb 11.2 oz)    01/22/22 0436   Weight: 136.1 kg (300 lb)            Data:   Labs:   Lab Results   Component Value Date    WBC 8.0 01/22/2022     Lab Results   Component Value Date    RBC 3.47 01/22/2022     Lab Results   Component Value Date    HGB 9.9 01/22/2022     Lab Results   Component Value Date    HCT 31.3 01/22/2022     No components found for: MCT  Lab Results   Component Value Date    MCV 90 01/22/2022     Lab Results   Component Value Date    MCH 28.5 01/22/2022     Lab Results   Component Value Date    MCHC 31.6 01/22/2022     Lab Results   Component Value Date    RDW 13.7 01/22/2022     Lab Results   Component Value Date     01/22/2022       Last Basic Metabolic Panel:  Lab Results   Component Value Date     01/22/2022      Lab  Results   Component Value Date    POTASSIUM 4.1 01/22/2022     Lab Results   Component Value Date    CHLORIDE 104 01/22/2022     Lab Results   Component Value Date    RATNA 8.5 01/22/2022     Lab Results   Component Value Date    CO2 27 01/22/2022     Lab Results   Component Value Date    BUN 13 01/22/2022     Lab Results   Component Value Date    CR 0.84 01/22/2022     Lab Results   Component Value Date     01/22/2022         Mckayla Armendariz PA-C  Pager #: [phone]

## 2022-01-22 NOTE — PROGRESS NOTES
[facility]    Cardiology Progress Note     Assessment & Plan   Onur Barr is a 66 year old male who was admitted on 1/12/2022.     I have previously met Mr. Barr last week prior to his CABG.  He is now POD#3 s/p Coronary artery bypass grafting x1 with left internal mammary artery to the left anterior descending, bilateral pulmonary vein isolation with the AtriCure bipolar radiofrequency ablation device Synergy, exclusion of left atrial appendage.    The patient developed Afib around 9:30 am and CV surgery team started amiodarone bolus and infusion and the patient converted to NSR in less than an hour.    Cardiology is re-consulted for Afib.    The patient has a history of Afib and essentially this is a reoccurrence of Afib in the setting of the post-operative state.    Problems:  Recurrence of Afib (prior history of Afib)  POD3 from CABG lima-LAD  HTN  HLD  DM2   Obesity    Plan:  Agree with amiodarone infusion and then transition to oral amiodarone 200mg TID for 1 week and then 200mg daily for 1-2 months to suppress afib following CABG (would defer to CV surgery if they prefer a different duration of therapy).    Agree with ASA, metoprolol, crestor     Anticoagulation can be restarted when cleared by CV surgery (was on apixaban PTA)        Neri Osorio MD    Interval History   Developed Afib with RVR this AM that resolved with amiodarone    Physical Exam   Temp: 97.7  F (36.5  C) Temp src: Oral BP: 139/79 Pulse: 76   Resp: 18 SpO2: 94 % O2 Device: None (Room air)    Vitals:    01/21/22 0600 01/21/22 1738 01/22/22 0436   Weight: 140.8 kg (310 lb 6.5 oz) 143.2 kg (315 lb 11.2 oz) 136.1 kg (300 lb)     Vital Signs with Ranges  Temp:  [97.6  F (36.4  C)-99.5  F (37.5  C)] 97.7  F (36.5  C)  Pulse:  [] 76  Resp:  [7-32] 18  BP: ()/(56-80) 139/79  SpO2:  [92 %-96 %] 94 %  I/O last 3 completed shifts:  In: 240 [P.O.:240]  Out: 980 [Urine:900; Chest Tube:80]    GENERAL  APPEARANCE: Alert and oriented x3. No acute distress.  SKIN: Inspection of the skin reveals no rashes, ulcerations, warm, dry  NECK: Supple and symmetric.  normal JVP  LUNGS: Clear breath sounds throughout bilaterally, no wheezes, no rhonchi  CARDIOVASCULAR: S1, S2, regular rate and rhythm without any murmurs, gallops, rubs. Sternal site present  ABDOMEN: Soft, non-tender, non-distended with normal bowel sounds.  No ascites noted.  EXTREMITIES: 1+ edema.  NEUROLOGIC:  Normal mood and affect.  Sensation to touch was normal.    Medications     amiodarone 1 mg/min (01/22/22 0957)     amiodarone       phenylephrine Stopped (01/20/22 1545)       aspirin  162 mg Oral or NG Tube Daily     furosemide  40 mg Oral Daily     gabapentin  100 mg Oral At Bedtime     heparin ANTICOAGULANT  5,000 Units Subcutaneous Q8H     magnesium citrate  148 mL Oral Once     metoprolol succinate ER  25 mg Oral BID     pantoprazole  40 mg Oral or NG Tube Daily    Or     pantoprazole  40 mg Oral Daily     polyethylene glycol  17 g Oral Daily     rosuvastatin  40 mg Oral Daily     senna-docusate  1 tablet Oral BID     sodium chloride (PF)  3 mL Intracatheter Q8H     traZODone  200 mg Oral At Bedtime       Data   Results for orders placed or performed during the hospital encounter of 01/12/22 (from the past 24 hour(s))   Glucose by meter   Result Value Ref Range    GLUCOSE BY METER POCT 121 (H) 70 - 99 mg/dL   Glucose by meter   Result Value Ref Range    GLUCOSE BY METER POCT 109 (H) 70 - 99 mg/dL   Basic metabolic panel   Result Value Ref Range    Sodium 135 133 - 144 mmol/L    Potassium 4.1 3.4 - 5.3 mmol/L    Chloride 104 94 - 109 mmol/L    Carbon Dioxide (CO2) 27 20 - 32 mmol/L    Anion Gap 4 3 - 14 mmol/L    Urea Nitrogen 13 7 - 30 mg/dL    Creatinine 0.84 0.66 - 1.25 mg/dL    Calcium 8.5 8.5 - 10.1 mg/dL    Glucose 122 (H) 70 - 99 mg/dL    GFR Estimate >90 >60 mL/min/1.73m2   CBC with platelets   Result Value Ref Range    WBC Count 8.0 4.0 -  11.0 10e3/uL    RBC Count 3.47 (L) 4.40 - 5.90 10e6/uL    Hemoglobin 9.9 (L) 13.3 - 17.7 g/dL    Hematocrit 31.3 (L) 40.0 - 53.0 %    MCV 90 78 - 100 fL    MCH 28.5 26.5 - 33.0 pg    MCHC 31.6 31.5 - 36.5 g/dL    RDW 13.7 10.0 - 15.0 %    Platelet Count 127 (L) 150 - 450 10e3/uL   Phosphorus   Result Value Ref Range    Phosphorus 2.6 2.5 - 4.5 mg/dL   EKG 12-lead, tracing only   Result Value Ref Range    Systolic Blood Pressure  mmHg    Diastolic Blood Pressure  mmHg    Ventricular Rate 121 BPM    Atrial Rate 92 BPM    MI Interval  ms    QRS Duration 96 ms     ms    QTc 448 ms    P Axis  degrees    R AXIS -60 degrees    T Axis 96 degrees    Interpretation ECG       Atrial fibrillation with rapid ventricular response with premature ventricular or aberrantly conducted complexes  Low voltage QRS  Incomplete right bundle branch block  Left anterior fascicular block  Cannot rule out Anteroseptal infarct , age undetermined  Abnormal ECG  When compared with ECG of 19-JAN-2022 12:22,  Significant changes have occurred

## 2022-01-22 NOTE — PLAN OF CARE
A/Ox4. VSS. Tele: NSR with frequent PVCS, a few 3-6 beat runs of Vtach, CTM /EP note. LS: clear and diminished. Cardiac diet. Pt has chronic back pain along with chest incision pain. Pain managed with dilaudid, Robaxin, toradol. Up with 2. CT 2-1 to suction, no air leak, no crepitus.Voiding via urinal. Borderline low urine output. Bruising throughout body. Ct site WDL. Wound vac on sternal incision. Pacer wires capped.

## 2022-01-22 NOTE — PROGRESS NOTES
Pt went into A-fib RVR around 9:30 am. STAT EKG ordered and confirmed RVR. MENDY Block, notified and 5mg IV metoprolol given, still in RVR. Amio bolus and amio drip started @ 10 am, pt in A-Fib CVR around 10:15. Converted to NSR w/ PVCs @ 1036 (had been in this rhythm previously).

## 2022-01-22 NOTE — PLAN OF CARE
Pt arrived on floor from ICU @ 1730. A/Ox4, VSS on RA. TELE SR w/ frequent PVCs. C/o 6-7 out of 10 back pain, pt states his baseline is a 6 at home. Bariatric mattress ordered. 2 chest tubes to one atrium, no air leak noted. To suction. Sternal incision w/ continuous wound vac. LS dim, very SOTOMAYOR. Up with A2/GB/W. Small skin tear under R pannus, cleaned and interdry placed. Pacer wires capped, pacer box at bedside. Discharge pending progress.

## 2022-01-23 ENCOUNTER — APPOINTMENT (OUTPATIENT)
Dept: GENERAL RADIOLOGY | Facility: CLINIC | Age: 67
End: 2022-01-23
Attending: PHYSICIAN ASSISTANT
Payer: COMMERCIAL

## 2022-01-23 ENCOUNTER — APPOINTMENT (OUTPATIENT)
Dept: OCCUPATIONAL THERAPY | Facility: CLINIC | Age: 67
End: 2022-01-23
Payer: COMMERCIAL

## 2022-01-23 LAB
ANION GAP SERPL CALCULATED.3IONS-SCNC: 6 MMOL/L (ref 3–14)
BUN SERPL-MCNC: 12 MG/DL (ref 7–30)
CALCIUM SERPL-MCNC: 8.3 MG/DL (ref 8.5–10.1)
CHLORIDE BLD-SCNC: 100 MMOL/L (ref 94–109)
CO2 SERPL-SCNC: 26 MMOL/L (ref 20–32)
CREAT SERPL-MCNC: 0.86 MG/DL (ref 0.66–1.25)
ERYTHROCYTE [DISTWIDTH] IN BLOOD BY AUTOMATED COUNT: 14 % (ref 10–15)
GFR SERPL CREATININE-BSD FRML MDRD: >90 ML/MIN/1.73M2
GLUCOSE BLD-MCNC: 168 MG/DL (ref 70–99)
HCT VFR BLD AUTO: 33 % (ref 40–53)
HGB BLD-MCNC: 10.5 G/DL (ref 13.3–17.7)
MAGNESIUM SERPL-MCNC: 2 MG/DL (ref 1.6–2.3)
MCH RBC QN AUTO: 28.2 PG (ref 26.5–33)
MCHC RBC AUTO-ENTMCNC: 31.8 G/DL (ref 31.5–36.5)
MCV RBC AUTO: 89 FL (ref 78–100)
PLATELET # BLD AUTO: 173 10E3/UL (ref 150–450)
PLATELET # BLD AUTO: 173 10E3/UL (ref 150–450)
POTASSIUM BLD-SCNC: 3.5 MMOL/L (ref 3.4–5.3)
RBC # BLD AUTO: 3.72 10E6/UL (ref 4.4–5.9)
SODIUM SERPL-SCNC: 132 MMOL/L (ref 133–144)
WBC # BLD AUTO: 8.6 10E3/UL (ref 4–11)

## 2022-01-23 PROCEDURE — 85027 COMPLETE CBC AUTOMATED: CPT | Performed by: PHYSICIAN ASSISTANT

## 2022-01-23 PROCEDURE — 250N000013 HC RX MED GY IP 250 OP 250 PS 637: Performed by: INTERNAL MEDICINE

## 2022-01-23 PROCEDURE — 250N000011 HC RX IP 250 OP 636: Performed by: INTERNAL MEDICINE

## 2022-01-23 PROCEDURE — 97530 THERAPEUTIC ACTIVITIES: CPT | Mod: GO | Performed by: OCCUPATIONAL THERAPIST

## 2022-01-23 PROCEDURE — 99232 SBSQ HOSP IP/OBS MODERATE 35: CPT | Performed by: INTERNAL MEDICINE

## 2022-01-23 PROCEDURE — 97535 SELF CARE MNGMENT TRAINING: CPT | Mod: GO | Performed by: OCCUPATIONAL THERAPIST

## 2022-01-23 PROCEDURE — 36415 COLL VENOUS BLD VENIPUNCTURE: CPT | Performed by: PHYSICIAN ASSISTANT

## 2022-01-23 PROCEDURE — 250N000011 HC RX IP 250 OP 636: Performed by: PHYSICIAN ASSISTANT

## 2022-01-23 PROCEDURE — 250N000009 HC RX 250: Performed by: INTERNAL MEDICINE

## 2022-01-23 PROCEDURE — 80048 BASIC METABOLIC PNL TOTAL CA: CPT | Performed by: PHYSICIAN ASSISTANT

## 2022-01-23 PROCEDURE — 99231 SBSQ HOSP IP/OBS SF/LOW 25: CPT | Performed by: INTERNAL MEDICINE

## 2022-01-23 PROCEDURE — 120N000001 HC R&B MED SURG/OB

## 2022-01-23 PROCEDURE — 85049 AUTOMATED PLATELET COUNT: CPT | Performed by: INTERNAL MEDICINE

## 2022-01-23 PROCEDURE — 250N000013 HC RX MED GY IP 250 OP 250 PS 637: Performed by: PHYSICIAN ASSISTANT

## 2022-01-23 PROCEDURE — 36415 COLL VENOUS BLD VENIPUNCTURE: CPT | Performed by: INTERNAL MEDICINE

## 2022-01-23 PROCEDURE — 999N000065 XR CHEST 2 VW

## 2022-01-23 PROCEDURE — 83735 ASSAY OF MAGNESIUM: CPT | Performed by: PHYSICIAN ASSISTANT

## 2022-01-23 RX ORDER — METOPROLOL SUCCINATE 50 MG/1
50 TABLET, EXTENDED RELEASE ORAL 2 TIMES DAILY
Status: DISCONTINUED | OUTPATIENT
Start: 2022-01-23 | End: 2022-01-24

## 2022-01-23 RX ORDER — ASPIRIN 81 MG/1
81 TABLET, CHEWABLE ORAL DAILY
Status: DISCONTINUED | OUTPATIENT
Start: 2022-01-23 | End: 2022-01-25 | Stop reason: HOSPADM

## 2022-01-23 RX ORDER — METOPROLOL TARTRATE 1 MG/ML
5 INJECTION, SOLUTION INTRAVENOUS ONCE
Status: COMPLETED | OUTPATIENT
Start: 2022-01-23 | End: 2022-01-23

## 2022-01-23 RX ORDER — FUROSEMIDE 10 MG/ML
40 INJECTION INTRAMUSCULAR; INTRAVENOUS ONCE
Status: COMPLETED | OUTPATIENT
Start: 2022-01-23 | End: 2022-01-23

## 2022-01-23 RX ORDER — AMIODARONE HYDROCHLORIDE 200 MG/1
400 TABLET ORAL 2 TIMES DAILY
Status: DISCONTINUED | OUTPATIENT
Start: 2022-01-23 | End: 2022-01-25 | Stop reason: HOSPADM

## 2022-01-23 RX ADMIN — AMIODARONE HYDROCHLORIDE 400 MG: 200 TABLET ORAL at 20:52

## 2022-01-23 RX ADMIN — OXYCODONE HYDROCHLORIDE 10 MG: 5 TABLET ORAL at 09:17

## 2022-01-23 RX ADMIN — FUROSEMIDE 40 MG: 10 INJECTION, SOLUTION INTRAVENOUS at 09:23

## 2022-01-23 RX ADMIN — SENNOSIDES AND DOCUSATE SODIUM 1 TABLET: 50; 8.6 TABLET ORAL at 20:52

## 2022-01-23 RX ADMIN — METOPROLOL SUCCINATE 50 MG: 50 TABLET, EXTENDED RELEASE ORAL at 20:52

## 2022-01-23 RX ADMIN — SENNOSIDES AND DOCUSATE SODIUM 1 TABLET: 50; 8.6 TABLET ORAL at 09:08

## 2022-01-23 RX ADMIN — OXYCODONE HYDROCHLORIDE 10 MG: 5 TABLET ORAL at 17:56

## 2022-01-23 RX ADMIN — METHOCARBAMOL 500 MG: 500 TABLET ORAL at 23:06

## 2022-01-23 RX ADMIN — ROSUVASTATIN CALCIUM 40 MG: 20 TABLET, FILM COATED ORAL at 09:08

## 2022-01-23 RX ADMIN — TRAZODONE HYDROCHLORIDE 200 MG: 100 TABLET ORAL at 20:51

## 2022-01-23 RX ADMIN — APIXABAN 5 MG: 5 TABLET, FILM COATED ORAL at 20:52

## 2022-01-23 RX ADMIN — OXYCODONE HYDROCHLORIDE 10 MG: 5 TABLET ORAL at 13:36

## 2022-01-23 RX ADMIN — METOPROLOL TARTRATE 5 MG: 5 INJECTION INTRAVENOUS at 17:01

## 2022-01-23 RX ADMIN — OXYCODONE HYDROCHLORIDE 5 MG: 5 TABLET ORAL at 23:07

## 2022-01-23 RX ADMIN — METHOCARBAMOL 500 MG: 500 TABLET ORAL at 15:30

## 2022-01-23 RX ADMIN — METHOCARBAMOL 500 MG: 500 TABLET ORAL at 09:17

## 2022-01-23 RX ADMIN — ASPIRIN 81 MG CHEWABLE TABLET 81 MG: 81 TABLET CHEWABLE at 09:08

## 2022-01-23 RX ADMIN — PANTOPRAZOLE SODIUM 40 MG: 40 TABLET, DELAYED RELEASE ORAL at 09:08

## 2022-01-23 RX ADMIN — AMIODARONE HYDROCHLORIDE 400 MG: 200 TABLET ORAL at 09:08

## 2022-01-23 RX ADMIN — OXYCODONE HYDROCHLORIDE 10 MG: 5 TABLET ORAL at 00:14

## 2022-01-23 RX ADMIN — METOPROLOL SUCCINATE 50 MG: 50 TABLET, EXTENDED RELEASE ORAL at 09:08

## 2022-01-23 RX ADMIN — APIXABAN 5 MG: 5 TABLET, FILM COATED ORAL at 09:17

## 2022-01-23 RX ADMIN — POLYETHYLENE GLYCOL 3350 17 G: 17 POWDER, FOR SOLUTION ORAL at 09:07

## 2022-01-23 RX ADMIN — HEPARIN SODIUM 5000 UNITS: 5000 INJECTION INTRAVENOUS; SUBCUTANEOUS at 05:34

## 2022-01-23 ASSESSMENT — ACTIVITIES OF DAILY LIVING (ADL)
ADLS_ACUITY_SCORE: 9
ADLS_ACUITY_SCORE: 11
ADLS_ACUITY_SCORE: 9
ADLS_ACUITY_SCORE: 11
ADLS_ACUITY_SCORE: 9
ADLS_ACUITY_SCORE: 9
ADLS_ACUITY_SCORE: 11
ADLS_ACUITY_SCORE: 11
ADLS_ACUITY_SCORE: 9
ADLS_ACUITY_SCORE: 10
ADLS_ACUITY_SCORE: 11

## 2022-01-23 NOTE — PLAN OF CARE
POD #3 CABG x1. A/Ox4, VSS on RA. TELE SR w/ frequent PVCs, converted to A-Fib RVR, started on amio drip and converted back to NSR within an hour, see previous note. Amio drip currently running through PIV @ 0.5 mg/min. Pt's chronic back pain managed with PRN Robaxin, Oxy and IV dilaudid, very minimal relief. Chest tube and pacer wires removed this afternoon, bandage in place and CDI. Sternal incision w/ continuous wound vac @ 125, no output. UOP increased after dose of PO lasix. Pt had med loose BM, noted small streaks of red but pt states he has hemorrhoids. Up in chair for meals with A1/GB/W, good appetite. Discharge pending progress, continue to monitor.    **pt back in A-Fib CVR @ 1830, already on amio drip so no need for new intervention.

## 2022-01-23 NOTE — PLAN OF CARE
Pt is A&Ox4, VSS on RA ex. tachycardia at times. Lung sounds diminished on RA. Tele afib CVR with frequent PVCs. Up w/1-2, gait belt, and walker, voiding in urinal. Cardiac diet. CMS intact, chest tube removal site dressing CDI. Wound vac to sternal incision CDI. Pain managed with PRN oxycodone and robaxin. IV running amiodarone gtt at 0.5 mg/min. Will continue to follow plan of care.

## 2022-01-23 NOTE — PROGRESS NOTES
[facility]    Medicine Progress Note - Hospitalist Service       Date of Admission:  1/12/2022  Assessment & Plan   66 year old male with hx of CAD s/p PCI, chronic a-fib on chronic anticoagulation with apixaban, CHF, HTN, and chronic back pain s/p indwelling pain pump who was admitted on 1/12/2022 for NSTEMI. He was found to have severe in-stent re-stenosis of mid-LAD and severe stenosis distal to previously stents, and underwent CABG today:    Principal Problem:  Principal Problem:    NSTEMI w Unstab Angina -- S/P CABG x 1 (LIMA to LAD) on 1/19/22    -- does have wound vac on sternal incision      Chronic diastolic heart failure -- stable       Paroxysmal atrial fib -- S/P Pulm Vein Ablation 1/19/22    Afib with RVR last PM    -- switched to IV Amio, currently in afib with  to 110      Anemia, acute blood loss    Thrombocytopenia -- 2nd to CAGB   -- stable     Active Problems:    Morbid obesity -- BMI 42.0    Hx of Gastric Bypass in 2008    Hx of DM type 2 -- now diet controlled since 2008   -- will stop glucometer checks since all <150       MARLEEN (doesn't tolerate CPAP)        Essential hypertension      Chronic Back Pain (IT Pump w Morphine, Clonidine, Baclofen)     Chronic combined systolic and diastolic CHF  TTE findings noted above  - Continues on PTA Lasix    GERD  - Continues on PTA omeprazole    Chronic low back pain  [PTA: morphine per indwelling pain pump, nabumetone 500 mg BID, duloxetine 30 mg qAM, 60 mg qhs; APAP prn; baclofen bid prn]  - Hold PTA nabumetone in setting of NSTEMI  - Continues on other PTA meds with pain pump in place    Resolved Tongue swelling  -- stopped Decadron 1/17/22 and no problems since      Diet: Low Saturated Fat Na <2400 mg  Snacks/Supplements Adult: Ensure Enlive; Between Meals    DVT Prophylaxis: IV heparin  Bran Catheter: Not present  Code Status: Full Code         Disposition: Continue postop care, will transfer to floor --  probably home  in 1-2 days pending atrial fib and wound vac for sternal wound.     The patient's care was discussed with the Patient.    Ej Keane MD  Hospitalist Service  [facility]  Contact information available via [facility] Paging/Directory    ______________________________________________________________________    Interval History   Feels OK, moderate back pain, but doing well from surgery.     Physical Exam   Vital Signs: Temp: 97.5  F (36.4  C) Temp src: Oral BP: 119/72 Pulse: 82   Resp: 14 SpO2: 92 % O2 Device: None (Room air)    Weight: 299 lbs 13.21 oz  Constitutional: Appears comfortable  Respiratory: clear, chest tube out.   Cardiovascular: Heart irregular rhythm, trace bilateral ankle edema  GI: +BS, abd obese but soft/NT  Musculoskeletal: trace ankle edema bilaterally  Neuro: Alert and appropriate, no focal deficits    Data   Recent Labs   Lab 01/23/22  0603 01/22/22  0750 01/21/22  2125 01/21/22  1758 01/21/22  0759 01/21/22  0514 01/20/22  0407 01/20/22  0404 01/19/22  1244 01/19/22  1235 01/19/22  1047 01/19/22  1040   WBC 8.6 8.0  --   --   --  8.4  --  10.1  --  17.4*  --  16.2*   HGB 10.5* 9.9*  --   --   --  9.9*  --  10.6*  --  12.0*   < > 11.0*   MCV 89 90  --   --   --  90  --  87  --  88  --  86     173 127*  --   --   --  122*  --  139*  --  160  --  110*   INR  --   --   --   --   --   --   --   --   --  1.31*  --  1.57*   NA  --  135  --   --   --  134  --  137  --  138   < > 139   POTASSIUM  --  4.1  --   --   --  4.3  --  4.2  --  4.4  4.4   < > 4.1   CHLORIDE  --  104  --   --   --  105  --  108  --  110*  --  109   CO2  --  27  --   --   --  25  --  25  --  23  --  24   BUN  --  13  --   --   --  12  --  14  --  14  --  16   CR  --  0.84  --   --   --  0.78  --  0.99  --  0.93  --  0.94   ANIONGAP  --  4  --   --   --  4  --  4  --  5  --  6   RATNA  --  8.5  --   --   --  8.4*  --  8.1*  --  8.3*  --  8.9   GLC  --  122* 109* 121*   < > 125*   < > 122*    < > 126*   < > 149*   ALBUMIN  --   --   --   --   --   --   --  3.0*  --  2.5*  --   --    PROTTOTAL  --   --   --   --   --   --   --  6.0*  --  5.9*  --   --    BILITOTAL  --   --   --   --   --   --   --  0.9  --  0.8  --   --    ALKPHOS  --   --   --   --   --   --   --  49  --  56  --   --    ALT  --   --   --   --   --   --   --  21  --  25  --   --    AST  --   --   --   --   --   --   --  37  --  26  --   --     < > = values in this interval not displayed.         Recent Results (from the past 24 hour(s))   XR Chest 2 Views    Narrative    EXAM: XR CHEST 2 VIEW  LOCATION: [facility]  DATE/TIME: 01/23/2022, 8:26 AM    INDICATION: Status post CABG, chest tubes removed.  COMPARISON: 01/20/2022.      Impression    IMPRESSION: Trace right apical pneumothorax. No left pneumothorax. Minimal effusions and associated compressive atelectasis.         Medications       amiodarone  400 mg Oral BID     apixaban ANTICOAGULANT  5 mg Oral BID     aspirin  81 mg Oral or NG Tube Daily     gabapentin  100 mg Oral At Bedtime     metoprolol succinate ER  50 mg Oral BID     pantoprazole  40 mg Oral or NG Tube Daily    Or     pantoprazole  40 mg Oral Daily     polyethylene glycol  17 g Oral Daily     rosuvastatin  40 mg Oral Daily     senna-docusate  1 tablet Oral BID     sodium chloride (PF)  3 mL Intracatheter Q8H     traZODone  200 mg Oral At Bedtime

## 2022-01-23 NOTE — PROGRESS NOTES
[facility]  Cardiovascular and Thoracic Surgery Daily Note          Assessment and Plan:   POD#4 s/p Coronary artery bypass grafting x1 with left internal mammary artery to the left anterior descending, bilateral pulmonary vein isolation with the AtriCure bipolar radiofrequency ablation device Synergy, exclusion of left atrial appendage, intraoperative transesophageal echocardiography by Dr. William Dumont     -CVS: HR: 80s-100s. SBP: 120s-140s. A fib CVR, Pod#3 Telemetry a.fib this am with conversion to NSR after IV metoprolol and IV amio bolus, prev NSR, frequent PVCs often bigeminal with short nonsustained runs of VT- EP consulted. Pre op EF: 55-60%. ASA, BB on XL and inc to 50 mg bid, statin. Will transition to PO amiodarone today, start eliquis, wound vac removed.      -Resp: Extubated within protocol. Saturating well on RA. Continue to encourage IS, cough, deep breathing, ambulation. Chest tubes/wires removed this am, anticoagulation tomorrow     -Neuro:  Grossly intact. Pain controlled.      -Renal: good UO, up 6kg above pre-op weight. Lasix IVP 40 mg as no response to PO     -GI:  Tolerating diet. + BM. Continue bowel regimen      -: voiding on own     -Endo: pre op A1c: 5.4. minimal insulin infusion requirements. Will transition to sliding scale insulin.      -FEN: replete lytes as needed, ADAT. Na & K stable  Orders Placed This Encounter      Low Saturated Fat Na <2400 mg     -ID: WBC: stable 8.0, Temp (24hrs), Av  F (36.7  C), Min:97.6  F (36.4  C), Max:99.2  F (37.3  C),  Completed perioperative abx     -Heme: Hgb & plt stable. Acute blood loss anemia and thrombocytopenia related to surgery     -Proph: PCD, ASA, statin, PPI, sub q heparin     -Dispo: St 33. Continue therapies. Continue to encourage IS, cough, deep breathing, ambulation. Home with home care tomorrow.              Interval History:   Sitting up in chair, breathing stable, tolerating diet, deconditioned and encouraged  to ambulate-give good effort.           Medications:       aspirin  162 mg Oral or NG Tube Daily     furosemide  40 mg Oral Daily     gabapentin  100 mg Oral At Bedtime     heparin ANTICOAGULANT  5,000 Units Subcutaneous Q8H     magnesium citrate  148 mL Oral Once     metoprolol succinate ER  50 mg Oral BID     pantoprazole  40 mg Oral or NG Tube Daily    Or     pantoprazole  40 mg Oral Daily     polyethylene glycol  17 g Oral Daily     rosuvastatin  40 mg Oral Daily     senna-docusate  1 tablet Oral BID     sodium chloride (PF)  3 mL Intracatheter Q8H     traZODone  200 mg Oral At Bedtime     @MEDSPRN-@          Physical Exam:   Vitals were reviewed  Blood pressure 120/78, pulse 92, temperature 97.8  F (36.6  C), temperature source Axillary, resp. rate 18, weight 136 kg (299 lb 13.2 oz), SpO2 96 %.  Rhythm: NSR    Lungs: course    Cardiovascular: s1/s2, no m/r/g    Abdomen: s/nt/nd    Extremeties: no LE edema    Incision: cdi    CT: na    Weight:   Vitals:    01/20/22 0428 01/21/22 0600 01/21/22 1738 01/22/22 0436   Weight: 143.6 kg (316 lb 9.3 oz) 140.8 kg (310 lb 6.5 oz) 143.2 kg (315 lb 11.2 oz) 136.1 kg (300 lb)    01/23/22 0517   Weight: 136 kg (299 lb 13.2 oz)            Data:   Labs:   Lab Results   Component Value Date    WBC 8.0 01/22/2022     Lab Results   Component Value Date    RBC 3.47 01/22/2022     Lab Results   Component Value Date    HGB 9.9 01/22/2022     Lab Results   Component Value Date    HCT 31.3 01/22/2022     No components found for: MCT  Lab Results   Component Value Date    MCV 90 01/22/2022     Lab Results   Component Value Date    MCH 28.5 01/22/2022     Lab Results   Component Value Date    MCHC 31.6 01/22/2022     Lab Results   Component Value Date    RDW 13.7 01/22/2022     Lab Results   Component Value Date     01/23/2022       Last Basic Metabolic Panel:  Lab Results   Component Value Date     01/22/2022      Lab Results   Component Value Date    POTASSIUM 4.1  01/22/2022     Lab Results   Component Value Date    CHLORIDE 104 01/22/2022     Lab Results   Component Value Date    RATNA 8.5 01/22/2022     Lab Results   Component Value Date    CO2 27 01/22/2022     Lab Results   Component Value Date    BUN 13 01/22/2022     Lab Results   Component Value Date    CR 0.84 01/22/2022     Lab Results   Component Value Date     01/22/2022       Seen and discussed with Dr. Renetta Armendariz PAGAURANG  Pager #: [phone]

## 2022-01-23 NOTE — PROGRESS NOTES
Pt. Alert and oriented x4. Vital signs stable on RA. Assist of 1. Tolerating Low saturated fat Na diet. Lung sounds clear. Bowel sounds normactive. No BM on shift. Adequately voiding urine. Skin: sternal surgical incision clean and intact on continuous wound vac @125, no output. Chest tube removed. Dressing clean, dry & intact. Currently on Amio drip 0.5 mg/min. Tele: A-fib w/ frequent PVCs. Chronic back pain. Pain managed with Robaxin, Oxy, & Dilaudid. Denies nausea.

## 2022-01-23 NOTE — PROVIDER NOTIFICATION
*Pt had sustained run of V-Tach (77 beats) after ambulating around 100 feet into hallway. Asymptomatic, /73, HR 77. Mckayla, CV-PA notified and 5mg IV metoprolol ordered STAT along with BMP, Mag labs.    *Dr. Osorio, cardiology, also notified. Recommends pt be on bedrest with commode privileges until EP sees him tomorrow for possible defib placement. TELE strip printed and placed in pt's chart. Pt now back in SR after V-tach run.

## 2022-01-23 NOTE — PROGRESS NOTES
[facility]    Cardiology Progress Note     Assessment & Plan   Onur Barr is a 66 year old male who was admitted on 1/12/2022.     I have previously met Mr. Barr last week prior to his CABG.  He is now POD#3 s/p Coronary artery bypass grafting x1 with left internal mammary artery to the left anterior descending, bilateral pulmonary vein isolation with the AtriCure bipolar radiofrequency ablation device Synergy, exclusion of left atrial appendage.    The patient developed Afib around 9:30 am and CV surgery team started amiodarone bolus and infusion and the patient converted to NSR in less than an hour.    Cardiology is re-consulted for Afib.    The patient has a history of Afib and essentially this is a reoccurrence of Afib in the setting of the post-operative state.    Problems:  Recurrence of Afib (prior history of Afib)  POD3 from CABG lima-LAD  HTN  HLD  DM2   Obesity    Plan:  Agree with amiodarone infusion and then transition to oral amiodarone 200mg TID for 1 week and then 200mg daily for 2 months to suppress afib following CABG.    Agree with ASA, metoprolol, crestor     Anticoagulation can be restarted when cleared by CV surgery (was on apixaban PTA)    Cardiology will sign off for now.  Please call with questions.       Neri Osorio MD    Interval History   Developed Afib with RVR yesterday that resolved with amiodarone.  NSVT today but asymptomatic    Physical Exam   Temp: 97.8  F (36.6  C) Temp src: Axillary BP: 108/73 Pulse: 97   Resp: 13 SpO2: 94 % O2 Device: None (Room air)    Vitals:    01/21/22 1738 01/22/22 0436 01/23/22 0517   Weight: 143.2 kg (315 lb 11.2 oz) 136.1 kg (300 lb) 136 kg (299 lb 13.2 oz)     Vital Signs with Ranges  Temp:  [97.6  F (36.4  C)-99.2  F (37.3  C)] 97.8  F (36.6  C)  Pulse:  [] 97  Resp:  [9-27] 13  BP: (102-139)/(60-80) 108/73  SpO2:  [90 %-98 %] 94 %  I/O last 3 completed shifts:  In: 483 [P.O.:480; I.V.:3]  Out: 1494  [Urine:1565]    GENERAL APPEARANCE: Alert and oriented x3. No acute distress.  SKIN: Inspection of the skin reveals no rashes, ulcerations, warm, dry  NECK: Supple and symmetric.  normal JVP  LUNGS: Clear breath sounds throughout bilaterally, no wheezes, no rhonchi  CARDIOVASCULAR: S1, S2, regular rate and rhythm without any murmurs, gallops, rubs. Sternal site present  ABDOMEN: Soft, non-tender, non-distended with normal bowel sounds.  No ascites noted.  EXTREMITIES: 1+ edema.  NEUROLOGIC:  Normal mood and affect.  Sensation to touch was normal.    Medications       amiodarone  400 mg Oral BID     apixaban ANTICOAGULANT  5 mg Oral BID     aspirin  81 mg Oral or NG Tube Daily     gabapentin  100 mg Oral At Bedtime     metoprolol succinate ER  50 mg Oral BID     pantoprazole  40 mg Oral or NG Tube Daily    Or     pantoprazole  40 mg Oral Daily     polyethylene glycol  17 g Oral Daily     rosuvastatin  40 mg Oral Daily     senna-docusate  1 tablet Oral BID     sodium chloride (PF)  3 mL Intracatheter Q8H     traZODone  200 mg Oral At Bedtime       Data   Results for orders placed or performed during the hospital encounter of 01/12/22 (from the past 24 hour(s))   Platelet count   Result Value Ref Range    Platelet Count 173 150 - 450 10e3/uL   CBC with platelets   Result Value Ref Range    WBC Count 8.6 4.0 - 11.0 10e3/uL    RBC Count 3.72 (L) 4.40 - 5.90 10e6/uL    Hemoglobin 10.5 (L) 13.3 - 17.7 g/dL    Hematocrit 33.0 (L) 40.0 - 53.0 %    MCV 89 78 - 100 fL    MCH 28.2 26.5 - 33.0 pg    MCHC 31.8 31.5 - 36.5 g/dL    RDW 14.0 10.0 - 15.0 %    Platelet Count 173 150 - 450 10e3/uL   XR Chest 2 Views    Narrative    EXAM: XR CHEST 2 VIEW  LOCATION: [facility]  DATE/TIME: 01/23/2022, 8:26 AM    INDICATION: Status post CABG, chest tubes removed.  COMPARISON: 01/20/2022.      Impression    IMPRESSION: Trace right apical pneumothorax. No left pneumothorax. Minimal effusions and associated  compressive atelectasis.

## 2022-01-24 ENCOUNTER — APPOINTMENT (OUTPATIENT)
Dept: OCCUPATIONAL THERAPY | Facility: CLINIC | Age: 67
End: 2022-01-24
Payer: COMMERCIAL

## 2022-01-24 LAB
ANION GAP SERPL CALCULATED.3IONS-SCNC: 8 MMOL/L (ref 3–14)
ATRIAL RATE - MUSE: 92 BPM
BUN SERPL-MCNC: 13 MG/DL (ref 7–30)
CALCIUM SERPL-MCNC: 8.7 MG/DL (ref 8.5–10.1)
CHLORIDE BLD-SCNC: 98 MMOL/L (ref 94–109)
CO2 SERPL-SCNC: 25 MMOL/L (ref 20–32)
CREAT SERPL-MCNC: 0.88 MG/DL (ref 0.66–1.25)
DIASTOLIC BLOOD PRESSURE - MUSE: NORMAL MMHG
ERYTHROCYTE [DISTWIDTH] IN BLOOD BY AUTOMATED COUNT: 14.2 % (ref 10–15)
GFR SERPL CREATININE-BSD FRML MDRD: >90 ML/MIN/1.73M2
GLUCOSE BLD-MCNC: 142 MG/DL (ref 70–99)
HCT VFR BLD AUTO: 34.7 % (ref 40–53)
HGB BLD-MCNC: 11.1 G/DL (ref 13.3–17.7)
INTERPRETATION ECG - MUSE: NORMAL
MAGNESIUM SERPL-MCNC: 2.2 MG/DL (ref 1.6–2.3)
MCH RBC QN AUTO: 28 PG (ref 26.5–33)
MCHC RBC AUTO-ENTMCNC: 32 G/DL (ref 31.5–36.5)
MCV RBC AUTO: 87 FL (ref 78–100)
P AXIS - MUSE: NORMAL DEGREES
PHOSPHATE SERPL-MCNC: 2.8 MG/DL (ref 2.5–4.5)
PLATELET # BLD AUTO: 248 10E3/UL (ref 150–450)
POTASSIUM BLD-SCNC: 3.9 MMOL/L (ref 3.4–5.3)
PR INTERVAL - MUSE: NORMAL MS
QRS DURATION - MUSE: 96 MS
QT - MUSE: 316 MS
QTC - MUSE: 448 MS
R AXIS - MUSE: -60 DEGREES
RBC # BLD AUTO: 3.97 10E6/UL (ref 4.4–5.9)
SODIUM SERPL-SCNC: 131 MMOL/L (ref 133–144)
SYSTOLIC BLOOD PRESSURE - MUSE: NORMAL MMHG
T AXIS - MUSE: 96 DEGREES
VENTRICULAR RATE- MUSE: 121 BPM
WBC # BLD AUTO: 10.8 10E3/UL (ref 4–11)

## 2022-01-24 PROCEDURE — 99152 MOD SED SAME PHYS/QHP 5/>YRS: CPT | Performed by: INTERNAL MEDICINE

## 2022-01-24 PROCEDURE — 250N000013 HC RX MED GY IP 250 OP 250 PS 637: Performed by: INTERNAL MEDICINE

## 2022-01-24 PROCEDURE — 99232 SBSQ HOSP IP/OBS MODERATE 35: CPT | Performed by: INTERNAL MEDICINE

## 2022-01-24 PROCEDURE — 02HK3KZ INSERTION OF DEFIBRILLATOR LEAD INTO RIGHT VENTRICLE, PERCUTANEOUS APPROACH: ICD-10-PCS | Performed by: INTERNAL MEDICINE

## 2022-01-24 PROCEDURE — 84100 ASSAY OF PHOSPHORUS: CPT | Performed by: PHYSICIAN ASSISTANT

## 2022-01-24 PROCEDURE — 0JH608Z INSERTION OF DEFIBRILLATOR GENERATOR INTO CHEST SUBCUTANEOUS TISSUE AND FASCIA, OPEN APPROACH: ICD-10-PCS | Performed by: INTERNAL MEDICINE

## 2022-01-24 PROCEDURE — 272N000001 HC OR GENERAL SUPPLY STERILE: Performed by: INTERNAL MEDICINE

## 2022-01-24 PROCEDURE — 99153 MOD SED SAME PHYS/QHP EA: CPT | Performed by: INTERNAL MEDICINE

## 2022-01-24 PROCEDURE — 250N000011 HC RX IP 250 OP 636: Performed by: INTERNAL MEDICINE

## 2022-01-24 PROCEDURE — 02H63KZ INSERTION OF DEFIBRILLATOR LEAD INTO RIGHT ATRIUM, PERCUTANEOUS APPROACH: ICD-10-PCS | Performed by: INTERNAL MEDICINE

## 2022-01-24 PROCEDURE — C1894 INTRO/SHEATH, NON-LASER: HCPCS | Performed by: INTERNAL MEDICINE

## 2022-01-24 PROCEDURE — 85027 COMPLETE CBC AUTOMATED: CPT | Performed by: INTERNAL MEDICINE

## 2022-01-24 PROCEDURE — 120N000001 HC R&B MED SURG/OB

## 2022-01-24 PROCEDURE — 97530 THERAPEUTIC ACTIVITIES: CPT | Mod: GO | Performed by: OCCUPATIONAL THERAPIST

## 2022-01-24 PROCEDURE — C1769 GUIDE WIRE: HCPCS | Performed by: INTERNAL MEDICINE

## 2022-01-24 PROCEDURE — C1898 LEAD, PMKR, OTHER THAN TRANS: HCPCS | Performed by: INTERNAL MEDICINE

## 2022-01-24 PROCEDURE — 36415 COLL VENOUS BLD VENIPUNCTURE: CPT | Performed by: INTERNAL MEDICINE

## 2022-01-24 PROCEDURE — 99223 1ST HOSP IP/OBS HIGH 75: CPT | Performed by: INTERNAL MEDICINE

## 2022-01-24 PROCEDURE — 82310 ASSAY OF CALCIUM: CPT | Performed by: INTERNAL MEDICINE

## 2022-01-24 PROCEDURE — C1721 AICD, DUAL CHAMBER: HCPCS | Performed by: INTERNAL MEDICINE

## 2022-01-24 PROCEDURE — 250N000013 HC RX MED GY IP 250 OP 250 PS 637: Performed by: PHYSICIAN ASSISTANT

## 2022-01-24 PROCEDURE — 250N000009 HC RX 250: Performed by: INTERNAL MEDICINE

## 2022-01-24 PROCEDURE — 83735 ASSAY OF MAGNESIUM: CPT | Performed by: INTERNAL MEDICINE

## 2022-01-24 PROCEDURE — C1895 LEAD, AICD, ENDO DUAL COIL: HCPCS | Performed by: INTERNAL MEDICINE

## 2022-01-24 PROCEDURE — 33249 INSJ/RPLCMT DEFIB W/LEAD(S): CPT | Performed by: INTERNAL MEDICINE

## 2022-01-24 DEVICE — IMPLANTABLE DEVICE: Type: IMPLANTABLE DEVICE | Status: FUNCTIONAL

## 2022-01-24 DEVICE — LEAD INGEVITY+ AF IS1 7841 52CM: Type: IMPLANTABLE DEVICE | Status: FUNCTIONAL

## 2022-01-24 DEVICE — ICD RESONATE EL DR DF4 D433: Type: IMPLANTABLE DEVICE | Status: FUNCTIONAL

## 2022-01-24 RX ORDER — FENTANYL CITRATE 50 UG/ML
INJECTION, SOLUTION INTRAMUSCULAR; INTRAVENOUS
Status: DISCONTINUED | OUTPATIENT
Start: 2022-01-24 | End: 2022-01-24 | Stop reason: HOSPADM

## 2022-01-24 RX ORDER — METOPROLOL SUCCINATE 100 MG/1
100 TABLET, EXTENDED RELEASE ORAL 2 TIMES DAILY
Status: DISCONTINUED | OUTPATIENT
Start: 2022-01-24 | End: 2022-01-24

## 2022-01-24 RX ORDER — CEFAZOLIN SODIUM 2 G/100ML
INJECTION, SOLUTION INTRAVENOUS CONTINUOUS PRN
Status: COMPLETED | OUTPATIENT
Start: 2022-01-24 | End: 2022-01-24

## 2022-01-24 RX ORDER — BUPIVACAINE HYDROCHLORIDE 2.5 MG/ML
INJECTION, SOLUTION EPIDURAL; INFILTRATION; INTRACAUDAL
Status: DISCONTINUED | OUTPATIENT
Start: 2022-01-24 | End: 2022-01-24 | Stop reason: HOSPADM

## 2022-01-24 RX ORDER — LIDOCAINE 40 MG/G
CREAM TOPICAL
Status: CANCELLED | OUTPATIENT
Start: 2022-01-24

## 2022-01-24 RX ORDER — OXYCODONE AND ACETAMINOPHEN 5; 325 MG/1; MG/1
1 TABLET ORAL EVERY 4 HOURS PRN
Status: DISCONTINUED | OUTPATIENT
Start: 2022-01-24 | End: 2022-01-25 | Stop reason: HOSPADM

## 2022-01-24 RX ORDER — FUROSEMIDE 10 MG/ML
40 INJECTION INTRAMUSCULAR; INTRAVENOUS EVERY 12 HOURS
Status: DISCONTINUED | OUTPATIENT
Start: 2022-01-24 | End: 2022-01-25 | Stop reason: HOSPADM

## 2022-01-24 RX ORDER — SODIUM CHLORIDE 450 MG/100ML
INJECTION, SOLUTION INTRAVENOUS CONTINUOUS
Status: CANCELLED | OUTPATIENT
Start: 2022-01-24

## 2022-01-24 RX ADMIN — ASPIRIN 81 MG CHEWABLE TABLET 81 MG: 81 TABLET CHEWABLE at 08:21

## 2022-01-24 RX ADMIN — AMIODARONE HYDROCHLORIDE 400 MG: 200 TABLET ORAL at 20:29

## 2022-01-24 RX ADMIN — OXYCODONE HYDROCHLORIDE 5 MG: 5 TABLET ORAL at 03:35

## 2022-01-24 RX ADMIN — FUROSEMIDE 40 MG: 10 INJECTION, SOLUTION INTRAVENOUS at 21:56

## 2022-01-24 RX ADMIN — GABAPENTIN 100 MG: 100 CAPSULE ORAL at 21:55

## 2022-01-24 RX ADMIN — SENNOSIDES AND DOCUSATE SODIUM 1 TABLET: 50; 8.6 TABLET ORAL at 08:21

## 2022-01-24 RX ADMIN — POLYETHYLENE GLYCOL 3350 17 G: 17 POWDER, FOR SOLUTION ORAL at 08:21

## 2022-01-24 RX ADMIN — SENNOSIDES AND DOCUSATE SODIUM 1 TABLET: 50; 8.6 TABLET ORAL at 20:30

## 2022-01-24 RX ADMIN — TRAZODONE HYDROCHLORIDE 200 MG: 100 TABLET ORAL at 21:55

## 2022-01-24 RX ADMIN — PANTOPRAZOLE SODIUM 40 MG: 40 TABLET, DELAYED RELEASE ORAL at 08:21

## 2022-01-24 RX ADMIN — APIXABAN 5 MG: 5 TABLET, FILM COATED ORAL at 08:21

## 2022-01-24 RX ADMIN — MAGNESIUM HYDROXIDE 30 ML: 400 SUSPENSION ORAL at 12:37

## 2022-01-24 RX ADMIN — METOPROLOL TARTRATE 12.5 MG: 25 TABLET, FILM COATED ORAL at 09:20

## 2022-01-24 RX ADMIN — METHOCARBAMOL 500 MG: 500 TABLET ORAL at 08:21

## 2022-01-24 RX ADMIN — METOPROLOL SUCCINATE 50 MG: 50 TABLET, EXTENDED RELEASE ORAL at 08:21

## 2022-01-24 RX ADMIN — ROSUVASTATIN CALCIUM 40 MG: 20 TABLET, FILM COATED ORAL at 08:21

## 2022-01-24 RX ADMIN — FUROSEMIDE 40 MG: 10 INJECTION, SOLUTION INTRAVENOUS at 11:14

## 2022-01-24 RX ADMIN — METOPROLOL SUCCINATE 75 MG: 50 TABLET, EXTENDED RELEASE ORAL at 20:29

## 2022-01-24 RX ADMIN — AMIODARONE HYDROCHLORIDE 400 MG: 200 TABLET ORAL at 08:21

## 2022-01-24 ASSESSMENT — ACTIVITIES OF DAILY LIVING (ADL)
ADLS_ACUITY_SCORE: 8
ADLS_ACUITY_SCORE: 8
ADLS_ACUITY_SCORE: 10
ADLS_ACUITY_SCORE: 8
ADLS_ACUITY_SCORE: 10
ADLS_ACUITY_SCORE: 8
ADLS_ACUITY_SCORE: 10
ADLS_ACUITY_SCORE: 8
ADLS_ACUITY_SCORE: 12
ADLS_ACUITY_SCORE: 10
ADLS_ACUITY_SCORE: 8
ADLS_ACUITY_SCORE: 10
ADLS_ACUITY_SCORE: 10

## 2022-01-24 NOTE — PROGRESS NOTES
CV Surgery    S: Sitting up in bed, breathing stable, pain controlled, tolerating diet, had a few episodes of vtach yesterday, metoprolol up titrated, holding on discharge until EP sees patient    O: B/P: 112/64, T: 97.5, P: 70, R: 20  General: NAD  Heart: s1/s2, no m/r/g  Lungs: course  Abd: s/nt/nd  Sternum: cdi  Extremities: 1+ LE edema    Lab Results   Component Value Date    WBC 8.6 01/23/2022     Lab Results   Component Value Date    RBC 3.72 01/23/2022     Lab Results   Component Value Date    HGB 10.5 01/23/2022     Lab Results   Component Value Date    HCT 33.0 01/23/2022     No components found for: MCT  Lab Results   Component Value Date    MCV 89 01/23/2022     Lab Results   Component Value Date    MCH 28.2 01/23/2022     Lab Results   Component Value Date    MCHC 31.8 01/23/2022     Lab Results   Component Value Date    RDW 14.0 01/23/2022     Lab Results   Component Value Date     01/23/2022     01/23/2022       Last Basic Metabolic Panel:  Lab Results   Component Value Date     01/23/2022      Lab Results   Component Value Date    POTASSIUM 3.5 01/23/2022     Lab Results   Component Value Date    CHLORIDE 100 01/23/2022     Lab Results   Component Value Date    RATNA 8.3 01/23/2022     Lab Results   Component Value Date    CO2 26 01/23/2022     Lab Results   Component Value Date    BUN 12 01/23/2022     Lab Results   Component Value Date    CR 0.86 01/23/2022     Lab Results   Component Value Date     01/23/2022       A/P: POD#5 4 s/p Coronary artery bypass grafting x1 with left internal mammary artery to the left anterior descending, bilateral pulmonary vein isolation with the AtriCure bipolar radiofrequency ablation device Synergy, exclusion of left atrial appendage, intraoperative transesophageal echocardiography by Dr. William Dumont    Seen and discussed with Dr. Julia Armendariz, [facility]ANG  Pager [phone]

## 2022-01-24 NOTE — PLAN OF CARE
A/O X4. VSS. RA. Tele: A fib CVR with PVCs, has runs of vtach. LS: diminished. Cardiac diet. Chronic back pain/ incision pain managed with oxy and robaxin. Up with 1. Pt on bedrest ex commode until Ep sees him. Scattered bruising. No BM this shift , requests stool softener.  Sternal/CT incision CDI. EP will consult in the morning.

## 2022-01-24 NOTE — PROGRESS NOTES
"[facility]    Medicine Progress Note - Hospitalist Service       Date of Admission:  1/12/2022  Assessment & Plan   66 year old male with hx of CAD s/p PCI, chronic a-fib on chronic anticoagulation with apixaban, CHF, HTN, and chronic back pain s/p indwelling pain pump who was admitted on 1/12/2022 for NSTEMI. He was found to have severe in-stent re-stenosis of mid-LAD and severe stenosis distal to previously stents, and underwent CABG today:    Principal Problem:    NSTEMI w Unstab Angina -- S/P CABG x 1 (LIMA to LAD) on 1/19/22    Chronic combined systolic and diastolic CHF diastolic heart failure, compensated    Current regimen includes Lasix, aspirin, Apixaban, beta blocker, statin    Per EP, increase Lasix to 40 mg BID      Paroxysmal atrial fib -- S/P Pulm Vein Ablation 1/19/22    Afib with RVR last PM     Sustained monomorphic ventricular tachycardia    Now on oral Amiodarone, Metoprolol for ventricular rate control; most heart rates < 100 bpm    Continue Eliquis for thromboembolism prevention, hold tonight's dose for procedure tomorrow     Seen by EP this morning, Dr. Young recommends AICD for secondary prevention    Also per Dr. Young, \"obtain a CT in a month to reevaluate JUSTO exclusion, and consider coming off blood thinners.\"      Anemia, acute blood loss    Thrombocytopenia -- 2nd to CAGB    Hemoglobin is stable    Consider transfusion for hemoglobin < 7 g/dl    Active Problems:    Morbid obesity -- BMI 42.0    Hx of Gastric Bypass in 2008    Hx of DM type 2 -- now diet controlled since 2008     stopped glucometer checks since all <150       MARLEEN (doesn't tolerate CPAP)        Essential hypertension    Regimen includes beta blocker    Blood pressure readings are at goal    GERD    Continues on PTA omeprazole     Chronic Back Pain (IT Pump w Morphine, Clonidine, Baclofen)  [PTA: morphine per indwelling pain pump, nabumetone 500 mg BID, duloxetine 30 mg qAM, 60 mg qhs; APAP prn; " "baclofen bid prn]    Hold PTA nabumetone in setting of NSTEMI    Continues on other PTA meds with pain pump in place    Resolved Tongue swelling    stopped Decadron 1/17/22 and no problems since      Diet: Low Saturated Fat Na <2400 mg  Snacks/Supplements Adult: Ensure Enlive; Between Meals    DVT Prophylaxis: IV heparin  Bran Catheter: Not present  Code Status: Full Code         Disposition:  Depends on timing for AICD implant; likely Wednesday    The patient's care was discussed with the Patient and wife    Mamie Velasco MD  Hospitalist Service  [facility]  Contact information available via [facility] Paging/Directory    ______________________________________________________________________    Interval History   \"I need to get back to bed.\"  Baudilio complains of back pain, wants to go from chair back to bed.  He is frustrated by recommendation for device implant, \"did you know I have been here since the 12th?\"  He denies chest pain or shortness of breath, no GI complaints.    Physical Exam   Vital Signs: Temp: 97.5  F (36.4  C) Temp src: Oral BP: 112/64 Pulse: 70   Resp: 20 SpO2: 91 % O2 Device: None (Room air)    Weight: 295 lbs 3.14 oz  Constitutional:   Awake, alert  Respiratory: lungs are quite clear.  Sternotomy incision looks good, no drainage  Cardiovascular: Heart irregular rhythm, bilateral ankle edema and dependent rubor  GI: +BS, abd obese but soft/NT  Neuro: moves arms and legs, exam is nonfocal  Psych: mood is upset, tearful    Data   Recent Labs   Lab 01/23/22  1718 01/23/22  0603 01/22/22  0750 01/21/22  2125 01/21/22  0759 01/21/22  0514 01/20/22  0407 01/20/22  0404 01/19/22  1244 01/19/22  1235 01/19/22  1047 01/19/22  1040   WBC  --  8.6 8.0  --   --  8.4  --  10.1  --  17.4*  --  16.2*   HGB  --  10.5* 9.9*  --   --  9.9*  --  10.6*  --  12.0*   < > 11.0*   MCV  --  89 90  --   --  90  --  87  --  88  --  86   PLT  --  173  173 127*  --   --  122*  --  139*  --  160  --  " 110*   INR  --   --   --   --   --   --   --   --   --  1.31*  --  1.57*   *  --  135  --   --  134  --  137  --  138   < > 139   POTASSIUM 3.5  --  4.1  --   --  4.3  --  4.2  --  4.4  4.4   < > 4.1   CHLORIDE 100  --  104  --   --  105  --  108  --  110*  --  109   CO2 26  --  27  --   --  25  --  25  --  23  --  24   BUN 12  --  13  --   --  12  --  14  --  14  --  16   CR 0.86  --  0.84  --   --  0.78  --  0.99  --  0.93  --  0.94   ANIONGAP 6  --  4  --   --  4  --  4  --  5  --  6   RATNA 8.3*  --  8.5  --   --  8.4*  --  8.1*  --  8.3*  --  8.9   *  --  122* 109*   < > 125*   < > 122*   < > 126*   < > 149*   ALBUMIN  --   --   --   --   --   --   --  3.0*  --  2.5*  --   --    PROTTOTAL  --   --   --   --   --   --   --  6.0*  --  5.9*  --   --    BILITOTAL  --   --   --   --   --   --   --  0.9  --  0.8  --   --    ALKPHOS  --   --   --   --   --   --   --  49  --  56  --   --    ALT  --   --   --   --   --   --   --  21  --  25  --   --    AST  --   --   --   --   --   --   --  37  --  26  --   --     < > = values in this interval not displayed.         Recent Results (from the past 24 hour(s))   XR Chest 2 Views    Narrative    EXAM: XR CHEST 2 VIEW  LOCATION: [facility]  DATE/TIME: 01/23/2022, 8:26 AM    INDICATION: Status post CABG, chest tubes removed.  COMPARISON: 01/20/2022.      Impression    IMPRESSION: Trace right apical pneumothorax. No left pneumothorax. Minimal effusions and associated compressive atelectasis.         Medications       amiodarone  400 mg Oral BID     apixaban ANTICOAGULANT  5 mg Oral BID     aspirin  81 mg Oral or NG Tube Daily     gabapentin  100 mg Oral At Bedtime     metoprolol succinate ER  50 mg Oral BID     pantoprazole  40 mg Oral or NG Tube Daily    Or     pantoprazole  40 mg Oral Daily     polyethylene glycol  17 g Oral Daily     rosuvastatin  40 mg Oral Daily     senna-docusate  1 tablet Oral BID     sodium chloride (PF)  3 mL  Intracatheter Q8H     traZODone  200 mg Oral At Bedtime

## 2022-01-24 NOTE — PLAN OF CARE
A/Ox4, VSS on RA, chronic pain managed with PRN Robaxin and oxy. TELE A-Fib CVR this am, converted back to NSR @ 1000 and stayed in that rhythm until 1645 when pt had a sustained run of V-tach after ambulating in mary. MD notified, see previous note. EP will see pt tomorrow, pt to stay on bedrest with commode privileges until then. Amio drip discontinued, PO amio started. Wound vac removed, sternal incision CDI and DICKSON. Previous chest tube incision healing well, no drainage. Up to chair/commode with A1/GB/W. Voiding adequately, IV lasix x1. No BM today. Good appetite. Plan was for pt to discharge to home with HC tomorrow but may need defibrillator placed, will continue to monitor.

## 2022-01-24 NOTE — PLAN OF CARE
DATE & TIME: 1/24/2022 0700-1930    Cognitive Concerns/ Orientation : A&O x4   BEHAVIOR & AGGRESSION TOOL COLOR: green   CIWA SCORE: na    ABNL VS/O2: vss, on RA. Lungs clear.   MOBILITY: Ax1, with GB\WK. sternal precaution.   PAIN MANAGMENT: chronic back pain, internal pain pump. PRN robaxin once this am.   DIET: cardiac.   BOWEL/BLADDER: PRN MOM once with sm BM. Continent.   ABNL LAB/BG: Na 131.   DRAIN/DEVICES: PIV x1.   TELEMETRY RHYTHM: in and out of Afib, now NSR, with frequent PVCs. 8 beats of V tach this am. Pt is getting ICD placement in cath lab today.   SKIN: sternal incision CDI with 2 open areas. Meplix applied.   TESTS/PROCEDURES: ICD placement.   D/C DATE: possible in am.   Discharge Barriers:   OTHER IMPORTANT INFO: PT/OT recommends TCU, but declined by pt.   MD/RN ROUNDING SIGNED OFF D/E SHIFT:   COMMIT TO SIT DONE AND SIGNED OFF   IS THE PATIENT ON REMDESIVIR? DATE OF LAST SCHEDULED DOSE

## 2022-01-24 NOTE — CONSULTS
Electrophysiology/ Cardiology- Consult Note         H&P and Plan:     Reason for consult: Ventricular tachycardia.     History of present illness: 66-year old gentleman with a history of hypertension, hyperlipidemia, diabetes, paroxysmal A. fib and ischemic cardiomyopathy, who was admitted with non-STEMI and hospital stay was complicated by sustained monomorphic VT.      Patient has a long history of CAD and went PCI to LAD in 2012.  He also presented with inferior STEMI last year (9/2021 and underwent PCI to RCA.  He was re-admitted 1/12/2022 with non-STEMI.  He underwent CABG (LIMA to LAD, PVI, LA exclusion), and hospital stay was complicated by postop A. fib.  He was started on amiodarone.      Yesterday, he had an episode of monomorphic VT which lasted for about 30 seconds).  He seemed to tolerate the arrhythmia well and denies lightheadedness/near syncope or syncope.      Today, he appears to be fluid overload on physical exam.  He explains dyspnea on minimal exertion.      Previous studies:  -Echo (1/12/2022): Mild LV dysfunction.  EF of 40-45%.  Patient was in A. fib at the time of the study.    Plan:  1.  Monomorphic VT in the setting of ischemic cardiomyopathy.  Patient has mild LV dysfunction but he presents with episodes of sustained VT in the setting of ischemic cardiomyopathy.  He is high risk for sudden cardiac death.  I recommend acid implantation for secondary prevention.    Procedure was explained details.  He understand there is a 1-2% risk of cases or procedure.  This time he is unsure whether or not he wants to have procedure done.  He would like to think about the procedure.  We will reevaluate tomorrow morning and finalize plan.  In the meantime, we will continue therapy with amiodarone and metoprolol.    2. A. fib.  He is in normal sinus rhythm.  Plan as detailed above.   3.  Embolic prevention/ CHADS Vasc of 4-5.  He underwent left atrial appendage exclusion.  I agree with Eliquis.  We will  obtain a CT in a month to reevaluate JUSTO exclusion, and consider coming off blood thinners.  Please hold Eliquis tonight, due to the possibility of ICD implantation tomorrow.  4.  Heart failure.  He appears to be fluid overload on physical exam.  We will start Lasix 40 mg twice daily.      Mason Young MD    Physical Exam:  Vitals: /64 (BP Location: Right arm)   Pulse 70   Temp 97.5  F (36.4  C) (Oral)   Resp 20   Wt 133.9 kg (295 lb 3.1 oz)   SpO2 91%   BMI 40.04 kg/m        Intake/Output Summary (Last 24 hours) at 1/24/2022 1001  Last data filed at 1/24/2022 0821  Gross per 24 hour   Intake 360 ml   Output 2050 ml   Net -1690 ml     Vitals:    01/21/22 0600 01/21/22 1738 01/22/22 0436 01/23/22 0517   Weight: 140.8 kg (310 lb 6.5 oz) 143.2 kg (315 lb 11.2 oz) 136.1 kg (300 lb) 136 kg (299 lb 13.2 oz)    01/24/22 0600   Weight: 133.9 kg (295 lb 3.1 oz)       Constitutional:  AAO x3.  Pt is in NAD.  HEAD: Normocephalic.  SKIN: Skin normal color, texture and turgor with no lesions or eruptions.  Eyes: PERRL, EOMI.  ENT:  Supple, normal JVP. No lymphadenopathy or thyroid enlargement.  Chest:  Decrease breath sound in base B/L.  Cardiac:  RRR, normal  S1 and S2.  No murmurs rubs or gallop.   Abdomen:  Normal BS.  Soft, non-tender and non-distended.  No rebound or guarding.    Extremities:  Pedious pulses palpable B/L.  No LE edema noticed.   Neurological: Strength and sensation grossly symmetric and intact throughout.         Review of Systems:  Complete review of system is otherwise negative with the exception of what was described above.     CURRENT MEDICATIONS:    amiodarone  400 mg Oral BID     apixaban ANTICOAGULANT  5 mg Oral BID     aspirin  81 mg Oral or NG Tube Daily     gabapentin  100 mg Oral At Bedtime     metoprolol succinate ER  75 mg Oral BID     pantoprazole  40 mg Oral or NG Tube Daily    Or     pantoprazole  40 mg Oral Daily     polyethylene glycol  17 g Oral Daily     rosuvastatin  40 mg  Oral Daily     senna-docusate  1 tablet Oral BID     sodium chloride (PF)  3 mL Intracatheter Q8H     traZODone  200 mg Oral At Bedtime     PRN Meds: acetaminophen, bisacodyl, hydrALAZINE, HYDROmorphone, lidocaine 4%, lidocaine (buffered or not buffered), magnesium hydroxide, methocarbamol, naloxone **OR** naloxone **OR** naloxone **OR** naloxone, ondansetron **OR** ondansetron, oxyCODONE, prochlorperazine **OR** prochlorperazine    ALLERGIES     Allergies   Allergen Reactions     Hydrocodone-Acetaminophen Other (See Comments)     sneezing       PAST MEDICAL HISTORY:  Past Medical History:   Diagnosis Date     Acid reflux disease 10/31/2017     CHF (congestive heart failure) (H)      Coronary artery disease     CABG 1-     Essential hypertension     Created by [facility] Annotation: Apr 6 2012 10:16Zack Harris: Lisinipril,  coreg  Replacement Utility updated for latest IMO load     H/O gastric bypass 2008     Insomnia      Ischemic cardiomyopathy      Lumbago     Created by [facility] Annotation: Apr 6 2012 10:20Anh Ford: Morphine pump      Morbid obesity (H) 08/01/2018     Nephrolithiasis      Obstructive sleep apnea     Doesn't tolerate CPAP     Osteoarthritis     Created by [facility] Annotation: Jun 26 2009  9:53Zack Harris: failed lumbar  fusion/morphine pump dr putnam  Replacement Utility updated for latest IMO load     Paroxysmal atrial fibrillation (H)     Created by Conversion      Type 2 diabetes mellitus (H)     Diet controlled after Gastric Bypass in 2008       PAST SURGICAL HISTORY:  Past Surgical History:   Procedure Laterality Date     BACK SURGERY       BYPASS GASTRIC DUODENAL SWITCH       BYPASS GRAFT ARTERY CORONARY N/A 1/19/2022    Procedure: CORONARY ARTERY BYPASS GRAFT (CABG) X1; LIMA -LAD.  PULMONARY VEIN ISOLATION, AND ATRIAL APPENDAGE CLIPPING USING 45MM ACTICURE CLIP.;  Surgeon: William Dumont MD;  Location:  [facility] OR     COSMETIC SURGERY      pannicullectomy     CV CORONARY ANGIOGRAM N/A 9/11/2021    Procedure: Coronary Angiogram;  Surgeon: Noris Ozuna MD;  Location: [facility] CV     CV HEART CATHETERIZATION WITH POSSIBLE INTERVENTION N/A 1/13/2022    Procedure: Heart Catheterization with Possible Intervention;  Surgeon: Liz Pearl MD;  Location:  HEART CARDIAC CATH LAB     CV LEFT HEART CATH N/A 9/11/2021    Procedure: Left Heart Cath;  Surgeon: Noris Ozuna MD;  Location: [facility] CV     CV PCI N/A 9/11/2021    Procedure: Percutaneous Coronary Intervention;  Surgeon: Noris Ozuna MD;  Location: [facility] LAB CV     CV PCI N/A 10/7/2021    Procedure: Percutaneous Coronary Intervention;  Surgeon: Mckayla Dan MD;  Location: [facility] CV     CV PCI ASPIRATION THROMECTOMY N/A 9/11/2021    Procedure: Percutaneous Coronary Intervention Aspiration Thrombectomy;  Surgeon: Noris Ozuan MD;  Location: [facility]     ENT SURGERY      tonsillectomy     EXTRACORPOREAL SHOCK WAVE LITHOTRIPSY, CYSTOSCOPY, INSERT STENT URETER(S), COMBINED  8/23/11     HC REMOVAL OF TONSILS,<14 Y/O      Description: Tonsillectomy;  Recorded: 03/23/2012;  Comments: for obstructive sleep apnea     HC REPAIR INCISIONAL HERNIA,REDUCIBLE  11/13/2012    Description: Incisional Hernia Repair;  Proc Date: 11/13/2012;  Comments: incisional hernia repair and abdominal panniculectomy by Dr Shon Green at the [facility].     HERNIA REPAIR       IR MISCELLANEOUS PROCEDURE  7/29/2011     IR MISCELLANEOUS PROCEDURE  8/5/2011     IR MISCELLANEOUS PROCEDURE  8/23/2011     IR NEPHROSTOMY TUBE CHANGE BILATERAL  8/5/2011     ORTHOPEDIC SURGERY      arthrodesis ant discectomy, lumbar     TN ARTHRODESIS ANT INTERBODY MIN DISCECTOMY,LUMBAR      Description: Lumbar Vertebral Fusion;  Recorded: 06/26/2009;  Comments: before 200 see [facility] consult under old records     TN EXCISE EXCESS SKIN TISSUE,ABDOMEN   11/13/2012    Description: Panniculectomy;  Proc Date: 11/13/2012;  Comments: 3.5 Lb Pannus was removed at the [facility] By Dr. Shon Green     REPLACE INTRATHECAL PAIN PUMP N/A 4/25/2017    Procedure: REPLACE INTRATHECAL PAIN PUMP;  INTRATHECAL PAIN PUMP CATHETER REPLACEMENT AND PUMP REPLACEMENT;  Surgeon: Anthony Maloney MD;  Location: [facility]     REVISE CATHETER INTRATHECAL N/A 4/25/2017    Procedure: REVISE CATHETER INTRATHECAL;;  Surgeon: Anthony Maloney MD;  Location: [facility]     SHOULDER SURGERY       ZZC GASTRIC BYPASS,OBESE<100CM LORA-EN-Y  2008    Description: Gastric Surgery For Morbid Obesity Bypass With Lora-en-Y;  Recorded: 06/26/2009;  Comments: dr green 2008       FAMILY HISTORY:  Family History   Problem Relation Age of Onset     Cerebrovascular Disease Mother      Heart Disease Father      Hodgkin's lymphoma Brother        SOCIAL HISTORY:  Social History     Socioeconomic History     Marital status:      Spouse name: None     Number of children: None     Years of education: None     Highest education level: None   Occupational History     None   Tobacco Use     Smoking status: Former Smoker     Years: 10.00     Types: Cigars, Cigars     Smokeless tobacco: Never Used   Substance and Sexual Activity     Alcohol use: No     Drug use: No     Comment: Drug use: formerly used     Sexual activity: None   Other Topics Concern     None   Social History Narrative     None     Social Determinants of Health     Financial Resource Strain: Not on file   Food Insecurity: Not on file   Transportation Needs: Not on file   Physical Activity: Not on file   Stress: Not on file   Social Connections: Not on file   Intimate Partner Violence: Not on file   Housing Stability: Not on file         Recent Lab Results:  Recent Labs   Lab 01/23/22  1718 01/23/22  0603 01/22/22  0750 01/21/22  2125 01/21/22  0759 01/21/22  0514 01/20/22  0407 01/20/22  0404 01/19/22  1244 01/19/22  1235 01/19/22  1047 01/19/22  1040    WBC  --  8.6 8.0  --   --  8.4  --  10.1  --  17.4*  --  16.2*   HGB  --  10.5* 9.9*  --   --  9.9*  --  10.6*  --  12.0*   < > 11.0*   MCV  --  89 90  --   --  90  --  87  --  88  --  86   PLT  --  173  173 127*  --   --  122*  --  139*  --  160  --  110*   INR  --   --   --   --   --   --   --   --   --  1.31*  --  1.57*   *  --  135  --   --  134  --  137  --  138   < > 139   POTASSIUM 3.5  --  4.1  --   --  4.3  --  4.2  --  4.4  4.4   < > 4.1   CHLORIDE 100  --  104  --   --  105  --  108  --  110*  --  109   CO2 26  --  27  --   --  25  --  25  --  23  --  24   BUN 12  --  13  --   --  12  --  14  --  14  --  16   CR 0.86  --  0.84  --   --  0.78  --  0.99  --  0.93  --  0.94   ANIONGAP 6  --  4  --   --  4  --  4  --  5  --  6   RATNA 8.3*  --  8.5  --   --  8.4*  --  8.1*  --  8.3*  --  8.9   *  --  122* 109*   < > 125*   < > 122*   < > 126*   < > 149*   ALBUMIN  --   --   --   --   --   --   --  3.0*  --  2.5*  --   --    PROTTOTAL  --   --   --   --   --   --   --  6.0*  --  5.9*  --   --    BILITOTAL  --   --   --   --   --   --   --  0.9  --  0.8  --   --    ALKPHOS  --   --   --   --   --   --   --  49  --  56  --   --    ALT  --   --   --   --   --   --   --  21  --  25  --   --    AST  --   --   --   --   --   --   --  37  --  26  --   --     < > = values in this interval not displayed.

## 2022-01-25 ENCOUNTER — APPOINTMENT (OUTPATIENT)
Dept: GENERAL RADIOLOGY | Facility: CLINIC | Age: 67
End: 2022-01-25
Attending: INTERNAL MEDICINE
Payer: COMMERCIAL

## 2022-01-25 ENCOUNTER — APPOINTMENT (OUTPATIENT)
Dept: OCCUPATIONAL THERAPY | Facility: CLINIC | Age: 67
End: 2022-01-25
Payer: COMMERCIAL

## 2022-01-25 VITALS
WEIGHT: 305.56 LBS | TEMPERATURE: 97.7 F | HEART RATE: 100 BPM | SYSTOLIC BLOOD PRESSURE: 124 MMHG | DIASTOLIC BLOOD PRESSURE: 77 MMHG | RESPIRATION RATE: 16 BRPM | BODY MASS INDEX: 41.44 KG/M2 | OXYGEN SATURATION: 95 %

## 2022-01-25 DIAGNOSIS — Z95.810 ICD (IMPLANTABLE CARDIOVERTER-DEFIBRILLATOR) IN PLACE: ICD-10-CM

## 2022-01-25 DIAGNOSIS — I47.29 PAROXYSMAL VENTRICULAR TACHYCARDIA (H): Primary | ICD-10-CM

## 2022-01-25 PROBLEM — E87.70 FLUID OVERLOAD: Status: ACTIVE | Noted: 2022-01-25

## 2022-01-25 PROBLEM — Z95.1 S/P CABG (CORONARY ARTERY BYPASS GRAFT): Status: ACTIVE | Noted: 2022-01-25

## 2022-01-25 LAB
ANION GAP SERPL CALCULATED.3IONS-SCNC: 6 MMOL/L (ref 3–14)
BUN SERPL-MCNC: 13 MG/DL (ref 7–30)
CALCIUM SERPL-MCNC: 8.6 MG/DL (ref 8.5–10.1)
CHLORIDE BLD-SCNC: 99 MMOL/L (ref 94–109)
CO2 SERPL-SCNC: 28 MMOL/L (ref 20–32)
CREAT SERPL-MCNC: 0.88 MG/DL (ref 0.66–1.25)
GFR SERPL CREATININE-BSD FRML MDRD: >90 ML/MIN/1.73M2
GLUCOSE BLD-MCNC: 119 MG/DL (ref 70–99)
MAGNESIUM SERPL-MCNC: 2.2 MG/DL (ref 1.6–2.3)
PHOSPHATE SERPL-MCNC: 3.7 MG/DL (ref 2.5–4.5)
POTASSIUM BLD-SCNC: 3.5 MMOL/L (ref 3.4–5.3)
SODIUM SERPL-SCNC: 133 MMOL/L (ref 133–144)

## 2022-01-25 PROCEDURE — 250N000013 HC RX MED GY IP 250 OP 250 PS 637: Performed by: INTERNAL MEDICINE

## 2022-01-25 PROCEDURE — 84100 ASSAY OF PHOSPHORUS: CPT | Performed by: INTERNAL MEDICINE

## 2022-01-25 PROCEDURE — 99232 SBSQ HOSP IP/OBS MODERATE 35: CPT | Performed by: INTERNAL MEDICINE

## 2022-01-25 PROCEDURE — 83735 ASSAY OF MAGNESIUM: CPT | Performed by: INTERNAL MEDICINE

## 2022-01-25 PROCEDURE — 97110 THERAPEUTIC EXERCISES: CPT | Mod: GO | Performed by: OCCUPATIONAL THERAPIST

## 2022-01-25 PROCEDURE — 36415 COLL VENOUS BLD VENIPUNCTURE: CPT | Performed by: INTERNAL MEDICINE

## 2022-01-25 PROCEDURE — 999N000065 XR CHEST 2 VW

## 2022-01-25 PROCEDURE — 97530 THERAPEUTIC ACTIVITIES: CPT | Mod: GO | Performed by: OCCUPATIONAL THERAPIST

## 2022-01-25 PROCEDURE — 250N000011 HC RX IP 250 OP 636: Performed by: INTERNAL MEDICINE

## 2022-01-25 PROCEDURE — 97535 SELF CARE MNGMENT TRAINING: CPT | Mod: GO | Performed by: OCCUPATIONAL THERAPIST

## 2022-01-25 PROCEDURE — 93005 ELECTROCARDIOGRAM TRACING: CPT

## 2022-01-25 PROCEDURE — 250N000013 HC RX MED GY IP 250 OP 250 PS 637: Performed by: PHYSICIAN ASSISTANT

## 2022-01-25 PROCEDURE — 80048 BASIC METABOLIC PNL TOTAL CA: CPT | Performed by: INTERNAL MEDICINE

## 2022-01-25 RX ORDER — AMIODARONE HYDROCHLORIDE 200 MG/1
200 TABLET ORAL DAILY
Qty: 30 TABLET | Refills: 0 | Status: SHIPPED | OUTPATIENT
Start: 2022-01-31 | End: 2022-02-04

## 2022-01-25 RX ORDER — METHOCARBAMOL 500 MG/1
500 TABLET, FILM COATED ORAL EVERY 6 HOURS PRN
Qty: 60 TABLET | Refills: 0 | Status: SHIPPED | OUTPATIENT
Start: 2022-01-25 | End: 2022-05-31

## 2022-01-25 RX ORDER — ACETAMINOPHEN 325 MG/1
650 TABLET ORAL EVERY 4 HOURS PRN
Qty: 40 TABLET | Refills: 0 | Status: SHIPPED | OUTPATIENT
Start: 2022-01-25

## 2022-01-25 RX ORDER — AMIODARONE HYDROCHLORIDE 400 MG/1
400 TABLET ORAL 2 TIMES DAILY
Qty: 10 TABLET | Refills: 0 | Status: SHIPPED | OUTPATIENT
Start: 2022-01-25 | End: 2022-02-04

## 2022-01-25 RX ORDER — FAMOTIDINE 20 MG/1
20 TABLET, FILM COATED ORAL 2 TIMES DAILY
Qty: 28 TABLET | Refills: 0 | Status: SHIPPED | OUTPATIENT
Start: 2022-01-25 | End: 2022-02-08

## 2022-01-25 RX ORDER — OXYCODONE AND ACETAMINOPHEN 5; 325 MG/1; MG/1
1 TABLET ORAL EVERY 6 HOURS PRN
Qty: 40 TABLET | Refills: 0 | Status: SHIPPED | OUTPATIENT
Start: 2022-01-25 | End: 2022-02-04

## 2022-01-25 RX ORDER — POTASSIUM CHLORIDE 1500 MG/1
20 TABLET, EXTENDED RELEASE ORAL ONCE
Status: COMPLETED | OUTPATIENT
Start: 2022-01-25 | End: 2022-01-25

## 2022-01-25 RX ORDER — POLYETHYLENE GLYCOL 3350 17 G/17G
17 POWDER, FOR SOLUTION ORAL DAILY
Qty: 510 G | Refills: 0 | Status: SHIPPED | OUTPATIENT
Start: 2022-01-25 | End: 2022-02-28

## 2022-01-25 RX ORDER — METOPROLOL SUCCINATE 25 MG/1
75 TABLET, EXTENDED RELEASE ORAL 2 TIMES DAILY
Qty: 60 TABLET | Refills: 3 | Status: SHIPPED | OUTPATIENT
Start: 2022-01-25 | End: 2022-02-15

## 2022-01-25 RX ADMIN — METOPROLOL SUCCINATE 75 MG: 50 TABLET, EXTENDED RELEASE ORAL at 08:35

## 2022-01-25 RX ADMIN — FUROSEMIDE 40 MG: 10 INJECTION, SOLUTION INTRAVENOUS at 09:48

## 2022-01-25 RX ADMIN — ACETAMINOPHEN 650 MG: 325 TABLET, FILM COATED ORAL at 02:54

## 2022-01-25 RX ADMIN — ROSUVASTATIN CALCIUM 40 MG: 20 TABLET, FILM COATED ORAL at 08:35

## 2022-01-25 RX ADMIN — PANTOPRAZOLE SODIUM 40 MG: 40 TABLET, DELAYED RELEASE ORAL at 08:35

## 2022-01-25 RX ADMIN — ASPIRIN 81 MG CHEWABLE TABLET 81 MG: 81 TABLET CHEWABLE at 08:35

## 2022-01-25 RX ADMIN — POTASSIUM CHLORIDE 20 MEQ: 1500 TABLET, EXTENDED RELEASE ORAL at 06:39

## 2022-01-25 RX ADMIN — APIXABAN 5 MG: 5 TABLET, FILM COATED ORAL at 09:48

## 2022-01-25 RX ADMIN — AMIODARONE HYDROCHLORIDE 400 MG: 200 TABLET ORAL at 08:35

## 2022-01-25 ASSESSMENT — ACTIVITIES OF DAILY LIVING (ADL)
ADLS_ACUITY_SCORE: 12
DEPENDENT_IADLS:: INDEPENDENT
ADLS_ACUITY_SCORE: 11
ADLS_ACUITY_SCORE: 11
ADLS_ACUITY_SCORE: 12
ADLS_ACUITY_SCORE: 11
ADLS_ACUITY_SCORE: 12
ADLS_ACUITY_SCORE: 11
ADLS_ACUITY_SCORE: 12
ADLS_ACUITY_SCORE: 12
ADLS_ACUITY_SCORE: 11
ADLS_ACUITY_SCORE: 12

## 2022-01-25 NOTE — DISCHARGE SUMMARY
Discharge Summary    Onur Barr MRN# [id number]   YOB: 1955 Age: 66 year old     Date of Admission:  1/12/2022  Date of Discharge:  1/25/2022  Admitting Physician:  Buddy Coleman, DO  Discharge Physician:  William Dumont,*  Discharging Service:  Cardiovascular and Thoracic Surgery     Home clinic: [facility]  Primary Provider: Luann Berger          Admission Diagnoses:   ACS (acute coronary syndrome) (H) [I24.9]  Chest pain, unspecified type [R07.9]          Discharge Diagnosis:   Patient Active Problem List   Diagnosis     Lumbago     Morbid obesity -- BMI 42.0     Bilateral leg edema     Chronic diastolic heart failure (H)     MARLEEN (doesn't tolerate CPAP)     NSTEMI w Unstab Angina -- S/P CABG x 1 (LIMA to LAD) on 1/19/22     Paroxysmal atrial fib -- S/P Pulm Vein Ablation 1/19/22     Essential hypertension     Mixed hyperlipidemia     Gastroesophageal reflux disease without esophagitis     Chronic Back Pain (IT Pump w Morphine, Clonidine, Baclofen)     S/P CABG (coronary artery bypass graft)     Fluid overload     ICD (implantable cardioverter-defibrillator) in place                Discharge Disposition:   Discharged to home           Condition on Discharge:   Discharge condition: Stable   Discharge vitals: Blood pressure 130/70, pulse 68, temperature 97.7  F (36.5  C), temperature source Oral, resp. rate 16, weight 138.6 kg (305 lb 8.9 oz), SpO2 94 %.     Code status on discharge: Full Code           Procedures:   On 1/19/22 Mr Barr successfully underwent Coronary artery bypass grafting x1 with left internal mammary artery to the left anterior descending, bilateral pulmonary vein isolation with the AtriCure bipolar radiofrequency ablation device Synergy, exclusion of left atrial appendage, intraoperative transesophageal echocardiography by Dr. William Dumont.           Medications Prior to Admission:     Medications Prior to Admission   Medication Sig Dispense  Refill Last Dose     baclofen (LIORESAL) 10 MG tablet Take 1 tablet (10 mg) by mouth 2 times daily as needed for muscle spasms 30 tablet 0  at PRN     clopidogrel (PLAVIX) 75 MG tablet Take 1 tablet (75 mg) by mouth daily 30 tablet 0 1/12/2022 at AM     DULoxetine (CYMBALTA) 30 MG capsule Take 30 mg by mouth every morning   1/12/2022 at AM     DULoxetine (CYMBALTA) 30 MG capsule Take 60 mg by mouth every evening   1/11/2022 at PM     Ferrous Gluconate (IRON) 240 (27 Fe) MG TABS Take 1 tablet by mouth daily 120 tablet 0 1/12/2022 at AM     MORPHINE SULFATE IT pump:updated 1/12/22  Medications in Pump:   [facility] Responsible for pump medications:   Conc:  Morphine 20mg/ml(3.95 mg/day), clonidine 21.1mcg/ml (4.168 mcg/day), baclofen 42.5mcg/ml (8.395mcg/day)  Rate:   Pump Last Fill Date: 9/2021  Pump Refill Date: 2/2022 1/12/2022 at Unknown time     nitroGLYcerin (NITROSTAT) 0.4 MG sublingual tablet For chest pain place 1 tablet under the tongue every 5 minutes for 3 doses. If symptoms persist 5 minutes after 1st dose call 911. 30 tablet 1  at PRN     nystatin (MYCOSTATIN) 608354 UNIT/GM external powder Apply to lower abdominal area twice daily 60 g 1 1/12/2022 at AM     Omeprazole (PRILOSEC PO) Take 40 mg by mouth daily   1/12/2022 at AM     rosuvastatin (CRESTOR) 40 MG tablet Take 1 tablet (40 mg) by mouth daily 90 tablet 3 1/11/2022 at PM     senna-docusate (SENOKOT-S/PERICOLACE) 8.6-50 MG tablet Take 1 tablet by mouth 2 times daily as needed for constipation 60 tablet 0  at PRN     traZODone (DESYREL) 100 MG tablet TAKE 2 TABLETS (200MG TOTAL) BY MOUTH AT BEDTIME 180 tablet 3 1/11/2022 at HS     [DISCONTINUED] apixaban ANTICOAGULANT (ELIQUIS) 5 MG tablet Take 1 tablet (5 mg) by mouth every 12 hours 180 tablet 1 1/12/2022 at AM     [DISCONTINUED] ASPIRIN LOW DOSE 81 MG EC tablet TAKE 1 TABLET (81 MG) BY MOUTH DAILY START TOMORROW. 90 tablet 1 1/12/2022 at AM     [DISCONTINUED] dexamethasone  (DECADRON) 2 MG tablet Take 1 tablet (2 mg) by mouth 2 times daily (with meals) 10 tablet 0 1/10/2022 at PM     [DISCONTINUED] furosemide (LASIX) 20 MG tablet TAKE 2 TABLET (40 MG) BY MOUTH  IN THE AM AND 1 TABLET (20 MG) IN EARLY AFTERNOON. TAKE WITH POTASSIUM 270 tablet 2 1/12/2022 at AM     [DISCONTINUED] lisinopril-hydrochlorothiazide (ZESTORETIC) 20-25 MG tablet TAKE 2 TABLETS BY MOUTH EVERY  tablet 1 1/12/2022 at AM     [DISCONTINUED] metoprolol succinate ER (TOPROL-XL) 50 MG 24 hr tablet Take 1.5 tablets (75 mg) by mouth daily 135 tablet 3 1/12/2022 at AM     [DISCONTINUED] nabumetone (RELAFEN) 500 MG tablet TAKE 1 TABLET BY MOUTH TWICE A  tablet 1 1/12/2022 at AM     [DISCONTINUED] potassium chloride ER (KLOR-CON M) 20 MEQ CR tablet Take 1 tablet (20 mEq) by mouth daily (take with furosemide/Lasix) 60 tablet 1 1/12/2022 at AM             Discharge Medications:     Current Discharge Medication List      START taking these medications    Details   acetaminophen (TYLENOL) 325 MG tablet Take 2 tablets (650 mg) by mouth every 4 hours as needed for mild pain  Qty: 40 tablet, Refills: 0    Associated Diagnoses: Coronary artery disease involving native coronary artery of native heart without angina pectoris      !! amiodarone (PACERONE) 200 MG tablet Take 1 tablet (200 mg) by mouth daily  Qty: 30 tablet, Refills: 0    Associated Diagnoses: Coronary artery disease involving native coronary artery of native heart without angina pectoris      !! amiodarone (PACERONE) 400 MG tablet Take 1 tablet (400 mg) by mouth 2 times daily for 5 days  Qty: 10 tablet, Refills: 0    Associated Diagnoses: Coronary artery disease involving native coronary artery of native heart without angina pectoris      famotidine (PEPCID) 20 MG tablet Take 1 tablet (20 mg) by mouth 2 times daily for 14 days  Qty: 28 tablet, Refills: 0    Associated Diagnoses: Coronary artery disease involving native coronary artery of native heart  without angina pectoris      methocarbamol (ROBAXIN) 500 MG tablet Take 1 tablet (500 mg) by mouth every 6 hours as needed for muscle spasms  Qty: 60 tablet, Refills: 0    Associated Diagnoses: Coronary artery disease involving native coronary artery of native heart without angina pectoris      oxyCODONE-acetaminophen (PERCOCET) 5-325 MG tablet Take 1 tablet by mouth every 6 hours as needed for moderate to severe pain  Qty: 40 tablet, Refills: 0    Comments: Do not exceed 4,000 mg Tylenol in 24 hrs  Associated Diagnoses: Coronary artery disease involving native coronary artery of native heart without angina pectoris      polyethylene glycol (MIRALAX) 17 GM/Dose powder Take 17 g by mouth daily  Qty: 510 g, Refills: 0    Associated Diagnoses: Coronary artery disease involving native coronary artery of native heart without angina pectoris      rivaroxaban ANTICOAGULANT (XARELTO ANTICOAGULANT) 15 MG TABS tablet Take 1 tablet (15 mg) by mouth daily (with dinner)  Qty: 30 tablet, Refills: 3    Associated Diagnoses: Coronary artery disease involving native coronary artery of native heart without angina pectoris       !! - Potential duplicate medications found. Please discuss with provider.      CONTINUE these medications which have CHANGED    Details   metoprolol succinate ER (TOPROL-XL) 25 MG 24 hr tablet Take 3 tablets (75 mg) by mouth 2 times daily  Qty: 60 tablet, Refills: 3    Associated Diagnoses: Coronary artery disease involving native coronary artery of native heart without angina pectoris         CONTINUE these medications which have NOT CHANGED    Details   baclofen (LIORESAL) 10 MG tablet Take 1 tablet (10 mg) by mouth 2 times daily as needed for muscle spasms  Qty: 30 tablet, Refills: 0    Associated Diagnoses: Low back pain, unspecified back pain laterality, unspecified chronicity, unspecified whether sciatica present      clopidogrel (PLAVIX) 75 MG tablet Take 1 tablet (75 mg) by mouth daily  Qty: 30 tablet,  Refills: 0    Associated Diagnoses: Coronary artery disease involving native coronary artery of native heart with angina pectoris (H)      !! DULoxetine (CYMBALTA) 30 MG capsule Take 30 mg by mouth every morning      !! DULoxetine (CYMBALTA) 30 MG capsule Take 60 mg by mouth every evening      Ferrous Gluconate (IRON) 240 (27 Fe) MG TABS Take 1 tablet by mouth daily  Qty: 120 tablet, Refills: 0    Associated Diagnoses: Other iron deficiency anemia      MORPHINE SULFATE IT pump:updated 1/12/22  Medications in Pump:   [facility] Responsible for pump medications:   Conc:  Morphine 20mg/ml(3.95 mg/day), clonidine 21.1mcg/ml (4.168 mcg/day), baclofen 42.5mcg/ml (8.395mcg/day)  Rate:   Pump Last Fill Date: 9/2021  Pump Refill Date: 2/2022      nitroGLYcerin (NITROSTAT) 0.4 MG sublingual tablet For chest pain place 1 tablet under the tongue every 5 minutes for 3 doses. If symptoms persist 5 minutes after 1st dose call 911.  Qty: 30 tablet, Refills: 1    Associated Diagnoses: Coronary artery disease involving native coronary artery of native heart with angina pectoris (H)      nystatin (MYCOSTATIN) 278073 UNIT/GM external powder Apply to lower abdominal area twice daily  Qty: 60 g, Refills: 1    Associated Diagnoses: Intertrigo      Omeprazole (PRILOSEC PO) Take 40 mg by mouth daily      rosuvastatin (CRESTOR) 40 MG tablet Take 1 tablet (40 mg) by mouth daily  Qty: 90 tablet, Refills: 3    Associated Diagnoses: Coronary artery disease involving native coronary artery of native heart with angina pectoris (H)      senna-docusate (SENOKOT-S/PERICOLACE) 8.6-50 MG tablet Take 1 tablet by mouth 2 times daily as needed for constipation  Qty: 60 tablet, Refills: 0    Associated Diagnoses: Constipation, unspecified constipation type      traZODone (DESYREL) 100 MG tablet TAKE 2 TABLETS (200MG TOTAL) BY MOUTH AT BEDTIME  Qty: 180 tablet, Refills: 3    Associated Diagnoses: Insomnia, unspecified type       !! - Potential  duplicate medications found. Please discuss with provider.      STOP taking these medications       apixaban ANTICOAGULANT (ELIQUIS) 5 MG tablet Comments:   Reason for Stopping:         ASPIRIN LOW DOSE 81 MG EC tablet Comments:   Reason for Stopping:         dexamethasone (DECADRON) 2 MG tablet Comments:   Reason for Stopping:         furosemide (LASIX) 20 MG tablet Comments:   Reason for Stopping:         lisinopril-hydrochlorothiazide (ZESTORETIC) 20-25 MG tablet Comments:   Reason for Stopping:         nabumetone (RELAFEN) 500 MG tablet Comments:   Reason for Stopping:         potassium chloride ER (KLOR-CON M) 20 MEQ CR tablet Comments:   Reason for Stopping:                     Consultations:   Nutrition, Intensivist, cardiology EP, cardiology             Brief History of Illness:   Mr. Barr is a very pleasant 66-year-old obese gentleman with diabetes, hyperlipidemia and significant coronary artery disease, status post PCI to the RCA for inferior STEMI in 09/2021, followed by staged PCI of in-stent restenosis of the proximal LAD, who was admitted to the hospital last week over recurrent chest pain and aggressive/early in-stent restenosis of the proximal LAD.  He was taken to the operating room today for coronary artery bypass grafting.          Hospital Course:   On 1/19/22 Mr Barr successfully underwent Coronary artery bypass grafting x1 with left internal mammary artery to the left anterior descending, bilateral pulmonary vein isolation with the AtriCure bipolar radiofrequency ablation device Synergy, exclusion of left atrial appendage, intraoperative transesophageal echocardiography by Dr. William Dumont.   Was extubated within 24 hours of surgery. Once he was weaned off hemodynamic stabilizing gtt's, transitioned to surgical floor.   Had some transient hyperglycemia treated with an insulin gtt, transitioned to sliding scale insulin and has no need at discharge.  Had some fluid overload treated with  diuretic medication. At discharge he is 4 kg below his pre-op weight and is not sent with diuretics.   He has paroxsymal a.fib that was treated with amiodarone which he would have episodes of NSR but mostly a.fib CVR. He had episodes of v.tach, cards EP was consulted and an ICD was placed on pod#5. He is resumed on his PTA plavix and started on a lower dose Xarelto for his a.fib per cards EP.  He progressed well with cardiac rehab. He is discharged to home with home care on pod# 6 with the help of their family.              Significant Results:   Lab Results   Component Value Date    WBC 10.8 01/24/2022     Lab Results   Component Value Date    RBC 3.97 01/24/2022     Lab Results   Component Value Date    HGB 11.1 01/24/2022     Lab Results   Component Value Date    HCT 34.7 01/24/2022     No components found for: MCT  Lab Results   Component Value Date    MCV 87 01/24/2022     Lab Results   Component Value Date    MCH 28.0 01/24/2022     Lab Results   Component Value Date    MCHC 32.0 01/24/2022     Lab Results   Component Value Date    RDW 14.2 01/24/2022     Lab Results   Component Value Date     01/24/2022       Last Basic Metabolic Panel:  Lab Results   Component Value Date     01/25/2022      Lab Results   Component Value Date    POTASSIUM 3.5 01/25/2022     Lab Results   Component Value Date    CHLORIDE 99 01/25/2022     Lab Results   Component Value Date    RATNA 8.6 01/25/2022     Lab Results   Component Value Date    CO2 28 01/25/2022     Lab Results   Component Value Date    BUN 13 01/25/2022     Lab Results   Component Value Date    CR 0.88 01/25/2022     Lab Results   Component Value Date     01/25/2022                  Pending Results:   None           Discharge Instructions and Follow-Up:   Discharge diet: Regular   Discharge activity: Daily weights: Call if weight gain 2-3 lbs over 24 hours or if greater than 5 lbs in 1 week.  No lifting more than 10 lbs for 8-12 weeks.  No driving  for 1 month.  Call for pain medication refill.  Call for temperature greater than 101.0  Daily shower with antibacterial soap.   Discharge follow-up: *Follow up primary care provider in 7-10 days after discharge in order to review your medication, vital signs, obtain any necessary lab work your doctor may want, and to update them on your hospitalization and medical issues.   *Follow up with Raghu Worley with Dr Dumont, heart surgeon, at [facility] at [facility] [address] on 2/7/22 at 1:00 PM. If any questions or concerns call [phone].  You will see us once at this visit and then if everything is going well you will not need to see us again.  You will follow long term with your cardiologist.   *Follow up with April King, cardiology NP, on 2/28/22 at 10:30 PM. This is who you will follow with long term about your heart issues. [phone].    Outpatient therapy: Cardiac rehab   Home Care agency: None    Supplies and equipment: None   Lines and drains: None    Wound care: Wash incision daily with antibacterial soap   Other instructions: None

## 2022-01-25 NOTE — PROGRESS NOTES
"[facility]    Medicine Progress Note - Hospitalist Service       Date of Admission:  1/12/2022  Assessment & Plan   66 year old male with hx of CAD s/p PCI, chronic a-fib on chronic anticoagulation with apixaban, CHF, HTN, and chronic back pain s/p indwelling pain pump who was admitted on 1/12/2022 for NSTEMI. He was found to have severe in-stent re-stenosis of mid-LAD and severe stenosis distal to previously stents, and underwent CABG today:    Principal Problem:    NSTEMI w Unstab Angina -- S/P CABG x 1 (LIMA to LAD) on 1/19/22    Chronic combined systolic and diastolic CHF diastolic heart failure, compensated    Current regimen includes Lasix, aspirin, Apixaban, beta blocker, statin    Per EP, increase Lasix to 40 mg BID      Paroxysmal atrial fib -- S/P Pulm Vein Ablation 1/19/22    Afib with RVR last PM     Sustained monomorphic ventricular tachycardia    Now on oral Amiodarone, Metoprolol for ventricular rate control; most heart rates < 100 bpm    Continue Eliquis for thromboembolism prevention    Seen by EP, they recommended AICD for secondary prevention. Dual chamber ICD implanted 1/24, no complications     Also per Dr. Young, \"obtain a CT in a month to reevaluate JUSTO exclusion, and consider coming off blood thinners.\"      Anemia, acute blood loss    Thrombocytopenia -- 2nd to CAGB    Hemoglobin is stable    Consider transfusion for hemoglobin < 7 g/dl    Active Problems:    Morbid obesity -- BMI 42.0    Hx of Gastric Bypass in 2008    Hx of DM type 2 -- now diet controlled since 2008     stopped glucometer checks since all <150       MARLEEN (doesn't tolerate CPAP)        Essential hypertension    Regimen includes beta blocker    Blood pressure readings are at goal    GERD    Continues on PTA omeprazole     Chronic Back Pain (IT Pump w Morphine, Clonidine, Baclofen)  [PTA: morphine per indwelling pain pump, nabumetone 500 mg BID, duloxetine 30 mg qAM, 60 mg qhs; APAP prn; baclofen bid " "prn]    Hold PTA nabumetone in setting of NSTEMI    Continues on other PTA meds with pain pump in place    Resolved Tongue swelling    stopped Decadron 1/17/22 and no problems since      Diet: Snacks/Supplements Adult: Ensure Enlive; Between Meals  Low Saturated Fat Na <2400 mg  Diet    DVT Prophylaxis: Eliquis  Bran Catheter: Not present  Code Status: Full Code         Disposition:  No medical objection to discharge home today.  Medicines reconciled in anticipation of discharge, discussed need to discontinue Nabumetone while on Eliquis with patient    The patient's care was discussed with the Patient and wife    Mamie Velasco MD  Hospitalist Service  [facility]  Contact information available via [facility] Paging/Directory    ______________________________________________________________________    Interval History   \"I know it's there.\"  Patient says he has some discomfort from AICD implant.  He denies shortness of breath, no GI complaints.    Physical Exam   Vital Signs: Temp: 97.7  F (36.5  C) Temp src: Oral BP: 130/70 Pulse: 68   Resp: 16 SpO2: 94 % O2 Device: None (Room air)    Weight: 305 lbs 8.92 oz  Constitutional:   Awake, alert  Chest: lungs are quite clear.  Sternotomy incision looks good, no drainage.  Bandage covers AICD implant.  Has two small, unroofed blisters on anterior chest  Cardiovascular: RRR  GI: +BS, abd obese but soft/NT  Neuro: moves arms and legs, exam is nonfocal  Psych: mood is calm    Data   Recent Labs   Lab 01/25/22  0535 01/24/22  1150 01/23/22  1718 01/23/22  0603 01/22/22  0750 01/20/22  0407 01/20/22  0404 01/19/22  1244 01/19/22  1235 01/19/22  1047 01/19/22  1040   WBC  --  10.8  --  8.6 8.0   < > 10.1  --  17.4*  --  16.2*   HGB  --  11.1*  --  10.5* 9.9*   < > 10.6*  --  12.0*   < > 11.0*   MCV  --  87  --  89 90   < > 87  --  88  --  86   PLT  --  248  --  173  173 127*   < > 139*  --  160  --  110*   INR  --   --   --   --   --   --   --   --  " 1.31*  --  1.57*    131* 132*  --  135   < > 137  --  138   < > 139   POTASSIUM 3.5 3.9 3.5  --  4.1   < > 4.2  --  4.4  4.4   < > 4.1   CHLORIDE 99 98 100  --  104   < > 108  --  110*  --  109   CO2 28 25 26  --  27   < > 25  --  23  --  24   BUN 13 13 12  --  13   < > 14  --  14  --  16   CR 0.88 0.88 0.86  --  0.84   < > 0.99  --  0.93  --  0.94   ANIONGAP 6 8 6  --  4   < > 4  --  5  --  6   RATNA 8.6 8.7 8.3*  --  8.5   < > 8.1*  --  8.3*  --  8.9   * 142* 168*  --  122*   < > 122*   < > 126*   < > 149*   ALBUMIN  --   --   --   --   --   --  3.0*  --  2.5*  --   --    PROTTOTAL  --   --   --   --   --   --  6.0*  --  5.9*  --   --    BILITOTAL  --   --   --   --   --   --  0.9  --  0.8  --   --    ALKPHOS  --   --   --   --   --   --  49  --  56  --   --    ALT  --   --   --   --   --   --  21  --  25  --   --    AST  --   --   --   --   --   --  37  --  26  --   --     < > = values in this interval not displayed.         Recent Results (from the past 24 hour(s))   EP Device    Narrative    PROCEDURE REPORT     NAME OF THE PROCEDURE  1. Conscious sedation  2. Implantation of a dual-chamber implantable cardiac defibrillator  system    INDICATIONS FOR PROCEDURE: Mr. Barr is a 66-year-old white male with  a history of paroxysmal atrial fibrillation. He was admitted for now  ST elevation myocardial infarct and underwent bypass surgery a few  days ago. He had sustained VT. The LV ejection fraction is reduced at  40%.    PROCEDURE AND RESULTS: After the written informed consent was  obtained, the patient was transported to CV lab 4 in the fasting  state. The procedure was performed under local anesthesia and sterile  conditions. IV Versed and fentanyl were used for conscious sedation.  The heart rate, blood pressure and oxygen saturation were monitored by  the RN under my supervision. The left subclavian vein access was quite  difficult due to the tortuosity and the Glidewire was required to  enter the  SVC. The incision was made below the clavicle around the  guidewire. The implantable cardiac defibrillator pocket was created  subcutaneously. The second guidewire was inserted after the creation  of the pocket. The leads were introduced by using the guidewires and  peel-away sheaths. Under fluoroscopy, the ventricular lead was screwed  into the RV apex and atrial lead to the RA appendage. Both leads were  anchored at the insertion sites after the pacing and sensing  parameters were measured. The pocket was irrigated with normal saline  and complete hemostasis was obtained before the pulse generator  implantation. The incision was closed in layers and dressed in a  sterile fashion. There was no complication.    Implantable cardiac defibrillator information:  Pulse generator: [employer] model D433, serial #[id number].   Atrial lead: [employer] model 7841, serial #[id number]. P-wave 6.1  mV pacing threshold 0.6 V pace impedance 551 ohms.   Ventricular lead: [employer] model 0276 serial #[id number]. R-wave  4.2 mV pacing threshold 0.8 V pace impedance 557 ohms.    X-ray Chest 2 vws*    Narrative    XR CHEST 2 VW 1/25/2022 9:25 AM    HISTORY: Status post pacer/ICD    COMPARISON: 1/23/2022      Impression    IMPRESSION: Left subclavian transvenous automatic implantable cardiac  defibrillator placement. No pneumothorax. Median sternotomy and  mediastinal surgical clips. Left atrial appendage closure device.  Stable linear atelectasis in the mid lungs and retrocardiac  atelectasis. Stable small right pleural effusion. Right scapula  instrumented fixation.    HUGO GOEL MD         SYSTEM ID:  [id number]       Medications       amiodarone  400 mg Oral BID     apixaban ANTICOAGULANT  5 mg Oral BID     aspirin  81 mg Oral or NG Tube Daily     furosemide  40 mg Intravenous Q12H     gabapentin  100 mg Oral At Bedtime     metoprolol succinate ER  75 mg Oral BID     pantoprazole  40 mg Oral or NG Tube Daily    Or      pantoprazole  40 mg Oral Daily     polyethylene glycol  17 g Oral Daily     rosuvastatin  40 mg Oral Daily     senna-docusate  1 tablet Oral BID     sodium chloride (PF)  3 mL Intracatheter Q8H     traZODone  200 mg Oral At Bedtime

## 2022-01-25 NOTE — PROGRESS NOTES
CV Surgery    S: Seen eating in bed-needs to be in chair, had gideon cardia overnight, breathing stable, reports he hasn't walked much-2 rounds of rehab today before discharge if ok by EP-rhythm questionable overnight, likely due to sleep apnea?     O: B/P: 117/69, T: 97.4, P: 66, R: 17  General: NAD  Heart: s1/s2, no m/r/g  Lungs: course  Abd: s/nt/nd  Sternum: cdi  Extremities: no LE edema    Lab Results   Component Value Date    WBC 10.8 01/24/2022     Lab Results   Component Value Date    RBC 3.97 01/24/2022     Lab Results   Component Value Date    HGB 11.1 01/24/2022     Lab Results   Component Value Date    HCT 34.7 01/24/2022     No components found for: MCT  Lab Results   Component Value Date    MCV 87 01/24/2022     Lab Results   Component Value Date    MCH 28.0 01/24/2022     Lab Results   Component Value Date    MCHC 32.0 01/24/2022     Lab Results   Component Value Date    RDW 14.2 01/24/2022     Lab Results   Component Value Date     01/24/2022       Last Basic Metabolic Panel:  Lab Results   Component Value Date     01/25/2022      Lab Results   Component Value Date    POTASSIUM 3.5 01/25/2022     Lab Results   Component Value Date    CHLORIDE 99 01/25/2022     Lab Results   Component Value Date    RATNA 8.6 01/25/2022     Lab Results   Component Value Date    CO2 28 01/25/2022     Lab Results   Component Value Date    BUN 13 01/25/2022     Lab Results   Component Value Date    CR 0.88 01/25/2022     Lab Results   Component Value Date     01/25/2022       A/P: POD#6 s/p Coronary artery bypass grafting x1 with left internal mammary artery to the left anterior descending, bilateral pulmonary vein isolation with the AtriCure bipolar radiofrequency ablation device Synergy, exclusion of left atrial appendage, intraoperative transesophageal echocardiography by Dr. William Dumont    Seen and discussed by Dr. Gerry Armendariz, CYNTHIA  Pager [phone]

## 2022-01-25 NOTE — PROGRESS NOTES
Care Management Discharge Note    Discharge Date: 01/25/2022       Discharge Disposition: Home    Discharge Services: [facility] ([phone])    Discharge DME: NA     Discharge Transportation: family or friend will provide    Private pay costs discussed: Not applicable    PAS Confirmation Code:  NA  Patient/family educated on Medicare website which has current facility and service quality ratings: yes    Education Provided on the Discharge Plan:  yes  Persons Notified of Discharge Plans: Pt, Nsg  Patient/Family in Agreement with the Plan: yes    Handoff Referral Completed: Yes    Additional Information:  Pt will discharge home this afternoon  All appointments have been made.  [facility] is unable to accept due to SOC out 6 days.  [facility] will start care on 1/28/22.  Referral has been sent.      Lindsey Montesinos, RN   Inpatient Care Management  [phone]

## 2022-01-25 NOTE — PROGRESS NOTES
Device Discharge  Dressing:  Clean, dry, and intact or Drainage present:  scant  Chest XR reviewed:  Yes  Pneumo present?:  No  Lead Measurements per Device Rep:  WNL (R wave 4.2mV today, same as at implant)    Education Provided:  Avoid raising left arm above the level of the shoulder for 3 weeks.  Do not shower until outer occlusive dressing has been removed.  Remove outer dressing in 3 - 4 days.  Leave steri-strips in place, these will be removed at the 1 week check.   Call Device Clinic with increased swelling, drainage, or fever > 101 degrees.   Follow-up with the Device Clinic as scheduled.

## 2022-01-25 NOTE — CONSULTS
Care Management Initial Consult    General Information  Assessment completed with: Patient,    Type of CM/SW Visit: Offer D/C Planning    Primary Care Provider verified and updated as needed: Yes, DEISI Garza [facility]  Readmission within the last 30 days: no previous admission in last 30 days      Reason for Consult: discharge planning  Advance Care Planning: Advance Care Planning Reviewed: no concerns identified     General Information Comments: High readmission risk    Communication Assessment  Patient's communication style: spoken language (English or Bilingual)    Hearing Difficulty or Deaf: no   Wear Glasses or Blind: no    Cognitive  Cognitive/Neuro/Behavioral: WDL  Level of Consciousness: alert  Arousal Level: opens eyes spontaneously  Orientation: oriented x 4  Mood/Behavior: calm  Best Language: 0 - No aphasia  Speech: clear,logical    Living Environment:   People in home: spouse     Current living Arrangements: [facility]     Able to return to prior arrangements: yes, declines TCU  Living Arrangement Comments: pt will remain on main level of home until he is able to conquer steps    Family/Social Support:  Care provided by: self  Provides care for: no one  Marital Status:   Wife          Description of Support System: Supportive         Current Resources:   Patient receiving home care services: No     Community Resources: None  Equipment currently used at home: cane, straight  Supplies currently used at home: None    Employment/Financial:  Employment Status: retired     Employment/ Comments: Postal service  Financial Concerns: No concerns identified           Lifestyle & Psychosocial Needs:  Social Determinants of Health     Tobacco Use: Medium Risk     Smoking Tobacco Use: Former Smoker     Smokeless Tobacco Use: Never Used   Alcohol Use: Not on file   Financial Resource Strain: Not on file   Food Insecurity: Not on file   Transportation Needs: Not on file    Physical Activity: Not on file   Stress: Not on file   Social Connections: Not on file   Intimate Partner Violence: Not on file   Depression: At risk     PHQ-2 Score: 4   Housing Stability: Not on file       Functional Status:  Prior to admission patient needed assistance:   Dependent ADLs:: Independent  Dependent IADLs:: Independent  Assesssment of Functional Status: Not at baseline with mobility,Not at  functional baseline    Mental Health Status:  Mental Health Status: No Current Concerns       Chemical Dependency Status:  Chemical Dependency Status: No Current Concerns             Values/Beliefs:  Spiritual, Cultural Beliefs, Jain Practices, Values that affect care:    NA             Additional Information:  Pt is discharging home with his spouse this afternoon.    All follow-up appointments have been scheduled.  A referral for home care has been sent to [facility], will await their decision/capacity to accept.  Pt wanted information on a HCD, this has been provided.  Pt's spouse will be providing transportation home.    Lindsey Montesinos RN   Inpatient Care Management  [phone]

## 2022-01-25 NOTE — PLAN OF CARE
Occupational Therapy Discharge Summary    Reason for therapy discharge:    Discharged to home with home therapy.    Progress towards therapy goal(s). See goals on Care Plan in [facility] electronic health record for goal details.  Goals not met.  Barriers to achieving goals:   discharge from facility.    Therapy recommendation(s):    Continued therapy is recommended.  Rationale/Recommendations:  Pt continuing to require assist with mobility, particularly sit>stand transfers. TCU recommended however pt deciding to return home. Recommend home health OT/PT for progression of activity tolerance. Eventually pt would benefit from OP Cardiac Rehab.

## 2022-01-25 NOTE — PLAN OF CARE
VSS. Reviewed discharge instructions with the patient including new medication regimen, incision care and aftercare instructions with instructions for follow up appointments. All questions were answered at the time of instructions. Will discharge around 4pm to home with wife.

## 2022-01-25 NOTE — DISCHARGE INSTRUCTIONS
Discharge Instructions for Defibrillator Implantation  You have had a procedure to insert a defibrillator. Once inside your body, this small electronic device helps keep your heart from beating too slowly and will pace or shock you out of dangerous fast heart rhythms. A defibrillator can t fix existing heart problems. But it can help you feel better and have more energy. As you recover, follow all of the instructions you are given, including those below.  Activity    Follow the instructions you are given about limiting your activity.    Do not raise your arm on the incision side (Left arm) above shoulder level for 3 weeks. This gives the device lead wires time to attach securely inside your heart.    Ask your doctor when you can expect to return to work.    You can still exercise. It s good for your body and your heart. Talk with your doctor about an exercise plan.  Other Precautions    Follow your doctor's directions carefully for wound care. You may remove the outer dressing in 3 - 4 days. Leave the steri-strips in place; these will be removed at your 1 week follow-up. Never put any creams, lotions, or products like peroxide on an incision unless your doctor tells you to.     You may shower once the outer dressing has been removed.     Before you receive any treatment, tell all health care providers (including your dentist) that you have a defibrillator.    You will be given an ID card that contains information about your defibrillator. Always carry this card with you. You can show this card if your defibrillator sets off a metal detector. You should also show it to avoid screening with a hand-held security wand.    Keep your cell phone away from your defibrillator. Don t carry the phone in your shirt pocket, even when it s turned off.    Avoid strong magnets. Examples are those used in MRIs or in hand-held security wands.    Avoid strong electrical fields. Examples are those made by radio transmitting towers,   ham  radios, and heavy-duty electrical equipment.    Avoid leaning over the open nunez of a running car. A running engine creates an electrical field. Most household and yard appliances will not cause any problems. If you use any large power tools, such as an industrial , talk with your doctor.   Follow-Up    Follow up in the device clinic as scheduled.     1 week ICD check: Tuesday 2/1/2022, 9:00am    [facility], [place]    [address], [place] [address]    Park in the [address] (west of [facility]), walk across the skyway, stay on the 2nd floor, the heart clinic is to the left of the large staircase just past the elevators.     Make regular follow-up appointments with your device clinic. They will check the defibrillator to make sure it s working properly.  When to Call Your Device Clinic [phone]  Call your doctor immediately if you have any of the following:    Dizziness    Chest pain    Lack of energy    Fainting spells    Rapid pulse or pounding heartbeat    Shortness of breath    Increased pain around your defibrillator    Fever above 100.4 F (38 C) or other signs of infection (redness, swelling, drainage, or warmth at the incision site)     4180-2477 [employer]. [address] [place] [address]. All rights reserved. This information is not intended as a substitute for professional medical care. Always follow your healthcare professional's instructions.    [facility] in [place]: [phone]  Device Clinic (Monday to Friday, 8am-4pm): [phone]  *The device clinic is closed on weekends and holidays.  Any calls received during this time will be answered on the next business  day. For any urgent questions after hours, please call the main clinic number and you will be put in contact with the cardiologist on call.      Home Care is [facility].  They will call you for scheduling what time they will see you on 1/28/22.   Their phone number is [phone].

## 2022-01-25 NOTE — PROGRESS NOTES
[facility]  EP Cardiology Progress Note  Date of Service: 01/25/2022  Primary Cardiologist: Dr. Ryanne Villasenortaj is a 66 year old male with past medical history significant for CAD s/p PCI x 2, atrial fibrillation on chronic anticoagulation, morbid obesity s/p gastric bypass (2008) HF, HTN, chronic back pain s/p indwelling pain pump admitted on 1/12/2022 with NSTEMI and underwent a CABG x 1, pulmonary vein ablation, and  LA exclusion.  EP was asked to see pt due to VT.        Interval History  Received an ICD on 1/24/22  Blood pressure controlled, HR controlled, afebrile, on room air  Pt has no complaints today     Assessment  Sustained monomorphic ventricular tachycardia  Sustained VT for about 30 seconds when walking  S/p dual chamber [employer] scientific ICD implantation (1/24/22)  No apparent complications   ECG today shows a-paced with PVCs and ventricular rate of 76 bpm  CXR pending  Interrogation check showed stable thresholds.   Incision is covered with small area of old dried blood but intact dressing  Device RN education still pending before discharge     Paroxsymal Atrial fibrillation    PTA Eliquis, Plavix, lisinopril, hydrochlorothiazide 20/25 mg daily, metoprolol XL 70 mg daily, lasix 60 mg daily, potassium chloride 20 mg daily    Dx prior to admission and then occurred post operatively     For rhythm control was started on amiodarone.  Currently taking amiodarone 400 mg BID and metoprolol XL 75 mg daily      For anticoagulation for CHADS VASC 4 (age, CAD, HTN) he is taking Eliquis 5 mg BID.   Now with PVI and LA exclusion    Consider CT in 1 month before deciding to stop Eliquis    Hypertension    controlled    Coronary artery disease    PCI to LAD in 2012    Inferior STEMI (9/2021) and underwent PCI to RCA    S/p NSTEMI (1/12/2022) and underwent a CABG x1 (LIMA to LAD, PVI, LA exclusion)    Ischemic cardiomyopathy    ECHO (1/12/2022) revealed EF 40-45% with apical  wall hypokinesis    I&O shows -1.156 since admission    Weight down from 313 (1/12/2022) to 295 (1/24/2022)    Was started on lasix 40 mg BID on 1/24/22 and BMP normal today    Currently taking metoprolol XL     Was on lisinopril PTA     morbid obesity     s/p gastric bypass (2008)     Weight today 295    MARLEEN     Does not tolerate CPAP    Chronic Back Pain     In dwelling pain Pump w Morphine, Clonidine, Baclofen    Plan  1. Chest xray pending  2. Restart Eliquis  3. Follow up in device clinic in 1 week  4. Follow up with EP SULEMA in Feb 2022 with BMP prior  5. Follow up with Dr. Pearl in April 2022 with ECHO prior  6. In approximately 1 month, consider CT to evaluate left atrial exclusion     Final recommendations pending Dr. Young's documentation      I spent 40 minutes face-to-face or coordinating care of Onur Barr. Over 50% of our time on the unit was spent counseling the patient and/or coordinating care regarding VT in the setting of s/p CABG    April King, APRN CNP  [facility]  Text Page  (M-F 7:30 am - 4:00 pm)      Physical Exam   Temp: 97.4  F (36.3  C) Temp src: Oral BP: 117/69 Pulse: 66   Resp: 17 SpO2: 93 % O2 Device: None (Room air) Oxygen Delivery: 2 LPM  Vitals:    01/22/22 0436 01/23/22 0517 01/24/22 0600   Weight: 136.1 kg (300 lb) 136 kg (299 lb 13.2 oz) 133.9 kg (295 lb 3.1 oz)       GEN:  In general, this is a obese male in no acute distress.    HEENT:  Pupils normal size, equal, and round. Sclerae nonicteric. Clear oropharynx. Mucous membranes moist,  no cyanosis.  NECK: Supple, no asymmetry, masses, or scars appreciated. Trachea midline.   C/V:  Regular rate and rhythm.   RESP: Respirations are unlabored. No use of accessory muscles. Clear to auscultation bilaterally without wheezing, rales, or rhonchi.  GI: Abdomen soft, nontender, nondistended. bowel sounds normal.  EXTREM:  No LE edema. No cyanosis or clubbing.  MS: Large joints without obvious swelling or erythema.   VASC:  Radial and dorsalis pedis are normal in volumes and symmetric bilaterally.   NEURO: Alert and oriented, cooperative.    PSYCH: Normal affect.  SKIN: Warm and dry. Sternal incision open to air.  Device incision well healed on upper right chest incision covered with clean, dry and intact dressing. No hematoma, drainage, ecchymosis    Medications       amiodarone  400 mg Oral BID     [Held by provider] apixaban ANTICOAGULANT  5 mg Oral BID     aspirin  81 mg Oral or NG Tube Daily     furosemide  40 mg Intravenous Q12H     gabapentin  100 mg Oral At Bedtime     metoprolol succinate ER  75 mg Oral BID     pantoprazole  40 mg Oral or NG Tube Daily    Or     pantoprazole  40 mg Oral Daily     polyethylene glycol  17 g Oral Daily     rosuvastatin  40 mg Oral Daily     senna-docusate  1 tablet Oral BID     sodium chloride (PF)  3 mL Intracatheter Q8H     traZODone  200 mg Oral At Bedtime       Data   Most Recent 3 CBC's:Recent Labs   Lab Test 01/24/22  1150 01/23/22  0603 01/22/22  0750   WBC 10.8 8.6 8.0   HGB 11.1* 10.5* 9.9*   MCV 87 89 90    173  173 127*     Most Recent 3 BMP's:Recent Labs   Lab Test 01/25/22  0535 01/24/22  1150 01/23/22  1718    131* 132*   POTASSIUM 3.5 3.9 3.5   CHLORIDE 99 98 100   CO2 28 25 26   BUN 13 13 12   CR 0.88 0.88 0.86   ANIONGAP 6 8 6   RATNA 8.6 8.7 8.3*   * 142* 168*

## 2022-01-26 ENCOUNTER — PATIENT OUTREACH (OUTPATIENT)
Dept: NURSING | Facility: CLINIC | Age: 67
End: 2022-01-26
Payer: COMMERCIAL

## 2022-01-26 ENCOUNTER — TELEPHONE (OUTPATIENT)
Dept: CARDIOLOGY | Facility: CLINIC | Age: 67
End: 2022-01-26
Payer: COMMERCIAL

## 2022-01-26 DIAGNOSIS — Z95.1 S/P CABG (CORONARY ARTERY BYPASS GRAFT): Primary | ICD-10-CM

## 2022-01-26 LAB
ATRIAL RATE - MUSE: 61 BPM
DIASTOLIC BLOOD PRESSURE - MUSE: NORMAL MMHG
INTERPRETATION ECG - MUSE: NORMAL
P AXIS - MUSE: 47 DEGREES
PR INTERVAL - MUSE: 238 MS
QRS DURATION - MUSE: 90 MS
QT - MUSE: 386 MS
QTC - MUSE: 434 MS
R AXIS - MUSE: 16 DEGREES
SYSTOLIC BLOOD PRESSURE - MUSE: NORMAL MMHG
T AXIS - MUSE: 84 DEGREES
VENTRICULAR RATE- MUSE: 76 BPM

## 2022-01-26 RX ORDER — CLOPIDOGREL BISULFATE 75 MG/1
75 TABLET ORAL DAILY
Qty: 60 TABLET | Refills: 3 | Status: SHIPPED | OUTPATIENT
Start: 2022-01-26 | End: 2022-03-21

## 2022-01-26 NOTE — LETTER
[facility] COORDINATION  [address] [address]    January 26, 2022    Onur Barr  [address] [facility] [address]      Dear Onur,    I am a clinic community health worker who works with Luann Berger MD at [facility]. I wanted to thank you for spending the time to talk with me.  Below is a description of clinic care coordination and how I can further assist you.      The clinic care coordination team is made up of a registered nurse,  and community health worker who understand the health care system. The goal of clinic care coordination is to help you manage your health and improve access to the health care system in the most efficient manner. The team can assist you in meeting your health care goals by providing education, coordinating services, strengthening the communication among your providers and supporting you with any resource needs.    Please feel free to contact me at [phone] with any questions or concerns. We are focused on providing you with the highest-quality healthcare experience possible and that all starts with you.     Sincerely,     Latonia Ospina  Community Health Worker  [facility] Care Coordination     Office: [phone]

## 2022-01-26 NOTE — TELEPHONE ENCOUNTER
CV Surgery    Patient's wife called stating she needs Plavix. New Rx sent to pharmacy.     Mckayla Armendariz PA-C

## 2022-01-26 NOTE — PROGRESS NOTES
Clinic Care Coordination Contact  Community Health Worker Initial Outreach    Patient accepts CC: No, Enrolled in home care. Patient will be sent Care Coordination introduction letter for future reference.     [facility]: Post-Discharge Note  SITUATION                                                      Admission:    Admission Date: 01/12/22   Reason for Admission: Chest Pain  Discharge:   Discharge Date: 01/25/22  Discharge Diagnosis: Lumbago, Morbid obesity -- BMI 42.0, Bilateral leg edema, Chronic diastolic heart failure, MARLEEN (doesn't tolerate CPAP), NSTEMI w Unstab Angina -- S/P CABG x 1 (LIMA to LAD) on 1/19/22, Paroxysmal atrial fib -- S/P Pulm Vein Ablation 1/19/22, Essential hypertension, Mixed hyperlipidemia, Gastroesophageal reflux disease without esophagitis, Chronic Back Pain (IT Pump w Morphine, Clonidine, Baclofen), S/P CABG (coronary artery bypass graft), Fluid overload, ICD (implantable cardioverter-defibrillator) in place    BACKGROUND                                                      On 1/19/22 Mr Barr successfully underwent Coronary artery bypass grafting x1 with left internal mammary artery to the left anterior descending, bilateral pulmonary vein isolation with the AtriCure bipolar radiofrequency ablation device Synergy, exclusion of left atrial appendage, intraoperative transesophageal echocardiography by Dr. William Dumont.   Was extubated within 24 hours of surgery. Once he was weaned off hemodynamic stabilizing gtt's, transitioned to surgical floor.   Had some transient hyperglycemia treated with an insulin gtt, transitioned to sliding scale insulin and has no need at discharge.  Had some fluid overload treated with diuretic medication. At discharge he is 4 kg below his pre-op weight and is not sent with diuretics.   He has paroxsymal a.fib that was treated with amiodarone which he would have episodes of NSR but mostly a.fib CVR. He had episodes of v.tach, cards EP was consulted  and an ICD was placed on pod#5. He is resumed on his PTA plavix and started on a lower dose Xarelto for his a.fib per cards EP.  He progressed well with cardiac rehab. He is discharged to home with home care on pod# 6 with the help of their family.     ASSESSMENT      Discharge Assessment  How are you doing now that you are home?: Doing pretty good.  How are your symptoms? (Red Flag symptoms escalate to triage hotline per guidelines): Other (Hard to tell right now)  Do you feel your condition is stable enough to be safe at home until your provider visit?: Yes  Does the patient have their discharge instructions? : Yes  Does the patient have questions regarding their discharge instructions? : No  Were you started on any new medications or were there changes to any of your previous medications? : Yes  Does the patient have all of their medications?: Yes  Do you have questions regarding any of your medications? : No  Do you have all of your needed medical supplies or equipment (DME)?  (i.e. oxygen tank, CPAP, cane, etc.): Yes  Discharge follow-up appointment scheduled within 14 calendar days? : Yes  Discharge Follow Up Appointment Date: 02/04/22  Discharge Follow Up Appointment Scheduled with?: Primary Care Provider    Post-op ([facility] CTA Only)  If the patient had a surgery or procedure, do they have any questions for a nurse?: Yes (see comment) (Coronary artery bypass)    Post-op (Clinicians Only)  Did the patient have surgery or a procedure: Yes  Incision: healing  Drainage: No  Bleeding: none  Fever: No  Chills: Yes (Cold sweats)  Redness: No  Warmth: No  Swelling: No  Incision site pain: No  Closure: suture;dissolving  Eating & Drinking: eating and drinking without complaints/concerns  PO Intake: full liquids  Bowel Function: constipation  Date of last BM: 01/25/22  Urinary Status: voiding without complaint/concerns    Care Management   Community Health Worker Initial Outreach    Patient accepts CC: No, Enrolled in  home care. Patient will be sent Care Coordination introduction letter for future reference.       PLAN                                                      Outpatient Plan:    Discharge follow-up: *Follow up primary care provider in 7-10 days after discharge in order to review your medication, vital signs, obtain any necessary lab work your doctor may want, and to update them on your hospitalization and medical issues.   *Follow up with Raghu Worley with Dr Dumont, heart surgeon, at [facility] [address] on 2/7/22 at 1:00 PM. If any questions or concerns call [phone].  You will see us once at this visit and then if everything is going well you will not need to see us again.  You will follow long term with your cardiologist.   *Follow up with April King, cardiology NP, on 2/28/22 at 10:30 PM. This is who you will follow with long term about your heart issues. [phone].        Future Appointments   Date Time Provider Department Center   2/1/2022  9:00 AM BLUE DCR2 [facility] PSA CLIN   2/4/2022  1:00 PM Luann Berger MD [id number]   2/7/2022  1:00 PM [facility] CARDIOTHORACIC SURGERY, MENDY PABLO   2/28/2022 10:30 AM April King APRN [facility] PSA CLIN   4/7/2022  9:00 AM Liz Pearl MD [facility] PSA CLIN         For any urgent concerns, please contact our 24 hour nurse triage line: [phone] ([phone])         Latonia Ospina

## 2022-01-27 ENCOUNTER — TELEPHONE (OUTPATIENT)
Dept: OTHER | Facility: CLINIC | Age: 67
End: 2022-01-27
Payer: COMMERCIAL

## 2022-01-27 NOTE — TELEPHONE ENCOUNTER
48 hour post op call    ACTIVITY  How are you doing with activity? I am needing help getting up but I can walk around my home then.   Are you continuing to use your IS 3-4 times a day and do you have any shortness of breath. Yes, I get up to 1500 ml and have nos shortness of breath     Are you weighing yourself daily and has it been stable?. I am staying stable with my weight     PAIN  How is your pain, what are you doing for your pain? I am having pain in my right shoulder. Only taking tylenol 1 X a day and 1 oxycodone a day. Instructed to increase tylenol to 3000 mg a day, adding Robaxin up to 4 times a day, Oxycodone for unrelieved pain.     Are you still doing sternal precautions? Yes    Do you hear any clicking when you are moving or taking a deep breath? No , my shoulder has cracked.      INCISION:   It looks good, denies redness or drainage.     ASSISTANCE  Do you have someone at home to help you with your daily activities and transportation needs? My wife       MEDICATIONS  I would like to review your discharge medications and answer any questions you may have.   Reviewed      Are you on a blood thinner/Coumadin  Eliquis and Plavix     Who is managing your Coumadin dosing/INR? Na       FOLLOW UP       Scheduled for cardiac rehab? Homecare PT/OT   Scheduled to see our PA or Surgeon? 2/7  Scheduled to see your cardiologist ? Dr Freeman 4/7  Scheduled to see SULEMA/Core April 2/28 S-fib and Ef 40 %. Device check 2/1  Scheduled to see your primary care physician? Not yet   Do you have our contact information? Yes

## 2022-02-01 ENCOUNTER — ANCILLARY PROCEDURE (OUTPATIENT)
Dept: CARDIOLOGY | Facility: CLINIC | Age: 67
End: 2022-02-01
Attending: INTERNAL MEDICINE
Payer: COMMERCIAL

## 2022-02-01 DIAGNOSIS — Z95.810 ICD (IMPLANTABLE CARDIOVERTER-DEFIBRILLATOR) IN PLACE: ICD-10-CM

## 2022-02-01 DIAGNOSIS — I47.29 PAROXYSMAL VENTRICULAR TACHYCARDIA (H): ICD-10-CM

## 2022-02-01 PROCEDURE — 93283 PRGRMG EVAL IMPLANTABLE DFB: CPT | Performed by: INTERNAL MEDICINE

## 2022-02-02 LAB
MDC_IDC_LEAD_IMPLANT_DT: NORMAL
MDC_IDC_LEAD_IMPLANT_DT: NORMAL
MDC_IDC_LEAD_LOCATION: NORMAL
MDC_IDC_LEAD_LOCATION: NORMAL
MDC_IDC_LEAD_LOCATION_DETAIL_1: NORMAL
MDC_IDC_LEAD_LOCATION_DETAIL_1: NORMAL
MDC_IDC_LEAD_MFG: NORMAL
MDC_IDC_LEAD_MFG: NORMAL
MDC_IDC_LEAD_MODEL: NORMAL
MDC_IDC_LEAD_MODEL: NORMAL
MDC_IDC_LEAD_POLARITY_TYPE: NORMAL
MDC_IDC_LEAD_POLARITY_TYPE: NORMAL
MDC_IDC_LEAD_SERIAL: NORMAL
MDC_IDC_LEAD_SERIAL: NORMAL
MDC_IDC_MSMT_BATTERY_DTM: NORMAL
MDC_IDC_MSMT_BATTERY_REMAINING_LONGEVITY: 150 MO
MDC_IDC_MSMT_BATTERY_REMAINING_PERCENTAGE: 100 %
MDC_IDC_MSMT_BATTERY_STATUS: NORMAL
MDC_IDC_MSMT_CAP_CHARGE_DTM: NORMAL
MDC_IDC_MSMT_CAP_CHARGE_TIME: 9.3 S
MDC_IDC_MSMT_CAP_CHARGE_TYPE: NORMAL
MDC_IDC_MSMT_LEADCHNL_RA_IMPEDANCE_VALUE: 711 OHM
MDC_IDC_MSMT_LEADCHNL_RA_PACING_THRESHOLD_AMPLITUDE: 0.6 V
MDC_IDC_MSMT_LEADCHNL_RA_PACING_THRESHOLD_PULSEWIDTH: 0.4 MS
MDC_IDC_MSMT_LEADCHNL_RV_IMPEDANCE_VALUE: 483 OHM
MDC_IDC_MSMT_LEADCHNL_RV_PACING_THRESHOLD_AMPLITUDE: 0.9 V
MDC_IDC_MSMT_LEADCHNL_RV_PACING_THRESHOLD_PULSEWIDTH: 0.4 MS
MDC_IDC_PG_IMPLANT_DTM: NORMAL
MDC_IDC_PG_MFG: NORMAL
MDC_IDC_PG_MODEL: NORMAL
MDC_IDC_PG_SERIAL: NORMAL
MDC_IDC_PG_TYPE: NORMAL
MDC_IDC_SESS_CLINIC_NAME: NORMAL
MDC_IDC_SESS_DTM: NORMAL
MDC_IDC_SESS_TYPE: NORMAL
MDC_IDC_SET_BRADY_AT_MODE_SWITCH_MODE: NORMAL
MDC_IDC_SET_BRADY_AT_MODE_SWITCH_RATE: 170 {BEATS}/MIN
MDC_IDC_SET_BRADY_LOWRATE: 60 {BEATS}/MIN
MDC_IDC_SET_BRADY_MAX_SENSOR_RATE: 130 {BEATS}/MIN
MDC_IDC_SET_BRADY_MAX_TRACKING_RATE: 130 {BEATS}/MIN
MDC_IDC_SET_BRADY_MODE: NORMAL
MDC_IDC_SET_BRADY_PAV_DELAY_HIGH: 200 MS
MDC_IDC_SET_BRADY_PAV_DELAY_LOW: 300 MS
MDC_IDC_SET_BRADY_SAV_DELAY_HIGH: 200 MS
MDC_IDC_SET_BRADY_SAV_DELAY_LOW: 300 MS
MDC_IDC_SET_LEADCHNL_RA_PACING_AMPLITUDE: 2 V
MDC_IDC_SET_LEADCHNL_RA_PACING_CAPTURE_MODE: NORMAL
MDC_IDC_SET_LEADCHNL_RA_PACING_POLARITY: NORMAL
MDC_IDC_SET_LEADCHNL_RA_PACING_PULSEWIDTH: 0.4 MS
MDC_IDC_SET_LEADCHNL_RA_SENSING_ADAPTATION_MODE: NORMAL
MDC_IDC_SET_LEADCHNL_RA_SENSING_POLARITY: NORMAL
MDC_IDC_SET_LEADCHNL_RA_SENSING_SENSITIVITY: 0.25 MV
MDC_IDC_SET_LEADCHNL_RV_PACING_AMPLITUDE: 2 V
MDC_IDC_SET_LEADCHNL_RV_PACING_CAPTURE_MODE: NORMAL
MDC_IDC_SET_LEADCHNL_RV_PACING_POLARITY: NORMAL
MDC_IDC_SET_LEADCHNL_RV_PACING_PULSEWIDTH: 0.4 MS
MDC_IDC_SET_LEADCHNL_RV_SENSING_ADAPTATION_MODE: NORMAL
MDC_IDC_SET_LEADCHNL_RV_SENSING_POLARITY: NORMAL
MDC_IDC_SET_LEADCHNL_RV_SENSING_SENSITIVITY: 0.6 MV
MDC_IDC_SET_ZONE_DETECTION_INTERVAL: 250 MS
MDC_IDC_SET_ZONE_DETECTION_INTERVAL: 300 MS
MDC_IDC_SET_ZONE_DETECTION_INTERVAL: 353 MS
MDC_IDC_SET_ZONE_TYPE: NORMAL
MDC_IDC_SET_ZONE_VENDOR_TYPE: NORMAL
MDC_IDC_STAT_AT_BURDEN_PERCENT: 4 %
MDC_IDC_STAT_AT_DTM_END: NORMAL
MDC_IDC_STAT_AT_DTM_START: NORMAL
MDC_IDC_STAT_BRADY_DTM_END: NORMAL
MDC_IDC_STAT_BRADY_DTM_START: NORMAL
MDC_IDC_STAT_BRADY_RA_PERCENT_PACED: 35 %
MDC_IDC_STAT_BRADY_RV_PERCENT_PACED: 16 %
MDC_IDC_STAT_EPISODE_RECENT_COUNT: 0
MDC_IDC_STAT_EPISODE_RECENT_COUNT: 26
MDC_IDC_STAT_EPISODE_RECENT_COUNT_DTM_END: NORMAL
MDC_IDC_STAT_EPISODE_RECENT_COUNT_DTM_START: NORMAL
MDC_IDC_STAT_EPISODE_TYPE: NORMAL
MDC_IDC_STAT_EPISODE_VENDOR_TYPE: NORMAL
MDC_IDC_STAT_TACHYTHERAPY_ATP_DELIVERED_RECENT: 0
MDC_IDC_STAT_TACHYTHERAPY_ATP_DELIVERED_TOTAL: 0
MDC_IDC_STAT_TACHYTHERAPY_RECENT_DTM_END: NORMAL
MDC_IDC_STAT_TACHYTHERAPY_RECENT_DTM_START: NORMAL
MDC_IDC_STAT_TACHYTHERAPY_SHOCKS_ABORTED_RECENT: 0
MDC_IDC_STAT_TACHYTHERAPY_SHOCKS_ABORTED_TOTAL: 0
MDC_IDC_STAT_TACHYTHERAPY_SHOCKS_DELIVERED_RECENT: 0
MDC_IDC_STAT_TACHYTHERAPY_SHOCKS_DELIVERED_TOTAL: 0
MDC_IDC_STAT_TACHYTHERAPY_TOTAL_DTM_END: NORMAL
MDC_IDC_STAT_TACHYTHERAPY_TOTAL_DTM_START: NORMAL

## 2022-02-03 ENCOUNTER — MEDICAL CORRESPONDENCE (OUTPATIENT)
Dept: HEALTH INFORMATION MANAGEMENT | Facility: CLINIC | Age: 67
End: 2022-02-03
Payer: COMMERCIAL

## 2022-02-04 ENCOUNTER — OFFICE VISIT (OUTPATIENT)
Dept: FAMILY MEDICINE | Facility: CLINIC | Age: 67
End: 2022-02-04
Payer: COMMERCIAL

## 2022-02-04 VITALS
DIASTOLIC BLOOD PRESSURE: 72 MMHG | SYSTOLIC BLOOD PRESSURE: 124 MMHG | HEART RATE: 76 BPM | BODY MASS INDEX: 40.38 KG/M2 | WEIGHT: 297.7 LBS

## 2022-02-04 DIAGNOSIS — D50.8 OTHER IRON DEFICIENCY ANEMIA: ICD-10-CM

## 2022-02-04 DIAGNOSIS — Z95.810 ICD (IMPLANTABLE CARDIOVERTER-DEFIBRILLATOR) IN PLACE: ICD-10-CM

## 2022-02-04 DIAGNOSIS — Z95.1 S/P CABG (CORONARY ARTERY BYPASS GRAFT): ICD-10-CM

## 2022-02-04 DIAGNOSIS — Z09 HOSPITAL DISCHARGE FOLLOW-UP: Primary | ICD-10-CM

## 2022-02-04 DIAGNOSIS — I25.10 CORONARY ARTERY DISEASE INVOLVING NATIVE CORONARY ARTERY OF NATIVE HEART WITHOUT ANGINA PECTORIS: ICD-10-CM

## 2022-02-04 DIAGNOSIS — I48.0 PAROXYSMAL ATRIAL FIBRILLATION (H): ICD-10-CM

## 2022-02-04 LAB
ERYTHROCYTE [DISTWIDTH] IN BLOOD BY AUTOMATED COUNT: 14.4 % (ref 10–15)
HCT VFR BLD AUTO: 33.4 % (ref 40–53)
HGB BLD-MCNC: 10.7 G/DL (ref 13.3–17.7)
MCH RBC QN AUTO: 27.9 PG (ref 26.5–33)
MCHC RBC AUTO-ENTMCNC: 32 G/DL (ref 31.5–36.5)
MCV RBC AUTO: 87 FL (ref 78–100)
PLATELET # BLD AUTO: 352 10E3/UL (ref 150–450)
RBC # BLD AUTO: 3.83 10E6/UL (ref 4.4–5.9)
WBC # BLD AUTO: 7.3 10E3/UL (ref 4–11)

## 2022-02-04 PROCEDURE — 99495 TRANSJ CARE MGMT MOD F2F 14D: CPT | Performed by: FAMILY MEDICINE

## 2022-02-04 PROCEDURE — 36415 COLL VENOUS BLD VENIPUNCTURE: CPT | Performed by: FAMILY MEDICINE

## 2022-02-04 PROCEDURE — 85027 COMPLETE CBC AUTOMATED: CPT | Performed by: FAMILY MEDICINE

## 2022-02-04 RX ORDER — FUROSEMIDE 20 MG
TABLET ORAL
COMMUNITY
Start: 2022-02-01 | End: 2022-02-04

## 2022-02-04 RX ORDER — AMIODARONE HYDROCHLORIDE 200 MG/1
200 TABLET ORAL DAILY
Qty: 30 TABLET | Refills: 0 | Status: SHIPPED | OUTPATIENT
Start: 2022-02-04 | End: 2022-02-28

## 2022-02-04 RX ORDER — OXYCODONE AND ACETAMINOPHEN 5; 325 MG/1; MG/1
1 TABLET ORAL DAILY PRN
Qty: 30 TABLET | Refills: 0 | Status: SHIPPED | OUTPATIENT
Start: 2022-02-04 | End: 2022-08-18

## 2022-02-04 NOTE — PROGRESS NOTES
Assessment & Plan     Hospital discharge follow-up  - CBC with platelets  - CBC with platelets    Coronary artery disease involving native coronary artery of native heart without angina pectoris  - amiodarone (PACERONE) 200 MG tablet  Dispense: 30 tablet; Refill: 0  - oxyCODONE-acetaminophen (PERCOCET) 5-325 MG tablet  Dispense: 30 tablet; Refill: 0    S/P CABG (coronary artery bypass graft)    ICD (implantable cardioverter-defibrillator) in place    Paroxysmal atrial fib -- S/P Pulm Vein Ablation 1/19/22    Other iron deficiency anemia  - Ferrous Gluconate (IRON) 240 (27 Fe) MG TABS  Dispense: 120 tablet; Refill: 0  Medications were reviewed, questions were answered.  He is doing better at this time though still fatigued and tired.  Noted no chest pain or shortness of breath at this time.  His medications were reviewed fully.       No follow-ups on file.    Luann Berger MD  [facility]    Sushil Astudillo is a 66 year old who presents for the following health issues  accompanied by his spouse.    HPI   Had a history of myocardial infarction.  Did have an angiogram earlier with attempted stenting.  Stenting did not succeed and he ended up going in again to have a CABG.  He did have this successfully after stay in the hospital he was discharged.  Today comes in for follow-up and noted that he is doing well.  Noted no major concerns.    Post Discharge Outreach 1/26/2022   Admission Date 1/12/2022   Reason for Admission Chest Pain   Discharge Date 1/25/2022   Discharge Diagnosis  Primary diagnosis:  S/P CABG (coronary artery bypass graft),S/P CABG x 1 (LIMA to LAD)  Secondary diagnosis: Lumbago, morbid obesity -- BMI 42.0, Bilateral leg edema, Chronic diastolic heart failure, MARLEEN (doesn't tolerate CPAP), NSTEMI w Unstab Angina -- S/P CABG x 1 (LIMA to LAD) on 1/19/22, Paroxysmal atrial fib -- S/P Pulm Vein Ablation 1/19/22, Essential hypertension, Mixed hyperlipidemia,  Gastroesophageal reflux disease without esophagitis, Chronic Back Pain (IT Pump w Morphine, Clonidine, Baclofen),, Fluid overload, ICD (implantable cardioverter-defibrillator) in place   How are you doing now that you are home? Doing pretty good.   How are your symptoms? (Red Flag symptoms escalate to triage hotline per guidelines) Other   Do you feel your condition is stable enough to be safe at home until your provider visit? Yes   Does the patient have their discharge instructions?  Yes   Does the patient have questions regarding their discharge instructions?  No   Were you started on any new medications or were there changes to any of your previous medications?  Yes   Does the patient have all of their medications? Yes   Do you have questions regarding any of your medications?  No   Do you have all of your needed medical supplies or equipment (DME)?  (i.e. oxygen tank, CPAP, cane, etc.) Yes   Discharge follow-up appointment scheduled within 14 calendar days?  Yes   Discharge Follow Up Appointment Date 2/4/2022   Discharge Follow Up Appointment Scheduled with? Primary Care Provider     Hospital Follow-up Visit:    Hospital/Nursing Home/IP Rehab Facility: [facility]  Date of Admission: 1/12/22  Date of Discharge: 1/25/22  Reason(s) for Admission:   NSTEMI with Unstable Angina S/P CABG x1 (LIMA to LAD)  Was your hospitalization related to COVID-19? No   Problems taking medications regularly:  None  Medication changes since discharge: No longer taking 400 mg of amiodarone, has stopped taking Eliquis.  Medications were reviewed.  Problems adhering to non-medication therapy:  None    Summary of hospitalization:  [facility] discharge summary reviewed  Diagnostic Tests/Treatments reviewed.  Follow up needed: none  Other Healthcare Providers Involved in Patient s Care:         Cardiology, cardiovascular surgery.  Update since discharge: improved. Post Discharge Medication  Reconciliation: discharge medications reconciled, continue medications without change.  Plan of care communicated with patient and caregiver                Review of Systems   CONSTITUTIONAL:NEGATIVE for fever, chills, change in weight and POSITIVE  for fatigue  ENT/MOUTH: NEGATIVE for ear, mouth and throat problems  RESP: NEGATIVE for significant cough or SOB  CV: POSITIVE for chest pain mainly from the surgery.  MUSCULOSKELETAL: ContInuing lower back pain.  PSYCHIATRIC: NEGATIVE for changes in mood or affect  ROS otherwise negative      Objective    /72 (BP Location: Right arm, Patient Position: Sitting, Cuff Size: Adult Large)   Pulse 76   Wt 135 kg (297 lb 11.2 oz)   BMI 40.38 kg/m    Body mass index is 40.38 kg/m .  Physical Exam   GENERAL: healthy, alert, some pain distress and obese  NECK: no adenopathy, no asymmetry, masses, or scars and thyroid normal to palpation  RESP: lungs clear to auscultation - no rales, rhonchi or wheezes.  Chest wall examination showed midline incision of CABG with good apposition and good healing no signs of infection.  The other lower chest wall upper abdomen surgical scars are also showing no signs of infection.  Surgical scar on the left upper anterior chest wall site of insertion of the defibrillator also well opposed showing no signs of infection.  CV: occasional premature beats, normal S1 S2, no S3 or S4, no murmur, click or rub, peripheral pulses strong and no peripheral edema  ABDOMEN: soft, nontender, no hepatosplenomegaly, no masses and bowel sounds normal  MS: no gross musculoskeletal defects noted, no edema  PSYCH: mentation appears normal, affect normal/bright

## 2022-02-07 ENCOUNTER — OFFICE VISIT (OUTPATIENT)
Dept: CARDIOLOGY | Facility: CLINIC | Age: 67
End: 2022-02-07
Payer: COMMERCIAL

## 2022-02-07 VITALS
BODY MASS INDEX: 38.92 KG/M2 | DIASTOLIC BLOOD PRESSURE: 85 MMHG | HEIGHT: 75 IN | HEART RATE: 56 BPM | SYSTOLIC BLOOD PRESSURE: 136 MMHG | WEIGHT: 313 LBS

## 2022-02-07 DIAGNOSIS — Z95.1 S/P CABG (CORONARY ARTERY BYPASS GRAFT): Primary | ICD-10-CM

## 2022-02-07 PROCEDURE — 99024 POSTOP FOLLOW-UP VISIT: CPT | Performed by: NURSE PRACTITIONER

## 2022-02-07 ASSESSMENT — MIFFLIN-ST. JEOR: SCORE: 2290.39

## 2022-02-07 NOTE — PROGRESS NOTES
CARDIOTHORACIC SURGERY FOLLOW-UP VISIT     Onur Barr   1955   [id number]      Reason for visit: Post-Op CABG and bilateral pulmonary vein isolation and left atrial appendage exclusion with Dr. Dumont on 1/19/2022.    HPI:   Per discharge summary:  On 1/19/22 Mr Barr successfully underwent Coronary artery bypass grafting x1 with left internal mammary artery to the left anterior descending, bilateral pulmonary vein isolation with the AtriCure bipolar radiofrequency ablation device Synergy, exclusion of left atrial appendage, intraoperative transesophageal echocardiography by Dr. William Dumont.   Was extubated within 24 hours of surgery. Once he was weaned off hemodynamic stabilizing gtt's, transitioned to surgical floor.   Had some transient hyperglycemia treated with an insulin gtt, transitioned to sliding scale insulin and has no need at discharge.  Had some fluid overload treated with diuretic medication. At discharge he is 4 kg below his pre-op weight and is not sent with diuretics.   He has paroxsymal a.fib that was treated with amiodarone which he would have episodes of NSR but mostly a.fib CVR. He had episodes of v.tach, cards EP was consulted and an ICD was placed on pod#5. He is resumed on his PTA plavix and started on a lower dose Xarelto for his a.fib per cards EP.  He progressed well with cardiac rehab. He is discharged to home with home care on pod# 6 with the help of their family.     Since discharge, has been recovering well at home.    Anticoagulated with xarelto and antiplatelet: plavix      States pain is well controlled. Has chronic back pain and has utilized a morphine pump for the past 15 years. Currently using tylenol, robaxin and oxycodone in addition with good relief. Sleep is poor related to his chronic back pain, using a recliner. Participating in therapy with home care, plans to start cardiac rehab once a little stronger.  Breathing has been stable/improving. Continues to work on  C &DB, able to reach 1750 mL on IS. Denies SOB at rest, PND, orthopnea. No cough. Today in clinic patient sounds to be in an  irregular rhythm with HR <100 bpm. Patient denies any fever, chills, chest pain, palpitations, edema, SOB, lightheadedness, nausea and vomiting.  Discharge weight 305 lbs; weight today 305 lbs at home; Appears  euvolemic upon examinination with no appreciable JVD, BLE edema, abdominal bloating. LS are CTA. Has had a fairly good appetite. Discussed the importance of nutrition in healing and staying adequately hydrated. Having regular bowel movements and voiding without difficulty. Midline sternal incision healing well with no noted erythema or drainage. Denies sternal popping or clicking. No psychiatric concerns.    PAST MEDICAL HISTORY:  Past Medical History:   Diagnosis Date     Acid reflux disease 10/31/2017     CHF (congestive heart failure) (H)      Coronary artery disease     CABG 1-     Essential hypertension     Created by [facility] Annotation: Apr 6 2012 10:16Zack Harris: Lisinipril,  coreg  Replacement Utility updated for latest IMO load     H/O gastric bypass 2008     Insomnia      Ischemic cardiomyopathy      Lumbago     Created by [facility] Annotation: Apr 6 2012 10:20Anh Ford: Morphine pump      Morbid obesity (H) 08/01/2018     Nephrolithiasis      Obstructive sleep apnea     Doesn't tolerate CPAP     Osteoarthritis     Created by [facility] Annotation: Jun 26 2009  9:53Zack Harris: failed lumbar  fusion/morphine pump dr putnam  Replacement Utility updated for latest IMO load     Paroxysmal atrial fibrillation (H)     Created by Conversion      Paroxysmal ventricular tachycardia (H)     dual chamber ICD 1/24/2022     Type 2 diabetes mellitus (H)     Diet controlled after Gastric Bypass in 2008       PAST SURGICAL HISTORY:  Past Surgical History:   Procedure Laterality Date     BACK SURGERY       BYPASS  GASTRIC DUODENAL SWITCH       BYPASS GRAFT ARTERY CORONARY N/A 1/19/2022    Procedure: CORONARY ARTERY BYPASS GRAFT (CABG) X1; LIMA -LAD.  PULMONARY VEIN ISOLATION, AND ATRIAL APPENDAGE CLIPPING USING 45MM ACTICURE CLIP.;  Surgeon: William Dumont MD;  Location:  OR     COSMETIC SURGERY      pannicullectomy     CV CORONARY ANGIOGRAM N/A 9/11/2021    Procedure: Coronary Angiogram;  Surgeon: Noris Ozuna MD;  Location: [facility] CV     CV HEART CATHETERIZATION WITH POSSIBLE INTERVENTION N/A 1/13/2022    Procedure: Heart Catheterization with Possible Intervention;  Surgeon: Liz Pearl MD;  Location:  HEART CARDIAC CATH LAB     CV LEFT HEART CATH N/A 9/11/2021    Procedure: Left Heart Cath;  Surgeon: Noris Ozuna MD;  Location: [facility] CV     CV PCI N/A 9/11/2021    Procedure: Percutaneous Coronary Intervention;  Surgeon: Noris Ozuna MD;  Location: [facility] LAB CV     CV PCI N/A 10/7/2021    Procedure: Percutaneous Coronary Intervention;  Surgeon: Mckayla Dan MD;  Location: [facility] CV     CV PCI ASPIRATION THROMECTOMY N/A 9/11/2021    Procedure: Percutaneous Coronary Intervention Aspiration Thrombectomy;  Surgeon: Noris Ozuna MD;  Location: [facility]     ENT SURGERY      tonsillectomy     EP ICD N/A 1/24/2022    Procedure: EP ICD;  Surgeon: Jannette Crook MD;  Location:  HEART CARDIAC CATH LAB     EXTRACORPOREAL SHOCK WAVE LITHOTRIPSY, CYSTOSCOPY, INSERT STENT URETER(S), COMBINED  8/23/11     HC REMOVAL OF TONSILS,<12 Y/O      Description: Tonsillectomy;  Recorded: 03/23/2012;  Comments: for obstructive sleep apnea     HC REPAIR INCISIONAL HERNIA,REDUCIBLE  11/13/2012    Description: Incisional Hernia Repair;  Proc Date: 11/13/2012;  Comments: incisional hernia repair and abdominal panniculectomy by Dr Shon Green at the [facility].     HERNIA REPAIR       IR MISCELLANEOUS PROCEDURE  7/29/2011     IR MISCELLANEOUS PROCEDURE   8/5/2011     IR MISCELLANEOUS PROCEDURE  8/23/2011     IR NEPHROSTOMY TUBE CHANGE BILATERAL  8/5/2011     ORTHOPEDIC SURGERY      arthrodesis ant discectomy, lumbar     AL ARTHRODESIS ANT INTERBODY MIN DISCECTOMY,LUMBAR      Description: Lumbar Vertebral Fusion;  Recorded: 06/26/2009;  Comments: before 200 see [facility] consult under old records     AL EXCISE EXCESS SKIN TISSUE,ABDOMEN  11/13/2012    Description: Panniculectomy;  Proc Date: 11/13/2012;  Comments: 3.5 Lb Pannus was removed at the [facility] By Dr. Shon Green     REPLACE INTRATHECAL PAIN PUMP N/A 4/25/2017    Procedure: REPLACE INTRATHECAL PAIN PUMP;  INTRATHECAL PAIN PUMP CATHETER REPLACEMENT AND PUMP REPLACEMENT;  Surgeon: Anthony Maloney MD;  Location: [facility]     REVISE CATHETER INTRATHECAL N/A 4/25/2017    Procedure: REVISE CATHETER INTRATHECAL;;  Surgeon: Anthony Maloney MD;  Location: [facility]     SHOULDER SURGERY       ZZC GASTRIC BYPASS,OBESE<100CM LORA-EN-Y  2008    Description: Gastric Surgery For Morbid Obesity Bypass With Lora-en-Y;  Recorded: 06/26/2009;  Comments: dr green 2008       CURRENT MEDICATIONS:   Current Outpatient Medications   Medication     acetaminophen (TYLENOL) 325 MG tablet     amiodarone (PACERONE) 200 MG tablet     baclofen (LIORESAL) 10 MG tablet     clopidogrel (PLAVIX) 75 MG tablet     DULoxetine (CYMBALTA) 30 MG capsule     DULoxetine (CYMBALTA) 30 MG capsule     famotidine (PEPCID) 20 MG tablet     Ferrous Gluconate (IRON) 240 (27 Fe) MG TABS     methocarbamol (ROBAXIN) 500 MG tablet     metoprolol succinate ER (TOPROL-XL) 25 MG 24 hr tablet     MORPHINE SULFATE     nitroGLYcerin (NITROSTAT) 0.4 MG sublingual tablet     nystatin (MYCOSTATIN) 923474 UNIT/GM external powder     Omeprazole (PRILOSEC PO)     oxyCODONE-acetaminophen (PERCOCET) 5-325 MG tablet     polyethylene glycol (MIRALAX) 17 GM/Dose powder     rivaroxaban ANTICOAGULANT (XARELTO ANTICOAGULANT) 15 MG TABS tablet     rivaroxaban ANTICOAGULANT (XARELTO  "ANTICOAGULANT) 15 MG TABS tablet     rosuvastatin (CRESTOR) 40 MG tablet     senna-docusate (SENOKOT-S/PERICOLACE) 8.6-50 MG tablet     traZODone (DESYREL) 100 MG tablet     No current facility-administered medications for this visit.       ALLERGIES:      Allergies   Allergen Reactions     Hydrocodone-Acetaminophen Other (See Comments)     sneezing       ROS:  Review of symptoms otherwise negative unless commented about in HPI.     LABS:  Last Basic Metabolic Panel:  Lab Results   Component Value Date     01/25/2022      Lab Results   Component Value Date    POTASSIUM 3.5 01/25/2022     Lab Results   Component Value Date    CHLORIDE 99 01/25/2022     Lab Results   Component Value Date    RATNA 8.6 01/25/2022     Lab Results   Component Value Date    CO2 28 01/25/2022     Lab Results   Component Value Date    BUN 13 01/25/2022     Lab Results   Component Value Date    CR 0.88 01/25/2022     Lab Results   Component Value Date     01/25/2022       Last CBC:   Lab Results   Component Value Date    WBC 7.3 02/04/2022     Lab Results   Component Value Date    RBC 3.83 02/04/2022     Lab Results   Component Value Date    HGB 10.7 02/04/2022     Lab Results   Component Value Date    HCT 33.4 02/04/2022     No components found for: MCT  Lab Results   Component Value Date    MCV 87 02/04/2022     Lab Results   Component Value Date    MCH 27.9 02/04/2022     Lab Results   Component Value Date    MCHC 32.0 02/04/2022     Lab Results   Component Value Date    RDW 14.4 02/04/2022     Lab Results   Component Value Date     02/04/2022       INR:  Lab Results   Component Value Date    INR 1.31 01/19/2022    INR 1.57 01/19/2022    INR 1.00 09/11/2021         IMAGING:  None    PHYSICAL EXAM:   Blood Pressure 136/85   Pulse 56   Height 1.905 m (6' 3\")   Weight 142 kg (313 lb)   Body Mass Index 39.12 kg/m    General: alert and oriented x 3, pleasant, no acute distress, normal mood and affect  Neuro: Intact, no " focal deficits   CV: Irregularly irregular, no murmurs, rubs or gallops, regular rate and rhythm, no peripheral edema  Pulm: bilateral breath sounds, clear to auscultation, easy work of breathing  Incision: incisions clean dry and intact without erythema, swelling or drainage    PROCEDURES: None       ASSESSMENT/PLAN:  Onur Barr is a 66 year old year old male status post CABG, bilateral PVI and left atrial appendage exclusion who returns to clinic for postop visit.     NSTEMI and Unstable Angina, previous staged PCI with in-stent stenosis S/P LIMA to LAD  Paroxysmal atrial fibrillation s/p bilateral pulmonary vein isolation and left atrial appendage exclusion  NSVT in hospital s/p ICD placement  Anticoagulation: Xarelto  Antiplatelet: Plavix    1. Surgically doing well overall.  Incisions are healing well with no signs of infection. Increasing activity and strength overall.   2. Hemodynamics are stable. No medication changes were needed today.  3. Follow up with your cardiologist as scheduled (EP and general Cardiology).  4. Continue Therapy / Outpatient Cardiac Rehab until completed.   5. Continue sternal precautions for 12 weeks from surgery date.   6. No driving for 4 weeks from surgery date.     Postoperative restrictions have been reviewed and all of the patient's questions were answered. Our contact information was given to the patient if he should have any further questions/concerns. No further follow up is needed with us.  The total time spent with the patient was 25 minutes, > 50% of which was spent in counseling and coordination of care.    YAMILETH Ragsdale, ACNPC-AG, CCRN  Nurse Practitioner  Cardiothoracic Surgery  Pager: [phone]    CC  Luann Berger

## 2022-02-07 NOTE — PATIENT INSTRUCTIONS
Your surgery was on 1/19/2022    From the date of surgery:  No lifting greater than 10 pounds for eight weeks, then no greater than 20 pounds for an additional 4 weeks.    No driving for 4 weeks.    Rocío Herron RN Coordinator   [phone]

## 2022-02-08 ENCOUNTER — TRANSFERRED RECORDS (OUTPATIENT)
Dept: HEALTH INFORMATION MANAGEMENT | Facility: CLINIC | Age: 67
End: 2022-02-08
Payer: COMMERCIAL

## 2022-02-11 ENCOUNTER — TELEPHONE (OUTPATIENT)
Dept: FAMILY MEDICINE | Facility: CLINIC | Age: 67
End: 2022-02-11
Payer: COMMERCIAL

## 2022-02-11 NOTE — TELEPHONE ENCOUNTER
Fabienne, RN Case Manager calling and states that she saw patient today for his weekly visit and states that patient told her that he noticed fluid on his right leg so he took 20mg of lasix and 20MEQ of potassium.  Fabienne states that patient was on Lasix before being in the hospital but did not get discharged on it.  Fabienne is wondering if there is a reason patient is not on Lasix anymore.  Fabienne states that if patient should be on PRN Lasix, she needs an order for it.      Ok to leave detailed message.    Zelda Meng

## 2022-02-14 NOTE — TELEPHONE ENCOUNTER
Called and LM for Fabienne STRONG case manager with Dr Berger adviscarmen on Furosemide. GENE COLLIER on 2/14/2022 at 2:44 PM

## 2022-02-14 NOTE — TELEPHONE ENCOUNTER
Please inform the registered nurse  that the patient probably does not need to be on furosemide after the discharge.  If there is an increase in his weight, or there is pedal pitting edema that is accessed by a professional and not what the patient reports then we can consider prescription for furosemide.  So I will encourage that the home health patient now check him for edema and let us know.

## 2022-02-28 ENCOUNTER — OFFICE VISIT (OUTPATIENT)
Dept: CARDIOLOGY | Facility: CLINIC | Age: 67
End: 2022-02-28
Payer: COMMERCIAL

## 2022-02-28 VITALS
SYSTOLIC BLOOD PRESSURE: 146 MMHG | HEIGHT: 75 IN | HEART RATE: 80 BPM | BODY MASS INDEX: 38.4 KG/M2 | DIASTOLIC BLOOD PRESSURE: 90 MMHG | WEIGHT: 308.8 LBS

## 2022-02-28 DIAGNOSIS — Z98.890 STATUS POST LIGATION OF LEFT ATRIAL APPENDAGE: ICD-10-CM

## 2022-02-28 DIAGNOSIS — I48.0 PAROXYSMAL ATRIAL FIBRILLATION (H): ICD-10-CM

## 2022-02-28 DIAGNOSIS — I50.32 CHRONIC DIASTOLIC HEART FAILURE (H): ICD-10-CM

## 2022-02-28 DIAGNOSIS — I25.10 CORONARY ARTERY DISEASE INVOLVING NATIVE CORONARY ARTERY OF NATIVE HEART WITHOUT ANGINA PECTORIS: ICD-10-CM

## 2022-02-28 DIAGNOSIS — Z95.810 ICD (IMPLANTABLE CARDIOVERTER-DEFIBRILLATOR) IN PLACE: ICD-10-CM

## 2022-02-28 DIAGNOSIS — Z95.1 S/P CABG (CORONARY ARTERY BYPASS GRAFT): ICD-10-CM

## 2022-02-28 DIAGNOSIS — I10 ESSENTIAL HYPERTENSION: ICD-10-CM

## 2022-02-28 DIAGNOSIS — I47.29 PAROXYSMAL VENTRICULAR TACHYCARDIA (H): Primary | ICD-10-CM

## 2022-02-28 DIAGNOSIS — R60.0 BILATERAL LEG EDEMA: ICD-10-CM

## 2022-02-28 DIAGNOSIS — E66.01 MORBID OBESITY (H): ICD-10-CM

## 2022-02-28 PROCEDURE — 99215 OFFICE O/P EST HI 40 MIN: CPT | Mod: 24 | Performed by: NURSE PRACTITIONER

## 2022-02-28 NOTE — PROGRESS NOTES
Cardiology Clinic Progress Note    Service Date: 02/28/22    Primary Cardiologist: Dr. Pearl      Reason for Visit: Follow-up after hospitalization    HPI:   I had the pleasure of seeing Mr. Onur Barr in the clinic today and he is a very pleasant 66 year old male with a past medical history notable for    1. Coronary artery disease.  PCI to LAD (2012).  PCI to RCA (9/2021).   CABG x 1 (LIMA to LAD, PVI, exclusion of left atrial appendage) on 1/19/2022   2. Paroxysmal atrial fibrillation.  S/p PVI and exclusion left atrial appendage (1/2022)  3. Sustained monomorphic ventricular tachycardia  4. Cardiomyopathy.  5. Morbid obesity  6. MARLEEN.  Not tolerate CPAP  7. Chronic back pain.      Diagnostics  I have personally reviewed the following reports    Device check (2/2022) revealed  A-paced  35% .   : 16% .     Stable leads.  Battery life 13 years.   Atrial Arrhythmia: 26 episodes of AF since implant with duration 1 second to 1.5 hours.  Controlled ventricular rates.  Ventricular Arrhythmia: None.    ATP: None.    Shocks: None.    In January 2022, patient underwent a coronary artery bypass graft x1 with the LIMA to the LAD, pulmonary vein isolation, exclusion of the left atrial appendage, intraoperative TATIANA.  He had paroxysmal atrial fibrillation and treated with amiodarone.  He had an episode of 30 seconds of sustained monomorphic ventricular tachycardia and an ICD was placed on postop day 5    Today, he presents with his wife and they state he is improving on a daily basis.  He is using a walker as he has chronic back pain.   He recently started sleeping in his own bed again after sleeping in a recliner.  He denies any heart failure symptoms including orthopnea, PND, lower extremity edema.   He is taking his medications as prescribed including his Xarelto and Plavix and denies any bleeding or signs and symptoms of stroke.   He will reach out to cardiac rehab in the next week to start  therapies.    ASSESSMENT AND PLAN:    Sustained monomorphic ventricular tachycardia    After surgery patient had sustained ventricular tachycardia while exercising.  He subsequently received a dual-chamber [employer] ICD which was implanted on 1/24/2022)    I have personally reviewed the last device check dated (2/2022) which revealed normal device function and stable leads.     He has not had any recurrent ventricular tachycardia.    He is currently taking amiodarone 200 mg daily.  Will discontinue this today and follow with the device check.  Patient will follow with the device clinic on a quarterly basis.     Paroxsymal Atrial fibrillation    For rhythm control he underwent a left atrial exclusion, ablation while having a CABG he did have recurrent postoperative atrial fibrillation.  Patient is currently taking amiodarone and metoprolol succinate 75 mg twice daily.  Will discontinue amiodarone today.  Patient will continue with metoprolol succinate 75 mg twice daily.  Will follow with the device check which can reassess his atrial fibrillation burden.      For anticoagulation for CHADS VASC 4 (age, CAD, HTN) he is taking Xarelto 15 mg daily.     He did undergo an exclusion of left atrial appendage and will have a CT completed within the next month to determine if his Xarelto 15 mg a day can be discontinued.     Hypertension    Controlled     Coronary artery disease    PCI to LAD in 2012    Inferior STEMI (9/2021) and underwent PCI to RCA    S/p NSTEMI (1/12/2022) and underwent a CABG x1 (LIMA to LAD, PVI, LA exclusion)    Currently taking Plavix 75 mg daily, rosuvastatin 40 mg daily, metoprolol succinate 75 mg twice daily.  His last LDL 38 (1/2022)    Denies any chest pain     Ischemic cardiomyopathy    ECHO (1/12/2022) revealed EF 40-45% with apical wall hypokinesis    Patient is overall euvolemic on exam.  He does have some lower extremity edema.  Otherwise denies any heart failure symptoms    Repeat  echo prior to seeing Dr. Pearl       morbid obesity     s/p gastric bypass (2008)     Patient's BMI 38.6     MARLEEN     Does not tolerate CPAP     Chronic Back Pain     In dwelling pain Pump w Morphine, Clonidine, Baclofen      Plan:    Stop amiodarone     Follow with device clinic on a quarterly basis    CT to evaluate left exclusion of the left atrial appendage.     Repeat hemoglobin at the time of CT If no leakage consider stopping Xarelto 15 mg daily     Follow-up with Dr. Pearl with ECHO and hgb prior.  Patient    Is curious when he can move his arms fully and start driving.  He is currently taking narcotics therefore driving is not advised.  We will ask surgery to reach out to patient regarding range of motion    Patient will reach out to cardiac rehab to start therapies please call the clinic if questions or problems arise      Thank you for the opportunity to participate in this pleasant patient's care. We would be happy to see him sooner if needed for any concerns in the meantime.     Today 45 minutes was spent reviewing the medical record, reviewing test results, face-to-face with the patient and wife, and documentation.        YAMILETH Pratt [facility]  Text Page  (M-F 8:00 am - 4:30 pm)    Orders this Visit:  Orders Placed This Encounter   Procedures     CT Angiogram heart     Hemoglobin     Echocardiogram Complete     Orders Placed This Encounter   Medications     Ferrous Sulfate (IRON PO)     Sig: Take by mouth daily Dosage is unknown     Medications Discontinued During This Encounter   Medication Reason     rivaroxaban ANTICOAGULANT (XARELTO ANTICOAGULANT) 15 MG TABS tablet Medication Reconciliation Clean Up     amiodarone (PACERONE) 200 MG tablet      Ferrous Gluconate (IRON) 240 (27 Fe) MG TABS Medication Reconciliation Clean Up     senna-docusate (SENOKOT-S/PERICOLACE) 8.6-50 MG tablet Medication Reconciliation Clean Up     polyethylene glycol (MIRALAX) 17 GM/Dose powder  Medication Reconciliation Clean Up     Encounter Diagnoses   Name Primary?     Paroxysmal ventricular tachycardia (H)      Status post ligation of left atrial appendage Yes     Paroxysmal atrial fib -- S/P Pulm Vein Ablation 1/19/22      Chronic diastolic heart failure (H)        CURRENT MEDICATIONS:  Current Outpatient Medications   Medication Sig Dispense Refill     acetaminophen (TYLENOL) 325 MG tablet Take 2 tablets (650 mg) by mouth every 4 hours as needed for mild pain 40 tablet 0     baclofen (LIORESAL) 10 MG tablet Take 1 tablet (10 mg) by mouth 2 times daily as needed for muscle spasms 30 tablet 0     clopidogrel (PLAVIX) 75 MG tablet Take 1 tablet (75 mg) by mouth daily 60 tablet 3     DULoxetine (CYMBALTA) 30 MG capsule Take 30 mg by mouth every morning       DULoxetine (CYMBALTA) 30 MG capsule Take 60 mg by mouth every evening       Ferrous Sulfate (IRON PO) Take by mouth daily Dosage is unknown       methocarbamol (ROBAXIN) 500 MG tablet Take 1 tablet (500 mg) by mouth every 6 hours as needed for muscle spasms 60 tablet 0     metoprolol succinate ER (TOPROL-XL) 25 MG 24 hr tablet Take 3 tablets (75 mg) by mouth 2 times daily 180 tablet 3     MORPHINE SULFATE IT pump:updated 1/12/22  Medications in Pump:   [facility] Responsible for pump medications:   Conc:  Morphine 20mg/ml(3.95 mg/day), clonidine 21.1mcg/ml (4.168 mcg/day), baclofen 42.5mcg/ml (8.395mcg/day)  Rate:   Pump Last Fill Date: 9/2021  Pump Refill Date: 2/2022       nitroGLYcerin (NITROSTAT) 0.4 MG sublingual tablet For chest pain place 1 tablet under the tongue every 5 minutes for 3 doses. If symptoms persist 5 minutes after 1st dose call 911. 30 tablet 1     nystatin (MYCOSTATIN) 688165 UNIT/GM external powder Apply to lower abdominal area twice daily 60 g 1     Omeprazole (PRILOSEC PO) Take 40 mg by mouth daily as needed        oxyCODONE-acetaminophen (PERCOCET) 5-325 MG tablet Take 1 tablet by mouth daily as needed for  "moderate to severe pain 30 tablet 0     rivaroxaban ANTICOAGULANT (XARELTO ANTICOAGULANT) 15 MG TABS tablet Take 1 tablet (15 mg) by mouth daily (with dinner) 60 tablet 3     rosuvastatin (CRESTOR) 40 MG tablet Take 1 tablet (40 mg) by mouth daily 90 tablet 3     traZODone (DESYREL) 100 MG tablet TAKE 2 TABLETS (200MG TOTAL) BY MOUTH AT BEDTIME 180 tablet 3       ALLERGIES  Allergies   Allergen Reactions     Hydrocodone-Acetaminophen Other (See Comments)     sneezing       PAST MEDICAL, SURGICAL, SOCIAL FAMILY HISTORY:  History was reviewed and updated as needed, see medical record.    Review of Systems:  Skin:  Positive for rash;bruising   Eyes:  Negative    ENT:  Negative    Respiratory:  Positive for dyspnea on exertion;sleep apnea  Cardiovascular:    Positive for;fatigue  Gastroenterology: Negative    Genitourinary:  Negative    Musculoskeletal:  Positive for back pain;joint pain  Neurologic:  Negative    Psychiatric:  Negative    Heme/Lymph/Imm:  Positive for allergies  Endocrine:  Negative       Physical Exam:  Vitals: BP (!) 146/90   Pulse 80   Ht 1.905 m (6' 3\")   Wt 140.1 kg (308 lb 12.8 oz)   BMI 38.60 kg/m     Wt Readings from Last 4 Encounters:   02/28/22 140.1 kg (308 lb 12.8 oz)   02/07/22 142 kg (313 lb)   02/04/22 135 kg (297 lb 11.2 oz)   01/25/22 138.6 kg (305 lb 8.9 oz)     CONSTITUTIONAL: Appears his stated age, obese, and in no acute distress.  HEENT: Pupils equal, round. Sclerae nonicteric.    NECK: Supple, no masses appreciated.   C/V:  Regular rate and rhythm, normal S1 and S2, no S3 or S4, no murmur, rub or gallop.  RESP: Respirations are unlabored. Lungs are clear to auscultation bilaterally without wheezing, rales, or rhonchi.  GI: Abdomen soft, non-tender, non-distended.  EXTREM:  No clubbing, cyanosis, or 1+ lower extremity edema bilaterally.   NEURO: Alert and oriented, cooperative. Gait steady. No gross focal deficits.   PSYCH: Affect appropriate. Mentation normal. Responds to " questions appropriately.  SKIN: Warm and dry. No apparent rashes or bruising.  Healing sternal incision and ICD incision    Recent Lab Results:  LIPID RESULTS:  Lab Results   Component Value Date    CHOL 105 01/14/2022    HDL 44 01/14/2022    LDL 38 01/14/2022    LDL 69 02/13/2018    TRIG 116 01/14/2022     LIVER ENZYME RESULTS:  Lab Results   Component Value Date    AST 37 01/20/2022    ALT 21 01/20/2022     CBC RESULTS:  Lab Results   Component Value Date    WBC 7.3 02/04/2022    RBC 3.83 (L) 02/04/2022    HGB 10.7 (L) 02/04/2022    HCT 33.4 (L) 02/04/2022    MCV 87 02/04/2022    MCH 27.9 02/04/2022    MCHC 32.0 02/04/2022    RDW 14.4 02/04/2022     02/04/2022     BMP RESULTS:  Lab Results   Component Value Date     01/25/2022    POTASSIUM 3.5 01/25/2022    CHLORIDE 99 01/25/2022    CO2 28 01/25/2022    ANIONGAP 6 01/25/2022     (H) 01/25/2022    BUN 13 01/25/2022    CR 0.88 01/25/2022    GFRESTIMATED >90 01/25/2022    GFRESTIMATED >60 01/05/2021    GFRESTBLACK >60 01/05/2021    RATNA 8.6 01/25/2022      CC  Jannette Crook MD  [id number]  [id number]  [place] [id number]    This note was completed in part using Dragon voice recognition software. Although reviewed after completion, some word and grammatical errors may occur.

## 2022-02-28 NOTE — PATIENT INSTRUCTIONS
Stop amiodarone  Establish with cardiac rehab    Have a CT to assess your left atrial appendage then we might be able to stop Xarelto  Have hgb drawn at the CT time  Have an ECHO prior Seeing Dr. Pearl    See Dr. Pearl in April       If you have questions or concerns please call the device clinic at [phone]

## 2022-02-28 NOTE — LETTER
2/28/2022    Luann Berger MD  [id number] [facility] [address]    RE: Onur Barr       Dear Colleague,     I had the pleasure of seeing Onur Barr in the [facility].      Cardiology Clinic Progress Note    Service Date: 02/28/22    Primary Cardiologist: Dr. Pearl      Reason for Visit: Follow-up after hospitalization    HPI:   I had the pleasure of seeing Mr. Onur Brar in the clinic today and he is a very pleasant 66 year old male with a past medical history notable for    1. Coronary artery disease.  PCI to LAD (2012).  PCI to RCA (9/2021).   CABG x 1 (LIMA to LAD, PVI, exclusion of left atrial appendage) on 1/19/2022   2. Paroxysmal atrial fibrillation.  S/p PVI and exclusion left atrial appendage (1/2022)  3. Sustained monomorphic ventricular tachycardia  4. Cardiomyopathy.  5. Morbid obesity  6. MARLEEN.  Not tolerate CPAP  7. Chronic back pain.      Diagnostics  I have personally reviewed the following reports    Device check (2/2022) revealed  A-paced  35% .   : 16% .     Stable leads.  Battery life 13 years.   Atrial Arrhythmia: 26 episodes of AF since implant with duration 1 second to 1.5 hours.  Controlled ventricular rates.  Ventricular Arrhythmia: None.    ATP: None.    Shocks: None.    In January 2022, patient underwent a coronary artery bypass graft x1 with the LIMA to the LAD, pulmonary vein isolation, exclusion of the left atrial appendage, intraoperative TATIANA.  He had paroxysmal atrial fibrillation and treated with amiodarone.  He had an episode of 30 seconds of sustained monomorphic ventricular tachycardia and an ICD was placed on postop day 5    Today, he presents with his wife and they state he is improving on a daily basis.  He is using a walker as he has chronic back pain.   He recently started sleeping in his own bed again after sleeping in a recliner.  He denies any heart failure symptoms including orthopnea, PND, lower extremity edema.   He is taking  his medications as prescribed including his Xarelto and Plavix and denies any bleeding or signs and symptoms of stroke.   He will reach out to cardiac rehab in the next week to start therapies.    ASSESSMENT AND PLAN:    Sustained monomorphic ventricular tachycardia    After surgery patient had sustained ventricular tachycardia while exercising.  He subsequently received a dual-chamber [employer] ICD which was implanted on 1/24/2022)    I have personally reviewed the last device check dated (2/2022) which revealed normal device function and stable leads.     He has not had any recurrent ventricular tachycardia.    He is currently taking amiodarone 200 mg daily.  Will discontinue this today and follow with the device check.  Patient will follow with the device clinic on a quarterly basis.     Paroxsymal Atrial fibrillation    For rhythm control he underwent a left atrial exclusion, ablation while having a CABG he did have recurrent postoperative atrial fibrillation.  Patient is currently taking amiodarone and metoprolol succinate 75 mg twice daily.  Will discontinue amiodarone today.  Patient will continue with metoprolol succinate 75 mg twice daily.  Will follow with the device check which can reassess his atrial fibrillation burden.      For anticoagulation for CHADS VASC 4 (age, CAD, HTN) he is taking Xarelto 15 mg daily.     He did undergo an exclusion of left atrial appendage and will have a CT completed within the next month to determine if his Xarelto 15 mg a day can be discontinued.     Hypertension    Controlled     Coronary artery disease    PCI to LAD in 2012    Inferior STEMI (9/2021) and underwent PCI to RCA    S/p NSTEMI (1/12/2022) and underwent a CABG x1 (LIMA to LAD, PVI, LA exclusion)    Currently taking Plavix 75 mg daily, rosuvastatin 40 mg daily, metoprolol succinate 75 mg twice daily.  His last LDL 38 (1/2022)    Denies any chest pain     Ischemic cardiomyopathy    ECHO (1/12/2022)  revealed EF 40-45% with apical wall hypokinesis    Patient is overall euvolemic on exam.  He does have some lower extremity edema.  Otherwise denies any heart failure symptoms    Repeat echo prior to seeing Dr. Pearl       morbid obesity     s/p gastric bypass (2008)     Patient's BMI 38.6     MARLEEN     Does not tolerate CPAP     Chronic Back Pain     In dwelling pain Pump w Morphine, Clonidine, Baclofen      Plan:    Stop amiodarone     Follow with device clinic on a quarterly basis    CT to evaluate left exclusion of the left atrial appendage.     Repeat hemoglobin at the time of CT If no leakage consider stopping Xarelto 15 mg daily     Follow-up with Dr. Pearl with ECHO and hgb prior.  Patient    Is curious when he can move his arms fully and start driving.  He is currently taking narcotics therefore driving is not advised.  We will ask surgery to reach out to patient regarding range of motion    Patient will reach out to cardiac rehab to start therapies please call the clinic if questions or problems arise      Thank you for the opportunity to participate in this pleasant patient's care. We would be happy to see him sooner if needed for any concerns in the meantime.     Today 45 minutes was spent reviewing the medical record, reviewing test results, face-to-face with the patient and wife, and documentation.        YAMILETH Pratt CNP  [facility]  Text Page  (M-F 8:00 am - 4:30 pm)    Orders this Visit:  Orders Placed This Encounter   Procedures     CT Angiogram heart     Hemoglobin     Echocardiogram Complete     Orders Placed This Encounter   Medications     Ferrous Sulfate (IRON PO)     Sig: Take by mouth daily Dosage is unknown     Medications Discontinued During This Encounter   Medication Reason     rivaroxaban ANTICOAGULANT (XARELTO ANTICOAGULANT) 15 MG TABS tablet Medication Reconciliation Clean Up     amiodarone (PACERONE) 200 MG tablet      Ferrous Gluconate (IRON) 240 (27 Fe) MG TABS  Medication Reconciliation Clean Up     senna-docusate (SENOKOT-S/PERICOLACE) 8.6-50 MG tablet Medication Reconciliation Clean Up     polyethylene glycol (MIRALAX) 17 GM/Dose powder Medication Reconciliation Clean Up     Encounter Diagnoses   Name Primary?     Paroxysmal ventricular tachycardia (H)      Status post ligation of left atrial appendage Yes     Paroxysmal atrial fib -- S/P Pulm Vein Ablation 1/19/22      Chronic diastolic heart failure (H)        CURRENT MEDICATIONS:  Current Outpatient Medications   Medication Sig Dispense Refill     acetaminophen (TYLENOL) 325 MG tablet Take 2 tablets (650 mg) by mouth every 4 hours as needed for mild pain 40 tablet 0     baclofen (LIORESAL) 10 MG tablet Take 1 tablet (10 mg) by mouth 2 times daily as needed for muscle spasms 30 tablet 0     clopidogrel (PLAVIX) 75 MG tablet Take 1 tablet (75 mg) by mouth daily 60 tablet 3     DULoxetine (CYMBALTA) 30 MG capsule Take 30 mg by mouth every morning       DULoxetine (CYMBALTA) 30 MG capsule Take 60 mg by mouth every evening       Ferrous Sulfate (IRON PO) Take by mouth daily Dosage is unknown       methocarbamol (ROBAXIN) 500 MG tablet Take 1 tablet (500 mg) by mouth every 6 hours as needed for muscle spasms 60 tablet 0     metoprolol succinate ER (TOPROL-XL) 25 MG 24 hr tablet Take 3 tablets (75 mg) by mouth 2 times daily 180 tablet 3     MORPHINE SULFATE IT pump:updated 1/12/22  Medications in Pump:   [facility] Responsible for pump medications:   Conc:  Morphine 20mg/ml(3.95 mg/day), clonidine 21.1mcg/ml (4.168 mcg/day), baclofen 42.5mcg/ml (8.395mcg/day)  Rate:   Pump Last Fill Date: 9/2021  Pump Refill Date: 2/2022       nitroGLYcerin (NITROSTAT) 0.4 MG sublingual tablet For chest pain place 1 tablet under the tongue every 5 minutes for 3 doses. If symptoms persist 5 minutes after 1st dose call 911. 30 tablet 1     nystatin (MYCOSTATIN) 700572 UNIT/GM external powder Apply to lower abdominal area twice  "daily 60 g 1     Omeprazole (PRILOSEC PO) Take 40 mg by mouth daily as needed        oxyCODONE-acetaminophen (PERCOCET) 5-325 MG tablet Take 1 tablet by mouth daily as needed for moderate to severe pain 30 tablet 0     rivaroxaban ANTICOAGULANT (XARELTO ANTICOAGULANT) 15 MG TABS tablet Take 1 tablet (15 mg) by mouth daily (with dinner) 60 tablet 3     rosuvastatin (CRESTOR) 40 MG tablet Take 1 tablet (40 mg) by mouth daily 90 tablet 3     traZODone (DESYREL) 100 MG tablet TAKE 2 TABLETS (200MG TOTAL) BY MOUTH AT BEDTIME 180 tablet 3       ALLERGIES  Allergies   Allergen Reactions     Hydrocodone-Acetaminophen Other (See Comments)     sneezing       PAST MEDICAL, SURGICAL, SOCIAL FAMILY HISTORY:  History was reviewed and updated as needed, see medical record.    Review of Systems:  Skin:  Positive for rash;bruising   Eyes:  Negative    ENT:  Negative    Respiratory:  Positive for dyspnea on exertion;sleep apnea  Cardiovascular:    Positive for;fatigue  Gastroenterology: Negative    Genitourinary:  Negative    Musculoskeletal:  Positive for back pain;joint pain  Neurologic:  Negative    Psychiatric:  Negative    Heme/Lymph/Imm:  Positive for allergies  Endocrine:  Negative       Physical Exam:  Vitals: BP (!) 146/90   Pulse 80   Ht 1.905 m (6' 3\")   Wt 140.1 kg (308 lb 12.8 oz)   BMI 38.60 kg/m     Wt Readings from Last 4 Encounters:   02/28/22 140.1 kg (308 lb 12.8 oz)   02/07/22 142 kg (313 lb)   02/04/22 135 kg (297 lb 11.2 oz)   01/25/22 138.6 kg (305 lb 8.9 oz)     CONSTITUTIONAL: Appears his stated age, obese, and in no acute distress.  HEENT: Pupils equal, round. Sclerae nonicteric.    NECK: Supple, no masses appreciated.   C/V:  Regular rate and rhythm, normal S1 and S2, no S3 or S4, no murmur, rub or gallop.  RESP: Respirations are unlabored. Lungs are clear to auscultation bilaterally without wheezing, rales, or rhonchi.  GI: Abdomen soft, non-tender, non-distended.  EXTREM:  No clubbing, cyanosis, or 1+ " lower extremity edema bilaterally.   NEURO: Alert and oriented, cooperative. Gait steady. No gross focal deficits.   PSYCH: Affect appropriate. Mentation normal. Responds to questions appropriately.  SKIN: Warm and dry. No apparent rashes or bruising.  Healing sternal incision and ICD incision    Recent Lab Results:  LIPID RESULTS:  Lab Results   Component Value Date    CHOL 105 01/14/2022    HDL 44 01/14/2022    LDL 38 01/14/2022    LDL 69 02/13/2018    TRIG 116 01/14/2022     LIVER ENZYME RESULTS:  Lab Results   Component Value Date    AST 37 01/20/2022    ALT 21 01/20/2022     CBC RESULTS:  Lab Results   Component Value Date    WBC 7.3 02/04/2022    RBC 3.83 (L) 02/04/2022    HGB 10.7 (L) 02/04/2022    HCT 33.4 (L) 02/04/2022    MCV 87 02/04/2022    MCH 27.9 02/04/2022    MCHC 32.0 02/04/2022    RDW 14.4 02/04/2022     02/04/2022     BMP RESULTS:  Lab Results   Component Value Date     01/25/2022    POTASSIUM 3.5 01/25/2022    CHLORIDE 99 01/25/2022    CO2 28 01/25/2022    ANIONGAP 6 01/25/2022     (H) 01/25/2022    BUN 13 01/25/2022    CR 0.88 01/25/2022    GFRESTIMATED >90 01/25/2022    GFRESTIMATED >60 01/05/2021    GFRESTBLACK >60 01/05/2021    RATNA 8.6 01/25/2022      CC  Jannette Crook MD  [id number]  [id number]  [place] [id number]    This note was completed in part using Dragon voice recognition software. Although reviewed after completion, some word and grammatical errors may occur.      Thank you for allowing me to participate in the care of your patient.      Sincerely,     YAMILETH Pratt [facility]  cc:   Jannette Crook MD  [address]  [id number]  [place] [id number]

## 2022-03-04 ENCOUNTER — OFFICE VISIT (OUTPATIENT)
Dept: FAMILY MEDICINE | Facility: CLINIC | Age: 67
End: 2022-03-04
Payer: COMMERCIAL

## 2022-03-04 VITALS
BODY MASS INDEX: 38.3 KG/M2 | HEART RATE: 60 BPM | WEIGHT: 308 LBS | SYSTOLIC BLOOD PRESSURE: 130 MMHG | DIASTOLIC BLOOD PRESSURE: 80 MMHG | HEIGHT: 75 IN | OXYGEN SATURATION: 98 %

## 2022-03-04 DIAGNOSIS — L30.4 INTERTRIGO: Primary | ICD-10-CM

## 2022-03-04 DIAGNOSIS — E66.01 MORBID OBESITY (H): ICD-10-CM

## 2022-03-04 PROCEDURE — 99213 OFFICE O/P EST LOW 20 MIN: CPT | Performed by: FAMILY MEDICINE

## 2022-03-04 ASSESSMENT — PATIENT HEALTH QUESTIONNAIRE - PHQ9
SUM OF ALL RESPONSES TO PHQ QUESTIONS 1-9: 6
10. IF YOU CHECKED OFF ANY PROBLEMS, HOW DIFFICULT HAVE THESE PROBLEMS MADE IT FOR YOU TO DO YOUR WORK, TAKE CARE OF THINGS AT HOME, OR GET ALONG WITH OTHER PEOPLE: SOMEWHAT DIFFICULT
SUM OF ALL RESPONSES TO PHQ QUESTIONS 1-9: 6

## 2022-03-04 NOTE — PROGRESS NOTES
imnAssessment/plan   Onur Barr is a 66 year old male who is a patient of Luann Berger    Onur was seen today for derm problem.    Diagnoses and all orders for this visit:    Intertrigo  We reviewed risk factors and etiology for the mild abdominal intertrigo that is noted beneath his pannus. A1c is well controlled.  Patient is understanding and given how well controlled it is currently I recommended to continue the current cares.  He is using every other day nystatin powder along with an interdry Ag cloth which helps to reduce moisture.  I have offered a miconazole cream that he could alternate with occasionally but he declines at this time and would like to continue the course.  No evidence for secondary infection nor cellulitis. Would not need wound culture at this time.   Encouraged weight loss       Follow up: Return in about 4 weeks (around 4/1/2022) for Annual wellness exam.      Nubia Almendarez MD    Subjective:      HPI: Onur Barr is a 66 year old male who is here for:    Chief Complaint   Patient presents with     Derm Problem     Rash in fold of his stomach. Has been seen previously for this, uses powder to try and dry st up but doesn't seem to go away.       Rash: His wife is concerned that the rash on his abdomen is not getting better.  He has had a rash in the fold of his stomach, history of previous gastric bypass in 2008 with 180lb weight loss and regained some of that weight back (!50lbs).     Wound clinic note from 8/2018 was reviewed which indicates he had 6 months of a rash/wound that was cultured and grew staph epidermidis. He was prescribed antibiotics.   His diagnosis at the time of the wound visit was intertrigo and he was given nystatin powder and asampel of InterDry AG.    He indicates now again for the past 6 months he uses the nystatin powder and the InterDry AG every other day.     Seems to be getting more red now but hard to see it so he is not really sure.  His wife  wanted him to come in today.    Review of Systems:  He was recently in the hospital for chest pain. He has a history of MI and angiogram with attempted stenting so he had to have a CABG.  12 point comprehensive review of systems was negative except as noted and HPI     Social History:  Social History     Social History Narrative     Not on file       Medical History:  Patient Active Problem List   Diagnosis     Lumbago     Morbid obesity -- BMI 42.0     Bilateral leg edema     Chronic diastolic heart failure (H)     MARLEEN (doesn't tolerate CPAP)     NSTEMI w Unstab Angina -- S/P CABG x 1 (LIMA to LAD) on 1/19/22     Paroxysmal atrial fib -- S/P Pulm Vein Ablation 1/19/22     Essential hypertension     Mixed hyperlipidemia     Gastroesophageal reflux disease without esophagitis     Chronic Back Pain (IT Pump w Morphine, Clonidine, Baclofen)     S/P CABG (coronary artery bypass graft)     Fluid overload     ICD (implantable cardioverter-defibrillator) in place     Past Medical History:   Diagnosis Date     Acid reflux disease 10/31/2017     CHF (congestive heart failure) (H)      Coronary artery disease     CABG 1-     Essential hypertension     Created by [facility] Annotation: Apr 6 2012 10:16Zack Harris: Lisinipril,  coreg  Replacement Utility updated for latest IMO load     H/O gastric bypass 2008     Insomnia      Ischemic cardiomyopathy      Lumbago     Created by [facility] Annotation: Apr 6 2012 10:20Anh Ford: Morphine pump      Morbid obesity (H) 08/01/2018     Nephrolithiasis      NSTEMI (non-ST elevated myocardial infarction) (H)      Obstructive sleep apnea     Doesn't tolerate CPAP     Osteoarthritis     Created by [facility] Annotation: Jun 26 2009  9:53Zack Harris: failed lumbar  fusion/morphine pump dr putnam  Replacement Utility updated for latest IMO load     Paroxysmal atrial fibrillation (H)     Created by [facility]       Paroxysmal ventricular tachycardia (H)     dual chamber ICD 1/24/2022     Type 2 diabetes mellitus (H)     Diet controlled after Gastric Bypass in 2008     Past Surgical History:   Procedure Laterality Date     BACK SURGERY       BYPASS GASTRIC DUODENAL SWITCH       BYPASS GRAFT ARTERY CORONARY N/A 1/19/2022    Procedure: CORONARY ARTERY BYPASS GRAFT (CABG) X1; LIMA -LAD.  PULMONARY VEIN ISOLATION, AND ATRIAL APPENDAGE CLIPPING USING 45MM ACTICURE CLIP.;  Surgeon: William Dumont MD;  Location:  OR     COSMETIC SURGERY      pannicullectomy     CV CORONARY ANGIOGRAM N/A 9/11/2021    Procedure: Coronary Angiogram;  Surgeon: Noris Ozuna MD;  Location: [facility] CATH LAB CV     CV HEART CATHETERIZATION WITH POSSIBLE INTERVENTION N/A 1/13/2022    Procedure: Heart Catheterization with Possible Intervention;  Surgeon: Liz Pearl MD;  Location:  HEART CARDIAC CATH LAB     CV LEFT HEART CATH N/A 9/11/2021    Procedure: Left Heart Cath;  Surgeon: Noris Ozuna MD;  Location: [facility] CATH LAB CV     CV PCI N/A 9/11/2021    Procedure: Percutaneous Coronary Intervention;  Surgeon: Noris Ozuna MD;  Location: [facility] LAB CV     CV PCI N/A 10/7/2021    Procedure: Percutaneous Coronary Intervention;  Surgeon: Mckayla Dan MD;  Location: [facility] LAB CV     CV PCI ASPIRATION THROMECTOMY N/A 9/11/2021    Procedure: Percutaneous Coronary Intervention Aspiration Thrombectomy;  Surgeon: Noris Ozuna MD;  Location: [facility] CV     ENT SURGERY      tonsillectomy     EP ICD N/A 1/24/2022    Procedure: EP ICD;  Surgeon: Jannette Crook MD;  Location:  HEART CARDIAC CATH LAB     EXTRACORPOREAL SHOCK WAVE LITHOTRIPSY, CYSTOSCOPY, INSERT STENT URETER(S), COMBINED  8/23/11     HC REMOVAL OF TONSILS,<14 Y/O      Description: Tonsillectomy;  Recorded: 03/23/2012;  Comments: for obstructive sleep apnea     HC REPAIR INCISIONAL HERNIA,REDUCIBLE  11/13/2012    Description: Incisional Hernia  Repair;  Proc Date: 11/13/2012;  Comments: incisional hernia repair and abdominal panniculectomy by Dr Shon Green at the [facility].     HERNIA REPAIR       IR MISCELLANEOUS PROCEDURE  7/29/2011     IR MISCELLANEOUS PROCEDURE  8/5/2011     IR MISCELLANEOUS PROCEDURE  8/23/2011     IR NEPHROSTOMY TUBE CHANGE BILATERAL  8/5/2011     ORTHOPEDIC SURGERY      arthrodesis ant discectomy, lumbar     [place] ARTHRODESIS ANT INTERBODY MIN DISCECTOMY,LUMBAR      Description: Lumbar Vertebral Fusion;  Recorded: 06/26/2009;  Comments: before 200 see [facility] consult under old records     WV EXCISE EXCESS SKIN TISSUE,ABDOMEN  11/13/2012    Description: Panniculectomy;  Proc Date: 11/13/2012;  Comments: 3.5 Lb Pannus was removed at the [facility] By Dr. Shon Green     REPLACE INTRATHECAL PAIN PUMP N/A 4/25/2017    Procedure: REPLACE INTRATHECAL PAIN PUMP;  INTRATHECAL PAIN PUMP CATHETER REPLACEMENT AND PUMP REPLACEMENT;  Surgeon: Anthony Maloney MD;  Location: [facility]     REVISE CATHETER INTRATHECAL N/A 4/25/2017    Procedure: REVISE CATHETER INTRATHECAL;;  Surgeon: Anthony Maloney MD;  Location: [facility]     SHOULDER SURGERY       ZZC GASTRIC BYPASS,OBESE<100CM LORA-EN-Y  2008    Description: Gastric Surgery For Morbid Obesity Bypass With Lora-en-Y;  Recorded: 06/26/2009;  Comments: dr green 2008     Hydrocodone-acetaminophen  Family History   Problem Relation Age of Onset     Cerebrovascular Disease Mother      Heart Disease Father      Hodgkin's lymphoma Brother        Medications:  Current Outpatient Medications   Medication     acetaminophen (TYLENOL) 325 MG tablet     baclofen (LIORESAL) 10 MG tablet     clopidogrel (PLAVIX) 75 MG tablet     DULoxetine (CYMBALTA) 30 MG capsule     DULoxetine (CYMBALTA) 30 MG capsule     Ferrous Sulfate (IRON PO)     methocarbamol (ROBAXIN) 500 MG tablet     metoprolol succinate ER (TOPROL-XL) 25 MG 24 hr tablet     MORPHINE SULFATE     nitroGLYcerin (NITROSTAT) 0.4 MG sublingual tablet      "nystatin (MYCOSTATIN) 850156 UNIT/GM external powder     Omeprazole (PRILOSEC PO)     oxyCODONE-acetaminophen (PERCOCET) 5-325 MG tablet     rivaroxaban ANTICOAGULANT (XARELTO ANTICOAGULANT) 15 MG TABS tablet     rosuvastatin (CRESTOR) 40 MG tablet     traZODone (DESYREL) 100 MG tablet     No current facility-administered medications for this visit.         Imaging & Labs reviewed this visit: none      Objective:      Vitals:    03/04/22 1452   BP: 130/80   Pulse: 60   SpO2: 98%   Weight: 139.7 kg (308 lb)   Height: 1.905 m (6' 3\")       Physical Exam:     General: Alert, no acute distress.   Abdomen: soft and nontender. Obese.   Skin: Large pannus, The Interdry Ag and nystatin powder were in place of these were removed for better visualization. and under this area there is hyperpigmentation consistent with previous intertrigo diagnosis, no extending satellite lesions today, no bright beefy red lesions, overall looks to be healing.  No signs of swelling, pain or warmth  Neuro: alert, interactive moving all extremities equally, normal muscle tone in all 4 extremities    Nubia Almendarez MD          Answers for HPI/ROS submitted by the patient on 3/4/2022  If you checked off any problems, how difficult have these problems made it for you to do your work, take care of things at home, or get along with other people?: Somewhat difficult  PHQ9 TOTAL SCORE: 6  How many servings of fruits and vegetables do you eat daily?: 4 or more  On average, how many sweetened beverages do you drink each day (Examples: soda, juice, sweet tea, etc.  Do NOT count diet or artificially sweetened beverages)?: 1  How many minutes a day do you exercise enough to make your heart beat faster?: 30 to 60  How many days a week do you exercise enough to make your heart beat faster?: 7  How many days per week do you miss taking your medication?: 0  What is the reason for your visit today?: Rash in lower stomach not going away.  Inflammed  When did " your symptoms begin?: More than a month  What are your symptoms?: Rash spreading and bruising on right side  How would you describe these symptoms?: Moderate  Are your symptoms:: Worsening  Have you had these symptoms before?: Yes  Have you tried or received treatment for these symptoms before?: Yes  Did that treatment work? : Yes  Please describe the treatment you had:: I was put on Nystatin and Steroids for Staph  Is there anything that makes you feel better?: It got better when I was put on Steriods.  Staph maybe back

## 2022-03-05 ASSESSMENT — PATIENT HEALTH QUESTIONNAIRE - PHQ9: SUM OF ALL RESPONSES TO PHQ QUESTIONS 1-9: 6

## 2022-03-08 ENCOUNTER — HOSPITAL ENCOUNTER (OUTPATIENT)
Dept: CARDIAC REHAB | Facility: CLINIC | Age: 67
End: 2022-03-08
Attending: STUDENT IN AN ORGANIZED HEALTH CARE EDUCATION/TRAINING PROGRAM
Payer: COMMERCIAL

## 2022-03-08 DIAGNOSIS — I25.10 CORONARY ARTERY DISEASE INVOLVING NATIVE CORONARY ARTERY OF NATIVE HEART WITHOUT ANGINA PECTORIS: ICD-10-CM

## 2022-03-08 PROCEDURE — 93798 PHYS/QHP OP CAR RHAB W/ECG: CPT

## 2022-03-08 PROCEDURE — 93797 PHYS/QHP OP CAR RHAB WO ECG: CPT

## 2022-03-16 ENCOUNTER — LAB (OUTPATIENT)
Dept: LAB | Facility: CLINIC | Age: 67
End: 2022-03-16
Payer: COMMERCIAL

## 2022-03-16 ENCOUNTER — HOSPITAL ENCOUNTER (OUTPATIENT)
Dept: CARDIOLOGY | Facility: CLINIC | Age: 67
Discharge: HOME | End: 2022-03-16
Attending: NURSE PRACTITIONER | Admitting: NURSE PRACTITIONER
Payer: COMMERCIAL

## 2022-03-16 DIAGNOSIS — I48.0 PAROXYSMAL ATRIAL FIBRILLATION (H): ICD-10-CM

## 2022-03-16 DIAGNOSIS — I50.32 CHRONIC DIASTOLIC HEART FAILURE (H): ICD-10-CM

## 2022-03-16 LAB
BI-PLANE LVEF ECHO: NORMAL
HGB BLD-MCNC: 11.3 G/DL (ref 13.3–17.7)

## 2022-03-16 PROCEDURE — 93325 DOPPLER ECHO COLOR FLOW MAPG: CPT | Mod: 26 | Performed by: INTERNAL MEDICINE

## 2022-03-16 PROCEDURE — 255N000002 HC RX 255 OP 636: Performed by: NURSE PRACTITIONER

## 2022-03-16 PROCEDURE — 93321 DOPPLER ECHO F-UP/LMTD STD: CPT | Mod: 26 | Performed by: INTERNAL MEDICINE

## 2022-03-16 PROCEDURE — 93308 TTE F-UP OR LMTD: CPT | Mod: 26 | Performed by: INTERNAL MEDICINE

## 2022-03-16 PROCEDURE — 999N000208 ECHOCARDIOGRAM LIMITED

## 2022-03-16 PROCEDURE — 85018 HEMOGLOBIN: CPT | Performed by: NURSE PRACTITIONER

## 2022-03-16 RX ADMIN — HUMAN ALBUMIN MICROSPHERES AND PERFLUTREN 3 ML: 10; .22 INJECTION, SOLUTION INTRAVENOUS at 11:19

## 2022-03-17 ENCOUNTER — HOSPITAL ENCOUNTER (OUTPATIENT)
Dept: CARDIAC REHAB | Facility: CLINIC | Age: 67
Discharge: HOME | End: 2022-03-17
Attending: SURGERY
Payer: COMMERCIAL

## 2022-03-17 PROCEDURE — 93798 PHYS/QHP OP CAR RHAB W/ECG: CPT | Performed by: REHABILITATION PRACTITIONER

## 2022-03-18 ENCOUNTER — TELEPHONE (OUTPATIENT)
Dept: CARDIOLOGY | Facility: CLINIC | Age: 67
End: 2022-03-18
Payer: COMMERCIAL

## 2022-03-18 DIAGNOSIS — Z95.1 S/P CABG (CORONARY ARTERY BYPASS GRAFT): ICD-10-CM

## 2022-03-21 DIAGNOSIS — Z95.1 S/P CABG (CORONARY ARTERY BYPASS GRAFT): ICD-10-CM

## 2022-03-21 RX ORDER — CLOPIDOGREL BISULFATE 75 MG/1
75 TABLET ORAL DAILY
Qty: 90 TABLET | Refills: 1 | Status: SHIPPED | OUTPATIENT
Start: 2022-03-21 | End: 2022-11-07

## 2022-03-22 ENCOUNTER — HOSPITAL ENCOUNTER (OUTPATIENT)
Dept: CARDIAC REHAB | Facility: CLINIC | Age: 67
Discharge: HOME | End: 2022-03-22
Attending: SURGERY
Payer: COMMERCIAL

## 2022-03-22 PROCEDURE — 93798 PHYS/QHP OP CAR RHAB W/ECG: CPT

## 2022-03-23 ENCOUNTER — MEDICAL CORRESPONDENCE (OUTPATIENT)
Dept: HEALTH INFORMATION MANAGEMENT | Facility: CLINIC | Age: 67
End: 2022-03-23
Payer: COMMERCIAL

## 2022-03-23 DIAGNOSIS — Z53.9 DIAGNOSIS NOT YET DEFINED: Primary | ICD-10-CM

## 2022-03-23 PROCEDURE — G0180 MD CERTIFICATION HHA PATIENT: HCPCS | Performed by: FAMILY MEDICINE

## 2022-03-24 ENCOUNTER — HOSPITAL ENCOUNTER (OUTPATIENT)
Dept: CARDIAC REHAB | Facility: CLINIC | Age: 67
Discharge: HOME | End: 2022-03-24
Attending: SURGERY
Payer: COMMERCIAL

## 2022-03-24 PROCEDURE — 93798 PHYS/QHP OP CAR RHAB W/ECG: CPT | Performed by: REHABILITATION PRACTITIONER

## 2022-03-29 ENCOUNTER — HOSPITAL ENCOUNTER (OUTPATIENT)
Dept: CARDIAC REHAB | Facility: CLINIC | Age: 67
Discharge: HOME | End: 2022-03-29
Attending: SURGERY
Payer: COMMERCIAL

## 2022-03-29 PROCEDURE — 93798 PHYS/QHP OP CAR RHAB W/ECG: CPT

## 2022-03-31 ENCOUNTER — HOSPITAL ENCOUNTER (OUTPATIENT)
Dept: CARDIAC REHAB | Facility: CLINIC | Age: 67
Discharge: HOME | End: 2022-03-31
Attending: SURGERY
Payer: COMMERCIAL

## 2022-03-31 PROCEDURE — 93798 PHYS/QHP OP CAR RHAB W/ECG: CPT

## 2022-04-01 DIAGNOSIS — Z53.9 DIAGNOSIS NOT YET DEFINED: Primary | ICD-10-CM

## 2022-04-01 PROCEDURE — G0180 MD CERTIFICATION HHA PATIENT: HCPCS | Performed by: FAMILY MEDICINE

## 2022-04-04 ENCOUNTER — HOSPITAL ENCOUNTER (OUTPATIENT)
Dept: CARDIAC REHAB | Facility: CLINIC | Age: 67
Discharge: HOME | End: 2022-04-04
Attending: SURGERY
Payer: COMMERCIAL

## 2022-04-04 ENCOUNTER — MEDICAL CORRESPONDENCE (OUTPATIENT)
Dept: HEALTH INFORMATION MANAGEMENT | Facility: CLINIC | Age: 67
End: 2022-04-04

## 2022-04-04 PROCEDURE — 93798 PHYS/QHP OP CAR RHAB W/ECG: CPT | Performed by: REHABILITATION PRACTITIONER

## 2022-04-05 NOTE — PROGRESS NOTES
A&O x4, VSS, bradycardic at times in high 40's low 50's. Tele: SR/A-fib with frequent PVCs. ICD pressure dressing CDI. Sternal incision WDL. Prn Tylenol administered for pain.  Interdry in abdominal folds. LS: diminished. BS: audible. CMS intact. Generalized  edema +2. +void, -BM. +flatus. Up A-1.    Topical Clindamycin Counseling: Patient counseled that this medication may cause skin irritation or allergic reactions.  In the event of skin irritation, the patient was advised to reduce the amount of the drug applied or use it less frequently.   The patient verbalized understanding of the proper use and possible adverse effects of clindamycin.  All of the patient's questions and concerns were addressed.

## 2022-04-06 ENCOUNTER — HOSPITAL ENCOUNTER (OUTPATIENT)
Dept: CARDIAC REHAB | Facility: CLINIC | Age: 67
Discharge: HOME | End: 2022-04-06
Attending: SURGERY
Payer: COMMERCIAL

## 2022-04-06 PROCEDURE — 93798 PHYS/QHP OP CAR RHAB W/ECG: CPT

## 2022-04-07 ENCOUNTER — OFFICE VISIT (OUTPATIENT)
Dept: CARDIOLOGY | Facility: CLINIC | Age: 67
End: 2022-04-07
Payer: COMMERCIAL

## 2022-04-07 VITALS
SYSTOLIC BLOOD PRESSURE: 152 MMHG | HEIGHT: 75 IN | HEART RATE: 62 BPM | BODY MASS INDEX: 38.79 KG/M2 | WEIGHT: 312 LBS | DIASTOLIC BLOOD PRESSURE: 100 MMHG | OXYGEN SATURATION: 99 %

## 2022-04-07 DIAGNOSIS — E78.2 MIXED HYPERLIPIDEMIA: Primary | ICD-10-CM

## 2022-04-07 DIAGNOSIS — I25.10 CORONARY ARTERY DISEASE INVOLVING NATIVE CORONARY ARTERY OF NATIVE HEART WITHOUT ANGINA PECTORIS: ICD-10-CM

## 2022-04-07 DIAGNOSIS — I48.0 PAROXYSMAL ATRIAL FIBRILLATION (H): ICD-10-CM

## 2022-04-07 DIAGNOSIS — I10 ESSENTIAL HYPERTENSION: ICD-10-CM

## 2022-04-07 DIAGNOSIS — G47.33 OSA (OBSTRUCTIVE SLEEP APNEA): ICD-10-CM

## 2022-04-07 DIAGNOSIS — E66.01 MORBID OBESITY (H): ICD-10-CM

## 2022-04-07 PROCEDURE — 99214 OFFICE O/P EST MOD 30 MIN: CPT | Performed by: INTERNAL MEDICINE

## 2022-04-07 NOTE — PROGRESS NOTES
[facility] Progress note    Assessment:  1. Coronary artery disease.  PCI to LAD (2012).  PCI to RCA (9/2021).   CABG x 1 (LIMA to LAD due to severe ISR, multiple layers of stent, PVI, exclusion of left atrial appendage) on 1/19/2022   2. Paroxysmal atrial fibrillation.  S/p PVI and exclusion left atrial appendage (1/2022).  Most recent device check does show episodes of atrial fibrillation with controlled ventricular response.  3. Sustained monomorphic ventricular tachycardia. No VT on device check 2/1/22.  4. Cardiomyopathy. EF 45% 3/16/22  5. HTN, blood pressure elevated in clinic today but reviewed his recent note from cardiac rehab and he was hypertensive on arrival but blood pressure in the low teens systolic after therapy.  6. Morbid obesity  7. MARLEEN.  Historically intolerant of CPAP  8. Chronic back pain.  Has a pain pump  9. Deconditioning, considering enrolling in pool therapy after completing cardiac rehab.     Plan:  1. Reschedule cardiac CT.  Our nurse manager has contacted the CT department to facilitate additional assistance for transfer for this patient to enable him to have the CT scan.  Please note he is able to lie flat but needs assist with transfer.  2. Continue cardiac rehab   3. Continue to monitor blood pressure.  BP regimen was not changed today given low normal BP after cardiac rehab.    4. He had balloon angioplasty of his LAD prior to surgery in January.  I would advocate for 1 year of clopidogrel therapy if tolerated given his complex CAD.  5. Follow-up in 1 to 2 months with April King after the CT scan.  If there is no residual left atrial appendage the Eliquis can be discontinued.    HPI:   Onur Barr is a very nice 66 year old here to follow-up on coronary artery disease status post previous PCI's and a single-vessel coronary artery bypass with a LIMA to the LAD in January 2022 followed by placement of a defibrillator due to sustained monomorphic VT after the bypass and a  mild ischemic cardiomyopathy with an ejection fraction of 45% as well as hypertension, dyslipidemia, morbid obesity and paroxysmal atrial fibrillation.    He is here for routine follow-up.  He is gradually regaining strength and going to cardiac rehab.  He still has fatigue and some exertional dyspnea but this is improving.  He has very minimal exertional angina.    He was scheduled for a CT scan to see if he has a residual left atrial appendage after his malignant surgery but unfortunately the CT was canceled because he could not transfer to the table to have the test done.  He is able to lie flat and we discussed rescheduling the CT versus transesophageal echo electively scheduled for CT scan.  I did return to work clinic nurse lead to help facilitate getting the CT done as scheduled at [facility] definitely need assist with transfer.    He is interested in attending pool therapy after he completes cardiac rehab and I would support this idea as he is obese and deconditioned.    CURRENT MEDICATIONS:  Current Outpatient Medications   Medication Sig Dispense Refill     acetaminophen (TYLENOL) 325 MG tablet Take 2 tablets (650 mg) by mouth every 4 hours as needed for mild pain 40 tablet 0     baclofen (LIORESAL) 10 MG tablet Take 1 tablet (10 mg) by mouth 2 times daily as needed for muscle spasms 30 tablet 0     clopidogrel (PLAVIX) 75 MG tablet Take 1 tablet (75 mg) by mouth daily 90 tablet 1     DULoxetine (CYMBALTA) 30 MG capsule Take 30 mg by mouth every morning       DULoxetine (CYMBALTA) 30 MG capsule Take 60 mg by mouth every evening       Ferrous Sulfate (IRON PO) Take by mouth daily Dosage is unknown       methocarbamol (ROBAXIN) 500 MG tablet Take 1 tablet (500 mg) by mouth every 6 hours as needed for muscle spasms 60 tablet 0     metoprolol succinate ER (TOPROL-XL) 25 MG 24 hr tablet Take 3 tablets (75 mg) by mouth 2 times daily 180 tablet 3     MORPHINE SULFATE IT pump:updated 1/12/22  Medications in Pump:    [facility] Responsible for pump medications:   Conc:  Morphine 20mg/ml(3.95 mg/day), clonidine 21.1mcg/ml (4.168 mcg/day), baclofen 42.5mcg/ml (8.395mcg/day)  Rate:   Pump Last Fill Date: 9/2021  Pump Refill Date: 2/2022       nitroGLYcerin (NITROSTAT) 0.4 MG sublingual tablet For chest pain place 1 tablet under the tongue every 5 minutes for 3 doses. If symptoms persist 5 minutes after 1st dose call 911. 30 tablet 1     nystatin (MYCOSTATIN) 666577 UNIT/GM external powder Apply to lower abdominal area twice daily 60 g 1     Omeprazole (PRILOSEC PO) Take 40 mg by mouth daily as needed        oxyCODONE-acetaminophen (PERCOCET) 5-325 MG tablet Take 1 tablet by mouth daily as needed for moderate to severe pain 30 tablet 0     rivaroxaban ANTICOAGULANT (XARELTO ANTICOAGULANT) 15 MG TABS tablet Take 1 tablet (15 mg) by mouth daily (with dinner) 60 tablet 3     rosuvastatin (CRESTOR) 40 MG tablet Take 1 tablet (40 mg) by mouth daily 90 tablet 3     traZODone (DESYREL) 100 MG tablet TAKE 2 TABLETS (200MG TOTAL) BY MOUTH AT BEDTIME 180 tablet 3       ALLERGIES     Allergies   Allergen Reactions     Hydrocodone-Acetaminophen Other (See Comments)     sneezing       PAST MEDICAL, SURGICAL, FAMILY, SOCIAL HISTORY:  History was reviewed and updated as needed, see medical record.    REVIEW OF SYSTEMS:  Skin:  Positive for rash;bruising   Eyes:  Negative    ENT:  Negative    Respiratory:  Positive for dyspnea on exertion;sleep apnea  Cardiovascular:    Positive for;fatigue  Gastroenterology: Negative    Genitourinary:  Negative    Musculoskeletal:  Positive for back pain;joint pain  Neurologic:  Negative    Psychiatric:  Negative    Heme/Lymph/Imm:  Positive for allergies  Endocrine:  Negative      PHYSICAL EXAM:      BP: (!) 152/100 Pulse: 62     SpO2: 99 %      Vital Signs with Ranges  Pulse:  [62] 62  BP: (152)/(100) 152/100  SpO2:  [99 %] 99 %  312 lbs 0 oz    Constitutional: awake, alert, no distress  Eyes:  sclera nonicteric  ENT: trachea midline  Respiratory: To auscultation bilaterally  Cardiovascular: Regular rate and rhythm no significant murmur  GI: nondistended, nontender, bowel sounds present  Skin: dry, no rash trace bilateral pretibial edema  Neurologic: Face is grossly symmetric but he is wearing a mask.  Shuffling gait, uses a walker.  Neuropsychiatric: Normal affect    Recent Lab Results:  Echo 3/16/22  Left ventricular systolic function is mildly reduced.Biplane LVEF is 45%.  Mildly decreased right ventricular systolic function  There is mild (1+) mitral regurgitation.  Mild aortic root dilatation.  The inferior vena cava was normal in size with preserved respiratory  variability.     Compared to echo dated 01/12/2022 no significant changes.      Cath 1/12/22  1. Severe in-stent restenosis of the mid LAD and severe stenosis distal to the previously placed stents.  2.  Patent stents in the right coronary artery with moderate stenosis of the large RPDA and RPL a branches  3.  Balloon angioplasty and Cutting Balloon angioplasty of the in-stent restenosis in mid LAD stenosis with suboptimal result due to very severe stenosis with multiple layers of stent which were underexpanded due to severe disease.    4.  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  This fistula was mildly disrupted causing a perforation in the forearm.  The radial artery was able to be accessed proximal to the lesion which was compressed manually.  5..  Difficult procedure due to patient with severe back pain and difficult imaging due to body habitus with morbid obesity.          Plan      Follow bedrest per protocol    Continued medical management and lifestyle modifications for cardiovascular risk factor optimizations.    Arterial sheath removed from radial artery with TR band placement.    Return to the primary inpatient team for further evaluation and managmenet      Cardiovascular surgery was consulted for CABG.   Primary target is the LAD.  There are moderate plus lesions in the RPDA and RPLA.  Plan to bridge to surgery with heparin and cangrelor but will need to monitor the right forearm for hematoma due to fistula and perforation.  Stop clopidogrel  Continue to hold apixaban which she takes for atrial fibrillation.       LIPID RESULTS:  Lab Results   Component Value Date    CHOL 105 01/14/2022    HDL 44 01/14/2022    LDL 38 01/14/2022    LDL 69 02/13/2018    TRIG 116 01/14/2022       LIVER ENZYME RESULTS:  Lab Results   Component Value Date    AST 37 01/20/2022    ALT 21 01/20/2022       CBC RESULTS:  Lab Results   Component Value Date    WBC 7.3 02/04/2022    RBC 3.83 (L) 02/04/2022    HGB 11.3 (L) 03/16/2022    HCT 33.4 (L) 02/04/2022    MCV 87 02/04/2022    MCH 27.9 02/04/2022    MCHC 32.0 02/04/2022    RDW 14.4 02/04/2022     02/04/2022       BMP RESULTS:  Lab Results   Component Value Date     01/25/2022    POTASSIUM 3.5 01/25/2022    CHLORIDE 99 01/25/2022    CO2 28 01/25/2022    ANIONGAP 6 01/25/2022     (H) 01/25/2022    BUN 13 01/25/2022    CR 0.88 01/25/2022    GFRESTIMATED >90 01/25/2022    GFRESTIMATED >60 01/05/2021    GFRESTBLACK >60 01/05/2021    RATNA 8.6 01/25/2022        A1C RESULTS:  Lab Results   Component Value Date    A1C 5.4 01/15/2022       INR RESULTS:  Lab Results   Component Value Date    INR 1.31 (H) 01/19/2022    INR 1.57 (H) 01/19/2022

## 2022-04-07 NOTE — LETTER
4/7/2022    Luann Berger MD  [address] [facility] [address]    RE: Onur Barr       Dear Colleague,     I had the pleasure of seeing Onur Barr in the [facility].  [facility] Progress note    Assessment:  1. Coronary artery disease.  PCI to LAD (2012).  PCI to RCA (9/2021).   CABG x 1 (LIMA to LAD due to severe ISR, multiple layers of stent, PVI, exclusion of left atrial appendage) on 1/19/2022   2. Paroxysmal atrial fibrillation.  S/p PVI and exclusion left atrial appendage (1/2022).  Most recent device check does show episodes of atrial fibrillation with controlled ventricular response.  3. Sustained monomorphic ventricular tachycardia. No VT on device check 2/1/22.  4. Cardiomyopathy. EF 45% 3/16/22  5. HTN, blood pressure elevated in clinic today but reviewed his recent note from cardiac rehab and he was hypertensive on arrival but blood pressure in the low teens systolic after therapy.  6. Morbid obesity  7. MARLEEN.  Historically intolerant of CPAP  8. Chronic back pain.  Has a pain pump  9. Deconditioning, considering enrolling in pool therapy after completing cardiac rehab.     Plan:  1. Reschedule cardiac CT.  Our nurse manager has contacted the CT department to facilitate additional assistance for transfer for this patient to enable him to have the CT scan.  Please note he is able to lie flat but needs assist with transfer.  2. Continue cardiac rehab   3. Continue to monitor blood pressure.  BP regimen was not changed today given low normal BP after cardiac rehab.    4. He had balloon angioplasty of his LAD prior to surgery in January.  I would advocate for 1 year of clopidogrel therapy if tolerated given his complex CAD.  5. Follow-up in 1 to 2 months with April King after the CT scan.  If there is no residual left atrial appendage the Eliquis can be discontinued.    HPI:   Onur Barr is a very nice 66 year old here to follow-up on coronary artery disease  status post previous PCI's and a single-vessel coronary artery bypass with a LIMA to the LAD in January 2022 followed by placement of a defibrillator due to sustained monomorphic VT after the bypass and a mild ischemic cardiomyopathy with an ejection fraction of 45% as well as hypertension, dyslipidemia, morbid obesity and paroxysmal atrial fibrillation.    He is here for routine follow-up.  He is gradually regaining strength and going to cardiac rehab.  He still has fatigue and some exertional dyspnea but this is improving.  He has very minimal exertional angina.    He was scheduled for a CT scan to see if he has a residual left atrial appendage after his malignant surgery but unfortunately the CT was canceled because he could not transfer to the table to have the test done.  He is able to lie flat and we discussed rescheduling the CT versus transesophageal echo electively scheduled for CT scan.  I did return to work clinic nurse lead to help facilitate getting the CT done as scheduled at [facility] definitely need assist with transfer.    He is interested in attending pool therapy after he completes cardiac rehab and I would support this idea as he is obese and deconditioned.    CURRENT MEDICATIONS:  Current Outpatient Medications   Medication Sig Dispense Refill     acetaminophen (TYLENOL) 325 MG tablet Take 2 tablets (650 mg) by mouth every 4 hours as needed for mild pain 40 tablet 0     baclofen (LIORESAL) 10 MG tablet Take 1 tablet (10 mg) by mouth 2 times daily as needed for muscle spasms 30 tablet 0     clopidogrel (PLAVIX) 75 MG tablet Take 1 tablet (75 mg) by mouth daily 90 tablet 1     DULoxetine (CYMBALTA) 30 MG capsule Take 30 mg by mouth every morning       DULoxetine (CYMBALTA) 30 MG capsule Take 60 mg by mouth every evening       Ferrous Sulfate (IRON PO) Take by mouth daily Dosage is unknown       methocarbamol (ROBAXIN) 500 MG tablet Take 1 tablet (500 mg) by mouth every 6 hours as needed for muscle  spasms 60 tablet 0     metoprolol succinate ER (TOPROL-XL) 25 MG 24 hr tablet Take 3 tablets (75 mg) by mouth 2 times daily 180 tablet 3     MORPHINE SULFATE IT pump:updated 1/12/22  Medications in Pump:   [facility] Responsible for pump medications:   Conc:  Morphine 20mg/ml(3.95 mg/day), clonidine 21.1mcg/ml (4.168 mcg/day), baclofen 42.5mcg/ml (8.395mcg/day)  Rate:   Pump Last Fill Date: 9/2021  Pump Refill Date: 2/2022       nitroGLYcerin (NITROSTAT) 0.4 MG sublingual tablet For chest pain place 1 tablet under the tongue every 5 minutes for 3 doses. If symptoms persist 5 minutes after 1st dose call 911. 30 tablet 1     nystatin (MYCOSTATIN) 104459 UNIT/GM external powder Apply to lower abdominal area twice daily 60 g 1     Omeprazole (PRILOSEC PO) Take 40 mg by mouth daily as needed        oxyCODONE-acetaminophen (PERCOCET) 5-325 MG tablet Take 1 tablet by mouth daily as needed for moderate to severe pain 30 tablet 0     rivaroxaban ANTICOAGULANT (XARELTO ANTICOAGULANT) 15 MG TABS tablet Take 1 tablet (15 mg) by mouth daily (with dinner) 60 tablet 3     rosuvastatin (CRESTOR) 40 MG tablet Take 1 tablet (40 mg) by mouth daily 90 tablet 3     traZODone (DESYREL) 100 MG tablet TAKE 2 TABLETS (200MG TOTAL) BY MOUTH AT BEDTIME 180 tablet 3       ALLERGIES     Allergies   Allergen Reactions     Hydrocodone-Acetaminophen Other (See Comments)     sneezing       PAST MEDICAL, SURGICAL, FAMILY, SOCIAL HISTORY:  History was reviewed and updated as needed, see medical record.    REVIEW OF SYSTEMS:  Skin:  Positive for rash;bruising   Eyes:  Negative    ENT:  Negative    Respiratory:  Positive for dyspnea on exertion;sleep apnea  Cardiovascular:    Positive for;fatigue  Gastroenterology: Negative    Genitourinary:  Negative    Musculoskeletal:  Positive for back pain;joint pain  Neurologic:  Negative    Psychiatric:  Negative    Heme/Lymph/Imm:  Positive for allergies  Endocrine:  Negative      PHYSICAL EXAM:       BP: (!) 152/100 Pulse: 62     SpO2: 99 %      Vital Signs with Ranges  Pulse:  [62] 62  BP: (152)/(100) 152/100  SpO2:  [99 %] 99 %  312 lbs 0 oz    Constitutional: awake, alert, no distress  Eyes: sclera nonicteric  ENT: trachea midline  Respiratory: To auscultation bilaterally  Cardiovascular: Regular rate and rhythm no significant murmur  GI: nondistended, nontender, bowel sounds present  Skin: dry, no rash trace bilateral pretibial edema  Neurologic: Face is grossly symmetric but he is wearing a mask.  Shuffling gait, uses a walker.  Neuropsychiatric: Normal affect    Recent Lab Results:  Echo 3/16/22  Left ventricular systolic function is mildly reduced.Biplane LVEF is 45%.  Mildly decreased right ventricular systolic function  There is mild (1+) mitral regurgitation.  Mild aortic root dilatation.  The inferior vena cava was normal in size with preserved respiratory  variability.     Compared to echo dated 01/12/2022 no significant changes.      Cath 1/12/22  1. Severe in-stent restenosis of the mid LAD and severe stenosis distal to the previously placed stents.  2.  Patent stents in the right coronary artery with moderate stenosis of the large RPDA and RPL a branches  3.  Balloon angioplasty and Cutting Balloon angioplasty of the in-stent restenosis in mid LAD stenosis with suboptimal result due to very severe stenosis with multiple layers of stent which were underexpanded due to severe disease.    4.  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  This fistula was mildly disrupted causing a perforation in the forearm.  The radial artery was able to be accessed proximal to the lesion which was compressed manually.  5..  Difficult procedure due to patient with severe back pain and difficult imaging due to body habitus with morbid obesity.          Plan      Follow bedrest per protocol    Continued medical management and lifestyle modifications for cardiovascular risk factor  optimizations.    Arterial sheath removed from radial artery with TR band placement.    Return to the primary inpatient team for further evaluation and [facility]      Cardiovascular surgery was consulted for CABG.  Primary target is the LAD.  There are moderate plus lesions in the RPDA and RPLA.  Plan to bridge to surgery with heparin and cangrelor but will need to monitor the right forearm for hematoma due to fistula and perforation.  Stop clopidogrel  Continue to hold apixaban which she takes for atrial fibrillation.       LIPID RESULTS:  Lab Results   Component Value Date    CHOL 105 01/14/2022    HDL 44 01/14/2022    LDL 38 01/14/2022    LDL 69 02/13/2018    TRIG 116 01/14/2022       LIVER ENZYME RESULTS:  Lab Results   Component Value Date    AST 37 01/20/2022    ALT 21 01/20/2022       CBC RESULTS:  Lab Results   Component Value Date    WBC 7.3 02/04/2022    RBC 3.83 (L) 02/04/2022    HGB 11.3 (L) 03/16/2022    HCT 33.4 (L) 02/04/2022    MCV 87 02/04/2022    MCH 27.9 02/04/2022    MCHC 32.0 02/04/2022    RDW 14.4 02/04/2022     02/04/2022       BMP RESULTS:  Lab Results   Component Value Date     01/25/2022    POTASSIUM 3.5 01/25/2022    CHLORIDE 99 01/25/2022    CO2 28 01/25/2022    ANIONGAP 6 01/25/2022     (H) 01/25/2022    BUN 13 01/25/2022    CR 0.88 01/25/2022    GFRESTIMATED >90 01/25/2022    GFRESTIMATED >60 01/05/2021    GFRESTBLACK >60 01/05/2021    RATNA 8.6 01/25/2022        A1C RESULTS:  Lab Results   Component Value Date    A1C 5.4 01/15/2022       INR RESULTS:  Lab Results   Component Value Date    INR 1.31 (H) 01/19/2022    INR 1.57 (H) 01/19/2022       Thank you for allowing me to participate in the care of your patient.      Sincerely,     Liz Pearl MD     [facility]  cc:   Claudio Stephens MD  [address] [place] [id number]

## 2022-04-11 ENCOUNTER — MEDICAL CORRESPONDENCE (OUTPATIENT)
Dept: HEALTH INFORMATION MANAGEMENT | Facility: CLINIC | Age: 67
End: 2022-04-11
Payer: COMMERCIAL

## 2022-04-12 ENCOUNTER — HOSPITAL ENCOUNTER (OUTPATIENT)
Dept: CARDIAC REHAB | Facility: CLINIC | Age: 67
Discharge: HOME | End: 2022-04-12
Attending: SURGERY
Payer: COMMERCIAL

## 2022-04-12 PROCEDURE — 93798 PHYS/QHP OP CAR RHAB W/ECG: CPT | Performed by: REHABILITATION PRACTITIONER

## 2022-04-13 ENCOUNTER — ANCILLARY PROCEDURE (OUTPATIENT)
Dept: CARDIOLOGY | Facility: CLINIC | Age: 67
End: 2022-04-13
Attending: INTERNAL MEDICINE
Payer: COMMERCIAL

## 2022-04-13 DIAGNOSIS — Z95.810 ICD (IMPLANTABLE CARDIOVERTER-DEFIBRILLATOR) IN PLACE: ICD-10-CM

## 2022-04-13 DIAGNOSIS — I47.29 PAROXYSMAL VENTRICULAR TACHYCARDIA (H): ICD-10-CM

## 2022-04-13 PROCEDURE — 93283 PRGRMG EVAL IMPLANTABLE DFB: CPT | Performed by: INTERNAL MEDICINE

## 2022-04-14 ENCOUNTER — HOSPITAL ENCOUNTER (OUTPATIENT)
Dept: CARDIAC REHAB | Facility: CLINIC | Age: 67
Discharge: HOME | End: 2022-04-14
Attending: SURGERY
Payer: COMMERCIAL

## 2022-04-14 PROCEDURE — 93798 PHYS/QHP OP CAR RHAB W/ECG: CPT

## 2022-04-15 ENCOUNTER — HOSPITAL ENCOUNTER (OUTPATIENT)
Dept: CARDIOLOGY | Facility: CLINIC | Age: 67
Discharge: HOME | End: 2022-04-15
Attending: INTERNAL MEDICINE | Admitting: INTERNAL MEDICINE
Payer: COMMERCIAL

## 2022-04-15 VITALS — SYSTOLIC BLOOD PRESSURE: 159 MMHG | HEART RATE: 60 BPM | DIASTOLIC BLOOD PRESSURE: 87 MMHG

## 2022-04-15 DIAGNOSIS — I48.0 PAROXYSMAL ATRIAL FIBRILLATION (H): ICD-10-CM

## 2022-04-15 PROCEDURE — 250N000011 HC RX IP 250 OP 636: Performed by: INTERNAL MEDICINE

## 2022-04-15 PROCEDURE — 75572 CT HRT W/3D IMAGE: CPT | Mod: 26 | Performed by: INTERNAL MEDICINE

## 2022-04-15 PROCEDURE — 75572 CT HRT W/3D IMAGE: CPT

## 2022-04-15 RX ORDER — IOPAMIDOL 755 MG/ML
500 INJECTION, SOLUTION INTRAVASCULAR ONCE
Status: COMPLETED | OUTPATIENT
Start: 2022-04-15 | End: 2022-04-15

## 2022-04-15 RX ADMIN — IOPAMIDOL 120 ML: 755 INJECTION, SOLUTION INTRAVENOUS at 11:33

## 2022-04-19 ENCOUNTER — HOSPITAL ENCOUNTER (OUTPATIENT)
Dept: CARDIAC REHAB | Facility: CLINIC | Age: 67
Discharge: HOME | End: 2022-04-19
Attending: SURGERY
Payer: COMMERCIAL

## 2022-04-19 PROCEDURE — 93798 PHYS/QHP OP CAR RHAB W/ECG: CPT

## 2022-04-21 LAB
MDC_IDC_LEAD_IMPLANT_DT: NORMAL
MDC_IDC_LEAD_IMPLANT_DT: NORMAL
MDC_IDC_LEAD_LOCATION: NORMAL
MDC_IDC_LEAD_LOCATION: NORMAL
MDC_IDC_LEAD_LOCATION_DETAIL_1: NORMAL
MDC_IDC_LEAD_LOCATION_DETAIL_1: NORMAL
MDC_IDC_LEAD_MFG: NORMAL
MDC_IDC_LEAD_MFG: NORMAL
MDC_IDC_LEAD_MODEL: NORMAL
MDC_IDC_LEAD_MODEL: NORMAL
MDC_IDC_LEAD_POLARITY_TYPE: NORMAL
MDC_IDC_LEAD_POLARITY_TYPE: NORMAL
MDC_IDC_LEAD_SERIAL: NORMAL
MDC_IDC_LEAD_SERIAL: NORMAL
MDC_IDC_MSMT_BATTERY_DTM: NORMAL
MDC_IDC_MSMT_BATTERY_REMAINING_LONGEVITY: 150 MO
MDC_IDC_MSMT_BATTERY_REMAINING_PERCENTAGE: 100 %
MDC_IDC_MSMT_BATTERY_STATUS: NORMAL
MDC_IDC_MSMT_CAP_CHARGE_DTM: NORMAL
MDC_IDC_MSMT_CAP_CHARGE_TIME: 9.3 S
MDC_IDC_MSMT_CAP_CHARGE_TYPE: NORMAL
MDC_IDC_MSMT_LEADCHNL_RA_IMPEDANCE_VALUE: 658 OHM
MDC_IDC_MSMT_LEADCHNL_RA_PACING_THRESHOLD_AMPLITUDE: 0.7 V
MDC_IDC_MSMT_LEADCHNL_RA_PACING_THRESHOLD_PULSEWIDTH: 0.4 MS
MDC_IDC_MSMT_LEADCHNL_RV_IMPEDANCE_VALUE: 438 OHM
MDC_IDC_MSMT_LEADCHNL_RV_PACING_THRESHOLD_AMPLITUDE: 0.7 V
MDC_IDC_MSMT_LEADCHNL_RV_PACING_THRESHOLD_PULSEWIDTH: 0.4 MS
MDC_IDC_PG_IMPLANT_DTM: NORMAL
MDC_IDC_PG_MFG: NORMAL
MDC_IDC_PG_MODEL: NORMAL
MDC_IDC_PG_SERIAL: NORMAL
MDC_IDC_PG_TYPE: NORMAL
MDC_IDC_SESS_CLINIC_NAME: NORMAL
MDC_IDC_SESS_DTM: NORMAL
MDC_IDC_SESS_TYPE: NORMAL
MDC_IDC_SET_BRADY_AT_MODE_SWITCH_MODE: NORMAL
MDC_IDC_SET_BRADY_AT_MODE_SWITCH_RATE: 170 {BEATS}/MIN
MDC_IDC_SET_BRADY_LOWRATE: 60 {BEATS}/MIN
MDC_IDC_SET_BRADY_MAX_SENSOR_RATE: 130 {BEATS}/MIN
MDC_IDC_SET_BRADY_MAX_TRACKING_RATE: 130 {BEATS}/MIN
MDC_IDC_SET_BRADY_MODE: NORMAL
MDC_IDC_SET_BRADY_PAV_DELAY_HIGH: 200 MS
MDC_IDC_SET_BRADY_PAV_DELAY_LOW: 300 MS
MDC_IDC_SET_BRADY_SAV_DELAY_HIGH: 200 MS
MDC_IDC_SET_BRADY_SAV_DELAY_LOW: 300 MS
MDC_IDC_SET_LEADCHNL_RA_PACING_AMPLITUDE: 2 V
MDC_IDC_SET_LEADCHNL_RA_PACING_CAPTURE_MODE: NORMAL
MDC_IDC_SET_LEADCHNL_RA_PACING_POLARITY: NORMAL
MDC_IDC_SET_LEADCHNL_RA_PACING_PULSEWIDTH: 0.4 MS
MDC_IDC_SET_LEADCHNL_RA_SENSING_ADAPTATION_MODE: NORMAL
MDC_IDC_SET_LEADCHNL_RA_SENSING_POLARITY: NORMAL
MDC_IDC_SET_LEADCHNL_RA_SENSING_SENSITIVITY: 0.25 MV
MDC_IDC_SET_LEADCHNL_RV_PACING_AMPLITUDE: 2 V
MDC_IDC_SET_LEADCHNL_RV_PACING_CAPTURE_MODE: NORMAL
MDC_IDC_SET_LEADCHNL_RV_PACING_POLARITY: NORMAL
MDC_IDC_SET_LEADCHNL_RV_PACING_PULSEWIDTH: 0.4 MS
MDC_IDC_SET_LEADCHNL_RV_SENSING_ADAPTATION_MODE: NORMAL
MDC_IDC_SET_LEADCHNL_RV_SENSING_POLARITY: NORMAL
MDC_IDC_SET_LEADCHNL_RV_SENSING_SENSITIVITY: 0.6 MV
MDC_IDC_SET_ZONE_DETECTION_INTERVAL: 250 MS
MDC_IDC_SET_ZONE_DETECTION_INTERVAL: 300 MS
MDC_IDC_SET_ZONE_DETECTION_INTERVAL: 353 MS
MDC_IDC_SET_ZONE_TYPE: NORMAL
MDC_IDC_SET_ZONE_VENDOR_TYPE: NORMAL
MDC_IDC_STAT_AT_BURDEN_PERCENT: 1 %
MDC_IDC_STAT_AT_DTM_END: NORMAL
MDC_IDC_STAT_AT_DTM_START: NORMAL
MDC_IDC_STAT_BRADY_DTM_END: NORMAL
MDC_IDC_STAT_BRADY_DTM_START: NORMAL
MDC_IDC_STAT_BRADY_RA_PERCENT_PACED: 53 %
MDC_IDC_STAT_BRADY_RV_PERCENT_PACED: 16 %
MDC_IDC_STAT_EPISODE_RECENT_COUNT: 0
MDC_IDC_STAT_EPISODE_RECENT_COUNT: 7
MDC_IDC_STAT_EPISODE_RECENT_COUNT_DTM_END: NORMAL
MDC_IDC_STAT_EPISODE_RECENT_COUNT_DTM_START: NORMAL
MDC_IDC_STAT_EPISODE_TYPE: NORMAL
MDC_IDC_STAT_EPISODE_VENDOR_TYPE: NORMAL
MDC_IDC_STAT_TACHYTHERAPY_ATP_DELIVERED_RECENT: 0
MDC_IDC_STAT_TACHYTHERAPY_ATP_DELIVERED_TOTAL: 0
MDC_IDC_STAT_TACHYTHERAPY_RECENT_DTM_END: NORMAL
MDC_IDC_STAT_TACHYTHERAPY_RECENT_DTM_START: NORMAL
MDC_IDC_STAT_TACHYTHERAPY_SHOCKS_ABORTED_RECENT: 0
MDC_IDC_STAT_TACHYTHERAPY_SHOCKS_ABORTED_TOTAL: 0
MDC_IDC_STAT_TACHYTHERAPY_SHOCKS_DELIVERED_RECENT: 0
MDC_IDC_STAT_TACHYTHERAPY_SHOCKS_DELIVERED_TOTAL: 0
MDC_IDC_STAT_TACHYTHERAPY_TOTAL_DTM_END: NORMAL
MDC_IDC_STAT_TACHYTHERAPY_TOTAL_DTM_START: NORMAL

## 2022-04-26 ENCOUNTER — TELEPHONE (OUTPATIENT)
Dept: CARDIAC REHAB | Facility: CLINIC | Age: 67
End: 2022-04-26
Payer: COMMERCIAL

## 2022-04-26 ENCOUNTER — HOSPITAL ENCOUNTER (OUTPATIENT)
Dept: CARDIAC REHAB | Facility: CLINIC | Age: 67
Discharge: HOME | End: 2022-04-26
Attending: SURGERY
Payer: COMMERCIAL

## 2022-04-26 PROCEDURE — 93798 PHYS/QHP OP CAR RHAB W/ECG: CPT

## 2022-04-26 NOTE — TELEPHONE ENCOUNTER
"Ray Chen,     Pt had an update in cardiac rehab today. To date Pt has not been engaging in any home exercise due to back pain. Pt expressed an interest in pool workouts but stated \"cardiology needed a note from cardiac rehab regarding this.\" Therapist asked if Pt was talking about specific pool physical therapy and Pt stated no this was just to exercise. I encouraged Pt to look into [employer] as I believe he'd qualify for a free or reduced gym membership.     You see this patient on May 6th. Please let me know if you require any additional information on this topic. It's my understanding that Pt wouldn't need any referral to attend a pool at a gym.    Thank you for your attention to this matter.     Sincerely,     Lambert Ramirez (Rachel) MS CEP  Cardiac Rehab Therapist   "

## 2022-04-26 NOTE — TELEPHONE ENCOUNTER
Called pt today and discussed swimming.  He was encouraged to swim as his incision is healed.     YAMILETH Pratt CNP on 4/26/2022 at 2:06 PM

## 2022-04-28 ENCOUNTER — HOSPITAL ENCOUNTER (OUTPATIENT)
Dept: CARDIAC REHAB | Facility: CLINIC | Age: 67
Discharge: HOME | End: 2022-04-28
Attending: SURGERY
Payer: COMMERCIAL

## 2022-04-28 PROCEDURE — 93798 PHYS/QHP OP CAR RHAB W/ECG: CPT

## 2022-04-29 ENCOUNTER — HOSPITAL ENCOUNTER (OUTPATIENT)
Dept: CARDIAC REHAB | Facility: CLINIC | Age: 67
Discharge: HOME | End: 2022-04-29
Attending: SURGERY
Payer: COMMERCIAL

## 2022-04-29 PROCEDURE — 93798 PHYS/QHP OP CAR RHAB W/ECG: CPT

## 2022-05-01 ENCOUNTER — MYC MEDICAL ADVICE (OUTPATIENT)
Dept: FAMILY MEDICINE | Facility: CLINIC | Age: 67
End: 2022-05-01
Payer: COMMERCIAL

## 2022-05-02 ASSESSMENT — PATIENT HEALTH QUESTIONNAIRE - PHQ9
SUM OF ALL RESPONSES TO PHQ QUESTIONS 1-9: 4
SUM OF ALL RESPONSES TO PHQ QUESTIONS 1-9: 4
10. IF YOU CHECKED OFF ANY PROBLEMS, HOW DIFFICULT HAVE THESE PROBLEMS MADE IT FOR YOU TO DO YOUR WORK, TAKE CARE OF THINGS AT HOME, OR GET ALONG WITH OTHER PEOPLE: NOT DIFFICULT AT ALL

## 2022-05-02 NOTE — TELEPHONE ENCOUNTER
Patient is scheduled to see Dr. Berger for Tuesday 5/3/22 at 12:20 pm for ongoing abdominal pain.

## 2022-05-03 ENCOUNTER — OFFICE VISIT (OUTPATIENT)
Dept: FAMILY MEDICINE | Facility: CLINIC | Age: 67
End: 2022-05-03
Payer: COMMERCIAL

## 2022-05-03 ENCOUNTER — HOSPITAL ENCOUNTER (OUTPATIENT)
Dept: CARDIAC REHAB | Facility: CLINIC | Age: 67
Discharge: HOME | End: 2022-05-03
Attending: SURGERY
Payer: COMMERCIAL

## 2022-05-03 VITALS
RESPIRATION RATE: 21 BRPM | DIASTOLIC BLOOD PRESSURE: 82 MMHG | HEART RATE: 69 BPM | OXYGEN SATURATION: 99 % | BODY MASS INDEX: 39.43 KG/M2 | SYSTOLIC BLOOD PRESSURE: 138 MMHG | WEIGHT: 315 LBS

## 2022-05-03 DIAGNOSIS — I25.10 CORONARY ARTERY DISEASE INVOLVING NATIVE CORONARY ARTERY OF NATIVE HEART WITHOUT ANGINA PECTORIS: ICD-10-CM

## 2022-05-03 DIAGNOSIS — I73.9 PERIPHERAL VASCULAR DISEASE (H): ICD-10-CM

## 2022-05-03 DIAGNOSIS — Z12.11 SCREEN FOR COLON CANCER: ICD-10-CM

## 2022-05-03 DIAGNOSIS — S30.811D ABRASION OF ABDOMINAL WALL, SUBSEQUENT ENCOUNTER: Primary | ICD-10-CM

## 2022-05-03 DIAGNOSIS — Z11.59 NEED FOR HEPATITIS C SCREENING TEST: ICD-10-CM

## 2022-05-03 DIAGNOSIS — I25.119 CORONARY ARTERY DISEASE INVOLVING NATIVE CORONARY ARTERY OF NATIVE HEART WITH ANGINA PECTORIS (H): ICD-10-CM

## 2022-05-03 DIAGNOSIS — R53.83 OTHER FATIGUE: ICD-10-CM

## 2022-05-03 DIAGNOSIS — E65 ABDOMINAL PANNUS: ICD-10-CM

## 2022-05-03 LAB
ERYTHROCYTE [DISTWIDTH] IN BLOOD BY AUTOMATED COUNT: 14.1 % (ref 10–15)
HCT VFR BLD AUTO: 38 % (ref 40–53)
HGB BLD-MCNC: 12.2 G/DL (ref 13.3–17.7)
MCH RBC QN AUTO: 26.1 PG (ref 26.5–33)
MCHC RBC AUTO-ENTMCNC: 32.1 G/DL (ref 31.5–36.5)
MCV RBC AUTO: 81 FL (ref 78–100)
PLATELET # BLD AUTO: 176 10E3/UL (ref 150–450)
RBC # BLD AUTO: 4.68 10E6/UL (ref 4.4–5.9)
WBC # BLD AUTO: 7.9 10E3/UL (ref 4–11)

## 2022-05-03 PROCEDURE — 93798 PHYS/QHP OP CAR RHAB W/ECG: CPT | Performed by: REHABILITATION PRACTITIONER

## 2022-05-03 PROCEDURE — 96127 BRIEF EMOTIONAL/BEHAV ASSMT: CPT | Performed by: FAMILY MEDICINE

## 2022-05-03 PROCEDURE — 36415 COLL VENOUS BLD VENIPUNCTURE: CPT | Performed by: FAMILY MEDICINE

## 2022-05-03 PROCEDURE — 99214 OFFICE O/P EST MOD 30 MIN: CPT | Performed by: FAMILY MEDICINE

## 2022-05-03 PROCEDURE — 85027 COMPLETE CBC AUTOMATED: CPT | Performed by: FAMILY MEDICINE

## 2022-05-03 RX ORDER — SULFAMETHOXAZOLE/TRIMETHOPRIM 800-160 MG
1 TABLET ORAL 2 TIMES DAILY
Qty: 14 TABLET | Refills: 0 | Status: SHIPPED | OUTPATIENT
Start: 2022-05-03 | End: 2022-05-31

## 2022-05-03 ASSESSMENT — PATIENT HEALTH QUESTIONNAIRE - PHQ9
10. IF YOU CHECKED OFF ANY PROBLEMS, HOW DIFFICULT HAVE THESE PROBLEMS MADE IT FOR YOU TO DO YOUR WORK, TAKE CARE OF THINGS AT HOME, OR GET ALONG WITH OTHER PEOPLE: NOT DIFFICULT AT ALL
SUM OF ALL RESPONSES TO PHQ QUESTIONS 1-9: 4

## 2022-05-03 ASSESSMENT — PAIN SCALES - GENERAL: PAINLEVEL: SEVERE PAIN (6)

## 2022-05-03 NOTE — PROGRESS NOTES
"  Assessment & Plan     Abrasion of abdominal wall, subsequent encounter  - Wound Care Referral  - CBC with platelets  - sulfamethoxazole-trimethoprim (BACTRIM DS) 800-160 MG tablet  Dispense: 14 tablet; Refill: 0  - CBC with platelets    Abdominal pannus    Need for hepatitis C screening test    Screen for colon cancer  - Adult Gastro Ref - Procedure Only    Coronary artery disease involving native coronary artery of native heart without angina pectoris  - REVIEW OF HEALTH MAINTENANCE PROTOCOL ORDERS    Peripheral vascular disease (H)    Coronary artery disease involving native coronary artery of native heart with angina pectoris (H)    Other fatigue    Because of his pain and discomfort is actually skin tears/abrasion noted on the skin beneath the pannus.  There appears to be mild irritation noted on that area.  He does have some mild discomfort based on that I am going to go ahead and have him treated with antibiotic as noted above.  I do think that it is necessary that he also get seen by the wound care so that he can be able to help him.  Said he put in a referral for him.  Also referred him to gastroenterology for follow-up of his colonoscopy.  At this point he continues to see the cardiologist for his heart disease as well as doing cardiac rehabilitation.  I am not sure if his fatigue is as a result of the cardiac issues, and he has had swelling to lower extremity especially on the right side which is not new.  He will discuss with his wife if he snores.  Because snoring can also be part of what is causing his symptoms.  But we will get CBC as part of evaluation.             BMI:   Estimated body mass index is 39.43 kg/m  as calculated from the following:    Height as of 4/7/22: 1.905 m (6' 3\").    Weight as of this encounter: 143.1 kg (315 lb 8 oz).   Weight management plan: Has chronic lumbar radiculopathy and will be unable to exercise.        No follow-ups on file.    Luann Berger MD  [facility] " [facility] AMPARO Astudillo is a 66 year old who presents for the following health issues     History of Present Illness       Mental Health Follow-up:                    Today's PHQ-9         PHQ-9 Total Score: 4  PHQ-9 Q9 Thoughts of better off dead/self-harm past 2 weeks :   (P) Not at all    How difficult have these problems made it for you to do your work, take care of things at home, or get along with other people: Not difficult at all        Reason for visit:  He has been seen on 1 or 2hoccasions with abdominal area and groin skin concerns.  He has a moderate amount of pannus noted on his lower abdomen, and he has had chronic lumbar pain that made him to be almost doubled over while walking or sitting.  He gets skin irritation as well as abrasions noted on the skin in that area.  He has been treated with antibiotics in the past.  At this time he is having pain as well as tenderness as he noted.  This is in the same area as well as a going down into the groin.  Symptom onset:  More than a month  Symptoms include:  Tender to touch, redness, rash spreading - not clearing up.  They have been putting some powder to the area but is not been effective to clear it.  Symptom intensity:  Moderate  Symptom progression:  Worsening  Had these symptoms before:  Yes  Has tried/received treatment for these symptoms:  Yes  Previous treatment was successful:  No    He eats 2-3 servings of fruits and vegetables daily.He consumes 1 sweetened beverage(s) daily.He exercises with enough effort to increase his heart rate 20 to 29 minutes per day.  He exercises with enough effort to increase his heart rate 3 or less days per week.   He is taking medications regularly.  Does have a recent diagnosis of coronary artery disease and had CABG not long ago.  Today he is feeling fatigued and tired and noted that he sleeps, but thinks that he needs to sleep more.  He does not snore.  He is not sure why he is  fatigued or tired.  Noted no new increase in his weight or swelling in the extremity.    Family History   Problem Relation Age of Onset     Cerebrovascular Disease Mother      Heart Disease Father      Hodgkin's lymphoma Brother       Social History     Socioeconomic History     Marital status:      Spouse name: Not on file     Number of children: Not on file     Years of education: Not on file     Highest education level: Not on file   Occupational History     Not on file   Tobacco Use     Smoking status: Former Smoker     Years: 10.00     Types: Cigars, Cigars     Smokeless tobacco: Never Used   Substance and Sexual Activity     Alcohol use: No     Drug use: No     Comment: Drug use: formerly used     Sexual activity: Not on file   Other Topics Concern     Not on file   Social History Narrative     Not on file     Social Determinants of Health     Financial Resource Strain: Not on file   Food Insecurity: Not on file   Transportation Needs: Not on file   Physical Activity: Not on file   Stress: Not on file   Social Connections: Not on file   Intimate Partner Violence: Not on file   Housing Stability: Not on file      Past Surgical History:   Procedure Laterality Date     BACK SURGERY       BYPASS GASTRIC DUODENAL SWITCH       BYPASS GRAFT ARTERY CORONARY N/A 1/19/2022    Procedure: CORONARY ARTERY BYPASS GRAFT (CABG) X1; LIMA -LAD.  PULMONARY VEIN ISOLATION, AND ATRIAL APPENDAGE CLIPPING USING 45MM ACTICURE CLIP.;  Surgeon: William Dumont MD;  Location:  OR     COSMETIC SURGERY      pannicullectomy     CV CORONARY ANGIOGRAM N/A 9/11/2021    Procedure: Coronary Angiogram;  Surgeon: Noris Ozuna MD;  Location: [facility] CATH LAB CV     CV HEART CATHETERIZATION WITH POSSIBLE INTERVENTION N/A 1/13/2022    Procedure: Heart Catheterization with Possible Intervention;  Surgeon: Liz Pearl MD;  Location:  HEART CARDIAC CATH LAB     CV LEFT HEART CATH N/A 9/11/2021    Procedure: Left Heart  Cath;  Surgeon: Noris Ozuna MD;  Location: [facility] CV     CV PCI N/A 9/11/2021    Procedure: Percutaneous Coronary Intervention;  Surgeon: Noris Ozuna MD;  Location: [facility] CV     CV PCI N/A 10/7/2021    Procedure: Percutaneous Coronary Intervention;  Surgeon: Mckayla Dan MD;  Location: [facility] CV     CV PCI ASPIRATION THROMECTOMY N/A 9/11/2021    Procedure: Percutaneous Coronary Intervention Aspiration Thrombectomy;  Surgeon: Noris Ozuna MD;  Location: [facility]     ENT SURGERY      tonsillectomy     EP ICD N/A 1/24/2022    Procedure: EP ICD;  Surgeon: Jannette Crook MD;  Location:  HEART CARDIAC CATH LAB     EXTRACORPOREAL SHOCK WAVE LITHOTRIPSY, CYSTOSCOPY, INSERT STENT URETER(S), COMBINED  8/23/11     HC REMOVAL OF TONSILS,<12 Y/O      Description: Tonsillectomy;  Recorded: 03/23/2012;  Comments: for obstructive sleep apnea     HC REPAIR INCISIONAL HERNIA,REDUCIBLE  11/13/2012    Description: Incisional Hernia Repair;  Proc Date: 11/13/2012;  Comments: incisional hernia repair and abdominal panniculectomy by Dr Shon Green at the [facility].     HERNIA REPAIR       IR MISCELLANEOUS PROCEDURE  7/29/2011     IR MISCELLANEOUS PROCEDURE  8/5/2011     IR MISCELLANEOUS PROCEDURE  8/23/2011     IR NEPHROSTOMY TUBE CHANGE BILATERAL  8/5/2011     ORTHOPEDIC SURGERY      arthrodesis ant discectomy, lumbar     NH ARTHRODESIS ANT INTERBODY MIN DISCECTOMY,LUMBAR      Description: Lumbar Vertebral Fusion;  Recorded: 06/26/2009;  Comments: before 200 see [facility] consult under old records     NH EXCISE EXCESS SKIN TISSUE,ABDOMEN  11/13/2012    Description: Panniculectomy;  Proc Date: 11/13/2012;  Comments: 3.5 Lb Pannus was removed at the [facility] By Dr. Shon Green     REPLACE INTRATHECAL PAIN PUMP N/A 4/25/2017    Procedure: REPLACE INTRATHECAL PAIN PUMP;  INTRATHECAL PAIN PUMP CATHETER REPLACEMENT AND PUMP REPLACEMENT;  Surgeon: Anthony Maloney MD;   Location: [facility]     REVISE CATHETER INTRATHECAL N/A 4/25/2017    Procedure: REVISE CATHETER INTRATHECAL;;  Surgeon: Anthony Maloney MD;  Location: [facility]     SHOULDER SURGERY       ZZC GASTRIC BYPASS,OBESE<100CM SUSY-EN-Y  2008    Description: Gastric Surgery For Morbid Obesity Bypass With Susy-en-Y;  Recorded: 06/26/2009;  Comments: dr smith 2008      Past Medical History:   Diagnosis Date     Acid reflux disease 10/31/2017     CHF (congestive heart failure) (H)      Coronary artery disease     CABG 1-     Essential hypertension     Created by [facility] Annotation: Apr 6 2012 10:16Zack Harris: Lisinipril,  coreg  Replacement Utility updated for latest IMO load     H/O gastric bypass 2008     Insomnia      Ischemic cardiomyopathy      Lumbago     Created by [facility] Annotation: Apr 6 2012 10:20Anh Ford: Morphine pump      Morbid obesity (H) 08/01/2018     Nephrolithiasis      NSTEMI (non-ST elevated myocardial infarction) (H)      Obstructive sleep apnea     Doesn't tolerate CPAP     Osteoarthritis     Created by [facility] Annotation: Jun 26 2009  9:53Zack Harris: failed lumbar  fusion/morphine pump dr putnam  Replacement Utility updated for latest IMO load     Paroxysmal atrial fibrillation (H)     Created by Conversion      Paroxysmal ventricular tachycardia (H)     dual chamber ICD 1/24/2022     Type 2 diabetes mellitus (H)     Diet controlled after Gastric Bypass in 2008              Review of Systems   CONSTITUTIONAL: NEGATIVE for fever, chills, change in weight  INTEGUMENTARY/SKIN: He does not have any major rash.  ENT/MOUTH: NEGATIVE for ear, mouth and throat problems  RESP: NEGATIVE for significant cough or SOB  CV: NEGATIVE for chest pain, palpitations or peripheral edema  MUSCULOSKELETAL: back pain  PSYCHIATRIC: NEGATIVE for changes in mood or affect      Objective    /82   Pulse 69   Resp 21   Wt 143.1 kg (315 lb 8 oz)    SpO2 99%   BMI 39.43 kg/m    Body mass index is 39.43 kg/m .  Physical Exam   GENERAL: healthy, alert and no distress  NECK: no adenopathy, no asymmetry, masses, or scars and thyroid normal to palpation  RESP: lungs clear to auscultation - no rales, rhonchi or wheezes  CV: regular rate and rhythm, normal S1 S2, no S3 or S4, no murmur, click or rub, no peripheral edema and peripheral pulses strong  ABDOMEN: He does have a big abdominal pannus, lifting that, he does have on the left side area of skin abrasion/skin tears which is fresh to he has diabetes that he has been using some lotion or cream around that.  Also on the groin both sides there are dry skin that will tear if stretched. All are tender but no erythema.  MS: He is almost doubled over due to chronic lumbar degenerative disc disease.  SKIN: As noted in abdominal examination

## 2022-05-05 ENCOUNTER — HOSPITAL ENCOUNTER (OUTPATIENT)
Dept: CARDIAC REHAB | Facility: CLINIC | Age: 67
Discharge: HOME | End: 2022-05-05
Attending: SURGERY
Payer: COMMERCIAL

## 2022-05-05 PROCEDURE — 93798 PHYS/QHP OP CAR RHAB W/ECG: CPT

## 2022-05-05 NOTE — PROGRESS NOTES
Cardiology Clinic Progress Note    Service Date: 05/06/22    Primary Cardiologist: Dr. Pearl      Reason for Visit: follow up     HPI:   I had the pleasure of seeing Mr. Onur Barr in the clinic today and he is a very pleasant 66 year old male with a past medical history notable for    1. Coronary artery disease.  PCI to LAD (2012).  PCI to RCA (9/2021).   CABG x 1 (LIMA to LAD, PVI, exclusion of left atrial appendage) on 1/19/2022   2. Paroxysmal atrial fibrillation.  S/p PVI and exclusion left atrial appendage (1/2022)  3. Sustained monomorphic ventricular tachycardia.  S/p [employer] dual-chamber ICD (1/2022)  4. Ischemic cardiomyopathy.  5. Morbid obesity  6. MARLEEN.  Not tolerate CPAP  7. Chronic back pain.   Has a pain pump  8. Right middle lobe pulmonary nodule.  Measuring 4 mm per CT (4/2022)      Diagnostics  I have personally reviewed the following reports    CT angiogram heart (4/2022) revealed normal pulmonary venous anatomy with all pulmonary veins draining into the left atrium, AtriCure device noted at the ostium of the JUSTO no flow was identified into the JUSTO, aortic root and ascending aorta and descending thoracic aorta are normal.  Coronary calcifications in LAD and RCA.    Device check (4/2022) revealed  A-paced  53% .   : 16% .     Stable leads.  Battery life 13 years.   Atrial Arrhythmia: 7 episodes recorded, 3 with EGMs for review. All 3 EGMs show brief atrial arrhythmia <10 seconds. Longest episode recorded in logbook is 41 seconds. AF burden <1%     Ventricular Arrhythmia: None.    Echo (3/2022) demonstrated Left ventricular systolic function is mildly reduced.Biplane LVEF is 45%. Mildly decreased right ventricular systolic function There is mild (1+) mitral regurgitation. Mild aortic root dilatation. The inferior vena cava was normal in size with preserved respiratoryvariability.    In January 2022, patient underwent a coronary artery bypass graft x1 with the LIMA to the LAD,  pulmonary vein isolation, exclusion of the left atrial appendage, intraoperative TATIANA.  He had paroxysmal atrial fibrillation and treated with amiodarone.  He had an episode of 30 seconds of sustained monomorphic ventricular tachycardia and an ICD was placed on postop day 5    In February 2022, he was seen in clinic he was using a walker for support his amiodarone was discontinued and was in cardiac rehab.  A CT was ordered to evaluate for his ligation left atrial appendage.   The CT had to be rescheduled as patient needed help transferring and was ultimately completed showing no flow into the left atrial appendage.    Today, he presents with his wife and reports having recurrent tremors in hands worse when he is fatigued.  His fatigue is improving and he continues to have good days and bad days unsure of triggers.  He has chronic shortness of breath and can only walk 40 feet and needs to stop due to back pain.  His weight is increasing and his lower extremity edema is worsening despite wearing compression stockings.   He denies chest pain, orthopnea, PND, palpitations, dizziness.   Reports taking medications as prescribed including Xarelto and Plavix and denies bleeding and sign/symptoms of stroke.     ASSESSMENT AND PLAN:    Sustained monomorphic ventricular tachycardia    After surgery patient had sustained ventricular tachycardia while exercising.  He subsequently received a dual-chamber [employer] ICD which was implanted on (1/24/2022)    I have personally reviewed the last device check dated (4/2022) which revealed normal device function and stable leads.     His amiodarone was discontinued in February 2022.  He has not had any recurrent ventricular tachycardia on his device checks    Patient will follow with the device clinic on a quarterly basis.      Paroxsymal Atrial fibrillation    For rhythm control he underwent a PVI ablation while having a CABG he did have recurrent postoperative atrial  fibrillation.    Postoperatively patient received amiodarone which was discontinued 2/2022.  On his February 2022 device check he did have recurrence of atrial fibrillation longest lasting 1.5 hours.  His most recent device check did not show any sustained atrial fibrillation.        For rate control he is currently taking metoprolol succinate 75 mg twice daily    For anticoagulation for CHADS VASC 4 (age, CAD, HTN) he is taking Xarelto 15 mg daily.     He did undergo an exclusion of left atrial appendage and his CT did show no flow in his left atrial appendage.  Will discontinue Xarelto - family prefers to finish bottle.     Hypertension    Controlled    Will add lisinopril to optimize HF medications     Coronary artery disease    PCI to LAD in 2012    Inferior STEMI (9/2021) and underwent PCI to RCA    S/p NSTEMI (1/12/2022) and underwent a CABG x1 (LIMA to LAD, PVI, LA exclusion)    Currently taking Plavix 75 mg daily, rosuvastatin 40 mg daily, metoprolol succinate 75 mg twice daily.  His last LDL 38 (1/2022)    Dr. Pearl recommends Plavix for 1 year     Ischemic cardiomyopathy    ECHO (3/2022) revealed EF 40-45% with apical wall hypokinesis    He is currently taking metoprolol succinate 75 mg twice daily     Having weight gain and worsening lower extremity edema.  We will start furosemide 20 mg daily.    To optimize medications Will start lisinopril 5 mg daily.    Follow-up with a BMP in 7 to 10 days.    Follow-up in clinic       morbid obesity     s/p gastric bypass (2008)     Patient's BMI 38.6     MARLEEN     Does not tolerate CPAP     Chronic Back Pain     In dwelling pain Pump w Morphine, Clonidine, Baclofen    Is working on obtaining insurance coverage for swim therapy      Plan:    Stop Xarelto due to ligation of left atrial appendage    Start lisinopril 5 mg daily    Start furosemide 20 mg daily    Follow-up with BMP in 7 to 14 days    Follow-up in clinic    Please call the clinic if questions or  problems arise      Thank you for the opportunity to participate in this pleasant patient's care. We would be happy to see him sooner if needed for any concerns in the meantime.        YAMILETH Pratt [facility] Heart  Text Page  (M-F 8:00 am - 4:30 pm)    Orders this Visit:  Orders Placed This Encounter   Procedures     Basic metabolic panel     Follow-Up with Cardiology- SULEMA     Orders Placed This Encounter   Medications     rivaroxaban ANTICOAGULANT (XARELTO ANTICOAGULANT) 15 MG TABS tablet     Sig: Take 1 tablet (15 mg) by mouth daily (with dinner)     Dispense:  60 tablet     Refill:  3     lisinopril (ZESTRIL) 5 MG tablet     Sig: Take 1 tablet (5 mg) by mouth daily     Dispense:  30 tablet     Refill:  11     furosemide (LASIX) 20 MG tablet     Sig: Take 1 tablet (20 mg) by mouth daily     Dispense:  30 tablet     Refill:  11     Medications Discontinued During This Encounter   Medication Reason     rivaroxaban ANTICOAGULANT (XARELTO ANTICOAGULANT) 15 MG TABS tablet      Encounter Diagnoses   Name Primary?     Coronary artery disease involving native coronary artery of native heart without angina pectoris      Mixed hyperlipidemia      Morbid obesity -- BMI 42.0      MARLEEN (doesn't tolerate CPAP)      Paroxysmal atrial fib -- S/P Pulm Vein Ablation 1/19/22      Essential hypertension      S/P CABG (coronary artery bypass graft)      Chronic systolic congestive heart failure (H) Yes       CURRENT MEDICATIONS:  Current Outpatient Medications   Medication Sig Dispense Refill     acetaminophen (TYLENOL) 325 MG tablet Take 2 tablets (650 mg) by mouth every 4 hours as needed for mild pain 40 tablet 0     baclofen (LIORESAL) 10 MG tablet Take 1 tablet (10 mg) by mouth 2 times daily as needed for muscle spasms 30 tablet 0     clopidogrel (PLAVIX) 75 MG tablet Take 1 tablet (75 mg) by mouth daily 90 tablet 1     DULoxetine (CYMBALTA) 30 MG capsule Take 30 mg by mouth every morning       DULoxetine (CYMBALTA) 30 MG  capsule Take 60 mg by mouth every evening       Ferrous Sulfate (IRON PO) Take by mouth daily Dosage is unknown       furosemide (LASIX) 20 MG tablet Take 1 tablet (20 mg) by mouth daily 30 tablet 11     lisinopril (ZESTRIL) 5 MG tablet Take 1 tablet (5 mg) by mouth daily 30 tablet 11     methocarbamol (ROBAXIN) 500 MG tablet Take 1 tablet (500 mg) by mouth every 6 hours as needed for muscle spasms 60 tablet 0     metoprolol succinate ER (TOPROL-XL) 25 MG 24 hr tablet Take 3 tablets (75 mg) by mouth 2 times daily 180 tablet 3     MORPHINE SULFATE IT pump:updated 1/12/22  Medications in Pump:   [facility] Responsible for pump medications:   Conc:  Morphine 20mg/ml(3.95 mg/day), clonidine 21.1mcg/ml (4.168 mcg/day), baclofen 42.5mcg/ml (8.395mcg/day)  Rate:   Pump Last Fill Date: 9/2021  Pump Refill Date: 2/2022       nitroGLYcerin (NITROSTAT) 0.4 MG sublingual tablet For chest pain place 1 tablet under the tongue every 5 minutes for 3 doses. If symptoms persist 5 minutes after 1st dose call 911. 30 tablet 1     nystatin (MYCOSTATIN) 624937 UNIT/GM external powder Apply to lower abdominal area twice daily 60 g 1     Omeprazole (PRILOSEC PO) Take 40 mg by mouth daily as needed        oxyCODONE-acetaminophen (PERCOCET) 5-325 MG tablet Take 1 tablet by mouth daily as needed for moderate to severe pain 30 tablet 0     rivaroxaban ANTICOAGULANT (XARELTO ANTICOAGULANT) 15 MG TABS tablet Take 1 tablet (15 mg) by mouth daily (with dinner) 60 tablet 3     rosuvastatin (CRESTOR) 40 MG tablet Take 1 tablet (40 mg) by mouth daily 90 tablet 3     sulfamethoxazole-trimethoprim (BACTRIM DS) 800-160 MG tablet Take 1 tablet by mouth 2 times daily 14 tablet 0     traZODone (DESYREL) 100 MG tablet TAKE 2 TABLETS (200MG TOTAL) BY MOUTH AT BEDTIME 180 tablet 3       ALLERGIES  Allergies   Allergen Reactions     Hydrocodone-Acetaminophen Other (See Comments)     sneezing       PAST MEDICAL, SURGICAL, SOCIAL FAMILY  "HISTORY:  History was reviewed and updated as needed, see medical record.    Review of Systems:  Skin:  Positive for rash;bruising   Eyes:  Negative    ENT:  Negative    Respiratory:  Positive for dyspnea on exertion;sleep apnea  Cardiovascular:    Positive for;fatigue  Gastroenterology: Negative    Genitourinary:  Negative    Musculoskeletal:  Positive for back pain;joint pain  Neurologic:  Positive for tremors  Psychiatric:  Negative    Heme/Lymph/Imm:  Positive for allergies  Endocrine:  Negative       Physical Exam:  Vitals: BP (!) 134/90 (BP Location: Right arm, Patient Position: Sitting, Cuff Size: Adult Large)   Pulse 64   Ht 1.905 m (6' 3\")   Wt 142.8 kg (314 lb 14.4 oz)   SpO2 96%   BMI 39.36 kg/m     Wt Readings from Last 4 Encounters:   05/06/22 142.8 kg (314 lb 14.4 oz)   05/03/22 143.1 kg (315 lb 8 oz)   04/07/22 141.5 kg (312 lb)   03/04/22 139.7 kg (308 lb)     CONSTITUTIONAL: Appears his stated age, well nourished, and in no acute distress.  HEENT: Pupils equal, round. Sclerae nonicteric.    NECK: Supple, no masses appreciated.  C/V:  Regular rate and rhythm, normal S1 and S2, no S3 or S4, no murmur, rub or gallop.   RESP: Respirations are unlabored. Lungs are clear to auscultation bilaterally without wheezing, rales, or rhonchi.  GI: Abdomen soft, non-tender, non-distended.  EXTREM:  No clubbing, cyanosis, or 2+ lower extremity edema bilaterally.   NEURO: Alert and oriented, cooperative. Gait steady. No gross focal deficits.   PSYCH: Affect appropriate. Mentation normal. Responds to questions appropriately.  SKIN: Warm and dry. No apparent rashes or bruising.     Recent Lab Results:  LIPID RESULTS:  Lab Results   Component Value Date    CHOL 105 01/14/2022    HDL 44 01/14/2022    LDL 38 01/14/2022    LDL 69 02/13/2018    TRIG 116 01/14/2022     LIVER ENZYME RESULTS:  Lab Results   Component Value Date    AST 37 01/20/2022    ALT 21 01/20/2022     CBC RESULTS:  Lab Results   Component Value Date "    WBC 7.9 05/03/2022    RBC 4.68 05/03/2022    HGB 12.2 (L) 05/03/2022    HCT 38.0 (L) 05/03/2022    MCV 81 05/03/2022    MCH 26.1 (L) 05/03/2022    MCHC 32.1 05/03/2022    RDW 14.1 05/03/2022     05/03/2022     BMP RESULTS:  Lab Results   Component Value Date     01/25/2022    POTASSIUM 3.5 01/25/2022    CHLORIDE 99 01/25/2022    CO2 28 01/25/2022    ANIONGAP 6 01/25/2022     (H) 01/25/2022    BUN 13 01/25/2022    CR 0.88 01/25/2022    GFRESTIMATED >90 01/25/2022    GFRESTIMATED >60 01/05/2021    GFRESTBLACK >60 01/05/2021    RATNA 8.6 01/25/2022      A1C RESULTS:  Lab Results   Component Value Date    A1C 5.4 01/15/2022     CC  Liz Pearl MD  [id number] [id number]    This note was completed in part using Dragon voice recognition software. Although reviewed after completion, some word and grammatical errors may occur.

## 2022-05-06 ENCOUNTER — TELEPHONE (OUTPATIENT)
Dept: CARDIOLOGY | Facility: CLINIC | Age: 67
End: 2022-05-06

## 2022-05-06 ENCOUNTER — OFFICE VISIT (OUTPATIENT)
Dept: CARDIOLOGY | Facility: CLINIC | Age: 67
End: 2022-05-06
Attending: INTERNAL MEDICINE
Payer: COMMERCIAL

## 2022-05-06 VITALS
HEIGHT: 75 IN | BODY MASS INDEX: 39.15 KG/M2 | DIASTOLIC BLOOD PRESSURE: 90 MMHG | WEIGHT: 314.9 LBS | HEART RATE: 64 BPM | SYSTOLIC BLOOD PRESSURE: 134 MMHG | OXYGEN SATURATION: 96 %

## 2022-05-06 DIAGNOSIS — I48.0 PAROXYSMAL ATRIAL FIBRILLATION (H): ICD-10-CM

## 2022-05-06 DIAGNOSIS — Z98.890 S/P LEFT ATRIAL APPENDAGE LIGATION: ICD-10-CM

## 2022-05-06 DIAGNOSIS — E66.01 MORBID OBESITY (H): ICD-10-CM

## 2022-05-06 DIAGNOSIS — I50.22 CHRONIC SYSTOLIC CONGESTIVE HEART FAILURE (H): Primary | ICD-10-CM

## 2022-05-06 DIAGNOSIS — Z79.01 ON CONTINUOUS ORAL ANTICOAGULATION: ICD-10-CM

## 2022-05-06 DIAGNOSIS — Z95.1 S/P CABG (CORONARY ARTERY BYPASS GRAFT): ICD-10-CM

## 2022-05-06 DIAGNOSIS — G47.33 OSA (OBSTRUCTIVE SLEEP APNEA): ICD-10-CM

## 2022-05-06 DIAGNOSIS — I10 ESSENTIAL HYPERTENSION: ICD-10-CM

## 2022-05-06 DIAGNOSIS — I25.10 CORONARY ARTERY DISEASE INVOLVING NATIVE CORONARY ARTERY OF NATIVE HEART WITHOUT ANGINA PECTORIS: ICD-10-CM

## 2022-05-06 PROCEDURE — 99215 OFFICE O/P EST HI 40 MIN: CPT | Performed by: NURSE PRACTITIONER

## 2022-05-06 RX ORDER — LISINOPRIL 5 MG/1
5 TABLET ORAL DAILY
Qty: 30 TABLET | Refills: 11 | Status: SHIPPED | OUTPATIENT
Start: 2022-05-06 | End: 2022-06-03

## 2022-05-06 RX ORDER — FUROSEMIDE 20 MG
20 TABLET ORAL DAILY
Qty: 30 TABLET | Refills: 11 | Status: SHIPPED | OUTPATIENT
Start: 2022-05-06 | End: 2022-05-31

## 2022-05-06 NOTE — LETTER
5/6/2022    Luann Berger MD  [address] [facility] [address]    RE: Onur Barr       Dear Colleague,     I had the pleasure of seeing Onur Barr in the [facility].      Cardiology Clinic Progress Note    Service Date: 05/06/22    Primary Cardiologist: Dr. Pearl      Reason for Visit: follow up     HPI:   I had the pleasure of seeing Mr. Onur Barr in the clinic today and he is a very pleasant 66 year old male with a past medical history notable for    1. Coronary artery disease.  PCI to LAD (2012).  PCI to RCA (9/2021).   CABG x 1 (LIMA to LAD, PVI, exclusion of left atrial appendage) on 1/19/2022   2. Paroxysmal atrial fibrillation.  S/p PVI and exclusion left atrial appendage (1/2022)  3. Sustained monomorphic ventricular tachycardia.  S/p [employer] dual-chamber ICD (1/2022)  4. Ischemic cardiomyopathy.  5. Morbid obesity  6. MARLEEN.  Not tolerate CPAP  7. Chronic back pain.   Has a pain pump  8. Right middle lobe pulmonary nodule.  Measuring 4 mm per CT (4/2022)      Diagnostics  I have personally reviewed the following reports    CT angiogram heart (4/2022) revealed normal pulmonary venous anatomy with all pulmonary veins draining into the left atrium, AtriCure device noted at the ostium of the JUSTO no flow was identified into the JUSTO, aortic root and ascending aorta and descending thoracic aorta are normal.  Coronary calcifications in LAD and RCA.    Device check (4/2022) revealed  A-paced  53% .   : 16% .     Stable leads.  Battery life 13 years.   Atrial Arrhythmia: 7 episodes recorded, 3 with EGMs for review. All 3 EGMs show brief atrial arrhythmia <10 seconds. Longest episode recorded in logbook is 41 seconds. AF burden <1%     Ventricular Arrhythmia: None.    Echo (3/2022) demonstrated Left ventricular systolic function is mildly reduced.Biplane LVEF is 45%. Mildly decreased right ventricular systolic function There is mild (1+) mitral regurgitation. Mild  aortic root dilatation. The inferior vena cava was normal in size with preserved respiratoryvariability.    In January 2022, patient underwent a coronary artery bypass graft x1 with the LIMA to the LAD, pulmonary vein isolation, exclusion of the left atrial appendage, intraoperative TATIANA.  He had paroxysmal atrial fibrillation and treated with amiodarone.  He had an episode of 30 seconds of sustained monomorphic ventricular tachycardia and an ICD was placed on postop day 5    In February 2022, he was seen in clinic he was using a walker for support his amiodarone was discontinued and was in cardiac rehab.  A CT was ordered to evaluate for his ligation left atrial appendage.   The CT had to be rescheduled as patient needed help transferring and was ultimately completed showing no flow into the left atrial appendage.    Today, he presents with his wife and reports having recurrent tremors in hands worse when he is fatigued.  His fatigue is improving and he continues to have good days and bad days unsure of triggers.  He has chronic shortness of breath and can only walk 40 feet and needs to stop due to back pain.  His weight is increasing and his lower extremity edema is worsening despite wearing compression stockings.   He denies chest pain, orthopnea, PND, palpitations, dizziness.   Reports taking medications as prescribed including Xarelto and Plavix and denies bleeding and sign/symptoms of stroke.     ASSESSMENT AND PLAN:    Sustained monomorphic ventricular tachycardia    After surgery patient had sustained ventricular tachycardia while exercising.  He subsequently received a dual-chamber [employer] ICD which was implanted on (1/24/2022)    I have personally reviewed the last device check dated (4/2022) which revealed normal device function and stable leads.     His amiodarone was discontinued in February 2022.  He has not had any recurrent ventricular tachycardia on his device checks    Patient will follow  with the device clinic on a quarterly basis.      Paroxsymal Atrial fibrillation    For rhythm control he underwent a PVI ablation while having a CABG he did have recurrent postoperative atrial fibrillation.    Postoperatively patient received amiodarone which was discontinued 2/2022.  On his February 2022 device check he did have recurrence of atrial fibrillation longest lasting 1.5 hours.  His most recent device check did not show any sustained atrial fibrillation.        For rate control he is currently taking metoprolol succinate 75 mg twice daily    For anticoagulation for CHADS VASC 4 (age, CAD, HTN) he is taking Xarelto 15 mg daily.     He did undergo an exclusion of left atrial appendage and his CT did show no flow in his left atrial appendage.  Will discontinue Xarelto - family prefers to finish bottle.     Hypertension    Controlled    Will add lisinopril to optimize HF medications     Coronary artery disease    PCI to LAD in 2012    Inferior STEMI (9/2021) and underwent PCI to RCA    S/p NSTEMI (1/12/2022) and underwent a CABG x1 (LIMA to LAD, PVI, LA exclusion)    Currently taking Plavix 75 mg daily, rosuvastatin 40 mg daily, metoprolol succinate 75 mg twice daily.  His last LDL 38 (1/2022)    Dr. Pearl recommends Plavix for 1 year     Ischemic cardiomyopathy    ECHO (3/2022) revealed EF 40-45% with apical wall hypokinesis    He is currently taking metoprolol succinate 75 mg twice daily     Having weight gain and worsening lower extremity edema.  We will start furosemide 20 mg daily.    To optimize medications Will start lisinopril 5 mg daily.    Follow-up with a BMP in 7 to 10 days.    Follow-up in clinic       morbid obesity     s/p gastric bypass (2008)     Patient's BMI 38.6     MARLEEN     Does not tolerate CPAP     Chronic Back Pain     In dwelling pain Pump w Morphine, Clonidine, Baclofen    Is working on obtaining insurance coverage for swim therapy      Plan:    Stop Xarelto due to ligation of  left atrial appendage    Start lisinopril 5 mg daily    Start furosemide 20 mg daily    Follow-up with BMP in 7 to 14 days    Follow-up in clinic    Please call the clinic if questions or problems arise      Thank you for the opportunity to participate in this pleasant patient's care. We would be happy to see him sooner if needed for any concerns in the meantime.        YAMILETH Pratt CNP  [facility]  Text Page  (M-F 8:00 am - 4:30 pm)    Orders this Visit:  Orders Placed This Encounter   Procedures     Basic metabolic panel     Follow-Up with Cardiology- SULEMA     Orders Placed This Encounter   Medications     rivaroxaban ANTICOAGULANT (XARELTO ANTICOAGULANT) 15 MG TABS tablet     Sig: Take 1 tablet (15 mg) by mouth daily (with dinner)     Dispense:  60 tablet     Refill:  3     lisinopril (ZESTRIL) 5 MG tablet     Sig: Take 1 tablet (5 mg) by mouth daily     Dispense:  30 tablet     Refill:  11     furosemide (LASIX) 20 MG tablet     Sig: Take 1 tablet (20 mg) by mouth daily     Dispense:  30 tablet     Refill:  11     Medications Discontinued During This Encounter   Medication Reason     rivaroxaban ANTICOAGULANT (XARELTO ANTICOAGULANT) 15 MG TABS tablet      Encounter Diagnoses   Name Primary?     Coronary artery disease involving native coronary artery of native heart without angina pectoris      Mixed hyperlipidemia      Morbid obesity -- BMI 42.0      MARLEEN (doesn't tolerate CPAP)      Paroxysmal atrial fib -- S/P Pulm Vein Ablation 1/19/22      Essential hypertension      S/P CABG (coronary artery bypass graft)      Chronic systolic congestive heart failure (H) Yes       CURRENT MEDICATIONS:  Current Outpatient Medications   Medication Sig Dispense Refill     acetaminophen (TYLENOL) 325 MG tablet Take 2 tablets (650 mg) by mouth every 4 hours as needed for mild pain 40 tablet 0     baclofen (LIORESAL) 10 MG tablet Take 1 tablet (10 mg) by mouth 2 times daily as needed for muscle spasms 30 tablet 0      clopidogrel (PLAVIX) 75 MG tablet Take 1 tablet (75 mg) by mouth daily 90 tablet 1     DULoxetine (CYMBALTA) 30 MG capsule Take 30 mg by mouth every morning       DULoxetine (CYMBALTA) 30 MG capsule Take 60 mg by mouth every evening       Ferrous Sulfate (IRON PO) Take by mouth daily Dosage is unknown       furosemide (LASIX) 20 MG tablet Take 1 tablet (20 mg) by mouth daily 30 tablet 11     lisinopril (ZESTRIL) 5 MG tablet Take 1 tablet (5 mg) by mouth daily 30 tablet 11     methocarbamol (ROBAXIN) 500 MG tablet Take 1 tablet (500 mg) by mouth every 6 hours as needed for muscle spasms 60 tablet 0     metoprolol succinate ER (TOPROL-XL) 25 MG 24 hr tablet Take 3 tablets (75 mg) by mouth 2 times daily 180 tablet 3     MORPHINE SULFATE IT pump:updated 1/12/22  Medications in Pump:   [facility] Responsible for pump medications:   Conc:  Morphine 20mg/ml(3.95 mg/day), clonidine 21.1mcg/ml (4.168 mcg/day), baclofen 42.5mcg/ml (8.395mcg/day)  Rate:   Pump Last Fill Date: 9/2021  Pump Refill Date: 2/2022       nitroGLYcerin (NITROSTAT) 0.4 MG sublingual tablet For chest pain place 1 tablet under the tongue every 5 minutes for 3 doses. If symptoms persist 5 minutes after 1st dose call 911. 30 tablet 1     nystatin (MYCOSTATIN) 186274 UNIT/GM external powder Apply to lower abdominal area twice daily 60 g 1     Omeprazole (PRILOSEC PO) Take 40 mg by mouth daily as needed        oxyCODONE-acetaminophen (PERCOCET) 5-325 MG tablet Take 1 tablet by mouth daily as needed for moderate to severe pain 30 tablet 0     rivaroxaban ANTICOAGULANT (XARELTO ANTICOAGULANT) 15 MG TABS tablet Take 1 tablet (15 mg) by mouth daily (with dinner) 60 tablet 3     rosuvastatin (CRESTOR) 40 MG tablet Take 1 tablet (40 mg) by mouth daily 90 tablet 3     sulfamethoxazole-trimethoprim (BACTRIM DS) 800-160 MG tablet Take 1 tablet by mouth 2 times daily 14 tablet 0     traZODone (DESYREL) 100 MG tablet TAKE 2 TABLETS (200MG TOTAL) BY MOUTH  "AT BEDTIME 180 tablet 3       ALLERGIES  Allergies   Allergen Reactions     Hydrocodone-Acetaminophen Other (See Comments)     sneezing       PAST MEDICAL, SURGICAL, SOCIAL FAMILY HISTORY:  History was reviewed and updated as needed, see medical record.    Review of Systems:  Skin:  Positive for rash;bruising   Eyes:  Negative    ENT:  Negative    Respiratory:  Positive for dyspnea on exertion;sleep apnea  Cardiovascular:    Positive for;fatigue  Gastroenterology: Negative    Genitourinary:  Negative    Musculoskeletal:  Positive for back pain;joint pain  Neurologic:  Positive for tremors  Psychiatric:  Negative    Heme/Lymph/Imm:  Positive for allergies  Endocrine:  Negative       Physical Exam:  Vitals: BP (!) 134/90 (BP Location: Right arm, Patient Position: Sitting, Cuff Size: Adult Large)   Pulse 64   Ht 1.905 m (6' 3\")   Wt 142.8 kg (314 lb 14.4 oz)   SpO2 96%   BMI 39.36 kg/m     Wt Readings from Last 4 Encounters:   05/06/22 142.8 kg (314 lb 14.4 oz)   05/03/22 143.1 kg (315 lb 8 oz)   04/07/22 141.5 kg (312 lb)   03/04/22 139.7 kg (308 lb)     CONSTITUTIONAL: Appears his stated age, well nourished, and in no acute distress.  HEENT: Pupils equal, round. Sclerae nonicteric.    NECK: Supple, no masses appreciated.  C/V:  Regular rate and rhythm, normal S1 and S2, no S3 or S4, no murmur, rub or gallop.   RESP: Respirations are unlabored. Lungs are clear to auscultation bilaterally without wheezing, rales, or rhonchi.  GI: Abdomen soft, non-tender, non-distended.  EXTREM:  No clubbing, cyanosis, or 2+ lower extremity edema bilaterally.   NEURO: Alert and oriented, cooperative. Gait steady. No gross focal deficits.   PSYCH: Affect appropriate. Mentation normal. Responds to questions appropriately.  SKIN: Warm and dry. No apparent rashes or bruising.     Recent Lab Results:  LIPID RESULTS:  Lab Results   Component Value Date    CHOL 105 01/14/2022    HDL 44 01/14/2022    LDL 38 01/14/2022    LDL 69 02/13/2018 "    TRIG 116 01/14/2022     LIVER ENZYME RESULTS:  Lab Results   Component Value Date    AST 37 01/20/2022    ALT 21 01/20/2022     CBC RESULTS:  Lab Results   Component Value Date    WBC 7.9 05/03/2022    RBC 4.68 05/03/2022    HGB 12.2 (L) 05/03/2022    HCT 38.0 (L) 05/03/2022    MCV 81 05/03/2022    MCH 26.1 (L) 05/03/2022    MCHC 32.1 05/03/2022    RDW 14.1 05/03/2022     05/03/2022     BMP RESULTS:  Lab Results   Component Value Date     01/25/2022    POTASSIUM 3.5 01/25/2022    CHLORIDE 99 01/25/2022    CO2 28 01/25/2022    ANIONGAP 6 01/25/2022     (H) 01/25/2022    BUN 13 01/25/2022    CR 0.88 01/25/2022    GFRESTIMATED >90 01/25/2022    GFRESTIMATED >60 01/05/2021    GFRESTBLACK >60 01/05/2021    RATNA 8.6 01/25/2022      A1C RESULTS:  Lab Results   Component Value Date    A1C 5.4 01/15/2022     CC  Liz Pearl MD  [address] [id number]    This note was completed in part using Dragon voice recognition software. Although reviewed after completion, some word and grammatical errors may occur.    Thank you for allowing me to participate in the care of your patient.      Sincerely,     April King, YAMILETH [facility]  cc:   Liz Pearl MD  [address] [id number]

## 2022-05-06 NOTE — PATIENT INSTRUCTIONS
Stop Xarelto when you finish the bottle  Start lisinopril 5 mg daily  Start furosemide 20 mg daily   Have your labs taken 7-14 days after starting the medication    Follow up with April     I will ask the RNs to obtain the records from primary care.      If you have problems or questions please call the RNs (Makenzie Loera, & Erin) at [phone]

## 2022-05-09 PROBLEM — R40.0 SLEEPINESS: Status: ACTIVE | Noted: 2018-05-08

## 2022-05-09 PROBLEM — I73.9 CLAUDICATION OF LOWER EXTREMITY (H): Status: ACTIVE | Noted: 2019-11-20

## 2022-05-09 PROBLEM — I25.10 CORONARY ARTERIOSCLEROSIS: Status: ACTIVE | Noted: 2022-05-09

## 2022-05-09 PROBLEM — M19.90 OSTEOARTHROSIS: Status: ACTIVE | Noted: 2022-05-09

## 2022-05-09 PROBLEM — G47.00 INSOMNIA: Status: ACTIVE | Noted: 2022-05-09

## 2022-05-09 PROBLEM — N20.0 NEPHROLITHIASIS: Status: ACTIVE | Noted: 2022-05-09

## 2022-05-09 RX ORDER — FUROSEMIDE 40 MG
TABLET ORAL EVERY 24 HOURS
COMMUNITY
End: 2022-05-31

## 2022-05-09 RX ORDER — FERROUS GLUCONATE 240(27)MG
TABLET ORAL
COMMUNITY
Start: 2022-04-19 | End: 2022-08-16

## 2022-05-09 RX ORDER — LISINOPRIL 40 MG/1
TABLET ORAL EVERY 24 HOURS
COMMUNITY
End: 2022-05-31

## 2022-05-10 ENCOUNTER — HOSPITAL ENCOUNTER (OUTPATIENT)
Dept: CARDIAC REHAB | Facility: CLINIC | Age: 67
Discharge: HOME | End: 2022-05-10
Attending: SURGERY
Payer: COMMERCIAL

## 2022-05-10 PROCEDURE — 93798 PHYS/QHP OP CAR RHAB W/ECG: CPT | Performed by: REHABILITATION PRACTITIONER

## 2022-05-12 ENCOUNTER — HOSPITAL ENCOUNTER (OUTPATIENT)
Dept: CARDIAC REHAB | Facility: CLINIC | Age: 67
Discharge: HOME | End: 2022-05-12
Attending: SURGERY
Payer: COMMERCIAL

## 2022-05-12 PROCEDURE — 93798 PHYS/QHP OP CAR RHAB W/ECG: CPT

## 2022-05-17 ENCOUNTER — HOSPITAL ENCOUNTER (OUTPATIENT)
Dept: CARDIAC REHAB | Facility: CLINIC | Age: 67
Discharge: HOME | End: 2022-05-17
Attending: SURGERY
Payer: COMMERCIAL

## 2022-05-17 PROCEDURE — 93798 PHYS/QHP OP CAR RHAB W/ECG: CPT | Performed by: REHABILITATION PRACTITIONER

## 2022-05-19 ENCOUNTER — HOSPITAL ENCOUNTER (OUTPATIENT)
Dept: CARDIAC REHAB | Facility: CLINIC | Age: 67
Discharge: HOME | End: 2022-05-19
Attending: SURGERY
Payer: COMMERCIAL

## 2022-05-19 PROCEDURE — 93798 PHYS/QHP OP CAR RHAB W/ECG: CPT

## 2022-05-20 ENCOUNTER — OFFICE VISIT (OUTPATIENT)
Dept: VASCULAR SURGERY | Facility: CLINIC | Age: 67
End: 2022-05-20
Attending: FAMILY MEDICINE
Payer: COMMERCIAL

## 2022-05-20 VITALS
RESPIRATION RATE: 16 BRPM | DIASTOLIC BLOOD PRESSURE: 70 MMHG | HEART RATE: 60 BPM | TEMPERATURE: 97.4 F | SYSTOLIC BLOOD PRESSURE: 110 MMHG

## 2022-05-20 DIAGNOSIS — I50.32 CHRONIC DIASTOLIC HEART FAILURE (H): ICD-10-CM

## 2022-05-20 DIAGNOSIS — E66.01 MORBID OBESITY (H): Primary | ICD-10-CM

## 2022-05-20 DIAGNOSIS — Z98.890 HISTORY OF VEIN STRIPPING: ICD-10-CM

## 2022-05-20 DIAGNOSIS — I87.303 VENOUS HYPERTENSION OF BOTH LOWER EXTREMITIES: ICD-10-CM

## 2022-05-20 DIAGNOSIS — I87.2 VENOUS (PERIPHERAL) INSUFFICIENCY: ICD-10-CM

## 2022-05-20 DIAGNOSIS — I73.9 PERIPHERAL VASCULAR DISEASE (H): ICD-10-CM

## 2022-05-20 DIAGNOSIS — S30.811D ABRASION OF ABDOMINAL WALL, SUBSEQUENT ENCOUNTER: ICD-10-CM

## 2022-05-20 PROCEDURE — 99203 OFFICE O/P NEW LOW 30 MIN: CPT | Performed by: NURSE PRACTITIONER

## 2022-05-20 PROCEDURE — G0463 HOSPITAL OUTPT CLINIC VISIT: HCPCS | Performed by: NURSE PRACTITIONER

## 2022-05-20 ASSESSMENT — PAIN SCALES - GENERAL: PAINLEVEL: NO PAIN (0)

## 2022-05-20 NOTE — PATIENT INSTRUCTIONS
Abdominal skin cares    You have a condition known as Intertrigo  It is important to have really good skin hygeine  Cleanse with antibacterial soap daily such as dial  Thoroughly dry the skin gently after bathing  Can use hair dryer on cool setting  Continue to apply antifungal powder to the open areas and rash area  Can Use InterDry wicking fabric        Legs  You have a condition known as venous insufficiency with secondary lymphedema  Continue to wear velcro wraps daily  Elevate the legs  Continue to walk and move as tolerated  Low sodium diet    We will get you scheduled for updated ultrasounds on the legs      If for some reason you are not able to get your dressing(s) changed as outlined above (due to illness, lack of supplies, lack of help) please do the following: remove old, soiled dressings; wash the wounds with saline; pat dry; apply ABD pad or other absorbant pad and secure with rolled gauze; avoid tape directly on your skin; Call the clinic as soon as possible to let us know what the current issues are in receiving wound care [phone].      SEEK MEDICAL CARE IF:  You have an increase in swelling, pain, or redness around the wound.  You have an increase in the amount of pus coming from the wound.  There is a bad smell coming from the wound.  The wound appears to be worsening/enlarging  You have a fever greater than 101.5 F      It is ok to continue current wound care treatment/products for the next 2-3 days until new wound care supplies are ordered and arrive. If longer than this please contact our office at [phone].    If you fail to show up to 3 scheduled clinic appointments you will be dismissed from our clinic.              We want to hear from you!  In the next few weeks, you should receive a call or email to complete a survey about your visit at [facility]. Please help us improve your appointment experience by letting us know how we did today. We strive to make your  experience good and value any ways in which we could do better.      We value your input and suggestions.    Thank you for choosing the [facility]!

## 2022-05-20 NOTE — PROGRESS NOTES
[facility] Consult Note    Date of Service: May 20, 2022     Requesting Provider: Dr. Luann Berger    Chief Complaint: abdominal wound and BLE swelling    History of Present Illness: Onur Barr is being seen at [facility] today regarding abdominal wound. History of gastric bypass 10 years ago with tummy yoni; has had recurrent issues with the abdominal incision site since. He has also been dealing with leg swelling for 5 years; has history of venous stasis ulcers; has history of vein stripping bilaterally. They arrive to the clinic today alone.  Was currently/previously using powder and silver alginate. Reports pain of 4/10 in the abdominal wound; currently using morphine pump and apap for pain. Has used compression stockings and velcro as compression in the past, is currently using nothing for compression. Denies any fevers, chills, or generalized ill feeling. Denies history of cancer. Sleeps in a bed/recliner with legs elevated. Last ultrasound of the legs was 4 years ago.  Denies history of DVT, Joint Replacement and Cellulitis. Positive history of Vein Procedures. I personally reviewed outside imaging, lab work, and progress noted through Care Everywhere and outside records.      Review of Systems:   Constitutional:  Negative   EENTM: negative for glasses;  negative [place]  GI:  negative for nausea/vomiting;   negative for constipation   negative diarrhea;   negative for fecal incontinence  negative weight loss; history of gastric bypass; lost over 100 pounds  :   negative dysuria,  negative incontinence  MS: patient is ambulatory;  does use assistive devices  Cardiac: positive chf; debib; cabg  Respiratory:  positive SOB  Endocrine:  negative  diabetes  Psych: negative  depression/anxiety    Past Medical History:   Past Medical History:   Diagnosis Date     Abdominal panniculus 11/13/2012    Formatting of this note might be different from the original. S/p  panniculectomy 11/13/2012     Acid reflux disease 10/31/2017     Bariatric surgery status 6/23/2008    Formatting of this note might be different from the original. Created by Conversion     Bilateral leg edema 7/13/2021     CHF (congestive heart failure) (H)      Chronic Back Pain (IT Pump w Morphine, Clonidine, Baclofen) 1/16/2022     Chronic diastolic heart failure (H) 7/26/2021     Claudication of lower extremity (H) 11/20/2019     Coronary arteriosclerosis 5/9/2022    Formatting of this note might be different from the original. 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     Ischemic cardiomyopathy      Lumbago     Created by [facility] Annotation: Apr 6 2012 10:20Anh Ford: Morphine pump      Mixed hyperlipidemia 6/23/2008     Morbid obesity (H) 08/01/2018     Nephrolithiasis      NSTEMI (non-ST elevated myocardial infarction) (H)      Obstructive sleep apnea     Doesn't tolerate CPAP     MARLEEN (doesn't tolerate CPAP) 7/26/2021     Osteoarthritis     Created by [facility] Annotation: Jun 26 2009  9:53Zack Harris: failed lumbar  fusion/morphine pump dr putnam  Replacement Utility updated for latest IMO load     Osteoarthrosis 5/9/2022    Formatting of this note might be different from the original. Created by [facility] [facility] Annotation: Jun 26 2009  9:53Zack Harris: failed lumbar  fusion/morphine pump dr putnam   Replacement Utility updated for latest IMO load     Paroxysmal atrial fib -- S/P Pulm Vein Ablation 1/19/22 9/11/2021    Formatting of this note might be different from the original. Created by Conversion     Paroxysmal atrial fibrillation (H)     Created by Conversion      Paroxysmal ventricular tachycardia (H)     dual chamber ICD 1/24/2022     Peripheral vascular disease (H) 5/3/2022     Post-laminectomy syndrome 11/25/2015     S/P CABG (coronary artery bypass graft) 1/25/2022     Sleepiness 5/8/2018     Type 2 diabetes mellitus (H)     Diet controlled after Gastric Bypass in 2008        Surgical History:   Past Surgical History:   Procedure Laterality Date     BACK SURGERY       BYPASS GASTRIC DUODENAL SWITCH       BYPASS GRAFT ARTERY CORONARY N/A 1/19/2022    Procedure: CORONARY ARTERY BYPASS GRAFT (CABG) X1; LIMA -LAD.  PULMONARY VEIN ISOLATION, AND ATRIAL APPENDAGE CLIPPING USING 45MM ACTICURE CLIP.;  Surgeon: William Dumont MD;  Location:  OR     COSMETIC SURGERY      pannicullectomy     CV CORONARY ANGIOGRAM N/A 9/11/2021    Procedure: Coronary Angiogram;  Surgeon: Noris Ozuna MD;  Location: [facility] CV     CV HEART CATHETERIZATION WITH POSSIBLE INTERVENTION N/A 1/13/2022    Procedure: Heart Catheterization with Possible Intervention;  Surgeon: Liz Pearl MD;  Location:  HEART CARDIAC CATH LAB     CV LEFT HEART CATH N/A 9/11/2021    Procedure: Left Heart Cath;  Surgeon: Noris Ozuna MD;  Location: [facility] LAB CV     CV PCI N/A 9/11/2021    Procedure: Percutaneous Coronary Intervention;  Surgeon: Noris zOuna MD;  Location: [facility] CV     CV PCI N/A 10/7/2021    Procedure: Percutaneous Coronary Intervention;  Surgeon: Mckayla Dan MD;  Location: [facility] CV     CV PCI ASPIRATION THROMECTOMY N/A 9/11/2021    Procedure: Percutaneous Coronary Intervention Aspiration Thrombectomy;  Surgeon: Noris Ozuna MD;  Location: [facility]  CV     ENT SURGERY      tonsillectomy     EP ICD N/A 1/24/2022    Procedure: EP ICD;  Surgeon: Jannette Crook MD;  Location:  HEART CARDIAC CATH LAB     EXTRACORPOREAL SHOCK WAVE LITHOTRIPSY, CYSTOSCOPY, INSERT STENT URETER(S), COMBINED  8/23/11     HC REMOVAL OF TONSILS,<14 Y/O      Description: Tonsillectomy;  Recorded: 03/23/2012;  Comments: for obstructive sleep apnea     HC REPAIR INCISIONAL HERNIA,REDUCIBLE  11/13/2012    Description: Incisional Hernia Repair;  Proc Date: 11/13/2012;  Comments: incisional hernia repair and abdominal panniculectomy by Dr Shon Green at the [facility].     HERNIA REPAIR       IR MISCELLANEOUS PROCEDURE  7/29/2011     IR MISCELLANEOUS PROCEDURE  8/5/2011     IR MISCELLANEOUS PROCEDURE  8/23/2011     IR NEPHROSTOMY TUBE CHANGE BILATERAL  8/5/2011     ORTHOPEDIC SURGERY      arthrodesis ant discectomy, lumbar     MS ARTHRODESIS ANT INTERBODY MIN DISCECTOMY,LUMBAR      Description: Lumbar Vertebral Fusion;  Recorded: 06/26/2009;  Comments: before 200 see [facility] consult under old records     MS EXCISE EXCESS SKIN TISSUE,ABDOMEN  11/13/2012    Description: Panniculectomy;  Proc Date: 11/13/2012;  Comments: 3.5 Lb Pannus was removed at the [facility] By Dr. Shon Green     REPLACE INTRATHECAL PAIN PUMP N/A 4/25/2017    Procedure: REPLACE INTRATHECAL PAIN PUMP;  INTRATHECAL PAIN PUMP CATHETER REPLACEMENT AND PUMP REPLACEMENT;  Surgeon: Anthony Maloney MD;  Location: [facility]     REVISE CATHETER INTRATHECAL N/A 4/25/2017    Procedure: REVISE CATHETER INTRATHECAL;;  Surgeon: Anthony Maloney MD;  Location: [facility]     SHOULDER SURGERY       ZZC GASTRIC BYPASS,OBESE<100CM SUSY-EN-Y  2008    Description: Gastric Surgery For Morbid Obesity Bypass With Susy-en-Y;  Recorded: 06/26/2009;  Comments: dr green 2008        Medications:   Current Outpatient Medications   Medication Sig     acetaminophen (TYLENOL) 325 MG tablet Take 2 tablets (650 mg) by mouth every 4 hours as needed for mild pain      baclofen (LIORESAL) 10 MG tablet Take 1 tablet (10 mg) by mouth 2 times daily as needed for muscle spasms     clopidogrel (PLAVIX) 75 MG tablet Take 1 tablet (75 mg) by mouth daily     DULoxetine (CYMBALTA) 30 MG capsule Take 30 mg by mouth every morning     DULoxetine (CYMBALTA) 30 MG capsule Take 60 mg by mouth every evening     FERATE 240 (27 Fe) MG TABS TAKE 1 TABLET BY MOUTH EVERY DAY *OTC NOT COV*     Ferrous Sulfate (IRON PO) Take by mouth daily Dosage is unknown     furosemide (LASIX) 20 MG tablet Take 1 tablet (20 mg) by mouth daily     furosemide (LASIX) 40 MG tablet Take by mouth every 24 hours     lisinopril (ZESTRIL) 40 MG tablet Take by mouth every 24 hours     lisinopril (ZESTRIL) 5 MG tablet Take 1 tablet (5 mg) by mouth daily     methocarbamol (ROBAXIN) 500 MG tablet Take 1 tablet (500 mg) by mouth every 6 hours as needed for muscle spasms     metoprolol succinate ER (TOPROL-XL) 25 MG 24 hr tablet Take 3 tablets (75 mg) by mouth 2 times daily     MORPHINE SULFATE IT pump:updated 1/12/22  Medications in Pump:   [facility] Responsible for pump medications:   Conc:  Morphine 20mg/ml(3.95 mg/day), clonidine 21.1mcg/ml (4.168 mcg/day), baclofen 42.5mcg/ml (8.395mcg/day)  Rate:   Pump Last Fill Date: 9/2021  Pump Refill Date: 2/2022     nitroGLYcerin (NITROSTAT) 0.4 MG sublingual tablet For chest pain place 1 tablet under the tongue every 5 minutes for 3 doses. If symptoms persist 5 minutes after 1st dose call 911.     nystatin (MYCOSTATIN) 819552 UNIT/GM external powder Apply to lower abdominal area twice daily     Omeprazole (PRILOSEC PO) Take 40 mg by mouth daily as needed      oxyCODONE-acetaminophen (PERCOCET) 5-325 MG tablet Take 1 tablet by mouth daily as needed for moderate to severe pain     rivaroxaban ANTICOAGULANT (XARELTO) 15 MG TABS tablet Take 1 tablet (15 mg) by mouth daily (with dinner)     rosuvastatin (CRESTOR) 40 MG tablet Take 1 tablet (40 mg) by mouth daily      sulfamethoxazole-trimethoprim (BACTRIM DS) 800-160 MG tablet Take 1 tablet by mouth 2 times daily     traZODone (DESYREL) 100 MG tablet TAKE 2 TABLETS (200MG TOTAL) BY MOUTH AT BEDTIME     No current facility-administered medications for this visit.       Allergies:   Allergies   Allergen Reactions     Hydrocodone-Acetaminophen Other (See Comments)     sneezing       Family history:   Family History   Problem Relation Age of Onset     Cerebrovascular Disease Mother      Heart Disease Father      Hodgkin's lymphoma Brother         Social History:   Social History     Socioeconomic History     Marital status:      Spouse name: Not on file     Number of children: Not on file     Years of education: Not on file     Highest education level: Not on file   Occupational History     Not on file   Tobacco Use     Smoking status: Former Smoker     Years: 10.00     Types: Cigars, Cigars     Smokeless tobacco: Never Used   Substance and Sexual Activity     Alcohol use: No     Drug use: No     Comment: Drug use: formerly used     Sexual activity: Not on file   Other Topics Concern     Not on file   Social History Narrative     Not on file     Social Determinants of Health     Financial Resource Strain: Not on file   Food Insecurity: Not on file   Transportation Needs: Not on file   Physical Activity: Not on file   Stress: Not on file   Social Connections: Not on file   Intimate Partner Violence: Not on file   Housing Stability: Not on file        Physical Exam  Vitals: /70   Pulse 60   Temp 97.4  F (36.3  C)   Resp 16   Weight is 0 lbs 0 oz          There is no height or weight on file to calculate BMI.  General: This is a 66 year old male who appears their reported age, not in acute distress  Head: normocephalic, Atraumatic; not wearing glasses; non-icteric sclera; no exudate; mild hearing loss   Respiratory: Clear throughout all lung fields; unlabored breathing; no cough   Cardiac: Irregular, Rate and Rhythm, no  murmurs appreciated   Skin: Uniformly warm and dry  Psych: Alert and oriented x4; calm and cooperative throughout exam;flat affect  Abdomen: pannus; in the deep skin fold there is evidence of old surgical incision; there is scattered partial thickness openings along this old incision; skin fold appears overly moist  Extremities: BLE with trace edema; +hemosiderosis; no weeping; no wounds; erythema to the legs dissipates with elevation; tissues are somewhat firm and fibrotic; strength testing revealed 2/4 to BLEs.    Sensation: Intact to pinprick and light touch throughout lower extremities bilaterally     Peripheral Vascular: normal dorsalis pedis, posterior tibial pulses to bilateral feet , using a handheld doppler these were strong; and biphasic in nature.  Good capillary refill. No unusual venous distention. Positive for spider veins, telangiectasias, hemosiderin deposition or hyperpigmentation and fibrosis or scarring         Circumferential volume measures:        Circumferential Measures 5/20/2022   Right just above MTP 25.2   Right Ankle 27   Right Widest Calf 38.1   Right Knee to Ankle 47   Left - just above MTP 25.7   Left Ankle 26.1   Left Widest Calf 40.6   Left Knee to Ankle 47       Ulceration(s)/Wound(s):     VASC Wound left abd (Active)   Pre Size Length 1 05/20/22 1000   Pre Size Width 0.2 05/20/22 1000   Pre Size Depth 0.1 05/20/22 1000   Pre Total Sq cm 0.2 05/20/22 1000       VASC Wound right abd (Active)   Pre Size Length 0.5 05/20/22 1000   Pre Size Width 0.3 05/20/22 1000   Pre Size Depth 0.1 05/20/22 1000   Pre Total Sq cm 0.15 05/20/22 1000       Laboratory studies:     I personally reviewed the following lab results today and those on care everywhere, if indicated     No results found for: CRP   Erythrocyte Sedimentation Rate   Date Value Ref Range Status   01/04/2022 26 (H) 0 - 15 mm/hr Final      Last Renal Panel:  Sodium   Date Value Ref Range Status   01/25/2022 133 133 - 144 mmol/L  Final     Potassium   Date Value Ref Range Status   01/25/2022 3.5 3.4 - 5.3 mmol/L Final     Chloride   Date Value Ref Range Status   01/25/2022 99 94 - 109 mmol/L Final     Carbon Dioxide (CO2)   Date Value Ref Range Status   01/25/2022 28 20 - 32 mmol/L Final     Anion Gap   Date Value Ref Range Status   01/25/2022 6 3 - 14 mmol/L Final     Glucose   Date Value Ref Range Status   01/25/2022 119 (H) 70 - 99 mg/dL Final     Urea Nitrogen   Date Value Ref Range Status   01/25/2022 13 7 - 30 mg/dL Final     Creatinine   Date Value Ref Range Status   01/25/2022 0.88 0.66 - 1.25 mg/dL Final     GFR Estimate   Date Value Ref Range Status   01/25/2022 >90 >60 mL/min/1.73m2 Final     Comment:     Effective December 21, 2021 eGFRcr in adults is calculated using the 2021 CKD-EPI creatinine equation which includes age and gender (Jayson et al., NEJ, DOI: 10.1056/RQBAsc9758532)   01/05/2021 >60 >60 mL/min/1.73m2 Final     Calcium   Date Value Ref Range Status   01/25/2022 8.6 8.5 - 10.1 mg/dL Final     Phosphorus   Date Value Ref Range Status   01/25/2022 3.7 2.5 - 4.5 mg/dL Final     Albumin   Date Value Ref Range Status   01/20/2022 3.0 (L) 3.4 - 5.0 g/dL Final      Lab Results   Component Value Date    WBC 7.9 05/03/2022     Lab Results   Component Value Date    RBC 4.68 05/03/2022     Lab Results   Component Value Date    HGB 12.2 05/03/2022     Lab Results   Component Value Date    HCT 38.0 05/03/2022     No components found for: MCT  Lab Results   Component Value Date    MCV 81 05/03/2022     Lab Results   Component Value Date    MCH 26.1 05/03/2022     Lab Results   Component Value Date    MCHC 32.1 05/03/2022     Lab Results   Component Value Date    RDW 14.1 05/03/2022     Lab Results   Component Value Date     05/03/2022      Lab Results   Component Value Date    A1C 5.4 01/15/2022    A1C 5.6 02/13/2018    A1C 5.1 11/19/2014    A1C 5.1 06/03/2014    A1C 5.0 12/11/2013      No results found for: TSH   No  results found for: ROSALINDA       Impression:    Encounter Diagnoses   Name Primary?     Morbid obesity -- BMI 42.0 Yes     Chronic diastolic heart failure (H)      Peripheral vascular disease (H)      Abrasion of abdominal wall, subsequent encounter      Venous (peripheral) insufficiency      History of vein stripping      Venous hypertension of both lower extremities                                      Assessment/Plan:  1. Debridement: na    2. Treatment: wound treatment will include irrigation and dressings to promote autolytic debridement which will include: for the abdominal skin fold region we discussed that this is most likley intertrigo; a build of yeast and bacteria; needs to have really good hygeine practices; washing with dial soap to decrease bacterial load; thoroughly but gently dry the area with hair dryer on cool setting; continue with AFP and interdry; provided sample.     If for some reason the patient is not able to get your dressing(s) changed as outlined above (due to illness, lack of supplies, lack of help) please do the following: remove old, soiled dressings; wash the wounds with saline; pat dry; apply ABD pad or other absorbant pad and secure with rolled gauze; avoid tape directly on your skin; Patient instructed to call the clinic as soon as possible to let us know what the current issues are in receiving wound care.    3. Edema. We spoke at length regarding their swelling, potential causes, and treatment options. Answered all questions. Their swelling is likely multifactorial including but not limited to the following: their weight, dependency of the limbs for most hours of the day, reduced activity with reduced calf pump mechanism, congestive heart failure, kidney disease, venous hypertension, valvular incompetence and side effect of certain medications. Encrouaged pt to continue compression stockings or velcro wraps. The patient should continue to elevate their legs periodically throughout  the day. Continue to take their diuretics per their PMD recommendations. Will update his venous insufficiency studies and call with results will refer to Dr. Siddiqui as needed.      If a 2 layer or 4 layer compression wrap is being used; these are safe to have on for ONLY 7 days. If for some reason the patient is not able to get the wrap(s) changed (due to illness; lack of supplies, lack of help, lack of transportation) please do the following: unwrap the old 2 or 4 layer compression wrap; avoid using scissors as you could cut your skin and cause wounds; use tubular compression when available. Call to reschedule your home care or clinic visit appointment as soon as possible.    4. Offloading: na    5. Nutrition: s/p gastric bypass    Patient to return to clinic PRN week(s) for re-evaluation. They were instructed to call the clinic sooner with any further questions or concerns. Answered all questions.          Lilian Llanes NP   [facility]  [phone]      This note was electronically signed by Lilian Llanes NP

## 2022-05-24 ENCOUNTER — HOSPITAL ENCOUNTER (OUTPATIENT)
Dept: CARDIAC REHAB | Facility: CLINIC | Age: 67
Discharge: HOME | End: 2022-05-24
Attending: SURGERY
Payer: COMMERCIAL

## 2022-05-24 ENCOUNTER — LAB (OUTPATIENT)
Dept: LAB | Facility: CLINIC | Age: 67
End: 2022-05-24
Payer: COMMERCIAL

## 2022-05-24 DIAGNOSIS — I50.22 CHRONIC SYSTOLIC CONGESTIVE HEART FAILURE (H): ICD-10-CM

## 2022-05-24 LAB
ANION GAP SERPL CALCULATED.3IONS-SCNC: 3 MMOL/L (ref 3–14)
BUN SERPL-MCNC: 21 MG/DL (ref 7–30)
CALCIUM SERPL-MCNC: 9 MG/DL (ref 8.5–10.1)
CHLORIDE BLD-SCNC: 108 MMOL/L (ref 94–109)
CO2 SERPL-SCNC: 29 MMOL/L (ref 20–32)
CREAT SERPL-MCNC: 0.74 MG/DL (ref 0.66–1.25)
GFR SERPL CREATININE-BSD FRML MDRD: >90 ML/MIN/1.73M2
GLUCOSE BLD-MCNC: 112 MG/DL (ref 70–99)
POTASSIUM BLD-SCNC: 3.8 MMOL/L (ref 3.4–5.3)
SODIUM SERPL-SCNC: 140 MMOL/L (ref 133–144)

## 2022-05-24 PROCEDURE — 93798 PHYS/QHP OP CAR RHAB W/ECG: CPT

## 2022-05-24 PROCEDURE — 80048 BASIC METABOLIC PNL TOTAL CA: CPT | Performed by: NURSE PRACTITIONER

## 2022-05-24 PROCEDURE — 36415 COLL VENOUS BLD VENIPUNCTURE: CPT | Performed by: NURSE PRACTITIONER

## 2022-05-26 ENCOUNTER — HOSPITAL ENCOUNTER (OUTPATIENT)
Dept: CARDIAC REHAB | Facility: CLINIC | Age: 67
Discharge: HOME | End: 2022-05-26
Attending: SURGERY
Payer: COMMERCIAL

## 2022-05-26 PROCEDURE — 93798 PHYS/QHP OP CAR RHAB W/ECG: CPT | Performed by: REHABILITATION PRACTITIONER

## 2022-05-27 ENCOUNTER — HOSPITAL ENCOUNTER (OUTPATIENT)
Dept: CARDIOLOGY | Facility: CLINIC | Age: 67
Discharge: HOME | End: 2022-05-27
Attending: NURSE PRACTITIONER | Admitting: NURSE PRACTITIONER
Payer: COMMERCIAL

## 2022-05-27 DIAGNOSIS — I49.3 PVC'S (PREMATURE VENTRICULAR CONTRACTIONS): ICD-10-CM

## 2022-05-27 PROCEDURE — 93227 XTRNL ECG REC<48 HR R&I: CPT | Performed by: INTERNAL MEDICINE

## 2022-05-27 PROCEDURE — 93225 XTRNL ECG REC<48 HRS REC: CPT

## 2022-05-29 DIAGNOSIS — I25.10 CORONARY ARTERY DISEASE INVOLVING NATIVE CORONARY ARTERY OF NATIVE HEART WITHOUT ANGINA PECTORIS: ICD-10-CM

## 2022-05-30 RX ORDER — METOPROLOL SUCCINATE 25 MG/1
75 TABLET, EXTENDED RELEASE ORAL 2 TIMES DAILY
Qty: 180 TABLET | Refills: 11 | Status: SHIPPED | OUTPATIENT
Start: 2022-05-30 | End: 2022-05-31

## 2022-05-31 ENCOUNTER — OFFICE VISIT (OUTPATIENT)
Dept: CARDIOLOGY | Facility: CLINIC | Age: 67
End: 2022-05-31
Attending: NURSE PRACTITIONER
Payer: COMMERCIAL

## 2022-05-31 ENCOUNTER — HOSPITAL ENCOUNTER (OUTPATIENT)
Dept: CARDIAC REHAB | Facility: CLINIC | Age: 67
Discharge: HOME | End: 2022-05-31
Attending: SURGERY
Payer: COMMERCIAL

## 2022-05-31 VITALS
HEIGHT: 75 IN | WEIGHT: 315 LBS | SYSTOLIC BLOOD PRESSURE: 123 MMHG | BODY MASS INDEX: 39.17 KG/M2 | DIASTOLIC BLOOD PRESSURE: 68 MMHG | HEART RATE: 61 BPM

## 2022-05-31 DIAGNOSIS — Z95.810 ICD (IMPLANTABLE CARDIOVERTER-DEFIBRILLATOR) IN PLACE: ICD-10-CM

## 2022-05-31 DIAGNOSIS — I10 ESSENTIAL HYPERTENSION: ICD-10-CM

## 2022-05-31 DIAGNOSIS — R53.82 CHRONIC FATIGUE: ICD-10-CM

## 2022-05-31 DIAGNOSIS — G47.33 OSA (OBSTRUCTIVE SLEEP APNEA): ICD-10-CM

## 2022-05-31 DIAGNOSIS — E66.01 MORBID OBESITY (H): ICD-10-CM

## 2022-05-31 DIAGNOSIS — I49.3 PVC'S (PREMATURE VENTRICULAR CONTRACTIONS): ICD-10-CM

## 2022-05-31 DIAGNOSIS — I25.10 CORONARY ARTERY DISEASE INVOLVING NATIVE CORONARY ARTERY OF NATIVE HEART WITHOUT ANGINA PECTORIS: ICD-10-CM

## 2022-05-31 DIAGNOSIS — I48.0 PAROXYSMAL ATRIAL FIBRILLATION (H): ICD-10-CM

## 2022-05-31 DIAGNOSIS — I50.22 CHRONIC SYSTOLIC CONGESTIVE HEART FAILURE (H): Primary | ICD-10-CM

## 2022-05-31 PROCEDURE — 93798 PHYS/QHP OP CAR RHAB W/ECG: CPT

## 2022-05-31 PROCEDURE — 99214 OFFICE O/P EST MOD 30 MIN: CPT | Performed by: NURSE PRACTITIONER

## 2022-05-31 RX ORDER — METOPROLOL SUCCINATE 25 MG/1
75 TABLET, EXTENDED RELEASE ORAL 2 TIMES DAILY
Qty: 540 TABLET | Refills: 3 | Status: SHIPPED | OUTPATIENT
Start: 2022-05-31 | End: 2022-11-28

## 2022-05-31 RX ORDER — FUROSEMIDE 40 MG
40 TABLET ORAL DAILY
Qty: 90 TABLET | Refills: 3 | Status: SHIPPED | OUTPATIENT
Start: 2022-05-31 | End: 2023-03-15

## 2022-05-31 NOTE — TELEPHONE ENCOUNTER
"Last Written Prescription Date:  2/15/22  Last Fill Quantity: 180,  # refills: 3   Last office visit provider:  5/3/22     Requested Prescriptions   Pending Prescriptions Disp Refills     metoprolol succinate ER (TOPROL XL) 25 MG 24 hr tablet [Pharmacy Med Name: METOPROLOL SUCC ER 25 MG TAB] 180 tablet 3     Sig: TAKE 3 TABLETS (75 MG) BY MOUTH 2 TIMES DAILY       Beta-Blockers Protocol Passed - 5/29/2022 10:51 AM        Passed - Blood pressure under 140/90 in past 12 months     BP Readings from Last 3 Encounters:   05/20/22 110/70   05/06/22 (!) 134/90   05/03/22 138/82                 Passed - Patient is age 6 or older        Passed - Recent (12 mo) or future (30 days) visit within the authorizing provider's specialty     Patient has had an office visit with the authorizing provider or a provider within the authorizing providers department within the previous 12 mos or has a future within next 30 days. See \"Patient Info\" tab in inbasket, or \"Choose Columns\" in Meds & Orders section of the refill encounter.              Passed - Medication is active on med list             Lilli Frank RN 05/30/22 7:09 PM  "

## 2022-05-31 NOTE — PROGRESS NOTES
Cardiology Clinic Progress Note    Service Date: 05/31/22    Primary Cardiologist: Dr. Pearl      Reason for Visit: Follow up     HPI:   I had the pleasure of seeing Mr. Onur Barr in the clinic today and he is a very pleasant 66 year old male with a past medical history notable for    1. Coronary artery disease.  PCI to LAD (2012).  PCI to RCA (9/2021).   CABG x 1 (LIMA to LAD, PVI, exclusion of left atrial appendage) on 1/19/2022   2. Paroxysmal atrial fibrillation.  S/p PVI and exclusion left atrial appendage (1/2022)  3. Sustained monomorphic ventricular tachycardia.  S/p [employer] dual-chamber ICD (1/2022)  4. Ischemic cardiomyopathy.  5. Morbid obesity  6. MARLEEN.  Not tolerate CPAP  7. Chronic back pain.   Has a pain pump  8. Right middle lobe pulmonary nodule.  Measuring 4 mm per CT (4/2022)        Diagnostics  I have personally reviewed the following reports    CT angiogram heart (4/2022) revealed normal pulmonary venous anatomy with all pulmonary veins draining into the left atrium, AtriCure device noted at the ostium of the JUSTO no flow was identified into the JUSTO, aortic root and ascending aorta and descending thoracic aorta are normal.  Coronary calcifications in LAD and RCA.    Device check (4/2022) revealed  A-paced  53% .   : 16% .     Stable leads.  Battery life 13 years.   Atrial Arrhythmia: 7 episodes recorded, 3 with EGMs for review. All 3 EGMs show brief atrial arrhythmia <10 seconds. Longest episode recorded in logbook is 41 seconds. AF burden <1%     Ventricular Arrhythmia: None.    Echo (3/2022) demonstrated Left ventricular systolic function is mildly reduced.Biplane LVEF is 45%. Mildly decreased right ventricular systolic function There is mild (1+) mitral regurgitation. Mild aortic root dilatation. The inferior vena cava was normal in size with preserved respiratory variability.     In January 2022, patient underwent a coronary artery bypass graft x1 with the LIMA to the  LAD, pulmonary vein isolation, exclusion of the left atrial appendage, intraoperative TATIANA.  He had paroxysmal atrial fibrillation and treated with amiodarone.  He had an episode of 30 seconds of sustained monomorphic ventricular tachycardia and an ICD was placed on postop day 5     In February 2022, he was seen in clinic he was using a walker for support his amiodarone was discontinued and was in cardiac rehab.  A CT was ordered to evaluate for his ligation left atrial appendage.   The CT had to be rescheduled as patient needed help transferring and was ultimately completed showing no flow into the left atrial appendage.    Today, he presents with his wife and reports being fatigued but not worse than usual.   He has significant back pain and could not stand up straight when leaving the room.  He denies symptoms associated with PVCs and denies chest pain, shortness of breath, dyspnea on exertion, orthopnea, PND, lower extremity edema, palpitations, dizziness.   Reports taking medications as prescribed     ASSESSMENT AND PLAN:    Sustained monomorphic ventricular tachycardia    After surgery patient had sustained ventricular tachycardia while exercising.  He subsequently received a dual-chamber [employer] ICD which was implanted on (1/24/2022)    I have personally reviewed the last device check dated (4/2022) which revealed normal device function and stable leads.       His amiodarone was discontinued in February 2022.  He has not had any recurrence of VT but is having PVCs and is wearing a monitor to determine burden    Patient will follow with the device clinic on a quarterly basis.      Paroxsymal Atrial fibrillation    For rhythm control he underwent a PVI ablation while having a CABG he did have recurrent postoperative atrial fibrillation.    Postoperatively patient received amiodarone which was discontinued 2/2022.  On his February 2022 device check he did have recurrence of atrial fibrillation longest  lasting 1.5 hours.  His most recent device check did not show any sustained atrial fibrillation.        For rate control he is currently taking metoprolol succinate 75 mg twice daily    For anticoagulation for CHADS VASC 4 (age, CAD, HTN) he is taking Xarelto 15 mg daily.     He did undergo an exclusion of left atrial appendage and his CT did show no flow in his left atrial appendage.  His Xarelto was discontinued     Hypertension    Controlled    Weight loss encouraged       Coronary artery disease    PCI to LAD in 2012    Inferior STEMI (9/2021) and underwent PCI to RCA    S/p NSTEMI (1/12/2022) and underwent a CABG x1 (LIMA to LAD, PVI, LA exclusion)    Currently taking Plavix 75 mg daily, rosuvastatin 40 mg daily, metoprolol succinate 75 mg twice daily.  His last LDL 38 (1/2022)    Dr. Pearl recommends Plavix for 1 year    Denies angina     Ischemic cardiomyopathy    ECHO (3/2022) revealed EF 40-45% with apical wall hypokinesis.      He is currently taking metoprolol succinate 75 mg twice daily and lisinopril 5 mg daily     Currently taking furosemide 40 mg daily and BMP was normal     He wears compression stockings.       Weight is stable.      Has a monitor pending for PVC burden    Denies HF symptoms.        morbid obesity     s/p gastric bypass (2008)     Patient's BMI 39.3     MARLEEN     Does not tolerate CPAP    Discussed this is likely the cause of fatigue but he is not interested in following up with sleep medicine.      Chronic Back Pain     In dwelling pain Pump w Morphine, Clonidine, Baclofen    Is working on obtaining insurance coverage for swim therapy    PVC    Having PVC bigeminy and trigeminy at therapy    Taking metoprolol     wearing a monitor    Asymptomatic     Plan:    Will call with monitor results.  If he is having more than 15% PVC burden will send to EP and if less will update Dr. Pearl    Follow up with device clinic quarterly    Follow up with Dr. Pearl in November 2022 with  ECHO and labs prior    Please call the clinic if questions or problems arise      Thank you for the opportunity to participate in this pleasant patient's care. We would be happy to see him sooner if needed for any concerns in the meantime.          YAMILETH [facility]  Text Page  (M-F 8:00 am - 4:30 pm)    Orders this Visit:  Orders Placed This Encounter   Procedures     Basic metabolic panel     Follow-Up with Cardiology     Echocardiogram Complete     Orders Placed This Encounter   Medications     furosemide (LASIX) 40 MG tablet     Sig: Take 1 tablet (40 mg) by mouth daily     Dispense:  90 tablet     Refill:  3     Pt will call for refills     metoprolol succinate ER (TOPROL XL) 25 MG 24 hr tablet     Sig: Take 3 tablets (75 mg) by mouth 2 times daily     Dispense:  540 tablet     Refill:  3     Medications Discontinued During This Encounter   Medication Reason     furosemide (LASIX) 40 MG tablet      sulfamethoxazole-trimethoprim (BACTRIM DS) 800-160 MG tablet Medication Reconciliation Clean Up     rivaroxaban ANTICOAGULANT (XARELTO) 15 MG TABS tablet Medication Reconciliation Clean Up     methocarbamol (ROBAXIN) 500 MG tablet Therapy completed     baclofen (LIORESAL) 10 MG tablet Medication Reconciliation Clean Up     Omeprazole (PRILOSEC PO) Medication Reconciliation Clean Up     lisinopril (ZESTRIL) 40 MG tablet Medication Reconciliation Clean Up     furosemide (LASIX) 20 MG tablet Reorder     metoprolol succinate ER (TOPROL XL) 25 MG 24 hr tablet Reorder     Encounter Diagnoses   Name Primary?     Chronic systolic congestive heart failure (H)      Coronary artery disease involving native coronary artery of native heart without angina pectoris        CURRENT MEDICATIONS:  Current Outpatient Medications   Medication Sig Dispense Refill     acetaminophen (TYLENOL) 325 MG tablet Take 2 tablets (650 mg) by mouth every 4 hours as needed for mild pain 40 tablet 0     clopidogrel (PLAVIX) 75 MG  tablet Take 1 tablet (75 mg) by mouth daily 90 tablet 1     DULoxetine (CYMBALTA) 30 MG capsule Take 30 mg by mouth every morning       DULoxetine (CYMBALTA) 30 MG capsule Take 60 mg by mouth every evening       FERATE 240 (27 Fe) MG TABS TAKE 1 TABLET BY MOUTH EVERY DAY *OTC NOT COV*       furosemide (LASIX) 40 MG tablet Take 1 tablet (40 mg) by mouth daily 90 tablet 3     lisinopril (ZESTRIL) 5 MG tablet Take 1 tablet (5 mg) by mouth daily 30 tablet 11     metoprolol succinate ER (TOPROL XL) 25 MG 24 hr tablet Take 3 tablets (75 mg) by mouth 2 times daily 540 tablet 3     MORPHINE SULFATE IT pump:updated 1/12/22  Medications in Pump:   [facility] Responsible for pump medications:   Conc:  Morphine 20mg/ml(3.95 mg/day), clonidine 21.1mcg/ml (4.168 mcg/day), baclofen 42.5mcg/ml (8.395mcg/day)  Rate:   Pump Last Fill Date: 9/2021  Pump Refill Date: 2/2022       nitroGLYcerin (NITROSTAT) 0.4 MG sublingual tablet For chest pain place 1 tablet under the tongue every 5 minutes for 3 doses. If symptoms persist 5 minutes after 1st dose call 911. 30 tablet 1     nystatin (MYCOSTATIN) 271502 UNIT/GM external powder Apply to lower abdominal area twice daily 60 g 1     oxyCODONE-acetaminophen (PERCOCET) 5-325 MG tablet Take 1 tablet by mouth daily as needed for moderate to severe pain 30 tablet 0     rosuvastatin (CRESTOR) 40 MG tablet Take 1 tablet (40 mg) by mouth daily 90 tablet 3     traZODone (DESYREL) 100 MG tablet TAKE 2 TABLETS (200MG TOTAL) BY MOUTH AT BEDTIME 180 tablet 3     Ferrous Sulfate (IRON PO) Take by mouth daily Dosage is unknown (Patient not taking: Reported on 5/31/2022)         ALLERGIES  Allergies   Allergen Reactions     Hydrocodone-Acetaminophen Other (See Comments)     sneezing       PAST MEDICAL, SURGICAL, SOCIAL FAMILY HISTORY:  History was reviewed and updated as needed, see medical record.    Review of Systems:  Skin:        Eyes:       ENT:       Respiratory:  Positive for dyspnea on  "exertion  Cardiovascular:    Positive for  Gastroenterology:      Genitourinary:       Musculoskeletal:       Neurologic:       Psychiatric:       Heme/Lymph/Imm:       Endocrine:          Physical Exam:  Vitals: /68   Pulse 61   Ht 1.905 m (6' 3\")   Wt 142.9 kg (315 lb)   BMI 39.37 kg/m     Wt Readings from Last 4 Encounters:   05/31/22 142.9 kg (315 lb)   05/06/22 142.8 kg (314 lb 14.4 oz)   05/03/22 143.1 kg (315 lb 8 oz)   04/07/22 141.5 kg (312 lb)     CONSTITUTIONAL: Appears his stated age, well nourished, and in no acute distress.  HEENT: Pupils equal, round. Sclerae nonicteric.    NECK: Supple, no masses appreciated.  C/V:  Regular rate and rhythm,  RESP: Respirations are unlabored. Lungs are clear to auscultation bilaterally without wheezing, rales, or rhonchi.  GI: Abdomen soft, non-tender, non-distended.  EXTREM:  No clubbing, cyanosis, or lower extremity edema bilaterally.   NEURO: Alert and oriented, cooperative. Cannot stand straight.  No gross focal deficits.   PSYCH: Affect appropriate. Mentation normal. Responds to questions appropriately.  SKIN: Warm and dry. No apparent rashes or bruising.     Recent Lab Results:  LIPID RESULTS:  Lab Results   Component Value Date    CHOL 105 01/14/2022    HDL 44 01/14/2022    LDL 38 01/14/2022    LDL 69 02/13/2018    TRIG 116 01/14/2022     LIVER ENZYME RESULTS:  Lab Results   Component Value Date    AST 37 01/20/2022    ALT 21 01/20/2022     CBC RESULTS:  Lab Results   Component Value Date    WBC 7.9 05/03/2022    RBC 4.68 05/03/2022    HGB 12.2 (L) 05/03/2022    HCT 38.0 (L) 05/03/2022    MCV 81 05/03/2022    MCH 26.1 (L) 05/03/2022    MCHC 32.1 05/03/2022    RDW 14.1 05/03/2022     05/03/2022     BMP RESULTS:  Lab Results   Component Value Date     05/24/2022    POTASSIUM 3.8 05/24/2022    CHLORIDE 108 05/24/2022    CO2 29 05/24/2022    ANIONGAP 3 05/24/2022     (H) 05/24/2022    BUN 21 05/24/2022    CR 0.74 05/24/2022    " GFRESTIMATED >90 05/24/2022    GFRESTIMATED >60 01/05/2021    GFRESTBLACK >60 01/05/2021    RATNA 9.0 05/24/2022        CC  April King, APRN CNP  [id number]  [place] [address]    This note was completed in part using Dragon voice recognition software. Although reviewed after completion, some word and grammatical errors may occur.

## 2022-05-31 NOTE — LETTER
5/31/2022    Luann Berger MD  [address] [facility] [address]    RE: Onur Barr       Dear Colleague,     I had the pleasure of seeing Onur Barr in the [facility].      Cardiology Clinic Progress Note    Service Date: 05/31/22    Primary Cardiologist: Dr. Pearl      Reason for Visit: Follow up     HPI:   I had the pleasure of seeing Mr. Onur Barr in the clinic today and he is a very pleasant 66 year old male with a past medical history notable for    1. Coronary artery disease.  PCI to LAD (2012).  PCI to RCA (9/2021).   CABG x 1 (LIMA to LAD, PVI, exclusion of left atrial appendage) on 1/19/2022   2. Paroxysmal atrial fibrillation.  S/p PVI and exclusion left atrial appendage (1/2022)  3. Sustained monomorphic ventricular tachycardia.  S/p [employer] dual-chamber ICD (1/2022)  4. Ischemic cardiomyopathy.  5. Morbid obesity  6. MARLEEN.  Not tolerate CPAP  7. Chronic back pain.   Has a pain pump  8. Right middle lobe pulmonary nodule.  Measuring 4 mm per CT (4/2022)        Diagnostics  I have personally reviewed the following reports    CT angiogram heart (4/2022) revealed normal pulmonary venous anatomy with all pulmonary veins draining into the left atrium, AtriCure device noted at the ostium of the JUSTO no flow was identified into the JUSTO, aortic root and ascending aorta and descending thoracic aorta are normal.  Coronary calcifications in LAD and RCA.    Device check (4/2022) revealed  A-paced  53% .   : 16% .     Stable leads.  Battery life 13 years.   Atrial Arrhythmia: 7 episodes recorded, 3 with EGMs for review. All 3 EGMs show brief atrial arrhythmia <10 seconds. Longest episode recorded in logbook is 41 seconds. AF burden <1%     Ventricular Arrhythmia: None.    Echo (3/2022) demonstrated Left ventricular systolic function is mildly reduced.Biplane LVEF is 45%. Mildly decreased right ventricular systolic function There is mild (1+) mitral regurgitation. Mild  aortic root dilatation. The inferior vena cava was normal in size with preserved respiratory variability.     In January 2022, patient underwent a coronary artery bypass graft x1 with the LIMA to the LAD, pulmonary vein isolation, exclusion of the left atrial appendage, intraoperative TATIANA.  He had paroxysmal atrial fibrillation and treated with amiodarone.  He had an episode of 30 seconds of sustained monomorphic ventricular tachycardia and an ICD was placed on postop day 5     In February 2022, he was seen in clinic he was using a walker for support his amiodarone was discontinued and was in cardiac rehab.  A CT was ordered to evaluate for his ligation left atrial appendage.   The CT had to be rescheduled as patient needed help transferring and was ultimately completed showing no flow into the left atrial appendage.    Today, he presents with his wife and reports being fatigued but not worse than usual.   He has significant back pain and could not stand up straight when leaving the room.  He denies symptoms associated with PVCs and denies chest pain, shortness of breath, dyspnea on exertion, orthopnea, PND, lower extremity edema, palpitations, dizziness.   Reports taking medications as prescribed     ASSESSMENT AND PLAN:    Sustained monomorphic ventricular tachycardia    After surgery patient had sustained ventricular tachycardia while exercising.  He subsequently received a dual-chamber [employer] ICD which was implanted on (1/24/2022)    I have personally reviewed the last device check dated (4/2022) which revealed normal device function and stable leads.       His amiodarone was discontinued in February 2022.  He has not had any recurrence of VT but is having PVCs and is wearing a monitor to determine burden    Patient will follow with the device clinic on a quarterly basis.      Paroxsymal Atrial fibrillation    For rhythm control he underwent a PVI ablation while having a CABG he did have recurrent  postoperative atrial fibrillation.    Postoperatively patient received amiodarone which was discontinued 2/2022.  On his February 2022 device check he did have recurrence of atrial fibrillation longest lasting 1.5 hours.  His most recent device check did not show any sustained atrial fibrillation.        For rate control he is currently taking metoprolol succinate 75 mg twice daily    For anticoagulation for CHADS VASC 4 (age, CAD, HTN) he is taking Xarelto 15 mg daily.     He did undergo an exclusion of left atrial appendage and his CT did show no flow in his left atrial appendage.  His Xarelto was discontinued     Hypertension    Controlled    Weight loss encouraged       Coronary artery disease    PCI to LAD in 2012    Inferior STEMI (9/2021) and underwent PCI to RCA    S/p NSTEMI (1/12/2022) and underwent a CABG x1 (LIMA to LAD, PVI, LA exclusion)    Currently taking Plavix 75 mg daily, rosuvastatin 40 mg daily, metoprolol succinate 75 mg twice daily.  His last LDL 38 (1/2022)    Dr. Pearl recommends Plavix for 1 year    Denies angina     Ischemic cardiomyopathy    ECHO (3/2022) revealed EF 40-45% with apical wall hypokinesis.      He is currently taking metoprolol succinate 75 mg twice daily and lisinopril 5 mg daily     Currently taking furosemide 40 mg daily and BMP was normal     He wears compression stockings.       Weight is stable.      Has a monitor pending for PVC burden    Denies HF symptoms.        morbid obesity     s/p gastric bypass (2008)     Patient's BMI 39.3     MARLEEN     Does not tolerate CPAP    Discussed this is likely the cause of fatigue but he is not interested in following up with sleep medicine.      Chronic Back Pain     In dwelling pain Pump w Morphine, Clonidine, Baclofen    Is working on obtaining insurance coverage for swim therapy    PVC    Having PVC bigeminy and trigeminy at therapy    Taking metoprolol     wearing a monitor    Asymptomatic     Plan:    Will call with monitor  results.  If he is having more than 15% PVC burden will send to EP and if less will update Dr. Pearl    Follow up with device clinic quarterly    Follow up with Dr. Pearl in November 2022 with ECHO and labs prior    Please call the clinic if questions or problems arise      Thank you for the opportunity to participate in this pleasant patient's care. We would be happy to see him sooner if needed for any concerns in the meantime.          YAMILETH Pratt [facility]  Text Page  (M-F 8:00 am - 4:30 pm)    Orders this Visit:  Orders Placed This Encounter   Procedures     Basic metabolic panel     Follow-Up with Cardiology     Echocardiogram Complete     Orders Placed This Encounter   Medications     furosemide (LASIX) 40 MG tablet     Sig: Take 1 tablet (40 mg) by mouth daily     Dispense:  90 tablet     Refill:  3     Pt will call for refills     metoprolol succinate ER (TOPROL XL) 25 MG 24 hr tablet     Sig: Take 3 tablets (75 mg) by mouth 2 times daily     Dispense:  540 tablet     Refill:  3     Medications Discontinued During This Encounter   Medication Reason     furosemide (LASIX) 40 MG tablet      sulfamethoxazole-trimethoprim (BACTRIM DS) 800-160 MG tablet Medication Reconciliation Clean Up     rivaroxaban ANTICOAGULANT (XARELTO) 15 MG TABS tablet Medication Reconciliation Clean Up     methocarbamol (ROBAXIN) 500 MG tablet Therapy completed     baclofen (LIORESAL) 10 MG tablet Medication Reconciliation Clean Up     Omeprazole (PRILOSEC PO) Medication Reconciliation Clean Up     lisinopril (ZESTRIL) 40 MG tablet Medication Reconciliation Clean Up     furosemide (LASIX) 20 MG tablet Reorder     metoprolol succinate ER (TOPROL XL) 25 MG 24 hr tablet Reorder     Encounter Diagnoses   Name Primary?     Chronic systolic congestive heart failure (H)      Coronary artery disease involving native coronary artery of native heart without angina pectoris        CURRENT MEDICATIONS:  Current Outpatient  Medications   Medication Sig Dispense Refill     acetaminophen (TYLENOL) 325 MG tablet Take 2 tablets (650 mg) by mouth every 4 hours as needed for mild pain 40 tablet 0     clopidogrel (PLAVIX) 75 MG tablet Take 1 tablet (75 mg) by mouth daily 90 tablet 1     DULoxetine (CYMBALTA) 30 MG capsule Take 30 mg by mouth every morning       DULoxetine (CYMBALTA) 30 MG capsule Take 60 mg by mouth every evening       FERATE 240 (27 Fe) MG TABS TAKE 1 TABLET BY MOUTH EVERY DAY *OTC NOT COV*       furosemide (LASIX) 40 MG tablet Take 1 tablet (40 mg) by mouth daily 90 tablet 3     lisinopril (ZESTRIL) 5 MG tablet Take 1 tablet (5 mg) by mouth daily 30 tablet 11     metoprolol succinate ER (TOPROL XL) 25 MG 24 hr tablet Take 3 tablets (75 mg) by mouth 2 times daily 540 tablet 3     MORPHINE SULFATE IT pump:updated 1/12/22  Medications in Pump:   [facility] Responsible for pump medications:   Conc:  Morphine 20mg/ml(3.95 mg/day), clonidine 21.1mcg/ml (4.168 mcg/day), baclofen 42.5mcg/ml (8.395mcg/day)  Rate:   Pump Last Fill Date: 9/2021  Pump Refill Date: 2/2022       nitroGLYcerin (NITROSTAT) 0.4 MG sublingual tablet For chest pain place 1 tablet under the tongue every 5 minutes for 3 doses. If symptoms persist 5 minutes after 1st dose call 911. 30 tablet 1     nystatin (MYCOSTATIN) 527362 UNIT/GM external powder Apply to lower abdominal area twice daily 60 g 1     oxyCODONE-acetaminophen (PERCOCET) 5-325 MG tablet Take 1 tablet by mouth daily as needed for moderate to severe pain 30 tablet 0     rosuvastatin (CRESTOR) 40 MG tablet Take 1 tablet (40 mg) by mouth daily 90 tablet 3     traZODone (DESYREL) 100 MG tablet TAKE 2 TABLETS (200MG TOTAL) BY MOUTH AT BEDTIME 180 tablet 3     Ferrous Sulfate (IRON PO) Take by mouth daily Dosage is unknown (Patient not taking: Reported on 5/31/2022)         ALLERGIES  Allergies   Allergen Reactions     Hydrocodone-Acetaminophen Other (See Comments)     sneezing       PAST  "MEDICAL, SURGICAL, SOCIAL FAMILY HISTORY:  History was reviewed and updated as needed, see medical record.    Review of Systems:  Skin:        Eyes:       ENT:       Respiratory:  Positive for dyspnea on exertion  Cardiovascular:    Positive for  Gastroenterology:      Genitourinary:       Musculoskeletal:       Neurologic:       Psychiatric:       Heme/Lymph/Imm:       Endocrine:          Physical Exam:  Vitals: /68   Pulse 61   Ht 1.905 m (6' 3\")   Wt 142.9 kg (315 lb)   BMI 39.37 kg/m     Wt Readings from Last 4 Encounters:   05/31/22 142.9 kg (315 lb)   05/06/22 142.8 kg (314 lb 14.4 oz)   05/03/22 143.1 kg (315 lb 8 oz)   04/07/22 141.5 kg (312 lb)     CONSTITUTIONAL: Appears his stated age, well nourished, and in no acute distress.  HEENT: Pupils equal, round. Sclerae nonicteric.    NECK: Supple, no masses appreciated.  C/V:  Regular rate and rhythm,  RESP: Respirations are unlabored. Lungs are clear to auscultation bilaterally without wheezing, rales, or rhonchi.  GI: Abdomen soft, non-tender, non-distended.  EXTREM:  No clubbing, cyanosis, or lower extremity edema bilaterally.   NEURO: Alert and oriented, cooperative. Cannot stand straight.  No gross focal deficits.   PSYCH: Affect appropriate. Mentation normal. Responds to questions appropriately.  SKIN: Warm and dry. No apparent rashes or bruising.     Recent Lab Results:  LIPID RESULTS:  Lab Results   Component Value Date    CHOL 105 01/14/2022    HDL 44 01/14/2022    LDL 38 01/14/2022    LDL 69 02/13/2018    TRIG 116 01/14/2022     LIVER ENZYME RESULTS:  Lab Results   Component Value Date    AST 37 01/20/2022    ALT 21 01/20/2022     CBC RESULTS:  Lab Results   Component Value Date    WBC 7.9 05/03/2022    RBC 4.68 05/03/2022    HGB 12.2 (L) 05/03/2022    HCT 38.0 (L) 05/03/2022    MCV 81 05/03/2022    MCH 26.1 (L) 05/03/2022    MCHC 32.1 05/03/2022    RDW 14.1 05/03/2022     05/03/2022     BMP RESULTS:  Lab Results   Component Value " Date     05/24/2022    POTASSIUM 3.8 05/24/2022    CHLORIDE 108 05/24/2022    CO2 29 05/24/2022    ANIONGAP 3 05/24/2022     (H) 05/24/2022    BUN 21 05/24/2022    CR 0.74 05/24/2022    GFRESTIMATED >90 05/24/2022    GFRESTIMATED >60 01/05/2021    GFRESTBLACK >60 01/05/2021    RATNA 9.0 05/24/2022        CC  YAMILETH Murilol CNP  [address]  [place] [address]    This note was completed in part using Dragon voice recognition software. Although reviewed after completion, some word and grammatical errors may occur.    Thank you for allowing me to participate in the care of your patient.      Sincerely,     YAMILETH Pratt CNP     [facility]

## 2022-05-31 NOTE — PATIENT INSTRUCTIONS
I will call you with results on monitor.       See Dr. Pearl in November with ECHO and labs prior.        If you have pacemaker or ICD and have questions or concerns please call the device clinic at [phone]

## 2022-06-02 ENCOUNTER — HOSPITAL ENCOUNTER (OUTPATIENT)
Dept: CARDIAC REHAB | Facility: CLINIC | Age: 67
Discharge: HOME | End: 2022-06-02
Attending: SURGERY
Payer: COMMERCIAL

## 2022-06-02 PROCEDURE — 93798 PHYS/QHP OP CAR RHAB W/ECG: CPT | Performed by: REHABILITATION PRACTITIONER

## 2022-06-03 DIAGNOSIS — I50.22 CHRONIC SYSTOLIC CONGESTIVE HEART FAILURE (H): ICD-10-CM

## 2022-06-03 RX ORDER — LISINOPRIL 5 MG/1
5 TABLET ORAL DAILY
Qty: 90 TABLET | Refills: 3 | Status: SHIPPED | OUTPATIENT
Start: 2022-06-03 | End: 2023-03-15

## 2022-06-06 ENCOUNTER — TELEPHONE (OUTPATIENT)
Dept: CARDIOLOGY | Facility: CLINIC | Age: 67
End: 2022-06-06
Payer: COMMERCIAL

## 2022-06-06 NOTE — TELEPHONE ENCOUNTER
[facility] Call Center    Phone Message    May a detailed message be left on voicemail: yes     Reason for Call: Requesting Results   Name/type of test:Recent test results  Date of test:   Was test done at a location other than [facility] (Please fill in the location if not [facility])?: No      Action Taken: Other: Cardiology    Travel Screening: Not Applicable

## 2022-06-06 NOTE — TELEPHONE ENCOUNTER
6/6/22 Called pt and informed him that results have not been released yet. Will route to April Jones 317 pm

## 2022-06-07 ENCOUNTER — HOSPITAL ENCOUNTER (OUTPATIENT)
Dept: CARDIAC REHAB | Facility: CLINIC | Age: 67
Discharge: HOME | End: 2022-06-07
Attending: SURGERY
Payer: COMMERCIAL

## 2022-06-07 PROCEDURE — 93798 PHYS/QHP OP CAR RHAB W/ECG: CPT | Performed by: REHABILITATION PRACTITIONER

## 2022-06-09 ENCOUNTER — HOSPITAL ENCOUNTER (OUTPATIENT)
Dept: CARDIAC REHAB | Facility: CLINIC | Age: 67
Discharge: HOME | End: 2022-06-09
Attending: SURGERY
Payer: COMMERCIAL

## 2022-06-09 PROCEDURE — 93798 PHYS/QHP OP CAR RHAB W/ECG: CPT | Performed by: REHABILITATION PRACTITIONER

## 2022-06-10 ENCOUNTER — ANCILLARY PROCEDURE (OUTPATIENT)
Dept: VASCULAR ULTRASOUND | Facility: CLINIC | Age: 67
End: 2022-06-10
Attending: NURSE PRACTITIONER
Payer: COMMERCIAL

## 2022-06-10 DIAGNOSIS — S30.811D ABRASION OF ABDOMINAL WALL, SUBSEQUENT ENCOUNTER: ICD-10-CM

## 2022-06-10 DIAGNOSIS — I50.32 CHRONIC DIASTOLIC HEART FAILURE (H): ICD-10-CM

## 2022-06-10 DIAGNOSIS — Z98.890 HISTORY OF VEIN STRIPPING: ICD-10-CM

## 2022-06-10 DIAGNOSIS — I87.2 VENOUS (PERIPHERAL) INSUFFICIENCY: ICD-10-CM

## 2022-06-10 DIAGNOSIS — E66.01 MORBID OBESITY (H): ICD-10-CM

## 2022-06-10 DIAGNOSIS — I87.303 VENOUS HYPERTENSION OF BOTH LOWER EXTREMITIES: ICD-10-CM

## 2022-06-10 DIAGNOSIS — I73.9 PERIPHERAL VASCULAR DISEASE (H): ICD-10-CM

## 2022-06-10 PROCEDURE — 93970 EXTREMITY STUDY: CPT | Mod: 26 | Performed by: SURGERY

## 2022-06-10 PROCEDURE — 93970 EXTREMITY STUDY: CPT

## 2022-06-13 NOTE — TELEPHONE ENCOUNTER
----- Message from Lilian Llanes NP sent at 6/10/2022  4:26 PM CDT -----  Can you call the patient and let him know that he had some areas in the deep system in both legs where the valves in the veins are not working not a candidate for ablation at this time

## 2022-06-13 NOTE — TELEPHONE ENCOUNTER
Called Baudilio and discussed US results. Baudilio has no further questions at this time and will continue with compression and elevation.

## 2022-06-14 ENCOUNTER — HOSPITAL ENCOUNTER (OUTPATIENT)
Dept: CARDIAC REHAB | Facility: CLINIC | Age: 67
Discharge: HOME | End: 2022-06-14
Attending: SURGERY
Payer: COMMERCIAL

## 2022-06-14 PROCEDURE — 93798 PHYS/QHP OP CAR RHAB W/ECG: CPT | Performed by: REHABILITATION PRACTITIONER

## 2022-06-16 ENCOUNTER — HOSPITAL ENCOUNTER (OUTPATIENT)
Dept: CARDIAC REHAB | Facility: CLINIC | Age: 67
Discharge: HOME | End: 2022-06-16
Attending: SURGERY
Payer: COMMERCIAL

## 2022-06-16 PROCEDURE — 93798 PHYS/QHP OP CAR RHAB W/ECG: CPT

## 2022-06-17 ENCOUNTER — HOSPITAL ENCOUNTER (OUTPATIENT)
Dept: CARDIAC REHAB | Facility: CLINIC | Age: 67
Discharge: HOME | End: 2022-06-17
Attending: SURGERY
Payer: COMMERCIAL

## 2022-06-17 PROCEDURE — 93798 PHYS/QHP OP CAR RHAB W/ECG: CPT

## 2022-06-17 PROCEDURE — 93797 PHYS/QHP OP CAR RHAB WO ECG: CPT

## 2022-06-18 ENCOUNTER — MYC MEDICAL ADVICE (OUTPATIENT)
Dept: CARDIOLOGY | Facility: CLINIC | Age: 67
End: 2022-06-18

## 2022-06-18 DIAGNOSIS — I49.3 PVC (PREMATURE VENTRICULAR CONTRACTION): Primary | ICD-10-CM

## 2022-06-27 DIAGNOSIS — L30.4 INTERTRIGO: ICD-10-CM

## 2022-06-28 RX ORDER — NYSTATIN 100000 [USP'U]/G
POWDER TOPICAL
Qty: 60 G | Refills: 4 | Status: SHIPPED | OUTPATIENT
Start: 2022-06-28 | End: 2022-12-19

## 2022-06-29 NOTE — TELEPHONE ENCOUNTER
"Last Written Prescription Date:  1/4/22  Last Fill Quantity: 60,  # refills: 1   Last office visit provider:  5/3/22     Requested Prescriptions   Pending Prescriptions Disp Refills     nystatin (MYCOSTATIN) 453822 UNIT/GM external powder [Pharmacy Med Name: NYSTATIN 100,000 UNIT/GM POWD] 60 g 1     Sig: APPLY TO LOWER ABDOMINAL AREA TWICE DAILY       Antifungal Agents Passed - 6/27/2022  6:01 PM        Passed - Recent (12 mo) or future (30 days) visit within the authorizing provider's specialty     Patient has had an office visit with the authorizing provider or a provider within the authorizing providers department within the previous 12 mos or has a future within next 30 days. See \"Patient Info\" tab in inbasket, or \"Choose Columns\" in Meds & Orders section of the refill encounter.              Passed - Not Fluconazole or Terconazole      If oral Fluconazole or Terconazole, may refill if indicated in progress notes.           Passed - Medication is active on med list             Lilli Frank RN 06/28/22 7:05 PM  "

## 2022-06-30 NOTE — TELEPHONE ENCOUNTER
Repeat holter monitor in 6 months to assess PVC burden.     YAMILETH Pratt CNP on 6/29/2022 at 9:44 PM

## 2022-07-13 ENCOUNTER — TELEPHONE (OUTPATIENT)
Dept: FAMILY MEDICINE | Facility: CLINIC | Age: 67
End: 2022-07-13

## 2022-07-13 NOTE — TELEPHONE ENCOUNTER
Call to pt. He states it is a hard bump, about 1/2 inch long. Starting to go down some. It is on the tongue towards the back, in the middle.   This is the second time he had it. His previous doctor gave him steroids before for this.   Didn't know what it was. No biopsy done.     No color to it. Gets sore when bigger. Hurts when he eats.   Towards the back of his throat.     Doesn't smoke. Doesn't use chewing tobacco.     Was in the hospital in January for heart attack, and then it came back after this.     For 4-5 months was gone.     Has not seen a throat doctor.     He will keep his appt tomorrow AM. He needs to find a new doctor since his doctor left.    NAMRATA for Dr Siu.

## 2022-07-13 NOTE — TELEPHONE ENCOUNTER
"This patient is on my schedule tomorrow for \"abscess on throat/tongue\"   He should be triaged, if these is truly an abscess I would not treat it, he would need ED or ENT urgently.     "

## 2022-07-14 ENCOUNTER — OFFICE VISIT (OUTPATIENT)
Dept: INTERNAL MEDICINE | Facility: CLINIC | Age: 67
End: 2022-07-14
Payer: COMMERCIAL

## 2022-07-14 VITALS
SYSTOLIC BLOOD PRESSURE: 128 MMHG | BODY MASS INDEX: 39.17 KG/M2 | HEIGHT: 75 IN | HEART RATE: 71 BPM | RESPIRATION RATE: 18 BRPM | DIASTOLIC BLOOD PRESSURE: 79 MMHG | WEIGHT: 315 LBS | TEMPERATURE: 98.2 F | OXYGEN SATURATION: 96 %

## 2022-07-14 DIAGNOSIS — K14.8 TONGUE LUMP: Primary | ICD-10-CM

## 2022-07-14 PROBLEM — R22.0 TONGUE SWELLING: Status: ACTIVE | Noted: 2022-07-14

## 2022-07-14 PROBLEM — R22.0 TONGUE SWELLING: Status: RESOLVED | Noted: 2022-07-14 | Resolved: 2022-07-14

## 2022-07-14 PROCEDURE — 99213 OFFICE O/P EST LOW 20 MIN: CPT | Performed by: INTERNAL MEDICINE

## 2022-07-14 RX ORDER — DEXAMETHASONE 2 MG/1
2 TABLET ORAL 2 TIMES DAILY WITH MEALS
Qty: 10 TABLET | Refills: 0 | Status: SHIPPED | OUTPATIENT
Start: 2022-07-14 | End: 2022-08-18

## 2022-07-14 NOTE — PROGRESS NOTES
Assessment & Plan     Tongue lump  Overall etiology is unclear to me.  This is happened a few times.  It does not seem to be a true mass. It is localized which would be atypical for angioedema though he is on an ACE inhibitor.  We will treat again with the Decadron since that seemed to help and refer to ENT for assistance.  - dexamethasone (DECADRON) 2 MG tablet; Take 1 tablet (2 mg) by mouth 2 times daily (with meals)  - Adult ENT  Referral; Future      Return in about 1 month (around 8/14/2022) for With primary provider..    Denise Siu MD  [facility] is a 66 year old, presenting for the following health issues:      He presents with localized swelling of his tongue.  This is in the middle posterior tongue.  He can feel firmness.  This has been coming and going, he was seen for it in January, treated with Decadron and it went away for quite some time.  It has been stable now for a few weeks.  It is not really painful but it does feel like it is making it a little more difficult to swallow.    Mass    History of Present Illness       Reason for visit:  Lump on back of tongue    He eats 2-3 servings of fruits and vegetables daily.He consumes 1 sweetened beverage(s) daily.He exercises with enough effort to increase his heart rate 20 to 29 minutes per day.  He exercises with enough effort to increase his heart rate 5 days per week.   He is taking medications regularly.       Patient Active Problem List   Diagnosis     Lumbago     Morbid obesity -- BMI 42.0     Bilateral leg edema     Chronic diastolic heart failure (H)     MARLEEN (doesn't tolerate CPAP)     NSTEMI w Unstab Angina -- S/P CABG x 1 (LIMA to LAD) on 1/19/22     Paroxysmal atrial fib -- S/P Pulm Vein Ablation 1/19/22     Essential hypertension     Mixed hyperlipidemia     Gastroesophageal reflux disease without esophagitis     Chronic Back Pain (IT Pump w Morphine, Clonidine, Baclofen)     S/P CABG  (coronary artery bypass graft)     Fluid overload     ICD (implantable cardioverter-defibrillator) in place     Peripheral vascular disease (H)     Abdominal panniculus     Bariatric surgery status     Claudication of lower extremity (H)     Insomnia     Intestinal disaccharidase deficiencies and disaccharide malabsorption     Nephrolithiasis     Osteoarthrosis     Post-laminectomy syndrome     Sleepiness     Coronary arteriosclerosis     Current Outpatient Medications   Medication Sig Dispense Refill     acetaminophen (TYLENOL) 325 MG tablet Take 2 tablets (650 mg) by mouth every 4 hours as needed for mild pain 40 tablet 0     clopidogrel (PLAVIX) 75 MG tablet Take 1 tablet (75 mg) by mouth daily 90 tablet 1     dexamethasone (DECADRON) 2 MG tablet Take 1 tablet (2 mg) by mouth 2 times daily (with meals) 10 tablet 0     DULoxetine (CYMBALTA) 30 MG capsule Take 30 mg by mouth every morning       DULoxetine (CYMBALTA) 30 MG capsule Take 60 mg by mouth every evening       FERATE 240 (27 Fe) MG TABS TAKE 1 TABLET BY MOUTH EVERY DAY *OTC NOT COV*       Ferrous Sulfate (IRON PO) Take by mouth daily Dosage is unknown       furosemide (LASIX) 40 MG tablet Take 1 tablet (40 mg) by mouth daily 90 tablet 3     lisinopril (ZESTRIL) 5 MG tablet Take 1 tablet (5 mg) by mouth daily 90 tablet 3     metoprolol succinate ER (TOPROL XL) 25 MG 24 hr tablet Take 3 tablets (75 mg) by mouth 2 times daily 540 tablet 3     MORPHINE SULFATE IT pump:updated 1/12/22  Medications in Pump:   [facility] Responsible for pump medications:   Conc:  Morphine 20mg/ml(3.95 mg/day), clonidine 21.1mcg/ml (4.168 mcg/day), baclofen 42.5mcg/ml (8.395mcg/day)  Rate:   Pump Last Fill Date: 9/2021  Pump Refill Date: 2/2022       nitroGLYcerin (NITROSTAT) 0.4 MG sublingual tablet For chest pain place 1 tablet under the tongue every 5 minutes for 3 doses. If symptoms persist 5 minutes after 1st dose call 911. 30 tablet 1     nystatin (MYCOSTATIN)  "174019 UNIT/GM external powder APPLY TO LOWER ABDOMINAL AREA TWICE DAILY 60 g 4     rosuvastatin (CRESTOR) 40 MG tablet Take 1 tablet (40 mg) by mouth daily 90 tablet 3     traZODone (DESYREL) 100 MG tablet TAKE 2 TABLETS (200MG TOTAL) BY MOUTH AT BEDTIME 180 tablet 3     oxyCODONE-acetaminophen (PERCOCET) 5-325 MG tablet Take 1 tablet by mouth daily as needed for moderate to severe pain (Patient not taking: Reported on 7/14/2022) 30 tablet 0          Review of Systems   No fever, chills      Objective    /79   Pulse 71   Temp 98.2  F (36.8  C) (Tympanic)   Resp 18   Ht 1.905 m (6' 3\")   Wt 142.9 kg (315 lb)   SpO2 96%   BMI 39.37 kg/m    Body mass index is 39.37 kg/m .  Physical Exam     Tongue with firmness, slightly raised area approximately 1.5 to 2 cm across in the mid posterior tongue.  With palpation is does not feel to be an infiltrating hard mass.  No erythema.  There is no swelling of the palate or uvula.  No other lesions in the mouth.  No adenopathy or masses of the neck.                .  ..  "

## 2022-07-14 NOTE — NURSING NOTE
"/79 (BP Location: Right arm, Patient Position: Sitting, Cuff Size: Adult Large)   Pulse 71   Temp 98.2  F (36.8  C) (Tympanic)   Resp 18   Ht 1.905 m (6' 3\")   Wt 142.9 kg (315 lb)   SpO2 96%   BMI 39.37 kg/m      "

## 2022-07-14 NOTE — PROGRESS NOTES
"  {PROVIDER CHARTING PREFERENCE:457667}    Sushil Astudillo is a 66 year old, presenting for the following health issues:  Mass (Patient is being seen for an lump on her tongue.)      Mass    History of Present Illness       Reason for visit:  Lump on back of tongue    He eats 2-3 servings of fruits and vegetables daily.He consumes 1 sweetened beverage(s) daily.He exercises with enough effort to increase his heart rate 20 to 29 minutes per day.  He exercises with enough effort to increase his heart rate 5 days per week.   He is taking medications regularly.       {SUPERLIST (Optional):594335}  {additonal problems for provider to add (Optional):910943}    Review of Systems   {ROS COMP (Optional):022484}      Objective    /79   Pulse 71   Temp 98.2  F (36.8  C) (Tympanic)   Resp 18   Ht 1.905 m (6' 3\")   Wt 142.9 kg (315 lb)   SpO2 96%   BMI 39.37 kg/m    Body mass index is 39.37 kg/m .  Physical Exam   {Exam List (Optional):217056}    {Diagnostic Test Results (Optional):607014}    {AMBULATORY ATTESTATION (Optional):896695}            .  ..  "

## 2022-08-05 ENCOUNTER — TELEPHONE (OUTPATIENT)
Dept: FAMILY MEDICINE | Facility: CLINIC | Age: 67
End: 2022-08-05

## 2022-08-05 NOTE — TELEPHONE ENCOUNTER
Attempted to contact patient. Left voice message to call back.     Please advise patient that Dr Feliciano will be out of the office most of next week. However, we could schedule in-person appointment on Monday 8/8 with MENDY Waters using one of her [facility] Acute appointments or schedule Video visit with another provider for rash.     Coleen Greer RN  [facility]

## 2022-08-05 NOTE — TELEPHONE ENCOUNTER
Patient is calling trying to get an earlier appointment.  Has an appointment 8/18/22 to establish care with Dr Feliciano.  Had been a patient at [facility], but his provider left and patient moved to [place].    Patient has a sore in the crease on his abdomen that wife believes started as yeast infection.  They have been using a hair dryer to dry area and have been applying Nystatin Cream but area seems to be getting larger.  Have also been putting a cloth in the crease to wick moisture away    Open Area with red all around is about 1 inch in length.  Dark red in open area.  Per wife, in the past has gotten a streptococcal infection that required antibiotics. Concerned it may be getting infected again    I tried Cathi Yoon, [place],  [facility], Larry and [facility].  Please advise.  They really do not want to go to Urgent Care but will see how their insurance covers and may try tomorrow morning   Clair Ward RN  [facility]

## 2022-08-08 DIAGNOSIS — I25.119 CORONARY ARTERY DISEASE INVOLVING NATIVE CORONARY ARTERY OF NATIVE HEART WITH ANGINA PECTORIS (H): ICD-10-CM

## 2022-08-09 RX ORDER — ROSUVASTATIN CALCIUM 40 MG/1
TABLET, COATED ORAL
Qty: 90 TABLET | Refills: 3 | Status: SHIPPED | OUTPATIENT
Start: 2022-08-09 | End: 2023-08-03

## 2022-08-12 NOTE — TELEPHONE ENCOUNTER
Per Care Everywhere, patient was seen in Urgency Room on 8/5 for wound.     Coleen Greer RN  [facility]

## 2022-08-14 ENCOUNTER — HEALTH MAINTENANCE LETTER (OUTPATIENT)
Age: 67
End: 2022-08-14

## 2022-08-16 DIAGNOSIS — D64.9 ANEMIA, UNSPECIFIED TYPE: Primary | ICD-10-CM

## 2022-08-17 ENCOUNTER — TRANSFERRED RECORDS (OUTPATIENT)
Dept: HEALTH INFORMATION MANAGEMENT | Facility: CLINIC | Age: 67
End: 2022-08-17

## 2022-08-17 NOTE — TELEPHONE ENCOUNTER
"Outpatient Medication Detail     Disp Refills Start End OSIRIS   ferrous gluconate 236 mg (27 mg iron) Tab 90 tablet 2 1/7/2021  --   Sig - Route: Take 1 tablet by mouth daily. - Oral   Sent to pharmacy as: ferrous gluconate 236 mg (27 mg iron) tablet   E-Prescribing Status: Receipt confirmed by pharmacy (1/7/2021  6:30 PM CST)       ferrous gluconate 236 mg (27 mg iron) Tab [[id number]]    Electronically signed by: Luann Berger MD on 01/07/21 1829 Status: Active   Ordering user: Luann Berger MD 01/07/21 1829 Authorized by: Luann Berger MD   Frequency: DAILY 01/07/21 - Until Discontinued   Diagnoses  Anemia, unspecified type [D64.9]     Routing refill request to provider for review/approval because:  A break in medication  No PCP    Last office visit provider:  5/3/22     Requested Prescriptions   Pending Prescriptions Disp Refills     FERATE 240 (27 Fe) MG TABS         Iron Supplements Passed - 8/17/2022 10:28 AM        Passed - Patient is 12 years of age or older        Passed - Recent (12 mo) or future (30 days) visit within the authorizing provider's specialty     Patient has had an office visit with the authorizing provider or a provider within the authorizing providers department within the previous 12 mos or has a future within next 30 days. See \"Patient Info\" tab in inbasket, or \"Choose Columns\" in Meds & Orders section of the refill encounter.              Passed - Hgb OR Hct on record within the past 12 mos.     Patient need only have had a HGB or HCT on file in the past 12 mos. That result does not need to be normal.    Recent Labs   Lab Test 05/03/22  1254 03/16/22  1109 02/04/22  1131   HGB 12.2* 11.3* 10.7*     Recent Labs   Lab Test 05/03/22  1254 02/04/22  1131 01/24/22  1150   HCT 38.0* 33.4* 34.7*       Please verify a HGB or HCT has been checked SINCE THE LAST DOSE CHANGE.            Passed - Medication is active on med list             Jair Prasad RN " 08/17/22 10:29 AM

## 2022-08-18 ENCOUNTER — OFFICE VISIT (OUTPATIENT)
Dept: INTERNAL MEDICINE | Facility: CLINIC | Age: 67
End: 2022-08-18
Payer: COMMERCIAL

## 2022-08-18 VITALS
HEART RATE: 58 BPM | BODY MASS INDEX: 35.93 KG/M2 | TEMPERATURE: 97.9 F | HEIGHT: 75 IN | RESPIRATION RATE: 22 BRPM | WEIGHT: 289 LBS | DIASTOLIC BLOOD PRESSURE: 79 MMHG | OXYGEN SATURATION: 96 % | SYSTOLIC BLOOD PRESSURE: 131 MMHG

## 2022-08-18 DIAGNOSIS — Z11.59 NEED FOR HEPATITIS C SCREENING TEST: ICD-10-CM

## 2022-08-18 DIAGNOSIS — E11.9 TYPE 2 DIABETES MELLITUS WITHOUT COMPLICATION, WITHOUT LONG-TERM CURRENT USE OF INSULIN (H): ICD-10-CM

## 2022-08-18 DIAGNOSIS — Z11.4 SCREENING FOR HIV (HUMAN IMMUNODEFICIENCY VIRUS): ICD-10-CM

## 2022-08-18 DIAGNOSIS — Z79.899 ENCOUNTER FOR LONG-TERM (CURRENT) USE OF MEDICATIONS: ICD-10-CM

## 2022-08-18 DIAGNOSIS — Z13.29 SCREENING FOR THYROID DISORDER: ICD-10-CM

## 2022-08-18 DIAGNOSIS — G89.4 CHRONIC PAIN SYNDROME: Primary | ICD-10-CM

## 2022-08-18 DIAGNOSIS — I50.22 CHRONIC SYSTOLIC CONGESTIVE HEART FAILURE (H): ICD-10-CM

## 2022-08-18 LAB — HBA1C MFR BLD: 5.5 % (ref 0–5.6)

## 2022-08-18 PROCEDURE — 82043 UR ALBUMIN QUANTITATIVE: CPT | Performed by: INTERNAL MEDICINE

## 2022-08-18 PROCEDURE — 80307 DRUG TEST PRSMV CHEM ANLYZR: CPT | Performed by: INTERNAL MEDICINE

## 2022-08-18 PROCEDURE — 84443 ASSAY THYROID STIM HORMONE: CPT | Performed by: INTERNAL MEDICINE

## 2022-08-18 PROCEDURE — 99207 PR FOOT EXAM NO CHARGE: CPT | Performed by: INTERNAL MEDICINE

## 2022-08-18 PROCEDURE — 83036 HEMOGLOBIN GLYCOSYLATED A1C: CPT | Performed by: INTERNAL MEDICINE

## 2022-08-18 PROCEDURE — 90677 PCV20 VACCINE IM: CPT | Performed by: INTERNAL MEDICINE

## 2022-08-18 PROCEDURE — 99214 OFFICE O/P EST MOD 30 MIN: CPT | Mod: 25 | Performed by: INTERNAL MEDICINE

## 2022-08-18 PROCEDURE — 90471 IMMUNIZATION ADMIN: CPT | Performed by: INTERNAL MEDICINE

## 2022-08-18 PROCEDURE — 36415 COLL VENOUS BLD VENIPUNCTURE: CPT | Performed by: INTERNAL MEDICINE

## 2022-08-18 PROCEDURE — 86803 HEPATITIS C AB TEST: CPT | Performed by: INTERNAL MEDICINE

## 2022-08-18 PROCEDURE — 87389 HIV-1 AG W/HIV-1&-2 AB AG IA: CPT | Performed by: INTERNAL MEDICINE

## 2022-08-18 PROCEDURE — 80048 BASIC METABOLIC PNL TOTAL CA: CPT | Performed by: INTERNAL MEDICINE

## 2022-08-18 ASSESSMENT — ENCOUNTER SYMPTOMS
GASTROINTESTINAL NEGATIVE: 1
BACK PAIN: 1
NEUROLOGICAL NEGATIVE: 1
RESPIRATORY NEGATIVE: 1
FATIGUE: 1

## 2022-08-18 NOTE — PROGRESS NOTES
Assessment & Plan     Chronic pain syndrome  At this time, patient continues have issues with persistent back pain related to his previous work-related injury.  I did discuss with the patient that I will be able to assist him when the time comes for renewal of his Workmen's Comp. paperwork.  Patient will continue his current medication regimen for management of his back pain.  Patient had no further questions or concerns in this regard.  - [id number] - Urine Drug Confirmation Panel (Comprehensive)    Type 2 diabetes mellitus without complication, without long-term current use of insulin (H)  Patient's last hemoglobin A1c looked excellent at 5.1.  He is not currently taking medications for management of his blood sugar.  He did wish to proceed with a repeat A1c at this time.  Patient will also attempt to submit a urine sample for urine microalbumin level.  Diabetic foot examination was completed today with no abnormalities noted.  Patient was encouraged to get his annual eye exam completed.  He will be contacted with results of his labs once available for review.  - Albumin Random Urine Quantitative with Creat Ratio  - HEMOGLOBIN A1C  - FOOT EXAM    Screening for HIV (human immunodeficiency virus)  - HIV Antigen Antibody Combo    Need for hepatitis C screening test  - Hepatitis C Screen Reflex to HCV RNA Quant and Genotype    Screening for thyroid disorder  - TSH WITH FREE T4 REFLEX    Need for pneumococcal vaccination  Patient will receive his PCV 20 vaccination today.      Ordering of each unique test  30 minutes spent on the date of the encounter doing chart review, history and exam, documentation and further activities per the note       See Patient Instructions    Return in about 3 months (around 11/18/2022) for Routine preventive.    Roge Feliciano MD  [facility] YAMILETH Astudillo is a 66 year old, presenting for the following health issues:  Establish Care      Patient is a  66-year-old  male who presents to the clinic to establish care.  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 Patient is an employee of the [employer].  He did suffer an injury involving his back approximately 2 to 3 years prior to presentation.  Patient has had persistent issues with chronic low back pain.  He has been placed on several medications for management of his discomfort.  Patient has also had undergo surgical intervention for his ongoing back pain.  He does report that this paperwork needs to be updated approximately once per year.  Patient reports that he has not been at work since 2020.  He does report a history of diabetes, but he is not currently taking any medications for management of his blood sugar.  His last hemoglobin A1c was noted to be 5.1.  Patient would like to reassess his A1c at this time.     History of Present Illness       Reason for visit:  New primary visit    He eats 2-3 servings of fruits and vegetables daily.He consumes 2 sweetened beverage(s) daily.He exercises with enough effort to increase his heart rate 9 or less minutes per day.  He exercises with enough effort to increase his heart rate 3 or less days per week.   He is taking medications regularly.       Hypertension Follow-up        Do you check your blood pressure regularly outside of the clinic? Yes, sometimes    Are you following a low salt diet? Yes    Are your blood pressures ever more than 140 on the top number (systolic) OR more   than 90 on the bottom number (diastolic), for example 140/90? No        Review of Systems   Constitutional: Positive for fatigue.   HENT: Negative.    Respiratory: Negative.    Cardiovascular: Positive for peripheral edema.   Gastrointestinal: Negative.    Genitourinary: Negative.    Musculoskeletal: Positive for back pain.   Skin: Negative.    Neurological: Negative.           Objective   "  Blood pressure 131/79, pulse 58, temperature 97.9  F (36.6  C), resp. rate 22, height 1.905 m (6' 3\"), weight 131.1 kg (289 lb), SpO2 96 %.      Physical Exam  Vitals reviewed.   Constitutional:       General: He is not in acute distress.     Appearance: He is well-developed.   HENT:      Right Ear: Tympanic membrane and external ear normal.      Left Ear: Tympanic membrane and external ear normal.      Nose: Nose normal.      Mouth/Throat:      Mouth: Mucous membranes are moist. No oral lesions.      Pharynx: Oropharynx is clear. No oropharyngeal exudate.   Eyes:      General:         Right eye: No discharge.         Left eye: No discharge.      Conjunctiva/sclera: Conjunctivae normal.      Pupils: Pupils are equal, round, and reactive to light.   Neck:      Thyroid: No thyromegaly.      Trachea: No tracheal deviation.   Cardiovascular:      Rate and Rhythm: Normal rate and regular rhythm.      Pulses: Normal pulses.      Heart sounds: Normal heart sounds, S1 normal and S2 normal. No murmur heard.    No S3 or S4 sounds.   Pulmonary:      Effort: Pulmonary effort is normal. No respiratory distress.      Breath sounds: Normal breath sounds. No wheezing or rales.   Abdominal:      General: Bowel sounds are normal.      Palpations: Abdomen is soft. There is no mass.      Tenderness: There is no abdominal tenderness.   Musculoskeletal:         General: No deformity. Normal range of motion.      Cervical back: Neck supple.      Right lower leg: Edema present.      Left lower leg: Edema present.   Lymphadenopathy:      Cervical: No cervical adenopathy.   Skin:     General: Skin is warm and dry.      Findings: No lesion or rash.   Neurological:      Mental Status: He is alert and oriented to person, place, and time.      Motor: No abnormal muscle tone.      Deep Tendon Reflexes: Reflexes are normal and symmetric.   Psychiatric:         Speech: Speech normal.         Thought Content: Thought content normal.         " Judgment: Judgment normal.        Diagnostic Test Results: Hemoglobin A1c, TSH with reflex free T4, urine microalbumin, and urine drug screen are pending.        .  ..

## 2022-08-19 LAB
ANION GAP SERPL CALCULATED.3IONS-SCNC: 7 MMOL/L (ref 3–14)
BUN SERPL-MCNC: 19 MG/DL (ref 7–30)
CALCIUM SERPL-MCNC: 9.2 MG/DL (ref 8.5–10.1)
CHLORIDE BLD-SCNC: 106 MMOL/L (ref 94–109)
CO2 SERPL-SCNC: 25 MMOL/L (ref 20–32)
CREAT SERPL-MCNC: 0.96 MG/DL (ref 0.66–1.25)
CREAT UR-MCNC: 181 MG/DL
CREAT UR-MCNC: 195 MG/DL
GFR SERPL CREATININE-BSD FRML MDRD: 88 ML/MIN/1.73M2
GLUCOSE BLD-MCNC: 95 MG/DL (ref 70–99)
HCV AB SERPL QL IA: NONREACTIVE
HIV 1+2 AB+HIV1 P24 AG SERPL QL IA: NONREACTIVE
MICROALBUMIN UR-MCNC: 31 MG/L
MICROALBUMIN/CREAT UR: 15.9 MG/G CR (ref 0–17)
POTASSIUM BLD-SCNC: 3.8 MMOL/L (ref 3.4–5.3)
SODIUM SERPL-SCNC: 138 MMOL/L (ref 133–144)
TSH SERPL DL<=0.005 MIU/L-ACNC: 1.16 MU/L (ref 0.4–4)

## 2022-08-22 RX ORDER — FERROUS GLUCONATE 240(27)MG
TABLET ORAL
Qty: 90 TABLET | Refills: 0 | Status: SHIPPED | OUTPATIENT
Start: 2022-08-22 | End: 2022-11-23

## 2022-08-23 LAB
MORPHINE UR CFM-MCNC: 2400 NG/ML
MORPHINE/CREAT UR: 1326 NG/MG {CREAT}

## 2022-09-05 ENCOUNTER — MYC MEDICAL ADVICE (OUTPATIENT)
Dept: INTERNAL MEDICINE | Facility: CLINIC | Age: 67
End: 2022-09-05

## 2022-09-07 NOTE — TELEPHONE ENCOUNTER
Message sent via [employer].      Shila Bryant CMA  [facility]  [place]

## 2022-09-26 ENCOUNTER — TELEPHONE (OUTPATIENT)
Dept: FAMILY MEDICINE | Facility: CLINIC | Age: 67
End: 2022-09-26

## 2022-09-26 NOTE — TELEPHONE ENCOUNTER
[place] [employer] * workmans comp received at .     Patient asks that when it is completed to call him at [phone]. He will pick it up    Forms in Dr. mail box for review and signature.

## 2022-10-03 ENCOUNTER — HOSPITAL ENCOUNTER (OUTPATIENT)
Facility: CLINIC | Age: 67
End: 2022-10-03
Attending: PHYSICAL MEDICINE & REHABILITATION | Admitting: PHYSICAL MEDICINE & REHABILITATION
Payer: COMMERCIAL

## 2022-10-12 ENCOUNTER — OFFICE VISIT (OUTPATIENT)
Dept: OTOLARYNGOLOGY | Facility: CLINIC | Age: 67
End: 2022-10-12
Attending: INTERNAL MEDICINE
Payer: COMMERCIAL

## 2022-10-12 DIAGNOSIS — K14.6 TONGUE PAIN: Primary | ICD-10-CM

## 2022-10-12 PROCEDURE — 99203 OFFICE O/P NEW LOW 30 MIN: CPT | Performed by: OTOLARYNGOLOGY

## 2022-10-12 NOTE — LETTER
10/12/2022         RE: Onur Barr  [id number] [facility] [id number]        Dear Colleague,    Thank you for referring your patient, Onur Barr, to the [facility]. Please see a copy of my visit note below.    HPI: This patient is a 68yo M who presents to clinic for evaluation of a tongue soreness. He was intubated on 3 separate occasions in the last year subsequent to heart issues and bypass surgery. The soreness is midline on the top/back of the tongue. He states that it has been swollen with a sore on a few occasions, but not in the last 4 months or so. He still has some occasional tenderness, but hasn't seen a sore for quite some time.  The patient has noticed slow growth, but no pain. Denies fevers, unintentional weight loss, odynophagia, dysphagia, hemoptysis, voice changes, and shortness of breath.     Past medical history, surgical history, social history, family history, medications, and allergies have been reviewed with the patient and are documented above.    Review of Systems: a 10-system review was performed. Pertinent positives are noted in the HPI and on a separate scanned document in the chart.    PHYSICAL EXAMINATION:  GEN: no acute distress, normocephalic. Obese.   EYES: extraocular movements are intact, pupils are equal and round. Sclera clear.   EARS: auricles are normally formed. The external auditory canals are clear with minimal to no cerumen. Tympanic membranes are intact bilaterally with no signs of infection, effusion, retractions, or perforations.  NOSE: anterior nares are patent. There are no masses or lesions. The septum is non-obstructing.  OC/OP: clear, dentition is in good repair. The tongue and palate are fully mobile and symmetric. No visible tongue lesions, sores, or masses. No palpable tongue lesions or masses. The floor of mouth, base of tongue, and tonsils are soft and symmetric.  HP/L (scope): nasopharynx, base of tongue, vallecula,  epiglottis, and pyriform sinuses are clear. The bilateral vocal folds are mobile and without lesion  NECK: soft and supple. No lymphadenopathy or masses. Airway is midline.  NEURO: CN VII and XII symmetric. alert and oriented. No spontaneous nystagmus. Walks bent over with a cane.   PULM: breathing comfortably on room air, normal chest expansion with respiration  CARDS: no cyanosis or clubbing, normal carotid pulses    FLEXIBLE LARYNGOSCOPY: Scope inserted bilaterally to examine nasal tissue, nasopharynx, posterior oropharynx, hypopharynx, and larynx. See exam notes for exam finding details. Patient tolerated the procedure well.    MEDICAL DECISION-MAKING: This patient is a 66yo M with some persistent midline dorsal tongue sensitivity following several intubations. No masses or lesions are appreciated. Reassured him that the nerves may take quite a bit of time to recover from pressure injuries possibly sustained from the tubes. They are welcome to return for re-eval if he has swelling or a lesion.        Again, thank you for allowing me to participate in the care of your patient.        Sincerely,        Jailene Fraire MD

## 2022-10-12 NOTE — PROGRESS NOTES
HPI: This patient is a 68yo M who presents to clinic for evaluation of a tongue soreness. He was intubated on 3 separate occasions in the last year subsequent to heart issues and bypass surgery. The soreness is midline on the top/back of the tongue. He states that it has been swollen with a sore on a few occasions, but not in the last 4 months or so. He still has some occasional tenderness, but hasn't seen a sore for quite some time.  The patient has noticed slow growth, but no pain. Denies fevers, unintentional weight loss, odynophagia, dysphagia, hemoptysis, voice changes, and shortness of breath.     Past medical history, surgical history, social history, family history, medications, and allergies have been reviewed with the patient and are documented above.    Review of Systems: a 10-system review was performed. Pertinent positives are noted in the HPI and on a separate scanned document in the chart.    PHYSICAL EXAMINATION:  GEN: no acute distress, normocephalic. Obese.   EYES: extraocular movements are intact, pupils are equal and round. Sclera clear.   EARS: auricles are normally formed. The external auditory canals are clear with minimal to no cerumen. Tympanic membranes are intact bilaterally with no signs of infection, effusion, retractions, or perforations.  NOSE: anterior nares are patent. There are no masses or lesions. The septum is non-obstructing.  OC/OP: clear, dentition is in good repair. The tongue and palate are fully mobile and symmetric. No visible tongue lesions, sores, or masses. No palpable tongue lesions or masses. The floor of mouth, base of tongue, and tonsils are soft and symmetric.  HP/L (scope): nasopharynx, base of tongue, vallecula, epiglottis, and pyriform sinuses are clear. The bilateral vocal folds are mobile and without lesion  NECK: soft and supple. No lymphadenopathy or masses. Airway is midline.  NEURO: CN VII and XII symmetric. alert and oriented. No spontaneous nystagmus.  Walks bent over with a cane.   PULM: breathing comfortably on room air, normal chest expansion with respiration  CARDS: no cyanosis or clubbing, normal carotid pulses    FLEXIBLE LARYNGOSCOPY: Scope inserted bilaterally to examine nasal tissue, nasopharynx, posterior oropharynx, hypopharynx, and larynx. See exam notes for exam finding details. Patient tolerated the procedure well.    MEDICAL DECISION-MAKING: This patient is a 68yo M with some persistent midline dorsal tongue sensitivity following several intubations. No masses or lesions are appreciated. Reassured him that the nerves may take quite a bit of time to recover from pressure injuries possibly sustained from the tubes. They are welcome to return for re-eval if he has swelling or a lesion.

## 2022-10-13 NOTE — TELEPHONE ENCOUNTER
Per patient's most recent [employer] message below, needs refill on Nystatin powder.    Last fill 6/28/22 60g with 4 refills.    Per [place] at [facility] pharmacy, patient has refills remaining.    Patient informed via [employer] to follow up with the pharmacy.

## 2022-10-17 ENCOUNTER — TELEPHONE (OUTPATIENT)
Dept: CARDIOLOGY | Facility: CLINIC | Age: 67
End: 2022-10-17

## 2022-10-17 ENCOUNTER — ANCILLARY PROCEDURE (OUTPATIENT)
Dept: CARDIOLOGY | Facility: CLINIC | Age: 67
End: 2022-10-17
Attending: INTERNAL MEDICINE
Payer: COMMERCIAL

## 2022-10-17 DIAGNOSIS — Z95.810 ICD (IMPLANTABLE CARDIOVERTER-DEFIBRILLATOR) IN PLACE: ICD-10-CM

## 2022-10-17 DIAGNOSIS — I47.29 PAROXYSMAL VENTRICULAR TACHYCARDIA (H): ICD-10-CM

## 2022-10-17 PROCEDURE — 93296 REM INTERROG EVL PM/IDS: CPT | Performed by: INTERNAL MEDICINE

## 2022-10-17 PROCEDURE — 93295 DEV INTERROG REMOTE 1/2/MLT: CPT | Performed by: INTERNAL MEDICINE

## 2022-10-17 NOTE — TELEPHONE ENCOUNTER
Remote device findings show pt has had 16 NSVT episodes logged with 2 EGM's available. EGM's show 12-20 secs of  NSVT in the VT-1 zone at a rate of 175. Although the onset of these rhythms is not recorded.  These are the first NSVT episodes logged since implant in January 2022. Pt is on Toprol XL 25 mg daily. Amiadarone was stopped in Feb 2022. EF 3/2022 was 45% . Pt denies symptoms with these episodes.   Hugo STRONG

## 2022-10-20 NOTE — TELEPHONE ENCOUNTER
Received response from Dr. Pearl:     Liz Pearl MD  P [facility] Heart Team 1; Promise Llamas, RN  Caller: Unspecified (3 days ago,  1:55 PM)  Please increase metoprolol succinate to 50mg.   Please call patient. If he is symptomatic let us try to move up his f/upt which is scheduled for next month with me with echo prior.     Liz Pearl MD on 10/19/2022 at 4:40 PM     Called and spoke with pt, pt confirmed he has not been having any symptoms. Pt is currently taking metoprolol succinate 75 mg twice daily. Will clarify with Dr. Pearl what dose she would like. Pt also wanted to let Dr. Pearl know that he still has pain in area of his device, described as a  pulling feeling that mainly happens when he turns his head to the right and wondering if he should be concerned about this.     Reviewed upcoming appointments, pt is scheduled for a 24 hour Holter on 11/1/22, echo and labs on 11/21/22 and follow up with Dr. Pearl on 11/28/22.

## 2022-10-20 NOTE — TELEPHONE ENCOUNTER
Received [employer] message from pt's wife with additional information:     Sk[facility]audilio [facility] Heart Team 1  Phone Number: [phone]     Dr. Pearl, this is Onur's wife.  As you may know, we have recently received calls regarding several episodes of Baudilio having irregular arrhythmia showing up on his defibrillator.  Baudilio said he didn't feel anything but has had pain where his defibrillator is when he turns his head to the right.  Not sure if this would cause the issue or if he is still healing from the implant.  I do have a question that he did not tell the nurse who called.  He has chronic pain in his back and ever since they legalized THC Gummies, he has been taking 2 every other day which is 10 mg.  Could this have caused the irregular arrhythmia he has currently experienced?  I have asked him to stop taking them.  It has been since the 17th.  I would appreciate your input on this.  Thank you for your time.

## 2022-10-28 LAB
MDC_IDC_EPISODE_DTM: NORMAL
MDC_IDC_EPISODE_DURATION: 1 S
MDC_IDC_EPISODE_DURATION: 1 S
MDC_IDC_EPISODE_DURATION: 14 S
MDC_IDC_EPISODE_DURATION: 15 S
MDC_IDC_EPISODE_DURATION: 15 S
MDC_IDC_EPISODE_DURATION: 16 S
MDC_IDC_EPISODE_DURATION: 16 S
MDC_IDC_EPISODE_DURATION: 18 S
MDC_IDC_EPISODE_DURATION: 19 S
MDC_IDC_EPISODE_DURATION: 2 S
MDC_IDC_EPISODE_DURATION: 21 S
MDC_IDC_EPISODE_DURATION: 21 S
MDC_IDC_EPISODE_DURATION: 4 S
MDC_IDC_EPISODE_DURATION: 7 S
MDC_IDC_EPISODE_DURATION: 9 S
MDC_IDC_EPISODE_ID: NORMAL
MDC_IDC_EPISODE_TYPE: NORMAL
MDC_IDC_LEAD_IMPLANT_DT: NORMAL
MDC_IDC_LEAD_IMPLANT_DT: NORMAL
MDC_IDC_LEAD_LOCATION: NORMAL
MDC_IDC_LEAD_LOCATION: NORMAL
MDC_IDC_LEAD_LOCATION_DETAIL_1: NORMAL
MDC_IDC_LEAD_LOCATION_DETAIL_1: NORMAL
MDC_IDC_LEAD_MFG: NORMAL
MDC_IDC_LEAD_MFG: NORMAL
MDC_IDC_LEAD_MODEL: NORMAL
MDC_IDC_LEAD_MODEL: NORMAL
MDC_IDC_LEAD_POLARITY_TYPE: NORMAL
MDC_IDC_LEAD_POLARITY_TYPE: NORMAL
MDC_IDC_LEAD_SERIAL: NORMAL
MDC_IDC_LEAD_SERIAL: NORMAL
MDC_IDC_MSMT_BATTERY_DTM: NORMAL
MDC_IDC_MSMT_BATTERY_REMAINING_LONGEVITY: 156 MO
MDC_IDC_MSMT_BATTERY_REMAINING_PERCENTAGE: 100 %
MDC_IDC_MSMT_BATTERY_STATUS: NORMAL
MDC_IDC_MSMT_CAP_CHARGE_DTM: NORMAL
MDC_IDC_MSMT_CAP_CHARGE_TIME: 10 S
MDC_IDC_MSMT_CAP_CHARGE_TYPE: NORMAL
MDC_IDC_MSMT_LEADCHNL_RA_IMPEDANCE_VALUE: 636 OHM
MDC_IDC_MSMT_LEADCHNL_RA_PACING_THRESHOLD_AMPLITUDE: 0.4 V
MDC_IDC_MSMT_LEADCHNL_RA_PACING_THRESHOLD_PULSEWIDTH: 0.4 MS
MDC_IDC_MSMT_LEADCHNL_RV_IMPEDANCE_VALUE: 438 OHM
MDC_IDC_MSMT_LEADCHNL_RV_PACING_THRESHOLD_AMPLITUDE: 0.7 V
MDC_IDC_MSMT_LEADCHNL_RV_PACING_THRESHOLD_PULSEWIDTH: 0.4 MS
MDC_IDC_PG_IMPLANT_DTM: NORMAL
MDC_IDC_PG_MFG: NORMAL
MDC_IDC_PG_MODEL: NORMAL
MDC_IDC_PG_SERIAL: NORMAL
MDC_IDC_PG_TYPE: NORMAL
MDC_IDC_SESS_CLINIC_NAME: NORMAL
MDC_IDC_SESS_DTM: NORMAL
MDC_IDC_SESS_TYPE: NORMAL
MDC_IDC_SET_BRADY_AT_MODE_SWITCH_MODE: NORMAL
MDC_IDC_SET_BRADY_AT_MODE_SWITCH_RATE: 170 {BEATS}/MIN
MDC_IDC_SET_BRADY_LOWRATE: 60 {BEATS}/MIN
MDC_IDC_SET_BRADY_MAX_SENSOR_RATE: 130 {BEATS}/MIN
MDC_IDC_SET_BRADY_MAX_TRACKING_RATE: 130 {BEATS}/MIN
MDC_IDC_SET_BRADY_MODE: NORMAL
MDC_IDC_SET_BRADY_PAV_DELAY_HIGH: 200 MS
MDC_IDC_SET_BRADY_PAV_DELAY_LOW: 300 MS
MDC_IDC_SET_BRADY_SAV_DELAY_HIGH: 200 MS
MDC_IDC_SET_BRADY_SAV_DELAY_LOW: 300 MS
MDC_IDC_SET_LEADCHNL_RA_PACING_AMPLITUDE: 2 V
MDC_IDC_SET_LEADCHNL_RA_PACING_CAPTURE_MODE: NORMAL
MDC_IDC_SET_LEADCHNL_RA_PACING_POLARITY: NORMAL
MDC_IDC_SET_LEADCHNL_RA_PACING_PULSEWIDTH: 0.4 MS
MDC_IDC_SET_LEADCHNL_RA_SENSING_ADAPTATION_MODE: NORMAL
MDC_IDC_SET_LEADCHNL_RA_SENSING_POLARITY: NORMAL
MDC_IDC_SET_LEADCHNL_RA_SENSING_SENSITIVITY: 0.25 MV
MDC_IDC_SET_LEADCHNL_RV_PACING_AMPLITUDE: 2 V
MDC_IDC_SET_LEADCHNL_RV_PACING_CAPTURE_MODE: NORMAL
MDC_IDC_SET_LEADCHNL_RV_PACING_POLARITY: NORMAL
MDC_IDC_SET_LEADCHNL_RV_PACING_PULSEWIDTH: 0.4 MS
MDC_IDC_SET_LEADCHNL_RV_SENSING_ADAPTATION_MODE: NORMAL
MDC_IDC_SET_LEADCHNL_RV_SENSING_POLARITY: NORMAL
MDC_IDC_SET_LEADCHNL_RV_SENSING_SENSITIVITY: 0.6 MV
MDC_IDC_SET_ZONE_DETECTION_INTERVAL: 250 MS
MDC_IDC_SET_ZONE_DETECTION_INTERVAL: 300 MS
MDC_IDC_SET_ZONE_DETECTION_INTERVAL: 353 MS
MDC_IDC_SET_ZONE_TYPE: NORMAL
MDC_IDC_SET_ZONE_VENDOR_TYPE: NORMAL
MDC_IDC_STAT_AT_BURDEN_PERCENT: 1 %
MDC_IDC_STAT_AT_DTM_END: NORMAL
MDC_IDC_STAT_AT_DTM_START: NORMAL
MDC_IDC_STAT_BRADY_DTM_END: NORMAL
MDC_IDC_STAT_BRADY_DTM_START: NORMAL
MDC_IDC_STAT_BRADY_RA_PERCENT_PACED: 67 %
MDC_IDC_STAT_BRADY_RV_PERCENT_PACED: 3 %
MDC_IDC_STAT_EPISODE_RECENT_COUNT: 0
MDC_IDC_STAT_EPISODE_RECENT_COUNT: 10
MDC_IDC_STAT_EPISODE_RECENT_COUNT: 16
MDC_IDC_STAT_EPISODE_RECENT_COUNT_DTM_END: NORMAL
MDC_IDC_STAT_EPISODE_RECENT_COUNT_DTM_START: NORMAL
MDC_IDC_STAT_EPISODE_TYPE: NORMAL
MDC_IDC_STAT_EPISODE_VENDOR_TYPE: NORMAL
MDC_IDC_STAT_TACHYTHERAPY_ATP_DELIVERED_RECENT: 0
MDC_IDC_STAT_TACHYTHERAPY_ATP_DELIVERED_TOTAL: 0
MDC_IDC_STAT_TACHYTHERAPY_RECENT_DTM_END: NORMAL
MDC_IDC_STAT_TACHYTHERAPY_RECENT_DTM_START: NORMAL
MDC_IDC_STAT_TACHYTHERAPY_SHOCKS_ABORTED_RECENT: 0
MDC_IDC_STAT_TACHYTHERAPY_SHOCKS_ABORTED_TOTAL: 0
MDC_IDC_STAT_TACHYTHERAPY_SHOCKS_DELIVERED_RECENT: 0
MDC_IDC_STAT_TACHYTHERAPY_SHOCKS_DELIVERED_TOTAL: 0
MDC_IDC_STAT_TACHYTHERAPY_TOTAL_DTM_END: NORMAL
MDC_IDC_STAT_TACHYTHERAPY_TOTAL_DTM_START: NORMAL

## 2022-10-30 ENCOUNTER — MYC MEDICAL ADVICE (OUTPATIENT)
Dept: CARDIOLOGY | Facility: CLINIC | Age: 67
End: 2022-10-30

## 2022-10-30 DIAGNOSIS — I10 ESSENTIAL HYPERTENSION: Primary | ICD-10-CM

## 2022-11-01 ENCOUNTER — HOSPITAL ENCOUNTER (OUTPATIENT)
Dept: CARDIOLOGY | Facility: CLINIC | Age: 67
Discharge: HOME | End: 2022-11-01
Attending: NURSE PRACTITIONER | Admitting: NURSE PRACTITIONER
Payer: COMMERCIAL

## 2022-11-01 DIAGNOSIS — I49.3 PVC (PREMATURE VENTRICULAR CONTRACTION): ICD-10-CM

## 2022-11-01 PROCEDURE — 93227 XTRNL ECG REC<48 HR R&I: CPT | Performed by: INTERNAL MEDICINE

## 2022-11-01 PROCEDURE — 93226 XTRNL ECG REC<48 HR SCAN A/R: CPT

## 2022-11-02 DIAGNOSIS — Z95.1 S/P CABG (CORONARY ARTERY BYPASS GRAFT): ICD-10-CM

## 2022-11-02 RX ORDER — CLOPIDOGREL BISULFATE 75 MG/1
TABLET ORAL
Qty: 90 TABLET | Refills: 1 | OUTPATIENT
Start: 2022-11-02

## 2022-11-07 RX ORDER — CLOPIDOGREL BISULFATE 75 MG/1
75 TABLET ORAL DAILY
Qty: 90 TABLET | Refills: 0 | Status: SHIPPED | OUTPATIENT
Start: 2022-11-07 | End: 2022-11-28

## 2022-11-07 NOTE — TELEPHONE ENCOUNTER
[facility] Refill Guideline reviewed.  Medication meets criteria for refill. Follow up scheduled on 11/28/22 with Dr. Pearl.

## 2022-11-21 ENCOUNTER — LAB (OUTPATIENT)
Dept: LAB | Facility: CLINIC | Age: 67
End: 2022-11-21
Payer: COMMERCIAL

## 2022-11-21 ENCOUNTER — HOSPITAL ENCOUNTER (OUTPATIENT)
Dept: CARDIOLOGY | Facility: CLINIC | Age: 67
Discharge: HOME | End: 2022-11-21
Attending: NURSE PRACTITIONER | Admitting: NURSE PRACTITIONER
Payer: COMMERCIAL

## 2022-11-21 DIAGNOSIS — I50.22 CHRONIC SYSTOLIC CONGESTIVE HEART FAILURE (H): ICD-10-CM

## 2022-11-21 DIAGNOSIS — E78.2 MIXED HYPERLIPIDEMIA: Primary | ICD-10-CM

## 2022-11-21 DIAGNOSIS — I10 ESSENTIAL HYPERTENSION: ICD-10-CM

## 2022-11-21 DIAGNOSIS — E78.2 MIXED HYPERLIPIDEMIA: ICD-10-CM

## 2022-11-21 LAB
ALT SERPL W P-5'-P-CCNC: 25 U/L (ref 10–50)
ANION GAP SERPL CALCULATED.3IONS-SCNC: 8 MMOL/L (ref 7–15)
BUN SERPL-MCNC: 17.9 MG/DL (ref 8–23)
CALCIUM SERPL-MCNC: 8.8 MG/DL (ref 8.8–10.2)
CHLORIDE SERPL-SCNC: 105 MMOL/L (ref 98–107)
CHOLEST SERPL-MCNC: 139 MG/DL
CREAT SERPL-MCNC: 0.88 MG/DL (ref 0.67–1.17)
DEPRECATED HCO3 PLAS-SCNC: 28 MMOL/L (ref 22–29)
GFR SERPL CREATININE-BSD FRML MDRD: >90 ML/MIN/1.73M2
GLUCOSE SERPL-MCNC: 105 MG/DL (ref 70–99)
HDLC SERPL-MCNC: 33 MG/DL
LDLC SERPL CALC-MCNC: 39 MG/DL
LVEF ECHO: NORMAL
NONHDLC SERPL-MCNC: 106 MG/DL
POTASSIUM SERPL-SCNC: 4 MMOL/L (ref 3.4–5.3)
SODIUM SERPL-SCNC: 141 MMOL/L (ref 136–145)
TRIGL SERPL-MCNC: 335 MG/DL

## 2022-11-21 PROCEDURE — 80048 BASIC METABOLIC PNL TOTAL CA: CPT | Performed by: INTERNAL MEDICINE

## 2022-11-21 PROCEDURE — 84460 ALANINE AMINO (ALT) (SGPT): CPT | Performed by: INTERNAL MEDICINE

## 2022-11-21 PROCEDURE — 255N000002 HC RX 255 OP 636: Performed by: NURSE PRACTITIONER

## 2022-11-21 PROCEDURE — 999N000208 ECHOCARDIOGRAM COMPLETE

## 2022-11-21 PROCEDURE — 93306 TTE W/DOPPLER COMPLETE: CPT | Mod: 26 | Performed by: INTERNAL MEDICINE

## 2022-11-21 PROCEDURE — 80061 LIPID PANEL: CPT | Performed by: INTERNAL MEDICINE

## 2022-11-21 RX ADMIN — HUMAN ALBUMIN MICROSPHERES AND PERFLUTREN 7 ML: 10; .22 INJECTION, SOLUTION INTRAVENOUS at 08:56

## 2022-11-22 ENCOUNTER — TELEPHONE (OUTPATIENT)
Dept: INTERNAL MEDICINE | Facility: CLINIC | Age: 67
End: 2022-11-22

## 2022-11-22 NOTE — TELEPHONE ENCOUNTER
[place] [employer] medical information request received via fax     Forms in [employer] mail box for review and signature.

## 2022-11-23 DIAGNOSIS — D64.9 ANEMIA, UNSPECIFIED TYPE: ICD-10-CM

## 2022-11-23 RX ORDER — FERROUS GLUCONATE 240(27)MG
TABLET ORAL
Qty: 90 TABLET | Refills: 0 | Status: SHIPPED | OUTPATIENT
Start: 2022-11-23 | End: 2023-02-27

## 2022-11-23 NOTE — TELEPHONE ENCOUNTER
A medical report is attached to the letter, and it is available for submission to the Department of Labor.

## 2022-11-23 NOTE — TELEPHONE ENCOUNTER
Pending Prescriptions:                       Disp   Refills    FERATE 240 (27 Fe) MG TABS                90 tab*0            Sig: TAKE 1 TABLET BY MOUTH EVERY DAY *OTC NOT COV*    Last seen by Braden on 8/18/22.  GENE COLLIER on 11/23/2022 at 8:40 AM

## 2022-11-28 ENCOUNTER — OFFICE VISIT (OUTPATIENT)
Dept: CARDIOLOGY | Facility: CLINIC | Age: 67
End: 2022-11-28
Attending: NURSE PRACTITIONER
Payer: COMMERCIAL

## 2022-11-28 VITALS
WEIGHT: 313.7 LBS | DIASTOLIC BLOOD PRESSURE: 86 MMHG | OXYGEN SATURATION: 99 % | HEIGHT: 75 IN | BODY MASS INDEX: 39 KG/M2 | SYSTOLIC BLOOD PRESSURE: 128 MMHG | HEART RATE: 64 BPM

## 2022-11-28 DIAGNOSIS — Z95.810 ICD (IMPLANTABLE CARDIOVERTER-DEFIBRILLATOR) IN PLACE: Primary | ICD-10-CM

## 2022-11-28 DIAGNOSIS — Z95.1 S/P CABG (CORONARY ARTERY BYPASS GRAFT): ICD-10-CM

## 2022-11-28 DIAGNOSIS — I48.0 PAROXYSMAL ATRIAL FIBRILLATION (H): ICD-10-CM

## 2022-11-28 DIAGNOSIS — I50.22 CHRONIC SYSTOLIC CONGESTIVE HEART FAILURE (H): ICD-10-CM

## 2022-11-28 PROCEDURE — 99214 OFFICE O/P EST MOD 30 MIN: CPT | Performed by: INTERNAL MEDICINE

## 2022-11-28 RX ORDER — CLOPIDOGREL BISULFATE 75 MG/1
75 TABLET ORAL DAILY
Qty: 90 TABLET | Refills: 0 | Status: SHIPPED | OUTPATIENT
Start: 2022-11-28 | End: 2022-11-28

## 2022-11-28 RX ORDER — METOPROLOL SUCCINATE 100 MG/1
100 TABLET, EXTENDED RELEASE ORAL 2 TIMES DAILY
Qty: 180 TABLET | Refills: 3 | Status: SHIPPED | OUTPATIENT
Start: 2022-11-28 | End: 2023-10-27

## 2022-11-28 NOTE — PATIENT INSTRUCTIONS
Increase metoprolol succinate to 100mg by mouth twice daily.  After you complete your last clopidogrel prescription in January 2023 stop clopidogrel and and start aspirin 81mg by mouth daily.   Schedule follow up with cardiac electrophysiology to discuss your device pocket discomfort and PVCs.

## 2022-11-28 NOTE — LETTER
11/28/2022    Roge Feliciano MD  [address] [facility] [address]    RE: Onur Chavezandrei       Dear Colleague,     I had the pleasure of seeing Onur Barr in the [facility].  [facility] Progress note    Assessment:  1. Coronary artery disease.  PCI to LAD (2012).  PCI to RCA (9/2021).   CABG x 1 (LIMA to LAD, we discussed that PVI, exclusion of left atrial appendage) on 1/19/2022   2. Paroxysmal atrial fibrillation.  S/p PVI and exclusion left atrial appendage (1/2022)  3. NSVT and h/o Sustained monomorphic ventricular tachycardia.  S/p [employer] dual-chamber ICD (1/2022). Was temporarily on amiodarone, now on 75 mg of metoprolol succinate twice daily.  Increase in ventricular ectopy may be correlated with taking THC Gummies.  4. Ischemic cardiomyopathy. EF 45-50%  5. Dyslipidemia, LDL well controlled on rosuvastatin.  Low HDL.  Elevated triglycerides.  6. Morbid obesity  7. MARLEEN.  Not tolerate CPAP  8. Chronic back pain.   Has a pain pump 1  9. Right middle lobe pulmonary nodule.  Measuring 4 mm per CT (4/2022). No history of smoking.  10. Device pocket tenderness.    Plan:  1. Increase metoprolol succinate to 100mg by mouth twice daily.  2. Stop  Clopidogrel after January 2023 and and start aspirin 81mg by mouth daily.   3. Schedule follow up with cardiac electrophysiology to discuss your device pocket discomfort and PVCs.   4. Agree with stopping THC Gummies.    5. Follow up with cardiology SULEMA in about 6 months.   6. He was reminded to continue routine follow-up with his PCP regarding diabetic screening etc.    HPI: Is    Onur Barr is a very nice 67 year old here today with his wife to to follow up ischemic cardiomyopathy, h/o PCIs in 2012 and 2021, CABG Jan 2022, PVCs and VT s/p ICD, dyslipidemia.  Overall he is feeling relatively well and has improving exercise tolerance.  He is morbidly obese and has chronic back pain which limits his activity but he is  able to go up and down 14 stairs at home.  He has chronic dyspnea which is improving with activity.      His main concerns today are discomfort at his ICD generator site which has been present since implantation and also last month and very short episodes up to 5 beats of NSVT on Holter monitor.  Of note he started taking 10 mg daily of THC Gummies which was associated with episodes of sweating and probably correlates with the increase in ventricular ectopy.  It seems the sweating has improved since he is stopped taking the THC Gummies recently.    A device pocket some times can be revised but this would be a new wound which would have to heal and may not be advisable.  He may just have some awareness at the pocket site which may not completely resolve.      Recent CV tests which I reviewed  Echo 11/21/22   1. The left ventricle is normal in size. The visual ejection fraction is 45-  50%. Mild global hypokinesis, very limited image quality even with contrast,  cannot comment on any subtle wall motion abnormalities.  2. The right ventricle is normal in structure, function and size.  3. No valve disease.  4. The ascending aorta is Mildly dilated. 3.9cm.  No changes from echo 3/2022.  Holter 24 hr  Sinus with first degree AV block,  Intermittent pacing, 14% PVCs, 2 5 bet runs of NSVT  Last device check 10/17/22  AP: 67%  : 3%  Mode: DDDR 60/130  Presenting Rhythm:  AP/VS with a PVC  Heart Rate: Stable with minimal variability- matches low activity levels  Sensing: WNL  Pacing Threshold: WNL  Impedance: WNL  Battery Status: estimated at 13 years remaining longevity  Atrial Arrhythmia: 7 ATR events logged with 3 EGM's showing A's > V's for aflutter with V rates 81-97 lasting 01-09 seconds. Pt has history of PAF and is on xarelto.                                                                          Ventricular Arrhythmia: 16 NSVT episodes logged with 2 EGM's available. EGM's show 12-20 secs of  NSVT in the VT-1 zone  at a rate of 175. Although the onset of these rhythms is not recorded. Will route message to MD as these are the first NSVT episodes logged since implant in January. pt is on Toprol XL 25 mg daily. Amiadarone was stopped in Feb 2022. EF 3/2022 was 45% . Pt denies symptoms.  ATP: 0  Shocks: 0    Encounter Diagnoses   Name Primary?     Chronic systolic congestive heart failure (H)      S/P CABG (coronary artery bypass graft)        CURRENT MEDICATIONS:  Current Outpatient Medications   Medication Sig Dispense Refill     acetaminophen (TYLENOL) 325 MG tablet Take 2 tablets (650 mg) by mouth every 4 hours as needed for mild pain 40 tablet 0     clopidogrel (PLAVIX) 75 MG tablet Take 1 tablet (75 mg) by mouth daily 90 tablet 0     DULoxetine (CYMBALTA) 30 MG capsule Take 60 mg by mouth every evening       FERATE 240 (27 Fe) MG TABS TAKE 1 TABLET BY MOUTH EVERY DAY *OTC NOT COV* 90 tablet 0     Ferrous Sulfate (IRON PO) Take by mouth daily Dosage is unknown       furosemide (LASIX) 40 MG tablet Take 1 tablet (40 mg) by mouth daily 90 tablet 3     lisinopril (ZESTRIL) 5 MG tablet Take 1 tablet (5 mg) by mouth daily 90 tablet 3     metoprolol succinate ER (TOPROL XL) 25 MG 24 hr tablet Take 3 tablets (75 mg) by mouth 2 times daily 540 tablet 3     MORPHINE SULFATE IT pump:updated 1/12/22  Medications in Pump:   [facility] Responsible for pump medications:   Conc:  Morphine 20mg/ml(3.95 mg/day), clonidine 21.1mcg/ml (4.168 mcg/day), baclofen 42.5mcg/ml (8.395mcg/day)  Rate:   Pump Last Fill Date: 9/2021  Pump Refill Date: 2/2022       nitroGLYcerin (NITROSTAT) 0.4 MG sublingual tablet For chest pain place 1 tablet under the tongue every 5 minutes for 3 doses. If symptoms persist 5 minutes after 1st dose call 911. 30 tablet 1     nystatin (MYCOSTATIN) 315503 UNIT/GM external powder APPLY TO LOWER ABDOMINAL AREA TWICE DAILY 60 g 4     rosuvastatin (CRESTOR) 40 MG tablet TAKE 1 TABLET BY MOUTH EVERY DAY 90 tablet 3      traZODone (DESYREL) 100 MG tablet TAKE 2 TABLETS (200MG TOTAL) BY MOUTH AT BEDTIME 180 tablet 0       ALLERGIES     Allergies   Allergen Reactions     Hydrocodone-Acetaminophen Other (See Comments)     sneezing       PAST MEDICAL, SURGICAL, FAMILY, SOCIAL HISTORY:  History was reviewed and updated as needed, see medical record.    REVIEW OF SYSTEMS:  Skin:        Eyes:       ENT:       Respiratory:  Positive for dyspnea on exertion  Cardiovascular:    Positive for  Gastroenterology:      Genitourinary:       Musculoskeletal:       Neurologic:       Psychiatric:       Heme/Lymph/Imm:       Endocrine:         PHYSICAL EXAM:      BP: 128/86 Pulse: 64     SpO2: 99 %      Vital Signs with Ranges  Pulse:  [64] 64  BP: (128)/(86) 128/86  SpO2:  [99 %] 99 %  313 lbs 11.2 oz    Constitutional: awake, alert, no distress  Eyes: sclera nonicteric  ENT: trachea midline  Respiratory: Clear to auscultation bilaterally  Cardiovascular: Regular rate and rhythm distant, no murmur rub or gallop\: Sternotomy scar and pocket incisions appear well-healed.  He reports some deep tenderness with palpation at his ICD generator implant site other than that exam seems normal.  GI: nondistended, nontender, bowel sounds present  Lymph/Hematologic: no lymphadenopathy  Skin: dry, no rash trace bilateral pitting nontender pretibial edema, wearing compression stockings.  Musculoskeletal: grossly normal muscle bulk and tone  Neurologic: no focal deficits  Neuropsychiatric: Normal on affect    Recent Lab Results:  LIPID RESULTS:  Lab Results   Component Value Date    CHOL 139 11/21/2022    HDL 33 (L) 11/21/2022    LDL 39 11/21/2022    LDL 69 02/13/2018    TRIG 335 (H) 11/21/2022       LIVER ENZYME RESULTS:  Lab Results   Component Value Date    AST 37 01/20/2022    ALT 25 11/21/2022       CBC RESULTS:  Lab Results   Component Value Date    WBC 7.9 05/03/2022    RBC 4.68 05/03/2022    HGB 12.2 (L) 05/03/2022    HCT 38.0 (L) 05/03/2022    MCV 81  05/03/2022    MCH 26.1 (L) 05/03/2022    MCHC 32.1 05/03/2022    RDW 14.1 05/03/2022     05/03/2022       BMP RESULTS:  Lab Results   Component Value Date     11/21/2022    POTASSIUM 4.0 11/21/2022    POTASSIUM 3.8 08/18/2022    CHLORIDE 105 11/21/2022    CHLORIDE 106 08/18/2022    CO2 28 11/21/2022    CO2 25 08/18/2022    ANIONGAP 8 11/21/2022    ANIONGAP 7 08/18/2022     (H) 11/21/2022    GLC 95 08/18/2022    BUN 17.9 11/21/2022    BUN 19 08/18/2022    CR 0.88 11/21/2022    GFRESTIMATED >90 11/21/2022    GFRESTIMATED >60 01/05/2021    GFRESTBLACK >60 01/05/2021    RATNA 8.8 11/21/2022        A1C RESULTS:  Lab Results   Component Value Date    A1C 5.5 08/18/2022       INR RESULTS:  Lab Results   Component Value Date    INR 1.31 (H) 01/19/2022    INR 1.57 (H) 01/19/2022     Thank you for allowing me to participate in the care of your patient.      Sincerely,     Liz Pearl MD     [facility]  cc:   April King, APRN CNP  [address]  [place] [id number]

## 2022-11-28 NOTE — PROGRESS NOTES
[facility] Progress note    Assessment:  1. Coronary artery disease.  PCI to LAD (2012).  PCI to RCA (9/2021).   CABG x 1 (LIMA to LAD, we discussed that PVI, exclusion of left atrial appendage) on 1/19/2022   2. Paroxysmal atrial fibrillation.  S/p PVI and exclusion left atrial appendage (1/2022)  3. NSVT and h/o Sustained monomorphic ventricular tachycardia.  S/p [employer] dual-chamber ICD (1/2022). Was temporarily on amiodarone, now on 75 mg of metoprolol succinate twice daily.  Increase in ventricular ectopy may be correlated with taking THC Gummies.  4. Ischemic cardiomyopathy. EF 45-50%  5. Dyslipidemia, LDL well controlled on rosuvastatin.  Low HDL.  Elevated triglycerides.  6. Morbid obesity  7. MARLEEN.  Not tolerate CPAP  8. Chronic back pain.   Has a pain pump 1  9. Right middle lobe pulmonary nodule.  Measuring 4 mm per CT (4/2022). No history of smoking.  10. Device pocket tenderness.    Plan:  1. Increase metoprolol succinate to 100mg by mouth twice daily.  2. Stop  Clopidogrel after January 2023 and and start aspirin 81mg by mouth daily.   3. Schedule follow up with cardiac electrophysiology to discuss your device pocket discomfort and PVCs.   4. Agree with stopping THC Gummies.    5. Follow up with cardiology SULE[facility] in about 6 months.   6. He was reminded to continue routine follow-up with his PCP regarding diabetic screening etc.    HPI: Is    Onur Barr is a very nice 67 year old here today with his wife to to follow up ischemic cardiomyopathy, h/o PCIs in 2012 and 2021, CABG Jan 2022, PVCs and VT s/p ICD, dyslipidemia.  Overall he is feeling relatively well and has improving exercise tolerance.  He is morbidly obese and has chronic back pain which limits his activity but he is able to go up and down 14 stairs at home.  He has chronic dyspnea which is improving with activity.      His main concerns today are discomfort at his ICD generator site which has been present since implantation  and also last month and very short episodes up to 5 beats of NSVT on Holter monitor.  Of note he started taking 10 mg daily of THC Gummies which was associated with episodes of sweating and probably correlates with the increase in ventricular ectopy.  It seems the sweating has improved since he is stopped taking the THC Gummies recently.    A device pocket some times can be revised but this would be a new wound which would have to heal and may not be advisable.  He may just have some awareness at the pocket site which may not completely resolve.      Recent CV tests which I reviewed  Echo 11/21/22   1. The left ventricle is normal in size. The visual ejection fraction is 45-  50%. Mild global hypokinesis, very limited image quality even with contrast,  cannot comment on any subtle wall motion abnormalities.  2. The right ventricle is normal in structure, function and size.  3. No valve disease.  4. The ascending aorta is Mildly dilated. 3.9cm.  No changes from echo 3/2022.  Holter 24 hr  Sinus with first degree AV block,  Intermittent pacing, 14% PVCs, 2 5 bet runs of NSVT  Last device check 10/17/22  AP: 67%  : 3%  Mode: DDDR 60/130  Presenting Rhythm:  AP/VS with a PVC  Heart Rate: Stable with minimal variability- matches low activity levels  Sensing: WNL  Pacing Threshold: WNL  Impedance: WNL  Battery Status: estimated at 13 years remaining longevity  Atrial Arrhythmia: 7 ATR events logged with 3 EGM's showing A's > V's for aflutter with V rates 81-97 lasting 01-09 seconds. Pt has history of PAF and is on xarelto.                                                                          Ventricular Arrhythmia: 16 NSVT episodes logged with 2 EGM's available. EGM's show 12-20 secs of  NSVT in the VT-1 zone at a rate of 175. Although the onset of these rhythms is not recorded. Will route message to MD as these are the first NSVT episodes logged since implant in January. pt is on Toprol XL 25 mg daily. Amiadarone  was stopped in Feb 2022. EF 3/2022 was 45% . Pt denies symptoms.  ATP: 0  Shocks: 0    Encounter Diagnoses   Name Primary?     Chronic systolic congestive heart failure (H)      S/P CABG (coronary artery bypass graft)        CURRENT MEDICATIONS:  Current Outpatient Medications   Medication Sig Dispense Refill     acetaminophen (TYLENOL) 325 MG tablet Take 2 tablets (650 mg) by mouth every 4 hours as needed for mild pain 40 tablet 0     clopidogrel (PLAVIX) 75 MG tablet Take 1 tablet (75 mg) by mouth daily 90 tablet 0     DULoxetine (CYMBALTA) 30 MG capsule Take 60 mg by mouth every evening       FERATE 240 (27 Fe) MG TABS TAKE 1 TABLET BY MOUTH EVERY DAY *OTC NOT COV* 90 tablet 0     Ferrous Sulfate (IRON PO) Take by mouth daily Dosage is unknown       furosemide (LASIX) 40 MG tablet Take 1 tablet (40 mg) by mouth daily 90 tablet 3     lisinopril (ZESTRIL) 5 MG tablet Take 1 tablet (5 mg) by mouth daily 90 tablet 3     metoprolol succinate ER (TOPROL XL) 25 MG 24 hr tablet Take 3 tablets (75 mg) by mouth 2 times daily 540 tablet 3     MORPHINE SULFATE IT pump:updated 1/12/22  Medications in Pump:   [facility] Responsible for pump medications:   Conc:  Morphine 20mg/ml(3.95 mg/day), clonidine 21.1mcg/ml (4.168 mcg/day), baclofen 42.5mcg/ml (8.395mcg/day)  Rate:   Pump Last Fill Date: 9/2021  Pump Refill Date: 2/2022       nitroGLYcerin (NITROSTAT) 0.4 MG sublingual tablet For chest pain place 1 tablet under the tongue every 5 minutes for 3 doses. If symptoms persist 5 minutes after 1st dose call 911. 30 tablet 1     nystatin (MYCOSTATIN) 398503 UNIT/GM external powder APPLY TO LOWER ABDOMINAL AREA TWICE DAILY 60 g 4     rosuvastatin (CRESTOR) 40 MG tablet TAKE 1 TABLET BY MOUTH EVERY DAY 90 tablet 3     traZODone (DESYREL) 100 MG tablet TAKE 2 TABLETS (200MG TOTAL) BY MOUTH AT BEDTIME 180 tablet 0       ALLERGIES     Allergies   Allergen Reactions     Hydrocodone-Acetaminophen Other (See Comments)      sneezing       PAST MEDICAL, SURGICAL, FAMILY, SOCIAL HISTORY:  History was reviewed and updated as needed, see medical record.    REVIEW OF SYSTEMS:  Skin:        Eyes:       ENT:       Respiratory:  Positive for dyspnea on exertion  Cardiovascular:    Positive for  Gastroenterology:      Genitourinary:       Musculoskeletal:       Neurologic:       Psychiatric:       Heme/Lymph/Imm:       Endocrine:         PHYSICAL EXAM:      BP: 128/86 Pulse: 64     SpO2: 99 %      Vital Signs with Ranges  Pulse:  [64] 64  BP: (128)/(86) 128/86  SpO2:  [99 %] 99 %  313 lbs 11.2 oz    Constitutional: awake, alert, no distress  Eyes: sclera nonicteric  ENT: trachea midline  Respiratory: Clear to auscultation bilaterally  Cardiovascular: Regular rate and rhythm distant, no murmur rub or gallop\  Chest: Sternotomy scar and pocket incisions appear well-healed.  He reports some deep tenderness with palpation at his ICD generator implant site other than that exam seems normal.  GI: nondistended, nontender, bowel sounds present  Lymph/Hematologic: no lymphadenopathy  Skin: dry, no rash trace bilateral pitting nontender pretibial edema, wearing compression stockings.  Musculoskeletal: grossly normal muscle bulk and tone  Neurologic: no focal deficits  Neuropsychiatric: Normal on affect    Recent Lab Results:  LIPID RESULTS:  Lab Results   Component Value Date    CHOL 139 11/21/2022    HDL 33 (L) 11/21/2022    LDL 39 11/21/2022    LDL 69 02/13/2018    TRIG 335 (H) 11/21/2022       LIVER ENZYME RESULTS:  Lab Results   Component Value Date    AST 37 01/20/2022    ALT 25 11/21/2022       CBC RESULTS:  Lab Results   Component Value Date    WBC 7.9 05/03/2022    RBC 4.68 05/03/2022    HGB 12.2 (L) 05/03/2022    HCT 38.0 (L) 05/03/2022    MCV 81 05/03/2022    MCH 26.1 (L) 05/03/2022    MCHC 32.1 05/03/2022    RDW 14.1 05/03/2022     05/03/2022       BMP RESULTS:  Lab Results   Component Value Date     11/21/2022    POTASSIUM 4.0  11/21/2022    POTASSIUM 3.8 08/18/2022    CHLORIDE 105 11/21/2022    CHLORIDE 106 08/18/2022    CO2 28 11/21/2022    CO2 25 08/18/2022    ANIONGAP 8 11/21/2022    ANIONGAP 7 08/18/2022     (H) 11/21/2022    GLC 95 08/18/2022    BUN 17.9 11/21/2022    BUN 19 08/18/2022    CR 0.88 11/21/2022    GFRESTIMATED >90 11/21/2022    GFRESTIMATED >60 01/05/2021    GFRESTBLACK >60 01/05/2021    RATNA 8.8 11/21/2022        A1C RESULTS:  Lab Results   Component Value Date    A1C 5.5 08/18/2022       INR RESULTS:  Lab Results   Component Value Date    INR 1.31 (H) 01/19/2022    INR 1.57 (H) 01/19/2022

## 2022-12-12 ENCOUNTER — MYC REFILL (OUTPATIENT)
Dept: FAMILY MEDICINE | Facility: CLINIC | Age: 67
End: 2022-12-12

## 2022-12-12 DIAGNOSIS — G47.00 INSOMNIA, UNSPECIFIED TYPE: ICD-10-CM

## 2022-12-13 ENCOUNTER — MYC MEDICAL ADVICE (OUTPATIENT)
Dept: INTERNAL MEDICINE | Facility: CLINIC | Age: 67
End: 2022-12-13

## 2022-12-13 DIAGNOSIS — L30.4 INTERTRIGO: ICD-10-CM

## 2022-12-13 DIAGNOSIS — G47.00 INSOMNIA, UNSPECIFIED TYPE: ICD-10-CM

## 2022-12-13 NOTE — TELEPHONE ENCOUNTER
Routing refill request to provider for review/approval because:  Trazodone previously ordered by another provider.      Coleen Greer RN  [facility]

## 2022-12-14 RX ORDER — TRAZODONE HYDROCHLORIDE 100 MG/1
200 TABLET ORAL AT BEDTIME
Qty: 180 TABLET | Refills: 3 | Status: SHIPPED | OUTPATIENT
Start: 2022-12-14 | End: 2022-12-21

## 2022-12-18 ENCOUNTER — HEALTH MAINTENANCE LETTER (OUTPATIENT)
Age: 67
End: 2022-12-18

## 2022-12-19 NOTE — TELEPHONE ENCOUNTER
Please review MyChart message and advise regarding Nystatin. Patient may need directions or quantity adjusted as pharmacy was telling patient too early to refill even though he had refills on file.     Coleen Greer RN  [facility]

## 2022-12-21 RX ORDER — TRAZODONE HYDROCHLORIDE 100 MG/1
200 TABLET ORAL AT BEDTIME
Qty: 180 TABLET | Refills: 3 | Status: SHIPPED | OUTPATIENT
Start: 2022-12-21 | End: 2024-01-22

## 2022-12-21 RX ORDER — NYSTATIN 100000 [USP'U]/G
POWDER TOPICAL 3 TIMES DAILY
Qty: 60 G | Refills: 4 | Status: SHIPPED | OUTPATIENT
Start: 2022-12-21 | End: 2023-12-12

## 2023-01-03 ENCOUNTER — TRANSFERRED RECORDS (OUTPATIENT)
Dept: HEALTH INFORMATION MANAGEMENT | Facility: CLINIC | Age: 68
End: 2023-01-03

## 2023-01-19 ENCOUNTER — ANCILLARY PROCEDURE (OUTPATIENT)
Dept: CARDIOLOGY | Facility: CLINIC | Age: 68
End: 2023-01-19
Attending: INTERNAL MEDICINE
Payer: COMMERCIAL

## 2023-01-19 DIAGNOSIS — I47.29 PAROXYSMAL VENTRICULAR TACHYCARDIA (H): ICD-10-CM

## 2023-01-19 DIAGNOSIS — Z95.810 ICD (IMPLANTABLE CARDIOVERTER-DEFIBRILLATOR) IN PLACE: ICD-10-CM

## 2023-01-19 PROCEDURE — 93296 REM INTERROG EVL PM/IDS: CPT | Performed by: INTERNAL MEDICINE

## 2023-01-19 PROCEDURE — 93295 DEV INTERROG REMOTE 1/2/MLT: CPT | Performed by: INTERNAL MEDICINE

## 2023-01-30 ENCOUNTER — NURSE TRIAGE (OUTPATIENT)
Dept: INTERNAL MEDICINE | Facility: CLINIC | Age: 68
End: 2023-01-30
Payer: COMMERCIAL

## 2023-01-30 NOTE — TELEPHONE ENCOUNTER
"S-(situation): abdominal pain    B-(background): Patient with history of bariatric surgery and history of heart disease, hyperlipidemia, hypertension    A-(assessment): Patient complains of right and central upper abdominal pain one episode last week and again today.  Patient's voice sounds strained as if in a great deal of pain.  Patient reports pain as sharp and rates pain 8/10.  Pain radiates through to back.  He denies pain in chest, neck, arms or jaw. He did force himself to vomit in hopes it would relieve some of his pain.     R-(recommendations): go to ER now.  Patient agrees and will have wife take him. LYN Reece R.N.      Reason for Disposition    SEVERE abdominal pain (e.g., excruciating)    Pain lasting > 10 minutes and over 50 years old    Pain lasting > 10 minutes and over 40 years old and associated chest, arm, neck, upper back, or jaw pain    Pain lasting > 10 minutes and over 35 years old and at least one cardiac risk factor (i.e., hypertension, diabetes, obesity, smoker or strong family history of heart disease)    Pain lasting > 10 minutes and history of heart disease (i.e., heart attack, bypass surgery, angina, angioplasty, CHF)    Additional Information    Negative: Passed out (i.e., fainted, collapsed and was not responding)    Negative: Shock suspected (e.g., cold/pale/clammy skin, too weak to stand, low BP, rapid pulse)    Negative: Visible sweat on face or sweat is dripping down    Negative: Chest pain    Negative: Recent injury to the abdomen    Negative: Vomiting red blood or black (coffee ground) material    Negative: Bloody, black, or tarry bowel movements  (Exception: Chronic-unchanged black-grey bowel movements and is taking iron pills or Pepto-Bismol.)    Answer Assessment - Initial Assessment Questions  1. LOCATION: \"Where does it hurt?\"       Mid to right upper abdomen  2. RADIATION: \"Does the pain shoot anywhere else?\" (e.g., chest, back)      Radiates through to back  3. ONSET: " "\"When did the pain begin?\" (e.g., minutes, hours or days ago)       Had an episode a week ago and now this episode this afternoon  4. SUDDEN: \"Gradual or sudden onset?\"      sudden  5. PATTERN \"Does the pain come and go, or is it constant?\"     - If constant: \"Is it getting better, staying the same, or worsening?\"       (Note: Constant means the pain never goes away completely; most serious pain is constant and it progresses)      - If intermittent: \"How long does it last?\" \"Do you have pain now?\"      (Note: Intermittent means the pain goes away completely between bouts)      constant  6. SEVERITY: \"How bad is the pain?\"  (e.g., Scale 1-10; mild, moderate, or severe)     - MILD (1-3): doesn't interfere with normal activities, abdomen soft and not tender to touch      - MODERATE (4-7): interferes with normal activities or awakens from sleep, abdomen tender to touch      - SEVERE (8-10): excruciating pain, doubled over, unable to do any normal activities        Rates 8/10, sharp  7. RECURRENT SYMPTOM: \"Have you ever had this type of stomach pain before?\" If Yes, ask: \"When was the last time?\" and \"What happened that time?\"       Once before, last week.  Pain was worse that episode  8. AGGRAVATING FACTORS: \"Does anything seem to cause this pain?\" (e.g., foods, stress, alcohol)      Eating or drinking has set it off  9. CARDIAC SYMPTOMS: \"Do you have any of the following symptoms: chest pain, difficulty breathing, sweating, nausea?\"      Breathing is a little bit harder than ususal, no sweating.  Patient forced himself to vomit in hopes it would relieve some of the pain.  10. OTHER SYMPTOMS: \"Do you have any other symptoms?\" (e.g., back pain, diarrhea, fever, urination pain, vomiting)        No diarrhea, fever, urinary symptoms.    11. PREGNANCY: \"Is there any chance you are pregnant?\" \"When was your last menstrual period?\"        NA    Protocols used: ABDOMINAL PAIN - UPPER-A-OH      "

## 2023-01-31 ENCOUNTER — OFFICE VISIT (OUTPATIENT)
Dept: INTERNAL MEDICINE | Facility: CLINIC | Age: 68
End: 2023-01-31
Payer: COMMERCIAL

## 2023-01-31 VITALS
DIASTOLIC BLOOD PRESSURE: 82 MMHG | WEIGHT: 315 LBS | RESPIRATION RATE: 20 BRPM | SYSTOLIC BLOOD PRESSURE: 132 MMHG | HEIGHT: 75 IN | HEART RATE: 54 BPM | TEMPERATURE: 98.4 F | OXYGEN SATURATION: 98 % | BODY MASS INDEX: 39.17 KG/M2

## 2023-01-31 DIAGNOSIS — B37.2 CANDIDAL INTERTRIGO: Primary | ICD-10-CM

## 2023-01-31 DIAGNOSIS — E66.01 MORBID OBESITY (H): ICD-10-CM

## 2023-01-31 DIAGNOSIS — Z98.84 GASTRIC BYPASS STATUS FOR OBESITY: ICD-10-CM

## 2023-01-31 PROCEDURE — 99214 OFFICE O/P EST MOD 30 MIN: CPT | Performed by: INTERNAL MEDICINE

## 2023-01-31 RX ORDER — FLUCONAZOLE 100 MG/1
100 TABLET ORAL DAILY
Qty: 15 TABLET | Refills: 0 | Status: SHIPPED | OUTPATIENT
Start: 2023-01-31 | End: 2023-02-18

## 2023-01-31 RX ORDER — KETOCONAZOLE 20 MG/G
CREAM TOPICAL 2 TIMES DAILY
Qty: 60 G | Refills: 4 | Status: SHIPPED | OUTPATIENT
Start: 2023-01-31 | End: 2023-08-23

## 2023-01-31 ASSESSMENT — PATIENT HEALTH QUESTIONNAIRE - PHQ9
10. IF YOU CHECKED OFF ANY PROBLEMS, HOW DIFFICULT HAVE THESE PROBLEMS MADE IT FOR YOU TO DO YOUR WORK, TAKE CARE OF THINGS AT HOME, OR GET ALONG WITH OTHER PEOPLE: VERY DIFFICULT
SUM OF ALL RESPONSES TO PHQ QUESTIONS 1-9: 11
SUM OF ALL RESPONSES TO PHQ QUESTIONS 1-9: 11

## 2023-01-31 NOTE — PATIENT INSTRUCTIONS
Comes in for follow-up visit with internal medicine, Baudilio recently started working with Dr. Braden mejia at [facility] in [place], close to where he lives.  Retired employee of the [employer]  Baudilio  comes in today to see me because of skin eruption in his lower abdomen and groins, which I am diagnosing as    Intertrigo, with excoriated skin  in the context of a rather complex situation of morbid obesity with thoracolumbar spine curvature and forward posture, which exacerbates the skin folds and pressure, making skin care more difficult.    Baudilio's wife helps him with skin care, and they have been using powders and also having tucking gauze and cloth into the skin folds    On examination today January 31, 2023, Baudilio has characteristic erythematous skin, in a moist environment, with a few areas of excoriation  I do not think there is bacterial infection to a great degree.  Baudilio told me that the skin does not itch.    I would like to start Baudilio on an intensive regimen to treat fungal intertrigo  We will have him use ketoconazole cream which has both antifungal and antibacterial effects, use 3 times a day.    I want Baudilio to get into a routine of cleaning the area 2 times a day, before each application of ketoconazole cream    Cleanse the area with an antibacterial soap, and then make sure the skin is dry before applying any creams or lotions.    He will probably need to lie on his back in order to open up the skin folds.  After washing, pat the skin dry with a clean towel.  Then let it air dry.  He can also use a hair dryer set on air only (no heat, or lowest heat setting    Then apply the ketoconazole cream  Then apply absorbent pads to try to alleviate skin to skin contact    Multiple times throughout the day, he will need to change the pads.  That will be lying down to open up skin folds, and change the pads    I am also going to give Baudilio 2 weeks of fluconazole, 100 mg daily.    I told Baudilio that the  management of intertrigo with his situation can be challenging, and he may need the help of a dermatologist.    Baudilio had one other symptom to mention to me today:  Right-sided chest and upper abdominal pain, which is relieved by vomiting, and tends to occur after eating, which I believe likely relates to his gastric bypass status (2008).    I told Baudilio that it is a well-known side effect after gastric bypass that over distention of the gastric remnant causes pain, nausea, and vomiting.    For gastric bypass status, Baudilio takes supplemental iron    Chronic pain syndrome  Long history of kyphotic posture (since mid-1990s) so altered body mechanics are likely contributing to his pain    Chayo Lama and Anthony Maloney  [facility]    Intrathecal pump  IT pump:updated 1/12/22 Medications in Pump: [facility] Responsible for pump medications: Conc:  Morphine 20mg/ml(3.95 mg/day), clonidine 21.1mcg/ml (4.168 mcg/day), baclofen 42.5mcg/ml (8.395mcg/day) Rate: Pump Last Fill Date: 9/2021 Pump Refill Date: 2/2022    Also takes duloxetine 30 mg daily    Type 2 diabetes mellitus, managed without medication    BP Readings from Last 6 Encounters:   01/31/23 132/82   11/28/22 128/86   08/18/22 131/79   07/14/22 128/79   05/31/22 123/68   05/20/22 110/70     8-  Hemoglobin A1C 0.0 - 5.6 % 5.5      TSH 0.40 - 4.00 mU/L 1.16      Hypertension in the context of type 2 diabetes and obesity, takes lisinopril 5 mg daily and also metoprolol succinate 100 mg twice a day    Mixed dyslipidemia, on high intensity rosuvastatin, with elevated triglycerides and low HDL in the context of type 2 diabetes  11-  Triglycerides <150 mg/dL 335 High      Direct Measure HDL >=40 mg/dL 33 Low      LDL Cholesterol Calculated <=100 mg/dL 39      Leg edema which he manages with furosemide 40 mg tablet taken daily    Insomnia, for which he takes trazodone

## 2023-01-31 NOTE — PROGRESS NOTES
Office Visit - Follow Up   Onur Barr   67 year old male    Date of Visit: 1/31/2023    No chief complaint on file.       -------------------------------------------------------------------------------------------------------------------------  Assessment and Plan    Comes in for follow-up visit with internal medicine, Baudilio recently started working with Dr. Braden mejia at [facility] in [place], close to where he lives.  Retired employee of the [employer]  Baudilio  comes in today to see me because of skin eruption in his lower abdomen and groins, which I am diagnosing as    Intertrigo, with excoriated skin  in the context of a rather complex situation of morbid obesity with thoracolumbar spine curvature and forward posture, which exacerbates the skin folds and pressure, making skin care more difficult.    Baudilio's wife helps him with skin care, and they have been using powders and also having tucking gauze and cloth into the skin folds    On examination today January 31, 2023, Baudilio has characteristic erythematous skin, in a moist environment, with a few areas of excoriation  I do not think there is bacterial infection to a great degree.  Baudilio told me that the skin does not itch.    I would like to start Baudilio on an intensive regimen to treat fungal intertrigo  We will have him use ketoconazole cream which has both antifungal and antibacterial effects, use 3 times a day.    I want Baudilio to get into a routine of cleaning the area 2 times a day, before each application of ketoconazole cream    Cleanse the area with an antibacterial soap, and then make sure the skin is dry before applying any creams or lotions.    He will probably need to lie on his back in order to open up the skin folds.  After washing, pat the skin dry with a clean towel.  Then let it air dry.  He can also use a hair dryer set on air only (no heat, or lowest heat setting    Then apply the ketoconazole cream  Then apply absorbent pads to  try to alleviate skin to skin contact    Multiple times throughout the day, he will need to change the pads.  That will be lying down to open up skin folds, and change the pads    I am also going to give Baudilio 2 weeks of fluconazole, 100 mg daily.    I told Baudilio that the management of intertrigo with his situation can be challenging, and he may need the help of a dermatologist.    Baudilio had one other symptom to mention to me today:  Right-sided chest and upper abdominal pain, which is relieved by vomiting, and tends to occur after eating, which I believe likely relates to his gastric bypass status (2008).    I told Baudilio that it is a well-known side effect after gastric bypass that over distention of the gastric remnant causes pain, nausea, and vomiting.    For gastric bypass status, Baudilio takes supplemental iron    Chronic pain syndrome  Long history of kyphotic posture (since mid-1990s) so altered body mechanics are likely contributing to his pain    Chayo Lmaa and Anthony Maloney  [facility]    Intrathecal pump  IT pump:updated 1/12/22 Medications in Pump: [facility] Responsible for pump medications: Conc:  Morphine 20mg/ml(3.95 mg/day), clonidine 21.1mcg/ml (4.168 mcg/day), baclofen 42.5mcg/ml (8.395mcg/day) Rate: Pump Last Fill Date: 9/2021 Pump Refill Date: 2/2022    Also takes duloxetine 30 mg daily    Type 2 diabetes mellitus, managed without medication    BP Readings from Last 6 Encounters:   01/31/23 132/82   11/28/22 128/86   08/18/22 131/79   07/14/22 128/79   05/31/22 123/68   05/20/22 110/70     8-  Hemoglobin A1C 0.0 - 5.6 % 5.5      TSH 0.40 - 4.00 mU/L 1.16      Hypertension in the context of type 2 diabetes and obesity, takes lisinopril 5 mg daily and also metoprolol succinate 100 mg twice a day    Mixed dyslipidemia, on high intensity rosuvastatin, with elevated triglycerides and low HDL in the context of type 2 diabetes  11-  Triglycerides <150 mg/dL  335 High      Direct Measure HDL >=40 mg/dL 33 Low      LDL Cholesterol Calculated <=100 mg/dL 39      Leg edema which he manages with furosemide 40 mg tablet taken daily    Insomnia, for which he takes trazodone    --------------------------------------------------------------------------------------------------------------------------  History of Present Illness  This 67 year old old    Reason for visit:  Stomach rash     He eats 2-3 servings of fruits and vegetables daily.He consumes 2 sweetened beverage(s) daily.He exercises with enough effort to increase his heart rate 9 or less minutes per day.  He exercises with enough effort to increase his heart rate 3 or less days per week.   He is taking medications regularly.     Today's PHQ-9         PHQ-9 Total Score: 11     PHQ-9 Q9 Thoughts of better off dead/self-harm past 2 weeks :   Not at all     How difficult have these problems made it for you to do your work, take care of things at home, or get along with other people: Very difficult    Comes in for follow-up visit with internal medicine, Baudilio recently started working with Dr. Feliciano over at [facility] in [place], close to where he lives.  Retired employee of [employer]  Baudilio  comes in today to see me because of skin eruption in his lower abdomen and groins, which I am diagnosing as    Intertrigo, with excoriated skin  in the context of a rather complex situation of morbid obesity with thoracolumbar spine curvature and forward posture, which exacerbates the skin folds and pressure, making skin care more difficult.    Baudilio's wife helps him with skin care, and they have been using powders and also having tucking gauze and cloth into the skin folds      Wt Readings from Last 3 Encounters:   01/31/23 145.6 kg (321 lb)   11/28/22 142.3 kg (313 lb 11.2 oz)   08/18/22 131.1 kg (289 lb)     BP Readings from Last 3 Encounters:   01/31/23 132/82   11/28/22 128/86   08/18/22 131/79      ---------------------------------------------------------------------------------------------------------------------------    Medications, Allergies, Social, and Problem List   Current Outpatient Medications   Medication Sig Dispense Refill     acetaminophen (TYLENOL) 325 MG tablet Take 2 tablets (650 mg) by mouth every 4 hours as needed for mild pain 40 tablet 0     DULoxetine (CYMBALTA) 30 MG capsule Take 60 mg by mouth every evening       FERATE 240 (27 Fe) MG TABS TAKE 1 TABLET BY MOUTH EVERY DAY *OTC NOT COV* 90 tablet 0     Ferrous Sulfate (IRON PO) Take by mouth daily Dosage is unknown       furosemide (LASIX) 40 MG tablet Take 1 tablet (40 mg) by mouth daily 90 tablet 3     lisinopril (ZESTRIL) 5 MG tablet Take 1 tablet (5 mg) by mouth daily 90 tablet 3     metoprolol succinate ER (TOPROL XL) 100 MG 24 hr tablet Take 1 tablet (100 mg) by mouth 2 times daily 180 tablet 3     MORPHINE SULFATE IT pump:updated 1/12/22  Medications in Pump:   [facility] Responsible for pump medications:   Conc:  Morphine 20mg/ml(3.95 mg/day), clonidine 21.1mcg/ml (4.168 mcg/day), baclofen 42.5mcg/ml (8.395mcg/day)  Rate:   Pump Last Fill Date: 9/2021  Pump Refill Date: 2/2022       nitroGLYcerin (NITROSTAT) 0.4 MG sublingual tablet For chest pain place 1 tablet under the tongue every 5 minutes for 3 doses. If symptoms persist 5 minutes after 1st dose call 911. 30 tablet 1     nystatin (MYCOSTATIN) 952819 UNIT/GM external powder Apply topically 3 times daily 60 g 4     rosuvastatin (CRESTOR) 40 MG tablet TAKE 1 TABLET BY MOUTH EVERY DAY 90 tablet 3     traZODone (DESYREL) 100 MG tablet Take 2 tablets (200 mg) by mouth At Bedtime TAKE 2 TABLETS (200MG TOTAL) BY MOUTH AT BEDTIME Strength: 100 mg 180 tablet 3     Allergies   Allergen Reactions     Hydrocodone-Acetaminophen Other (See Comments)     sneezing     Social History     Tobacco Use     Smoking status: Former     Types: Cigars     Smokeless tobacco: Never  "  Vaping Use     Vaping Use: Never used   Substance Use Topics     Alcohol use: No     Drug use: No     Comment: Drug use: formerly used     Patient Active Problem List   Diagnosis     Lumbago     Morbid obesity -- BMI 42.0     Bilateral leg edema     Chronic diastolic heart failure (H)     MARLEEN (doesn't tolerate CPAP)     NSTEMI w Unstab Angina -- S/P CABG x 1 (LIMA to LAD) on 1/19/22     Paroxysmal atrial fib -- S/P Pulm Vein Ablation 1/19/22     Essential hypertension     Mixed hyperlipidemia     Gastroesophageal reflux disease without esophagitis     Chronic Back Pain (IT Pump w Morphine, Clonidine, Baclofen)     S/P CABG (coronary artery bypass graft)     Fluid overload     ICD (implantable cardioverter-defibrillator) in place     Peripheral vascular disease (H)     Abdominal panniculus     Bariatric surgery status     Claudication of lower extremity (H)     Insomnia     Intestinal disaccharidase deficiencies and disaccharide malabsorption     Nephrolithiasis     Osteoarthrosis     Post-laminectomy syndrome     Sleepiness     Coronary arteriosclerosis        Reviewed, reconciled and updated       Physical Exam   General Appearance:      /82 (BP Location: Right arm, Patient Position: Sitting, Cuff Size: Adult Large)   Pulse 54   Temp 98.4  F (36.9  C)   Resp 20   Ht 1.905 m (6' 3\")   Wt 145.6 kg (321 lb)   SpO2 98%   BMI 40.12 kg/m      On examination today January 31, 2023, Baudilio has characteristic erythematous skin, in a moist environment, with a few areas of excoriation  I do not think there is bacterial infection to a great degree.  Baudilio told me that the skin does not itch.     Additional Information        NISHANT MORALES MD, MD    "

## 2023-02-05 LAB
MDC_IDC_EPISODE_DTM: NORMAL
MDC_IDC_EPISODE_DURATION: 10 S
MDC_IDC_EPISODE_DURATION: 11 S
MDC_IDC_EPISODE_DURATION: 13 S
MDC_IDC_EPISODE_DURATION: 2 S
MDC_IDC_EPISODE_DURATION: 3 S
MDC_IDC_EPISODE_DURATION: 3 S
MDC_IDC_EPISODE_DURATION: 4 S
MDC_IDC_EPISODE_DURATION: 4 S
MDC_IDC_EPISODE_DURATION: 5 S
MDC_IDC_EPISODE_DURATION: 7 S
MDC_IDC_EPISODE_ID: NORMAL
MDC_IDC_EPISODE_TYPE: NORMAL
MDC_IDC_LEAD_IMPLANT_DT: NORMAL
MDC_IDC_LEAD_IMPLANT_DT: NORMAL
MDC_IDC_LEAD_LOCATION: NORMAL
MDC_IDC_LEAD_LOCATION: NORMAL
MDC_IDC_LEAD_LOCATION_DETAIL_1: NORMAL
MDC_IDC_LEAD_LOCATION_DETAIL_1: NORMAL
MDC_IDC_LEAD_MFG: NORMAL
MDC_IDC_LEAD_MFG: NORMAL
MDC_IDC_LEAD_MODEL: NORMAL
MDC_IDC_LEAD_MODEL: NORMAL
MDC_IDC_LEAD_POLARITY_TYPE: NORMAL
MDC_IDC_LEAD_POLARITY_TYPE: NORMAL
MDC_IDC_LEAD_SERIAL: NORMAL
MDC_IDC_LEAD_SERIAL: NORMAL
MDC_IDC_MSMT_BATTERY_DTM: NORMAL
MDC_IDC_MSMT_BATTERY_REMAINING_LONGEVITY: 156 MO
MDC_IDC_MSMT_BATTERY_REMAINING_PERCENTAGE: 100 %
MDC_IDC_MSMT_BATTERY_STATUS: NORMAL
MDC_IDC_MSMT_CAP_CHARGE_DTM: NORMAL
MDC_IDC_MSMT_CAP_CHARGE_TIME: 10 S
MDC_IDC_MSMT_CAP_CHARGE_TYPE: NORMAL
MDC_IDC_MSMT_LEADCHNL_RA_IMPEDANCE_VALUE: 646 OHM
MDC_IDC_MSMT_LEADCHNL_RA_PACING_THRESHOLD_AMPLITUDE: 0.4 V
MDC_IDC_MSMT_LEADCHNL_RA_PACING_THRESHOLD_PULSEWIDTH: 0.4 MS
MDC_IDC_MSMT_LEADCHNL_RV_IMPEDANCE_VALUE: 420 OHM
MDC_IDC_MSMT_LEADCHNL_RV_PACING_THRESHOLD_AMPLITUDE: 0.8 V
MDC_IDC_MSMT_LEADCHNL_RV_PACING_THRESHOLD_PULSEWIDTH: 0.4 MS
MDC_IDC_PG_IMPLANT_DTM: NORMAL
MDC_IDC_PG_MFG: NORMAL
MDC_IDC_PG_MODEL: NORMAL
MDC_IDC_PG_SERIAL: NORMAL
MDC_IDC_PG_TYPE: NORMAL
MDC_IDC_SESS_CLINIC_NAME: NORMAL
MDC_IDC_SESS_DTM: NORMAL
MDC_IDC_SESS_TYPE: NORMAL
MDC_IDC_SET_BRADY_AT_MODE_SWITCH_MODE: NORMAL
MDC_IDC_SET_BRADY_AT_MODE_SWITCH_RATE: 170 {BEATS}/MIN
MDC_IDC_SET_BRADY_LOWRATE: 60 {BEATS}/MIN
MDC_IDC_SET_BRADY_MAX_SENSOR_RATE: 130 {BEATS}/MIN
MDC_IDC_SET_BRADY_MAX_TRACKING_RATE: 130 {BEATS}/MIN
MDC_IDC_SET_BRADY_MODE: NORMAL
MDC_IDC_SET_BRADY_PAV_DELAY_HIGH: 200 MS
MDC_IDC_SET_BRADY_PAV_DELAY_LOW: 300 MS
MDC_IDC_SET_BRADY_SAV_DELAY_HIGH: 200 MS
MDC_IDC_SET_BRADY_SAV_DELAY_LOW: 300 MS
MDC_IDC_SET_LEADCHNL_RA_PACING_AMPLITUDE: 2 V
MDC_IDC_SET_LEADCHNL_RA_PACING_CAPTURE_MODE: NORMAL
MDC_IDC_SET_LEADCHNL_RA_PACING_POLARITY: NORMAL
MDC_IDC_SET_LEADCHNL_RA_PACING_PULSEWIDTH: 0.4 MS
MDC_IDC_SET_LEADCHNL_RA_SENSING_ADAPTATION_MODE: NORMAL
MDC_IDC_SET_LEADCHNL_RA_SENSING_POLARITY: NORMAL
MDC_IDC_SET_LEADCHNL_RA_SENSING_SENSITIVITY: 0.25 MV
MDC_IDC_SET_LEADCHNL_RV_PACING_AMPLITUDE: 2 V
MDC_IDC_SET_LEADCHNL_RV_PACING_CAPTURE_MODE: NORMAL
MDC_IDC_SET_LEADCHNL_RV_PACING_POLARITY: NORMAL
MDC_IDC_SET_LEADCHNL_RV_PACING_PULSEWIDTH: 0.4 MS
MDC_IDC_SET_LEADCHNL_RV_SENSING_ADAPTATION_MODE: NORMAL
MDC_IDC_SET_LEADCHNL_RV_SENSING_POLARITY: NORMAL
MDC_IDC_SET_LEADCHNL_RV_SENSING_SENSITIVITY: 0.6 MV
MDC_IDC_SET_ZONE_DETECTION_INTERVAL: 250 MS
MDC_IDC_SET_ZONE_DETECTION_INTERVAL: 300 MS
MDC_IDC_SET_ZONE_DETECTION_INTERVAL: 353 MS
MDC_IDC_SET_ZONE_TYPE: NORMAL
MDC_IDC_SET_ZONE_VENDOR_TYPE: NORMAL
MDC_IDC_STAT_AT_BURDEN_PERCENT: 1 %
MDC_IDC_STAT_AT_DTM_END: NORMAL
MDC_IDC_STAT_AT_DTM_START: NORMAL
MDC_IDC_STAT_BRADY_DTM_END: NORMAL
MDC_IDC_STAT_BRADY_DTM_START: NORMAL
MDC_IDC_STAT_BRADY_RA_PERCENT_PACED: 69 %
MDC_IDC_STAT_BRADY_RV_PERCENT_PACED: 3 %
MDC_IDC_STAT_EPISODE_RECENT_COUNT: 0
MDC_IDC_STAT_EPISODE_RECENT_COUNT: 16
MDC_IDC_STAT_EPISODE_RECENT_COUNT: 28
MDC_IDC_STAT_EPISODE_RECENT_COUNT_DTM_END: NORMAL
MDC_IDC_STAT_EPISODE_RECENT_COUNT_DTM_START: NORMAL
MDC_IDC_STAT_EPISODE_TYPE: NORMAL
MDC_IDC_STAT_EPISODE_VENDOR_TYPE: NORMAL
MDC_IDC_STAT_TACHYTHERAPY_ATP_DELIVERED_RECENT: 0
MDC_IDC_STAT_TACHYTHERAPY_ATP_DELIVERED_TOTAL: 0
MDC_IDC_STAT_TACHYTHERAPY_RECENT_DTM_END: NORMAL
MDC_IDC_STAT_TACHYTHERAPY_RECENT_DTM_START: NORMAL
MDC_IDC_STAT_TACHYTHERAPY_SHOCKS_ABORTED_RECENT: 0
MDC_IDC_STAT_TACHYTHERAPY_SHOCKS_ABORTED_TOTAL: 0
MDC_IDC_STAT_TACHYTHERAPY_SHOCKS_DELIVERED_RECENT: 0
MDC_IDC_STAT_TACHYTHERAPY_SHOCKS_DELIVERED_TOTAL: 0
MDC_IDC_STAT_TACHYTHERAPY_TOTAL_DTM_END: NORMAL
MDC_IDC_STAT_TACHYTHERAPY_TOTAL_DTM_START: NORMAL

## 2023-02-16 DIAGNOSIS — B37.2 CANDIDAL INTERTRIGO: ICD-10-CM

## 2023-02-18 RX ORDER — FLUCONAZOLE 100 MG/1
100 TABLET ORAL DAILY
Qty: 15 TABLET | Refills: 0 | Status: SHIPPED | OUTPATIENT
Start: 2023-02-18 | End: 2023-03-30

## 2023-02-18 NOTE — TELEPHONE ENCOUNTER
"Routing refill request to provider for review/approval because:  Med     Last Written Prescription Date:  23  Last Fill Quantity: 15,  # refills: 0   Last office visit provider:  23     Requested Prescriptions   Pending Prescriptions Disp Refills     fluconazole (DIFLUCAN) 100 MG tablet [Pharmacy Med Name: FLUCONAZOLE 100 MG TABLET] 15 tablet 0     Sig: TAKE 1 TABLET (100 MG) BY MOUTH DAILY FOR 15 DAYS       Antifungal Agents Failed - 2023  3:50 PM        Failed - Not Fluconazole or Terconazole      If oral Fluconazole or Terconazole, may refill if indicated in progress notes.           Passed - Recent (12 mo) or future (30 days) visit within the authorizing provider's specialty     Patient has had an office visit with the authorizing provider or a provider within the authorizing providers department within the previous 12 mos or has a future within next 30 days. See \"Patient Info\" tab in inbasket, or \"Choose Columns\" in Meds & Orders section of the refill encounter.              Passed - Medication is active on med list             Lilli Frank RN 23 9:23 AM  "

## 2023-02-25 DIAGNOSIS — D64.9 ANEMIA, UNSPECIFIED TYPE: ICD-10-CM

## 2023-02-27 RX ORDER — FERROUS GLUCONATE 240(27)MG
TABLET ORAL
Qty: 90 TABLET | Refills: 0 | Status: SHIPPED | OUTPATIENT
Start: 2023-02-27 | End: 2023-05-10

## 2023-03-01 ENCOUNTER — MYC MEDICAL ADVICE (OUTPATIENT)
Dept: CARDIOLOGY | Facility: CLINIC | Age: 68
End: 2023-03-01

## 2023-03-01 ENCOUNTER — OFFICE VISIT (OUTPATIENT)
Dept: INTERNAL MEDICINE | Facility: CLINIC | Age: 68
End: 2023-03-01
Payer: OTHER MISCELLANEOUS

## 2023-03-01 VITALS
OXYGEN SATURATION: 97 % | WEIGHT: 315 LBS | HEIGHT: 75 IN | TEMPERATURE: 97.9 F | SYSTOLIC BLOOD PRESSURE: 160 MMHG | RESPIRATION RATE: 24 BRPM | BODY MASS INDEX: 39.17 KG/M2 | DIASTOLIC BLOOD PRESSURE: 90 MMHG | HEART RATE: 76 BPM

## 2023-03-01 DIAGNOSIS — E11.9 TYPE 2 DIABETES MELLITUS WITHOUT COMPLICATION, WITHOUT LONG-TERM CURRENT USE OF INSULIN (H): ICD-10-CM

## 2023-03-01 DIAGNOSIS — I48.0 PAROXYSMAL ATRIAL FIBRILLATION (H): ICD-10-CM

## 2023-03-01 DIAGNOSIS — Z01.818 PREOPERATIVE EXAMINATION: Primary | ICD-10-CM

## 2023-03-01 DIAGNOSIS — I50.22 CHRONIC SYSTOLIC CONGESTIVE HEART FAILURE (H): ICD-10-CM

## 2023-03-01 DIAGNOSIS — E66.01 MORBID OBESITY (H): ICD-10-CM

## 2023-03-01 DIAGNOSIS — I25.119 CORONARY ARTERY DISEASE INVOLVING NATIVE CORONARY ARTERY OF NATIVE HEART WITH ANGINA PECTORIS (H): ICD-10-CM

## 2023-03-01 DIAGNOSIS — G89.4 CHRONIC PAIN SYNDROME: ICD-10-CM

## 2023-03-01 LAB — HBA1C MFR BLD: 5.9 % (ref 0–5.6)

## 2023-03-01 PROCEDURE — 99213 OFFICE O/P EST LOW 20 MIN: CPT | Performed by: INTERNAL MEDICINE

## 2023-03-01 PROCEDURE — 36415 COLL VENOUS BLD VENIPUNCTURE: CPT | Performed by: INTERNAL MEDICINE

## 2023-03-01 PROCEDURE — 83036 HEMOGLOBIN GLYCOSYLATED A1C: CPT | Performed by: INTERNAL MEDICINE

## 2023-03-01 ASSESSMENT — ENCOUNTER SYMPTOMS
ARTHRALGIAS: 1
GASTROINTESTINAL NEGATIVE: 1
FATIGUE: 1
BACK PAIN: 1
SHORTNESS OF BREATH: 1
NEUROLOGICAL NEGATIVE: 1

## 2023-03-01 NOTE — PROGRESS NOTES
[place] [address] [address]  [address]  [facility] [address]  Phone: [phone]  Primary Provider: Mi Llamas  Pre-op Performing Provider: MI LLAMAS      PREOPERATIVE EVALUATION:  Today's date: 3/1/2023    Onur Barr is a 67 year old male who presents for a preoperative evaluation.    Surgical Information:  Surgery/Procedure: pump replacement  Surgery Location: TBD  Surgeon: TBD  Surgery Date: TBD  Time of Surgery: TBD  Where patient plans to recover: At home with family  Fax number for surgical facility: [phone]    Type of Anesthesia Anticipated: General    Assessment & Plan     The proposed surgical procedure is considered INTERMEDIATE risk.    Preoperative examination and chronic pain syndrome  At this time, patient does have several complicating factors in regards to his preoperative examination.  Given that he did have a relatively recent myocardial infarction with subsequent CABG, I do feel that it would be worthwhile for him to be seen by his cardiologist for further evaluation for his preoperative examination.  Patient does have several other complicating factors including the placement of an ICD, history of atrial fibrillation, congestive heart failure, and prior myocardial infarction during a previous morphine pump battery replacement procedure.  Patient is in agreement that he should be seen by his cardiologist for ongoing preoperative evaluation.    Type 2 diabetes mellitus without complication, without long-term current use of insulin (H)  Patient is due for a repeat hemoglobin A1c.  His last A1c was noted to be 5.5 in August 2022.  Patient is not currently taking medication for management of his blood sugar.  He will be contacted with results once available for review.    Morbid obesity (H)  BMI is 40.1 with a history of type 2 diabetes.  Weight loss encouraged.    Chronic systolic congestive heart failure (H), paroxysmal atrial  fibrillation (H), and coronary artery disease involving native coronary artery of native heart with angina pectoris (H)  Chronic conditions.  Managed by his cardiologist.          Risks and Recommendations:  The patient has the following additional risks and recommendations for perioperative complications:   - Consult Hospitalist / IM to assist with post-op medical management    Medication Instructions:  Patient is to take all scheduled medications on the day of surgery    RECOMMENDATION:  Patient referred to cardiology for evaluation before surgery. Surgery approval pending completion of consultation.    Ordering of each unique test  25 minutes spent on the date of the encounter doing chart review, history and exam, documentation and further activities per the note      Subjective     HPI related to upcoming procedure:   Patient is a 67-year-old  male with a very complicated past medical history that includes atrial fibrillation status post ICD placement, coronary disease status post CABG in January 2022, diabetes mellitus, and congestive heart failure.  He presents to the clinic for preoperative examination.  Patient is attempting to schedule a morphine pump battery replacement procedure.  He has been utilizing morphine pump for chronic pain management related to issues with his back.  Patient has undergone this procedure several times before, he does state that he did suffer a myocardial infarction during his most recent battery replacement.  Patient also had a repeat myocardial infarction in January 2022 that did require a subsequent CABG as he did not respond well to stent placement.  Patient's last hemoglobin A1c was noted to be 5.5 in August 2022.  Patient does report that he does not have a surgical date set as of yet.  He is not currently on any anticoagulation.  Patient's initial blood pressure was quite high at 181/98    Preop Questions 2/22/2023   1. Have you ever had a heart attack or stroke?  YES -January 2022 status post CABG   2. Have you ever had surgery on your heart or blood vessels, such as a stent placement, a coronary artery bypass, or surgery on an artery in your head, neck, heart, or legs? YES -CABG in January 2022   3. Do you have chest pain with activity? No   4. Do you have a history of  heart failure? YES -    5. Do you currently have a cold, bronchitis or symptoms of other infection? No   6. Do you have a cough, shortness of breath, or wheezing? No   7. Do you or anyone in your family have previous history of blood clots? YES -    8. Do you or does anyone in your family have a serious bleeding problem such as prolonged bleeding following surgeries or cuts? No   9. Have you ever had problems with anemia or been told to take iron pills? YES -    10. Have you had any abnormal blood loss such as black, tarry or bloody stools? YES -    11. Have you ever had a blood transfusion? YES -    11a. Have you ever had a transfusion reaction? No   12. Are you willing to have a blood transfusion if it is medically needed before, during, or after your surgery? Yes   13. Have you or any of your relatives ever had problems with anesthesia? No   14. Do you have sleep apnea, excessive snoring or daytime drowsiness? YES -    14a. Do you have a CPAP machine? No   15. Do you have any artifical heart valves or other implanted medical devices like a pacemaker, defibrillator, or continuous glucose monitor? YES -ICD   15a. What type of device do you have? Defibrillator   15b. Name of the clinic that manages your device:  [facility]   16. Do you have artificial joints? No   17. Are you allergic to latex? No       Health Care Directive:  Patient does not have a Health Care Directive or Living Will: Discussed advance care planning with patient; however, patient declined at this time.    Preoperative Review of :  Controlled substances as noted in medical record.        Review of Systems   Constitutional: Positive for  fatigue.   HENT: Negative.    Respiratory: Positive for shortness of breath.    Cardiovascular: Positive for peripheral edema.   Gastrointestinal: Negative.    Genitourinary: Negative.    Musculoskeletal: Positive for arthralgias and back pain.   Neurological: Negative.          Patient Active Problem List    Diagnosis Date Noted     Candidal intertrigo 01/31/2023     Priority: Medium     Insomnia 05/09/2022     Priority: Medium     Formatting of this note might be different from the original.  Created by [facility] Annotation: Sep 11 2013  9:56Zack Harris: Trouble   maintaining sleep-Trazodone-minimal helpzolpidem-       Nephrolithiasis 05/09/2022     Priority: Medium     Osteoarthrosis 05/09/2022     Priority: Medium     Formatting of this note might be different from the original.  Created by [facility] Annotation: Jun 26 2009  9:53Zack Harris: failed lumbar   fusion/morphine pump dr putnam    Replacement Utility updated for latest IMO load       Coronary arteriosclerosis 05/09/2022     Priority: Medium     Formatting of this note might be different from the original.  Created by Conversion    Replacement Utility updated for latest IMO load       Peripheral vascular disease (H) 05/03/2022     Priority: Medium     S/P CABG (coronary artery bypass graft) 01/25/2022     Priority: Medium     Fluid overload 01/25/2022     Priority: Medium     ICD (implantable cardioverter-defibrillator) in place 01/25/2022     Priority: Medium     Chronic Back Pain (IT Pump w Morphine, Clonidine, Baclofen) 01/16/2022     Priority: Medium     Paroxysmal atrial fib -- S/P Pulm Vein Ablation 1/19/22 09/11/2021     Priority: Medium     Formatting of this note might be different from the original.  Created by Conversion       Gastroesophageal reflux disease without esophagitis 09/11/2021     Priority: Medium     NSTEMI w Unstab Angina -- S/P CABG x 1 (LIMA to LAD) on 1/19/22      Priority: Medium      Chronic diastolic heart failure (H) 07/26/2021     Priority: Medium     MARLEEN (doesn't tolerate CPAP) 07/26/2021     Priority: Medium     Morbid obesity -- BMI 42.0 07/13/2021     Priority: Medium     Bilateral leg edema 07/13/2021     Priority: Medium     Claudication of lower extremity (H) 11/20/2019     Priority: Medium     Sleepiness 05/08/2018     Priority: Medium     Post-laminectomy syndrome 11/25/2015     Priority: Medium     Abdominal panniculus 11/13/2012     Priority: Medium     Formatting of this note might be different from the original.  S/p panniculectomy 11/13/2012       Lumbago 10/18/2011     Priority: Medium     Essential hypertension 06/23/2008     Priority: Medium     Formatting of this note might be different from the original.  Created by Conversion  [facility] Annotation: Apr 6 2012 10:Zack Cutler: Lisinipril,   coreg    Replacement Utility updated for latest IMO load       Mixed hyperlipidemia 06/23/2008     Priority: Medium     Bariatric surgery status 06/23/2008     Priority: Medium     Formatting of this note might be different from the original.  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 S/p panniculectomy 11/13/2012     Acid reflux disease 10/31/2017     Bariatric surgery status 6/23/2008    Formatting of this note might be different from the original. Created by Conversion     Bilateral leg edema 7/13/2021     CHF (congestive heart failure) (H)      Chronic Back Pain (IT Pump w Morphine, Clonidine, Baclofen) 1/16/2022     Chronic diastolic heart failure (H) 7/26/2021     Claudication of lower extremity (H) 11/20/2019     Coronary arteriosclerosis 5/9/2022    Formatting of this note might be different from the original. Created by Conversion   Replacement Utility updated for latest IMO load     Coronary artery disease     CABG 1-     Essential hypertension     Created by [facility] Annotation: Apr 6 2012 10:16Zack Harris: Lisinipril,  coreg  Replacement Utility updated for latest IMO load     Fluid overload 1/25/2022     Gastroesophageal reflux disease without esophagitis 9/11/2021     H/O gastric bypass 2008     ICD (implantable cardioverter-defibrillator) in place 1/25/2022     Insomnia      Intestinal disaccharidase deficiencies and disaccharide malabsorption 6/23/2008     Ischemic cardiomyopathy      Lumbago     Created by [facility] Annotation: Apr 6 2012 10:20Anh Ford: Morphine pump      Mixed hyperlipidemia 6/23/2008     Morbid obesity (H) 08/01/2018     Nephrolithiasis      NSTEMI (non-ST elevated myocardial infarction) (H)      Obstructive sleep apnea     Doesn't tolerate CPAP     MARLEEN (doesn't tolerate CPAP) 7/26/2021     Osteoarthritis     Created by [facility] Annotation: Jun 26 2009  9:53Zack Harris: failed lumbar  fusion/morphine pump dr putnam  Replacement Utility updated for latest IMO load     Osteoarthrosis 5/9/2022    Formatting of this note might be different from the original. Created by [facility] Annotation: Jun 26 2009  9:53Zack Harris: failed lumbar  fusion/morphine pump dr putnam  Replacement Utility updated for latest IMO load     Paroxysmal atrial fib -- S/P Pulm Vein Ablation 1/19/22 9/11/2021    Formatting of this note might be different from the original. Created by Conversion     Paroxysmal atrial fibrillation (H)     Created by Conversion      Paroxysmal ventricular tachycardia     dual chamber ICD 1/24/2022     Peripheral vascular disease (H) 5/3/2022     Post-laminectomy syndrome 11/25/2015     S/P CABG (coronary artery bypass graft) 1/25/2022     Sleepiness 5/8/2018     Type 2 diabetes mellitus (H)     Diet controlled after  Gastric Bypass in 2008     Past Surgical History:   Procedure Laterality Date     BACK SURGERY       BYPASS GASTRIC DUODENAL SWITCH       BYPASS GRAFT ARTERY CORONARY N/A 1/19/2022    Procedure: CORONARY ARTERY BYPASS GRAFT (CABG) X1; LIMA -LAD.  PULMONARY VEIN ISOLATION, AND ATRIAL APPENDAGE CLIPPING USING 45MM ACTICURE CLIP.;  Surgeon: William Dumont MD;  Location:  OR     COSMETIC SURGERY      pannicullectomy     CV CORONARY ANGIOGRAM N/A 9/11/2021    Procedure: Coronary Angiogram;  Surgeon: Noris Ozuna MD;  Location: [facility] CATH LAB CV     CV HEART CATHETERIZATION WITH POSSIBLE INTERVENTION N/A 1/13/2022    Procedure: Heart Catheterization with Possible Intervention;  Surgeon: Liz Pearl MD;  Location:  HEART CARDIAC CATH LAB     CV LEFT HEART CATH N/A 9/11/2021    Procedure: Left Heart Cath;  Surgeon: Noris Ozuna MD;  Location: [facility] LAB CV     CV PCI N/A 9/11/2021    Procedure: Percutaneous Coronary Intervention;  Surgeon: Noris Ozuna MD;  Location: [facility] CATH LAB CV     CV PCI N/A 10/7/2021    Procedure: Percutaneous Coronary Intervention;  Surgeon: Mckayla Dan MD;  Location: [facility] CATH LAB CV     CV PCI ASPIRATION THROMECTOMY N/A 9/11/2021    Procedure: Percutaneous Coronary Intervention Aspiration Thrombectomy;  Surgeon: Noris Ozuna MD;  Location: [facility] CV     ENT SURGERY      tonsillectomy     EP ICD N/A 1/24/2022    Procedure: EP ICD;  Surgeon: Jannette Crook MD;  Location:  HEART CARDIAC CATH LAB     EXTRACORPOREAL SHOCK WAVE LITHOTRIPSY, CYSTOSCOPY, INSERT STENT URETER(S), COMBINED  8/23/11     HC REMOVAL OF TONSILS,<12 Y/O      Description: Tonsillectomy;  Recorded: 03/23/2012;  Comments: for obstructive sleep apnea     HERNIA REPAIR       IR MISCELLANEOUS PROCEDURE  7/29/2011     IR MISCELLANEOUS PROCEDURE  8/5/2011     IR MISCELLANEOUS PROCEDURE  8/23/2011     IR NEPHROSTOMY TUBE CHANGE BILATERAL  8/5/2011     ORTHOPEDIC SURGERY       arthrodesis ant discectomy, lumbar     ME ARTHRODESIS ANT INTERBODY MIN DISCECTOMY,LUMBAR      Description: Lumbar Vertebral Fusion;  Recorded: 06/26/2009;  Comments: before 200 see [facility] consult under old records     ME EXCISE EXCESS SKIN TISSUE,ABDOMEN  11/13/2012    Description: Panniculectomy;  Proc Date: 11/13/2012;  Comments: 3.5 Lb Pannus was removed at the [facility] By Dr. Shon Green     REPLACE INTRATHECAL PAIN PUMP N/A 4/25/2017    Procedure: REPLACE INTRATHECAL PAIN PUMP;  INTRATHECAL PAIN PUMP CATHETER REPLACEMENT AND PUMP REPLACEMENT;  Surgeon: Anthony Maloney MD;  Location: [facility]     REVISE CATHETER INTRATHECAL N/A 4/25/2017    Procedure: REVISE CATHETER INTRATHECAL;;  Surgeon: Anthony Maloney MD;  Location: [facility]     SHOULDER SURGERY       [facility] GASTRIC BYPASS,OBESE<100CM LORA-EN-Y  2008    Description: Gastric Surgery For Morbid Obesity Bypass With Lora-en-Y;  Recorded: 06/26/2009;  Comments: dr green 2008     [facility] REPAIR INCISIONAL HERNIA,REDUCIBLE  11/13/2012    Description: Incisional Hernia Repair;  Proc Date: 11/13/2012;  Comments: incisional hernia repair and abdominal panniculectomy by Dr Shon Green at the [facility].     Current Outpatient Medications   Medication Sig Dispense Refill     acetaminophen (TYLENOL) 325 MG tablet Take 2 tablets (650 mg) by mouth every 4 hours as needed for mild pain 40 tablet 0     DULoxetine (CYMBALTA) 30 MG capsule Take 60 mg by mouth every evening       FERATE 240 (27 Fe) MG TABS TAKE 1 TABLET BY MOUTH EVERY DAY *OTC NOT COV* 90 tablet 0     Ferrous Sulfate (IRON PO) Take by mouth daily Dosage is unknown       fluconazole (DIFLUCAN) 100 MG tablet TAKE 1 TABLET (100 MG) BY MOUTH DAILY FOR 15 DAYS 15 tablet 0     furosemide (LASIX) 40 MG tablet Take 1 tablet (40 mg) by mouth daily 90 tablet 3     ketoconazole (NIZORAL) 2 % external cream Apply topically 2 times daily 60 g 4     lisinopril (ZESTRIL) 5 MG tablet Take 1 tablet (5 mg) by mouth daily  "90 tablet 3     metoprolol succinate ER (TOPROL XL) 100 MG 24 hr tablet Take 1 tablet (100 mg) by mouth 2 times daily 180 tablet 3     MORPHINE SULFATE IT pump:updated 1/12/22  Medications in Pump:   [facility] Responsible for pump medications:   Conc:  Morphine 20mg/ml(3.95 mg/day), clonidine 21.1mcg/ml (4.168 mcg/day), baclofen 42.5mcg/ml (8.395mcg/day)  Rate:   Pump Last Fill Date: 9/2021  Pump Refill Date: 2/2022       nitroGLYcerin (NITROSTAT) 0.4 MG sublingual tablet For chest pain place 1 tablet under the tongue every 5 minutes for 3 doses. If symptoms persist 5 minutes after 1st dose call 911. 30 tablet 1     nystatin (MYCOSTATIN) 555022 UNIT/GM external powder Apply topically 3 times daily 60 g 4     rosuvastatin (CRESTOR) 40 MG tablet TAKE 1 TABLET BY MOUTH EVERY DAY 90 tablet 3     traZODone (DESYREL) 100 MG tablet Take 2 tablets (200 mg) by mouth At Bedtime TAKE 2 TABLETS (200MG TOTAL) BY MOUTH AT BEDTIME Strength: 100 mg 180 tablet 3       Allergies   Allergen Reactions     Hydrocodone-Acetaminophen Other (See Comments)     sneezing        Social History     Tobacco Use     Smoking status: Former     Types: Cigars     Smokeless tobacco: Never   Substance Use Topics     Alcohol use: No       History   Drug Use No     Comment: Drug use: formerly used         Objective     Blood pressure (!) 160/90, pulse 76, temperature 97.9  F (36.6  C), resp. rate 24, height 1.905 m (6' 3\"), weight 145.6 kg (321 lb), SpO2 97 %.        Physical Exam  Vitals reviewed.   HENT:      Head: Normocephalic and atraumatic.      Mouth/Throat:      Mouth: Mucous membranes are moist.      Pharynx: Oropharynx is clear.   Eyes:      Extraocular Movements: Extraocular movements intact.      Conjunctiva/sclera: Conjunctivae normal.      Pupils: Pupils are equal, round, and reactive to light.   Cardiovascular:      Rate and Rhythm: Normal rate and regular rhythm.      Pulses: Normal pulses.      Heart sounds: Normal heart " sounds.   Pulmonary:      Effort: Pulmonary effort is normal.      Breath sounds: Normal breath sounds.   Skin:     General: Skin is warm and dry.      Capillary Refill: Capillary refill takes less than 2 seconds.   Neurological:      Mental Status: He is alert and oriented to person, place, and time.           Recent Labs   Lab Test 11/21/22  0921 08/18/22  1425 05/24/22  0910 05/03/22  1254 03/16/22  1109 02/04/22  1131 01/20/22  0404 01/19/22  1235 01/19/22  1047 01/19/22  1040 01/16/22  0621 01/15/22  0832   HGB  --   --   --  12.2* 11.3* 10.7*   < > 12.0*   < > 11.0*   < > 13.3   PLT  --   --   --  176  --  352   < > 160  --  110*   < > 166   INR  --   --   --   --   --   --   --  1.31*  --  1.57*  --   --     138   < >  --   --   --    < > 138   < > 139   < > 138   POTASSIUM 4.0 3.8   < >  --   --   --    < > 4.4  4.4   < > 4.1   < > 3.9   CR 0.88 0.96   < >  --   --   --    < > 0.93  --  0.94   < > 0.90   A1C  --  5.5  --   --   --   --   --   --   --   --   --  5.4    < > = values in this interval not displayed.        Diagnostics:  Labs pending at this time.  Results will be reviewed when available.       Revised Cardiac Risk Index (RCRI):  The patient has the following serious cardiovascular risks for perioperative complications:   - Congestive Heart Failure (pulmonary edema, PND, s3 lalita, CXR with pulmonary congestion, basilar rales) = 1 point     RCRI Interpretation: 1 point: Class II (low risk - 0.9% complication rate)           Signed Electronically by: Roge Feliciano MD  Copy of this evaluation report is provided to requesting physician.

## 2023-03-01 NOTE — PATIENT INSTRUCTIONS
Given complex cardiac history, patient will need to be evaluated by his cardiologist for further preoperative eval ration.

## 2023-03-01 NOTE — TELEPHONE ENCOUNTER
Spoke with pt about needing cardiac clearance per PCP after pre op eval today.   Scheduled pt to see Dr. Pearl on 3/10/23 in [place].   Pt gave verbal understanding.

## 2023-03-10 ENCOUNTER — OFFICE VISIT (OUTPATIENT)
Dept: CARDIOLOGY | Facility: CLINIC | Age: 68
End: 2023-03-10
Payer: COMMERCIAL

## 2023-03-10 VITALS
HEIGHT: 75 IN | WEIGHT: 315 LBS | BODY MASS INDEX: 39.17 KG/M2 | SYSTOLIC BLOOD PRESSURE: 134 MMHG | HEART RATE: 70 BPM | DIASTOLIC BLOOD PRESSURE: 78 MMHG | OXYGEN SATURATION: 98 %

## 2023-03-10 DIAGNOSIS — I10 ESSENTIAL HYPERTENSION: ICD-10-CM

## 2023-03-10 DIAGNOSIS — Z95.1 S/P CABG (CORONARY ARTERY BYPASS GRAFT): ICD-10-CM

## 2023-03-10 DIAGNOSIS — G47.33 OBSTRUCTIVE SLEEP APNEA SYNDROME: ICD-10-CM

## 2023-03-10 DIAGNOSIS — I49.3 PVC'S (PREMATURE VENTRICULAR CONTRACTIONS): ICD-10-CM

## 2023-03-10 DIAGNOSIS — I48.0 PAROXYSMAL A-FIB (H): ICD-10-CM

## 2023-03-10 DIAGNOSIS — G89.4 CHRONIC PAIN SYNDROME: Primary | ICD-10-CM

## 2023-03-10 DIAGNOSIS — E66.01 MORBID OBESITY (H): ICD-10-CM

## 2023-03-10 PROCEDURE — 99214 OFFICE O/P EST MOD 30 MIN: CPT | Performed by: INTERNAL MEDICINE

## 2023-03-10 PROCEDURE — 93000 ELECTROCARDIOGRAM COMPLETE: CPT | Performed by: INTERNAL MEDICINE

## 2023-03-10 RX ORDER — ASPIRIN 81 MG/1
81 TABLET, CHEWABLE ORAL DAILY
COMMUNITY

## 2023-03-10 NOTE — PROGRESS NOTES
[facility] Progress note    Assessment:  1. Coronary artery disease.  Has chronic dyspnea. No angina but very sedentary due to pain.   PCI to LAD (2012).    PCI to RCA (9/2021).     CABG x 1 (LIMA to LAD for severe instent restenosis,  PVI, exclusion of left atrial appendage) on 1/19/2022.   2. Paroxysmal atrial fibrillation.  S/p PVI and exclusion left atrial appendage (1/2022). No flow in appendage. Short runs atrial arrhythmia on device check. Xarelto was stopped.   3. 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  S/p [employer] dual-chamber ICD (1/2022). Was temporarily on amiodarone. Now taking metoprolol succinate 100mg PO BID. Last Holter 11/1/2022 14% PVCs. Bigeminal PVCs noted on most recent device check.   4. Chronic HFrEF/Ischemic cardiomyopathy. EF 45-50% by echo 11/21/22. NYHA II-III.  Appears euvolemic on exam.  5. Dyslipidemia, LDL well controlled 39 on rosuvastatin.  Low HDL.  Elevated triglycerides.  6. Morbid obesity BMI 42.  7. MARLEEN.  he is willing to try CPAP again but has not been using it.   8. Chronic back pain.   Has a pain pump 1. Due for a battery change.   9. Device pocket tenderness.    Plan:  1. Continue aspirin, metoprolol succinate, lisinopril.  2. For pre-operative optimization: recommend lexiscan nuclear stress test. He continues to have PVCs and dyspnea after CABG and cannot exercise >4METS.  3. Follow up as scheduled next week with Dr. Young in electrophysiology for device check, evaluate device pocket tenderness. Also review recs re: PVCs and PAF management. Note he is not anticoagulated.  4. If possible recommend continuation of aspirin in the perioperative period for the battery change.  5. Follow up with me in about 6 months.     HPI:     Onur Barr is a very nice 67 year old gentleman scheduled for morphine pump battery replacement procedure.  He was scheduled as an add-on appointment to see me for a pre-operative evaluation.  Next week  he is scheduled for device check and EP follow up. I have followed him for CAD s/p previous PCIs, single vessel CABG in jan 2021 for ISR LAD, mild ICM, s/p ICD for monomorphic VT. At our last visit in Nov 2021 I increased his metoprolol succinate from 75 to 100mg PO BID, instructed him to stop clopidogrel in Jan 2023 and he also agreed to stop using THC gummies because he thought they may have been causing palpitations. Stopping THC gummies did not change his symptoms.     He Is fairly stable from a symptom standpoint, but has significant chronic exertional dyspnea. He has no chest pain. His activity is very limited due to back pain. His  mild LE edema is controlled with furosemide.  He does not use his CPAP, but is willing to try it again. He continues to be concerned about tenderness around his device generator pocket.       EKG today  Sinus with first degree AV block [place] 222msec, a PVC      Encounter Diagnoses   Name Primary?     A-fib (H) Yes     HTN (hypertension)      HLD (hyperlipidemia)        CURRENT MEDICATIONS:  Current Outpatient Medications   Medication Sig Dispense Refill     acetaminophen (TYLENOL) 325 MG tablet Take 2 tablets (650 mg) by mouth every 4 hours as needed for mild pain 40 tablet 0     aspirin (ASA) 81 MG chewable tablet Take 81 mg by mouth daily       DULoxetine (CYMBALTA) 30 MG capsule Take 60 mg by mouth every evening       FERATE 240 (27 Fe) MG TABS TAKE 1 TABLET BY MOUTH EVERY DAY *OTC NOT COV* 90 tablet 0     Ferrous Sulfate (IRON PO) Take by mouth daily Dosage is unknown       furosemide (LASIX) 40 MG tablet Take 1 tablet (40 mg) by mouth daily 90 tablet 3     ketoconazole (NIZORAL) 2 % external cream Apply topically 2 times daily 60 g 4     lisinopril (ZESTRIL) 5 MG tablet Take 1 tablet (5 mg) by mouth daily 90 tablet 3     metoprolol succinate ER (TOPROL XL) 100 MG 24 hr tablet Take 1 tablet (100 mg) by mouth 2 times daily 180 tablet 3     MORPHINE SULFATE IT pump:updated  1/12/22  Medications in Pump:   [facility] Responsible for pump medications:   Conc:  Morphine 20mg/ml(3.95 mg/day), clonidine 21.1mcg/ml (4.168 mcg/day), baclofen 42.5mcg/ml (8.395mcg/day)  Rate:   Pump Last Fill Date: 9/2021  Pump Refill Date: 2/2022       nitroGLYcerin (NITROSTAT) 0.4 MG sublingual tablet For chest pain place 1 tablet under the tongue every 5 minutes for 3 doses. If symptoms persist 5 minutes after 1st dose call 911. 30 tablet 1     nystatin (MYCOSTATIN) 834703 UNIT/GM external powder Apply topically 3 times daily 60 g 4     rosuvastatin (CRESTOR) 40 MG tablet TAKE 1 TABLET BY MOUTH EVERY DAY 90 tablet 3     traZODone (DESYREL) 100 MG tablet Take 2 tablets (200 mg) by mouth At Bedtime TAKE 2 TABLETS (200MG TOTAL) BY MOUTH AT BEDTIME Strength: 100 mg 180 tablet 3       ALLERGIES     Allergies   Allergen Reactions     Hydrocodone-Acetaminophen Other (See Comments)     sneezing       PAST MEDICAL, SURGICAL, FAMILY, SOCIAL HISTORY:  History was reviewed and updated as needed, see medical record.    REVIEW OF SYSTEMS:  Skin:  Negative     Eyes:  Positive for    ENT:  Negative    Respiratory:  Positive for shortness of breath;dyspnea on exertion;sleep apnea  Cardiovascular:    edema;Positive for  Gastroenterology: Negative    Genitourinary:  Negative    Musculoskeletal:  Positive for back pain;neck pain;joint pain  Neurologic:  Negative    Psychiatric:  Negative    Heme/Lymph/Imm:  Positive for allergies  Endocrine:  Negative      PHYSICAL EXAM:      BP: 134/78 Pulse: 70     SpO2: 98 %      Vital Signs with Ranges  Pulse:  [70] 70  BP: (134)/(78) 134/78  SpO2:  [98 %] 98 %  339 lbs 3.2 oz    Constitutional: awake, alert, no distress but sweating from the effort of walking into clinic. Large body habitus  Eyes: sclera nonicteric  Chest: left chest gen pocket appears well-healed. No flutuance or drainage or significant tenderness to palpation.   ENT: trachea midline  Respiratory: clear  bilaterally  Cardiovascular: regular rate and rhythm no murmur distant heart tones  GI: nondistended, nontender, bowel sounds present  Skin: dry, no rash no edema  Musculoskeletal: grossly normal muscle bulk and tone  Neurologic: walks with a cane. He bends forward almost 90 degrees at the waist when he walks and I believe this is due to back pain.   Neuropsychiatric: normal affect    Recent Lab Results:  LIPID RESULTS:  Lab Results   Component Value Date    CHOL 139 11/21/2022    HDL 33 (L) 11/21/2022    LDL 39 11/21/2022    LDL 69 02/13/2018    TRIG 335 (H) 11/21/2022       LIVER ENZYME RESULTS:  Lab Results   Component Value Date    AST 37 01/20/2022    ALT 25 11/21/2022       CBC RESULTS:  Lab Results   Component Value Date    WBC 7.9 05/03/2022    RBC 4.68 05/03/2022    HGB 12.2 (L) 05/03/2022    HCT 38.0 (L) 05/03/2022    MCV 81 05/03/2022    MCH 26.1 (L) 05/03/2022    MCHC 32.1 05/03/2022    RDW 14.1 05/03/2022     05/03/2022       BMP RESULTS:  Lab Results   Component Value Date     11/21/2022    POTASSIUM 4.0 11/21/2022    POTASSIUM 3.8 08/18/2022    CHLORIDE 105 11/21/2022    CHLORIDE 106 08/18/2022    CO2 28 11/21/2022    CO2 25 08/18/2022    ANIONGAP 8 11/21/2022    ANIONGAP 7 08/18/2022     (H) 11/21/2022    GLC 95 08/18/2022    BUN 17.9 11/21/2022    BUN 19 08/18/2022    CR 0.88 11/21/2022    GFRESTIMATED >90 11/21/2022    GFRESTIMATED >60 01/05/2021    GFRESTBLACK >60 01/05/2021    RATNA 8.8 11/21/2022        A1C RESULTS:  Lab Results   Component Value Date    A1C 5.9 (H) 03/01/2023       INR RESULTS:  Lab Results   Component Value Date    INR 1.31 (H) 01/19/2022    INR 1.57 (H) 01/19/2022

## 2023-03-10 NOTE — LETTER
3/10/2023    Roge Feliciano MD  [address] [facility] [address]    RE: Onur Barr       Dear Colleague,     I had the pleasure of seeing Onur Barr in the [facility].  [facility] Progress note    Assessment:  1. Coronary artery disease.  Has chronic dyspnea. No angina but very sedentary due to pain.   PCI to LAD (2012).    PCI to RCA (9/2021).     CABG x 1 (LIMA to LAD for severe instent restenosis,  PVI, exclusion of left atrial appendage) on 1/19/2022.   2. Paroxysmal atrial fibrillation.  S/p PVI and exclusion left atrial appendage (1/2022). No flow in appendage. Short runs atrial arrhythmia on device check. Xarelto was stopped.   3. 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  S/p [employer] dual-chamber ICD (1/2022). Was temporarily on amiodarone. Now taking metoprolol succinate 100mg PO BID. Last Holter 11/1/2022 14% PVCs. Bigeminal PVCs noted on most recent device check.   4. Chronic HFrEF/Ischemic cardiomyopathy. EF 45-50% by echo 11/21/22. NYHA II-III.  Appears euvolemic on exam.  5. Dyslipidemia, LDL well controlled 39 on rosuvastatin.  Low HDL.  Elevated triglycerides.  6. Morbid obesity BMI 42.  7. MARLEEN.  he is willing to try CPAP again but has not been using it.   8. Chronic back pain.   Has a pain pump 1. Due for a battery change.   9. Device pocket tenderness.    Plan:  1. Continue aspirin, metoprolol succinate, lisinopril.  2. For pre-operative optimization: recommend lexiscan nuclear stress test. He continues to have PVCs and dyspnea after CABG and cannot exercise >4METS.  3. Follow up as scheduled next week with Dr. Young in electrophysiology for device check, evaluate device pocket tenderness. Also review recs re: PVCs and PAF management. Note he is not anticoagulated.  4. If possible recommend continuation of aspirin in the perioperative period for the battery change.  5. Follow up with me in about 6 months.     HPI:      Onur Barr is a very nice 67 year old gentleman scheduled for morphine pump battery replacement procedure.  He was scheduled as an add-on appointment to see me for a pre-operative evaluation.  Next week he is scheduled for device check and EP follow up. I have followed him for CAD s/p previous PCIs, single vessel CABG in jan 2021 for ISR LAD, mild ICM, s/p ICD for monomorphic VT. At our last visit in Nov 2021 I increased his metoprolol succinate from 75 to 100mg PO BID, instructed him to stop clopidogrel in Jan 2023 and he also agreed to stop using THC gummies because he thought they may have been causing palpitations. Stopping THC gummies did not change his symptoms.     He Is fairly stable from a symptom standpoint, but has significant chronic exertional dyspnea. He has no chest pain. His activity is very limited due to back pain. His  mild LE edema is controlled with furosemide.  He does not use his CPAP, but is willing to try it again. He continues to be concerned about tenderness around his device generator pocket.       EKG today  Sinus with first degree AV block [place] 222msec, a PVC      Encounter Diagnoses   Name Primary?     A-fib (H) Yes     HTN (hypertension)      HLD (hyperlipidemia)        CURRENT MEDICATIONS:  Current Outpatient Medications   Medication Sig Dispense Refill     acetaminophen (TYLENOL) 325 MG tablet Take 2 tablets (650 mg) by mouth every 4 hours as needed for mild pain 40 tablet 0     aspirin (ASA) 81 MG chewable tablet Take 81 mg by mouth daily       DULoxetine (CYMBALTA) 30 MG capsule Take 60 mg by mouth every evening       FERATE 240 (27 Fe) MG TABS TAKE 1 TABLET BY MOUTH EVERY DAY *OTC NOT COV* 90 tablet 0     Ferrous Sulfate (IRON PO) Take by mouth daily Dosage is unknown       furosemide (LASIX) 40 MG tablet Take 1 tablet (40 mg) by mouth daily 90 tablet 3     ketoconazole (NIZORAL) 2 % external cream Apply topically 2 times daily 60 g 4     lisinopril (ZESTRIL) 5 MG tablet  Take 1 tablet (5 mg) by mouth daily 90 tablet 3     metoprolol succinate ER (TOPROL XL) 100 MG 24 hr tablet Take 1 tablet (100 mg) by mouth 2 times daily 180 tablet 3     MORPHINE SULFATE IT pump:updated 1/12/22  Medications in Pump:   [facility] Responsible for pump medications:   Conc:  Morphine 20mg/ml(3.95 mg/day), clonidine 21.1mcg/ml (4.168 mcg/day), baclofen 42.5mcg/ml (8.395mcg/day)  Rate:   Pump Last Fill Date: 9/2021  Pump Refill Date: 2/2022       nitroGLYcerin (NITROSTAT) 0.4 MG sublingual tablet For chest pain place 1 tablet under the tongue every 5 minutes for 3 doses. If symptoms persist 5 minutes after 1st dose call 911. 30 tablet 1     nystatin (MYCOSTATIN) 593248 UNIT/GM external powder Apply topically 3 times daily 60 g 4     rosuvastatin (CRESTOR) 40 MG tablet TAKE 1 TABLET BY MOUTH EVERY DAY 90 tablet 3     traZODone (DESYREL) 100 MG tablet Take 2 tablets (200 mg) by mouth At Bedtime TAKE 2 TABLETS (200MG TOTAL) BY MOUTH AT BEDTIME Strength: 100 mg 180 tablet 3       ALLERGIES     Allergies   Allergen Reactions     Hydrocodone-Acetaminophen Other (See Comments)     sneezing       PAST MEDICAL, SURGICAL, FAMILY, SOCIAL HISTORY:  History was reviewed and updated as needed, see medical record.    REVIEW OF SYSTEMS:  Skin:  Negative     Eyes:  Positive for    ENT:  Negative    Respiratory:  Positive for shortness of breath;dyspnea on exertion;sleep apnea  Cardiovascular:    edema;Positive for  Gastroenterology: Negative    Genitourinary:  Negative    Musculoskeletal:  Positive for back pain;neck pain;joint pain  Neurologic:  Negative    Psychiatric:  Negative    Heme/Lymph/Imm:  Positive for allergies  Endocrine:  Negative      PHYSICAL EXAM:      BP: 134/78 Pulse: 70     SpO2: 98 %      Vital Signs with Ranges  Pulse:  [70] 70  BP: (134)/(78) 134/78  SpO2:  [98 %] 98 %  339 lbs 3.2 oz    Constitutional: awake, alert, no distress but sweating from the effort of walking into clinic.  Large body habitus  Eyes: sclera nonicteric  Chest: left chest gen pocket appears well-healed. No flutuance or drainage or significant tenderness to palpation.   ENT: trachea midline  Respiratory: clear bilaterally  Cardiovascular: regular rate and rhythm no murmur distant heart tones  GI: nondistended, nontender, bowel sounds present  Skin: dry, no rash no edema  Musculoskeletal: grossly normal muscle bulk and tone  Neurologic: walks with a cane. He bends forward almost 90 degrees at the waist when he walks and I believe this is due to back pain.   Neuropsychiatric: normal affect    Recent Lab Results:  LIPID RESULTS:  Lab Results   Component Value Date    CHOL 139 11/21/2022    HDL 33 (L) 11/21/2022    LDL 39 11/21/2022    LDL 69 02/13/2018    TRIG 335 (H) 11/21/2022       LIVER ENZYME RESULTS:  Lab Results   Component Value Date    AST 37 01/20/2022    ALT 25 11/21/2022       CBC RESULTS:  Lab Results   Component Value Date    WBC 7.9 05/03/2022    RBC 4.68 05/03/2022    HGB 12.2 (L) 05/03/2022    HCT 38.0 (L) 05/03/2022    MCV 81 05/03/2022    MCH 26.1 (L) 05/03/2022    MCHC 32.1 05/03/2022    RDW 14.1 05/03/2022     05/03/2022       BMP RESULTS:  Lab Results   Component Value Date     11/21/2022    POTASSIUM 4.0 11/21/2022    POTASSIUM 3.8 08/18/2022    CHLORIDE 105 11/21/2022    CHLORIDE 106 08/18/2022    CO2 28 11/21/2022    CO2 25 08/18/2022    ANIONGAP 8 11/21/2022    ANIONGAP 7 08/18/2022     (H) 11/21/2022    GLC 95 08/18/2022    BUN 17.9 11/21/2022    BUN 19 08/18/2022    CR 0.88 11/21/2022    GFRESTIMATED >90 11/21/2022    GFRESTIMATED >60 01/05/2021    GFRESTBLACK >60 01/05/2021    RATNA 8.8 11/21/2022        A1C RESULTS:  Lab Results   Component Value Date    A1C 5.9 (H) 03/01/2023       INR RESULTS:  Lab Results   Component Value Date    INR 1.31 (H) 01/19/2022    INR 1.57 (H) 01/19/2022                 Thank you for allowing me to participate in the care of your  patient.      Sincerely,     Liz Pearl MD     [facility]  cc:   No referring provider defined for this encounter.

## 2023-03-15 ENCOUNTER — OFFICE VISIT (OUTPATIENT)
Dept: CARDIOLOGY | Facility: CLINIC | Age: 68
End: 2023-03-15
Attending: INTERNAL MEDICINE
Payer: COMMERCIAL

## 2023-03-15 VITALS
HEIGHT: 75 IN | WEIGHT: 315 LBS | DIASTOLIC BLOOD PRESSURE: 100 MMHG | OXYGEN SATURATION: 94 % | SYSTOLIC BLOOD PRESSURE: 150 MMHG | BODY MASS INDEX: 39.17 KG/M2 | HEART RATE: 63 BPM

## 2023-03-15 DIAGNOSIS — I50.22 CHRONIC SYSTOLIC CONGESTIVE HEART FAILURE (H): ICD-10-CM

## 2023-03-15 DIAGNOSIS — Z95.810 ICD (IMPLANTABLE CARDIOVERTER-DEFIBRILLATOR) IN PLACE: ICD-10-CM

## 2023-03-15 DIAGNOSIS — I47.29 PAROXYSMAL VENTRICULAR TACHYCARDIA (H): ICD-10-CM

## 2023-03-15 DIAGNOSIS — G89.4 CHRONIC PAIN SYNDROME: Primary | ICD-10-CM

## 2023-03-15 DIAGNOSIS — I48.0 PAROXYSMAL ATRIAL FIBRILLATION (H): ICD-10-CM

## 2023-03-15 PROCEDURE — 93283 PRGRMG EVAL IMPLANTABLE DFB: CPT | Performed by: INTERNAL MEDICINE

## 2023-03-15 PROCEDURE — 99214 OFFICE O/P EST MOD 30 MIN: CPT | Mod: 25 | Performed by: INTERNAL MEDICINE

## 2023-03-15 RX ORDER — FUROSEMIDE 40 MG
40 TABLET ORAL 2 TIMES DAILY
Qty: 90 TABLET | Refills: 3 | COMMUNITY
Start: 2023-03-15 | End: 2023-05-08

## 2023-03-15 RX ORDER — LISINOPRIL 5 MG/1
10 TABLET ORAL DAILY
Qty: 90 TABLET | Refills: 3 | COMMUNITY
Start: 2023-03-15 | End: 2023-03-20

## 2023-03-15 NOTE — PATIENT INSTRUCTIONS
Your blood pressure was elevated at your appointment today.  Elevated blood pressure can increase your risk of a heart attack, stroke and heart failure.  For this reason, we feel it is important to monitor your blood pressure closely.  If you have access to a home blood pressure monitor or are able to check your blood pressure at a local pharmacy in the next week we would like you to do so and call our clinic with those readings. Please call [phone] ([place]) and leave a message with your name, date of birth, blood pressure reading, date and location it was completed. If your blood pressure remains elevated your care team will be notified.  We appreciate being a part of your healthcare team and look forward to hearing from you soon.        -Increase lasix to 40 mg twice a day.  -Increase lisinopril to 10 mg daily.

## 2023-03-15 NOTE — PROGRESS NOTES
Electrophysiology/ Clinic Note         H&P and Plan:     REASON FOR VISIT: Electrophysiology evaluation.      HISTORY OF PRESENT ILLNESS: Patient is a pleasant  67-year old gentleman with a history of hypertension, hyperlipidemia, diabetes, paroxysmal A. fib and ischemic cardiomyopathy (ICD implantation on 1/20/2022), who is here for routine evaluation.     Patient has a long history of CAD.  He initially underwent PCI to LAD in 2012.   In 2021, he presented with inferior STEMI and underwent PCI to RCA.  He was re-admitted with non-STEMI on 1/12/2022, and during admission he underwent CABG (LIMA to LAD, PVI, LA exclusion).  Hospital stay was complicated by postop A. fib and episode of monomorphic VT.  He underwent ICD implantation for secondary prevention and was temporarily placed on amiodarone.      He was recent evaluated in general cardiology clinic and a stress test was recommended which is scheduled to be done in a week.        Today, he complains of dyspnea on exertion.  He also affirms that he has gained significant amount of weight (15-20 pounds) in the last few months.  He appears to be fluid overload on physical exam.      He denies chest pain, palpitation, lightheadedness, near-syncope or syncope.     Device was interrogated today.  He is atrially paced 70% of the time and ventricular paced 3% of the time.  Lead parameters are stable.  He appears to have frequent PVCs.  There were 18 mode switch.  The longest episode lasted for 9 minutes.    EKG obtained last year showed PVC morphology suggestive of outflow tract possible epicardial origin (LBBB pattern, inferior axis, transition at V3 and slurred onset of QRS).     PREVIOUS STUDIES (personally reviewed):  -Echo (1/12/2022): Mild LV dysfunction.  EF of 40-45%.  Patient was in A. fib at the time of the study.  -Echo (11/21/2022): EF of 45-50%.  Mild global HK.  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      ASSESSMENT  "AND PLAN:   1.    Heart for exacerbation.  He appears to be fluid overloaded physical exam.  Labs obtained last year showed normal kidney function.  I recommend the following:  -Increase Lasix to 40 mg twice daily.  -Increase lisinopril to 10 mg daily.  -Agree with stress test.  -Follow-up with an SULEMA in 2 weeks to reevaluate blood pressure and fluid status.    2.  Hypertension.  Blood pressure is elevated.  Plan as detailed above.  3.  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  PVC morphology suggestive of outflow tract/epicardial origin and ablation can be challenging.  He seems to be asymptomatic with the PVCs, and cardiac function has actually gotten better compared to previous echo.  Therefore, I favor continued therapy of metoprolol.  4.  Paroxysmal atrial fibrillation.  No recurrence of A-fib.  5.  Embolic prevention/ CHADS Vasc of 4-5.    Completed JUSTO exclusion confirmed on CTA (4/15/2022).  No need for anticoagulation.    Mason Young MD    Physical Exam:  Vitals: BP (!) 150/100   Pulse 63   Ht 1.905 m (6' 3\")   Wt (!) 159.2 kg (350 lb 14.4 oz)   SpO2 94%   BMI 43.86 kg/m      Constitutional:  AAO x3.  Pt is in NAD.  HEAD: normocephalic.  SKIN: Skin normal color, texture and turgor with no lesions or eruptions.  Eyes: PERRL, EOMI.  ENT:  Supple, normal JVP. No lymphadenopathy or thyroid enlargement.  Chest:  CTAB.  Cardiac: RRR, normal  S1 and S2.  No murmurs rubs or gallop.    Abdomen:  Normal BS.  Soft, non-tender and non-distended.  No rebound or guarding.    Extremities:  Pedious pulses palpable B/L.  LE edema noticed.   Neurological: Strength and sensation grossly symmetric and intact throughout.       CURRENT MEDICATIONS:  Current Outpatient Medications   Medication Sig Dispense Refill     acetaminophen (TYLENOL) 325 MG tablet Take 2 tablets (650 mg) by mouth every 4 hours as needed for mild pain 40 tablet 0     aspirin (ASA) 81 MG chewable tablet Take 81 mg by mouth daily       DULoxetine (CYMBALTA) 30 MG " capsule Take 60 mg by mouth every evening       FERATE 240 (27 Fe) MG TABS TAKE 1 TABLET BY MOUTH EVERY DAY *OTC NOT COV* 90 tablet 0     Ferrous Sulfate (IRON PO) Take by mouth daily Dosage is unknown       furosemide (LASIX) 40 MG tablet Take 1 tablet (40 mg) by mouth daily 90 tablet 3     ketoconazole (NIZORAL) 2 % external cream Apply topically 2 times daily 60 g 4     lisinopril (ZESTRIL) 5 MG tablet Take 1 tablet (5 mg) by mouth daily 90 tablet 3     metoprolol succinate ER (TOPROL XL) 100 MG 24 hr tablet Take 1 tablet (100 mg) by mouth 2 times daily 180 tablet 3     MORPHINE SULFATE IT pump:updated 1/12/22  Medications in Pump:   [facility] Responsible for pump medications:   Conc:  Morphine 20mg/ml(3.95 mg/day), clonidine 21.1mcg/ml (4.168 mcg/day), baclofen 42.5mcg/ml (8.395mcg/day)  Rate:   Pump Last Fill Date: 9/2021  Pump Refill Date: 2/2022       nitroGLYcerin (NITROSTAT) 0.4 MG sublingual tablet For chest pain place 1 tablet under the tongue every 5 minutes for 3 doses. If symptoms persist 5 minutes after 1st dose call 911. 30 tablet 1     nystatin (MYCOSTATIN) 597801 UNIT/GM external powder Apply topically 3 times daily 60 g 4     rosuvastatin (CRESTOR) 40 MG tablet TAKE 1 TABLET BY MOUTH EVERY DAY 90 tablet 3     traZODone (DESYREL) 100 MG tablet Take 2 tablets (200 mg) by mouth At Bedtime TAKE 2 TABLETS (200MG TOTAL) BY MOUTH AT BEDTIME Strength: 100 mg 180 tablet 3       ALLERGIES     Allergies   Allergen Reactions     Hydrocodone-Acetaminophen Other (See Comments)     sneezing       PAST MEDICAL HISTORY:  Past Medical History:   Diagnosis Date     Abdominal panniculus 11/13/2012    Formatting of this note might be different from the original. S/p panniculectomy 11/13/2012     Acid reflux disease 10/31/2017     Bariatric surgery status 06/23/2008    Formatting of this note might be different from the original. Created by Conversion     Bilateral leg edema 07/13/2021     CHF (congestive  heart failure) (H)      Chronic Back Pain (IT Pump w Morphine, Clonidine, Baclofen) 01/16/2022     Chronic diastolic heart failure (H) 07/26/2021     Claudication of lower extremity (H) 11/20/2019     Coronary arteriosclerosis 05/09/2022    Formatting of this note might be different from the original. 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     Ischemic cardiomyopathy      Lumbago     Created by [facility] Annotation: Apr 6 2012 10:20Anh Ford: Morphine pump      Mixed hyperlipidemia 06/23/2008     Morbid obesity (H) 08/01/2018     Nephrolithiasis      NSTEMI (non-ST elevated myocardial infarction) (H)      Obstructive sleep apnea     Doesn't tolerate CPAP     MARLEEN (doesn't tolerate CPAP) 07/26/2021     Osteoarthritis     Created by [employer] Annotation: Jun 26 2009  9:53Zack Harris: failed lumbar  fusion/morphine pump dr putnam  Replacement Utility updated for latest IMO load     Osteoarthrosis 05/09/2022    Formatting of this note might be different from the original. Created by [employer] Annotation: Jun 26 2009  9:53Zack Harris: failed lumbar  fusion/morphine pump dr putnam  Replacement Utility updated for latest IMO load     Paroxysmal atrial fib -- S/P Pulm Vein Ablation 1/19/22 09/11/2021    Formatting of this note might be different from the  original. Created by Conversion     Paroxysmal atrial fibrillation (H)     Created by Conversion      Paroxysmal ventricular tachycardia     dual chamber ICD 1/24/2022     Peripheral vascular disease (H) 05/03/2022     Post-laminectomy syndrome 11/25/2015     S/P CABG (coronary artery bypass graft) 01/25/2022     Sleepiness 05/08/2018     Type 2 diabetes mellitus (H)     Diet controlled after Gastric Bypass in 2008       PAST SURGICAL HISTORY:  Past Surgical History:   Procedure Laterality Date     BACK SURGERY       BYPASS GASTRIC DUODENAL SWITCH       BYPASS GRAFT ARTERY CORONARY N/A 01/19/2022    Procedure: CORONARY ARTERY BYPASS GRAFT (CABG) X1; LIMA -LAD.  PULMONARY VEIN ISOLATION, AND ATRIAL APPENDAGE CLIPPING USING 45MM ACTICURE CLIP.;  Surgeon: William Dumont MD;  Location:  OR     COLONOSCOPY       COSMETIC SURGERY      pannicullectomy     CV CORONARY ANGIOGRAM N/A 09/11/2021    Procedure: Coronary Angiogram;  Surgeon: Noris Ozuna MD;  Location: [facility] CATH LAB CV     CV HEART CATHETERIZATION WITH POSSIBLE INTERVENTION N/A 01/13/2022    Procedure: Heart Catheterization with Possible Intervention;  Surgeon: Liz Pearl MD;  Location:  HEART CARDIAC CATH LAB     CV LEFT HEART CATH N/A 09/11/2021    Procedure: Left Heart Cath;  Surgeon: Noris Ozuna MD;  Location: [facility] CATH LAB CV     CV PCI N/A 09/11/2021    Procedure: Percutaneous Coronary Intervention;  Surgeon: Noris Ozuna MD;  Location: [facility] CATH LAB CV     CV PCI N/A 10/07/2021    Procedure: Percutaneous Coronary Intervention;  Surgeon: Mckayla Dan MD;  Location: [facility] CATH LAB CV     CV PCI ASPIRATION THROMECTOMY N/A 09/11/2021    Procedure: Percutaneous Coronary Intervention Aspiration Thrombectomy;  Surgeon: Noris Ozuna MD;  Location: [facility] CATH LAB CV     ENT SURGERY      tonsillectomy     EP ICD N/A 01/24/2022    Procedure: EP ICD;  Surgeon: Jannette Crook MD;  Location:  HEART CARDIAC CATH  LAB     EXTRACORPOREAL SHOCK WAVE LITHOTRIPSY, CYSTOSCOPY, INSERT STENT URETER(S), COMBINED  08/23/2011     HC REMOVAL OF TONSILS,<13 Y/O      Description: Tonsillectomy;  Recorded: 03/23/2012;  Comments: for obstructive sleep apnea     HERNIA REPAIR       IR MISCELLANEOUS PROCEDURE  07/29/2011     IR MISCELLANEOUS PROCEDURE  08/05/2011     IR MISCELLANEOUS PROCEDURE  08/23/2011     IR NEPHROSTOMY TUBE CHANGE BILATERAL  08/05/2011     ORTHOPEDIC SURGERY      arthrodesis ant discectomy, lumbar     NH ARTHRODESIS ANT INTERBODY MIN DISCECTOMY,LUMBAR      Description: Lumbar Vertebral Fusion;  Recorded: 06/26/2009;  Comments: before 200 see [facility] consult under old records     NH EXCISE EXCESS SKIN TISSUE,ABDOMEN  11/13/2012    Description: Panniculectomy;  Proc Date: 11/13/2012;  Comments: 3.5 Lb Pannus was removed at the [facility] By Dr. Shon Green     REPLACE INTRATHECAL PAIN PUMP N/A 04/25/2017    Procedure: REPLACE INTRATHECAL PAIN PUMP;  INTRATHECAL PAIN PUMP CATHETER REPLACEMENT AND PUMP REPLACEMENT;  Surgeon: Anthony Maloney MD;  Location: [facility]     REVISE CATHETER INTRATHECAL N/A 04/25/2017    Procedure: REVISE CATHETER INTRATHECAL;;  Surgeon: Anthony Maloney MD;  Location: [facility]     SHOULDER SURGERY       Z GASTRIC BYPASS,OBESE<100CM LORA-EN-Y  01/01/2008    Description: Gastric Surgery For Morbid Obesity Bypass With Lora-en-Y;  Recorded: 06/26/2009;  Comments: dr green 2008     RUST REPAIR INCISIONAL HERNIA,REDUCIBLE  11/13/2012    Description: Incisional Hernia Repair;  Proc Date: 11/13/2012;  Comments: incisional hernia repair and abdominal panniculectomy by Dr Shon Green at the [facility].       FAMILY HISTORY:  The patient's family history was reviewed and is non-contributory for heart disease.    SOCIAL HISTORY:  Social History     Socioeconomic History     Marital status:    Tobacco Use     Smoking status: Former     Types: Cigars     Smokeless tobacco: Never     Tobacco comments:      Haven't smoked in years   Vaping Use     Vaping Use: Never used   Substance and Sexual Activity     Alcohol use: No     Drug use: No     Comment: Drug use: formerly used     Sexual activity: Not Currently     Partners: Female     Birth control/protection: None   Other Topics Concern     Parent/sibling w/ CABG, MI or angioplasty before 65F 55M? Yes     Comment: Dad       Review of Systems:  Skin:        Eyes:       ENT:       Respiratory:       Cardiovascular:       Gastroenterology:      Genitourinary:       Musculoskeletal:       Neurologic:       Psychiatric:       Heme/Lymph/Imm:       Endocrine:           Recent Lab Results:  LIPID RESULTS:  Lab Results   Component Value Date    CHOL 139 11/21/2022    HDL 33 (L) 11/21/2022    LDL 39 11/21/2022    LDL 69 02/13/2018    TRIG 335 (H) 11/21/2022       LIVER ENZYME RESULTS:  Lab Results   Component Value Date    AST 37 01/20/2022    ALT 25 11/21/2022       CBC RESULTS:  Lab Results   Component Value Date    WBC 7.9 05/03/2022    RBC 4.68 05/03/2022    HGB 12.2 (L) 05/03/2022    HCT 38.0 (L) 05/03/2022    MCV 81 05/03/2022    MCH 26.1 (L) 05/03/2022    MCHC 32.1 05/03/2022    RDW 14.1 05/03/2022     05/03/2022       BMP RESULTS:  Lab Results   Component Value Date     11/21/2022    POTASSIUM 4.0 11/21/2022    POTASSIUM 3.8 08/18/2022    CHLORIDE 105 11/21/2022    CHLORIDE 106 08/18/2022    CO2 28 11/21/2022    CO2 25 08/18/2022    ANIONGAP 8 11/21/2022    ANIONGAP 7 08/18/2022     (H) 11/21/2022    GLC 95 08/18/2022    BUN 17.9 11/21/2022    BUN 19 08/18/2022    CR 0.88 11/21/2022    GFRESTIMATED >90 11/21/2022    GFRESTIMATED >60 01/05/2021    GFRESTBLACK >60 01/05/2021    RATNA 8.8 11/21/2022        A1C RESULTS:  Lab Results   Component Value Date    A1C 5.9 (H) 03/01/2023       INR RESULTS:  Lab Results   Component Value Date    INR 1.31 (H) 01/19/2022    INR 1.57 (H) 01/19/2022         ECHOCARDIOGRAM  No results found for this or any previous  visit (from the past 8760 hour(s)).      No orders of the defined types were placed in this encounter.    No orders of the defined types were placed in this encounter.    There are no discontinued medications.      Encounter Diagnoses   Name Primary?     Chronic systolic congestive heart failure (H)      ICD (implantable cardioverter-defibrillator) in place      Paroxysmal atrial fib -- S/P Pulm Vein Ablation 1/19/22            Liz Pearl MD  [id number] [id number]

## 2023-03-15 NOTE — LETTER
3/15/2023    Roge Feliciano MD  [address] [facility] [address]    RE: Onur Barr       Dear Colleague,     I had the pleasure of seeing Onur Barr in the [facility].  Electrophysiology/ Clinic Note         H&P and Plan:     REASON FOR VISIT: Electrophysiology evaluation.      HISTORY OF PRESENT ILLNESS: Patient is a pleasant  67-year old gentleman with a history of hypertension, hyperlipidemia, diabetes, paroxysmal A. fib and ischemic cardiomyopathy (ICD implantation on 1/20/2022), who is here for routine evaluation.     Patient has a long history of CAD.  He initially underwent PCI to LAD in 2012.   In 2021, he presented with inferior STEMI and underwent PCI to RCA.  He was re-admitted with non-STEMI on 1/12/2022, and during admission he underwent CABG (LIMA to LAD, PVI, LA exclusion).  Hospital stay was complicated by postop A. fib and episode of monomorphic VT.  He underwent ICD implantation for secondary prevention and was temporarily placed on amiodarone.      He was recent evaluated in general cardiology clinic and a stress test was recommended which is scheduled to be done in a week.        Today, he complains of dyspnea on exertion.  He also affirms that he has gained significant amount of weight (15-20 pounds) in the last few months.  He appears to be fluid overload on physical exam.      He denies chest pain, palpitation, lightheadedness, near-syncope or syncope.     Device was interrogated today.  He is atrially paced 70% of the time and ventricular paced 3% of the time.  Lead parameters are stable.  He appears to have frequent PVCs.  There were 18 mode switch.  The longest episode lasted for 9 minutes.    EKG obtained last year showed PVC morphology suggestive of outflow tract possible epicardial origin (LBBB pattern, inferior axis, transition at V3 and slurred onset of QRS).     PREVIOUS STUDIES (personally reviewed):  -Echo (1/12/2022): Mild LV dysfunction.   "EF of 40-45%.  Patient was in A. fib at the time of the study.  -Echo (11/21/2022): EF of 45-50%.  Mild global HK.  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      ASSESSMENT AND PLAN:   1.    Heart for exacerbation.  He appears to be fluid overloaded physical exam.  Labs obtained last year showed normal kidney function.  I recommend the following:  -Increase Lasix to 40 mg twice daily.  -Increase lisinopril to 10 mg daily.  -Agree with stress test.  -Follow-up with an SULEMA in 2 weeks to reevaluate blood pressure and fluid status.    2.  Hypertension.  Blood pressure is elevated.  Plan as detailed above.  3.  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  PVC morphology suggestive of outflow tract/epicardial origin and ablation can be challenging.  He seems to be asymptomatic with the PVCs, and cardiac function has actually gotten better compared to previous echo.  Therefore, I favor continued therapy of metoprolol.  4.  Paroxysmal atrial fibrillation.  No recurrence of A-fib.  5.  Embolic prevention/ CHADS Vasc of 4-5.    Completed JUSTO exclusion confirmed on CTA (4/15/2022).  No need for anticoagulation.    Mason Young MD    Physical Exam:  Vitals: BP (!) 150/100   Pulse 63   Ht 1.905 m (6' 3\")   Wt (!) 159.2 kg (350 lb 14.4 oz)   SpO2 94%   BMI 43.86 kg/m      Constitutional:  AAO x3.  Pt is in NAD.  HEAD: normocephalic.  SKIN: Skin normal color, texture and turgor with no lesions or eruptions.  Eyes: PERRL, EOMI.  ENT:  Supple, normal JVP. No lymphadenopathy or thyroid enlargement.  Chest:  CTAB.  Cardiac: RRR, normal  S1 and S2.  No murmurs rubs or gallop.    Abdomen:  Normal BS.  Soft, non-tender and non-distended.  No rebound or guarding.    Extremities:  Pedious pulses palpable B/L.  LE edema noticed.   Neurological: Strength and sensation grossly symmetric and intact throughout.       CURRENT MEDICATIONS:  Current Outpatient Medications   Medication Sig Dispense Refill     " acetaminophen (TYLENOL) 325 MG tablet Take 2 tablets (650 mg) by mouth every 4 hours as needed for mild pain 40 tablet 0     aspirin (ASA) 81 MG chewable tablet Take 81 mg by mouth daily       DULoxetine (CYMBALTA) 30 MG capsule Take 60 mg by mouth every evening       FERATE 240 (27 Fe) MG TABS TAKE 1 TABLET BY MOUTH EVERY DAY *OTC NOT COV* 90 tablet 0     Ferrous Sulfate (IRON PO) Take by mouth daily Dosage is unknown       furosemide (LASIX) 40 MG tablet Take 1 tablet (40 mg) by mouth daily 90 tablet 3     ketoconazole (NIZORAL) 2 % external cream Apply topically 2 times daily 60 g 4     lisinopril (ZESTRIL) 5 MG tablet Take 1 tablet (5 mg) by mouth daily 90 tablet 3     metoprolol succinate ER (TOPROL XL) 100 MG 24 hr tablet Take 1 tablet (100 mg) by mouth 2 times daily 180 tablet 3     MORPHINE SULFATE IT pump:updated 1/12/22  Medications in Pump:   [facility] Responsible for pump medications:   Conc:  Morphine 20mg/ml(3.95 mg/day), clonidine 21.1mcg/ml (4.168 mcg/day), baclofen 42.5mcg/ml (8.395mcg/day)  Rate:   Pump Last Fill Date: 9/2021  Pump Refill Date: 2/2022       nitroGLYcerin (NITROSTAT) 0.4 MG sublingual tablet For chest pain place 1 tablet under the tongue every 5 minutes for 3 doses. If symptoms persist 5 minutes after 1st dose call 911. 30 tablet 1     nystatin (MYCOSTATIN) 779730 UNIT/GM external powder Apply topically 3 times daily 60 g 4     rosuvastatin (CRESTOR) 40 MG tablet TAKE 1 TABLET BY MOUTH EVERY DAY 90 tablet 3     traZODone (DESYREL) 100 MG tablet Take 2 tablets (200 mg) by mouth At Bedtime TAKE 2 TABLETS (200MG TOTAL) BY MOUTH AT BEDTIME Strength: 100 mg 180 tablet 3       ALLERGIES     Allergies   Allergen Reactions     Hydrocodone-Acetaminophen Other (See Comments)     sneezing       PAST MEDICAL HISTORY:  Past Medical History:   Diagnosis Date     Abdominal panniculus 11/13/2012    Formatting of this note might be different from the original. S/p panniculectomy  11/13/2012     Acid reflux disease 10/31/2017     Bariatric surgery status 06/23/2008    Formatting of this note might be different from the original. Created by Conversion     Bilateral leg edema 07/13/2021     CHF (congestive heart failure) (H)      Chronic Back Pain (IT Pump w Morphine, Clonidine, Baclofen) 01/16/2022     Chronic diastolic heart failure (H) 07/26/2021     Claudication of lower extremity (H) 11/20/2019     Coronary arteriosclerosis 05/09/2022    Formatting of this note might be different from the original. 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     Ischemic cardiomyopathy      Lumbago     Created by Design Clinicals Annotation: Apr 6 2012 10:20AM Anh Ramos: Morphine pump      Mixed hyperlipidemia 06/23/2008     Morbid obesity (H) 08/01/2018     Nephrolithiasis      NSTEMI (non-ST elevated myocardial infarction) (H)      Obstructive sleep apnea     Doesn't tolerate CPAP     MARLEEN (doesn't tolerate CPAP) 07/26/2021     Osteoarthritis     Created by Design Clinicals Annotation: Jun 26 2009  9:53AM Zack Brewer: failed lumbar  fusion/morphine pump dr putnam  Replacement Utility updated for latest IMO load     Osteoarthrosis 05/09/2022    Formatting of this note might be different from the original. Created by Design Clinicals Annotation: Jun 26 2009  9:53AM -  Zack Gutierrez: failed lumbar  fusion/morphine pump dr putnam  Replacement Utility updated for latest IMO load     Paroxysmal atrial fib -- S/P Pulm Vein Ablation 1/19/22 09/11/2021    Formatting of this note might be different from the original. Created by Conversion     Paroxysmal atrial fibrillation (H)     Created by Conversion      Paroxysmal ventricular tachycardia     dual chamber ICD 1/24/2022     Peripheral vascular disease (H) 05/03/2022     Post-laminectomy syndrome 11/25/2015     S/P CABG (coronary artery bypass graft) 01/25/2022     Sleepiness 05/08/2018     Type 2 diabetes mellitus (H)     Diet controlled after Gastric Bypass in 2008       PAST SURGICAL HISTORY:  Past Surgical History:   Procedure Laterality Date     BACK SURGERY       BYPASS GASTRIC DUODENAL SWITCH       BYPASS GRAFT ARTERY CORONARY N/A 01/19/2022    Procedure: CORONARY ARTERY BYPASS GRAFT (CABG) X1; LIMA -LAD.  PULMONARY VEIN ISOLATION, AND ATRIAL APPENDAGE CLIPPING USING 45MM ACTICURE CLIP.;  Surgeon: William Dumont MD;  Location:  OR     COLONOSCOPY       COSMETIC SURGERY      pannicullectomy     CV CORONARY ANGIOGRAM N/A 09/11/2021    Procedure: Coronary Angiogram;  Surgeon: Noris Ozuna MD;  Location: [facility] CATH LAB CV     CV HEART CATHETERIZATION WITH POSSIBLE INTERVENTION N/A 01/13/2022    Procedure: Heart Catheterization with Possible Intervention;  Surgeon: Liz Pearl MD;  Location:  HEART CARDIAC CATH LAB     CV LEFT HEART CATH N/A 09/11/2021    Procedure: Left Heart Cath;  Surgeon: Noris Ozuna MD;  Location: [facility] CATH LAB CV     CV PCI N/A 09/11/2021    Procedure: Percutaneous Coronary Intervention;  Surgeon: Noris Ozuna MD;  Location: [facility] CATH LAB CV     CV PCI N/A 10/07/2021    Procedure: Percutaneous Coronary Intervention;  Surgeon: Mckayla Dan MD;  Location: [facility] CATH LAB CV     CV PCI ASPIRATION THROMECTOMY N/A 09/11/2021    Procedure: Percutaneous Coronary  Intervention Aspiration Thrombectomy;  Surgeon: Noris Ozuna MD;  Location: [facility] CATH LAB      ENT SURGERY      tonsillectomy     EP ICD N/A 01/24/2022    Procedure: EP ICD;  Surgeon: Jannette Crook MD;  Location:  HEART CARDIAC CATH LAB     EXTRACORPOREAL SHOCK WAVE LITHOTRIPSY, CYSTOSCOPY, INSERT STENT URETER(S), COMBINED  08/23/2011     HC REMOVAL OF TONSILS,<13 Y/O      Description: Tonsillectomy;  Recorded: 03/23/2012;  Comments: for obstructive sleep apnea     HERNIA REPAIR       IR MISCELLANEOUS PROCEDURE  07/29/2011     IR MISCELLANEOUS PROCEDURE  08/05/2011     IR MISCELLANEOUS PROCEDURE  08/23/2011     IR NEPHROSTOMY TUBE CHANGE BILATERAL  08/05/2011     ORTHOPEDIC SURGERY      arthrodesis ant discectomy, lumbar     HI ARTHRODESIS ANT INTERBODY MIN DISCECTOMY,LUMBAR      Description: Lumbar Vertebral Fusion;  Recorded: 06/26/2009;  Comments: before 200 see [facility] consult under old records     HI EXCISE EXCESS SKIN TISSUE,ABDOMEN  11/13/2012    Description: Panniculectomy;  Proc Date: 11/13/2012;  Comments: 3.5 Lb Pannus was removed at the [facility] By Dr. Shon Green     REPLACE INTRATHECAL PAIN PUMP N/A 04/25/2017    Procedure: REPLACE INTRATHECAL PAIN PUMP;  INTRATHECAL PAIN PUMP CATHETER REPLACEMENT AND PUMP REPLACEMENT;  Surgeon: Anthony Maloney MD;  Location: [facility]     REVISE CATHETER INTRATHECAL N/A 04/25/2017    Procedure: REVISE CATHETER INTRATHECAL;;  Surgeon: Anthony Maloney MD;  Location: [facility]     SHOULDER SURGERY       ZZC GASTRIC BYPASS,OBESE<100CM SUSY-EN-Y  01/01/2008    Description: Gastric Surgery For Morbid Obesity Bypass With Susy-en-Y;  Recorded: 06/26/2009;  Comments: dr green 2008     [facility] REPAIR INCISIONAL HERNIA,REDUCIBLE  11/13/2012    Description: Incisional Hernia Repair;  Proc Date: 11/13/2012;  Comments: incisional hernia repair and abdominal panniculectomy by Dr Shon Green at the [facility].       FAMILY HISTORY:  The patient's family history was reviewed  and is non-contributory for heart disease.    SOCIAL HISTORY:  Social History     Socioeconomic History     Marital status:    Tobacco Use     Smoking status: Former     Types: Cigars     Smokeless tobacco: Never     Tobacco comments:     Haven't smoked in years   Vaping Use     Vaping Use: Never used   Substance and Sexual Activity     Alcohol use: No     Drug use: No     Comment: Drug use: formerly used     Sexual activity: Not Currently     Partners: Female     Birth control/protection: None   Other Topics Concern     Parent/sibling w/ CABG, MI or angioplasty before 65F 55M? Yes     Comment: Dad       Review of Systems:  Skin:        Eyes:       ENT:       Respiratory:       Cardiovascular:       Gastroenterology:      Genitourinary:       Musculoskeletal:       Neurologic:       Psychiatric:       Heme/Lymph/Imm:       Endocrine:           Recent Lab Results:  LIPID RESULTS:  Lab Results   Component Value Date    CHOL 139 11/21/2022    HDL 33 (L) 11/21/2022    LDL 39 11/21/2022    LDL 69 02/13/2018    TRIG 335 (H) 11/21/2022       LIVER ENZYME RESULTS:  Lab Results   Component Value Date    AST 37 01/20/2022    ALT 25 11/21/2022       CBC RESULTS:  Lab Results   Component Value Date    WBC 7.9 05/03/2022    RBC 4.68 05/03/2022    HGB 12.2 (L) 05/03/2022    HCT 38.0 (L) 05/03/2022    MCV 81 05/03/2022    MCH 26.1 (L) 05/03/2022    MCHC 32.1 05/03/2022    RDW 14.1 05/03/2022     05/03/2022       BMP RESULTS:  Lab Results   Component Value Date     11/21/2022    POTASSIUM 4.0 11/21/2022    POTASSIUM 3.8 08/18/2022    CHLORIDE 105 11/21/2022    CHLORIDE 106 08/18/2022    CO2 28 11/21/2022    CO2 25 08/18/2022    ANIONGAP 8 11/21/2022    ANIONGAP 7 08/18/2022     (H) 11/21/2022    GLC 95 08/18/2022    BUN 17.9 11/21/2022    BUN 19 08/18/2022    CR 0.88 11/21/2022    GFRESTIMATED >90 11/21/2022    GFRESTIMATED >60 01/05/2021    GFRESTBLACK >60 01/05/2021    RATNA 8.8 11/21/2022        A1C  RESULTS:  Lab Results   Component Value Date    A1C 5.9 (H) 03/01/2023       INR RESULTS:  Lab Results   Component Value Date    INR 1.31 (H) 01/19/2022    INR 1.57 (H) 01/19/2022         ECHOCARDIOGRAM  No results found for this or any previous visit (from the past 8760 hour(s)).      No orders of the defined types were placed in this encounter.    No orders of the defined types were placed in this encounter.    There are no discontinued medications.      Encounter Diagnoses   Name Primary?     Chronic systolic congestive heart failure (H)      ICD (implantable cardioverter-defibrillator) in place      Paroxysmal atrial fib -- S/P Pulm Vein Ablation 1/19/22          CC  Liz Pearl MD  [id number] [address]    Thank you for allowing me to participate in the care of your patient.      Sincerely,     Mason Young MD     [facility]

## 2023-03-19 ENCOUNTER — MYC MEDICAL ADVICE (OUTPATIENT)
Dept: CARDIOLOGY | Facility: CLINIC | Age: 68
End: 2023-03-19
Payer: COMMERCIAL

## 2023-03-19 DIAGNOSIS — I50.22 CHRONIC SYSTOLIC CONGESTIVE HEART FAILURE (H): ICD-10-CM

## 2023-03-20 ENCOUNTER — HOSPITAL ENCOUNTER (OUTPATIENT)
Dept: NUCLEAR MEDICINE | Facility: CLINIC | Age: 68
Setting detail: NUCLEAR MEDICINE
Discharge: HOME | End: 2023-03-20
Attending: INTERNAL MEDICINE
Payer: COMMERCIAL

## 2023-03-20 DIAGNOSIS — Z95.1 S/P CABG (CORONARY ARTERY BYPASS GRAFT): ICD-10-CM

## 2023-03-20 DIAGNOSIS — I49.3 PVC'S (PREMATURE VENTRICULAR CONTRACTIONS): ICD-10-CM

## 2023-03-20 DIAGNOSIS — I10 ESSENTIAL HYPERTENSION: ICD-10-CM

## 2023-03-20 LAB — STRESS ECHO TARGET HR: 154

## 2023-03-20 PROCEDURE — 78451 HT MUSCLE IMAGE SPECT SING: CPT

## 2023-03-20 PROCEDURE — A9502 TC99M TETROFOSMIN: HCPCS | Performed by: INTERNAL MEDICINE

## 2023-03-20 PROCEDURE — 78451 HT MUSCLE IMAGE SPECT SING: CPT | Mod: 26 | Performed by: INTERNAL MEDICINE

## 2023-03-20 PROCEDURE — 343N000001 HC RX 343: Performed by: INTERNAL MEDICINE

## 2023-03-20 RX ORDER — LISINOPRIL 10 MG/1
10 TABLET ORAL DAILY
Qty: 90 TABLET | Refills: 3 | Status: SHIPPED | OUTPATIENT
Start: 2023-03-20 | End: 2024-01-22

## 2023-03-20 RX ADMIN — Medication 35 MILLICURIE: at 08:48

## 2023-03-24 ENCOUNTER — TELEPHONE (OUTPATIENT)
Dept: CARDIOLOGY | Facility: CLINIC | Age: 68
End: 2023-03-24
Payer: COMMERCIAL

## 2023-03-24 NOTE — TELEPHONE ENCOUNTER
Pt unable to complete nuclear stress on Monday 3/20/23 because pt unable to lay on back for extended period of time.     Dr. Pearl then discussed with pain clinic MD doing the battery replacement to discuss what can be done to help clear pt.     It was discussed that the procedure is fairly simple and done under moderate sedation. They both agreed that it is not worth doing a stress test or angiogram under general anesthesia prior to the low risk procedure.   Dr. Pearl would like to see pt prior to the procedure to assess pt and make sure the plan is outlined.     Informed pt and his wife and they agreed and pt is scheduled to see Dr. Pearl on 4/17/23.

## 2023-03-29 DIAGNOSIS — B37.2 CANDIDAL INTERTRIGO: ICD-10-CM

## 2023-03-30 RX ORDER — FLUCONAZOLE 100 MG/1
100 TABLET ORAL DAILY
Qty: 15 TABLET | Refills: 0 | Status: SHIPPED | OUTPATIENT
Start: 2023-03-30 | End: 2023-04-14

## 2023-03-30 NOTE — TELEPHONE ENCOUNTER
"Routing refill request to provider for review/approval because:  Not active on med list    Last Written Prescription Date:  02/18/2023  Last Fill Quantity: 15,  # refills: 0   Last office visit provider:  03/01/2023    Requested Prescriptions   Pending Prescriptions Disp Refills     fluconazole (DIFLUCAN) 100 MG tablet [Pharmacy Med Name: FLUCONAZOLE 100 MG TABLET] 15 tablet 0     Sig: TAKE 1 TABLET (100 MG) BY MOUTH DAILY FOR 15 DAYS       Antifungal Agents Failed - 3/29/2023  9:08 AM        Failed - Not Fluconazole or Terconazole      If oral Fluconazole or Terconazole, may refill if indicated in progress notes.           Failed - Medication is active on med list        Passed - Recent (12 mo) or future (30 days) visit within the authorizing provider's specialty     Patient has had an office visit with the authorizing provider or a provider within the authorizing providers department within the previous 12 mos or has a future within next 30 days. See \"Patient Info\" tab in inbasket, or \"Choose Columns\" in Meds & Orders section of the refill encounter.                   Lizzette Fernández RN 03/30/23 5:19 AM  "

## 2023-04-07 LAB
MDC_IDC_EPISODE_DTM: NORMAL
MDC_IDC_EPISODE_ID: NORMAL
MDC_IDC_EPISODE_TYPE: NORMAL
MDC_IDC_LEAD_IMPLANT_DT: NORMAL
MDC_IDC_LEAD_IMPLANT_DT: NORMAL
MDC_IDC_LEAD_LOCATION: NORMAL
MDC_IDC_LEAD_LOCATION: NORMAL
MDC_IDC_LEAD_LOCATION_DETAIL_1: NORMAL
MDC_IDC_LEAD_LOCATION_DETAIL_1: NORMAL
MDC_IDC_LEAD_MFG: NORMAL
MDC_IDC_LEAD_MFG: NORMAL
MDC_IDC_LEAD_MODEL: NORMAL
MDC_IDC_LEAD_MODEL: NORMAL
MDC_IDC_LEAD_POLARITY_TYPE: NORMAL
MDC_IDC_LEAD_POLARITY_TYPE: NORMAL
MDC_IDC_LEAD_SERIAL: NORMAL
MDC_IDC_LEAD_SERIAL: NORMAL
MDC_IDC_MSMT_BATTERY_STATUS: NORMAL
MDC_IDC_MSMT_CAP_CHARGE_DTM: NORMAL
MDC_IDC_MSMT_CAP_CHARGE_ENERGY: 0 J
MDC_IDC_MSMT_CAP_CHARGE_TIME: 0 S
MDC_IDC_MSMT_CAP_CHARGE_TIME: 10.14 S
MDC_IDC_MSMT_CAP_CHARGE_TYPE: NORMAL
MDC_IDC_MSMT_CAP_CHARGE_TYPE: NORMAL
MDC_IDC_MSMT_LEADCHNL_RA_IMPEDANCE_VALUE: 576 OHM
MDC_IDC_MSMT_LEADCHNL_RA_PACING_THRESHOLD_AMPLITUDE: 0.5 V
MDC_IDC_MSMT_LEADCHNL_RA_PACING_THRESHOLD_PULSEWIDTH: 0.4 MS
MDC_IDC_MSMT_LEADCHNL_RA_SENSING_INTR_AMPL: 5.3 MV
MDC_IDC_MSMT_LEADCHNL_RV_IMPEDANCE_VALUE: 414 OHM
MDC_IDC_MSMT_LEADCHNL_RV_PACING_THRESHOLD_AMPLITUDE: 0.7 V
MDC_IDC_MSMT_LEADCHNL_RV_PACING_THRESHOLD_PULSEWIDTH: 0.4 MS
MDC_IDC_MSMT_LEADCHNL_RV_SENSING_INTR_AMPL: 10.1 MV
MDC_IDC_PG_IMPLANT_DTM: NORMAL
MDC_IDC_PG_MFG: NORMAL
MDC_IDC_PG_MODEL: NORMAL
MDC_IDC_PG_SERIAL: NORMAL
MDC_IDC_PG_TYPE: NORMAL
MDC_IDC_SESS_CLINIC_NAME: NORMAL
MDC_IDC_SESS_DTM: NORMAL
MDC_IDC_SESS_TYPE: NORMAL
MDC_IDC_SET_BRADY_AT_MODE_SWITCH_MODE: NORMAL
MDC_IDC_SET_BRADY_AT_MODE_SWITCH_RATE: 170 {BEATS}/MIN
MDC_IDC_SET_BRADY_LOWRATE: 60 {BEATS}/MIN
MDC_IDC_SET_BRADY_MAX_SENSOR_RATE: 130 {BEATS}/MIN
MDC_IDC_SET_BRADY_MAX_TRACKING_RATE: 130 {BEATS}/MIN
MDC_IDC_SET_BRADY_MODE: NORMAL
MDC_IDC_SET_BRADY_PAV_DELAY_HIGH: 200 MS
MDC_IDC_SET_BRADY_PAV_DELAY_LOW: 300 MS
MDC_IDC_SET_BRADY_SAV_DELAY_HIGH: 200 MS
MDC_IDC_SET_BRADY_SAV_DELAY_LOW: 300 MS
MDC_IDC_SET_LEADCHNL_RA_PACING_AMPLITUDE: 2 V
MDC_IDC_SET_LEADCHNL_RA_PACING_CAPTURE_MODE: NORMAL
MDC_IDC_SET_LEADCHNL_RA_PACING_POLARITY: NORMAL
MDC_IDC_SET_LEADCHNL_RA_PACING_PULSEWIDTH: 0.4 MS
MDC_IDC_SET_LEADCHNL_RA_SENSING_ADAPTATION_MODE: NORMAL
MDC_IDC_SET_LEADCHNL_RA_SENSING_POLARITY: NORMAL
MDC_IDC_SET_LEADCHNL_RA_SENSING_SENSITIVITY: 0.25 MV
MDC_IDC_SET_LEADCHNL_RV_PACING_AMPLITUDE: 2 V
MDC_IDC_SET_LEADCHNL_RV_PACING_CAPTURE_MODE: NORMAL
MDC_IDC_SET_LEADCHNL_RV_PACING_POLARITY: NORMAL
MDC_IDC_SET_LEADCHNL_RV_PACING_PULSEWIDTH: 0.4 MS
MDC_IDC_SET_LEADCHNL_RV_SENSING_ADAPTATION_MODE: NORMAL
MDC_IDC_SET_LEADCHNL_RV_SENSING_POLARITY: NORMAL
MDC_IDC_SET_LEADCHNL_RV_SENSING_SENSITIVITY: 0.6 MV
MDC_IDC_SET_ZONE_DETECTION_INTERVAL: 250 MS
MDC_IDC_SET_ZONE_DETECTION_INTERVAL: 300 MS
MDC_IDC_SET_ZONE_DETECTION_INTERVAL: 353 MS
MDC_IDC_SET_ZONE_TYPE: NORMAL
MDC_IDC_SET_ZONE_VENDOR_TYPE: NORMAL
MDC_IDC_STAT_EPISODE_RECENT_COUNT: 0
MDC_IDC_STAT_EPISODE_RECENT_COUNT: 0
MDC_IDC_STAT_EPISODE_RECENT_COUNT: 16
MDC_IDC_STAT_EPISODE_RECENT_COUNT_DTM_END: NORMAL
MDC_IDC_STAT_EPISODE_RECENT_COUNT_DTM_START: NORMAL
MDC_IDC_STAT_EPISODE_TOTAL_COUNT: 0
MDC_IDC_STAT_EPISODE_TOTAL_COUNT: 0
MDC_IDC_STAT_EPISODE_TOTAL_COUNT: 16
MDC_IDC_STAT_EPISODE_TOTAL_COUNT_DTM_END: NORMAL
MDC_IDC_STAT_EPISODE_TYPE: NORMAL
MDC_IDC_STAT_EPISODE_VENDOR_TYPE: NORMAL
MDC_IDC_STAT_TACHYTHERAPY_ATP_DELIVERED_RECENT: 0
MDC_IDC_STAT_TACHYTHERAPY_ATP_DELIVERED_TOTAL: 0
MDC_IDC_STAT_TACHYTHERAPY_RECENT_DTM_END: NORMAL
MDC_IDC_STAT_TACHYTHERAPY_RECENT_DTM_START: NORMAL
MDC_IDC_STAT_TACHYTHERAPY_SHOCKS_ABORTED_RECENT: 0
MDC_IDC_STAT_TACHYTHERAPY_SHOCKS_ABORTED_TOTAL: 0
MDC_IDC_STAT_TACHYTHERAPY_SHOCKS_DELIVERED_RECENT: 0
MDC_IDC_STAT_TACHYTHERAPY_SHOCKS_DELIVERED_TOTAL: 0
MDC_IDC_STAT_TACHYTHERAPY_TOTAL_DTM_END: NORMAL

## 2023-04-17 ENCOUNTER — OFFICE VISIT (OUTPATIENT)
Dept: CARDIOLOGY | Facility: CLINIC | Age: 68
End: 2023-04-17
Payer: COMMERCIAL

## 2023-04-17 ENCOUNTER — LAB (OUTPATIENT)
Dept: LAB | Facility: CLINIC | Age: 68
End: 2023-04-17
Payer: COMMERCIAL

## 2023-04-17 VITALS
DIASTOLIC BLOOD PRESSURE: 90 MMHG | SYSTOLIC BLOOD PRESSURE: 132 MMHG | HEIGHT: 75 IN | BODY MASS INDEX: 38.3 KG/M2 | HEART RATE: 70 BPM | WEIGHT: 308 LBS | OXYGEN SATURATION: 98 %

## 2023-04-17 DIAGNOSIS — Z01.810 PRE-OPERATIVE CARDIOVASCULAR EXAMINATION: ICD-10-CM

## 2023-04-17 DIAGNOSIS — D72.829 LEUKOCYTOSIS, UNSPECIFIED TYPE: ICD-10-CM

## 2023-04-17 DIAGNOSIS — Z01.810 PRE-OPERATIVE CARDIOVASCULAR EXAMINATION: Primary | ICD-10-CM

## 2023-04-17 LAB
ANION GAP SERPL CALCULATED.3IONS-SCNC: 9 MMOL/L (ref 7–15)
BUN SERPL-MCNC: 24.7 MG/DL (ref 8–23)
CALCIUM SERPL-MCNC: 9.5 MG/DL (ref 8.8–10.2)
CHLORIDE SERPL-SCNC: 103 MMOL/L (ref 98–107)
CREAT SERPL-MCNC: 1.03 MG/DL (ref 0.67–1.17)
DEPRECATED HCO3 PLAS-SCNC: 28 MMOL/L (ref 22–29)
ERYTHROCYTE [DISTWIDTH] IN BLOOD BY AUTOMATED COUNT: 12.6 % (ref 10–15)
GFR SERPL CREATININE-BSD FRML MDRD: 80 ML/MIN/1.73M2
GLUCOSE SERPL-MCNC: 101 MG/DL (ref 70–99)
HCT VFR BLD AUTO: 45 % (ref 40–53)
HGB BLD-MCNC: 14.7 G/DL (ref 13.3–17.7)
MCH RBC QN AUTO: 29.6 PG (ref 26.5–33)
MCHC RBC AUTO-ENTMCNC: 32.7 G/DL (ref 31.5–36.5)
MCV RBC AUTO: 91 FL (ref 78–100)
PLATELET # BLD AUTO: 153 10E3/UL (ref 150–450)
POTASSIUM SERPL-SCNC: 4.7 MMOL/L (ref 3.4–5.3)
RBC # BLD AUTO: 4.96 10E6/UL (ref 4.4–5.9)
SODIUM SERPL-SCNC: 140 MMOL/L (ref 136–145)
WBC # BLD AUTO: 18.2 10E3/UL (ref 4–11)
WBC # BLD AUTO: 18.2 10E3/UL (ref 4–11)

## 2023-04-17 PROCEDURE — 85027 COMPLETE CBC AUTOMATED: CPT | Performed by: INTERNAL MEDICINE

## 2023-04-17 PROCEDURE — 36415 COLL VENOUS BLD VENIPUNCTURE: CPT | Performed by: INTERNAL MEDICINE

## 2023-04-17 PROCEDURE — 80048 BASIC METABOLIC PNL TOTAL CA: CPT | Performed by: INTERNAL MEDICINE

## 2023-04-17 PROCEDURE — 85007 BL SMEAR W/DIFF WBC COUNT: CPT | Performed by: INTERNAL MEDICINE

## 2023-04-17 PROCEDURE — 93000 ELECTROCARDIOGRAM COMPLETE: CPT | Performed by: INTERNAL MEDICINE

## 2023-04-17 PROCEDURE — 99214 OFFICE O/P EST MOD 30 MIN: CPT | Performed by: INTERNAL MEDICINE

## 2023-04-17 NOTE — H&P (VIEW-ONLY)
[facility] Progress note    Assessment:  1. CAD  PCI to LAD (2012).  PCI to RCA (9/2021).   CABG x 1 (LIMA to LAD, we discussed that PVI, exclusion of left atrial appendage) on 1/19/2022   2. Paroxysmal atrial fibrillation.   Embolic prevention/ CHADS Vasc of 4-5.    Completed JUSTO exclusion confirmed on CTA (4/15/2022).  No need for anticoagulation.  3. HFpEF   markedly improved with higher dose furosemide. Lisinopril was also increased. Needs f/u BMP.   4. Hypertension  5. PVCs.  Continue high-dose metoprolol.    Addendum:  Labs show stable renal function. Normal Hgb and platelets. WBC count elevated. No infectious symptoms. Will add differential and likely recommend PCP follow up. No change other change in management at this time.     Plan:  1. BMP and CBC today.   2. Proceed with intrathecal pump revision/re;lacement pain pump battery change without further cardiovascular evaluation.  See discussion below.  3. Hold aspirin and resume after the procedure per your surgeon's instructions.   4. He was advised to weigh himself at home daily and call cardiology clinic if weight increases by more than 5 pounds in 1 week.    5. Low-sodium diet  6. Follow-up with cardiology [facility] in about 3 months.    HPI:     Onur Barr is a very nice 67 year old with a history of coronary artery disease status post previous PCI's and then CABG with a LIMA and left atrial appendage ligation in January 2022 here for preoperative evaluation.  My last visit with him was March 10, 2022 when he presented for preoperative evaluation to have his pain pump battery changed..  At that point he complained of exertional dyspnea.  A stress test was ordered.  Subsequently 5 days later he was seen in electrophysiology clinic and was significantly more dyspneic had gained 12 pounds and had worsening edema and his furosemide and lisinopril were increased.  He was advised to continue medical management with high-dose metoprolol for his frequent  PVCs.  He is subsequently presented for his stress test but was not able to complete it due to severe back pain.    I spoke to one of the surgeons at his pain clinic about the procedure which is planned at [facility] due to his relatively higher risk but it is expected to be a very low risk procedure with some sedation and local anesthetic to revise or replace his intrathecal pump.  He would require general anesthesia to complete a stress test or angiogram and it did not seem to make sense to schedule him for general anesthesia for preoperative evaluation for a low risk procedure unless absolutely necessary, so he is here today to follow-up for this evaluation and consideration of whether further cardiac testing is needed prior to the pain pump revision.    Fortunately today he is feeling much better.  Weight is down almost 40 pounds although he thinks our scale is not accurate and by his home scale it is down more like 20 pounds.  His shortness of breath is better, his energy is good and he is having no chest pain.  His activity level is limited due to his chronic pain and need to use a cane.    Echo 11/21/22: Interpretation Summary 1. The left ventricle is normal in size. The visual ejection fraction is 45-50%. Mild global hypokinesis, very limited image quality even with contrast,cannot comment on any subtle wall motion abnormalities.2. The right ventricle is normal in structure, function and size.3. No valve disease.4. The ascending aorta is Mildly dilated. 3.9cm. No changes from echo 3/2022.    At home weight runs around 330 lbs on his home scale. He was up to 350 lbs at his visit with Dr. Young and had gained >10 lbs in 5 days after his visit with me      CURRENT MEDICATIONS:  Current Outpatient Medications   Medication Sig Dispense Refill     acetaminophen (TYLENOL) 325 MG tablet Take 2 tablets (650 mg) by mouth every 4 hours as needed for mild pain 40 tablet 0     aspirin (ASA) 81 MG chewable tablet Take 81  mg by mouth daily       DULoxetine (CYMBALTA) 30 MG capsule Take 60 mg by mouth every evening       FERATE 240 (27 Fe) MG TABS TAKE 1 TABLET BY MOUTH EVERY DAY *OTC NOT COV* 90 tablet 0     furosemide (LASIX) 40 MG tablet Take 1 tablet (40 mg) by mouth 2 times daily 90 tablet 3     ketoconazole (NIZORAL) 2 % external cream Apply topically 2 times daily 60 g 4     lisinopril (ZESTRIL) 10 MG tablet Take 1 tablet (10 mg) by mouth daily 90 tablet 3     metoprolol succinate ER (TOPROL XL) 100 MG 24 hr tablet Take 1 tablet (100 mg) by mouth 2 times daily 180 tablet 3     MORPHINE SULFATE IT pump:updated 1/12/22  Medications in Pump:   [facility] Responsible for pump medications:   Conc:  Morphine 20mg/ml(3.95 mg/day), clonidine 21.1mcg/ml (4.168 mcg/day), baclofen 42.5mcg/ml (8.395mcg/day)  Rate:   Pump Last Fill Date: 9/2021  Pump Refill Date: 2/2022       nitroGLYcerin (NITROSTAT) 0.4 MG sublingual tablet For chest pain place 1 tablet under the tongue every 5 minutes for 3 doses. If symptoms persist 5 minutes after 1st dose call 911. 30 tablet 1     nystatin (MYCOSTATIN) 332866 UNIT/GM external powder Apply topically 3 times daily 60 g 4     rosuvastatin (CRESTOR) 40 MG tablet TAKE 1 TABLET BY MOUTH EVERY DAY 90 tablet 3     traZODone (DESYREL) 100 MG tablet Take 2 tablets (200 mg) by mouth At Bedtime TAKE 2 TABLETS (200MG TOTAL) BY MOUTH AT BEDTIME Strength: 100 mg 180 tablet 3     Ferrous Sulfate (IRON PO) Take by mouth daily Dosage is unknown (Patient not taking: Reported on 4/17/2023)         ALLERGIES     Allergies   Allergen Reactions     Hydrocodone-Acetaminophen Other (See Comments)     sneezing       PAST MEDICAL, SURGICAL, FAMILY, SOCIAL HISTORY:  History was reviewed and updated as needed, see medical record.    REVIEW OF SYSTEMS:  Skin:        Eyes:       ENT:       Respiratory:  Positive for dyspnea on exertion;sleep apnea  Cardiovascular:    Positive for;edema;fatigue  Gastroenterology:       Genitourinary:       Musculoskeletal:       Neurologic:       Psychiatric:       Heme/Lymph/Imm:       Endocrine:         PHYSICAL EXAM:      BP: (!) 132/90 Pulse: 70     SpO2: 98 %      Vital Signs with Ranges  Pulse:  [70] 70  BP: (132)/(90) 132/90  SpO2:  [98 %] 98 %  308 lbs 0 oz    Constitutional: awake, alert, no distress  Eyes: sclera nonicteric  ENT: trachea midline  Respiratory: Lungs are clear to auscultation bilaterally  Cardiovascular: Distant heart tones regular with no murmur  GI: nondistended, nontender, bowel sounds present  Lymph/Hematologic: no lymphadenopathy  Skin: dry, no rash trace to 1+ bilateral nontender ankle edema which is mostly woody and not pitting   Musculoskeletal: Kyphosis  Neurologic: Slow wide-based gait  Neuropsychiatric: Normal affect    Recent Results:  EKG today shows  Sinus  Rhythm   first degree AV  block  -Poor R-wave progression -nonspecific -consider old anterior infarct.     LIPID RESULTS:  Lab Results   Component Value Date    CHOL 139 11/21/2022    HDL 33 (L) 11/21/2022    LDL 39 11/21/2022    LDL 69 02/13/2018    TRIG 335 (H) 11/21/2022       LIVER ENZYME RESULTS:  Lab Results   Component Value Date    AST 37 01/20/2022    ALT 25 11/21/2022       CBC RESULTS:  Lab Results   Component Value Date    WBC 7.9 05/03/2022    RBC 4.68 05/03/2022    HGB 12.2 (L) 05/03/2022    HCT 38.0 (L) 05/03/2022    MCV 81 05/03/2022    MCH 26.1 (L) 05/03/2022    MCHC 32.1 05/03/2022    RDW 14.1 05/03/2022     05/03/2022       BMP RESULTS:  Lab Results   Component Value Date     11/21/2022    POTASSIUM 4.0 11/21/2022    POTASSIUM 3.8 08/18/2022    CHLORIDE 105 11/21/2022    CHLORIDE 106 08/18/2022    CO2 28 11/21/2022    CO2 25 08/18/2022    ANIONGAP 8 11/21/2022    ANIONGAP 7 08/18/2022     (H) 11/21/2022    GLC 95 08/18/2022    BUN 17.9 11/21/2022    BUN 19 08/18/2022    CR 0.88 11/21/2022    GFRESTIMATED >90 11/21/2022    GFRESTIMATED >60 01/05/2021    GFRESTBLACK  >60 01/05/2021    RATNA 8.8 11/21/2022        A1C RESULTS:  Lab Results   Component Value Date    A1C 5.9 (H) 03/01/2023       INR RESULTS:  Lab Results   Component Value Date    INR 1.31 (H) 01/19/2022    INR 1.57 (H) 01/19/2022

## 2023-04-17 NOTE — LETTER
4/17/2023    Roge Feliciano MD  [address] [facility] [address]    RE: Onur Barr       Dear Colleague,     I had the pleasure of seeing Onur Barr in the [facility].  [facility] Progress note    Assessment:  CAD  PCI to LAD (2012).  PCI to RCA (9/2021).   CABG x 1 (LIMA to LAD, we discussed that PVI, exclusion of left atrial appendage) on 1/19/2022   Paroxysmal atrial fibrillation.   Embolic prevention/ CHADS Vasc of 4-5.    Completed JUSTO exclusion confirmed on CTA (4/15/2022).  No need for anticoagulation.  HFpEF   markedly improved with higher dose furosemide. Lisinopril was also increased. Needs f/u BMP.   Hypertension  PVCs.  Continue high-dose metoprolol.    Addendum:  Labs show stable renal function. Normal Hgb and platelets. WBC count elevated. No infectious symptoms. Will add differential and likely recommend PCP follow up. No change other change in management at this time.     Plan:  BMP and CBC today.   Proceed with intrathecal pump revision/re;lacement pain pump battery change without further cardiovascular evaluation.  See discussion below.  Hold aspirin and resume after the procedure per your surgeon's instructions.   He was advised to weigh himself at home daily and call cardiology clinic if weight increases by more than 5 pounds in 1 week.    Low-sodium diet  Follow-up with cardiology [facility] in about 3 months.    HPI:     Onur Barr is a very nice 67 year old with a history of coronary artery disease status post previous PCI's and then CABG with a LIMA and left atrial appendage ligation in January 2022 here for preoperative evaluation.  My last visit with him was March 10, 2022 when he presented for preoperative evaluation to have his pain pump battery changed..  At that point he complained of exertional dyspnea.  A stress test was ordered.  Subsequently 5 days later he was seen in electrophysiology clinic and was significantly more dyspneic had gained  12 pounds and had worsening edema and his furosemide and lisinopril were increased.  He was advised to continue medical management with high-dose metoprolol for his frequent PVCs.  He is subsequently presented for his stress test but was not able to complete it due to severe back pain.    I spoke to one of the surgeons at his pain clinic about the procedure which is planned at [facility] due to his relatively higher risk but it is expected to be a very low risk procedure with some sedation and local anesthetic to revise or replace his intrathecal pump.  He would require general anesthesia to complete a stress test or angiogram and it did not seem to make sense to schedule him for general anesthesia for preoperative evaluation for a low risk procedure unless absolutely necessary, so he is here today to follow-up for this evaluation and consideration of whether further cardiac testing is needed prior to the pain pump revision.    Fortunately today he is feeling much better.  Weight is down almost 40 pounds although he thinks our scale is not accurate and by his home scale it is down more like 20 pounds.  His shortness of breath is better, his energy is good and he is having no chest pain.  His activity level is limited due to his chronic pain and need to use a cane.    Echo 11/21/22: Interpretation Summary 1. The left ventricle is normal in size. The visual ejection fraction is 45-50%. Mild global hypokinesis, very limited image quality even with contrast,cannot comment on any subtle wall motion abnormalities.2. The right ventricle is normal in structure, function and size.3. No valve disease.4. The ascending aorta is Mildly dilated. 3.9cm. No changes from echo 3/2022.    At home weight runs around 330 lbs on his home scale. He was up to 350 lbs at his visit with Dr. Young and had gained >10 lbs in 5 days after his visit with me      CURRENT MEDICATIONS:  Current Outpatient Medications   Medication Sig Dispense Refill     acetaminophen (TYLENOL) 325 MG tablet Take 2 tablets (650 mg) by mouth every 4 hours as needed for mild pain 40 tablet 0    aspirin (ASA) 81 MG chewable tablet Take 81 mg by mouth daily      DULoxetine (CYMBALTA) 30 MG capsule Take 60 mg by mouth every evening      FERATE 240 (27 Fe) MG TABS TAKE 1 TABLET BY MOUTH EVERY DAY *OTC NOT COV* 90 tablet 0    furosemide (LASIX) 40 MG tablet Take 1 tablet (40 mg) by mouth 2 times daily 90 tablet 3    ketoconazole (NIZORAL) 2 % external cream Apply topically 2 times daily 60 g 4    lisinopril (ZESTRIL) 10 MG tablet Take 1 tablet (10 mg) by mouth daily 90 tablet 3    metoprolol succinate ER (TOPROL XL) 100 MG 24 hr tablet Take 1 tablet (100 mg) by mouth 2 times daily 180 tablet 3    MORPHINE SULFATE IT pump:updated 1/12/22  Medications in Pump:   [facility] Responsible for pump medications:   Conc:  Morphine 20mg/ml(3.95 mg/day), clonidine 21.1mcg/ml (4.168 mcg/day), baclofen 42.5mcg/ml (8.395mcg/day)  Rate:   Pump Last Fill Date: 9/2021  Pump Refill Date: 2/2022      nitroGLYcerin (NITROSTAT) 0.4 MG sublingual tablet For chest pain place 1 tablet under the tongue every 5 minutes for 3 doses. If symptoms persist 5 minutes after 1st dose call 911. 30 tablet 1    nystatin (MYCOSTATIN) 846353 UNIT/GM external powder Apply topically 3 times daily 60 g 4    rosuvastatin (CRESTOR) 40 MG tablet TAKE 1 TABLET BY MOUTH EVERY DAY 90 tablet 3    traZODone (DESYREL) 100 MG tablet Take 2 tablets (200 mg) by mouth At Bedtime TAKE 2 TABLETS (200MG TOTAL) BY MOUTH AT BEDTIME Strength: 100 mg 180 tablet 3    Ferrous Sulfate (IRON PO) Take by mouth daily Dosage is unknown (Patient not taking: Reported on 4/17/2023)         ALLERGIES     Allergies   Allergen Reactions    Hydrocodone-Acetaminophen Other (See Comments)     sneezing       PAST MEDICAL, SURGICAL, FAMILY, SOCIAL HISTORY:  History was reviewed and updated as needed, see medical record.    REVIEW OF SYSTEMS:  Skin:         Eyes:       ENT:       Respiratory:  Positive for dyspnea on exertion;sleep apnea  Cardiovascular:    Positive for;edema;fatigue  Gastroenterology:      Genitourinary:       Musculoskeletal:       Neurologic:       Psychiatric:       Heme/Lymph/Imm:       Endocrine:         PHYSICAL EXAM:      BP: (!) 132/90 Pulse: 70     SpO2: 98 %      Vital Signs with Ranges  Pulse:  [70] 70  BP: (132)/(90) 132/90  SpO2:  [98 %] 98 %  308 lbs 0 oz    Constitutional: awake, alert, no distress  Eyes: sclera nonicteric  ENT: trachea midline  Respiratory: Lungs are clear to auscultation bilaterally  Cardiovascular: Distant heart tones regular with no murmur  GI: nondistended, nontender, bowel sounds present  Lymph/Hematologic: no lymphadenopathy  Skin: dry, no rash trace to 1+ bilateral nontender ankle edema which is mostly woody and not pitting   Musculoskeletal: Kyphosis  Neurologic: Slow wide-based gait  Neuropsychiatric: Normal affect    Recent Results:  EKG today shows  Sinus  Rhythm   first degree AV  block  -Poor R-wave progression -nonspecific -consider old anterior infarct.     LIPID RESULTS:  Lab Results   Component Value Date    CHOL 139 11/21/2022    HDL 33 (L) 11/21/2022    LDL 39 11/21/2022    LDL 69 02/13/2018    TRIG 335 (H) 11/21/2022       LIVER ENZYME RESULTS:  Lab Results   Component Value Date    AST 37 01/20/2022    ALT 25 11/21/2022       CBC RESULTS:  Lab Results   Component Value Date    WBC 7.9 05/03/2022    RBC 4.68 05/03/2022    HGB 12.2 (L) 05/03/2022    HCT 38.0 (L) 05/03/2022    MCV 81 05/03/2022    MCH 26.1 (L) 05/03/2022    MCHC 32.1 05/03/2022    RDW 14.1 05/03/2022     05/03/2022       BMP RESULTS:  Lab Results   Component Value Date     11/21/2022    POTASSIUM 4.0 11/21/2022    POTASSIUM 3.8 08/18/2022    CHLORIDE 105 11/21/2022    CHLORIDE 106 08/18/2022    CO2 28 11/21/2022    CO2 25 08/18/2022    ANIONGAP 8 11/21/2022    ANIONGAP 7 08/18/2022     (H) 11/21/2022    GLC 95  08/18/2022    BUN 17.9 11/21/2022    BUN 19 08/18/2022    CR 0.88 11/21/2022    GFRESTIMATED >90 11/21/2022    GFRESTIMATED >60 01/05/2021    GFRESTBLACK >60 01/05/2021    RATNA 8.8 11/21/2022        A1C RESULTS:  Lab Results   Component Value Date    A1C 5.9 (H) 03/01/2023       INR RESULTS:  Lab Results   Component Value Date    INR 1.31 (H) 01/19/2022    INR 1.57 (H) 01/19/2022       Thank you for allowing me to participate in the care of your patient.      Sincerely,     Liz Pearl MD     [facility]  cc:   No referring provider defined for this encounter.

## 2023-04-17 NOTE — PROGRESS NOTES
[facility] Progress note    Assessment:  1. CAD  PCI to LAD (2012).  PCI to RCA (9/2021).   CABG x 1 (LIMA to LAD, we discussed that PVI, exclusion of left atrial appendage) on 1/19/2022   2. Paroxysmal atrial fibrillation.   Embolic prevention/ CHADS Vasc of 4-5.    Completed JUSTO exclusion confirmed on CTA (4/15/2022).  No need for anticoagulation.  3. HFpEF   markedly improved with higher dose furosemide. Lisinopril was also increased. Needs f/u BMP.   4. Hypertension  5. PVCs.  Continue high-dose metoprolol.    Addendum:  Labs show stable renal function. Normal Hgb and platelets. WBC count elevated. No infectious symptoms. Will add differential and likely recommend PCP follow up. No change other change in management at this time.     Plan:  1. BMP and CBC today.   2. Proceed with intrathecal pump revision/re;lacement pain pump battery change without further cardiovascular evaluation.  See discussion below.  3. Hold aspirin and resume after the procedure per your surgeon's instructions.   4. He was advised to weigh himself at home daily and call cardiology clinic if weight increases by more than 5 pounds in 1 week.    5. Low-sodium diet  6. Follow-up with cardiology SULEMA in about 3 months.    HPI:     Onur Barr is a very nice 67 year old with a history of coronary artery disease status post previous PCI's and then CABG with a LIMA and left atrial appendage ligation in January 2022 here for preoperative evaluation.  My last visit with him was March 10, 2022 when he presented for preoperative evaluation to have his pain pump battery changed..  At that point he complained of exertional dyspnea.  A stress test was ordered.  Subsequently 5 days later he was seen in electrophysiology clinic and was significantly more dyspneic had gained 12 pounds and had worsening edema and his furosemide and lisinopril were increased.  He was advised to continue medical management with high-dose metoprolol for his frequent  PVCs.  He is subsequently presented for his stress test but was not able to complete it due to severe back pain.    I spoke to one of the surgeons at his pain clinic about the procedure which is planned at [facility] due to his relatively higher risk but it is expected to be a very low risk procedure with some sedation and local anesthetic to revise or replace his intrathecal pump.  He would require general anesthesia to complete a stress test or angiogram and it did not seem to make sense to schedule him for general anesthesia for preoperative evaluation for a low risk procedure unless absolutely necessary, so he is here today to follow-up for this evaluation and consideration of whether further cardiac testing is needed prior to the pain pump revision.    Fortunately today he is feeling much better.  Weight is down almost 40 pounds although he thinks our scale is not accurate and by his home scale it is down more like 20 pounds.  His shortness of breath is better, his energy is good and he is having no chest pain.  His activity level is limited due to his chronic pain and need to use a cane.    Echo 11/21/22: Interpretation Summary 1. The left ventricle is normal in size. The visual ejection fraction is 45-50%. Mild global hypokinesis, very limited image quality even with contrast,cannot comment on any subtle wall motion abnormalities.2. The right ventricle is normal in structure, function and size.3. No valve disease.4. The ascending aorta is Mildly dilated. 3.9cm. No changes from echo 3/2022.    At home weight runs around 330 lbs on his home scale. He was up to 350 lbs at his visit with Dr. Young and had gained >10 lbs in 5 days after his visit with me      CURRENT MEDICATIONS:  Current Outpatient Medications   Medication Sig Dispense Refill     acetaminophen (TYLENOL) 325 MG tablet Take 2 tablets (650 mg) by mouth every 4 hours as needed for mild pain 40 tablet 0     aspirin (ASA) 81 MG chewable tablet Take 81  mg by mouth daily       DULoxetine (CYMBALTA) 30 MG capsule Take 60 mg by mouth every evening       FERATE 240 (27 Fe) MG TABS TAKE 1 TABLET BY MOUTH EVERY DAY *OTC NOT COV* 90 tablet 0     furosemide (LASIX) 40 MG tablet Take 1 tablet (40 mg) by mouth 2 times daily 90 tablet 3     ketoconazole (NIZORAL) 2 % external cream Apply topically 2 times daily 60 g 4     lisinopril (ZESTRIL) 10 MG tablet Take 1 tablet (10 mg) by mouth daily 90 tablet 3     metoprolol succinate ER (TOPROL XL) 100 MG 24 hr tablet Take 1 tablet (100 mg) by mouth 2 times daily 180 tablet 3     MORPHINE SULFATE IT pump:updated 1/12/22  Medications in Pump:   [facility] Responsible for pump medications:   Conc:  Morphine 20mg/ml(3.95 mg/day), clonidine 21.1mcg/ml (4.168 mcg/day), baclofen 42.5mcg/ml (8.395mcg/day)  Rate:   Pump Last Fill Date: 9/2021  Pump Refill Date: 2/2022       nitroGLYcerin (NITROSTAT) 0.4 MG sublingual tablet For chest pain place 1 tablet under the tongue every 5 minutes for 3 doses. If symptoms persist 5 minutes after 1st dose call 911. 30 tablet 1     nystatin (MYCOSTATIN) 686164 UNIT/GM external powder Apply topically 3 times daily 60 g 4     rosuvastatin (CRESTOR) 40 MG tablet TAKE 1 TABLET BY MOUTH EVERY DAY 90 tablet 3     traZODone (DESYREL) 100 MG tablet Take 2 tablets (200 mg) by mouth At Bedtime TAKE 2 TABLETS (200MG TOTAL) BY MOUTH AT BEDTIME Strength: 100 mg 180 tablet 3     Ferrous Sulfate (IRON PO) Take by mouth daily Dosage is unknown (Patient not taking: Reported on 4/17/2023)         ALLERGIES     Allergies   Allergen Reactions     Hydrocodone-Acetaminophen Other (See Comments)     sneezing       PAST MEDICAL, SURGICAL, FAMILY, SOCIAL HISTORY:  History was reviewed and updated as needed, see medical record.    REVIEW OF SYSTEMS:  Skin:        Eyes:       ENT:       Respiratory:  Positive for dyspnea on exertion;sleep apnea  Cardiovascular:    Positive for;edema;fatigue  Gastroenterology:       Genitourinary:       Musculoskeletal:       Neurologic:       Psychiatric:       Heme/Lymph/Imm:       Endocrine:         PHYSICAL EXAM:      BP: (!) 132/90 Pulse: 70     SpO2: 98 %      Vital Signs with Ranges  Pulse:  [70] 70  BP: (132)/(90) 132/90  SpO2:  [98 %] 98 %  308 lbs 0 oz    Constitutional: awake, alert, no distress  Eyes: sclera nonicteric  ENT: trachea midline  Respiratory: Lungs are clear to auscultation bilaterally  Cardiovascular: Distant heart tones regular with no murmur  GI: nondistended, nontender, bowel sounds present  Lymph/Hematologic: no lymphadenopathy  Skin: dry, no rash trace to 1+ bilateral nontender ankle edema which is mostly woody and not pitting   Musculoskeletal: Kyphosis  Neurologic: Slow wide-based gait  Neuropsychiatric: Normal affect    Recent Results:  EKG today shows  Sinus  Rhythm   first degree AV  block  -Poor R-wave progression -nonspecific -consider old anterior infarct.     LIPID RESULTS:  Lab Results   Component Value Date    CHOL 139 11/21/2022    HDL 33 (L) 11/21/2022    LDL 39 11/21/2022    LDL 69 02/13/2018    TRIG 335 (H) 11/21/2022       LIVER ENZYME RESULTS:  Lab Results   Component Value Date    AST 37 01/20/2022    ALT 25 11/21/2022       CBC RESULTS:  Lab Results   Component Value Date    WBC 7.9 05/03/2022    RBC 4.68 05/03/2022    HGB 12.2 (L) 05/03/2022    HCT 38.0 (L) 05/03/2022    MCV 81 05/03/2022    MCH 26.1 (L) 05/03/2022    MCHC 32.1 05/03/2022    RDW 14.1 05/03/2022     05/03/2022       BMP RESULTS:  Lab Results   Component Value Date     11/21/2022    POTASSIUM 4.0 11/21/2022    POTASSIUM 3.8 08/18/2022    CHLORIDE 105 11/21/2022    CHLORIDE 106 08/18/2022    CO2 28 11/21/2022    CO2 25 08/18/2022    ANIONGAP 8 11/21/2022    ANIONGAP 7 08/18/2022     (H) 11/21/2022    GLC 95 08/18/2022    BUN 17.9 11/21/2022    BUN 19 08/18/2022    CR 0.88 11/21/2022    GFRESTIMATED >90 11/21/2022    GFRESTIMATED >60 01/05/2021    GFRESTBLACK  >60 01/05/2021    RATNA 8.8 11/21/2022        A1C RESULTS:  Lab Results   Component Value Date    A1C 5.9 (H) 03/01/2023       INR RESULTS:  Lab Results   Component Value Date    INR 1.31 (H) 01/19/2022    INR 1.57 (H) 01/19/2022

## 2023-04-18 ENCOUNTER — MYC MEDICAL ADVICE (OUTPATIENT)
Dept: INTERNAL MEDICINE | Facility: CLINIC | Age: 68
End: 2023-04-18
Payer: COMMERCIAL

## 2023-04-18 DIAGNOSIS — R79.89 ABNORMAL CBC: Primary | ICD-10-CM

## 2023-04-19 LAB
BASOPHILS # BLD MANUAL: 0 10E3/UL (ref 0–0.2)
BASOPHILS NFR BLD MANUAL: 0 %
EOSINOPHIL # BLD MANUAL: 0.4 10E3/UL (ref 0–0.7)
EOSINOPHIL NFR BLD MANUAL: 2 %
LYMPHOCYTES # BLD MANUAL: 12.2 10E3/UL (ref 0.8–5.3)
LYMPHOCYTES NFR BLD MANUAL: 67 %
MONOCYTES # BLD MANUAL: 1.1 10E3/UL (ref 0–1.3)
MONOCYTES NFR BLD MANUAL: 6 %
NEUTROPHILS # BLD MANUAL: 4.6 10E3/UL (ref 1.6–8.3)
NEUTROPHILS NFR BLD MANUAL: 25 %
PATH REV: ABNORMAL
PLAT MORPH BLD: ABNORMAL
RBC MORPH BLD: ABNORMAL

## 2023-04-19 NOTE — TELEPHONE ENCOUNTER
Please see my chart message and advise.  Thanks    WBC 18.2 on 4/17/23.      Patient having intrathecal pump replaced tomorrow.  Please advise if ok to still proceed with this surgery.  Thanks!    Primary care provider not in clinic today.  Routed to team provider that is in clinic today.

## 2023-04-20 ENCOUNTER — HOSPITAL ENCOUNTER (OUTPATIENT)
Facility: CLINIC | Age: 68
Discharge: HOME | End: 2023-04-20
Attending: ANESTHESIOLOGY | Admitting: ANESTHESIOLOGY
Payer: OTHER MISCELLANEOUS

## 2023-04-20 ENCOUNTER — ANESTHESIA (OUTPATIENT)
Dept: SURGERY | Facility: CLINIC | Age: 68
End: 2023-04-20
Payer: OTHER MISCELLANEOUS

## 2023-04-20 ENCOUNTER — ANESTHESIA EVENT (OUTPATIENT)
Dept: SURGERY | Facility: CLINIC | Age: 68
End: 2023-04-20
Payer: OTHER MISCELLANEOUS

## 2023-04-20 VITALS
DIASTOLIC BLOOD PRESSURE: 65 MMHG | HEIGHT: 73 IN | RESPIRATION RATE: 18 BRPM | HEART RATE: 62 BPM | WEIGHT: 315 LBS | SYSTOLIC BLOOD PRESSURE: 99 MMHG | OXYGEN SATURATION: 93 % | BODY MASS INDEX: 41.75 KG/M2 | TEMPERATURE: 96.8 F

## 2023-04-20 LAB
APTT PPP: 28 SECONDS (ref 22–38)
INR PPP: 1.1 (ref 0.85–1.15)
POTASSIUM SERPL-SCNC: 4.5 MMOL/L (ref 3.4–5.3)

## 2023-04-20 PROCEDURE — 85610 PROTHROMBIN TIME: CPT | Performed by: ANESTHESIOLOGY

## 2023-04-20 PROCEDURE — 272N000001 HC OR GENERAL SUPPLY STERILE: Performed by: ANESTHESIOLOGY

## 2023-04-20 PROCEDURE — 85730 THROMBOPLASTIN TIME PARTIAL: CPT | Performed by: ANESTHESIOLOGY

## 2023-04-20 PROCEDURE — C1772 INFUSION PUMP, PROGRAMMABLE: HCPCS | Performed by: ANESTHESIOLOGY

## 2023-04-20 PROCEDURE — 250N000009 HC RX 250: Performed by: ANESTHESIOLOGY

## 2023-04-20 PROCEDURE — 250N000011 HC RX IP 250 OP 636: Mod: JZ | Performed by: ANESTHESIOLOGY

## 2023-04-20 PROCEDURE — C1787 PATIENT PROGR, NEUROSTIM: HCPCS | Performed by: ANESTHESIOLOGY

## 2023-04-20 PROCEDURE — 360N000077 HC SURGERY LEVEL 4, PER MIN: Performed by: ANESTHESIOLOGY

## 2023-04-20 PROCEDURE — 370N000017 HC ANESTHESIA TECHNICAL FEE, PER MIN: Performed by: ANESTHESIOLOGY

## 2023-04-20 PROCEDURE — 250N000011 HC RX IP 250 OP 636: Performed by: ANESTHESIOLOGY

## 2023-04-20 PROCEDURE — 272N000002 HC OR SUPPLY OTHER OPNP: Performed by: ANESTHESIOLOGY

## 2023-04-20 PROCEDURE — 36415 COLL VENOUS BLD VENIPUNCTURE: CPT | Performed by: ANESTHESIOLOGY

## 2023-04-20 PROCEDURE — 250N000013 HC RX MED GY IP 250 OP 250 PS 637: Performed by: ANESTHESIOLOGY

## 2023-04-20 PROCEDURE — 710N000012 HC RECOVERY PHASE 2, PER MINUTE: Performed by: ANESTHESIOLOGY

## 2023-04-20 PROCEDURE — 250N000011 HC RX IP 250 OP 636: Performed by: NURSE ANESTHETIST, CERTIFIED REGISTERED

## 2023-04-20 PROCEDURE — 84132 ASSAY OF SERUM POTASSIUM: CPT | Performed by: ANESTHESIOLOGY

## 2023-04-20 PROCEDURE — 710N000009 HC RECOVERY PHASE 1, LEVEL 1, PER MIN: Performed by: ANESTHESIOLOGY

## 2023-04-20 PROCEDURE — 999N000141 HC STATISTIC PRE-PROCEDURE NURSING ASSESSMENT: Performed by: ANESTHESIOLOGY

## 2023-04-20 PROCEDURE — 258N000003 HC RX IP 258 OP 636: Performed by: ANESTHESIOLOGY

## 2023-04-20 DEVICE — PUMP SYNCHROMED II 40ML 8637-40: Type: IMPLANTABLE DEVICE | Site: BACK | Status: FUNCTIONAL

## 2023-04-20 DEVICE — IMPLANTABLE DEVICE: Type: IMPLANTABLE DEVICE | Site: BACK | Status: FUNCTIONAL

## 2023-04-20 RX ORDER — SODIUM CHLORIDE, SODIUM LACTATE, POTASSIUM CHLORIDE, CALCIUM CHLORIDE 600; 310; 30; 20 MG/100ML; MG/100ML; MG/100ML; MG/100ML
INJECTION, SOLUTION INTRAVENOUS CONTINUOUS PRN
Status: DISCONTINUED | OUTPATIENT
Start: 2023-04-20 | End: 2023-04-20

## 2023-04-20 RX ORDER — ONDANSETRON 2 MG/ML
INJECTION INTRAMUSCULAR; INTRAVENOUS PRN
Status: DISCONTINUED | OUTPATIENT
Start: 2023-04-20 | End: 2023-04-20

## 2023-04-20 RX ORDER — ONDANSETRON 4 MG/1
4 TABLET, ORALLY DISINTEGRATING ORAL EVERY 30 MIN PRN
Status: DISCONTINUED | OUTPATIENT
Start: 2023-04-20 | End: 2023-04-20 | Stop reason: HOSPADM

## 2023-04-20 RX ORDER — LIDOCAINE HYDROCHLORIDE 20 MG/ML
INJECTION, SOLUTION INFILTRATION; PERINEURAL PRN
Status: DISCONTINUED | OUTPATIENT
Start: 2023-04-20 | End: 2023-04-20

## 2023-04-20 RX ORDER — SODIUM CHLORIDE, SODIUM LACTATE, POTASSIUM CHLORIDE, CALCIUM CHLORIDE 600; 310; 30; 20 MG/100ML; MG/100ML; MG/100ML; MG/100ML
INJECTION, SOLUTION INTRAVENOUS CONTINUOUS
Status: DISCONTINUED | OUTPATIENT
Start: 2023-04-20 | End: 2023-04-20 | Stop reason: HOSPADM

## 2023-04-20 RX ORDER — HYDROMORPHONE HCL IN WATER/PF 6 MG/30 ML
0.2 PATIENT CONTROLLED ANALGESIA SYRINGE INTRAVENOUS EVERY 5 MIN PRN
Status: DISCONTINUED | OUTPATIENT
Start: 2023-04-20 | End: 2023-04-20 | Stop reason: HOSPADM

## 2023-04-20 RX ORDER — DEXMEDETOMIDINE HYDROCHLORIDE 4 UG/ML
INJECTION, SOLUTION INTRAVENOUS PRN
Status: DISCONTINUED | OUTPATIENT
Start: 2023-04-20 | End: 2023-04-20

## 2023-04-20 RX ORDER — ACETAMINOPHEN 325 MG/1
975 TABLET ORAL ONCE
Status: COMPLETED | OUTPATIENT
Start: 2023-04-20 | End: 2023-04-20

## 2023-04-20 RX ORDER — LIDOCAINE 40 MG/G
CREAM TOPICAL
Status: DISCONTINUED | OUTPATIENT
Start: 2023-04-20 | End: 2023-04-20 | Stop reason: HOSPADM

## 2023-04-20 RX ORDER — FENTANYL CITRATE 50 UG/ML
INJECTION, SOLUTION INTRAMUSCULAR; INTRAVENOUS PRN
Status: DISCONTINUED | OUTPATIENT
Start: 2023-04-20 | End: 2023-04-20

## 2023-04-20 RX ORDER — LABETALOL HYDROCHLORIDE 5 MG/ML
10 INJECTION, SOLUTION INTRAVENOUS
Status: DISCONTINUED | OUTPATIENT
Start: 2023-04-20 | End: 2023-04-20 | Stop reason: HOSPADM

## 2023-04-20 RX ORDER — ONDANSETRON 2 MG/ML
4 INJECTION INTRAMUSCULAR; INTRAVENOUS EVERY 30 MIN PRN
Status: DISCONTINUED | OUTPATIENT
Start: 2023-04-20 | End: 2023-04-20 | Stop reason: HOSPADM

## 2023-04-20 RX ORDER — CEFAZOLIN SODIUM/WATER 3 G/30 ML
3 SYRINGE (ML) INTRAVENOUS
Status: COMPLETED | OUTPATIENT
Start: 2023-04-20 | End: 2023-04-20

## 2023-04-20 RX ORDER — HYDROMORPHONE HCL IN WATER/PF 6 MG/30 ML
0.4 PATIENT CONTROLLED ANALGESIA SYRINGE INTRAVENOUS EVERY 5 MIN PRN
Status: DISCONTINUED | OUTPATIENT
Start: 2023-04-20 | End: 2023-04-20 | Stop reason: HOSPADM

## 2023-04-20 RX ORDER — EPHEDRINE SULFATE 50 MG/ML
INJECTION, SOLUTION INTRAMUSCULAR; INTRAVENOUS; SUBCUTANEOUS PRN
Status: DISCONTINUED | OUTPATIENT
Start: 2023-04-20 | End: 2023-04-20

## 2023-04-20 RX ORDER — KETAMINE HYDROCHLORIDE 10 MG/ML
INJECTION INTRAMUSCULAR; INTRAVENOUS PRN
Status: DISCONTINUED | OUTPATIENT
Start: 2023-04-20 | End: 2023-04-20

## 2023-04-20 RX ORDER — PROPOFOL 10 MG/ML
INJECTION, EMULSION INTRAVENOUS CONTINUOUS PRN
Status: DISCONTINUED | OUTPATIENT
Start: 2023-04-20 | End: 2023-04-20

## 2023-04-20 RX ORDER — FENTANYL CITRATE 0.05 MG/ML
25 INJECTION, SOLUTION INTRAMUSCULAR; INTRAVENOUS EVERY 5 MIN PRN
Status: DISCONTINUED | OUTPATIENT
Start: 2023-04-20 | End: 2023-04-20 | Stop reason: HOSPADM

## 2023-04-20 RX ORDER — MAGNESIUM HYDROXIDE 1200 MG/15ML
LIQUID ORAL PRN
Status: DISCONTINUED | OUTPATIENT
Start: 2023-04-20 | End: 2023-04-20 | Stop reason: HOSPADM

## 2023-04-20 RX ORDER — HYDRALAZINE HYDROCHLORIDE 20 MG/ML
2.5-5 INJECTION INTRAMUSCULAR; INTRAVENOUS EVERY 10 MIN PRN
Status: DISCONTINUED | OUTPATIENT
Start: 2023-04-20 | End: 2023-04-20 | Stop reason: HOSPADM

## 2023-04-20 RX ORDER — FENTANYL CITRATE 0.05 MG/ML
50 INJECTION, SOLUTION INTRAMUSCULAR; INTRAVENOUS EVERY 5 MIN PRN
Status: DISCONTINUED | OUTPATIENT
Start: 2023-04-20 | End: 2023-04-20 | Stop reason: HOSPADM

## 2023-04-20 RX ORDER — DIMENHYDRINATE 50 MG/ML
25 INJECTION, SOLUTION INTRAMUSCULAR; INTRAVENOUS
Status: DISCONTINUED | OUTPATIENT
Start: 2023-04-20 | End: 2023-04-20 | Stop reason: HOSPADM

## 2023-04-20 RX ADMIN — SODIUM CHLORIDE, POTASSIUM CHLORIDE, SODIUM LACTATE AND CALCIUM CHLORIDE: 600; 310; 30; 20 INJECTION, SOLUTION INTRAVENOUS at 09:05

## 2023-04-20 RX ADMIN — SODIUM CHLORIDE, POTASSIUM CHLORIDE, SODIUM LACTATE AND CALCIUM CHLORIDE: 600; 310; 30; 20 INJECTION, SOLUTION INTRAVENOUS at 07:35

## 2023-04-20 RX ADMIN — PHENYLEPHRINE HYDROCHLORIDE 100 MCG: 10 INJECTION INTRAVENOUS at 08:13

## 2023-04-20 RX ADMIN — Medication 5 MG: at 08:02

## 2023-04-20 RX ADMIN — Medication 20 MG: at 08:05

## 2023-04-20 RX ADMIN — Medication 3 G: at 07:36

## 2023-04-20 RX ADMIN — ONDANSETRON 4 MG: 2 INJECTION INTRAMUSCULAR; INTRAVENOUS at 08:22

## 2023-04-20 RX ADMIN — PHENYLEPHRINE HYDROCHLORIDE 100 MCG: 10 INJECTION INTRAVENOUS at 07:53

## 2023-04-20 RX ADMIN — PHENYLEPHRINE HYDROCHLORIDE 100 MCG: 10 INJECTION INTRAVENOUS at 07:48

## 2023-04-20 RX ADMIN — ACETAMINOPHEN 975 MG: 325 TABLET ORAL at 09:44

## 2023-04-20 RX ADMIN — Medication 10 MG: at 07:49

## 2023-04-20 RX ADMIN — DEXMEDETOMIDINE HYDROCHLORIDE 8 MCG: 200 INJECTION INTRAVENOUS at 07:40

## 2023-04-20 RX ADMIN — LIDOCAINE HYDROCHLORIDE 80 MG: 20 INJECTION, SOLUTION INFILTRATION; PERINEURAL at 07:39

## 2023-04-20 RX ADMIN — Medication 5 MG: at 08:10

## 2023-04-20 RX ADMIN — FENTANYL CITRATE 50 MCG: 50 INJECTION, SOLUTION INTRAMUSCULAR; INTRAVENOUS at 07:56

## 2023-04-20 RX ADMIN — PHENYLEPHRINE HYDROCHLORIDE 100 MCG: 10 INJECTION INTRAVENOUS at 08:18

## 2023-04-20 RX ADMIN — FENTANYL CITRATE 50 MCG: 50 INJECTION, SOLUTION INTRAMUSCULAR; INTRAVENOUS at 07:39

## 2023-04-20 RX ADMIN — PROPOFOL 75 MCG/KG/MIN: 10 INJECTION, EMULSION INTRAVENOUS at 07:40

## 2023-04-20 RX ADMIN — PHENYLEPHRINE HYDROCHLORIDE 100 MCG: 10 INJECTION INTRAVENOUS at 08:02

## 2023-04-20 RX ADMIN — PHENYLEPHRINE HYDROCHLORIDE 200 MCG: 10 INJECTION INTRAVENOUS at 07:55

## 2023-04-20 RX ADMIN — Medication 5 MG: at 08:21

## 2023-04-20 RX ADMIN — FENTANYL CITRATE 50 MCG: 50 INJECTION, SOLUTION INTRAMUSCULAR; INTRAVENOUS at 09:39

## 2023-04-20 RX ADMIN — MIDAZOLAM 0.5 MG: 1 INJECTION INTRAMUSCULAR; INTRAVENOUS at 08:05

## 2023-04-20 RX ADMIN — PHENYLEPHRINE HYDROCHLORIDE 100 MCG: 10 INJECTION INTRAVENOUS at 08:49

## 2023-04-20 RX ADMIN — Medication 5 MG: at 08:15

## 2023-04-20 RX ADMIN — PHENYLEPHRINE HYDROCHLORIDE 100 MCG: 10 INJECTION INTRAVENOUS at 08:08

## 2023-04-20 RX ADMIN — Medication 5 MG: at 07:53

## 2023-04-20 RX ADMIN — Medication 5 MG: at 07:55

## 2023-04-20 RX ADMIN — Medication 10 MG: at 07:46

## 2023-04-20 ASSESSMENT — LIFESTYLE VARIABLES: TOBACCO_USE: 1

## 2023-04-20 ASSESSMENT — ENCOUNTER SYMPTOMS: DYSRHYTHMIAS: 1

## 2023-04-20 ASSESSMENT — ACTIVITIES OF DAILY LIVING (ADL)
ADLS_ACUITY_SCORE: 35
ADLS_ACUITY_SCORE: 35

## 2023-04-20 NOTE — ANESTHESIA CARE TRANSFER NOTE
Patient: Onur Barr    Procedure: Procedure(s):  Pump Replacement and intrathecal catheter replacement       Diagnosis: Chronic pain syndrome [G89.4]  Diagnosis Additional Information: No value filed.    Anesthesia Type:   MAC     Note:    Oropharynx: spontaneously breathing  Level of Consciousness: awake  Oxygen Supplementation: room air    Independent Airway: airway patency satisfactory and stable  Dentition: dentition unchanged  Vital Signs Stable: post-procedure vital signs reviewed and stable  Report to RN Given: handoff report given  Patient transferred to: PACU  Comments: At end of procedure, spontaneous respirations, patient alert to voice, able to follow commands. Patient breathing room air at room air to PACU. Oxygen tubing connected to wall O2 in PACU, SpO2, NiBP, and EKG monitors and alarms on and functioning, report on patient's clinical status given to PACU RN, RN questions answered.  Handoff Report: Identifed the Patient, Identified the Reponsible Provider, Reviewed the pertinent medical history, Discussed the surgical course, Reviewed Intra-OP anesthesia mangement and issues during anesthesia, Set expectations for post-procedure period and Allowed opportunity for questions and acknowledgement of understanding      Vitals:  Vitals Value Taken Time   BP     Temp     Pulse     Resp     SpO2         Electronically Signed By: YAMILETH Randolph CRNA  April 20, 2023  9:17 AM

## 2023-04-20 NOTE — ANESTHESIA PREPROCEDURE EVALUATION
Anesthesia Pre-Procedure Evaluation    Patient: Onur Barr   MRN: [id number] : 1955        Procedure : Procedure(s):  Pump and Catheter Intrathecal Pump Replacement          Past Medical History:   Diagnosis Date     Abdominal panniculus 2012    Formatting of this note might be different from the original. S/p panniculectomy 2012     Acid reflux disease 10/31/2017     Bariatric surgery status 2008    Formatting of this note might be different from the original. Created by Conversion     Bilateral leg edema 2021     CHF (congestive heart failure) (H)      Chronic Back Pain (IT Pump w Morphine, Clonidine, Baclofen) 2022     Chronic diastolic heart failure (H) 2021     Claudication of lower extremity (H) 2019     Coronary arteriosclerosis 2022    Formatting of this note might be different from the original. Created by Conversion  Replacement Utility updated for latest IMO load     Coronary artery disease     CABG 2022     Depressive disorder      Essential hypertension     Created by [employer] Annotation: 2012 10:16Zack Harris: Lisinipril,  coreg  Replacement Utility updated for latest IMO load     Fluid overload 2022     Gastroesophageal reflux disease without esophagitis 2021     H/O gastric bypass      History of blood transfusion 2004     ICD (implantable cardioverter-defibrillator) in place 2022     Insomnia      Intestinal disaccharidase deficiencies and disaccharide malabsorption 2008     Ischemic cardiomyopathy      Lumbago     Created by [employer] Annotation: 2012 10:20Anh Ford: Morphine pump      Mixed hyperlipidemia 2008     Morbid obesity (H) 2018     Nephrolithiasis      NSTEMI (non-ST elevated myocardial infarction) (H)      Obstructive sleep apnea     Doesn't tolerate CPAP     MARLEEN (doesn't tolerate CPAP) 2021     Osteoarthritis      Created by Conversion [facility] Annotation: Jun 26 2009  9:53KINSEY Esquedajamey Zack: failed lumbar  fusion/morphine pump dr putnam  Replacement Utility updated for latest IMO load     Osteoarthrosis 05/09/2022    Formatting of this note might be different from the original. Created by [facility] Annotation: Jun 26 2009  9:[id number] [place] Matt Zack: failed lumbar  fusion/morphine pump dr putnam  Replacement Utility updated for latest IMO load     Paroxysmal atrial fib -- S/P Pulm Vein Ablation 1/19/22 09/11/2021    Formatting of this note might be different from the original. Created by Conversion     Paroxysmal atrial fibrillation (H)     Created by Conversion      Paroxysmal ventricular tachycardia (H)     dual chamber ICD 1/24/2022     Peripheral vascular disease (H) 05/03/2022     Post-laminectomy syndrome 11/25/2015     S/P CABG (coronary artery bypass graft) 01/25/2022     Sleepiness 05/08/2018     Type 2 diabetes mellitus (H)     Diet controlled after Gastric Bypass in 2008      Past Surgical History:   Procedure Laterality Date     BACK SURGERY       BYPASS GASTRIC DUODENAL SWITCH       BYPASS GRAFT ARTERY CORONARY N/A 01/19/2022    Procedure: CORONARY ARTERY BYPASS GRAFT (CABG) X1; LIMA -LAD.  PULMONARY VEIN ISOLATION, AND ATRIAL APPENDAGE CLIPPING USING 45MM ACTICURE CLIP.;  Surgeon: William Dumont MD;  Location:  OR     COLONOSCOPY       COSMETIC SURGERY      pannicullectomy     CV CORONARY ANGIOGRAM N/A 09/11/2021    Procedure: Coronary Angiogram;  Surgeon: Noris Ozuna MD;  Location: [facility] CATH LAB CV     CV HEART CATHETERIZATION WITH POSSIBLE INTERVENTION N/A 01/13/2022    Procedure: Heart Catheterization with Possible Intervention;  Surgeon: Liz Pearl MD;  Location:  HEART CARDIAC CATH LAB     CV LEFT HEART CATH N/A 09/11/2021    Procedure: Left Heart Cath;  Surgeon: Noris Ozuna MD;  Location: [facility] CATH LAB CV     CV PCI N/A 09/11/2021    Procedure:  Percutaneous Coronary Intervention;  Surgeon: Noris Ozuna MD;  Location: [facility] CV     CV PCI N/A 10/07/2021    Procedure: Percutaneous Coronary Intervention;  Surgeon: Mckayla Dan MD;  Location: [facility] CV     CV PCI ASPIRATION THROMECTOMY N/A 09/11/2021    Procedure: Percutaneous Coronary Intervention Aspiration Thrombectomy;  Surgeon: Noris Ozuna MD;  Location: [facility] CV     ENT SURGERY      tonsillectomy     EP ICD N/A 01/24/2022    Procedure: EP ICD;  Surgeon: Jannette Crook MD;  Location:  HEART CARDIAC CATH LAB     EXTRACORPOREAL SHOCK WAVE LITHOTRIPSY, CYSTOSCOPY, INSERT STENT URETER(S), COMBINED  08/23/2011     HC REMOVAL OF TONSILS,<14 Y/O      Description: Tonsillectomy;  Recorded: 03/23/2012;  Comments: for obstructive sleep apnea     HERNIA REPAIR       IR MISCELLANEOUS PROCEDURE  07/29/2011     IR MISCELLANEOUS PROCEDURE  08/05/2011     IR MISCELLANEOUS PROCEDURE  08/23/2011     IR NEPHROSTOMY TUBE CHANGE BILATERAL  08/05/2011     ORTHOPEDIC SURGERY      arthrodesis ant discectomy, lumbar     UT ARTHRODESIS ANT INTERBODY MIN DISCECTOMY,LUMBAR      Description: Lumbar Vertebral Fusion;  Recorded: 06/26/2009;  Comments: before 200 see [facility] consult under old records     UT EXCISE EXCESS SKIN TISSUE,ABDOMEN  11/13/2012    Description: Panniculectomy;  Proc Date: 11/13/2012;  Comments: 3.5 Lb Pannus was removed at the [facility] By Dr. Shon Green     REPLACE INTRATHECAL PAIN PUMP N/A 04/25/2017    Procedure: REPLACE INTRATHECAL PAIN PUMP;  INTRATHECAL PAIN PUMP CATHETER REPLACEMENT AND PUMP REPLACEMENT;  Surgeon: Anthony Maloney MD;  Location: [facility]     REVISE CATHETER INTRATHECAL N/A 04/25/2017    Procedure: REVISE CATHETER INTRATHECAL;;  Surgeon: Anthony Maloney MD;  Location: [facility]     SHOULDER SURGERY       ZZC GASTRIC BYPASS,OBESE<100CM LORA-EN-Y  01/01/2008    Description: Gastric Surgery For Morbid Obesity Bypass With Lora-en-Y;  Recorded:  06/26/2009;  Comments: dr green 2008     [facility] REPAIR INCISIONAL HERNIA,REDUCIBLE  11/13/2012    Description: Incisional Hernia Repair;  Proc Date: 11/13/2012;  Comments: incisional hernia repair and abdominal panniculectomy by Dr Shon Green at the [facility].      Allergies   Allergen Reactions     Hydrocodone-Acetaminophen Other (See Comments)     sneezing      Social History     Tobacco Use     Smoking status: Former     Types: Cigars     Smokeless tobacco: Never     Tobacco comments:     Haven't smoked in years   Vaping Use     Vaping status: Never Used     Passive vaping exposure: Yes   Substance Use Topics     Alcohol use: No      Wt Readings from Last 1 Encounters:   04/20/23 (!) 152 kg (335 lb)        Anesthesia Evaluation   Pt has had prior anesthetic.     No history of anesthetic complications       ROS/MED HX  ENT/Pulmonary:     (+) sleep apnea, doesn't use CPAP, tobacco use, Past use,     Neurologic:     (+) peripheral neuropathy, - LE claudication.     Cardiovascular:     (+) Dyslipidemia hypertension-Peripheral Vascular Disease-CAD -CABG (1v)-date: 1/19/22. stent (to LAD in '12; RCA in '21)-'12 & '21. 2 CHF Last EF: 45-50 ICD  type;Affine Resonate  dysrhythmias (s/p pulmonary vein ablation), a-fib, Previous cardiac testing   Echo: Date: 11/21/22 Results:  Interpretation Summary  1. The left ventricle is normal in size. The visual ejection fraction is 45- 50%. Mild global hypokinesis, very limited image quality even with contrast, cannot comment on any subtle wall motion abnormalities.  2. The right ventricle is normal in structure, function and size.  3. No valve disease.  4. The ascending aorta is Mildly dilated. 3.9cm.    Left Ventricle  The left ventricle is normal in size. There is normal left ventricular wall thickness. The visual ejection fraction is 45-50%. Grade I or early diastolic dysfunction. Mild global hypokinesis, very limited image quality even with contrast, cannot  comment on any subtle wall motion abnormalities    Right Ventricle  The right ventricle is normal in structure, function and size. There is a pacemaker lead in the right ventricle.    Atria  The left atrium is moderately dilated. Right atrial size is normal. There is no atrial shunt seen.    Mitral Valve  The mitral valve is normal in structure and function.    Tricuspid Valve  There is mild (1+) tricuspid regurgitation. The right ventricular systolic pressure is approximated at 28.5 mmHg plus the right atrial pressure.    Aortic Valve  The aortic valve is normal in structure and function.    Pulmonic Valve  The pulmonic valve is normal in structure and function.    Vessels  The ascending aorta is Mildly dilated. Inferior vena cava not well visualized for estimation of right atrial pressure.    Pericardium  There is no pericardial effusion.    Rhythm  Sinus rhythm was noted. The patient exhibited occasional PVCs.  Stress Test: Date: Results:    ECG Reviewed: Date: Results:    Cath: Date: Results:      METS/Exercise Tolerance:     Hematologic:       Musculoskeletal:       GI/Hepatic: Comment: S/p gastric bypass    (+) GERD,     Renal/Genitourinary:       Endo:     (+) type II DM, Obesity (Class 3),     Psychiatric/Substance Use:     (+) psychiatric history depression H/O chronic opiod use  (IT pump w/ morphine, clonidine & baclofen).     Infectious Disease:       Malignancy:       Other:      (+) , H/O Chronic Pain (Lumbago),        Physical Exam    Airway        Mallampati: III   TM distance: > 3 FB   Neck ROM: full   Mouth opening: > 3 cm    Respiratory Devices and Support         Dental       (+) Multiple crowns, permanant bridges      Cardiovascular          Rhythm and rate: regular and normal     Pulmonary           breath sounds clear to auscultation           OUTSIDE LABS:  CBC:   Lab Results   Component Value Date    WBC 18.2 (H) 04/17/2023    WBC 18.2 (H) 04/17/2023    HGB 14.7 04/17/2023    HGB 12.2 (L)  05/03/2022    HCT 45.0 04/17/2023    HCT 38.0 (L) 05/03/2022     04/17/2023     05/03/2022     BMP:   Lab Results   Component Value Date     04/17/2023     11/21/2022    POTASSIUM 4.7 04/17/2023    POTASSIUM 4.0 11/21/2022    CHLORIDE 103 04/17/2023    CHLORIDE 105 11/21/2022    CO2 28 04/17/2023    CO2 28 11/21/2022    BUN 24.7 (H) 04/17/2023    BUN 17.9 11/21/2022    CR 1.03 04/17/2023    CR 0.88 11/21/2022     (H) 04/17/2023     (H) 11/21/2022     COAGS:   Lab Results   Component Value Date    PTT 51 (H) 01/19/2022    INR 1.31 (H) 01/19/2022    FIBR 433 01/19/2022     POC: No results found for: BGM, HCG, HCGS  HEPATIC:   Lab Results   Component Value Date    ALBUMIN 3.0 (L) 01/20/2022    PROTTOTAL 6.0 (L) 01/20/2022    ALT 25 11/21/2022    AST 37 01/20/2022    ALKPHOS 49 01/20/2022    BILITOTAL 0.9 01/20/2022     OTHER:   Lab Results   Component Value Date    PH 7.30 (L) 01/19/2022    LACT 0.8 01/19/2022    A1C 5.9 (H) 03/01/2023    RATNA 9.5 04/17/2023    PHOS 3.7 01/25/2022    MAG 2.2 01/25/2022    TSH 1.16 08/18/2022    SED 26 (H) 01/04/2022       Anesthesia Plan    ASA Status:  3   NPO Status:  NPO Appropriate    Anesthesia Type: MAC.     - Reason for MAC: straight local not clinically adequate              Consents    Anesthesia Plan(s) and associated risks, benefits, and realistic alternatives discussed. Questions answered and patient/representative(s) expressed understanding.    - Discussed:     - Discussed with:  Patient         Postoperative Care    Pain management: IV analgesics, Oral pain medications, Multi-modal analgesia.   PONV prophylaxis: Ondansetron (or other 5HT-3)     Comments:                Murtaza Jordan MD

## 2023-04-20 NOTE — TELEPHONE ENCOUNTER
Per chart, patient arrived for his surgical procedure this morning.    Left message for patient to call back to inform him of Sang's message below.

## 2023-04-20 NOTE — INTERVAL H&P NOTE
"I have reviewed the surgical (or preoperative) H&P that is linked to this encounter, and examined the patient. There are no significant changes    Clinical Conditions Present on Arrival:  Clinically Significant Risk Factors Present on Admission                  # Severe Obesity: Estimated body mass index is 44.2 kg/m  as calculated from the following:    Height as of this encounter: 1.854 m (6' 1\").    Weight as of this encounter: 152 kg (335 lb).       "

## 2023-04-20 NOTE — BRIEF OP NOTE
[facility]    Brief Operative Note    Pre-operative diagnosis: Chronic pain syndrome [G89.4]  Post-operative diagnosis same    Procedure: Procedure(s):  Pump Replacement and intrathecal catheter replacement  Surgeon: Surgeon(s) and Role:     * Anthony Dick MD - Primary  Anesthesia: MAC   Estimated Blood Loss: Less than 10 ml    Drains: None  Specimens: * No specimens in log *  Findings:   Existing IT pump removed intact.  Existing catheter not able to be detached from pump and was therefore cut and connected via collette to a new pump segment.  CSF aspirated from catheter successfully.  Catheter connected to new IT pain pump after being appropriately prepared with medication and priming.  Complications: None.  Implants:   Implant Name Type Inv. Item Serial No.  Lot No. LRB No. Used Action   synchromed   [id number] [employer]  N/A 1 Implanted   synchromed pump      Left 1 Explanted   ascenda    [employer] [id number] Left 1 Implanted

## 2023-04-20 NOTE — ANESTHESIA POSTPROCEDURE EVALUATION
Patient: Onur Barr    Procedure: Procedure(s):  Pump Replacement and intrathecal catheter replacement       Anesthesia Type:  MAC    Note:     Postop Pain Control: Uneventful            Sign Out: Well controlled pain   PONV: No   Neuro/Psych: Uneventful            Sign Out: Acceptable/Baseline neuro status   Airway/Respiratory: Uneventful            Sign Out: Acceptable/Baseline resp. status   CV/Hemodynamics: Uneventful            Sign Out: Acceptable CV status   Other NRE: NONE   DID A NON-ROUTINE EVENT OCCUR?            Last vitals:  Vitals Value Taken Time   /71 04/20/23 0945   Temp 36.1  C (97  F) 04/20/23 0945   Pulse 72 04/20/23 0933   Resp 18 04/20/23 0933   SpO2 92 % 04/20/23 0945   Vitals shown include unvalidated device data.    Electronically Signed By: Murtaza Jordan MD  April 20, 2023  4:21 PM

## 2023-04-20 NOTE — DISCHARGE INSTRUCTIONS
Same Day Surgery Discharge Instructions for  Sedation and General Anesthesia     It's not unusual to feel dizzy, light-headed or faint for up to 24 hours after surgery or while taking pain medication.  If you have these symptoms: sit for a few minutes before standing and have someone assist you when you get up to walk or use the bathroom.    You should rest and relax for the next 24 hours. We recommend you make arrangements to have an adult stay with you for at least 24 hours after your discharge.  Avoid hazardous and strenuous activity.    DO NOT DRIVE any vehicle or operate mechanical equipment for 24 hours following the end of your surgery.  Even though you may feel normal, your reactions may be affected by the medication you have received.    Do not drink alcoholic beverages for 24 hours following surgery.     Slowly progress to your regular diet as you feel able. It's not unusual to feel nauseated and/or vomit after receiving anesthesia.  If you develop these symptoms, drink clear liquids (apple juice, ginger ale, broth, 7-up, etc. ) until you feel better.  If your nausea and vomiting persists for 24 hours, please notify your surgeon.      All narcotic pain medications, along with inactivity and anesthesia, can cause constipation. Drinking plenty of liquids and increasing fiber intake will help.    For any questions of a medical nature, call your surgeon.    Do not make important decisions for 24 hours.    If you had general anesthesia, you may have a sore throat for a couple of days related to the breathing tube used during surgery.  You may use Cepacol lozenges to help with this discomfort.  If it worsens or if you develop a fever, contact your surgeon.     If you feel your pain is not well managed with the pain medications prescribed by your surgeon, please contact your surgeon's office to let them know so they can address your concerns.   Today you were given 975 mg of Tylenol at 9:45 am. The recommended  daily maximum dose is 4000 mg.          Intrathecal Pump Implant Discharge Instructions         After Surgery:    Resume light activity as tolerated  Resume your regular pain medication regimen  Begin taking your oral antibiotic the day after your surgery.  It is important you finish the entire course of medication until gone.  A small amount of blood visible on the bandages is normal, if any leaks out of the bandage contact [facility].  Due to receiving anesthesia; do not drive or operate heavy machinery for the remainder of the day, do not sign any important legal documents, make any important decisions, or drink alcohol.      Site Care:    You may remove your bandages 48 hours after surgery. Your incisions have been closed with dissolvable stitches. There are steri-strips covering your incisions under your bandages. Do not remove these, they will fall off on their own in 5-7 days.  After you remove your bandages, you may shower. Do not scrub the incision areas and pat the area dry. No soaking your incisions for 2 weeks. This includes baths, whirlpools, swimming pools, and hot tubs.  Check your incision sites daily.  Keep sites clean and dry.  During the first few days, you may notice some swelling or discoloration around the incision sites, which is normal. However, if the fluid is foul smelling, thick, or does not decrease in amount, call [facility].       Call [facility] during business hours or the on-call call provider after hours if you develop any of the following:    Signs of infection such as: fevers, chills, increased pain, drainage (as mentioned above), redness, and swelling from your incision sites.  Headache persisting for more than 48 hours  Unusual changes in or stopping of sensation in your legs or back.  Uncontrolled pain,     [facility]  [phone]  (option 5 for Nurse Line)    Signs or symptoms of a deep vein thrombosis (DVT) : swelling in  one or both legs, pain or tenderness in one or both legs, warm skin on your leg, and/or red or discolored skin on your leg     **If you have questions or concerns about your procedure,  call Dr. Dick at [phone]**

## 2023-04-27 ENCOUNTER — LAB (OUTPATIENT)
Dept: LAB | Facility: CLINIC | Age: 68
End: 2023-04-27
Payer: COMMERCIAL

## 2023-04-27 DIAGNOSIS — R79.89 ABNORMAL CBC: ICD-10-CM

## 2023-04-27 LAB
BASOPHILS # BLD MANUAL: 0 10E3/UL (ref 0–0.2)
BASOPHILS NFR BLD MANUAL: 0 %
EOSINOPHIL # BLD MANUAL: 0.2 10E3/UL (ref 0–0.7)
EOSINOPHIL NFR BLD MANUAL: 1 %
ERYTHROCYTE [DISTWIDTH] IN BLOOD BY AUTOMATED COUNT: 12.9 % (ref 10–15)
HCT VFR BLD AUTO: 45 % (ref 40–53)
HGB BLD-MCNC: 14.8 G/DL (ref 13.3–17.7)
LYMPHOCYTES # BLD MANUAL: 13.2 10E3/UL (ref 0.8–5.3)
LYMPHOCYTES NFR BLD MANUAL: 64 %
MCH RBC QN AUTO: 29.8 PG (ref 26.5–33)
MCHC RBC AUTO-ENTMCNC: 32.9 G/DL (ref 31.5–36.5)
MCV RBC AUTO: 91 FL (ref 78–100)
MONOCYTES # BLD MANUAL: 1 10E3/UL (ref 0–1.3)
MONOCYTES NFR BLD MANUAL: 5 %
NEUTROPHILS # BLD MANUAL: 6.2 10E3/UL (ref 1.6–8.3)
NEUTROPHILS NFR BLD MANUAL: 30 %
PLAT MORPH BLD: ABNORMAL
PLATELET # BLD AUTO: 174 10E3/UL (ref 150–450)
RBC # BLD AUTO: 4.96 10E6/UL (ref 4.4–5.9)
RBC MORPH BLD: ABNORMAL
VARIANT LYMPHS BLD QL SMEAR: PRESENT
WBC # BLD AUTO: 20.7 10E3/UL (ref 4–11)

## 2023-04-27 PROCEDURE — 85027 COMPLETE CBC AUTOMATED: CPT

## 2023-04-27 PROCEDURE — 85007 BL SMEAR W/DIFF WBC COUNT: CPT

## 2023-04-27 PROCEDURE — 36415 COLL VENOUS BLD VENIPUNCTURE: CPT

## 2023-04-27 NOTE — OP NOTE
[facility] Operative Note    Pre-operative diagnosis: Chronic pain syndrome [G89.4]   Post-operative diagnosis * No post-op diagnosis entered *   Procedure: Procedure(s):  Pump Replacement and intrathecal catheter replacement   Surgeon: Anthony Dick MD   Assistants(s): None   Estimated blood loss: Less than 10 ml    Specimens: Removal of old pump, removal of catheter segment   Findings: Revision of pump catheter segment and implant of new intrathecal pump.  Successful side port access of IT pump confirming patency at end of procedure and prior to closing.       Name:   Onur Barr   :    1955  Surgeon:    Anthony Dick MD   Date of Service: 2023  Procedure:    Intrathecal Pain Pump and Catheter Replacement  Indication:  Chronic pain syndrome G89.4  The procedure, indications, risks, and alternatives to therapy were discussed with the patient. The COVID-19 Health Risk Assessment was discussed and reviewed with the patient. Written informed consent was obtained. Questions were encouraged and answered.     Due to the patient's anxiety and pain, MAC sedation was utilized. A pre-procedural evaluation was performed and reviewed by the surgeon and a CRNA. The patient was assessed as a good candidate for MAC sedation.     Implants: All implants were [employer] products. The catheter lot number was [id number]. The SynchroMed II pump was model number 8637-40 and serial number [id number].     Description of Procedure: IV access was obtained via a registered nurse and 2g Ancef mixed in normal saline was administered through the IV. The pocket site and insertion site were marked in the preoperative area. The patient was brought into the procedure room and positioned semi lateral on the fluoroscopy table, prepped with chlorhexidine, and draped in the usual sterile fashion. A time out was performed stating the patient's name, date of birth, allergies, and procedure to be performed. Sterile  technique was used throughout the entire procedure.    MAC sedation was administered in increments throughout the procedure which resulted in satisfactory relaxation and sedation. Lactated Ringers ran through the IV over the course of the procedure. The patient was monitored throughout the procedure by a CRNA. Physiologic parameters were observed utilizing pulse oximetry and blood pressure checks every five minutes during the procedure. Monitoring revealed normal oxygen saturation and pulse rate.    The operative field was prepped and draped in normal sterile fashion. The skin was anesthetized with 10mL lidocaine 1% with epinephrine at the appropriate level. An incision was made in the left flank region over the existing scar where the pump was held. The catheter was unable to be disconnected from the pump and was therefore cut.  This was then connected via collette to a new terminal catheter segment.  There was flow of CSF from the new catheter attachment end.  A luer lock syringle was then used to aspirate from the catheter and 3 ml of CSF were easily withdrawn from the catheter with a good flow rate.      The sterile water was removed from the new pump, the pump was rinsed, and the pump was filled with PF Morphine 20 mg/mL, PF Clonidine 21.1 mcg/mL, and PF Baclofen 42.5 mcg/mL.  The catheter was connected to the pump, which was set with a priming bolus of 0.177mL over 11 minutes. The starting dose was programmed to  Morphine 3.950 mg per day, Clonidine 4.168 mcg per day, and Baclofen 8.395 mcg per day. The pump was placed into the left flank pocket. It was turned medially, and the additional catheter material was coiled behind the pump. The wound was irrigated copiously with saline solution and hemostasis was obtained. The wound was closed with interrupted 0 vicryl sutures for the deep layers followed by a running subcuticular 4-0 vicryl for the skin.  Steri strips and sterile dressings were applied, and the  patient was transferred to the recovery room in stable condition. Catheter length is 80.5cm (0.177mL). The [employer] rep was present throughout the case and assisted with measuring the discarded catheter segments, calculating the new catheter length, and programming the new catheter length to the pump.    The patient was then brought back to the recovery area where they were monitored by a registered nurse. Physiological parameters were observed utilizing pulse oximetry and blood pressure checks every 10 minutes in recovery. Monitoring revealed normal oxygen saturation and pulse rate. The patient tolerated the procedure well and no immediate complications were encountered. When the patient's vital signs and level of consciousness returned to pre-sedation levels, they were determined to be ready for discharge. Post-procedure instructions were given to the patient/family, and they verbalized understanding. The patient was then discharged in stable condition.     Impression: Technically successful intrathecal pump and catheter replacement. Long-term results are pending.

## 2023-05-01 DIAGNOSIS — D72.820 LYMPHOCYTOSIS: Primary | ICD-10-CM

## 2023-05-02 ENCOUNTER — LAB (OUTPATIENT)
Dept: LAB | Facility: CLINIC | Age: 68
End: 2023-05-02
Payer: COMMERCIAL

## 2023-05-02 DIAGNOSIS — D72.820 LYMPHOCYTOSIS: ICD-10-CM

## 2023-05-02 LAB
BASOPHILS # BLD AUTO: 0.1 10E3/UL (ref 0–0.2)
BASOPHILS NFR BLD AUTO: 0 %
EOSINOPHIL # BLD AUTO: 0.3 10E3/UL (ref 0–0.7)
EOSINOPHIL NFR BLD AUTO: 2 %
ERYTHROCYTE [DISTWIDTH] IN BLOOD BY AUTOMATED COUNT: 12.9 % (ref 10–15)
HCT VFR BLD AUTO: 43.7 % (ref 40–53)
HGB BLD-MCNC: 14.4 G/DL (ref 13.3–17.7)
IMM GRANULOCYTES # BLD: 0.1 10E3/UL
IMM GRANULOCYTES NFR BLD: 1 %
LYMPHOCYTES # BLD AUTO: 11.1 10E3/UL (ref 0.8–5.3)
LYMPHOCYTES NFR BLD AUTO: 64 %
MCH RBC QN AUTO: 29.6 PG (ref 26.5–33)
MCHC RBC AUTO-ENTMCNC: 33 G/DL (ref 31.5–36.5)
MCV RBC AUTO: 90 FL (ref 78–100)
MONOCYTES # BLD AUTO: 1.1 10E3/UL (ref 0–1.3)
MONOCYTES NFR BLD AUTO: 6 %
NEUTROPHILS # BLD AUTO: 4.7 10E3/UL (ref 1.6–8.3)
NEUTROPHILS NFR BLD AUTO: 27 %
NRBC # BLD AUTO: 0.1 10E3/UL
NRBC BLD AUTO-RTO: 0 /100
PLAT MORPH BLD: NORMAL
PLATELET # BLD AUTO: 158 10E3/UL (ref 150–450)
RBC # BLD AUTO: 4.87 10E6/UL (ref 4.4–5.9)
RBC MORPH BLD: NORMAL
RETICS # AUTO: 0.13 10E6/UL (ref 0.03–0.1)
RETICS/RBC NFR AUTO: 2.6 % (ref 0.5–2)
WBC # BLD AUTO: 17.3 10E3/UL (ref 4–11)

## 2023-05-02 PROCEDURE — 99207 BLOOD MORPHOLOGY PATHOLOGIST REVIEW: CPT | Performed by: PATHOLOGY

## 2023-05-02 PROCEDURE — 36415 COLL VENOUS BLD VENIPUNCTURE: CPT

## 2023-05-02 PROCEDURE — 85025 COMPLETE CBC W/AUTO DIFF WBC: CPT

## 2023-05-02 PROCEDURE — 85045 AUTOMATED RETICULOCYTE COUNT: CPT

## 2023-05-03 ENCOUNTER — TRANSFERRED RECORDS (OUTPATIENT)
Dept: HEALTH INFORMATION MANAGEMENT | Facility: CLINIC | Age: 68
End: 2023-05-03
Payer: COMMERCIAL

## 2023-05-03 LAB
PATH REPORT.COMMENTS IMP SPEC: NORMAL
PATH REPORT.FINAL DX SPEC: NORMAL
PATH REPORT.MICROSCOPIC SPEC OTHER STN: NORMAL
PATH REPORT.MICROSCOPIC SPEC OTHER STN: NORMAL

## 2023-05-04 ENCOUNTER — TELEPHONE (OUTPATIENT)
Dept: INTERNAL MEDICINE | Facility: CLINIC | Age: 68
End: 2023-05-04
Payer: COMMERCIAL

## 2023-05-04 DIAGNOSIS — D72.820 LYMPHOCYTOSIS: Primary | ICD-10-CM

## 2023-05-04 NOTE — TELEPHONE ENCOUNTER
"Per result note message 5/2/23 from Sang:    \"Abnormalities were seen on your blood test- I will refer to blood doctor with a priority referral so you can get in sooner.\"    Referral info:  [facility] [phone]    Spoke with patient and informed of result note message and provided him with referral info/phone number.  Advised patient get in as soon as he can.  "

## 2023-05-05 ENCOUNTER — PATIENT OUTREACH (OUTPATIENT)
Dept: ONCOLOGY | Facility: CLINIC | Age: 68
End: 2023-05-05
Payer: COMMERCIAL

## 2023-05-05 NOTE — PROGRESS NOTES
May 5, 2023    Hematology/Oncology  referral reviewed.   Referred By 05/04/23 1540   Priority: 1-2 Weeks  Referred To     Sang Ma NP  [place] [address] [facility] [address]   Phone: [phone]   Fax: [phone]    Diagnoses: Lymphocytosis   Order: Adult Oncology/Hematology  Referral   My Clinical Question Is:morphology cannot exclude lymphoid neoplasm    Hematology/Oncology         Pertinent labs -- BOOKMARKED  CBC RESULTS: Recent Labs   Lab Test 05/02/23  1104   WBC 17.3*   RBC 4.87   HGB 14.4   HCT 43.7   MCV 90   MCH 29.6   MCHC 33.0   RDW 12.9      ALC  11.1 (H)   +smudge cells on smear      Referring provider visit note -- BOOKMARKED    May 5, 2023OUTGOING CALL to pt, no answer. Left voicemail introducing my role as nurse navigator with [facility] hematology clinics and that we have recd the referral to hematology from NP in primary care. Requested callback to number below (Mon - Fri 8am - 4:30pm) to speak to a  to set the consult date/time/location. Explained in VM that he will also receive a call from our scheduling intake team in the next business day    Vianey Nicole, RN, BSN, OCN  [facility] Hematology/Oncology Nurse Navigator  [phone]

## 2023-05-07 ENCOUNTER — MYC MEDICAL ADVICE (OUTPATIENT)
Dept: INTERNAL MEDICINE | Facility: CLINIC | Age: 68
End: 2023-05-07
Payer: COMMERCIAL

## 2023-05-07 DIAGNOSIS — I50.22 CHRONIC SYSTOLIC CONGESTIVE HEART FAILURE (H): ICD-10-CM

## 2023-05-08 DIAGNOSIS — D64.9 ANEMIA, UNSPECIFIED TYPE: ICD-10-CM

## 2023-05-08 RX ORDER — FUROSEMIDE 40 MG
40 TABLET ORAL 2 TIMES DAILY
Qty: 90 TABLET | Refills: 3 | Status: SHIPPED | OUTPATIENT
Start: 2023-05-08 | End: 2023-06-15

## 2023-05-10 RX ORDER — FERROUS GLUCONATE 240(27)MG
TABLET ORAL
Qty: 90 TABLET | Refills: 2 | Status: SHIPPED | OUTPATIENT
Start: 2023-05-10 | End: 2024-04-30

## 2023-05-15 ENCOUNTER — ONCOLOGY VISIT (OUTPATIENT)
Dept: ONCOLOGY | Facility: CLINIC | Age: 68
End: 2023-05-15
Payer: COMMERCIAL

## 2023-05-15 ENCOUNTER — LAB (OUTPATIENT)
Dept: INFUSION THERAPY | Facility: CLINIC | Age: 68
End: 2023-05-15
Attending: INTERNAL MEDICINE
Payer: COMMERCIAL

## 2023-05-15 VITALS
BODY MASS INDEX: 41.75 KG/M2 | SYSTOLIC BLOOD PRESSURE: 146 MMHG | HEIGHT: 73 IN | WEIGHT: 315 LBS | RESPIRATION RATE: 18 BRPM | OXYGEN SATURATION: 96 % | HEART RATE: 60 BPM | DIASTOLIC BLOOD PRESSURE: 99 MMHG

## 2023-05-15 DIAGNOSIS — D72.820 LYMPHOCYTOSIS: ICD-10-CM

## 2023-05-15 DIAGNOSIS — D72.820 LYMPHOCYTOSIS: Primary | ICD-10-CM

## 2023-05-15 LAB
ALBUMIN SERPL-MCNC: 3.8 G/DL (ref 3.5–5)
ALP SERPL-CCNC: 73 U/L (ref 45–120)
ALT SERPL W P-5'-P-CCNC: 17 U/L (ref 0–45)
ANION GAP SERPL CALCULATED.3IONS-SCNC: 9 MMOL/L (ref 5–18)
AST SERPL W P-5'-P-CCNC: 24 U/L (ref 0–40)
BASOPHILS # BLD AUTO: 0.1 10E3/UL (ref 0–0.2)
BASOPHILS NFR BLD AUTO: 0 %
BILIRUB SERPL-MCNC: 0.6 MG/DL (ref 0–1)
BUN SERPL-MCNC: 19 MG/DL (ref 8–22)
CALCIUM SERPL-MCNC: 9.5 MG/DL (ref 8.5–10.5)
CHLORIDE BLD-SCNC: 104 MMOL/L (ref 98–107)
CO2 SERPL-SCNC: 28 MMOL/L (ref 22–31)
CREAT SERPL-MCNC: 0.98 MG/DL (ref 0.7–1.3)
EOSINOPHIL # BLD AUTO: 0.3 10E3/UL (ref 0–0.7)
EOSINOPHIL NFR BLD AUTO: 2 %
ERYTHROCYTE [DISTWIDTH] IN BLOOD BY AUTOMATED COUNT: 12.8 % (ref 10–15)
GFR SERPL CREATININE-BSD FRML MDRD: 85 ML/MIN/1.73M2
GLUCOSE BLD-MCNC: 104 MG/DL (ref 70–125)
HCT VFR BLD AUTO: 43.5 % (ref 40–53)
HGB BLD-MCNC: 14.2 G/DL (ref 13.3–17.7)
IMM GRANULOCYTES # BLD: 0 10E3/UL
IMM GRANULOCYTES NFR BLD: 0 %
LYMPHOCYTES # BLD AUTO: 11.5 10E3/UL (ref 0.8–5.3)
LYMPHOCYTES NFR BLD AUTO: 69 %
MCH RBC QN AUTO: 29.5 PG (ref 26.5–33)
MCHC RBC AUTO-ENTMCNC: 32.6 G/DL (ref 31.5–36.5)
MCV RBC AUTO: 90 FL (ref 78–100)
MONOCYTES # BLD AUTO: 0.7 10E3/UL (ref 0–1.3)
MONOCYTES NFR BLD AUTO: 4 %
NEUTROPHILS # BLD AUTO: 4.2 10E3/UL (ref 1.6–8.3)
NEUTROPHILS NFR BLD AUTO: 25 %
NRBC # BLD AUTO: 0 10E3/UL
NRBC BLD AUTO-RTO: 0 /100
PATH REPORT.COMMENTS IMP SPEC: NORMAL
PATH REPORT.COMMENTS IMP SPEC: NORMAL
PATH REPORT.FINAL DX SPEC: NORMAL
PLATELET # BLD AUTO: 156 10E3/UL (ref 150–450)
POTASSIUM BLD-SCNC: 4.2 MMOL/L (ref 3.5–5)
PROT SERPL-MCNC: 7.5 G/DL (ref 6–8)
RBC # BLD AUTO: 4.81 10E6/UL (ref 4.4–5.9)
RETICS # AUTO: 0.1 10E6/UL (ref 0.01–0.11)
RETICS/RBC NFR AUTO: 2 % (ref 0.8–2.7)
SODIUM SERPL-SCNC: 141 MMOL/L (ref 136–145)
WBC # BLD AUTO: 16.8 10E3/UL (ref 4–11)

## 2023-05-15 PROCEDURE — 88185 FLOWCYTOMETRY/TC ADD-ON: CPT | Performed by: INTERNAL MEDICINE

## 2023-05-15 PROCEDURE — 88189 FLOWCYTOMETRY/READ 16 & >: CPT | Performed by: STUDENT IN AN ORGANIZED HEALTH CARE EDUCATION/TRAINING PROGRAM

## 2023-05-15 PROCEDURE — 88184 FLOWCYTOMETRY/ TC 1 MARKER: CPT | Performed by: INTERNAL MEDICINE

## 2023-05-15 PROCEDURE — 99213 OFFICE O/P EST LOW 20 MIN: CPT | Performed by: INTERNAL MEDICINE

## 2023-05-15 PROCEDURE — 80053 COMPREHEN METABOLIC PANEL: CPT

## 2023-05-15 PROCEDURE — 99207 BLOOD MORPHOLOGY PATHOLOGIST REVIEW: CPT | Performed by: PATHOLOGY

## 2023-05-15 PROCEDURE — 36415 COLL VENOUS BLD VENIPUNCTURE: CPT

## 2023-05-15 PROCEDURE — 85045 AUTOMATED RETICULOCYTE COUNT: CPT

## 2023-05-15 PROCEDURE — 99205 OFFICE O/P NEW HI 60 MIN: CPT | Performed by: INTERNAL MEDICINE

## 2023-05-15 PROCEDURE — 85014 HEMATOCRIT: CPT

## 2023-05-15 ASSESSMENT — ENCOUNTER SYMPTOMS
NAUSEA: 0
SLEEP DISTURBANCE: 1
DIARRHEA: 0
SHORTNESS OF BREATH: 1
LEG SWELLING: 1
BRUISES/BLEEDS EASILY: 1
ABDOMINAL DISTENTION: 1
ABDOMINAL PAIN: 0
ROS GI COMMENTS: DENIES EARLY SATIETY
BACK PAIN: 1
FATIGUE: 1
EYES NEGATIVE: 1
ARTHRALGIAS: 1

## 2023-05-15 ASSESSMENT — PAIN SCALES - GENERAL: PAINLEVEL: SEVERE PAIN (7)

## 2023-05-15 NOTE — LETTER
5/15/2023         RE: Onur Barr  [address] [facility] [address]        Dear Colleague,    Thank you for referring your patient, Onur Barr, to the [facility]. Please see a copy of my visit note below.    Assessment & Plan   Mild lymphocytosis with preserved hemoglobin and platelet count, likely chronic lymphocytic leukemia  --highly unlikely to need treatment, especially in the context of his overall health  Innumerable other serious medical problems including chronic pain syndrome, heart failure, morbid obesity    CBC, CMP, Peripheral smear, flow cytometry, abdominal ultrasound  Follow up 3 months with labs    History  This is an initial hematology consultation visit to address lymphocytosis, found during pre-op evaluation for pain pump revision.  The patient has not noticed adenopathy or weight loss.  He has not had infections.  He does have fatigue and sweating.  He is winded with exertion.  He's always in pain from his back, just now 7/10.  He is followed by a pain clinic.  He has to rest most of the day.  None of this is new.    ECOG = 2-3    Patient Active Problem List   Diagnosis     Lumbago     Morbid obesity -- BMI 42.0     Bilateral leg edema     Chronic diastolic heart failure (H)     MARLEEN (doesn't tolerate CPAP)     NSTEMI w Unstab Angina -- S/P CABG x 1 (LIMA to LAD) on 1/19/22     Paroxysmal atrial fib -- S/P Pulm Vein Ablation 1/19/22     Essential hypertension     Mixed hyperlipidemia     Gastroesophageal reflux disease without esophagitis     Chronic Back Pain (IT Pump w Morphine, Clonidine, Baclofen)     S/P CABG (coronary artery bypass graft)     Fluid overload     ICD (implantable cardioverter-defibrillator) in place     Peripheral vascular disease (H)     Abdominal panniculus     Bariatric surgery status     Claudication of lower extremity (H)     Insomnia     Intestinal disaccharidase deficiencies and disaccharide malabsorption      Nephrolithiasis     Osteoarthrosis     Post-laminectomy syndrome     Sleepiness     Coronary arteriosclerosis     Candidal intertrigo     Current Outpatient Medications   Medication     acetaminophen (TYLENOL) 325 MG tablet     aspirin (ASA) 81 MG chewable tablet     DULoxetine (CYMBALTA) 30 MG capsule     FERATE 240 (27 Fe) MG TABS     Ferrous Sulfate (IRON PO)     furosemide (LASIX) 40 MG tablet     ketoconazole (NIZORAL) 2 % external cream     lisinopril (ZESTRIL) 10 MG tablet     metoprolol succinate ER (TOPROL XL) 100 MG 24 hr tablet     MORPHINE SULFATE     nystatin (MYCOSTATIN) 086955 UNIT/GM external powder     rosuvastatin (CRESTOR) 40 MG tablet     traZODone (DESYREL) 100 MG tablet     nitroGLYcerin (NITROSTAT) 0.4 MG sublingual tablet     No current facility-administered medications for this visit.     Past Medical History:   Diagnosis Date     Abdominal panniculus 11/13/2012    Formatting of this note might be different from the original. S/p panniculectomy 11/13/2012     Acid reflux disease 10/31/2017     Bariatric surgery status 06/23/2008    Formatting of this note might be different from the original. Created by Conversion     Bilateral leg edema 07/13/2021     CHF (congestive heart failure) (H)      Chronic Back Pain (IT Pump w Morphine, Clonidine, Baclofen) 01/16/2022     Chronic diastolic heart failure (H) 07/26/2021     Claudication of lower extremity (H) 11/20/2019     Coronary arteriosclerosis 05/09/2022    Formatting of this note might be different from the original. Created by Conversion  Replacement Utility updated for latest IMO load     Coronary artery disease     CABG 1-     Depressive disorder      Essential hypertension     Created by Conversion [facility] Annotation: Apr 6 2012 10:16Zack Harris: Lisinipril,  coreg  Replacement Utility updated for latest IMO load     Fluid overload 01/25/2022     Gastroesophageal reflux disease without esophagitis 09/11/2021     H/O  gastric bypass 2008     History of blood transfusion 2004     ICD (implantable cardioverter-defibrillator) in place 01/25/2022     Insomnia      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     Ischemic cardiomyopathy      Lumbago     Created by [facility] Annotation: Apr 6 2012 10:20Anh Ford: Morphine pump      Mixed hyperlipidemia 06/23/2008     Morbid obesity (H) 08/01/2018     Nephrolithiasis      NSTEMI (non-ST elevated myocardial infarction) (H)      Obstructive sleep apnea     Doesn't tolerate CPAP     MARLEEN (doesn't tolerate CPAP) 07/26/2021     Osteoarthritis     Created by [facility] Annotation: Jun 26 2009  9:53Zack Harris: failed lumbar  fusion/morphine pump dr putnam  Replacement Utility updated for latest IMO load     Osteoarthrosis 05/09/2022    Formatting of this note might be different from the original. Created by [facility] Annotation: Jun 26 2009  9:Zack Mantilla: failed lumbar  fusion/morphine pump dr putnam  Replacement Utility updated for latest IMO load     Paroxysmal atrial fib -- S/P Pulm Vein Ablation 1/19/22 09/11/2021    Formatting of this note might be different from the original. Created by Conversion     Paroxysmal atrial fibrillation (H)     Created by Conversion      Paroxysmal ventricular tachycardia (H)     dual chamber ICD 1/24/2022     Peripheral vascular disease (H) 05/03/2022     Post-laminectomy syndrome 11/25/2015     S/P CABG (coronary artery bypass graft) 01/25/2022     Sleepiness 05/08/2018     Type 2 diabetes mellitus (H)     Diet controlled after Gastric Bypass in 2008     Socioeconomic History     Marital status:      Disabled x 25 years due to back problems, formerly     Review of Systems   Constitutional: Positive for fatigue.   HENT:  Negative.    Eyes: Negative.    Respiratory: Positive for shortness of breath.    Cardiovascular: Positive for leg  "swelling.   Gastrointestinal: Positive for abdominal distention. Negative for abdominal pain, diarrhea and nausea.        Denies early satiety   Endocrine:        Diabetes, controlled by diet.  Lost ~100 pounds after bariatric surgery   Genitourinary: Negative.          Injured his right testicle in shower last week with an episode of bleeding   Musculoskeletal: Positive for arthralgias, back pain and gait problem.        Knees are shot   Skin: Positive for rash.   Neurological: Positive for gait problem.   Hematological: Bruises/bleeds easily.   Psychiatric/Behavioral: Positive for sleep disturbance.   All other systems reviewed and are negative.      BP (!) 146/99   Pulse 60   Resp 18   Ht 1.854 m (6' 1\")   Wt 148.8 kg (328 lb)   SpO2 96%   BMI 43.27 kg/m      Physical Exam  Vitals and nursing note reviewed. Exam conducted with a chaperone present.   Constitutional:       Appearance: He is morbidly obese.   Chest:       Abdominal:      General: Abdomen is protuberant. A surgical scar is present.      Palpations: There is no fluid wave or mass.      Tenderness: There is no guarding or rebound.      Hernia: A hernia is present.       Musculoskeletal:         General: Deformity present.      Lumbar back: Decreased range of motion.      Right lower leg: Edema present.      Left lower leg: Edema present.      Comments: Carries himself with extreme forward bend, about 80 degrees forward   Lymphadenopathy:      Upper Body:      Right upper body: No supraclavicular or axillary adenopathy.      Left upper body: No supraclavicular or axillary adenopathy.   Skin:     Findings: Lesion: scattered excoriations on arms.   Neurological:      Mental Status: He is alert and oriented to person, place, and time.      Cranial Nerves: No cranial nerve deficit.      Motor: No weakness.      Gait: Gait abnormal.   Psychiatric:         Mood and Affect: Mood normal.         Behavior: Behavior normal. Behavior is cooperative.         " Thought Content: Thought content normal.         Judgment: Judgment normal.         Recent Results (from the past 720 hour(s))   CBC with platelets    Collection Time: 04/17/23 10:45 AM   Result Value Ref Range    WBC Count 18.2 (H) 4.0 - 11.0 10e3/uL    RBC Count 4.96 4.40 - 5.90 10e6/uL    Hemoglobin 14.7 13.3 - 17.7 g/dL    Hematocrit 45.0 40.0 - 53.0 %    MCV 91 78 - 100 fL    MCH 29.6 26.5 - 33.0 pg    MCHC 32.7 31.5 - 36.5 g/dL    RDW 12.6 10.0 - 15.0 %    Platelet Count 153 150 - 450 10e3/uL   Basic metabolic panel  (Ca, Cl, CO2, Creat, Gluc, K, Na, BUN)    Collection Time: 04/17/23 10:45 AM   Result Value Ref Range    Sodium 140 136 - 145 mmol/L    Potassium 4.7 3.4 - 5.3 mmol/L    Chloride 103 98 - 107 mmol/L    Carbon Dioxide (CO2) 28 22 - 29 mmol/L    Anion Gap 9 7 - 15 mmol/L    Urea Nitrogen 24.7 (H) 8.0 - 23.0 mg/dL    Creatinine 1.03 0.67 - 1.17 mg/dL    Calcium 9.5 8.8 - 10.2 mg/dL    Glucose 101 (H) 70 - 99 mg/dL    GFR Estimate 80 >60 mL/min/1.73m2   WBC and Differential    Collection Time: 04/17/23 10:45 AM   Result Value Ref Range    WBC Count 18.2 (H) 4.0 - 11.0 10e3/uL   Manual Differential    Collection Time: 04/17/23 10:45 AM   Result Value Ref Range    % Neutrophils 25 %    % Lymphocytes 67 %    % Monocytes 6 %    % Eosinophils 2 %    % Basophils 0 %    Absolute Neutrophils 4.6 1.6 - 8.3 10e3/uL    Absolute Lymphocytes 12.2 (H) 0.8 - 5.3 10e3/uL    Absolute Monocytes 1.1 0.0 - 1.3 10e3/uL    Absolute Eosinophils 0.4 0.0 - 0.7 10e3/uL    Absolute Basophils 0.0 0.0 - 0.2 10e3/uL    RBC Morphology Confirmed RBC Indices     Platelet Assessment  Automated Count Confirmed. Platelet morphology is normal.     Automated Count Confirmed. Platelet morphology is normal.    Pathologist Review Comments     INR    Collection Time: 04/20/23  6:29 AM   Result Value Ref Range    INR 1.10 0.85 - 1.15   Partial thromboplastin time    Collection Time: 04/20/23  6:29 AM   Result Value Ref Range    aPTT 28 22 - 38  Seconds   Potassium    Collection Time: 04/20/23  6:29 AM   Result Value Ref Range    Potassium 4.5 3.4 - 5.3 mmol/L   CBC with platelets and differential    Collection Time: 04/27/23 11:37 AM   Result Value Ref Range    WBC Count 20.7 (H) 4.0 - 11.0 10e3/uL    RBC Count 4.96 4.40 - 5.90 10e6/uL    Hemoglobin 14.8 13.3 - 17.7 g/dL    Hematocrit 45.0 40.0 - 53.0 %    MCV 91 78 - 100 fL    MCH 29.8 26.5 - 33.0 pg    MCHC 32.9 31.5 - 36.5 g/dL    RDW 12.9 10.0 - 15.0 %    Platelet Count 174 150 - 450 10e3/uL   Manual Differential    Collection Time: 04/27/23 11:37 AM   Result Value Ref Range    % Neutrophils 30 %    % Lymphocytes 64 %    % Monocytes 5 %    % Eosinophils 1 %    % Basophils 0 %    Absolute Neutrophils 6.2 1.6 - 8.3 10e3/uL    Absolute Lymphocytes 13.2 (H) 0.8 - 5.3 10e3/uL    Absolute Monocytes 1.0 0.0 - 1.3 10e3/uL    Absolute Eosinophils 0.2 0.0 - 0.7 10e3/uL    Absolute Basophils 0.0 0.0 - 0.2 10e3/uL    RBC Morphology Confirmed RBC Indices     Platelet Assessment  Automated Count Confirmed. Platelet morphology is normal.     Automated Count Confirmed. Platelet morphology is normal.    Reactive Lymphocytes Present (A) None Seen   Bld morphology pathology review    Collection Time: 05/02/23 11:04 AM   Result Value Ref Range    Final Diagnosis       Peripheral blood, morphology:  - Mild leukocytosis with atypical lymphocytosis.  - Hemoglobin quantitatively within normal limits and erythrocytes without diagnostic morphologic abnormality.  - Platelets quantitatively within normal limits and without diagnostic morphologic abnormality.  - Negative for schistocytes or definitive morphologic features of hemolysis.  - Negative for overt features of myelodysplasia or circulating blasts.    See comment.    COMMENT:  A lymphoid neoplasm cannot be excluded, and flow cytometry immunophenotyping (a test which may be ordered on a peripheral blood specimen) is recommended for further evaluation.       Peripheral Smear  "      A microscopic examination is performed.  There is a population of small lymphoid cells with high N:C ratio, scant cytoplasm, and clumped nuclear chromatin.  \"Smudge cells\" are seen.  Prolymphocytes do not constitute greater than 55% of the lymphoid cells.  There are no blasts seen.      Peripheral Hematologic Data        Latest Reference Range & Units 05/02/23 11:04   WBC 4.0 - 11.0 10e3/uL 17.3 (H)   Hemoglobin 13.3 - 17.7 g/dL 14.4   Hematocrit 40.0 - 53.0 % 43.7   Platelet Count 150 - 450 10e3/uL 158   RBC Count 4.40 - 5.90 10e6/uL 4.87   MCV 78 - 100 fL 90   MCH 26.5 - 33.0 pg 29.6   MCHC 31.5 - 36.5 g/dL 33.0   RDW 10.0 - 15.0 % 12.9   % Neutrophils % 27   % Lymphocytes % 64   % Monocytes % 6   % Eosinophils % 2   % Basophils % 0   Absolute Basophils 0.0 - 0.2 10e3/uL 0.1   Absolute Eosinophils 0.0 - 0.7 10e3/uL 0.3   Absolute Immature Granulocytes <=0.4 10e3/uL 0.1   Absolute Lymphocytes 0.8 - 5.3 10e3/uL 11.1 (H)   Absolute Monocytes 0.0 - 1.3 10e3/uL 1.1   % Immature Granulocytes % 1   Absolute Neutrophils 1.6 - 8.3 10e3/uL 4.7   Absolute NRBCs 10e3/uL 0.1   NRBCs per 100 WBC <1 /100 0   % Retic 0.5 - 2.0 % 2.6 (H)   Absolute Retic 0.025 - 0.095 10e6/uL 0.127 (H)   (H): Data is abnormally high    For patients over 18 years old, anemia and quantitative abnormalities of WBC and platelets (if present) may be further stratified as follows:    Hemoglobin (g/dL) in males:    11.3 - 13.2: Mild anemia    9.3 - 11.2: Moderate anemia    Less than 9.3: Marked anemia    WBC (10^9/L):    Greater than 50.0: Marked leukocytosis    18.0 - 50.0: Moderate leukocytosis    11.1 - 17.9: Mild leukocytosis    3.0 - 3.9: Mild leukopenia    2.0 - 2.9: Moderate leukopenia    Less than 2.0: Marked leukopenia    Platelets (10^9/L):    Greater than 900: Marked thrombocytosis    601 - 900: Moderate thrombocytosis    451 - 600: Mild thrombocytosis    100 - 149: Mild thrombocytopenia    50 - 99: Moderate thrombocytopenia    Less than " 50: Marked thrombocytopenia       Performing Labs       The technical component of this testing was completed at [facility], [facility] and [facility]     CBC with platelets and differential    Collection Time: 05/02/23 11:04 AM   Result Value Ref Range    WBC Count 17.3 (H) 4.0 - 11.0 10e3/uL    RBC Count 4.87 4.40 - 5.90 10e6/uL    Hemoglobin 14.4 13.3 - 17.7 g/dL    Hematocrit 43.7 40.0 - 53.0 %    MCV 90 78 - 100 fL    MCH 29.6 26.5 - 33.0 pg    MCHC 33.0 31.5 - 36.5 g/dL    RDW 12.9 10.0 - 15.0 %    Platelet Count 158 150 - 450 10e3/uL    % Neutrophils 27 %    % Lymphocytes 64 %    % Monocytes 6 %    % Eosinophils 2 %    % Basophils 0 %    % Immature Granulocytes 1 %    NRBCs per 100 WBC 0 <1 /100    Absolute Neutrophils 4.7 1.6 - 8.3 10e3/uL    Absolute Lymphocytes 11.1 (H) 0.8 - 5.3 10e3/uL    Absolute Monocytes 1.1 0.0 - 1.3 10e3/uL    Absolute Eosinophils 0.3 0.0 - 0.7 10e3/uL    Absolute Basophils 0.1 0.0 - 0.2 10e3/uL    Absolute Immature Granulocytes 0.1 <=0.4 10e3/uL    Absolute NRBCs 0.1 10e3/uL   Reticulocyte count    Collection Time: 05/02/23 11:04 AM   Result Value Ref Range    % Reticulocyte 2.6 (H) 0.5 - 2.0 %    Absolute Reticulocyte 0.127 (H) 0.025 - 0.095 10e6/uL   RBC and Platelet Morphology    Collection Time: 05/02/23 11:04 AM   Result Value Ref Range    Platelet Assessment  Automated Count Confirmed. Platelet morphology is normal.     Automated Count Confirmed. Platelet morphology is normal.    RBC Morphology Confirmed RBC Indices      No results found for this or any previous visit (from the past 744 hour(s)).      Total time for review of records, interview and examination, documentation = 66 min    Oncology Rooming Note    May 15, 2023 9:02 AM   Onur Barr is a 67 year old male who presents for:    Chief Complaint   Patient presents with     Oncology Clinic Visit     New consult  "Lymphocytosis     Initial Vitals: BP (!) 146/99   Pulse 60   Resp 18   Ht 1.854 m (6' 1\")   Wt 148.8 kg (328 lb)   SpO2 96%   BMI 43.27 kg/m   Estimated body mass index is 43.27 kg/m  as calculated from the following:    Height as of this encounter: 1.854 m (6' 1\").    Weight as of this encounter: 148.8 kg (328 lb). Body surface area is 2.77 meters squared.  Severe Pain (7) Comment: Data Unavailable   No LMP for male patient.  Allergies reviewed: Yes  Medications reviewed: Yes    Medications: Medication refills not needed today.  Pharmacy name entered into [employer]: [id number] [place], [place] - [address]    Clinical concerns:  New consult CLL      Carlee Alcaraz                Again, thank you for allowing me to participate in the care of your patient.        Sincerely,        Melissa Peguero MD    "

## 2023-05-15 NOTE — PROGRESS NOTES
Assessment & Plan   Mild lymphocytosis with preserved hemoglobin and platelet count, likely chronic lymphocytic leukemia  --highly unlikely to need treatment, especially in the context of his overall health  Innumerable other serious medical problems including chronic pain syndrome, heart failure, morbid obesity    CBC, CMP, Peripheral smear, flow cytometry, abdominal ultrasound  Follow up 3 months with labs    History  This is an initial hematology consultation visit to address lymphocytosis, found during pre-op evaluation for pain pump revision.  The patient has not noticed adenopathy or weight loss.  He has not had infections.  He does have fatigue and sweating.  He is winded with exertion.  He's always in pain from his back, just now 7/10.  He is followed by a pain clinic.  He has to rest most of the day.  None of this is new.    ECOG = 2-3    Patient Active Problem List   Diagnosis     Lumbago     Morbid obesity -- BMI 42.0     Bilateral leg edema     Chronic diastolic heart failure (H)     MARLEEN (doesn't tolerate CPAP)     NSTEMI w Unstab Angina -- S/P CABG x 1 (LIMA to LAD) on 1/19/22     Paroxysmal atrial fib -- S/P Pulm Vein Ablation 1/19/22     Essential hypertension     Mixed hyperlipidemia     Gastroesophageal reflux disease without esophagitis     Chronic Back Pain (IT Pump w Morphine, Clonidine, Baclofen)     S/P CABG (coronary artery bypass graft)     Fluid overload     ICD (implantable cardioverter-defibrillator) in place     Peripheral vascular disease (H)     Abdominal panniculus     Bariatric surgery status     Claudication of lower extremity (H)     Insomnia     Intestinal disaccharidase deficiencies and disaccharide malabsorption     Nephrolithiasis     Osteoarthrosis     Post-laminectomy syndrome     Sleepiness     Coronary arteriosclerosis     Candidal intertrigo     Current Outpatient Medications   Medication     acetaminophen (TYLENOL) 325 MG tablet     aspirin (ASA) 81 MG chewable tablet      DULoxetine (CYMBALTA) 30 MG capsule     FERATE 240 (27 Fe) MG TABS     Ferrous Sulfate (IRON PO)     furosemide (LASIX) 40 MG tablet     ketoconazole (NIZORAL) 2 % external cream     lisinopril (ZESTRIL) 10 MG tablet     metoprolol succinate ER (TOPROL XL) 100 MG 24 hr tablet     MORPHINE SULFATE     nystatin (MYCOSTATIN) 658744 UNIT/GM external powder     rosuvastatin (CRESTOR) 40 MG tablet     traZODone (DESYREL) 100 MG tablet     nitroGLYcerin (NITROSTAT) 0.4 MG sublingual tablet     No current facility-administered medications for this visit.     Past Medical History:   Diagnosis Date     Abdominal panniculus 11/13/2012    Formatting of this note might be different from the original. S/p panniculectomy 11/13/2012     Acid reflux disease 10/31/2017     Bariatric surgery status 06/23/2008    Formatting of this note might be different from the original. Created by Conversion     Bilateral leg edema 07/13/2021     CHF (congestive heart failure) (H)      Chronic Back Pain (IT Pump w Morphine, Clonidine, Baclofen) 01/16/2022     Chronic diastolic heart failure (H) 07/26/2021     Claudication of lower extremity (H) 11/20/2019     Coronary arteriosclerosis 05/09/2022    Formatting of this note might be different from the original. Created by Conversion  Replacement Utility updated for latest IMO load     Coronary artery disease     CABG 1-     Depressive disorder      Essential hypertension     Created by Conversion [facility] Annotation: Apr 6 2012 10:Zack Cutler: Lisinipril,  coreg  Replacement Utility updated for latest IMO load     Fluid overload 01/25/2022     Gastroesophageal reflux disease without esophagitis 09/11/2021     H/O gastric bypass 2008     History of blood transfusion 2004     ICD (implantable cardioverter-defibrillator) in place 01/25/2022     Insomnia      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     Ischemic cardiomyopathy      Lumbago      Created by [facility] Annotation: Apr 6 2012 10:20AM Anh Ramos: Morphine pump      Mixed hyperlipidemia 06/23/2008     Morbid obesity (H) 08/01/2018     Nephrolithiasis      NSTEMI (non-ST elevated myocardial infarction) (H)      Obstructive sleep apnea     Doesn't tolerate CPAP     MARLEEN (doesn't tolerate CPAP) 07/26/2021     Osteoarthritis     Created by [facility] Annotation: Jun 26 2009  9:53Zack Harris: failed lumbar  fusion/morphine pump dr putnam  Replacement Utility updated for latest IMO load     Osteoarthrosis 05/09/2022    Formatting of this note might be different from the original. Created by [facility] Annotation: Jun 26 2009  9:53Zack Harris: failed lumbar  fusion/morphine pump dr putnam  Replacement Utility updated for latest IMO load     Paroxysmal atrial fib -- S/P Pulm Vein Ablation 1/19/22 09/11/2021    Formatting of this note might be different from the original. Created by Conversion     Paroxysmal atrial fibrillation (H)     Created by Conversion      Paroxysmal ventricular tachycardia (H)     dual chamber ICD 1/24/2022     Peripheral vascular disease (H) 05/03/2022     Post-laminectomy syndrome 11/25/2015     S/P CABG (coronary artery bypass graft) 01/25/2022     Sleepiness 05/08/2018     Type 2 diabetes mellitus (H)     Diet controlled after Gastric Bypass in 2008     Socioeconomic History     Marital status:      Disabled x 25 years due to back problems, formerly     Review of Systems   Constitutional: Positive for fatigue.   HENT:  Negative.    Eyes: Negative.    Respiratory: Positive for shortness of breath.    Cardiovascular: Positive for leg swelling.   Gastrointestinal: Positive for abdominal distention. Negative for abdominal pain, diarrhea and nausea.        Denies early satiety   Endocrine:        Diabetes, controlled by diet.  Lost ~100 pounds after bariatric surgery   Genitourinary: Negative.           "Injured his right testicle in shower last week with an episode of bleeding   Musculoskeletal: Positive for arthralgias, back pain and gait problem.        Knees are shot   Skin: Positive for rash.   Neurological: Positive for gait problem.   Hematological: Bruises/bleeds easily.   Psychiatric/Behavioral: Positive for sleep disturbance.   All other systems reviewed and are negative.      BP (!) 146/99   Pulse 60   Resp 18   Ht 1.854 m (6' 1\")   Wt 148.8 kg (328 lb)   SpO2 96%   BMI 43.27 kg/m      Physical Exam  Vitals and nursing note reviewed. Exam conducted with a chaperone present.   Constitutional:       Appearance: He is morbidly obese.   Chest:       Abdominal:      General: Abdomen is protuberant. A surgical scar is present.      Palpations: There is no fluid wave or mass.      Tenderness: There is no guarding or rebound.      Hernia: A hernia is present.       Musculoskeletal:         General: Deformity present.      Lumbar back: Decreased range of motion.      Right lower leg: Edema present.      Left lower leg: Edema present.      Comments: Carries himself with extreme forward bend, about 80 degrees forward   Lymphadenopathy:      Upper Body:      Right upper body: No supraclavicular or axillary adenopathy.      Left upper body: No supraclavicular or axillary adenopathy.   Skin:     Findings: Lesion: scattered excoriations on arms.   Neurological:      Mental Status: He is alert and oriented to person, place, and time.      Cranial Nerves: No cranial nerve deficit.      Motor: No weakness.      Gait: Gait abnormal.   Psychiatric:         Mood and Affect: Mood normal.         Behavior: Behavior normal. Behavior is cooperative.         Thought Content: Thought content normal.         Judgment: Judgment normal.         Recent Results (from the past 720 hour(s))   CBC with platelets    Collection Time: 04/17/23 10:45 AM   Result Value Ref Range    WBC Count 18.2 (H) 4.0 - 11.0 10e3/uL    RBC Count 4.96 " 4.40 - 5.90 10e6/uL    Hemoglobin 14.7 13.3 - 17.7 g/dL    Hematocrit 45.0 40.0 - 53.0 %    MCV 91 78 - 100 fL    MCH 29.6 26.5 - 33.0 pg    MCHC 32.7 31.5 - 36.5 g/dL    RDW 12.6 10.0 - 15.0 %    Platelet Count 153 150 - 450 10e3/uL   Basic metabolic panel  (Ca, Cl, CO2, Creat, Gluc, K, Na, BUN)    Collection Time: 04/17/23 10:45 AM   Result Value Ref Range    Sodium 140 136 - 145 mmol/L    Potassium 4.7 3.4 - 5.3 mmol/L    Chloride 103 98 - 107 mmol/L    Carbon Dioxide (CO2) 28 22 - 29 mmol/L    Anion Gap 9 7 - 15 mmol/L    Urea Nitrogen 24.7 (H) 8.0 - 23.0 mg/dL    Creatinine 1.03 0.67 - 1.17 mg/dL    Calcium 9.5 8.8 - 10.2 mg/dL    Glucose 101 (H) 70 - 99 mg/dL    GFR Estimate 80 >60 mL/min/1.73m2   WBC and Differential    Collection Time: 04/17/23 10:45 AM   Result Value Ref Range    WBC Count 18.2 (H) 4.0 - 11.0 10e3/uL   Manual Differential    Collection Time: 04/17/23 10:45 AM   Result Value Ref Range    % Neutrophils 25 %    % Lymphocytes 67 %    % Monocytes 6 %    % Eosinophils 2 %    % Basophils 0 %    Absolute Neutrophils 4.6 1.6 - 8.3 10e3/uL    Absolute Lymphocytes 12.2 (H) 0.8 - 5.3 10e3/uL    Absolute Monocytes 1.1 0.0 - 1.3 10e3/uL    Absolute Eosinophils 0.4 0.0 - 0.7 10e3/uL    Absolute Basophils 0.0 0.0 - 0.2 10e3/uL    RBC Morphology Confirmed RBC Indices     Platelet Assessment  Automated Count Confirmed. Platelet morphology is normal.     Automated Count Confirmed. Platelet morphology is normal.    Pathologist Review Comments     INR    Collection Time: 04/20/23  6:29 AM   Result Value Ref Range    INR 1.10 0.85 - 1.15   Partial thromboplastin time    Collection Time: 04/20/23  6:29 AM   Result Value Ref Range    aPTT 28 22 - 38 Seconds   Potassium    Collection Time: 04/20/23  6:29 AM   Result Value Ref Range    Potassium 4.5 3.4 - 5.3 mmol/L   CBC with platelets and differential    Collection Time: 04/27/23 11:37 AM   Result Value Ref Range    WBC Count 20.7 (H) 4.0 - 11.0 10e3/uL    RBC  "Count 4.96 4.40 - 5.90 10e6/uL    Hemoglobin 14.8 13.3 - 17.7 g/dL    Hematocrit 45.0 40.0 - 53.0 %    MCV 91 78 - 100 fL    MCH 29.8 26.5 - 33.0 pg    MCHC 32.9 31.5 - 36.5 g/dL    RDW 12.9 10.0 - 15.0 %    Platelet Count 174 150 - 450 10e3/uL   Manual Differential    Collection Time: 04/27/23 11:37 AM   Result Value Ref Range    % Neutrophils 30 %    % Lymphocytes 64 %    % Monocytes 5 %    % Eosinophils 1 %    % Basophils 0 %    Absolute Neutrophils 6.2 1.6 - 8.3 10e3/uL    Absolute Lymphocytes 13.2 (H) 0.8 - 5.3 10e3/uL    Absolute Monocytes 1.0 0.0 - 1.3 10e3/uL    Absolute Eosinophils 0.2 0.0 - 0.7 10e3/uL    Absolute Basophils 0.0 0.0 - 0.2 10e3/uL    RBC Morphology Confirmed RBC Indices     Platelet Assessment  Automated Count Confirmed. Platelet morphology is normal.     Automated Count Confirmed. Platelet morphology is normal.    Reactive Lymphocytes Present (A) None Seen   Bld morphology pathology review    Collection Time: 05/02/23 11:04 AM   Result Value Ref Range    Final Diagnosis       Peripheral blood, morphology:  - Mild leukocytosis with atypical lymphocytosis.  - Hemoglobin quantitatively within normal limits and erythrocytes without diagnostic morphologic abnormality.  - Platelets quantitatively within normal limits and without diagnostic morphologic abnormality.  - Negative for schistocytes or definitive morphologic features of hemolysis.  - Negative for overt features of myelodysplasia or circulating blasts.    See comment.    COMMENT:  A lymphoid neoplasm cannot be excluded, and flow cytometry immunophenotyping (a test which may be ordered on a peripheral blood specimen) is recommended for further evaluation.       Peripheral Smear       A microscopic examination is performed.  There is a population of small lymphoid cells with high N:C ratio, scant cytoplasm, and clumped nuclear chromatin.  \"Smudge cells\" are seen.  Prolymphocytes do not constitute greater than 55% of the lymphoid cells.  " There are no blasts seen.      Peripheral Hematologic Data        Latest Reference Range & Units 05/02/23 11:04   WBC 4.0 - 11.0 10e3/uL 17.3 (H)   Hemoglobin 13.3 - 17.7 g/dL 14.4   Hematocrit 40.0 - 53.0 % 43.7   Platelet Count 150 - 450 10e3/uL 158   RBC Count 4.40 - 5.90 10e6/uL 4.87   MCV 78 - 100 fL 90   MCH 26.5 - 33.0 pg 29.6   MCHC 31.5 - 36.5 g/dL 33.0   RDW 10.0 - 15.0 % 12.9   % Neutrophils % 27   % Lymphocytes % 64   % Monocytes % 6   % Eosinophils % 2   % Basophils % 0   Absolute Basophils 0.0 - 0.2 10e3/uL 0.1   Absolute Eosinophils 0.0 - 0.7 10e3/uL 0.3   Absolute Immature Granulocytes <=0.4 10e3/uL 0.1   Absolute Lymphocytes 0.8 - 5.3 10e3/uL 11.1 (H)   Absolute Monocytes 0.0 - 1.3 10e3/uL 1.1   % Immature Granulocytes % 1   Absolute Neutrophils 1.6 - 8.3 10e3/uL 4.7   Absolute NRBCs 10e3/uL 0.1   NRBCs per 100 WBC <1 /100 0   % Retic 0.5 - 2.0 % 2.6 (H)   Absolute Retic 0.025 - 0.095 10e6/uL 0.127 (H)   (H): Data is abnormally high    For patients over 18 years old, anemia and quantitative abnormalities of WBC and platelets (if present) may be further stratified as follows:    Hemoglobin (g/dL) in males:    11.3 - 13.2: Mild anemia    9.3 - 11.2: Moderate anemia    Less than 9.3: Marked anemia    WBC (10^9/L):    Greater than 50.0: Marked leukocytosis    18.0 - 50.0: Moderate leukocytosis    11.1 - 17.9: Mild leukocytosis    3.0 - 3.9: Mild leukopenia    2.0 - 2.9: Moderate leukopenia    Less than 2.0: Marked leukopenia    Platelets (10^9/L):    Greater than 900: Marked thrombocytosis    601 - 900: Moderate thrombocytosis    451 - 600: Mild thrombocytosis    100 - 149: Mild thrombocytopenia    50 - 99: Moderate thrombocytopenia    Less than 50: Marked thrombocytopenia       Performing Labs       The technical component of this testing was completed at [facility], [facility] and [facility]     CBC with  platelets and differential    Collection Time: 05/02/23 11:04 AM   Result Value Ref Range    WBC Count 17.3 (H) 4.0 - 11.0 10e3/uL    RBC Count 4.87 4.40 - 5.90 10e6/uL    Hemoglobin 14.4 13.3 - 17.7 g/dL    Hematocrit 43.7 40.0 - 53.0 %    MCV 90 78 - 100 fL    MCH 29.6 26.5 - 33.0 pg    MCHC 33.0 31.5 - 36.5 g/dL    RDW 12.9 10.0 - 15.0 %    Platelet Count 158 150 - 450 10e3/uL    % Neutrophils 27 %    % Lymphocytes 64 %    % Monocytes 6 %    % Eosinophils 2 %    % Basophils 0 %    % Immature Granulocytes 1 %    NRBCs per 100 WBC 0 <1 /100    Absolute Neutrophils 4.7 1.6 - 8.3 10e3/uL    Absolute Lymphocytes 11.1 (H) 0.8 - 5.3 10e3/uL    Absolute Monocytes 1.1 0.0 - 1.3 10e3/uL    Absolute Eosinophils 0.3 0.0 - 0.7 10e3/uL    Absolute Basophils 0.1 0.0 - 0.2 10e3/uL    Absolute Immature Granulocytes 0.1 <=0.4 10e3/uL    Absolute NRBCs 0.1 10e3/uL   Reticulocyte count    Collection Time: 05/02/23 11:04 AM   Result Value Ref Range    % Reticulocyte 2.6 (H) 0.5 - 2.0 %    Absolute Reticulocyte 0.127 (H) 0.025 - 0.095 10e6/uL   RBC and Platelet Morphology    Collection Time: 05/02/23 11:04 AM   Result Value Ref Range    Platelet Assessment  Automated Count Confirmed. Platelet morphology is normal.     Automated Count Confirmed. Platelet morphology is normal.    RBC Morphology Confirmed RBC Indices      No results found for this or any previous visit (from the past 744 hour(s)).      Total time for review of records, interview and examination, documentation = 66 min

## 2023-05-16 LAB
PATH REPORT.COMMENTS IMP SPEC: ABNORMAL
PATH REPORT.COMMENTS IMP SPEC: YES
PATH REPORT.FINAL DX SPEC: ABNORMAL
PATH REPORT.MICROSCOPIC SPEC OTHER STN: ABNORMAL
PATH REPORT.RELEVANT HX SPEC: ABNORMAL

## 2023-05-19 ENCOUNTER — PATIENT OUTREACH (OUTPATIENT)
Dept: ONCOLOGY | Facility: CLINIC | Age: 68
End: 2023-05-19
Payer: COMMERCIAL

## 2023-05-19 NOTE — PROGRESS NOTES
[facility]: Cancer Care                                                                                          Melissa Peguero MD Dobbelaere, Kiera HERNANDEZ, TAE  As we anticipated, he has a lymphocytosis consistent with low grade CLL by morphology and flow.  No need for intervention at this time, fine to have follow up in 3 months.       Signature:  Kiera Tello RN

## 2023-05-30 DIAGNOSIS — I50.22 CHRONIC SYSTOLIC CONGESTIVE HEART FAILURE (H): ICD-10-CM

## 2023-05-31 ENCOUNTER — HOSPITAL ENCOUNTER (OUTPATIENT)
Dept: ULTRASOUND IMAGING | Facility: CLINIC | Age: 68
Discharge: HOME | End: 2023-05-31
Attending: INTERNAL MEDICINE | Admitting: INTERNAL MEDICINE
Payer: COMMERCIAL

## 2023-05-31 DIAGNOSIS — D72.820 LYMPHOCYTOSIS: ICD-10-CM

## 2023-05-31 PROCEDURE — 76705 ECHO EXAM OF ABDOMEN: CPT

## 2023-05-31 RX ORDER — FUROSEMIDE 20 MG
TABLET ORAL
Qty: 90 TABLET | Refills: 3 | OUTPATIENT
Start: 2023-05-31

## 2023-06-09 ENCOUNTER — OFFICE VISIT (OUTPATIENT)
Dept: INTERNAL MEDICINE | Facility: CLINIC | Age: 68
End: 2023-06-09
Payer: COMMERCIAL

## 2023-06-09 VITALS
BODY MASS INDEX: 41.72 KG/M2 | HEART RATE: 66 BPM | HEIGHT: 73 IN | RESPIRATION RATE: 14 BRPM | DIASTOLIC BLOOD PRESSURE: 74 MMHG | OXYGEN SATURATION: 93 % | WEIGHT: 314.8 LBS | SYSTOLIC BLOOD PRESSURE: 126 MMHG | TEMPERATURE: 98.5 F

## 2023-06-09 DIAGNOSIS — G47.33 OSA (OBSTRUCTIVE SLEEP APNEA): ICD-10-CM

## 2023-06-09 DIAGNOSIS — K43.9 VENTRAL HERNIA WITHOUT OBSTRUCTION OR GANGRENE: ICD-10-CM

## 2023-06-09 DIAGNOSIS — B37.2 CANDIDAL INTERTRIGO: ICD-10-CM

## 2023-06-09 DIAGNOSIS — Z13.21 ENCOUNTER FOR VITAMIN DEFICIENCY SCREENING: ICD-10-CM

## 2023-06-09 DIAGNOSIS — Z12.11 SCREEN FOR COLON CANCER: ICD-10-CM

## 2023-06-09 DIAGNOSIS — R79.89 LOW TESTOSTERONE: ICD-10-CM

## 2023-06-09 DIAGNOSIS — R53.83 OTHER FATIGUE: Primary | ICD-10-CM

## 2023-06-09 LAB
TSH SERPL DL<=0.005 MIU/L-ACNC: 1.38 UIU/ML (ref 0.3–4.2)
VIT B12 SERPL-MCNC: 940 PG/ML (ref 232–1245)

## 2023-06-09 PROCEDURE — 99214 OFFICE O/P EST MOD 30 MIN: CPT

## 2023-06-09 PROCEDURE — 84443 ASSAY THYROID STIM HORMONE: CPT

## 2023-06-09 PROCEDURE — 82306 VITAMIN D 25 HYDROXY: CPT

## 2023-06-09 PROCEDURE — 36415 COLL VENOUS BLD VENIPUNCTURE: CPT

## 2023-06-09 PROCEDURE — 84403 ASSAY OF TOTAL TESTOSTERONE: CPT

## 2023-06-09 PROCEDURE — 84270 ASSAY OF SEX HORMONE GLOBUL: CPT

## 2023-06-09 PROCEDURE — 82607 VITAMIN B-12: CPT

## 2023-06-09 NOTE — PROGRESS NOTES
"  Assessment & Plan     Other fatigue  Patient has chronic fatigue. I believe this is most likely related to untreated MARLEEN, and polypharmacy. Will check TSH and testosterone as well    Testosterone is low- will recheck and refer to endo  - Testosterone Free and Total; Future  - TSH with free T4 reflex; Future  - Testosterone Free and Total  - TSH with free T4 reflex    Screen for colon cancer  Update colon cancer screening  - Colonoscopy Screening  Referral; Future    MARLEEN (obstructive sleep apnea)  Untreated, likely related     Encounter for vitamin deficiency screening  Patient requesting levels be drawn.   - Vitamin D Deficiency; Future  - Vitamin B12; Future  - Vitamin D Deficiency  - Vitamin B12    Candida intertrigo   In skin folds, and scrotum. Patient has been using ketaconazole, and interdry to treat, I do not see any open skin, or other signs of infection on exam. Declined dermatology referral     Ventral hernia  Unsure if this is correct diangosis. Will refer to gen surg to evaluate     BMI:   Estimated body mass index is 41.53 kg/m  as calculated from the following:    Height as of this encounter: 1.854 m (6' 1\").    Weight as of this encounter: 142.8 kg (314 lb 12.8 oz).       Sang Ma NP  [facility] YAMILETH Astudillo is a 67 year old, presenting for the following health issues:  Mass (His testicles are swollen. Possible he has hernia. He also want to be tested for any vitamin deficiency.)        6/9/2023     9:52 AM   Additional Questions   Roomed by Carmella Gabriel MA   Accompanied by Himself     Mass    History of Present Illness       Reason for visit:  Swollen testicles, hernia possibly and want my levels check to see if I have viatamin deficincy, (D, B, Testostriam) etc.  What viatamins should I take with being tired all the time.    He eats 2-3 servings of fruits and vegetables daily.He consumes 0 sweetened beverage(s) daily.He exercises with enough " "effort to increase his heart rate 10 to 19 minutes per day.  He exercises with enough effort to increase his heart rate 3 or less days per week.   He is taking medications regularly.     Has had upper left abdomen for a couple of months at least. Redness on scrotum has been staying the same     Swollen testicles, kind of red from heat he thinks. He has had rash in groin fold this for at least a year. Has been improving recently. Has sore previously, non seen on exam today     Fatigue has been ongoing for a long time. He states that he falls asleep easily- only sleep 4-5 hours per night.    Follows with pain clinic    No fever or chills     Review of Systems   Constitutional, HEENT, cardiovascular, pulmonary, gi and gu systems are negative, except as otherwise noted.      Objective    /74 (BP Location: Left arm, Patient Position: Sitting, Cuff Size: Adult Large)   Pulse 66   Temp 98.5  F (36.9  C) (Oral)   Resp 14   Ht 1.854 m (6' 1\")   Wt 142.8 kg (314 lb 12.8 oz)   SpO2 93%   BMI 41.53 kg/m    Body mass index is 41.53 kg/m .  Physical Exam   GENERAL: alert and no distress  EYES: Eyes grossly normal to inspection  NECK: no adenopathy, no asymmetry, masses, or scars and thyroid normal to palpation  RESP: lungs clear to auscultation - no rales, rhonchi or wheezes  CV: regular rate and rhythm, normal S1 S2, no S3 or S4, no murmur, click or rub, no peripheral edema and peripheral pulses strong  ABDOMEN: soft, nontender, no hepatosplenomegaly, no masses and bowel sounds normal, bulging on left upper quadrant  MS: no gross musculoskeletal defects noted, no edema  SKIN: no suspicious lesions or rashes, redness on R side of scrotum, and in skin folds near pannus  NEURO: Normal strength and tone, mentation intact and speech normal  PSYCH: mentation appears normal, affect normal/bright  : Redness on R side of scrotum, no lumps or mass felt          "

## 2023-06-10 LAB
DEPRECATED CALCIDIOL+CALCIFEROL SERPL-MC: 22 UG/L (ref 20–75)
SHBG SERPL-SCNC: 43 NMOL/L (ref 11–80)

## 2023-06-13 LAB
TESTOST FREE SERPL-MCNC: 0.43 NG/DL
TESTOST SERPL-MCNC: 28 NG/DL (ref 240–950)

## 2023-06-14 DIAGNOSIS — I25.119 CORONARY ARTERY DISEASE INVOLVING NATIVE CORONARY ARTERY OF NATIVE HEART WITH ANGINA PECTORIS (H): ICD-10-CM

## 2023-06-15 DIAGNOSIS — I50.22 CHRONIC SYSTOLIC CONGESTIVE HEART FAILURE (H): ICD-10-CM

## 2023-06-15 RX ORDER — ROSUVASTATIN CALCIUM 40 MG/1
TABLET, COATED ORAL
Qty: 90 TABLET | Refills: 3 | OUTPATIENT
Start: 2023-06-15

## 2023-06-15 RX ORDER — FUROSEMIDE 40 MG
TABLET ORAL
Qty: 180 TABLET | Refills: 0 | Status: SHIPPED | OUTPATIENT
Start: 2023-06-15 | End: 2023-08-22

## 2023-06-19 ENCOUNTER — ANCILLARY PROCEDURE (OUTPATIENT)
Dept: CARDIOLOGY | Facility: CLINIC | Age: 68
End: 2023-06-19
Attending: INTERNAL MEDICINE
Payer: COMMERCIAL

## 2023-06-19 ENCOUNTER — OFFICE VISIT (OUTPATIENT)
Dept: SURGERY | Facility: CLINIC | Age: 68
End: 2023-06-19
Payer: COMMERCIAL

## 2023-06-19 VITALS
HEIGHT: 73 IN | SYSTOLIC BLOOD PRESSURE: 136 MMHG | HEART RATE: 55 BPM | WEIGHT: 314 LBS | RESPIRATION RATE: 18 BRPM | BODY MASS INDEX: 41.62 KG/M2 | DIASTOLIC BLOOD PRESSURE: 88 MMHG | OXYGEN SATURATION: 93 %

## 2023-06-19 DIAGNOSIS — I47.29 PAROXYSMAL VENTRICULAR TACHYCARDIA (H): ICD-10-CM

## 2023-06-19 DIAGNOSIS — Z95.810 ICD (IMPLANTABLE CARDIOVERTER-DEFIBRILLATOR) IN PLACE: ICD-10-CM

## 2023-06-19 DIAGNOSIS — R10.12 LEFT UPPER QUADRANT PAIN: Primary | ICD-10-CM

## 2023-06-19 PROCEDURE — 99203 OFFICE O/P NEW LOW 30 MIN: CPT | Performed by: SURGERY

## 2023-06-19 PROCEDURE — 93295 DEV INTERROG REMOTE 1/2/MLT: CPT | Performed by: INTERNAL MEDICINE

## 2023-06-19 PROCEDURE — 93296 REM INTERROG EVL PM/IDS: CPT | Performed by: INTERNAL MEDICINE

## 2023-06-19 NOTE — PROGRESS NOTES
"Onur is a 67 year old male who presents for hernia evaluation.  The patient has noticed a bulge.  Pain has been present, especially when pushed upon.  Symptoms began 1-2 years ago.  Symptoms are described as aching  located in the LUQ. The patient has had previous abdominal surgery including an open gastric bypass in 2008 as well as an epigastric hernia repair related to his sternotomy.  Patient does not report that increased activity/lifting causes pain. Employment does not require lifting, he is retired from being a  since 2000 because of back issues. He has known splenomegaly and probable CLL.    Constipation No  DysuriaNo  Cough No  Smoking No    Pt's chart has been reviewed for PMH, PSH, allergies, medications, social and family history.    ROS:  Pulm:  No shortness of breath, dyspnea on exertion, cough, or hemoptysis  CV:  negative  ABD:  See chief complaint  :  Negative  All other systems on 10 point review are negative.    Ht 1.854 m (6' 1\")   Wt 142.4 kg (314 lb)   BMI 41.43 kg/m    Physical exam:  Patient able to get up on table with mild difficulty.  abdomen is soft without significant tenderness, masses, organomegaly or guarding  bowel sounds are positive and no caput medusa noted.  He has a healed midline scar as well as scars from mediastinal tubes and a median sternotomy. He has palpable fullness in the LUQ but no augmentable bulge.    Imaging  Ultrasound from 5/31/2023 showing mild splenomegaly. CT from 2020 without abdominal wall hernia and relatively normal appearing spleen.    Assesment: no clinical hernia    Plan: The nature of hernias, anatomic considerations, indications and approaches to repair were discussed.  Given the nature and location of his symptoms, it seems unlikely this represents a hernia. I offered a CT to be more definitive but he reports difficulty laying supine and would therefore be a burden without much benefit. Should he change his mind in the future, I would " be happy to order such a study. He will observe his symtpoms for now.    Hieu Polo MD  6/19/2023 9:02 AM    Please route or send letter to:  Primary Care Provider (PCP)

## 2023-06-19 NOTE — LETTER
"June 19, 2023          RE:   Onur Barr 1955      Dear Colleague,    Thank you for referring your patient, Onur Barr, to [facility]. Please see a copy of my visit note below.    Onur is a 67 year old male who presents for hernia evaluation.  The patient has noticed a bulge.  Pain has been present, especially when pushed upon.  Symptoms began 1-2 years ago.  Symptoms are described as aching  located in the LUQ. The patient has had previous abdominal surgery including an open gastric bypass in 2008 as well as an epigastric hernia repair related to his sternotomy.  Patient does not report that increased activity/lifting causes pain. Employment does not require lifting, he is retired from being a  since 2000 because of back issues. He has known splenomegaly and probable CLL.    Constipation No  DysuriaNo  Cough No  Smoking No    Pt's chart has been reviewed for PMH, PSH, allergies, medications, social and family history.    ROS:  Pulm:  No shortness of breath, dyspnea on exertion, cough, or hemoptysis  CV:  negative  ABD:  See chief complaint  :  Negative  All other systems on 10 point review are negative.    Ht 1.854 m (6' 1\")   Wt 142.4 kg (314 lb)   BMI 41.43 kg/m    Physical exam:  Patient able to get up on table with mild difficulty.  abdomen is soft without significant tenderness, masses, organomegaly or guarding  bowel sounds are positive and no caput medusa noted.  He has a healed midline scar as well as scars from mediastinal tubes and a median sternotomy. He has palpable fullness in the LUQ but no augmentable bulge.    Imaging  Ultrasound from 5/31/2023 showing mild splenomegaly. CT from 2020 without abdominal wall hernia and relatively normal appearing spleen.    Assesment: no clinical hernia    Plan: The nature of hernias, anatomic considerations, indications and approaches to repair were discussed.  Given the nature and location of his " symptoms, it seems unlikely this represents a hernia. I offered a CT to be more definitive but he reports difficulty laying supine and would therefore be a burden without much benefit. Should he change his mind in the future, I would be happy to order such a study. He will observe his symtpoms for now.      Again, thank you for allowing me to participate in the care of your patient.      Sincerely,      MD MOOSE Villanueva/giovani      D: 6/19/2023  T: 10:22 AM

## 2023-06-22 LAB
MDC_IDC_EPISODE_DTM: NORMAL
MDC_IDC_EPISODE_DURATION: 10 S
MDC_IDC_EPISODE_DURATION: 19 S
MDC_IDC_EPISODE_DURATION: 3 S
MDC_IDC_EPISODE_DURATION: 35 S
MDC_IDC_EPISODE_DURATION: 4 S
MDC_IDC_EPISODE_DURATION: 5 S
MDC_IDC_EPISODE_DURATION: 5 S
MDC_IDC_EPISODE_DURATION: 8 S
MDC_IDC_EPISODE_ID: NORMAL
MDC_IDC_EPISODE_TYPE: NORMAL
MDC_IDC_LEAD_IMPLANT_DT: NORMAL
MDC_IDC_LEAD_IMPLANT_DT: NORMAL
MDC_IDC_LEAD_LOCATION: NORMAL
MDC_IDC_LEAD_LOCATION: NORMAL
MDC_IDC_LEAD_LOCATION_DETAIL_1: NORMAL
MDC_IDC_LEAD_LOCATION_DETAIL_1: NORMAL
MDC_IDC_LEAD_MFG: NORMAL
MDC_IDC_LEAD_MFG: NORMAL
MDC_IDC_LEAD_MODEL: NORMAL
MDC_IDC_LEAD_MODEL: NORMAL
MDC_IDC_LEAD_POLARITY_TYPE: NORMAL
MDC_IDC_LEAD_POLARITY_TYPE: NORMAL
MDC_IDC_LEAD_SERIAL: NORMAL
MDC_IDC_LEAD_SERIAL: NORMAL
MDC_IDC_MSMT_BATTERY_DTM: NORMAL
MDC_IDC_MSMT_BATTERY_REMAINING_LONGEVITY: 150 MO
MDC_IDC_MSMT_BATTERY_REMAINING_PERCENTAGE: 100 %
MDC_IDC_MSMT_BATTERY_STATUS: NORMAL
MDC_IDC_MSMT_CAP_CHARGE_DTM: NORMAL
MDC_IDC_MSMT_CAP_CHARGE_TIME: 10.1 S
MDC_IDC_MSMT_CAP_CHARGE_TYPE: NORMAL
MDC_IDC_MSMT_LEADCHNL_RA_IMPEDANCE_VALUE: 564 OHM
MDC_IDC_MSMT_LEADCHNL_RA_PACING_THRESHOLD_AMPLITUDE: 0.4 V
MDC_IDC_MSMT_LEADCHNL_RA_PACING_THRESHOLD_PULSEWIDTH: 0.4 MS
MDC_IDC_MSMT_LEADCHNL_RV_IMPEDANCE_VALUE: 423 OHM
MDC_IDC_MSMT_LEADCHNL_RV_PACING_THRESHOLD_AMPLITUDE: 0.6 V
MDC_IDC_MSMT_LEADCHNL_RV_PACING_THRESHOLD_PULSEWIDTH: 0.4 MS
MDC_IDC_PG_IMPLANT_DTM: NORMAL
MDC_IDC_PG_MFG: NORMAL
MDC_IDC_PG_MODEL: NORMAL
MDC_IDC_PG_SERIAL: NORMAL
MDC_IDC_PG_TYPE: NORMAL
MDC_IDC_SESS_CLINIC_NAME: NORMAL
MDC_IDC_SESS_DTM: NORMAL
MDC_IDC_SESS_TYPE: NORMAL
MDC_IDC_SET_BRADY_AT_MODE_SWITCH_MODE: NORMAL
MDC_IDC_SET_BRADY_AT_MODE_SWITCH_RATE: 170 {BEATS}/MIN
MDC_IDC_SET_BRADY_LOWRATE: 60 {BEATS}/MIN
MDC_IDC_SET_BRADY_MAX_SENSOR_RATE: 130 {BEATS}/MIN
MDC_IDC_SET_BRADY_MAX_TRACKING_RATE: 130 {BEATS}/MIN
MDC_IDC_SET_BRADY_MODE: NORMAL
MDC_IDC_SET_BRADY_PAV_DELAY_HIGH: 200 MS
MDC_IDC_SET_BRADY_PAV_DELAY_LOW: 300 MS
MDC_IDC_SET_BRADY_SAV_DELAY_HIGH: 200 MS
MDC_IDC_SET_BRADY_SAV_DELAY_LOW: 300 MS
MDC_IDC_SET_LEADCHNL_RA_PACING_AMPLITUDE: 2 V
MDC_IDC_SET_LEADCHNL_RA_PACING_CAPTURE_MODE: NORMAL
MDC_IDC_SET_LEADCHNL_RA_PACING_POLARITY: NORMAL
MDC_IDC_SET_LEADCHNL_RA_PACING_PULSEWIDTH: 0.4 MS
MDC_IDC_SET_LEADCHNL_RA_SENSING_ADAPTATION_MODE: NORMAL
MDC_IDC_SET_LEADCHNL_RA_SENSING_POLARITY: NORMAL
MDC_IDC_SET_LEADCHNL_RA_SENSING_SENSITIVITY: 0.25 MV
MDC_IDC_SET_LEADCHNL_RV_PACING_AMPLITUDE: 2 V
MDC_IDC_SET_LEADCHNL_RV_PACING_CAPTURE_MODE: NORMAL
MDC_IDC_SET_LEADCHNL_RV_PACING_POLARITY: NORMAL
MDC_IDC_SET_LEADCHNL_RV_PACING_PULSEWIDTH: 0.4 MS
MDC_IDC_SET_LEADCHNL_RV_SENSING_ADAPTATION_MODE: NORMAL
MDC_IDC_SET_LEADCHNL_RV_SENSING_POLARITY: NORMAL
MDC_IDC_SET_LEADCHNL_RV_SENSING_SENSITIVITY: 0.6 MV
MDC_IDC_SET_ZONE_DETECTION_INTERVAL: 250 MS
MDC_IDC_SET_ZONE_DETECTION_INTERVAL: 300 MS
MDC_IDC_SET_ZONE_DETECTION_INTERVAL: 353 MS
MDC_IDC_SET_ZONE_TYPE: NORMAL
MDC_IDC_SET_ZONE_VENDOR_TYPE: NORMAL
MDC_IDC_STAT_AT_BURDEN_PERCENT: 1 %
MDC_IDC_STAT_AT_DTM_END: NORMAL
MDC_IDC_STAT_AT_DTM_START: NORMAL
MDC_IDC_STAT_BRADY_DTM_END: NORMAL
MDC_IDC_STAT_BRADY_DTM_START: NORMAL
MDC_IDC_STAT_BRADY_RA_PERCENT_PACED: 71 %
MDC_IDC_STAT_BRADY_RV_PERCENT_PACED: 2 %
MDC_IDC_STAT_EPISODE_RECENT_COUNT: 0
MDC_IDC_STAT_EPISODE_RECENT_COUNT: 29
MDC_IDC_STAT_EPISODE_RECENT_COUNT_DTM_END: NORMAL
MDC_IDC_STAT_EPISODE_RECENT_COUNT_DTM_START: NORMAL
MDC_IDC_STAT_EPISODE_TYPE: NORMAL
MDC_IDC_STAT_EPISODE_VENDOR_TYPE: NORMAL
MDC_IDC_STAT_TACHYTHERAPY_ATP_DELIVERED_RECENT: 0
MDC_IDC_STAT_TACHYTHERAPY_ATP_DELIVERED_TOTAL: 0
MDC_IDC_STAT_TACHYTHERAPY_RECENT_DTM_END: NORMAL
MDC_IDC_STAT_TACHYTHERAPY_RECENT_DTM_START: NORMAL
MDC_IDC_STAT_TACHYTHERAPY_SHOCKS_ABORTED_RECENT: 0
MDC_IDC_STAT_TACHYTHERAPY_SHOCKS_ABORTED_TOTAL: 0
MDC_IDC_STAT_TACHYTHERAPY_SHOCKS_DELIVERED_RECENT: 0
MDC_IDC_STAT_TACHYTHERAPY_SHOCKS_DELIVERED_TOTAL: 0
MDC_IDC_STAT_TACHYTHERAPY_TOTAL_DTM_END: NORMAL
MDC_IDC_STAT_TACHYTHERAPY_TOTAL_DTM_START: NORMAL

## 2023-07-31 DIAGNOSIS — I25.119 CORONARY ARTERY DISEASE INVOLVING NATIVE CORONARY ARTERY OF NATIVE HEART WITH ANGINA PECTORIS (H): ICD-10-CM

## 2023-08-01 RX ORDER — ROSUVASTATIN CALCIUM 40 MG/1
TABLET, COATED ORAL
Qty: 90 TABLET | Refills: 3 | OUTPATIENT
Start: 2023-08-01

## 2023-08-03 ENCOUNTER — MYC MEDICAL ADVICE (OUTPATIENT)
Dept: INTERNAL MEDICINE | Facility: CLINIC | Age: 68
End: 2023-08-03
Payer: COMMERCIAL

## 2023-08-03 DIAGNOSIS — I25.119 CORONARY ARTERY DISEASE INVOLVING NATIVE CORONARY ARTERY OF NATIVE HEART WITH ANGINA PECTORIS (H): ICD-10-CM

## 2023-08-07 ENCOUNTER — MYC MEDICAL ADVICE (OUTPATIENT)
Dept: INTERNAL MEDICINE | Facility: CLINIC | Age: 68
End: 2023-08-07
Payer: COMMERCIAL

## 2023-08-07 RX ORDER — ROSUVASTATIN CALCIUM 40 MG/1
40 TABLET, COATED ORAL DAILY
Qty: 90 TABLET | Refills: 3 | Status: SHIPPED | OUTPATIENT
Start: 2023-08-07 | End: 2024-08-26

## 2023-08-07 NOTE — TELEPHONE ENCOUNTER
Medication refilled in another encounter.    Thank you,  Joe Rai, Triage RN [place]  3:11 PM 8/7/2023

## 2023-08-14 ENCOUNTER — LAB (OUTPATIENT)
Dept: INFUSION THERAPY | Facility: CLINIC | Age: 68
End: 2023-08-14
Attending: INTERNAL MEDICINE
Payer: COMMERCIAL

## 2023-08-14 ENCOUNTER — ONCOLOGY VISIT (OUTPATIENT)
Dept: ONCOLOGY | Facility: CLINIC | Age: 68
End: 2023-08-14
Attending: INTERNAL MEDICINE
Payer: COMMERCIAL

## 2023-08-14 VITALS
HEIGHT: 73 IN | DIASTOLIC BLOOD PRESSURE: 83 MMHG | BODY MASS INDEX: 41.75 KG/M2 | SYSTOLIC BLOOD PRESSURE: 137 MMHG | OXYGEN SATURATION: 95 % | WEIGHT: 315 LBS | RESPIRATION RATE: 18 BRPM | HEART RATE: 60 BPM

## 2023-08-14 DIAGNOSIS — C91.10 CLL (CHRONIC LYMPHOCYTIC LEUKEMIA) (H): ICD-10-CM

## 2023-08-14 DIAGNOSIS — D72.820 LYMPHOCYTOSIS: ICD-10-CM

## 2023-08-14 DIAGNOSIS — D72.820 LYMPHOCYTOSIS: Primary | ICD-10-CM

## 2023-08-14 LAB
ERYTHROCYTE [DISTWIDTH] IN BLOOD BY AUTOMATED COUNT: 12.6 % (ref 10–15)
HCT VFR BLD AUTO: 46.2 % (ref 40–53)
HGB BLD-MCNC: 15.4 G/DL (ref 13.3–17.7)
MCH RBC QN AUTO: 29.3 PG (ref 26.5–33)
MCHC RBC AUTO-ENTMCNC: 33.3 G/DL (ref 31.5–36.5)
MCV RBC AUTO: 88 FL (ref 78–100)
PLATELET # BLD AUTO: 180 10E3/UL (ref 150–450)
RBC # BLD AUTO: 5.26 10E6/UL (ref 4.4–5.9)
WBC # BLD AUTO: 22.1 10E3/UL (ref 4–11)

## 2023-08-14 PROCEDURE — 99214 OFFICE O/P EST MOD 30 MIN: CPT | Performed by: INTERNAL MEDICINE

## 2023-08-14 PROCEDURE — 85027 COMPLETE CBC AUTOMATED: CPT

## 2023-08-14 PROCEDURE — 36415 COLL VENOUS BLD VENIPUNCTURE: CPT

## 2023-08-14 PROCEDURE — 99213 OFFICE O/P EST LOW 20 MIN: CPT | Performed by: INTERNAL MEDICINE

## 2023-08-14 ASSESSMENT — ENCOUNTER SYMPTOMS
SHORTNESS OF BREATH: 1
ARTHRALGIAS: 1
GASTROINTESTINAL NEGATIVE: 1
PSYCHIATRIC NEGATIVE: 1
BACK PAIN: 1
FATIGUE: 1
ENDOCRINE NEGATIVE: 1
MYALGIAS: 1
LEG SWELLING: 1
HEMATOLOGIC/LYMPHATIC NEGATIVE: 1
PALPITATIONS: 1

## 2023-08-14 ASSESSMENT — PAIN SCALES - GENERAL: PAINLEVEL: EXTREME PAIN (8)

## 2023-08-14 NOTE — LETTER
"    8/14/2023         RE: Onur Barr  [id number] [address] [facility] [id number]        Dear Colleague,    Thank you for referring your patient, Onur Barr, to the [facility]. Please see a copy of my visit note below.    Oncology Rooming Note    August 14, 2023 9:42 AM   Onur Barr is a 67 year old male who presents for:    Chief Complaint   Patient presents with     Hematology     Lymphocytosis      Initial Vitals: /83   Pulse 60   Resp 18   Ht 1.854 m (6' 1\")   Wt (!) 150.6 kg (332 lb)   SpO2 95%   BMI 43.80 kg/m   Estimated body mass index is 43.8 kg/m  as calculated from the following:    Height as of this encounter: 1.854 m (6' 1\").    Weight as of this encounter: 150.6 kg (332 lb). Body surface area is 2.79 meters squared.  Extreme Pain (8) Comment: Data Unavailable   No LMP for male patient.  Allergies reviewed: Yes  Medications reviewed: Yes    Medications: Medication refills not needed today.  Pharmacy name entered into [facility]: [id number] - [place], [place] - [address]    Clinical concerns:  3 month labs       Carlee Alcaraz              Assessment & Plan  Chronic lymphocytic leukemia  Borderline splenomegaly, not definitely related to CLL  Multiple medical problems including chronic pain syndrome, morbid obesity    Monitor counts, next visit could be virtual in December    Interval History  This is a scheduled follow up visit for this chronically ill man with pain syndrome, on pain pump.  Per patient and wife, he was last well about 30 years ago.    He was noted earlier this year to have an elevated white count, and assessment is consistent with CLL        ECOG 2    Patient Active Problem List   Diagnosis     Lumbago     Morbid obesity -- BMI 42.0     Bilateral leg edema     Chronic diastolic heart failure (H)     MARLEEN (doesn't tolerate CPAP)     NSTEMI w Unstab Angina -- S/P CABG x 1 (LIMA to LAD) on 1/19/22     Paroxysmal atrial fib -- " S/P Pulm Vein Ablation 1/19/22     Essential hypertension     Mixed hyperlipidemia     Gastroesophageal reflux disease without esophagitis     Chronic Back Pain (IT Pump w Morphine, Clonidine, Baclofen)     S/P CABG (coronary artery bypass graft)     Fluid overload     ICD (implantable cardioverter-defibrillator) in place     Peripheral vascular disease (H)     Abdominal panniculus     Bariatric surgery status     Claudication of lower extremity (H)     Insomnia     Intestinal disaccharidase deficiencies and disaccharide malabsorption     Nephrolithiasis     Osteoarthrosis     Post-laminectomy syndrome     Sleepiness     Coronary arteriosclerosis     Candidal intertrigo     Current Outpatient Medications   Medication     acetaminophen (TYLENOL) 325 MG tablet     aspirin (ASA) 81 MG chewable tablet     DULoxetine (CYMBALTA) 30 MG capsule     FERATE 240 (27 Fe) MG TABS     Ferrous Sulfate (IRON PO)     furosemide (LASIX) 40 MG tablet     ketoconazole (NIZORAL) 2 % external cream     lisinopril (ZESTRIL) 10 MG tablet     metoprolol succinate ER (TOPROL XL) 100 MG 24 hr tablet     MORPHINE SULFATE     nitroGLYcerin (NITROSTAT) 0.4 MG sublingual tablet     nystatin (MYCOSTATIN) 311084 UNIT/GM external powder     rosuvastatin (CRESTOR) 40 MG tablet     traZODone (DESYREL) 100 MG tablet     No current facility-administered medications for this visit.     Past Medical History:   Diagnosis Date     Abdominal panniculus 11/13/2012    Formatting of this note might be different from the original. S/p panniculectomy 11/13/2012     Acid reflux disease 10/31/2017     Bariatric surgery status 06/23/2008    Formatting of this note might be different from the original. Created by Conversion     Bilateral leg edema 07/13/2021     CHF (congestive heart failure) (H)      Chronic Back Pain (IT Pump w Morphine, Clonidine, Baclofen) 01/16/2022     Chronic diastolic heart failure (H) 07/26/2021     Claudication of lower extremity (H)  11/20/2019     Coronary arteriosclerosis 05/09/2022    Formatting of this note might be different from the original. Created by Conversion  Replacement Utility updated for latest IMO load     Coronary artery disease     CABG 1-     Depressive disorder      Essential hypertension     Created by [facility] Annotation: Apr 6 2012 10:16Zack Harris: Lisinipril,  coreg  Replacement Utility updated for latest IMO load     Fluid overload 01/25/2022     Gastroesophageal reflux disease without esophagitis 09/11/2021     H/O gastric bypass 2008     History of blood transfusion 2004     ICD (implantable cardioverter-defibrillator) in place 01/25/2022     Insomnia      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     Ischemic cardiomyopathy      Lumbago     Created by [facility] Annotation: Apr 6 2012 10:20Anh Ford: Morphine pump      Mixed hyperlipidemia 06/23/2008     Morbid obesity (H) 08/01/2018     Nephrolithiasis      NSTEMI (non-ST elevated myocardial infarction) (H)      Obstructive sleep apnea     Doesn't tolerate CPAP     MARLEEN (doesn't tolerate CPAP) 07/26/2021     Osteoarthritis     Created by [facility] Annotation: Jun 26 2009  9:53Zack Harris: failed lumbar  fusion/morphine pump dr putnam  Replacement Utility updated for latest IMO load     Osteoarthrosis 05/09/2022    Formatting of this note might be different from the original. Created by [facility] Annotation: Jun 26 2009  9:Zack Mantilla: failed lumbar  fusion/morphine pump dr putnam  Replacement Utility updated for latest IMO load     Paroxysmal atrial fib -- S/P Pulm Vein Ablation 1/19/22 09/11/2021    Formatting of this note might be different from the original. Created by Conversion     Paroxysmal atrial fibrillation (H)     Created by Conversion      Paroxysmal ventricular tachycardia (H)     dual chamber ICD 1/24/2022      Peripheral vascular disease (H) 05/03/2022     Post-laminectomy syndrome 11/25/2015     S/P CABG (coronary artery bypass graft) 01/25/2022     Sleepiness 05/08/2018     Type 2 diabetes mellitus (H)     Diet controlled after Gastric Bypass in 2008     Past Surgical History:   Procedure Laterality Date     BACK SURGERY       BYPASS GASTRIC DUODENAL SWITCH       BYPASS GRAFT ARTERY CORONARY N/A 01/19/2022    Procedure: CORONARY ARTERY BYPASS GRAFT (CABG) X1; LIMA -LAD.  PULMONARY VEIN ISOLATION, AND ATRIAL APPENDAGE CLIPPING USING 45MM ACTICURE CLIP.;  Surgeon: William Dumont MD;  Location:  OR     COLONOSCOPY       COSMETIC SURGERY      pannicullectomy     CV CORONARY ANGIOGRAM N/A 09/11/2021    Procedure: Coronary Angiogram;  Surgeon: Noris Ozuna MD;  Location: [facility] CV     CV HEART CATHETERIZATION WITH POSSIBLE INTERVENTION N/A 01/13/2022    Procedure: Heart Catheterization with Possible Intervention;  Surgeon: Liz Pearl MD;  Location:  HEART CARDIAC CATH LAB     CV LEFT HEART CATH N/A 09/11/2021    Procedure: Left Heart Cath;  Surgeon: Noris Ozuna MD;  Location: [facility] LAB CV     CV PCI N/A 09/11/2021    Procedure: Percutaneous Coronary Intervention;  Surgeon: Noris Ozuna MD;  Location: [facility] CATH LAB CV     CV PCI N/A 10/07/2021    Procedure: Percutaneous Coronary Intervention;  Surgeon: Mckayla Dan MD;  Location: [facility] CATH LAB CV     CV PCI ASPIRATION THROMECTOMY N/A 09/11/2021    Procedure: Percutaneous Coronary Intervention Aspiration Thrombectomy;  Surgeon: Noris Ozuna MD;  Location: [facility] CV     ENT SURGERY      tonsillectomy     EP ICD N/A 01/24/2022    Procedure: EP ICD;  Surgeon: aJnnette Crook MD;  Location:  HEART CARDIAC CATH LAB     EXTRACORPOREAL SHOCK WAVE LITHOTRIPSY, CYSTOSCOPY, INSERT STENT URETER(S), COMBINED  08/23/2011     HC REMOVAL OF TONSILS,<12 Y/O      Description: Tonsillectomy;  Recorded: 03/23/2012;   Comments: for obstructive sleep apnea     HERNIA REPAIR       IR MISCELLANEOUS PROCEDURE  07/29/2011     IR MISCELLANEOUS PROCEDURE  08/05/2011     IR MISCELLANEOUS PROCEDURE  08/23/2011     IR NEPHROSTOMY TUBE CHANGE BILATERAL  08/05/2011     ORTHOPEDIC SURGERY      arthrodesis ant discectomy, lumbar     HI ARTHRODESIS ANT INTERBODY MIN DISCECTOMY,LUMBAR      Description: Lumbar Vertebral Fusion;  Recorded: 06/26/2009;  Comments: before 200 see [facility] consult under old records     HI EXCISE EXCESS SKIN TISSUE,ABDOMEN  11/13/2012    Description: Panniculectomy;  Proc Date: 11/13/2012;  Comments: 3.5 Lb Pannus was removed at the [facility] By Dr. Shon Green     REPLACE INTRATHECAL PAIN PUMP N/A 04/25/2017    Procedure: REPLACE INTRATHECAL PAIN PUMP;  INTRATHECAL PAIN PUMP CATHETER REPLACEMENT AND PUMP REPLACEMENT;  Surgeon: Anthony Maloney MD;  Location: [facility]     REPLACE INTRATHECAL PAIN PUMP N/A 4/20/2023    Procedure: Pump Replacement and intrathecal catheter replacement;  Surgeon: Anthony Dick MD;  Location:  OR     REVISE CATHETER INTRATHECAL N/A 04/25/2017    Procedure: REVISE CATHETER INTRATHECAL;;  Surgeon: Anthony Maloney MD;  Location: [facility]     SHOULDER SURGERY       ZZ GASTRIC BYPASS,OBESE<100CM LORA-EN-Y  01/01/2008    Description: Gastric Surgery For Morbid Obesity Bypass With Lora-en-Y;  Recorded: 06/26/2009;  Comments: dr green 2008     [facility] REPAIR INCISIONAL HERNIA,REDUCIBLE  11/13/2012    Description: Incisional Hernia Repair;  Proc Date: 11/13/2012;  Comments: incisional hernia repair and abdominal panniculectomy by Dr Shon Green at the [facility].     Socioeconomic History     Marital status:      Review of Systems   Constitutional:  Positive for fatigue.   HENT:  Negative.     Respiratory:  Positive for shortness of breath.    Cardiovascular:  Positive for leg swelling and palpitations.        Prior heart attacks   Gastrointestinal: Negative.    Endocrine: Negative.   "  Genitourinary: Negative.     Musculoskeletal:  Positive for arthralgias, back pain, gait problem and myalgias.   Skin:  Positive for itching.   Neurological:  Positive for gait problem.   Hematological: Negative.    Psychiatric/Behavioral: Negative.     All other systems reviewed and are negative.      /83   Pulse 60   Resp 18   Ht 1.854 m (6' 1\")   Wt (!) 150.6 kg (332 lb)   SpO2 95%   BMI 43.80 kg/m      Physical Exam  Constitutional:       Appearance: He is morbidly obese. He is ill-appearing.      Comments: Stooped, uses a cane   HENT:      Head: Normocephalic.        Mouth/Throat:      Mouth: Mucous membranes are moist.   Eyes:      Extraocular Movements: Extraocular movements intact.      Conjunctiva/sclera: Conjunctivae normal.      Pupils: Pupils are equal, round, and reactive to light.   Cardiovascular:      Rate and Rhythm: Normal rate and regular rhythm.      Heart sounds: Normal heart sounds.   Pulmonary:      Effort: Pulmonary effort is normal.      Breath sounds: Normal breath sounds.   Abdominal:      Palpations: Abdomen is soft. There is no mass.       Musculoskeletal:         General: Tenderness present.      Right lower leg: Edema present.      Left lower leg: Edema present.   Lymphadenopathy:      Cervical: No cervical adenopathy.   Skin:     Findings: Erythema and lesion present.   Neurological:      General: No focal deficit present.      Mental Status: He is alert and oriented to person, place, and time.      Cranial Nerves: No cranial nerve deficit.   Psychiatric:         Mood and Affect: Mood normal.         Behavior: Behavior normal. Behavior is cooperative.         Thought Content: Thought content normal.         Judgment: Judgment normal.       Recent Results (from the past 720 hour(s))   **CBC with platelets FUTURE 2mo    Collection Time: 08/14/23  9:00 AM   Result Value Ref Range    WBC Count 22.1 (H) 4.0 - 11.0 10e3/uL    RBC Count 5.26 4.40 - 5.90 10e6/uL    Hemoglobin " 15.4 13.3 - 17.7 g/dL    Hematocrit 46.2 40.0 - 53.0 %    MCV 88 78 - 100 fL    MCH 29.3 26.5 - 33.0 pg    MCHC 33.3 31.5 - 36.5 g/dL    RDW 12.6 10.0 - 15.0 %    Platelet Count 180 150 - 450 10e3/uL     No results found for this or any previous visit (from the past 744 hour(s)).            Recent Results (from the past 720 hour(s))   **CBC with platelets FUTURE 2mo    Collection Time: 08/14/23  9:00 AM   Result Value Ref Range    WBC Count 22.1 (H) 4.0 - 11.0 10e3/uL    RBC Count 5.26 4.40 - 5.90 10e6/uL    Hemoglobin 15.4 13.3 - 17.7 g/dL    Hematocrit 46.2 40.0 - 53.0 %    MCV 88 78 - 100 fL    MCH 29.3 26.5 - 33.0 pg    MCHC 33.3 31.5 - 36.5 g/dL    RDW 12.6 10.0 - 15.0 %    Platelet Count 180 150 - 450 10e3/uL         Again, thank you for allowing me to participate in the care of your patient.        Sincerely,        Melissa Peguero MD

## 2023-08-14 NOTE — PROGRESS NOTES
Assessment & Plan   Chronic lymphocytic leukemia  Borderline splenomegaly, not definitely related to CLL  Multiple medical problems including chronic pain syndrome, morbid obesity    Monitor counts, next visit could be virtual in December    Interval History  This is a scheduled follow up visit for this chronically ill man with pain syndrome, on pain pump.  Per patient and wife, he was last well about 30 years ago.    He was noted earlier this year to have an elevated white count, and assessment is consistent with CLL        ECOG 2    Patient Active Problem List   Diagnosis    Lumbago    Morbid obesity -- BMI 42.0    Bilateral leg edema    Chronic diastolic heart failure (H)    MARLEEN (doesn't tolerate CPAP)    NSTEMI w Unstab Angina -- S/P CABG x 1 (LIMA to LAD) on 1/19/22    Paroxysmal atrial fib -- S/P Pulm Vein Ablation 1/19/22    Essential hypertension    Mixed hyperlipidemia    Gastroesophageal reflux disease without esophagitis    Chronic Back Pain (IT Pump w Morphine, Clonidine, Baclofen)    S/P CABG (coronary artery bypass graft)    Fluid overload    ICD (implantable cardioverter-defibrillator) in place    Peripheral vascular disease (H)    Abdominal panniculus    Bariatric surgery status    Claudication of lower extremity (H)    Insomnia    Intestinal disaccharidase deficiencies and disaccharide malabsorption    Nephrolithiasis    Osteoarthrosis    Post-laminectomy syndrome    Sleepiness    Coronary arteriosclerosis    Candidal intertrigo     Current Outpatient Medications   Medication    acetaminophen (TYLENOL) 325 MG tablet    aspirin (ASA) 81 MG chewable tablet    DULoxetine (CYMBALTA) 30 MG capsule    FERATE 240 (27 Fe) MG TABS    Ferrous Sulfate (IRON PO)    furosemide (LASIX) 40 MG tablet    ketoconazole (NIZORAL) 2 % external cream    lisinopril (ZESTRIL) 10 MG tablet    metoprolol succinate ER (TOPROL XL) 100 MG 24 hr tablet    MORPHINE SULFATE    nitroGLYcerin (NITROSTAT) 0.4 MG sublingual tablet     nystatin (MYCOSTATIN) 006621 UNIT/GM external powder    rosuvastatin (CRESTOR) 40 MG tablet    traZODone (DESYREL) 100 MG tablet     No current facility-administered medications for this visit.     Past Medical History:   Diagnosis Date    Abdominal panniculus 11/13/2012    Formatting of this note might be different from the original. S/p panniculectomy 11/13/2012    Acid reflux disease 10/31/2017    Bariatric surgery status 06/23/2008    Formatting of this note might be different from the original. Created by Conversion    Bilateral leg edema 07/13/2021    CHF (congestive heart failure) (H)     Chronic Back Pain (IT Pump w Morphine, Clonidine, Baclofen) 01/16/2022    Chronic diastolic heart failure (H) 07/26/2021    Claudication of lower extremity (H) 11/20/2019    Coronary arteriosclerosis 05/09/2022    Formatting of this note might be different from the original. Created by Conversion  Replacement Utility updated for latest IMO load    Coronary artery disease     CABG 1-    Depressive disorder     Essential hypertension     Created by [employer] [facility] Annotation: Apr 6 2012 10:16Zack Harris: Lisinipril,  coreg  Replacement Utility updated for latest IMO load    Fluid overload 01/25/2022    Gastroesophageal reflux disease without esophagitis 09/11/2021    H/O gastric bypass 2008    History of blood transfusion 2004    ICD (implantable cardioverter-defibrillator) in place 01/25/2022    Insomnia     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    Ischemic cardiomyopathy     Lumbago     Created by [employer] [facility] Annotation: Apr 6 2012 10:20Anh Ford: Morphine pump     Mixed hyperlipidemia 06/23/2008    Morbid obesity (H) 08/01/2018    Nephrolithiasis     NSTEMI (non-ST elevated myocardial infarction) (H)     Obstructive sleep apnea     Doesn't tolerate CPAP    MARLEEN (doesn't tolerate CPAP) 07/26/2021    Osteoarthritis     Created by  Conversion [facility] Annotation: Jun 26 2009  [id number] Esquedajamey Zack: failed lumbar  fusion/morphine pump dr putnam  Replacement Utility updated for latest IMO load    Osteoarthrosis 05/09/2022    Formatting of this note might be different from the original. Created by [facility] Annotation: Jun 26 2009  9:MANISHA Esquedajamey Zack: failed lumbar  fusion/morphine pump dr putnam  Replacement Utility updated for latest IMO load    Paroxysmal atrial fib -- S/P Pulm Vein Ablation 1/19/22 09/11/2021    Formatting of this note might be different from the original. Created by Conversion    Paroxysmal atrial fibrillation (H)     Created by Conversion     Paroxysmal ventricular tachycardia (H)     dual chamber ICD 1/24/2022    Peripheral vascular disease (H) 05/03/2022    Post-laminectomy syndrome 11/25/2015    S/P CABG (coronary artery bypass graft) 01/25/2022    Sleepiness 05/08/2018    Type 2 diabetes mellitus (H)     Diet controlled after Gastric Bypass in 2008     Past Surgical History:   Procedure Laterality Date    BACK SURGERY      BYPASS GASTRIC DUODENAL SWITCH      BYPASS GRAFT ARTERY CORONARY N/A 01/19/2022    Procedure: CORONARY ARTERY BYPASS GRAFT (CABG) X1; LIMA -LAD.  PULMONARY VEIN ISOLATION, AND ATRIAL APPENDAGE CLIPPING USING 45MM ACTICURE CLIP.;  Surgeon: William Dumont MD;  Location:  OR    COLONOSCOPY      COSMETIC SURGERY      pannicullectomy    CV CORONARY ANGIOGRAM N/A 09/11/2021    Procedure: Coronary Angiogram;  Surgeon: Noris Ozuna MD;  Location: [facility] CATH LAB CV    CV HEART CATHETERIZATION WITH POSSIBLE INTERVENTION N/A 01/13/2022    Procedure: Heart Catheterization with Possible Intervention;  Surgeon: Liz Pearl MD;  Location:  HEART CARDIAC CATH LAB    CV LEFT HEART CATH N/A 09/11/2021    Procedure: Left Heart Cath;  Surgeon: Noris Ozuna MD;  Location: [facility] CATH LAB CV    CV PCI N/A 09/11/2021    Procedure: Percutaneous Coronary Intervention;   Surgeon: Noris Ozuna MD;  Location: [facility] CV    CV PCI N/A 10/07/2021    Procedure: Percutaneous Coronary Intervention;  Surgeon: Mckayla Dan MD;  Location: [facility] CV    CV PCI ASPIRATION THROMECTOMY N/A 09/11/2021    Procedure: Percutaneous Coronary Intervention Aspiration Thrombectomy;  Surgeon: Noris Ozuna MD;  Location: [facility] CV    ENT SURGERY      tonsillectomy    EP ICD N/A 01/24/2022    Procedure: EP ICD;  Surgeon: Jannette Crook MD;  Location:  HEART CARDIAC CATH LAB    EXTRACORPOREAL SHOCK WAVE LITHOTRIPSY, CYSTOSCOPY, INSERT STENT URETER(S), COMBINED  08/23/2011    HC REMOVAL OF TONSILS,<14 Y/O      Description: Tonsillectomy;  Recorded: 03/23/2012;  Comments: for obstructive sleep apnea    HERNIA REPAIR      IR MISCELLANEOUS PROCEDURE  07/29/2011    IR MISCELLANEOUS PROCEDURE  08/05/2011    IR MISCELLANEOUS PROCEDURE  08/23/2011    IR NEPHROSTOMY TUBE CHANGE BILATERAL  08/05/2011    ORTHOPEDIC SURGERY      arthrodesis ant discectomy, lumbar    WY ARTHRODESIS ANT INTERBODY MIN DISCECTOMY,LUMBAR      Description: Lumbar Vertebral Fusion;  Recorded: 06/26/2009;  Comments: before 200 see [facility] consult under old records    WY EXCISE EXCESS SKIN TISSUE,ABDOMEN  11/13/2012    Description: Panniculectomy;  Proc Date: 11/13/2012;  Comments: 3.5 Lb Pannus was removed at the [facility] By Dr. Shon Green    REPLACE INTRATHECAL PAIN PUMP N/A 04/25/2017    Procedure: REPLACE INTRATHECAL PAIN PUMP;  INTRATHECAL PAIN PUMP CATHETER REPLACEMENT AND PUMP REPLACEMENT;  Surgeon: Anthony Maloney MD;  Location: [facility]    REPLACE INTRATHECAL PAIN PUMP N/A 4/20/2023    Procedure: Pump Replacement and intrathecal catheter replacement;  Surgeon: Anthony Dick MD;  Location:  OR    REVISE CATHETER INTRATHECAL N/A 04/25/2017    Procedure: REVISE CATHETER INTRATHECAL;;  Surgeon: Anthony Maloney MD;  Location: [facility]    SHOULDER SURGERY      ZZC GASTRIC BYPASS,OBESE<100CM  "LORA-EN-Y  01/01/2008    Description: Gastric Surgery For Morbid Obesity Bypass With Lora-en-Y;  Recorded: 06/26/2009;  Comments: dr green 2008    [facility] REPAIR INCISIONAL HERNIA,REDUCIBLE  11/13/2012    Description: Incisional Hernia Repair;  Proc Date: 11/13/2012;  Comments: incisional hernia repair and abdominal panniculectomy by Dr Shon Green at the [facility].     Socioeconomic History    Marital status:      Review of Systems   Constitutional:  Positive for fatigue.   HENT:  Negative.     Respiratory:  Positive for shortness of breath.    Cardiovascular:  Positive for leg swelling and palpitations.        Prior heart attacks   Gastrointestinal: Negative.    Endocrine: Negative.    Genitourinary: Negative.     Musculoskeletal:  Positive for arthralgias, back pain, gait problem and myalgias.   Skin:  Positive for itching.   Neurological:  Positive for gait problem.   Hematological: Negative.    Psychiatric/Behavioral: Negative.     All other systems reviewed and are negative.      /83   Pulse 60   Resp 18   Ht 1.854 m (6' 1\")   Wt (!) 150.6 kg (332 lb)   SpO2 95%   BMI 43.80 kg/m      Physical Exam  Constitutional:       Appearance: He is morbidly obese. He is ill-appearing.      Comments: Stooped, uses a cane   HENT:      Head: Normocephalic.        Mouth/Throat:      Mouth: Mucous membranes are moist.   Eyes:      Extraocular Movements: Extraocular movements intact.      Conjunctiva/sclera: Conjunctivae normal.      Pupils: Pupils are equal, round, and reactive to light.   Cardiovascular:      Rate and Rhythm: Normal rate and regular rhythm.      Heart sounds: Normal heart sounds.   Pulmonary:      Effort: Pulmonary effort is normal.      Breath sounds: Normal breath sounds.   Abdominal:      Palpations: Abdomen is soft. There is no mass.       Musculoskeletal:         General: Tenderness present.      Right lower leg: Edema present.      Left lower leg: Edema present.   Lymphadenopathy: "      Cervical: No cervical adenopathy.   Skin:     Findings: Erythema and lesion present.   Neurological:      General: No focal deficit present.      Mental Status: He is alert and oriented to person, place, and time.      Cranial Nerves: No cranial nerve deficit.   Psychiatric:         Mood and Affect: Mood normal.         Behavior: Behavior normal. Behavior is cooperative.         Thought Content: Thought content normal.         Judgment: Judgment normal.       Recent Results (from the past 720 hour(s))   **CBC with platelets FUTURE 2mo    Collection Time: 08/14/23  9:00 AM   Result Value Ref Range    WBC Count 22.1 (H) 4.0 - 11.0 10e3/uL    RBC Count 5.26 4.40 - 5.90 10e6/uL    Hemoglobin 15.4 13.3 - 17.7 g/dL    Hematocrit 46.2 40.0 - 53.0 %    MCV 88 78 - 100 fL    MCH 29.3 26.5 - 33.0 pg    MCHC 33.3 31.5 - 36.5 g/dL    RDW 12.6 10.0 - 15.0 %    Platelet Count 180 150 - 450 10e3/uL     No results found for this or any previous visit (from the past 744 hour(s)).            Recent Results (from the past 720 hour(s))   **CBC with platelets FUTURE 2mo    Collection Time: 08/14/23  9:00 AM   Result Value Ref Range    WBC Count 22.1 (H) 4.0 - 11.0 10e3/uL    RBC Count 5.26 4.40 - 5.90 10e6/uL    Hemoglobin 15.4 13.3 - 17.7 g/dL    Hematocrit 46.2 40.0 - 53.0 %    MCV 88 78 - 100 fL    MCH 29.3 26.5 - 33.0 pg    MCHC 33.3 31.5 - 36.5 g/dL    RDW 12.6 10.0 - 15.0 %    Platelet Count 180 150 - 450 10e3/uL

## 2023-08-14 NOTE — PROGRESS NOTES
"Oncology Rooming Note    August 14, 2023 9:42 AM   Onur Barr is a 67 year old male who presents for:    Chief Complaint   Patient presents with    Hematology     Lymphocytosis      Initial Vitals: /83   Pulse 60   Resp 18   Ht 1.854 m (6' 1\")   Wt (!) 150.6 kg (332 lb)   SpO2 95%   BMI 43.80 kg/m   Estimated body mass index is 43.8 kg/m  as calculated from the following:    Height as of this encounter: 1.854 m (6' 1\").    Weight as of this encounter: 150.6 kg (332 lb). Body surface area is 2.79 meters squared.  Extreme Pain (8) Comment: Data Unavailable   No LMP for male patient.  Allergies reviewed: Yes  Medications reviewed: Yes    Medications: Medication refills not needed today.  Pharmacy name entered into EPIC: [id number] [place], [place] - [address]    Clinical concerns:  3 month labs       Carlee Alcaraz            "

## 2023-08-22 ENCOUNTER — OFFICE VISIT (OUTPATIENT)
Dept: CARDIOLOGY | Facility: CLINIC | Age: 68
End: 2023-08-22
Attending: INTERNAL MEDICINE
Payer: COMMERCIAL

## 2023-08-22 ENCOUNTER — MYC MEDICAL ADVICE (OUTPATIENT)
Dept: INTERNAL MEDICINE | Facility: CLINIC | Age: 68
End: 2023-08-22

## 2023-08-22 ENCOUNTER — TELEPHONE (OUTPATIENT)
Dept: CARDIOLOGY | Facility: CLINIC | Age: 68
End: 2023-08-22

## 2023-08-22 VITALS
BODY MASS INDEX: 41.75 KG/M2 | WEIGHT: 315 LBS | HEART RATE: 63 BPM | HEIGHT: 73 IN | SYSTOLIC BLOOD PRESSURE: 128 MMHG | DIASTOLIC BLOOD PRESSURE: 78 MMHG | OXYGEN SATURATION: 95 %

## 2023-08-22 DIAGNOSIS — I49.3 PVC'S (PREMATURE VENTRICULAR CONTRACTIONS): ICD-10-CM

## 2023-08-22 DIAGNOSIS — I50.31 ACUTE DIASTOLIC CONGESTIVE HEART FAILURE (H): ICD-10-CM

## 2023-08-22 DIAGNOSIS — I50.22 CHRONIC SYSTOLIC CONGESTIVE HEART FAILURE (H): ICD-10-CM

## 2023-08-22 DIAGNOSIS — Z95.1 S/P CABG (CORONARY ARTERY BYPASS GRAFT): ICD-10-CM

## 2023-08-22 DIAGNOSIS — G89.4 CHRONIC PAIN SYNDROME: ICD-10-CM

## 2023-08-22 DIAGNOSIS — R73.09 ELEVATED GLUCOSE: ICD-10-CM

## 2023-08-22 DIAGNOSIS — G89.4 CHRONIC PAIN SYNDROME: Primary | ICD-10-CM

## 2023-08-22 DIAGNOSIS — I10 ESSENTIAL HYPERTENSION: ICD-10-CM

## 2023-08-22 DIAGNOSIS — B37.2 CANDIDAL INTERTRIGO: ICD-10-CM

## 2023-08-22 DIAGNOSIS — E66.01 CLASS 3 SEVERE OBESITY WITH SERIOUS COMORBIDITY AND BODY MASS INDEX (BMI) OF 40.0 TO 44.9 IN ADULT, UNSPECIFIED OBESITY TYPE (H): Primary | ICD-10-CM

## 2023-08-22 DIAGNOSIS — I48.0 PAROXYSMAL A-FIB (H): ICD-10-CM

## 2023-08-22 DIAGNOSIS — E66.813 CLASS 3 SEVERE OBESITY WITH SERIOUS COMORBIDITY AND BODY MASS INDEX (BMI) OF 40.0 TO 44.9 IN ADULT, UNSPECIFIED OBESITY TYPE (H): Primary | ICD-10-CM

## 2023-08-22 DIAGNOSIS — I25.5 ISCHEMIC CARDIOMYOPATHY: ICD-10-CM

## 2023-08-22 PROCEDURE — 99214 OFFICE O/P EST MOD 30 MIN: CPT | Performed by: INTERNAL MEDICINE

## 2023-08-22 RX ORDER — FUROSEMIDE 40 MG
40 TABLET ORAL 2 TIMES DAILY
Qty: 180 TABLET | Refills: 0 | Status: SHIPPED | OUTPATIENT
Start: 2023-08-22 | End: 2023-10-27

## 2023-08-22 NOTE — LETTER
8/22/2023    Roge Feliciano MD  [address] [facility] [address]    RE: Onur Barr       Dear Colleague,     I had the pleasure of seeing Onur Barr in the [facility].  [facility] Progress note    Assessment:  CAD  PCI to LAD (2012).  PCI to RCA (9/2021).   CABG x 1 (LIMA to LAD,  PVI, exclusion of left atrial appendage) on 1/19/2022. Last cath 1/12/22.   Paroxysmal atrial fibrillation.   Embolic prevention/ CHADS Vasc of 4-5.  Completed JUSTO exclusion confirmed on CTA (4/15/2022).  Not anticoagulated.  HFpEF   Edema worse at this time with 15 lbs weight gain.   -increase furosemide x 5 days.   Hypertension  PVCs.  Continue metoprolol.  CLL  Low testosterone    Subacute presentation of CHF NYHA III, edema and weight above baseline. Will add diuresis, check an echo and schedule close follow up.     Plan:  Double furosemide dose to 80mg PO BID for 5 days and then get BMP next week 8/28/23.  Monitor daily weight at home. He has not been doing this regularly and importance was stressed in clinic today.   Echo  Follow up with cardiology SULEMA in about 1 month.  Comprehensive weight management referral also given.    HPI:   Onur Barr is a very nice 67 year old M with chronic CHF, mixed systolic/diastolic and right heart failure with ischemic cardiomyopathy and history of PCI and CABG, severe morbid obesity, history of PVCs, paroxysmal atrial fibrillation, HTN, HLD here for follow up. Last visit was 4/17/23.    He is not feeling well today.  Weight is up from 308 at last visit to 323.   He is feeling really tired. He sweats easily and is more edematous. He is interested in medical weight loss, specifically asking about wegovy.  He is not having chest pain or palpitation. Breathing is stable; he has chronic dyspnea.     He was recently diagnosed with CLL. He is now following with oncology.     Echo 11/21/22 : Interpretation Summary 1. The left ventricle is normal in size. The  visual ejection fraction is 45-50%. Mild global hypokinesis, very limited image quality even with contrast,cannot comment on any subtle wall motion abnormalities.2. The right ventricle is normal in structure, function and size.3. No valve disease.4. The ascending aorta is Mildly dilated. 3.9cm. No changes from echo 3/2022.      Last cath 1/12/22  1. Severe in-stent restenosis of the mid LAD and severe stenosis distal to the previously placed stents.  2.  Patent stents in the right coronary artery with moderate stenosis of the large RPDA and RPL a branches  3.  Balloon angioplasty and Cutting Balloon angioplasty of the in-stent restenosis in mid LAD stenosis with suboptimal result due to very severe stenosis with multiple layers of stent which were underexpanded due to severe disease.    4.  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  This fistula was mildly disrupted causing a perforation in the forearm.  The radial artery was able to be accessed proximal to the lesion which was compressed manually.  5..  Difficult procedure due to patient with severe back pain and difficult imaging due to body habitus with morbid obesity.        CURRENT MEDICATIONS:  Current Outpatient Medications   Medication Sig Dispense Refill    acetaminophen (TYLENOL) 325 MG tablet Take 2 tablets (650 mg) by mouth every 4 hours as needed for mild pain 40 tablet 0    aspirin (ASA) 81 MG chewable tablet Take 81 mg by mouth daily      DULoxetine (CYMBALTA) 30 MG capsule Take 60 mg by mouth every evening      FERATE 240 (27 Fe) MG TABS TAKE 1 TABLET BY MOUTH EVERY DAY 90 tablet 2    Ferrous Sulfate (IRON PO) Take by mouth daily Dosage is unknown      furosemide (LASIX) 40 MG tablet TAKE 1 TABLET BY MOUTH 2 TIMES DAILY 180 tablet 0    ketoconazole (NIZORAL) 2 % external cream Apply topically 2 times daily 60 g 4    lisinopril (ZESTRIL) 10 MG tablet Take 1 tablet (10 mg) by mouth daily 90 tablet 3    metoprolol succinate ER  (TOPROL XL) 100 MG 24 hr tablet Take 1 tablet (100 mg) by mouth 2 times daily 180 tablet 3    MORPHINE SULFATE IT pump:updated 1/12/22  Medications in Pump:   [facility] Responsible for pump medications:   Conc:  Morphine 20mg/ml(3.95 mg/day), clonidine 21.1mcg/ml (4.168 mcg/day), baclofen 42.5mcg/ml (8.395mcg/day)  Rate:   Pump Last Fill Date: 9/2021  Pump Refill Date: 2/2022      nitroGLYcerin (NITROSTAT) 0.4 MG sublingual tablet For chest pain place 1 tablet under the tongue every 5 minutes for 3 doses. If symptoms persist 5 minutes after 1st dose call 911. 30 tablet 1    nystatin (MYCOSTATIN) 740740 UNIT/GM external powder Apply topically 3 times daily 60 g 4    rosuvastatin (CRESTOR) 40 MG tablet Take 1 tablet (40 mg) by mouth daily 90 tablet 3    traZODone (DESYREL) 100 MG tablet Take 2 tablets (200 mg) by mouth At Bedtime TAKE 2 TABLETS (200MG TOTAL) BY MOUTH AT BEDTIME Strength: 100 mg 180 tablet 3       ALLERGIES     Allergies   Allergen Reactions    Hydrocodone-Acetaminophen Other (See Comments)     sneezing       PAST MEDICAL, SURGICAL, FAMILY, SOCIAL HISTORY:  History was reviewed and updated as needed, see medical record.    REVIEW OF SYSTEMS:  Skin:  Negative     Eyes:  Negative    ENT:  Negative    Respiratory:  Positive for dyspnea on exertion;sleep apnea  Cardiovascular:    edema;Positive for  Gastroenterology: Negative    Genitourinary:  Negative    Musculoskeletal:  Positive for back pain;neck pain;joint pain  Neurologic:  Negative    Psychiatric:  Negative    Heme/Lymph/Imm:  Negative    Endocrine:  Negative      PHYSICAL EXAM:      BP: 128/78 Pulse: 63     SpO2: 95 %      Vital Signs with Ranges  Pulse:  [63] 63  BP: (128)/(78) 128/78  SpO2:  [95 %] 95 %  323 lbs 1.6 oz    Constitutional: awake, alert, no distress but appears fatigued, chronically ill, obese but frail  Eyes: sclera nonicteric  ENT: trachea midline  Respiratory: no wheezes or crackles  Cardiovascular: regular,  distant, no murmur appreciated  GI: nondistended, nontender, bowel sounds present  Skin: dry, no rash 2+ bilateral nontender LE edema to midcalf.    Musculoskeletal: severe kyphosis  Neurologic: very abnormal gait. Walks with a cane  Neuropsychiatric: normal affect    Recent Lab Results:  LIPID RESULTS:  Lab Results   Component Value Date    CHOL 139 11/21/2022    HDL 33 (L) 11/21/2022    LDL 39 11/21/2022    LDL 69 02/13/2018    TRIG 335 (H) 11/21/2022       LIVER ENZYME RESULTS:  Lab Results   Component Value Date    AST 24 05/15/2023    ALT 17 05/15/2023       CBC RESULTS:  Lab Results   Component Value Date    WBC 22.1 (H) 08/14/2023    RBC 5.26 08/14/2023    HGB 15.4 08/14/2023    HCT 46.2 08/14/2023    MCV 88 08/14/2023    MCH 29.3 08/14/2023    MCHC 33.3 08/14/2023    RDW 12.6 08/14/2023     08/14/2023       BMP RESULTS:  Lab Results   Component Value Date     05/15/2023    POTASSIUM 4.2 05/15/2023    CHLORIDE 104 05/15/2023    CO2 28 05/15/2023    ANIONGAP 9 05/15/2023     05/15/2023    BUN 19 05/15/2023    CR 0.98 05/15/2023    GFRESTIMATED 85 05/15/2023    GFRESTIMATED >60 01/05/2021    GFRESTBLACK >60 01/05/2021    RATNA 9.5 05/15/2023        A1C RESULTS:  Lab Results   Component Value Date    A1C 5.9 (H) 03/01/2023       INR RESULTS:  Lab Results   Component Value Date    INR 1.10 04/20/2023    INR 1.31 (H) 01/19/2022       Thank you for allowing me to participate in the care of your patient.      Sincerely,     Liz Pearl MD     [facility]  cc:   Liz Pearl MD  [address] [place] [address]

## 2023-08-22 NOTE — TELEPHONE ENCOUNTER
"Called back to Krystal, no answer. Left  requesting call back to Team 1.     Addendum 8/22/23 - Called back and spoke with Krystal, reviewed per Dr. Pearl's note: \"Double furosemide dose to 80mg PO BID for 5 days and then get BMP next week 8/28/23.\" Krystal verbalized understanding and stated pt already took his usual 40 mg BID dose today, so they will start the increased dose tomorrow. Pt was scheduled for labs on 9/7/23, cancelled this and held lab appointment for pt on 8/28/23 at 9:30 am. Message sent to scheduling. Will await labs and Krystal will call back if any further questions.   "

## 2023-08-22 NOTE — PROGRESS NOTES
[facility] Progress note    Assessment:  CAD  PCI to LAD (2012).  PCI to RCA (9/2021).   CABG x 1 (LIMA to LAD,  PVI, exclusion of left atrial appendage) on 1/19/2022. Last cath 1/12/22.   Paroxysmal atrial fibrillation.   Embolic prevention/ CHADS Vasc of 4-5.  Completed JUSTO exclusion confirmed on CTA (4/15/2022).  Not anticoagulated.  HFpEF   Edema worse at this time with 15 lbs weight gain.   -increase furosemide x 5 days.   Hypertension  PVCs.  Continue metoprolol.  CLL  Low testosterone    Subacute presentation of CHF NYHA III, edema and weight above baseline. Will add diuresis, check an echo and schedule close follow up.     Plan:  Double furosemide dose to 80mg PO BID for 5 days and then get BMP next week 8/28/23.  Monitor daily weight at home. He has not been doing this regularly and importance was stressed in clinic today.   Echo  Follow up with cardiology SULEMA in about 1 month.  Comprehensive weight management referral also given.    HPI:   Onur WEBER Cortney is a very nice 67 year old M with chronic CHF, mixed systolic/diastolic and right heart failure with ischemic cardiomyopathy and history of PCI and CABG, severe morbid obesity, history of PVCs, paroxysmal atrial fibrillation, HTN, HLD here for follow up. Last visit was 4/17/23.    He is not feeling well today.  Weight is up from 308 at last visit to 323.   He is feeling really tired. He sweats easily and is more edematous. He is interested in medical weight loss, specifically asking about wegovy.  He is not having chest pain or palpitation. Breathing is stable; he has chronic dyspnea.     He was recently diagnosed with CLL. He is now following with oncology.     Echo 11/21/22 : Interpretation Summary 1. The left ventricle is normal in size. The visual ejection fraction is 45-50%. Mild global hypokinesis, very limited image quality even with contrast,cannot comment on any subtle wall motion abnormalities.2. The right ventricle is normal in structure,  function and size.3. No valve disease.4. The ascending aorta is Mildly dilated. 3.9cm. No changes from echo 3/2022.      Last cath 1/12/22  1. Severe in-stent restenosis of the mid LAD and severe stenosis distal to the previously placed stents.  2.  Patent stents in the right coronary artery with moderate stenosis of the large RPDA and RPL a branches  3.  Balloon angioplasty and Cutting Balloon angioplasty of the in-stent restenosis in mid LAD stenosis with suboptimal result due to very severe stenosis with multiple layers of stent which were underexpanded due to severe disease.    4.  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  This fistula was mildly disrupted causing a perforation in the forearm.  The radial artery was able to be accessed proximal to the lesion which was compressed manually.  5..  Difficult procedure due to patient with severe back pain and difficult imaging due to body habitus with morbid obesity.        CURRENT MEDICATIONS:  Current Outpatient Medications   Medication Sig Dispense Refill    acetaminophen (TYLENOL) 325 MG tablet Take 2 tablets (650 mg) by mouth every 4 hours as needed for mild pain 40 tablet 0    aspirin (ASA) 81 MG chewable tablet Take 81 mg by mouth daily      DULoxetine (CYMBALTA) 30 MG capsule Take 60 mg by mouth every evening      FERATE 240 (27 Fe) MG TABS TAKE 1 TABLET BY MOUTH EVERY DAY 90 tablet 2    Ferrous Sulfate (IRON PO) Take by mouth daily Dosage is unknown      furosemide (LASIX) 40 MG tablet TAKE 1 TABLET BY MOUTH 2 TIMES DAILY 180 tablet 0    ketoconazole (NIZORAL) 2 % external cream Apply topically 2 times daily 60 g 4    lisinopril (ZESTRIL) 10 MG tablet Take 1 tablet (10 mg) by mouth daily 90 tablet 3    metoprolol succinate ER (TOPROL XL) 100 MG 24 hr tablet Take 1 tablet (100 mg) by mouth 2 times daily 180 tablet 3    MORPHINE SULFATE IT pump:updated 1/12/22  Medications in Pump:   [facility] Responsible for pump  medications:   Conc:  Morphine 20mg/ml(3.95 mg/day), clonidine 21.1mcg/ml (4.168 mcg/day), baclofen 42.5mcg/ml (8.395mcg/day)  Rate:   Pump Last Fill Date: 9/2021  Pump Refill Date: 2/2022      nitroGLYcerin (NITROSTAT) 0.4 MG sublingual tablet For chest pain place 1 tablet under the tongue every 5 minutes for 3 doses. If symptoms persist 5 minutes after 1st dose call 911. 30 tablet 1    nystatin (MYCOSTATIN) 174436 UNIT/GM external powder Apply topically 3 times daily 60 g 4    rosuvastatin (CRESTOR) 40 MG tablet Take 1 tablet (40 mg) by mouth daily 90 tablet 3    traZODone (DESYREL) 100 MG tablet Take 2 tablets (200 mg) by mouth At Bedtime TAKE 2 TABLETS (200MG TOTAL) BY MOUTH AT BEDTIME Strength: 100 mg 180 tablet 3       ALLERGIES     Allergies   Allergen Reactions    Hydrocodone-Acetaminophen Other (See Comments)     sneezing       PAST MEDICAL, SURGICAL, FAMILY, SOCIAL HISTORY:  History was reviewed and updated as needed, see medical record.    REVIEW OF SYSTEMS:  Skin:  Negative     Eyes:  Negative    ENT:  Negative    Respiratory:  Positive for dyspnea on exertion;sleep apnea  Cardiovascular:    edema;Positive for  Gastroenterology: Negative    Genitourinary:  Negative    Musculoskeletal:  Positive for back pain;neck pain;joint pain  Neurologic:  Negative    Psychiatric:  Negative    Heme/Lymph/Imm:  Negative    Endocrine:  Negative      PHYSICAL EXAM:      BP: 128/78 Pulse: 63     SpO2: 95 %      Vital Signs with Ranges  Pulse:  [63] 63  BP: (128)/(78) 128/78  SpO2:  [95 %] 95 %  323 lbs 1.6 oz    Constitutional: awake, alert, no distress but appears fatigued, chronically ill, obese but frail  Eyes: sclera nonicteric  ENT: trachea midline  Respiratory: no wheezes or crackles  Cardiovascular: regular, distant, no murmur appreciated  GI: nondistended, nontender, bowel sounds present  Skin: dry, no rash 2+ bilateral nontender LE edema to midcalf.    Musculoskeletal: severe kyphosis  Neurologic: very abnormal  gait. Walks with a cane  Neuropsychiatric: normal affect    Recent Lab Results:  LIPID RESULTS:  Lab Results   Component Value Date    CHOL 139 11/21/2022    HDL 33 (L) 11/21/2022    LDL 39 11/21/2022    LDL 69 02/13/2018    TRIG 335 (H) 11/21/2022       LIVER ENZYME RESULTS:  Lab Results   Component Value Date    AST 24 05/15/2023    ALT 17 05/15/2023       CBC RESULTS:  Lab Results   Component Value Date    WBC 22.1 (H) 08/14/2023    RBC 5.26 08/14/2023    HGB 15.4 08/14/2023    HCT 46.2 08/14/2023    MCV 88 08/14/2023    MCH 29.3 08/14/2023    MCHC 33.3 08/14/2023    RDW 12.6 08/14/2023     08/14/2023       BMP RESULTS:  Lab Results   Component Value Date     05/15/2023    POTASSIUM 4.2 05/15/2023    CHLORIDE 104 05/15/2023    CO2 28 05/15/2023    ANIONGAP 9 05/15/2023     05/15/2023    BUN 19 05/15/2023    CR 0.98 05/15/2023    GFRESTIMATED 85 05/15/2023    GFRESTIMATED >60 01/05/2021    GFRESTBLACK >60 01/05/2021    RATNA 9.5 05/15/2023        A1C RESULTS:  Lab Results   Component Value Date    A1C 5.9 (H) 03/01/2023       INR RESULTS:  Lab Results   Component Value Date    INR 1.10 04/20/2023    INR 1.31 (H) 01/19/2022

## 2023-08-22 NOTE — TELEPHONE ENCOUNTER
[facility] Call Center    Phone Message    May a detailed message be left on voicemail: yes     Reason for Call: Medication Question or concern regarding medication   Prescription Clarification  Name of Medication: furosemide (LASIX) 40 MG tablet   Prescribing Provider: Ryanne   Pharmacy:        What on the order needs clarification? Pt thinks he was told to take 80 mg of furosemide (LASIX) in the morning and 80 mg in the evening, however the script says to do 40 mg in am and 40 mg in the evening.  Please call Krystal to clarify.      Action Taken: Message routed to:  [facility] cardio    Travel Screening: Not Applicable                                   Thank you!  [facility]

## 2023-08-23 NOTE — TELEPHONE ENCOUNTER
While reviewing chart I can see that previous people have noted that he takes duloxetine 30mg in AM and 60mg at night. Is this accurate?

## 2023-08-23 NOTE — TELEPHONE ENCOUNTER
Routed to covering provider - Sang Mcgarry NP, to review. Per Mychart message from patient, Duloxetine taken for depression and nerve pain.     Coleen Greer RN  [facility]

## 2023-08-26 DIAGNOSIS — D64.9 ANEMIA, UNSPECIFIED TYPE: ICD-10-CM

## 2023-08-28 ENCOUNTER — LAB (OUTPATIENT)
Dept: LAB | Facility: CLINIC | Age: 68
End: 2023-08-28
Payer: COMMERCIAL

## 2023-08-28 DIAGNOSIS — I50.31 ACUTE DIASTOLIC CONGESTIVE HEART FAILURE (H): ICD-10-CM

## 2023-08-28 LAB
ANION GAP SERPL CALCULATED.3IONS-SCNC: 11 MMOL/L (ref 7–15)
BUN SERPL-MCNC: 19.1 MG/DL (ref 8–23)
CALCIUM SERPL-MCNC: 9.3 MG/DL (ref 8.8–10.2)
CHLORIDE SERPL-SCNC: 101 MMOL/L (ref 98–107)
CREAT SERPL-MCNC: 1.08 MG/DL (ref 0.67–1.17)
DEPRECATED HCO3 PLAS-SCNC: 29 MMOL/L (ref 22–29)
GFR SERPL CREATININE-BSD FRML MDRD: 76 ML/MIN/1.73M2
GLUCOSE SERPL-MCNC: 150 MG/DL (ref 70–99)
POTASSIUM SERPL-SCNC: 3.9 MMOL/L (ref 3.4–5.3)
SODIUM SERPL-SCNC: 141 MMOL/L (ref 136–145)

## 2023-08-28 PROCEDURE — 36415 COLL VENOUS BLD VENIPUNCTURE: CPT | Performed by: INTERNAL MEDICINE

## 2023-08-28 PROCEDURE — 80048 BASIC METABOLIC PNL TOTAL CA: CPT | Performed by: INTERNAL MEDICINE

## 2023-08-28 RX ORDER — FERROUS GLUCONATE 240(27)MG
TABLET ORAL
Qty: 90 TABLET | Refills: 2 | OUTPATIENT
Start: 2023-08-28

## 2023-08-30 ENCOUNTER — TELEPHONE (OUTPATIENT)
Dept: CARDIOLOGY | Facility: CLINIC | Age: 68
End: 2023-08-30
Payer: COMMERCIAL

## 2023-08-30 RX ORDER — KETOCONAZOLE 20 MG/G
CREAM TOPICAL 2 TIMES DAILY
Qty: 60 G | Refills: 4 | Status: SHIPPED | OUTPATIENT
Start: 2023-08-30 | End: 2024-02-29

## 2023-08-30 RX ORDER — DULOXETIN HYDROCHLORIDE 30 MG/1
CAPSULE, DELAYED RELEASE ORAL
Qty: 270 CAPSULE | Refills: 1 | Status: SHIPPED | OUTPATIENT
Start: 2023-08-30 | End: 2024-02-28

## 2023-08-30 NOTE — TELEPHONE ENCOUNTER
I would recommend he consider repeating his blood sugar level when he is fasting for at least 8 hours.  This could be done with a lab only visit.  A future lab order has been placed.

## 2023-08-30 NOTE — TELEPHONE ENCOUNTER
Received call from pt's wife Krystal with question about recent BMP. Krystal stated they noticed pt's glucose was 150 and they don't know if they should be concerned about pt having diabetes. Asked if pt was fasting and pt said he was not as he wasn't told to fast. Advised that is OK but would not be concerned about glucose of 150 if the labs were drawn after pt had eaten. Discussed if pt has further concerns about diabetes testing would recommended discussing with pt's PCP Dr. Feliciano. Krystal verbalized understanding, no further questions at this time.

## 2023-09-07 ENCOUNTER — LAB (OUTPATIENT)
Dept: LAB | Facility: CLINIC | Age: 68
End: 2023-09-07
Payer: COMMERCIAL

## 2023-09-07 ENCOUNTER — HOSPITAL ENCOUNTER (OUTPATIENT)
Dept: CARDIOLOGY | Facility: CLINIC | Age: 68
Discharge: HOME | End: 2023-09-07
Attending: INTERNAL MEDICINE | Admitting: INTERNAL MEDICINE
Payer: COMMERCIAL

## 2023-09-07 DIAGNOSIS — I25.5 ISCHEMIC CARDIOMYOPATHY: ICD-10-CM

## 2023-09-07 DIAGNOSIS — R73.09 ELEVATED GLUCOSE: ICD-10-CM

## 2023-09-07 LAB
FASTING STATUS PATIENT QL REPORTED: YES
GLUCOSE SERPL-MCNC: 120 MG/DL (ref 70–99)
LVEF ECHO: NORMAL

## 2023-09-07 PROCEDURE — 82947 ASSAY GLUCOSE BLOOD QUANT: CPT

## 2023-09-07 PROCEDURE — 93306 TTE W/DOPPLER COMPLETE: CPT | Mod: 26 | Performed by: INTERNAL MEDICINE

## 2023-09-07 PROCEDURE — 36415 COLL VENOUS BLD VENIPUNCTURE: CPT

## 2023-09-07 PROCEDURE — 255N000002 HC RX 255 OP 636: Performed by: INTERNAL MEDICINE

## 2023-09-07 RX ADMIN — HUMAN ALBUMIN MICROSPHERES AND PERFLUTREN 3 ML: 10; .22 INJECTION, SOLUTION INTRAVENOUS at 09:45

## 2023-09-09 ENCOUNTER — HEALTH MAINTENANCE LETTER (OUTPATIENT)
Age: 68
End: 2023-09-09

## 2023-09-25 ENCOUNTER — ANCILLARY PROCEDURE (OUTPATIENT)
Dept: CARDIOLOGY | Facility: CLINIC | Age: 68
End: 2023-09-25
Attending: INTERNAL MEDICINE
Payer: COMMERCIAL

## 2023-09-25 DIAGNOSIS — Z95.810 ICD (IMPLANTABLE CARDIOVERTER-DEFIBRILLATOR) IN PLACE: ICD-10-CM

## 2023-09-25 DIAGNOSIS — I47.29 PAROXYSMAL VENTRICULAR TACHYCARDIA (H): ICD-10-CM

## 2023-09-25 PROCEDURE — 93296 REM INTERROG EVL PM/IDS: CPT | Performed by: INTERNAL MEDICINE

## 2023-09-25 PROCEDURE — 93295 DEV INTERROG REMOTE 1/2/MLT: CPT | Performed by: INTERNAL MEDICINE

## 2023-10-03 LAB
MDC_IDC_EPISODE_DTM: NORMAL
MDC_IDC_EPISODE_DURATION: 2 S
MDC_IDC_EPISODE_DURATION: 3 S
MDC_IDC_EPISODE_DURATION: 4 S
MDC_IDC_EPISODE_DURATION: 5 S
MDC_IDC_EPISODE_DURATION: 6 S
MDC_IDC_EPISODE_DURATION: 7 S
MDC_IDC_EPISODE_ID: NORMAL
MDC_IDC_EPISODE_TYPE: NORMAL
MDC_IDC_LEAD_IMPLANT_DT: NORMAL
MDC_IDC_LEAD_IMPLANT_DT: NORMAL
MDC_IDC_LEAD_LOCATION: NORMAL
MDC_IDC_LEAD_LOCATION: NORMAL
MDC_IDC_LEAD_LOCATION_DETAIL_1: NORMAL
MDC_IDC_LEAD_LOCATION_DETAIL_1: NORMAL
MDC_IDC_LEAD_MFG: NORMAL
MDC_IDC_LEAD_MFG: NORMAL
MDC_IDC_LEAD_MODEL: NORMAL
MDC_IDC_LEAD_MODEL: NORMAL
MDC_IDC_LEAD_POLARITY_TYPE: NORMAL
MDC_IDC_LEAD_POLARITY_TYPE: NORMAL
MDC_IDC_LEAD_SERIAL: NORMAL
MDC_IDC_LEAD_SERIAL: NORMAL
MDC_IDC_MSMT_BATTERY_DTM: NORMAL
MDC_IDC_MSMT_BATTERY_REMAINING_LONGEVITY: 150 MO
MDC_IDC_MSMT_BATTERY_REMAINING_PERCENTAGE: 100 %
MDC_IDC_MSMT_BATTERY_STATUS: NORMAL
MDC_IDC_MSMT_CAP_CHARGE_DTM: NORMAL
MDC_IDC_MSMT_CAP_CHARGE_TIME: 10.2 S
MDC_IDC_MSMT_CAP_CHARGE_TYPE: NORMAL
MDC_IDC_MSMT_LEADCHNL_RA_IMPEDANCE_VALUE: 613 OHM
MDC_IDC_MSMT_LEADCHNL_RA_PACING_THRESHOLD_AMPLITUDE: 0.4 V
MDC_IDC_MSMT_LEADCHNL_RA_PACING_THRESHOLD_PULSEWIDTH: 0.4 MS
MDC_IDC_MSMT_LEADCHNL_RV_IMPEDANCE_VALUE: 418 OHM
MDC_IDC_MSMT_LEADCHNL_RV_PACING_THRESHOLD_AMPLITUDE: 0.8 V
MDC_IDC_MSMT_LEADCHNL_RV_PACING_THRESHOLD_PULSEWIDTH: 0.4 MS
MDC_IDC_PG_IMPLANT_DTM: NORMAL
MDC_IDC_PG_MFG: NORMAL
MDC_IDC_PG_MODEL: NORMAL
MDC_IDC_PG_SERIAL: NORMAL
MDC_IDC_PG_TYPE: NORMAL
MDC_IDC_SESS_CLINIC_NAME: NORMAL
MDC_IDC_SESS_DTM: NORMAL
MDC_IDC_SESS_TYPE: NORMAL
MDC_IDC_SET_BRADY_AT_MODE_SWITCH_MODE: NORMAL
MDC_IDC_SET_BRADY_AT_MODE_SWITCH_RATE: 170 {BEATS}/MIN
MDC_IDC_SET_BRADY_LOWRATE: 60 {BEATS}/MIN
MDC_IDC_SET_BRADY_MAX_SENSOR_RATE: 130 {BEATS}/MIN
MDC_IDC_SET_BRADY_MAX_TRACKING_RATE: 130 {BEATS}/MIN
MDC_IDC_SET_BRADY_MODE: NORMAL
MDC_IDC_SET_BRADY_PAV_DELAY_HIGH: 200 MS
MDC_IDC_SET_BRADY_PAV_DELAY_LOW: 300 MS
MDC_IDC_SET_BRADY_SAV_DELAY_HIGH: 200 MS
MDC_IDC_SET_BRADY_SAV_DELAY_LOW: 300 MS
MDC_IDC_SET_LEADCHNL_RA_PACING_AMPLITUDE: 2 V
MDC_IDC_SET_LEADCHNL_RA_PACING_CAPTURE_MODE: NORMAL
MDC_IDC_SET_LEADCHNL_RA_PACING_POLARITY: NORMAL
MDC_IDC_SET_LEADCHNL_RA_PACING_PULSEWIDTH: 0.4 MS
MDC_IDC_SET_LEADCHNL_RA_SENSING_ADAPTATION_MODE: NORMAL
MDC_IDC_SET_LEADCHNL_RA_SENSING_POLARITY: NORMAL
MDC_IDC_SET_LEADCHNL_RA_SENSING_SENSITIVITY: 0.25 MV
MDC_IDC_SET_LEADCHNL_RV_PACING_AMPLITUDE: 2 V
MDC_IDC_SET_LEADCHNL_RV_PACING_CAPTURE_MODE: NORMAL
MDC_IDC_SET_LEADCHNL_RV_PACING_POLARITY: NORMAL
MDC_IDC_SET_LEADCHNL_RV_PACING_PULSEWIDTH: 0.4 MS
MDC_IDC_SET_LEADCHNL_RV_SENSING_ADAPTATION_MODE: NORMAL
MDC_IDC_SET_LEADCHNL_RV_SENSING_POLARITY: NORMAL
MDC_IDC_SET_LEADCHNL_RV_SENSING_SENSITIVITY: 0.6 MV
MDC_IDC_SET_ZONE_DETECTION_INTERVAL: 250 MS
MDC_IDC_SET_ZONE_DETECTION_INTERVAL: 300 MS
MDC_IDC_SET_ZONE_DETECTION_INTERVAL: 353 MS
MDC_IDC_SET_ZONE_TYPE: NORMAL
MDC_IDC_SET_ZONE_VENDOR_TYPE: NORMAL
MDC_IDC_STAT_AT_BURDEN_PERCENT: 1 %
MDC_IDC_STAT_AT_DTM_END: NORMAL
MDC_IDC_STAT_AT_DTM_START: NORMAL
MDC_IDC_STAT_BRADY_DTM_END: NORMAL
MDC_IDC_STAT_BRADY_DTM_START: NORMAL
MDC_IDC_STAT_BRADY_RA_PERCENT_PACED: 73 %
MDC_IDC_STAT_BRADY_RV_PERCENT_PACED: 2 %
MDC_IDC_STAT_EPISODE_RECENT_COUNT: 0
MDC_IDC_STAT_EPISODE_RECENT_COUNT: 40
MDC_IDC_STAT_EPISODE_RECENT_COUNT_DTM_END: NORMAL
MDC_IDC_STAT_EPISODE_RECENT_COUNT_DTM_START: NORMAL
MDC_IDC_STAT_EPISODE_TYPE: NORMAL
MDC_IDC_STAT_EPISODE_VENDOR_TYPE: NORMAL
MDC_IDC_STAT_TACHYTHERAPY_ATP_DELIVERED_RECENT: 0
MDC_IDC_STAT_TACHYTHERAPY_ATP_DELIVERED_TOTAL: 0
MDC_IDC_STAT_TACHYTHERAPY_RECENT_DTM_END: NORMAL
MDC_IDC_STAT_TACHYTHERAPY_RECENT_DTM_START: NORMAL
MDC_IDC_STAT_TACHYTHERAPY_SHOCKS_ABORTED_RECENT: 0
MDC_IDC_STAT_TACHYTHERAPY_SHOCKS_ABORTED_TOTAL: 0
MDC_IDC_STAT_TACHYTHERAPY_SHOCKS_DELIVERED_RECENT: 0
MDC_IDC_STAT_TACHYTHERAPY_SHOCKS_DELIVERED_TOTAL: 0
MDC_IDC_STAT_TACHYTHERAPY_TOTAL_DTM_END: NORMAL
MDC_IDC_STAT_TACHYTHERAPY_TOTAL_DTM_START: NORMAL

## 2023-10-04 ENCOUNTER — TELEPHONE (OUTPATIENT)
Dept: INTERNAL MEDICINE | Facility: CLINIC | Age: 68
End: 2023-10-04
Payer: COMMERCIAL

## 2023-10-04 NOTE — TELEPHONE ENCOUNTER
Reason for Call:  Appointment Request    Patient requesting this type of appt: Chronic Diease Management/Medication/Follow-Up    Requested provider: Roge Feliciano    Reason patient unable to be scheduled: Not within requested timeframe    When does patient want to be seen/preferred time:  as soon as possible     Comments: pt appointment was canceled for Oct 18 and pt is looking to be worked into a schedule sooner than what is available . Appointment is regarding follow up/weight loss     Could we send this information to you in [place] or would you prefer to receive a phone call?:   No preference   Okay to leave a detailed message?: Yes at Home number on file [phone] (home)    Call taken on 10/4/2023 at 8:48 AM by Han Britton

## 2023-10-13 ENCOUNTER — OFFICE VISIT (OUTPATIENT)
Dept: CARDIOLOGY | Facility: CLINIC | Age: 68
End: 2023-10-13
Attending: INTERNAL MEDICINE
Payer: COMMERCIAL

## 2023-10-13 VITALS
BODY MASS INDEX: 42.66 KG/M2 | WEIGHT: 315 LBS | DIASTOLIC BLOOD PRESSURE: 84 MMHG | HEART RATE: 68 BPM | HEIGHT: 72 IN | SYSTOLIC BLOOD PRESSURE: 120 MMHG | OXYGEN SATURATION: 93 %

## 2023-10-13 DIAGNOSIS — I48.0 PAROXYSMAL ATRIAL FIBRILLATION (H): ICD-10-CM

## 2023-10-13 DIAGNOSIS — I10 ESSENTIAL HYPERTENSION: ICD-10-CM

## 2023-10-13 DIAGNOSIS — I25.10 CORONARY ARTERY DISEASE INVOLVING NATIVE CORONARY ARTERY OF NATIVE HEART WITHOUT ANGINA PECTORIS: ICD-10-CM

## 2023-10-13 DIAGNOSIS — E78.5 HYPERLIPIDEMIA WITH TARGET LDL LESS THAN 70: ICD-10-CM

## 2023-10-13 DIAGNOSIS — I50.31 ACUTE DIASTOLIC CONGESTIVE HEART FAILURE (H): Primary | ICD-10-CM

## 2023-10-13 PROCEDURE — 99214 OFFICE O/P EST MOD 30 MIN: CPT | Performed by: NURSE PRACTITIONER

## 2023-10-13 NOTE — LETTER
10/13/2023    Roge Feliciano MD  [address] [facility] [address]    RE: Onur Barr       Dear Colleague,     I had the pleasure of seeing Onur Barr in the [facility].  CARDIOLOGY CLINIC NOTE    PRIMARY CARDIOLOGIST  Dr. Pearl     PRIMARY CARE PHYSICIAN:  Roge Feliciano    HISTORY OF PRESENT ILLNESS:  Onur Barr is a very pleasant 68 year old male who carries a PMH significant for chronic systolic/diastolic heart failure, right heart failure, coronary artery disease status post PCI to the LAD in 2012, PCI to RCA in 2021, CABG x1 (LIMA to the LAD 1/2022), left atrial appendage exclusion (1/2022), paroxysmal atrial fibrillation, hypertension, PVCs, CLL, low testosterone, and morbid obesity.     He was last seen on 8/22/2023 by Dr. Pearl. He was up ~15 lbs and reported worsening fatigue and leg edema. Lasix was up-titrated to 80 mg BID x 5 days. Unfortunately, he noticed no difference. He recently underwent an echocardiogram that showed a low normal ejection fraction estimated at 50 to 55%, regional wall motion abnormalities cannot be excluded due to limited visualization.  Normal RV size and function, no significant valvular disease.    He returns to the office today for follow up. He denies chest pain, palpitations, PND, orthopnea, presyncope or syncope. He has mild bilateral ankle edema, well controlled on lasix.     Last device interrogation showed 73% atrial pacing and 2% ventricular pacing, underlying rhythm is sinus rhythm in the 60s with second-degree AV block type I and bigeminal PVCs.  3 episodes of atrial arrhythmias lasting 3 to 6 seconds.  No ventricular arrhythmias noted.    Blood pressure is well controlled at 120/84, managed on metoprolol, lisinopril, and furosemide.   Last BMP is normal with the exception of elevated glucose (150).   CBC showed a hemoglobin of 15.4, hematocrit 46.2, Plt 180  Lipid panel showed a total cholesterol of 139, HDL 33,  LDL 39, and Total cholesterol 335  Wt 323 lb     He does not engage in any activity.   Follows a low sodium diet   Compliant with all medications.     PAST MEDICAL HISTORY:  Past Medical History:   Diagnosis Date    Abdominal panniculus 11/13/2012    Formatting of this note might be different from the original. S/p panniculectomy 11/13/2012    Acid reflux disease 10/31/2017    Bariatric surgery status 06/23/2008    Formatting of this note might be different from the original. Created by Conversion    Bilateral leg edema 07/13/2021    CHF (congestive heart failure) (H)     Chronic Back Pain (IT Pump w Morphine, Clonidine, Baclofen) 01/16/2022    Chronic diastolic heart failure (H) 07/26/2021    Claudication of lower extremity (H24) 11/20/2019    Coronary arteriosclerosis 05/09/2022    Formatting of this note might be different from the original. 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    Ischemic cardiomyopathy     Lumbago     Created by [employer] Annotation: Apr 6 2012 10:20Anh Ford: Morphine pump     Mixed hyperlipidemia 06/23/2008    Morbid obesity (H) 08/01/2018    Nephrolithiasis     NSTEMI (non-ST elevated myocardial infarction) (H)     Obstructive sleep apnea     Doesn't tolerate CPAP    MARLEEN (doesn't tolerate CPAP) 07/26/2021    Osteoarthritis     Created by [employer]  Annotation: Jun 26 2009  9:Zack Mantilla: failed lumbar  fusion/morphine pump dr ptunam  Replacement Utility updated for latest IMO load    Osteoarthrosis 05/09/2022    Formatting of this note might be different from the original. Created by [facility] Annotation: Jun 26 2009  9:Zack Mantilla: failed lumbar  fusion/morphine pump dr putnam  Replacement Utility updated for latest IMO load    Paroxysmal atrial fib -- S/P Pulm Vein Ablation 1/19/22 09/11/2021    Formatting of this note might be different from the original. Created by Conversion    Paroxysmal atrial fibrillation (H)     Created by Conversion     Paroxysmal ventricular tachycardia (H)     dual chamber ICD 1/24/2022    Peripheral vascular disease (H24) 05/03/2022    Post-laminectomy syndrome 11/25/2015    S/P CABG (coronary artery bypass graft) 01/25/2022    Sleepiness 05/08/2018    Type 2 diabetes mellitus (H)     Diet controlled after Gastric Bypass in 2008       MEDICATIONS:  Current Outpatient Medications   Medication    acetaminophen (TYLENOL) 325 MG tablet    aspirin (ASA) 81 MG chewable tablet    DULoxetine (CYMBALTA) 30 MG capsule    FERATE 240 (27 Fe) MG TABS    Ferrous Sulfate (IRON PO)    furosemide (LASIX) 40 MG tablet    ketoconazole (NIZORAL) 2 % external cream    lisinopril (ZESTRIL) 10 MG tablet    metoprolol succinate ER (TOPROL XL) 100 MG 24 hr tablet    MORPHINE SULFATE    nystatin (MYCOSTATIN) 091566 UNIT/GM external powder    rosuvastatin (CRESTOR) 40 MG tablet    traZODone (DESYREL) 100 MG tablet    nitroGLYcerin (NITROSTAT) 0.4 MG sublingual tablet     No current facility-administered medications for this visit.       SOCIAL HISTORY:  I have reviewed this patient's social history and updated it with pertinent information if needed. Onur Barr  reports that he has quit smoking. His smoking use included cigars. He has never been exposed to tobacco smoke. He has never used smokeless tobacco. He reports that  he does not drink alcohol and does not use drugs.    PHYSICAL EXAM:  Pulse:  [68] 68  BP: (120)/(84) 120/84  SpO2:  [93 %] 93 %  323 lbs 0 oz    Constitutional: alert, no distress  Respiratory: Good bilateral air entry  Cardiovascular: Regular rate and rhythm   GI: nondistended  Neuropsychiatric: appropriate affact    ASSESSMENT/PLAN:  Pertinent issues addressed/ reviewed during this cardiology visit    Chronic systolic/diastolic heart failure / right heart failure - echocardiogram that showed a low normal ejection fraction estimated at 50 to 55%, regional wall motion abnormalities cannot be excluded due to limited visualization.  Normal RV size and function, no significant valvular disease. Wt stable at 323 lb. Near euvolemic upon exam. No symptoms of fluid overload. Continue furosemide, metoprolol and lisinopril. Monitor weights daily, call office with a weight gain of 3 lb overnight or 5 lbs in a week, worsening shortness of breath or leg edema. Encourage exercise, weight loss, and low salt diet.   Coronary artery disease - status post PCI to the LAD in 2012, PCI to RCA in 2021, CABG x1 (LIMA to the LAD 1/2022). No symptoms of ischemia. Continue current therapy.   PAF/ left atrial appendage exclusion (1/2022) - rate controlled. Continue metoprolol. Off anticoagulation.   Hypertension - Well controlled    PVCs -  well controlled  Hyperlipidemia - Due for fasting lipid panel, continue rosuvastatin   Follow up in 6 months with SULEMA or sooner if needed.     It was a pleasure seeing this patient in clinic today. Please do not hesitate to contact me with any future questions.     YAMILETH Melton, CNP  Cardiology - [facility]  October 13, 2023    Review of the result(s) of each unique test - Last echocardiogram, BMP, CBC, and lipid panel      The level of medical decision making during this visit was of moderate complexity.    This note was completed in part using dictation via the Dragon voice recognition software.  Some word and grammatical errors may occur and must be interpreted in the appropriate clinical context.  If there are any questions pertaining to this issue, please contact me for further clarification.    Thank you for allowing me to participate in the care of your patient.      Sincerely,     YAMILETH Melton [facility]  cc:   Liz Pearl MD  [address] [address]

## 2023-10-13 NOTE — PROGRESS NOTES
CARDIOLOGY CLINIC NOTE    PRIMARY CARDIOLOGIST  Dr. Pearl     PRIMARY CARE PHYSICIAN:  Roge Feliciano    HISTORY OF PRESENT ILLNESS:  Onur Barr is a very pleasant 68 year old male who carries a PMH significant for chronic systolic/diastolic heart failure, right heart failure, coronary artery disease status post PCI to the LAD in 2012, PCI to RCA in 2021, CABG x1 (LIMA to the LAD 1/2022), left atrial appendage exclusion (1/2022), paroxysmal atrial fibrillation, hypertension, PVCs, CLL, low testosterone, and morbid obesity.     He was last seen on 8/22/2023 by Dr. Pearl. He was up ~15 lbs and reported worsening fatigue and leg edema. Lasix was up-titrated to 80 mg BID x 5 days. Unfortunately, he noticed no difference. He recently underwent an echocardiogram that showed a low normal ejection fraction estimated at 50 to 55%, regional wall motion abnormalities cannot be excluded due to limited visualization.  Normal RV size and function, no significant valvular disease.    He returns to the office today for follow up. He denies chest pain, palpitations, PND, orthopnea, presyncope or syncope. He has mild bilateral ankle edema, well controlled on lasix.     Last device interrogation showed 73% atrial pacing and 2% ventricular pacing, underlying rhythm is sinus rhythm in the 60s with second-degree AV block type I and bigeminal PVCs.  3 episodes of atrial arrhythmias lasting 3 to 6 seconds.  No ventricular arrhythmias noted.    Blood pressure is well controlled at 120/84, managed on metoprolol, lisinopril, and furosemide.   Last BMP is normal with the exception of elevated glucose (150).   CBC showed a hemoglobin of 15.4, hematocrit 46.2, Plt 180  Lipid panel showed a total cholesterol of 139, HDL 33, LDL 39, and Total cholesterol 335  Wt 323 lb     He does not engage in any activity.   Follows a low sodium diet   Compliant with all medications.     PAST MEDICAL HISTORY:  Past Medical History:   Diagnosis  Date    Abdominal panniculus 11/13/2012    Formatting of this note might be different from the original. S/p panniculectomy 11/13/2012    Acid reflux disease 10/31/2017    Bariatric surgery status 06/23/2008    Formatting of this note might be different from the original. Created by Conversion    Bilateral leg edema 07/13/2021    CHF (congestive heart failure) (H)     Chronic Back Pain (IT Pump w Morphine, Clonidine, Baclofen) 01/16/2022    Chronic diastolic heart failure (H) 07/26/2021    Claudication of lower extremity (H24) 11/20/2019    Coronary arteriosclerosis 05/09/2022    Formatting of this note might be different from the original. 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    Ischemic cardiomyopathy     Lumbago     Created by [employer] [facility] Annotation: Apr 6 2012 10:20Anh Ford: Morphine pump     Mixed hyperlipidemia 06/23/2008    Morbid obesity (H) 08/01/2018    Nephrolithiasis     NSTEMI (non-ST elevated myocardial infarction) (H)     Obstructive sleep apnea     Doesn't tolerate CPAP    MARLEEN (doesn't tolerate CPAP) 07/26/2021    Osteoarthritis     Created by [employer] [facility] Annotation: Jun 26 2009  9:53Zack Harris: failed lumbar  fusion/morphine pump dr putnam  Replacement Utility updated for latest IMO load    Osteoarthrosis 05/09/2022    Formatting of this note might be  different from the original. Created by [facility] Annotation: Jun 26 2009  9:53AM - Zack Gutierrez: failed lumbar  fusion/morphine pump dr putnam  Replacement Utility updated for latest IMO load    Paroxysmal atrial fib -- S/P Pulm Vein Ablation 1/19/22 09/11/2021    Formatting of this note might be different from the original. Created by Conversion    Paroxysmal atrial fibrillation (H)     Created by Conversion     Paroxysmal ventricular tachycardia (H)     dual chamber ICD 1/24/2022    Peripheral vascular disease (H24) 05/03/2022    Post-laminectomy syndrome 11/25/2015    S/P CABG (coronary artery bypass graft) 01/25/2022    Sleepiness 05/08/2018    Type 2 diabetes mellitus (H)     Diet controlled after Gastric Bypass in 2008       MEDICATIONS:  Current Outpatient Medications   Medication    acetaminophen (TYLENOL) 325 MG tablet    aspirin (ASA) 81 MG chewable tablet    DULoxetine (CYMBALTA) 30 MG capsule    FERATE 240 (27 Fe) MG TABS    Ferrous Sulfate (IRON PO)    furosemide (LASIX) 40 MG tablet    ketoconazole (NIZORAL) 2 % external cream    lisinopril (ZESTRIL) 10 MG tablet    metoprolol succinate ER (TOPROL XL) 100 MG 24 hr tablet    MORPHINE SULFATE    nystatin (MYCOSTATIN) 852958 UNIT/GM external powder    rosuvastatin (CRESTOR) 40 MG tablet    traZODone (DESYREL) 100 MG tablet    nitroGLYcerin (NITROSTAT) 0.4 MG sublingual tablet     No current facility-administered medications for this visit.       SOCIAL HISTORY:  I have reviewed this patient's social history and updated it with pertinent information if needed. Onur Barr  reports that he has quit smoking. His smoking use included cigars. He has never been exposed to tobacco smoke. He has never used smokeless tobacco. He reports that he does not drink alcohol and does not use drugs.    PHYSICAL EXAM:  Pulse:  [68] 68  BP: (120)/(84) 120/84  SpO2:  [93 %] 93 %  323 lbs 0 oz    Constitutional: alert, no distress  Respiratory: Good bilateral  air entry  Cardiovascular: Regular rate and rhythm   GI: nondistended  Neuropsychiatric: appropriate affact    ASSESSMENT/PLAN:  Pertinent issues addressed/ reviewed during this cardiology visit    Chronic systolic/diastolic heart failure / right heart failure - echocardiogram that showed a low normal ejection fraction estimated at 50 to 55%, regional wall motion abnormalities cannot be excluded due to limited visualization.  Normal RV size and function, no significant valvular disease. Wt stable at 323 lb. Near euvolemic upon exam. No symptoms of fluid overload. Continue furosemide, metoprolol and lisinopril. Monitor weights daily, call office with a weight gain of 3 lb overnight or 5 lbs in a week, worsening shortness of breath or leg edema. Encourage exercise, weight loss, and low salt diet.   Coronary artery disease - status post PCI to the LAD in 2012, PCI to RCA in 2021, CABG x1 (LIMA to the LAD 1/2022). No symptoms of ischemia. Continue current therapy.   PAF/ left atrial appendage exclusion (1/2022) - rate controlled. Continue metoprolol. Off anticoagulation.   Hypertension - Well controlled    PVCs -  well controlled  Hyperlipidemia - Due for fasting lipid panel, continue rosuvastatin   Follow up in 6 months with SULEMA or sooner if needed.     It was a pleasure seeing this patient in clinic today. Please do not hesitate to contact me with any future questions.     YAMILETH Melton, CNP  Cardiology - [facility]  October 13, 2023    Review of the result(s) of each unique test - Last echocardiogram, BMP, CBC, and lipid panel      The level of medical decision making during this visit was of moderate complexity.    This note was completed in part using dictation via the Dragon voice recognition software. Some word and grammatical errors may occur and must be interpreted in the appropriate clinical context.  If there are any questions pertaining to this issue, please contact me for further clarification.

## 2023-10-13 NOTE — PATIENT INSTRUCTIONS
Thanks for participating in a office visit with the [facility] clinic today.    Symptoms unchanged on a cardiac standpoint.  Weights stable, no evidence of fluid overload.  Blood pressures well controlled.   Continue current medical therapy  Due for fasting cholesterol level in 1 month     Follow up in 6 months with SULEMA or sooner if needed     Please call my nurse at  [phone] with any questions or concerns.    Scheduling phone number: [phone]  Reminder: Please bring in all current medications, over the counter supplements and vitamin bottles to your next appointment.

## 2023-10-17 NOTE — COMMUNITY RESOURCES LIST (ENGLISH)
10/17/2023   [facility]  N/A  For questions about this resource list or additional care needs, please contact your primary care clinic or care manager.  Phone: [phone]   Email: N/A   Address: [address] [place] [address]   Hours: N/A        Financial Stability       Utility payment assistance  1  [facility] Distance: 1.2 miles      In-Person, Phone/Virtual   [address] [address]  Language: English  Hours: Mon 4:00 PM - 6:00 PM , Tue 11:00 AM - 1:00 PM , Wed 4:00 PM - 6:00 PM , Thu 10:30 AM - 12:30 PM  Fees: Free   Phone: (604) 945-9096 Email: resources@[employer].org Website: https://[employer].org/mission/mission-outpost/     2  [address] [place] [address] Distance: 1.66 miles      In-Person, Phone/Virtual   [address] [place] [address] [place] [address]  Language: English, Belizean  Hours: Mon - Thu 9:00 AM - 4:00 PM  Fees: Free   Phone: (338) 394-3405 Email: info@[facility] [facility].org Website: https://[facility] Center[facility].org/          Food and Nutrition       Food pantry  3  [facility] Out[facility] - Food Shelf Distance: 1.2 miles      Pickup   [address] [place] [address]  Language: English  Hours: Mon 4:00 PM - 6:00 PM , Tue 11:00 AM - 2:00 PM , Wed 4:00 PM - 6:00 PM , Thu 1:00 PM - 3:00 PM  Fees: Free   Phone: (363) 752-3247 Email: resources@[employer].org Website: https://[employer].org/mission/mission-outpost/     4  [facility] Distance: 1.2 miles      In-Person, Pickup   [address] [place] [address]  Language: English  Hours: Mon 4:00 PM - 6:00 PM , Tue 11:00 AM - 1:00 PM , Wed 4:00 PM - 6:00 PM , Thu 10:30 AM - 12:30 PM  Fees: Free   Phone: (100) 865-7570 Email: info@[employer].org Website: http://[employer].org     SNAP application assistance  5  [facility] Distance: 5.65 miles      In-Person   [address] [place] [address]  Language: English  Hours: Mon 8:00 AM - 4:00 PM , Tue 8:00 AM - 7:00 PM , Wed - Thu 8:00 AM - 4:00 PM  Fees: Free   Phone: (936) 859-7828 Email: info@[employer] Website: https://[employer]/resources/resource-centers/     6  [employer] (CAP)  [facility] Distance: 6.92 miles      In-Person   [address] [place] [address]  Language: English, Nigerian  Hours: Mon - Fri 8:00 AM - 8:00 PM  Fees: Free   Phone: (183) 338-2567 Email: info@[employer] Website: http://www.[employer]     Soup kitchen or free meals  7  [facility] - [facility] [place] Distance: 4.68 miles      Pickup   [address] [place] [address]  Language: English, Nigerian  Hours: Mon - Thu 5:30 PM - 6:30 PM  Fees: Free   Phone: (398) 280-5217 Email: easter@[employer] Website: http://[employer]/wordpress/?page_id=5168     8  [facility] and [place] Distance: 6.44 miles      Pickup   [address] [place] [address]  Language: English  Hours: Mon - Fri 5:00 PM - 6:00 PM  Fees: Free   Phone: (444) 121-6207 Email: contactus@[employer].org Website: https://www.[employer].org/          Important Numbers & Websites       Emergency Services   911  City Services   311  Poison Control   (305) 131-4984  Suicide Prevention Lifeline   (595) 672-3485 (TALK)  Child Abuse Hotline   (444) 347-7080 (4-A-Child)  Sexual Assault Hotline   (995) 909-4531 (HOPE)  National Runaway Safeline   (264) 949-5346 (RUNAWAY)  All-Options Talkline   (323) 937-2585  Substance Abuse Referral   (742) 271-6722 (HELP)

## 2023-10-23 ENCOUNTER — VIRTUAL VISIT (OUTPATIENT)
Dept: INTERNAL MEDICINE | Facility: CLINIC | Age: 68
End: 2023-10-23
Payer: COMMERCIAL

## 2023-10-23 DIAGNOSIS — E11.9 TYPE 2 DIABETES MELLITUS WITHOUT COMPLICATION, WITHOUT LONG-TERM CURRENT USE OF INSULIN (H): ICD-10-CM

## 2023-10-23 DIAGNOSIS — Z76.89 ENCOUNTER FOR WEIGHT MANAGEMENT: Primary | ICD-10-CM

## 2023-10-23 DIAGNOSIS — I73.9 PERIPHERAL VASCULAR DISEASE (H): ICD-10-CM

## 2023-10-23 PROCEDURE — 99213 OFFICE O/P EST LOW 20 MIN: CPT | Mod: VID | Performed by: INTERNAL MEDICINE

## 2023-10-23 ASSESSMENT — ENCOUNTER SYMPTOMS
CARDIOVASCULAR NEGATIVE: 1
RESPIRATORY NEGATIVE: 1
GASTROINTESTINAL NEGATIVE: 1
CONSTITUTIONAL NEGATIVE: 1

## 2023-10-23 NOTE — PROGRESS NOTES
Baudilio is a 68 year old who is being evaluated via a billable video visit.      How would you like to obtain your AVS? MyChart  If the video visit is dropped, the invitation should be resent by: Text to cell phone: [phone]  Will anyone else be joining your video visit? No      Assessment & Plan     Encounter for weight management and type 2 diabetes mellitus without complication, without long-term current use of insulin  At this time, we did spend some time discussing a variety of medications that can be helpful for weight management.  Given his cardiac history, I did not recommend that he consider trying phentermine for management of his weight.  Given that he has a history of diabetes, I do feel that it would be worthwhile for him to proceed with a trial of Ozempic.  Patient was in agreement.  We did spend some time reviewing side effects associated with this medication.  Patient was provided with a prescription for Ozempic 0.5 mg to be administered subcutaneously once per week for 1 month.  We will assess his response to the medication in approximately 1 month.  - semaglutide (OZEMPIC) 2 MG/3ML pen; Inject 0.5 mg Subcutaneous every 7 days    Peripheral vascular disease (H24)  Chronic condition.  Managed by cardiology.    Prescription drug management  23 minutes spent by me on the date of the encounter doing chart review, history and exam, documentation and further activities per the note       BMI:   Estimated body mass index is 43.81 kg/m  as calculated from the following:    Height as of 10/13/23: 1.829 m (6').    Weight as of 10/13/23: 146.5 kg (323 lb).   Weight management plan: Discussed healthy diet and exercise guidelines    See Patient Instructions    Roge Feliciano MD  [facility]   Baudilio is a 68 year old, presenting for the following health issues:  Follow Up (Weight loss)      Patient is a 68-year-old  male who participates in a virtual visit to  discuss weight management.  Patient has a complicated past medical history that includes diabetes mellitus, hypertension, heart failure, and atrial fibrillation.  Patient's last hemoglobin A1c was noted to be 5.9 in March 2023.  He is wanting to discuss weight management.  His most recent weight was noted to be 323 pounds on October 13, 2023.  Patient states that it is very difficult for him to exercise and move very frequently given his ongoing health issues.  He is not currently on any medication for management of his diabetes, but he is concerned that his glucose levels are trending upward.  Patient is wondering what medication options might be available to him for weight management and to help with his blood sugar concerns.  Patient has previously been on insulin, but he has been off of diabetic medications for quite some time.  Patient is not currently reporting any other concerns or issues.    History of Present Illness       Reason for visit:  To discuss my Gluclose level and what to do.  Also want to see if there is  weightloss pill i can go onHe consumes 3 sweetened beverage(s) daily.He exercises with enough effort to increase his heart rate 9 or less minutes per day.  He exercises with enough effort to increase his heart rate 3 or less days per week.   He is taking medications regularly.      Review of Systems   Constitutional: Negative.    HENT: Negative.     Respiratory: Negative.     Cardiovascular: Negative.    Gastrointestinal: Negative.    Skin: Negative.           Objective         Vitals:  No vitals were obtained today due to virtual visit.    Physical Exam   GENERAL: Healthy, alert and no distress  EYES: Eyes grossly normal to inspection.  No discharge or erythema, or obvious scleral/conjunctival abnormalities.  RESP: No audible wheeze, cough, or visible cyanosis.  No visible retractions or increased work of breathing.    SKIN: Visible skin clear. No significant rash, abnormal pigmentation or  lesions.  NEURO: Cranial nerves grossly intact.  Mentation and speech appropriate for age.  PSYCH: Mentation appears normal, affect normal/bright, judgement and insight intact, normal speech and appearance well-groomed.      Video-Visit Details    Type of service:  Video Visit   Video Start Time:  8:50 AM  Video End Time:9:13 AM    Originating Location (pt. Location): Home  Distant Location (provider location):  On-site  Platform used for Video Visit: Yousuf

## 2023-10-27 ENCOUNTER — MYC MEDICAL ADVICE (OUTPATIENT)
Dept: CARDIOLOGY | Facility: CLINIC | Age: 68
End: 2023-10-27
Payer: COMMERCIAL

## 2023-10-27 DIAGNOSIS — I50.22 CHRONIC SYSTOLIC CONGESTIVE HEART FAILURE (H): ICD-10-CM

## 2023-10-27 DIAGNOSIS — Z95.1 S/P CABG (CORONARY ARTERY BYPASS GRAFT): ICD-10-CM

## 2023-10-27 RX ORDER — METOPROLOL SUCCINATE 100 MG/1
100 TABLET, EXTENDED RELEASE ORAL 2 TIMES DAILY
Qty: 180 TABLET | Refills: 1 | Status: SHIPPED | OUTPATIENT
Start: 2023-10-27 | End: 2024-04-01

## 2023-10-27 RX ORDER — FUROSEMIDE 40 MG
40 TABLET ORAL 2 TIMES DAILY
Qty: 180 TABLET | Refills: 1 | Status: SHIPPED | OUTPATIENT
Start: 2023-10-27 | End: 2024-05-01

## 2023-10-31 ENCOUNTER — OFFICE VISIT (OUTPATIENT)
Dept: ENDOCRINOLOGY | Facility: CLINIC | Age: 68
End: 2023-10-31
Payer: COMMERCIAL

## 2023-10-31 VITALS
TEMPERATURE: 97.4 F | WEIGHT: 315 LBS | RESPIRATION RATE: 20 BRPM | HEIGHT: 72 IN | DIASTOLIC BLOOD PRESSURE: 71 MMHG | BODY MASS INDEX: 42.66 KG/M2 | HEART RATE: 64 BPM | OXYGEN SATURATION: 96 % | SYSTOLIC BLOOD PRESSURE: 112 MMHG

## 2023-10-31 DIAGNOSIS — R79.89 LOW TESTOSTERONE: ICD-10-CM

## 2023-10-31 DIAGNOSIS — E66.01 MORBID OBESITY (H): Primary | ICD-10-CM

## 2023-10-31 LAB
ALBUMIN SERPL BCG-MCNC: 4 G/DL (ref 3.5–5.2)
ALP SERPL-CCNC: 85 U/L (ref 40–129)
ALT SERPL W P-5'-P-CCNC: 12 U/L (ref 0–70)
ANION GAP SERPL CALCULATED.3IONS-SCNC: 11 MMOL/L (ref 7–15)
AST SERPL W P-5'-P-CCNC: 24 U/L (ref 0–45)
BILIRUB SERPL-MCNC: 0.5 MG/DL
BUN SERPL-MCNC: 26.5 MG/DL (ref 8–23)
CALCIUM SERPL-MCNC: 9.4 MG/DL (ref 8.8–10.2)
CHLORIDE SERPL-SCNC: 105 MMOL/L (ref 98–107)
CREAT SERPL-MCNC: 1.04 MG/DL (ref 0.67–1.17)
DEPRECATED HCO3 PLAS-SCNC: 26 MMOL/L (ref 22–29)
EGFRCR SERPLBLD CKD-EPI 2021: 79 ML/MIN/1.73M2
ERYTHROCYTE [DISTWIDTH] IN BLOOD BY AUTOMATED COUNT: 12.5 % (ref 10–15)
FSH SERPL IRP2-ACNC: 2.6 MIU/ML (ref 1.5–12.4)
GLUCOSE SERPL-MCNC: 120 MG/DL (ref 70–99)
HCT VFR BLD AUTO: 48.3 % (ref 40–53)
HGB BLD-MCNC: 15.9 G/DL (ref 13.3–17.7)
LH SERPL-ACNC: 1.3 MIU/ML (ref 1.7–8.6)
MCH RBC QN AUTO: 29.5 PG (ref 26.5–33)
MCHC RBC AUTO-ENTMCNC: 32.9 G/DL (ref 31.5–36.5)
MCV RBC AUTO: 90 FL (ref 78–100)
PLATELET # BLD AUTO: 138 10E3/UL (ref 150–450)
POTASSIUM SERPL-SCNC: 4.2 MMOL/L (ref 3.4–5.3)
PROLACTIN SERPL 3RD IS-MCNC: 17 NG/ML (ref 4–15)
PROT SERPL-MCNC: 7.5 G/DL (ref 6.4–8.3)
RBC # BLD AUTO: 5.39 10E6/UL (ref 4.4–5.9)
SODIUM SERPL-SCNC: 142 MMOL/L (ref 135–145)
WBC # BLD AUTO: 16.3 10E3/UL (ref 4–11)

## 2023-10-31 PROCEDURE — 80053 COMPREHEN METABOLIC PANEL: CPT | Performed by: PHYSICIAN ASSISTANT

## 2023-10-31 PROCEDURE — 36415 COLL VENOUS BLD VENIPUNCTURE: CPT | Performed by: PHYSICIAN ASSISTANT

## 2023-10-31 PROCEDURE — 83002 ASSAY OF GONADOTROPIN (LH): CPT | Performed by: PHYSICIAN ASSISTANT

## 2023-10-31 PROCEDURE — 83001 ASSAY OF GONADOTROPIN (FSH): CPT | Performed by: PHYSICIAN ASSISTANT

## 2023-10-31 PROCEDURE — 99204 OFFICE O/P NEW MOD 45 MIN: CPT | Performed by: PHYSICIAN ASSISTANT

## 2023-10-31 PROCEDURE — 84403 ASSAY OF TOTAL TESTOSTERONE: CPT | Performed by: PHYSICIAN ASSISTANT

## 2023-10-31 PROCEDURE — 84270 ASSAY OF SEX HORMONE GLOBUL: CPT | Performed by: PHYSICIAN ASSISTANT

## 2023-10-31 PROCEDURE — 84146 ASSAY OF PROLACTIN: CPT | Performed by: PHYSICIAN ASSISTANT

## 2023-10-31 PROCEDURE — 85027 COMPLETE CBC AUTOMATED: CPT | Performed by: PHYSICIAN ASSISTANT

## 2023-10-31 NOTE — LETTER
10/31/2023         RE: Onur Barr  [address] [facility] [address]        Dear Colleague,    Thank you for referring your patient, Onur Barr, to the [facility]. Please see a copy of my visit note below.    Assessment/Plan :   Low testosterone. We discussed his previous laboratory results. We also discussed the possible causes of low testosterone. Baudilio has many comorbidities that may contribute to low testosterone. With that being said, I think working to get his testosterone closer to a normal range, would help with his energy. We discussed treatment options and we discussed what to expect with testosterone replacement. We will repeat routine laboratory testing today and I will contact him with the results and treatment options. We will follow-up in about 3 mos.      I have independently reviewed and interpreted labs, imaging as indicated.      Chief complaint:  Onur is a 68 year old male seen in consultation at the request of Dr. Ma for low testosterone.    I have reviewed Care Everywhere including [facility], [facility], [facility], [facility], [facility] , [facility], [place] lab reports, imaging reports and provider notes as indicated.      HISTORY OF PRESENT ILLNESS  Baudilio has a complicated medical history that includes Type 2 DM, morbid obesity with a history of bariatric surgery, hyperlipidemia, MARLEEN, NSTEMI s/p CABG in 2022, ICD in place with a history of paroxysmal atrial fib, PVD, osteoarthrosis, chronic back pain with multiple previous surgeries and the current use of a pain pump, HTN, CLL, and low testosterone. He states that he has been struggling with exhaustion for over 20 yrs. If he sits for more than a few minutes, he will fall asleep. He also feels like the fatigue prevents him from being more active. He would like to get his weight down and be able to walk but he is so tired.    He was discussing this fatigue at a  follow-up visit with his primary care provider. They decided to check his testosterone level and thyroid level. Baudilio was told that his thyroid level was normal but that his testosterone was very low. Baudilio does not believe that he has ever had his testosterone level checked. He denies any history of testicular damage.     Endocrine relevant labs are as follows:   Latest Reference Range & Units 06/09/23 10:45   Testosterone Total 240 - 950 ng/dL 28 (L)   (L): Data is abnormally low   Latest Reference Range & Units 06/09/23 10:45   Free Testosterone Calculated ng/dL 0.43     REVIEW OF SYSTEMS    Endocrine: positive for diabetes, low testosterone, and obesity  Skin: negative  Eyes: negative for, visual blurring, double vision, redness, tearing  Ears/Nose/Throat: negative  Respiratory: No shortness of breath, dyspnea on exertion, cough, or hemoptysis  Cardiovascular: positive for dyspnea on exertion, lower extremity edema, claudication, and exercise intolerance, negative for, irregular heart beat, and chest pain  Gastrointestinal: negative for, nausea, vomiting, constipation, and diarrhea  Genitourinary: negative for, dysuria, frequency, urgency, and hesitancy  Musculoskeletal: positive for back pain and muscular weakness, negative for, and nocturnal cramping  Neurologic: negative for, numbness or tingling of hands, and numbness or tingling of feet  Psychiatric: negative  Hematologic/Lymphatic/Immunologic: negative    Past Medical History  Past Medical History:   Diagnosis Date     Abdominal panniculus 11/13/2012    Formatting of this note might be different from the original. S/p panniculectomy 11/13/2012     Acid reflux disease 10/31/2017     Bariatric surgery status 06/23/2008    Formatting of this note might be different from the original. Created by Conversion     Bilateral leg edema 07/13/2021     CHF (congestive heart failure) (H)      Chronic Back Pain (IT Pump w Morphine, Clonidine, Baclofen) 01/16/2022      Chronic diastolic heart failure (H) 07/26/2021     Claudication of lower extremity (H24) 11/20/2019     Coronary arteriosclerosis 05/09/2022    Formatting of this note might be different from the original. 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     Ischemic cardiomyopathy      Lumbago     Created by [facility] Annotation: Apr 6 2012 10:20Anh Ford: Morphine pump      Mixed hyperlipidemia 06/23/2008     Morbid obesity (H) 08/01/2018     Nephrolithiasis      NSTEMI (non-ST elevated myocardial infarction) (H)      Obstructive sleep apnea     Doesn't tolerate CPAP     MARLEEN (doesn't tolerate CPAP) 07/26/2021     Osteoarthritis     Created by [facility] Annotation: Jun 26 2009  9:53Zack Harris: failed lumbar  fusion/morphine pump dr putnam  Replacement Utility updated for latest IMO load     Osteoarthrosis 05/09/2022    Formatting of this note might be different from the original. Created by [facility] Annotation: Jun 26 2009  9:Zack Mantilla: failed lumbar  fusion/morphine pump dr putnam  Replacement Utility updated for latest IMO load     Paroxysmal atrial fib -- S/P Pulm Vein Ablation 1/19/22 09/11/2021    Formatting of this note might be different from the original. Created by Conversion     Paroxysmal atrial fibrillation (H)     Created by Conversion       Paroxysmal ventricular tachycardia (H)     dual chamber ICD 1/24/2022     Peripheral vascular disease (H24) 05/03/2022     Post-laminectomy syndrome 11/25/2015     S/P CABG (coronary artery bypass graft) 01/25/2022     Sleepiness 05/08/2018     Type 2 diabetes mellitus (H)     Diet controlled after Gastric Bypass in 2008       Medications  Current Outpatient Medications   Medication Sig Dispense Refill     acetaminophen (TYLENOL) 325 MG tablet Take 2 tablets (650 mg) by mouth every 4 hours as needed for mild pain 40 tablet 0     aspirin (ASA) 81 MG chewable tablet Take 81 mg by mouth daily       DULoxetine (CYMBALTA) 30 MG capsule Take 1 capsule (30mg) every morning and 2 capsules (60mg) every evening 270 capsule 1     FERATE 240 (27 Fe) MG TABS TAKE 1 TABLET BY MOUTH EVERY DAY 90 tablet 2     furosemide (LASIX) 40 MG tablet Take 1 tablet (40 mg) by mouth 2 times daily 180 tablet 1     ketoconazole (NIZORAL) 2 % external cream Apply topically 2 times daily 60 g 4     lisinopril (ZESTRIL) 10 MG tablet Take 1 tablet (10 mg) by mouth daily 90 tablet 3     metoprolol succinate ER (TOPROL XL) 100 MG 24 hr tablet Take 1 tablet (100 mg) by mouth 2 times daily 180 tablet 1     MORPHINE SULFATE IT pump:updated 1/12/22  Medications in Pump:   [facility] Responsible for pump medications:   Conc:  Morphine 20mg/ml(3.95 mg/day), clonidine 21.1mcg/ml (4.168 mcg/day), baclofen 42.5mcg/ml (8.395mcg/day)  Rate:   Pump Last Fill Date: 9/2021  Pump Refill Date: 2/2022       nitroGLYcerin (NITROSTAT) 0.4 MG sublingual tablet For chest pain place 1 tablet under the tongue every 5 minutes for 3 doses. If symptoms persist 5 minutes after 1st dose call 911. 30 tablet 1     nystatin (MYCOSTATIN) 052224 UNIT/GM external powder Apply topically 3 times daily 60 g 4     rosuvastatin (CRESTOR) 40 MG tablet Take 1 tablet (40 mg) by mouth daily 90 tablet 3     semaglutide (OZEMPIC) 2 MG/3ML pen Inject 0.5 mg Subcutaneous every 7  "days 3 mL 0     traZODone (DESYREL) 100 MG tablet Take 2 tablets (200 mg) by mouth At Bedtime TAKE 2 TABLETS (200MG TOTAL) BY MOUTH AT BEDTIME Strength: 100 mg 180 tablet 3       Allergies  Allergies   Allergen Reactions     Hydrocodone-Acetaminophen Other (See Comments)     sneezing         Family History  family history includes Breast Cancer in his sister; Cerebrovascular Disease in his mother; Coronary Artery Disease in his father; Heart Disease in his father; Hodgkin's lymphoma in his brother; Hyperlipidemia in his father; Hypertension in his father; Other Cancer in his daughter and sister.    Social History  Social History     Tobacco Use     Smoking status: Former     Types: Cigars     Passive exposure: Never     Smokeless tobacco: Never     Tobacco comments:     Haven't smoked in years   Vaping Use     Vaping Use: Never used   Substance Use Topics     Alcohol use: No     Drug use: No     Comment: Drug use: formerly used       Physical Exam  /71 (BP Location: Left arm, Patient Position: Chair, Cuff Size: Adult Large)   Pulse 64   Temp 97.4  F (36.3  C) (Tympanic)   Resp 20   Ht 1.829 m (6' 0.01\")   Wt 147.4 kg (325 lb)   SpO2 96%   BMI 44.07 kg/m    Body mass index is 44.07 kg/m .  GENERAL :  In no apparent distress  SKIN: Normal color, normal temperature, texture.  No hirsutism, alopecia or purple striae.     EYES: PERRL, EOMI, No scleral icterus,  No proptosis, conjunctival redness, stare, retraction  RESP: Lungs clear to auscultation bilaterally  CARDIAC: Regular rate and rhythm, normal S1 S2, without murmurs, rubs or gallops  ABDOMEN: Normal bowel sounds; soft, nontender, no HSM or masses       NEURO: awake, alert, responds appropriately to questions.  Cranial nerves intact.   Moves all extremities; Gait normal. Pt walks bent over with the help of a cane  No tremor of the outstretched hand.  EXTREMITIES: No clubbing or cyanosis. 2+ pitting edema. Discoloration of lower " extremities.            Again, thank you for allowing me to participate in the care of your patient.        Sincerely,        Kylie Hawkins PA-C

## 2023-10-31 NOTE — PROGRESS NOTES
Assessment/Plan :   Low testosterone. We discussed his previous laboratory results. We also discussed the possible causes of low testosterone. Baudilio has many comorbidities that may contribute to low testosterone. With that being said, I think working to get his testosterone closer to a normal range, would help with his energy. We discussed treatment options and we discussed what to expect with testosterone replacement. We will repeat routine laboratory testing today and I will contact him with the results and treatment options. We will follow-up in about 3 mos.      I have independently reviewed and interpreted labs, imaging as indicated.      Chief complaint:  Onur is a 68 year old male seen in consultation at the request of Dr. Ma for low testosterone.    I have reviewed Care Everywhere including [facility], [facility], [facility] , [facility], [place] lab reports, imaging reports and provider notes as indicated.      HISTORY OF PRESENT ILLNESS  Baudilio has a complicated medical history that includes Type 2 DM, morbid obesity with a history of bariatric surgery, hyperlipidemia, MARLEEN, NSTEMI s/p CABG in 2022, ICD in place with a history of paroxysmal atrial fib, PVD, osteoarthrosis, chronic back pain with multiple previous surgeries and the current use of a pain pump, HTN, CLL, and low testosterone. He states that he has been struggling with exhaustion for over 20 yrs. If he sits for more than a few minutes, he will fall asleep. He also feels like the fatigue prevents him from being more active. He would like to get his weight down and be able to walk but he is so tired.    He was discussing this fatigue at a follow-up visit with his primary care provider. They decided to check his testosterone level and thyroid level. Baudilio was told that his thyroid level was normal but that his testosterone was very low. Baudilio does not believe that he has ever had his testosterone  level checked. He denies any history of testicular damage.     Endocrine relevant labs are as follows:   Latest Reference Range & Units 06/09/23 10:45   Testosterone Total 240 - 950 ng/dL 28 (L)   (L): Data is abnormally low   Latest Reference Range & Units 06/09/23 10:45   Free Testosterone Calculated ng/dL 0.43     REVIEW OF SYSTEMS    Endocrine: positive for diabetes, low testosterone, and obesity  Skin: negative  Eyes: negative for, visual blurring, double vision, redness, tearing  Ears/Nose/Throat: negative  Respiratory: No shortness of breath, dyspnea on exertion, cough, or hemoptysis  Cardiovascular: positive for dyspnea on exertion, lower extremity edema, claudication, and exercise intolerance, negative for, irregular heart beat, and chest pain  Gastrointestinal: negative for, nausea, vomiting, constipation, and diarrhea  Genitourinary: negative for, dysuria, frequency, urgency, and hesitancy  Musculoskeletal: positive for back pain and muscular weakness, negative for, and nocturnal cramping  Neurologic: negative for, numbness or tingling of hands, and numbness or tingling of feet  Psychiatric: negative  Hematologic/Lymphatic/Immunologic: negative    Past Medical History  Past Medical History:   Diagnosis Date    Abdominal panniculus 11/13/2012    Formatting of this note might be different from the original. S/p panniculectomy 11/13/2012    Acid reflux disease 10/31/2017    Bariatric surgery status 06/23/2008    Formatting of this note might be different from the original. Created by Conversion    Bilateral leg edema 07/13/2021    CHF (congestive heart failure) (H)     Chronic Back Pain (IT Pump w Morphine, Clonidine, Baclofen) 01/16/2022    Chronic diastolic heart failure (H) 07/26/2021    Claudication of lower extremity (H24) 11/20/2019    Coronary arteriosclerosis 05/09/2022    Formatting of this note might be different from the original. 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    Ischemic cardiomyopathy     Lumbago     Created by [facility] Annotation: Apr 6 2012 10:20Anh Ford: Morphine pump     Mixed hyperlipidemia 06/23/2008    Morbid obesity (H) 08/01/2018    Nephrolithiasis     NSTEMI (non-ST elevated myocardial infarction) (H)     Obstructive sleep apnea     Doesn't tolerate CPAP    MARLEEN (doesn't tolerate CPAP) 07/26/2021    Osteoarthritis     Created by [facility] Annotation: Jun 26 2009  9:53Zack Harris: failed lumbar  fusion/morphine pump dr putnam  Replacement Utility updated for latest IMO load    Osteoarthrosis 05/09/2022    Formatting of this note might be different from the original. Created by [facility] Annotation: Jun 26 2009  9:53Zack Harris: failed lumbar  fusion/morphine pump dr putnam  Replacement Utility updated for latest IMO load    Paroxysmal atrial fib -- S/P Pulm Vein Ablation 1/19/22 09/11/2021    Formatting of this note might be different from the original. Created by Conversion    Paroxysmal atrial fibrillation (H)     Created by Conversion     Paroxysmal ventricular tachycardia (H)     dual chamber ICD 1/24/2022    Peripheral vascular disease (H24) 05/03/2022    Post-laminectomy syndrome 11/25/2015    S/P CABG (coronary artery bypass graft) 01/25/2022    Sleepiness 05/08/2018    Type 2 diabetes mellitus (H)     Diet controlled  after Gastric Bypass in 2008       Medications  Current Outpatient Medications   Medication Sig Dispense Refill    acetaminophen (TYLENOL) 325 MG tablet Take 2 tablets (650 mg) by mouth every 4 hours as needed for mild pain 40 tablet 0    aspirin (ASA) 81 MG chewable tablet Take 81 mg by mouth daily      DULoxetine (CYMBALTA) 30 MG capsule Take 1 capsule (30mg) every morning and 2 capsules (60mg) every evening 270 capsule 1    FERATE 240 (27 Fe) MG TABS TAKE 1 TABLET BY MOUTH EVERY DAY 90 tablet 2    furosemide (LASIX) 40 MG tablet Take 1 tablet (40 mg) by mouth 2 times daily 180 tablet 1    ketoconazole (NIZORAL) 2 % external cream Apply topically 2 times daily 60 g 4    lisinopril (ZESTRIL) 10 MG tablet Take 1 tablet (10 mg) by mouth daily 90 tablet 3    metoprolol succinate ER (TOPROL XL) 100 MG 24 hr tablet Take 1 tablet (100 mg) by mouth 2 times daily 180 tablet 1    MORPHINE SULFATE IT pump:updated 1/12/22  Medications in Pump:   [facility] Responsible for pump medications:   Conc:  Morphine 20mg/ml(3.95 mg/day), clonidine 21.1mcg/ml (4.168 mcg/day), baclofen 42.5mcg/ml (8.395mcg/day)  Rate:   Pump Last Fill Date: 9/2021  Pump Refill Date: 2/2022      nitroGLYcerin (NITROSTAT) 0.4 MG sublingual tablet For chest pain place 1 tablet under the tongue every 5 minutes for 3 doses. If symptoms persist 5 minutes after 1st dose call 911. 30 tablet 1    nystatin (MYCOSTATIN) 139080 UNIT/GM external powder Apply topically 3 times daily 60 g 4    rosuvastatin (CRESTOR) 40 MG tablet Take 1 tablet (40 mg) by mouth daily 90 tablet 3    semaglutide (OZEMPIC) 2 MG/3ML pen Inject 0.5 mg Subcutaneous every 7 days 3 mL 0    traZODone (DESYREL) 100 MG tablet Take 2 tablets (200 mg) by mouth At Bedtime TAKE 2 TABLETS (200MG TOTAL) BY MOUTH AT BEDTIME Strength: 100 mg 180 tablet 3       Allergies  Allergies   Allergen Reactions    Hydrocodone-Acetaminophen Other (See Comments)     sneezing         Family  "History  family history includes Breast Cancer in his sister; Cerebrovascular Disease in his mother; Coronary Artery Disease in his father; Heart Disease in his father; Hodgkin's lymphoma in his brother; Hyperlipidemia in his father; Hypertension in his father; Other Cancer in his daughter and sister.    Social History  Social History     Tobacco Use    Smoking status: Former     Types: Cigars     Passive exposure: Never    Smokeless tobacco: Never    Tobacco comments:     Haven't smoked in years   Vaping Use    Vaping Use: Never used   Substance Use Topics    Alcohol use: No    Drug use: No     Comment: Drug use: formerly used       Physical Exam  /71 (BP Location: Left arm, Patient Position: Chair, Cuff Size: Adult Large)   Pulse 64   Temp 97.4  F (36.3  C) (Tympanic)   Resp 20   Ht 1.829 m (6' 0.01\")   Wt 147.4 kg (325 lb)   SpO2 96%   BMI 44.07 kg/m    Body mass index is 44.07 kg/m .  GENERAL :  In no apparent distress  SKIN: Normal color, normal temperature, texture.  No hirsutism, alopecia or purple striae.     EYES: PERRL, EOMI, No scleral icterus,  No proptosis, conjunctival redness, stare, retraction  RESP: Lungs clear to auscultation bilaterally  CARDIAC: Regular rate and rhythm, normal S1 S2, without murmurs, rubs or gallops  ABDOMEN: Normal bowel sounds; soft, nontender, no HSM or masses       NEURO: awake, alert, responds appropriately to questions.  Cranial nerves intact.   Moves all extremities; Gait normal. Pt walks bent over with the help of a cane  No tremor of the outstretched hand.  EXTREMITIES: No clubbing or cyanosis. 2+ pitting edema. Discoloration of lower extremities.          "

## 2023-11-01 LAB — SHBG SERPL-SCNC: 36 NMOL/L (ref 11–80)

## 2023-11-02 LAB
TESTOST FREE SERPL-MCNC: 0.36 NG/DL
TESTOST SERPL-MCNC: 21 NG/DL (ref 240–950)

## 2023-11-03 ENCOUNTER — TELEPHONE (OUTPATIENT)
Dept: ENDOCRINOLOGY | Facility: CLINIC | Age: 68
End: 2023-11-03
Payer: COMMERCIAL

## 2023-11-03 DIAGNOSIS — R79.89 LOW TESTOSTERONE: Primary | ICD-10-CM

## 2023-11-03 RX ORDER — TESTOSTERONE GEL, 1% 10 MG/G
50 GEL TRANSDERMAL DAILY
Qty: 150 G | Refills: 1 | Status: SHIPPED | OUTPATIENT
Start: 2023-11-03 | End: 2024-02-28

## 2023-11-03 NOTE — TELEPHONE ENCOUNTER
Central Prior Authorization Team   Phone: [phone]    PRIOR AUTHORIZATION DENIED    Medication: TESTOSTERONE 12.5 MG/ACT (1%) TD GEL  Insurance Company: [employer] - Phone [phone] Fax [phone]  Denial Date: 11/3/2023  Denial Rational: REQUIRES A BASELINE PSA LEVEL      Appeal Information: IF PROVIDER WOULD LIKE TO APPEAL THIS DECISION PLEASE PROVIDE THE PA TEAM WITH A LETTER OF MEDICAL NECESSITY      Patient Notified: No

## 2023-11-03 NOTE — LETTER
To whom it may concern,      Onur is a patient that suffers from hypogonadism, which is evidenced by consecutive low serum testosterone levels, as well as symptoms relating to this diagnosis. Onur has undergone additional laboratory testing of a PSA, with a result of 0.19. Please consider covering testosterone gel for this patient, as he meets criteria for treatment. If there are any questions we can be reached at [phone].    Sincerely,    Kylie Hawkins PA-C.

## 2023-11-09 NOTE — TELEPHONE ENCOUNTER
Pt needs a lab appointment for a PSA (Prostate specific antigen). The insurance will not cover testosterone without this lab level.

## 2023-11-10 NOTE — TELEPHONE ENCOUNTER
Mrs Barr called back and asked if the labs from 11/13/23 will be okay for the PA needed or will he need to get another set of labs? He's also scheduled to do a cbc for Kylie Hawkins 12/7/23 will be need still ? Please contact via my chart to confirm thank you

## 2023-11-13 ENCOUNTER — LAB (OUTPATIENT)
Dept: LAB | Facility: CLINIC | Age: 68
End: 2023-11-13
Payer: COMMERCIAL

## 2023-11-13 DIAGNOSIS — R79.89 LOW TESTOSTERONE: ICD-10-CM

## 2023-11-13 DIAGNOSIS — E78.5 HYPERLIPIDEMIA WITH TARGET LDL LESS THAN 70: ICD-10-CM

## 2023-11-13 LAB
ALT SERPL W P-5'-P-CCNC: 12 U/L (ref 0–70)
CHOLEST SERPL-MCNC: 105 MG/DL
HDLC SERPL-MCNC: 37 MG/DL
LDLC SERPL CALC-MCNC: 36 MG/DL
NONHDLC SERPL-MCNC: 68 MG/DL
TRIGL SERPL-MCNC: 161 MG/DL

## 2023-11-13 PROCEDURE — 84460 ALANINE AMINO (ALT) (SGPT): CPT | Performed by: NURSE PRACTITIONER

## 2023-11-13 PROCEDURE — G0103 PSA SCREENING: HCPCS | Performed by: INTERNAL MEDICINE

## 2023-11-13 PROCEDURE — 80061 LIPID PANEL: CPT | Performed by: NURSE PRACTITIONER

## 2023-11-13 PROCEDURE — 36415 COLL VENOUS BLD VENIPUNCTURE: CPT | Performed by: NURSE PRACTITIONER

## 2023-11-16 ENCOUNTER — MYC MEDICAL ADVICE (OUTPATIENT)
Dept: ENDOCRINOLOGY | Facility: CLINIC | Age: 68
End: 2023-11-16

## 2023-11-16 DIAGNOSIS — R79.89 LOW TESTOSTERONE: Primary | ICD-10-CM

## 2023-11-16 LAB
PSA SERPL DL<=0.01 NG/ML-MCNC: 0.19 NG/ML (ref 0–4.5)
PSA SERPL DL<=0.01 NG/ML-MCNC: 0.19 NG/ML (ref 0–4.5)

## 2023-11-17 NOTE — TELEPHONE ENCOUNTER
Central Prior Authorization Team   Phone: [phone]    PA Initiation    Medication: TESTOSTERONE 12.5 MG/ACT (1%) TD GEL  Insurance Company: [employer] - Phone [phone] Fax [phone]  Pharmacy Filling the Rx:    Filling Pharmacy Phone:    Filling Pharmacy Fax:    Start Date: 11/17/2023    PER INSURANCE - A NEW REQUEST CAN BE INITIATED WITH NEW INFORMATION

## 2023-11-18 NOTE — TELEPHONE ENCOUNTER
PRIOR AUTHORIZATION DENIED    Medication: TESTOSTERONE 12.5 MG/ACT (1%) TD GEL  Insurance Company: [employer] - Phone [phone] Fax [phone]  Denial Date: 11/17/2023  Denial Rational: NOT COVERED WHEN PATIENT HAS UNTREATED SLEEP APNEA      Appeal Information: IF PROVIDER WOULD LIKE TO APPEAL THIS DECISION PLEASE PROVIDE THE PA TEAM WITH A LETTER OF MEDICAL NECESSITY      Patient Notified: No

## 2023-11-21 ENCOUNTER — TELEPHONE (OUTPATIENT)
Dept: INTERNAL MEDICINE | Facility: CLINIC | Age: 68
End: 2023-11-21
Payer: COMMERCIAL

## 2023-11-21 DIAGNOSIS — Z76.89 ENCOUNTER FOR WEIGHT MANAGEMENT: ICD-10-CM

## 2023-11-21 NOTE — TELEPHONE ENCOUNTER
Spouse calls about med clarification on Ozempic stating she doesn't know if he is taking this correctly. Wife states box says to take 0.5 mg every 7 days, but patient has been giving 0.25 mg since starting it but has been having nausea since starting the medication. Patient reports throwing up every other day. Patient denies abdominal pain, but reports constipation stating he is pooping every 2 days and they are hard. Patient is due for next injection tomorrow.     Routing to primary care provider to advise on side effects and if patient should keep at 0.25mg dose or go up to the 0.5mg dose as prescribed? Patient's wife also requesting calling in the next dose for patient.     Thank you,  Joe Rai, Triage RN [place]  10:44 AM 11/21/2023

## 2023-11-22 NOTE — TELEPHONE ENCOUNTER
Called patient. He clarifies he is taking 0.5 mg Ozempic each week. He took his regular dose today. Advised patient of recommendations from Dr. Feliciano. Patient agrees to try holding medication for 1-2 weeks if nausea and vomiting continue over the next week.     However, he is now out of medication and asks to have refill sent to the pharmacy to have available if needed next week.     Coleen Greer RN  [facility]

## 2023-11-22 NOTE — TELEPHONE ENCOUNTER
If he is having no side effects on 0.25 mg, I would not recommend that he increase to 0.5 mg.  I would recommend that he consider holding the medication to see if his symptoms are resolved.

## 2023-12-07 ENCOUNTER — LAB (OUTPATIENT)
Dept: LAB | Facility: CLINIC | Age: 68
End: 2023-12-07
Payer: COMMERCIAL

## 2023-12-07 DIAGNOSIS — C91.10 CLL (CHRONIC LYMPHOCYTIC LEUKEMIA) (H): ICD-10-CM

## 2023-12-07 DIAGNOSIS — E11.9 TYPE 2 DIABETES MELLITUS WITHOUT COMPLICATION, WITHOUT LONG-TERM CURRENT USE OF INSULIN (H): Primary | ICD-10-CM

## 2023-12-07 DIAGNOSIS — E66.01 MORBID OBESITY (H): ICD-10-CM

## 2023-12-07 DIAGNOSIS — R79.89 LOW TESTOSTERONE: ICD-10-CM

## 2023-12-07 LAB
CREAT UR-MCNC: 239 MG/DL
ERYTHROCYTE [DISTWIDTH] IN BLOOD BY AUTOMATED COUNT: 12.5 % (ref 10–15)
HBA1C MFR BLD: 5.4 % (ref 0–5.6)
HCT VFR BLD AUTO: 42.7 % (ref 40–53)
HGB BLD-MCNC: 14.4 G/DL (ref 13.3–17.7)
MCH RBC QN AUTO: 29.6 PG (ref 26.5–33)
MCHC RBC AUTO-ENTMCNC: 33.7 G/DL (ref 31.5–36.5)
MCV RBC AUTO: 88 FL (ref 78–100)
MICROALBUMIN UR-MCNC: 113 MG/L
MICROALBUMIN/CREAT UR: 47.28 MG/G CR (ref 0–17)
PLATELET # BLD AUTO: 156 10E3/UL (ref 150–450)
PSA SERPL DL<=0.01 NG/ML-MCNC: 0.13 NG/ML (ref 0–4.5)
PSA SERPL DL<=0.01 NG/ML-MCNC: 0.13 NG/ML (ref 0–4.5)
RBC # BLD AUTO: 4.86 10E6/UL (ref 4.4–5.9)
WBC # BLD AUTO: 23.6 10E3/UL (ref 4–11)

## 2023-12-07 PROCEDURE — 85027 COMPLETE CBC AUTOMATED: CPT

## 2023-12-07 PROCEDURE — 82570 ASSAY OF URINE CREATININE: CPT

## 2023-12-07 PROCEDURE — 82043 UR ALBUMIN QUANTITATIVE: CPT

## 2023-12-07 PROCEDURE — 83036 HEMOGLOBIN GLYCOSYLATED A1C: CPT

## 2023-12-07 PROCEDURE — 36415 COLL VENOUS BLD VENIPUNCTURE: CPT

## 2023-12-07 PROCEDURE — 84153 ASSAY OF PSA TOTAL: CPT

## 2023-12-11 ENCOUNTER — VIRTUAL VISIT (OUTPATIENT)
Dept: ONCOLOGY | Facility: CLINIC | Age: 68
End: 2023-12-11
Attending: INTERNAL MEDICINE
Payer: COMMERCIAL

## 2023-12-11 VITALS — WEIGHT: 305 LBS | BODY MASS INDEX: 37.92 KG/M2 | HEIGHT: 75 IN

## 2023-12-11 DIAGNOSIS — C91.10 CLL (CHRONIC LYMPHOCYTIC LEUKEMIA) (H): Primary | ICD-10-CM

## 2023-12-11 PROCEDURE — 99213 OFFICE O/P EST LOW 20 MIN: CPT | Mod: VID | Performed by: INTERNAL MEDICINE

## 2023-12-11 ASSESSMENT — ENCOUNTER SYMPTOMS
CONSTIPATION: 1
MUSCULOSKELETAL NEGATIVE: 1
APPETITE CHANGE: 1
NAUSEA: 1
NEUROLOGICAL NEGATIVE: 1
VOMITING: 1
FATIGUE: 1
PSYCHIATRIC NEGATIVE: 1
ENDOCRINE COMMENTS: DIABETES MELLITUS
SHORTNESS OF BREATH: 1
HEMATOLOGIC/LYMPHATIC NEGATIVE: 1
CARDIOVASCULAR NEGATIVE: 1
EYES NEGATIVE: 1

## 2023-12-11 ASSESSMENT — PAIN SCALES - GENERAL: PAINLEVEL: SEVERE PAIN (7)

## 2023-12-11 NOTE — NURSING NOTE
Patient is also taking a stool softeners      Is the patient currently in the state of [place]? YES    Visit mode:VIDEO    If the visit is dropped, the patient can be reconnected by: VIDEO VISIT: Text to cell phone:   Telephone Information:   Mobile [phone]       Will anyone else be joining the visit? YES: How would they like to receive their invitation? Text to cell phone: [phone]  Krystal-Pt's Wife  (If patient encounters technical issues they should call [phone] :[phone])    How would you like to obtain your AVS? MyChart    Are changes needed to the allergy or medication list? Yes ADD MEDICATION- stool softeners    Reason for visit: RECHECK (Review labs, follow up)      Vianey BROWN

## 2023-12-11 NOTE — PROGRESS NOTES
Virtual Visit Details    Type of service:  Video Visit   Video Start Time:  10  AM  Video End Time:4:02 PM    Originating Location (pt. Location): Home    Distant Location (provider location):  On-site  Platform used for Video Visit: Yousuf

## 2023-12-11 NOTE — PROGRESS NOTES
Assessment & Plan   CLL  Diabetes mellitus    Recheck counts in ~ 4 months    Interval History  This is a scheduled virtual follow up visit for this patient with early stage CLL (no cytopenias, no palpable or imagable lymphadenopathy, mild splenomegaly possibly unrelated).  Apart from the lymphocytosis, which remains in the same range as it has been for many months, the rest of his hemogram remains normal.    He has started Ozempic for his diabetes, has had occasional nausea, vomiting and consistent constipation but he's lost 20#.      Otherwise, he feels much the same.      ECOG = 0    Patient Active Problem List   Diagnosis    Lumbago    Morbid obesity -- BMI 42.0    Bilateral leg edema    Chronic diastolic heart failure (H)    MARLEEN (doesn't tolerate CPAP)    NSTEMI w Unstab Angina -- S/P CABG x 1 (LIMA to LAD) on 1/19/22    Paroxysmal atrial fib -- S/P Pulm Vein Ablation 1/19/22    Essential hypertension    Mixed hyperlipidemia    Gastroesophageal reflux disease without esophagitis    Chronic Back Pain (IT Pump w Morphine, Clonidine, Baclofen)    S/P CABG (coronary artery bypass graft)    Fluid overload    ICD (implantable cardioverter-defibrillator) in place    Peripheral vascular disease (H24)    Abdominal panniculus    Bariatric surgery status    Claudication of lower extremity (H24)    Insomnia    Intestinal disaccharidase deficiencies and disaccharide malabsorption    Nephrolithiasis    Osteoarthrosis    Post-laminectomy syndrome    Sleepiness    Coronary arteriosclerosis    Candidal intertrigo     Current Outpatient Medications   Medication    acetaminophen (TYLENOL) 325 MG tablet    aspirin (ASA) 81 MG chewable tablet    DULoxetine (CYMBALTA) 30 MG capsule    FERATE 240 (27 Fe) MG TABS    Ferrous Sulfate 27 MG TABS    furosemide (LASIX) 40 MG tablet    ketoconazole (NIZORAL) 2 % external cream    lisinopril (ZESTRIL) 10 MG tablet    metoprolol succinate ER (TOPROL XL) 100 MG 24 hr tablet    MORPHINE SULFATE     nitroGLYcerin (NITROSTAT) 0.4 MG sublingual tablet    nystatin (MYCOSTATIN) 885143 UNIT/GM external powder    rosuvastatin (CRESTOR) 40 MG tablet    semaglutide (OZEMPIC) 2 MG/3ML pen    traZODone (DESYREL) 100 MG tablet    testosterone (ANDROGEL/TESTIM) 50 MG/5GM (1%) topical gel     No current facility-administered medications for this visit.     Past Medical History:   Diagnosis Date    Abdominal panniculus 11/13/2012    Formatting of this note might be different from the original. S/p panniculectomy 11/13/2012    Acid reflux disease 10/31/2017    Bariatric surgery status 06/23/2008    Formatting of this note might be different from the original. Created by Conversion    Bilateral leg edema 07/13/2021    CHF (congestive heart failure) (H)     Chronic Back Pain (IT Pump w Morphine, Clonidine, Baclofen) 01/16/2022    Chronic diastolic heart failure (H) 07/26/2021    Claudication of lower extremity (H24) 11/20/2019    Coronary arteriosclerosis 05/09/2022    Formatting of this note might be different from the original. Created by Conversion  Replacement Utility updated for latest IMO load    Coronary artery disease     CABG 1-    Depressive disorder     Essential hypertension     Created by [facility] Annotation: Apr 6 2012 10:16Zack Harris: Lisinipril,  coreg  Replacement Utility updated for latest IMO load    Fluid overload 01/25/2022    Gastroesophageal reflux disease without esophagitis 09/11/2021    H/O gastric bypass 2008    History of blood transfusion 2004    ICD (implantable cardioverter-defibrillator) in place 01/25/2022    Insomnia     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    Ischemic cardiomyopathy     Lumbago     Created by [facility] Annotation: Apr 6 2012 10:20Anh Ford: Morphine pump     Mixed hyperlipidemia 06/23/2008    Morbid obesity (H) 08/01/2018    Nephrolithiasis     NSTEMI (non-ST elevated  myocardial infarction) (H)     Obstructive sleep apnea     Doesn't tolerate CPAP    MARLEEN (doesn't tolerate CPAP) 07/26/2021    Osteoarthritis     Created by Conversion [facility] Annotation: Jun 26 2009  9:Zack Mantilla: failed lumbar  fusion/morphine pump dr putnam  Replacement Utility updated for latest IMO load    Osteoarthrosis 05/09/2022    Formatting of this note might be different from the original. Created by [facility] Annotation: Jun 26 2009  9:53Zack Harris: failed lumbar  fusion/morphine pump dr putnam  Replacement Utility updated for latest IMO load    Paroxysmal atrial fib -- S/P Pulm Vein Ablation 1/19/22 09/11/2021    Formatting of this note might be different from the original. Created by Conversion    Paroxysmal atrial fibrillation (H)     Created by Conversion     Paroxysmal ventricular tachycardia (H)     dual chamber ICD 1/24/2022    Peripheral vascular disease (H24) 05/03/2022    Post-laminectomy syndrome 11/25/2015    S/P CABG (coronary artery bypass graft) 01/25/2022    Sleepiness 05/08/2018    Type 2 diabetes mellitus (H)     Diet controlled after Gastric Bypass in 2008     Past Surgical History:   Procedure Laterality Date    BACK SURGERY      BYPASS GASTRIC DUODENAL SWITCH      BYPASS GRAFT ARTERY CORONARY N/A 01/19/2022    Procedure: CORONARY ARTERY BYPASS GRAFT (CABG) X1; LIMA -LAD.  PULMONARY VEIN ISOLATION, AND ATRIAL APPENDAGE CLIPPING USING 45MM ACTICURE CLIP.;  Surgeon: William Dumont MD;  Location:  OR    COLONOSCOPY      COSMETIC SURGERY      pannicullectomy    CV CORONARY ANGIOGRAM N/A 09/11/2021    Procedure: Coronary Angiogram;  Surgeon: Noris Ozuna MD;  Location: [facility] CATH LAB CV    CV HEART CATHETERIZATION WITH POSSIBLE INTERVENTION N/A 01/13/2022    Procedure: Heart Catheterization with Possible Intervention;  Surgeon: Liz Pearl MD;  Location:  HEART CARDIAC CATH LAB    CV LEFT HEART CATH N/A 09/11/2021    Procedure:  Left Heart Cath;  Surgeon: Noris Ozuna MD;  Location: [facility] CV    CV PCI N/A 09/11/2021    Procedure: Percutaneous Coronary Intervention;  Surgeon: Noris Ozuna MD;  Location: [facility] CV    CV PCI N/A 10/07/2021    Procedure: Percutaneous Coronary Intervention;  Surgeon: Mckayla Dan MD;  Location: [facility] CV    CV PCI ASPIRATION THROMECTOMY N/A 09/11/2021    Procedure: Percutaneous Coronary Intervention Aspiration Thrombectomy;  Surgeon: Noris Ozuna MD;  Location: [facility]    ENT SURGERY      tonsillectomy    EP ICD N/A 01/24/2022    Procedure: EP ICD;  Surgeon: Jannette Crook MD;  Location: [facility] CARDIAC CATH LAB    EXTRACORPOREAL SHOCK WAVE LITHOTRIPSY, CYSTOSCOPY, INSERT STENT URETER(S), COMBINED  08/23/2011    HC REMOVAL OF TONSILS,<13 Y/O      Description: Tonsillectomy;  Recorded: 03/23/2012;  Comments: for obstructive sleep apnea    HERNIA REPAIR      IR MISCELLANEOUS PROCEDURE  07/29/2011    IR MISCELLANEOUS PROCEDURE  08/05/2011    IR MISCELLANEOUS PROCEDURE  08/23/2011    IR NEPHROSTOMY TUBE CHANGE BILATERAL  08/05/2011    ORTHOPEDIC SURGERY      arthrodesis ant discectomy, lumbar    NH ARTHRODESIS ANT INTERBODY MIN DISCECTOMY,LUMBAR      Description: Lumbar Vertebral Fusion;  Recorded: 06/26/2009;  Comments: before 200 see [facility] consult under old records    NH EXCISE EXCESS SKIN TISSUE,ABDOMEN  11/13/2012    Description: Panniculectomy;  Proc Date: 11/13/2012;  Comments: 3.5 Lb Pannus was removed at the [facility] By Dr. Shon Green    REPLACE INTRATHECAL PAIN PUMP N/A 04/25/2017    Procedure: REPLACE INTRATHECAL PAIN PUMP;  INTRATHECAL PAIN PUMP CATHETER REPLACEMENT AND PUMP REPLACEMENT;  Surgeon: Anthony Maloney MD;  Location: [facility]    REPLACE INTRATHECAL PAIN PUMP N/A 4/20/2023    Procedure: Pump Replacement and intrathecal catheter replacement;  Surgeon: Anthony Dick MD;  Location:  OR    REVISE CATHETER INTRATHECAL N/A  "04/25/2017    Procedure: REVISE CATHETER INTRATHECAL;;  Surgeon: Anthony Maloney MD;  Location: [facility]    SHOULDER SURGERY      ZZC GASTRIC BYPASS,OBESE<100CM LORA-EN-Y  01/01/2008    Description: Gastric Surgery For Morbid Obesity Bypass With Lora-en-Y;  Recorded: 06/26/2009;  Comments: dr green 2008    ZZ REPAIR INCISIONAL HERNIA,REDUCIBLE  11/13/2012    Description: Incisional Hernia Repair;  Proc Date: 11/13/2012;  Comments: incisional hernia repair and abdominal panniculectomy by Dr Shon Green at the [facility].     Socioeconomic History    Marital status:      Wife participates in video call    Review of Systems   Constitutional:  Positive for appetite change and fatigue (improving).        He's losing weight on Ozempic, down 20# so far, happy about that.   HENT:  Negative.     Eyes: Negative.    Respiratory:  Positive for shortness of breath.    Cardiovascular: Negative.    Gastrointestinal:  Positive for constipation, nausea (since starting Ozempic) and vomiting.   Endocrine:        Diabetes mellitus   Genitourinary: Negative.     Musculoskeletal: Negative.    Neurological: Negative.    Hematological: Negative.    Psychiatric/Behavioral: Negative.     All other systems reviewed and are negative.    Ht 1.905 m (6' 3\")   Wt 138.3 kg (305 lb)   BMI 38.12 kg/m      GENERAL: Healthy, alert and no distress  EYES: Eyes grossly normal to inspection.  No discharge or erythema, or obvious scleral/conjunctival abnormalities.  RESP: No audible wheeze, cough, or visible cyanosis.  No visible retractions or increased work of breathing.    SKIN: Visible skin clear. No significant rash, abnormal pigmentation or lesions.  NEURO: Cranial nerves grossly intact.  Mentation and speech appropriate for age.  PSYCH: Mentation appears normal, affect normal/bright, judgement and insight intact, normal speech and appearance well-groomed.    Recent Results (from the past 720 hour(s))   Lipid Profile    Collection Time: " 11/13/23  9:24 AM   Result Value Ref Range    Cholesterol 105 <200 mg/dL    Triglycerides 161 (H) <150 mg/dL    Direct Measure HDL 37 (L) >=40 mg/dL    LDL Cholesterol Calculated 36 <=100 mg/dL    Non HDL Cholesterol 68 <130 mg/dL   ALT    Collection Time: 11/13/23  9:24 AM   Result Value Ref Range    ALT 12 0 - 70 U/L   PSA, screen    Collection Time: 11/13/23  9:24 AM   Result Value Ref Range    Prostate Specific Antigen Screen 0.19 0.00 - 4.50 ng/mL   PSA, tumor marker    Collection Time: 11/13/23  9:24 AM   Result Value Ref Range    PSA Tumor Marker 0.19 0.00 - 4.50 ng/mL   **CBC with platelets FUTURE 2mo    Collection Time: 12/07/23  8:24 AM   Result Value Ref Range    WBC Count 23.6 (H) 4.0 - 11.0 10e3/uL    RBC Count 4.86 4.40 - 5.90 10e6/uL    Hemoglobin 14.4 13.3 - 17.7 g/dL    Hematocrit 42.7 40.0 - 53.0 %    MCV 88 78 - 100 fL    MCH 29.6 26.5 - 33.0 pg    MCHC 33.7 31.5 - 36.5 g/dL    RDW 12.5 10.0 - 15.0 %    Platelet Count 156 150 - 450 10e3/uL   PSA tumor marker    Collection Time: 12/07/23  8:24 AM   Result Value Ref Range    PSA Tumor Marker 0.13 0.00 - 4.50 ng/mL   PSA, screen    Collection Time: 12/07/23  8:24 AM   Result Value Ref Range    Prostate Specific Antigen Screen 0.13 0.00 - 4.50 ng/mL   HEMOGLOBIN A1C    Collection Time: 12/07/23  8:24 AM   Result Value Ref Range    Hemoglobin A1C 5.4 0.0 - 5.6 %   Albumin Random Urine Quantitative with Creat Ratio    Collection Time: 12/07/23  8:26 AM   Result Value Ref Range    Creatinine Urine mg/dL 239.0 mg/dL    Albumin Urine mg/L 113.0 mg/L    Albumin Urine mg/g Cr 47.28 (H) 0.00 - 17.00 mg/g Cr     No results found for this or any previous visit (from the past 744 hour(s)).

## 2023-12-12 DIAGNOSIS — L30.4 INTERTRIGO: ICD-10-CM

## 2023-12-12 RX ORDER — NYSTATIN 100000 [USP'U]/G
POWDER TOPICAL
Qty: 120 G | Refills: 0 | Status: SHIPPED | OUTPATIENT
Start: 2023-12-12

## 2023-12-15 ENCOUNTER — PATIENT OUTREACH (OUTPATIENT)
Dept: CARE COORDINATION | Facility: CLINIC | Age: 68
End: 2023-12-15
Payer: COMMERCIAL

## 2023-12-15 NOTE — PROGRESS NOTES
Oncology Distress Screening Follow-up  Clinical Social Work  [facility]    Identified Concern and Score From Distress Screenin. How concerned are you about your ability to eat? 5  Due to medicaiton       2. How concerned are you about unintended weight loss or your current weight? 6  On medication       3. How concerned are you about feeling depressed or very sad? 5       4. How concerned are you about feeling anxious or very scared? 8 Abnormal        5. Do you struggle with the loss of meaning and sacha in your life? A great deal Abnormal        6. How concerned are you about work and home life issues that may be affected by your cancer? 8 Abnormal        7. How concerned are you about knowing what resources are available to help you? 5       8. Do you currently have what you would describe as Restorationism or spiritual struggles? Not at all             Date of Distress Screenin23      Intervention/Education Provided:   Attempted to contact patient following an elevated distress screen. Left a message providing writer contact information encouraging a return call if needed. Sw will continue to assist as needed.    Follow-up Required:   No follow-up at this time.          ROBERT Sherman, [facility]    [facility]  [phone]  [email or web address]

## 2023-12-27 DIAGNOSIS — Z76.89 ENCOUNTER FOR WEIGHT MANAGEMENT: ICD-10-CM

## 2024-01-11 ENCOUNTER — ANCILLARY PROCEDURE (OUTPATIENT)
Dept: CARDIOLOGY | Facility: CLINIC | Age: 69
End: 2024-01-11
Attending: INTERNAL MEDICINE
Payer: COMMERCIAL

## 2024-01-11 ENCOUNTER — TELEPHONE (OUTPATIENT)
Dept: CARDIOLOGY | Facility: CLINIC | Age: 69
End: 2024-01-11

## 2024-01-11 DIAGNOSIS — I47.29 PAROXYSMAL VENTRICULAR TACHYCARDIA (H): Primary | ICD-10-CM

## 2024-01-11 DIAGNOSIS — I49.3 PVC'S (PREMATURE VENTRICULAR CONTRACTIONS): ICD-10-CM

## 2024-01-11 DIAGNOSIS — Z95.810 ICD (IMPLANTABLE CARDIOVERTER-DEFIBRILLATOR) IN PLACE: ICD-10-CM

## 2024-01-11 DIAGNOSIS — I47.29 PAROXYSMAL VENTRICULAR TACHYCARDIA (H): ICD-10-CM

## 2024-01-11 PROCEDURE — 93296 REM INTERROG EVL PM/IDS: CPT | Performed by: INTERNAL MEDICINE

## 2024-01-11 PROCEDURE — 93295 DEV INTERROG REMOTE 1/2/MLT: CPT | Performed by: INTERNAL MEDICINE

## 2024-01-11 NOTE — TELEPHONE ENCOUNTER
Routine ICD remote check completed today. Pt had 2 VT episodes, from 1/2/2024 and 1/3/2024. One episode was 14+ seconds long, exact duration unknown because the onset is not seen on the EGM. The other episode was about 2 minutes long with  bpm, within the monitor zone. Details below. LVM for pt to call back to ask about symptoms. After hearing back from pt, will update MD. Per Lily EMERSON's OV note from 10/2023, pt's primary cardiologist is Dr. Pearl.       [employer] Resonate (D) ICD Remote Device Check  Presenting Rhythm: AP/, AS/, AP/VS, PVCs    Historical Underlying Rhythm: SR 60's, trigeminal PVCs, occasional AVB on beat after PVC (3/15/2023)   Ventricular Arrhythmia: 2 episodes.     - 1/2/2024, 11:06am. EGM shows 14+ seconds of VT at  bpm. Onset not seen on EGM, heart rate was below monitor zone at beginning and increased to 180 bpm. Monitor zone is 170-200 bpm.   - 1/3/2024, 8:42am. EGM shows VT at 180 bpm lasting entire EGM, so onset and termination are not seen. Logbook says total time was 2 minutes 8 seconds. No therapy delivered as this was within the monitor zone of 170-200 bpm.   ATP: 0    Shocks: 0    Tachy Zones:   - monitor zone: 170-200 bpm  - VT treatment zone: 200-240 bpm  - VF treatment zone: 240+ bpm  Tachy Therapy History:   - Resonate (2022-present): implanted for secondary prevention, no therapy logged  Care Plan: due for annual threshold, entered order. OV with Lily EMERSON on 4/15/2024.  Called pt, left VM to call back to device clinic to discuss a couple episodes from remote check.         EGM excerpt from 2 minute episode:

## 2024-01-12 LAB
MDC_IDC_EPISODE_DTM: NORMAL
MDC_IDC_EPISODE_DURATION: 128 S
MDC_IDC_EPISODE_DURATION: 16 S
MDC_IDC_EPISODE_DURATION: 2 S
MDC_IDC_EPISODE_DURATION: 20 S
MDC_IDC_EPISODE_DURATION: 68 S
MDC_IDC_EPISODE_ID: NORMAL
MDC_IDC_EPISODE_TYPE: NORMAL
MDC_IDC_EPISODE_TYPE_INDUCED: NO
MDC_IDC_EPISODE_TYPE_INDUCED: NO
MDC_IDC_EPISODE_VENDOR_TYPE: NORMAL
MDC_IDC_LEAD_CONNECTION_STATUS: NORMAL
MDC_IDC_LEAD_CONNECTION_STATUS: NORMAL
MDC_IDC_LEAD_IMPLANT_DT: NORMAL
MDC_IDC_LEAD_IMPLANT_DT: NORMAL
MDC_IDC_LEAD_LOCATION: NORMAL
MDC_IDC_LEAD_LOCATION: NORMAL
MDC_IDC_LEAD_LOCATION_DETAIL_1: NORMAL
MDC_IDC_LEAD_LOCATION_DETAIL_1: NORMAL
MDC_IDC_LEAD_MFG: NORMAL
MDC_IDC_LEAD_MFG: NORMAL
MDC_IDC_LEAD_MODEL: NORMAL
MDC_IDC_LEAD_MODEL: NORMAL
MDC_IDC_LEAD_POLARITY_TYPE: NORMAL
MDC_IDC_LEAD_POLARITY_TYPE: NORMAL
MDC_IDC_LEAD_SERIAL: NORMAL
MDC_IDC_LEAD_SERIAL: NORMAL
MDC_IDC_MSMT_BATTERY_DTM: NORMAL
MDC_IDC_MSMT_BATTERY_REMAINING_LONGEVITY: 150 MO
MDC_IDC_MSMT_BATTERY_REMAINING_PERCENTAGE: 100 %
MDC_IDC_MSMT_BATTERY_STATUS: NORMAL
MDC_IDC_MSMT_CAP_CHARGE_DTM: NORMAL
MDC_IDC_MSMT_CAP_CHARGE_TIME: 10.2 S
MDC_IDC_MSMT_CAP_CHARGE_TYPE: NORMAL
MDC_IDC_MSMT_LEADCHNL_RA_IMPEDANCE_VALUE: 676 OHM
MDC_IDC_MSMT_LEADCHNL_RA_PACING_THRESHOLD_AMPLITUDE: 0.4 V
MDC_IDC_MSMT_LEADCHNL_RA_PACING_THRESHOLD_PULSEWIDTH: 0.4 MS
MDC_IDC_MSMT_LEADCHNL_RV_IMPEDANCE_VALUE: 424 OHM
MDC_IDC_MSMT_LEADCHNL_RV_PACING_THRESHOLD_AMPLITUDE: 0.9 V
MDC_IDC_MSMT_LEADCHNL_RV_PACING_THRESHOLD_PULSEWIDTH: 0.4 MS
MDC_IDC_PG_IMPLANT_DTM: NORMAL
MDC_IDC_PG_MFG: NORMAL
MDC_IDC_PG_MODEL: NORMAL
MDC_IDC_PG_SERIAL: NORMAL
MDC_IDC_PG_TYPE: NORMAL
MDC_IDC_SESS_CLINIC_NAME: NORMAL
MDC_IDC_SESS_DTM: NORMAL
MDC_IDC_SESS_TYPE: NORMAL
MDC_IDC_SET_BRADY_AT_MODE_SWITCH_MODE: NORMAL
MDC_IDC_SET_BRADY_AT_MODE_SWITCH_RATE: 170 {BEATS}/MIN
MDC_IDC_SET_BRADY_LOWRATE: 60 {BEATS}/MIN
MDC_IDC_SET_BRADY_MAX_SENSOR_RATE: 130 {BEATS}/MIN
MDC_IDC_SET_BRADY_MAX_TRACKING_RATE: 130 {BEATS}/MIN
MDC_IDC_SET_BRADY_MODE: NORMAL
MDC_IDC_SET_BRADY_PAV_DELAY_HIGH: 200 MS
MDC_IDC_SET_BRADY_PAV_DELAY_LOW: 300 MS
MDC_IDC_SET_BRADY_SAV_DELAY_HIGH: 200 MS
MDC_IDC_SET_BRADY_SAV_DELAY_LOW: 300 MS
MDC_IDC_SET_LEADCHNL_RA_PACING_AMPLITUDE: 2 V
MDC_IDC_SET_LEADCHNL_RA_PACING_CAPTURE_MODE: NORMAL
MDC_IDC_SET_LEADCHNL_RA_PACING_POLARITY: NORMAL
MDC_IDC_SET_LEADCHNL_RA_PACING_PULSEWIDTH: 0.4 MS
MDC_IDC_SET_LEADCHNL_RA_SENSING_ADAPTATION_MODE: NORMAL
MDC_IDC_SET_LEADCHNL_RA_SENSING_POLARITY: NORMAL
MDC_IDC_SET_LEADCHNL_RA_SENSING_SENSITIVITY: 0.25 MV
MDC_IDC_SET_LEADCHNL_RV_PACING_AMPLITUDE: 2 V
MDC_IDC_SET_LEADCHNL_RV_PACING_CAPTURE_MODE: NORMAL
MDC_IDC_SET_LEADCHNL_RV_PACING_POLARITY: NORMAL
MDC_IDC_SET_LEADCHNL_RV_PACING_PULSEWIDTH: 0.4 MS
MDC_IDC_SET_LEADCHNL_RV_SENSING_ADAPTATION_MODE: NORMAL
MDC_IDC_SET_LEADCHNL_RV_SENSING_POLARITY: NORMAL
MDC_IDC_SET_LEADCHNL_RV_SENSING_SENSITIVITY: 0.6 MV
MDC_IDC_SET_ZONE_DETECTION_INTERVAL: 250 MS
MDC_IDC_SET_ZONE_DETECTION_INTERVAL: 300 MS
MDC_IDC_SET_ZONE_DETECTION_INTERVAL: 353 MS
MDC_IDC_SET_ZONE_STATUS: NORMAL
MDC_IDC_SET_ZONE_TYPE: NORMAL
MDC_IDC_SET_ZONE_VENDOR_TYPE: NORMAL
MDC_IDC_STAT_AT_BURDEN_PERCENT: 1 %
MDC_IDC_STAT_AT_DTM_END: NORMAL
MDC_IDC_STAT_AT_DTM_START: NORMAL
MDC_IDC_STAT_BRADY_DTM_END: NORMAL
MDC_IDC_STAT_BRADY_DTM_START: NORMAL
MDC_IDC_STAT_BRADY_RA_PERCENT_PACED: 73 %
MDC_IDC_STAT_BRADY_RV_PERCENT_PACED: 2 %
MDC_IDC_STAT_EPISODE_RECENT_COUNT: 0
MDC_IDC_STAT_EPISODE_RECENT_COUNT: 1
MDC_IDC_STAT_EPISODE_RECENT_COUNT_DTM_END: NORMAL
MDC_IDC_STAT_EPISODE_RECENT_COUNT_DTM_START: NORMAL
MDC_IDC_STAT_EPISODE_TYPE: NORMAL
MDC_IDC_STAT_EPISODE_VENDOR_TYPE: NORMAL
MDC_IDC_STAT_TACHYTHERAPY_ATP_DELIVERED_RECENT: 0
MDC_IDC_STAT_TACHYTHERAPY_ATP_DELIVERED_TOTAL: 0
MDC_IDC_STAT_TACHYTHERAPY_RECENT_DTM_END: NORMAL
MDC_IDC_STAT_TACHYTHERAPY_RECENT_DTM_START: NORMAL
MDC_IDC_STAT_TACHYTHERAPY_SHOCKS_ABORTED_RECENT: 0
MDC_IDC_STAT_TACHYTHERAPY_SHOCKS_ABORTED_TOTAL: 0
MDC_IDC_STAT_TACHYTHERAPY_SHOCKS_DELIVERED_RECENT: 0
MDC_IDC_STAT_TACHYTHERAPY_SHOCKS_DELIVERED_TOTAL: 0
MDC_IDC_STAT_TACHYTHERAPY_TOTAL_DTM_END: NORMAL
MDC_IDC_STAT_TACHYTHERAPY_TOTAL_DTM_START: NORMAL

## 2024-01-16 ENCOUNTER — TRANSFERRED RECORDS (OUTPATIENT)
Dept: HEALTH INFORMATION MANAGEMENT | Facility: CLINIC | Age: 69
End: 2024-01-16
Payer: COMMERCIAL

## 2024-01-16 ENCOUNTER — MYC MEDICAL ADVICE (OUTPATIENT)
Dept: INTERNAL MEDICINE | Facility: CLINIC | Age: 69
End: 2024-01-16
Payer: COMMERCIAL

## 2024-01-16 DIAGNOSIS — G47.00 INSOMNIA, UNSPECIFIED TYPE: ICD-10-CM

## 2024-01-16 DIAGNOSIS — I50.22 CHRONIC SYSTOLIC CONGESTIVE HEART FAILURE (H): ICD-10-CM

## 2024-01-16 DIAGNOSIS — Z76.89 ENCOUNTER FOR WEIGHT MANAGEMENT: ICD-10-CM

## 2024-01-18 ENCOUNTER — MYC MEDICAL ADVICE (OUTPATIENT)
Dept: CARDIOLOGY | Facility: CLINIC | Age: 69
End: 2024-01-18
Payer: COMMERCIAL

## 2024-01-18 NOTE — TELEPHONE ENCOUNTER
Discussed with pt via [employer] (see separate encounter). He said he thinks he was laying down during both episodes of VT. He knows he felt strange one day, but it was nothing that felt alarming, he said it felt like he didn't have a heartbeat, but when he sat up he felt the heartbeat and felt fine. He also said he recently had his BP checked at the pain doctor and it was low, but he doesn't know the number. He's going to his PMD on the 24th to have BP rechecked.     Will route to Dr. Pearl for review.       In summary, pt had 2 episodes of VT, was was 14+ seconds long on 1/2/2024, and the other was 2 minutes long on 1/3/2024, heart rates 180, this is within the monitor zone. Pt was likely laying down at the time of both episodes, and he may have been vaguely symptomatic with symptoms listed above in this note.     Pt's ICD was implanted in 2022 for secondary prevention, he has never received ATP or shocks. Pt takes metoprolol succinate 100mg BID      ADDENDUM 1:40PM. Pt sent [employer] message, he remembered that his BP was 110/55

## 2024-01-18 NOTE — TELEPHONE ENCOUNTER
Third attempt to reach patient, sent a [employer] message. (See separate [employer] encounter that was opened today 1/18/2024).     Awaiting return call or message from pt.

## 2024-01-22 ENCOUNTER — TELEPHONE (OUTPATIENT)
Dept: CARDIOLOGY | Facility: CLINIC | Age: 69
End: 2024-01-22
Payer: COMMERCIAL

## 2024-01-22 RX ORDER — TRAZODONE HYDROCHLORIDE 100 MG/1
200 TABLET ORAL AT BEDTIME
Qty: 180 TABLET | Refills: 0 | Status: SHIPPED | OUTPATIENT
Start: 2024-01-22 | End: 2024-02-28

## 2024-01-22 RX ORDER — LISINOPRIL 10 MG/1
10 TABLET ORAL DAILY
Qty: 90 TABLET | Refills: 0 | Status: SHIPPED | OUTPATIENT
Start: 2024-01-22 | End: 2024-02-28

## 2024-01-22 NOTE — TELEPHONE ENCOUNTER
[facility] Call Center    Phone Message    May a detailed message be left on voicemail: yes     Reason for Call: Other: Pt stated they received a call about scheduling another appt and would like to discuss     Action Taken: Other: Cardio    Travel Screening: Not Applicable

## 2024-01-22 NOTE — TELEPHONE ENCOUNTER
Sent [employer] message to patient.      Pended medication for Trazodone and Lisinopril.    Joe Rai, Triage RN [facility]  9:07 AM 1/22/2024

## 2024-01-22 NOTE — TELEPHONE ENCOUNTER
Called pt. AZAM stating Per Dr. Freeman:   He is scheduled for follow up in April.      -can we move up his follow up?  He is scheduled in April.  He is already on high dose metoprolol and has ICD.  He is prone to HFpEF with acute volume overload so would be helpful to see and assess him.      Liz Pearl MD on 1/19/2024 at 3:11 PM     Gave direct number for scheduling. TAE Mcgowan

## 2024-01-27 ENCOUNTER — HEALTH MAINTENANCE LETTER (OUTPATIENT)
Age: 69
End: 2024-01-27

## 2024-02-08 ENCOUNTER — MYC MEDICAL ADVICE (OUTPATIENT)
Dept: CARDIOLOGY | Facility: CLINIC | Age: 69
End: 2024-02-08
Payer: COMMERCIAL

## 2024-02-08 NOTE — TELEPHONE ENCOUNTER
Received call from patient's wife, Krystal, stating that patient pushed up with his left arm today and had some discomfort in his chest where his ICD was.  She wanted to check to see if he had any irregular heart rhythms with this as they were surprised at the time of the remote check on 1/11/24 when he was told he had a few fast heart rhythms.    Explained that the sensation that he felt was likely musculo-skeletal from the device shifting in the pocket and irritating the muscles and nerves.  Offered to have them send a transmission for reassurance.  Patient sent transmission and no episodes logged from today, so the chest discomfort at the pocket likely was musculo-skeletal in nature.    However, since last check on 1/11/24, patient has had 7 more ventricular episodes logged.  Episodes as follows:   - 1/11/24 (two back to back episodes at 0826 and 0827): EGMs show VT in the 180s lasting 32-53 seconds each.  No onset noted for the 0826 episode, though this one does show that there was a short return to SR inbetween the two episodes.  - 1/11/24 (two back to back episodes at 1542 and 1544): EGMs show VT in the 170- lasting 22 to 34 seconds each, however, there is no onset/offset for these episodes and it may have been one continuous episode lasting several minutes.  - 1/22/24 at 1550: VT lasting at least 20 seconds in the 180s.  No onset available.    - 1/22/24 at 1553: VT lasting at least 28s in the 170s.  No onset/offset available.  - 1/28/24 at 1144.  VT lasting at least 1m22s with v-rates in the 180s.  No onset/offset available.    Patient's device is programmed with a monitor zone at 170bpm, VT therapy zone at 200bpm, and VF zone at 240bpm (see below for full settings).      Patient was unaware of/asymptomatic with these episodes. Patient had other VT episodes noted on his remote check 1/11/24.  These were routed to Dr. Freeman who moved patient's appointment up from April to 2/29/24.    Patient is on Toprol  XL; confirmed he has been taking this as prescribed. They note the patient did start on Ozempic at the end of October/early November.      Will route update to Dr. Freeman and to TITA YOU of the day Dr. Alejandro for review and recommendations (patient previously saw Dr. Young who is no longer with the clinic).      TAE Madden           Logbook:         1/11/24 at 0826 (shows start of 0827 episode):                 1/22/24 at 1550 (no onset available):            1/22/24 at 1553 (no onset/offset available):               1/28/24 at 1144 episode (no onset/offset available, so may have lasted longer than 1m22s):                   ICD Therapy settings:

## 2024-02-08 NOTE — CONFIDENTIAL NOTE
"Onur Bell.    I pulled up your information on the [employer] website and there have been no unusually rhythms that have appeared this month.  I see the daily tests came through this morning between 3119-7509, so I am confident your device is transmitting and working correctly.  Is it possible that the discomfort stemmed for position of the device?  If you repeat the same motion, is it reproducible (meaning does it come back with the same motion)?      The way you describe it, it sounds more like positional pain that could be pressure from the device having slight mobility in the \"device pocket,\" which would not be anything alarming.  However, if you feel that you had left sided check pain and it was accompanied by any symptoms such as shortness of breath, chest pressure, lightheadedness, etc, you should follow-up with an ER visit or call your provider if it returns.    Please let us know if you have any questions we can help with.  Currently, no unusual heart rhythms have shown as an alert that would correlate to the discomfort from your device.    Thank you,  Cristina, Device Clinic RN  "

## 2024-02-09 RX ORDER — SOTALOL HYDROCHLORIDE 80 MG/1
80 TABLET ORAL 2 TIMES DAILY
Qty: 15 TABLET | Refills: 0 | Status: SHIPPED | OUTPATIENT
Start: 2024-02-09 | End: 2024-02-12

## 2024-02-09 NOTE — TELEPHONE ENCOUNTER
Received below messages and recommendations from Dr. Alejandro and Dr. Pearl.      Called and spoke with patient and his wife, Krystal, and updated them on the recommendations from Dr. Alejandro and Dr. Pearl.   Short term prescription for Sotalol sent to patient's pharmacy of choice (15 tablets provided to bridge through EKGs until final dose determined).  He will start this on Sunday morning and have an EKG done on Monday.  He is aware that his dose may be increased depending on the results of this EKG and that, if it is, he will need to have another EKG on Tuesday. Orders for EKG entered.  Spoke with Lilian in scheduling who will reach out to patient to schedule his EKGs (in [place] if possible).     Chano, Liz Deleon MD Pham, Quan Van, MD; [facility] Heart Ep Nurse42 minutes ago (1:37 PM)     [place]  Thanks Johnnie Mcgovern MD Thorsgard, Marit, MD; [facility] Heart Ep Nurse1 hour ago (1:10 PM)     QP  I am pretty confidence that vt was scar related. No need for stress test. I would start him on sotalol 80 mg bid for 1 day. Ecg next day. If QTC<500 ms increase to 120 mg bid. Ecg next day. Thanks, qp     Liz Pearl MD  You; Johnnie Alejandro MD2 hours ago (11:56 AM)     MT  Unfortunately his last nuc was aborted because he was unable to tolerate it.    I think the only way we will  be able to get a stress test done is to have him get a stress MRI with general anesthesia at [facility].    -Johnnie Messer MD  You; Liz Pearl MD3 hours ago (11:00 AM)     QP  Vt likely scar related. I would get a nuc stress test. If no significant ischemia I would either start Sotalol or ablation. Pt can see me to discuss about options.     Liz Pearl MD  You; Johnnie Alejandro MDYesterday (1:30 PM)     MT  He is already taking metoprolol succinate 100mg PO BID    He has had previous CABG and really cannot tolerate an angiogram without general anesthesia due to severe obesity and back pain.   I  do not think there are any med changes or recommendations I am comfortable making prior to seeing him.    Johnnie, please LMK if you have other thoughts.    Thanks,    Liz Pearl MD on 2/8/2024 at 1:30 PM

## 2024-02-09 NOTE — TELEPHONE ENCOUNTER
"Received a voicemail from Onur's wife, Krystal. They are confused about the sotalol dosing. They were under the impression that he would be taking 120mg daily but his pharmacist told him something different.     Per Dr. Alejandro: \"I would start him on sotalol 80mg BID for 1 day. Ecg next day. If QTC<500ms increase to 120mg BID. ECG next day.\"    Called patient and his wife back and spoke with them about the plan again. Patient was already aware of the dose and EKG plan.   DAI RN  "

## 2024-02-09 NOTE — TELEPHONE ENCOUNTER
"Patient's wife called back again. The pharmacist had many questions/concerns that he wanted her to address with Baudilio's team.    1) patient is on metoprolol. He is concerned about the patient taking both at once.  - Explained to Krystal that patient should be taking both in the hopes that this will decrease the amount of VT episodes we are seeing. Both medications can lower blood pressure so they should monitor his BP closely and contact the clinic if it drops or if he has any symptoms of dizziness or lightheadedness. (ICD mode DDDR  so bradycardia is not a concern).    2) patient takes trazodone  - per the pharmacist trazodone \"does not mix well\" with sotalol and he asked that Krystal inform the care team that the trazodone may effect patient's EKG on Monday.    Krystal is aware that the plan is still for patient to start taking the 80mg on Sunday morning. If Dr. Alejandro has any different recommendations, we will let her know.  DAI RN     "

## 2024-02-12 DIAGNOSIS — I47.29 PAROXYSMAL VENTRICULAR TACHYCARDIA (H): ICD-10-CM

## 2024-02-12 PROCEDURE — 93000 ELECTROCARDIOGRAM COMPLETE: CPT | Performed by: INTERNAL MEDICINE

## 2024-02-12 RX ORDER — SOTALOL HYDROCHLORIDE 80 MG/1
80 TABLET ORAL 2 TIMES DAILY
Qty: 60 TABLET | Refills: 3 | Status: SHIPPED | OUTPATIENT
Start: 2024-02-12 | End: 2024-05-02

## 2024-02-12 NOTE — TELEPHONE ENCOUNTER
You33 minutes ago (11:08 AM)     QP  Because of frequent pvc, can't tell qt. I would continue with sotalol 80 mg bid for now. Obtain 24 hour holter to look at pvc burden.     You  Johnnie Alejandro MD37 minutes ago (11:04 AM)     TK  Hi Dr. Alejandro.  Pt's EKG is back.  Looks like QTC is 488.  Are you still ok with us increasing his Sotalol to 120mg BID?  Do you have any objections with the Pt taking Trazadone at the same time?  I guess the pharmacist had a concern with this. Thanks       Called pt and updated on above recommendations. Sent script to pharmacy and also ordered 24 hour holter.  Pt knows to call scheduling.

## 2024-02-20 ENCOUNTER — TELEPHONE (OUTPATIENT)
Dept: CARDIOLOGY | Facility: CLINIC | Age: 69
End: 2024-02-20
Payer: COMMERCIAL

## 2024-02-20 NOTE — TELEPHONE ENCOUNTER
[facility] Call Center    Phone Message    May a detailed message be left on voicemail: yes     Reason for Call: Medication Question or concern regarding medication   Prescription Clarification  Name of Medication: sotalol (BETAPACE) 80 MG tablet   Prescribing Provider: Dr. Pearl   Pharmacy:    What on the order needs clarification? Spouse states that he is more shortness of breath and very fatigued.       Action Taken: Other: cardiology    Travel Screening: Not Applicable  Thank you!  [facility]

## 2024-02-20 NOTE — TELEPHONE ENCOUNTER
"Pt started Ozyemic last October (dose increased to 1mg 12/27/23 from 0.5mg Q7D), pt started on sotalol 80 mg BID 2/9/24 , pt still \"out of it, no ambition to do anything, mouth is dry, dizziness seems to be slowing down a little bit.\"  He has been on the metoprolol 100 mg BID since 5/8/23.      Pt and spouse are wondering if the dosing of either med can be lowered.  Reportedly, the pharmacist continues to question the dosing to the pt and spouse.      Per pt and spouse report, he was \"fine on the metoprolol but now we added this..I just want my  back.\"  Pt's spouse is also questioning if the increase in Ozyemic is contributing to any of the pt's symptoms.  Per the chart, it looks like the pharmacist has brought this question up previously and it was addressed by Dr Alejandro at that time:      Pt has not called to schedule holter monitor at this time, pt's spouse is going to call scheduling today to initiate to evaluate PVC burden.   They are aware that there is thought to increasing the sotalol dose if it is safe to do so.      Will route to both Dr Alejandro and Dr Pearl as both seem to be involved in collaborate approach with pt.    TAE Tapai        "

## 2024-02-21 ENCOUNTER — HOSPITAL ENCOUNTER (OUTPATIENT)
Dept: CARDIOLOGY | Facility: CLINIC | Age: 69
Discharge: HOME | End: 2024-02-21
Attending: INTERNAL MEDICINE | Admitting: INTERNAL MEDICINE
Payer: COMMERCIAL

## 2024-02-21 DIAGNOSIS — I49.3 PVC'S (PREMATURE VENTRICULAR CONTRACTIONS): ICD-10-CM

## 2024-02-21 PROCEDURE — 93225 XTRNL ECG REC<48 HRS REC: CPT

## 2024-02-21 PROCEDURE — 93227 XTRNL ECG REC<48 HR R&I: CPT | Performed by: INTERNAL MEDICINE

## 2024-02-21 NOTE — TELEPHONE ENCOUNTER
Spoke with pt, he took his sotalol this morning but will hold for a week going forward.  Pt is also currently wearing his Holter monitor, he had it placed today.    Will continue to monitor.  Pt encouraged to call with any questions or concerns.    TAE Tapia

## 2024-02-22 SDOH — HEALTH STABILITY: PHYSICAL HEALTH: ON AVERAGE, HOW MANY MINUTES DO YOU ENGAGE IN EXERCISE AT THIS LEVEL?: PATIENT DECLINED

## 2024-02-22 SDOH — HEALTH STABILITY: PHYSICAL HEALTH
ON AVERAGE, HOW MANY DAYS PER WEEK DO YOU ENGAGE IN MODERATE TO STRENUOUS EXERCISE (LIKE A BRISK WALK)?: PATIENT DECLINED

## 2024-02-22 ASSESSMENT — SOCIAL DETERMINANTS OF HEALTH (SDOH): HOW OFTEN DO YOU GET TOGETHER WITH FRIENDS OR RELATIVES?: ONCE A WEEK

## 2024-02-27 SDOH — HEALTH STABILITY: PHYSICAL HEALTH: ON AVERAGE, HOW MANY MINUTES DO YOU ENGAGE IN EXERCISE AT THIS LEVEL?: PATIENT DECLINED

## 2024-02-27 ASSESSMENT — SOCIAL DETERMINANTS OF HEALTH (SDOH): HOW OFTEN DO YOU GET TOGETHER WITH FRIENDS OR RELATIVES?: ONCE A WEEK

## 2024-02-28 ENCOUNTER — OFFICE VISIT (OUTPATIENT)
Dept: INTERNAL MEDICINE | Facility: CLINIC | Age: 69
End: 2024-02-28
Attending: INTERNAL MEDICINE
Payer: COMMERCIAL

## 2024-02-28 VITALS
HEIGHT: 75 IN | WEIGHT: 287 LBS | DIASTOLIC BLOOD PRESSURE: 84 MMHG | SYSTOLIC BLOOD PRESSURE: 121 MMHG | HEART RATE: 86 BPM | BODY MASS INDEX: 35.68 KG/M2 | OXYGEN SATURATION: 98 % | RESPIRATION RATE: 26 BRPM | TEMPERATURE: 97.9 F

## 2024-02-28 DIAGNOSIS — G89.4 CHRONIC PAIN SYNDROME: ICD-10-CM

## 2024-02-28 DIAGNOSIS — G47.00 INSOMNIA, UNSPECIFIED TYPE: ICD-10-CM

## 2024-02-28 DIAGNOSIS — I73.9 PERIPHERAL VASCULAR DISEASE (H): ICD-10-CM

## 2024-02-28 DIAGNOSIS — E11.9 TYPE 2 DIABETES MELLITUS WITHOUT COMPLICATION, WITHOUT LONG-TERM CURRENT USE OF INSULIN (H): ICD-10-CM

## 2024-02-28 DIAGNOSIS — I50.32 CHRONIC DIASTOLIC HEART FAILURE (H): ICD-10-CM

## 2024-02-28 DIAGNOSIS — Z00.00 ANNUAL PHYSICAL EXAM: Primary | ICD-10-CM

## 2024-02-28 DIAGNOSIS — I48.0 PAROXYSMAL ATRIAL FIBRILLATION (H): ICD-10-CM

## 2024-02-28 DIAGNOSIS — C91.10 CLL (CHRONIC LYMPHOCYTIC LEUKEMIA) (H): ICD-10-CM

## 2024-02-28 DIAGNOSIS — E66.01 MORBID OBESITY (H): ICD-10-CM

## 2024-02-28 PROCEDURE — 99397 PER PM REEVAL EST PAT 65+ YR: CPT | Performed by: INTERNAL MEDICINE

## 2024-02-28 PROCEDURE — 99214 OFFICE O/P EST MOD 30 MIN: CPT | Mod: 25 | Performed by: INTERNAL MEDICINE

## 2024-02-28 PROCEDURE — 99207 PR FOOT EXAM NO CHARGE: CPT | Performed by: INTERNAL MEDICINE

## 2024-02-28 RX ORDER — DULOXETIN HYDROCHLORIDE 30 MG/1
CAPSULE, DELAYED RELEASE ORAL
Qty: 270 CAPSULE | Refills: 3 | Status: SHIPPED | OUTPATIENT
Start: 2024-02-28

## 2024-02-28 RX ORDER — RESPIRATORY SYNCYTIAL VIRUS VACCINE 120MCG/0.5
0.5 KIT INTRAMUSCULAR ONCE
Qty: 1 EACH | Refills: 0 | Status: CANCELLED | OUTPATIENT
Start: 2024-02-28 | End: 2024-02-28

## 2024-02-28 RX ORDER — LISINOPRIL 10 MG/1
10 TABLET ORAL DAILY
Qty: 90 TABLET | Refills: 3 | Status: SHIPPED | OUTPATIENT
Start: 2024-02-28

## 2024-02-28 RX ORDER — TRAZODONE HYDROCHLORIDE 100 MG/1
200 TABLET ORAL AT BEDTIME
Qty: 180 TABLET | Refills: 3 | Status: SHIPPED | OUTPATIENT
Start: 2024-02-28

## 2024-02-28 ASSESSMENT — ENCOUNTER SYMPTOMS
RESPIRATORY NEGATIVE: 1
NAUSEA: 1
NEUROLOGICAL NEGATIVE: 1
FATIGUE: 1
ARTHRALGIAS: 1

## 2024-02-28 NOTE — PATIENT INSTRUCTIONS
Preventive Health Recommendations:     See your health care provider every year to  Review health changes.   Discuss preventive care.    Review your medicines if your doctor has prescribed any.    Talk with your health care provider about whether you should have a test to screen for prostate cancer (PSA).  Every 3 years, have a diabetes test (fasting glucose). If you are at risk for diabetes, you should have this test more often.  Every 5 years, have a cholesterol test. Have this test more often if you are at risk for high cholesterol or heart disease.   Every 10 years, have a colonoscopy. Or, have a yearly FIT test (stool test). These exams will check for colon cancer.  Talk to with your health care provider about screening for Abdominal Aortic Aneurysm if you have a family history of AAA or have a history of smoking.    Shots:   Get a flu shot each year.   Get a tetanus shot every 10 years.   Talk to your doctor about your pneumonia vaccines. There are now two you should receive - Pneumovax (PPSV 23) and Prevnar (PCV 13).   Talk to your pharmacist about a shingles vaccine.   Talk to your doctor about the hepatitis B vaccine.  Nutrition:   Eat at least 5 servings of fruits and vegetables each day.   Eat whole-grain bread, whole-wheat pasta and brown rice instead of white grains and rice.   Get adequate Calcium and Vitamin D.   Lifestyle  Exercise for at least 150 minutes a week (30 minutes a day, 5 days a week). This will help you control your weight and prevent disease.   Limit alcohol to one drink per day.   No smoking.   Wear sunscreen to prevent skin cancer.  See your dentist every six months for an exam and cleaning.  See your eye doctor every 1 to 2 years to screen for conditions such as glaucoma, macular degeneration, cataracts, etc.    Personalized Prevention Plan  You are due for the preventive services outlined below.  Your care team is available to assist you in scheduling these services.  If you have  already completed any of these items, please share that information with your care team to update in your medical record.  Health Maintenance Due   Topic Date Due    Heart Failure Action Plan  Never done    Eye Exam  Never done    Zoster (Shingles) Vaccine (1 of 2) Never done    RSV VACCINE (Pregnancy & 60+) (1 - 1-dose 60+ series) Never done    Colorectal Cancer Screening  09/19/2021    Annual Wellness Visit  Never done    LUNG CANCER SCREENING  01/14/2023    Diabetic Foot Exam  08/18/2023    URINE DRUG SCREEN  08/18/2023

## 2024-02-28 NOTE — PROGRESS NOTES
Preventive Care Visit  [facility]  Roge Feliciano MD, Internal Medicine  Feb 28, 2024      SUBJECTIVE:   Baudilio is a 68 year old, presenting for the following:  Physical        2/28/2024     2:00 PM   Additional Questions   Roomed by Jeaneth HERNANDEZ   Accompanied by self     Are you in the first 12 months of your Medicare coverage?  No    Patient is a 68-year-old  male who presents to the clinic for his annual physical.  He has been on Ozempic for management of his weight for the past few months.  He did start this medication in October 2023.  Patient did have his doses titrated up to 1 mg subcutaneous injections per week.  Unfortunately, he did not tolerate the 1 mg dose very well.  He did have issues with fatigue, nausea, and worsening constipation.  Patient did stop taking the Ozempic, and his symptoms did improve.  Patient and his wife would like to resume the 0.5 mg dosage if possible.  Patient has lost approximately 40 pounds since starting this medication.  He does have a complicated past medical history that includes proximal atrial fibrillation, coronary disease, and chronic lymphocytic leukemia.  Other than the recent issues with the Ozempic, patient is doing well.  He is not interested in updating any immunizations.  Patient has no desire to repeat colon cancer screening.  His last hemoglobin A1c was noted to be 5.4 in December 2023.        Today's PHQ-2 Score:       2/27/2024     4:02 PM   PHQ-2 ( 1999 [employer])   Q1: Little interest or pleasure in doing things 0   Q2: Feeling down, depressed or hopeless 0   PHQ-2 Score 0   Q1: Little interest or pleasure in doing things Not at all   Q2: Feeling down, depressed or hopeless Not at all   PHQ-2 Score 0           Have you ever done Advance Care Planning? (For example, a Health Directive, POLST, or a discussion with a medical provider or your loved ones about your wishes): No, advance care planning information given to  patient to review.  Patient declined advance care planning discussion at this time.       Fall risk  Fallen 2 or more times in the past year?: No    Cognitive Screening   1) Repeat 3 items (Leader, Season, Table)    2) Clock draw: NORMAL  3) 3 item recall: Recalls 2 objects   Results: NORMAL clock, 1-2 items recalled: COGNITIVE IMPAIRMENT LESS LIKELY    Mini-CogTM Copyright JOSE Machado. Licensed by the author for use in [facility]; reprinted with permission ([email or web address]@[email or web address]). All rights reserved.      Do you have sleep apnea, excessive snoring or daytime drowsiness? : yes    Reviewed and updated as needed this visit by clinical staff   Tobacco  Allergies  Meds  Problems  Med Hx  Surg Hx  Fam Hx  Soc   Hx        Reviewed and updated as needed this visit by Provider                  Social History     Tobacco Use    Smoking status: Former     Types: Cigars     Passive exposure: Never    Smokeless tobacco: Never    Tobacco comments:     Haven't smoked in years   Substance Use Topics    Alcohol use: No             2/27/2024     4:02 PM   Alcohol Use   Prescreen: >3 drinks/day or >7 drinks/week? Not Applicable     Do you have a current opioid prescription? (!) YES   How severe is your pain on a scale from 1-10?      Do you use any other controlled substances or medications that are not prescribed by a provider? None        Current providers sharing in care for this patient include:   Patient Care Team:  Roge Feliciano MD as PCP - General  Roge Feliciano MD as Assigned PCP  Liz Pearl MD as Assigned Heart and Vascular Provider  Melissa Peguero MD as MD (Hematology & Oncology)  Melissa Peguero MD as Assigned Cancer Care Provider  Kylie Hawkins PA-C as Physician Assistant (Endocrinology, Diabetes, and Metabolism)  Hieu Manjarrez MD as Assigned Surgical Provider  Kylie Hawkins PA-C as Assigned Endocrinology Provider    The following health maintenance items are reviewed in Epic and  "correct as of today:  Health Maintenance   Topic Date Due    HF ACTION PLAN  Never done    EYE EXAM  Never done    ZOSTER IMMUNIZATION (1 of 2) Never done    RSV VACCINE (Pregnancy & 60+) (1 - 1-dose 60+ series) Never done    COLORECTAL CANCER SCREENING  09/19/2021    MEDICARE ANNUAL WELLNESS VISIT  Never done    LUNG CANCER SCREENING  01/14/2023    DIABETIC FOOT EXAM  08/18/2023    URINE DRUG SCREEN  08/18/2023    BMP  04/30/2024    A1C  06/07/2024    ANNUAL REVIEW OF HM ORDERS  06/09/2024    ALT  11/13/2024    LIPID  11/13/2024    MICROALBUMIN  12/07/2024    CBC  12/07/2024    FALL RISK ASSESSMENT  02/28/2025    ADVANCE CARE PLANNING  07/14/2027    DTAP/TDAP/TD IMMUNIZATION (6 - Td or Tdap) 11/20/2029    TSH W/FREE T4 REFLEX  Completed    HEPATITIS C SCREENING  Completed    PHQ-2 (once per calendar year)  Completed    INFLUENZA VACCINE  Completed    Pneumococcal Vaccine: 65+ Years  Completed    AORTIC ANEURYSM SCREENING (SYSTEM ASSIGNED)  Completed    IPV IMMUNIZATION  Aged Out    HPV IMMUNIZATION  Aged Out    MENINGITIS IMMUNIZATION  Aged Out    RSV MONOCLONAL ANTIBODY  Aged Out    COVID-19 Vaccine  Discontinued     Labs reviewed in EPIC    Review of Systems   Constitutional:  Positive for fatigue.   HENT: Negative.     Respiratory: Negative.     Cardiovascular:  Positive for leg swelling.   Gastrointestinal:  Positive for nausea.   Genitourinary: Negative.    Musculoskeletal:  Positive for arthralgias.   Skin: Negative.    Neurological: Negative.         OBJECTIVE:   /84 (BP Location: Right arm, Patient Position: Sitting, Cuff Size: Adult Large)   Pulse 86   Temp 97.9  F (36.6  C) (Oral)   Resp 26   Ht 6' 3\" (1.905 m)   Wt 287 lb (130.2 kg)   SpO2 98%   BMI 35.87 kg/m     Estimated body mass index is 35.87 kg/m  as calculated from the following:    Height as of this encounter: 6' 3\" (1.905 m).    Weight as of this encounter: 287 lb (130.2 kg).  Physical Exam  Vitals reviewed.   HENT:      Head: " Normocephalic and atraumatic.      Right Ear: Tympanic membrane, ear canal and external ear normal.      Left Ear: Tympanic membrane, ear canal and external ear normal.      Mouth/Throat:      Mouth: Mucous membranes are moist.      Pharynx: Oropharynx is clear.   Eyes:      Extraocular Movements: Extraocular movements intact.      Conjunctiva/sclera: Conjunctivae normal.      Pupils: Pupils are equal, round, and reactive to light.   Cardiovascular:      Rate and Rhythm: Normal rate and regular rhythm.      Pulses:           Dorsalis pedis pulses are 2+ on the right side and 2+ on the left side.        Posterior tibial pulses are 2+ on the right side and 2+ on the left side.      Heart sounds: Normal heart sounds.   Pulmonary:      Effort: Pulmonary effort is normal.      Breath sounds: Normal breath sounds.   Abdominal:      General: Bowel sounds are normal.      Palpations: Abdomen is soft.   Feet:      Right foot:      Protective Sensation: 4 sites tested.  4 sites sensed.      Skin integrity: Skin integrity normal.      Left foot:      Protective Sensation: 4 sites tested.        Skin integrity: Skin integrity normal.   Skin:     General: Skin is warm and dry.      Capillary Refill: Capillary refill takes less than 2 seconds.   Neurological:      Mental Status: He is alert.       Diagnostic Test Results:  Labs reviewed in Epic    ASSESSMENT / PLAN:   Annual physical exam  At this time, patient does have a relatively unremarkable physical examination.  His blood pressure is noted to be at acceptable level.  We did spend some time discussing appropriate dietary lifestyle modifications help keep his weight and blood pressure under good control.  Patient is not due for any lab work at this time.  All health maintenance items are addressed.    Type 2 diabetes mellitus without complication, without long-term current use of insulin (H)  Patient blood sugar does appear to be under very good control as his most recent  A1c was noted to be 5.4.  We will decrease his dose of Ozempic down to 0.5 mg subcutaneous injections once per week as he did tolerate that dosage without issue.  Eye examination was encouraged.  Patient be due for follow-up A1c in approximately 3 months.  - semaglutide (OZEMPIC) 2 MG/3ML pen; Inject 0.5 mg Subcutaneous every 7 days      Paroxysmal atrial fib -- S/P Pulm Vein Ablation 1/19/22  Chronic condition.  Followed by cardiology.  They are making some adjustments to his beta-blocker dosage.    Morbid obesity -- BMI 42.0  BMI 35.7 with history of coronary disease.  Weight loss encouraged.  Will resume Ozempic at lower dose as previously noted.    CLL (chronic lymphocytic leukemia) (H)  Chronic condition.  Followed by oncology.    Chronic diastolic heart failure (H)  Chronic condition.  Managed by cardiology.  Will continue his ACE inhibitor, statin, and beta-blockers as currently prescribed.  Patient does have an upcoming follow-up visit with his cardiologist in the next few days.  - lisinopril (ZESTRIL) 10 MG tablet; Take 1 tablet (10 mg) by mouth daily    Peripheral vascular disease (H24)  Chronic condition.  Managed by cardiology.  Will continue statin as currently prescribed.    Insomnia, unspecified type  Patient will continue his use of trazodone 200 mg per mouth in the evening to assist with insomnia.  Side effects of trazodone use were reviewed.  Sleep hygiene was discussed.  - traZODone (DESYREL) 100 MG tablet; Take 2 tablets (200 mg) by mouth at bedtime TAKE 2 TABLETS (200MG TOTAL) BY MOUTH AT BEDTIME Strength: 100 mg    Chronic pain syndrome  Patient does have his opioid therapy managed by a pain specialist.  He will also continue his duloxetine 30 mg in the morning with additional 6 mg in the evening as part of his pain management plan.  Side effects medication reviewed.  Patient had no further questions or concerns in this regard.  - DULoxetine (CYMBALTA) 30 MG capsule; Take 1 capsule (30mg) every  morning and 2 capsules (60mg) every evening    Patient has been advised of split billing requirements and indicates understanding: Yes      Counseling  Reviewed preventive health counseling, as reflected in patient instructions        He reports that he has quit smoking. His smoking use included cigars. He has never been exposed to tobacco smoke. He has never used smokeless tobacco.      Appropriate preventive services were discussed with this patient, including applicable screening as appropriate for fall prevention, nutrition, physical activity, Tobacco-use cessation, weight loss and cognition.  Checklist reviewing preventive services available has been given to the patient.    Reviewed patients plan of care and provided an AVS. The Basic Care Plan (routine screening as documented in Health Maintenance) for Onur meets the Care Plan requirement. This Care Plan has been established and reviewed with the Patient.        Signed Electronically by: Roge Feliciano MD    Identified Health Risks  I have reviewed Opioid Use Disorder and Substance Use Disorder risk factors and made any needed referrals.

## 2024-02-29 ENCOUNTER — OFFICE VISIT (OUTPATIENT)
Dept: CARDIOLOGY | Facility: CLINIC | Age: 69
End: 2024-02-29
Payer: COMMERCIAL

## 2024-02-29 VITALS
WEIGHT: 279.2 LBS | HEIGHT: 75 IN | OXYGEN SATURATION: 96 % | HEART RATE: 70 BPM | SYSTOLIC BLOOD PRESSURE: 118 MMHG | BODY MASS INDEX: 34.71 KG/M2 | DIASTOLIC BLOOD PRESSURE: 60 MMHG

## 2024-02-29 DIAGNOSIS — I48.0 PAROXYSMAL ATRIAL FIBRILLATION (H): Primary | ICD-10-CM

## 2024-02-29 DIAGNOSIS — I21.4 NSTEMI (NON-ST ELEVATED MYOCARDIAL INFARCTION) (H): ICD-10-CM

## 2024-02-29 DIAGNOSIS — Z95.1 S/P CABG (CORONARY ARTERY BYPASS GRAFT): ICD-10-CM

## 2024-02-29 DIAGNOSIS — B37.2 CANDIDAL INTERTRIGO: ICD-10-CM

## 2024-02-29 DIAGNOSIS — I47.29 PAROXYSMAL VENTRICULAR TACHYCARDIA (H): ICD-10-CM

## 2024-02-29 PROCEDURE — G2211 COMPLEX E/M VISIT ADD ON: HCPCS | Performed by: INTERNAL MEDICINE

## 2024-02-29 PROCEDURE — 99214 OFFICE O/P EST MOD 30 MIN: CPT | Performed by: INTERNAL MEDICINE

## 2024-02-29 RX ORDER — KETOCONAZOLE 20 MG/G
CREAM TOPICAL 2 TIMES DAILY
Qty: 60 G | Refills: 4 | Status: SHIPPED | OUTPATIENT
Start: 2024-02-29 | End: 2024-09-24

## 2024-02-29 NOTE — LETTER
2/29/2024    Roge Feliciano MD  [address] [facility] [address]    RE: Onur Barr       Dear Colleague,     I had the pleasure of seeing Onur Barr in the [facility].  [facility] Progress note    Assessment:  Chronic diastolic heart failure / right heart failure. Euvolemic on exam today.  Severe morbid obesity with Recent significant weight loss: 40lbs on home scale since Oct 2023 when he started ozempic  Coronary artery disease - status post PCI to the LAD in 2012, PCI to RCA in 2021, CABG x1 (LIMA to the LAD 1/2022). No symptoms of ischemia. Continue current therapy.   VT noted incidentally on device check. No angina. Unable to tolerate stress test due to back pain.  Would require general anesthesia for a stress test or angiogram. On sotalol per EP. Also on high dose metoprolol.  PAF/ left atrial appendage exclusion (1/2022) - rate controlled. Continue metoprolol. Off anticoagulation.   Hypertension - Well controlled    PVCs -  well controlled  Hyperlipidemia - Due for fasting lipid panel, continue rosuvastatin   Severe chronic back pain, has a pain pump  CLL. Follows with oncology. Has appointment coming up with labs ordered.     >30 min for this encounter  The longitudinal plan of care for the diagnosis(es)/condition(s) as documented were addressed during this visit. Due to the added complexity in care, I will continue to support Baudilio in the subsequent management and with ongoing continuity of care.    Plan:  Echo  Continue current cardiac medications and routine device checks  Follow up in about 6 months. He has been on and of sotalol.     At this time we will continue to monitor for arrhythmia with routine device checks.  He is euvolemic and not in acute heart failure or having angina and so at this time I do not not plan to  pursue stress testing or angiogram because he is clinically doing well and also because general anesthesia would be required due to his  very severe back pain issues. We will get an updated echo to eval EF and  if he continues to have issues with VT would consider coronary angiogram with general anesthesia.     HPI:       Onur Barr is a  very nice 68 year old M patient  with history of chronic systolic/diastolic heart failure, right heart failure, coronary artery disease status post PCI to the LAD in 2012, PCI to RCA in 2021, CABG x1 (LIMA to the LAD 1/2022), left atrial appendage exclusion (1/2022), paroxysmal atrial fibrillation, hypertension, PVCs, CLL, low testosterone, and morbid obesity.     He has lost between 40-50 lbs with ozempic and feel better with more energy. He still gets short of breath with minimal exertion and is extremely sedentary due to debilitating back pain.  He has some discomfort at his ICD device site but no internal chest discomfort/pain.     He had VT episodes noted on his device check in Jan. He was not symptomatic with these.  He cannot tolerate a stress test due to back pain.  A holter showed 7% PVCs.  He stopped sotalol temporarily per EP recommendations and  is now back on it.  He is holding his ozempic.  He thinks the combination of sotalol, metoprolol and ozempic gives him a brain fog. He has mild orthostaic symptoms occasionally.  He plans to resume ozempic next week at a lower dose.     He has not had recent labs checked, but extensive labs are ordered for his upcoming hem/onc visit to follow up his CLL.     He is taking an ozempic holiday and plans to resume at a lower dose. He has had some episodes where he feels that he has a brain fog and it seems to be a combination of his medications that causes this. He has exertional dyspnea though feels much better since losing 40 lbs with ozempic.  He has some discomfort from the device pocket as noted, but no angina.       Holter 2/21/24 I believe he took sotalol the day this was hooked up  1.  Onur Barr was monitored for 24 hours.  The quality of the tracing was  good.  The predominant rhythm is an electronic  atrial paced rhythm with  intermittent ventricular pacing and intermittent sinus rhythm.  During this time the average HR was  63 bpm with a minimum HR of 60 bpm at 1:21 AM and  a maximum HR of 86 bpm at 7:53 AM.  The RI interval measured .22-.26 sec.,  the QRS measured .09 sec., and the QT varied with HR .39-.47 sec.  2.  6030 Ventricular ectopic beats (7% burden) and 98 couplets.  3.  53 Isolated SVPBs.  4.  No symptoms were identified.       Last device check   [employer] Resonate (D) ICD Remote Device Check  Presenting Rhythm: AP/, AS/, AP/VS, PVCs    Historical Underlying Rhythm: SR 60's, trigeminal PVCs, occasional AVB on beat after PVC (3/15/2023)   Ventricular Arrhythmia: 2 episodes.     - 1/2/2024, 11:06am. EGM shows 14+ seconds of VT at  bpm. Onset not seen on EGM, heart rate was below monitor zone at beginning and increased to 180 bpm. Monitor zone is 170-200 bpm.   - 1/3/2024, 8:42am. EGM shows VT at 180 bpm lasting entire EGM, so onset and termination are not seen. Logbook says total time was 2 minutes 8 seconds. No therapy delivered as this was within the monitor zone of 170-200 bpm.   ATP: 0    Shocks: 0    Tachy Zones:   - monitor zone: 170-200 bpm  - VT treatment zone: 200-240 bpm  - VF treatment zone: 240+ bpm  Tachy Therapy History:   - Resonate (2022-present): implanted for secondary prevention, no therapy logged  Care Plan: due for annual threshold, entered order. OV with Lily EMERSON on 4/15/2024.  Called pt, left VM to call back to device clinic to discuss a couple episodes from remote check.     Echo 11/21/22  Echo result w/o MOPS: Interpretation Summary The visual ejection fraction is 50-55%.Regional wall motion abnormalities cannot be excluded due to limitedvisualization.There is a catheter/pacemaker lead seen in the right ventricle.The study was technically difficult. Contrast was used without apparentcomplications. Compared to  prior study, there is no significant change.      CURRENT MEDICATIONS:  Current Outpatient Medications   Medication Sig Dispense Refill    acetaminophen (TYLENOL) 325 MG tablet Take 2 tablets (650 mg) by mouth every 4 hours as needed for mild pain 40 tablet 0    aspirin (ASA) 81 MG chewable tablet Take 81 mg by mouth daily      DULoxetine (CYMBALTA) 30 MG capsule Take 1 capsule (30mg) every morning and 2 capsules (60mg) every evening 270 capsule 3    FERATE 240 (27 Fe) MG TABS TAKE 1 TABLET BY MOUTH EVERY DAY 90 tablet 2    furosemide (LASIX) 40 MG tablet Take 1 tablet (40 mg) by mouth 2 times daily 180 tablet 1    ketoconazole (NIZORAL) 2 % external cream Apply topically 2 times daily 60 g 4    lisinopril (ZESTRIL) 10 MG tablet Take 1 tablet (10 mg) by mouth daily 90 tablet 3    metoprolol succinate ER (TOPROL XL) 100 MG 24 hr tablet Take 1 tablet (100 mg) by mouth 2 times daily 180 tablet 1    MORPHINE SULFATE IT pump:updated 1/12/22  Medications in Pump:   [facility] Responsible for pump medications:   Conc:  Morphine 20mg/ml(3.95 mg/day), clonidine 21.1mcg/ml (4.168 mcg/day), baclofen 42.5mcg/ml (8.395mcg/day)  Rate:   Pump Last Fill Date: 9/2021  Pump Refill Date: 2/2022      nitroGLYcerin (NITROSTAT) 0.4 MG sublingual tablet For chest pain place 1 tablet under the tongue every 5 minutes for 3 doses. If symptoms persist 5 minutes after 1st dose call 911. 30 tablet 1    nystatin (NYAMYC) 377904 UNIT/GM external powder APPLY TO AFFECTED AREA 3 TIMES A DAY **MAX 90G/67 DAYS 120 g 0    rosuvastatin (CRESTOR) 40 MG tablet Take 1 tablet (40 mg) by mouth daily 90 tablet 3    semaglutide (OZEMPIC) 2 MG/3ML pen Inject 0.5 mg Subcutaneous every 7 days 3 mL 0    sotalol (BETAPACE) 80 MG tablet Take 1 tablet (80 mg) by mouth 2 times daily 60 tablet 3    traZODone (DESYREL) 100 MG tablet Take 2 tablets (200 mg) by mouth at bedtime TAKE 2 TABLETS (200MG TOTAL) BY MOUTH AT BEDTIME Strength: 100 mg 180 tablet 3        ALLERGIES     Allergies   Allergen Reactions    Hydrocodone-Acetaminophen Other (See Comments)     sneezing       PAST MEDICAL, SURGICAL, FAMILY, SOCIAL HISTORY:  History was reviewed and updated as needed, see medical record.    REVIEW OF SYSTEMS:  Skin:        Eyes:       ENT:       Respiratory:  Negative    Cardiovascular:  Negative    Gastroenterology:      Genitourinary:       Musculoskeletal:       Neurologic:       Psychiatric:       Heme/Lymph/Imm:       Endocrine:         PHYSICAL EXAM:      BP: 118/60 Pulse: 70     SpO2: 96 %      Vital Signs with Ranges  Temp:  [97.9  F (36.6  C)] 97.9  F (36.6  C)  Pulse:  [70-86] 70  Resp:  [26] 26  BP: (118-121)/(60-84) 118/60  SpO2:  [96 %-98 %] 96 %  279 lbs 3.2 oz    Constitutional: awake, alert, no distress.   Eyes: sclera nonicteric  ENT: trachea midline  Respiratory: clear bilaterally  Cardiovascular: regular rate and rhythm no murmur rub or gallp appreciated  GI: nondistended, nontender, bowel sounds present  Skin: dry, no rash edema  Musculoskeletal: kyphosis and and severely abnormal gait, walks with a can bent over at a 90 degree angle   Neurologic: awake, alert moves all extremities  Neuropsychiatric: normal affect    Recent Lab Results:  LIPID RESULTS:  Lab Results   Component Value Date    CHOL 105 11/13/2023    HDL 37 (L) 11/13/2023    LDL 36 11/13/2023    LDL 69 02/13/2018    TRIG 161 (H) 11/13/2023       LIVER ENZYME RESULTS:  Lab Results   Component Value Date    AST 24 10/31/2023    ALT 12 11/13/2023       CBC RESULTS:  Lab Results   Component Value Date    WBC 23.6 (H) 12/07/2023    RBC 4.86 12/07/2023    HGB 14.4 12/07/2023    HCT 42.7 12/07/2023    MCV 88 12/07/2023    MCH 29.6 12/07/2023    MCHC 33.7 12/07/2023    RDW 12.5 12/07/2023     12/07/2023       BMP RESULTS:  Lab Results   Component Value Date     10/31/2023    POTASSIUM 4.2 10/31/2023    POTASSIUM 4.2 05/15/2023    CHLORIDE 105 10/31/2023    CHLORIDE 104 05/15/2023     CO2 26 10/31/2023    CO2 28 05/15/2023    ANIONGAP 11 10/31/2023    ANIONGAP 9 05/15/2023     (H) 10/31/2023     05/15/2023    BUN 26.5 (H) 10/31/2023    BUN 19 05/15/2023    CR 1.04 10/31/2023    GFRESTIMATED 79 10/31/2023    GFRESTIMATED >60 01/05/2021    GFRESTBLACK >60 01/05/2021    RATNA 9.4 10/31/2023        A1C RESULTS:  Lab Results   Component Value Date    A1C 5.4 12/07/2023       INR RESULTS:  Lab Results   Component Value Date    INR 1.10 04/20/2023    INR 1.31 (H) 01/19/2022     Thank you for allowing me to participate in the care of your patient.      Sincerely,     Liz Pearl MD   [facility]  cc: No referring provider defined for this encounter.

## 2024-02-29 NOTE — PROGRESS NOTES
[facility] Progress note    Assessment:  Chronic diastolic heart failure / right heart failure. Euvolemic on exam today.  Severe morbid obesity with Recent significant weight loss: 40lbs on home scale since Oct 2023 when he started ozempic  Coronary artery disease - status post PCI to the LAD in 2012, PCI to RCA in 2021, CABG x1 (LIMA to the LAD 1/2022). No symptoms of ischemia. Continue current therapy.   VT noted incidentally on device check. No angina. Unable to tolerate stress test due to back pain.  Would require general anesthesia for a stress test or angiogram. On sotalol per EP. Also on high dose metoprolol.  PAF/ left atrial appendage exclusion (1/2022) - rate controlled. Continue metoprolol. Off anticoagulation.   Hypertension - Well controlled    PVCs -  well controlled  Hyperlipidemia - Due for fasting lipid panel, continue rosuvastatin   Severe chronic back pain, has a pain pump  CLL. Follows with oncology. Has appointment coming up with labs ordered.     >30 min for this encounter  The longitudinal plan of care for the diagnosis(es)/condition(s) as documented were addressed during this visit. Due to the added complexity in care, I will continue to support Baudilio in the subsequent management and with ongoing continuity of care.    Plan:  Echo  Continue current cardiac medications and routine device checks  Follow up in about 6 months. He has been on and of sotalol.     At this time we will continue to monitor for arrhythmia with routine device checks.  He is euvolemic and not in acute heart failure or having angina and so at this time I do not not plan to  pursue stress testing or angiogram because he is clinically doing well and also because general anesthesia would be required due to his very severe back pain issues. We will get an updated echo to eval EF and  if he continues to have issues with VT would consider coronary angiogram with general anesthesia.     HPI:       Onur Barr is a  very  nice 68 year old M patient  with history of chronic systolic/diastolic heart failure, right heart failure, coronary artery disease status post PCI to the LAD in 2012, PCI to RCA in 2021, CABG x1 (LIMA to the LAD 1/2022), left atrial appendage exclusion (1/2022), paroxysmal atrial fibrillation, hypertension, PVCs, CLL, low testosterone, and morbid obesity.     He has lost between 40-50 lbs with ozempic and feel better with more energy. He still gets short of breath with minimal exertion and is extremely sedentary due to debilitating back pain.  He has some discomfort at his ICD device site but no internal chest discomfort/pain.     He had VT episodes noted on his device check in Jan. He was not symptomatic with these.  He cannot tolerate a stress test due to back pain.  A holter showed 7% PVCs.  He stopped sotalol temporarily per EP recommendations and  is now back on it.  He is holding his ozempic.  He thinks the combination of sotalol, metoprolol and ozempic gives him a brain fog. He has mild orthostaic symptoms occasionally.  He plans to resume ozempic next week at a lower dose.     He has not had recent labs checked, but extensive labs are ordered for his upcoming hem/onc visit to follow up his CLL.     He is taking an ozempic holiday and plans to resume at a lower dose. He has had some episodes where he feels that he has a brain fog and it seems to be a combination of his medications that causes this. He has exertional dyspnea though feels much better since losing 40 lbs with ozempic.  He has some discomfort from the device pocket as noted, but no angina.       Holter 2/21/24 I believe he took sotalol the day this was hooked up  1Parveen Barr was monitored for 24 hours.  The quality of the tracing was good.  The predominant rhythm is an electronic  atrial paced rhythm with  intermittent ventricular pacing and intermittent sinus rhythm.  During this time the average HR was  63 bpm with a minimum HR of 60 bpm at  1:21 AM and  a maximum HR of 86 bpm at 7:53 AM.  The CO interval measured .22-.26 sec.,  the QRS measured .09 sec., and the QT varied with HR .39-.47 sec.  2.  6030 Ventricular ectopic beats (7% burden) and 98 couplets.  3.  53 Isolated SVPBs.  4.  No symptoms were identified.       Last device check   [employer] Resonate (D) ICD Remote Device Check  Presenting Rhythm: AP/, AS/, AP/VS, PVCs    Historical Underlying Rhythm: SR 60's, trigeminal PVCs, occasional AVB on beat after PVC (3/15/2023)   Ventricular Arrhythmia: 2 episodes.     - 1/2/2024, 11:06am. EGM shows 14+ seconds of VT at  bpm. Onset not seen on EGM, heart rate was below monitor zone at beginning and increased to 180 bpm. Monitor zone is 170-200 bpm.   - 1/3/2024, 8:42am. EGM shows VT at 180 bpm lasting entire EGM, so onset and termination are not seen. Logbook says total time was 2 minutes 8 seconds. No therapy delivered as this was within the monitor zone of 170-200 bpm.   ATP: 0    Shocks: 0    Tachy Zones:   - monitor zone: 170-200 bpm  - VT treatment zone: 200-240 bpm  - VF treatment zone: 240+ bpm  Tachy Therapy History:   - Resonate (2022-present): implanted for secondary prevention, no therapy logged  Care Plan: due for annual threshold, entered order. OV with Lily EMERSON on 4/15/2024.  Called pt, left VM to call back to device clinic to discuss a couple episodes from remote check.     Echo 11/21/22  Echo result w/o MOPS: Interpretation Summary The visual ejection fraction is 50-55%.Regional wall motion abnormalities cannot be excluded due to limitedvisualization.There is a catheter/pacemaker lead seen in the right ventricle.The study was technically difficult. Contrast was used without apparentcomplications. Compared to prior study, there is no significant change.      CURRENT MEDICATIONS:  Current Outpatient Medications   Medication Sig Dispense Refill    acetaminophen (TYLENOL) 325 MG tablet Take 2 tablets (650 mg) by mouth every  4 hours as needed for mild pain 40 tablet 0    aspirin (ASA) 81 MG chewable tablet Take 81 mg by mouth daily      DULoxetine (CYMBALTA) 30 MG capsule Take 1 capsule (30mg) every morning and 2 capsules (60mg) every evening 270 capsule 3    FERATE 240 (27 Fe) MG TABS TAKE 1 TABLET BY MOUTH EVERY DAY 90 tablet 2    furosemide (LASIX) 40 MG tablet Take 1 tablet (40 mg) by mouth 2 times daily 180 tablet 1    ketoconazole (NIZORAL) 2 % external cream Apply topically 2 times daily 60 g 4    lisinopril (ZESTRIL) 10 MG tablet Take 1 tablet (10 mg) by mouth daily 90 tablet 3    metoprolol succinate ER (TOPROL XL) 100 MG 24 hr tablet Take 1 tablet (100 mg) by mouth 2 times daily 180 tablet 1    MORPHINE SULFATE IT pump:updated 1/12/22  Medications in Pump:   [facility] Responsible for pump medications:   Conc:  Morphine 20mg/ml(3.95 mg/day), clonidine 21.1mcg/ml (4.168 mcg/day), baclofen 42.5mcg/ml (8.395mcg/day)  Rate:   Pump Last Fill Date: 9/2021  Pump Refill Date: 2/2022      nitroGLYcerin (NITROSTAT) 0.4 MG sublingual tablet For chest pain place 1 tablet under the tongue every 5 minutes for 3 doses. If symptoms persist 5 minutes after 1st dose call 911. 30 tablet 1    nystatin (NYAMYC) 812670 UNIT/GM external powder APPLY TO AFFECTED AREA 3 TIMES A DAY **MAX 90G/67 DAYS 120 g 0    rosuvastatin (CRESTOR) 40 MG tablet Take 1 tablet (40 mg) by mouth daily 90 tablet 3    semaglutide (OZEMPIC) 2 MG/3ML pen Inject 0.5 mg Subcutaneous every 7 days 3 mL 0    sotalol (BETAPACE) 80 MG tablet Take 1 tablet (80 mg) by mouth 2 times daily 60 tablet 3    traZODone (DESYREL) 100 MG tablet Take 2 tablets (200 mg) by mouth at bedtime TAKE 2 TABLETS (200MG TOTAL) BY MOUTH AT BEDTIME Strength: 100 mg 180 tablet 3       ALLERGIES     Allergies   Allergen Reactions    Hydrocodone-Acetaminophen Other (See Comments)     sneezing       PAST MEDICAL, SURGICAL, FAMILY, SOCIAL HISTORY:  History was reviewed and updated as needed, see  medical record.    REVIEW OF SYSTEMS:  Skin:        Eyes:       ENT:       Respiratory:  Negative    Cardiovascular:  Negative    Gastroenterology:      Genitourinary:       Musculoskeletal:       Neurologic:       Psychiatric:       Heme/Lymph/Imm:       Endocrine:         PHYSICAL EXAM:      BP: 118/60 Pulse: 70     SpO2: 96 %      Vital Signs with Ranges  Temp:  [97.9  F (36.6  C)] 97.9  F (36.6  C)  Pulse:  [70-86] 70  Resp:  [26] 26  BP: (118-121)/(60-84) 118/60  SpO2:  [96 %-98 %] 96 %  279 lbs 3.2 oz    Constitutional: awake, alert, no distress.   Eyes: sclera nonicteric  ENT: trachea midline  Respiratory: clear bilaterally  Cardiovascular: regular rate and rhythm no murmur rub or gallp appreciated  GI: nondistended, nontender, bowel sounds present  Skin: dry, no rash edema  Musculoskeletal: kyphosis and and severely abnormal gait, walks with a can bent over at a 90 degree angle   Neurologic: awake, alert moves all extremities  Neuropsychiatric: normal affect    Recent Lab Results:  LIPID RESULTS:  Lab Results   Component Value Date    CHOL 105 11/13/2023    HDL 37 (L) 11/13/2023    LDL 36 11/13/2023    LDL 69 02/13/2018    TRIG 161 (H) 11/13/2023       LIVER ENZYME RESULTS:  Lab Results   Component Value Date    AST 24 10/31/2023    ALT 12 11/13/2023       CBC RESULTS:  Lab Results   Component Value Date    WBC 23.6 (H) 12/07/2023    RBC 4.86 12/07/2023    HGB 14.4 12/07/2023    HCT 42.7 12/07/2023    MCV 88 12/07/2023    MCH 29.6 12/07/2023    MCHC 33.7 12/07/2023    RDW 12.5 12/07/2023     12/07/2023       BMP RESULTS:  Lab Results   Component Value Date     10/31/2023    POTASSIUM 4.2 10/31/2023    POTASSIUM 4.2 05/15/2023    CHLORIDE 105 10/31/2023    CHLORIDE 104 05/15/2023    CO2 26 10/31/2023    CO2 28 05/15/2023    ANIONGAP 11 10/31/2023    ANIONGAP 9 05/15/2023     (H) 10/31/2023     05/15/2023    BUN 26.5 (H) 10/31/2023    BUN 19 05/15/2023    CR 1.04 10/31/2023     GFRESTIMATED 79 10/31/2023    GFRESTIMATED >60 01/05/2021    GFRESTBLACK >60 01/05/2021    RATNA 9.4 10/31/2023        A1C RESULTS:  Lab Results   Component Value Date    A1C 5.4 12/07/2023       INR RESULTS:  Lab Results   Component Value Date    INR 1.10 04/20/2023    INR 1.31 (H) 01/19/2022

## 2024-03-07 ENCOUNTER — LAB (OUTPATIENT)
Dept: LAB | Facility: CLINIC | Age: 69
End: 2024-03-07
Payer: COMMERCIAL

## 2024-03-07 ENCOUNTER — TELEPHONE (OUTPATIENT)
Dept: ONCOLOGY | Facility: HOSPITAL | Age: 69
End: 2024-03-07

## 2024-03-07 DIAGNOSIS — C91.10 CLL (CHRONIC LYMPHOCYTIC LEUKEMIA) (H): ICD-10-CM

## 2024-03-07 LAB
ERYTHROCYTE [DISTWIDTH] IN BLOOD BY AUTOMATED COUNT: 13.1 % (ref 10–15)
HCT VFR BLD AUTO: 40 % (ref 40–53)
HGB BLD-MCNC: 13.1 G/DL (ref 13.3–17.7)
MCH RBC QN AUTO: 30 PG (ref 26.5–33)
MCHC RBC AUTO-ENTMCNC: 32.8 G/DL (ref 31.5–36.5)
MCV RBC AUTO: 92 FL (ref 78–100)
PLATELET # BLD AUTO: 180 10E3/UL (ref 150–450)
RBC # BLD AUTO: 4.36 10E6/UL (ref 4.4–5.9)
WBC # BLD AUTO: 39.1 10E3/UL (ref 4–11)

## 2024-03-07 PROCEDURE — 85027 COMPLETE CBC AUTOMATED: CPT

## 2024-03-07 PROCEDURE — 36415 COLL VENOUS BLD VENIPUNCTURE: CPT

## 2024-03-07 NOTE — TELEPHONE ENCOUNTER
DATE/TIME OF CALL RECEIVED FROM LAB:  03/07/24 at 9:52 AM   LAB TEST:  WBC  LAB VALUE:  38.2  PROVIDER NOTIFIED?: Yes  PROVIDER NAME: Mckayla Galvin NP  DATE/TIME LAB VALUE REPORTED TO PROVIDER: 12:10 PM  MECHANISM OF PROVIDER NOTIFICATION:  Secure chat  PROVIDER RESPONSE: Defer assessment until until clinic visit on Monday. Leukocytosis expected/mildly progressed and rest of his counts are stable .  Heather Madrid RN

## 2024-03-08 ENCOUNTER — HOSPITAL ENCOUNTER (OUTPATIENT)
Dept: CARDIOLOGY | Facility: CLINIC | Age: 69
Discharge: HOME | End: 2024-03-08
Attending: INTERNAL MEDICINE | Admitting: INTERNAL MEDICINE
Payer: COMMERCIAL

## 2024-03-08 DIAGNOSIS — I47.29 PAROXYSMAL VENTRICULAR TACHYCARDIA (H): ICD-10-CM

## 2024-03-08 DIAGNOSIS — I48.0 PAROXYSMAL ATRIAL FIBRILLATION (H): ICD-10-CM

## 2024-03-08 DIAGNOSIS — Z95.1 S/P CABG (CORONARY ARTERY BYPASS GRAFT): ICD-10-CM

## 2024-03-08 LAB
BI-PLANE LVEF ECHO: NORMAL
LVEF ECHO: NORMAL

## 2024-03-08 PROCEDURE — 93306 TTE W/DOPPLER COMPLETE: CPT | Mod: 26 | Performed by: INTERNAL MEDICINE

## 2024-03-08 PROCEDURE — C8929 TTE W OR WO FOL WCON,DOPPLER: HCPCS

## 2024-03-08 PROCEDURE — 255N000002 HC RX 255 OP 636: Performed by: INTERNAL MEDICINE

## 2024-03-08 RX ADMIN — HUMAN ALBUMIN MICROSPHERES AND PERFLUTREN 3 ML: 10; .22 INJECTION, SOLUTION INTRAVENOUS at 13:50

## 2024-03-11 ENCOUNTER — ONCOLOGY VISIT (OUTPATIENT)
Dept: ONCOLOGY | Facility: HOSPITAL | Age: 69
End: 2024-03-11
Attending: INTERNAL MEDICINE
Payer: COMMERCIAL

## 2024-03-11 ENCOUNTER — LAB (OUTPATIENT)
Dept: INFUSION THERAPY | Facility: HOSPITAL | Age: 69
End: 2024-03-11
Attending: INTERNAL MEDICINE
Payer: COMMERCIAL

## 2024-03-11 VITALS
OXYGEN SATURATION: 96 % | BODY MASS INDEX: 35.06 KG/M2 | RESPIRATION RATE: 18 BRPM | WEIGHT: 282 LBS | DIASTOLIC BLOOD PRESSURE: 85 MMHG | HEART RATE: 60 BPM | HEIGHT: 75 IN | TEMPERATURE: 97.6 F | SYSTOLIC BLOOD PRESSURE: 130 MMHG

## 2024-03-11 DIAGNOSIS — C91.10 CLL (CHRONIC LYMPHOCYTIC LEUKEMIA) (H): Primary | ICD-10-CM

## 2024-03-11 DIAGNOSIS — R11.2 NAUSEA AND VOMITING, UNSPECIFIED VOMITING TYPE: ICD-10-CM

## 2024-03-11 LAB
BASOPHILS # BLD MANUAL: 0 10E3/UL (ref 0–0.2)
BASOPHILS NFR BLD MANUAL: 0 %
EOSINOPHIL # BLD MANUAL: 0 10E3/UL (ref 0–0.7)
EOSINOPHIL NFR BLD MANUAL: 0 %
ERYTHROCYTE [DISTWIDTH] IN BLOOD BY AUTOMATED COUNT: 12.9 % (ref 10–15)
HCT VFR BLD AUTO: 41.1 % (ref 40–53)
HGB BLD-MCNC: 13.8 G/DL (ref 13.3–17.7)
LYMPHOCYTES # BLD MANUAL: 25.3 10E3/UL (ref 0.8–5.3)
LYMPHOCYTES NFR BLD MANUAL: 83 %
MCH RBC QN AUTO: 29.7 PG (ref 26.5–33)
MCHC RBC AUTO-ENTMCNC: 33.6 G/DL (ref 31.5–36.5)
MCV RBC AUTO: 89 FL (ref 78–100)
MONOCYTES # BLD MANUAL: 0.3 10E3/UL (ref 0–1.3)
MONOCYTES NFR BLD MANUAL: 1 %
NEUTROPHILS # BLD MANUAL: 4.9 10E3/UL (ref 1.6–8.3)
NEUTROPHILS NFR BLD MANUAL: 16 %
NRBC # BLD AUTO: 0 10E3/UL
NRBC BLD AUTO-RTO: 0 /100
PLAT MORPH BLD: ABNORMAL
PLATELET # BLD AUTO: 173 10E3/UL (ref 150–450)
RBC # BLD AUTO: 4.64 10E6/UL (ref 4.4–5.9)
RBC MORPH BLD: ABNORMAL
RETICS # AUTO: 0.07 10E6/UL (ref 0.03–0.1)
RETICS/RBC NFR AUTO: 1.6 % (ref 0.5–2)
SMUDGE CELLS BLD QL SMEAR: PRESENT
VARIANT LYMPHS BLD QL SMEAR: PRESENT
WBC # BLD AUTO: 30.5 10E3/UL (ref 4–11)

## 2024-03-11 PROCEDURE — 85045 AUTOMATED RETICULOCYTE COUNT: CPT | Performed by: INTERNAL MEDICINE

## 2024-03-11 PROCEDURE — 36415 COLL VENOUS BLD VENIPUNCTURE: CPT | Performed by: INTERNAL MEDICINE

## 2024-03-11 PROCEDURE — 85007 BL SMEAR W/DIFF WBC COUNT: CPT | Performed by: INTERNAL MEDICINE

## 2024-03-11 PROCEDURE — 84999 UNLISTED CHEMISTRY PROCEDURE: CPT | Performed by: PATHOLOGY

## 2024-03-11 PROCEDURE — 99214 OFFICE O/P EST MOD 30 MIN: CPT | Performed by: INTERNAL MEDICINE

## 2024-03-11 PROCEDURE — 88368 INSITU HYBRIDIZATION MANUAL: CPT | Performed by: PATHOLOGY

## 2024-03-11 PROCEDURE — 99207 BLOOD MORPHOLOGY PATHOLOGIST REVIEW: CPT | Performed by: PATHOLOGY

## 2024-03-11 PROCEDURE — 88369 M/PHMTRC ALYSISHQUANT/SEMIQ: CPT | Performed by: PATHOLOGY

## 2024-03-11 PROCEDURE — 85025 COMPLETE CBC W/AUTO DIFF WBC: CPT | Performed by: INTERNAL MEDICINE

## 2024-03-11 PROCEDURE — 88377 M/PHMTRC ALYS ISHQUANT/SEMIQ: CPT | Performed by: PATHOLOGY

## 2024-03-11 RX ORDER — ONDANSETRON 4 MG/1
4 TABLET, ORALLY DISINTEGRATING ORAL EVERY 8 HOURS PRN
Qty: 100 TABLET | Refills: 1 | Status: SHIPPED | OUTPATIENT
Start: 2024-03-11

## 2024-03-11 ASSESSMENT — PAIN SCALES - GENERAL: PAINLEVEL: SEVERE PAIN (7)

## 2024-03-11 NOTE — PROGRESS NOTES
"Oncology Rooming Note    March 11, 2024 2:06 PM   Onur Barr is a 68 year old male who presents for:    Chief Complaint   Patient presents with    Oncology Clinic Visit     CLL (chronic lymphocytic leukemia)      Initial Vitals: /85   Pulse 60   Temp 97.6  F (36.4  C)   Resp 18   Ht 1.905 m (6' 3\")   Wt 127.9 kg (282 lb)   SpO2 96%   BMI 35.25 kg/m   Estimated body mass index is 35.25 kg/m  as calculated from the following:    Height as of this encounter: 1.905 m (6' 3\").    Weight as of this encounter: 127.9 kg (282 lb). Body surface area is 2.6 meters squared.  Severe Pain (7) Comment: Data Unavailable   No LMP for male patient.  Allergies reviewed: No  Medications reviewed: No    Medications: Medication refills not needed today.  Pharmacy name entered into [facility]: [id number] [place], [place] - [address]    Frailty Screening:   Is the patient here for a new oncology consult visit in cancer care? 2. No      Clinical concerns:  4 month follow up      Carlee Alcaraz            "

## 2024-03-11 NOTE — PROGRESS NOTES
Assessment & Plan   Lymphoproliferative disorder consistent with CLL  Progressing lymphocytosis and falling hemoglobin  Coexisting weight loss and GI malaise since starting Ozempic    Ondansetron for symptom control  Discussed with pathologist Reilly Rasmussen MD:  we'll obtain peripheral smear for morphology and then he'll coordinate testing for FISH (del 17p), TP53, karyotype, IGHV as feasible  Follow up in a month for possible start of therapy depending on counts and test outcomes.    Interval History  This is a scheduled follow up visit for this chronically ill gentleman with a clonal lymphoid malignancy initially identified last year.  His initial flow cytometry studies confirmed a clonal CD5 + population.  He had mild splenomegaly on ultrasound.    He has had a rising white count for many months, his platelet count is at the lower limit of normal and his hemoglobin is drifting downwards.        He has chronic pain for many years and has an indwelling pain pump.  He also has had longstanding morbid obesity despite Lora-en-Y gastrectomy.    He had been somewhat stable until about 5 months ago when he was started on Ozempic for morbid obesity.      Since then he's had marked weight loss (>40#) but has also had daily nausea often with vomiting and weakness.  He has no energy and is breathless with exertion.  He has occasional constipation.  His problems persist despite dose reductions.    ECOG = 1-2      Patient Active Problem List   Diagnosis    Lumbago    Morbid obesity -- BMI 42.0    Bilateral leg edema    Chronic diastolic heart failure (H)    MARLEEN (doesn't tolerate CPAP)    NSTEMI w Unstab Angina -- S/P CABG x 1 (LIMA to LAD) on 1/19/22    Paroxysmal atrial fib -- S/P Pulm Vein Ablation 1/19/22    Essential hypertension    Mixed hyperlipidemia    Gastroesophageal reflux disease without esophagitis    Chronic Back Pain (IT Pump w Morphine, Clonidine, Baclofen)    S/P CABG (coronary artery bypass graft)    Fluid  overload    ICD (implantable cardioverter-defibrillator) in place    Peripheral vascular disease (H24)    Abdominal panniculus    Bariatric surgery status    Claudication of lower extremity (H24)    Insomnia    Intestinal disaccharidase deficiencies and disaccharide malabsorption    Nephrolithiasis    Osteoarthrosis    Post-laminectomy syndrome    Sleepiness    Coronary arteriosclerosis    Candidal intertrigo    CLL (chronic lymphocytic leukemia) (H)     Current Outpatient Medications   Medication    acetaminophen (TYLENOL) 325 MG tablet    aspirin (ASA) 81 MG chewable tablet    DULoxetine (CYMBALTA) 30 MG capsule    FERATE 240 (27 Fe) MG TABS    furosemide (LASIX) 40 MG tablet    ketoconazole (NIZORAL) 2 % external cream    lisinopril (ZESTRIL) 10 MG tablet    metoprolol succinate ER (TOPROL XL) 100 MG 24 hr tablet    MORPHINE SULFATE    nystatin (NYAMYC) 726769 UNIT/GM external powder    ondansetron (ZOFRAN ODT) 4 MG ODT tab    rosuvastatin (CRESTOR) 40 MG tablet    semaglutide (OZEMPIC) 2 MG/3ML pen    sotalol (BETAPACE) 80 MG tablet    traZODone (DESYREL) 100 MG tablet    nitroGLYcerin (NITROSTAT) 0.4 MG sublingual tablet     No current facility-administered medications for this visit.     Past Medical History:   Diagnosis Date    Abdominal panniculus 11/13/2012    Formatting of this note might be different from the original. S/p panniculectomy 11/13/2012    Acid reflux disease 10/31/2017    Bariatric surgery status 06/23/2008    Formatting of this note might be different from the original. Created by Conversion    Bilateral leg edema 07/13/2021    CHF (congestive heart failure) (H)     Chronic Back Pain (IT Pump w Morphine, Clonidine, Baclofen) 01/16/2022    Chronic diastolic heart failure (H) 07/26/2021    Claudication of lower extremity (H24) 11/20/2019    Coronary arteriosclerosis 05/09/2022    Formatting of this note might be different from the original. Created by Conversion  Replacement Utility updated  for latest IMO load    Coronary artery disease     CABG 1-    Depressive disorder     Essential hypertension     Created by [facility] Annotation: Apr 6 2012 10:16Zack Harris: Lisinipril,  coreg  Replacement Utility updated for latest IMO load    Fluid overload 01/25/2022    Gastroesophageal reflux disease without esophagitis 09/11/2021    H/O gastric bypass 2008    History of blood transfusion 2004    ICD (implantable cardioverter-defibrillator) in place 01/25/2022    Insomnia     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    Ischemic cardiomyopathy     Lumbago     Created by [facility] Annotation: Apr 6 2012 10:20Anh Ford: Morphine pump     Mixed hyperlipidemia 06/23/2008    Morbid obesity (H) 08/01/2018    Nephrolithiasis     NSTEMI (non-ST elevated myocardial infarction) (H)     Obstructive sleep apnea     Doesn't tolerate CPAP    MARLEEN (doesn't tolerate CPAP) 07/26/2021    Osteoarthritis     Created by [facility] Annotation: Jun 26 2009  9:53Zack Harris: failed lumbar  fusion/morphine pump dr putnam  Replacement Utility updated for latest IMO load    Osteoarthrosis 05/09/2022    Formatting of this note might be different from the original. Created by [facility] Annotation: Jun 26 2009  9:53Zack Harris: failed lumbar  fusion/morphine pump dr putnam  Replacement Utility updated for latest IMO load    Paroxysmal atrial fib -- S/P Pulm Vein Ablation 1/19/22 09/11/2021    Formatting of this note might be different from the original. Created by Conversion    Paroxysmal atrial fibrillation (H)     Created by Conversion     Paroxysmal ventricular tachycardia (H)     dual chamber ICD 1/24/2022    Peripheral vascular disease (H24) 05/03/2022    Post-laminectomy syndrome 11/25/2015    S/P CABG (coronary artery bypass graft) 01/25/2022    Sleepiness 05/08/2018    Type 2 diabetes mellitus (H)      Diet controlled after Gastric Bypass in 2008     Past Surgical History:   Procedure Laterality Date    BACK SURGERY      BYPASS GASTRIC DUODENAL SWITCH      BYPASS GRAFT ARTERY CORONARY N/A 01/19/2022    Procedure: CORONARY ARTERY BYPASS GRAFT (CABG) X1; LIMA -LAD.  PULMONARY VEIN ISOLATION, AND ATRIAL APPENDAGE CLIPPING USING 45MM ACTICURE CLIP.;  Surgeon: William Dumont MD;  Location:  OR    COLONOSCOPY      COSMETIC SURGERY      pannicullectomy    CV CORONARY ANGIOGRAM N/A 09/11/2021    Procedure: Coronary Angiogram;  Surgeon: Noris Ozuna MD;  Location: [facility] CATH LAB CV    CV HEART CATHETERIZATION WITH POSSIBLE INTERVENTION N/A 01/13/2022    Procedure: Heart Catheterization with Possible Intervention;  Surgeon: Liz Pearl MD;  Location:  HEART CARDIAC CATH LAB    CV LEFT HEART CATH N/A 09/11/2021    Procedure: Left Heart Cath;  Surgeon: Noris Ozuna MD;  Location: [facility] CATH LAB CV    CV PCI N/A 09/11/2021    Procedure: Percutaneous Coronary Intervention;  Surgeon: Noris Ozuna MD;  Location: [facility] CATH LAB CV    CV PCI N/A 10/07/2021    Procedure: Percutaneous Coronary Intervention;  Surgeon: Mckayla Dan MD;  Location: [facility] CATH LAB CV    CV PCI ASPIRATION THROMECTOMY N/A 09/11/2021    Procedure: Percutaneous Coronary Intervention Aspiration Thrombectomy;  Surgeon: Noris Ozuna MD;  Location: [facility] LAB CV    ENT SURGERY      tonsillectomy    EP ICD N/A 01/24/2022    Procedure: EP ICD;  Surgeon: Jannette Crook MD;  Location:  HEART CARDIAC CATH LAB    EXTRACORPOREAL SHOCK WAVE LITHOTRIPSY, CYSTOSCOPY, INSERT STENT URETER(S), COMBINED  08/23/2011    HC REMOVAL OF TONSILS,<11 Y/O      Description: Tonsillectomy;  Recorded: 03/23/2012;  Comments: for obstructive sleep apnea    HERNIA REPAIR      IR MISCELLANEOUS PROCEDURE  07/29/2011    IR MISCELLANEOUS PROCEDURE  08/05/2011    IR MISCELLANEOUS PROCEDURE  08/23/2011    IR NEPHROSTOMY TUBE CHANGE  "BILATERAL  08/05/2011    ORTHOPEDIC SURGERY      arthrodesis ant discectomy, lumbar    WI ARTHRODESIS ANT INTERBODY MIN DISCECTOMY,LUMBAR      Description: Lumbar Vertebral Fusion;  Recorded: 06/26/2009;  Comments: before 200 see [facility] consult under old records    WI EXCISE EXCESS SKIN TISSUE,ABDOMEN  11/13/2012    Description: Panniculectomy;  Proc Date: 11/13/2012;  Comments: 3.5 Lb Pannus was removed at the [facility] By Dr. Shon Green    REPLACE INTRATHECAL PAIN PUMP N/A 04/25/2017    Procedure: REPLACE INTRATHECAL PAIN PUMP;  INTRATHECAL PAIN PUMP CATHETER REPLACEMENT AND PUMP REPLACEMENT;  Surgeon: Anthony Maloney MD;  [facility]    REPLACE INTRATHECAL PAIN PUMP N/A 4/20/2023    Procedure: Pump Replacement and intrathecal catheter replacement;  Surgeon: Anthony Dick MD;  Location:  OR    REVISE CATHETER INTRATHECAL N/A 04/25/2017    Procedure: REVISE CATHETER INTRATHECAL;;  Surgeon: Anthony Maloney MD;  Location:  [facility]    SHOULDER SURGERY      ZZC GASTRIC BYPASS,OBESE<100CM LORA-EN-Y  01/01/2008    Description: Gastric Surgery For Morbid Obesity Bypass With Lora-en-Y;  Recorded: 06/26/2009;  Comments: dr green 2008    ZZHC REPAIR INCISIONAL HERNIA,REDUCIBLE  11/13/2012    Description: Incisional Hernia Repair;  Proc Date: 11/13/2012;  Comments: incisional hernia repair and abdominal panniculectomy by Dr Shon Green at the [facility].     Socioeconomic History    Marital status:      Social Determinants of Health     Social Connections: Unknown (2/27/2024)    Social Connection and Isolation Panel [NHANES]     Frequency of Social Gatherings with Friends and Family: Once a week     Review of Systems - Oncology    /85   Pulse 60   Temp 97.6  F (36.4  C)   Resp 18   Ht 1.905 m (6' 3\")   Wt 127.9 kg (282 lb)   SpO2 96%   BMI 35.25 kg/m      Physical Exam  Vitals and nursing note reviewed. Exam conducted with a chaperone present.   Constitutional:       Appearance: He is " obese. He is ill-appearing.      Comments: Soft spoken with marked kyphoscoliosis, unable to straighten for walking or to lie flat   HENT:      Head: Normocephalic and atraumatic.      Mouth/Throat:      Mouth: Mucous membranes are moist.      Dentition: Normal dentition. No dental caries.   Eyes:      Extraocular Movements: Extraocular movements intact.      Conjunctiva/sclera: Conjunctivae normal.      Pupils: Pupils are equal, round, and reactive to light.   Pulmonary:      Effort: Pulmonary effort is normal.      Breath sounds: Normal breath sounds.   Abdominal:      Palpations: Abdomen is soft. There is no mass.       Musculoskeletal:         General: Swelling present.      Thoracic back: Deformity present.      Lumbar back: Deformity present.   Lymphadenopathy:      Upper Body:      Right upper body: No axillary or epitrochlear adenopathy.      Left upper body: No axillary or epitrochlear adenopathy.   Skin:     General: Skin is warm and dry.   Neurological:      General: No focal deficit present.      Mental Status: He is alert and oriented to person, place, and time.      Cranial Nerves: No cranial nerve deficit.      Motor: No weakness.      Gait: Gait abnormal.      Deep Tendon Reflexes: Reflexes normal.   Psychiatric:         Mood and Affect: Mood normal.         Behavior: Behavior normal. Behavior is cooperative.         Thought Content: Thought content normal.         Judgment: Judgment normal.       Recent Results (from the past 720 hour(s))   CBC with platelets    Collection Time: 03/07/24  9:21 AM   Result Value Ref Range    WBC Count 39.1 (H) 4.0 - 11.0 10e3/uL    RBC Count 4.36 (L) 4.40 - 5.90 10e6/uL    Hemoglobin 13.1 (L) 13.3 - 17.7 g/dL    Hematocrit 40.0 40.0 - 53.0 %    MCV 92 78 - 100 fL    MCH 30.0 26.5 - 33.0 pg    MCHC 32.8 31.5 - 36.5 g/dL    RDW 13.1 10.0 - 15.0 %    Platelet Count 180 150 - 450 10e3/uL   Echocardiogram Complete    Collection Time: 03/08/24  1:50 PM   Result Value Ref  Range    LVEF  55-60%     Biplane LVEF 57%    CBC with platelets and differential    Collection Time: 03/11/24  2:57 PM   Result Value Ref Range    WBC Count 30.5 (H) 4.0 - 11.0 10e3/uL    RBC Count 4.64 4.40 - 5.90 10e6/uL    Hemoglobin 13.8 13.3 - 17.7 g/dL    Hematocrit 41.1 40.0 - 53.0 %    MCV 89 78 - 100 fL    MCH 29.7 26.5 - 33.0 pg    MCHC 33.6 31.5 - 36.5 g/dL    RDW 12.9 10.0 - 15.0 %    Platelet Count 173 150 - 450 10e3/uL    NRBCs per 100 WBC 0 <1 /100    Absolute NRBCs 0.0 10e3/uL   Reticulocyte count    Collection Time: 03/11/24  2:57 PM   Result Value Ref Range    % Reticulocyte 1.6 0.5 - 2.0 %    Absolute Reticulocyte 0.073 0.025 - 0.095 10e6/uL   Manual Differential    Collection Time: 03/11/24  2:57 PM   Result Value Ref Range    % Neutrophils 16 %    % Lymphocytes 83 %    % Monocytes 1 %    % Eosinophils 0 %    % Basophils 0 %    Absolute Neutrophils 4.9 1.6 - 8.3 10e3/uL    Absolute Lymphocytes 25.3 (H) 0.8 - 5.3 10e3/uL    Absolute Monocytes 0.3 0.0 - 1.3 10e3/uL    Absolute Eosinophils 0.0 0.0 - 0.7 10e3/uL    Absolute Basophils 0.0 0.0 - 0.2 10e3/uL    RBC Morphology Confirmed RBC Indices     Platelet Assessment  Automated Count Confirmed. Platelet morphology is normal.     Automated Count Confirmed. Platelet morphology is normal.    Reactive Lymphocytes Present (A) None Seen    Smudge Cells Present (A) None Seen     Recent Results (from the past 744 hour(s))   Adult Holter Monitor 24 hour    Narrative    1Parveen Barr was monitored for 24 hours.  The quality of the tracing was good.  The predominant rhythm is an electronic  atrial paced rhythm with  intermittent ventricular pacing and intermittent sinus rhythm.  During this time the average HR was  63 bpm with a minimum HR of 60 bpm at 1:21 AM and  a maximum HR of 86 bpm at 7:53 AM.  The ND interval measured .22-.26 sec.,  the QRS measured .09 sec., and the QT varied with HR .39-.47 sec.  2.  6030 Ventricular ectopic beats (7% burden) and  98 couplets.  3.  53 Isolated SVPBs.  4.  No symptoms were identified.    Agree with above.  Confirmed by MD JAZLYN, BRITT ([id number]) on 2024 4:25:05 PM   Echocardiogram Complete   Result Value    LVEF  55-60%    Biplane LVEF 57%    [facility]    [id number]  [id number]  [id number]  [id number]^JASWANT     [facility]  Echocardiography Laboratory  [address] [place] [address]     Name: KARMEN ERICKSON  MRN: [id number]  : 1955  Study Date: 2024 01:20 PM  Age: 68 yrs  Gender: Male  Patient Location: [facility] [address]  Reason For Study: Paroxysmal atrial fibrillation (H), S/P CABG (coronary  artery by  Ordering Physician: JASWANT TARANGO  Referring Physician: JASWANT TARANGO  Performed By: Flavia Malagon     BSA: 2.5 m2  Height: 74 in  Weight: 285 lb  BP: 118/60 mmHg  ______________________________________________________________________________  Procedure  Complete Echo Adult. Optison (NDC #[id number]) given intravenously.  ______________________________________________________________________________  Interpretation Summary     1. The left ventricle is mildly dilated. There is normal left ventricular wall  thickness. Left ventricular systolic function is normal. The visual ejection  fraction is 55-60%. Biplane LVEF is 57%. Septal motion is consistent with  post-operative state. There is no thrombus seen in the left ventricle.  2.. The right ventricle is normal size. There is a catheter/pacemaker lead  seen in the right ventricle. The right ventricular systolic function is  normal.  3. Trace mitral and tricuspid regurgitation.  4. Mildly dilated aortic root.  5. No pericardial effusion.  6. In comparisoin to the previous report dated 2023, the findings are  similar.  ______________________________________________________________________________  Left Ventricle  The left ventricle is mildly dilated. There is normal left ventricular wall  thickness. Left ventricular systolic  function is normal. The visual ejection  fraction is 55-60%. Biplane LVEF is 57%. Septal motion is consistent with  post-operative state. There is no thrombus seen in the left ventricle.     Right Ventricle  The right ventricle is normal size. There is a catheter/pacemaker lead seen in  the right ventricle. The right ventricular systolic function is normal.     Atria  Normal left atrial size. Right atrial size is normal.     Mitral Valve  There is trace mitral regurgitation.     Tricuspid Valve  There is trace tricuspid regurgitation. Right ventricular systolic pressure  could not be approximated due to inadequate tricuspid regurgitation.     Aortic Valve  The aortic valve is trileaflet. There is trace aortic regurgitation. No aortic  stenosis is present.     Pulmonic Valve  There is trace pulmonic valvular regurgitation.     Vessels  Aortic root dilatation is present. Normal size ascending aorta. The inferior  vena cava is normal.     Pericardium  There is no pericardial effusion.     Rhythm  Sinus rhythm was noted.  ______________________________________________________________________________  MMode/2D Measurements & Calculations     IVSd: 0.87 cm  LVIDd: 5.8 cm  LVIDs: 3.3 cm  LVPWd: 0.64 cm  FS: 42.9 %  LV mass(C)d: 161.7 grams  LV mass(C)dI: 64.0 grams/m2  Ao root diam: 4.0 cm  asc Aorta Diam: 3.8 cm  Ao root diam index Ht(cm/m): 2.1  Ao root diam index BSA (cm/m2): 1.6  Asc Ao diam index BSA (cm/m2): 1.5  Asc Ao diam index Ht(cm/m): 2.0  RWT: 0.22     Doppler Measurements & Calculations  MV E max elsie: 54.9 cm/sec  MV A max elsie: 55.1 cm/sec  MV E/A: 1.00  MV dec time: 0.17 sec  Medial E/e': 12.0     ______________________________________________________________________________  Report approved by: Leyla Blanco 03/08/2024 04:12 PM

## 2024-03-11 NOTE — LETTER
"    3/11/2024         RE: Onur Barr  [address] [facility] [address]        Dear Colleague,    Thank you for referring your patient, Onur Barr, to the [facility]. Please see a copy of my visit note below.    Oncology Rooming Note    March 11, 2024 2:06 PM   Onur Barr is a 68 year old male who presents for:    Chief Complaint   Patient presents with     Oncology Clinic Visit     CLL (chronic lymphocytic leukemia)      Initial Vitals: /85   Pulse 60   Temp 97.6  F (36.4  C)   Resp 18   Ht 1.905 m (6' 3\")   Wt 127.9 kg (282 lb)   SpO2 96%   BMI 35.25 kg/m   Estimated body mass index is 35.25 kg/m  as calculated from the following:    Height as of this encounter: 1.905 m (6' 3\").    Weight as of this encounter: 127.9 kg (282 lb). Body surface area is 2.6 meters squared.  Severe Pain (7) Comment: Data Unavailable   No LMP for male patient.  Allergies reviewed: No  Medications reviewed: No    Medications: Medication refills not needed today.  Pharmacy name entered into U[facility]: [id number] [place], [place] - [address]    Frailty Screening:   Is the patient here for a new oncology consult visit in cancer care? 2. No      Clinical concerns:  4 month follow up      Carlee Alcaraz              Assessment & Plan  Lymphoproliferative disorder consistent with CLL  Progressing lymphocytosis and falling hemoglobin  Coexisting weight loss and GI malaise since starting Ozempic    Ondansetron for symptom control  Discussed with pathologist Reilly Rasmussen MD:  we'll obtain peripheral smear for morphology and then he'll coordinate testing for FISH (del 17p), TP53, karyotype, IGHV as feasible  Follow up in a month for possible start of therapy depending on counts and test outcomes.    Interval History  This is a scheduled follow up visit for this chronically ill gentleman with a clonal lymphoid malignancy initially identified last year.  His initial " flow cytometry studies confirmed a clonal CD5 + population.  He had mild splenomegaly on ultrasound.    He has had a rising white count for many months, his platelet count is at the lower limit of normal and his hemoglobin is drifting downwards.        He has chronic pain for many years and has an indwelling pain pump.  He also has had longstanding morbid obesity despite Lora-en-Y gastrectomy.    He had been somewhat stable until about 5 months ago when he was started on Ozempic for morbid obesity.      Since then he's had marked weight loss (>40#) but has also had daily nausea often with vomiting and weakness.  He has no energy and is breathless with exertion.  He has occasional constipation.  His problems persist despite dose reductions.    ECOG = 1-2      Patient Active Problem List   Diagnosis     Lumbago     Morbid obesity -- BMI 42.0     Bilateral leg edema     Chronic diastolic heart failure (H)     MARLEEN (doesn't tolerate CPAP)     NSTEMI w Unstab Angina -- S/P CABG x 1 (LIMA to LAD) on 1/19/22     Paroxysmal atrial fib -- S/P Pulm Vein Ablation 1/19/22     Essential hypertension     Mixed hyperlipidemia     Gastroesophageal reflux disease without esophagitis     Chronic Back Pain (IT Pump w Morphine, Clonidine, Baclofen)     S/P CABG (coronary artery bypass graft)     Fluid overload     ICD (implantable cardioverter-defibrillator) in place     Peripheral vascular disease (H24)     Abdominal panniculus     Bariatric surgery status     Claudication of lower extremity (H24)     Insomnia     Intestinal disaccharidase deficiencies and disaccharide malabsorption     Nephrolithiasis     Osteoarthrosis     Post-laminectomy syndrome     Sleepiness     Coronary arteriosclerosis     Candidal intertrigo     CLL (chronic lymphocytic leukemia) (H)     Current Outpatient Medications   Medication     acetaminophen (TYLENOL) 325 MG tablet     aspirin (ASA) 81 MG chewable tablet     DULoxetine (CYMBALTA) 30 MG capsule      FERATE 240 (27 Fe) MG TABS     furosemide (LASIX) 40 MG tablet     ketoconazole (NIZORAL) 2 % external cream     lisinopril (ZESTRIL) 10 MG tablet     metoprolol succinate ER (TOPROL XL) 100 MG 24 hr tablet     MORPHINE SULFATE     nystatin (NYAMYC) 054213 UNIT/GM external powder     ondansetron (ZOFRAN ODT) 4 MG ODT tab     rosuvastatin (CRESTOR) 40 MG tablet     semaglutide (OZEMPIC) 2 MG/3ML pen     sotalol (BETAPACE) 80 MG tablet     traZODone (DESYREL) 100 MG tablet     nitroGLYcerin (NITROSTAT) 0.4 MG sublingual tablet     No current facility-administered medications for this visit.     Past Medical History:   Diagnosis Date     Abdominal panniculus 11/13/2012    Formatting of this note might be different from the original. S/p panniculectomy 11/13/2012     Acid reflux disease 10/31/2017     Bariatric surgery status 06/23/2008    Formatting of this note might be different from the original. Created by Conversion     Bilateral leg edema 07/13/2021     CHF (congestive heart failure) (H)      Chronic Back Pain (IT Pump w Morphine, Clonidine, Baclofen) 01/16/2022     Chronic diastolic heart failure (H) 07/26/2021     Claudication of lower extremity (H24) 11/20/2019     Coronary arteriosclerosis 05/09/2022    Formatting of this note might be different from the original. Created by Conversion  Replacement Utility updated for latest IMO load     Coronary artery disease     CABG 1-     Depressive disorder      Essential hypertension     Created by Conversion [facility] Annotation: Apr 6 2012 10:16Zack Harris: Lisinipril,  coreg  Replacement Utility updated for latest IMO load     Fluid overload 01/25/2022     Gastroesophageal reflux disease without esophagitis 09/11/2021     H/O gastric bypass 2008     History of blood transfusion 2004     ICD (implantable cardioverter-defibrillator) in place 01/25/2022     Insomnia      Intestinal disaccharidase deficiencies and disaccharide malabsorption  06/23/2008     Ischemic cardiomyopathy      Lumbago     Created by [facility] Annotation: Apr 6 2012 10:Anh Santana: Morphine pump      Mixed hyperlipidemia 06/23/2008     Morbid obesity (H) 08/01/2018     Nephrolithiasis      NSTEMI (non-ST elevated myocardial infarction) (H)      Obstructive sleep apnea     Doesn't tolerate CPAP     MARLEEN (doesn't tolerate CPAP) 07/26/2021     Osteoarthritis     Created by [facility] Annotation: Jun 26 2009  9:53Zack Harris: failed lumbar  fusion/morphine pump dr putnam  Replacement Utility updated for latest IMO load     Osteoarthrosis 05/09/2022    Formatting of this note might be different from the original. Created by [facility] Annotation: Jun 26 2009  9:53Zack Harris: failed lumbar  fusion/morphine pump dr putnam  Replacement Utility updated for latest IMO load     Paroxysmal atrial fib -- S/P Pulm Vein Ablation 1/19/22 09/11/2021    Formatting of this note might be different from the original. Created by Conversion     Paroxysmal atrial fibrillation (H)     Created by Conversion      Paroxysmal ventricular tachycardia (H)     dual chamber ICD 1/24/2022     Peripheral vascular disease (H24) 05/03/2022     Post-laminectomy syndrome 11/25/2015     S/P CABG (coronary artery bypass graft) 01/25/2022     Sleepiness 05/08/2018     Type 2 diabetes mellitus (H)     Diet controlled after Gastric Bypass in 2008     Past Surgical History:   Procedure Laterality Date     BACK SURGERY       BYPASS GASTRIC DUODENAL SWITCH       BYPASS GRAFT ARTERY CORONARY N/A 01/19/2022    Procedure: CORONARY ARTERY BYPASS GRAFT (CABG) X1; LIMA -LAD.  PULMONARY VEIN ISOLATION, AND ATRIAL APPENDAGE CLIPPING USING 45MM ACTICURE CLIP.;  Surgeon: William Dumont MD;  Location: [facility] OR     COLONOSCOPY       COSMETIC SURGERY      pannicullectomy     CV CORONARY ANGIOGRAM N/A 09/11/2021    Procedure: Coronary Angiogram;  Surgeon: Noris Ozuna  MD;  Location: [facility] CV     CV HEART CATHETERIZATION WITH POSSIBLE INTERVENTION N/A 01/13/2022    Procedure: Heart Catheterization with Possible Intervention;  Surgeon: Liz Pearl MD;  Location:  HEART CARDIAC CATH LAB     CV LEFT HEART CATH N/A 09/11/2021    Procedure: Left Heart Cath;  Surgeon: Noris Ozuna MD;  Location: [facility] CATH LAB CV     CV PCI N/A 09/11/2021    Procedure: Percutaneous Coronary Intervention;  Surgeon: Noris Ozuna MD;  Location: [facility] LAB CV     CV PCI N/A 10/07/2021    Procedure: Percutaneous Coronary Intervention;  Surgeon: Mckayla Dan MD;  Location: [facility] CV     CV PCI ASPIRATION THROMECTOMY N/A 09/11/2021    Procedure: Percutaneous Coronary Intervention Aspiration Thrombectomy;  Surgeon: Noris Ozuna MD;  Location: [facility]     ENT SURGERY      tonsillectomy     EP ICD N/A 01/24/2022    Procedure: EP ICD;  Surgeon: Jannette Crook MD;  Location:  HEART CARDIAC CATH LAB     EXTRACORPOREAL SHOCK WAVE LITHOTRIPSY, CYSTOSCOPY, INSERT STENT URETER(S), COMBINED  08/23/2011     HC REMOVAL OF TONSILS,<11 Y/O      Description: Tonsillectomy;  Recorded: 03/23/2012;  Comments: for obstructive sleep apnea     HERNIA REPAIR       IR MISCELLANEOUS PROCEDURE  07/29/2011     IR MISCELLANEOUS PROCEDURE  08/05/2011     IR MISCELLANEOUS PROCEDURE  08/23/2011     IR NEPHROSTOMY TUBE CHANGE BILATERAL  08/05/2011     ORTHOPEDIC SURGERY      arthrodesis ant discectomy, lumbar     ID ARTHRODESIS ANT INTERBODY MIN DISCECTOMY,LUMBAR      Description: Lumbar Vertebral Fusion;  Recorded: 06/26/2009;  Comments: before 200 see [facility] consult under old records     ID EXCISE EXCESS SKIN TISSUE,ABDOMEN  11/13/2012    Description: Panniculectomy;  Proc Date: 11/13/2012;  Comments: 3.5 Lb Pannus was removed at the [facility] By Dr. Shon Green     REPLACE INTRATHECAL PAIN PUMP N/A 04/25/2017    Procedure: REPLACE INTRATHECAL PAIN PUMP;   "INTRATHECAL PAIN PUMP CATHETER REPLACEMENT AND PUMP REPLACEMENT;  Surgeon: Anthony Maloney MD;  Location:  [facility]     REPLACE INTRATHECAL PAIN PUMP N/A 4/20/2023    Procedure: Pump Replacement and intrathecal catheter replacement;  Surgeon: Anthony Dick MD;  Location:  OR     REVISE CATHETER INTRATHECAL N/A 04/25/2017    Procedure: REVISE CATHETER INTRATHECAL;;  Surgeon: Anthony Maloney MD;  Location:  [facility]     SHOULDER SURGERY       ZZC GASTRIC BYPASS,OBESE<100CM LOAR-EN-Y  01/01/2008    Description: Gastric Surgery For Morbid Obesity Bypass With Lora-en-Y;  Recorded: 06/26/2009;  Comments: dr green 2008     [facility]  11/13/2012    Description: Incisional Hernia Repair;  Proc Date: 11/13/2012;  Comments: incisional hernia repair and abdominal panniculectomy by Dr Shon Green at the [facility].     Socioeconomic History     Marital status:      Social Determinants of Health     Social Connections: Unknown (2/27/2024)    Social Connection and Isolation Panel [NHANES]      Frequency of Social Gatherings with Friends and Family: Once a week     Review of Systems - Oncology    /85   Pulse 60   Temp 97.6  F (36.4  C)   Resp 18   Ht 1.905 m (6' 3\")   Wt 127.9 kg (282 lb)   SpO2 96%   BMI 35.25 kg/m      Physical Exam  Vitals and nursing note reviewed. Exam conducted with a chaperone present.   Constitutional:       Appearance: He is obese. He is ill-appearing.      Comments: Soft spoken with marked kyphoscoliosis, unable to straighten for walking or to lie flat   HENT:      Head: Normocephalic and atraumatic.      Mouth/Throat:      Mouth: Mucous membranes are moist.      Dentition: Normal dentition. No dental caries.   Eyes:      Extraocular Movements: Extraocular movements intact.      Conjunctiva/sclera: Conjunctivae normal.      Pupils: Pupils are equal, round, and reactive to light.   Pulmonary:      Effort: Pulmonary effort is normal.      Breath sounds: " Normal breath sounds.   Abdominal:      Palpations: Abdomen is soft. There is no mass.       Musculoskeletal:         General: Swelling present.      Thoracic back: Deformity present.      Lumbar back: Deformity present.   Lymphadenopathy:      Upper Body:      Right upper body: No axillary or epitrochlear adenopathy.      Left upper body: No axillary or epitrochlear adenopathy.   Skin:     General: Skin is warm and dry.   Neurological:      General: No focal deficit present.      Mental Status: He is alert and oriented to person, place, and time.      Cranial Nerves: No cranial nerve deficit.      Motor: No weakness.      Gait: Gait abnormal.      Deep Tendon Reflexes: Reflexes normal.   Psychiatric:         Mood and Affect: Mood normal.         Behavior: Behavior normal. Behavior is cooperative.         Thought Content: Thought content normal.         Judgment: Judgment normal.       Recent Results (from the past 720 hour(s))   CBC with platelets    Collection Time: 03/07/24  9:21 AM   Result Value Ref Range    WBC Count 39.1 (H) 4.0 - 11.0 10e3/uL    RBC Count 4.36 (L) 4.40 - 5.90 10e6/uL    Hemoglobin 13.1 (L) 13.3 - 17.7 g/dL    Hematocrit 40.0 40.0 - 53.0 %    MCV 92 78 - 100 fL    MCH 30.0 26.5 - 33.0 pg    MCHC 32.8 31.5 - 36.5 g/dL    RDW 13.1 10.0 - 15.0 %    Platelet Count 180 150 - 450 10e3/uL   Echocardiogram Complete    Collection Time: 03/08/24  1:50 PM   Result Value Ref Range    LVEF  55-60%     Biplane LVEF 57%    CBC with platelets and differential    Collection Time: 03/11/24  2:57 PM   Result Value Ref Range    WBC Count 30.5 (H) 4.0 - 11.0 10e3/uL    RBC Count 4.64 4.40 - 5.90 10e6/uL    Hemoglobin 13.8 13.3 - 17.7 g/dL    Hematocrit 41.1 40.0 - 53.0 %    MCV 89 78 - 100 fL    MCH 29.7 26.5 - 33.0 pg    MCHC 33.6 31.5 - 36.5 g/dL    RDW 12.9 10.0 - 15.0 %    Platelet Count 173 150 - 450 10e3/uL    NRBCs per 100 WBC 0 <1 /100    Absolute NRBCs 0.0 10e3/uL   Reticulocyte count    Collection Time:  24  2:57 PM   Result Value Ref Range    % Reticulocyte 1.6 0.5 - 2.0 %    Absolute Reticulocyte 0.073 0.025 - 0.095 10e6/uL   Manual Differential    Collection Time: 24  2:57 PM   Result Value Ref Range    % Neutrophils 16 %    % Lymphocytes 83 %    % Monocytes 1 %    % Eosinophils 0 %    % Basophils 0 %    Absolute Neutrophils 4.9 1.6 - 8.3 10e3/uL    Absolute Lymphocytes 25.3 (H) 0.8 - 5.3 10e3/uL    Absolute Monocytes 0.3 0.0 - 1.3 10e3/uL    Absolute Eosinophils 0.0 0.0 - 0.7 10e3/uL    Absolute Basophils 0.0 0.0 - 0.2 10e3/uL    RBC Morphology Confirmed RBC Indices     Platelet Assessment  Automated Count Confirmed. Platelet morphology is normal.     Automated Count Confirmed. Platelet morphology is normal.    Reactive Lymphocytes Present (A) None Seen    Smudge Cells Present (A) None Seen     Recent Results (from the past 744 hour(s))   Adult Holter Monitor 24 hour    Narrative    1Parveen Erickson was monitored for 24 hours.  The quality of the tracing was good.  The predominant rhythm is an electronic  atrial paced rhythm with  intermittent ventricular pacing and intermittent sinus rhythm.  During this time the average HR was  63 bpm with a minimum HR of 60 bpm at 1:21 AM and  a maximum HR of 86 bpm at 7:53 AM.  The GA interval measured .22-.26 sec.,  the QRS measured .09 sec., and the QT varied with HR .39-.47 sec.  2.  6030 Ventricular ectopic beats (7% burden) and 98 couplets.  3.  53 Isolated SVPBs.  4.  No symptoms were identified.    Agree with above.  Confirmed by MD JAZLYN, BRITT ([id number]) on 2024 4:25:05 PM   Echocardiogram Complete   Result Value    LVEF  55-60%    Biplane LVEF 57%    Narrative    [id number]  [id number]  [id number]  [id number]^SUKHDEEP^[facility]  Echocardiography Laboratory  [address] [place] [address]     Name: KARMEN ERICKSON  MRN: [id number]  : 1955  Study Date: 2024 01:20 PM  Age: 68 yrs  Gender: Male  Patient  Location: [facility]  Reason For Study: Paroxysmal atrial fibrillation (H), S/P CABG (coronary  artery by  Ordering Physician: JASWANT TARANGO  Referring Physician: JASWANT TARANGO  Performed By: Flavia Malagon     BSA: 2.5 m2  Height: 74 in  Weight: 285 lb  BP: 118/60 mmHg  ______________________________________________________________________________  Procedure  Complete Echo Adult. Optison (NDC #[id number]) given intravenously.  ______________________________________________________________________________  Interpretation Summary     1. The left ventricle is mildly dilated. There is normal left ventricular wall  thickness. Left ventricular systolic function is normal. The visual ejection  fraction is 55-60%. Biplane LVEF is 57%. Septal motion is consistent with  post-operative state. There is no thrombus seen in the left ventricle.  2.. The right ventricle is normal size. There is a catheter/pacemaker lead  seen in the right ventricle. The right ventricular systolic function is  normal.  3. Trace mitral and tricuspid regurgitation.  4. Mildly dilated aortic root.  5. No pericardial effusion.  6. In comparisoin to the previous report dated 09/07/2023, the findings are  similar.  ______________________________________________________________________________  Left Ventricle  The left ventricle is mildly dilated. There is normal left ventricular wall  thickness. Left ventricular systolic function is normal. The visual ejection  fraction is 55-60%. Biplane LVEF is 57%. Septal motion is consistent with  post-operative state. There is no thrombus seen in the left ventricle.     Right Ventricle  The right ventricle is normal size. There is a catheter/pacemaker lead seen in  the right ventricle. The right ventricular systolic function is normal.     Atria  Normal left atrial size. Right atrial size is normal.     Mitral Valve  There is trace mitral regurgitation.     Tricuspid Valve  There is trace tricuspid regurgitation.  Right ventricular systolic pressure  could not be approximated due to inadequate tricuspid regurgitation.     Aortic Valve  The aortic valve is trileaflet. There is trace aortic regurgitation. No aortic  stenosis is present.     Pulmonic Valve  There is trace pulmonic valvular regurgitation.     Vessels  Aortic root dilatation is present. Normal size ascending aorta. The inferior  vena cava is normal.     Pericardium  There is no pericardial effusion.     Rhythm  Sinus rhythm was noted.  ______________________________________________________________________________  MMode/2D Measurements & Calculations     IVSd: 0.87 cm  LVIDd: 5.8 cm  LVIDs: 3.3 cm  LVPWd: 0.64 cm  FS: 42.9 %  LV mass(C)d: 161.7 grams  LV mass(C)dI: 64.0 grams/m2  Ao root diam: 4.0 cm  asc Aorta Diam: 3.8 cm  Ao root diam index Ht(cm/m): 2.1  Ao root diam index BSA (cm/m2): 1.6  Asc Ao diam index BSA (cm/m2): 1.5  Asc Ao diam index Ht(cm/m): 2.0  RWT: 0.22     Doppler Measurements & Calculations  MV E max elsie: 54.9 cm/sec  MV A max elsie: 55.1 cm/sec  MV E/A: 1.00  MV dec time: 0.17 sec  Medial E/e': 12.0     ______________________________________________________________________________  Report approved by: Leyla Blanco 03/08/2024 04:12 PM               Again, thank you for allowing me to participate in the care of your patient.        Sincerely,        Melissa Peguero MD

## 2024-03-14 LAB
PATH REPORT.ADDENDUM SPEC: ABNORMAL
PATH REPORT.COMMENTS IMP SPEC: ABNORMAL
PATH REPORT.COMMENTS IMP SPEC: ABNORMAL
PATH REPORT.COMMENTS IMP SPEC: YES
PATH REPORT.FINAL DX SPEC: ABNORMAL
PATH REPORT.RELEVANT HX SPEC: ABNORMAL

## 2024-03-20 DIAGNOSIS — E11.9 TYPE 2 DIABETES MELLITUS WITHOUT COMPLICATION, WITHOUT LONG-TERM CURRENT USE OF INSULIN (H): ICD-10-CM

## 2024-03-21 RX ORDER — SEMAGLUTIDE 0.68 MG/ML
0.5 INJECTION, SOLUTION SUBCUTANEOUS
Qty: 3 ML | Refills: 1 | Status: SHIPPED | OUTPATIENT
Start: 2024-03-21

## 2024-03-22 ENCOUNTER — HOSPITAL ENCOUNTER (OUTPATIENT)
Dept: ULTRASOUND IMAGING | Facility: CLINIC | Age: 69
Discharge: HOME | End: 2024-03-22
Attending: INTERNAL MEDICINE | Admitting: INTERNAL MEDICINE
Payer: COMMERCIAL

## 2024-03-22 DIAGNOSIS — C91.10 CLL (CHRONIC LYMPHOCYTIC LEUKEMIA) (H): ICD-10-CM

## 2024-03-22 PROCEDURE — 76705 ECHO EXAM OF ABDOMEN: CPT

## 2024-03-29 ENCOUNTER — MYC MEDICAL ADVICE (OUTPATIENT)
Dept: CARDIOLOGY | Facility: CLINIC | Age: 69
End: 2024-03-29
Payer: COMMERCIAL

## 2024-03-29 DIAGNOSIS — Z95.1 S/P CABG (CORONARY ARTERY BYPASS GRAFT): ICD-10-CM

## 2024-03-29 DIAGNOSIS — I50.22 CHRONIC SYSTOLIC CONGESTIVE HEART FAILURE (H): ICD-10-CM

## 2024-04-01 RX ORDER — METOPROLOL SUCCINATE 100 MG/1
100 TABLET, EXTENDED RELEASE ORAL 2 TIMES DAILY
Qty: 180 TABLET | Refills: 3 | Status: SHIPPED | OUTPATIENT
Start: 2024-04-01

## 2024-04-17 ENCOUNTER — ONCOLOGY VISIT (OUTPATIENT)
Dept: ONCOLOGY | Facility: HOSPITAL | Age: 69
End: 2024-04-17
Attending: INTERNAL MEDICINE
Payer: COMMERCIAL

## 2024-04-17 ENCOUNTER — LAB (OUTPATIENT)
Dept: INFUSION THERAPY | Facility: HOSPITAL | Age: 69
End: 2024-04-17
Attending: INTERNAL MEDICINE
Payer: COMMERCIAL

## 2024-04-17 VITALS
RESPIRATION RATE: 18 BRPM | OXYGEN SATURATION: 94 % | HEART RATE: 59 BPM | SYSTOLIC BLOOD PRESSURE: 121 MMHG | HEIGHT: 75 IN | DIASTOLIC BLOOD PRESSURE: 70 MMHG | WEIGHT: 288.4 LBS | BODY MASS INDEX: 35.86 KG/M2 | TEMPERATURE: 97.6 F

## 2024-04-17 DIAGNOSIS — C91.10 CLL (CHRONIC LYMPHOCYTIC LEUKEMIA) (H): ICD-10-CM

## 2024-04-17 DIAGNOSIS — C91.10 CLL (CHRONIC LYMPHOCYTIC LEUKEMIA) (H): Primary | ICD-10-CM

## 2024-04-17 LAB
ALBUMIN SERPL BCG-MCNC: 3.9 G/DL (ref 3.5–5.2)
ALP SERPL-CCNC: 65 U/L (ref 40–150)
ALT SERPL W P-5'-P-CCNC: <5 U/L (ref 0–70)
ANION GAP SERPL CALCULATED.3IONS-SCNC: 9 MMOL/L (ref 7–15)
AST SERPL W P-5'-P-CCNC: 20 U/L (ref 0–45)
BASOPHILS # BLD MANUAL: 0 10E3/UL (ref 0–0.2)
BASOPHILS NFR BLD MANUAL: 0 %
BILIRUB SERPL-MCNC: 0.6 MG/DL
BUN SERPL-MCNC: 16.6 MG/DL (ref 8–23)
CALCIUM SERPL-MCNC: 9.3 MG/DL (ref 8.8–10.2)
CHLORIDE SERPL-SCNC: 103 MMOL/L (ref 98–107)
CREAT SERPL-MCNC: 1.24 MG/DL (ref 0.67–1.17)
DEPRECATED HCO3 PLAS-SCNC: 28 MMOL/L (ref 22–29)
EGFRCR SERPLBLD CKD-EPI 2021: 64 ML/MIN/1.73M2
EOSINOPHIL # BLD MANUAL: 0 10E3/UL (ref 0–0.7)
EOSINOPHIL NFR BLD MANUAL: 0 %
ERYTHROCYTE [DISTWIDTH] IN BLOOD BY AUTOMATED COUNT: 12.7 % (ref 10–15)
GLUCOSE SERPL-MCNC: 109 MG/DL (ref 70–99)
HCT VFR BLD AUTO: 39.9 % (ref 40–53)
HGB BLD-MCNC: 12.9 G/DL (ref 13.3–17.7)
LYMPHOCYTES # BLD MANUAL: 21.3 10E3/UL (ref 0.8–5.3)
LYMPHOCYTES NFR BLD MANUAL: 86 %
MCH RBC QN AUTO: 29.2 PG (ref 26.5–33)
MCHC RBC AUTO-ENTMCNC: 32.3 G/DL (ref 31.5–36.5)
MCV RBC AUTO: 90 FL (ref 78–100)
MONOCYTES # BLD MANUAL: 0.2 10E3/UL (ref 0–1.3)
MONOCYTES NFR BLD MANUAL: 1 %
NEUTROPHILS # BLD MANUAL: 3.2 10E3/UL (ref 1.6–8.3)
NEUTROPHILS NFR BLD MANUAL: 13 %
NRBC # BLD AUTO: 0 10E3/UL
NRBC BLD AUTO-RTO: 0 /100
PLAT MORPH BLD: ABNORMAL
PLATELET # BLD AUTO: 163 10E3/UL (ref 150–450)
POTASSIUM SERPL-SCNC: 3.8 MMOL/L (ref 3.4–5.3)
PROT SERPL-MCNC: 7 G/DL (ref 6.4–8.3)
RBC # BLD AUTO: 4.42 10E6/UL (ref 4.4–5.9)
RBC MORPH BLD: ABNORMAL
SMUDGE CELLS BLD QL SMEAR: PRESENT
SODIUM SERPL-SCNC: 140 MMOL/L (ref 135–145)
URATE SERPL-MCNC: 5.9 MG/DL (ref 3.4–7)
VARIANT LYMPHS BLD QL SMEAR: PRESENT
WBC # BLD AUTO: 24.8 10E3/UL (ref 4–11)

## 2024-04-17 PROCEDURE — 36415 COLL VENOUS BLD VENIPUNCTURE: CPT

## 2024-04-17 PROCEDURE — 85025 COMPLETE CBC W/AUTO DIFF WBC: CPT

## 2024-04-17 PROCEDURE — 82247 BILIRUBIN TOTAL: CPT

## 2024-04-17 PROCEDURE — 99214 OFFICE O/P EST MOD 30 MIN: CPT | Performed by: INTERNAL MEDICINE

## 2024-04-17 PROCEDURE — 85007 BL SMEAR W/DIFF WBC COUNT: CPT

## 2024-04-17 PROCEDURE — 84550 ASSAY OF BLOOD/URIC ACID: CPT

## 2024-04-17 ASSESSMENT — ENCOUNTER SYMPTOMS
HEMATOLOGIC/LYMPHATIC NEGATIVE: 1
GASTROINTESTINAL NEGATIVE: 1
RESPIRATORY NEGATIVE: 1
BACK PAIN: 1
CARDIOVASCULAR NEGATIVE: 1
NEUROLOGICAL NEGATIVE: 1
CONSTITUTIONAL NEGATIVE: 1
ARTHRALGIAS: 1
EYES NEGATIVE: 1
PSYCHIATRIC NEGATIVE: 1

## 2024-04-17 ASSESSMENT — PAIN SCALES - GENERAL: PAINLEVEL: SEVERE PAIN (7)

## 2024-04-17 NOTE — PROGRESS NOTES
Assessment & Plan   Chronic lymphocytic leukemia, with stabilization of lymphocytosis after several sequential rising values  Mild splenomegaly  Failed back syndrome with severe kyphosis and implanted pain pump  Morbid obesity  Intolerance to Ozempic    Defer starting treatment for now  Provider follow up in 3 months    Interval History  This is a scheduled follow up visit for this disabled diabetic man who we see for low grade lymphoproliferative disease consistent with CLL.    At our last visit we were concerned about rising white count, but it has improved considerably since February.          He was on Ozempic for a period earlier this year but had declining health and the injections were stopped.  Since then, he's back to feeling better.    He did have a fall on some stairs a couple of weeks ago with a soft tissue injury to back.    ECOG = 1    Patient Active Problem List   Diagnosis    Lumbago    Morbid obesity -- BMI 42.0    Bilateral leg edema    Chronic diastolic heart failure (H)    MARLEEN (doesn't tolerate CPAP)    NSTEMI w Unstab Angina -- S/P CABG x 1 (LIMA to LAD) on 1/19/22    Paroxysmal atrial fib -- S/P Pulm Vein Ablation 1/19/22    Essential hypertension    Mixed hyperlipidemia    Gastroesophageal reflux disease without esophagitis    Chronic Back Pain (IT Pump w Morphine, Clonidine, Baclofen)    S/P CABG (coronary artery bypass graft)    Fluid overload    ICD (implantable cardioverter-defibrillator) in place    Peripheral vascular disease (H24)    Abdominal panniculus    Bariatric surgery status    Claudication of lower extremity (H24)    Insomnia    Intestinal disaccharidase deficiencies and disaccharide malabsorption    Nephrolithiasis    Osteoarthrosis    Post-laminectomy syndrome    Sleepiness    Coronary arteriosclerosis    Candidal intertrigo    CLL (chronic lymphocytic leukemia) (H)     Current Outpatient Medications   Medication Sig Dispense Refill    acetaminophen (TYLENOL) 325 MG tablet  Take 2 tablets (650 mg) by mouth every 4 hours as needed for mild pain 40 tablet 0    aspirin (ASA) 81 MG chewable tablet Take 81 mg by mouth daily      DULoxetine (CYMBALTA) 30 MG capsule Take 1 capsule (30mg) every morning and 2 capsules (60mg) every evening 270 capsule 3    FERATE 240 (27 Fe) MG TABS TAKE 1 TABLET BY MOUTH EVERY DAY 90 tablet 2    furosemide (LASIX) 40 MG tablet Take 1 tablet (40 mg) by mouth 2 times daily 180 tablet 1    ketoconazole (NIZORAL) 2 % external cream APPLY TO AFFECTED AREA TWICE A DAY 60 g 4    lisinopril (ZESTRIL) 10 MG tablet Take 1 tablet (10 mg) by mouth daily 90 tablet 3    metoprolol succinate ER (TOPROL XL) 100 MG 24 hr tablet Take 1 tablet (100 mg) by mouth 2 times daily 180 tablet 3    MORPHINE SULFATE IT pump:updated 1/12/22  Medications in Pump:   [facility] Responsible for pump medications:   Conc:  Morphine 20mg/ml(3.95 mg/day), clonidine 21.1mcg/ml (4.168 mcg/day), baclofen 42.5mcg/ml (8.395mcg/day)  Rate:   Pump Last Fill Date: 9/2021  Pump Refill Date: 2/2022      nitroGLYcerin (NITROSTAT) 0.4 MG sublingual tablet For chest pain place 1 tablet under the tongue every 5 minutes for 3 doses. If symptoms persist 5 minutes after 1st dose call 911. (Patient not taking: Reported on 3/11/2024) 30 tablet 1    nystatin (NYAMYC) 365056 UNIT/GM external powder APPLY TO AFFECTED AREA 3 TIMES A DAY **MAX 90G/67 DAYS 120 g 0    ondansetron (ZOFRAN ODT) 4 MG ODT tab Take 1 tablet (4 mg) by mouth every 8 hours as needed for nausea 100 tablet 1    rosuvastatin (CRESTOR) 40 MG tablet Take 1 tablet (40 mg) by mouth daily 90 tablet 3    semaglutide (OZEMPIC, 0.25 OR 0.5 MG/DOSE,) 2 MG/3ML pen INJECT 0.5 MG SUBCUTANEOUS EVERY 7 DAYS 3 mL 1    sotalol (BETAPACE) 80 MG tablet Take 1 tablet (80 mg) by mouth 2 times daily 60 tablet 3    traZODone (DESYREL) 100 MG tablet Take 2 tablets (200 mg) by mouth at bedtime TAKE 2 TABLETS (200MG TOTAL) BY MOUTH AT BEDTIME Strength: 100 mg  180 tablet 3     No current facility-administered medications for this visit.     Past Medical History:   Diagnosis Date    Abdominal panniculus 11/13/2012    Formatting of this note might be different from the original. S/p panniculectomy 11/13/2012    Acid reflux disease 10/31/2017    Bariatric surgery status 06/23/2008    Formatting of this note might be different from the original. Created by Conversion    Bilateral leg edema 07/13/2021    CHF (congestive heart failure) (H)     Chronic Back Pain (IT Pump w Morphine, Clonidine, Baclofen) 01/16/2022    Chronic diastolic heart failure (H) 07/26/2021    Claudication of lower extremity (H24) 11/20/2019    Coronary arteriosclerosis 05/09/2022    Formatting of this note might be different from the original. Created by Conversion  Replacement Utility updated for latest IMO load    Coronary artery disease     CABG 1-    Depressive disorder     Essential hypertension     Created by [employer] [facility] Annotation: Apr 6 2012 10:16Zack Harris: Lisinipril,  coreg  Replacement Utility updated for latest IMO load    Fluid overload 01/25/2022    Gastroesophageal reflux disease without esophagitis 09/11/2021    H/O gastric bypass 2008    History of blood transfusion 2004    ICD (implantable cardioverter-defibrillator) in place 01/25/2022    Insomnia     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    Ischemic cardiomyopathy     Lumbago     Created by [employer] [facility] Annotation: Apr 6 2012 10:20Anh Ford: Morphine pump     Mixed hyperlipidemia 06/23/2008    Morbid obesity (H) 08/01/2018    Nephrolithiasis     NSTEMI (non-ST elevated myocardial infarction) (H)     Obstructive sleep apnea     Doesn't tolerate CPAP    MARLEEN (doesn't tolerate CPAP) 07/26/2021    Osteoarthritis     Created by [employer] [facility] Annotation: Jun 26 2009  9:53Zack Harris: failed lumbar  fusion/morphine pump dr putnam   Replacement Utility updated for latest IMO load    Osteoarthrosis 05/09/2022    Formatting of this note might be different from the original. Created by [facility] Annotation: Jun 26 2009  9:[id number] - Zack Gutierrez: failed lumbar  fusion/morphine pump dr putnam  Replacement Utility updated for latest IMO load    Paroxysmal atrial fib -- S/P Pulm Vein Ablation 1/19/22 09/11/2021    Formatting of this note might be different from the original. Created by Conversion    Paroxysmal atrial fibrillation (H)     Created by Conversion     Paroxysmal ventricular tachycardia (H)     dual chamber ICD 1/24/2022    Peripheral vascular disease (H24) 05/03/2022    Post-laminectomy syndrome 11/25/2015    S/P CABG (coronary artery bypass graft) 01/25/2022    Sleepiness 05/08/2018    Type 2 diabetes mellitus (H)     Diet controlled after Gastric Bypass in 2008     Past Surgical History:   Procedure Laterality Date    BACK SURGERY      BYPASS GASTRIC DUODENAL SWITCH      BYPASS GRAFT ARTERY CORONARY N/A 01/19/2022    Procedure: CORONARY ARTERY BYPASS GRAFT (CABG) X1; LIMA -LAD.  PULMONARY VEIN ISOLATION, AND ATRIAL APPENDAGE CLIPPING USING 45MM ACTICURE CLIP.;  Surgeon: William Dumont MD;  Location:  OR    COLONOSCOPY      COSMETIC SURGERY      pannicullectomy    CV CORONARY ANGIOGRAM N/A 09/11/2021    Procedure: Coronary Angiogram;  Surgeon: Noris Ozuna MD;  Location: [facility] CATH LAB CV    CV HEART CATHETERIZATION WITH POSSIBLE INTERVENTION N/A 01/13/2022    Procedure: Heart Catheterization with Possible Intervention;  Surgeon: Liz Pearl MD;  Location:  HEART CARDIAC CATH LAB    CV LEFT HEART CATH N/A 09/11/2021    Procedure: Left Heart Cath;  Surgeon: Noris Ozuna MD;  Location: [facility] LAB CV    CV PCI N/A 09/11/2021    Procedure: Percutaneous Coronary Intervention;  Surgeon: Noris Ozuna MD;  Location: [facility] CATH LAB CV    CV PCI N/A 10/07/2021    Procedure: Percutaneous  Coronary Intervention;  Surgeon: Mckayla Dan MD;  Location: [facility] CV    CV PCI ASPIRATION THROMECTOMY N/A 09/11/2021    Procedure: Percutaneous Coronary Intervention Aspiration Thrombectomy;  Surgeon: Noris Ozuna MD;  Location: [facility] CV    ENT SURGERY      tonsillectomy    EP ICD N/A 01/24/2022    Procedure: EP ICD;  Surgeon: Jannette Crook MD;  Location:  HEART CARDIAC CATH LAB    EXTRACORPOREAL SHOCK WAVE LITHOTRIPSY, CYSTOSCOPY, INSERT STENT URETER(S), COMBINED  08/23/2011    HC REMOVAL OF TONSILS,<14 Y/O      Description: Tonsillectomy;  Recorded: 03/23/2012;  Comments: for obstructive sleep apnea    HERNIA REPAIR      IR MISCELLANEOUS PROCEDURE  07/29/2011    IR MISCELLANEOUS PROCEDURE  08/05/2011    IR MISCELLANEOUS PROCEDURE  08/23/2011    IR NEPHROSTOMY TUBE CHANGE BILATERAL  08/05/2011    ORTHOPEDIC SURGERY      arthrodesis ant discectomy, lumbar    GA ARTHRODESIS ANT INTERBODY MIN DISCECTOMY,LUMBAR      Description: Lumbar Vertebral Fusion;  Recorded: 06/26/2009;  Comments: before 200 see [facility] consult under old records    GA EXCISE EXCESS SKIN TISSUE,ABDOMEN  11/13/2012    Description: Panniculectomy;  Proc Date: 11/13/2012;  Comments: 3.5 Lb Pannus was removed at the [facility] By Dr. Shon Green    REPLACE INTRATHECAL PAIN PUMP N/A 04/25/2017    Procedure: REPLACE INTRATHECAL PAIN PUMP;  INTRATHECAL PAIN PUMP CATHETER REPLACEMENT AND PUMP REPLACEMENT;  Surgeon: Anthony Maloney MD;  Location: [facility]    REPLACE INTRATHECAL PAIN PUMP N/A 4/20/2023    Procedure: Pump Replacement and intrathecal catheter replacement;  Surgeon: Anthony Dick MD;  Location:  OR    REVISE CATHETER INTRATHECAL N/A 04/25/2017    Procedure: REVISE CATHETER INTRATHECAL;;  Surgeon: Anthony Maloney MD;  Location: [facility]    SHOULDER SURGERY      ZZC GASTRIC BYPASS,OBESE<100CM LORA-EN-Y  01/01/2008    Description: Gastric Surgery For Morbid Obesity Bypass With Lora-en-Y;  Recorded:  "06/26/2009;  Comments: dr green 2008    ZZ REPAIR INCISIONAL HERNIA,REDUCIBLE  11/13/2012    Description: Incisional Hernia Repair;  Proc Date: 11/13/2012;  Comments: incisional hernia repair and abdominal panniculectomy by Dr Shon Green at the [facility].     Socioeconomic History    Marital status:      Social Determinants of Health     Social Connections: Unknown (2/27/2024)    Social Connection and Isolation Panel [NHANES]     Frequency of Social Gatherings with Friends and Family: Once a week     Wife Krystal is with him    Review of Systems   Constitutional: Negative.    HENT:  Negative.     Eyes: Negative.    Respiratory: Negative.     Cardiovascular: Negative.    Gastrointestinal: Negative.    Endocrine:        Diabetic.  Weight hasn't changed since stopping Ozempic   Genitourinary: Negative.     Musculoskeletal:  Positive for arthralgias and back pain.   Neurological: Negative.    Hematological: Negative.    Psychiatric/Behavioral: Negative.     All other systems reviewed and are negative.    /70 (BP Location: Right arm, Patient Position: Sitting, Cuff Size: Adult Large)   Pulse 59   Temp 97.6  F (36.4  C) (Tympanic)   Resp 18   Ht 1.905 m (6' 3\")   Wt 130.8 kg (288 lb 6.4 oz)   SpO2 94%   BMI 36.05 kg/m      Physical Exam  Vitals and nursing note reviewed. Exam conducted with a chaperone present.   Constitutional:       Appearance: He is well-developed. He is obese.      Comments: Calm and pleasant   HENT:      Head: Normocephalic and atraumatic.      Mouth/Throat:      Mouth: Mucous membranes are moist.      Dentition: Normal dentition. No dental caries.   Eyes:      Extraocular Movements: Extraocular movements intact.      Conjunctiva/sclera: Conjunctivae normal.      Pupils: Pupils are equal, round, and reactive to light.   Cardiovascular:      Rate and Rhythm: Normal rate and regular rhythm.      Heart sounds: Normal heart sounds.   Pulmonary:      Effort: Pulmonary effort is " normal.      Breath sounds: Examination of the right-lower field reveals decreased breath sounds. Decreased breath sounds present.   Abdominal:      General: There is no distension.      Palpations: Abdomen is soft. There is no mass.      Tenderness: There is no abdominal tenderness. There is no guarding.   Musculoskeletal:         General: Swelling, deformity and signs of injury present.      Cervical back: Normal range of motion. No deformity.      Thoracic back: Deformity present. Scoliosis present.      Lumbar back: Deformity present. Scoliosis present.        Back:       Comments: Pain pump / bruising from recent fall   Lymphadenopathy:      Cervical: No cervical adenopathy.      Upper Body:      Right upper body: No axillary or epitrochlear adenopathy.      Left upper body: No axillary or epitrochlear adenopathy.   Skin:     General: Skin is warm.      Findings: Bruising present.   Neurological:      General: No focal deficit present.      Mental Status: He is alert and oriented to person, place, and time.      Cranial Nerves: No cranial nerve deficit.      Motor: No weakness.      Gait: Gait abnormal.      Deep Tendon Reflexes: Reflexes abnormal (diminished right patellar reflex).   Psychiatric:         Behavior: Behavior is cooperative.       Recent Results (from the past 720 hour(s))   Comprehensive metabolic panel    Collection Time: 04/17/24  9:13 AM   Result Value Ref Range    Sodium 140 135 - 145 mmol/L    Potassium 3.8 3.4 - 5.3 mmol/L    Carbon Dioxide (CO2) 28 22 - 29 mmol/L    Anion Gap 9 7 - 15 mmol/L    Urea Nitrogen 16.6 8.0 - 23.0 mg/dL    Creatinine 1.24 (H) 0.67 - 1.17 mg/dL    GFR Estimate 64 >60 mL/min/1.73m2    Calcium 9.3 8.8 - 10.2 mg/dL    Chloride 103 98 - 107 mmol/L    Glucose 109 (H) 70 - 99 mg/dL    Alkaline Phosphatase 65 40 - 150 U/L    AST 20 0 - 45 U/L    ALT <5 0 - 70 U/L    Protein Total 7.0 6.4 - 8.3 g/dL    Albumin 3.9 3.5 - 5.2 g/dL    Bilirubin Total 0.6 <=1.2 mg/dL   Uric  acid    Collection Time: 04/17/24  9:13 AM   Result Value Ref Range    Uric Acid 5.9 3.4 - 7.0 mg/dL   CBC with platelets and differential    Collection Time: 04/17/24  9:13 AM   Result Value Ref Range    WBC Count 24.8 (H) 4.0 - 11.0 10e3/uL    RBC Count 4.42 4.40 - 5.90 10e6/uL    Hemoglobin 12.9 (L) 13.3 - 17.7 g/dL    Hematocrit 39.9 (L) 40.0 - 53.0 %    MCV 90 78 - 100 fL    MCH 29.2 26.5 - 33.0 pg    MCHC 32.3 31.5 - 36.5 g/dL    RDW 12.7 10.0 - 15.0 %    Platelet Count 163 150 - 450 10e3/uL    NRBCs per 100 WBC 0 <1 /100    Absolute NRBCs 0.0 10e3/uL   Manual Differential    Collection Time: 04/17/24  9:13 AM   Result Value Ref Range    % Neutrophils 13 %    % Lymphocytes 86 %    % Monocytes 1 %    % Eosinophils 0 %    % Basophils 0 %    Absolute Neutrophils 3.2 1.6 - 8.3 10e3/uL    Absolute Lymphocytes 21.3 (H) 0.8 - 5.3 10e3/uL    Absolute Monocytes 0.2 0.0 - 1.3 10e3/uL    Absolute Eosinophils 0.0 0.0 - 0.7 10e3/uL    Absolute Basophils 0.0 0.0 - 0.2 10e3/uL    RBC Morphology Confirmed RBC Indices     Platelet Assessment  Automated Count Confirmed. Platelet morphology is normal.     Automated Count Confirmed. Platelet morphology is normal.    Reactive Lymphocytes Present (A) None Seen    Smudge Cells Present (A) None Seen     Recent Results (from the past 744 hour(s))   US Abdomen Limited    Narrative    US ABDOMEN LIMITED 3/22/2024 10:26 AM    CLINICAL HISTORY: CLL (chronic lymphocytic leukemia) (H).    TECHNIQUE: Limited abdominal ultrasound.    COMPARISON: None.    FINDINGS: Limited visualization of structures limits assessment. Left  kidney shows no hydronephrosis. Left kidney measures 8.7 cm in length.  Spleen is 14.1 cm in length, previously 13.2 cm. Incidental splenule.    No fluid collection or other acute abnormality can be seen.    Area of abdominal bulging on the left shows nonspecific bowel loops.     No ascites.      Impression    IMPRESSION:  1.  Splenomegaly is slightly larger compared to  5/31/2023.  2.  No specific acute abnormality otherwise seen, but structures are  limited in view.     VALARIE MORALES MD         SYSTEM ID:  [id number]

## 2024-04-17 NOTE — LETTER
4/17/2024         RE: Onur Barr  [id number] [facility] [address]        Dear Colleague,    Thank you for referring your patient, Onur Barr, to the [facility] [place]. Please see a copy of my visit note below.    Assessment & Plan  Chronic lymphocytic leukemia, with stabilization of lymphocytosis after several sequential rising values  Mild splenomegaly  Failed back syndrome with severe kyphosis and implanted pain pump  Morbid obesity  Intolerance to Ozempic    Defer starting treatment for now  Provider follow up in 3 months    Interval History  This is a scheduled follow up visit for this disabled diabetic man who we see for low grade lymphoproliferative disease consistent with CLL.    At our last visit we were concerned about rising white count, but it has improved considerably since February.          He was on Ozempic for a period earlier this year but had declining health and the injections were stopped.  Since then, he's back to feeling better.    He did have a fall on some stairs a couple of weeks ago with a soft tissue injury to back.    ECOG = 1    Patient Active Problem List   Diagnosis     Lumbago     Morbid obesity -- BMI 42.0     Bilateral leg edema     Chronic diastolic heart failure (H)     MARLEEN (doesn't tolerate CPAP)     NSTEMI w Unstab Angina -- S/P CABG x 1 (LIMA to LAD) on 1/19/22     Paroxysmal atrial fib -- S/P Pulm Vein Ablation 1/19/22     Essential hypertension     Mixed hyperlipidemia     Gastroesophageal reflux disease without esophagitis     Chronic Back Pain (IT Pump w Morphine, Clonidine, Baclofen)     S/P CABG (coronary artery bypass graft)     Fluid overload     ICD (implantable cardioverter-defibrillator) in place     Peripheral vascular disease (H24)     Abdominal panniculus     Bariatric surgery status     Claudication of lower extremity (H24)     Insomnia     Intestinal disaccharidase deficiencies and disaccharide malabsorption      Nephrolithiasis     Osteoarthrosis     Post-laminectomy syndrome     Sleepiness     Coronary arteriosclerosis     Candidal intertrigo     CLL (chronic lymphocytic leukemia) (H)     Current Outpatient Medications   Medication Sig Dispense Refill     acetaminophen (TYLENOL) 325 MG tablet Take 2 tablets (650 mg) by mouth every 4 hours as needed for mild pain 40 tablet 0     aspirin (ASA) 81 MG chewable tablet Take 81 mg by mouth daily       DULoxetine (CYMBALTA) 30 MG capsule Take 1 capsule (30mg) every morning and 2 capsules (60mg) every evening 270 capsule 3     FERATE 240 (27 Fe) MG TABS TAKE 1 TABLET BY MOUTH EVERY DAY 90 tablet 2     furosemide (LASIX) 40 MG tablet Take 1 tablet (40 mg) by mouth 2 times daily 180 tablet 1     ketoconazole (NIZORAL) 2 % external cream APPLY TO AFFECTED AREA TWICE A DAY 60 g 4     lisinopril (ZESTRIL) 10 MG tablet Take 1 tablet (10 mg) by mouth daily 90 tablet 3     metoprolol succinate ER (TOPROL XL) 100 MG 24 hr tablet Take 1 tablet (100 mg) by mouth 2 times daily 180 tablet 3     MORPHINE SULFATE IT pump:updated 1/12/22  Medications in Pump:   [facility] Responsible for pump medications:   Conc:  Morphine 20mg/ml(3.95 mg/day), clonidine 21.1mcg/ml (4.168 mcg/day), baclofen 42.5mcg/ml (8.395mcg/day)  Rate:   Pump Last Fill Date: 9/2021  Pump Refill Date: 2/2022       nitroGLYcerin (NITROSTAT) 0.4 MG sublingual tablet For chest pain place 1 tablet under the tongue every 5 minutes for 3 doses. If symptoms persist 5 minutes after 1st dose call 911. (Patient not taking: Reported on 3/11/2024) 30 tablet 1     nystatin (NYAMYC) 648744 UNIT/GM external powder APPLY TO AFFECTED AREA 3 TIMES A DAY **MAX 90G/67 DAYS 120 g 0     ondansetron (ZOFRAN ODT) 4 MG ODT tab Take 1 tablet (4 mg) by mouth every 8 hours as needed for nausea 100 tablet 1     rosuvastatin (CRESTOR) 40 MG tablet Take 1 tablet (40 mg) by mouth daily 90 tablet 3     semaglutide (OZEMPIC, 0.25 OR 0.5 MG/DOSE,) 2  MG/3ML pen INJECT 0.5 MG SUBCUTANEOUS EVERY 7 DAYS 3 mL 1     sotalol (BETAPACE) 80 MG tablet Take 1 tablet (80 mg) by mouth 2 times daily 60 tablet 3     traZODone (DESYREL) 100 MG tablet Take 2 tablets (200 mg) by mouth at bedtime TAKE 2 TABLETS (200MG TOTAL) BY MOUTH AT BEDTIME Strength: 100 mg 180 tablet 3     No current facility-administered medications for this visit.     Past Medical History:   Diagnosis Date     Abdominal panniculus 11/13/2012    Formatting of this note might be different from the original. S/p panniculectomy 11/13/2012     Acid reflux disease 10/31/2017     Bariatric surgery status 06/23/2008    Formatting of this note might be different from the original. Created by Conversion     Bilateral leg edema 07/13/2021     CHF (congestive heart failure) (H)      Chronic Back Pain (IT Pump w Morphine, Clonidine, Baclofen) 01/16/2022     Chronic diastolic heart failure (H) 07/26/2021     Claudication of lower extremity (H24) 11/20/2019     Coronary arteriosclerosis 05/09/2022    Formatting of this note might be different from the original. Created by Conversion  Replacement Utility updated for latest IMO load     Coronary artery disease     CABG 1-     Depressive disorder      Essential hypertension     Created by [facility] Annotation: Apr 6 2012 10:16Zack Harris: Lisinipril,  coreg  Replacement Utility updated for latest IMO load     Fluid overload 01/25/2022     Gastroesophageal reflux disease without esophagitis 09/11/2021     H/O gastric bypass 2008     History of blood transfusion 2004     ICD (implantable cardioverter-defibrillator) in place 01/25/2022     Insomnia      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     Ischemic cardiomyopathy      Lumbago     Created by Minka [facility] Annotation: Apr 6 2012 10:20Anh Ford: Morphine pump      Mixed hyperlipidemia 06/23/2008     Morbid obesity (H) 08/01/2018      Nephrolithiasis      NSTEMI (non-ST elevated myocardial infarction) (H)      Obstructive sleep apnea     Doesn't tolerate CPAP     MARLEEN (doesn't tolerate CPAP) 07/26/2021     Osteoarthritis     Created by Conversion [facility] Annotation: Jun 26 2009  9:53Zack Harris: failed lumbar  fusion/morphine pump dr putnam  Replacement Utility updated for latest IMO load     Osteoarthrosis 05/09/2022    Formatting of this note might be different from the original. Created by Conversion [facility] Annotation: Jun 26 2009  9:53Zack Harris: failed lumbar  fusion/morphine pump dr putnam  Replacement Utility updated for latest IMO load     Paroxysmal atrial fib -- S/P Pulm Vein Ablation 1/19/22 09/11/2021    Formatting of this note might be different from the original. Created by Conversion     Paroxysmal atrial fibrillation (H)     Created by Conversion      Paroxysmal ventricular tachycardia (H)     dual chamber ICD 1/24/2022     Peripheral vascular disease (H24) 05/03/2022     Post-laminectomy syndrome 11/25/2015     S/P CABG (coronary artery bypass graft) 01/25/2022     Sleepiness 05/08/2018     Type 2 diabetes mellitus (H)     Diet controlled after Gastric Bypass in 2008     Past Surgical History:   Procedure Laterality Date     BACK SURGERY       BYPASS GASTRIC DUODENAL SWITCH       BYPASS GRAFT ARTERY CORONARY N/A 01/19/2022    Procedure: CORONARY ARTERY BYPASS GRAFT (CABG) X1; LIMA -LAD.  PULMONARY VEIN ISOLATION, AND ATRIAL APPENDAGE CLIPPING USING 45MM ACTICURE CLIP.;  Surgeon: William Dumont MD;  Location:  OR     COLONOSCOPY       COSMETIC SURGERY      pannicullectomy     CV CORONARY ANGIOGRAM N/A 09/11/2021    Procedure: Coronary Angiogram;  Surgeon: Noris Ozuna MD;  Location: [facility] CATH LAB CV     CV HEART CATHETERIZATION WITH POSSIBLE INTERVENTION N/A 01/13/2022    Procedure: Heart Catheterization with Possible Intervention;  Surgeon: Liz Pearl MD;  Location: [facility]  CARDIAC CATH LAB     CV LEFT HEART CATH N/A 09/11/2021    Procedure: Left Heart Cath;  Surgeon: Noris Ozuna MD;  Location: [facility] CV     CV PCI N/A 09/11/2021    Procedure: Percutaneous Coronary Intervention;  Surgeon: Noris Ozuna MD;  Location: [facility] CV     CV PCI N/A 10/07/2021    Procedure: Percutaneous Coronary Intervention;  Surgeon: Mckayla Dan MD;  Location: [facility] CV     CV PCI ASPIRATION THROMECTOMY N/A 09/11/2021    Procedure: Percutaneous Coronary Intervention Aspiration Thrombectomy;  Surgeon: Noris Ozuna MD;  Location: [facility]     ENT SURGERY      tonsillectomy     EP ICD N/A 01/24/2022    Procedure: EP ICD;  Surgeon: Jannette Crook MD;  Location: [facility] CARDIAC CATH LAB     EXTRACORPOREAL SHOCK WAVE LITHOTRIPSY, CYSTOSCOPY, INSERT STENT URETER(S), COMBINED  08/23/2011     HC REMOVAL OF TONSILS,<14 Y/O      Description: Tonsillectomy;  Recorded: 03/23/2012;  Comments: for obstructive sleep apnea     HERNIA REPAIR       IR MISCELLANEOUS PROCEDURE  07/29/2011     IR MISCELLANEOUS PROCEDURE  08/05/2011     IR MISCELLANEOUS PROCEDURE  08/23/2011     IR NEPHROSTOMY TUBE CHANGE BILATERAL  08/05/2011     ORTHOPEDIC SURGERY      arthrodesis ant discectomy, lumbar     NC ARTHRODESIS ANT INTERBODY MIN DISCECTOMY,LUMBAR      Description: Lumbar Vertebral Fusion;  Recorded: 06/26/2009;  Comments: before 200 see [facility] consult under old records     NC EXCISE EXCESS SKIN TISSUE,ABDOMEN  11/13/2012    Description: Panniculectomy;  Proc Date: 11/13/2012;  Comments: 3.5 Lb Pannus was removed at the [facility] By Dr. Shon Green     REPLACE INTRATHECAL PAIN PUMP N/A 04/25/2017    Procedure: REPLACE INTRATHECAL PAIN PUMP;  INTRATHECAL PAIN PUMP CATHETER REPLACEMENT AND PUMP REPLACEMENT;  Surgeon: Anthony Maloney MD;  Location: [facility]     REPLACE INTRATHECAL PAIN PUMP N/A 4/20/2023    Procedure: Pump Replacement and intrathecal catheter replacement;   "Surgeon: Anthony Dick MD;  Location:  OR     REVISE CATHETER INTRATHECAL N/A 04/25/2017    Procedure: REVISE CATHETER INTRATHECAL;;  Surgeon: Anthony Maloney MD;  Location:  [place]     SHOULDER SURGERY       ZZC GASTRIC BYPASS,OBESE<100CM SUSY-EN-Y  01/01/2008    Description: Gastric Surgery For Morbid Obesity Bypass With Susy-en-Y;  Recorded: 06/26/2009;  Comments: dr green 2008     [facility] REPAIR INCISIONAL HERNIA,REDUCIBLE  11/13/2012    Description: Incisional Hernia Repair;  Proc Date: 11/13/2012;  Comments: incisional hernia repair and abdominal panniculectomy by Dr Shon Green at the [facility].     Socioeconomic History     Marital status:      Social Determinants of Health     Social Connections: Unknown (2/27/2024)    Social Connection and Isolation Panel [NHANES]      Frequency of Social Gatherings with Friends and Family: Once a week     Wife Krystal is with him    Review of Systems   Constitutional: Negative.    HENT:  Negative.     Eyes: Negative.    Respiratory: Negative.     Cardiovascular: Negative.    Gastrointestinal: Negative.    Endocrine:        Diabetic.  Weight hasn't changed since stopping Ozempic   Genitourinary: Negative.     Musculoskeletal:  Positive for arthralgias and back pain.   Neurological: Negative.    Hematological: Negative.    Psychiatric/Behavioral: Negative.     All other systems reviewed and are negative.    /70 (BP Location: Right arm, Patient Position: Sitting, Cuff Size: Adult Large)   Pulse 59   Temp 97.6  F (36.4  C) (Tympanic)   Resp 18   Ht 1.905 m (6' 3\")   Wt 130.8 kg (288 lb 6.4 oz)   SpO2 94%   BMI 36.05 kg/m      Physical Exam  Vitals and nursing note reviewed. Exam conducted with a chaperone present.   Constitutional:       Appearance: He is well-developed. He is obese.      Comments: Calm and pleasant   HENT:      Head: Normocephalic and atraumatic.      Mouth/Throat:      Mouth: Mucous membranes are moist.      Dentition: Normal " dentition. No dental caries.   Eyes:      Extraocular Movements: Extraocular movements intact.      Conjunctiva/sclera: Conjunctivae normal.      Pupils: Pupils are equal, round, and reactive to light.   Cardiovascular:      Rate and Rhythm: Normal rate and regular rhythm.      Heart sounds: Normal heart sounds.   Pulmonary:      Effort: Pulmonary effort is normal.      Breath sounds: Examination of the right-lower field reveals decreased breath sounds. Decreased breath sounds present.   Abdominal:      General: There is no distension.      Palpations: Abdomen is soft. There is no mass.      Tenderness: There is no abdominal tenderness. There is no guarding.   Musculoskeletal:         General: Swelling, deformity and signs of injury present.      Cervical back: Normal range of motion. No deformity.      Thoracic back: Deformity present. Scoliosis present.      Lumbar back: Deformity present. Scoliosis present.        Back:       Comments: Pain pump / bruising from recent fall   Lymphadenopathy:      Cervical: No cervical adenopathy.      Upper Body:      Right upper body: No axillary or epitrochlear adenopathy.      Left upper body: No axillary or epitrochlear adenopathy.   Skin:     General: Skin is warm.      Findings: Bruising present.   Neurological:      General: No focal deficit present.      Mental Status: He is alert and oriented to person, place, and time.      Cranial Nerves: No cranial nerve deficit.      Motor: No weakness.      Gait: Gait abnormal.      Deep Tendon Reflexes: Reflexes abnormal (diminished right patellar reflex).   Psychiatric:         Behavior: Behavior is cooperative.       Recent Results (from the past 720 hour(s))   Comprehensive metabolic panel    Collection Time: 04/17/24  9:13 AM   Result Value Ref Range    Sodium 140 135 - 145 mmol/L    Potassium 3.8 3.4 - 5.3 mmol/L    Carbon Dioxide (CO2) 28 22 - 29 mmol/L    Anion Gap 9 7 - 15 mmol/L    Urea Nitrogen 16.6 8.0 - 23.0 mg/dL     Creatinine 1.24 (H) 0.67 - 1.17 mg/dL    GFR Estimate 64 >60 mL/min/1.73m2    Calcium 9.3 8.8 - 10.2 mg/dL    Chloride 103 98 - 107 mmol/L    Glucose 109 (H) 70 - 99 mg/dL    Alkaline Phosphatase 65 40 - 150 U/L    AST 20 0 - 45 U/L    ALT <5 0 - 70 U/L    Protein Total 7.0 6.4 - 8.3 g/dL    Albumin 3.9 3.5 - 5.2 g/dL    Bilirubin Total 0.6 <=1.2 mg/dL   Uric acid    Collection Time: 04/17/24  9:13 AM   Result Value Ref Range    Uric Acid 5.9 3.4 - 7.0 mg/dL   CBC with platelets and differential    Collection Time: 04/17/24  9:13 AM   Result Value Ref Range    WBC Count 24.8 (H) 4.0 - 11.0 10e3/uL    RBC Count 4.42 4.40 - 5.90 10e6/uL    Hemoglobin 12.9 (L) 13.3 - 17.7 g/dL    Hematocrit 39.9 (L) 40.0 - 53.0 %    MCV 90 78 - 100 fL    MCH 29.2 26.5 - 33.0 pg    MCHC 32.3 31.5 - 36.5 g/dL    RDW 12.7 10.0 - 15.0 %    Platelet Count 163 150 - 450 10e3/uL    NRBCs per 100 WBC 0 <1 /100    Absolute NRBCs 0.0 10e3/uL   Manual Differential    Collection Time: 04/17/24  9:13 AM   Result Value Ref Range    % Neutrophils 13 %    % Lymphocytes 86 %    % Monocytes 1 %    % Eosinophils 0 %    % Basophils 0 %    Absolute Neutrophils 3.2 1.6 - 8.3 10e3/uL    Absolute Lymphocytes 21.3 (H) 0.8 - 5.3 10e3/uL    Absolute Monocytes 0.2 0.0 - 1.3 10e3/uL    Absolute Eosinophils 0.0 0.0 - 0.7 10e3/uL    Absolute Basophils 0.0 0.0 - 0.2 10e3/uL    RBC Morphology Confirmed RBC Indices     Platelet Assessment  Automated Count Confirmed. Platelet morphology is normal.     Automated Count Confirmed. Platelet morphology is normal.    Reactive Lymphocytes Present (A) None Seen    Smudge Cells Present (A) None Seen     Recent Results (from the past 744 hour(s))   US Abdomen Limited    Narrative    US ABDOMEN LIMITED 3/22/2024 10:26 AM    CLINICAL HISTORY: CLL (chronic lymphocytic leukemia) (H).    TECHNIQUE: Limited abdominal ultrasound.    COMPARISON: None.    FINDINGS: Limited visualization of structures limits assessment. Left  kidney shows  "no hydronephrosis. Left kidney measures 8.7 cm in length.  Spleen is 14.1 cm in length, previously 13.2 cm. Incidental splenule.    No fluid collection or other acute abnormality can be seen.    Area of abdominal bulging on the left shows nonspecific bowel loops.     No ascites.      Impression    IMPRESSION:  1.  Splenomegaly is slightly larger compared to 5/31/2023.  2.  No specific acute abnormality otherwise seen, but structures are  limited in view.     VALARIE MORALES MD         SYSTEM ID:  [id number]       Oncology Rooming Note    April 17, 2024 9:30 AM   Onur Barr is a 68 year old male who presents for:    Chief Complaint   Patient presents with     Oncology Clinic Visit     CLL (chronic lymphocytic leukemia)     Initial Vitals: /77 (BP Location: Right arm, Patient Position: Sitting, Cuff Size: Adult Large)   Pulse 59   Temp 97.6  F (36.4  C) (Tympanic)   Resp 18   Ht 1.905 m (6' 3\")   Wt 130.8 kg (288 lb 6.4 oz)   SpO2 94%   BMI 36.05 kg/m   Estimated body mass index is 36.05 kg/m  as calculated from the following:    Height as of this encounter: 1.905 m (6' 3\").    Weight as of this encounter: 130.8 kg (288 lb 6.4 oz). Body surface area is 2.63 meters squared.  Severe Pain (7) Comment: Data Unavailable   No LMP for male patient.  Allergies reviewed: Yes  Medications reviewed: Yes    Medications: Medication refills not needed today.  Pharmacy name entered into [employer]: [facility]Y #[id number] [place], [place] - [address]    Frailty Screening:   Is the patient here for a new oncology consult visit in cancer care? 2. No      Clinical concerns: No concerns per patient.       Zelda Lam MA                Again, thank you for allowing me to participate in the care of your patient.        Sincerely,        Melissa Peguero MD  "

## 2024-04-17 NOTE — PROGRESS NOTES
"Oncology Rooming Note    April 17, 2024 9:30 AM   Onur Barr is a 68 year old male who presents for:    Chief Complaint   Patient presents with    Oncology Clinic Visit     CLL (chronic lymphocytic leukemia)     Initial Vitals: /77 (BP Location: Right arm, Patient Position: Sitting, Cuff Size: Adult Large)   Pulse 59   Temp 97.6  F (36.4  C) (Tympanic)   Resp 18   Ht 1.905 m (6' 3\")   Wt 130.8 kg (288 lb 6.4 oz)   SpO2 94%   BMI 36.05 kg/m   Estimated body mass index is 36.05 kg/m  as calculated from the following:    Height as of this encounter: 1.905 m (6' 3\").    Weight as of this encounter: 130.8 kg (288 lb 6.4 oz). Body surface area is 2.63 meters squared.  Severe Pain (7) Comment: Data Unavailable   No LMP for male patient.  Allergies reviewed: Yes  Medications reviewed: Yes    Medications: Medication refills not needed today.  Pharmacy name entered into [employer]: [facility]Y #[id number] [place], [place] - [address]    Frailty Screening:   Is the patient here for a new oncology consult visit in cancer care? 2. No      Clinical concerns: No concerns per patient.       Zelda Lam MA              "

## 2024-04-22 ENCOUNTER — PATIENT OUTREACH (OUTPATIENT)
Dept: INTERNAL MEDICINE | Facility: CLINIC | Age: 69
End: 2024-04-22
Payer: COMMERCIAL

## 2024-04-22 NOTE — TELEPHONE ENCOUNTER
Patient Quality Outreach    Patient is due for the following:   Diabetes -  Eye Exam    Next Steps:   Schedule a office visit for an eye exam.    Type of outreach:    Sent [employer] message.      Questions for provider review:    None           Kim Paredes LPN  Chart routed to none.

## 2024-04-28 DIAGNOSIS — D64.9 ANEMIA, UNSPECIFIED TYPE: ICD-10-CM

## 2024-04-30 RX ORDER — FERROUS GLUCONATE 240(27)MG
TABLET ORAL
Qty: 90 TABLET | Refills: 2 | Status: SHIPPED | OUTPATIENT
Start: 2024-04-30

## 2024-05-01 DIAGNOSIS — I50.22 CHRONIC SYSTOLIC CONGESTIVE HEART FAILURE (H): ICD-10-CM

## 2024-05-01 DIAGNOSIS — I25.10 CORONARY ARTERY DISEASE INVOLVING NATIVE CORONARY ARTERY OF NATIVE HEART WITHOUT ANGINA PECTORIS: Primary | ICD-10-CM

## 2024-05-01 RX ORDER — FUROSEMIDE 40 MG
40 TABLET ORAL 2 TIMES DAILY
Qty: 180 TABLET | Refills: 3 | Status: SHIPPED | OUTPATIENT
Start: 2024-05-01

## 2024-05-02 ENCOUNTER — TELEPHONE (OUTPATIENT)
Dept: CARDIOLOGY | Facility: CLINIC | Age: 69
End: 2024-05-02
Payer: COMMERCIAL

## 2024-05-02 DIAGNOSIS — I47.29 PAROXYSMAL VENTRICULAR TACHYCARDIA (H): ICD-10-CM

## 2024-05-02 RX ORDER — SOTALOL HYDROCHLORIDE 80 MG/1
80 TABLET ORAL 2 TIMES DAILY
Qty: 180 TABLET | Refills: 3 | Status: SHIPPED | OUTPATIENT
Start: 2024-05-02

## 2024-05-02 NOTE — TELEPHONE ENCOUNTER
[facility] Call Center    Phone Message    May a detailed message be left on voicemail: yes     Reason for Call: Medication Refill Request    Has the patient contacted the pharmacy for the refill? Yes   Name of medication being requested: sotalol (BETAPACE) 80 MG tablet   Provider who prescribed the medication: Dr. Alejandro  Pharmacy: [facility]/PHARMACY #[id number] [place], [place] - [address]    Date medication is needed: 5/9       Action Taken: Other: Cardiology    Travel Screening: Not Applicable    Thank you!  [facility]

## 2024-05-02 NOTE — TELEPHONE ENCOUNTER
Received request for sotalol refill.     Sotalol was ordered by Dr. Alejandro in January for sustained VT in the monitor zone. Pt has f/u OV with Dr. Alejandro on 7/1/2024. Last EKG was done in February.     Dr. Pearl's OV note from 2/29/2024 says:   He stopped sotalol temporarily per EP recommendations and  is now back on it.      Sent refill for sotalol to pt's pharmacy of choice.

## 2024-05-20 ENCOUNTER — MYC MEDICAL ADVICE (OUTPATIENT)
Dept: CARDIOLOGY | Facility: CLINIC | Age: 69
End: 2024-05-20

## 2024-05-20 ENCOUNTER — ANCILLARY PROCEDURE (OUTPATIENT)
Dept: CARDIOLOGY | Facility: CLINIC | Age: 69
End: 2024-05-20
Attending: INTERNAL MEDICINE
Payer: COMMERCIAL

## 2024-05-20 DIAGNOSIS — I47.29 PAROXYSMAL VENTRICULAR TACHYCARDIA (H): ICD-10-CM

## 2024-05-20 DIAGNOSIS — Z95.810 ICD (IMPLANTABLE CARDIOVERTER-DEFIBRILLATOR) IN PLACE: ICD-10-CM

## 2024-05-20 LAB
MDC_IDC_EPISODE_DTM: NORMAL
MDC_IDC_EPISODE_DURATION: 1 S
MDC_IDC_EPISODE_DURATION: 304 S
MDC_IDC_EPISODE_DURATION: 39 S
MDC_IDC_EPISODE_DURATION: 5 S
MDC_IDC_EPISODE_ID: NORMAL
MDC_IDC_EPISODE_TYPE: NORMAL
MDC_IDC_LEAD_CONNECTION_STATUS: NORMAL
MDC_IDC_LEAD_CONNECTION_STATUS: NORMAL
MDC_IDC_LEAD_IMPLANT_DT: NORMAL
MDC_IDC_LEAD_IMPLANT_DT: NORMAL
MDC_IDC_LEAD_LOCATION: NORMAL
MDC_IDC_LEAD_LOCATION: NORMAL
MDC_IDC_LEAD_LOCATION_DETAIL_1: NORMAL
MDC_IDC_LEAD_LOCATION_DETAIL_1: NORMAL
MDC_IDC_LEAD_MFG: NORMAL
MDC_IDC_LEAD_MFG: NORMAL
MDC_IDC_LEAD_MODEL: NORMAL
MDC_IDC_LEAD_MODEL: NORMAL
MDC_IDC_LEAD_POLARITY_TYPE: NORMAL
MDC_IDC_LEAD_POLARITY_TYPE: NORMAL
MDC_IDC_LEAD_SERIAL: NORMAL
MDC_IDC_LEAD_SERIAL: NORMAL
MDC_IDC_MSMT_BATTERY_DTM: NORMAL
MDC_IDC_MSMT_BATTERY_REMAINING_LONGEVITY: 138 MO
MDC_IDC_MSMT_BATTERY_REMAINING_PERCENTAGE: 100 %
MDC_IDC_MSMT_BATTERY_STATUS: NORMAL
MDC_IDC_MSMT_CAP_CHARGE_DTM: NORMAL
MDC_IDC_MSMT_CAP_CHARGE_TIME: 10.3 S
MDC_IDC_MSMT_CAP_CHARGE_TYPE: NORMAL
MDC_IDC_MSMT_LEADCHNL_RA_IMPEDANCE_VALUE: 641 OHM
MDC_IDC_MSMT_LEADCHNL_RA_PACING_THRESHOLD_AMPLITUDE: 0.4 V
MDC_IDC_MSMT_LEADCHNL_RA_PACING_THRESHOLD_PULSEWIDTH: 0.4 MS
MDC_IDC_MSMT_LEADCHNL_RV_IMPEDANCE_VALUE: 413 OHM
MDC_IDC_MSMT_LEADCHNL_RV_PACING_THRESHOLD_AMPLITUDE: 0.9 V
MDC_IDC_MSMT_LEADCHNL_RV_PACING_THRESHOLD_PULSEWIDTH: 0.4 MS
MDC_IDC_PG_IMPLANT_DTM: NORMAL
MDC_IDC_PG_MFG: NORMAL
MDC_IDC_PG_MODEL: NORMAL
MDC_IDC_PG_SERIAL: NORMAL
MDC_IDC_PG_TYPE: NORMAL
MDC_IDC_SESS_CLINIC_NAME: NORMAL
MDC_IDC_SESS_DTM: NORMAL
MDC_IDC_SESS_TYPE: NORMAL
MDC_IDC_SET_BRADY_AT_MODE_SWITCH_MODE: NORMAL
MDC_IDC_SET_BRADY_AT_MODE_SWITCH_RATE: 170 {BEATS}/MIN
MDC_IDC_SET_BRADY_LOWRATE: 60 {BEATS}/MIN
MDC_IDC_SET_BRADY_MAX_SENSOR_RATE: 130 {BEATS}/MIN
MDC_IDC_SET_BRADY_MAX_TRACKING_RATE: 130 {BEATS}/MIN
MDC_IDC_SET_BRADY_MODE: NORMAL
MDC_IDC_SET_BRADY_PAV_DELAY_HIGH: 200 MS
MDC_IDC_SET_BRADY_PAV_DELAY_LOW: 300 MS
MDC_IDC_SET_BRADY_SAV_DELAY_HIGH: 200 MS
MDC_IDC_SET_BRADY_SAV_DELAY_LOW: 300 MS
MDC_IDC_SET_LEADCHNL_RA_PACING_AMPLITUDE: 2 V
MDC_IDC_SET_LEADCHNL_RA_PACING_CAPTURE_MODE: NORMAL
MDC_IDC_SET_LEADCHNL_RA_PACING_POLARITY: NORMAL
MDC_IDC_SET_LEADCHNL_RA_PACING_PULSEWIDTH: 0.4 MS
MDC_IDC_SET_LEADCHNL_RA_SENSING_ADAPTATION_MODE: NORMAL
MDC_IDC_SET_LEADCHNL_RA_SENSING_POLARITY: NORMAL
MDC_IDC_SET_LEADCHNL_RA_SENSING_SENSITIVITY: 0.25 MV
MDC_IDC_SET_LEADCHNL_RV_PACING_AMPLITUDE: 2 V
MDC_IDC_SET_LEADCHNL_RV_PACING_CAPTURE_MODE: NORMAL
MDC_IDC_SET_LEADCHNL_RV_PACING_POLARITY: NORMAL
MDC_IDC_SET_LEADCHNL_RV_PACING_PULSEWIDTH: 0.4 MS
MDC_IDC_SET_LEADCHNL_RV_SENSING_ADAPTATION_MODE: NORMAL
MDC_IDC_SET_LEADCHNL_RV_SENSING_POLARITY: NORMAL
MDC_IDC_SET_LEADCHNL_RV_SENSING_SENSITIVITY: 0.6 MV
MDC_IDC_SET_ZONE_DETECTION_INTERVAL: 250 MS
MDC_IDC_SET_ZONE_DETECTION_INTERVAL: 300 MS
MDC_IDC_SET_ZONE_DETECTION_INTERVAL: 353 MS
MDC_IDC_SET_ZONE_STATUS: NORMAL
MDC_IDC_SET_ZONE_TYPE: NORMAL
MDC_IDC_SET_ZONE_VENDOR_TYPE: NORMAL
MDC_IDC_STAT_AT_BURDEN_PERCENT: 1 %
MDC_IDC_STAT_AT_DTM_END: NORMAL
MDC_IDC_STAT_AT_DTM_START: NORMAL
MDC_IDC_STAT_BRADY_DTM_END: NORMAL
MDC_IDC_STAT_BRADY_DTM_START: NORMAL
MDC_IDC_STAT_BRADY_RA_PERCENT_PACED: 76 %
MDC_IDC_STAT_BRADY_RV_PERCENT_PACED: 2 %
MDC_IDC_STAT_EPISODE_RECENT_COUNT: 0
MDC_IDC_STAT_EPISODE_RECENT_COUNT: 2
MDC_IDC_STAT_EPISODE_RECENT_COUNT_DTM_END: NORMAL
MDC_IDC_STAT_EPISODE_RECENT_COUNT_DTM_START: NORMAL
MDC_IDC_STAT_EPISODE_TYPE: NORMAL
MDC_IDC_STAT_EPISODE_VENDOR_TYPE: NORMAL
MDC_IDC_STAT_TACHYTHERAPY_ATP_DELIVERED_RECENT: 0
MDC_IDC_STAT_TACHYTHERAPY_ATP_DELIVERED_TOTAL: 0
MDC_IDC_STAT_TACHYTHERAPY_RECENT_DTM_END: NORMAL
MDC_IDC_STAT_TACHYTHERAPY_RECENT_DTM_START: NORMAL
MDC_IDC_STAT_TACHYTHERAPY_SHOCKS_ABORTED_RECENT: 0
MDC_IDC_STAT_TACHYTHERAPY_SHOCKS_ABORTED_TOTAL: 0
MDC_IDC_STAT_TACHYTHERAPY_SHOCKS_DELIVERED_RECENT: 0
MDC_IDC_STAT_TACHYTHERAPY_SHOCKS_DELIVERED_TOTAL: 0
MDC_IDC_STAT_TACHYTHERAPY_TOTAL_DTM_END: NORMAL
MDC_IDC_STAT_TACHYTHERAPY_TOTAL_DTM_START: NORMAL

## 2024-05-20 PROCEDURE — 93295 DEV INTERROG REMOTE 1/2/MLT: CPT | Performed by: INTERNAL MEDICINE

## 2024-05-20 PROCEDURE — 93296 REM INTERROG EVL PM/IDS: CPT | Performed by: INTERNAL MEDICINE

## 2024-06-15 ENCOUNTER — HEALTH MAINTENANCE LETTER (OUTPATIENT)
Age: 69
End: 2024-06-15

## 2024-06-17 ENCOUNTER — MYC MEDICAL ADVICE (OUTPATIENT)
Dept: INTERNAL MEDICINE | Facility: CLINIC | Age: 69
End: 2024-06-17
Payer: COMMERCIAL

## 2024-06-17 DIAGNOSIS — E11.9 TYPE 2 DIABETES MELLITUS WITHOUT COMPLICATION, WITHOUT LONG-TERM CURRENT USE OF INSULIN (H): Primary | ICD-10-CM

## 2024-06-18 NOTE — TELEPHONE ENCOUNTER
Patient last A1C was completed 12/7/23, patient requesting A1C orders for re-check. Last OV 2/23/24    Appointments in Next Year      Jul 16, 2024  9:30 AM  LAB with RH MA LAB  [facility] [place] ([facility] ) [phone]     Summer RN 7:41 AM June 18, 2024   [facility]

## 2024-07-01 ENCOUNTER — OFFICE VISIT (OUTPATIENT)
Dept: CARDIOLOGY | Facility: CLINIC | Age: 69
End: 2024-07-01
Attending: INTERNAL MEDICINE
Payer: COMMERCIAL

## 2024-07-01 VITALS
RESPIRATION RATE: 15 BRPM | DIASTOLIC BLOOD PRESSURE: 80 MMHG | SYSTOLIC BLOOD PRESSURE: 122 MMHG | BODY MASS INDEX: 35.42 KG/M2 | OXYGEN SATURATION: 100 % | HEIGHT: 74 IN | WEIGHT: 276 LBS | HEART RATE: 65 BPM

## 2024-07-01 DIAGNOSIS — I47.29 PAROXYSMAL VENTRICULAR TACHYCARDIA (H): ICD-10-CM

## 2024-07-01 DIAGNOSIS — Z95.1 S/P CABG (CORONARY ARTERY BYPASS GRAFT): ICD-10-CM

## 2024-07-01 DIAGNOSIS — I48.0 PAROXYSMAL ATRIAL FIBRILLATION (H): ICD-10-CM

## 2024-07-01 PROCEDURE — 99214 OFFICE O/P EST MOD 30 MIN: CPT | Performed by: INTERNAL MEDICINE

## 2024-07-01 NOTE — PROGRESS NOTES
"  Electrophysiology Clinic Progress Note    Onur Barr MRN# [id number]   YOB: 1955 Age: 68 year old     Primary cardiologist: Dr. Pearl         Assessment and Plan   Very  pleasant  68 year old gentleman with a history of hypertension, hyperlipidemia, diabetes, paroxysmal A. fib and mild ischemic cardiomyopathy with EF  45-50%. He initially underwent PCI to LAD in 2012.   In 2021, he presented with inferior STEMI and underwent PCI to RCA.  He was re-admitted with non-STEMI on 1/12/2022, and during admission he underwent CABG (LIMA to LAD, PVI, LA exclusion).  Hospital stay was complicated by postop A. fib and episode of monomorphic VT.  He underwent ICD implantation for secondary prevention and was temporarily placed on amiodarone.       Known frequent asymptomatic PVCs with morphology suggestive of outflow tract possible epicardial origin (LBBB pattern, inferior axis, transition at V3 and slurred onset of QRS). This past Feb note to have sustained VT with longest [id number] at 180 bpm, below treatment zone. Sotalol was started and since has not had sustained VT. Tolerating current Sotalol 80 mg bid along with Toprol.  Suspect VT scar related. As long as quiescent on sotalol will continue with it and annual ECG. See EP should VT recur             Review of Systems     12-pt ROS is negative except for as noted in the HPI.          Physical Exam     Vitals: /80   Pulse 65   Resp 15   Ht 1.88 m (6' 2\")   Wt 125.2 kg (276 lb)   SpO2 100%   BMI 35.44 kg/m    Wt Readings from Last 10 Encounters:   07/01/24 125.2 kg (276 lb)   04/17/24 130.8 kg (288 lb 6.4 oz)   03/11/24 127.9 kg (282 lb)   02/29/24 126.6 kg (279 lb 3.2 oz)   02/28/24 130.2 kg (287 lb)   12/11/23 138.3 kg (305 lb)   10/31/23 147.4 kg (325 lb)   10/13/23 146.5 kg (323 lb)   08/22/23 146.6 kg (323 lb 1.6 oz)   08/14/23 (!) 150.6 kg (332 lb)       Constitutional:  Patient is pleasant, alert, cooperative, and in NAD.  HEENT:  " NCAT. PERRLA. EOM's intact.   Neck:  CVP appears normal. No carotid bruits.   Pulmonary: Normal respiratory effort. CTAB.   Cardiac: RRR, normal S1/S2, no S3/S4, no murmur or rub.   Abdomen:  Non-tender abdomen, no hepatosplenomegaly appreciated.   Vascular: Pulses in the upper and lower extremities are 2+ and equal bilaterally.  Extremities: No edema, erythema, cyanosis or tenderness appreciated.  Skin:  No rashes or lesions appreciated.   Neurological:  No gross motor or sensory deficits.   Psych: Appropriate affect.          Data   Labs reviewed:  Recent Labs   Lab Test 11/13/23  0924 06/09/23  1045 11/21/22  0921 08/18/22  1425 01/14/22  0438   LDL 36  --  39  --  38   HDL 37*  --  33*  --  44   NHDL 68  --  106  --  61   CHOL 105  --  139  --  105   TRIG 161*  --  335*  --  116   TSH  --  1.38  --  1.16  --        Lab Results   Component Value Date    WBC 24.8 (H) 04/17/2024    RBC 4.42 04/17/2024    HGB 12.9 (L) 04/17/2024    HCT 39.9 (L) 04/17/2024    MCV 90 04/17/2024    MCH 29.2 04/17/2024    MCHC 32.3 04/17/2024    RDW 12.7 04/17/2024     04/17/2024       Lab Results   Component Value Date     04/17/2024    POTASSIUM 3.8 04/17/2024    POTASSIUM 4.2 05/15/2023    CHLORIDE 103 04/17/2024    CHLORIDE 104 05/15/2023    CO2 28 04/17/2024    CO2 28 05/15/2023    ANIONGAP 9 04/17/2024    ANIONGAP 9 05/15/2023     (H) 04/17/2024     05/15/2023    BUN 16.6 04/17/2024    BUN 19 05/15/2023    CR 1.24 (H) 04/17/2024    GFRESTIMATED 64 04/17/2024    GFRESTIMATED >60 01/05/2021    GFRESTBLACK >60 01/05/2021    RATNA 9.3 04/17/2024      Lab Results   Component Value Date    AST 20 04/17/2024    ALT <5 04/17/2024       Lab Results   Component Value Date    A1C 5.4 12/07/2023       Lab Results   Component Value Date    INR 1.10 04/20/2023    INR 1.31 (H) 01/19/2022            Problem List     Patient Active Problem List   Diagnosis    Lumbago    Morbid obesity -- BMI 42.0    Bilateral leg edema     Chronic diastolic heart failure (H)    MARLEEN (doesn't tolerate CPAP)    NSTEMI w Unstab Angina -- S/P CABG x 1 (LIMA to LAD) on 1/19/22    Paroxysmal atrial fib -- S/P Pulm Vein Ablation 1/19/22    Essential hypertension    Mixed hyperlipidemia    Gastroesophageal reflux disease without esophagitis    Chronic Back Pain (IT Pump w Morphine, Clonidine, Baclofen)    S/P CABG (coronary artery bypass graft)    Fluid overload    ICD (implantable cardioverter-defibrillator) in place    Peripheral vascular disease (H24)    Abdominal panniculus    Bariatric surgery status    Claudication of lower extremity (H24)    Insomnia    Intestinal disaccharidase deficiencies and disaccharide malabsorption    Nephrolithiasis    Osteoarthrosis    Post-laminectomy syndrome    Sleepiness    Coronary arteriosclerosis    Candidal intertrigo    CLL (chronic lymphocytic leukemia) (H)            Medications     Current Outpatient Medications   Medication Sig Dispense Refill    acetaminophen (TYLENOL) 325 MG tablet Take 2 tablets (650 mg) by mouth every 4 hours as needed for mild pain 40 tablet 0    aspirin (ASA) 81 MG chewable tablet Take 81 mg by mouth daily      DULoxetine (CYMBALTA) 30 MG capsule Take 1 capsule (30mg) every morning and 2 capsules (60mg) every evening 270 capsule 3    FERATE 240 (27 Fe) MG TABS TAKE 1 TABLET BY MOUTH EVERY DAY 90 tablet 2    furosemide (LASIX) 40 MG tablet Take 1 tablet (40 mg) by mouth 2 times daily 180 tablet 3    ketoconazole (NIZORAL) 2 % external cream APPLY TO AFFECTED AREA TWICE A DAY 60 g 4    lisinopril (ZESTRIL) 10 MG tablet Take 1 tablet (10 mg) by mouth daily 90 tablet 3    metoprolol succinate ER (TOPROL XL) 100 MG 24 hr tablet Take 1 tablet (100 mg) by mouth 2 times daily 180 tablet 3    MORPHINE SULFATE IT pump:updated 1/12/22  Medications in Pump:   [facility] Responsible for pump medications:   Conc:  Morphine 20mg/ml(3.95 mg/day), clonidine 21.1mcg/ml (4.168 mcg/day), baclofen  42.5mcg/ml (8.395mcg/day)  Rate:   Pump Last Fill Date: 9/2021  Pump Refill Date: 2/2022      nystatin (NYAMYC) 123894 UNIT/GM external powder APPLY TO AFFECTED AREA 3 TIMES A DAY **MAX 90G/67 DAYS 120 g 0    ondansetron (ZOFRAN ODT) 4 MG ODT tab Take 1 tablet (4 mg) by mouth every 8 hours as needed for nausea 100 tablet 1    rosuvastatin (CRESTOR) 40 MG tablet Take 1 tablet (40 mg) by mouth daily 90 tablet 3    sotalol (BETAPACE) 80 MG tablet Take 1 tablet (80 mg) by mouth 2 times daily 180 tablet 3    traZODone (DESYREL) 100 MG tablet Take 2 tablets (200 mg) by mouth at bedtime TAKE 2 TABLETS (200MG TOTAL) BY MOUTH AT BEDTIME Strength: 100 mg 180 tablet 3    nitroGLYcerin (NITROSTAT) 0.4 MG sublingual tablet For chest pain place 1 tablet under the tongue every 5 minutes for 3 doses. If symptoms persist 5 minutes after 1st dose call 911. (Patient not taking: Reported on 3/11/2024) 30 tablet 1    semaglutide (OZEMPIC, 0.25 OR 0.5 MG/DOSE,) 2 MG/3ML pen INJECT 0.5 MG SUBCUTANEOUS EVERY 7 DAYS (Patient not taking: Reported on 4/17/2024) 3 mL 1            Past Medical History     Past Medical History:   Diagnosis Date    Abdominal panniculus 11/13/2012    Formatting of this note might be different from the original. S/p panniculectomy 11/13/2012    Acid reflux disease 10/31/2017    Bariatric surgery status 06/23/2008    Formatting of this note might be different from the original. Created by Conversion    Bilateral leg edema 07/13/2021    CHF (congestive heart failure) (H)     Chronic Back Pain (IT Pump w Morphine, Clonidine, Baclofen) 01/16/2022    Chronic diastolic heart failure (H) 07/26/2021    Claudication of lower extremity (H24) 11/20/2019    Coronary arteriosclerosis 05/09/2022    Formatting of this note might be different from the original. Created by Conversion  Replacement Utility updated for latest IMO load    Coronary artery disease     CABG 1-    Depressive disorder     Essential hypertension      Created by [facility] Annotation: Apr 6 2012 10:16Zack Harris: Lisinipril,  coreg  Replacement Utility updated for latest IMO load    Fluid overload 01/25/2022    Gastroesophageal reflux disease without esophagitis 09/11/2021    H/O gastric bypass 2008    History of blood transfusion 2004    ICD (implantable cardioverter-defibrillator) in place 01/25/2022    Insomnia     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    Ischemic cardiomyopathy     Lumbago     Created by [facility] Annotation: Apr 6 2012 10:20Anh Ford: Morphine pump     Mixed hyperlipidemia 06/23/2008    Morbid obesity (H) 08/01/2018    Nephrolithiasis     NSTEMI (non-ST elevated myocardial infarction) (H)     Obstructive sleep apnea     Doesn't tolerate CPAP    MARLEEN (doesn't tolerate CPAP) 07/26/2021    Osteoarthritis     Created by [facility] Annotation: Jun 26 2009  9:53Zack Harris: failed lumbar  fusion/morphine pump dr putnam  Replacement Utility updated for latest IMO load    Osteoarthrosis 05/09/2022    Formatting of this note might be different from the original. Created by [facility] Annotation: Jun 26 2009  9:53Zack Harris: failed lumbar  fusion/morphine pump dr putnam  Replacement Utility updated for latest IMO load    Paroxysmal atrial fib -- S/P Pulm Vein Ablation 1/19/22 09/11/2021    Formatting of this note might be different from the original. Created by Conversion    Paroxysmal atrial fibrillation (H)     Created by Conversion     Paroxysmal ventricular tachycardia (H)     dual chamber ICD 1/24/2022    Peripheral vascular disease (H24) 05/03/2022    Post-laminectomy syndrome 11/25/2015    S/P CABG (coronary artery bypass graft) 01/25/2022    Sleepiness 05/08/2018    Type 2 diabetes mellitus (H)     Diet controlled after Gastric Bypass in 2008     Past Surgical History:   Procedure Laterality Date    BACK SURGERY       BYPASS GASTRIC DUODENAL SWITCH      BYPASS GRAFT ARTERY CORONARY N/A 01/19/2022    Procedure: CORONARY ARTERY BYPASS GRAFT (CABG) X1; LIMA -LAD.  PULMONARY VEIN ISOLATION, AND ATRIAL APPENDAGE CLIPPING USING 45MM ACTICURE CLIP.;  Surgeon: William Dumont MD;  Location:  OR    COLONOSCOPY      COSMETIC SURGERY      pannicullectomy    CV CORONARY ANGIOGRAM N/A 09/11/2021    Procedure: Coronary Angiogram;  Surgeon: Noris Ozuna MD;  Location: [facility] CATH LAB CV    CV HEART CATHETERIZATION WITH POSSIBLE INTERVENTION N/A 01/13/2022    Procedure: Heart Catheterization with Possible Intervention;  Surgeon: Liz Pearl MD;  Location:  HEART CARDIAC CATH LAB    CV LEFT HEART CATH N/A 09/11/2021    Procedure: Left Heart Cath;  Surgeon: Noris Ozuna MD;  Location: [facility] CV    CV PCI N/A 09/11/2021    Procedure: Percutaneous Coronary Intervention;  Surgeon: Noris Ozuna MD;  Location: [facility] CATH LAB CV    CV PCI N/A 10/07/2021    Procedure: Percutaneous Coronary Intervention;  Surgeon: Mckayla Dan MD;  Location: [facility] CV    CV PCI ASPIRATION THROMECTOMY N/A 09/11/2021    Procedure: Percutaneous Coronary Intervention Aspiration Thrombectomy;  Surgeon: Noris Ozuna MD;  Location: [facility] CV    ENT SURGERY      tonsillectomy    EP ICD N/A 01/24/2022    Procedure: EP ICD;  Surgeon: Jannette Crook MD;  Location:  HEART CARDIAC CATH LAB    EXTRACORPOREAL SHOCK WAVE LITHOTRIPSY, CYSTOSCOPY, INSERT STENT URETER(S), COMBINED  08/23/2011    HC REMOVAL OF TONSILS,<13 Y/O      Description: Tonsillectomy;  Recorded: 03/23/2012;  Comments: for obstructive sleep apnea    HERNIA REPAIR      IR MISCELLANEOUS PROCEDURE  07/29/2011    IR MISCELLANEOUS PROCEDURE  08/05/2011    IR MISCELLANEOUS PROCEDURE  08/23/2011    IR NEPHROSTOMY TUBE CHANGE BILATERAL  08/05/2011    ORTHOPEDIC SURGERY      arthrodesis ant discectomy, lumbar    SD ARTHRODESIS ANT INTERBODY MIN  DISCECTOMY,LUMBAR      Description: Lumbar Vertebral Fusion;  Recorded: 06/26/2009;  Comments: before 200 see [facility] consult under old records    LA EXCISE EXCESS SKIN TISSUE,ABDOMEN  11/13/2012    Description: Panniculectomy;  Proc Date: 11/13/2012;  Comments: 3.5 Lb Pannus was removed at the [facility] By Dr. Shon Green    REPLACE INTRATHECAL PAIN PUMP N/A 04/25/2017    Procedure: REPLACE INTRATHECAL PAIN PUMP;  INTRATHECAL PAIN PUMP CATHETER REPLACEMENT AND PUMP REPLACEMENT;  Surgeon: Anthony Maloney MD;  Location: [facility]    REPLACE INTRATHECAL PAIN PUMP N/A 4/20/2023    Procedure: Pump Replacement and intrathecal catheter replacement;  Surgeon: Anthony Dick MD;  Location:  OR    REVISE CATHETER INTRATHECAL N/A 04/25/2017    Procedure: REVISE CATHETER INTRATHECAL;;  Surgeon: Anthony Maloney MD;  Location:  SD    SHOULDER SURGERY      ZZC GASTRIC BYPASS,OBESE<100CM LORA-EN-Y  01/01/2008    Description: Gastric Surgery For Morbid Obesity Bypass With Lora-en-Y;  Recorded: 06/26/2009;  Comments: dr green 2008    [facility] REPAIR INCISIONAL HERNIA,REDUCIBLE  11/13/2012    Description: Incisional Hernia Repair;  Proc Date: 11/13/2012;  Comments: incisional hernia repair and abdominal panniculectomy by Dr Shon Green at the [facility].     Family History   Problem Relation Age of Onset    Cerebrovascular Disease Mother     Heart Disease Father     Coronary Artery Disease Father     Hypertension Father     Hyperlipidemia Father     Hodgkin's lymphoma Brother     Breast Cancer Sister     Other Cancer Sister     Other Cancer Daughter         Carcinoide Tumor 2021     Social History     Socioeconomic History    Marital status:      Spouse name: Not on file    Number of children: Not on file    Years of education: Not on file    Highest education level: Not on file   Occupational History    Not on file   Tobacco Use    Smoking status: Former     Types: Cigars     Passive exposure: Never    Smokeless  tobacco: Never    Tobacco comments:     Haven't smoked in years   Vaping Use    Vaping status: Never Used   Substance and Sexual Activity    Alcohol use: No    Drug use: No     Comment: Drug use: formerly used    Sexual activity: Not Currently     Partners: Female     Birth control/protection: None   Other Topics Concern    Parent/sibling w/ CABG, MI or angioplasty before 65F 55M? Yes     Comment: Dad   Social History Narrative    Not on file     Social Determinants of Health     Financial Resource Strain: Low Risk  (2/27/2024)    Financial Resource Strain     Within the past 12 months, have you or your family members you live with been unable to get utilities (heat, electricity) when it was really needed?: No   Food Insecurity: Low Risk  (2/27/2024)    Food Insecurity     Within the past 12 months, did you worry that your food would run out before you got money to buy more?: No     Within the past 12 months, did the food you bought just not last and you didn t have money to get more?: Patient declined   Transportation Needs: Low Risk  (2/27/2024)    Transportation Needs     Within the past 12 months, has lack of transportation kept you from medical appointments, getting your medicines, non-medical meetings or appointments, work, or from getting things that you need?: No   Physical Activity: Patient Declined (2/27/2024)    Exercise Vital Sign     Days of Exercise per Week: Patient declined     Minutes of Exercise per Session: Patient declined   Stress: No Stress Concern Present (2/27/2024)    [employer] - Occupational Stress Questionnaire     Feeling of Stress : Not at all   Social Connections: Unknown (2/27/2024)    Social Connection and Isolation Panel [NHANES]     Frequency of Communication with Friends and Family: Not on file     Frequency of Social Gatherings with Friends and Family: Once a week     Attends Rastafari Services: Not on file     Active Member of Clubs or  Organizations: Not on file     Attends Club or Organization Meetings: Not on file     Marital Status: Not on file   Interpersonal Safety: Not on file   Housing Stability: Low Risk  (2/27/2024)    Housing Stability     Do you have housing? : Yes     Are you worried about losing your housing?: No            Allergies   Hydrocodone-acetaminophen    Today's clinic visit entailed:  The following tests were independently interpreted by me as noted in my documentation: ecg, device checks  30 minutes spent by me on the date of the encounter doing chart review, history and exam, documentation and further activities per the note  Provider  Link to MDM Help Grid     The level of medical decision making during this visit was of moderate complexity.

## 2024-07-01 NOTE — LETTER
"7/1/2024    Roge Feliciano MD  [address] [facility] [address]    RE: Onur Barr       Dear Colleague,     I had the pleasure of seeing Onur Barr in the [facility].    Electrophysiology Clinic Progress Note    Onur Barr MRN# [id number]   YOB: 1955 Age: 68 year old     Primary cardiologist: Dr. Pearl         Assessment and Plan   Very  pleasant  68 year old gentleman with a history of hypertension, hyperlipidemia, diabetes, paroxysmal A. fib and mild ischemic cardiomyopathy with EF  45-50%. He initially underwent PCI to LAD in 2012.   In 2021, he presented with inferior STEMI and underwent PCI to RCA.  He was re-admitted with non-STEMI on 1/12/2022, and during admission he underwent CABG (LIMA to LAD, PVI, LA exclusion).  Hospital stay was complicated by postop A. fib and episode of monomorphic VT.  He underwent ICD implantation for secondary prevention and was temporarily placed on amiodarone.       Known frequent asymptomatic PVCs with morphology suggestive of outflow tract possible epicardial origin (LBBB pattern, inferior axis, transition at V3 and slurred onset of QRS). This past Feb note to have sustained VT with longest [id number] at 180 bpm, below treatment zone. Sotalol was started and since has not had sustained VT. Tolerating current Sotalol 80 mg bid along with Toprol.  Suspect VT scar related. As long as quiescent on sotalol will continue with it and annual ECG. See EP should VT recur             Review of Systems     12-pt ROS is negative except for as noted in the HPI.          Physical Exam     Vitals: /80   Pulse 65   Resp 15   Ht 1.88 m (6' 2\")   Wt 125.2 kg (276 lb)   SpO2 100%   BMI 35.44 kg/m    Wt Readings from Last 10 Encounters:   07/01/24 125.2 kg (276 lb)   04/17/24 130.8 kg (288 lb 6.4 oz)   03/11/24 127.9 kg (282 lb)   02/29/24 126.6 kg (279 lb 3.2 oz)   02/28/24 130.2 kg (287 lb)   12/11/23 138.3 kg (305 lb) "   10/31/23 147.4 kg (325 lb)   10/13/23 146.5 kg (323 lb)   08/22/23 146.6 kg (323 lb 1.6 oz)   08/14/23 (!) 150.6 kg (332 lb)       Constitutional:  Patient is pleasant, alert, cooperative, and in NAD.  HEENT:  NCAT. PERRLA. EOM's intact.   Neck:  CVP appears normal. No carotid bruits.   Pulmonary: Normal respiratory effort. CTAB.   Cardiac: RRR, normal S1/S2, no S3/S4, no murmur or rub.   Abdomen:  Non-tender abdomen, no hepatosplenomegaly appreciated.   Vascular: Pulses in the upper and lower extremities are 2+ and equal bilaterally.  Extremities: No edema, erythema, cyanosis or tenderness appreciated.  Skin:  No rashes or lesions appreciated.   Neurological:  No gross motor or sensory deficits.   Psych: Appropriate affect.          Data   Labs reviewed:  Recent Labs   Lab Test 11/13/23  0924 06/09/23  1045 11/21/22  0921 08/18/22  1425 01/14/22  0438   LDL 36  --  39  --  38   HDL 37*  --  33*  --  44   NHDL 68  --  106  --  61   CHOL 105  --  139  --  105   TRIG 161*  --  335*  --  116   TSH  --  1.38  --  1.16  --        Lab Results   Component Value Date    WBC 24.8 (H) 04/17/2024    RBC 4.42 04/17/2024    HGB 12.9 (L) 04/17/2024    HCT 39.9 (L) 04/17/2024    MCV 90 04/17/2024    MCH 29.2 04/17/2024    MCHC 32.3 04/17/2024    RDW 12.7 04/17/2024     04/17/2024       Lab Results   Component Value Date     04/17/2024    POTASSIUM 3.8 04/17/2024    POTASSIUM 4.2 05/15/2023    CHLORIDE 103 04/17/2024    CHLORIDE 104 05/15/2023    CO2 28 04/17/2024    CO2 28 05/15/2023    ANIONGAP 9 04/17/2024    ANIONGAP 9 05/15/2023     (H) 04/17/2024     05/15/2023    BUN 16.6 04/17/2024    BUN 19 05/15/2023    CR 1.24 (H) 04/17/2024    GFRESTIMATED 64 04/17/2024    GFRESTIMATED >60 01/05/2021    GFRESTBLACK >60 01/05/2021    RATNA 9.3 04/17/2024      Lab Results   Component Value Date    AST 20 04/17/2024    ALT <5 04/17/2024       Lab Results   Component Value Date    A1C 5.4 12/07/2023       Lab  Results   Component Value Date    INR 1.10 04/20/2023    INR 1.31 (H) 01/19/2022            Problem List     Patient Active Problem List   Diagnosis    Lumbago    Morbid obesity -- BMI 42.0    Bilateral leg edema    Chronic diastolic heart failure (H)    AMRLEEN (doesn't tolerate CPAP)    NSTEMI w Unstab Angina -- S/P CABG x 1 (LIMA to LAD) on 1/19/22    Paroxysmal atrial fib -- S/P Pulm Vein Ablation 1/19/22    Essential hypertension    Mixed hyperlipidemia    Gastroesophageal reflux disease without esophagitis    Chronic Back Pain (IT Pump w Morphine, Clonidine, Baclofen)    S/P CABG (coronary artery bypass graft)    Fluid overload    ICD (implantable cardioverter-defibrillator) in place    Peripheral vascular disease (H24)    Abdominal panniculus    Bariatric surgery status    Claudication of lower extremity (H24)    Insomnia    Intestinal disaccharidase deficiencies and disaccharide malabsorption    Nephrolithiasis    Osteoarthrosis    Post-laminectomy syndrome    Sleepiness    Coronary arteriosclerosis    Candidal intertrigo    CLL (chronic lymphocytic leukemia) (H)            Medications     Current Outpatient Medications   Medication Sig Dispense Refill    acetaminophen (TYLENOL) 325 MG tablet Take 2 tablets (650 mg) by mouth every 4 hours as needed for mild pain 40 tablet 0    aspirin (ASA) 81 MG chewable tablet Take 81 mg by mouth daily      DULoxetine (CYMBALTA) 30 MG capsule Take 1 capsule (30mg) every morning and 2 capsules (60mg) every evening 270 capsule 3    FERATE 240 (27 Fe) MG TABS TAKE 1 TABLET BY MOUTH EVERY DAY 90 tablet 2    furosemide (LASIX) 40 MG tablet Take 1 tablet (40 mg) by mouth 2 times daily 180 tablet 3    ketoconazole (NIZORAL) 2 % external cream APPLY TO AFFECTED AREA TWICE A DAY 60 g 4    lisinopril (ZESTRIL) 10 MG tablet Take 1 tablet (10 mg) by mouth daily 90 tablet 3    metoprolol succinate ER (TOPROL XL) 100 MG 24 hr tablet Take 1 tablet (100 mg) by mouth 2 times daily 180 tablet 3     MORPHINE SULFATE IT pump:updated 1/12/22  Medications in Pump:   [facility] Responsible for pump medications:   Conc:  Morphine 20mg/ml(3.95 mg/day), clonidine 21.1mcg/ml (4.168 mcg/day), baclofen 42.5mcg/ml (8.395mcg/day)  Rate:   Pump Last Fill Date: 9/2021  Pump Refill Date: 2/2022      nystatin (NYAMYC) 266727 UNIT/GM external powder APPLY TO AFFECTED AREA 3 TIMES A DAY **MAX 90G/67 DAYS 120 g 0    ondansetron (ZOFRAN ODT) 4 MG ODT tab Take 1 tablet (4 mg) by mouth every 8 hours as needed for nausea 100 tablet 1    rosuvastatin (CRESTOR) 40 MG tablet Take 1 tablet (40 mg) by mouth daily 90 tablet 3    sotalol (BETAPACE) 80 MG tablet Take 1 tablet (80 mg) by mouth 2 times daily 180 tablet 3    traZODone (DESYREL) 100 MG tablet Take 2 tablets (200 mg) by mouth at bedtime TAKE 2 TABLETS (200MG TOTAL) BY MOUTH AT BEDTIME Strength: 100 mg 180 tablet 3    nitroGLYcerin (NITROSTAT) 0.4 MG sublingual tablet For chest pain place 1 tablet under the tongue every 5 minutes for 3 doses. If symptoms persist 5 minutes after 1st dose call 911. (Patient not taking: Reported on 3/11/2024) 30 tablet 1    semaglutide (OZEMPIC, 0.25 OR 0.5 MG/DOSE,) 2 MG/3ML pen INJECT 0.5 MG SUBCUTANEOUS EVERY 7 DAYS (Patient not taking: Reported on 4/17/2024) 3 mL 1            Past Medical History     Past Medical History:   Diagnosis Date    Abdominal panniculus 11/13/2012    Formatting of this note might be different from the original. S/p panniculectomy 11/13/2012    Acid reflux disease 10/31/2017    Bariatric surgery status 06/23/2008    Formatting of this note might be different from the original. Created by Conversion    Bilateral leg edema 07/13/2021    CHF (congestive heart failure) (H)     Chronic Back Pain (IT Pump w Morphine, Clonidine, Baclofen) 01/16/2022    Chronic diastolic heart failure (H) 07/26/2021    Claudication of lower extremity (H24) 11/20/2019    Coronary arteriosclerosis 05/09/2022    Formatting of this  note might be different from the original. Created by Conversion  Replacement Utility updated for latest IMO load    Coronary artery disease     CABG 1-    Depressive disorder     Essential hypertension     Created by [facility] Annotation: Apr 6 2012 10:16Zack Harris: Lisinipril,  coreg  Replacement Utility updated for latest IMO load    Fluid overload 01/25/2022    Gastroesophageal reflux disease without esophagitis 09/11/2021    H/O gastric bypass 2008    History of blood transfusion 2004    ICD (implantable cardioverter-defibrillator) in place 01/25/2022    Insomnia     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    Ischemic cardiomyopathy     Lumbago     Created by [facility] Annotation: Apr 6 2012 10:20Anh Ford: Morphine pump     Mixed hyperlipidemia 06/23/2008    Morbid obesity (H) 08/01/2018    Nephrolithiasis     NSTEMI (non-ST elevated myocardial infarction) (H)     Obstructive sleep apnea     Doesn't tolerate CPAP    MARLEEN (doesn't tolerate CPAP) 07/26/2021    Osteoarthritis     Created by [facility] Annotation: Jun 26 2009  9:53Zack Harris: failed lumbar  fusion/morphine pump dr putnam  Replacement Utility updated for latest IMO load    Osteoarthrosis 05/09/2022    Formatting of this note might be different from the original. Created by [facility] Annotation: Jun 26 2009  9:53Zack Harris: failed lumbar  fusion/morphine pump dr putnam  Replacement Utility updated for latest IMO load    Paroxysmal atrial fib -- S/P Pulm Vein Ablation 1/19/22 09/11/2021    Formatting of this note might be different from the original. Created by Conversion    Paroxysmal atrial fibrillation (H)     Created by Conversion     Paroxysmal ventricular tachycardia (H)     dual chamber ICD 1/24/2022    Peripheral vascular disease (H24) 05/03/2022    Post-laminectomy syndrome 11/25/2015    S/P CABG (coronary  artery bypass graft) 01/25/2022    Sleepiness 05/08/2018    Type 2 diabetes mellitus (H)     Diet controlled after Gastric Bypass in 2008     Past Surgical History:   Procedure Laterality Date    BACK SURGERY      BYPASS GASTRIC DUODENAL SWITCH      BYPASS GRAFT ARTERY CORONARY N/A 01/19/2022    Procedure: CORONARY ARTERY BYPASS GRAFT (CABG) X1; LIMA -LAD.  PULMONARY VEIN ISOLATION, AND ATRIAL APPENDAGE CLIPPING USING 45MM ACTICURE CLIP.;  Surgeon: William Dumont MD;  Location:  OR    COLONOSCOPY      COSMETIC SURGERY      pannicullectomy    CV CORONARY ANGIOGRAM N/A 09/11/2021    Procedure: Coronary Angiogram;  Surgeon: Noris Ozuna MD;  Location: [facility] CATH LAB CV    CV HEART CATHETERIZATION WITH POSSIBLE INTERVENTION N/A 01/13/2022    Procedure: Heart Catheterization with Possible Intervention;  Surgeon: Liz Pearl MD;  Location:  HEART CARDIAC CATH LAB    CV LEFT HEART CATH N/A 09/11/2021    Procedure: Left Heart Cath;  Surgeon: Noris Ozuna MD;  Location: [facility] CATH LAB CV    CV PCI N/A 09/11/2021    Procedure: Percutaneous Coronary Intervention;  Surgeon: Noris Ozuna MD;  Location: [facility] CATH LAB CV    CV PCI N/A 10/07/2021    Procedure: Percutaneous Coronary Intervention;  Surgeon: Mckayla Dan MD;  Location: [facility] CATH LAB CV    CV PCI ASPIRATION THROMECTOMY N/A 09/11/2021    Procedure: Percutaneous Coronary Intervention Aspiration Thrombectomy;  Surgeon: Noris Ozuna MD;  Location: [facility] CATH LAB CV    ENT SURGERY      tonsillectomy    EP ICD N/A 01/24/2022    Procedure: EP ICD;  Surgeon: Jannette Crook MD;  Location:  HEART CARDIAC CATH LAB    EXTRACORPOREAL SHOCK WAVE LITHOTRIPSY, CYSTOSCOPY, INSERT STENT URETER(S), COMBINED  08/23/2011    HC REMOVAL OF TONSILS,<11 Y/O      Description: Tonsillectomy;  Recorded: 03/23/2012;  Comments: for obstructive sleep apnea    HERNIA REPAIR      IR MISCELLANEOUS PROCEDURE  07/29/2011    IR MISCELLANEOUS  PROCEDURE  08/05/2011    IR MISCELLANEOUS PROCEDURE  08/23/2011    IR NEPHROSTOMY TUBE CHANGE BILATERAL  08/05/2011    ORTHOPEDIC SURGERY      arthrodesis ant discectomy, lumbar    TN ARTHRODESIS ANT INTERBODY MIN DISCECTOMY,LUMBAR      Description: Lumbar Vertebral Fusion;  Recorded: 06/26/2009;  Comments: before 200 see [facility] consult under old records    TN EXCISE EXCESS SKIN TISSUE,ABDOMEN  11/13/2012    Description: Panniculectomy;  Proc Date: 11/13/2012;  Comments: 3.5 Lb Pannus was removed at the [facility] By Dr. Shon Green    REPLACE INTRATHECAL PAIN PUMP N/A 04/25/2017    Procedure: REPLACE INTRATHECAL PAIN PUMP;  INTRATHECAL PAIN PUMP CATHETER REPLACEMENT AND PUMP REPLACEMENT;  Surgeon: Anthony Maloney MD;  Location: [facility]    REPLACE INTRATHECAL PAIN PUMP N/A 4/20/2023    Procedure: Pump Replacement and intrathecal catheter replacement;  Surgeon: Anthony Dick MD;  Location:  OR    REVISE CATHETER INTRATHECAL N/A 04/25/2017    Procedure: REVISE CATHETER INTRATHECAL;;  Surgeon: Anthony Maloney MD;  Location: [facility]    SHOULDER SURGERY      ZZC GASTRIC BYPASS,OBESE<100CM LORA-EN-Y  01/01/2008    Description: Gastric Surgery For Morbid Obesity Bypass With Lora-en-Y;  Recorded: 06/26/2009;  Comments: dr green 2008    [facility]  11/[facility]/2012    Description: Incisional Hernia Repair;  Proc Date: 11/13/2012;  Comments: incisional hernia repair and abdominal panniculectomy by Dr Shon Green at the [facility].     Family History   Problem Relation Age of Onset    Cerebrovascular Disease Mother     Heart Disease Father     Coronary Artery Disease Father     Hypertension Father     Hyperlipidemia Father     Hodgkin's lymphoma Brother     Breast Cancer Sister     Other Cancer Sister     Other Cancer Daughter         Carcinoide Tumor 2021     Social History     Socioeconomic History    Marital status:      Spouse name: Not on file    Number of children: Not on file     Years of education: Not on file    Highest education level: Not on file   Occupational History    Not on file   Tobacco Use    Smoking status: Former     Types: Cigars     Passive exposure: Never    Smokeless tobacco: Never    Tobacco comments:     Haven't smoked in years   Vaping Use    Vaping status: Never Used   Substance and Sexual Activity    Alcohol use: No    Drug use: No     Comment: Drug use: formerly used    Sexual activity: Not Currently     Partners: Female     Birth control/protection: None   Other Topics Concern    Parent/sibling w/ CABG, MI or angioplasty before 65F 55M? Yes     Comment: Dad   Social History Narrative    Not on file     Social Determinants of Health     Financial Resource Strain: Low Risk  (2/27/2024)    Financial Resource Strain     Within the past 12 months, have you or your family members you live with been unable to get utilities (heat, electricity) when it was really needed?: No   Food Insecurity: Low Risk  (2/27/2024)    Food Insecurity     Within the past 12 months, did you worry that your food would run out before you got money to buy more?: No     Within the past 12 months, did the food you bought just not last and you didn t have money to get more?: Patient declined   Transportation Needs: Low Risk  (2/27/2024)    Transportation Needs     Within the past 12 months, has lack of transportation kept you from medical appointments, getting your medicines, non-medical meetings or appointments, work, or from getting things that you need?: No   Physical Activity: Patient Declined (2/27/2024)    Exercise Vital Sign     Days of Exercise per Week: Patient declined     Minutes of Exercise per Session: Patient declined   Stress: No Stress Concern Present (2/27/2024)    [employer] [employer] - Occupational Stress Questionnaire     Feeling of Stress : Not at all   Social Connections: Unknown (2/27/2024)    Social Connection and Isolation Panel [NHANES]     Frequency  of Communication with Friends and Family: Not on file     Frequency of Social Gatherings with Friends and Family: Once a week     Attends Gnosticist Services: Not on file     Active Member of Clubs or Organizations: Not on file     Attends Club or Organization Meetings: Not on file     Marital Status: Not on file   Interpersonal Safety: Not on file   Housing Stability: Low Risk  (2/27/2024)    Housing Stability     Do you have housing? : Yes     Are you worried about losing your housing?: No            Allergies   Hydrocodone-acetaminophen    Today's clinic visit entailed:  The following tests were independently interpreted by me as noted in my documentation: ecg, device checks  30 minutes spent by me on the date of the encounter doing chart review, history and exam, documentation and further activities per the note  Provider  Link to [facility] Help Grid     The level of medical decision making during this visit was of moderate complexity.       Thank you for allowing me to participate in the care of your patient.      Sincerely,     Johnnie Lu MD     [facility]  cc:   Liz Pearl MD  [id number] [id number]

## 2024-07-03 ENCOUNTER — TRANSFERRED RECORDS (OUTPATIENT)
Dept: HEALTH INFORMATION MANAGEMENT | Facility: CLINIC | Age: 69
End: 2024-07-03
Payer: COMMERCIAL

## 2024-07-14 DIAGNOSIS — I25.119 CORONARY ARTERY DISEASE INVOLVING NATIVE CORONARY ARTERY OF NATIVE HEART WITH ANGINA PECTORIS (H): ICD-10-CM

## 2024-07-15 RX ORDER — ROSUVASTATIN CALCIUM 40 MG/1
40 TABLET, COATED ORAL DAILY
Qty: 90 TABLET | Refills: 3 | OUTPATIENT
Start: 2024-07-15

## 2024-07-16 ENCOUNTER — LAB (OUTPATIENT)
Dept: ONCOLOGY | Facility: CLINIC | Age: 69
End: 2024-07-16
Attending: INTERNAL MEDICINE
Payer: COMMERCIAL

## 2024-07-16 VITALS
HEART RATE: 60 BPM | DIASTOLIC BLOOD PRESSURE: 76 MMHG | OXYGEN SATURATION: 95 % | SYSTOLIC BLOOD PRESSURE: 120 MMHG | BODY MASS INDEX: 35.68 KG/M2 | WEIGHT: 277.9 LBS | TEMPERATURE: 97.6 F | RESPIRATION RATE: 18 BRPM

## 2024-07-16 DIAGNOSIS — C91.10 CLL (CHRONIC LYMPHOCYTIC LEUKEMIA) (H): Primary | ICD-10-CM

## 2024-07-16 DIAGNOSIS — C91.10 CLL (CHRONIC LYMPHOCYTIC LEUKEMIA) (H): ICD-10-CM

## 2024-07-16 LAB
ANION GAP SERPL CALCULATED.3IONS-SCNC: 13 MMOL/L (ref 7–15)
BUN SERPL-MCNC: 20.5 MG/DL (ref 8–23)
CALCIUM SERPL-MCNC: 8.8 MG/DL (ref 8.8–10.2)
CHLORIDE SERPL-SCNC: 104 MMOL/L (ref 98–107)
CREAT SERPL-MCNC: 1.05 MG/DL (ref 0.67–1.17)
EGFRCR SERPLBLD CKD-EPI 2021: 78 ML/MIN/1.73M2
ERYTHROCYTE [DISTWIDTH] IN BLOOD BY AUTOMATED COUNT: 12.9 % (ref 10–15)
GLUCOSE SERPL-MCNC: 135 MG/DL (ref 70–99)
HCO3 SERPL-SCNC: 26 MMOL/L (ref 22–29)
HCT VFR BLD AUTO: 41.5 % (ref 40–53)
HGB BLD-MCNC: 13.5 G/DL (ref 13.3–17.7)
MCH RBC QN AUTO: 29.3 PG (ref 26.5–33)
MCHC RBC AUTO-ENTMCNC: 32.5 G/DL (ref 31.5–36.5)
MCV RBC AUTO: 90 FL (ref 78–100)
PLATELET # BLD AUTO: 149 10E3/UL (ref 150–450)
POTASSIUM SERPL-SCNC: 4.2 MMOL/L (ref 3.4–5.3)
RBC # BLD AUTO: 4.6 10E6/UL (ref 4.4–5.9)
SODIUM SERPL-SCNC: 143 MMOL/L (ref 135–145)
WBC # BLD AUTO: 25.1 10E3/UL (ref 4–11)

## 2024-07-16 PROCEDURE — 80048 BASIC METABOLIC PNL TOTAL CA: CPT | Performed by: INTERNAL MEDICINE

## 2024-07-16 PROCEDURE — 85018 HEMOGLOBIN: CPT | Performed by: INTERNAL MEDICINE

## 2024-07-16 PROCEDURE — 99214 OFFICE O/P EST MOD 30 MIN: CPT | Performed by: INTERNAL MEDICINE

## 2024-07-16 PROCEDURE — 99213 OFFICE O/P EST LOW 20 MIN: CPT | Performed by: INTERNAL MEDICINE

## 2024-07-16 PROCEDURE — 36415 COLL VENOUS BLD VENIPUNCTURE: CPT

## 2024-07-16 ASSESSMENT — ENCOUNTER SYMPTOMS
ARTHRALGIAS: 1
HEMATOLOGIC/LYMPHATIC NEGATIVE: 1
ENDOCRINE COMMENTS: DIABETES
EYES NEGATIVE: 1
PSYCHIATRIC NEGATIVE: 1
CONSTITUTIONAL NEGATIVE: 1
RESPIRATORY NEGATIVE: 1
CARDIOVASCULAR NEGATIVE: 1
GASTROINTESTINAL NEGATIVE: 1
BACK PAIN: 1

## 2024-07-16 ASSESSMENT — PAIN SCALES - GENERAL: PAINLEVEL: SEVERE PAIN (7)

## 2024-07-16 NOTE — LETTER
7/16/2024      Onur Chavezandrei  [address] [facility] [address]      Dear Colleague,    Thank you for referring your patient, Onur Barr, to the [facility]. Please see a copy of my visit note below.    Assessment & Plan   Chronic  lymphocytic leukemia  Chronic back pain  Diabetes    Next provider visit in 3 months    Interval History  This is a scheduled 3 month follow up for this retired , previously seen by me for lymphocytosis and diagnosed with CLL.  Thus far, he's been asymptomatic and has not been treated.        ECOG = 0-1    Patient Active Problem List   Diagnosis     Lumbago     Morbid obesity -- BMI 42.0     Bilateral leg edema     Chronic diastolic heart failure (H)     MARLEEN (doesn't tolerate CPAP)     NSTEMI w Unstab Angina -- S/P CABG x 1 (LIMA to LAD) on 1/19/22     Paroxysmal atrial fib -- S/P Pulm Vein Ablation 1/19/22     Essential hypertension     Mixed hyperlipidemia     Gastroesophageal reflux disease without esophagitis     Chronic Back Pain (IT Pump w Morphine, Clonidine, Baclofen)     S/P CABG (coronary artery bypass graft)     Fluid overload     ICD (implantable cardioverter-defibrillator) in place     Peripheral vascular disease (H24)     Abdominal panniculus     Bariatric surgery status     Claudication of lower extremity (H24)     Insomnia     Intestinal disaccharidase deficiencies and disaccharide malabsorption     Nephrolithiasis     Osteoarthrosis     Post-laminectomy syndrome     Sleepiness     Coronary arteriosclerosis     Candidal intertrigo     CLL (chronic lymphocytic leukemia) (H)     Current Outpatient Medications   Medication Sig Dispense Refill     acetaminophen (TYLENOL) 325 MG tablet Take 2 tablets (650 mg) by mouth every 4 hours as needed for mild pain 40 tablet 0     aspirin (ASA) 81 MG chewable tablet Take 81 mg by mouth daily       DULoxetine (CYMBALTA) 30 MG capsule Take 1 capsule (30mg) every morning and 2  capsules (60mg) every evening 270 capsule 3     FERATE 240 (27 Fe) MG TABS TAKE 1 TABLET BY MOUTH EVERY DAY 90 tablet 2     furosemide (LASIX) 40 MG tablet Take 1 tablet (40 mg) by mouth 2 times daily 180 tablet 3     ketoconazole (NIZORAL) 2 % external cream APPLY TO AFFECTED AREA TWICE A DAY 60 g 4     lisinopril (ZESTRIL) 10 MG tablet Take 1 tablet (10 mg) by mouth daily 90 tablet 3     metoprolol succinate ER (TOPROL XL) 100 MG 24 hr tablet Take 1 tablet (100 mg) by mouth 2 times daily 180 tablet 3     MORPHINE SULFATE IT pump:updated 1/12/22  Medications in Pump:   [facility] Responsible for pump medications:   Conc:  Morphine 20mg/ml(3.95 mg/day), clonidine 21.1mcg/ml (4.168 mcg/day), baclofen 42.5mcg/ml (8.395mcg/day)  Rate:   Pump Last Fill Date: 9/2021  Pump Refill Date: 2/2022       nitroGLYcerin (NITROSTAT) 0.4 MG sublingual tablet For chest pain place 1 tablet under the tongue every 5 minutes for 3 doses. If symptoms persist 5 minutes after 1st dose call 911. 30 tablet 1     nystatin (NYAMYC) 719002 UNIT/GM external powder APPLY TO AFFECTED AREA 3 TIMES A DAY **MAX 90G/67 DAYS 120 g 0     ondansetron (ZOFRAN ODT) 4 MG ODT tab Take 1 tablet (4 mg) by mouth every 8 hours as needed for nausea 100 tablet 1     rosuvastatin (CRESTOR) 40 MG tablet Take 1 tablet (40 mg) by mouth daily 90 tablet 3     semaglutide (OZEMPIC, 0.25 OR 0.5 MG/DOSE,) 2 MG/3ML pen INJECT 0.5 MG SUBCUTANEOUS EVERY 7 DAYS 3 mL 1     sotalol (BETAPACE) 80 MG tablet Take 1 tablet (80 mg) by mouth 2 times daily 180 tablet 3     traZODone (DESYREL) 100 MG tablet Take 2 tablets (200 mg) by mouth at bedtime TAKE 2 TABLETS (200MG TOTAL) BY MOUTH AT BEDTIME Strength: 100 mg 180 tablet 3     No current facility-administered medications for this visit.     Past Medical History:   Diagnosis Date     Abdominal panniculus 11/13/2012    Formatting of this note might be different from the original. S/p panniculectomy 11/13/2012     Acid  reflux disease 10/31/2017     Bariatric surgery status 06/23/2008    Formatting of this note might be different from the original. Created by Conversion     Bilateral leg edema 07/13/2021     CHF (congestive heart failure) (H)      Chronic Back Pain (IT Pump w Morphine, Clonidine, Baclofen) 01/16/2022     Chronic diastolic heart failure (H) 07/26/2021     Claudication of lower extremity (H24) 11/20/2019     Coronary arteriosclerosis 05/09/2022    Formatting of this note might be different from the original. Created by Conversion  Replacement Utility updated for latest IMO load     Coronary artery disease     CABG 1-     Depressive disorder      Essential hypertension     Created by [employer] Annotation: Apr 6 2012 10:16Zack Harris: Lisinipril,  coreg  Replacement Utility updated for latest IMO load     Fluid overload 01/25/2022     Gastroesophageal reflux disease without esophagitis 09/11/2021     H/O gastric bypass 2008     History of blood transfusion 2004     ICD (implantable cardioverter-defibrillator) in place 01/25/2022     Insomnia      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     Ischemic cardiomyopathy      Lumbago     Created by [employer] Annotation: Apr 6 2012 10:20Anh Ford: Morphine pump      Mixed hyperlipidemia 06/23/2008     Morbid obesity (H) 08/01/2018     Nephrolithiasis      NSTEMI (non-ST elevated myocardial infarction) (H)      Obstructive sleep apnea     Doesn't tolerate CPAP     MARLEEN (doesn't tolerate CPAP) 07/26/2021     Osteoarthritis     Created by [employer] Annotation: Jun 26 2009  9:53Zack Harris: failed lumbar  fusion/morphine pump dr putnam  Replacement Utility updated for latest IMO load     Osteoarthrosis 05/09/2022    Formatting of this note might be different from the original. Created by [employer] Annotation: Jun 26 2009  9:53Zack Harris: failed  lumbar  fusion/morphine pump dr putnam  Replacement Utility updated for latest IMO load     Paroxysmal atrial fib -- S/P Pulm Vein Ablation 1/19/22 09/11/2021    Formatting of this note might be different from the original. Created by Conversion     Paroxysmal atrial fibrillation (H)     Created by Conversion      Paroxysmal ventricular tachycardia (H)     dual chamber ICD 1/24/2022     Peripheral vascular disease (H24) 05/03/2022     Post-laminectomy syndrome 11/25/2015     S/P CABG (coronary artery bypass graft) 01/25/2022     Sleepiness 05/08/2018     Type 2 diabetes mellitus (H)     Diet controlled after Gastric Bypass in 2008     Past Surgical History:   Procedure Laterality Date     BACK SURGERY       BYPASS GASTRIC DUODENAL SWITCH       BYPASS GRAFT ARTERY CORONARY N/A 01/19/2022    Procedure: CORONARY ARTERY BYPASS GRAFT (CABG) X1; LIMA -LAD.  PULMONARY VEIN ISOLATION, AND ATRIAL APPENDAGE CLIPPING USING 45MM ACTICURE CLIP.;  Surgeon: William Dumont MD;  Location:  OR     COLONOSCOPY       COSMETIC SURGERY      pannicullectomy     CV CORONARY ANGIOGRAM N/A 09/11/2021    Procedure: Coronary Angiogram;  Surgeon: Noris Ozuna MD;  Location: [facility] CATH LAB CV     CV HEART CATHETERIZATION WITH POSSIBLE INTERVENTION N/A 01/13/2022    Procedure: Heart Catheterization with Possible Intervention;  Surgeon: Liz Pearl MD;  Location:  HEART CARDIAC CATH LAB     CV LEFT HEART CATH N/A 09/11/2021    Procedure: Left Heart Cath;  Surgeon: Noris Ozuna MD;  Location: [facility] CATH LAB CV     CV PCI N/A 09/11/2021    Procedure: Percutaneous Coronary Intervention;  Surgeon: Noris Ozuna MD;  Location: [facility] CATH LAB CV     CV PCI N/A 10/07/2021    Procedure: Percutaneous Coronary Intervention;  Surgeon: Mckayla Dan MD;  Location: [facility] CATH LAB CV     CV PCI ASPIRATION THROMECTOMY N/A 09/11/2021    Procedure: Percutaneous Coronary Intervention Aspiration Thrombectomy;  Surgeon:  Noris Ozuna MD;  Location: [facility] CATH LAB CV     ENT SURGERY      tonsillectomy     EP ICD N/A 01/24/2022    Procedure: EP ICD;  Surgeon: Jannette Crook MD;  Location:  HEART CARDIAC CATH LAB     EXTRACORPOREAL SHOCK WAVE LITHOTRIPSY, CYSTOSCOPY, INSERT STENT URETER(S), COMBINED  08/23/2011     HC REMOVAL OF TONSILS,<14 Y/O      Description: Tonsillectomy;  Recorded: 03/23/2012;  Comments: for obstructive sleep apnea     HERNIA REPAIR       IR MISCELLANEOUS PROCEDURE  07/29/2011     IR MISCELLANEOUS PROCEDURE  08/05/2011     IR MISCELLANEOUS PROCEDURE  08/23/2011     IR NEPHROSTOMY TUBE CHANGE BILATERAL  08/05/2011     ORTHOPEDIC SURGERY      arthrodesis ant discectomy, lumbar     MD ARTHRODESIS ANT INTERBODY MIN DISCECTOMY,LUMBAR      Description: Lumbar Vertebral Fusion;  Recorded: 06/26/2009;  Comments: before 200 see [facility] consult under old records     MD EXCISE EXCESS SKIN TISSUE,ABDOMEN  11/13/2012    Description: Panniculectomy;  Proc Date: 11/13/2012;  Comments: 3.5 Lb Pannus was removed at the [facility] By Dr. Shon Green     REPLACE INTRATHECAL PAIN PUMP N/A 04/25/2017    Procedure: REPLACE INTRATHECAL PAIN PUMP;  INTRATHECAL PAIN PUMP CATHETER REPLACEMENT AND PUMP REPLACEMENT;  Surgeon: Anthony Maloney MD;  Location: [facility]     REPLACE INTRATHECAL PAIN PUMP N/A 4/20/2023    Procedure: Pump Replacement and intrathecal catheter replacement;  Surgeon: Anthony Dick MD;  Location:  OR     REVISE CATHETER INTRATHECAL N/A 04/25/2017    Procedure: REVISE CATHETER INTRATHECAL;;  Surgeon: Anthony Maloney MD;  Location: [facility]     SHOULDER SURGERY       ZC GASTRIC BYPASS,OBESE<100CM SUSY-EN-Y  01/01/2008    Description: Gastric Surgery For Morbid Obesity Bypass With Susy-en-Y;  Recorded: 06/26/2009;  Comments: dr green 2008     [facility] REPAIR INCISIONAL HERNIA,REDUCIBLE  11/13/2012    Description: Incisional Hernia Repair;  Proc Date: 11/13/2012;  Comments: incisional hernia repair and abdominal  panniculectomy by Dr Shon Green at the [facility].     Socioeconomic History     Marital status:      Social Determinants of Health     Social Connections: Unknown (2/27/2024)    Social Connection and Isolation Panel [NHANES]      Frequency of Social Gatherings with Friends and Family: Once a week     Review of Systems   Constitutional: Negative.    HENT:  Negative.     Eyes: Negative.    Respiratory: Negative.     Cardiovascular: Negative.    Gastrointestinal: Negative.    Endocrine:        Diabetes   Genitourinary: Negative.     Musculoskeletal:  Positive for arthralgias, back pain and gait problem.   Skin: Negative.    Neurological:  Positive for gait problem.   Hematological: Negative.    Psychiatric/Behavioral: Negative.     All other systems reviewed and are negative.      /76   Pulse 60   Temp 97.6  F (36.4  C) (Tympanic)   Resp 18   Wt 126.1 kg (277 lb 14.4 oz)   SpO2 95%   BMI 35.68 kg/m      Physical Exam  Vitals and nursing note reviewed.   Constitutional:       Appearance: He is obese.      Comments: Casually dressed, calm   HENT:      Head: Normocephalic and atraumatic.      Mouth/Throat:      Dentition: Abnormal dentition. Dental caries present.        Comments: Cracked lesion  Eyes:      Extraocular Movements: Extraocular movements intact.      Conjunctiva/sclera: Conjunctivae normal.      Pupils: Pupils are equal, round, and reactive to light.   Cardiovascular:      Rate and Rhythm: Normal rate and regular rhythm.      Pulses: Normal pulses.      Heart sounds: Normal heart sounds.   Pulmonary:      Effort: Pulmonary effort is normal.      Breath sounds: Normal breath sounds.   Abdominal:      General: There is no distension.      Palpations: Abdomen is soft. There is no mass.      Tenderness: There is no abdominal tenderness. There is no guarding.      Comments: Unable to lie flat due to back problems   Musculoskeletal:         General: Swelling (bilateral with thin,  burgundy colored skin changes) and deformity (back is bent at least 70 degrees forward) present.      Cervical back: Normal range of motion and neck supple.   Lymphadenopathy:      Cervical: No cervical adenopathy.      Upper Body:      Right upper body: No axillary or epitrochlear adenopathy.      Left upper body: No axillary or epitrochlear adenopathy.   Skin:     Findings: Lesion (solar changes on face and ears) present.   Neurological:      General: No focal deficit present.      Mental Status: He is alert and oriented to person, place, and time.      Cranial Nerves: No cranial nerve deficit.      Motor: No weakness.      Gait: Gait abnormal.      Deep Tendon Reflexes: Reflexes normal.   Psychiatric:         Mood and Affect: Mood normal.         Behavior: Behavior normal. Behavior is cooperative.         Thought Content: Thought content normal.         Judgment: Judgment normal.       Recent Results (from the past 720 hour(s))   **Basic metabolic panel FUTURE 2mo    Collection Time: 07/16/24  9:17 AM   Result Value Ref Range    Sodium 143 135 - 145 mmol/L    Potassium 4.2 3.4 - 5.3 mmol/L    Chloride 104 98 - 107 mmol/L    Carbon Dioxide (CO2) 26 22 - 29 mmol/L    Anion Gap 13 7 - 15 mmol/L    Urea Nitrogen 20.5 8.0 - 23.0 mg/dL    Creatinine 1.05 0.67 - 1.17 mg/dL    GFR Estimate 78 >60 mL/min/1.73m2    Calcium 8.8 8.8 - 10.2 mg/dL    Glucose 135 (H) 70 - 99 mg/dL   CBC with platelets    Collection Time: 07/16/24  9:17 AM   Result Value Ref Range    WBC Count 25.1 (H) 4.0 - 11.0 10e3/uL    RBC Count 4.60 4.40 - 5.90 10e6/uL    Hemoglobin 13.5 13.3 - 17.7 g/dL    Hematocrit 41.5 40.0 - 53.0 %    MCV 90 78 - 100 fL    MCH 29.3 26.5 - 33.0 pg    MCHC 32.5 31.5 - 36.5 g/dL    RDW 12.9 10.0 - 15.0 %    Platelet Count 149 (L) 150 - 450 10e3/uL     No results found for this or any previous visit (from the past 744 hour(s)).      Again, thank you for allowing me to participate in the care of your patient.         Sincerely,        Melissa Peguero MD

## 2024-07-16 NOTE — PROGRESS NOTES
Medical Assistant Note:  Onur Barr presents today for blood draw.    Patient seen by provider today: Yes: Dr Pillai.   present during visit today: Not Applicable.    Concerns: No Concerns.    Procedure:  Lab draw site: lt hand, Needle type: butterfly, Gauge: 23.    Post Assessment:  Labs drawn without difficulty: Yes.    Discharge Plan:  Departure Mode: Ambulatory.    Face to Face Time: 10.    Sarai Goldman CMA

## 2024-07-16 NOTE — PROGRESS NOTES
Assessment & Plan    Chronic  lymphocytic leukemia  Chronic back pain  Diabetes    Next provider visit in 3 months    Interval History  This is a scheduled 3 month follow up for this retired , previously seen by me for lymphocytosis and diagnosed with CLL.  Thus far, he's been asymptomatic and has not been treated.        ECOG = 0-1    Patient Active Problem List   Diagnosis    Lumbago    Morbid obesity -- BMI 42.0    Bilateral leg edema    Chronic diastolic heart failure (H)    MARLEEN (doesn't tolerate CPAP)    NSTEMI w Unstab Angina -- S/P CABG x 1 (LIMA to LAD) on 1/19/22    Paroxysmal atrial fib -- S/P Pulm Vein Ablation 1/19/22    Essential hypertension    Mixed hyperlipidemia    Gastroesophageal reflux disease without esophagitis    Chronic Back Pain (IT Pump w Morphine, Clonidine, Baclofen)    S/P CABG (coronary artery bypass graft)    Fluid overload    ICD (implantable cardioverter-defibrillator) in place    Peripheral vascular disease (H24)    Abdominal panniculus    Bariatric surgery status    Claudication of lower extremity (H24)    Insomnia    Intestinal disaccharidase deficiencies and disaccharide malabsorption    Nephrolithiasis    Osteoarthrosis    Post-laminectomy syndrome    Sleepiness    Coronary arteriosclerosis    Candidal intertrigo    CLL (chronic lymphocytic leukemia) (H)     Current Outpatient Medications   Medication Sig Dispense Refill    acetaminophen (TYLENOL) 325 MG tablet Take 2 tablets (650 mg) by mouth every 4 hours as needed for mild pain 40 tablet 0    aspirin (ASA) 81 MG chewable tablet Take 81 mg by mouth daily      DULoxetine (CYMBALTA) 30 MG capsule Take 1 capsule (30mg) every morning and 2 capsules (60mg) every evening 270 capsule 3    FERATE 240 (27 Fe) MG TABS TAKE 1 TABLET BY MOUTH EVERY DAY 90 tablet 2    furosemide (LASIX) 40 MG tablet Take 1 tablet (40 mg) by mouth 2 times daily 180 tablet 3    ketoconazole (NIZORAL) 2 % external cream APPLY TO AFFECTED AREA  TWICE A DAY 60 g 4    lisinopril (ZESTRIL) 10 MG tablet Take 1 tablet (10 mg) by mouth daily 90 tablet 3    metoprolol succinate ER (TOPROL XL) 100 MG 24 hr tablet Take 1 tablet (100 mg) by mouth 2 times daily 180 tablet 3    MORPHINE SULFATE IT pump:updated 1/12/22  Medications in Pump:   [facility] Responsible for pump medications:   Conc:  Morphine 20mg/ml(3.95 mg/day), clonidine 21.1mcg/ml (4.168 mcg/day), baclofen 42.5mcg/ml (8.395mcg/day)  Rate:   Pump Last Fill Date: 9/2021  Pump Refill Date: 2/2022      nitroGLYcerin (NITROSTAT) 0.4 MG sublingual tablet For chest pain place 1 tablet under the tongue every 5 minutes for 3 doses. If symptoms persist 5 minutes after 1st dose call 911. 30 tablet 1    nystatin (NYAMYC) 972708 UNIT/GM external powder APPLY TO AFFECTED AREA 3 TIMES A DAY **MAX 90G/67 DAYS 120 g 0    ondansetron (ZOFRAN ODT) 4 MG ODT tab Take 1 tablet (4 mg) by mouth every 8 hours as needed for nausea 100 tablet 1    rosuvastatin (CRESTOR) 40 MG tablet Take 1 tablet (40 mg) by mouth daily 90 tablet 3    semaglutide (OZEMPIC, 0.25 OR 0.5 MG/DOSE,) 2 MG/3ML pen INJECT 0.5 MG SUBCUTANEOUS EVERY 7 DAYS 3 mL 1    sotalol (BETAPACE) 80 MG tablet Take 1 tablet (80 mg) by mouth 2 times daily 180 tablet 3    traZODone (DESYREL) 100 MG tablet Take 2 tablets (200 mg) by mouth at bedtime TAKE 2 TABLETS (200MG TOTAL) BY MOUTH AT BEDTIME Strength: 100 mg 180 tablet 3     No current facility-administered medications for this visit.     Past Medical History:   Diagnosis Date    Abdominal panniculus 11/13/2012    Formatting of this note might be different from the original. S/p panniculectomy 11/13/2012    Acid reflux disease 10/31/2017    Bariatric surgery status 06/23/2008    Formatting of this note might be different from the original. Created by Conversion    Bilateral leg edema 07/13/2021    CHF (congestive heart failure) (H)     Chronic Back Pain (IT Pump w Morphine, Clonidine, Baclofen)  01/16/2022    Chronic diastolic heart failure (H) 07/26/2021    Claudication of lower extremity (H24) 11/20/2019    Coronary arteriosclerosis 05/09/2022    Formatting of this note might be different from the original. Created by Conversion  Replacement Utility updated for latest IMO load    Coronary artery disease     CABG 1-    Depressive disorder     Essential hypertension     Created by [facility] Annotation: Apr 6 2012 10:16Zack Harris: Lisinipril,  coreg  Replacement Utility updated for latest IMO load    Fluid overload 01/25/2022    Gastroesophageal reflux disease without esophagitis 09/11/2021    H/O gastric bypass 2008    History of blood transfusion 2004    ICD (implantable cardioverter-defibrillator) in place 01/25/2022    Insomnia     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    Ischemic cardiomyopathy     Lumbago     Created by [facility] Annotation: Apr 6 2012 10:20Anh Ford: Morphine pump     Mixed hyperlipidemia 06/23/2008    Morbid obesity (H) 08/01/2018    Nephrolithiasis     NSTEMI (non-ST elevated myocardial infarction) (H)     Obstructive sleep apnea     Doesn't tolerate CPAP    MARLEEN (doesn't tolerate CPAP) 07/26/2021    Osteoarthritis     Created by [facility] Annotation: Jun 26 2009  9:53Zack Harris: failed lumbar  fusion/morphine pump dr putnam  Replacement Utility updated for latest IMO load    Osteoarthrosis 05/09/2022    Formatting of this note might be different from the original. Created by [facility] Annotation: Jun 26 2009  9:53Zack Harris: failed lumbar  fusion/morphine pump dr putnam  Replacement Utility updated for latest IMO load    Paroxysmal atrial fib -- S/P Pulm Vein Ablation 1/19/22 09/11/2021    Formatting of this note might be different from the original. Created by Conversion    Paroxysmal atrial fibrillation (H)     Created by Conversion      Paroxysmal ventricular tachycardia (H)     dual chamber ICD 1/24/2022    Peripheral vascular disease (H24) 05/03/2022    Post-laminectomy syndrome 11/25/2015    S/P CABG (coronary artery bypass graft) 01/25/2022    Sleepiness 05/08/2018    Type 2 diabetes mellitus (H)     Diet controlled after Gastric Bypass in 2008     Past Surgical History:   Procedure Laterality Date    BACK SURGERY      BYPASS GASTRIC DUODENAL SWITCH      BYPASS GRAFT ARTERY CORONARY N/A 01/19/2022    Procedure: CORONARY ARTERY BYPASS GRAFT (CABG) X1; LIMA -LAD.  PULMONARY VEIN ISOLATION, AND ATRIAL APPENDAGE CLIPPING USING 45MM ACTICURE CLIP.;  Surgeon: William Dumont MD;  Location:  OR    COLONOSCOPY      COSMETIC SURGERY      pannicullectomy    CV CORONARY ANGIOGRAM N/A 09/11/2021    Procedure: Coronary Angiogram;  Surgeon: Noris Ozuna MD;  Location: [facility] LAB CV    CV HEART CATHETERIZATION WITH POSSIBLE INTERVENTION N/A 01/13/2022    Procedure: Heart Catheterization with Possible Intervention;  Surgeon: Liz Pearl MD;  Location:  HEART CARDIAC CATH LAB    CV LEFT HEART CATH N/A 09/11/2021    Procedure: Left Heart Cath;  Surgeon: Noris Ozuna MD;  Location: [facility] CATH LAB CV    CV PCI N/A 09/11/2021    Procedure: Percutaneous Coronary Intervention;  Surgeon: Noris Ozuna MD;  Location: [facility] CATH LAB CV    CV PCI N/A 10/07/2021    Procedure: Percutaneous Coronary Intervention;  Surgeon: Mckayla Dan MD;  Location: [facility] CATH LAB CV    CV PCI ASPIRATION THROMECTOMY N/A 09/11/2021    Procedure: Percutaneous Coronary Intervention Aspiration Thrombectomy;  Surgeon: Noris Ozuna MD;  Location: [facility] LAB CV    ENT SURGERY      tonsillectomy    EP ICD N/A 01/24/2022    Procedure: EP ICD;  Surgeon: Jannette Crook MD;  Location:  HEART CARDIAC CATH LAB    EXTRACORPOREAL SHOCK WAVE LITHOTRIPSY, CYSTOSCOPY, INSERT STENT URETER(S), COMBINED  08/23/2011    HC REMOVAL OF TONSILS,<12 Y/O       Description: Tonsillectomy;  Recorded: 03/23/2012;  Comments: for obstructive sleep apnea    HERNIA REPAIR      IR MISCELLANEOUS PROCEDURE  07/29/2011    IR MISCELLANEOUS PROCEDURE  08/05/2011    IR MISCELLANEOUS PROCEDURE  08/23/2011    IR NEPHROSTOMY TUBE CHANGE BILATERAL  08/05/2011    ORTHOPEDIC SURGERY      arthrodesis ant discectomy, lumbar    WA ARTHRODESIS ANT INTERBODY MIN DISCECTOMY,LUMBAR      Description: Lumbar Vertebral Fusion;  Recorded: 06/26/2009;  Comments: before 200 see [facility] consult under old records    WA EXCISE EXCESS SKIN TISSUE,ABDOMEN  11/13/2012    Description: Panniculectomy;  Proc Date: 11/13/2012;  Comments: 3.5 Lb Pannus was removed at the [facility] By Dr. Shon Green    REPLACE INTRATHECAL PAIN PUMP N/A 04/25/2017    Procedure: REPLACE INTRATHECAL PAIN PUMP;  INTRATHECAL PAIN PUMP CATHETER REPLACEMENT AND PUMP REPLACEMENT;  Surgeon: Anthony Maloney MD;  Location: [facility]    REPLACE INTRATHECAL PAIN PUMP N/A 4/20/2023    Procedure: Pump Replacement and intrathecal catheter replacement;  Surgeon: Anthony Dick MD;  Location:  OR    REVISE CATHETER INTRATHECAL N/A 04/25/2017    Procedure: REVISE CATHETER INTRATHECAL;;  Surgeon: Anthony Maloney MD;  Location:  SD    SHOULDER SURGERY      ZZC GASTRIC BYPASS,OBESE<100CM LORA-EN-Y  01/01/2008    Description: Gastric Surgery For Morbid Obesity Bypass With Lora-en-Y;  Recorded: 06/26/2009;  Comments: dr green 2008    ZZ REPAIR INCISIONAL HERNIA,REDUCIBLE  11/13/2012    Description: Incisional Hernia Repair;  Proc Date: 11/13/2012;  Comments: incisional hernia repair and abdominal panniculectomy by Dr Shon Green at the [facility].     Socioeconomic History    Marital status:      Social Determinants of Health     Social Connections: Unknown (2/27/2024)    Social Connection and Isolation Panel [NHANES]     Frequency of Social Gatherings with Friends and Family: Once a week     Review of Systems   Constitutional:  Negative.    HENT:  Negative.     Eyes: Negative.    Respiratory: Negative.     Cardiovascular: Negative.    Gastrointestinal: Negative.    Endocrine:        Diabetes   Genitourinary: Negative.     Musculoskeletal:  Positive for arthralgias, back pain and gait problem.   Skin: Negative.    Neurological:  Positive for gait problem.   Hematological: Negative.    Psychiatric/Behavioral: Negative.     All other systems reviewed and are negative.      /76   Pulse 60   Temp 97.6  F (36.4  C) (Tympanic)   Resp 18   Wt 126.1 kg (277 lb 14.4 oz)   SpO2 95%   BMI 35.68 kg/m      Physical Exam  Vitals and nursing note reviewed.   Constitutional:       Appearance: He is obese.      Comments: Casually dressed, calm   HENT:      Head: Normocephalic and atraumatic.      Mouth/Throat:      Dentition: Abnormal dentition. Dental caries present.        Comments: Cracked lesion  Eyes:      Extraocular Movements: Extraocular movements intact.      Conjunctiva/sclera: Conjunctivae normal.      Pupils: Pupils are equal, round, and reactive to light.   Cardiovascular:      Rate and Rhythm: Normal rate and regular rhythm.      Pulses: Normal pulses.      Heart sounds: Normal heart sounds.   Pulmonary:      Effort: Pulmonary effort is normal.      Breath sounds: Normal breath sounds.   Abdominal:      General: There is no distension.      Palpations: Abdomen is soft. There is no mass.      Tenderness: There is no abdominal tenderness. There is no guarding.      Comments: Unable to lie flat due to back problems   Musculoskeletal:         General: Swelling (bilateral with thin, burgundy colored skin changes) and deformity (back is bent at least 70 degrees forward) present.      Cervical back: Normal range of motion and neck supple.   Lymphadenopathy:      Cervical: No cervical adenopathy.      Upper Body:      Right upper body: No axillary or epitrochlear adenopathy.      Left upper body: No axillary or epitrochlear adenopathy.    Skin:     Findings: Lesion (solar changes on face and ears) present.   Neurological:      General: No focal deficit present.      Mental Status: He is alert and oriented to person, place, and time.      Cranial Nerves: No cranial nerve deficit.      Motor: No weakness.      Gait: Gait abnormal.      Deep Tendon Reflexes: Reflexes normal.   Psychiatric:         Mood and Affect: Mood normal.         Behavior: Behavior normal. Behavior is cooperative.         Thought Content: Thought content normal.         Judgment: Judgment normal.       Recent Results (from the past 720 hour(s))   **Basic metabolic panel FUTURE 2mo    Collection Time: 07/16/24  9:17 AM   Result Value Ref Range    Sodium 143 135 - 145 mmol/L    Potassium 4.2 3.4 - 5.3 mmol/L    Chloride 104 98 - 107 mmol/L    Carbon Dioxide (CO2) 26 22 - 29 mmol/L    Anion Gap 13 7 - 15 mmol/L    Urea Nitrogen 20.5 8.0 - 23.0 mg/dL    Creatinine 1.05 0.67 - 1.17 mg/dL    GFR Estimate 78 >60 mL/min/1.73m2    Calcium 8.8 8.8 - 10.2 mg/dL    Glucose 135 (H) 70 - 99 mg/dL   CBC with platelets    Collection Time: 07/16/24  9:17 AM   Result Value Ref Range    WBC Count 25.1 (H) 4.0 - 11.0 10e3/uL    RBC Count 4.60 4.40 - 5.90 10e6/uL    Hemoglobin 13.5 13.3 - 17.7 g/dL    Hematocrit 41.5 40.0 - 53.0 %    MCV 90 78 - 100 fL    MCH 29.3 26.5 - 33.0 pg    MCHC 32.5 31.5 - 36.5 g/dL    RDW 12.9 10.0 - 15.0 %    Platelet Count 149 (L) 150 - 450 10e3/uL     No results found for this or any previous visit (from the past 744 hour(s)).

## 2024-07-16 NOTE — NURSING NOTE
"Oncology Rooming Note    July 16, 2024 9:21 AM   Onur Barr is a 68 year old male who presents for:    Chief Complaint   Patient presents with    Oncology Clinic Visit     Initial Vitals: /76   Pulse 60   Temp 97.6  F (36.4  C) (Tympanic)   Resp 18   Wt 126.1 kg (277 lb 14.4 oz)   SpO2 95%   BMI 35.68 kg/m   Estimated body mass index is 35.68 kg/m  as calculated from the following:    Height as of 7/1/24: 1.88 m (6' 2\").    Weight as of this encounter: 126.1 kg (277 lb 14.4 oz). Body surface area is 2.57 meters squared.  Severe Pain (7) Comment: Data Unavailable   No LMP for male patient.  Allergies reviewed: Yes  Medications reviewed: Yes    Medications: Medication refills not needed today.  Pharmacy name entered into [employer]: [id number] [place], [place] - [address]    Frailty Screening:   Is the patient here for a new oncology consult visit in cancer care? 2. No      Clinical concerns: f/u       Omaira Harris CMA              "

## 2024-08-13 ENCOUNTER — MYC MEDICAL ADVICE (OUTPATIENT)
Dept: INTERNAL MEDICINE | Facility: CLINIC | Age: 69
End: 2024-08-13
Payer: COMMERCIAL

## 2024-08-14 ENCOUNTER — ANCILLARY PROCEDURE (OUTPATIENT)
Dept: GENERAL RADIOLOGY | Facility: CLINIC | Age: 69
End: 2024-08-14
Payer: COMMERCIAL

## 2024-08-14 ENCOUNTER — TELEPHONE (OUTPATIENT)
Dept: INTERNAL MEDICINE | Facility: CLINIC | Age: 69
End: 2024-08-14

## 2024-08-14 ENCOUNTER — OFFICE VISIT (OUTPATIENT)
Dept: INTERNAL MEDICINE | Facility: CLINIC | Age: 69
End: 2024-08-14
Payer: COMMERCIAL

## 2024-08-14 VITALS
HEIGHT: 73 IN | BODY MASS INDEX: 36.45 KG/M2 | DIASTOLIC BLOOD PRESSURE: 72 MMHG | WEIGHT: 275 LBS | OXYGEN SATURATION: 98 % | TEMPERATURE: 97.6 F | RESPIRATION RATE: 18 BRPM | SYSTOLIC BLOOD PRESSURE: 118 MMHG | HEART RATE: 63 BPM

## 2024-08-14 DIAGNOSIS — I73.9 PERIPHERAL VASCULAR DISEASE (H): ICD-10-CM

## 2024-08-14 DIAGNOSIS — M54.6 ACUTE RIGHT-SIDED THORACIC BACK PAIN: ICD-10-CM

## 2024-08-14 DIAGNOSIS — E11.9 TYPE 2 DIABETES MELLITUS WITHOUT COMPLICATION, WITHOUT LONG-TERM CURRENT USE OF INSULIN (H): ICD-10-CM

## 2024-08-14 DIAGNOSIS — M54.6 ACUTE RIGHT-SIDED THORACIC BACK PAIN: Primary | ICD-10-CM

## 2024-08-14 DIAGNOSIS — Z98.1 HISTORY OF SPINAL FUSION: ICD-10-CM

## 2024-08-14 LAB
ALBUMIN UR-MCNC: NEGATIVE MG/DL
ANION GAP SERPL CALCULATED.3IONS-SCNC: 11 MMOL/L (ref 7–15)
APPEARANCE UR: CLEAR
BILIRUB UR QL STRIP: NEGATIVE
BUN SERPL-MCNC: 19 MG/DL (ref 8–23)
CALCIUM SERPL-MCNC: 9.6 MG/DL (ref 8.8–10.4)
CHLORIDE SERPL-SCNC: 103 MMOL/L (ref 98–107)
COLOR UR AUTO: YELLOW
CREAT SERPL-MCNC: 1.08 MG/DL (ref 0.67–1.17)
EGFRCR SERPLBLD CKD-EPI 2021: 75 ML/MIN/1.73M2
GLUCOSE SERPL-MCNC: 92 MG/DL (ref 70–99)
GLUCOSE UR STRIP-MCNC: NEGATIVE MG/DL
HBA1C MFR BLD: 5.4 % (ref 0–5.6)
HCO3 SERPL-SCNC: 29 MMOL/L (ref 22–29)
HGB UR QL STRIP: NEGATIVE
KETONES UR STRIP-MCNC: NEGATIVE MG/DL
LEUKOCYTE ESTERASE UR QL STRIP: NEGATIVE
NITRATE UR QL: NEGATIVE
PH UR STRIP: 6.5 [PH] (ref 5–7)
POTASSIUM SERPL-SCNC: 4.4 MMOL/L (ref 3.4–5.3)
SODIUM SERPL-SCNC: 143 MMOL/L (ref 135–145)
SP GR UR STRIP: 1.02 (ref 1–1.03)
UROBILINOGEN UR STRIP-ACNC: 1 E.U./DL

## 2024-08-14 PROCEDURE — 99214 OFFICE O/P EST MOD 30 MIN: CPT

## 2024-08-14 PROCEDURE — 80048 BASIC METABOLIC PNL TOTAL CA: CPT

## 2024-08-14 PROCEDURE — 73502 X-RAY EXAM HIP UNI 2-3 VIEWS: CPT | Mod: TC | Performed by: RADIOLOGY

## 2024-08-14 PROCEDURE — 83036 HEMOGLOBIN GLYCOSYLATED A1C: CPT

## 2024-08-14 PROCEDURE — 85025 COMPLETE CBC W/AUTO DIFF WBC: CPT

## 2024-08-14 PROCEDURE — 36415 COLL VENOUS BLD VENIPUNCTURE: CPT

## 2024-08-14 PROCEDURE — 85007 BL SMEAR W/DIFF WBC COUNT: CPT

## 2024-08-14 PROCEDURE — 81003 URINALYSIS AUTO W/O SCOPE: CPT

## 2024-08-14 NOTE — TELEPHONE ENCOUNTER
DATE/TIME OF CALL RECEIVED FROM LAB:  08/14/24 at 2:28 PM   LAB TEST:  WBC  LAB VALUE:  33.5  PROVIDER NOTIFIED?: Yes  PROVIDER NAME: Brianne Chan  DATE/TIME LAB VALUE REPORTED TO PROVIDER: 1430 8/14/24  MECHANISM OF PROVIDER NOTIFICATION:  sent through [employer] also notified [place] nursing staff  PROVIDER RESPONSE:

## 2024-08-14 NOTE — TELEPHONE ENCOUNTER
Huddled with provider, recommended to call patient and recommend ER due to high WBC. This could be an indication of an infection and needs to be evaluated in ER for further workup. Can't rule out if kidney infection (pyelonephritis), indicative of possible infection due to pain on R side which cannot be ruled out without imaging. If pain gets worse needs to go to ER. As patient has historically had high WBC, it would be up to patient but Brianne does recommend ER for further workup.     Xray results pending, will follow-up once resulted.     Summer RN 2:56 PM August 14, 2024   [facility]

## 2024-08-14 NOTE — PROGRESS NOTES
"  Assessment & Plan     Acute right-sided thoracic back pain  Patient presents to the clinic with a chief complaint of acute right-sided thoracic back pain.  Patient has a history of for spinal fusions per the patient.  He states he has not seen a spine specialist since 1990.  He states today that he has had increased back pain since a fall 2 months ago.  He primarily states it is on the right side and it radiates down the right leg.  I will order a x-ray of his right hip and have him follow-up with spine versus Ortho depending on results of right hip x-ray.  Patient verbalizes understanding.  Continue to follow with pain clinic for pain management.  I will also order a urinalysis to rule out urinary tract infection versus pyelonephritis or acute kidney injury.  Labs pending.  - XR Hip Right 2-3 Views; Future  - Spine  Referral; Future    Peripheral vascular disease (H24)  Patient does have a past medical history of peripheral vascular disease.  He has scabs covering both knees which is most likely due to delayed healing from his peripheral vascular disease.  - UA Macroscopic with reflex to Microscopic and Culture - Lab Collect; Future  - CBC with platelets and differential; Future  - Basic metabolic panel  (Ca, Cl, CO2, Creat, Gluc, K, Na, BUN); Future  - UA Macroscopic with reflex to Microscopic and Culture - Lab Collect  - CBC with platelets and differential  - Basic metabolic panel  (Ca, Cl, CO2, Creat, Gluc, K, Na, BUN)    History of spinal fusion  Referral placed.  - Spine  Referral; Future    Type 2 diabetes mellitus without complication, without long-term current use of insulin (H)  Labs pending.  - Hemoglobin A1c          BMI  Estimated body mass index is 36.28 kg/m  as calculated from the following:    Height as of this encounter: 1.854 m (6' 1\").    Weight as of this encounter: 124.7 kg (275 lb).   Weight management plan: Patient was referred to their PCP to discuss a diet and exercise " "plan.          Subjective   Baudilio is a 68 year old, presenting for the following health issues:  2 months ago he fell. Ever since then his right side and right leg has been really hurting. He has had 4 back surgeries. He goes to pain clinic for his back pain. It hasn't been helping too much.   The last time he saw a back specialist was in 1990.       His knees have had scabs on them for 3-4 years.     Musculoskeletal Problem (Pain starting in mid right back that radiates down the right leg) and GI Problem      8/14/2024     1:39 PM   Additional Questions   Roomed by Evelyn PAGE CMA     Musculoskeletal Problem    GI Problem    History of Present Illness       Reason for visit:  Knees, stomach pain    He eats 0-1 servings of fruits and vegetables daily.He consumes 2 sweetened beverage(s) daily.He exercises with enough effort to increase his heart rate 10 to 19 minutes per day.  He exercises with enough effort to increase his heart rate 3 or less days per week.   He is taking medications regularly.                 Review of Systems  Constitutional, HEENT, cardiovascular, pulmonary, gi and gu systems are negative, except as otherwise noted.      Objective    /72 (BP Location: Left arm, Patient Position: Sitting, Cuff Size: Adult Large)   Pulse 63   Temp 97.6  F (36.4  C) (Tympanic)   Resp 18   Ht 1.854 m (6' 1\")   Wt 124.7 kg (275 lb)   SpO2 98%   BMI 36.28 kg/m    Body mass index is 36.28 kg/m .  Physical Exam   GENERAL: alert and no distress, Unable to stand straight up  NECK: no adenopathy, no asymmetry, masses, or scars  RESP: lungs clear to auscultation - no rales, rhonchi or wheezes  CV: regular rate and rhythm, normal S1 S2, no S3 or S4, no murmur, click or rub, no peripheral edema  ABDOMEN: soft, nontender, no hepatosplenomegaly, no masses and bowel sounds normal  MS: no gross musculoskeletal defects noted, no edema, hunch back   BACK: Pain pump located on left posterior hip  SKIN: Scabs over both knees. "             Signed Electronically by: YAMILETH Mcleod CNP

## 2024-08-14 NOTE — TELEPHONE ENCOUNTER
Placed encounter on provider desk and messaged.     Summer RN 2:48 PM August 14, 2024   [facility]

## 2024-08-14 NOTE — TELEPHONE ENCOUNTER
Addressed in results notes.     Summer RN 4:28 PM August 14, 2024   [facility]

## 2024-08-14 NOTE — TELEPHONE ENCOUNTER
Patient's wife calling back.  Advised of provider's message below.    Reports patient has CLL and wbc has been high in the past.    Patient cannot sit in the ER for hours and cannot lay flat for imaging due to chronic back pain.  He does not want to go to the ER.  Wife is asking if provider can just order lab work to r/o infection.    (This RN explained to her that the ER providers can fully assess patient in the ED and order the appropriate labs and imaging if needed.  And get those results back a lot faster than the clinic can.)    Please advise, thanks.

## 2024-08-15 ENCOUNTER — TRANSFERRED RECORDS (OUTPATIENT)
Dept: HEALTH INFORMATION MANAGEMENT | Facility: CLINIC | Age: 69
End: 2024-08-15

## 2024-08-15 ENCOUNTER — ANCILLARY PROCEDURE (OUTPATIENT)
Dept: CARDIOLOGY | Facility: CLINIC | Age: 69
End: 2024-08-15
Attending: INTERNAL MEDICINE
Payer: COMMERCIAL

## 2024-08-15 DIAGNOSIS — Z95.810 ICD (IMPLANTABLE CARDIOVERTER-DEFIBRILLATOR) IN PLACE: Primary | ICD-10-CM

## 2024-08-15 DIAGNOSIS — I25.5 ISCHEMIC CARDIOMYOPATHY: ICD-10-CM

## 2024-08-15 DIAGNOSIS — I47.29 PAROXYSMAL VENTRICULAR TACHYCARDIA (H): ICD-10-CM

## 2024-08-15 LAB
BASOPHILS # BLD AUTO: 0.1 10E3/UL (ref 0–0.2)
BASOPHILS # BLD MANUAL: 0 10E3/UL (ref 0–0.2)
BASOPHILS NFR BLD AUTO: 0 %
BASOPHILS NFR BLD MANUAL: 0 %
EOSINOPHIL # BLD AUTO: 0.4 10E3/UL (ref 0–0.7)
EOSINOPHIL # BLD MANUAL: 1 10E3/UL (ref 0–0.7)
EOSINOPHIL NFR BLD AUTO: 1 %
EOSINOPHIL NFR BLD MANUAL: 3 %
ERYTHROCYTE [DISTWIDTH] IN BLOOD BY AUTOMATED COUNT: 12.9 % (ref 10–15)
HCT VFR BLD AUTO: 43.9 % (ref 40–53)
HGB BLD-MCNC: 14.3 G/DL (ref 13.3–17.7)
IMM GRANULOCYTES # BLD: 0.1 10E3/UL
IMM GRANULOCYTES NFR BLD: 0 %
LYMPHOCYTES # BLD AUTO: 26.5 10E3/UL (ref 0.8–5.3)
LYMPHOCYTES # BLD MANUAL: 26.1 10E3/UL (ref 0.8–5.3)
LYMPHOCYTES NFR BLD AUTO: 76 %
LYMPHOCYTES NFR BLD MANUAL: 75 %
MCH RBC QN AUTO: 28.8 PG (ref 26.5–33)
MCHC RBC AUTO-ENTMCNC: 32.6 G/DL (ref 31.5–36.5)
MCV RBC AUTO: 89 FL (ref 78–100)
MONOCYTES # BLD AUTO: 2.6 10E3/UL (ref 0–1.3)
MONOCYTES # BLD MANUAL: 2.1 10E3/UL (ref 0–1.3)
MONOCYTES NFR BLD AUTO: 8 %
MONOCYTES NFR BLD MANUAL: 6 %
NEUTROPHILS # BLD AUTO: 5.1 10E3/UL (ref 1.6–8.3)
NEUTROPHILS # BLD MANUAL: 5.6 10E3/UL (ref 1.6–8.3)
NEUTROPHILS NFR BLD AUTO: 15 %
NEUTROPHILS NFR BLD MANUAL: 16 %
NRBC # BLD AUTO: 0 10E3/UL
NRBC BLD AUTO-RTO: 0 /100
PATH REV: ABNORMAL
PLAT MORPH BLD: ABNORMAL
PLATELET # BLD AUTO: 180 10E3/UL (ref 150–450)
RBC # BLD AUTO: 4.96 10E6/UL (ref 4.4–5.9)
RBC MORPH BLD: ABNORMAL
WBC # BLD AUTO: 34.8 10E3/UL (ref 4–11)

## 2024-08-15 PROCEDURE — 93283 PRGRMG EVAL IMPLANTABLE DFB: CPT | Performed by: INTERNAL MEDICINE

## 2024-08-26 DIAGNOSIS — I25.119 CORONARY ARTERY DISEASE INVOLVING NATIVE CORONARY ARTERY OF NATIVE HEART WITH ANGINA PECTORIS (H): ICD-10-CM

## 2024-08-26 RX ORDER — ROSUVASTATIN CALCIUM 40 MG/1
40 TABLET, COATED ORAL DAILY
Qty: 90 TABLET | Refills: 0 | Status: SHIPPED | OUTPATIENT
Start: 2024-08-26

## 2024-08-26 NOTE — TELEPHONE ENCOUNTER
Medication Question or Refill    Contacts       Contact Date/Time Type Contact Phone/Fax    08/26/2024 11:50 AM CDT Phone (Incoming) Cortney Baudilio WEBER (Self) [phone] (M)            What medication are you calling about (include dose and sig)?: Rosuvastatin 40mg    Preferred Pharmacy:   [id number] [address]  [address] [place] [address]  Phone: [phone] Fax: [phone]      Controlled Substance Agreement on file:   CSA -- Patient Level:    CSA: None found at the patient level.       Who prescribed the medication?: Dr. Feliciano    Do you need a refill? Yes    When did you use the medication last? Today, has 7 tabs left    Patient offered an appointment? No-has appointment scheduled 9/12/24    Do you have any questions or concerns?  No      Could we send this information to you in [facility] or would you prefer to receive a phone call?:  will wait to hear from pharmacy

## 2024-08-30 ENCOUNTER — TRANSFERRED RECORDS (OUTPATIENT)
Dept: HEALTH INFORMATION MANAGEMENT | Facility: CLINIC | Age: 69
End: 2024-08-30
Payer: COMMERCIAL

## 2024-09-04 ENCOUNTER — MYC MEDICAL ADVICE (OUTPATIENT)
Dept: INTERNAL MEDICINE | Facility: CLINIC | Age: 69
End: 2024-09-04
Payer: COMMERCIAL

## 2024-09-05 ENCOUNTER — OFFICE VISIT (OUTPATIENT)
Dept: INTERNAL MEDICINE | Facility: CLINIC | Age: 69
End: 2024-09-05
Payer: COMMERCIAL

## 2024-09-05 VITALS
RESPIRATION RATE: 22 BRPM | DIASTOLIC BLOOD PRESSURE: 91 MMHG | HEART RATE: 97 BPM | TEMPERATURE: 97.5 F | WEIGHT: 275 LBS | HEIGHT: 73 IN | SYSTOLIC BLOOD PRESSURE: 150 MMHG | BODY MASS INDEX: 36.45 KG/M2

## 2024-09-05 DIAGNOSIS — L98.491 NONHEALING SKIN ULCER, LIMITED TO BREAKDOWN OF SKIN (H): Primary | ICD-10-CM

## 2024-09-05 PROCEDURE — 99213 OFFICE O/P EST LOW 20 MIN: CPT | Performed by: INTERNAL MEDICINE

## 2024-09-05 NOTE — PROGRESS NOTES
Assessment & Plan     Nonhealing skin ulcer, limited to breakdown of skin (H)  Patient does have multiple superficial ulcerations on both knees at this time.  Given his history of diabetes as well as report circulation issues, a wound care referral was placed for nonhealing superficial skin ulcers.  Patient has previously been dermatology, and they did only recommend topical treatment.  We will follow-up on the wound care team's recommendations once their evaluation has been completed.  - Wound Care Referral; Future      See Patient Instructions    Subjective   Baudilio is a 68 year old, presenting for the following health issues:  Derm Problem    Patient is a 68-year-old male with a Cockett past medical history who presents to the clinic with concerns about the skin on his knees.  He reports that for the past several years she has had issues with persistent, nonhealing lesions on his knees.  Patient reports that they do not bleed or drain.  They can be uncomfortable when palpated.  Patient states that he did see a dermatologist a few years ago who did only recommend an unspecified topical cream.  Patient states that he has been told that these lesions are due to his poor circulation in his lower extremities.  He also has a history of diabetes, and his last hemoglobin A1c was 5.9 in December 2023.  Patient is wondering what else can be done to help manage these spots.    History of Present Illness       Reason for visit:  Lower abdominal and back painHe consumes 0 sweetened beverage(s) daily.He exercises with enough effort to increase his heart rate 9 or less minutes per day.  He exercises with enough effort to increase his heart rate 3 or less days per week.   He is taking medications regularly.         Review of Systems  CONSTITUTIONAL: NEGATIVE for fever, chills, change in weight  INTEGUMENTARY/SKIN: Positive for sores on knees as noted in HPI.  ENT/MOUTH: NEGATIVE for ear, mouth and throat problems  RESP: NEGATIVE  "for significant cough or SOB  CV: NEGATIVE for chest pain, palpitations or peripheral edema  GI: NEGATIVE for nausea, abdominal pain, heartburn, or change in bowel habits  MUSCULOSKELETAL: Positive for back pain.  NEURO: NEGATIVE for weakness, dizziness or paresthesias      Objective    BP (!) 150/91   Pulse 97   Temp 97.5  F (36.4  C)   Resp 22   Ht 1.854 m (6' 1\")   Wt 124.7 kg (275 lb)   BMI 36.28 kg/m    Body mass index is 36.28 kg/m .  Physical Exam  Vitals reviewed.   HENT:      Head: Normocephalic and atraumatic.      Mouth/Throat:      Mouth: Mucous membranes are moist.      Pharynx: Oropharynx is clear.   Eyes:      Conjunctiva/sclera: Conjunctivae normal.      Pupils: Pupils are equal, round, and reactive to light.   Cardiovascular:      Rate and Rhythm: Normal rate and regular rhythm.      Pulses: Normal pulses.      Heart sounds: Normal heart sounds.   Pulmonary:      Effort: Pulmonary effort is normal.      Breath sounds: Normal breath sounds.   Skin:     General: Skin is warm and dry.      Comments: Patient was noted to have multiple, superficial lesions of varying size on his knees bilaterally.  There was no bleeding or drainage noted at this time.  The area was not warm to the touch.   Neurological:      Mental Status: He is alert.             Signed Electronically by: Roge Feliciano MD    "

## 2024-09-06 ENCOUNTER — TELEPHONE (OUTPATIENT)
Dept: WOUND CARE | Facility: CLINIC | Age: 69
End: 2024-09-06
Payer: COMMERCIAL

## 2024-09-06 NOTE — TELEPHONE ENCOUNTER
Left voicemail for patient to call clinic back when able to verify if the areas on his knees are draining or if it would be more appropriate to see dermatology for another opinion.       Consult received via Workqueue from Roge Feliciano MD in [facility]  for wound of the Bilateral knees

## 2024-09-09 NOTE — TELEPHONE ENCOUNTER
Consult received via Workqueue from Roge Feliciano MD in [facility]  for wound of the bilateral knees    Does the patient have an open and draining wound?  Open but not draining    Please schedule with Kelly Neal PA-C, Lissy Martinez NP, or Arturo Russo M.D. at [facility] for next available appointment.    **For all providers, please schedule a follow up 4 weeks after initial appointment. (2-3 weeks for Dr. Ivory and Dr. Reese)**  --If unable to schedule within 2-3 weeks then please place on cancellation list--    Is the patient able to make their own medical decisions? Yes    Can the patient be scheduled on a Thursday (Juan)? Yes    Is patient a ADALID lift? PLEASE INQUIRE WHEN MAKING THE APPOINTMENT AND PUT IN APPOINTMENT NOTES    Routing to  Wound Healing Scheduling.

## 2024-09-23 ENCOUNTER — TELEPHONE (OUTPATIENT)
Dept: INTERNAL MEDICINE | Facility: CLINIC | Age: 69
End: 2024-09-23
Payer: COMMERCIAL

## 2024-09-23 NOTE — TELEPHONE ENCOUNTER
"Patient dropped off WC form to be completed.     Patient was called to make an appointment for this form to be addressed and completed.     Patient also asked  \"How did his injury occur\" question on the form.       Form at [facility] in green folder   "

## 2024-09-23 NOTE — TELEPHONE ENCOUNTER
Previous form completed 9-2022 by Dr Feliciano.     Forms in Dr. mail box for review and signature.

## 2024-09-23 NOTE — TELEPHONE ENCOUNTER
"Pt calling back -     Rn advised on the information below that he needs to be seen for this     He said he does not - he said he does not need to be seen as you \"just need to copy the info from the last time this was done\"     Routing back to team for review and to reach out to pt if needed     Thank you     Coral Lock RN, BSN  [facility]    "

## 2024-09-24 ENCOUNTER — MYC REFILL (OUTPATIENT)
Dept: INTERNAL MEDICINE | Facility: CLINIC | Age: 69
End: 2024-09-24
Payer: COMMERCIAL

## 2024-09-24 DIAGNOSIS — B37.2 CANDIDAL INTERTRIGO: ICD-10-CM

## 2024-09-24 NOTE — TELEPHONE ENCOUNTER
Pt calling asking about this form and would like a call when completed     RN advised that the form looks to be with the provider     Patient stated an understanding and agreed with plan.    Coral Lock RN, BSN  [facility]

## 2024-09-25 RX ORDER — KETOCONAZOLE 20 MG/G
CREAM TOPICAL 2 TIMES DAILY
Qty: 60 G | Refills: 4 | Status: SHIPPED | OUTPATIENT
Start: 2024-09-25

## 2024-09-25 NOTE — TELEPHONE ENCOUNTER
This form is different from his previous disability forms.  He does agree that he will have to have at least a virtual visit scheduled so I can get further information from him so that the form can be completed accurately.

## 2024-09-25 NOTE — TELEPHONE ENCOUNTER
Appointments in Next Year      Sep 26, 2024 1:00 PM  (Arrive by 12:55 PM)  Provider Visit with Roge Feliciano MD  [facility] ([facility] ) [phone]     Summer RN 3:54 PM September 25, 2024   [facility]

## 2024-09-26 ENCOUNTER — VIRTUAL VISIT (OUTPATIENT)
Dept: INTERNAL MEDICINE | Facility: CLINIC | Age: 69
End: 2024-09-26
Payer: COMMERCIAL

## 2024-09-26 DIAGNOSIS — G89.4 CHRONIC PAIN SYNDROME: Primary | ICD-10-CM

## 2024-09-26 PROCEDURE — 99443 PR PHYSICIAN TELEPHONE EVALUATION 21-30 MIN: CPT | Mod: 95 | Performed by: INTERNAL MEDICINE

## 2024-09-26 NOTE — PROGRESS NOTES
Baudilio is a 68 year old who is being evaluated via a billable video visit.    How would you like to obtain your AVS? MyChart  If the video visit is dropped, the invitation should be resent by: Text to cell phone: [phone]  Will anyone else be joining your video visit? No      Assessment & Plan     Chronic pain syndrome  At this time, I will complete his disability paperwork related to his history of postlaminectomy syndrome as well as chronic pain from his right shoulder.  Patient does have limitation in range of motion involving his shoulder, and he is unable to stand upright and ambulate without difficulty due to his back issues.  His paperwork will be completed, and submitted in a timely manner.      See Patient Instructions    Subjective   Baudilio is a 68 year old, presenting for the following health issues:  Forms      Video Start Time:  11:30 AM    Patient is a 68-year-old  male who participates in a virtual visit to discuss disability paperwork.  Patient has had longstanding issues with disability due to chronic back and shoulder issues.  He did suffer an injury to his back while working at the [employer] in 1995.  Patient reports that his job did involve lots of repetitive lifting.  He states that he did feel a popping sensation in his back went out.  Patient has been followed by [facility] orthopedics, and he did undergo a laminectomy as part of his treatment process.  Patient has had issues with persistent pain in his lower back.  He does have difficulty with standing fully erect, and ambulation can be difficult.  Patient has been totally disabled since 2020.  He also has issues with chronic right shoulder pain due to an injury that he suffered in 1985.  Patient does report significant limitations in range of motion with his right shoulder as he is unable to lift his right extremity greater than shoulder level.  Patient does have to have his disability paperwork renewed each year.  He has no  other acute concerns or complaints.    History of Present Illness       Reason for visit:  Virtual with  to fill out medical forms for WC   He is taking medications regularly.           Review of Systems  CONSTITUTIONAL: NEGATIVE for fever, chills, change in weight  INTEGUMENTARY/SKIN: NEGATIVE for worrisome rashes, moles or lesions  ENT/MOUTH: NEGATIVE for ear, mouth and throat problems  RESP: NEGATIVE for significant cough or SOB  CV: NEGATIVE for chest pain, palpitations or peripheral edema  GI: NEGATIVE for nausea, abdominal pain, heartburn, or change in bowel habits  MUSCULOSKELETAL: NEGATIVE for significant arthralgias or myalgia      Objective       Vitals:  No vitals were obtained today due to virtual visit.    Physical Exam   GENERAL: alert and no distress  EYES: Eyes grossly normal to inspection.  No discharge or erythema, or obvious scleral/conjunctival abnormalities.  RESP: No audible wheeze, cough, or visible cyanosis.    SKIN: Visible skin clear. No significant rash, abnormal pigmentation or lesions.        Video-Visit Details    Type of service:  Video Visit   Video End Time: 11:53 AM  Originating Location (pt. Location): Home  Distant Location (provider location):  On-site  Platform used for Video Visit: Yousuf  Signed Electronically by: Roge Feliciano MD

## 2024-09-27 ENCOUNTER — HOSPITAL ENCOUNTER (OUTPATIENT)
Dept: WOUND CARE | Facility: CLINIC | Age: 69
Discharge: HOME | End: 2024-09-27
Payer: COMMERCIAL

## 2024-09-27 VITALS
HEART RATE: 59 BPM | TEMPERATURE: 96.4 F | WEIGHT: 275 LBS | SYSTOLIC BLOOD PRESSURE: 121 MMHG | BODY MASS INDEX: 38.5 KG/M2 | DIASTOLIC BLOOD PRESSURE: 55 MMHG | HEIGHT: 71 IN

## 2024-09-27 DIAGNOSIS — L98.491 NONHEALING SKIN ULCER, LIMITED TO BREAKDOWN OF SKIN (H): ICD-10-CM

## 2024-09-27 PROCEDURE — G0463 HOSPITAL OUTPT CLINIC VISIT: HCPCS

## 2024-09-27 NOTE — DISCHARGE INSTRUCTIONS
09/27/2024   Baudilio Barr   1955    A DME order was not completed because supplies were not needed      Plan 09/27/2024   Wear your compression stockings as much as possible    Compression:   Your compression is personal compression stockings and can be removed at night and put back on first thing in the morning.   Please remove compression dressing if toes turn blue and/or tingle and can not be relieved by raising the leg for one hour. If unable to reapply in the morning, keep compression on until next dressing change.    Elevation:    It is recommended that you elevate your legs above the level of your heart for 30 minutes: approximately 2-3 times each day     Ways to do this:   - Lay on the couch or your bed and prop your legs up on pillows   - Recline back as far as you can go in your recliner and prop your legs on pillows.     Doing these things will help reduce the edema in your legs.      Walk as much as you can, as you are able. Whenever you sit raise your ankle above your hips to promote wound healing.        Main Provider: Lissy Martinez NP September 27, 2024    Call us at [phone] if you have any questions about your wounds, if you have redness or swelling around your wound, have a fever of 101 degrees Fahrenheit or greater or if you have any other problems or concerns. We answer the phone Monday through Friday 8 am to 4 pm, please leave a message as we check the voicemail frequently throughout the day. If you have a concern over the weekend, please leave a message and we will return your call Monday. If the need is urgent, go to the ER or urgent care.    If you had a positive experience please indicate that on your patient satisfaction survey form that [facility] will be sending you.    It was a pleasure meeting with you today.  Thank you for allowing me and my team the privilege of caring for you today.  YOU are the reason we are here, and I truly hope we provided you with the excellent  service you deserve.  Please let us know if there is anything else we can do for you so that we can be sure you are leaving completely satisfied with your care experience.      If you have any billing related questions please call the [facility] Business office at [phone]. The clinic staff does not handle billing related matters.    If you are scheduled to have a follow up appointment, you will receive a reminder call the day before your visit. On the appointment day please arrive 15 minutes prior to your appointment time. If you are unable to keep that appointment, please call the clinic to cancel or reschedule. If you are more than 10 minutes late or greater for your scheduled appointment time, the clinic policy is that you may be asked to reschedule.

## 2024-09-27 NOTE — PROGRESS NOTES
Patient arrived for wound care visit. Certified Wound Care Nurse time spent evaluating patient record, completed a full evaluation and documented wound(s) & jessica-wound skin; provided recommendation based on treatment plan.Reviewed discharge instructions, patient education, and discussed plan of care with appropriate medical team staff members and patient and/or family members.

## 2024-09-27 NOTE — PROGRESS NOTES
[facility]    ASSESSMENT:   (L98.778) Nonhealing skin ulcer, limited to breakdown of skin (H)-healed    PLAN/DISCUSSION:   Discussed compression therapy and elevation.   Dietary recommendations discussed, see AVS       HISTORY OF PRESENT ILLNESS:   Onur Barr is a 68 year old male with a history of heart failure, lymphedema who has not been wearing compression who comes in due to ulcerations of bilateral knees, Currently healed.    Patient Active Problem List   Diagnosis    Lumbago    Morbid obesity -- BMI 42.0    Bilateral leg edema    Chronic diastolic heart failure (H)    MARLEEN (doesn't tolerate CPAP)    NSTEMI w Unstab Angina -- S/P CABG x 1 (LIMA to LAD) on 1/19/22    Paroxysmal atrial fib -- S/P Pulm Vein Ablation 1/19/22    Essential hypertension    Mixed hyperlipidemia    Gastroesophageal reflux disease without esophagitis    Chronic Back Pain (IT Pump w Morphine, Clonidine, Baclofen)    S/P CABG (coronary artery bypass graft)    Fluid overload    ICD (implantable cardioverter-defibrillator) in place    Peripheral vascular disease (H)    Abdominal panniculus    Bariatric surgery status    Claudication of lower extremity (H)    Insomnia    Intestinal disaccharidase deficiencies and disaccharide malabsorption    Nephrolithiasis    Osteoarthrosis    Post-laminectomy syndrome    Sleepiness    Coronary arteriosclerosis    Candidal intertrigo    CLL (chronic lymphocytic leukemia) (H)       Current Outpatient Medications   Medication Sig Dispense Refill    acetaminophen (TYLENOL) 325 MG tablet Take 2 tablets (650 mg) by mouth every 4 hours as needed for mild pain 40 tablet 0    aspirin (ASA) 81 MG chewable tablet Take 81 mg by mouth daily      DULoxetine (CYMBALTA) 30 MG capsule Take 1 capsule (30mg) every morning and 2 capsules (60mg) every evening 270 capsule 3    FERATE 240 (27 Fe) MG TABS TAKE 1 TABLET BY MOUTH EVERY DAY 90 tablet 2    furosemide (LASIX) 40 MG tablet Take 1 tablet (40 mg)  "by mouth 2 times daily 180 tablet 3    ketoconazole (NIZORAL) 2 % external cream Apply topically 2 times daily. to affected area 60 g 4    lisinopril (ZESTRIL) 10 MG tablet Take 1 tablet (10 mg) by mouth daily 90 tablet 3    metoprolol succinate ER (TOPROL XL) 100 MG 24 hr tablet Take 1 tablet (100 mg) by mouth 2 times daily 180 tablet 3    MORPHINE SULFATE IT pump:updated 1/12/22  Medications in Pump:   [facility] Responsible for pump medications:   Conc:  Morphine 20mg/ml(3.95 mg/day), clonidine 21.1mcg/ml (4.168 mcg/day), baclofen 42.5mcg/ml (8.395mcg/day)  Rate:   Pump Last Fill Date: 9/2021  Pump Refill Date: 2/2022      nitroGLYcerin (NITROSTAT) 0.4 MG sublingual tablet For chest pain place 1 tablet under the tongue every 5 minutes for 3 doses. If symptoms persist 5 minutes after 1st dose call 911. 30 tablet 1    nystatin (NYAMYC) 356691 UNIT/GM external powder APPLY TO AFFECTED AREA 3 TIMES A DAY **MAX 90G/67 DAYS 120 g 0    ondansetron (ZOFRAN ODT) 4 MG ODT tab Take 1 tablet (4 mg) by mouth every 8 hours as needed for nausea 100 tablet 1    rosuvastatin (CRESTOR) 40 MG tablet Take 1 tablet (40 mg) by mouth daily. 90 tablet 0    semaglutide (OZEMPIC, 0.25 OR 0.5 MG/DOSE,) 2 MG/3ML pen INJECT 0.5 MG SUBCUTANEOUS EVERY 7 DAYS 3 mL 1    sotalol (BETAPACE) 80 MG tablet Take 1 tablet (80 mg) by mouth 2 times daily 180 tablet 3    traZODone (DESYREL) 100 MG tablet Take 2 tablets (200 mg) by mouth at bedtime TAKE 2 TABLETS (200MG TOTAL) BY MOUTH AT BEDTIME Strength: 100 mg 180 tablet 3     No current facility-administered medications for this encounter.       VITALS: /55 (BP Location: Left arm, Patient Position: Sitting, Cuff Size: Adult Regular)   Pulse 59   Temp (!) 96.4  F (35.8  C) (Temporal)   Ht 1.803 m (5' 11\")   Wt 124.7 kg (275 lb)   BMI 38.35 kg/m       PHYSICAL EXAM:  GENERAL: Patient is alert and oriented and in no acute distress  INTEGUMENTARY:              MDM: 30 minutes were " spent on the date of the visit reviewing previous chart notes, evaluating patient and developing the treatment plan, this excludes any time spent on procedures    PATIENT INSTRUCTIONS      Further instructions from your care team         09/27/2024   Baudilio Barr   1955    A DME order was not completed because supplies were not needed      Plan 09/27/2024   Wear your compression stockings as much as possible    Compression:   Your compression is personal compression stockings and can be removed at night and put back on first thing in the morning.   Please remove compression dressing if toes turn blue and/or tingle and can not be relieved by raising the leg for one hour. If unable to reapply in the morning, keep compression on until next dressing change.    Elevation:    It is recommended that you elevate your legs above the level of your heart for 30 minutes: approximately 2-3 times each day     Ways to do this:   - Lay on the couch or your bed and prop your legs up on pillows   - Recline back as far as you can go in your recliner and prop your legs on pillows.     Doing these things will help reduce the edema in your legs.      Walk as much as you can, as you are able. Whenever you sit raise your ankle above your hips to promote wound healing.        Main Provider: Lissy Martinez NP September 27, 2024    Call us at [phone] if you have any questions about your wounds, if you have redness or swelling around your wound, have a fever of 101 degrees Fahrenheit or greater or if you have any other problems or concerns. We answer the phone Monday through Friday 8 am to 4 pm, please leave a message as we check the voicemail frequently throughout the day. If you have a concern over the weekend, please leave a message and we will return your call Monday. If the need is urgent, go to the ER or urgent care.    If you had a positive experience please indicate that on your patient satisfaction survey form that  [facility] will be sending you.    It was a pleasure meeting with you today.  Thank you for allowing me and my team the privilege of caring for you today.  YOU are the reason we are here, and I truly hope we provided you with the excellent service you deserve.  Please let us know if there is anything else we can do for you so that we can be sure you are leaving completely satisfied with your care experience.      If you have any billing related questions please call the [facility] Business office at [phone]. The clinic staff does not handle billing related matters.    If you are scheduled to have a follow up appointment, you will receive a reminder call the day before your visit. On the appointment day please arrive 15 minutes prior to your appointment time. If you are unable to keep that appointment, please call the clinic to cancel or reschedule. If you are more than 10 minutes late or greater for your scheduled appointment time, the clinic policy is that you may be asked to reschedule.          Electronically signed by Lissy Martinez CNP on September 27, 2024

## 2024-10-11 NOTE — PROGRESS NOTES
Assessment & Plan  Chronic  lymphocytic leukemia, untreated to date  Chronic back pain  Diabetes     Next provider visit with CBC in 3 months     Interval History  This is a scheduled 3 month follow up for this retired , previously seen by me for lymphocytosis and diagnosed with CLL.  Thus far, he's been asymptomatic and has not been treated.    ECOG = 0    ONCOLOGIC HISTORY  2023 April 17 - New onset lymphocytosis  May 15 Flow  A. Peripheral Blood:  -CD5-positive kappa-monotypic B cells (47%)  -No aberrant immunophenotype on T cells    Patient Active Problem List   Diagnosis    Lumbago    Morbid obesity -- BMI 42.0    Bilateral leg edema    Chronic diastolic heart failure (H)    MARLEEN (doesn't tolerate CPAP)    NSTEMI w Unstab Angina -- S/P CABG x 1 (LIMA to LAD) on 1/19/22    Paroxysmal atrial fib -- S/P Pulm Vein Ablation 1/19/22    Essential hypertension    Mixed hyperlipidemia    Gastroesophageal reflux disease without esophagitis    Chronic Back Pain (IT Pump w Morphine, Clonidine, Baclofen)    S/P CABG (coronary artery bypass graft)    Fluid overload    ICD (implantable cardioverter-defibrillator) in place    Peripheral vascular disease (H)    Abdominal panniculus    Bariatric surgery status    Claudication of lower extremity (H)    Insomnia    Intestinal disaccharidase deficiencies and disaccharide malabsorption    Nephrolithiasis    Osteoarthrosis    Post-laminectomy syndrome    Sleepiness    Coronary arteriosclerosis    Candidal intertrigo    CLL (chronic lymphocytic leukemia) (H)     Current Outpatient Medications   Medication Sig Dispense Refill    acetaminophen (TYLENOL) 325 MG tablet Take 2 tablets (650 mg) by mouth every 4 hours as needed for mild pain 40 tablet 0    aspirin (ASA) 81 MG chewable tablet Take 81 mg by mouth daily      DULoxetine (CYMBALTA) 30 MG capsule Take 1 capsule (30mg) every morning and 2 capsules (60mg) every evening 270 capsule 3    FERATE 240 (27 Fe) MG TABS TAKE 1  TABLET BY MOUTH EVERY DAY 90 tablet 2    furosemide (LASIX) 40 MG tablet Take 1 tablet (40 mg) by mouth 2 times daily 180 tablet 3    ketoconazole (NIZORAL) 2 % external cream Apply topically 2 times daily. to affected area 60 g 4    lisinopril (ZESTRIL) 10 MG tablet Take 1 tablet (10 mg) by mouth daily 90 tablet 3    metoprolol succinate ER (TOPROL XL) 100 MG 24 hr tablet Take 1 tablet (100 mg) by mouth 2 times daily 180 tablet 3    MORPHINE SULFATE IT pump:updated 1/12/22  Medications in Pump:   [facility] Responsible for pump medications:   Conc:  Morphine 20mg/ml(3.95 mg/day), clonidine 21.1mcg/ml (4.168 mcg/day), baclofen 42.5mcg/ml (8.395mcg/day)  Rate:   Pump Last Fill Date: 9/2021  Pump Refill Date: 2/2022      nitroGLYcerin (NITROSTAT) 0.4 MG sublingual tablet For chest pain place 1 tablet under the tongue every 5 minutes for 3 doses. If symptoms persist 5 minutes after 1st dose call 911. 30 tablet 1    nystatin (NYAMYC) 023882 UNIT/GM external powder APPLY TO AFFECTED AREA 3 TIMES A DAY **MAX 90G/67 DAYS 120 g 0    ondansetron (ZOFRAN ODT) 4 MG ODT tab Take 1 tablet (4 mg) by mouth every 8 hours as needed for nausea 100 tablet 1    rosuvastatin (CRESTOR) 40 MG tablet Take 1 tablet (40 mg) by mouth daily. 90 tablet 0    semaglutide (OZEMPIC, 0.25 OR 0.5 MG/DOSE,) 2 MG/3ML pen INJECT 0.5 MG SUBCUTANEOUS EVERY 7 DAYS 3 mL 1    sotalol (BETAPACE) 80 MG tablet Take 1 tablet (80 mg) by mouth 2 times daily 180 tablet 3    traZODone (DESYREL) 100 MG tablet Take 2 tablets (200 mg) by mouth at bedtime TAKE 2 TABLETS (200MG TOTAL) BY MOUTH AT BEDTIME Strength: 100 mg 180 tablet 3     No current facility-administered medications for this visit.     Past Medical History:   Diagnosis Date    Abdominal panniculus 11/13/2012    Formatting of this note might be different from the original. S/p panniculectomy 11/13/2012    Acid reflux disease 10/31/2017    Bariatric surgery status 06/23/2008    Formatting of  this note might be different from the original. Created by Conversion    Bilateral leg edema 07/13/2021    CHF (congestive heart failure) (H)     Chronic Back Pain (IT Pump w Morphine, Clonidine, Baclofen) 01/16/2022    Chronic diastolic heart failure (H) 07/26/2021    Claudication of lower extremity (H) 11/20/2019    Coronary arteriosclerosis 05/09/2022    Formatting of this note might be different from the original. Created by Conversion  Replacement Utility updated for latest IMO load    Coronary artery disease     CABG 1-    Depressive disorder     Essential hypertension     Created by [facility] Annotation: Apr 6 2012 10:16Zack Harris: Lisinipril,  coreg  Replacement Utility updated for latest IMO load    Fluid overload 01/25/2022    Gastroesophageal reflux disease without esophagitis 09/11/2021    H/O gastric bypass 2008    History of blood transfusion 2004    ICD (implantable cardioverter-defibrillator) in place 01/25/2022    Insomnia     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    Ischemic cardiomyopathy     Lumbago     Created by [facility] Annotation: Apr 6 2012 10:20Anh Ford: Morphine pump     Mixed hyperlipidemia 06/23/2008    Morbid obesity (H) 08/01/2018    Nephrolithiasis     NSTEMI (non-ST elevated myocardial infarction) (H)     Obstructive sleep apnea     Doesn't tolerate CPAP    MARLEEN (doesn't tolerate CPAP) 07/26/2021    Osteoarthritis     Created by [facility] Annotation: Jun 26 2009  9:53Zack Harris: failed lumbar  fusion/morphine pump dr putnam  Replacement Utility updated for latest IMO load    Osteoarthrosis 05/09/2022    Formatting of this note might be different from the original. Created by [facility] Annotation: Jun 26 2009  9:53Zack Harris: failed lumbar  fusion/morphine pump dr putnam  Replacement Utility updated for latest IMO load    Paroxysmal atrial fib  -- S/P Pulm Vein Ablation 1/19/22 09/11/2021    Formatting of this note might be different from the original. Created by Conversion    Paroxysmal atrial fibrillation (H)     Created by Conversion     Paroxysmal ventricular tachycardia (H)     dual chamber ICD 1/24/2022    Peripheral vascular disease (H) 05/03/2022    Post-laminectomy syndrome 11/25/2015    S/P CABG (coronary artery bypass graft) 01/25/2022    Sleepiness 05/08/2018    Type 2 diabetes mellitus (H)     Diet controlled after Gastric Bypass in 2008     Past Surgical History:   Procedure Laterality Date    BACK SURGERY      BYPASS GASTRIC DUODENAL SWITCH      BYPASS GRAFT ARTERY CORONARY N/A 01/19/2022    Procedure: CORONARY ARTERY BYPASS GRAFT (CABG) X1; LIMA -LAD.  PULMONARY VEIN ISOLATION, AND ATRIAL APPENDAGE CLIPPING USING 45MM ACTICURE CLIP.;  Surgeon: William Dumont MD;  Location:  OR    COLONOSCOPY      COSMETIC SURGERY      pannicullectomy    CV CORONARY ANGIOGRAM N/A 09/11/2021    Procedure: Coronary Angiogram;  Surgeon: Noris Ozuna MD;  Location: [facility] CV    CV HEART CATHETERIZATION WITH POSSIBLE INTERVENTION N/A 01/13/2022    Procedure: Heart Catheterization with Possible Intervention;  Surgeon: Liz Pearl MD;  Location:  HEART CARDIAC CATH LAB    CV LEFT HEART CATH N/A 09/11/2021    Procedure: Left Heart Cath;  Surgeon: Noris Ozuna MD;  Location: [facility] LAB CV    CV PCI N/A 09/11/2021    Procedure: Percutaneous Coronary Intervention;  Surgeon: Noris Ozuna MD;  Location: [facility] LAB CV    CV PCI N/A 10/07/2021    Procedure: Percutaneous Coronary Intervention;  Surgeon: Mckayla Dan MD;  Location: [facility] CV    CV PCI ASPIRATION THROMECTOMY N/A 09/11/2021    Procedure: Percutaneous Coronary Intervention Aspiration Thrombectomy;  Surgeon: Noris Ozuna MD;  Location: [facility] CV    ENT SURGERY      tonsillectomy    EP ICD N/A 01/24/2022    Procedure: EP ICD;   Surgeon: Jannette Crook MD;  Location:  HEART CARDIAC CATH LAB    EXTRACORPOREAL SHOCK WAVE LITHOTRIPSY, CYSTOSCOPY, INSERT STENT URETER(S), COMBINED  08/23/2011    HC REMOVAL OF TONSILS,<14 Y/O      Description: Tonsillectomy;  Recorded: 03/23/2012;  Comments: for obstructive sleep apnea    HERNIA REPAIR      IR MISCELLANEOUS PROCEDURE  07/29/2011    IR MISCELLANEOUS PROCEDURE  08/05/2011    IR MISCELLANEOUS PROCEDURE  08/23/2011    IR NEPHROSTOMY TUBE CHANGE BILATERAL  08/05/2011    ORTHOPEDIC SURGERY      arthrodesis ant discectomy, lumbar    DE ARTHRODESIS ANT INTERBODY MIN DISCECTOMY,LUMBAR      Description: Lumbar Vertebral Fusion;  Recorded: 06/26/2009;  Comments: before 200 see [facility] consult under old records    DE EXCISE EXCESS SKIN TISSUE,ABDOMEN  11/13/2012    Description: Panniculectomy;  Proc Date: 11/13/2012;  Comments: 3.5 Lb Pannus was removed at the [facility] By Dr. Shon Green    REPLACE INTRATHECAL PAIN PUMP N/A 04/25/2017    Procedure: REPLACE INTRATHECAL PAIN PUMP;  INTRATHECAL PAIN PUMP CATHETER REPLACEMENT AND PUMP REPLACEMENT;  Surgeon: Anthony Maloney MD;  Location: [facility]    REPLACE INTRATHECAL PAIN PUMP N/A 4/20/2023    Procedure: Pump Replacement and intrathecal catheter replacement;  Surgeon: Anthony Dick MD;  Location:  OR    REVISE CATHETER INTRATHECAL N/A 04/25/2017    Procedure: REVISE CATHETER INTRATHECAL;;  Surgeon: Anthony Maloney MD;  Location: [facility]    SHOULDER SURGERY      ZZC GASTRIC BYPASS,OBESE<100CM SUSY-EN-Y  01/01/2008    Description: Gastric Surgery For Morbid Obesity Bypass With Susy-en-Y;  Recorded: 06/26/2009;  Comments: dr green 2008    [facility] REPAIR INCISIONAL HERNIA,REDUCIBLE  11/13/2012    Description: Incisional Hernia Repair;  Proc Date: 11/13/2012;  Comments: incisional hernia repair and abdominal panniculectomy by Dr Shon Green at the [facility].     Socioeconomic History    Marital status:      Social Determinants of Health     Social  "Connections: Unknown (2/27/2024)    Social Connection and Isolation Panel [NHANES]     Frequency of Social Gatherings with Friends and Family: Once a week   Interpersonal Safety: Low Risk  (9/27/2024)    Interpersonal Safety     Do you feel physically and emotionally safe where you currently live?: Yes     Within the past 12 months, have you been hit, slapped, kicked or otherwise physically hurt by someone?: No     Within the past 12 months, have you been humiliated or emotionally abused in other ways by your partner or ex-partner?: No     Review of Systems   Constitutional: Negative.    HENT:  Negative.     Eyes: Negative.    Respiratory: Negative.     Cardiovascular: Negative.    Gastrointestinal: Negative.    Endocrine:        Diabetes well controlled since bariatric surgery   Genitourinary: Negative.     Musculoskeletal:  Positive for arthralgias (right shoulder, hip, and knee), back pain and gait problem.   Neurological:  Positive for gait problem.   Hematological: Negative.    Psychiatric/Behavioral: Negative.     All other systems reviewed and are negative.      /84   Pulse 58   Temp 97.5  F (36.4  C) (Oral)   Resp 14   Ht 1.803 m (5' 10.98\")   Wt 124.4 kg (274 lb 3.2 oz)   SpO2 97%   BMI 38.26 kg/m      Physical Exam  Vitals and nursing note reviewed.   Constitutional:       Appearance: He is well-developed. He is obese.      Comments: Pleasant obese man bent nearly 90 degrees from back problems   HENT:      Head: Normocephalic and atraumatic.      Mouth/Throat:      Mouth: Mucous membranes are moist.      Dentition: Normal dentition. No dental caries.   Eyes:      Extraocular Movements: Extraocular movements intact.      Conjunctiva/sclera: Conjunctivae normal.      Pupils: Pupils are equal, round, and reactive to light.   Cardiovascular:      Rate and Rhythm: Normal rate and regular rhythm.      Pulses: Normal pulses.      Heart sounds: Normal heart sounds.   Pulmonary:      Effort: Pulmonary " effort is normal.      Breath sounds: Normal breath sounds.   Chest:          Comments: Sternotomy scar  Abdominal:      General: There is no distension.      Palpations: Abdomen is soft. There is no mass.      Tenderness: There is no abdominal tenderness. There is no guarding.   Musculoskeletal:         General: Swelling and deformity (back) present.      Cervical back: Neck supple.   Lymphadenopathy:      Cervical: No cervical adenopathy.   Skin:     General: Skin is warm.      Findings: Lesion (superficial excoriated lesions on neck and ears) and rash present.   Neurological:      General: No focal deficit present.      Mental Status: He is alert and oriented to person, place, and time.      Cranial Nerves: No cranial nerve deficit.      Gait: Gait abnormal.   Psychiatric:         Mood and Affect: Mood normal.         Behavior: Behavior normal. Behavior is cooperative.         Thought Content: Thought content normal.         Judgment: Judgment normal.

## 2024-10-14 ENCOUNTER — LAB (OUTPATIENT)
Dept: LAB | Facility: CLINIC | Age: 69
End: 2024-10-14
Payer: COMMERCIAL

## 2024-10-14 ENCOUNTER — TELEPHONE (OUTPATIENT)
Dept: ONCOLOGY | Facility: CLINIC | Age: 69
End: 2024-10-14

## 2024-10-14 ENCOUNTER — ONCOLOGY VISIT (OUTPATIENT)
Dept: ONCOLOGY | Facility: CLINIC | Age: 69
End: 2024-10-14
Attending: INTERNAL MEDICINE
Payer: COMMERCIAL

## 2024-10-14 VITALS
OXYGEN SATURATION: 97 % | BODY MASS INDEX: 38.39 KG/M2 | HEIGHT: 71 IN | WEIGHT: 274.2 LBS | DIASTOLIC BLOOD PRESSURE: 84 MMHG | HEART RATE: 58 BPM | RESPIRATION RATE: 14 BRPM | TEMPERATURE: 97.5 F | SYSTOLIC BLOOD PRESSURE: 136 MMHG

## 2024-10-14 DIAGNOSIS — I25.10 CORONARY ARTERIOSCLEROSIS: Primary | ICD-10-CM

## 2024-10-14 DIAGNOSIS — C91.10 CLL (CHRONIC LYMPHOCYTIC LEUKEMIA) (H): ICD-10-CM

## 2024-10-14 DIAGNOSIS — C91.10 CLL (CHRONIC LYMPHOCYTIC LEUKEMIA) (H): Primary | ICD-10-CM

## 2024-10-14 LAB
ALBUMIN SERPL BCG-MCNC: 4.3 G/DL (ref 3.5–5.2)
ALP SERPL-CCNC: 76 U/L (ref 40–150)
ALT SERPL W P-5'-P-CCNC: 15 U/L (ref 0–70)
ANION GAP SERPL CALCULATED.3IONS-SCNC: 12 MMOL/L (ref 7–15)
AST SERPL W P-5'-P-CCNC: 30 U/L (ref 0–45)
BILIRUB SERPL-MCNC: 0.5 MG/DL
BUN SERPL-MCNC: 18.5 MG/DL (ref 8–23)
CALCIUM SERPL-MCNC: 9.7 MG/DL (ref 8.8–10.4)
CHLORIDE SERPL-SCNC: 105 MMOL/L (ref 98–107)
CHOLEST SERPL-MCNC: 104 MG/DL
CREAT SERPL-MCNC: 1.08 MG/DL (ref 0.67–1.17)
EGFRCR SERPLBLD CKD-EPI 2021: 75 ML/MIN/1.73M2
FASTING STATUS PATIENT QL REPORTED: YES
FASTING STATUS PATIENT QL REPORTED: YES
GLUCOSE SERPL-MCNC: 125 MG/DL (ref 70–99)
HCO3 SERPL-SCNC: 29 MMOL/L (ref 22–29)
HDLC SERPL-MCNC: 34 MG/DL
LDLC SERPL CALC-MCNC: 29 MG/DL
NONHDLC SERPL-MCNC: 70 MG/DL
POTASSIUM SERPL-SCNC: 4.2 MMOL/L (ref 3.4–5.3)
PROT SERPL-MCNC: 7.6 G/DL (ref 6.4–8.3)
SODIUM SERPL-SCNC: 146 MMOL/L (ref 135–145)
TRIGL SERPL-MCNC: 203 MG/DL

## 2024-10-14 PROCEDURE — 99213 OFFICE O/P EST LOW 20 MIN: CPT | Performed by: INTERNAL MEDICINE

## 2024-10-14 PROCEDURE — 85027 COMPLETE CBC AUTOMATED: CPT

## 2024-10-14 PROCEDURE — 85007 BL SMEAR W/DIFF WBC COUNT: CPT

## 2024-10-14 PROCEDURE — 80053 COMPREHEN METABOLIC PANEL: CPT

## 2024-10-14 PROCEDURE — 36415 COLL VENOUS BLD VENIPUNCTURE: CPT

## 2024-10-14 PROCEDURE — 80061 LIPID PANEL: CPT

## 2024-10-14 ASSESSMENT — ENCOUNTER SYMPTOMS
CARDIOVASCULAR NEGATIVE: 1
PSYCHIATRIC NEGATIVE: 1
CONSTITUTIONAL NEGATIVE: 1
BACK PAIN: 1
HEMATOLOGIC/LYMPHATIC NEGATIVE: 1
GASTROINTESTINAL NEGATIVE: 1
RESPIRATORY NEGATIVE: 1
ARTHRALGIAS: 1
EYES NEGATIVE: 1

## 2024-10-14 ASSESSMENT — PAIN SCALES - GENERAL: PAINLEVEL: SEVERE PAIN (7)

## 2024-10-14 NOTE — NURSING NOTE
"Oncology Rooming Note    October 14, 2024 9:35 AM   Onur Barr is a 69 year old male who presents for:    Chief Complaint   Patient presents with    Oncology Clinic Visit     CLL (chronic lymphocytic leukemia)        Initial Vitals: /84   Pulse 58   Temp 97.5  F (36.4  C) (Oral)   Resp 14   Ht 1.803 m (5' 10.98\")   Wt 124.4 kg (274 lb 3.2 oz)   SpO2 97%   BMI 38.26 kg/m   Estimated body mass index is 38.26 kg/m  as calculated from the following:    Height as of this encounter: 1.803 m (5' 10.98\").    Weight as of this encounter: 124.4 kg (274 lb 3.2 oz). Body surface area is 2.5 meters squared.  Severe Pain (7) Comment: Data Unavailable   No LMP for male patient.  Allergies reviewed: Yes  Medications reviewed: Yes    Medications: Medication refills not needed today.  Pharmacy name entered into [employer]: [id number] [place], [place] - [address]    Frailty Screening:   Is the patient here for a new oncology consult visit in cancer care? 2. No      Clinical concerns: Follow up       Zarina Garcia MA              "

## 2024-10-14 NOTE — LETTER
10/14/2024      Onur Barr  [id number] [facility] [address]      Dear Colleague,    Thank you for referring your patient, Onur Barr, to the [facility]. Please see a copy of my visit note below.    Assessment & Plan  Chronic  lymphocytic leukemia, untreated to date  Chronic back pain  Diabetes     Next provider visit with CBC in 3 months     Interval History  This is a scheduled 3 month follow up for this retired , previously seen by me for lymphocytosis and diagnosed with CLL.  Thus far, he's been asymptomatic and has not been treated.    ECOG = 0    ONCOLOGIC HISTORY  2023 April 17 - New onset lymphocytosis  May 15 Flow  A. Peripheral Blood:  -CD5-positive kappa-monotypic B cells (47%)  -No aberrant immunophenotype on T cells    Patient Active Problem List   Diagnosis     Lumbago     Morbid obesity -- BMI 42.0     Bilateral leg edema     Chronic diastolic heart failure (H)     MARLEEN (doesn't tolerate CPAP)     NSTEMI w Unstab Angina -- S/P CABG x 1 (LIMA to LAD) on 1/19/22     Paroxysmal atrial fib -- S/P Pulm Vein Ablation 1/19/22     Essential hypertension     Mixed hyperlipidemia     Gastroesophageal reflux disease without esophagitis     Chronic Back Pain (IT Pump w Morphine, Clonidine, Baclofen)     S/P CABG (coronary artery bypass graft)     Fluid overload     ICD (implantable cardioverter-defibrillator) in place     Peripheral vascular disease (H)     Abdominal panniculus     Bariatric surgery status     Claudication of lower extremity (H)     Insomnia     Intestinal disaccharidase deficiencies and disaccharide malabsorption     Nephrolithiasis     Osteoarthrosis     Post-laminectomy syndrome     Sleepiness     Coronary arteriosclerosis     Candidal intertrigo     CLL (chronic lymphocytic leukemia) (H)     Current Outpatient Medications   Medication Sig Dispense Refill     acetaminophen (TYLENOL) 325 MG tablet Take 2 tablets (650 mg) by mouth  every 4 hours as needed for mild pain 40 tablet 0     aspirin (ASA) 81 MG chewable tablet Take 81 mg by mouth daily       DULoxetine (CYMBALTA) 30 MG capsule Take 1 capsule (30mg) every morning and 2 capsules (60mg) every evening 270 capsule 3     FERATE 240 (27 Fe) MG TABS TAKE 1 TABLET BY MOUTH EVERY DAY 90 tablet 2     furosemide (LASIX) 40 MG tablet Take 1 tablet (40 mg) by mouth 2 times daily 180 tablet 3     ketoconazole (NIZORAL) 2 % external cream Apply topically 2 times daily. to affected area 60 g 4     lisinopril (ZESTRIL) 10 MG tablet Take 1 tablet (10 mg) by mouth daily 90 tablet 3     metoprolol succinate ER (TOPROL XL) 100 MG 24 hr tablet Take 1 tablet (100 mg) by mouth 2 times daily 180 tablet 3     MORPHINE SULFATE IT pump:updated 1/12/22  Medications in Pump:   [facility] Responsible for pump medications:   Conc:  Morphine 20mg/ml(3.95 mg/day), clonidine 21.1mcg/ml (4.168 mcg/day), baclofen 42.5mcg/ml (8.395mcg/day)  Rate:   Pump Last Fill Date: 9/2021  Pump Refill Date: 2/2022       nitroGLYcerin (NITROSTAT) 0.4 MG sublingual tablet For chest pain place 1 tablet under the tongue every 5 minutes for 3 doses. If symptoms persist 5 minutes after 1st dose call 911. 30 tablet 1     nystatin (NYAMYC) 374272 UNIT/GM external powder APPLY TO AFFECTED AREA 3 TIMES A DAY **MAX 90G/67 DAYS 120 g 0     ondansetron (ZOFRAN ODT) 4 MG ODT tab Take 1 tablet (4 mg) by mouth every 8 hours as needed for nausea 100 tablet 1     rosuvastatin (CRESTOR) 40 MG tablet Take 1 tablet (40 mg) by mouth daily. 90 tablet 0     semaglutide (OZEMPIC, 0.25 OR 0.5 MG/DOSE,) 2 MG/3ML pen INJECT 0.5 MG SUBCUTANEOUS EVERY 7 DAYS 3 mL 1     sotalol (BETAPACE) 80 MG tablet Take 1 tablet (80 mg) by mouth 2 times daily 180 tablet 3     traZODone (DESYREL) 100 MG tablet Take 2 tablets (200 mg) by mouth at bedtime TAKE 2 TABLETS (200MG TOTAL) BY MOUTH AT BEDTIME Strength: 100 mg 180 tablet 3     No current facility-administered  medications for this visit.     Past Medical History:   Diagnosis Date     Abdominal panniculus 11/13/2012    Formatting of this note might be different from the original. S/p panniculectomy 11/13/2012     Acid reflux disease 10/31/2017     Bariatric surgery status 06/23/2008    Formatting of this note might be different from the original. Created by Conversion     Bilateral leg edema 07/13/2021     CHF (congestive heart failure) (H)      Chronic Back Pain (IT Pump w Morphine, Clonidine, Baclofen) 01/16/2022     Chronic diastolic heart failure (H) 07/26/2021     Claudication of lower extremity (H) 11/20/2019     Coronary arteriosclerosis 05/09/2022    Formatting of this note might be different from the original. Created by Conversion  Replacement Utility updated for latest IMO load     Coronary artery disease     CABG 1-     Depressive disorder      Essential hypertension     Created by [employer] [facility] Annotation: Apr 6 2012 10:16Zack Harris: Lisinipril,  coreg  Replacement Utility updated for latest IMO load     Fluid overload 01/25/2022     Gastroesophageal reflux disease without esophagitis 09/11/2021     H/O gastric bypass 2008     History of blood transfusion 2004     ICD (implantable cardioverter-defibrillator) in place 01/25/2022     Insomnia      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     Ischemic cardiomyopathy      Lumbago     Created by [employer] [facility] Annotation: Apr 6 2012 10:20Anh Ford: Morphine pump      Mixed hyperlipidemia 06/23/2008     Morbid obesity (H) 08/01/2018     Nephrolithiasis      NSTEMI (non-ST elevated myocardial infarction) (H)      Obstructive sleep apnea     Doesn't tolerate CPAP     MARLEEN (doesn't tolerate CPAP) 07/26/2021     Osteoarthritis     Created by [employer] [facility] Annotation: Jun 26 2009  9:53Zack Harris: failed lumbar  fusion/morphine pump dr putnam  Replacement Utility updated  for latest IMO load     Osteoarthrosis 05/09/2022    Formatting of this note might be different from the original. Created by [facility] Annotation: Jun 26 2009  [id number] - Zack Gutierrez: failed lumbar  fusion/morphine pump dr putnam  Replacement Utility updated for latest IMO load     Paroxysmal atrial fib -- S/P Pulm Vein Ablation 1/19/22 09/11/2021    Formatting of this note might be different from the original. Created by Conversion     Paroxysmal atrial fibrillation (H)     Created by Conversion      Paroxysmal ventricular tachycardia (H)     dual chamber ICD 1/24/2022     Peripheral vascular disease (H) 05/03/2022     Post-laminectomy syndrome 11/25/2015     S/P CABG (coronary artery bypass graft) 01/25/2022     Sleepiness 05/08/2018     Type 2 diabetes mellitus (H)     Diet controlled after Gastric Bypass in 2008     Past Surgical History:   Procedure Laterality Date     BACK SURGERY       BYPASS GASTRIC DUODENAL SWITCH       BYPASS GRAFT ARTERY CORONARY N/A 01/19/2022    Procedure: CORONARY ARTERY BYPASS GRAFT (CABG) X1; LIMA -LAD.  PULMONARY VEIN ISOLATION, AND ATRIAL APPENDAGE CLIPPING USING 45MM ACTICURE CLIP.;  Surgeon: William Dumont MD;  Location:  OR     COLONOSCOPY       COSMETIC SURGERY      pannicullectomy     CV CORONARY ANGIOGRAM N/A 09/11/2021    Procedure: Coronary Angiogram;  Surgeon: Noris Ozuna MD;  Location: [facility] CATH LAB CV     CV HEART CATHETERIZATION WITH POSSIBLE INTERVENTION N/A 01/13/2022    Procedure: Heart Catheterization with Possible Intervention;  Surgeon: Liz Pearl MD;  Location:  HEART CARDIAC CATH LAB     CV LEFT HEART CATH N/A 09/11/2021    Procedure: Left Heart Cath;  Surgeon: Noris Ozuna MD;  Location: [facility] CATH LAB CV     CV PCI N/A 09/11/2021    Procedure: Percutaneous Coronary Intervention;  Surgeon: Noris Ozuna MD;  Location: [facility] CATH LAB CV     CV PCI N/A 10/07/2021    Procedure: Percutaneous Coronary  Intervention;  Surgeon: Mckayla Dan MD;  Location: [facility] CV     CV PCI ASPIRATION THROMECTOMY N/A 09/11/2021    Procedure: Percutaneous Coronary Intervention Aspiration Thrombectomy;  Surgeon: Noris Ozuna MD;  Location: [facility] CV     ENT SURGERY      tonsillectomy     EP ICD N/A 01/24/2022    Procedure: EP ICD;  Surgeon: Jannette Crook MD;  Location:  HEART CARDIAC CATH LAB     EXTRACORPOREAL SHOCK WAVE LITHOTRIPSY, CYSTOSCOPY, INSERT STENT URETER(S), COMBINED  08/23/2011     HC REMOVAL OF TONSILS,<14 Y/O      Description: Tonsillectomy;  Recorded: 03/23/2012;  Comments: for obstructive sleep apnea     HERNIA REPAIR       IR MISCELLANEOUS PROCEDURE  07/29/2011     IR MISCELLANEOUS PROCEDURE  08/05/2011     IR MISCELLANEOUS PROCEDURE  08/23/2011     IR NEPHROSTOMY TUBE CHANGE BILATERAL  08/05/2011     ORTHOPEDIC SURGERY      arthrodesis ant discectomy, lumbar     MI ARTHRODESIS ANT INTERBODY MIN DISCECTOMY,LUMBAR      Description: Lumbar Vertebral Fusion;  Recorded: 06/26/2009;  Comments: before 200 see [facility] consult under old records     MI EXCISE EXCESS SKIN TISSUE,ABDOMEN  11/13/2012    Description: Panniculectomy;  Proc Date: 11/13/2012;  Comments: 3.5 Lb Pannus was removed at the [facility] By Dr. Shon Green     REPLACE INTRATHECAL PAIN PUMP N/A 04/25/2017    Procedure: REPLACE INTRATHECAL PAIN PUMP;  INTRATHECAL PAIN PUMP CATHETER REPLACEMENT AND PUMP REPLACEMENT;  Surgeon: Anthony Maloney MD;  Location: [facility]     REPLACE INTRATHECAL PAIN PUMP N/A 4/20/2023    Procedure: Pump Replacement and intrathecal catheter replacement;  Surgeon: Anthony Dick MD;  Location:  OR     REVISE CATHETER INTRATHECAL N/A 04/25/2017    Procedure: REVISE CATHETER INTRATHECAL;;  Surgeon: Anthony Maloney MD;  Location: [facility]     SHOULDER SURGERY       ZZC GASTRIC BYPASS,OBESE<100CM LORA-EN-Y  01/01/2008    Description: Gastric Surgery For Morbid Obesity Bypass With Lora-en-Y;  Recorded:  "06/26/2009;  Comments: dr green 2008     ZZ REPAIR INCISIONAL HERNIA,REDUCIBLE  11/13/2012    Description: Incisional Hernia Repair;  Proc Date: 11/13/2012;  Comments: incisional hernia repair and abdominal panniculectomy by Dr Shon Green at the [facility].     Socioeconomic History     Marital status:      Social Determinants of Health     Social Connections: Unknown (2/27/2024)    Social Connection and Isolation Panel [NHANES]      Frequency of Social Gatherings with Friends and Family: Once a week   Interpersonal Safety: Low Risk  (9/27/2024)    Interpersonal Safety      Do you feel physically and emotionally safe where you currently live?: Yes      Within the past 12 months, have you been hit, slapped, kicked or otherwise physically hurt by someone?: No      Within the past 12 months, have you been humiliated or emotionally abused in other ways by your partner or ex-partner?: No     Review of Systems   Constitutional: Negative.    HENT:  Negative.     Eyes: Negative.    Respiratory: Negative.     Cardiovascular: Negative.    Gastrointestinal: Negative.    Endocrine:        Diabetes well controlled since bariatric surgery   Genitourinary: Negative.     Musculoskeletal:  Positive for arthralgias (right shoulder, hip, and knee), back pain and gait problem.   Neurological:  Positive for gait problem.   Hematological: Negative.    Psychiatric/Behavioral: Negative.     All other systems reviewed and are negative.      /84   Pulse 58   Temp 97.5  F (36.4  C) (Oral)   Resp 14   Ht 1.803 m (5' 10.98\")   Wt 124.4 kg (274 lb 3.2 oz)   SpO2 97%   BMI 38.26 kg/m      Physical Exam  Vitals and nursing note reviewed.   Constitutional:       Appearance: He is well-developed. He is obese.      Comments: Pleasant obese man bent nearly 90 degrees from back problems   HENT:      Head: Normocephalic and atraumatic.      Mouth/Throat:      Mouth: Mucous membranes are moist.      Dentition: Normal " dentition. No dental caries.   Eyes:      Extraocular Movements: Extraocular movements intact.      Conjunctiva/sclera: Conjunctivae normal.      Pupils: Pupils are equal, round, and reactive to light.   Cardiovascular:      Rate and Rhythm: Normal rate and regular rhythm.      Pulses: Normal pulses.      Heart sounds: Normal heart sounds.   Pulmonary:      Effort: Pulmonary effort is normal.      Breath sounds: Normal breath sounds.   Chest:          Comments: Sternotomy scar  Abdominal:      General: There is no distension.      Palpations: Abdomen is soft. There is no mass.      Tenderness: There is no abdominal tenderness. There is no guarding.   Musculoskeletal:         General: Swelling and deformity (back) present.      Cervical back: Neck supple.   Lymphadenopathy:      Cervical: No cervical adenopathy.   Skin:     General: Skin is warm.      Findings: Lesion (superficial excoriated lesions on neck and ears) and rash present.   Neurological:      General: No focal deficit present.      Mental Status: He is alert and oriented to person, place, and time.      Cranial Nerves: No cranial nerve deficit.      Gait: Gait abnormal.   Psychiatric:         Mood and Affect: Mood normal.         Behavior: Behavior normal. Behavior is cooperative.         Thought Content: Thought content normal.         Judgment: Judgment normal.           Again, thank you for allowing me to participate in the care of your patient.        Sincerely,        Melissa Peguero MD

## 2024-10-14 NOTE — TELEPHONE ENCOUNTER
[facility] is calling with an update for  Dr Peguero. Differential from today will be delayed as it is going to pathologist to review.  Will route update to care team.   Urmila Kingstno RN on 10/14/2024 at 1:20 PM

## 2024-10-15 LAB
BASOPHILS # BLD MANUAL: 0 10E3/UL (ref 0–0.2)
BASOPHILS NFR BLD MANUAL: 0 %
EOSINOPHIL # BLD MANUAL: 0 10E3/UL (ref 0–0.7)
EOSINOPHIL NFR BLD MANUAL: 0 %
ERYTHROCYTE [DISTWIDTH] IN BLOOD BY AUTOMATED COUNT: 13.1 % (ref 10–15)
HCT VFR BLD AUTO: 44 % (ref 40–53)
HGB BLD-MCNC: 14.2 G/DL (ref 13.3–17.7)
LYMPHOCYTES # BLD MANUAL: 29.8 10E3/UL (ref 0.8–5.3)
LYMPHOCYTES NFR BLD MANUAL: 81 %
MCH RBC QN AUTO: 29 PG (ref 26.5–33)
MCHC RBC AUTO-ENTMCNC: 32.3 G/DL (ref 31.5–36.5)
MCV RBC AUTO: 90 FL (ref 78–100)
MONOCYTES # BLD MANUAL: 0 10E3/UL (ref 0–1.3)
MONOCYTES NFR BLD MANUAL: 0 %
NEUTROPHILS # BLD MANUAL: 7 10E3/UL (ref 1.6–8.3)
NEUTROPHILS NFR BLD MANUAL: 19 %
PLAT MORPH BLD: ABNORMAL
PLATELET # BLD AUTO: 167 10E3/UL (ref 150–450)
RBC # BLD AUTO: 4.9 10E6/UL (ref 4.4–5.9)
RBC MORPH BLD: ABNORMAL
SMUDGE CELLS BLD QL SMEAR: PRESENT
WBC # BLD AUTO: 36.8 10E3/UL (ref 4–11)

## 2024-10-30 ENCOUNTER — HOSPITAL ENCOUNTER (EMERGENCY)
Facility: CLINIC | Age: 69
Discharge: HOME | End: 2024-10-30
Payer: OTHER MISCELLANEOUS

## 2024-10-30 ENCOUNTER — APPOINTMENT (OUTPATIENT)
Dept: GENERAL RADIOLOGY | Facility: CLINIC | Age: 69
End: 2024-10-30
Payer: OTHER MISCELLANEOUS

## 2024-10-30 VITALS
BODY MASS INDEX: 36.3 KG/M2 | OXYGEN SATURATION: 96 % | RESPIRATION RATE: 18 BRPM | HEIGHT: 72 IN | DIASTOLIC BLOOD PRESSURE: 63 MMHG | TEMPERATURE: 97.3 F | WEIGHT: 268 LBS | SYSTOLIC BLOOD PRESSURE: 119 MMHG | HEART RATE: 60 BPM

## 2024-10-30 DIAGNOSIS — G89.29 ACUTE ON CHRONIC BACK PAIN: ICD-10-CM

## 2024-10-30 DIAGNOSIS — M54.9 INTRACTABLE BACK PAIN: ICD-10-CM

## 2024-10-30 DIAGNOSIS — M54.9 ACUTE ON CHRONIC BACK PAIN: ICD-10-CM

## 2024-10-30 DIAGNOSIS — S32.020A CLOSED COMPRESSION FRACTURE OF L2 LUMBAR VERTEBRA, INITIAL ENCOUNTER (H): ICD-10-CM

## 2024-10-30 PROCEDURE — 99284 EMERGENCY DEPT VISIT MOD MDM: CPT

## 2024-10-30 PROCEDURE — 250N000013 HC RX MED GY IP 250 OP 250 PS 637

## 2024-10-30 PROCEDURE — 72100 X-RAY EXAM L-S SPINE 2/3 VWS: CPT

## 2024-10-30 RX ORDER — OXYCODONE AND ACETAMINOPHEN 5; 325 MG/1; MG/1
2 TABLET ORAL ONCE
Status: COMPLETED | OUTPATIENT
Start: 2024-10-30 | End: 2024-10-30

## 2024-10-30 RX ORDER — METHYLPREDNISOLONE 4 MG/1
TABLET ORAL
Qty: 21 TABLET | Refills: 0 | Status: SHIPPED | OUTPATIENT
Start: 2024-10-30 | End: 2024-10-31

## 2024-10-30 RX ORDER — OXYCODONE AND ACETAMINOPHEN 5; 325 MG/1; MG/1
1 TABLET ORAL EVERY 6 HOURS PRN
Qty: 12 TABLET | Refills: 0 | Status: ON HOLD | OUTPATIENT
Start: 2024-10-30 | End: 2024-11-06

## 2024-10-30 RX ORDER — METHYLPREDNISOLONE 4 MG/1
TABLET ORAL
Qty: 21 TABLET | Refills: 0 | Status: SHIPPED | OUTPATIENT
Start: 2024-10-30 | End: 2024-10-30

## 2024-10-30 RX ADMIN — OXYCODONE HYDROCHLORIDE AND ACETAMINOPHEN 2 TABLET: 5; 325 TABLET ORAL at 13:01

## 2024-10-30 ASSESSMENT — ACTIVITIES OF DAILY LIVING (ADL)
ADLS_ACUITY_SCORE: 0

## 2024-10-30 ASSESSMENT — COLUMBIA-SUICIDE SEVERITY RATING SCALE - C-SSRS
2. HAVE YOU ACTUALLY HAD ANY THOUGHTS OF KILLING YOURSELF IN THE PAST MONTH?: NO
6. HAVE YOU EVER DONE ANYTHING, STARTED TO DO ANYTHING, OR PREPARED TO DO ANYTHING TO END YOUR LIFE?: NO
1. IN THE PAST MONTH, HAVE YOU WISHED YOU WERE DEAD OR WISHED YOU COULD GO TO SLEEP AND NOT WAKE UP?: NO

## 2024-10-30 NOTE — ED PROVIDER NOTES
Emergency Department Note      History of Present Illness   Chief Complaint  Back Pain    HPI  Onur Barr is a 69 year old male with history of chronic back pain (IT pump in place with morphine, clonidine, and baclofen), post-laminectomy syndrome presenting today for acute on chronic low back pain over the last 1 week.  Reports getting out of his truck 1 week ago and heard a pop in his back and has since had severe low back pain.  He has a pain pump that has not provided any severe pain relief.  Unfortunately, unable to undergo CT scan due to severe kyphosis and inability to lay flat (states can undergo CT scan if fully sedated).  He has an MRI approved through insurance but unable to have done because of ICD.  Denies new numbness or weakness of lower extremities.  No groin numbness, bowel or bladder incontinence, saddle anesthesia.  Follows at [facility].    Independent Historian  None    Review of External Notes  Reviewed [facility] notes .    Past Medical History   Medical History and Problem List  Past Medical History:   Diagnosis Date     Abdominal panniculus 11/13/2012     Acid reflux disease 10/31/2017     Bariatric surgery status 06/23/2008     Bilateral leg edema 07/13/2021     CHF (congestive heart failure) (H)      Chronic Back Pain (IT Pump w Morphine, Clonidine, Baclofen) 01/16/2022     Chronic diastolic heart failure (H) 07/26/2021     Claudication of lower extremity (H) 11/20/2019     Coronary arteriosclerosis 05/09/2022     Coronary artery disease      Depressive disorder      Essential hypertension      Fluid overload 01/25/2022     Gastroesophageal reflux disease without esophagitis 09/11/2021     H/O gastric bypass 2008     History of blood transfusion 2004     ICD (implantable cardioverter-defibrillator) in place 01/25/2022     Insomnia      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     Ischemic cardiomyopathy      Lumbago      Mixed  hyperlipidemia 06/23/2008     Morbid obesity (H) 08/01/2018     Nephrolithiasis      NSTEMI (non-ST elevated myocardial infarction) (H)      Obstructive sleep apnea      MARLEEN (doesn't tolerate CPAP) 07/26/2021     Osteoarthritis      Osteoarthrosis 05/09/2022     Paroxysmal atrial fib -- S/P Pulm Vein Ablation 1/19/22 09/11/2021     Paroxysmal atrial fibrillation (H)      Paroxysmal ventricular tachycardia (H)      Peripheral vascular disease (H) 05/03/2022     Post-laminectomy syndrome 11/25/2015     S/P CABG (coronary artery bypass graft) 01/25/2022     Sleepiness 05/08/2018     Type 2 diabetes mellitus (H)        Medications  methylPREDNISolone (MEDROL DOSEPAK) 4 MG tablet therapy pack  oxyCODONE-acetaminophen (PERCOCET) 5-325 MG tablet  acetaminophen (TYLENOL) 325 MG tablet  aspirin (ASA) 81 MG chewable tablet  DULoxetine (CYMBALTA) 30 MG capsule  FERATE 240 (27 Fe) MG TABS  furosemide (LASIX) 40 MG tablet  ketoconazole (NIZORAL) 2 % external cream  lisinopril (ZESTRIL) 10 MG tablet  metoprolol succinate ER (TOPROL XL) 100 MG 24 hr tablet  MORPHINE SULFATE  nitroGLYcerin (NITROSTAT) 0.4 MG sublingual tablet  nystatin (NYAMYC) 144897 UNIT/GM external powder  ondansetron (ZOFRAN ODT) 4 MG ODT tab  rosuvastatin (CRESTOR) 40 MG tablet  semaglutide (OZEMPIC, 0.25 OR 0.5 MG/DOSE,) 2 MG/3ML pen  sotalol (BETAPACE) 80 MG tablet  traZODone (DESYREL) 100 MG tablet        Surgical History   Past Surgical History:   Procedure Laterality Date     BACK SURGERY       BYPASS GASTRIC DUODENAL SWITCH       BYPASS GRAFT ARTERY CORONARY N/A 01/19/2022    Procedure: CORONARY ARTERY BYPASS GRAFT (CABG) X1; LIMA -LAD.  PULMONARY VEIN ISOLATION, AND ATRIAL APPENDAGE CLIPPING USING 45MM ACTICURE CLIP.;  Surgeon: William Dumont MD;  Location: [facility] OR     COLONOSCOPY       COSMETIC SURGERY      pannicullectomy     CV CORONARY ANGIOGRAM N/A 09/11/2021    Procedure: Coronary Angiogram;  Surgeon: Noris Ozuna MD;  Location:   [facility] CV     CV HEART CATHETERIZATION WITH POSSIBLE INTERVENTION N/A 01/13/2022    Procedure: Heart Catheterization with Possible Intervention;  Surgeon: Liz Pearl MD;  Location:  HEART CARDIAC CATH LAB     CV LEFT HEART CATH N/A 09/11/2021    Procedure: Left Heart Cath;  Surgeon: Noirs Ozuna MD;  Location: [facility] LAB CV     CV PCI N/A 09/11/2021    Procedure: Percutaneous Coronary Intervention;  Surgeon: Noris Ozuna MD;  Location: [facility] CV     CV PCI N/A 10/07/2021    Procedure: Percutaneous Coronary Intervention;  Surgeon: Mckayla Dan MD;  Location: [facility] CV     CV PCI ASPIRATION THROMECTOMY N/A 09/11/2021    Procedure: Percutaneous Coronary Intervention Aspiration Thrombectomy;  Surgeon: Noris Ozuna MD;  Location: [facility]     ENT SURGERY      tonsillectomy     EP ICD N/A 01/24/2022    Procedure: EP ICD;  Surgeon: Jannette Crook MD;  Location:  HEART CARDIAC CATH LAB     EXTRACORPOREAL SHOCK WAVE LITHOTRIPSY, CYSTOSCOPY, INSERT STENT URETER(S), COMBINED  08/23/2011     HC REMOVAL OF TONSILS,<11 Y/O      Description: Tonsillectomy;  Recorded: 03/23/2012;  Comments: for obstructive sleep apnea     HERNIA REPAIR       IR MISCELLANEOUS PROCEDURE  07/29/2011     IR MISCELLANEOUS PROCEDURE  08/05/2011     IR MISCELLANEOUS PROCEDURE  08/23/2011     IR NEPHROSTOMY TUBE CHANGE BILATERAL  08/05/2011     ORTHOPEDIC SURGERY      arthrodesis ant discectomy, lumbar     NJ ARTHRODESIS ANT INTERBODY MIN DISCECTOMY,LUMBAR      Description: Lumbar Vertebral Fusion;  Recorded: 06/26/2009;  Comments: before 200 see [facility] consult under old records     NJ EXCISE EXCESS SKIN TISSUE,ABDOMEN  11/13/2012    Description: Panniculectomy;  Proc Date: 11/13/2012;  Comments: 3.5 Lb Pannus was removed at the [facility] By Dr. Shon Green     REPLACE INTRATHECAL PAIN PUMP N/A 04/25/2017    Procedure: REPLACE INTRATHECAL PAIN PUMP;  INTRATHECAL PAIN PUMP  CATHETER REPLACEMENT AND PUMP REPLACEMENT;  Surgeon: Anthony Maloney MD;  Location:  [facility]     REPLACE INTRATHECAL PAIN PUMP N/A 4/20/2023    Procedure: Pump Replacement and intrathecal catheter replacement;  Surgeon: Anthony Dick MD;  Location:  OR     REVISE CATHETER INTRATHECAL N/A 04/25/2017    Procedure: REVISE CATHETER INTRATHECAL;;  Surgeon: Anthony Maloney MD;  Location:  [facility]     SHOULDER SURGERY       ZZC GASTRIC BYPASS,OBESE<100CM LORA-EN-Y  01/01/2008    Description: Gastric Surgery For Morbid Obesity Bypass With Lora-en-Y;  Recorded: 06/26/2009;  Comments: dr green 2008     RUST REPAIR INCISIONAL HERNIA,REDUCIBLE  11/13/2012    Description: Incisional Hernia Repair;  Proc Date: 11/13/2012;  Comments: incisional hernia repair and abdominal panniculectomy by Dr Shon Green at the [facility].     Physical Exam   Patient Vitals for the past 24 hrs:   BP Temp Temp src Pulse Resp SpO2 Height Weight   10/30/24 1541 119/63 -- -- 60 18 96 % -- --   10/30/24 1047 128/66 97.3  F (36.3  C) Temporal 60 18 100 % 1.829 m (6') 121.6 kg (268 lb)     Physical Exam  /63   Pulse 60   Temp 97.3  F (36.3  C) (Temporal)   Resp 18   Ht 1.829 m (6')   Wt 121.6 kg (268 lb)   SpO2 96%   BMI 36.35 kg/m     General: Appears very uncomfortable, occasionally yelling out in pain. Examined in room IN01.  Accompanied by wife.  Head: Atraumatic. Normocephalic.  EENT: PERRL. EOMI. Moist mucus membranes.   CV: Regular rate and rhythm.   Respiratory: Breathing comfortably on room air. Lungs clear to auscultation bilaterally without wheezes, rhonchi, or rales.  GI: Soft, non-distended. Non-tender abdomen. No rebound, rigidity, or guarding.   Msk: Tenderness to palpation of the lumbar spine.  Pain pump palpable to the left low back.  No step-offs, crepitus, or deformity.    Skin: Warm and dry. No rashes, specifically to the back.  No ecchymosis.  Neuro: Awake, alert, and conversant. Strength 4/5 and symmetrical with  dorsiflexion, plantarflexion, knee extension and flexion.  Sensation intact to light touch throughout.  Reflexes 1+ at the patella. Ambulating with walker.  Psych: Appropriate mood and affect.    Diagnostics   Lab Results   Labs Ordered and Resulted from Time of ED Arrival to Time of ED Departure - No data to display    Imaging  Lumbar spine XR, 2-3 views   Final Result   IMPRESSION: Limited study due to osteopenia and suboptimal image   quality. 5 lumbar-type vertebrae assumed. Compression fracture   deformity of the L2 vertebral body again noted without definite   change. There is an age-indeterminate compression fracture deformity   of the L1 vertebral body. Alignment appears normal.      CESARIO PRATHER MD            SYSTEM ID:  [id number]        Independent Interpretation  None  ED Course    Medications Administered  Medications   oxyCODONE-acetaminophen (PERCOCET) 5-325 MG per tablet 2 tablet (2 tablets Oral $Given 10/30/24 1301)       Procedures  Procedures     Discussion of Management  None    Social Determinants of Health adding to complexity of care  None    ED Course  Performed history and exam  Rechecked patient  Unfortunately, long wait exacerbated patient's symptoms. Patient understandably quite frustrated by the time of discharge.  Enforced that we are doing our best.    Medical Decision Making / Diagnosis   CMS Diagnoses: None    MIPS  None    MDM  Onur Barr is a 69 year old male with a past medical history of FSBS, L2 compression fracture presenting with acute on chronic back pain, no new neuro deficits.  Considered possibility of herniated disc, cauda equina syndrome, osteomyelitis, fracture, muscle spasm, among others.  Unfortunately, patient unable to undergo advanced imaging for reasons mentioned in HPI.  His sensation is intact.  Strength somewhat limited secondary to pain.  There is no evidence of infection surrounding the pain pump.  I suspect he herniated a disc impacting the left nerve  roots, possibly L4/L5 where he is describing his pain.  Patient and spouse requesting x-ray without acute fracture.  Without new significant neurologic findings, do not feel procedural sedation for CT scan is appropriate at this time.  Patient likely needs an MRI for his lumbar spine but with ICD, cannot have this done currently. He was given the above medications.  He has been doing his best to manage pain at home. We did discuss admission for pain management/consultation due to decline of functioning but patient declined.  He will follow up with [facility].  Small prescription for Percocet provided. PDMP reviewed.  Medrol dosepak.  Red flag signs/symptoms discussed.     Disposition  The patient was discharged.     ICD-10 Codes:    ICD-10-CM    1. Acute on chronic back pain  M54.9     G89.29       2. Intractable back pain  M54.9       3. Closed compression fracture of L2 lumbar vertebra, initial encounter (H)  S32.020A            Discharge Medications  Discharge Medication List as of 10/30/2024  3:38 PM        START taking these medications    Details   oxyCODONE-acetaminophen (PERCOCET) 5-325 MG tablet Take 1 tablet by mouth every 6 hours as needed for pain., Disp-12 tablet, R-0, E-Prescribe         Medrol dosepak      Jumana Copeland PA-C  October 30, 2024   [employer]        Jumana Copeland PA-C  10/30/24 1940

## 2024-10-30 NOTE — ED TRIAGE NOTES
Pt arrives with c/o acute on chronic back pain. Pt reports a few days of worsening pain. Pt reports he has a morphine pump and isn't sure it is fully working as he heard something snap while getting out of his truck about 1 week ago. Pt has a MRI approved but can't get it completed d/t having an AICD, a morphine pump, and other hardware.      Triage Assessment (Adult)       Row Name 10/30/24 1045          Triage Assessment    Airway WDL WDL        Respiratory WDL    Respiratory WDL WDL        Skin Circulation/Temperature WDL    Skin Circulation/Temperature WDL WDL        Cardiac WDL    Cardiac WDL WDL        Peripheral/Neurovascular WDL    Peripheral Neurovascular WDL WDL        Cognitive/Neuro/Behavioral WDL    Cognitive/Neuro/Behavioral WDL WDL

## 2024-10-30 NOTE — DISCHARGE INSTRUCTIONS
Percocet 1-2 tablets every 6 hours for pain  Continue with Aleve 2 tablets every 12 hours  Ice back  Follow up with [facility] pain clinic  Discussed option for admission for pain control, return if you reconsider     Discharge Instructions  Back Pain  You were seen today for back pain. Back pain can have many causes, but most will get better without surgery or other specific treatment. Sometimes there is a herniated ( slipped ) disc. We do not usually do MRI scans to look for these right away, since most herniated discs will get better on their own with time.  Today, we did not find any evidence that your back pain was caused by a serious condition. However, sometimes symptoms develop over time and cannot be found during an emergency visit, so it is very important that you follow up with your primary provider.  Generally, every Emergency Department visit should have a follow-up clinic visit with either a primary or a specialty clinic/provider. Please follow-up as instructed by your emergency provider today.    Return to the Emergency Department if:  You develop a fever with your back pain.   You have weakness or change in sensation in one or both legs.  You lose control of your bowels or bladder, or cannot empty your bladder (cannot pee).  Your pain gets much worse.     Follow-up with your provider:  Unless your pain has completely gone away, please make an appointment with your provider within one week. Most of the routine care for back pain is available in a clinic and not the Emergency Department. You may need further management of your back pain, such as more pain medication, imaging such as an X-ray or MRI, or physical therapy.    What can I do to help myself?  Remain Active -- People are often afraid that they will hurt their back further or delay recovery by remaining active, but this is one of the best things you can do for your back. In fact, staying in bed for a long time to rest is not recommended.  Studies have shown that people with low back pain recover faster when they remain active. Movement helps to bring blood flow to the muscles and relieve muscle spasms as well as preventing loss of muscle strength.  Heat -- Using a heating pad can help with low back pain during the first few weeks. Do not sleep with a heating pad, as you can be burned.   Pain medications - You may take a pain medication such as Tylenol  (acetaminophen), Advil , Motrin  (ibuprofen) or Aleve  (naproxen).  If you were given a prescription for medicine here today, be sure to read all of the information (including the package insert) that comes with your prescription.  This will include important information about the medicine, its side effects, and any warnings that you need to know about.  The pharmacist who fills the prescription can provide more information and answer questions you may have about the medicine.  If you have questions or concerns that the pharmacist cannot address, please call or return to the Emergency Department.   Remember that you can always come back to the Emergency Department if you are not able to see your regular provider in the amount of time listed above, if you get any new symptoms, or if there is anything that worries you.

## 2024-10-31 ENCOUNTER — HOSPITAL ENCOUNTER (EMERGENCY)
Facility: CLINIC | Age: 69
Discharge: ANOTHER HOSPITAL | End: 2024-10-31
Attending: EMERGENCY MEDICINE | Admitting: EMERGENCY MEDICINE
Payer: OTHER MISCELLANEOUS

## 2024-10-31 ENCOUNTER — HOSPITAL ENCOUNTER (INPATIENT)
Facility: CLINIC | Age: 69
LOS: 6 days | Discharge: NURSING, REHAB OR LONG-TERM CARE | DRG: 552 | End: 2024-11-06
Attending: HOSPITALIST | Admitting: HOSPITALIST
Payer: OTHER MISCELLANEOUS

## 2024-10-31 ENCOUNTER — APPOINTMENT (OUTPATIENT)
Dept: GENERAL RADIOLOGY | Facility: CLINIC | Age: 69
End: 2024-10-31
Attending: EMERGENCY MEDICINE
Payer: OTHER MISCELLANEOUS

## 2024-10-31 ENCOUNTER — APPOINTMENT (OUTPATIENT)
Dept: CT IMAGING | Facility: CLINIC | Age: 69
End: 2024-10-31
Attending: EMERGENCY MEDICINE
Payer: OTHER MISCELLANEOUS

## 2024-10-31 VITALS
HEART RATE: 60 BPM | TEMPERATURE: 97.6 F | HEIGHT: 72 IN | RESPIRATION RATE: 20 BRPM | SYSTOLIC BLOOD PRESSURE: 120 MMHG | BODY MASS INDEX: 36.79 KG/M2 | DIASTOLIC BLOOD PRESSURE: 71 MMHG | OXYGEN SATURATION: 96 % | WEIGHT: 271.61 LBS

## 2024-10-31 DIAGNOSIS — R60.0 BILATERAL LEG EDEMA: ICD-10-CM

## 2024-10-31 DIAGNOSIS — C91.10 CLL (CHRONIC LYMPHOCYTIC LEUKEMIA) (H): Primary | ICD-10-CM

## 2024-10-31 DIAGNOSIS — B37.2 CANDIDAL INTERTRIGO: ICD-10-CM

## 2024-10-31 DIAGNOSIS — S32.050A CLOSED WEDGE COMPRESSION FRACTURE OF L5 VERTEBRA, INITIAL ENCOUNTER (H): ICD-10-CM

## 2024-10-31 DIAGNOSIS — S32.000A CLOSED COMPRESSION FRACTURE OF LUMBOSACRAL SPINE, INITIAL ENCOUNTER (H): ICD-10-CM

## 2024-10-31 DIAGNOSIS — M54.50 ACUTE MIDLINE LOW BACK PAIN, UNSPECIFIED WHETHER SCIATICA PRESENT: ICD-10-CM

## 2024-10-31 LAB
ANION GAP SERPL CALCULATED.3IONS-SCNC: 19 MMOL/L (ref 7–15)
ATRIAL RATE - MUSE: 71 BPM
BASOPHILS # BLD AUTO: 0.1 10E3/UL (ref 0–0.2)
BASOPHILS NFR BLD AUTO: 0 %
BUN SERPL-MCNC: 35 MG/DL (ref 8–23)
CALCIUM SERPL-MCNC: 9.1 MG/DL (ref 8.8–10.4)
CHLORIDE SERPL-SCNC: 100 MMOL/L (ref 98–107)
CREAT SERPL-MCNC: 1.29 MG/DL (ref 0.67–1.17)
DIASTOLIC BLOOD PRESSURE - MUSE: NORMAL MMHG
EGFRCR SERPLBLD CKD-EPI 2021: 60 ML/MIN/1.73M2
EOSINOPHIL # BLD AUTO: 0.2 10E3/UL (ref 0–0.7)
EOSINOPHIL NFR BLD AUTO: 1 %
ERYTHROCYTE [DISTWIDTH] IN BLOOD BY AUTOMATED COUNT: 13.2 % (ref 10–15)
GLUCOSE SERPL-MCNC: 124 MG/DL (ref 70–99)
HCO3 SERPL-SCNC: 23 MMOL/L (ref 22–29)
HCT VFR BLD AUTO: 37.1 % (ref 40–53)
HGB BLD-MCNC: 12.2 G/DL (ref 13.3–17.7)
IMM GRANULOCYTES # BLD: 0.1 10E3/UL
IMM GRANULOCYTES NFR BLD: 0 %
INTERPRETATION ECG - MUSE: NORMAL
LYMPHOCYTES # BLD AUTO: 23.5 10E3/UL (ref 0.8–5.3)
LYMPHOCYTES NFR BLD AUTO: 76 %
MCH RBC QN AUTO: 28.8 PG (ref 26.5–33)
MCHC RBC AUTO-ENTMCNC: 32.9 G/DL (ref 31.5–36.5)
MCV RBC AUTO: 88 FL (ref 78–100)
MONOCYTES # BLD AUTO: 1.8 10E3/UL (ref 0–1.3)
MONOCYTES NFR BLD AUTO: 6 %
NEUTROPHILS # BLD AUTO: 5.3 10E3/UL (ref 1.6–8.3)
NEUTROPHILS NFR BLD AUTO: 17 %
NRBC # BLD AUTO: 0 10E3/UL
NRBC BLD AUTO-RTO: 0 /100
P AXIS - MUSE: 61 DEGREES
PLAT MORPH BLD: NORMAL
PLATELET # BLD AUTO: 186 10E3/UL (ref 150–450)
POTASSIUM SERPL-SCNC: 3.9 MMOL/L (ref 3.4–5.3)
PR INTERVAL - MUSE: 200 MS
QRS DURATION - MUSE: 86 MS
QT - MUSE: 434 MS
QTC - MUSE: 471 MS
R AXIS - MUSE: 54 DEGREES
RBC # BLD AUTO: 4.23 10E6/UL (ref 4.4–5.9)
RBC MORPH BLD: NORMAL
SODIUM SERPL-SCNC: 142 MMOL/L (ref 135–145)
SYSTOLIC BLOOD PRESSURE - MUSE: NORMAL MMHG
T AXIS - MUSE: 33 DEGREES
TROPONIN T SERPL HS-MCNC: 15 NG/L
TROPONIN T SERPL HS-MCNC: 15 NG/L
VENTRICULAR RATE- MUSE: 71 BPM
WBC # BLD AUTO: 30.9 10E3/UL (ref 4–11)

## 2024-10-31 PROCEDURE — 96375 TX/PRO/DX INJ NEW DRUG ADDON: CPT

## 2024-10-31 PROCEDURE — 71046 X-RAY EXAM CHEST 2 VIEWS: CPT

## 2024-10-31 PROCEDURE — 72131 CT LUMBAR SPINE W/O DYE: CPT

## 2024-10-31 PROCEDURE — 120N000001 HC R&B MED SURG/OB

## 2024-10-31 PROCEDURE — 250N000013 HC RX MED GY IP 250 OP 250 PS 637: Performed by: EMERGENCY MEDICINE

## 2024-10-31 PROCEDURE — 82435 ASSAY OF BLOOD CHLORIDE: CPT | Performed by: EMERGENCY MEDICINE

## 2024-10-31 PROCEDURE — 85025 COMPLETE CBC W/AUTO DIFF WBC: CPT | Performed by: EMERGENCY MEDICINE

## 2024-10-31 PROCEDURE — 80048 BASIC METABOLIC PNL TOTAL CA: CPT | Performed by: EMERGENCY MEDICINE

## 2024-10-31 PROCEDURE — 250N000013 HC RX MED GY IP 250 OP 250 PS 637: Performed by: HOSPITALIST

## 2024-10-31 PROCEDURE — 99285 EMERGENCY DEPT VISIT HI MDM: CPT | Mod: 25

## 2024-10-31 PROCEDURE — 84484 ASSAY OF TROPONIN QUANT: CPT | Performed by: EMERGENCY MEDICINE

## 2024-10-31 PROCEDURE — 96376 TX/PRO/DX INJ SAME DRUG ADON: CPT

## 2024-10-31 PROCEDURE — 250N000011 HC RX IP 250 OP 636: Mod: JZ | Performed by: EMERGENCY MEDICINE

## 2024-10-31 PROCEDURE — 99223 1ST HOSP IP/OBS HIGH 75: CPT | Performed by: HOSPITALIST

## 2024-10-31 PROCEDURE — 85041 AUTOMATED RBC COUNT: CPT | Performed by: EMERGENCY MEDICINE

## 2024-10-31 PROCEDURE — 96374 THER/PROPH/DIAG INJ IV PUSH: CPT

## 2024-10-31 PROCEDURE — 250N000011 HC RX IP 250 OP 636: Performed by: EMERGENCY MEDICINE

## 2024-10-31 PROCEDURE — 93005 ELECTROCARDIOGRAM TRACING: CPT | Mod: RTG

## 2024-10-31 PROCEDURE — 258N000003 HC RX IP 258 OP 636: Performed by: HOSPITALIST

## 2024-10-31 PROCEDURE — 36415 COLL VENOUS BLD VENIPUNCTURE: CPT | Performed by: EMERGENCY MEDICINE

## 2024-10-31 RX ORDER — GUAIFENESIN/DEXTROMETHORPHAN 100-10MG/5
10 SYRUP ORAL EVERY 4 HOURS PRN
Status: DISCONTINUED | OUTPATIENT
Start: 2024-10-31 | End: 2024-11-06 | Stop reason: HOSPADM

## 2024-10-31 RX ORDER — NITROGLYCERIN 0.4 MG/1
0.4 TABLET SUBLINGUAL EVERY 5 MIN PRN
Status: DISCONTINUED | OUTPATIENT
Start: 2024-10-31 | End: 2024-11-06 | Stop reason: HOSPADM

## 2024-10-31 RX ORDER — OXYCODONE HYDROCHLORIDE 5 MG/1
5 TABLET ORAL EVERY 4 HOURS PRN
Status: DISCONTINUED | OUTPATIENT
Start: 2024-10-31 | End: 2024-11-01

## 2024-10-31 RX ORDER — MORPHINE SULFATE 4 MG/ML
4 INJECTION, SOLUTION INTRAMUSCULAR; INTRAVENOUS ONCE
Status: COMPLETED | OUTPATIENT
Start: 2024-10-31 | End: 2024-10-31

## 2024-10-31 RX ORDER — NALOXONE HYDROCHLORIDE 0.4 MG/ML
0.2 INJECTION, SOLUTION INTRAMUSCULAR; INTRAVENOUS; SUBCUTANEOUS
Status: DISCONTINUED | OUTPATIENT
Start: 2024-10-31 | End: 2024-11-06 | Stop reason: HOSPADM

## 2024-10-31 RX ORDER — ONDANSETRON 4 MG/1
4 TABLET, ORALLY DISINTEGRATING ORAL EVERY 6 HOURS PRN
Status: DISCONTINUED | OUTPATIENT
Start: 2024-10-31 | End: 2024-11-06 | Stop reason: HOSPADM

## 2024-10-31 RX ORDER — HYDROMORPHONE HCL IN WATER/PF 6 MG/30 ML
0.4 PATIENT CONTROLLED ANALGESIA SYRINGE INTRAVENOUS
Status: DISCONTINUED | OUTPATIENT
Start: 2024-10-31 | End: 2024-11-01

## 2024-10-31 RX ORDER — AMOXICILLIN 250 MG
1 CAPSULE ORAL 2 TIMES DAILY PRN
Status: DISCONTINUED | OUTPATIENT
Start: 2024-10-31 | End: 2024-11-04

## 2024-10-31 RX ORDER — ROSUVASTATIN CALCIUM 20 MG/1
40 TABLET, COATED ORAL DAILY
Status: DISCONTINUED | OUTPATIENT
Start: 2024-11-01 | End: 2024-11-06 | Stop reason: HOSPADM

## 2024-10-31 RX ORDER — DULOXETIN HYDROCHLORIDE 30 MG/1
30 CAPSULE, DELAYED RELEASE ORAL EVERY MORNING
Status: DISCONTINUED | OUTPATIENT
Start: 2024-11-01 | End: 2024-11-06 | Stop reason: HOSPADM

## 2024-10-31 RX ORDER — SOTALOL HYDROCHLORIDE 80 MG/1
80 TABLET ORAL 2 TIMES DAILY
Status: DISCONTINUED | OUTPATIENT
Start: 2024-10-31 | End: 2024-11-06 | Stop reason: HOSPADM

## 2024-10-31 RX ORDER — CALCIUM CARBONATE 500 MG/1
1000 TABLET, CHEWABLE ORAL 4 TIMES DAILY PRN
Status: DISCONTINUED | OUTPATIENT
Start: 2024-10-31 | End: 2024-11-06 | Stop reason: HOSPADM

## 2024-10-31 RX ORDER — HYDROMORPHONE HYDROCHLORIDE 1 MG/ML
0.5 INJECTION, SOLUTION INTRAMUSCULAR; INTRAVENOUS; SUBCUTANEOUS ONCE
Status: COMPLETED | OUTPATIENT
Start: 2024-10-31 | End: 2024-10-31

## 2024-10-31 RX ORDER — HYDROMORPHONE HCL IN WATER/PF 6 MG/30 ML
0.2 PATIENT CONTROLLED ANALGESIA SYRINGE INTRAVENOUS
Status: DISCONTINUED | OUTPATIENT
Start: 2024-10-31 | End: 2024-11-01

## 2024-10-31 RX ORDER — BISACODYL 10 MG
10 SUPPOSITORY, RECTAL RECTAL DAILY PRN
Status: DISCONTINUED | OUTPATIENT
Start: 2024-10-31 | End: 2024-11-06 | Stop reason: HOSPADM

## 2024-10-31 RX ORDER — NALOXONE HYDROCHLORIDE 0.4 MG/ML
0.4 INJECTION, SOLUTION INTRAMUSCULAR; INTRAVENOUS; SUBCUTANEOUS
Status: DISCONTINUED | OUTPATIENT
Start: 2024-10-31 | End: 2024-11-06 | Stop reason: HOSPADM

## 2024-10-31 RX ORDER — LORAZEPAM 2 MG/ML
1 INJECTION INTRAMUSCULAR
Status: COMPLETED | OUTPATIENT
Start: 2024-10-31 | End: 2024-11-01

## 2024-10-31 RX ORDER — GABAPENTIN 100 MG/1
100 CAPSULE ORAL 3 TIMES DAILY
Status: DISCONTINUED | OUTPATIENT
Start: 2024-10-31 | End: 2024-10-31

## 2024-10-31 RX ORDER — MORPHINE SULFATE 4 MG/ML
4 INJECTION, SOLUTION INTRAMUSCULAR; INTRAVENOUS
Status: COMPLETED | OUTPATIENT
Start: 2024-10-31 | End: 2024-10-31

## 2024-10-31 RX ORDER — TRAZODONE HYDROCHLORIDE 100 MG/1
200 TABLET ORAL AT BEDTIME
Status: DISCONTINUED | OUTPATIENT
Start: 2024-10-31 | End: 2024-11-06 | Stop reason: HOSPADM

## 2024-10-31 RX ORDER — LIDOCAINE 4 G/G
1 PATCH TOPICAL ONCE
Status: DISCONTINUED | OUTPATIENT
Start: 2024-10-31 | End: 2024-10-31 | Stop reason: HOSPADM

## 2024-10-31 RX ORDER — ACETAMINOPHEN 325 MG/1
975 TABLET ORAL 3 TIMES DAILY
Status: DISCONTINUED | OUTPATIENT
Start: 2024-10-31 | End: 2024-11-04

## 2024-10-31 RX ORDER — ONDANSETRON 2 MG/ML
4 INJECTION INTRAMUSCULAR; INTRAVENOUS EVERY 6 HOURS PRN
Status: DISCONTINUED | OUTPATIENT
Start: 2024-10-31 | End: 2024-11-06 | Stop reason: HOSPADM

## 2024-10-31 RX ORDER — OXYCODONE HYDROCHLORIDE 5 MG/1
10 TABLET ORAL EVERY 4 HOURS PRN
Status: DISCONTINUED | OUTPATIENT
Start: 2024-10-31 | End: 2024-11-01

## 2024-10-31 RX ORDER — LIDOCAINE 4 G/G
1 PATCH TOPICAL
Status: DISCONTINUED | OUTPATIENT
Start: 2024-10-31 | End: 2024-11-01

## 2024-10-31 RX ORDER — CYCLOBENZAPRINE HCL 10 MG
10 TABLET ORAL EVERY 8 HOURS PRN
Status: DISCONTINUED | OUTPATIENT
Start: 2024-10-31 | End: 2024-11-01

## 2024-10-31 RX ORDER — METOPROLOL SUCCINATE 100 MG/1
100 TABLET, EXTENDED RELEASE ORAL 2 TIMES DAILY
Status: DISCONTINUED | OUTPATIENT
Start: 2024-10-31 | End: 2024-11-06 | Stop reason: HOSPADM

## 2024-10-31 RX ORDER — DULOXETIN HYDROCHLORIDE 60 MG/1
60 CAPSULE, DELAYED RELEASE ORAL AT BEDTIME
Status: DISCONTINUED | OUTPATIENT
Start: 2024-10-31 | End: 2024-11-06 | Stop reason: HOSPADM

## 2024-10-31 RX ORDER — AMOXICILLIN 250 MG
2 CAPSULE ORAL 2 TIMES DAILY PRN
Status: DISCONTINUED | OUTPATIENT
Start: 2024-10-31 | End: 2024-11-04

## 2024-10-31 RX ORDER — LIDOCAINE 40 MG/G
CREAM TOPICAL
Status: DISCONTINUED | OUTPATIENT
Start: 2024-10-31 | End: 2024-11-06 | Stop reason: HOSPADM

## 2024-10-31 RX ORDER — POLYETHYLENE GLYCOL 3350 17 G/17G
17 POWDER, FOR SOLUTION ORAL 2 TIMES DAILY PRN
Status: DISCONTINUED | OUTPATIENT
Start: 2024-10-31 | End: 2024-11-04

## 2024-10-31 RX ADMIN — ACETAMINOPHEN 975 MG: 325 TABLET, FILM COATED ORAL at 22:53

## 2024-10-31 RX ADMIN — MORPHINE SULFATE 4 MG: 4 INJECTION, SOLUTION INTRAMUSCULAR; INTRAVENOUS at 10:12

## 2024-10-31 RX ADMIN — MORPHINE SULFATE 4 MG: 4 INJECTION, SOLUTION INTRAMUSCULAR; INTRAVENOUS at 11:21

## 2024-10-31 RX ADMIN — MORPHINE SULFATE 4 MG: 4 INJECTION, SOLUTION INTRAMUSCULAR; INTRAVENOUS at 19:23

## 2024-10-31 RX ADMIN — SODIUM CHLORIDE 500 ML: 900 INJECTION, SOLUTION INTRAVENOUS at 22:55

## 2024-10-31 RX ADMIN — SOTALOL HYDROCHLORIDE 80 MG: 80 TABLET ORAL at 23:35

## 2024-10-31 RX ADMIN — LIDOCAINE 1 PATCH: 4 PATCH TOPICAL at 09:40

## 2024-10-31 RX ADMIN — LIDOCAINE 1 PATCH: 4 PATCH TOPICAL at 23:36

## 2024-10-31 RX ADMIN — HYDROMORPHONE HYDROCHLORIDE 0.5 MG: 1 INJECTION, SOLUTION INTRAMUSCULAR; INTRAVENOUS; SUBCUTANEOUS at 20:02

## 2024-10-31 RX ADMIN — DULOXETINE HYDROCHLORIDE 60 MG: 60 CAPSULE, DELAYED RELEASE ORAL at 23:35

## 2024-10-31 RX ADMIN — TRAZODONE HYDROCHLORIDE 200 MG: 100 TABLET ORAL at 23:35

## 2024-10-31 RX ADMIN — MORPHINE SULFATE 4 MG: 4 INJECTION, SOLUTION INTRAMUSCULAR; INTRAVENOUS at 09:40

## 2024-10-31 RX ADMIN — METOPROLOL SUCCINATE 100 MG: 100 TABLET, EXTENDED RELEASE ORAL at 23:35

## 2024-10-31 RX ADMIN — MORPHINE SULFATE 4 MG: 4 INJECTION, SOLUTION INTRAMUSCULAR; INTRAVENOUS at 13:36

## 2024-10-31 RX ADMIN — MORPHINE SULFATE 4 MG: 4 INJECTION, SOLUTION INTRAMUSCULAR; INTRAVENOUS at 17:59

## 2024-10-31 ASSESSMENT — ACTIVITIES OF DAILY LIVING (ADL)
ADLS_ACUITY_SCORE: 0

## 2024-10-31 ASSESSMENT — COLUMBIA-SUICIDE SEVERITY RATING SCALE - C-SSRS
2. HAVE YOU ACTUALLY HAD ANY THOUGHTS OF KILLING YOURSELF IN THE PAST MONTH?: NO
1. IN THE PAST MONTH, HAVE YOU WISHED YOU WERE DEAD OR WISHED YOU COULD GO TO SLEEP AND NOT WAKE UP?: NO
6. HAVE YOU EVER DONE ANYTHING, STARTED TO DO ANYTHING, OR PREPARED TO DO ANYTHING TO END YOUR LIFE?: NO

## 2024-10-31 NOTE — ED TRIAGE NOTES
Pt present with intractable back pain. Brought in from home via EMS. Pt has an extensive back pain and surgical history, noted pt has a morphine pump, pt is unsure if the pump is working as his pain has gotten worse. Notes no fall or new injury.  Pt was in our department for same chief complaint yesterday and do to pain pump and pacemaker MRI was unable to complete.   Pt was sent home with oxycodone and last dose last night 1 tab patient states. Endorses that it is not working.     Pt notes that aura legs are now numb and states that normally its just the left.

## 2024-10-31 NOTE — ED NOTES
"Pt yelling out and screaming, refusing to use call light. Pt yelled at nurse, \"you better get me water now or else I'm going to drink my urine.\" Rn explained to pt that we are waiting on doctors orders and that pt must use call light for assistance. Pt stated \"I don't care, I've been screaming for two whole days.\"  "

## 2024-10-31 NOTE — ED PROVIDER NOTES
Emergency Department Note      History of Present Illness     Chief Complaint   Back Pain    HPI   Onur Barr is a 69 year old male with a history of CHF, hypertension, MI x4, type 2 DM and chronic back pain who presents with evaluation of a back pain. He notes the pain started while getting out of his truck and bending down to pick something up a week and half ago. He mentions hearing a pop in the back. Over the past day, the pain seems to be getting worse. He describes pain as sharp and is shooting up from his ankle up to the back. He feels numbness to his back and legs along with it. He denies any trauma to the back or loss of bowel or bladder inconsistence. He took pain meds and ice application which minimally relived his symptoms. He is able to walk. He feels little nauseated and denies fever, chills, vomiting, diarrhea, abdominal pain, chest pain, shortness of breath or rash. Of note, he has a pain pump in place to administer morphine. He reports this is managed by [facility].    Independent Historian   None    Review of External Notes   Reviewed ER assessment from yesterday along with x-ray that was done.  Additionally reviewed oncology note from 10/14/2024 regarding patient's CLL.    Past Medical History     Medical History and Problem List   Abdominal panniculus  CHF  Chronic Back Pain  Chronic diastolic heart failure  Coronary arteriosclerosis  Coronary artery disease  Depressive disorder  Essential hypertension  Gastroesophageal reflux disease without esophagitis  ICD in place  Insomnia  Intestinal disaccharidase deficiencies and disaccharide malabsorption  Ischemic cardiomyopathy  Lumbago  Mixed hyperlipidemia  Morbid obesity  Nephrolithiasis  NSTEMI  Obstructive sleep apnea  Osteoarthritis  Osteoarthrosis  Paroxysmal atrial fibrillation  Paroxysmal ventricular tachycardia  Peripheral vascular disease  Post-laminectomy syndrome  Type 2 diabetes mellitus    Medications   acetaminophen    aspirin   Duloxetine   Ferate 240   furosemide  lisinopril   methylprednisolone   metoprolol succinate   nitroGLYcerin   ondansetron   oxycodone-acetaminophen   rosuvastatin  semaglutide  sotalol   trazodone     Surgical History   Bypass graft artery coronary   Cv coronary angiogram  Cv left heart cath  Cv pci   Cv pci aspiration thrombectomy  excise excess skin tissue,abdomen    repair incisional hernia,reducible   Extracorporeal shock wave lithotripsy  Ir nephrostomy tube change bilateral  Ir miscellaneous procedure  Back surgery  Bypass gastric duodenal switch  Colonoscopy  Cosmetic surgery   Ent surgery   Tonsillectomy  Hernia repair  Orthopedic surgery   discectomy,lumbar   Shoulder surgery    Physical Exam     Patient Vitals for the past 24 hrs:   BP Temp Temp src Pulse Resp SpO2 Height Weight   10/31/24 1345 94/62 -- -- 60 -- 97 % -- --   10/31/24 1315 97/61 -- -- 59 -- -- -- --   10/31/24 1300 98/61 -- -- 61 -- -- -- --   10/31/24 1245 97/60 -- -- 63 -- -- -- --   10/31/24 1230 106/55 -- -- 61 -- -- -- --   10/31/24 1215 101/60 -- -- 62 -- -- -- --   10/31/24 1200 96/52 -- -- 63 -- 97 % -- --   10/31/24 1017 102/66 -- -- 67 -- 100 % -- --   10/31/24 1000 119/80 -- -- 70 -- 100 % -- --   10/31/24 0945 109/79 -- -- 82 -- 100 % -- --   10/31/24 0939 -- -- -- -- -- 100 % -- --   10/31/24 0908 (!) 135/95 -- -- -- -- 100 % -- --   10/31/24 0904 -- 97.6  F (36.4  C) Oral 73 22 95 % 1.829 m (6') 123.2 kg (271 lb 9.7 oz)     Physical Exam  General: Resting on the bed.  Head: No obvious trauma to head.  Ears, Nose, Throat:  External ears normal.  Nose normal.   Neck: Normal range of motion.  Neck supple.   CV: Regular rate and rhythm.  No murmurs.      Respiratory: Effort normal and breath sounds normal.  No wheezing or crackles.   Gastrointestinal: Soft.  No distension. There is no tenderness.  There is no rigidity, no rebound and no guarding.   Musculoskeletal: Normal range of motion.  Non tender extremities to  palpations.  Reports tenderness to the midline palpation of the lumbar spine.  Nontender thoracic spine.  Neuro: Alert. Moving all extremities appropriately.  Normal speech.  No saddle anesthesia.  Reports sensation is intact lower extremities but reports some tingling below the knees bilaterally in a stocking pattern.  Able to wiggle feet.  Able to bend legs and help readjust/roll over in the ER.  Skin: Skin is warm and dry.  No rash noted.       Diagnostics     Lab Results   Labs Ordered and Resulted from Time of ED Arrival to Time of ED Departure   BASIC METABOLIC PANEL - Abnormal       Result Value    Sodium 142      Potassium 3.9      Chloride 100      Carbon Dioxide (CO2) 23      Anion Gap 19 (*)     Urea Nitrogen 35.0 (*)     Creatinine 1.29 (*)     GFR Estimate 60 (*)     Calcium 9.1      Glucose 124 (*)    CBC WITH PLATELETS AND DIFFERENTIAL - Abnormal    WBC Count 30.9 (*)     RBC Count 4.23 (*)     Hemoglobin 12.2 (*)     Hematocrit 37.1 (*)     MCV 88      MCH 28.8      MCHC 32.9      RDW 13.2      Platelet Count 186      % Neutrophils 17      % Lymphocytes 76      % Monocytes 6      % Eosinophils 1      % Basophils 0      % Immature Granulocytes 0      NRBCs per 100 WBC 0      Absolute Neutrophils 5.3      Absolute Lymphocytes 23.5 (*)     Absolute Monocytes 1.8 (*)     Absolute Eosinophils 0.2      Absolute Basophils 0.1      Absolute Immature Granulocytes 0.1      Absolute NRBCs 0.0     TROPONIN T, HIGH SENSITIVITY - Normal    Troponin T, High Sensitivity 15     TROPONIN T, HIGH SENSITIVITY - Normal    Troponin T, High Sensitivity 15     RBC AND PLATELET MORPHOLOGY    RBC Morphology Confirmed RBC Indices      Platelet Assessment        Value: Automated Count Confirmed. Platelet morphology is normal.       Imaging   XR Chest 2 Views   Final Result   IMPRESSION: No focal airspace opacities, pleural effusion or   pneumothorax. Normal heart size. Automatic implantable cardiac   defibrillator. Median  sternotomy. Left atrial appendage closure   device.      HUGO GOEL MD            SYSTEM ID:  [id number]      CT Lumbar Spine w/o Contrast   Final Result   IMPRESSION: Five lumbar type vertebrae. Anterior and posterior bony   fusion from L4 down to S1. There is a recent appearing nondisplaced   fracture through the fused spinal column involving the vertebral body   and posterior elements at the mid L5 level. No other recent fractures.   Chronic compression fracture deformities of the L1, L2 and L3   vertebral bodies again noted. No high-grade spinal canal stenosis.         CESARIO PRATHER MD            SYSTEM ID:  [id number]          EKG   ECG taken at 0924, ECG read at 0946  Normal sinus rhythm  Septal infarct. Age undetermined   No significant changes as compared to prior, dated 02/12/2024.  Rate 71 bpm. SD interval 200 ms. QRS duration 86 ms. QT/QTc 434/471 ms. P-R-T axes 61 54 33.    Independent Interpretation   CXR: No pneumothorax, infiltrate, pleural effusion, or pulmonary edema.    ED Course      Medications Administered   Medications   Lidocaine (LIDOCARE) 4 % Patch 1 patch (1 patch Transdermal $Patch/Med Applied 10/31/24 0940)   morphine (PF) injection 4 mg (4 mg Intravenous $Given 10/31/24 1121)   morphine (PF) injection 4 mg (4 mg Intravenous $Given 10/31/24 1336)       Procedures   Procedures     Discussion of Management   Admitting Hospitalist, see below  Neurosurgery, see below    ED Course   ED Course as of 10/31/24 1424   Thu Oct 31, 2024   0915 I obtained the history and examined the patient as above.     1120 I reassessed the patient.      1132 I spoke with Coral VILLAFUERTE from neurosurgery    1144 I spoke again with neurosurgery, who recommends bedrest, HOB no more than 30 degrees, tx to [facility].     1244 I spoke with Dr Molina accepting transfer to [facility]     Additional Documentation  None    Medical Decision Making / Diagnosis     CMS Diagnoses: None    MIPS       None    IVONNE WEBER  Cortney is a 69 year old male who presents to the ER with back pain.  Vital signs are reassuring.  Broad differential was considered including not limited to fracture, subluxation, epidural abscess, epidural hematoma, cauda equina, radiculopathy, etc.  Patient also notes some vague discomfort near his ICD site.  CBC shows chronic leukocytosis related to CLL.  No significant anemia.  BMP without acute electrolyte, metabolic or significant renal abnormality.  Troponin x 2 within normal notes.  EKG reassuring.  No signs acute ischemic change.  Pacemaker without any recent events.  CT lumbar spine shows fracture through the fused spinal column involving the anterior and posterior elements.  Unfortunately with ICD and pain pump uncertain if MRI compatible.  Regardless of consulted with neurosurgery who felt the patient should be transferred to [facility] for possible imaging and neurosurgery consultation.  Recommends bedrest, head of bed no more than 30 degrees, pain management.  Patient was transferred to [facility] for ongoing management.    Disposition   The patient was transferred to [facility] via EMS. Dr. Molina accepted the patient for transfer.     Diagnosis     ICD-10-CM    1. Acute midline low back pain, unspecified whether sciatica present  M54.50            Discharge Medications   New Prescriptions    No medications on file     Scribe Disclosure:  Solo REBOLLAR, am serving as a scribe at 9:48 AM on 10/31/2024 to document services personally performed by Nubia Khan MD based on my observations and the provider's statements to me.     Scribe Disclosure:  Johanne REBOLLAR, am serving as the scribe  for Solo Pham, the scribe trainee, at 9:59 AM on 10/31/2024 to document services personally performed by Nubia Khan MD based on our observations and the provider's statements to us.         Nubia Khan MD  10/31/24 [id number]

## 2024-10-31 NOTE — ED NOTES
Called [employer]  for pain device evaluation. Rep is being paged and  I am told they will call me urgently. Olive A Son, RN

## 2024-10-31 NOTE — PROGRESS NOTES
Transfer Type: [facility]  Transfer Triage Note    Date of call: 10/31/24  Time of call: 12:24 PM    Current Patient Location:  [facility]  Current Level of Care: ED  Ref: Dr. Khan    Vitals: Temp: 97.6  F (36.4  C) Temp src: Oral BP: 96/52 Pulse: 63   Resp: 22 SpO2: 97 % Height: 182.9 cm (6') Weight: 123.2 kg (271 lb 9.7 oz)  O2 Device: None (Room air) at    Diagnosis: L5 fracure  Reason for requested transfer: Further diagnostic work up, management, and consultation for specialized care   Isolation Needs: None    Care everywhere has been updated and reviewed: Yes  Necessary images have been sent through PACS: Yes    If patient is transferring for specialty care or specific procedure, the specialist required has reviewed the patient and is in agreement with transfer per documentation: Yes, Provider name: Dr. Carreno specialty with: Neurosurgery    Transfer accepted: Yes  Stability of Patient: Patient is vitally stable, with no critical labs, and will likely remain stable throughout the transfer process  Level of Care Needed: Med Surg  Telemetry Needed:  None  Expected Time of Arrival for Transfer: 0-8 hours  Arrival Location:  [facility]     Recommendations for Management and Stabilization: Not needed    Additional Comments:   Onur Barr is a 69 year old male with history of chronic back pain (IT pump in place with morphine, clonidine, and baclofen), post-laminectomy syndrome presenting today for acute on chronic low back pain over the last 1 week. Presented to [facility] on 10/30 without neurologic deficits and was unable to complete CT or MRI imaging due to kyphosis and discharged but returned on 10/31/2024 by ambulance for worsening pain and new LE numbness bilaterally.    Neurosurgery consulted by phone and recommended transfer to [facility] for MRI versus CT Myelogram with Neurosurgery evaluation at [facility].    Referring provider noted exam limited due to pain but LE movement  "in tact and sensation in tact but has \"tingling\" in LE bilaterally, no saddle parasthesias, incontinence or retention of bowel/bladder.    Recommendations to continue on transfer:  Bedrest  HOB max 30 deg    Of note:  [employer] coming to interrogate pain pump for dysfunction and determine MRI compatability - Pending  ICD associated pain - EKG ok and no acute concerns but awaiting interrogation bedside and MRI compatibility determination     captain updated.      Lashawn Molina MD  "

## 2024-10-31 NOTE — PROGRESS NOTES
"[facility] Neurosurgery  Telephone Note     Contacted by Dr. Khan at [facility] ED.      69M with a history of lumbar fusion many years ago who follows with [facility] and has pain pump presented to the ED with acute on chronic back pain after a \"crack\" in his back a week and a half ago. Now complaining of severe back pain and numbness in the legs below the knees.      Per ED:  Limited exam due to participation and pain.  Seen lifting legs straight in the air.     Imaging:  CT OF THE LUMBAR SPINE WITHOUT CONTRAST October 31, 2024 11:00 AM      HISTORY: Low back pain, felt a pop.     TECHNIQUE: Axial images of the lumbar spine were acquired without  intravenous contrast. Multiplanar reformations were created from the  axial source images. Radiation dose for this scan was reduced using  automated exposure control, adjustment of the mA and/or kV according  to patient size, or iterative reconstruction technique.     COMPARISON: Lumbar spine x-ray series 10/30/2024.                                                                      IMPRESSION: Five lumbar type vertebrae. Anterior and posterior bony  fusion from L4 down to S1. There is a recent appearing nondisplaced  fracture through the fused spinal column involving the vertebral body  and posterior elements at the mid L5 level. No other recent fractures.  Chronic compression fracture deformities of the L1, L2 and L3  vertebral bodies again noted. No high-grade spinal canal stenosis.       Plan:   -Transfer to [facility] for now  -Determine if pain pump is MRI compatible  -If not MRI compatible, would recommend CT myelogram of lumbar spine to further evaluate fracture    Discussed with Dr. Carreno who were in agreement with the plan.      Coral Kendall, CNP  [facility] Neurosurgery  [address] [place] [address]  [address] [place] [id number]  Tel [phone]  Fax [phone]  Text page via [employer] " Paging/Directory

## 2024-10-31 NOTE — ED NOTES
[employer] rep at bedside to assess pt's pain pump. Reports adequate volumes of medication present in pump w/ no identified malfunction. Reports pump IS MRI COMPATIBLE and if there are any additional questions we can call [employer].

## 2024-10-31 NOTE — ED NOTES
Received phone call from [facility] pain clinic and verified that pain pump rep will come within the hour

## 2024-10-31 NOTE — ED NOTES
"Patient has been awaiting transfer to [facility]. ED HUC was informed that a nurse would be calling US for nurse to nurse report. No call has been received. ED HUC called patient placement and received unit phone number. Attempted to call report and was told that the nurse would call back \"in two minutes.\" Continue to await transfer. Pt updated and understandably is frustrated with wait time.   "

## 2024-11-01 ENCOUNTER — PREP FOR PROCEDURE (OUTPATIENT)
Dept: NEUROLOGY | Facility: CLINIC | Age: 69
End: 2024-11-01

## 2024-11-01 ENCOUNTER — DOCUMENTATION ONLY (OUTPATIENT)
Dept: CARDIOLOGY | Facility: CLINIC | Age: 69
End: 2024-11-01
Payer: COMMERCIAL

## 2024-11-01 ENCOUNTER — APPOINTMENT (OUTPATIENT)
Dept: MRI IMAGING | Facility: CLINIC | Age: 69
DRG: 552 | End: 2024-11-01
Attending: HOSPITALIST
Payer: OTHER MISCELLANEOUS

## 2024-11-01 DIAGNOSIS — S32.058A OTHER CLOSED FRACTURE OF FIFTH LUMBAR VERTEBRA, INITIAL ENCOUNTER (H): Primary | ICD-10-CM

## 2024-11-01 DIAGNOSIS — Z98.1 S/P LUMBAR FUSION: ICD-10-CM

## 2024-11-01 LAB
ANION GAP SERPL CALCULATED.3IONS-SCNC: 16 MMOL/L (ref 7–15)
BASOPHILS # BLD AUTO: 0.1 10E3/UL (ref 0–0.2)
BASOPHILS NFR BLD AUTO: 0 %
BUN SERPL-MCNC: 23.6 MG/DL (ref 8–23)
CALCIUM SERPL-MCNC: 9.2 MG/DL (ref 8.8–10.4)
CHLORIDE SERPL-SCNC: 105 MMOL/L (ref 98–107)
CREAT SERPL-MCNC: 0.96 MG/DL (ref 0.67–1.17)
EGFRCR SERPLBLD CKD-EPI 2021: 86 ML/MIN/1.73M2
EOSINOPHIL # BLD AUTO: 0.2 10E3/UL (ref 0–0.7)
EOSINOPHIL NFR BLD AUTO: 1 %
ERYTHROCYTE [DISTWIDTH] IN BLOOD BY AUTOMATED COUNT: 13 % (ref 10–15)
GLUCOSE SERPL-MCNC: 107 MG/DL (ref 70–99)
HCO3 SERPL-SCNC: 20 MMOL/L (ref 22–29)
HCT VFR BLD AUTO: 33.6 % (ref 40–53)
HGB BLD-MCNC: 11.6 G/DL (ref 13.3–17.7)
IMM GRANULOCYTES # BLD: 0.1 10E3/UL
IMM GRANULOCYTES NFR BLD: 0 %
LYMPHOCYTES # BLD AUTO: 15.2 10E3/UL (ref 0.8–5.3)
LYMPHOCYTES NFR BLD AUTO: 70 %
MCH RBC QN AUTO: 29.4 PG (ref 26.5–33)
MCHC RBC AUTO-ENTMCNC: 34.5 G/DL (ref 31.5–36.5)
MCV RBC AUTO: 85 FL (ref 78–100)
MONOCYTES # BLD AUTO: 0.8 10E3/UL (ref 0–1.3)
MONOCYTES NFR BLD AUTO: 4 %
NEUTROPHILS # BLD AUTO: 5.6 10E3/UL (ref 1.6–8.3)
NEUTROPHILS NFR BLD AUTO: 26 %
NRBC # BLD AUTO: 0 10E3/UL
NRBC BLD AUTO-RTO: 0 /100
PLAT MORPH BLD: ABNORMAL
PLATELET # BLD AUTO: 141 10E3/UL (ref 150–450)
POTASSIUM SERPL-SCNC: 3.8 MMOL/L (ref 3.4–5.3)
RBC # BLD AUTO: 3.94 10E6/UL (ref 4.4–5.9)
RBC MORPH BLD: ABNORMAL
SMUDGE CELLS BLD QL SMEAR: PRESENT
SODIUM SERPL-SCNC: 141 MMOL/L (ref 135–145)
WBC # BLD AUTO: 21.8 10E3/UL (ref 4–11)

## 2024-11-01 PROCEDURE — 250N000011 HC RX IP 250 OP 636: Performed by: HOSPITALIST

## 2024-11-01 PROCEDURE — 250N000013 HC RX MED GY IP 250 OP 250 PS 637: Performed by: STUDENT IN AN ORGANIZED HEALTH CARE EDUCATION/TRAINING PROGRAM

## 2024-11-01 PROCEDURE — G1010 CDSM STANSON: HCPCS

## 2024-11-01 PROCEDURE — 36415 COLL VENOUS BLD VENIPUNCTURE: CPT | Performed by: HOSPITALIST

## 2024-11-01 PROCEDURE — 250N000013 HC RX MED GY IP 250 OP 250 PS 637: Performed by: NURSE PRACTITIONER

## 2024-11-01 PROCEDURE — 120N000001 HC R&B MED SURG/OB

## 2024-11-01 PROCEDURE — 250N000013 HC RX MED GY IP 250 OP 250 PS 637: Performed by: HOSPITALIST

## 2024-11-01 PROCEDURE — 62367 ANALYZE SPINE INFUS PUMP: CPT

## 2024-11-01 PROCEDURE — L0627 LO SAG RI AN/POS PNL PRE CST: HCPCS

## 2024-11-01 PROCEDURE — 99233 SBSQ HOSP IP/OBS HIGH 50: CPT | Performed by: STUDENT IN AN ORGANIZED HEALTH CARE EDUCATION/TRAINING PROGRAM

## 2024-11-01 PROCEDURE — 80048 BASIC METABOLIC PNL TOTAL CA: CPT | Performed by: HOSPITALIST

## 2024-11-01 PROCEDURE — 250N000011 HC RX IP 250 OP 636: Performed by: STUDENT IN AN ORGANIZED HEALTH CARE EDUCATION/TRAINING PROGRAM

## 2024-11-01 PROCEDURE — 85025 COMPLETE CBC W/AUTO DIFF WBC: CPT | Performed by: HOSPITALIST

## 2024-11-01 PROCEDURE — 99222 1ST HOSP IP/OBS MODERATE 55: CPT | Performed by: NURSE PRACTITIONER

## 2024-11-01 PROCEDURE — 99254 IP/OBS CNSLTJ NEW/EST MOD 60: CPT | Mod: 95 | Performed by: NURSE PRACTITIONER

## 2024-11-01 RX ORDER — GABAPENTIN 100 MG/1
200 CAPSULE ORAL 2 TIMES DAILY
Status: DISCONTINUED | OUTPATIENT
Start: 2024-11-01 | End: 2024-11-04

## 2024-11-01 RX ORDER — HYDROMORPHONE HYDROCHLORIDE 2 MG/1
2-4 TABLET ORAL EVERY 4 HOURS PRN
Status: DISCONTINUED | OUTPATIENT
Start: 2024-11-01 | End: 2024-11-06 | Stop reason: HOSPADM

## 2024-11-01 RX ORDER — HYDROMORPHONE HCL IN WATER/PF 6 MG/30 ML
.2-.4 PATIENT CONTROLLED ANALGESIA SYRINGE INTRAVENOUS EVERY 4 HOURS PRN
Status: DISCONTINUED | OUTPATIENT
Start: 2024-11-01 | End: 2024-11-04

## 2024-11-01 RX ORDER — CYCLOBENZAPRINE HCL 10 MG
10 TABLET ORAL EVERY 8 HOURS PRN
Status: DISCONTINUED | OUTPATIENT
Start: 2024-11-01 | End: 2024-11-01

## 2024-11-01 RX ORDER — LIDOCAINE 4 G/G
2 PATCH TOPICAL
Status: DISCONTINUED | OUTPATIENT
Start: 2024-11-01 | End: 2024-11-06 | Stop reason: HOSPADM

## 2024-11-01 RX ORDER — FUROSEMIDE 40 MG/1
40 TABLET ORAL 2 TIMES DAILY
Status: DISCONTINUED | OUTPATIENT
Start: 2024-11-01 | End: 2024-11-06 | Stop reason: HOSPADM

## 2024-11-01 RX ORDER — METHOCARBAMOL 750 MG/1
750 TABLET, FILM COATED ORAL 4 TIMES DAILY
Status: DISCONTINUED | OUTPATIENT
Start: 2024-11-01 | End: 2024-11-06 | Stop reason: HOSPADM

## 2024-11-01 RX ORDER — LISINOPRIL 10 MG/1
10 TABLET ORAL DAILY
Status: DISCONTINUED | OUTPATIENT
Start: 2024-11-02 | End: 2024-11-06 | Stop reason: HOSPADM

## 2024-11-01 RX ORDER — CALCITONIN SALMON 200 [IU]/.09ML
1 SPRAY, METERED NASAL DAILY
Status: DISCONTINUED | OUTPATIENT
Start: 2024-11-01 | End: 2024-11-06 | Stop reason: HOSPADM

## 2024-11-01 RX ADMIN — CYCLOBENZAPRINE 10 MG: 10 TABLET, FILM COATED ORAL at 09:36

## 2024-11-01 RX ADMIN — OXYCODONE HYDROCHLORIDE 10 MG: 5 TABLET ORAL at 05:06

## 2024-11-01 RX ADMIN — METOPROLOL SUCCINATE 100 MG: 100 TABLET, EXTENDED RELEASE ORAL at 20:46

## 2024-11-01 RX ADMIN — LIDOCAINE 2 PATCH: 4 PATCH TOPICAL at 20:42

## 2024-11-01 RX ADMIN — ROSUVASTATIN CALCIUM 40 MG: 20 TABLET, FILM COATED ORAL at 08:21

## 2024-11-01 RX ADMIN — SOTALOL HYDROCHLORIDE 80 MG: 80 TABLET ORAL at 08:21

## 2024-11-01 RX ADMIN — SOTALOL HYDROCHLORIDE 80 MG: 80 TABLET ORAL at 20:47

## 2024-11-01 RX ADMIN — LORAZEPAM 1 MG: 2 INJECTION INTRAMUSCULAR; INTRAVENOUS at 14:25

## 2024-11-01 RX ADMIN — METHOCARBAMOL 750 MG: 750 TABLET ORAL at 17:13

## 2024-11-01 RX ADMIN — DICLOFENAC 2 G: 10 GEL TOPICAL at 20:42

## 2024-11-01 RX ADMIN — METHOCARBAMOL 750 MG: 750 TABLET ORAL at 22:03

## 2024-11-01 RX ADMIN — HYDROMORPHONE HYDROCHLORIDE 2 MG: 1 INJECTION, SOLUTION INTRAMUSCULAR; INTRAVENOUS; SUBCUTANEOUS at 14:25

## 2024-11-01 RX ADMIN — ACETAMINOPHEN 975 MG: 325 TABLET, FILM COATED ORAL at 22:02

## 2024-11-01 RX ADMIN — METOPROLOL SUCCINATE 100 MG: 100 TABLET, EXTENDED RELEASE ORAL at 08:21

## 2024-11-01 RX ADMIN — CALCITONIN SALMON 1 SPRAY: 200 SPRAY, METERED NASAL at 17:15

## 2024-11-01 RX ADMIN — ACETAMINOPHEN 975 MG: 325 TABLET, FILM COATED ORAL at 17:13

## 2024-11-01 RX ADMIN — ACETAMINOPHEN 975 MG: 325 TABLET, FILM COATED ORAL at 08:21

## 2024-11-01 RX ADMIN — HYDROMORPHONE HYDROCHLORIDE 4 MG: 2 TABLET ORAL at 22:04

## 2024-11-01 RX ADMIN — HYDROMORPHONE HYDROCHLORIDE 0.4 MG: 0.2 INJECTION, SOLUTION INTRAMUSCULAR; INTRAVENOUS; SUBCUTANEOUS at 10:43

## 2024-11-01 RX ADMIN — DULOXETINE HYDROCHLORIDE 30 MG: 30 CAPSULE, DELAYED RELEASE ORAL at 08:21

## 2024-11-01 RX ADMIN — GABAPENTIN 200 MG: 100 CAPSULE ORAL at 20:46

## 2024-11-01 RX ADMIN — FUROSEMIDE 40 MG: 40 TABLET ORAL at 20:47

## 2024-11-01 RX ADMIN — DULOXETINE HYDROCHLORIDE 60 MG: 60 CAPSULE, DELAYED RELEASE ORAL at 22:03

## 2024-11-01 RX ADMIN — TRAZODONE HYDROCHLORIDE 200 MG: 100 TABLET ORAL at 22:02

## 2024-11-01 RX ADMIN — HYDROMORPHONE HYDROCHLORIDE 0.4 MG: 0.2 INJECTION, SOLUTION INTRAMUSCULAR; INTRAVENOUS; SUBCUTANEOUS at 08:17

## 2024-11-01 NOTE — PROGRESS NOTES
Is the implanted device safe for MRI Exam? Yes  Is this device 3T compatible? Yes  Device Type: ICD      Device Information: [employer], Dual Chamber and ICD Resonate (EL)                  Cardiology Orders for Device Programming     -- Yes -- The patient has a MRI conditional pulse generator and leads from the same     -- Yes -- The pulse generator and leads have been implanted for at least 6 weeks. (Does not apply to [employer] devices)    -- Yes-- The device is implanted in the right or left pectoral region    -- Yes -- There are not any additional active cardiac devices, abandoned leads, lead extenders or adapters    -- Yes -- The device lead impedance measurements are within the normal range per  guidelines    -- Yes -- If the patient is pacemaker dependent the thresholds are less than or equal to 2.0V @ 0.4ms.    -- Yes -- RA and RV leads are programmed to bipolar pacing operation or pacing off. If no, CONTACT THE DEVICE REP FOR PROGRAMMING     Date of last In-clinic Device check: 8/15/24  Results of last Device check:  1.   Right atrium impedance: 713 ohms  2.   Right ventricle impedance: 405 ohms       4.   Right atrium threshold: 0.6V@0.4ms  5.   Right ventricle threshold: 0.9V@0.4ms       Device programming during the scan guidelines   Pacing Mode (check one): DOO  Pacing Rate: 80  bpm or 20 above intrinsic (not to exceed 120 bpm)   If remote reprogramming is applicable, please contact the Rep to assist         Jennifer Arthur RN

## 2024-11-01 NOTE — H&P
"[facility]    History and Physical  Hospitalist       Date of Admission:  10/31/2024  Date of Service (when I saw the patient): 10/31/24    ASSESSMENT  Onur Barr is a markedly pleasant 69 year old gentleman with past medical history that is most notable for prior spine surgery, chronic pain status post intrathecal pump placement, CAD, ventricular tachycardia status post ICD placement, atrial fibrillation status post JUSTO occlusion, and CLL, among others; who presents with acute non-traumatic lower back pain, and is found to have acute pathologic lumbar spine fracture.    PLAN     Acute pathologic lumbar spine fracture: Of note, Mr. Barr is followed through O. He has chronic back pain and lumbago, having undergone prior lumbar spine fusion surgery in the 1990's (he also reports three other spine surgeries in or before that time). He has had an intrathecal pain pump in place for over twenty years (reportedly with morphine, clonidine, and baclofen), and has reportedly had post-laminectomy syndrome. He underwent pump replacement and intrathecal catheter replacement on 4/20/2023 (per that op note: \"Implants: All implants were [employer] products. The catheter lot number was [id number]. The SynchroMed II pump was model number 8637-40 and serial number [id number]. \").    Now, he presents for evaluation of acute non-traumatic lower back pain. In the ED, he is afebrile, without hypotension, tachycardia, or hypoxia. He has severe chronic leukocytosis to 30 k. He has mild CAROLYN as discussed below. Serial enzymes are negative. EKG shows NSR. CXR shows no acute cardiopulmonary pathology. Limited lumbar spine CT done without contrast shows a previous anterior and posterior bony fusion from L4 down to S1. There is a recent appearing nondisplaced fracture through the fused spinal column involving the vertebral body and posterior elements at the mid L5 level, without other recent fractures. Chronic " compression fracture deformities of the L1, L2 and L3 vertebral bodies are noted. No high-grade spinal canal stenosis is seen on this limited exam.    Overall, his symptoms are consistent with acute pathologic lumbar spine fracture. It is unclear if there has been damage to his pain pump. There are no clear signs of cord compression but we must monitor very carefully overnight.        -- Inpatient. NPO. Bedrest. Neurosurgery consulted. I have discussed the case with Neurosurgery tonight and guidance appreciated.    -- Per the MRI team, his pump and catheter are MRI compatible. MRI of the lower spine has been ordered. He expresses that he may not be able to complete any imaging unless he is sedated. I have ordered a prn dose of IV Ativan to be given prior to the procedure.    -- Multi-modal pain relief with Tylenol, prn Oxycodone and IV Dilaudid as needed for pain, as well as hot and cold, home Cymbalta, prn muscle relaxant, and Lidocaine patch. Anti-emetics as needed.     -- Note is made of history of diet controlled diabetes. If steroids are contemplated, would order ISS insulin while hospitalized    -- Close monitoring of neurologic status overnight; would get CT Myelogram for any emergent changes    -- Repeat CBC and BMP in AM. SW consulted for disposition planning.    CAROLYN: Suspect due to hypovolemia. IVF bolus ordered overnight. Repeat BMP in AM.    Recent Labs   Lab Test 10/31/24  0941 10/14/24  0918 08/14/24  1410   CR 1.29* 1.08 1.08     Chronic leukocytosis due to CLL: Diagnosed in 2023 and followed with serial observation by  Oncology.      -- Pending further evaluation of possible pathologic fracture as above; consider Oncology consultation if an underlying malignant cause of fracture becomes suspected    CAD: He had prior PCI in 2012. He suffered a STEMI in 2021 and underwent CRYSTAL to the RCA. Most recently he is status post 1 vessel CABG in 1/2022.      -- Resume ASA when verified and pending  "Neurosurgery guidance    History of paroxysmal atrial fibrillation: He underwent concomitant PVI and exclusion of the left atrial appendage in 1/2022, at the time of his CABG.      -- Telemetry ordered. Resume home Sotalol when verified.    History of chronic diastolic CHF and paroxysmal ventricular tachycardia: Due to ischemic cardiomyopathy. He has undergone ICD placement (dual chamber [employer]) on 1/4/2022. TTE in 3/2024 showed LVEF 57% and trace MR and TR.      -- Resume home BID Lasix when verified and once CAROLYN has resolved    MARLEEN: Not on CPAP as an outpatient. Monitor O2 sats closely while sleeping. Home Trazodone continued when verified    History of Lora-en-y gastric bypass surgery: In 2008. Noted. IV PPI ordered    Hypertension: Resume home Lisinopril when verified and CAROLYN has resolved. Resume home Toprol when verified.    Hyperlipidemia: Resume home Crestor when verified    Acute anemia: Mild. Monitor closely as above while hospitalized. Resume home oral iron when verified.    Recent Labs   Lab Test 10/31/24  0941 10/14/24  0918 08/14/24  1410   HGB 12.2* 14.2 14.3     Severe morbid obesity: Noted  Estimated body mass index is 36.84 kg/m  as calculated from the following:    Height as of an earlier encounter on 10/31/24: 1.829 m (6').    Weight as of an earlier encounter on 10/31/24: 123.2 kg (271 lb 9.7 oz).    I have spent 80 minutes on the date of service doing chart review, history, examination, documentation, and further activities per the note.    Chief Complaint   Back pain    History is obtained from the patient and the ED electronic medical record    History of Present Illness   Onur Barr is a markedly pleasant 69 year old gentleman who presents with back pain. He says he was stepping out of his truck about 10 days ago when he felt a \"clicking sound\" in his lower back. The mid lower back pain started soon afterward and has become constant, sharp, and severe, radiating down both legs " into the feet, associated with tingling in the feet. He was seen initially in the [facility] ED for further evaluation on 10/30/24. It was noted that he could not tolerate CT imaging due to pain from kyphosis, and that he had an MRI ordered but not yet done in part out of concern for his ICD and pain pump placement. An x-ray series of the lumbar spine showed age indeterminate compression fractures at L1 and  L2. He was given a Medrol dose nicho and narcotic relief and discharged. He returned to that ED today for worsening symptoms. He otherwise endorses being able to stand and walk currently though with great difficulty due to sever exacerbation of pain. He otherwise denies saddle anesthesia, bowel or bladder incontinence, upper back pain, fever, recent chest pain or dyspnea, or other acute complaints.    In the ED,   10/31/24 0904 135/95 97.6  F (36.4  C) Oral 73 22 95 %     CBC and BMP were notable for WBC 30.9, HGB 12.2, BUN 35.0, Cr 1.29, AG 19, Glucose 124, otherwise were within the normal reference range. Troponin T was 15 and 15 again. EKG showed NSR.    The case was discussed with Neurosurgery in the ED and he was given Morphine and a Lidocaine patch, and transferred here.    Recent Results (from the past 24 hours)   CT Lumbar Spine w/o Contrast    Narrative    CT OF THE LUMBAR SPINE WITHOUT CONTRAST October 31, 2024 11:00 AM     HISTORY: Low back pain, felt a pop.     TECHNIQUE: Axial images of the lumbar spine were acquired without  intravenous contrast. Multiplanar reformations were created from the  axial source images. Radiation dose for this scan was reduced using  automated exposure control, adjustment of the mA and/or kV according  to patient size, or iterative reconstruction technique.    COMPARISON: Lumbar spine x-ray series 10/30/2024.      Impression    IMPRESSION: Five lumbar type vertebrae. Anterior and posterior bony  fusion from L4 down to S1. There is a recent appearing nondisplaced  fracture  through the fused spinal column involving the vertebral body  and posterior elements at the mid L5 level. No other recent fractures.  Chronic compression fracture deformities of the L1, L2 and L3  vertebral bodies again noted. No high-grade spinal canal stenosis.      CESARIO PRATHER MD         SYSTEM ID:  [id number]   XR Chest 2 Views    Narrative    XR CHEST 2 VIEWS 10/31/2024 11:16 AM    HISTORY: chest pain    COMPARISON: 1/25/2022      Impression    IMPRESSION: No focal airspace opacities, pleural effusion or  pneumothorax. Normal heart size. Automatic implantable cardiac  defibrillator. Median sternotomy. Left atrial appendage closure  device.    HUGO GOEL MD         SYSTEM ID:  [id number]       PHYSICAL EXAM  Blood pressure (!) 144/89, pulse 61, temperature 98.9  F (37.2  C), temperature source Oral, resp. rate 20, SpO2 98%.  Constitutional: Alert and oriented to person, place and time; uncomfortable; lying on his left side  Respiratory: lungs clear to auscultation bilaterally  Cardiovascular: regular S1 S2  GI: abdomen soft non tender non distended bowel sounds positive  Musculoskeletal: no clubbing, cyanosis or edema  Neurologic: extra-ocular muscles intact; moves all four extremities; able to lift legs off the bed; neuro exam is significantly limited due to severe pain     DVT Prophylaxis: Pneumatic Compression Devices  Code Status: Full Code    Medically Ready for Discharge: Anticipated in 2-4 Days       Mesfin Everett MD, MD    Past Medical History    I have reviewed this patient's medical history and updated it with pertinent information if needed.   Past Medical History:   Diagnosis Date    Bariatric surgery status 06/23/2008    Formatting of this note might be different from the original. Created by Conversion    Bilateral leg edema 07/13/2021    Chronic Back Pain (IT Pump w Morphine, Clonidine, Baclofen) 01/16/2022    Chronic diastolic heart failure (H) 07/26/2021    Claudication of lower  extremity (H) 11/20/2019    Coronary artery disease     CABG 1-    Depressive disorder     Essential hypertension     Created by [facility] Annotation: Apr 6 2012 10:16Zack Harris: Lisinipril,  coreg  Replacement Utility updated for latest IMO load    Gastroesophageal reflux disease without esophagitis 09/11/2021    H/O gastric bypass 2008    History of blood transfusion 2004    ICD (implantable cardioverter-defibrillator) in place 01/25/2022    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    Ischemic cardiomyopathy     Lumbago     Created by [facility] Annotation: Apr 6 2012 10:20Anh Ford: Morphine pump     Mixed hyperlipidemia 06/23/2008    Morbid obesity (H) 08/01/2018    Nephrolithiasis     NSTEMI (non-ST elevated myocardial infarction) (H)     MARLEEN (doesn't tolerate CPAP) 07/26/2021    Osteoarthritis     Created by [facility] Annotation: Jun 26 2009  9:53Zack Harris: failed lumbar  fusion/morphine pump dr putnam  Replacement Utility updated for latest IMO load    Paroxysmal atrial fib -- S/P Pulm Vein Ablation 1/19/22 09/11/2021    Formatting of this note might be different from the original. Created by Conversion    Paroxysmal ventricular tachycardia (H)     dual chamber ICD 1/24/2022    Peripheral vascular disease (H) 05/03/2022    Post-laminectomy syndrome 11/25/2015    S/P CABG (coronary artery bypass graft) 01/25/2022    Type 2 diabetes mellitus (H)     Diet controlled after Gastric Bypass in 2008       Past Surgical History   I have reviewed this patient's surgical history and updated it with pertinent information if needed.  Past Surgical History:   Procedure Laterality Date    BACK SURGERY      BYPASS GASTRIC DUODENAL SWITCH      BYPASS GRAFT ARTERY CORONARY N/A 01/19/2022    Procedure: CORONARY ARTERY BYPASS GRAFT (CABG) X1; LIMA -LAD.  PULMONARY VEIN ISOLATION, AND ATRIAL APPENDAGE CLIPPING  USING 45MM ACTICURE CLIP.;  Surgeon: William Dumont MD;  Location:  OR    COLONOSCOPY      COSMETIC SURGERY      pannicullectomy    CV CORONARY ANGIOGRAM N/A 09/11/2021    Procedure: Coronary Angiogram;  Surgeon: Noris Ozuna MD;  Location: [facility] CATH LAB CV    CV HEART CATHETERIZATION WITH POSSIBLE INTERVENTION N/A 01/13/2022    Procedure: Heart Catheterization with Possible Intervention;  Surgeon: Liz Pearl MD;  Location:  HEART CARDIAC CATH LAB    CV LEFT HEART CATH N/A 09/11/2021    Procedure: Left Heart Cath;  Surgeon: Noris Ozuna MD;  Location: [facility] CATH LAB CV    CV PCI N/A 09/11/2021    Procedure: Percutaneous Coronary Intervention;  Surgeon: Noris Ozuna MD;  Location: [facility] CATH LAB CV    CV PCI N/A 10/07/2021    Procedure: Percutaneous Coronary Intervention;  Surgeon: Mckayla Dan MD;  Location: [facility] CV    CV PCI ASPIRATION THROMECTOMY N/A 09/11/2021    Procedure: Percutaneous Coronary Intervention Aspiration Thrombectomy;  Surgeon: Noris Ozuna MD;  Location: [facility] LAB CV    ENT SURGERY      tonsillectomy    EP ICD N/A 01/24/2022    Procedure: EP ICD;  Surgeon: Jannette Crook MD;  Location:  HEART CARDIAC CATH LAB    EXTRACORPOREAL SHOCK WAVE LITHOTRIPSY, CYSTOSCOPY, INSERT STENT URETER(S), COMBINED  08/23/2011    HC REMOVAL OF TONSILS,<14 Y/O      Description: Tonsillectomy;  Recorded: 03/23/2012;  Comments: for obstructive sleep apnea    HERNIA REPAIR      IR MISCELLANEOUS PROCEDURE  07/29/2011    IR MISCELLANEOUS PROCEDURE  08/05/2011    IR MISCELLANEOUS PROCEDURE  08/23/2011    IR NEPHROSTOMY TUBE CHANGE BILATERAL  08/05/2011    ORTHOPEDIC SURGERY      arthrodesis ant discectomy, lumbar    MD ARTHRODESIS ANT INTERBODY MIN DISCECTOMY,LUMBAR      Description: Lumbar Vertebral Fusion;  Recorded: 06/26/2009;  Comments: before 200 see [facility] consult under old records    MD EXCISE EXCESS SKIN TISSUE,ABDOMEN  11/13/2012    Description:  Panniculectomy;  Proc Date: 11/13/2012;  Comments: 3.5 Lb Pannus was removed at the [facility] By Dr. Shon Green    REPLACE INTRATHECAL PAIN PUMP N/A 04/25/2017    Procedure: REPLACE INTRATHECAL PAIN PUMP;  INTRATHECAL PAIN PUMP CATHETER REPLACEMENT AND PUMP REPLACEMENT;  Surgeon: Anthony Maloney MD;  Location:  [facility]    REPLACE INTRATHECAL PAIN PUMP N/A 4/20/2023    Procedure: Pump Replacement and intrathecal catheter replacement;  Surgeon: Anthony Dick MD;  Location:  OR    REVISE CATHETER INTRATHECAL N/A 04/25/2017    Procedure: REVISE CATHETER INTRATHECAL;;  Surgeon: Anthony Maloney MD;  Location:  [facility]    SHOULDER SURGERY      Z GASTRIC BYPASS,OBESE<100CM LORA-EN-Y  01/01/2008    Description: Gastric Surgery For Morbid Obesity Bypass With Lora-en-Y;  Recorded: 06/26/2009;  Comments: dr green 2008    [facility] REPAIR INCISIONAL HERNIA,REDUCIBLE  11/13/2012    Description: Incisional Hernia Repair;  Proc Date: 11/13/2012;  Comments: incisional hernia repair and abdominal panniculectomy by Dr Shon Green at the [facility].       Prior to Admission Medications   Prior to Admission Medications   Prescriptions Last Dose Informant Patient Reported? Taking?   DULoxetine (CYMBALTA) 30 MG capsule   No No   Sig: Take 1 capsule (30mg) every morning and 2 capsules (60mg) every evening   FERATE 240 (27 Fe) MG TABS   No No   Sig: TAKE 1 TABLET BY MOUTH EVERY DAY   acetaminophen (TYLENOL) 325 MG tablet   No No   Sig: Take 2 tablets (650 mg) by mouth every 4 hours as needed for mild pain   aspirin (ASA) 81 MG chewable tablet   Yes No   Sig: Take 81 mg by mouth daily   furosemide (LASIX) 40 MG tablet   No No   Sig: Take 1 tablet (40 mg) by mouth 2 times daily   ketoconazole (NIZORAL) 2 % external cream   No No   Sig: Apply topically 2 times daily. to affected area   lisinopril (ZESTRIL) 10 MG tablet   No No   Sig: Take 1 tablet (10 mg) by mouth daily   medication given by implanted intrathecal pump   Yes No    Sig: continuously. Updated by PharmD 10/31/24    Drug # 1: Morphine (Duramorph or Infumorph)  - Conc:20 mg/mL - Total Dose / 24 hours: 4.342 mg    Drug # 2: Clonidine (Duraclon)  - Conc:21.1 mcg/mL - Total Dose / 24 hours: 4.580 mcg    Drug # 3: Baclofen (Lioresal) - Conc: 42.5 mcg/mL - Total Dose / 24 hours: 9.226 mcg    Diluent: NS    Infusion Rate: 0.2171 mL/24 hrs  Pump Reservoir Volume: 40 mL  Outside Clinic & Provider: [facility] [phone]  Last Refill Date: 07/12/2024  Next Refill Date: 01/01/2025  [place] Alarm Date: 01/01/2025  Pump : ViRTUAL INTERACTiVE Synchromed II    Please consider consulting the pain team if the patient is admitted with an IT pump.   metoprolol succinate ER (TOPROL XL) 100 MG 24 hr tablet   No No   Sig: Take 1 tablet (100 mg) by mouth 2 times daily   nitroGLYcerin (NITROSTAT) 0.4 MG sublingual tablet  Self No No   Sig: For chest pain place 1 tablet under the tongue every 5 minutes for 3 doses. If symptoms persist 5 minutes after 1st dose call 911.   ondansetron (ZOFRAN ODT) 4 MG ODT tab   No No   Sig: Take 1 tablet (4 mg) by mouth every 8 hours as needed for nausea   oxyCODONE-acetaminophen (PERCOCET) 5-325 MG tablet   No No   Sig: Take 1 tablet by mouth every 6 hours as needed for pain.   rosuvastatin (CRESTOR) 40 MG tablet   No No   Sig: Take 1 tablet (40 mg) by mouth daily.   semaglutide (OZEMPIC, 0.25 OR 0.5 MG/DOSE,) 2 MG/3ML pen   No No   Sig: INJECT 0.5 MG SUBCUTANEOUS EVERY 7 DAYS   sotalol (BETAPACE) 80 MG tablet   No No   Sig: Take 1 tablet (80 mg) by mouth 2 times daily   traZODone (DESYREL) 100 MG tablet   No No   Sig: Take 2 tablets (200 mg) by mouth at bedtime TAKE 2 TABLETS (200MG TOTAL) BY MOUTH AT BEDTIME Strength: 100 mg      Facility-Administered Medications: None     Allergies   Allergies   Allergen Reactions    Hydrocodone-Acetaminophen Other (See Comments)     sneezing       Social History   I have reviewed this patient's social history  and updated it with pertinent information if needed. Onur WEBER Cortney  reports that he has quit smoking. His smoking use included cigars. He has never been exposed to tobacco smoke. He has never used smokeless tobacco. He reports that he does not drink alcohol and does not use drugs.    Family History   Family history assessed and, except as above, is non-contributory.    Family History   Problem Relation Age of Onset    Cerebrovascular Disease Mother     Heart Disease Father     Coronary Artery Disease Father     Hypertension Father     Hyperlipidemia Father     Hodgkin's lymphoma Brother     Breast Cancer Sister     Other Cancer Sister     Other Cancer Daughter         Carcinoide Tumor 2021       Review of Systems   The 10 point Review of Systems is negative other than noted in the HPI or here.     Primary Care Physician   oRge Feliciano    Data   Labs Ordered and Resulted from Time of ED Arrival to Time of ED Departure - No data to display    Data reviewed today:  I personally reviewed the EKG tracing showing NSR and the chest x-ray image(s) showing no acute pathology .

## 2024-11-01 NOTE — PROCEDURES
"Procedure Note - Intrathecal Pump Interrogation       Procedure:  Pump Check     Pre-Operative Diagnosis: presence of intrathecal pain pump     Post-Operative Diagnosis: Same     Indication: Presence of intrathecal pain pump     Procedure Details: The intended procedure, risks, and alternatives were discussed with the patient and informedconsent was obtained. \"Pause for the cause\" was utilized to identify correct patient and intended procedure. The pump was interrogated.      Findings: The patient's pump is filled with   Morphine concentration is 20.0mg/ML and is delivering 4.342mg/day   Baclofen with a concentration of 42.5mcg/ML delivering 9.226mcg/day  Clonidine concentration is 21.1 mcg/mL and delivering 4.580mg/day.     The patient is not due to have an alarm run off until 1/2/25.    Total volume: 40 mL   Current volume: 15.4 mL    Pain Clinic notified: [facility] [place]    Pump fill:  Not needed     Patient tolerated the procedure well.    Jamia CARSON, MUNIR-BC  Acute Care Pain Management Program   Hours of pain coverage Mon-Fri [id number], afterhours please call the house officer   [facility] ([place], [place], [place], [place])   Page via [employer]- Click HERE to page Jamia or Arpita text web console -Click for Arpita  "

## 2024-11-01 NOTE — PROGRESS NOTES
Received call from MRI requesting MRI Cardiology Clearance for patient.  Message routed to the device team to complete MRI Safety Clearance Form.    Inpatient MRI Phone number: [phone].    TAE Madden

## 2024-11-01 NOTE — CONSULTS
Care Management Initial Consult    General Information  Assessment completed with:  PatientBaudilio  Type of CM/SW Visit: Initial Assessment  Primary Care Provider verified and updated as needed: Yes (Roge Feliciano [phone])   Readmission within the last 30 days: no previous admission in last 30 days      Reason for Consult: discharge planning  Advance Care Planning:    no ACP documents on file in EPIC    Communication Assessment  Patient's communication style:    English    Cognitive  Cognitive/Neuro/Behavioral: WDL  Level of Consciousness: alert     Orientation: oriented x 4  Mood/Behavior: anxious, restless          Living Environment:   People in home: spouse  Krystal  Current living Arrangements: house      Able to return to prior arrangements: yes  Living Arrangement Comments: 2-3 steps to enter, then one level living for pt    Family/Social Support:  Care provided by:    Provides care for:    Marital Status:   Support system: Wife  Krystal       Description of Support System: Supportive, Involved    Support Assessment: Adequate family and caregiver support    Current Resources:   Patient receiving home care services: No  Community Resources: None  Equipment currently used at home: hospital bed, walker, standard, cane, straight  Supplies currently used at home: None    Employment/Financial:  Employment Status: retired     Financial Concerns:   n/a  Referral to Financial Worker: No  Finance Comments: active insurance: [employer] and MEDICARE / MEDICARE PART A ONLY  Does the patient's insurance plan have a 3 day qualifying hospital stay waiver?  No    Lifestyle & Psychosocial Needs:  Social Drivers of Health     Food Insecurity: Low Risk  (2/27/2024)    Food Insecurity     Within the past 12 months, did you worry that your food would run out before you got money to buy more?: No     Within the past 12 months, did the food you bought just not last and you didn t have money to get  more?: Patient declined   Depression: Not at risk (2/28/2024)    PHQ-2     PHQ-2 Score: 0   Housing Stability: Low Risk  (2/27/2024)    Housing Stability     Do you have housing? : Yes     Are you worried about losing your housing?: No   Tobacco Use: Medium Risk (10/14/2024)    Patient History     Smoking Tobacco Use: Former     Smokeless Tobacco Use: Never     Passive Exposure: Never   Financial Resource Strain: Low Risk  (2/27/2024)    Financial Resource Strain     Within the past 12 months, have you or your family members you live with been unable to get utilities (heat, electricity) when it was really needed?: No   Alcohol Use: Not on file   Transportation Needs: Low Risk  (2/27/2024)    Transportation Needs     Within the past 12 months, has lack of transportation kept you from medical appointments, getting your medicines, non-medical meetings or appointments, work, or from getting things that you need?: No   Physical Activity: Patient Declined (2/27/2024)    Exercise Vital Sign     Days of Exercise per Week: Patient declined     Minutes of Exercise per Session: Patient declined   Interpersonal Safety: Low Risk  (9/27/2024)    Interpersonal Safety     Do you feel physically and emotionally safe where you currently live?: Yes     Within the past 12 months, have you been hit, slapped, kicked or otherwise physically hurt by someone?: No     Within the past 12 months, have you been humiliated or emotionally abused in other ways by your partner or ex-partner?: No   Stress: No Stress Concern Present (2/27/2024)    [employer] - Occupational Stress Questionnaire     Feeling of Stress : Not at all   Social Connections: Unknown (2/27/2024)    Social Connection and Isolation Panel [NHANES]     Frequency of Communication with Friends and Family: Not on file     Frequency of Social Gatherings with Friends and Family: Once a week     Attends Anabaptist Services: Not on file     Active Member of  "Clubs or Organizations: Not on file     Attends Club or Organization Meetings: Not on file     Marital Status: Not on file   Health Literacy: Not on file     Functional Status:  Prior to admission patient needed assistance:   Dependent ADLs:: Independent  Dependent IADLs::  (wife assist)  Assesssment of Functional Status: Not at  functional baseline (see therapy assessment)    Mental Health Status:  Mental Health Status: No Current Concerns       Chemical Dependency Status:  Chemical Dependency Status: No Current Concerns       Values/Beliefs:  Spiritual, Cultural Beliefs, Worship Practices, Values that affect care: no          Values/Beliefs Comment: Nondenominational    Discussed  Partnership in Safe Discharge Planning  document with patient/family: No    Additional Information:  Met with patient in room, introduced self and role in discharge planning. Pt describes current back pain, and expresses wish to be \"put out.\"  CM-RN acknowledged difficulty of having pain, and though it is not my role, my understanding is that putting people \"out\" in the hospital usually requires ICU / ventilator, so he might need to find some middle ground with his care providers on that subject as it might not be medically appropriate.  Pt verbalized understanding.  Pt agreeable to CM assessment as above.  Confirmed the information in the above assessment, please see each section for helpful details.      Pt lives independently with his spouse in [place], 2-3 steps to enter the home then one-level living.  He states he has a hospital bed but is not sure where it is from.  He has a cane and a walker, and his wife is his biggest support, provides transportation.  She called briefly during the assessment to tell him she loves him, she won't be visiting today, and that he needs to be kind to the staff, pt said \"OK.\"  Pt indicates leading up to this hospitalization he went to the urgency room, and then was sent to the wrong hospital, before " ending up [facility] at [facility].  MESERET-RN acknowledged long pain history including having an implanted pain pump for many years.  Pt states his Pain Pump is managed at [facility] (not [facility]).  He is a retired  and states his back problems came from his job.  He is familiar with Home Health Care from prior home visits following heart surgery (Beti), but has no current home health care.      Next Steps: Continue to follow for discharge planning.     Lilian Cash RN, BSN, PHN  [facility]  Inpatient Care Management - FLOAT  Please reach out via vocera or contact   [facility] CM RN Mobile: [phone] daily 7:30-4:00

## 2024-11-01 NOTE — PROGRESS NOTES
S: Pt. seen at [facility]. Male. Narcisa CNP. RX: LSO OTS. DX: L5 compression Fx..  O:A: Today I F/D a Breg Epic LSO with extension to support L5 Fx. Donning doffing wear and care instructions given to Pt. and Wife.  P: Pt. to be seen as needed.    [place], FESTUS

## 2024-11-01 NOTE — PROVIDER NOTIFICATION
MD Notification    Notified Person: MD    Notified Person Name: STEPHANIE Stevenson    Notification Date/Time: 11/1 0825    Notification Interaction: Stublisher messaging     Purpose of Notification:   Pt is complaining of a lot of pain. Just gave 0.4 dilaudid. Could we try a muscle relaxer?     Orders Received: Flexeril ordered    Comments:

## 2024-11-01 NOTE — PHARMACY-ADMISSION MEDICATION HISTORY
Pharmacist Admission Medication History    Admission medication history is complete. The information provided in this note is only as accurate as the sources available at the time of the update.    Information Source(s): Patient, Clinic records, and CareEverywhere/SureScripts via phone    Pertinent Information: Pain pump info was updated at [facility] prior to transfer.    Changes made to PTA medication list:  Added: None  Deleted: ozempic  Changed: None    Allergies reviewed with patient and updates made in EHR: no    Medication History Completed By: Kylie Galarza RPH 10/31/2024 9:43 PM    PTA Med List   Medication Sig Last Dose/Taking    acetaminophen (TYLENOL) 325 MG tablet Take 2 tablets (650 mg) by mouth every 4 hours as needed for mild pain Taking As Needed    aspirin (ASA) 81 MG chewable tablet Take 81 mg by mouth daily Taking    DULoxetine (CYMBALTA) 30 MG capsule Take 1 capsule (30mg) every morning and 2 capsules (60mg) every evening Taking    FERATE 240 (27 Fe) MG TABS TAKE 1 TABLET BY MOUTH EVERY DAY Taking    furosemide (LASIX) 40 MG tablet Take 1 tablet (40 mg) by mouth 2 times daily Taking    ketoconazole (NIZORAL) 2 % external cream Apply topically 2 times daily. to affected area Taking    lisinopril (ZESTRIL) 10 MG tablet Take 1 tablet (10 mg) by mouth daily Taking    medication given by implanted intrathecal pump continuously. Updated by PharmD 10/31/24    Drug # 1: Morphine (Duramorph or Infumorph)  - Conc:20 mg/mL - Total Dose / 24 hours: 4.342 mg    Drug # 2: Clonidine (Duraclon)  - Conc:21.1 mcg/mL - Total Dose / 24 hours: 4.580 mcg    Drug # 3: Baclofen (Lioresal) - Conc: 42.5 mcg/mL - Total Dose / 24 hours: 9.226 mcg    Diluent: NS    Infusion Rate: 0.2171 mL/24 hrs  Pump Reservoir Volume: 40 mL  Outside Clinic & Provider: [facility] [phone]  Last Refill Date: 07/12/2024  Next Refill Date: 01/01/2025  Low [place] Alarm Date: 01/01/2025  Pump : [employer]  II    Please consider consulting the pain team if the patient is admitted with an IT pump. Taking    metoprolol succinate ER (TOPROL XL) 100 MG 24 hr tablet Take 1 tablet (100 mg) by mouth 2 times daily Taking    nitroGLYcerin (NITROSTAT) 0.4 MG sublingual tablet For chest pain place 1 tablet under the tongue every 5 minutes for 3 doses. If symptoms persist 5 minutes after 1st dose call 911. Taking    ondansetron (ZOFRAN ODT) 4 MG ODT tab Take 1 tablet (4 mg) by mouth every 8 hours as needed for nausea Taking As Needed    oxyCODONE-acetaminophen (PERCOCET) 5-325 MG tablet Take 1 tablet by mouth every 6 hours as needed for pain. Taking As Needed    rosuvastatin (CRESTOR) 40 MG tablet Take 1 tablet (40 mg) by mouth daily. Taking    sotalol (BETAPACE) 80 MG tablet Take 1 tablet (80 mg) by mouth 2 times daily Taking    traZODone (DESYREL) 100 MG tablet Take 2 tablets (200 mg) by mouth at bedtime TAKE 2 TABLETS (200MG TOTAL) BY MOUTH AT BEDTIME Strength: 100 mg Taking

## 2024-11-01 NOTE — PROGRESS NOTES
Patient had received 2 different medications before coming to MRI. Staff set his ICD to MRI mode. Patient also stated he will be able to get his Pain pump checked after the MRI. Patient had difficulty laying flat and in too much pain, he did not hold still for the MRI even though staff encouraged him to hold still. Staff called for medication but staff said they had given medication prior and no one available to bring meds right away due to shift change. When in the middle of AX T1 patient yelled in room to get out. MRI aborted then patient's ICD set back to normal mode. Transport brought the patient back to his room

## 2024-11-01 NOTE — PROVIDER NOTIFICATION
"MD Notification    Notified Person: NP    Notified Person Name: Tod Celaya    Notification Date/Time: 11/1/24 0828    Notification Interaction: Amcom    Purpose of Notification:   pt is on strict bedrest and he is not following the orders. He said that the only way he will lay down again is if we \"put him out.\"     Orders Received:    Comments:    "

## 2024-11-01 NOTE — PLAN OF CARE
Date & Time: 11/1/24 0202-0701  Orientation/Cognitive Concerns: A/O x4  Abnl VS/O2: VSS on RA  Pain Management: PRN IV dilaudid  Abnl Labs/BG: , WBC 21.8  Behavior/Aggression Tool Color: green  Mobility: Not OOB, TLSO brace on  Diet: NPO  Bowel/Bladder: continent  Drains/Devices: PIV SL  Test/Procedures: MRI in process  Anticipated DC date: pending

## 2024-11-01 NOTE — CONSULTS
"   NEUROSURGERY CONSULTATION NOTE    This consultation was requested by the emergency room department        Reason for Consultation  L5 fracture     History of Present Illness:  Onur Barr is a 69 year old male with history of chronic pain s/p multiple lumbar spinal fusions in the 1990's and placement of intrathecal pain pump  (reportedly with morphine, clonidine, and baclofen),  CAD on aspirin, ventricular tachycardia status post ICD placement, atrial fibrillation status post JUSTO occlusion, and CLL who presented to the ED for worsening back pain.  Patient states that 10 days ago he was stepping out of his trucked when he felt a \"clicking\" in his lower back.  The back pain started shortly thereafter and has become constant.  He also noted radicular pain in the posterior aspect of both legs.  Denies change in bowel or bladder function.  Given the uncontrolled pain, patient initially presented to ED at [facility] on 10/30/2024, a CT was ordered and patient could not tolerate and patient elected to discharge home with medrol dose pack.  He represented on 10/31/2024 and at that time a lumbar CT was successfully obtained which revealed a fracture involving the L5 vertebral body and the posterior elements.  Patient was subsequently transferred to [facility] for further evaluation and treatment.    Currently, the patient states he is experiencing severe low back pain with radiation to the bilateral lower extremities.  He states he will not be able to tolerated lying flat for MRI or CT myelogram imaging.      PAST MEDICAL HISTORY:   Past Medical History:   Diagnosis Date    Bariatric surgery status 06/23/2008    Formatting of this note might be different from the original. Created by Conversion    Bilateral leg edema 07/13/2021    Chronic Back Pain (IT Pump w Morphine, Clonidine, Baclofen) 01/16/2022    Chronic diastolic heart failure (H) 07/26/2021    Claudication of lower extremity (H) 11/20/2019    CLL (chronic " lymphocytic leukemia) (H)     Coronary artery disease     CABG 1-    Depressive disorder     Essential hypertension     Created by [employer] [facility] Annotation: Apr 6 2012 10:16Zack Harris: Lisinipril,  coreg  Replacement Utility updated for latest IMO load    Gastroesophageal reflux disease without esophagitis 09/11/2021    H/O gastric bypass 2008    History of blood transfusion 2004    ICD (implantable cardioverter-defibrillator) in place 01/25/2022    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    Ischemic cardiomyopathy     Lumbago     Created by [facility] [employer] [facility] Annotation: Apr 6 2012 10:20Anh Ford: Morphine pump     Mixed hyperlipidemia 06/23/2008    Morbid obesity (H) 08/01/2018    Nephrolithiasis     NSTEMI (non-ST elevated myocardial infarction) (H)     MARLEEN (doesn't tolerate CPAP) 07/26/2021    Osteoarthritis     Created by [facility] [employer] [facility] Annotation: Jun 26 2009  9:53Zack Harris: failed lumbar  fusion/morphine pump dr putnam  Replacement Utility updated for latest IMO load    Paroxysmal atrial fib -- S/P Pulm Vein Ablation 1/19/22 09/11/2021    Formatting of this note might be different from the original. Created by Conversion    Paroxysmal ventricular tachycardia (H)     dual chamber ICD 1/24/2022    Peripheral vascular disease (H) 05/03/2022    Post-laminectomy syndrome 11/25/2015    S/P CABG (coronary artery bypass graft) 01/25/2022    Type 2 diabetes mellitus (H)     Diet controlled after Gastric Bypass in 2008       PAST SURGICAL HISTORY:   Past Surgical History:   Procedure Laterality Date    BACK SURGERY      BYPASS GASTRIC DUODENAL SWITCH      BYPASS GRAFT ARTERY CORONARY N/A 01/19/2022    Procedure: CORONARY ARTERY BYPASS GRAFT (CABG) X1; LIMA -LAD.  PULMONARY VEIN ISOLATION, AND ATRIAL APPENDAGE CLIPPING USING 45MM ACTICURE CLIP.;  Surgeon: William Dumont MD;  Location: [facility] OR    COLONOSCOPY       COSMETIC SURGERY      pannicullectomy    CV CORONARY ANGIOGRAM N/A 09/11/2021    Procedure: Coronary Angiogram;  Surgeon: Noris Ozuna MD;  Location: [facility] CV    CV HEART CATHETERIZATION WITH POSSIBLE INTERVENTION N/A 01/13/2022    Procedure: Heart Catheterization with Possible Intervention;  Surgeon: Liz Pearl MD;  Location:  HEART CARDIAC CATH LAB    CV LEFT HEART CATH N/A 09/11/2021    Procedure: Left Heart Cath;  Surgeon: Noris Ozuna MD;  Location: [facility] LAB CV    CV PCI N/A 09/11/2021    Procedure: Percutaneous Coronary Intervention;  Surgeon: Noris Ozuna MD;  Location: [facility] CATH LAB CV    CV PCI N/A 10/07/2021    Procedure: Percutaneous Coronary Intervention;  Surgeon: Mckayla Dan MD;  Location: [facility] CV    CV PCI ASPIRATION THROMECTOMY N/A 09/11/2021    Procedure: Percutaneous Coronary Intervention Aspiration Thrombectomy;  Surgeon: Noris Ozuna MD;  Location: [facility]    ENT SURGERY      tonsillectomy    EP ICD N/A 01/24/2022    Procedure: EP ICD;  Surgeon: Jannette Crook MD;  Location:  HEART CARDIAC CATH LAB    EXTRACORPOREAL SHOCK WAVE LITHOTRIPSY, CYSTOSCOPY, INSERT STENT URETER(S), COMBINED  08/23/2011    HC REMOVAL OF TONSILS,<11 Y/O      Description: Tonsillectomy;  Recorded: 03/23/2012;  Comments: for obstructive sleep apnea    HERNIA REPAIR      IR MISCELLANEOUS PROCEDURE  07/29/2011    IR MISCELLANEOUS PROCEDURE  08/05/2011    IR MISCELLANEOUS PROCEDURE  08/23/2011    IR NEPHROSTOMY TUBE CHANGE BILATERAL  08/05/2011    ORTHOPEDIC SURGERY      arthrodesis ant discectomy, lumbar    RI ARTHRODESIS ANT INTERBODY MIN DISCECTOMY,LUMBAR      Description: Lumbar Vertebral Fusion;  Recorded: 06/26/2009;  Comments: before 200 see [facility] consult under old records    RI EXCISE EXCESS SKIN TISSUE,ABDOMEN  11/13/2012    Description: Panniculectomy;  Proc Date: 11/13/2012;  Comments: 3.5 Lb Pannus was removed at the [facility] By  Dr. Shon Green    REPLACE INTRATHECAL PAIN PUMP N/A 04/25/2017    Procedure: REPLACE INTRATHECAL PAIN PUMP;  INTRATHECAL PAIN PUMP CATHETER REPLACEMENT AND PUMP REPLACEMENT;  Surgeon: Anthony Maloney MD;  Location: [facility]    REPLACE INTRATHECAL PAIN PUMP N/A 4/20/2023    Procedure: Pump Replacement and intrathecal catheter replacement;  Surgeon: Anthony Dick MD;  Location:  OR    REVISE CATHETER INTRATHECAL N/A 04/25/2017    Procedure: REVISE CATHETER INTRATHECAL;;  Surgeon: Anthony Maloney MD;  Location: [facility]    SHOULDER SURGERY      ZZC GASTRIC BYPASS,OBESE<100CM LORA-EN-Y  01/01/2008    Description: Gastric Surgery For Morbid Obesity Bypass With Lora-en-Y;  Recorded: 06/26/2009;  Comments: dr green 2008    [facility] REPAIR INCISIONAL HERNIA,REDUCIBLE  11/13/2012    Description: Incisional Hernia Repair;  Proc Date: 11/13/2012;  Comments: incisional hernia repair and abdominal panniculectomy by Dr Shon Green at the [facility].       FAMILY HISTORY:   Family History   Problem Relation Age of Onset    Cerebrovascular Disease Mother     Heart Disease Father     Coronary Artery Disease Father     Hypertension Father     Hyperlipidemia Father     Hodgkin's lymphoma Brother     Breast Cancer Sister     Other Cancer Sister     Other Cancer Daughter         Carcinoide Tumor 2021       SOCIAL HISTORY:   Social History     Tobacco Use    Smoking status: Former     Types: Cigars     Passive exposure: Never    Smokeless tobacco: Never    Tobacco comments:     Haven't smoked in years   Substance Use Topics    Alcohol use: No       MEDICATIONS:  No current outpatient medications on file.       Allergies:  Allergies   Allergen Reactions    Hydrocodone-Acetaminophen Other (See Comments)     sneezing         ROS: 10 point ROS of systems including Constitutional, Eyes, Respiratory, Cardiovascular, Gastroenterology, Genitourinary, Integumentary, Muscularskeletal, Psychiatric were all negative except for pertinent  positives noted in my HPI.    EXAM:  BP (!) 148/91 (BP Location: Right arm)   Pulse 66   Temp 97.5  F (36.4  C) (Oral)   Resp 18   SpO2 99%     Neuro:   AAOx3, NAD.   PERRL, EOMI. Face symmetric, tongue midline. Uvula midline, palate elevated symmetrically.   Motor: patient refuses exam, however will move feet    Sensation: intact to light touch and pinprick throughout.           IMAGING:  CT OF THE LUMBAR SPINE WITHOUT CONTRAST October 31, 2024 11:00 AM      HISTORY: Low back pain, felt a pop.     TECHNIQUE: Axial images of the lumbar spine were acquired without  intravenous contrast. Multiplanar reformations were created from the  axial source images. Radiation dose for this scan was reduced using  automated exposure control, adjustment of the mA and/or kV according  to patient size, or iterative reconstruction technique.     COMPARISON: Lumbar spine x-ray series 10/30/2024.                                                                      IMPRESSION: Five lumbar type vertebrae. Anterior and posterior bony  fusion from L4 down to S1. There is a recent appearing nondisplaced  fracture through the fused spinal column involving the vertebral body  and posterior elements at the mid L5 level. No other recent fractures.  Chronic compression fracture deformities of the L1, L2 and L3  vertebral bodies again noted. No high-grade spinal canal stenosis.       ASSESSMENT:  69 year old male with fracture of the L5 vertebral body extending to the posterior elements     RECOMMENDATIONS:  - After discussion with Dr. Carreno, it is felt this fracture may potentially heal with conservative management.  Will attempt to fit patient with LSO brace and mobilize patient.   If patient tolerates, would repeat lumbar CT scan the following day.   If patient is unable to tolerate, recommend proceeding with MRI of the lumbar spine (will likely need anesthesia for imaging).    - Continue NPO until it is determined if patient will require  intubation for imaging  - Ok to sit up and mobilize as able  - Further recommendations/intervention pending response to the plan listed above   - Please hold all antiplatelet and anticoagulant medications until final neurosurgical plan is determined.    - Remainder of cares per primary team    The patient was discussed with Dr. Ricky Carreno, neurosurgery staff.  -----------------------------------  YAMILETH Morales, CNP  Department of Neurosurgery  Pager: [phone]

## 2024-11-01 NOTE — PROGRESS NOTES
"[facility]    Medicine Progress Note - Hospitalist Service    Date of Admission:  10/31/2024  Date of Service: 11/01/2024    Assessment & Plan      Onur Barr is a markedly pleasant 69 year old gentleman with past medical history that is most notable for prior spine surgery, chronic pain status post intrathecal pump placement, CAD, ventricular tachycardia status post ICD placement, atrial fibrillation status post JUSTO occlusion, and CLL, among others; who presents with acute non-traumatic lower back pain, and is found to have acute pathologic lumbar spine fracture.     PLAN      Acute pathologic lumbar spine fracture: Of note, Mr. Barr is followed through [facility]. He has chronic back pain and lumbago, having undergone prior lumbar spine fusion surgery in the 1990's (he also reports three other spine surgeries in or before that time). He has had an intrathecal pain pump in place for over twenty years (reportedly with morphine, clonidine, and baclofen), and has reportedly had post-laminectomy syndrome. He underwent pump replacement and intrathecal catheter replacement on 4/20/2023 (per that op note: \"Implants: All implants were [employer] products. The catheter lot number was [id number]. The SynchroMed II pump was model number 8637-40 and serial number [id number]. \").     Now, he presents for evaluation of acute non-traumatic lower back pain. In the ED, he is afebrile, without hypotension, tachycardia, or hypoxia. He has severe chronic leukocytosis to 30 k. He has mild CAROLYN as discussed below. Serial enzymes are negative. EKG shows NSR. CXR shows no acute cardiopulmonary pathology. Limited lumbar spine CT done without contrast shows a previous anterior and posterior bony fusion from L4 down to S1. There is a recent appearing nondisplaced fracture through the fused spinal column involving the vertebral body and posterior elements at the mid L5 level, without other recent fractures. Chronic " compression fracture deformities of the L1, L2 and L3 vertebral bodies are noted. No high-grade spinal canal stenosis is seen on this limited exam.     Overall, his symptoms are consistent with acute pathologic lumbar spine fracture. It is unclear if there has been damage to his pain pump. There are no clear signs of cord compression but we must monitor very carefully overnight.        -- NPO.     -- Bedrest. Neurosurgery consulted. I have discussed the case with Neurosurgery tonight and guidance appreciated.    -- Per the MRI team, his pump and catheter are MRI compatible. MRI of the lower spine has been ordered. He expresses that he may not be able to complete any imaging unless he is sedated. I have ordered a prn dose of IV Ativan / IV Dilaudid to be given prior to the procedure.    -- Multi-modal pain relief with Tylenol, prn Oxycodone and IV Dilaudid as needed for pain, as well as hot and cold, home Cymbalta, prn muscle relaxant, and Lidocaine patch. Anti-emetics as needed.     -- Pain service consulted    -- Note is made of history of diet controlled diabetes. If steroids are contemplated, would order ISS insulin while hospitalized    -- Close monitoring of neurologic status overnight; would get CT Myelogram for any emergent changes    -- Repeat CBC and BMP in AM.     -- SW consulted for disposition planning.     CAROLYN: Suspect due to hypovolemia. IVF bolus ordered overnight -> Cr now wnl. Repeat BMP in AM.     Chronic leukocytosis due to CLL: Diagnosed in 2023 and followed with serial observation by [facility].       -- Pending further evaluation of possible pathologic fracture as above; consider Oncology consultation if an underlying malignant cause of fracture becomes suspected     CAD: He had prior PCI in 2012. He suffered a STEMI in 2021 and underwent CRYSTAL to the RCA. Most recently he is status post 1 vessel CABG in 1/2022.       -- Resume ASA pending Neurosurgery guidance     History of paroxysmal atrial  fibrillation: He underwent concomitant PVI and exclusion of the left atrial appendage in 1/2022, at the time of his CABG.    -- home Sotalol when verified.     History of chronic diastolic CHF and paroxysmal ventricular tachycardia: Due to ischemic cardiomyopathy. He has undergone ICD placement (dual chamber [employer]) on 1/4/2022. TTE in 3/2024 showed LVEF 57% and trace MR and TR.       -- Resume home BID Lasix      MARLEEN: Not on CPAP as an outpatient. Monitor O2 sats closely while sleeping. Home Trazodone continued when verified     History of Lora-en-y gastric bypass surgery: In 2008. Noted. IV PPI ordered     Hypertension: Resume home Lisinopril when verified and CAROLYN has resolved. Resume home Toprol when verified.     Hyperlipidemia: Resume home Crestor      Acute anemia: Mild. Monitor closely as above while hospitalized. Resume home oral iron at discharge          Diet: NPO for Medical/Clinical Reasons Except for: Meds, Ice Chips    DVT Prophylaxis: Pneumatic Compression Devices  Bran Catheter: Not present  Lines: None     Cardiac Monitoring: ACTIVE order. Indication: Tachyarrhythmias, acute (48 hours)  Code Status: Full Code      Clinically Significant Risk Factors Present on Admission              # Anion Gap Metabolic Acidosis: Highest Anion Gap = 19 mmol/L in last 2 days, will monitor and treat as appropriate    # Drug Induced Platelet Defect: home medication list includes an antiplatelet medication   # Hypertension: Noted on problem list         # Obesity: Estimated body mass index is 36.84 kg/m  as calculated from the following:    Height as of an earlier encounter on 10/31/24: 1.829 m (6').    Weight as of an earlier encounter on 10/31/24: 123.2 kg (271 lb 9.7 oz).        # ICD device present  # History of CABG: noted on surgical history       Disposition Plan     Medically Ready for Discharge: Anticipated in 2-4 Days             Gwyn Stevenson MD  Hospitalist [facility]  [facility]  Securely message with [employer] (more info)  Text page via [facility] Paging/Directory   ______________________________________________________________________    Interval History     Main issue is back pain  Feels he wont be able to tolerate MRI and needs anesthesia  No fevers  No new weakness or numbness  No nausea / vomiting   No new complaints       Physical Exam   Vital Signs: Temp: 97.5  F (36.4  C) Temp src: Oral BP: (!) 148/91 Pulse: 66   Resp: 18 SpO2: 99 % O2 Device: None (Room air)    Weight: 0 lbs 0 oz    Constitutional: Alert and oriented to person, place and time; uncomfortable; lying on his left side  Respiratory: lungs clear to auscultation bilaterally  Cardiovascular: regular S1 S2  GI: abdomen soft non tender non distended bowel sounds positive  Musculoskeletal: no clubbing, cyanosis or edema  Neurologic: extra-ocular muscles intact; moves all four extremities; able to lift legs off the bed; neuro exam is significantly limited due to severe pain       ----------------------------------------------------------------------------------------    Medical Decision Making       50 MINUTES SPENT BY ME on the date of service doing chart review, history, exam, documentation & further activities per the note.      Data   ------------------------- PAST 24 HR DATA REVIEWED -----------------------------------------------    I have personally reviewed the following data over the past 24 hrs:    21.8 (H)  \   11.6 (L)   / 141 (L)     141 105 23.6 (H) /  107 (H)   3.8 20 (L) 0.96 \       Imaging results reviewed over the past 24 hrs:   No results found for this or any previous visit (from the past 24 hours).  ------------------------- ENCOUNTER LABS ----------------------------------------------------------------  Recent Labs   Lab 11/01/24  1104 10/31/24  0941   WBC 21.8* 30.9*   HGB 11.6* 12.2*   MCV 85 88   * 186    142   POTASSIUM 3.8 3.9   CHLORIDE 105 100   CO2 20* 23   BUN 23.6* 35.0*   CR  0.96 1.29*   ANIONGAP 16* 19*   RATNA 9.2 9.1   * 124*       Most Recent 3 CBC's:  Recent Labs   Lab Test 11/01/24  1104 10/31/24  0941 10/14/24  0918   WBC 21.8* 30.9* 36.8*   HGB 11.6* 12.2* 14.2   MCV 85 88 90   * 186 167     Most Recent 3 BMP's:  Recent Labs   Lab Test 11/01/24  1104 10/31/24  0941 10/14/24  0918    142 146*   POTASSIUM 3.8 3.9 4.2   CHLORIDE 105 100 105   CO2 20* 23 29   BUN 23.6* 35.0* 18.5   CR 0.96 1.29* 1.08   ANIONGAP 16* 19* 12   RATNA 9.2 9.1 9.7   * 124* 125*     Most Recent 2 LFT's:  Recent Labs   Lab Test 10/14/24  0918 04/17/24  0913   AST 30 20   ALT 15 <5   ALKPHOS 76 65   BILITOTAL 0.5 0.6     Most Recent 3 INR's:  Recent Labs   Lab Test 04/20/23  0629 01/19/22  1235 01/19/22  1040   INR 1.10 1.31* 1.57*     Most Recent 3 Troponin's:  Recent Labs   Lab Test 07/13/21  1536   TROPONIN <0.015     Most Recent 3 BNP's:  Recent Labs   Lab Test 01/13/22  0546 07/13/21  1536   NTBNPI 2,463* 58     Most Recent D-dimer:No lab results found.  Most Recent Cholesterol Panel:  Recent Labs   Lab Test 10/14/24  0918   CHOL 104   LDL 29   HDL 34*   TRIG 203*     Most Recent 6 Bacteria Isolates From Any Culture (See EPIC Reports for Culture Details):No lab results found.  Most Recent TSH and T4:  Recent Labs   Lab Test 06/09/23  1045   TSH 1.38     Most Recent Urinalysis:  Recent Labs   Lab Test 08/14/24  1434 01/15/22  0551   COLOR Yellow Light Yellow   APPEARANCE Clear Clear   URINEGLC Negative Negative   URINEBILI Negative Negative   URINEKETONE Negative Negative   SG 1.025 1.018   UBLD Negative Negative   URINEPH 6.5 6.0   PROTEIN Negative Negative   UROBILINOGEN 1.0  --    NITRITE Negative Negative   LEUKEST Negative Negative   RBCU  --  <1   WBCU  --  1     Most Recent ESR & CRP:  Recent Labs   Lab Test 01/04/22  1027   SED 26*

## 2024-11-01 NOTE — PLAN OF CARE
Goal Outcome Evaluation:       Shift Summary [id number]    Admitting Diagnosis: Spine fracture  Closed compression fracture of lumbosacral spine (H)   Vitals VSS on RA  Pain managed by IV dilaudid, Oxy, Tylenol and Implanted Intrathecal pump.  A&Ox4  Voiding yes External catheter in place.  Mobility Bedrest.  Tele VT and Has ICD  CMS Intact ex BLE numbness  GI No BM @ this shift.  Dressing N/A    Orders Placed This Encounter      NPO for Medical/Clinical Reasons Except for: Meds, Ice Chips     Plan: MRI to be done today  Discharge Pending

## 2024-11-02 LAB
ANION GAP SERPL CALCULATED.3IONS-SCNC: 14 MMOL/L (ref 7–15)
BUN SERPL-MCNC: 17.8 MG/DL (ref 8–23)
CALCIUM SERPL-MCNC: 8.6 MG/DL (ref 8.8–10.4)
CHLORIDE SERPL-SCNC: 106 MMOL/L (ref 98–107)
CREAT SERPL-MCNC: 0.94 MG/DL (ref 0.67–1.17)
EGFRCR SERPLBLD CKD-EPI 2021: 88 ML/MIN/1.73M2
ERYTHROCYTE [DISTWIDTH] IN BLOOD BY AUTOMATED COUNT: 13.1 % (ref 10–15)
GLUCOSE SERPL-MCNC: 145 MG/DL (ref 70–99)
HCO3 SERPL-SCNC: 22 MMOL/L (ref 22–29)
HCT VFR BLD AUTO: 33 % (ref 40–53)
HGB BLD-MCNC: 11 G/DL (ref 13.3–17.7)
MCH RBC QN AUTO: 29.3 PG (ref 26.5–33)
MCHC RBC AUTO-ENTMCNC: 33.3 G/DL (ref 31.5–36.5)
MCV RBC AUTO: 88 FL (ref 78–100)
PLATELET # BLD AUTO: 137 10E3/UL (ref 150–450)
POTASSIUM SERPL-SCNC: 3.6 MMOL/L (ref 3.4–5.3)
RBC # BLD AUTO: 3.76 10E6/UL (ref 4.4–5.9)
SODIUM SERPL-SCNC: 142 MMOL/L (ref 135–145)
WBC # BLD AUTO: 21.6 10E3/UL (ref 4–11)

## 2024-11-02 PROCEDURE — 250N000013 HC RX MED GY IP 250 OP 250 PS 637: Performed by: STUDENT IN AN ORGANIZED HEALTH CARE EDUCATION/TRAINING PROGRAM

## 2024-11-02 PROCEDURE — 36415 COLL VENOUS BLD VENIPUNCTURE: CPT | Performed by: STUDENT IN AN ORGANIZED HEALTH CARE EDUCATION/TRAINING PROGRAM

## 2024-11-02 PROCEDURE — 120N000001 HC R&B MED SURG/OB

## 2024-11-02 PROCEDURE — 99232 SBSQ HOSP IP/OBS MODERATE 35: CPT | Performed by: STUDENT IN AN ORGANIZED HEALTH CARE EDUCATION/TRAINING PROGRAM

## 2024-11-02 PROCEDURE — 250N000013 HC RX MED GY IP 250 OP 250 PS 637: Performed by: NURSE PRACTITIONER

## 2024-11-02 PROCEDURE — 85014 HEMATOCRIT: CPT | Performed by: STUDENT IN AN ORGANIZED HEALTH CARE EDUCATION/TRAINING PROGRAM

## 2024-11-02 PROCEDURE — 80048 BASIC METABOLIC PNL TOTAL CA: CPT | Performed by: STUDENT IN AN ORGANIZED HEALTH CARE EDUCATION/TRAINING PROGRAM

## 2024-11-02 PROCEDURE — 250N000013 HC RX MED GY IP 250 OP 250 PS 637: Performed by: HOSPITALIST

## 2024-11-02 RX ADMIN — METHOCARBAMOL 750 MG: 750 TABLET ORAL at 15:11

## 2024-11-02 RX ADMIN — LIDOCAINE 2 PATCH: 4 PATCH TOPICAL at 19:47

## 2024-11-02 RX ADMIN — TRAZODONE HYDROCHLORIDE 200 MG: 100 TABLET ORAL at 22:45

## 2024-11-02 RX ADMIN — METOPROLOL SUCCINATE 100 MG: 100 TABLET, EXTENDED RELEASE ORAL at 10:21

## 2024-11-02 RX ADMIN — ACETAMINOPHEN 975 MG: 325 TABLET, FILM COATED ORAL at 15:11

## 2024-11-02 RX ADMIN — ACETAMINOPHEN 975 MG: 325 TABLET, FILM COATED ORAL at 22:45

## 2024-11-02 RX ADMIN — HYDROMORPHONE HYDROCHLORIDE 4 MG: 2 TABLET ORAL at 17:22

## 2024-11-02 RX ADMIN — FUROSEMIDE 40 MG: 40 TABLET ORAL at 21:31

## 2024-11-02 RX ADMIN — FUROSEMIDE 40 MG: 40 TABLET ORAL at 10:21

## 2024-11-02 RX ADMIN — HYDROMORPHONE HYDROCHLORIDE 4 MG: 2 TABLET ORAL at 08:54

## 2024-11-02 RX ADMIN — CALCITONIN SALMON 1 SPRAY: 200 SPRAY, METERED NASAL at 10:41

## 2024-11-02 RX ADMIN — ACETAMINOPHEN 975 MG: 325 TABLET, FILM COATED ORAL at 10:21

## 2024-11-02 RX ADMIN — HYDROMORPHONE HYDROCHLORIDE 4 MG: 2 TABLET ORAL at 02:05

## 2024-11-02 RX ADMIN — LISINOPRIL 10 MG: 10 TABLET ORAL at 10:20

## 2024-11-02 RX ADMIN — SOTALOL HYDROCHLORIDE 80 MG: 80 TABLET ORAL at 10:20

## 2024-11-02 RX ADMIN — METHOCARBAMOL 750 MG: 750 TABLET ORAL at 10:21

## 2024-11-02 RX ADMIN — ROSUVASTATIN CALCIUM 40 MG: 20 TABLET, FILM COATED ORAL at 10:21

## 2024-11-02 RX ADMIN — DULOXETINE HYDROCHLORIDE 60 MG: 60 CAPSULE, DELAYED RELEASE ORAL at 22:45

## 2024-11-02 RX ADMIN — METHOCARBAMOL 750 MG: 750 TABLET ORAL at 21:31

## 2024-11-02 RX ADMIN — GABAPENTIN 200 MG: 100 CAPSULE ORAL at 21:31

## 2024-11-02 RX ADMIN — DULOXETINE HYDROCHLORIDE 30 MG: 30 CAPSULE, DELAYED RELEASE ORAL at 10:21

## 2024-11-02 RX ADMIN — GABAPENTIN 200 MG: 100 CAPSULE ORAL at 10:21

## 2024-11-02 NOTE — PROGRESS NOTES
[facility]  Neurosurgery Daily Progress Note    Assessment  69M with history of multiple lumbar spinal fusions in the 1990's and placement of intrathecal pain pump. Presented on 10/31/24 with back pain and leg pain. Imaging revealed L5 vertebral body fracture extending to the posterior elements.     Plan   -Continue to wear LSO brace and attempt to work with PT. If patient tolerates, will repeat lumbar spine CT tomorrow. If patient is unable to tolerate, recommend proceeding with MRI of the lumbar spine (will likely need anesthesia for MRI).    -Pain control measures as needed  -Monitor neuro exam  -Appreciate assistance from specialties     Discussed with Dr. Dupree, CNP  [facility] Neurosurgery  Tel [phone]  Pager [phone]    Interval History   Patient reports back pain has improved compared to yesterday. He has intermittent right > left leg pain that mostly occurs with activity. Brace intact. Neuro exam stable. Patient denies any new or worsening symptoms today.    Physical Exam   Temp: 97  F (36.1  C) Temp src: Oral BP: 92/50 Pulse: 61   Resp: 18 SpO2: 95 % O2 Device: None (Room air)      Mental status:  Alert and oriented x 3, speech is fluent, following commands  Motor:    Iliopsoas  (hip flexion)               Right: 5/5  Left:  5/5  Quadriceps  (knee extension)       Right:  5/5  Left:  5/5  Hamstrings  (knee flexion)            Right:  5/5  Left:  5/5  Gastroc Soleus  (PF)                          Right:  5/5  Left:  5/5  Tibialis Ant  (DF)                          Right:  5/5  Left:  5/5  EHL                          Right:  5/5  Left:  5/5    Sensation:  Intact to light touch in BLE   Reflexes: Negative clonus.    LSO brace intact.

## 2024-11-02 NOTE — PLAN OF CARE
Goal Outcome Evaluation:    Date & Time: 11/2/24 4850-7683  Orientation/Cognitive Concerns: A&Ox4; neuro intact, CMS at baseline  Abnl VS/O2: VSS on Rm Air  Pain Management: pain services following; PO dilaudid, scheduled robaxin, tylenol, lidocaine patches, voltaren cream & ice packs utilized. Patient also has intrathecal pain pump in place. Pain somewhat relieved while up to chair.   Abnl Labs/BG: , WBC 21.6  Behavior/Aggression Tool Color: green  Mobility: Asstx2 w/ gb/walker, pivot turn, up to chair w/ brace intact  Diet: Regular diet, tolerating well  Bowel/Bladder: continent of B/B, passing flatus, voiding in urinal  Drains/Devices: PIV SL  Test/Procedures: plan for CT when patient able to participate w/ PT   Anticipated DC date: pending. Seen by neurosurgery in a.m.; LSO brace when oob, pain control, continued PT therapy, plan to repeat lumbar spine CT tomorrow if patient able to tolerate/pain control. Patient does have implantable cardioverter-defibrillator in place, along w/ intrathecal pain pump. Patient states pain has improved since up to chair today.

## 2024-11-02 NOTE — PLAN OF CARE
Goal Outcome Evaluation:    Shift Summary 11/2/24-11p-7a    Admitting Diagnosis: Spine fracture  Closed compression fracture of lumbosacral spine (H)   Vitals WNL ex soft BP  Pain 6/10. Taking Dilaudid PRN & schedule lidocaine patch. Last dose 0205  A&Ox4  Voiding Uses bedside urinal  Mobility Not OOB this shift. Ax2 with LSO brace when OOB.  Tele A-fib  CMS Intact ex baseline numbness to BLE  Lung Sounds clear on RA   GI Continent  Dressing NA    Orders Placed This Encounter      Regular Diet Adult       Plan: Neurosurgery following

## 2024-11-02 NOTE — PROGRESS NOTES
"[facility]    Medicine Progress Note - Hospitalist Service    Date of Admission:  10/31/2024  Date of Service: 11/02/2024    Assessment & Plan      Onur Barr is a markedly pleasant 69 year old gentleman with past medical history that is most notable for prior spine surgery, chronic pain status post intrathecal pump placement, CAD, ventricular tachycardia status post ICD placement, atrial fibrillation status post JUSTO occlusion, and CLL, among others; who presents with acute non-traumatic lower back pain, and is found to have acute pathologic lumbar spine fracture.     PLAN      Acute pathologic lumbar spine fracture: Of note, Mr. Barr is followed through [facility]. He has chronic back pain and lumbago, having undergone prior lumbar spine fusion surgery in the 1990's (he also reports three other spine surgeries in or before that time). He has had an intrathecal pain pump in place for over twenty years (reportedly with morphine, clonidine, and baclofen), and has reportedly had post-laminectomy syndrome. He underwent pump replacement and intrathecal catheter replacement on 4/20/2023 (per that op note: \"Implants: All implants were [employer] products. The catheter lot number was [id number]. The SynchroMed II pump was model number 8637-40 and serial number [id number]. \").     Now, he presents for evaluation of acute non-traumatic lower back pain. In the ED, he is afebrile, without hypotension, tachycardia, or hypoxia. He has severe chronic leukocytosis to 30 k. He has mild CAROLYN as discussed below. Serial enzymes are negative. EKG shows NSR. CXR shows no acute cardiopulmonary pathology. Limited lumbar spine CT done without contrast shows a previous anterior and posterior bony fusion from L4 down to S1. There is a recent appearing nondisplaced fracture through the fused spinal column involving the vertebral body and posterior elements at the mid L5 level, without other recent fractures. Chronic " compression fracture deformities of the L1, L2 and L3 vertebral bodies are noted. No high-grade spinal canal stenosis is seen on this limited exam.     Overall, his symptoms are consistent with acute pathologic lumbar spine fracture. It is unclear if there has been damage to his pain pump. There are no clear signs of cord compression but we must monitor very carefully overnight.   Plan:    -- NSG following    -- MRI Lumbar -> Motion degraded examination with findings of acute marrow edema involving L1 and L5. Significant fracture line findings are visualized better on the CT assessment but extend into the posterior elements at the level of L5. No evidence of clear findings of displaced ligamentous injury or significant prevertebral edema within the limitations of the exam. No clear findings of epidural fluid collection    -- Multi-modal pain relief with Tylenol, prn Oxycodone and IV Dilaudid as needed for pain, as well as hot and cold, home Cymbalta, prn muscle relaxant, and Lidocaine patch. Anti-emetics as needed.     -- Pain service consulted    -- Note is made of history of diet controlled diabetes. If steroids are contemplated, would order ISS insulin while hospitalized    -- Close monitoring of neurologic status overnight; would get CT Myelogram for any emergent changes    -- Repeat CBC and BMP in AM.     -- SW consulted for disposition planning.     CAROLYN: Suspect due to hypovolemia. IVF bolus ordered overnight -> Cr now wnl.      Chronic leukocytosis due to CLL: Diagnosed in 2023 and followed with serial observation by [facility].       -- Pending further evaluation of possible pathologic fracture as above; consider Oncology consultation if an underlying malignant cause of fracture becomes suspected     CAD: He had prior PCI in 2012. He suffered a STEMI in 2021 and underwent CRYSTAL to the RCA. Most recently he is status post 1 vessel CABG in 1/2022.       -- Resume ASA pending Neurosurgery guidance     History of  paroxysmal atrial fibrillation: He underwent concomitant PVI and exclusion of the left atrial appendage in 1/2022, at the time of his CABG.    -- home Sotalol     History of chronic diastolic CHF and paroxysmal ventricular tachycardia: Due to ischemic cardiomyopathy. He has undergone ICD placement (dual chamber [employer]) on 1/4/2022. TTE in 3/2024 showed LVEF 57% and trace MR and TR.   Plan:    -- Resume home BID Lasix      MARLEEN: Not on CPAP as an outpatient. Monitor O2 sats closely while sleeping. Home Trazodone continued when verified     History of Lora-en-y gastric bypass surgery: In 2008. Noted. IV PPI ordered     Hypertension: Resume home Lisinopril CAROLYN has resolved. Resume home Toprol .     Hyperlipidemia: Resume home Crestor      Acute anemia: Mild. Monitor closely as above while hospitalized. Resume home oral iron at discharge          Diet: Regular Diet Adult    DVT Prophylaxis: Pneumatic Compression Devices  Bran Catheter: Not present  Lines: None     Cardiac Monitoring: None  Code Status: Full Code      Clinically Significant Risk Factors                   # Hypertension: Noted on problem list           # Obesity: Estimated body mass index is 36.84 kg/m  as calculated from the following:    Height as of an earlier encounter on 10/31/24: 1.829 m (6').    Weight as of an earlier encounter on 10/31/24: 123.2 kg (271 lb 9.7 oz)., PRESENT ON ADMISSION      # ICD device present  # History of CABG: noted on surgical history       Disposition Plan     Medically Ready for Discharge: Anticipated in 2-4 Days             Gwyn Stevenson MD  Hospitalist Service  [facility]  Securely message with [employer] (more info)  Text page via [employer] Paging/Directory   ______________________________________________________________________    Interval History     No acute events overnight  Main issue is back pain, but better controlled today  No fevers  No CP/SOB  Sitting in chair with brace  No new  weakness or numbness  No nausea / vomiting   No new complaints       Physical Exam   Vital Signs: Temp: (P) 97.7  F (36.5  C) Temp src: (P) Oral BP: 124/63 Pulse: 64   Resp: (P) 16 SpO2: (P) 98 % O2 Device: (P) None (Room air)    Weight: 0 lbs 0 oz    Constitutional: Alert and oriented to person, place and time; uncomfortable; lying on his left side  Respiratory: lungs clear to auscultation bilaterally  Cardiovascular: regular S1 S2  GI: abdomen soft non tender non distended bowel sounds positive  Musculoskeletal: no clubbing, cyanosis or edema  Neurologic: extra-ocular muscles intact; moves all four extremities; able to lift legs off the bed; neuro exam is significantly limited due to severe pain       ----------------------------------------------------------------------------------------    Medical Decision Making       35 MINUTES SPENT BY ME on the date of service doing chart review, history, exam, documentation & further activities per the note.      Data   ------------------------- PAST 24 HR DATA REVIEWED -----------------------------------------------    I have personally reviewed the following data over the past 24 hrs:    21.6 (H)  \   11.0 (L)   / 137 (L)     142 106 17.8 /  145 (H)   3.6 22 0.94 \       Imaging results reviewed over the past 24 hrs:   Recent Results (from the past 24 hours)   MR Lumbar Spine w/o Contrast    Narrative    MRI LUMBAR SPINE WITHOUT CONTRAST   11/1/2024 3:42 PM     HISTORY: Acute back pain andlower extremity weakness, lumbar spine  fracture on CT, evaluate for cord compression and or fractured  intrathecal pain pump     TECHNIQUE: Multiplanar multisequence MRI of the lumbar spine without  contrast.     COMPARISON: CT evaluation 11/1/2024.     FINDINGS: Chronic-appearing compression deformity findings at L2 and  L3 associated with Schmorl's nodes along the superior endplates of L2  and L3. There is acute marrow edema involving the L1 vertebral level  with 50-75% loss of  vertebral body height involving the L1 vertebral  level centrally without significant bony retropulsion findings.    At L5 there are findings of acute marrow edema and fracture lines  visualized through the central aspect of the L5 vertebral level with  extension into the posterior elements with abnormal marrow signal  involving the posterior elements at the level of L5. No evidence of  significant epidural fluid collection. The axial images are only  obtained with axial T1 weighted images. No axial T2-weighted images  were obtained. The sagittal T1-weighted images are nondiagnostic due  to motion.      Impression    IMPRESSION:  Motion degraded examination with findings of acute marrow  edema involving L1 and L5. Significant fracture line findings are  visualized better on the CT assessment but extend into the posterior  elements at the level of L5. No evidence of clear findings of  displaced ligamentous injury or significant prevertebral edema within  the limitations of the exam. No clear findings of epidural fluid  collection on sagittal T2-weighted images. No axial T2-weighted images  were obtained. Study is degraded by significant motion. Consider  repeat imaging when the patient is able.     SARA OSORIO MD         SYSTEM ID:  [id number]     ------------------------- ENCOUNTER LABS ----------------------------------------------------------------  Recent Labs   Lab 11/02/24  1012 11/01/24  1104 10/31/24  0941   WBC 21.6* 21.8* 30.9*   HGB 11.0* 11.6* 12.2*   MCV 88 85 88   * 141* 186    141 142   POTASSIUM 3.6 3.8 3.9   CHLORIDE 106 105 100   CO2 22 20* 23   BUN 17.8 23.6* 35.0*   CR 0.94 0.96 1.29*   ANIONGAP 14 16* 19*   RATNA 8.6* 9.2 9.1   * 107* 124*       Most Recent 3 CBC's:  Recent Labs   Lab Test 11/02/24  1012 11/01/24  1104 10/31/24  0941   WBC 21.6* 21.8* 30.9*   HGB 11.0* 11.6* 12.2*   MCV 88 85 88   * 141* 186     Most Recent 3 BMP's:  Recent Labs   Lab Test 11/02/24  1012  11/01/24  1104 10/31/24  0941    141 142   POTASSIUM 3.6 3.8 3.9   CHLORIDE 106 105 100   CO2 22 20* 23   BUN 17.8 23.6* 35.0*   CR 0.94 0.96 1.29*   ANIONGAP 14 16* 19*   RATNA 8.6* 9.2 9.1   * 107* 124*     Most Recent 2 LFT's:  Recent Labs   Lab Test 10/14/24  0918 04/17/24  0913   AST 30 20   ALT 15 <5   ALKPHOS 76 65   BILITOTAL 0.5 0.6     Most Recent 3 INR's:  Recent Labs   Lab Test 04/20/23  0629 01/19/22  1235 01/19/22  1040   INR 1.10 1.31* 1.57*     Most Recent 3 Troponin's:  Recent Labs   Lab Test 07/13/21  1536   TROPONIN <0.015     Most Recent 3 BNP's:  Recent Labs   Lab Test 01/13/22  0546 07/13/21  1536   NTBNPI 2,463* 58     Most Recent D-dimer:No lab results found.  Most Recent Cholesterol Panel:  Recent Labs   Lab Test 10/14/24  0918   CHOL 104   LDL 29   HDL 34*   TRIG 203*     Most Recent 6 Bacteria Isolates From Any Culture (See EPIC Reports for Culture Details):No lab results found.  Most Recent TSH and T4:  Recent Labs   Lab Test 06/09/23  1045   TSH 1.38     Most Recent Urinalysis:  Recent Labs   Lab Test 08/14/24  1434 01/15/22  0551   COLOR Yellow Light Yellow   APPEARANCE Clear Clear   URINEGLC Negative Negative   URINEBILI Negative Negative   URINEKETONE Negative Negative   SG 1.025 1.018   UBLD Negative Negative   URINEPH 6.5 6.0   PROTEIN Negative Negative   UROBILINOGEN 1.0  --    NITRITE Negative Negative   LEUKEST Negative Negative   RBCU  --  <1   WBCU  --  1     Most Recent ESR & CRP:  Recent Labs   Lab Test 01/04/22  1027   SED 26*

## 2024-11-02 NOTE — PROGRESS NOTES
Goal Outcome Evaluation; Shift Summary: [id number]    Plan of Care Reviewed With: patient, spouse     Overall Patient Progress: no changeOverall Patient Progress: no change     Admitting Diagnosis: Spine fracture  Closed compression fracture of lumbosacral spine     Patient A&Ox4. VSS on RA. CMS intact with baseline BLE Numbness. PIV SL. Pain managed with PRN and schedule medications see MAR, 2 Lidocaine patches on the lower back. Patient was suppose to have MRI done per Previous shift RN Pt was given Pain medications prior to MRN. But MRI called that stated they were not able to get clear pictures due to Pt moving a lot, NS PA was paged. Pt not OOB but can self turn/repo, TLSO brace when OOB. Voiding via external male cath. On Regular diet.

## 2024-11-03 ENCOUNTER — APPOINTMENT (OUTPATIENT)
Dept: CT IMAGING | Facility: CLINIC | Age: 69
DRG: 552 | End: 2024-11-03
Attending: NURSE PRACTITIONER
Payer: OTHER MISCELLANEOUS

## 2024-11-03 PROCEDURE — 258N000003 HC RX IP 258 OP 636: Performed by: INTERNAL MEDICINE

## 2024-11-03 PROCEDURE — 250N000011 HC RX IP 250 OP 636: Performed by: STUDENT IN AN ORGANIZED HEALTH CARE EDUCATION/TRAINING PROGRAM

## 2024-11-03 PROCEDURE — 99232 SBSQ HOSP IP/OBS MODERATE 35: CPT | Performed by: STUDENT IN AN ORGANIZED HEALTH CARE EDUCATION/TRAINING PROGRAM

## 2024-11-03 PROCEDURE — 72131 CT LUMBAR SPINE W/O DYE: CPT | Mod: MG

## 2024-11-03 PROCEDURE — 250N000013 HC RX MED GY IP 250 OP 250 PS 637: Performed by: HOSPITALIST

## 2024-11-03 PROCEDURE — 250N000013 HC RX MED GY IP 250 OP 250 PS 637: Performed by: NURSE PRACTITIONER

## 2024-11-03 PROCEDURE — 120N000001 HC R&B MED SURG/OB

## 2024-11-03 PROCEDURE — 250N000013 HC RX MED GY IP 250 OP 250 PS 637: Performed by: STUDENT IN AN ORGANIZED HEALTH CARE EDUCATION/TRAINING PROGRAM

## 2024-11-03 RX ORDER — LORAZEPAM 2 MG/ML
1 INJECTION INTRAMUSCULAR
Status: DISCONTINUED | OUTPATIENT
Start: 2024-11-03 | End: 2024-11-04

## 2024-11-03 RX ADMIN — ROSUVASTATIN CALCIUM 40 MG: 20 TABLET, FILM COATED ORAL at 08:05

## 2024-11-03 RX ADMIN — GABAPENTIN 200 MG: 100 CAPSULE ORAL at 08:02

## 2024-11-03 RX ADMIN — HYDROMORPHONE HYDROCHLORIDE 2 MG: 2 TABLET ORAL at 08:02

## 2024-11-03 RX ADMIN — TRAZODONE HYDROCHLORIDE 200 MG: 100 TABLET ORAL at 21:05

## 2024-11-03 RX ADMIN — METHOCARBAMOL 750 MG: 750 TABLET ORAL at 12:45

## 2024-11-03 RX ADMIN — HYDROMORPHONE HYDROCHLORIDE 2 MG: 2 TABLET ORAL at 15:55

## 2024-11-03 RX ADMIN — GABAPENTIN 200 MG: 100 CAPSULE ORAL at 20:26

## 2024-11-03 RX ADMIN — DICLOFENAC 2 G: 10 GEL TOPICAL at 06:41

## 2024-11-03 RX ADMIN — ACETAMINOPHEN 975 MG: 325 TABLET, FILM COATED ORAL at 15:55

## 2024-11-03 RX ADMIN — METOPROLOL SUCCINATE 100 MG: 100 TABLET, EXTENDED RELEASE ORAL at 20:27

## 2024-11-03 RX ADMIN — CALCITONIN SALMON 1 SPRAY: 200 SPRAY, METERED NASAL at 08:05

## 2024-11-03 RX ADMIN — ACETAMINOPHEN 975 MG: 325 TABLET, FILM COATED ORAL at 21:05

## 2024-11-03 RX ADMIN — SODIUM CHLORIDE 500 ML: 9 INJECTION, SOLUTION INTRAVENOUS at 02:29

## 2024-11-03 RX ADMIN — HYDROMORPHONE HYDROCHLORIDE 4 MG: 2 TABLET ORAL at 00:31

## 2024-11-03 RX ADMIN — SOTALOL HYDROCHLORIDE 80 MG: 80 TABLET ORAL at 20:26

## 2024-11-03 RX ADMIN — METHOCARBAMOL 750 MG: 750 TABLET ORAL at 20:27

## 2024-11-03 RX ADMIN — ACETAMINOPHEN 975 MG: 325 TABLET, FILM COATED ORAL at 08:02

## 2024-11-03 RX ADMIN — LIDOCAINE 2 PATCH: 4 PATCH TOPICAL at 20:27

## 2024-11-03 RX ADMIN — METHOCARBAMOL 750 MG: 750 TABLET ORAL at 08:03

## 2024-11-03 RX ADMIN — POLYETHYLENE GLYCOL 3350 17 G: 17 POWDER, FOR SOLUTION ORAL at 15:55

## 2024-11-03 RX ADMIN — FUROSEMIDE 40 MG: 40 TABLET ORAL at 20:27

## 2024-11-03 RX ADMIN — HYDROMORPHONE HYDROCHLORIDE 1 MG: 1 INJECTION, SOLUTION INTRAMUSCULAR; INTRAVENOUS; SUBCUTANEOUS at 12:45

## 2024-11-03 RX ADMIN — DULOXETINE HYDROCHLORIDE 60 MG: 60 CAPSULE, DELAYED RELEASE ORAL at 21:05

## 2024-11-03 RX ADMIN — HYDROMORPHONE HYDROCHLORIDE 2 MG: 2 TABLET ORAL at 23:16

## 2024-11-03 RX ADMIN — HYDROMORPHONE HYDROCHLORIDE 2 MG: 2 TABLET ORAL at 23:18

## 2024-11-03 RX ADMIN — DULOXETINE HYDROCHLORIDE 30 MG: 30 CAPSULE, DELAYED RELEASE ORAL at 08:03

## 2024-11-03 RX ADMIN — SENNOSIDES AND DOCUSATE SODIUM 2 TABLET: 50; 8.6 TABLET ORAL at 09:44

## 2024-11-03 NOTE — PLAN OF CARE
Goal Outcome Evaluation:      Plan of Care Reviewed With: patient    Overall Patient Progress: no changeOverall Patient Progress: no change    Date/Time: 11/2/2024 from [id number]  Admitting Diagnosis: Acute pathologic lumbar spine fracture  Behavior & Aggression: Green  Fall Risk: Yes  Orientation: A&Ox4  ABNL VS/O2: VSS on RA except soft BP- held bedtime heart medications  Pain Management: Scheduled Tylenol, Robaxin, Gabapentin, & Lidocaine patches to lower back. Pt also has intrathecal pain pump in place  Bowel/Bladder: Voiding well. BS audible- passing gas/no stools  Skin: Scattered bruising.   IV: PIV SL  Diet:Regular- tolerating  Activity Level: Ax2 w/ gb/walker, pivot turn, up in chair w/ brace intact- prefers chair  Tests/Procedures: Plan for repeat lumbar spine CT when patient able to participate w/ PT   Anticipated  DC Date: Pending  Significant Information: LSO brace when OOB

## 2024-11-03 NOTE — PROGRESS NOTES
[facility]  Neurosurgery Daily Progress Note    Assessment  69M with history of multiple lumbar spinal fusions in the 1990's and placement of intrathecal pain pump. Presented on 10/31/24 with back pain and leg pain. Imaging revealed L5 vertebral body fracture extending to the posterior elements.     Plan   -Continue to wear LSO brace  -Attempt to work with PT today, then obtain lumbar spine CT    -If patient is unable to tolerate PT, recommend proceeding with MRI of the lumbar spine (will likely need anesthesia for MRI).    -Pain control measures as needed  -Monitor neuro exam  -Appreciate assistance from specialties     Discussed with Dr. King    ADDENDUM:   Reviewed lumbar spine CT with Dr. King.  -Continue LSO brace for now  -Will confirm plan with Dr. Carreno tomorrow  -NPO at midnight     EXAM: CT LUMBAR SPINE W/O CONTRAST  LOCATION: [facility]  DATE: 11/3/2024                                                IMPRESSION:  1.  Unchanged nondisplaced fracture through the L5 vertebral body, pedicles, and fused posterior elements.  2.  Unchanged acute to subacute L1 anterior compression deformity.  3.  Unchanged chronic T10, T11, L2, L3 and L4 anterior compression deformities.    Em Goodwin, CNP  [facility]  Tel [phone]  Pager [phone]    Interval History   Patient reports moderate back pain, feels better when wearing brace. Continued radiating pain from right knee to foot, with numbness and tingling in feet. He states he was not able to ambulate much yesterday. Neuro exam stable. Patient denies any new or worsening symptoms today.    Physical Exam   Temp: 97.4  F (36.3  C) Temp src: Oral BP: 103/64 Pulse: 62   Resp: 16 SpO2: 95 % O2 Device: None (Room air)      Mental status:  Alert and oriented x 3, speech is fluent, following commands  Motor:    Iliopsoas  (hip flexion)               Right: 5/5  Left:  5/5  Quadriceps  (knee extension)        Right:  5/5  Left:  5/5  Hamstrings  (knee flexion)            Right:  5/5  Left:  5/5  Gastroc Soleus  (PF)                          Right:  5/5  Left:  5/5  Tibialis Ant  (DF)                          Right:  5/5  Left:  5/5  EHL                          Right:  5/5  Left:  5/5    Sensation:  Intact to light touch in BLE   Reflexes: Negative clonus.    LSO brace intact.

## 2024-11-03 NOTE — PLAN OF CARE
Goal Outcome Evaluation:      Plan of Care Reviewed With: patient    Overall Patient Progress: no changeOverall Patient Progress: no change                  Goal Outcome Evaluation:     Date & Time: 11/2/24 [id number]  Orientation/Cognitive Concerns: A&Ox4; neuro intact, CMS at baseline  Abnl VS/O2: BP soft, on room air, lugs clear.  Pain Management: pain services following; PO dilaudid given x 1 for pain rated 7/10 with no change. Scheduled robaxin, tylenol, lidocaine patches & ice packs utilized. Patient also has intrathecal pain pump in place. Pain somewhat relieved while up to chair.   Abnl Labs/BG: Hgb 11, WBC 21.6, Plt 137.  Behavior/Aggression Tool Color: green.  Mobility: Asstx2 w/ gb/walker, pivot turn, up to chair w/ brace intact.  Diet: Regular diet, tolerating well with no nausea.  Bowel/Bladder: continent of B/B, passing flatus, voiding in urinal adequate.  Drains/Devices: PIV SL  Test/Procedures: plan for CT when patient able to participate w/ PT   Anticipated DC date: pending. Seen by neurosurgery in a.m.; LSO brace when oob, pain control, continued PT therapy, plan to repeat lumbar spine CT tomorrow if patient able to tolerate/pain control. Patient does have implantable cardioverter-defibrillator in place, along w/ intrathecal pain pump. Patient states pain has improved since up to chair today.

## 2024-11-03 NOTE — PLAN OF CARE
Goal Outcome Evaluation:      Plan of Care Reviewed With: patient    Shift Summary [id number]    Admitting Diagnosis: Spine fracture  Closed compression fracture of lumbosacral spine (H)   A&Ox4  Vitals WNL x low BP.   Pain 8/10. Taking oral dilaudid PRN.   Voiding: continent, uses urinal at BS during the night.   Mobility A2/GB/W.   Diet: regular.   PIV: SL.     Others: Pt BP was 80/51, MD notified, 500 ml bolus order received and given. Most recent BP was 108/63. Wears LSO brace when oob. Patient also has intrathecal pain pump in place. Repeat lumbar spine CT today per neuro team. Pt slept on recliner throughout the night.   Plan of care ongoing.

## 2024-11-03 NOTE — PROGRESS NOTES
"[facility]    Medicine Progress Note - Hospitalist Service    Date of Admission:  10/31/2024  Date of Service: 11/03/2024    Assessment & Plan      Onur Barr is a markedly pleasant 69 year old gentleman with past medical history that is most notable for prior spine surgery, chronic pain status post intrathecal pump placement, CAD, ventricular tachycardia status post ICD placement, atrial fibrillation status post JUSTO occlusion, and CLL, among others; who presents with acute non-traumatic lower back pain, and is found to have acute pathologic lumbar spine fracture.     PLAN      Acute pathologic lumbar spine fracture: Of note, Mr. Barr is followed through [facility]. He has chronic back pain and lumbago, having undergone prior lumbar spine fusion surgery in the 1990's (he also reports three other spine surgeries in or before that time). He has had an intrathecal pain pump in place for over twenty years (reportedly with morphine, clonidine, and baclofen), and has reportedly had post-laminectomy syndrome. He underwent pump replacement and intrathecal catheter replacement on 4/20/2023 (per that op note: \"Implants: All implants were [employer] products. The catheter lot number was [id number]. The SynchroMed II pump was model number 8637-40 and serial number [id number]. \").     Now, he presents for evaluation of acute non-traumatic lower back pain. In the ED, he is afebrile, without hypotension, tachycardia, or hypoxia. He has severe chronic leukocytosis to 30 k. He has mild CAROLYN as discussed below. Serial enzymes are negative. EKG shows NSR. CXR shows no acute cardiopulmonary pathology. Limited lumbar spine CT done without contrast shows a previous anterior and posterior bony fusion from L4 down to S1. There is a recent appearing nondisplaced fracture through the fused spinal column involving the vertebral body and posterior elements at the mid L5 level, without other recent fractures. Chronic " compression fracture deformities of the L1, L2 and L3 vertebral bodies are noted. No high-grade spinal canal stenosis is seen on this limited exam.     Overall, his symptoms are consistent with acute pathologic lumbar spine fracture. It is unclear if there has been damage to his pain pump. There are no clear signs of cord compression but we must monitor very carefully overnight.   Plan:    -- NSG following    -- MRI Lumbar -> Motion degraded examination with findings of acute marrow edema involving L1 and L5. Significant fracture line findings are visualized better on the CT assessment but extend into the posterior elements at the level of L5. No evidence of clear findings of displaced ligamentous injury or significant prevertebral edema within the limitations of the exam. No clear findings of epidural fluid collection    -- Multi-modal pain relief with Tylenol, prn Oxycodone and IV Dilaudid as needed for pain, as well as hot and cold, home Cymbalta, prn muscle relaxant, and Lidocaine patch. Anti-emetics as needed.     -- Pain service following    -- Note is made of history of diet controlled diabetes. If steroids are contemplated, would order ISS insulin while hospitalized    -- SW consulted for disposition planning.   -- Attempt to work with PT today, then obtain lumbar spine CT      CAROLYN: Suspect due to hypovolemia. IVF bolus ordered overnight -> Cr now wnl.      Chronic leukocytosis due to CLL: Diagnosed in 2023 and followed with serial observation by [facility].       -- Pending further evaluation of possible pathologic fracture as above; consider Oncology consultation if an underlying malignant cause of fracture becomes suspected     CAD: He had prior PCI in 2012. He suffered a STEMI in 2021 and underwent CRYSTAL to the RCA. Most recently he is status post 1 vessel CABG in 1/2022.       -- Resume ASA pending Neurosurgery guidance     History of paroxysmal atrial fibrillation: He underwent concomitant PVI and  exclusion of the left atrial appendage in 1/2022, at the time of his CABG.    -- home Sotalol     History of chronic diastolic CHF and paroxysmal ventricular tachycardia: Due to ischemic cardiomyopathy. He has undergone ICD placement (dual chamber [employer]) on 1/4/2022. TTE in 3/2024 showed LVEF 57% and trace MR and TR.   Plan:    -- Resume home BID Lasix      MARLEEN: Not on CPAP as an outpatient. Monitor O2 sats closely while sleeping. Home Trazodone continued when verified     History of Lora-en-y gastric bypass surgery: In 2008. Noted. IV PPI ordered     Hypertension: Resume home Lisinopril CAROLYN has resolved. Resume home Toprol .     Hyperlipidemia: Resume home Crestor      Acute anemia: Mild. Monitor closely as above while hospitalized. Resume home oral iron at discharge          Diet: Regular Diet Adult    DVT Prophylaxis: Pneumatic Compression Devices  Bran Catheter: Not present  Lines: None     Cardiac Monitoring: None  Code Status: Full Code      Clinically Significant Risk Factors                   # Hypertension: Noted on problem list           # Obesity: Estimated body mass index is 36.84 kg/m  as calculated from the following:    Height as of an earlier encounter on 10/31/24: 1.829 m (6').    Weight as of an earlier encounter on 10/31/24: 123.2 kg (271 lb 9.7 oz)., PRESENT ON ADMISSION      # ICD device present  # History of CABG: noted on surgical history       Disposition Plan     Medically Ready for Discharge: Anticipated in 2-4 Days             Gwyn Stevenson MD  Hospitalist Service  [facility]  Securely message with [email or web address] (more info)  Text page via [employer] Paging/Directory   ______________________________________________________________________    Interval History     No acute events overnight  Back pain controlled  Nervous about CT lumbar as pain worsens laying flat.  No fevers  No CP/SOB  Sitting in chair with brace  No new weakness or numbness  No nausea / vomiting    No new complaints       Physical Exam   Vital Signs: Temp: 97.4  F (36.3  C) Temp src: Oral BP: 103/64 Pulse: 62   Resp: 16 SpO2: 95 % O2 Device: None (Room air)    Weight: 0 lbs 0 oz    Constitutional: Alert and oriented to person, place and time;  Respiratory: lungs clear to auscultation bilaterally  Cardiovascular: regular S1 S2  GI: abdomen soft non tender non distended bowel sounds positive  Musculoskeletal: no clubbing, cyanosis or edema  Neurologic: extra-ocular muscles intact; moves all four extremities; able to lift legs off the bed; A/OX3.       ----------------------------------------------------------------------------------------    Medical Decision Making       40 MINUTES SPENT BY ME on the date of service doing chart review, history, exam, documentation & further activities per the note.      Data   ------------------------- PAST 24 HR DATA REVIEWED -----------------------------------------------        Imaging results reviewed over the past 24 hrs:   No results found for this or any previous visit (from the past 24 hours).    ------------------------- ENCOUNTER LABS ----------------------------------------------------------------  Recent Labs   Lab 11/02/24  1012 11/01/24  1104 10/31/24  0941   WBC 21.6* 21.8* 30.9*   HGB 11.0* 11.6* 12.2*   MCV 88 85 88   * 141* 186    141 142   POTASSIUM 3.6 3.8 3.9   CHLORIDE 106 105 100   CO2 22 20* 23   BUN 17.8 23.6* 35.0*   CR 0.94 0.96 1.29*   ANIONGAP 14 16* 19*   RATNA 8.6* 9.2 9.1   * 107* 124*       Most Recent 3 CBC's:  Recent Labs   Lab Test 11/02/24  1012 11/01/24  1104 10/31/24  0941   WBC 21.6* 21.8* 30.9*   HGB 11.0* 11.6* 12.2*   MCV 88 85 88   * 141* 186     Most Recent 3 BMP's:  Recent Labs   Lab Test 11/02/24  1012 11/01/24  1104 10/31/24  0941    141 142   POTASSIUM 3.6 3.8 3.9   CHLORIDE 106 105 100   CO2 22 20* 23   BUN 17.8 23.6* 35.0*   CR 0.94 0.96 1.29*   ANIONGAP 14 16* 19*   RATNA 8.6* 9.2 9.1   *  107* 124*     Most Recent 2 LFT's:  Recent Labs   Lab Test 10/14/24  0918 04/17/24  0913   AST 30 20   ALT 15 <5   ALKPHOS 76 65   BILITOTAL 0.5 0.6     Most Recent 3 INR's:  Recent Labs   Lab Test 04/20/23  0629 01/19/22  1235 01/19/22  1040   INR 1.10 1.31* 1.57*     Most Recent 3 Troponin's:  Recent Labs   Lab Test 07/13/21  1536   TROPONIN <0.015     Most Recent 3 BNP's:  Recent Labs   Lab Test 01/13/22  0546 07/13/21  1536   NTBNPI 2,463* 58     Most Recent D-dimer:No lab results found.  Most Recent Cholesterol Panel:  Recent Labs   Lab Test 10/14/24  0918   CHOL 104   LDL 29   HDL 34*   TRIG 203*     Most Recent 6 Bacteria Isolates From Any Culture (See EPIC Reports for Culture Details):No lab results found.  Most Recent TSH and T4:  Recent Labs   Lab Test 06/09/23  1045   TSH 1.38     Most Recent Urinalysis:  Recent Labs   Lab Test 08/14/24  1434 01/15/22  0551   COLOR Yellow Light Yellow   APPEARANCE Clear Clear   URINEGLC Negative Negative   URINEBILI Negative Negative   URINEKETONE Negative Negative   SG 1.025 1.018   UBLD Negative Negative   URINEPH 6.5 6.0   PROTEIN Negative Negative   UROBILINOGEN 1.0  --    NITRITE Negative Negative   LEUKEST Negative Negative   RBCU  --  <1   WBCU  --  1     Most Recent ESR & CRP:  Recent Labs   Lab Test 01/04/22  1027   SED 26*

## 2024-11-03 NOTE — PROGRESS NOTES
"MD Notification    Notified Person: MD    Notified Person Name: Buddy Rodgers.     Notification Date/Time: now     Notification Interaction: vocera message.     Purpose of Notification: Low BP.     Orders Received: order received for 500 bolus.     Comments: \"Pt BP is 80/51. HR at 61. O2 sat 93% on RA. temp 97.7. Please advice. Thank you\"  "

## 2024-11-03 NOTE — PLAN OF CARE
Date & Time: 11/3  Surgery/POD#: NA  Behavior & Aggression: Calm & cooperative  Fall Risk: Yes  Orientation:A/O  ABNL VS/O2: low SBP over 24hrs. Cardiac am meds held per MD RIVASL Labs: WDL's  Pain Management: pain pump (baseline), scheduled & prn oral Dilaudid x1  Bowel/Bladder: Constipation per pt; Senna x2  Drains: SL PIV  Wounds/incisions: NA  Diet:G  Activity Level: 2A w/FWW & gait belt  Tests/Procedures: 11/3 CT lumbar scheduled  Anticipated  DC Date: TBD  Significant Information:   Pt needs PT order (MD aware)

## 2024-11-04 ENCOUNTER — APPOINTMENT (OUTPATIENT)
Dept: PHYSICAL THERAPY | Facility: CLINIC | Age: 69
DRG: 552 | End: 2024-11-04
Attending: STUDENT IN AN ORGANIZED HEALTH CARE EDUCATION/TRAINING PROGRAM
Payer: OTHER MISCELLANEOUS

## 2024-11-04 PROCEDURE — 97530 THERAPEUTIC ACTIVITIES: CPT | Mod: GP

## 2024-11-04 PROCEDURE — 120N000001 HC R&B MED SURG/OB

## 2024-11-04 PROCEDURE — 97116 GAIT TRAINING THERAPY: CPT | Mod: GP

## 2024-11-04 PROCEDURE — 250N000013 HC RX MED GY IP 250 OP 250 PS 637

## 2024-11-04 PROCEDURE — 97161 PT EVAL LOW COMPLEX 20 MIN: CPT | Mod: GP

## 2024-11-04 PROCEDURE — 99232 SBSQ HOSP IP/OBS MODERATE 35: CPT | Performed by: STUDENT IN AN ORGANIZED HEALTH CARE EDUCATION/TRAINING PROGRAM

## 2024-11-04 PROCEDURE — 99233 SBSQ HOSP IP/OBS HIGH 50: CPT

## 2024-11-04 PROCEDURE — 250N000013 HC RX MED GY IP 250 OP 250 PS 637: Performed by: HOSPITALIST

## 2024-11-04 PROCEDURE — 99231 SBSQ HOSP IP/OBS SF/LOW 25: CPT | Performed by: PHYSICIAN ASSISTANT

## 2024-11-04 PROCEDURE — 250N000013 HC RX MED GY IP 250 OP 250 PS 637: Performed by: STUDENT IN AN ORGANIZED HEALTH CARE EDUCATION/TRAINING PROGRAM

## 2024-11-04 PROCEDURE — 250N000013 HC RX MED GY IP 250 OP 250 PS 637: Performed by: NURSE PRACTITIONER

## 2024-11-04 RX ORDER — POLYETHYLENE GLYCOL 3350 17 G/17G
17 POWDER, FOR SOLUTION ORAL DAILY
Status: DISCONTINUED | OUTPATIENT
Start: 2024-11-04 | End: 2024-11-06 | Stop reason: HOSPADM

## 2024-11-04 RX ORDER — ACETAMINOPHEN 325 MG/1
975 TABLET ORAL EVERY 6 HOURS
Status: DISCONTINUED | OUTPATIENT
Start: 2024-11-04 | End: 2024-11-06 | Stop reason: HOSPADM

## 2024-11-04 RX ORDER — AMOXICILLIN 250 MG
2 CAPSULE ORAL 2 TIMES DAILY
Status: DISCONTINUED | OUTPATIENT
Start: 2024-11-04 | End: 2024-11-06 | Stop reason: HOSPADM

## 2024-11-04 RX ORDER — GABAPENTIN 100 MG/1
200 CAPSULE ORAL 3 TIMES DAILY
Status: DISCONTINUED | OUTPATIENT
Start: 2024-11-04 | End: 2024-11-06 | Stop reason: HOSPADM

## 2024-11-04 RX ORDER — ASPIRIN 81 MG/1
81 TABLET, CHEWABLE ORAL DAILY
Status: DISCONTINUED | OUTPATIENT
Start: 2024-11-04 | End: 2024-11-06 | Stop reason: HOSPADM

## 2024-11-04 RX ADMIN — HYDROMORPHONE HYDROCHLORIDE 4 MG: 2 TABLET ORAL at 22:01

## 2024-11-04 RX ADMIN — SOTALOL HYDROCHLORIDE 80 MG: 80 TABLET ORAL at 08:44

## 2024-11-04 RX ADMIN — DICLOFENAC 2 G: 10 GEL TOPICAL at 06:38

## 2024-11-04 RX ADMIN — ACETAMINOPHEN 975 MG: 325 TABLET, FILM COATED ORAL at 22:01

## 2024-11-04 RX ADMIN — CALCITONIN SALMON 1 SPRAY: 200 SPRAY, METERED NASAL at 08:45

## 2024-11-04 RX ADMIN — HYDROMORPHONE HYDROCHLORIDE 4 MG: 2 TABLET ORAL at 13:15

## 2024-11-04 RX ADMIN — GABAPENTIN 200 MG: 100 CAPSULE ORAL at 08:41

## 2024-11-04 RX ADMIN — SOTALOL HYDROCHLORIDE 80 MG: 80 TABLET ORAL at 22:09

## 2024-11-04 RX ADMIN — HYDROMORPHONE HYDROCHLORIDE 4 MG: 2 TABLET ORAL at 17:35

## 2024-11-04 RX ADMIN — METOPROLOL SUCCINATE 100 MG: 100 TABLET, EXTENDED RELEASE ORAL at 22:01

## 2024-11-04 RX ADMIN — FUROSEMIDE 40 MG: 40 TABLET ORAL at 08:42

## 2024-11-04 RX ADMIN — DICLOFENAC 2 G: 10 GEL TOPICAL at 22:10

## 2024-11-04 RX ADMIN — TRAZODONE HYDROCHLORIDE 200 MG: 100 TABLET ORAL at 22:02

## 2024-11-04 RX ADMIN — METHOCARBAMOL 750 MG: 750 TABLET ORAL at 13:15

## 2024-11-04 RX ADMIN — SENNOSIDES AND DOCUSATE SODIUM 2 TABLET: 50; 8.6 TABLET ORAL at 22:09

## 2024-11-04 RX ADMIN — POLYETHYLENE GLYCOL 3350 17 G: 17 POWDER, FOR SOLUTION ORAL at 08:41

## 2024-11-04 RX ADMIN — ASPIRIN 81 MG CHEWABLE TABLET 81 MG: 81 TABLET CHEWABLE at 13:15

## 2024-11-04 RX ADMIN — LISINOPRIL 10 MG: 10 TABLET ORAL at 08:42

## 2024-11-04 RX ADMIN — HYDROMORPHONE HYDROCHLORIDE 4 MG: 2 TABLET ORAL at 03:21

## 2024-11-04 RX ADMIN — DICLOFENAC 2 G: 10 GEL TOPICAL at 15:15

## 2024-11-04 RX ADMIN — HYDROMORPHONE HYDROCHLORIDE 4 MG: 2 TABLET ORAL at 08:41

## 2024-11-04 RX ADMIN — SENNOSIDES AND DOCUSATE SODIUM 2 TABLET: 50; 8.6 TABLET ORAL at 08:42

## 2024-11-04 RX ADMIN — METOPROLOL SUCCINATE 100 MG: 100 TABLET, EXTENDED RELEASE ORAL at 08:44

## 2024-11-04 RX ADMIN — ACETAMINOPHEN 975 MG: 325 TABLET, FILM COATED ORAL at 14:32

## 2024-11-04 RX ADMIN — DICLOFENAC 2 G: 10 GEL TOPICAL at 10:21

## 2024-11-04 RX ADMIN — FUROSEMIDE 40 MG: 40 TABLET ORAL at 22:02

## 2024-11-04 RX ADMIN — ACETAMINOPHEN 975 MG: 325 TABLET, FILM COATED ORAL at 08:41

## 2024-11-04 RX ADMIN — ROSUVASTATIN CALCIUM 40 MG: 20 TABLET, FILM COATED ORAL at 08:41

## 2024-11-04 RX ADMIN — GABAPENTIN 200 MG: 100 CAPSULE ORAL at 16:12

## 2024-11-04 RX ADMIN — GABAPENTIN 200 MG: 100 CAPSULE ORAL at 22:02

## 2024-11-04 RX ADMIN — DULOXETINE HYDROCHLORIDE 30 MG: 30 CAPSULE, DELAYED RELEASE ORAL at 08:42

## 2024-11-04 RX ADMIN — METHOCARBAMOL 750 MG: 750 TABLET ORAL at 22:01

## 2024-11-04 RX ADMIN — DULOXETINE HYDROCHLORIDE 60 MG: 60 CAPSULE, DELAYED RELEASE ORAL at 22:06

## 2024-11-04 RX ADMIN — METHOCARBAMOL 750 MG: 750 TABLET ORAL at 08:41

## 2024-11-04 NOTE — PLAN OF CARE
Goal Outcome Evaluation:    Orientations: Alert and oriented x 4  Vitals/Pain: Vital signs stable on a RA. Pain controlled with dilaudid x2.  Tele: NA  Skin/Wounds: old incision site CDI, scattered bruising  GI/: Bowel sounds active, flatus present.-BM, NPO at midnight   Labs: -  Ambulation/Assist: Up assist of 2 with W&G.  Sleep Quality: fair  Plan: possible surgery

## 2024-11-04 NOTE — PROGRESS NOTES
"[facility] ACUTE INPATIENT PAIN SERVICE    [facility], [facility], [facility], [facility], [place]   PAIN PROGRESS NOTE     Assessment/Plan:  Onur Barr is a 69 year old male who was admitted on 10/31/2024.  Pain team was asked to see the patient for complex pain management. Admitted for  acute non-traumatic lower back pain, and is found to have acute pathologic lumbar spine fracture. History of  prior spine surgery, chronic pain status post intrathecal pump placement, CAD, ventricular tachycardia status post ICD placement, atrial fibrillation status post JUSTO occlusion, and CLL.  Describes pain as 7-10/10 and aching, sharp, stabbing in the low back and does radiate to BLEs. The patient does not smoke and denies chemical dependency history.      In the last 24 hours, \"Baudilio\" has received: 4 (2mg) po dilaudid, 1 (4mg) dilaudid, 1 (1mg) iv dilaudid     PLAN:   1) Pain is consistent with acute on chronic back pain, fractue pain in the setting of chronic pain, chronic opioid use, intrathecal pain pump.     Multimodal Medication Therapy  Adjuvants:   lidocaine patch x2  voltaren gel qid  nasal calcitonin x2 weeks    Robaxin 750mg qid  Cymbalta 30mg qAM, 60mg qPM   Trazodone 200mg at hs   Acetaminophen 975mg increased to q6h   Gabapentin increased to 200mg tid   Opioids:  dilaudid 2-4 mg q4h prn   IV dilaudid discontinued  Intrathecal pain pump infusing morphine, clonidine, baclofen. See procedure note from 11/1 or MAR for more details.   Non-medication interventions: Ice, Rest, PT, OT, Distraction (TV, Music, Reading), and brace per NRS  Constipation Prophylaxis: Bisacodyl Suppository and Senna-docusate  Discharge recommendations   Continue PTA cymbalta, trazodone   Stop percocet, dilaudid 4mg   Robaxin 750mg qid prn   Nasal calcitonin x2 weeks today (Ending 11/15)  Voltaren qid   Lidocaine patch x2 daily   Acetaminophen 975mg q6h   Gabapentin 200mg tid     -Opioid prescriber has been Beronica Riojas [facility] " "clinic [id number]  [place] [address]  -[place]  pulled from system on 11/1/24. This indicates Morphine powder and Percocet   10/30/24 Percocet 5-325 mg #12 for 3 days by Jumana Copeland  7/9/24 Morphine powder by Beronica Riojas - same on 01/23/24      Subjective:  \"Baudilio\" is seen sitting up in his chair, wife and daughter at the bedside. He is currently endorsing 8/10 pain, primarily to the right leg that he describes as \"spasms\". He reports switching to oral dilaudid and robaxin was helpful. He is unsure how helpful gabapentin has been. He really likes voltaren gel to his lower extremities.  Denies nausea, vomiting, diarrhea, chest pain, shortness of breath. Last bowel movement this morning.     Discussed medication plan with patient and family. Patient verbalized understanding of adjustments being made and is in agreement with the plan.     Discussed with neurosurgery - no surgical intervention planned at this time. Follow up outpatient in 2-4 weeks.      History   Drug Use No     Comment: Drug use: formerly used         Tobacco Use      Smoking status: Former        Types: Cigars        Passive exposure: Never      Smokeless tobacco: Never      Tobacco comments: Haven't smoked in years        Objective:  Vital signs in last 24 hours:  B/P: 109/60, T: 98.1, P: 61, R: 16   Blood pressure 109/60, pulse 61, temperature 98.1  F (36.7  C), temperature source Oral, resp. rate 16, SpO2 93%.      Review of Systems:   As per subjective, all others negative.    Physical Exam  General: in no apparent distress, sitting up in chair, family at bedside   HEENT: Head normocephalic atraumatic, oral mucosa moist. Sclerae anicteric  CV: Regular rhythm, normal rate, no murmurs  Resp: Respirations unlabored, on room air  Skin: Warm and dry   Extremities: Able to move all four extremities spontaneously   Psych: Normal affect, pleasant  Neuro: Alert and oriented x3       Imaging:  Personally Reviewed.    Results for orders placed or performed " during the hospital encounter of 10/31/24   MR Lumbar Spine w/o Contrast    Impression    IMPRESSION:  Motion degraded examination with findings of acute marrow  edema involving L1 and L5. Significant fracture line findings are  visualized better on the CT assessment but extend into the posterior  elements at the level of L5. No evidence of clear findings of  displaced ligamentous injury or significant prevertebral edema within  the limitations of the exam. No clear findings of epidural fluid  collection on sagittal T2-weighted images. No axial T2-weighted images  were obtained. Study is degraded by significant motion. Consider  repeat imaging when the patient is able.     SARA OSORIO MD         SYSTEM ID:  [id number]   CT Lumbar Spine w/o Contrast    Impression    IMPRESSION:  1.  Unchanged nondisplaced fracture through the L5 vertebral body, pedicles, and fused posterior elements.  2.  Unchanged acute to subacute L1 anterior compression deformity.  3.  Unchanged chronic T10, T11, L2, L3 and L4 anterior compression deformities.          Lab Results:  Personally Reviewed.   Last Comprehensive Metabolic Panel:  Sodium   Date Value Ref Range Status   11/02/2024 142 135 - 145 mmol/L Final     Potassium   Date Value Ref Range Status   11/02/2024 3.6 3.4 - 5.3 mmol/L Final   05/15/2023 4.2 3.5 - 5.0 mmol/L Final     Chloride   Date Value Ref Range Status   11/02/2024 106 98 - 107 mmol/L Final   05/15/2023 104 98 - 107 mmol/L Final     Carbon Dioxide (CO2)   Date Value Ref Range Status   11/02/2024 22 22 - 29 mmol/L Final   05/15/2023 28 22 - 31 mmol/L Final     Anion Gap   Date Value Ref Range Status   11/02/2024 14 7 - 15 mmol/L Final   05/15/2023 9 5 - 18 mmol/L Final     Glucose   Date Value Ref Range Status   11/02/2024 145 (H) 70 - 99 mg/dL Final   05/15/2023 104 70 - 125 mg/dL Final     Urea Nitrogen   Date Value Ref Range Status   11/02/2024 17.8 8.0 - 23.0 mg/dL Final   05/15/2023 19 8 - 22 mg/dL Final  "    Creatinine   Date Value Ref Range Status   11/02/2024 0.94 0.67 - 1.17 mg/dL Final     GFR Estimate   Date Value Ref Range Status   11/02/2024 88 >60 mL/min/1.73m2 Final     Comment:     eGFR calculated using 2021 CKD-EPI equation.   01/05/2021 >60 >60 mL/min/1.73m2 Final     Calcium   Date Value Ref Range Status   11/02/2024 8.6 (L) 8.8 - 10.4 mg/dL Final     Comment:     Reference intervals for this test were updated on 7/16/2024 to reflect our healthy population more accurately. There may be differences in the flagging of prior results with similar values performed with this method. Those prior results can be interpreted in the context of the updated reference intervals.        UA: No results found for: \"UAMP\", \"UBARB\", \"BENZODIAZEUR\", \"UCANN\", \"UCOC\", \"OPIT\", \"UPCP\"       Please see A&P for additional details of medical decision making.  MANAGEMENT DISCUSSED with the following over the past 24 hours:   -Neurosurgery  -Hospitalist updated on changes made to pain plan    NOTE(S)/MEDICAL RECORDS REVIEWED over the past 24 hours:   -Hospitalist: interval history reviewed   -Nursing   -Neurosurgery: potential surgical intervention, awaiting final plan   Tests personally interpreted in the past 24 hours:  - CT Lumbar spine showing unchanged nondisplaced fracture through the L5 vertebral body, unchanged chronic compression deformities   Tests REVIEWED in the past 24 hours:  - BMP  - CBC  SUPPLEMENTAL HISTORY, in addition to the patient's history, over the past 24 hours obtained from:   - Spouse or significant other  - Child  Medical complexity over the past 24 hours:  - Parenteral (IV) CONTROLLED SUBSTANCES ordered  - Prescription DRUG MANAGEMENT performed    Jamia CARSON, FNP-BC  Acute Care Pain Management Program   Hours of pain coverage Mon-Fri [id number], afterhours please call the house officer   DEISI [place] (MUKESH, [place], [place], [place])   Page via [employer]- Click HERE to page Jamia or Arpita text web console " -Click for Arpita

## 2024-11-04 NOTE — PROGRESS NOTES
Status: Patient admitted on 10/31 with lower back pain, fund to have  L5 vertebral body fracture extending to the posterior elements.   Vitals: VSS  Neuros: Oriented x4, BUE 4/5, BLE 3/5 with weak dorsi/planter flexion on LLE, weak dorsi/planter on RLE  IV: PIV saline locked  Labs/Electrolytes: WNL  Resp/trach: Lung sounds clear throughout  Diet: Regular  Bowel status: Last BM, small this AM, first BM in 5 days, BM hard, Senna and Miralax given  : Voiding spontaneously  Skin: Intact ex BLE ignacio with 2+ edema  Pain: Lower back pain contolled with PO Dilaudid, Robaxin and Tylenol  Activity: Up with A2, walker, gaitbelt, LSO brace on when OOB  Social: Cooperative with cares, wife and daughter at bedside, supportive  Plan: Discharge to TCU pending placement, continue to monitor and follow POC

## 2024-11-04 NOTE — PROGRESS NOTES
"[facility]    Medicine Progress Note - Hospitalist Service    Date of Admission:  10/31/2024  Date of Service: 11/04/2024    Assessment & Plan      Onur Barr is a markedly pleasant 69 year old gentleman with past medical history that is most notable for prior spine surgery, chronic pain status post intrathecal pump placement, CAD, ventricular tachycardia status post ICD placement, atrial fibrillation status post JUSTO occlusion, and CLL, among others; who presents with acute non-traumatic lower back pain, and is found to have acute pathologic lumbar spine fracture.     PLAN      Acute pathologic lumbar spine fracture: Of note, Mr. Barr is followed through [facility]. He has chronic back pain and lumbago, having undergone prior lumbar spine fusion surgery in the 1990's (he also reports three other spine surgeries in or before that time). He has had an intrathecal pain pump in place for over twenty years (reportedly with morphine, clonidine, and baclofen), and has reportedly had post-laminectomy syndrome. He underwent pump replacement and intrathecal catheter replacement on 4/20/2023 (per that op note: \"Implants: All implants were [employer] products. The catheter lot number was [id number]. The SynchroMed II pump was model number 8637-40 and serial number [id number]. \").     Now, he presents for evaluation of acute non-traumatic lower back pain. In the ED, he is afebrile, without hypotension, tachycardia, or hypoxia. He has severe chronic leukocytosis to 30 k. He has mild CAROLYN as discussed below. Serial enzymes are negative. EKG shows NSR. CXR shows no acute cardiopulmonary pathology. Limited lumbar spine CT done without contrast shows a previous anterior and posterior bony fusion from L4 down to S1. There is a recent appearing nondisplaced fracture through the fused spinal column involving the vertebral body and posterior elements at the mid L5 level, without other recent fractures. Chronic " compression fracture deformities of the L1, L2 and L3 vertebral bodies are noted. No high-grade spinal canal stenosis is seen on this limited exam.     Overall, his symptoms are consistent with acute pathologic lumbar spine fracture. It is unclear if there has been damage to his pain pump. There are no clear signs of cord compression but we must monitor very carefully overnight.   Plan:    -- NSG following    -- MRI Lumbar -> Motion degraded examination with findings of acute marrow edema involving L1 and L5. Significant fracture line findings are visualized better on the CT assessment but extend into the posterior elements at the level of L5. No evidence of clear findings of displaced ligamentous injury or significant prevertebral edema within the limitations of the exam. No clear findings of epidural fluid collection    -- Multi-modal pain relief with Tylenol, prn Oxycodone and IV Dilaudid as needed for pain, as well as hot and cold, home Cymbalta, prn muscle relaxant, and Lidocaine patch. Anti-emetics as needed.     -- Pain service following    -- Note is made of history of diet controlled diabetes. If steroids are contemplated, would order ISS insulin while hospitalized    -- SW consulted for disposition planning -> will need TCU   -- CT scan performed 11/03 was stable compared to prior.      CAROLYN: Suspect due to hypovolemia. IVF bolus ordered overnight -> Cr now wnl.      Chronic leukocytosis due to CLL: Diagnosed in 2023 and followed with serial observation by [facility].  Plan:  - Follow as an outpatient     CAD: He had prior PCI in 2012. He suffered a STEMI in 2021 and underwent CRYSTAL to the RCA. Most recently he is status post 1 vessel CABG in 1/2022.       -- Resume ASA     History of paroxysmal atrial fibrillation: He underwent concomitant PVI and exclusion of the left atrial appendage in 1/2022, at the time of his CABG.    -- home Sotalol     History of chronic diastolic CHF and paroxysmal ventricular  tachycardia: Due to ischemic cardiomyopathy. He has undergone ICD placement (dual chamber [employer]) on 1/4/2022. TTE in 3/2024 showed LVEF 57% and trace MR and TR.   Plan:    -- Resume home BID Lasix      MARLEEN: Not on CPAP as an outpatient. Monitor O2 sats closely while sleeping. Home Trazodone continued when verified     History of Lora-en-y gastric bypass surgery: In 2008. Noted. IV PPI ordered     Hypertension: Resume home Lisinopril CAROLYN has resolved. Resume home Toprol .     Hyperlipidemia: Resume home Crestor      Acute anemia: Mild. Monitor closely as above while hospitalized. Resume home oral iron at discharge          Diet: Regular Diet Adult    DVT Prophylaxis: Pneumatic Compression Devices  Bran Catheter: Not present  Lines: None     Cardiac Monitoring: None  Code Status: Full Code      Clinically Significant Risk Factors                   # Hypertension: Noted on problem list           # Obesity: Estimated body mass index is 36.84 kg/m  as calculated from the following:    Height as of an earlier encounter on 10/31/24: 1.829 m (6').    Weight as of an earlier encounter on 10/31/24: 123.2 kg (271 lb 9.7 oz)., PRESENT ON ADMISSION      # ICD device present  # History of CABG: noted on surgical history       Disposition Plan     Medically Ready for Discharge: Anticipated Tomorrow             Gwyn Stevenson MD  Hospitalist Service  [facility]  Securely message with iJ[employer] (more info)  Text page via Interview Paging/Directory   ______________________________________________________________________    Interval History     No acute events overnight  Back pain controlled  No fevers  No CP/SOB  Sitting in chair, comfortable at rest  No new weakness or numbness  No nausea / vomiting   No new complaints       Physical Exam   Vital Signs: Temp: 97.9  F (36.6  C) Temp src: Oral BP: 121/67 Pulse: 61   Resp: 16 SpO2: 94 % O2 Device: None (Room air)    Weight: 0 lbs 0 oz    Constitutional:  Alert and oriented to person, place and time;  Respiratory: lungs clear to auscultation bilaterally  Cardiovascular: regular S1 S2  GI: abdomen soft non tender non distended bowel sounds positive  Musculoskeletal: no clubbing, cyanosis or edema  Neurologic: extra-ocular muscles intact; moves all four extremities; able to lift legs off the bed; A/OX3.       ----------------------------------------------------------------------------------------    Medical Decision Making       35 MINUTES SPENT BY ME on the date of service doing chart review, history, exam, documentation & further activities per the note.      Data   ------------------------- PAST 24 HR DATA REVIEWED -----------------------------------------------        Imaging results reviewed over the past 24 hrs:   Recent Results (from the past 24 hours)   CT Lumbar Spine w/o Contrast    Narrative    EXAM: CT LUMBAR SPINE W/O CONTRAST  LOCATION: [facility]  DATE: 11/3/2024    INDICATION: L5 fracture, compare to previous imaging  COMPARISON: October 31, 2024, November 1, 2024  TECHNIQUE: Routine CT Lumbar Spine without IV contrast. Multiplanar reformats. Dose reduction techniques were used.     FINDINGS:  VERTEBRA: Reversal of the normal lumbar lordosis. L4-S1 spinal fusion as before. Vertebral body heights and alignment are unchanged. Marked bony demineralization. Nondisplaced fracture extending through the L5 vertebral body and pedicles into the fused   posterior elements as before. No significant interval change. Unchanged L1 anterior compression deformity with approximately 80% vertebral body height loss (this vertebra demonstrated edema on MRI). Unchanged chronic T10, T11, L2, L3 and L4 anterior   compression deformities. No new or progressive vertebral body height loss.     CANAL/FORAMINA: No high-grade spinal canal stenosis. No severe osseous foraminal stenosis. Partially imaged spinal catheter entering at the L1-2  level    PARASPINAL: IVC filter. Nonobstructing left renal calcifications. Significant formed stool in the rectum and colon, may reflect constipation.      Impression    IMPRESSION:  1.  Unchanged nondisplaced fracture through the L5 vertebral body, pedicles, and fused posterior elements.  2.  Unchanged acute to subacute L1 anterior compression deformity.  3.  Unchanged chronic T10, T11, L2, L3 and L4 anterior compression deformities.         ------------------------- ENCOUNTER LABS ----------------------------------------------------------------  Recent Labs   Lab 11/02/24  1012 11/01/24  1104 10/31/24  0941   WBC 21.6* 21.8* 30.9*   HGB 11.0* 11.6* 12.2*   MCV 88 85 88   * 141* 186    141 142   POTASSIUM 3.6 3.8 3.9   CHLORIDE 106 105 100   CO2 22 20* 23   BUN 17.8 23.6* 35.0*   CR 0.94 0.96 1.29*   ANIONGAP 14 16* 19*   RATNA 8.6* 9.2 9.1   * 107* 124*       Most Recent 3 CBC's:  Recent Labs   Lab Test 11/02/24  1012 11/01/24  1104 10/31/24  0941   WBC 21.6* 21.8* 30.9*   HGB 11.0* 11.6* 12.2*   MCV 88 85 88   * 141* 186     Most Recent 3 BMP's:  Recent Labs   Lab Test 11/02/24  1012 11/01/24  1104 10/31/24  0941    141 142   POTASSIUM 3.6 3.8 3.9   CHLORIDE 106 105 100   CO2 22 20* 23   BUN 17.8 23.6* 35.0*   CR 0.94 0.96 1.29*   ANIONGAP 14 16* 19*   RATNA 8.6* 9.2 9.1   * 107* 124*     Most Recent 2 LFT's:  Recent Labs   Lab Test 10/14/24  0918 04/17/24  0913   AST 30 20   ALT 15 <5   ALKPHOS 76 65   BILITOTAL 0.5 0.6     Most Recent 3 INR's:  Recent Labs   Lab Test 04/20/23  0629 01/19/22  1235 01/19/22  1040   INR 1.10 1.31* 1.57*     Most Recent 3 Troponin's:  Recent Labs   Lab Test 07/13/21  1536   TROPONIN <0.015     Most Recent 3 BNP's:  Recent Labs   Lab Test 01/13/22  0546 07/13/21  1536   NTBNPI 2,463* 58     Most Recent D-dimer:No lab results found.  Most Recent Cholesterol Panel:  Recent Labs   Lab Test 10/14/24  0918   CHOL 104   LDL 29   HDL 34*   TRIG 203*      Most Recent 6 Bacteria Isolates From Any Culture (See EPIC Reports for Culture Details):No lab results found.  Most Recent TSH and T4:  Recent Labs   Lab Test 06/09/23  1045   TSH 1.38     Most Recent Urinalysis:  Recent Labs   Lab Test 08/14/24  1434 01/15/22  0551   COLOR Yellow Light Yellow   APPEARANCE Clear Clear   URINEGLC Negative Negative   URINEBILI Negative Negative   URINEKETONE Negative Negative   SG 1.025 1.018   UBLD Negative Negative   URINEPH 6.5 6.0   PROTEIN Negative Negative   UROBILINOGEN 1.0  --    NITRITE Negative Negative   LEUKEST Negative Negative   RBCU  --  <1   WBCU  --  1     Most Recent ESR & CRP:  Recent Labs   Lab Test 01/04/22  1027   SED 26*

## 2024-11-04 NOTE — PROGRESS NOTES
11/04/24 3834   Appointment Info   Signing Clinician's Name / Credentials (PT) Vandana Jiménez, PT, DPT   Living Environment   People in Home spouse   Current Living Arrangements house  ([facility])   Home Accessibility stairs to enter home;stairs within home   Number of Stairs, Main Entrance 2   Stair Railings, Main Entrance railing on right side (ascending)   Number of Stairs, Within Home, Primary greater than 10 stairs   Stair Railings, Within Home, Primary railing on right side (ascending)   Transportation Anticipated family or friend will provide   Living Environment Comments Pt reports bed and toilet on main level but shower is on the upper level, grab bars.   Self-Care   Usual Activity Tolerance moderate   Current Activity Tolerance poor   Equipment Currently Used at Home hospital bed;walker, standard;cane, straight;grab bar, tub/shower;wheelchair, power   Fall history within last six months no   Activity/Exercise/Self-Care Comment Pt reports he normally uses cane for short distance ambulation, has been crawling up the stairs to get to where the shower is. Ind with ADLs, assist from spouse for IADLs. Pt reports has a power WC for outside of the home.   General Information   Onset of Illness/Injury or Date of Surgery 10/31/24   Referring Physician Gwyn Stevenson MD   Patient/Family Therapy Goals Statement (PT) Would like to go home but open to TCU if needed.   Pertinent History of Current Problem (include personal factors and/or comorbidities that impact the POC) Pt is a 69 year old gentleman with past medical history that is most notable for prior spine surgery, chronic pain status post intrathecal pump placement, CAD, ventricular tachycardia status post ICD placement, atrial fibrillation status post JUSTO occlusion, and CLL, among others; who presents with acute non-traumatic lower back pain on 10/31/24 and is found to have acute pathologic lumbar spine fracture. Imaging revealed L5 vertebral body fracture  extending to the posterior elements. Neurosurgery recommends no surgical intervention at this time.   Existing Precautions/Restrictions fall;brace worn when out of bed  (LSO OOB.)   Cognition   Affect/Mental Status (Cognition) [facility]   Orientation Status (Cognition) oriented x 4   Follows Commands (Cognition) [facility]   Pain Assessment   Patient Currently in Pain   (7/10 pain in back at rest.)   Integumentary/Edema   Integumentary/Edema Comments Pitting edema present 1-2+ in bilateral lower legs. pt reports he used to wear compression socks for edema but not lately. Pt reports increased edema since admission.   Range of Motion (ROM)   ROM Comment Grossly WFL LUE and LE with functional transfers. R shoulder flexion limited to about 90 deg grossly pt reports has been limited for 20+ years.   Strength (Manual Muscle Testing)   Strength (Manual Muscle Testing) Deficits observed during functional mobility   Left Hip (Manual Muscle Testing)   Hip Flexion - Left Side (3-/5) fair minus, left   Right Hip (Manual Muscle Testing)   Hip Flexion - Right Side (3-/5) fair minus, right   Left Knee (Manual Muscle Testing)   Knee Flexion - Left Side (3+/5) fair plus, left   Knee Extension - Left Side (3+/5) fair plus, left   Right Knee (Manual Muscle Testing)   Comment, Right Knee: Manual Muscle Testing (MMT) Knee Flexion: 3-/5 Knee Extension: 3+/5   Bed Mobility   Comment, (Bed Mobility) L side ly HOB approx 20 deg>sitting EOB, log roll technique, modA   Transfers   Comment, (Transfers) sit>stand to FWW, modA   Gait/Stairs (Locomotion)   Distance in Feet (Gait) 5' eval + 30' treatment   Comment, (Gait/Stairs) Amb with FWW, Jessica, significant forward flexed trunk posture, decreased step length, decreased delphine, wide SANYA, decreased heel strike, fall risk.   Balance   Balance Comments Able to maintain seated balance at EOB. Able to maintain standing balance, SBA.   Sensory Examination   Sensory Perception Comments Pt reports tingling in R  foot, intact and symmetrical sensation to light touch examination.   Clinical Impression   Criteria for Skilled Therapeutic Intervention Yes, treatment indicated   PT Diagnosis (PT) Impaired gait   Influenced by the following impairments pain, decreased activity tolerance, decreased strength, impaired balance   Functional limitations due to impairments Impaired functional independence, pain, fall risk, impaired ADLs   Clinical Presentation (PT Evaluation Complexity) stable   Clinical Presentation Rationale per MR and clinical judgement   Clinical Decision Making (Complexity) low complexity   Planned Therapy Interventions (PT) balance training;bed mobility training;gait training;home exercise program;neuromuscular re-education;patient/family education;ROM (range of motion);stair training;strengthening;stretching;transfer training;progressive activity/exercise;cryotherapy;motor coordination training   Risk & Benefits of therapy have been explained evaluation/treatment results reviewed;care plan/treatment goals reviewed;risks/benefits reviewed;current/potential barriers reviewed;participants voiced agreement with care plan;participants included;patient   PT Total Evaluation Time   PT Eval, Low Complexity Minutes (13641) 7   Physical Therapy Goals   PT Frequency 5x/week   PT Predicted Duration/Target Date for Goal Attainment 11/11/24   PT Goals Bed Mobility;Transfers;Gait;Stairs   PT: Bed Mobility Modified independent;Supine to/from sit;Rolling;Within precautions   PT: Transfers Modified independent;Sit to/from stand;Bed to/from chair;Assistive device   PT: Gait Modified independent;Assistive device;Rolling walker;50 feet   PT: Stairs Supervision/stand-by assist;Assistive device;7 stairs;Rail on right   Interventions   Interventions Quick Adds Gait Training;Therapeutic Activity;Therapeutic Procedure   Therapeutic Procedure/Exercise   Treatment Detail/Skilled Intervention pt completed 5 reps LAQ on R, reports increased  pain.   Therapeutic Activity   Therapeutic Activities: dynamic activities to improve functional performance Minutes (83309) 8   Symptoms Noted During/After Treatment Increased pain;Fatigue   Treatment Detail/Skilled Intervention Pt agreed to therapy session. pt sititng up in chair upon start of session. Increased time for room set up. Increased time for donning LSO brace, Pt able to don with Jessica for set up. Pt completed additional STS to FWW from bed, Jessica, cues for hand placement and walker safety, cues for anterior weight shift of trunk, increased time and effort. pt sit>L side ly, log roll technique, modA to lift BLE into bed, increased pain. Edu pt on log roll technique. pt reports too painful to try to roll onto back. pt able to reposition hips in recliner chair. edu pt on difference between TCU and HHPT frequency, edu pt on recommendation for TCU, pt verbalized understanding. pt left in recliner chair, chair alarm on, all needs in reach, pt tolerated okay, RN updated.   Gait Training   Gait Training Minutes (73720) 9   Symptoms Noted During/After Treatment (Gait Training) increased pain;fatigue   Treatment Detail/Skilled Intervention Pt amb in room, two bouts of ambulation with seated rest break due to pain. Demo for pt appropriate use of walker position. Replaced walker with wider width to fit pt's hips appropriately. Cues for walker proximity, upright gaze and posture. pt tends to have significant anterior trunk lean, reports pain is much worse in any other position. cues for breathing throuhgout, cues for increased step length. Pt fatigued quickly.   Distance in Feet 10'+20'   Emmons Level (Gait Training) minimum assist (75% patient effort)   Physical Assistance Level (Gait Training) set-up required;supervision;nonverbal cues (demo/gestures);verbal cues;1 person assist   Assistive Device (Gait Training) rolling walker   PT Discharge Planning   PT Plan progress gait with WC follow, progress LE  strength exercises, time pain meds   PT Discharge Recommendation (DC Rec) Transitional Care Facility   PT Rationale for DC Rec Pt is below baseline functional independence. Pt limited by pain, decreased activity tolerance, decreased strength, impaired balance, fall risk. Pt at baseline uses cane for short distance amb, ind with ADLs, was crawling up the stairs for showers. Pt currently requiring Ax1 for transfers, use of walker for short distance, significant pain. Pt would benefit from continued skilled therapy at TCU prior to returning home safely. If pt were to return home, pt would require a ramp to enter home, stair lift to get to shower, WC inside of home, HHPT/OT Ax1-2 for all transfers, assist with all ADLs/IADLs. Will continue to update as appropriate.   PT Brief overview of current status recommend nursing stand pivot with FWW to commode/ chair Ax2.  (Goals of therapy will be to address safe mobility and make recs for d/c to next level of care. Pt and RN will continue to follow all falls risk precautions as documented by RN staff while hospitalized.)   Physical Therapy Time and Intention   Timed Code Treatment Minutes 17   Total Session Time (sum of timed and untimed services) 24

## 2024-11-04 NOTE — PROGRESS NOTES
Neurosurgery progress note    Patient reports continued back pain and right leg numbness.  Was able to stand with his brace, and seems to be tolerating it well.  CT scan performed yesterday was stable compared to prior.    Exam    Alert, oriented, no acute distress  Bilateral lower extremities with 5 out of 5 strength with hip flexion, 4/5 strength with knee flexion extension, 5-5 strength ankle dorsiflexion plantarflexion  Diminished sensation in the right foot compared to the left  Lumbar spine tender to palpation    Assessment    69 year old gentleman with past medical history that is most notable for prior spine surgery, chronic pain status post intrathecal pump placement, CAD, ventricular tachycardia status post ICD placement, atrial fibrillation status post JUSTO occlusion, and CLL, among others; who presents with acute non-traumatic lower back pain, and is found to have acute pathologic lumbar spine fracture.  Imaging revealed L5 vertebral body fracture extending to the posterior elements.     Plan    Recommend follow-up with neurosurgery clinic in 2 to 4 weeks with a repeat CT scan at that time.  Our clinic will help arrange this.    Patient should wear his brace when out of bed, activity as tolerated, no surgical intervention recommended at this time.    Continue to monitor his lower extremity symptoms, and contact neurosurgery clinic sooner if symptoms are worsening.    Reviewed with Dr. Carreno.

## 2024-11-05 ENCOUNTER — APPOINTMENT (OUTPATIENT)
Dept: PHYSICAL THERAPY | Facility: CLINIC | Age: 69
DRG: 552 | End: 2024-11-05
Attending: HOSPITALIST
Payer: OTHER MISCELLANEOUS

## 2024-11-05 PROCEDURE — 99232 SBSQ HOSP IP/OBS MODERATE 35: CPT

## 2024-11-05 PROCEDURE — 250N000013 HC RX MED GY IP 250 OP 250 PS 637

## 2024-11-05 PROCEDURE — 250N000013 HC RX MED GY IP 250 OP 250 PS 637: Performed by: STUDENT IN AN ORGANIZED HEALTH CARE EDUCATION/TRAINING PROGRAM

## 2024-11-05 PROCEDURE — 250N000013 HC RX MED GY IP 250 OP 250 PS 637: Performed by: NURSE PRACTITIONER

## 2024-11-05 PROCEDURE — 120N000001 HC R&B MED SURG/OB

## 2024-11-05 PROCEDURE — 250N000013 HC RX MED GY IP 250 OP 250 PS 637: Performed by: HOSPITALIST

## 2024-11-05 PROCEDURE — 97116 GAIT TRAINING THERAPY: CPT | Mod: GP

## 2024-11-05 PROCEDURE — 250N000013 HC RX MED GY IP 250 OP 250 PS 637: Performed by: INTERNAL MEDICINE

## 2024-11-05 PROCEDURE — 97530 THERAPEUTIC ACTIVITIES: CPT | Mod: GP

## 2024-11-05 PROCEDURE — 99232 SBSQ HOSP IP/OBS MODERATE 35: CPT | Performed by: STUDENT IN AN ORGANIZED HEALTH CARE EDUCATION/TRAINING PROGRAM

## 2024-11-05 RX ORDER — POLYETHYLENE GLYCOL 3350 17 G/17G
17 POWDER, FOR SOLUTION ORAL 2 TIMES DAILY PRN
Status: DISCONTINUED | OUTPATIENT
Start: 2024-11-05 | End: 2024-11-06 | Stop reason: HOSPADM

## 2024-11-05 RX ADMIN — HYDROMORPHONE HYDROCHLORIDE 4 MG: 2 TABLET ORAL at 02:16

## 2024-11-05 RX ADMIN — ACETAMINOPHEN 975 MG: 325 TABLET, FILM COATED ORAL at 01:59

## 2024-11-05 RX ADMIN — ACETAMINOPHEN 975 MG: 325 TABLET, FILM COATED ORAL at 13:40

## 2024-11-05 RX ADMIN — GABAPENTIN 200 MG: 100 CAPSULE ORAL at 08:06

## 2024-11-05 RX ADMIN — HYDROMORPHONE HYDROCHLORIDE 2 MG: 2 TABLET ORAL at 16:06

## 2024-11-05 RX ADMIN — HYDROMORPHONE HYDROCHLORIDE 2 MG: 2 TABLET ORAL at 13:40

## 2024-11-05 RX ADMIN — SOTALOL HYDROCHLORIDE 80 MG: 80 TABLET ORAL at 21:30

## 2024-11-05 RX ADMIN — TRAZODONE HYDROCHLORIDE 200 MG: 100 TABLET ORAL at 21:30

## 2024-11-05 RX ADMIN — FUROSEMIDE 40 MG: 40 TABLET ORAL at 08:08

## 2024-11-05 RX ADMIN — ASPIRIN 81 MG CHEWABLE TABLET 81 MG: 81 TABLET CHEWABLE at 08:08

## 2024-11-05 RX ADMIN — ACETAMINOPHEN 975 MG: 325 TABLET, FILM COATED ORAL at 21:30

## 2024-11-05 RX ADMIN — SENNOSIDES AND DOCUSATE SODIUM 2 TABLET: 50; 8.6 TABLET ORAL at 08:06

## 2024-11-05 RX ADMIN — POLYETHYLENE GLYCOL 3350 17 G: 17 POWDER, FOR SOLUTION ORAL at 21:29

## 2024-11-05 RX ADMIN — METOPROLOL SUCCINATE 100 MG: 100 TABLET, EXTENDED RELEASE ORAL at 21:30

## 2024-11-05 RX ADMIN — SENNOSIDES AND DOCUSATE SODIUM 2 TABLET: 50; 8.6 TABLET ORAL at 21:34

## 2024-11-05 RX ADMIN — METHOCARBAMOL 750 MG: 750 TABLET ORAL at 08:07

## 2024-11-05 RX ADMIN — DICLOFENAC 2 G: 10 GEL TOPICAL at 21:30

## 2024-11-05 RX ADMIN — HYDROMORPHONE HYDROCHLORIDE 4 MG: 2 TABLET ORAL at 21:29

## 2024-11-05 RX ADMIN — DULOXETINE HYDROCHLORIDE 30 MG: 30 CAPSULE, DELAYED RELEASE ORAL at 08:08

## 2024-11-05 RX ADMIN — FUROSEMIDE 40 MG: 40 TABLET ORAL at 21:30

## 2024-11-05 RX ADMIN — GABAPENTIN 200 MG: 100 CAPSULE ORAL at 21:30

## 2024-11-05 RX ADMIN — ROSUVASTATIN CALCIUM 40 MG: 20 TABLET, FILM COATED ORAL at 08:07

## 2024-11-05 RX ADMIN — HYDROMORPHONE HYDROCHLORIDE 4 MG: 2 TABLET ORAL at 07:08

## 2024-11-05 RX ADMIN — CALCITONIN SALMON 1 SPRAY: 200 SPRAY, METERED NASAL at 08:20

## 2024-11-05 RX ADMIN — POLYETHYLENE GLYCOL 3350 17 G: 17 POWDER, FOR SOLUTION ORAL at 08:06

## 2024-11-05 RX ADMIN — GABAPENTIN 200 MG: 100 CAPSULE ORAL at 16:04

## 2024-11-05 RX ADMIN — METHOCARBAMOL 750 MG: 750 TABLET ORAL at 17:36

## 2024-11-05 RX ADMIN — METHOCARBAMOL 750 MG: 750 TABLET ORAL at 13:40

## 2024-11-05 RX ADMIN — METHOCARBAMOL 750 MG: 750 TABLET ORAL at 21:29

## 2024-11-05 RX ADMIN — DULOXETINE HYDROCHLORIDE 60 MG: 60 CAPSULE, DELAYED RELEASE ORAL at 21:30

## 2024-11-05 RX ADMIN — DICLOFENAC 2 G: 10 GEL TOPICAL at 08:21

## 2024-11-05 NOTE — PROGRESS NOTES
"[facility]    Medicine Progress Note - Hospitalist Service    Date of Admission:  10/31/2024  Date of Service: 11/05/2024    Assessment & Plan      Onur Barr is a markedly pleasant 69 year old gentleman with past medical history that is most notable for prior spine surgery, chronic pain status post intrathecal pump placement, CAD, ventricular tachycardia status post ICD placement, atrial fibrillation status post JUSTO occlusion, and CLL, among others; who presents with acute non-traumatic lower back pain, and is found to have acute pathologic lumbar spine fracture.     PLAN      Acute pathologic lumbar spine fracture  Back Pain  Assessment: Of note, Mr. Barr is followed through [facility]. He has chronic back pain and lumbago, having undergone prior lumbar spine fusion surgery in the 1990's (he also reports three other spine surgeries in or before that time). He has had an intrathecal pain pump in place for over twenty years (reportedly with morphine, clonidine, and baclofen), and has reportedly had post-laminectomy syndrome. He underwent pump replacement and intrathecal catheter replacement on 4/20/2023 (per that op note: \"Implants: All implants were [employer] products. The catheter lot number was [id number]. The SynchroMed II pump was model number 8637-40 and serial number [id number]. \").     Now, he presents for evaluation of acute non-traumatic lower back pain. In the ED, he is afebrile, without hypotension, tachycardia, or hypoxia. He has severe chronic leukocytosis to 30 k. He has mild CAROLYN as discussed below. Serial enzymes are negative. EKG shows NSR. CXR shows no acute cardiopulmonary pathology. Limited lumbar spine CT done without contrast shows a previous anterior and posterior bony fusion from L4 down to S1. There is a recent appearing nondisplaced fracture through the fused spinal column involving the vertebral body and posterior elements at the mid L5 level, without other recent " fractures. Chronic compression fracture deformities of the L1, L2 and L3 vertebral bodies are noted. No high-grade spinal canal stenosis is seen on this limited exam.     Overall, his symptoms are consistent with acute pathologic lumbar spine fracture. It is unclear if there has been damage to his pain pump. There are no clear signs of cord compression but we must monitor very carefully overnight.   Plan:    -- NSG following    -- MRI Lumbar -> Motion degraded examination with findings of acute marrow edema involving L1 and L5. Significant fracture line findings are visualized better on the CT assessment but extend into the posterior elements at the level of L5. No evidence of clear findings of displaced ligamentous injury or significant prevertebral edema within the limitations of the exam. No clear findings of epidural fluid collection    -- Multi-modal pain relief with Tylenol, prn Oxycodone and IV Dilaudid as needed for pain, as well as hot and cold, home Cymbalta, prn muscle relaxant, and Lidocaine patch. Anti-emetics as needed.     -- Pain service following -> PO dilaudid 2-4 mg q4h prn  and IV dilaudid discontinued    -- Note is made of history of diet controlled diabetes. If steroids are contemplated, would consider ordering ISS insulin while hospitalized    -- SW consulted for disposition planning -> will need TCU  -> working on placement    -- CT scan performed 11/03 was stable compared to prior.      CAROLYN: Suspect due to hypovolemia. IVF bolus ordered overnight -> Cr now wnl.      Chronic leukocytosis due to CLL: Diagnosed in 2023 and followed with serial observation by  Oncology.  Plan:  - Follow as an outpatient     CAD: He had prior PCI in 2012. He suffered a STEMI in 2021 and underwent CRYSTAL to the RCA. Most recently he is status post 1 vessel CABG in 1/2022.    -- Resume ASA     History of paroxysmal atrial fibrillation: He underwent concomitant PVI and exclusion of the left atrial appendage in  1/2022, at the time of his CABG.    -- home Sotalol     History of chronic diastolic CHF and paroxysmal ventricular tachycardia: Due to ischemic cardiomyopathy. He has undergone ICD placement (dual chamber [employer]) on 1/4/2022. TTE in 3/2024 showed LVEF 57% and trace MR and TR.   Plan:    -- Resume home BID Lasix      MARLEEN: Not on CPAP as an outpatient. Monitor O2 sats closely while sleeping. Home Trazodone continued when verified     History of Lora-en-y gastric bypass surgery: In 2008. Noted. IV PPI ordered     Hypertension: Resume home Lisinopril CAROLYN has resolved. Resume home Toprol .     Hyperlipidemia: Resume home Crestor      Acute anemia: Mild. Monitor closely as above while hospitalized. Resume home oral iron at discharge          Diet: Regular Diet Adult    DVT Prophylaxis: Pneumatic Compression Devices  Bran Catheter: Not present  Lines: None     Cardiac Monitoring: None  Code Status: Full Code      Clinically Significant Risk Factors                   # Hypertension: Noted on problem list           # Obesity: Estimated body mass index is 36.84 kg/m  as calculated from the following:    Height as of an earlier encounter on 10/31/24: 1.829 m (6').    Weight as of an earlier encounter on 10/31/24: 123.2 kg (271 lb 9.7 oz).       # ICD device present  # History of CABG: noted on surgical history       Disposition Plan     Medically Ready for Discharge: Anticipated Tomorrow             Gwyn Stevenson MD  Hospitalist Service  [facility]  Securely message with [employer] (more info)  Text page via [employer] Paging/Directory   ______________________________________________________________________    Interval History     No acute events overnight  Back pain controlled  No fevers  No CP/SOB  Sitting in chair, comfortable at rest  No new weakness or numbness  No nausea / vomiting   No new complaints, working on TCU placement      Physical Exam   Vital Signs: Temp: 97.6  F (36.4  C) Temp src:  Oral BP: 109/64 Pulse: 61   Resp: 18 SpO2: 95 % O2 Device: None (Room air)    Weight: 0 lbs 0 oz    Constitutional: Alert and oriented to person, place and time;  Respiratory: lungs clear to auscultation bilaterally  Cardiovascular: regular S1 S2  GI: abdomen soft non tender non distended bowel sounds positive  Musculoskeletal: no clubbing, cyanosis or edema  Neurologic: extra-ocular muscles intact; moves all four extremities; able to lift legs off the bed; A/OX3.    ----------------------------------------------------------------------------------------    Medical Decision Making       40 MINUTES SPENT BY ME on the date of service doing chart review, history, exam, documentation & further activities per the note.      Data   ------------------------- PAST 24 HR DATA REVIEWED -----------------------------------------------        Imaging results reviewed over the past 24 hrs:   No results found for this or any previous visit (from the past 24 hours).      ------------------------- ENCOUNTER LABS ----------------------------------------------------------------  Recent Labs   Lab 11/02/24  1012 11/01/24  1104 10/31/24  0941   WBC 21.6* 21.8* 30.9*   HGB 11.0* 11.6* 12.2*   MCV 88 85 88   * 141* 186    141 142   POTASSIUM 3.6 3.8 3.9   CHLORIDE 106 105 100   CO2 22 20* 23   BUN 17.8 23.6* 35.0*   CR 0.94 0.96 1.29*   ANIONGAP 14 16* 19*   RATNA 8.6* 9.2 9.1   * 107* 124*       Most Recent 3 CBC's:  Recent Labs   Lab Test 11/02/24  1012 11/01/24  1104 10/31/24  0941   WBC 21.6* 21.8* 30.9*   HGB 11.0* 11.6* 12.2*   MCV 88 85 88   * 141* 186     Most Recent 3 BMP's:  Recent Labs   Lab Test 11/02/24  1012 11/01/24  1104 10/31/24  0941    141 142   POTASSIUM 3.6 3.8 3.9   CHLORIDE 106 105 100   CO2 22 20* 23   BUN 17.8 23.6* 35.0*   CR 0.94 0.96 1.29*   ANIONGAP 14 16* 19*   RATNA 8.6* 9.2 9.1   * 107* 124*     Most Recent 2 LFT's:  Recent Labs   Lab Test 10/14/24  0918 04/17/24  0913    AST 30 20   ALT 15 <5   ALKPHOS 76 65   BILITOTAL 0.5 0.6     Most Recent 3 INR's:  Recent Labs   Lab Test 04/20/23  0629 01/19/22  1235 01/19/22  1040   INR 1.10 1.31* 1.57*     Most Recent 3 Troponin's:  Recent Labs   Lab Test 07/13/21  1536   TROPONIN <0.015     Most Recent 3 BNP's:  Recent Labs   Lab Test 01/13/22  0546 07/13/21  1536   NTBNPI 2,463* 58     Most Recent D-dimer:No lab results found.  Most Recent Cholesterol Panel:  Recent Labs   Lab Test 10/14/24  0918   CHOL 104   LDL 29   HDL 34*   TRIG 203*     Most Recent 6 Bacteria Isolates From Any Culture (See EPIC Reports for Culture Details):No lab results found.  Most Recent TSH and T4:  Recent Labs   Lab Test 06/09/23  1045   TSH 1.38     Most Recent Urinalysis:  Recent Labs   Lab Test 08/14/24  1434 01/15/22  0551   COLOR Yellow Light Yellow   APPEARANCE Clear Clear   URINEGLC Negative Negative   URINEBILI Negative Negative   URINEKETONE Negative Negative   SG 1.025 1.018   UBLD Negative Negative   URINEPH 6.5 6.0   PROTEIN Negative Negative   UROBILINOGEN 1.0  --    NITRITE Negative Negative   LEUKEST Negative Negative   RBCU  --  <1   WBCU  --  1     Most Recent ESR & CRP:  Recent Labs   Lab Test 01/04/22  1027   SED 26*

## 2024-11-05 NOTE — PLAN OF CARE
Date & Time: 11/5  Surgery/POD#: NA  Behavior & Aggression: Calm & cooperative  Fall Risk: Yes  Orientation: A&O  ABNL VS/O2: SBP low 100's, per MD hold am BP meds.  ABNL Labs: NA  Pain Management: internal pain pump, scheduled, & prn Dilaudid x2  Bowel/Bladder: WDL's, using urinal  Drains: SL PIV  Wounds/incisions NA  Diet: R  Activity Level: 2A w/FWW & gait belt  Tests/Procedures: NA  Anticipated  DC Date: TBD pending TCU authorization completion  Significant Information: chronic pain

## 2024-11-05 NOTE — PROGRESS NOTES
Care Management Follow Up    Length of Stay (days): 5    Expected Discharge Date: 11/06/2024     Concerns to be Addressed: discharge planning     Patient plan of care discussed at interdisciplinary rounds: Yes    Anticipated Discharge Disposition: Transitional Care              Anticipated Discharge Services: None  Anticipated Discharge DME: None    Patient/family educated on Medicare website which has current facility and service quality ratings: yes  Education Provided on the Discharge Plan: Yes  Patient/Family in Agreement with the Plan: yes    Referrals Placed by CM/SW: Post Acute Facilities  Private pay costs discussed: Not applicable    Discussed  Partnership in Safe Discharge Planning  document with patient/family: No     Handoff Completed: No, handoff not indicated or clinically appropriate    Additional Information:  Writer left medicare.gov TCU list with patient to review. Will follow up for choices.     ADD 1204:   Writer met with Pt, pt would like to go to [facility] as it is close to his home.     ADD 1241:   Patient was accepted at [facility]. Facility is starting insurance auth.     ADD 1511:   Writer was notified by bedside nurse that she had spoken to wife and that he said patient encounter needs to be billed through work comp. Writer called patients wife and explained that she needs to call the business office to get this information updated as it does not appear on patients chart. Patients wife stated that she uploaded it to [employer]. Writer explained that she should still call the business office to ensure that they have the right information. Pts wife understood and stated she would call them, writer provided her with their number.     Next Steps: Get insurance auth.     JOSE F ZARATE  [facility]  INPATIENT CARE COORDINATION

## 2024-11-05 NOTE — PLAN OF CARE
Goal Outcome Evaluation:         Pt A&Ox4. A2 GB/W. Scheduled meds & prn PO dilaudid given for pain. Baseline BLE numbness. PIV SL.

## 2024-11-05 NOTE — DISCHARGE INSTRUCTIONS
Pain Team Instructions   - You were prescribed dilaudid, robaxin, tylenol for pain..   - Acetaminophen (Tylenol) may be used as needed for pain. Do not exceed more than 3000 mg of Tylenol per day in a 24 hour period. Many prescription pain medications contain Tylenol  (acetaminophen), including Vicodin , Tylenol #3 , Norco , Lortab , and Percocet .  You should not take any extra pills of Tylenol  if you are using these prescription medications or you can get very sick.    - Voltaren (Diclofenac) gel may be used on the skin topically 3-4 times daily on painful joints, back, chest wall. This is a topical NSAID and can be a safe alternative for those with cardiac, kidney, or liver disease, bleeding disorders, GI ulcers, GI bleeding, or taking blood thinning medication. This may be purchased over the counter.   - Lidocaine ointment may be used up to 3-4 times daily on painful areas. If you do not want to use an ointment, lidocaine patches could be an alternative. You may wear up to 1-3 patches at a time. You should only use one lidocaine product, either a patch or an ointment, not both. Do not place lidocaine on red or open skin or over a healing incision. Avoid heat over lidocaine due to risk of burns. Patches may be used and on for 12 hours and then remove and have off for 12 hours. Lidocaine can be purchased over the counter. Aspercreme or Salon Pas are common Lidocaine brands over the counter.     - Store your medications in a safe and secure place.   - Only take medications prescribed to you and only take them as directed by your doctor. Taking more than the prescribed amount increases your risk for respiratory depression or death.  - Dispose of any unused medications in your home.  Over-the-counter medications and prescription drugs can pollute elias and be harmful to humans, fish, and other wildlife when disposed of improperly -- do not flush medications down the toilet or place in the trash.  Properly disposing  of medicines is important to prevent abuse or poisoning and protect the environment. Prescription and over-the-counter medications are collected anonymously from residents for free at [facility] drop-off locations usually located at your local police department.   - Opioid pain medications can cause addiction. If you have a history of chemical dependency of any type, you are at a higher risk of becoming addicted to pain medications.  Only take these prescribed medications to treat your pain when all other options have been tried. Take it for as short a time and as few doses as possible. Store your pain pills in a secure place, as they are frequently stolen and provide a dangerous opportunity for children or visitors in your house to start abusing these powerful medications. We will not replace any lost or stolen medicine.   - As soon as your pain is better, you should seek out a drug take back program (see your local police department) to dispose of your extra medication.   - You have been given a prescription for an opioid (narcotic) pain medicine and/or have received a pain medicine. These medicines can make you drowsy or impaired. You must not drive, operate dangerous equipment, or engage in any other dangerous activities such as drinking alcohol or using illicit drugs while taking these medications. If you drive while taking these medications, you could be arrested for DUI, or driving under the influence. Do not drink any alcohol while you are taking these medications.   - Opioids can have side effects of nausea, vomiting, constipation. Take your medication with food.   - All opioids tend to cause constipation. Drink plenty of water and eat foods that have a lot of fiber, such as fruits, vegetables, prune juice, apple juice and high fiber cereal.  Take a laxative if you don't move your bowels at least every other day. Miralax , Milk of Magnesia, Colace , or Senna  can be used to keep you regular.  You will likely  need to continue stool softeners and stimulants while taking opioids. Call your Surgeon or Primary Care Provider if it has been greater than 3 days since your last bowel movement.  -Take muscle relaxer and dilaudid at least one hour apart

## 2024-11-05 NOTE — PROGRESS NOTES
"[facility] ACUTE INPATIENT PAIN SERVICE    [facility], [facility], [facility], [facility], [place]   PAIN PROGRESS NOTE    Assessment/Plan:  Onur Barr is a 69 year old male who was admitted on 10/31/2024.  Pain team was asked to see the patient for complex pain management. Admitted for  acute non-traumatic lower back pain, and is found to have acute pathologic lumbar spine fracture. History of  prior spine surgery, chronic pain status post intrathecal pump placement, CAD, ventricular tachycardia status post ICD placement, atrial fibrillation status post JUSTO occlusion, and CLL.  Describes pain as 7-10/10 and aching, sharp, stabbing in the low back and does radiate to BLEs. The patient does not smoke and denies chemical dependency history.      In the last 24 hours, \"Baudilio\" has received: 6 (4mg) po dilaudid  Total MME = 120     PLAN:   1) Pain is consistent with acute on chronic back pain, fractue pain in the setting of chronic pain, chronic opioid use, intrathecal pain pump. Pain improving.   Multimodal Medication Therapy  Adjuvants:   lidocaine patch x2  voltaren gel qid  nasal calcitonin x2 weeks    Robaxin 750mg qid  Cymbalta 30mg qAM, 60mg qPM   Trazodone 200mg at hs   Acetaminophen 975mg increased to q6h   Gabapentin increased to 200mg tid   Opioids:  dilaudid 2-4 mg q4h prn   IV dilaudid discontinued  Intrathecal pain pump infusing morphine, clonidine, baclofen. See procedure note from 11/1 or MAR for more details.   Non-medication interventions: Ice, Rest, PT, OT, Distraction (TV, Music, Reading), and brace per NRS  Constipation Prophylaxis: Bisacodyl Suppository and Senna-docusate, scheduled miralax and senna   Discharge recommendations   Continue PTA cymbalta, trazodone   Stop percocet, dilaudid 4mg   Robaxin 750mg qid prn   Nasal calcitonin x2 weeks today (Ending 11/15)  Voltaren qid   Lidocaine patch x2 daily   Acetaminophen 975mg q6h   Gabapentin 200mg tid      -Opioid presc[facility] [address]  [place] [address]  -[place]  pulled from system on 11/1/24. This indicates Morphine powder and Percocet   10/30/24 Percocet 5-325 mg #12 for 3 days by Jumana Copeland  7/9/24 Morphine powder by Beronica Riojas - same on 01/23/24      Subjective:  Baudilio is seen resting in his chair and appears comfortable. He reports his right leg pain has improved but continues to have intermittent feelings of spasming. Current pain 7/10 and in general is getting better. Baseline chronic pain is a 6-7/10, reports he is about back to baseline. Denies nausea, vomiting, diarrhea, chest pain, shortness of breath. Last bowel movement a few days ago.      Educated on medications and med safety. Patient verbalized understanding and is in agreement with the plan.          History   Drug Use No     Comment: Drug use: formerly used         Tobacco Use      Smoking status: Former        Types: Cigars        Passive exposure: Never      Smokeless tobacco: Never      Tobacco comments: Haven't smoked in years        Objective:  Vital signs in last 24 hours:  B/P: 100/62, T: 97.8, P: 61, R: 16   Blood pressure 100/62, pulse 61, temperature 97.8  F (36.6  C), temperature source Oral, resp. rate 16, SpO2 98%.        Review of Systems:   As per subjective, all others negative.    Physical Exam  General: in no apparent distress, sitting up in chair, appears comfortable  HEENT: Head normocephalic atraumatic, oral mucosa moist. Sclerae anicteric  Resp: Respirations unlabored, on room air   Skin: Warm and dry   Extremities: No peripheral edema  Psych: Normal affect, pleasant  Neuro: Alert and oriented x3          Imaging:  Personally Reviewed.    Results for orders placed or performed during the hospital encounter of 10/31/24   MR Lumbar Spine w/o Contrast    Impression    IMPRESSION:  Motion degraded examination with findings of acute marrow  edema involving L1 and L5. Significant fracture line findings  are  visualized better on the CT assessment but extend into the posterior  elements at the level of L5. No evidence of clear findings of  displaced ligamentous injury or significant prevertebral edema within  the limitations of the exam. No clear findings of epidural fluid  collection on sagittal T2-weighted images. No axial T2-weighted images  were obtained. Study is degraded by significant motion. Consider  repeat imaging when the patient is able.     SARA OSORIO MD         SYSTEM ID:  [id number]   CT Lumbar Spine w/o Contrast    Impression    IMPRESSION:  1.  Unchanged nondisplaced fracture through the L5 vertebral body, pedicles, and fused posterior elements.  2.  Unchanged acute to subacute L1 anterior compression deformity.  3.  Unchanged chronic T10, T11, L2, L3 and L4 anterior compression deformities.          Lab Results:  Personally Reviewed.   Last Comprehensive Metabolic Panel:  Sodium   Date Value Ref Range Status   11/02/2024 142 135 - 145 mmol/L Final     Potassium   Date Value Ref Range Status   11/02/2024 3.6 3.4 - 5.3 mmol/L Final   05/15/2023 4.2 3.5 - 5.0 mmol/L Final     Chloride   Date Value Ref Range Status   11/02/2024 106 98 - 107 mmol/L Final   05/15/2023 104 98 - 107 mmol/L Final     Carbon Dioxide (CO2)   Date Value Ref Range Status   11/02/2024 22 22 - 29 mmol/L Final   05/15/2023 28 22 - 31 mmol/L Final     Anion Gap   Date Value Ref Range Status   11/02/2024 14 7 - 15 mmol/L Final   05/15/2023 9 5 - 18 mmol/L Final     Glucose   Date Value Ref Range Status   11/02/2024 145 (H) 70 - 99 mg/dL Final   05/15/2023 104 70 - 125 mg/dL Final     Urea Nitrogen   Date Value Ref Range Status   11/02/2024 17.8 8.0 - 23.0 mg/dL Final   05/15/2023 19 8 - 22 mg/dL Final     Creatinine   Date Value Ref Range Status   11/02/2024 0.94 0.67 - 1.17 mg/dL Final     GFR Estimate   Date Value Ref Range Status   11/02/2024 88 >60 mL/min/1.73m2 Final     Comment:     eGFR calculated using 2021 CKD-EPI equation.  "  01/05/2021 >60 >60 mL/min/1.73m2 Final     Calcium   Date Value Ref Range Status   11/02/2024 8.6 (L) 8.8 - 10.4 mg/dL Final     Comment:     Reference intervals for this test were updated on 7/16/2024 to reflect our healthy population more accurately. There may be differences in the flagging of prior results with similar values performed with this method. Those prior results can be interpreted in the context of the updated reference intervals.        UA: No results found for: \"UAMP\", \"UBARB\", \"BENZODIAZEUR\", \"UCANN\", \"UCOC\", \"OPIT\", \"UPCP\"       Please see A&P for additional details of medical decision making.  MANAGEMENT DISCUSSED with the following over the past 24 hours:   -Hospitalist and bedside RN updated on interval history   NOTE(S)/MEDICAL RECORDS REVIEWED over the past 24 hours:   -Nursing   -Hospitalist: interval history reviewed   -Neurosurgery: follow up in 2-4 weeks with neurosurgery clinic, wear brace when out of bed   Medical complexity over the past 24 hours:  - Prescription DRUG MANAGEMENT performed      Jamia CARSON FNP-BC  Acute Care Pain Management Program   Hours of pain coverage Mon-Fri [phone], afterhours please call the house officer   [facility] ([place], [place], [place], [place])   Page via [employer]- Click HERE to page Jamia or Arpita text web console -Click for Vocera            "

## 2024-11-05 NOTE — CONSULTS
Care Management Follow Up    Length of Stay (days): 4    Expected Discharge Date: 11/05/2024     Concerns to be Addressed: discharge planning     Patient plan of care discussed at interdisciplinary rounds: Yes    Anticipated Discharge Disposition: Home              Anticipated Discharge Services: Home Care  Anticipated Discharge DME: None    Patient/family educated on Medicare website which has current facility and service quality ratings: yes  Education Provided on the Discharge Plan: Yes  Patient/Family in Agreement with the Plan: yes    Referrals Placed by CM/SW: Homecare  Private pay costs discussed: Not applicable    Discussed  Partnership in Safe Discharge Planning  document with patient/family: No     Handoff Completed: No, handoff not indicated or clinically appropriate    Additional Information:  Writer was made aware by the bedside nurse of new consult placed for TCU.  Writer stated care management was already following, but will follow-up with the patient for TCU choices.    Next Steps: follow up for TCU choices.     JOSE F Cameron  [facility]  Social Work

## 2024-11-06 ENCOUNTER — LAB REQUISITION (OUTPATIENT)
Dept: LAB | Facility: CLINIC | Age: 69
End: 2024-11-06

## 2024-11-06 ENCOUNTER — MYC MEDICAL ADVICE (OUTPATIENT)
Dept: INTERNAL MEDICINE | Facility: CLINIC | Age: 69
End: 2024-11-06

## 2024-11-06 VITALS
OXYGEN SATURATION: 93 % | RESPIRATION RATE: 18 BRPM | HEART RATE: 61 BPM | SYSTOLIC BLOOD PRESSURE: 105 MMHG | DIASTOLIC BLOOD PRESSURE: 66 MMHG | TEMPERATURE: 97.8 F

## 2024-11-06 DIAGNOSIS — Z11.1 ENCOUNTER FOR SCREENING FOR RESPIRATORY TUBERCULOSIS: ICD-10-CM

## 2024-11-06 PROCEDURE — 250N000013 HC RX MED GY IP 250 OP 250 PS 637: Performed by: HOSPITALIST

## 2024-11-06 PROCEDURE — 250N000013 HC RX MED GY IP 250 OP 250 PS 637

## 2024-11-06 PROCEDURE — 99232 SBSQ HOSP IP/OBS MODERATE 35: CPT

## 2024-11-06 PROCEDURE — 250N000013 HC RX MED GY IP 250 OP 250 PS 637: Performed by: STUDENT IN AN ORGANIZED HEALTH CARE EDUCATION/TRAINING PROGRAM

## 2024-11-06 PROCEDURE — 99239 HOSP IP/OBS DSCHRG MGMT >30: CPT | Performed by: HOSPITALIST

## 2024-11-06 PROCEDURE — 250N000013 HC RX MED GY IP 250 OP 250 PS 637: Performed by: NURSE PRACTITIONER

## 2024-11-06 RX ORDER — METHOCARBAMOL 750 MG/1
750 TABLET, FILM COATED ORAL 4 TIMES DAILY PRN
DISCHARGE
Start: 2024-11-06 | End: 2024-11-07 | Stop reason: DRUGHIGH

## 2024-11-06 RX ORDER — BISACODYL 10 MG
10 SUPPOSITORY, RECTAL RECTAL DAILY PRN
DISCHARGE
Start: 2024-11-06 | End: 2024-11-19

## 2024-11-06 RX ORDER — POLYETHYLENE GLYCOL 3350 17 G/17G
17 POWDER, FOR SOLUTION ORAL DAILY
DISCHARGE
Start: 2024-11-07 | End: 2024-11-07

## 2024-11-06 RX ORDER — AMOXICILLIN 250 MG
2 CAPSULE ORAL 2 TIMES DAILY
DISCHARGE
Start: 2024-11-06 | End: 2024-11-19

## 2024-11-06 RX ORDER — GABAPENTIN 100 MG/1
200 CAPSULE ORAL 3 TIMES DAILY
DISCHARGE
Start: 2024-11-06 | End: 2024-11-19

## 2024-11-06 RX ORDER — ACETAMINOPHEN 325 MG/1
975 TABLET ORAL EVERY 6 HOURS
DISCHARGE
Start: 2024-11-06 | End: 2024-11-07 | Stop reason: DRUGHIGH

## 2024-11-06 RX ORDER — LIDOCAINE 4 G/G
2 PATCH TOPICAL EVERY 24 HOURS
Qty: 14 PATCH | Refills: 0 | Status: SHIPPED | OUTPATIENT
Start: 2024-11-06 | End: 2024-11-13

## 2024-11-06 RX ORDER — CALCITONIN SALMON 200 [IU]/.09ML
1 SPRAY, METERED NASAL DAILY
DISCHARGE
Start: 2024-11-07 | End: 2024-11-15

## 2024-11-06 RX ORDER — HYDROMORPHONE HYDROCHLORIDE 2 MG/1
2-4 TABLET ORAL EVERY 6 HOURS PRN
Qty: 20 TABLET | Refills: 0 | Status: SHIPPED | OUTPATIENT
Start: 2024-11-06 | End: 2024-11-07 | Stop reason: DRUGHIGH

## 2024-11-06 RX ADMIN — SENNOSIDES AND DOCUSATE SODIUM 2 TABLET: 50; 8.6 TABLET ORAL at 08:54

## 2024-11-06 RX ADMIN — POLYETHYLENE GLYCOL 3350 17 G: 17 POWDER, FOR SOLUTION ORAL at 08:58

## 2024-11-06 RX ADMIN — ACETAMINOPHEN 975 MG: 325 TABLET, FILM COATED ORAL at 08:54

## 2024-11-06 RX ADMIN — CALCITONIN SALMON 1 SPRAY: 200 SPRAY, METERED NASAL at 08:58

## 2024-11-06 RX ADMIN — GABAPENTIN 200 MG: 100 CAPSULE ORAL at 08:54

## 2024-11-06 RX ADMIN — ROSUVASTATIN CALCIUM 40 MG: 20 TABLET, FILM COATED ORAL at 08:55

## 2024-11-06 RX ADMIN — METHOCARBAMOL 750 MG: 750 TABLET ORAL at 12:01

## 2024-11-06 RX ADMIN — DICLOFENAC 2 G: 10 GEL TOPICAL at 09:04

## 2024-11-06 RX ADMIN — SOTALOL HYDROCHLORIDE 80 MG: 80 TABLET ORAL at 10:11

## 2024-11-06 RX ADMIN — FUROSEMIDE 40 MG: 40 TABLET ORAL at 08:55

## 2024-11-06 RX ADMIN — DICLOFENAC 2 G: 10 GEL TOPICAL at 12:01

## 2024-11-06 RX ADMIN — METOPROLOL SUCCINATE 100 MG: 100 TABLET, EXTENDED RELEASE ORAL at 10:11

## 2024-11-06 RX ADMIN — HYDROMORPHONE HYDROCHLORIDE 4 MG: 2 TABLET ORAL at 04:01

## 2024-11-06 RX ADMIN — METHOCARBAMOL 750 MG: 750 TABLET ORAL at 08:54

## 2024-11-06 RX ADMIN — ASPIRIN 81 MG CHEWABLE TABLET 81 MG: 81 TABLET CHEWABLE at 08:54

## 2024-11-06 RX ADMIN — DULOXETINE HYDROCHLORIDE 30 MG: 30 CAPSULE, DELAYED RELEASE ORAL at 08:54

## 2024-11-06 ASSESSMENT — ACTIVITIES OF DAILY LIVING (ADL)
ADLS_ACUITY_SCORE: 0

## 2024-11-06 NOTE — PROVIDER NOTIFICATION
Paged Hospitalist   Do you want me to hold htn medications (lisinopril, TOPROL XL, sotalol)?BP: 105/66, HR: 61. Pt had soft bps yesterday.

## 2024-11-06 NOTE — PLAN OF CARE
Physical Therapy Discharge Summary    Reason for therapy discharge:    Discharged to transitional care facility.    Progress towards therapy goal(s). See goals on Care Plan [facility] record for goal details.  Goals partially met.  Barriers to achieving goals:   discharge from facility.    Therapy recommendation(s):    Continued therapy is recommended.  Rationale/Recommendations:  Pt is below baseline functional independence. Pt limited by pain, decreased activity tolerance, decreased strength, impaired balance, fall risk. Pt at baseline uses cane for short distance amb, ind with ADLs, was crawling up the stairs for showers. Pt currently requiring Ax1 for transfers, use of walker for short distance, significant pain. Pt would benefit from continued skilled therapy at TCU prior to returning home safely. If pt were to return home, pt would require a ramp to enter home, stair lift to get to shower, WC inside of home, HHPT/OT Ax1-2 for all transfers, assist with all ADLs/IADLs. Will continue to update as appropriate..    *Of note, writer did not treat pt

## 2024-11-06 NOTE — PLAN OF CARE
Goal Outcome Evaluation:         Pt A&Ox4. A2 GB/W. Scheduled meds & prn PO dilaudid given for pain. Baseline BLE numbness. Prn miralax given at night d/t constipation, no BM overnight. Pt slept in chair overnight d/t comfort, encouraged to weight shift & use pillows to take pressure off of bottom. Discharge to TCU pending authorization.

## 2024-11-06 NOTE — PROGRESS NOTES
Care Management Discharge Note    Discharge Date: 11/06/2024       Discharge Disposition: Transitional Care    Discharge Services: None    Discharge DME: None    Discharge Transportation: family or friend will provide    Private pay costs discussed: transportation costs    Does the patient's insurance plan have a 3 day qualifying hospital stay waiver?  No    PAS Confirmation Code: [id number]  Patient/family educated on Medicare website which has current facility and service quality ratings: yes    Education Provided on the Discharge Plan: Yes  Persons Notified of Discharge Plans: Patient, Spouse, TCU, and [facility] staff  Patient/Family in Agreement with the Plan: yes    Handoff Referral Completed: No, handoff not indicated or clinically appropriate    Additional Information:  Per DOD, [facility].    Writer reached out to provider inquiring if the patient was medically ready.     Writer reached out to [facility], liaison who stated [facility] would be able to admit the patient anytime after 1100.     Writer received a Vm from the patient's spouse, Krystal, inquiring about the process it would take to become a PCA.     Writer inquired with  supervisor who stated the spouse would have to contact [facility].    Writer was updated by the provider of patient medically ready to discharge.     Writer updated the patient. Patient stated they would need assitance with a ride. Writer informed the patient of potential out of pocket cost for ride. Patient agreed.     Writer contacted the patient's spouse and shared with her the above information, including for PCA services. Writer informed her as well of potential out of pocket costs for W/C ride. Patient's spouse agreed.     Writer contacted [employer] and set up a W/C ride between [phone].    Writer updated nursing staff, TCU, and patient.     Helenar set discharge orders via DOD to [facility] and sent scripts to [facility]  [phone]. Writer completed PAS.   PAS-RR    D: Per DHS regulation, SW completed and submitted PAS-RR to [place] [employer]ging Direct Connect via the Senior [employer] Line.  PAS-RR confirmation # is : [id number]    I: SW spoke with patient and they are aware a PAS-RR has been submitted.  SW reviewed with patient that they may be contacted for a follow up appointment within 10 days of hospital discharge if their SNF stay is < 30 days.  Contact information for [facility] was also provided.    A: patient verbalized understanding.    P: Further questions may be directed to [facility] at #[phone], option #4 for PAS-RR staff.  Patient will discharge from [facility] to [facility] with [facility]:  Wheelchair at [phone].  Please refer to  progress notes for further details.No further care management intervention anticipated at this time.  Please re-consult if further needs arise.  Care management signing off.        JOSE F Cameron  [facility]  Social Work

## 2024-11-06 NOTE — PROGRESS NOTES
Pt discharged to [facility] with [facility]: Wheelchair, and all of his belongings.

## 2024-11-06 NOTE — PROGRESS NOTES
"[facility] ACUTE INPATIENT PAIN SERVICE    [facility], [facility], [facility], [facility], [place]   PAIN FOLLOW UP    Assessment/Plan:  Onur Barr is a 69 year old male who was admitted on 10/31/2024.  Pain team was asked to see the patient for complex pain management. Admitted for  acute non-traumatic lower back pain, and is found to have acute pathologic lumbar spine fracture. History of  prior spine surgery, chronic pain status post intrathecal pump placement, CAD, ventricular tachycardia status post ICD placement, atrial fibrillation status post JUSTO occlusion, and CLL.  Describes pain as 7-10/10 and aching, sharp, stabbing in the low back and does radiate to BLEs. The patient does not smoke and denies chemical dependency history.      In the last 24 hours, \"Baudilio\" has received: 2 (4mg) po dilaudid, 2 (2mg) po dilaudid  Total MME = 120     PLAN:   1) Pain is consistent with acute on chronic back pain, fractue pain in the setting of chronic pain, chronic opioid use, intrathecal pain pump. Pain improving.   Multimodal Medication Therapy  Adjuvants:   lidocaine patch x2  voltaren gel qid  nasal calcitonin x2 weeks    Robaxin 750mg qid  Cymbalta 30mg qAM, 60mg qPM   Trazodone 200mg at hs   Acetaminophen 975mg q6h   Gabapentin 200mg tid   Opioids:  dilaudid 2-4 mg q4h prn   No IV pain medications  Intrathecal pain pump infusing morphine, clonidine, baclofen. See procedure note from 11/1 or MAR for more details.   Non-medication interventions: Ice, Rest, PT, OT, Distraction (TV, Music, Reading), and brace per NRS  Constipation Prophylaxis: Bisacodyl Suppository and Senna-docusate, scheduled miralax and senna   Discharge recommendations   Continue PTA cymbalta, trazodone   Stop percocet, dilaudid 4mg q6h prn   Robaxin 750mg qid prn   Nasal calcitonin x2 weeks today (Ending 11/15)  Voltaren qid   Lidocaine patch x2 daily   Acetaminophen 975mg q6h   Gabapentin 200mg tid      -Opioid prescriber has been Beronica" [facility] [address]  [place] [address]  -[place]  pulled from system on 11/1/24. This indicates Morphine powder and Percocet   10/30/24 Percocet 5-325 mg #12 for 3 days by Jumana Copeland  7/9/24 Morphine powder by Beronica Riojas - same on 01/23/24      Subjective:  Baudilio is seen sitting up in his chair and appears mildly uncomfortable. He reports an increase in back pain he believes to be related to constipation - he has not had a bowel movement in a few days. Current pain rating is a 9/10, no new areas of pain or change in character. Denies nausea, vomiting, chest pain, shortness of breath.          History   Drug Use No     Comment: Drug use: formerly used         Tobacco Use      Smoking status: Former        Types: Cigars        Passive exposure: Never      Smokeless tobacco: Never      Tobacco comments: Haven't smoked in years      Objective:  Vital signs in last 24 hours:  B/P: 107/63, T: 97.4, P: 61, R: 18   Blood pressure 107/63, pulse 61, temperature 97.4  F (36.3  C), temperature source Oral, resp. rate 18, SpO2 96%.      Review of Systems:   As per subjective, all others negative.    Physical Exam  General: in no apparent distress, sitting up in chair   HEENT: Head normocephalic atraumatic, oral mucosa moist. Sclerae anicteric  CV: Regular rhythm, normal rate, no murmurs  Resp: Respirations unlabored  Skin: No rashes or lesions to visualized skin   Extremities: Able to move all four extremities spontaneously   Psych: Normal affect  Neuro: alert and oriented x3       Imaging:  Personally Reviewed.    Results for orders placed or performed during the hospital encounter of 10/31/24   MR Lumbar Spine w/o Contrast    Impression    IMPRESSION:  Motion degraded examination with findings of acute marrow  edema involving L1 and L5. Significant fracture line findings are  visualized better on the CT assessment but extend into the posterior  elements at the level of L5. No evidence of clear  findings of  displaced ligamentous injury or significant prevertebral edema within  the limitations of the exam. No clear findings of epidural fluid  collection on sagittal T2-weighted images. No axial T2-weighted images  were obtained. Study is degraded by significant motion. Consider  repeat imaging when the patient is able.     SARA OSORIO MD         SYSTEM ID:  [id number]   CT Lumbar Spine w/o Contrast    Impression    IMPRESSION:  1.  Unchanged nondisplaced fracture through the L5 vertebral body, pedicles, and fused posterior elements.  2.  Unchanged acute to subacute L1 anterior compression deformity.  3.  Unchanged chronic T10, T11, L2, L3 and L4 anterior compression deformities.          Lab Results:  Personally Reviewed.   Last Comprehensive Metabolic Panel:  Sodium   Date Value Ref Range Status   11/02/2024 142 135 - 145 mmol/L Final     Potassium   Date Value Ref Range Status   11/02/2024 3.6 3.4 - 5.3 mmol/L Final   05/15/2023 4.2 3.5 - 5.0 mmol/L Final     Chloride   Date Value Ref Range Status   11/02/2024 106 98 - 107 mmol/L Final   05/15/2023 104 98 - 107 mmol/L Final     Carbon Dioxide (CO2)   Date Value Ref Range Status   11/02/2024 22 22 - 29 mmol/L Final   05/15/2023 28 22 - 31 mmol/L Final     Anion Gap   Date Value Ref Range Status   11/02/2024 14 7 - 15 mmol/L Final   05/15/2023 9 5 - 18 mmol/L Final     Glucose   Date Value Ref Range Status   11/02/2024 145 (H) 70 - 99 mg/dL Final   05/15/2023 104 70 - 125 mg/dL Final     Urea Nitrogen   Date Value Ref Range Status   11/02/2024 17.8 8.0 - 23.0 mg/dL Final   05/15/2023 19 8 - 22 mg/dL Final     Creatinine   Date Value Ref Range Status   11/02/2024 0.94 0.67 - 1.17 mg/dL Final     GFR Estimate   Date Value Ref Range Status   11/02/2024 88 >60 mL/min/1.73m2 Final     Comment:     eGFR calculated using 2021 CKD-EPI equation.   01/05/2021 >60 >60 mL/min/1.73m2 Final     Calcium   Date Value Ref Range Status   11/02/2024 8.6 (L) 8.8 - 10.4 mg/dL  "Final     Comment:     Reference intervals for this test were updated on 7/16/2024 to reflect our healthy population more accurately. There may be differences in the flagging of prior results with similar values performed with this method. Those prior results can be interpreted in the context of the updated reference intervals.        UA: No results found for: \"UAMP\", \"UBARB\", \"BENZODIAZEUR\", \"UCANN\", \"UCOC\", \"OPIT\", \"UPCP\"       Please see A&P for additional details of medical decision making.  MANAGEMENT DISCUSSED with the following over the past 24 hours:   -Hospitalist updated on pain plan, pt interval history    NOTE(S)/MEDICAL RECORDS REVIEWED over the past 24 hours:   -Hospitalist: interval history reviewed   -Nursing   Medical complexity over the past 24 hours:  - Prescription DRUG MANAGEMENT performed      MUNIR Aguila-BC  Acute Care Pain Management Program   Hours of pain coverage Mon-Fri [id number], afterhours please call the house officer   [facility] (MUKESH, [place], [place], [place])   Page via [employer]- Click HERE to page Jamia or Arpita text web console -Click for Vocyash            "

## 2024-11-06 NOTE — DISCHARGE SUMMARY
"Discharge Summary  Hospitalist    Date of Admission:  10/31/2024  Date of Discharge:  11/6/2024  Discharging Provider: Aminata Baires MD, MD  Date of Service (when I saw the patient): 11/06/24    Discharge Diagnoses   Acute pathologic lumbar spine fracture  Back Pain    History of Present Illness   Please refer H & P for details.      Hospital Course   Onur Barr is a markedly pleasant 69 year old gentleman with past medical history that is most notable for prior spine surgery, chronic pain status post intrathecal pump placement, CAD, ventricular tachycardia status post ICD placement, atrial fibrillation status post JUSTO occlusion, and CLL, among others; who presents with acute non-traumatic lower back pain, and is found to have acute pathologic lumbar spine fracture.     PLAN      Acute pathologic lumbar spine fracture  Back Pain  Assessment: Of note, Mr. Barr is followed through [facility] pain clinic. He has chronic back pain and lumbago, having undergone prior lumbar spine fusion surgery in the 1990's (he also reports three other spine surgeries in or before that time). He has had an intrathecal pain pump in place for over twenty years (reportedly with morphine, clonidine, and baclofen), and has reportedly had post-laminectomy syndrome. He underwent pump replacement and intrathecal catheter replacement on 4/20/2023 (per that op note: \"Implants: All implants were [employer] products. The catheter lot number was [id number]. The SynchroMed II pump was model number 8637-40 and serial number [id number]. \").     Now, he presents for evaluation of acute non-traumatic lower back pain. In the ED, he is afebrile, without hypotension, tachycardia, or hypoxia. He has severe chronic leukocytosis to 30 k. He has mild CAROLYN as discussed below. Serial enzymes are negative. EKG shows NSR. CXR shows no acute cardiopulmonary pathology. Limited lumbar spine CT done without contrast shows a previous anterior and posterior bony " fusion from L4 down to S1. There is a recent appearing nondisplaced fracture through the fused spinal column involving the vertebral body and posterior elements at the mid L5 level, without other recent fractures. Chronic compression fracture deformities of the L1, L2 and L3 vertebral bodies are noted. No high-grade spinal canal stenosis is seen on this limited exam.     Overall, his symptoms are consistent with acute pathologic lumbar spine fracture. It is unclear if there has been damage to his pain pump. There are no clear signs of cord compression.  Patient remained stable during hospitalization.  Plan:    -- NSG following    -- MRI Lumbar spine-> Motion degraded examination with findings of acute marrow edema involving L1 and L5. Significant fracture line findings are visualized better on the CT assessment but extend into the posterior elements at the level of L5. No evidence of clear findings of displaced ligamentous injury or significant prevertebral edema within the limitations of the exam. No clear findings of epidural fluid collection    -- Multi-modal pain relief with Tylenol, prn Oxycodone and IV Dilaudid as needed for pain, Cymbalta, prn muscle relaxant, and Lidocaine patch. Anti-emetics as needed.     -- Pain service consulted and helped optimize pain regimen   -- CT scan performed 11/03 was stable compared to prior.   --Patient discharged to TCU in stable condition on 11/6     CAROLYN: Suspect due to hypovolemia. IVF bolus given-> Cr now wnl.      Chronic leukocytosis due to CLL: Diagnosed in 2023 and followed with serial observation by [facility].  Plan:  - Follow as an outpatient     CAD: He had prior PCI in 2012. He suffered a STEMI in 2021 and underwent CRYSTAL to the RCA. Most recently he is status post 1 vessel CABG in 1/2022.    -- Resume ASA      History of paroxysmal atrial fibrillation: He underwent concomitant PVI and exclusion of the left atrial appendage in 1/2022, at the time of his CABG.    --  home Sotalol resumed     History of chronic diastolic CHF and paroxysmal ventricular tachycardia: Due to ischemic cardiomyopathy. He has undergone ICD placement (dual chamber [employer]) on 1/4/2022. TTE in 3/2024 showed LVEF 57% and trace MR and TR.   Plan:    -- Resumed home BID Lasix.  Also ordered compression wraps at discharge.     MARLEEN: Not on CPAP as an outpatient. Monitor O2 sats closely while sleeping. Home Trazodone continued      History of Lora-en-y gastric bypass surgery: In 2008. Noted.     Hypertension: BP has been soft during hospitalization and BP meds have been held intermittently.  At discharge-will continue on PTA sotalol and Toprol-XL.  Continue PTA Lasix.  Have placed lisinopril on hold given recent CAROLYN and soft BP.  This will need to be followed as outpatient and resumed as able.     Hyperlipidemia: Resumed home Crestor      Acute anemia: Mild.  Stable around 11/12 during hospitalization.  Resumed home oral iron at discharge            Aminata Baires MD, MD      Pending Results   These results will be followed up by Hospitalist team.  Unresulted Labs Ordered in the Past 30 Days of this Admission       No orders found from 10/1/2024 to 11/1/2024.            Code Status   Full Code       Primary Care Physician   Roge Feliciano    Follow-ups Needed After Discharge   Follow-up Appointments       Follow Up and recommended labs and tests      Follow up with Nursing home physician.  No follow up labs or test are needed.                Physical Exam   Temp: 97.8  F (36.6  C) Temp src: Oral BP: 105/66 Pulse: 61   Resp: 18 SpO2: 93 % O2 Device: None (Room air)    There were no vitals filed for this visit.  Vital Signs with Ranges  Temp:  [97.3  F (36.3  C)-97.8  F (36.6  C)] 97.8  F (36.6  C)  Pulse:  [61-62] 61  Resp:  [16-18] 18  BP: (103-107)/(59-66) 105/66  SpO2:  [93 %-96 %] 93 %  I/O last 3 completed shifts:  In: 710 [P.O.:710]  Out: 2300 [Urine:2300]    Constitutional: Alert,  cooperative, no apparent distress  Respiratory: Non labored breathing  Cardiovascular: Regular rate and rhythm  GI: Normal bowel sounds, soft, non-distended, non-tender  Neuro: Alert, engages in appropriate conversation, fluent speech, moving all extremities, no facial asymmetry  Psych: Calm and pleasant      Discharge Disposition   Discharged to short-term care facility  Condition at discharge: Stable    Consultations This Hospital Stay   CARE MANAGEMENT / SOCIAL WORK IP CONSULT  NEUROSURGERY IP CONSULT  PAIN MANAGEMENT ADULT IP CONSULT  PHYSICAL THERAPY ADULT IP CONSULT  CARE MANAGEMENT / SOCIAL WORK IP CONSULT  PHYSICAL THERAPY ADULT IP CONSULT  OCCUPATIONAL THERAPY ADULT IP CONSULT    Time Spent on this Encounter   IAminata MD, personally saw the patient today and spent greater than 30 minutes discharging this patient.    Discharge Orders      CT Lumbar Spine w/o Contrast     Primary Care - Care Coordination Referral      General info for SNF    Length of Stay Estimate: Short Term Care: Estimated # of Days <30  Condition at Discharge: Stable  Level of care:skilled   Rehabilitation Potential: Good  Admission H&P remains valid and up-to-date: Yes  Recent Chemotherapy: N/A  Use Nursing Home Standing Orders: Yes     Mantoux instructions    Give two-step Mantoux (PPD) Per Facility Policy Yes     Follow Up and recommended labs and tests    Follow up with Nursing home physician.  No follow up labs or test are needed.     Reason for your hospital stay    Acute pathologic spine fracture-lumbar spine     Activity - Up with nursing assistance     Physical Therapy Adult Consult    Evaluate and treat as clinically indicated.    Reason: Weakness     Occupational Therapy Adult Consult    Evaluate and treat as clinically indicated.    Reason: Weakness     Fall precautions     Compression Sleeve/Stocking Order    Compression Sleeve/Stocking Documentation:   The patient needs compression stockings for Other reason:  bilateral legedema    I, the undersigned, certify that the above prescribed supplies are medically necessary for this patient and is both reasonable and necessary in reference to accepted standards of medical and necessary in reference to accepted standards of medical practice in the treatment of this patient's condition and is not prescribed as a convenience.     Diet    Follow this diet upon discharge: Current Diet:Orders Placed This Encounter      Regular Diet Adult     Discharge Medications   Discharge Medication List as of 11/6/2024 12:41 PM        START taking these medications    Details   bisacodyl (DULCOLAX) 10 MG suppository Place 1 suppository (10 mg) rectally daily as needed for constipation., Transitional      calcitonin, salmon, (MIACALCIN) 200 UNIT/ACT nasal spray Spray 1 spray into one nostril alternating nostrils daily for 8 days. Alternate nostril each day.Alternate nostrils daily.  Right nostril on even days.  Left nostril on odd days., Transitional      diclofenac (VOLTAREN) 1 % topical gel Apply 2 g topically 4 times daily., Transitional      gabapentin (NEURONTIN) 100 MG capsule Take 2 capsules (200 mg) by mouth 3 times daily. Hold sedation, confusion, Transitional      HYDROmorphone (DILAUDID) 2 MG tablet Take 1-2 tablets (2-4 mg) by mouth every 6 hours as needed for moderate pain or severe pain., Disp-20 tablet, R-0, Local Print      Lidocaine (LIDOCARE) 4 % Patch Place 2 patches over 12 hours onto the skin every 24 hours for 7 days. To prevent lidocaine toxicity, patient should be patch free for 12 hrs daily.Apply patch(s) to lower back To prevent lidocaine toxicity, patient should be patch free for 12 hrs daily.  Patches may be cut to smaller size prior to removing release liner. Reminder: Remove previous patch before applying new patch.  NEVER APPLY HEAT OVER PATCH which increases absorption and may lead to local anesthetic toxicity. Do not apply over area wher e liposomal bupivacaine was  injected for 96 hours post injection.Disp-14 patch, [id number]      methocarbamol (ROBAXIN) 750 MG tablet Take 1 tablet (750 mg) by mouth 4 times daily as needed for muscle spasms., Transitional      polyethylene glycol (MIRALAX) 17 g packet Take 17 g by mouth daily. IF more than 1 constipation PRN medication is ordered, administer step-wise as indicated, moving to the next step ONLY if prior step ineffective.  Step 1: senna-docusate (SENOKOT-S; PERICOLACE) OR bisacodyl (DULCOLAX) EC tablet   Step 2: polyethylene glycol (MIRALAX/GLYCOLAX)  Step 3: bisacodyl (DULCOLAX) suppository   Step 4: enema    1 Packet = 17 grams. Mix each gram with at least 1/2 ounce (15 mL) of water -   8 ounces for 17 g dose, 4 ounces for 8.5 g dose, 2 ounces for 4 g  dose.  Follow with the same volume of water.  Hold for loose stools unless being administered as part of a bowel prep regimen or bowel clean out., Transitional      senna-docusate (SENOKOT-S/PERICOLACE) 8.6-50 MG tablet Take 2 tablets by mouth 2 times daily. Hold for loose stools., Transitional           CONTINUE these medications which have CHANGED    Details   acetaminophen (TYLENOL) 325 MG tablet Take 3 tablets (975 mg) by mouth every 6 hours for 7 days. After 1 week can change to PRN dosing, Transitional           CONTINUE these medications which have NOT CHANGED    Details   aspirin (ASA) 81 MG chewable tablet Take 81 mg by mouth daily, Historical      DULoxetine (CYMBALTA) 30 MG capsule Take 1 capsule (30mg) every morning and 2 capsules (60mg) every evening, Disp-270 capsule, R-3, E-Prescribe      FERATE 240 (27 Fe) MG TABS TAKE 1 TABLET BY MOUTH EVERY DAY, Disp-90 tablet, R-2, OSIRIS, E-Prescribe      furosemide (LASIX) 40 MG tablet Take 1 tablet (40 mg) by mouth 2 times daily, Disp-180 tablet, R-3, E-Prescribe      ketoconazole (NIZORAL) 2 % external cream Apply topically 2 times daily. to affected areaDisp-60 g, [id number]-[id number]-[id number]      medication given by implanted  intrathecal pump continuously. Updated by PharmD 10/31/24    Drug # 1: Morphine (Duramorph or Infumorph)  - Conc:20 mg/mL - Total Dose / 24 hours: 4.342 mg    Drug # 2: Clonidine (Duraclon)  - Conc:21.1 mcg/mL - Total Dose / 24 hours: 4.580 mcg    Drug # 3: Baclofen (Eusebio resal) - Conc: 42.5 mcg/mL - Total Dose / 24 hours: 9.226 mcg    Diluent: NS    Infusion Rate: 0.2171 mL/24 hrs  Pump Reservoir Volume: 40 mL  Outside Clinic & Provider: [facility] [phone]  Last Refill Date: 07/12/2024  Next Refill Date : 01/01/2025  [place] Alarm Date: 01/01/2025  Pump : [employer] Synchromed II    Please consider consulting the pain team if the patient is admitted with an IT pump.Historical      metoprolol succinate ER (TOPROL XL) 100 MG 24 hr tablet Take 1 tablet (100 mg) by mouth 2 times daily, Disp-180 tablet, R-3, E-Prescribe      nitroGLYcerin (NITROSTAT) 0.4 MG sublingual tablet For chest pain place 1 tablet under the tongue every 5 minutes for 3 doses. If symptoms persist 5 minutes after 1st dose call 911., Disp-30 tablet, R-1, E-Prescribe      ondansetron (ZOFRAN ODT) 4 MG ODT tab Take 1 tablet (4 mg) by mouth every 8 hours as needed for nausea, Disp-100 tablet, R-1, E-Prescribe      rosuvastatin (CRESTOR) 40 MG tablet Take 1 tablet (40 mg) by mouth daily., Disp-90 tablet, R-0, E-PrescribeDue for fasting labs in November 2024.      sotalol (BETAPACE) 80 MG tablet Take 1 tablet (80 mg) by mouth 2 times daily, Disp-180 tablet, R-3, E-Prescribe      traZODone (DESYREL) 100 MG tablet Take 2 tablets (200 mg) by mouth at bedtime TAKE 2 TABLETS (200MG TOTAL) BY MOUTH AT BEDTIME Strength: 100 mg, Disp-180 tablet, R-3, E-Prescribe           STOP taking these medications       lisinopril (ZESTRIL) 10 MG tablet Comments:   Reason for Stopping:         oxyCODONE-acetaminophen (PERCOCET) 5-325 MG tablet Comments:   Reason for Stopping:             Allergies   Allergies   Allergen Reactions     Hydrocodone-Acetaminophen Other (See Comments)     sneezing     Data   Most Recent 3 CBC's:  Recent Labs   Lab Test 11/02/24  1012 11/01/24  1104 10/31/24  0941   WBC 21.6* 21.8* 30.9*   HGB 11.0* 11.6* 12.2*   MCV 88 85 88   * 141* 186      Most Recent 3 BMP's:  Recent Labs   Lab Test 11/02/24  1012 11/01/24  1104 10/31/24  0941    141 142   POTASSIUM 3.6 3.8 3.9   CHLORIDE 106 105 100   CO2 22 20* 23   BUN 17.8 23.6* 35.0*   CR 0.94 0.96 1.29*   ANIONGAP 14 16* 19*   RATNA 8.6* 9.2 9.1   * 107* 124*     Most Recent 2 LFT's:  Recent Labs   Lab Test 10/14/24  0918 04/17/24  0913   AST 30 20   ALT 15 <5   ALKPHOS 76 65   BILITOTAL 0.5 0.6     Most Recent INR's and Anticoagulation Dosing History:  Anticoagulation Dose History          Latest Ref Rng & Units 9/11/2021 1/19/2022 4/20/2023   Recent Dosing and Labs   INR 0.85 - 1.15 1.00        Effective 7/11/2021, the reference range for this assay has changed. 1.31  1.57  1.10       Details          Multiple values from one day are sorted in reverse-chronological order             Most Recent 3 Troponin's:  Recent Labs   Lab Test 07/13/21  1536   TROPONIN <0.015     Most Recent Cholesterol Panel:  Recent Labs   Lab Test 10/14/24  0918   CHOL 104   LDL 29   HDL 34*   TRIG 203*     Most Recent 6 Bacteria Isolates From Any Culture (See EPIC Reports for Culture Details):No lab results found.  Most Recent TSH, T4 and A1c Labs:  Recent Labs   Lab Test 08/14/24  1410 12/07/23  0824 06/09/23  1045   TSH  --   --  1.38   A1C 5.4   < >  --     < > = values in this interval not displayed.       Results for orders placed or performed during the hospital encounter of 10/31/24   MR Lumbar Spine w/o Contrast    Narrative    MRI LUMBAR SPINE WITHOUT CONTRAST   11/1/2024 3:42 PM     HISTORY: Acute back pain andlower extremity weakness, lumbar spine  fracture on CT, evaluate for cord compression and or fractured  intrathecal pain pump     TECHNIQUE: Multiplanar  multisequence MRI of the lumbar spine without  contrast.     COMPARISON: CT evaluation 11/1/2024.     FINDINGS: Chronic-appearing compression deformity findings at L2 and  L3 associated with Schmorl's nodes along the superior endplates of L2  and L3. There is acute marrow edema involving the L1 vertebral level  with 50-75% loss of vertebral body height involving the L1 vertebral  level centrally without significant bony retropulsion findings.    At L5 there are findings of acute marrow edema and fracture lines  visualized through the central aspect of the L5 vertebral level with  extension into the posterior elements with abnormal marrow signal  involving the posterior elements at the level of L5. No evidence of  significant epidural fluid collection. The axial images are only  obtained with axial T1 weighted images. No axial T2-weighted images  were obtained. The sagittal T1-weighted images are nondiagnostic due  to motion.      Impression    IMPRESSION:  Motion degraded examination with findings of acute marrow  edema involving L1 and L5. Significant fracture line findings are  visualized better on the CT assessment but extend into the posterior  elements at the level of L5. No evidence of clear findings of  displaced ligamentous injury or significant prevertebral edema within  the limitations of the exam. No clear findings of epidural fluid  collection on sagittal T2-weighted images. No axial T2-weighted images  were obtained. Study is degraded by significant motion. Consider  repeat imaging when the patient is able.     SARA OSORIO MD         SYSTEM ID:  [id number]   CT Lumbar Spine w/o Contrast    Narrative    EXAM: CT LUMBAR SPINE W/O CONTRAST  LOCATION: [facility] [place]  DATE: 11/3/2024    INDICATION: L5 fracture, compare to previous imaging  COMPARISON: October 31, 2024, November 1, 2024  TECHNIQUE: Routine CT Lumbar Spine without IV contrast. Multiplanar reformats. Dose reduction  techniques were used.     FINDINGS:  VERTEBRA: Reversal of the normal lumbar lordosis. L4-S1 spinal fusion as before. Vertebral body heights and alignment are unchanged. Marked bony demineralization. Nondisplaced fracture extending through the L5 vertebral body and pedicles into the fused   posterior elements as before. No significant interval change. Unchanged L1 anterior compression deformity with approximately 80% vertebral body height loss (this vertebra demonstrated edema on MRI). Unchanged chronic T10, T11, L2, L3 and L4 anterior   compression deformities. No new or progressive vertebral body height loss.     CANAL/FORAMINA: No high-grade spinal canal stenosis. No severe osseous foraminal stenosis. Partially imaged spinal catheter entering at the L1-2 level    PARASPINAL: IVC filter. Nonobstructing left renal calcifications. Significant formed stool in the rectum and colon, may reflect constipation.      Impression    IMPRESSION:  1.  Unchanged nondisplaced fracture through the L5 vertebral body, pedicles, and fused posterior elements.  2.  Unchanged acute to subacute L1 anterior compression deformity.  3.  Unchanged chronic T10, T11, L2, L3 and L4 anterior compression deformities.

## 2024-11-07 ENCOUNTER — PATIENT OUTREACH (OUTPATIENT)
Dept: CARE COORDINATION | Facility: CLINIC | Age: 69
End: 2024-11-07

## 2024-11-07 ENCOUNTER — LAB REQUISITION (OUTPATIENT)
Dept: LAB | Facility: CLINIC | Age: 69
End: 2024-11-07
Payer: COMMERCIAL

## 2024-11-07 ENCOUNTER — TRANSITIONAL CARE UNIT VISIT (OUTPATIENT)
Dept: GERIATRICS | Facility: CLINIC | Age: 69
End: 2024-11-07
Payer: COMMERCIAL

## 2024-11-07 VITALS
DIASTOLIC BLOOD PRESSURE: 60 MMHG | RESPIRATION RATE: 16 BRPM | HEART RATE: 60 BPM | TEMPERATURE: 98 F | WEIGHT: 271 LBS | SYSTOLIC BLOOD PRESSURE: 111 MMHG | OXYGEN SATURATION: 96 % | HEIGHT: 72 IN | BODY MASS INDEX: 36.7 KG/M2

## 2024-11-07 DIAGNOSIS — I50.32 CHRONIC DIASTOLIC HEART FAILURE (H): ICD-10-CM

## 2024-11-07 DIAGNOSIS — Z98.84 HISTORY OF ROUX-EN-Y GASTRIC BYPASS: ICD-10-CM

## 2024-11-07 DIAGNOSIS — G89.4 CHRONIC PAIN SYNDROME: ICD-10-CM

## 2024-11-07 DIAGNOSIS — K59.01 SLOW TRANSIT CONSTIPATION: ICD-10-CM

## 2024-11-07 DIAGNOSIS — E78.5 DYSLIPIDEMIA: ICD-10-CM

## 2024-11-07 DIAGNOSIS — D64.9 ACUTE ON CHRONIC ANEMIA: ICD-10-CM

## 2024-11-07 DIAGNOSIS — C91.10 CLL (CHRONIC LYMPHOCYTIC LEUKEMIA) (H): ICD-10-CM

## 2024-11-07 DIAGNOSIS — R53.81 PHYSICAL DECONDITIONING: ICD-10-CM

## 2024-11-07 DIAGNOSIS — N18.9 CHRONIC KIDNEY DISEASE, UNSPECIFIED: ICD-10-CM

## 2024-11-07 DIAGNOSIS — D72.829 LEUKOCYTOSIS, UNSPECIFIED TYPE: ICD-10-CM

## 2024-11-07 DIAGNOSIS — I47.29 PAROXYSMAL VENTRICULAR TACHYCARDIA (H): ICD-10-CM

## 2024-11-07 DIAGNOSIS — F11.90 CHRONIC, CONTINUOUS USE OF OPIOIDS: ICD-10-CM

## 2024-11-07 DIAGNOSIS — N17.9 AKI (ACUTE KIDNEY INJURY) (H): ICD-10-CM

## 2024-11-07 DIAGNOSIS — I10 ESSENTIAL HYPERTENSION: ICD-10-CM

## 2024-11-07 DIAGNOSIS — S32.000A CLOSED COMPRESSION FRACTURE OF LUMBOSACRAL SPINE, INITIAL ENCOUNTER (H): Primary | ICD-10-CM

## 2024-11-07 DIAGNOSIS — I48.0 PAF (PAROXYSMAL ATRIAL FIBRILLATION) (H): ICD-10-CM

## 2024-11-07 DIAGNOSIS — Z95.1 S/P CABG (CORONARY ARTERY BYPASS GRAFT): ICD-10-CM

## 2024-11-07 DIAGNOSIS — G47.33 OSA (OBSTRUCTIVE SLEEP APNEA): ICD-10-CM

## 2024-11-07 DIAGNOSIS — I25.10 CORONARY ARTERIOSCLEROSIS: ICD-10-CM

## 2024-11-07 PROCEDURE — 36415 COLL VENOUS BLD VENIPUNCTURE: CPT

## 2024-11-07 PROCEDURE — 99310 SBSQ NF CARE HIGH MDM 45: CPT

## 2024-11-07 PROCEDURE — P9604 ONE-WAY ALLOW PRORATED TRIP: HCPCS

## 2024-11-07 PROCEDURE — 86481 TB AG RESPONSE T-CELL SUSP: CPT

## 2024-11-07 RX ORDER — METHOCARBAMOL 750 MG/1
750 TABLET, FILM COATED ORAL 4 TIMES DAILY
COMMUNITY

## 2024-11-07 RX ORDER — POLYETHYLENE GLYCOL 3350 17 G/17G
17 POWDER, FOR SOLUTION ORAL DAILY
COMMUNITY
Start: 2024-11-07

## 2024-11-07 RX ORDER — HYDROMORPHONE HYDROCHLORIDE 2 MG/1
2-4 TABLET ORAL EVERY 4 HOURS PRN
Qty: 10 TABLET | Refills: 0 | Status: SHIPPED | OUTPATIENT
Start: 2024-11-07 | End: 2024-11-08

## 2024-11-07 RX ORDER — ACETAMINOPHEN 325 MG/1
975 TABLET ORAL EVERY 6 HOURS
COMMUNITY

## 2024-11-07 NOTE — PROGRESS NOTES
[facility] GERIATRICS    PRIMARY CARE PROVIDER AND CLINIC:  Roge Feliciano MD, [address] / [facility] [address]  Chief Complaint   Patient presents with    Hospital F/U      [facility] Medical Record Number:  [id number]  Place of Service where encounter took place:  [facility]  ([facility]) [[id number]]    Onur Barr  is a 69 year old  (1955), admitted to the above facility from  [facility]. Hospital stay 10/31/24 through 11/6/24.    By chart review, patient was admitted to [facility] 10/31- 11/6/24 with an acute pathologic lumbar spine fracture. In ED, labs remarkable for WBC 30.9k, hgb 12.2, Cr 1.29. CXR was negative for acute cardiopulmonary findings. CT lumbar spine demonstrated a previous anterior and posterior bony fusion from L4-S1 and a recent appearing nondisplaced fracture at the mid L5 level, chronic compression deformities of L1, L2, L3, without high grade spinal canal stenosis.  NS[facility] and Pain team were consulted. MRI of the spine demonstrated acute marrow edema at L1 and L5, L5 fracture without displaced ligamentous injury or significant prevertebral edema or epidural fluid collection. Pain ws managed with tylenol, PRN oxycodone and IV dilaudid, cymbalta, PRN muscle relaxants, lidocaine. He was started on calcitriol for pain. Repeat CT 11/3 was stable. He was recommended TCU at discharge, admitted to current facility for ongoing medical management, rehab, nursing care.     HPI:    Seen today for follow-up in his room in TCU resting abed. Pain is poorly controlled this morning. He did not use any PRN medications over night. He is constipated. Appetite is fair. He is denying CP, SOB, changes in bladder habits, fever/chills.     CODE STATUS/ADVANCE DIRECTIVES DISCUSSION:  Prior  CPR/Full code   ALLERGIES:   Allergies   Allergen Reactions    Hydrocodone-Acetaminophen Other (See Comments)     sneezing      PAST MEDICAL HISTORY:   Past Medical History:    Diagnosis Date    Bariatric surgery status 06/23/2008    Formatting of this note might be different from the original. Created by Conversion    Bilateral leg edema 07/13/2021    Chronic Back Pain (IT Pump w Morphine, Clonidine, Baclofen) 01/16/2022    Chronic diastolic heart failure (H) 07/26/2021    Claudication of lower extremity (H) 11/20/2019    CLL (chronic lymphocytic leukemia) (H)     Coronary artery disease     CABG 1-    Depressive disorder     Essential hypertension     Created by [facility] [facility] Annotation: Apr 6 2012 10:16Zack Harris: Lisinipril,  coreg  Replacement Utility updated for latest IMO load    Gastroesophageal reflux disease without esophagitis 09/11/2021    H/O gastric bypass 2008    History of blood transfusion 2004    ICD (implantable cardioverter-defibrillator) in place 01/25/2022    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    Ischemic cardiomyopathy     Lumbago     Created by [facility] Annotation: Apr 6 2012 10:20Anh Ford: Morphine pump     Mixed hyperlipidemia 06/23/2008    Morbid obesity (H) 08/01/2018    Nephrolithiasis     NSTEMI (non-ST elevated myocardial infarction) (H)     MARLEEN (doesn't tolerate CPAP) 07/26/2021    Osteoarthritis     Created by [facility] Annotation: Jun 26 2009  9:53Zack Harris: failed lumbar  fusion/morphine pump dr putnam  Replacement Utility updated for latest IMO load    Paroxysmal atrial fib -- S/P Pulm Vein Ablation 1/19/22 09/11/2021    Formatting of this note might be different from the original. Created by Conversion    Paroxysmal ventricular tachycardia (H)     dual chamber ICD 1/24/2022    Peripheral vascular disease (H) 05/03/2022    Post-laminectomy syndrome 11/25/2015    S/P CABG (coronary artery bypass graft) 01/25/2022    Type 2 diabetes mellitus (H)     Diet controlled after Gastric Bypass in 2008      PAST SURGICAL HISTORY:   has a past  surgical history that includes Extracorporeal shock wave lithotripsy, cystoscopy, insert stent ureter(s), combined (08/23/2011); Bypass gastric duodenal switch; back surgery; shoulder surgery; orthopedic surgery; Cosmetic surgery; ENT surgery; hernia repair; Replace Intrathecal Pain Pump (N/A, 04/25/2017); Revise catheter intrathecal (N/A, 04/25/2017); IR Miscellaneous Procedure (07/29/2011); IR Nephrostomy Tube Change Bilateral (08/05/2011); IR Miscellaneous Procedure (08/05/2011); IR Miscellaneous Procedure (08/23/2011); GASTRIC BYPASS,OBESE<100CM SUSY-EN-Y (01/01/2008); Pr Arthrodesis Ant Interbody Min Discectomy,Lumbar; REMOVAL OF TONSILS,<11 Y/O; Pr Excise Excess Skin Tissue,Abdomen (11/13/2012); REPAIR INCISIONAL HERNIA,REDUCIBLE (11/13/2012); Coronary Angiogram (N/A, 09/11/2021); Left Heart Catheterization (N/A, 09/11/2021); Percutaneous Coronary Intervention (N/A, 09/11/2021); Percutaneous Coronary Intervention Aspiration Thrombectomy (N/A, 09/11/2021); Percutaneous Coronary Intervention (N/A, 10/07/2021); Heart Catheterization with Possible Intervention (N/A, 01/13/2022); Bypass graft artery coronary (N/A, 01/19/2022); Implantable Cardioverter-Defibrillator (ICD) (N/A, 01/24/2022); colonoscopy; and Replace Intrathecal Pain Pump (N/A, 4/20/2023).  FAMILY HISTORY: family history includes Breast Cancer in his sister; Cerebrovascular Disease in his mother; Coronary Artery Disease in his father; Heart Disease in his father; Hodgkin's lymphoma in his brother; Hyperlipidemia in his father; Hypertension in his father; Other Cancer in his daughter and sister.  SOCIAL HISTORY:   reports that he has quit smoking. His smoking use included cigars. He has never been exposed to tobacco smoke. He has never used smokeless tobacco. He reports that he does not drink alcohol and does not use drugs.  Patient's living condition: lives with spouse    Post Discharge Medication Reconciliation Status:   MED REC REQUIRED  Post  Medication Reconciliation Status: discharge medications reconciled and changed, per note/orders       Current Outpatient Medications   Medication Sig Dispense Refill    acetaminophen (TYLENOL) 325 MG tablet Take 975 mg by mouth every 6 hours.      aspirin (ASA) 81 MG chewable tablet Take 81 mg by mouth daily      bisacodyl (DULCOLAX) 10 MG suppository Place 1 suppository (10 mg) rectally daily as needed for constipation.      calcitonin, salmon, (MIACALCIN) 200 UNIT/ACT nasal spray Spray 1 spray into one nostril alternating nostrils daily for 8 days. Alternate nostril each day.Alternate nostrils daily.  Right nostril on even days.  Left nostril on odd days.      diclofenac (VOLTAREN) 1 % topical gel Apply 2 g topically 4 times daily.      DULoxetine (CYMBALTA) 30 MG capsule Take 1 capsule (30mg) every morning and 2 capsules (60mg) every evening 270 capsule 3    FERATE 240 (27 Fe) MG TABS TAKE 1 TABLET BY MOUTH EVERY DAY 90 tablet 2    furosemide (LASIX) 40 MG tablet Take 1 tablet (40 mg) by mouth 2 times daily 180 tablet 3    gabapentin (NEURONTIN) 100 MG capsule Take 2 capsules (200 mg) by mouth 3 times daily. Hold sedation, confusion      HYDROmorphone (DILAUDID) 2 MG tablet Take 1-2 tablets (2-4 mg) by mouth every 4 hours as needed for pain (take 2 mg for pain 4-6/10 and take 4 mg for pain 7-10/10). 10 tablet 0    ketoconazole (NIZORAL) 2 % external cream Apply topically 2 times daily. to affected area 60 g 4    Lidocaine (LIDOCARE) 4 % Patch Place 2 patches over 12 hours onto the skin every 24 hours for 7 days. To prevent lidocaine toxicity, patient should be patch free for 12 hrs daily.Apply patch(s) to lower back To prevent lidocaine toxicity, patient should be patch free for 12 hrs daily. Patches may be cut to smaller size prior to removing release liner. Reminder: Remove previous patch before applying new patch.  NEVER APPLY HEAT OVER PATCH which increases absorption and may lead to local anesthetic  toxicity. Do not apply over area where liposomal bupivacaine was injected for 96 hours post injection. 14 patch 0    medication given by implanted intrathecal pump continuously. Updated by PharmD 10/31/24    Drug # 1: Morphine (Duramorph or Infumorph)  - Conc:20 mg/mL - Total Dose / 24 hours: 4.342 mg    Drug # 2: Clonidine (Duraclon)  - Conc:21.1 mcg/mL - Total Dose / 24 hours: 4.580 mcg    Drug # 3: Baclofen (Lioresal) - Conc: 42.5 mcg/mL - Total Dose / 24 hours: 9.226 mcg    Diluent: NS    Infusion Rate: 0.2171 mL/24 hrs  Pump Reservoir Volume: 40 mL  Outside Clinic & Provider: [facility] [phone]  Last Refill Date: 07/12/2024  Next Refill Date: 01/01/2025  Low [place] Alarm Date: 01/01/2025  Pump : MedSnapLayout Synchromed II    Please consider consulting the pain team if the patient is admitted with an IT pump.      methocarbamol (ROBAXIN) 750 MG tablet Take 750 mg by mouth 4 times daily.      metoprolol succinate ER (TOPROL XL) 100 MG 24 hr tablet Take 1 tablet (100 mg) by mouth 2 times daily 180 tablet 3    naloxone (NARCAN) 4 MG/0.1ML nasal spray Spray 1 spray (4 mg) into one nostril alternating nostrils as needed for opioid reversal. every 2-3 minutes until assistance arrives 2 each 2    nitroGLYcerin (NITROSTAT) 0.4 MG sublingual tablet For chest pain place 1 tablet under the tongue every 5 minutes for 3 doses. If symptoms persist 5 minutes after 1st dose call 911. 30 tablet 1    ondansetron (ZOFRAN ODT) 4 MG ODT tab Take 1 tablet (4 mg) by mouth every 8 hours as needed for nausea 100 tablet 1    polyethylene glycol (MIRALAX) 17 GM/Dose powder Take 17 g by mouth daily.      rosuvastatin (CRESTOR) 40 MG tablet Take 1 tablet (40 mg) by mouth daily. 90 tablet 0    senna-docusate (SENOKOT-S/PERICOLACE) 8.6-50 MG tablet Take 2 tablets by mouth 2 times daily. Hold for loose stools.      sotalol (BETAPACE) 80 MG tablet Take 1 tablet (80 mg) by mouth 2 times daily 180 tablet 3     traZODone (DESYREL) 100 MG tablet Take 2 tablets (200 mg) by mouth at bedtime TAKE 2 TABLETS (200MG TOTAL) BY MOUTH AT BEDTIME Strength: 100 mg 180 tablet 3     No current facility-administered medications for this visit.       ROS:  10 point ROS of systems including Constitutional, Eyes, Respiratory, Cardiovascular, Gastroenterology, Genitourinary, Integumentary, Musculoskeletal, Psychiatric were all negative except for pertinent positives noted in my HPI.    Vitals:  /60   Pulse 60   Temp 98  F (36.7  C)   Resp 16   Ht 1.829 m (6')   Wt 122.9 kg (271 lb)   SpO2 96%   BMI 36.75 kg/m    Exam:  GENERAL APPEARANCE:  Alert, in no distress  RESP:  respiratory effort and palpation of chest normal, lungs clear to auscultation , no respiratory distress  CV:  regular rate and rhythm, no murmur, rub, or gallop, trace edema in BLE  ABDOMEN:  normal bowel sounds, soft, nontender, no hepatosplenomegaly or other masses  M/S:   Gait and station abnormal transfers with assist, wheelchair for mobility  SKIN:  Inspection of skin and subcutaneous tissue baseline, Palpation of skin and subcutaneous tissue baseline  NEURO:   hilton freely  PSYCH:  oriented X 3, affect and mood normal    Lab/Diagnostic data:  Labs done in SNF are in [facility]. Please refer to them using [facility] Everywhere. and Recent labs in [facility] reviewed by me today.     ASSESSMENT/PLAN:    (S32.000A) Closed compression fracture of lumbosacral spine, initial encounter (H)  (primary encounter diagnosis)  (G89.4) Chronic pain syndrome  (F11.90) Chronic, continuous use of opioids  (R53.81) Physical deconditioning  Comment: chronic pain, history of lumbar spinal fusion in 1990s, indwelling intrathecal pump x 20+ years, replaced 4/2023. Now with acute pathologic fracture. Pain poorly controlled after not receiving pain medications for nearly 12 hours. Management reviewed with PT, noted extreme spasticity, will schedule methocarbamol. Dilaudid Q6H> Q4H.    Plan: Continue tylenol 975 mg Q4H, calcitonin 1 spray daily through 11/15, topical diclofenac QID, duloxetine 60 mg daily, gabapentin 200 mg TID, lidocaine patch. Update dilaudid to 2-4 mg Q4H PRN. Naloxone PRN. Zofran PRN for nausea. PT/OT. SNF for assist with ADLs, medication management, meals, activities    (N17.9) CAROLYN (acute kidney injury) (H)  Comment: noted inpatient, improved prior to discharge  Creatinine   Date Value Ref Range Status   11/02/2024 0.94 0.67 - 1.17 mg/dL Final   Plan: ensure adequate hydration, repeat BMP 11/9    (C91.10) CLL (chronic lymphocytic leukemia) (H)  (D72.829) Leukocytosis, unspecified type  Comment: chronic, baseline WBC 16k+  Plan: monitor for s/sx infection, repeat CBC 11/9, follow-up with oncology as directed    (I25.10) Coronary arteriosclerosis  (Z95.1) S/P CABG (coronary artery bypass graft)  Comment: chronic, s/p CRYSTAL to right RCA 2021, CABG x 1 1/2022  Plan: continue ASA, statin, SL NTG PRN, monitor symptoms    (I48.0) PAF (paroxysmal atrial fibrillation) (H)  Comment: chronic, with history of left atrial appendage closure procedure 1/2022  Plan: continue sotalol 80 mg BID, monitor HR    (I50.32) Chronic diastolic heart failure (H)  (I47.29) Paroxysmal ventricular tachycardia (H)  Comment: s/p ICD placement 1/2022. TTE 3/2024 with LVEF 57%, trace MR, trace TR  Plan: continue lasix 40 mg BID, BB, monitor weights, exam. Follow-up with cardiology as directed    (G47.33) MARLEEN (doesn't tolerate CPAP)  Comment: chronic  Plan: monitor SPO2 PRN    (Z98.84) History of Lora-en-Y gastric bypass  Comment: by history  Plan: RD to follow in TCU    (I10) Essential hypertension  Comment: chronic  BP Readings from Last 3 Encounters:   11/07/24 111/60   11/06/24 105/66   10/31/24 120/71   Plan: continue sotalol 80 mg BID, lasix 40 mg bid, monitor BP per facility protocol     (E78.5) Dyslipidemia  Comment: chronic  Plan: continue statin    (D64.9) Acute on chronic anemia  Comment: baseline  hgb 12-14, hgb trended down during hospitalization  Plan: monitor for s/sx bleeding, repeat CBC 11/8, continue PTA iron    K59.01 Constipation  Comment: acute on chronic, opiates and immobility contributing  Plan: Bisacodyl supp today now, and give 1 daily PRN for no BM x 2 days. Continue miralax 17 g Bid and may take 17 G daily PRN, senna-s 2 tabs BID, monitor bowel habits per nursing      Orders:  CBC, BMP 11/8  Increase dilaudid to 2-4 mg Q4H PRN  Schedule methocarbamol 750 mg Q6H  Add miralax 17 G daily PRN  Bisacodyl suppository 10 mg MA daily PRN if no BM x 2 days    Total time spent with patient visit at the skilled nursing facility was 55 minutes including patient visit, review of past records, reviewing and writing orders and review of management with nursing facility staff, PT, patient's spouse.       Electronically signed by:  YAMILETH Melissa CNP

## 2024-11-07 NOTE — TELEPHONE ENCOUNTER
Please see message below and advise.    Per discharge note, patient was started on: senna, miralax, methocarbamol, lidocaine, dilaudid, gabapentin, Voltaren, calcitonin, and dulcolax

## 2024-11-07 NOTE — TELEPHONE ENCOUNTER
I would suspect that most of these medications are utilized on an as-needed basis for pain management as well as to prevent constipation secondary to his pain medications.  This does appear to be an appropriate medication regimen at this time.

## 2024-11-07 NOTE — PROGRESS NOTES
Clinic Care Coordination Contact  Care Coordination Transition Communication    Clinical Data: Patient was hospitalized at [facility] from 10/31/24 to 11/06/24 with diagnosis of:     Onur Barr is a markedly pleasant 69 year old gentleman with past medical history that is most notable for prior spine surgery, chronic pain status post intrathecal pump placement, CAD, ventricular tachycardia status post ICD placement, atrial fibrillation status post JUSTO occlusion, and CLL, among others; who presents with acute non-traumatic lower back pain, and is found to have acute pathologic lumbar spine fracture.     Assessment: Patient has transitioned to TCU/ARU for short term rehabilitation:    Facility Name: [facility]  Transition Communication:  Notified facility of Primary Care- Care Coordination support via TCU hand-in e-mail.    Plan: Care Coordinator will await notification from facility staff informing of patient's discharge plans/needs. Care Coordinator will review chart and outreach to facility staff every 4 weeks and as needed.     GISELLA Reece  [employer]   Primary Care Social Work Care Coordinator  [facility] [place]   Phone: [phone]

## 2024-11-07 NOTE — LETTER
11/7/2024      Onur Barr  [address]  [facility] [address]        [facility] GERIATRICS    PRIMARY CARE PROVIDER AND CLINIC:  Roge Feliciano MD, [address] / [facility] [address]  Chief Complaint   Patient presents with     Hospital F/U      [facility] Medical Record Number:  [id number]  Place of Service where encounter took place:  [facility]  ([facility]) [[id number]]    Onur Barr  is a 69 year old  (1955), admitted to the above facility from  [facility]. Hospital stay 10/31/24 through 11/6/24.    By chart review, patient was admitted to [facility] 10/31- 11/6/24 with an acute pathologic lumbar spine fracture. In ED, labs remarkable for WBC 30.9k, hgb 12.2, Cr 1.29. CXR was negative for acute cardiopulmonary findings. CT lumbar spine demonstrated a previous anterior and posterior bony fusion from L4-S1 and a recent appearing nondisplaced fracture at the mid L5 level, chronic compression deformities of L1, L2, L3, without high grade spinal canal stenosis.  NSY and Pain team were consulted. MRI of the spine demonstrated acute marrow edema at L1 and L5, L5 fracture without displaced ligamentous injury or significant prevertebral edema or epidural fluid collection. Pain ws managed with tylenol, PRN oxycodone and IV dilaudid, cymbalta, PRN muscle relaxants, lidocaine. He was started on calcitriol for pain. Repeat CT 11/3 was stable. He was recommended TCU at discharge, admitted to current facility for ongoing medical management, rehab, nursing care.     HPI:    Seen today for follow-up in his room in TCU resting abed. Pain is poorly controlled this morning. He did not use any PRN medications over night. He is constipated. Appetite is fair. He is denying CP, SOB, changes in bladder habits, fever/chills.     CODE STATUS/ADVANCE DIRECTIVES DISCUSSION:  Prior  CPR/Full code   ALLERGIES:   Allergies   Allergen Reactions     Hydrocodone-Acetaminophen  Other (See Comments)     sneezing      PAST MEDICAL HISTORY:   Past Medical History:   Diagnosis Date     Bariatric surgery status 06/23/2008    Formatting of this note might be different from the original. Created by Conversion     Bilateral leg edema 07/13/2021     Chronic Back Pain (IT Pump w Morphine, Clonidine, Baclofen) 01/16/2022     Chronic diastolic heart failure (H) 07/26/2021     Claudication of lower extremity (H) 11/20/2019     CLL (chronic lymphocytic leukemia) (H)      Coronary artery disease     CABG 1-     Depressive disorder      Essential hypertension     Created by [facility] Annotation: Apr 6 2012 10:16Zack Harris: Lisinipril,  coreg  Replacement Utility updated for latest IMO load     Gastroesophageal reflux disease without esophagitis 09/11/2021     H/O gastric bypass 2008     History of blood transfusion 2004     ICD (implantable cardioverter-defibrillator) in place 01/25/2022     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     Ischemic cardiomyopathy      Lumbago     Created by [facility] Annotation: Apr 6 2012 10:20Anh Ford: Morphine pump      Mixed hyperlipidemia 06/23/2008     Morbid obesity (H) 08/01/2018     Nephrolithiasis      NSTEMI (non-ST elevated myocardial infarction) (H)      MARLEEN (doesn't tolerate CPAP) 07/26/2021     Osteoarthritis     Created by [facility] Annotation: Jun 26 2009  9:53Zack Harris: failed lumbar  fusion/morphine pump dr putnam  Replacement Utility updated for latest IMO load     Paroxysmal atrial fib -- S/P Pulm Vein Ablation 1/19/22 09/11/2021    Formatting of this note might be different from the original. Created by Conversion     Paroxysmal ventricular tachycardia (H)     dual chamber ICD 1/24/2022     Peripheral vascular disease (H) 05/03/2022     Post-laminectomy syndrome 11/25/2015     S/P CABG (coronary artery bypass graft) 01/25/2022     Type 2  diabetes mellitus (H)     Diet controlled after Gastric Bypass in 2008      PAST SURGICAL HISTORY:   has a past surgical history that includes Extracorporeal shock wave lithotripsy, cystoscopy, insert stent ureter(s), combined (08/23/2011); Bypass gastric duodenal switch; back surgery; shoulder surgery; orthopedic surgery; Cosmetic surgery; ENT surgery; hernia repair; Replace Intrathecal Pain Pump (N/A, 04/25/2017); Revise catheter intrathecal (N/A, 04/25/2017); IR Miscellaneous Procedure (07/29/2011); IR Nephrostomy Tube Change Bilateral (08/05/2011); IR Miscellaneous Procedure (08/05/2011); IR Miscellaneous Procedure (08/23/2011); GASTRIC BYPASS,OBESE<100CM SUSY-EN-Y (01/01/2008); Pr Arthrodesis Ant Interbody Min Discectomy,Lumbar; REMOVAL OF TONSILS,<11 Y/O; Pr Excise Excess Skin Tissue,Abdomen (11/13/2012); REPAIR INCISIONAL HERNIA,REDUCIBLE (11/13/2012); Coronary Angiogram (N/A, 09/11/2021); Left Heart Catheterization (N/A, 09/11/2021); Percutaneous Coronary Intervention (N/A, 09/11/2021); Percutaneous Coronary Intervention Aspiration Thrombectomy (N/A, 09/11/2021); Percutaneous Coronary Intervention (N/A, 10/07/2021); Heart Catheterization with Possible Intervention (N/A, 01/13/2022); Bypass graft artery coronary (N/A, 01/19/2022); Implantable Cardioverter-Defibrillator (ICD) (N/A, 01/24/2022); colonoscopy; and Replace Intrathecal Pain Pump (N/A, 4/20/2023).  FAMILY HISTORY: family history includes Breast Cancer in his sister; Cerebrovascular Disease in his mother; Coronary Artery Disease in his father; Heart Disease in his father; Hodgkin's lymphoma in his brother; Hyperlipidemia in his father; Hypertension in his father; Other Cancer in his daughter and sister.  SOCIAL HISTORY:   reports that he has quit smoking. His smoking use included cigars. He has never been exposed to tobacco smoke. He has never used smokeless tobacco. He reports that he does not drink alcohol and does not use drugs.  Patient's living  condition: lives with spouse    Post Discharge Medication Reconciliation Status:   MED REC REQUIRED  Post Medication Reconciliation Status: discharge medications reconciled and changed, per note/orders       Current Outpatient Medications   Medication Sig Dispense Refill     acetaminophen (TYLENOL) 325 MG tablet Take 975 mg by mouth every 6 hours.       aspirin (ASA) 81 MG chewable tablet Take 81 mg by mouth daily       bisacodyl (DULCOLAX) 10 MG suppository Place 1 suppository (10 mg) rectally daily as needed for constipation.       calcitonin, salmon, (MIACALCIN) 200 UNIT/ACT nasal spray Spray 1 spray into one nostril alternating nostrils daily for 8 days. Alternate nostril each day.Alternate nostrils daily.  Right nostril on even days.  Left nostril on odd days.       diclofenac (VOLTAREN) 1 % topical gel Apply 2 g topically 4 times daily.       DULoxetine (CYMBALTA) 30 MG capsule Take 1 capsule (30mg) every morning and 2 capsules (60mg) every evening 270 capsule 3     FERATE 240 (27 Fe) MG TABS TAKE 1 TABLET BY MOUTH EVERY DAY 90 tablet 2     furosemide (LASIX) 40 MG tablet Take 1 tablet (40 mg) by mouth 2 times daily 180 tablet 3     gabapentin (NEURONTIN) 100 MG capsule Take 2 capsules (200 mg) by mouth 3 times daily. Hold sedation, confusion       HYDROmorphone (DILAUDID) 2 MG tablet Take 1-2 tablets (2-4 mg) by mouth every 4 hours as needed for pain (take 2 mg for pain 4-6/10 and take 4 mg for pain 7-10/10). 10 tablet 0     ketoconazole (NIZORAL) 2 % external cream Apply topically 2 times daily. to affected area 60 g 4     Lidocaine (LIDOCARE) 4 % Patch Place 2 patches over 12 hours onto the skin every 24 hours for 7 days. To prevent lidocaine toxicity, patient should be patch free for 12 hrs daily.Apply patch(s) to lower back To prevent lidocaine toxicity, patient should be patch free for 12 hrs daily. Patches may be cut to smaller size prior to removing release liner. Reminder: Remove previous patch before  applying new patch.  NEVER APPLY HEAT OVER PATCH which increases absorption and may lead to local anesthetic toxicity. Do not apply over area where liposomal bupivacaine was injected for 96 hours post injection. 14 patch 0     medication given by implanted intrathecal pump continuously. Updated by PharmD 10/31/24    Drug # 1: Morphine (Duramorph or Infumorph)  - Conc:20 mg/mL - Total Dose / 24 hours: 4.342 mg    Drug # 2: Clonidine (Duraclon)  - Conc:21.1 mcg/mL - Total Dose / 24 hours: 4.580 mcg    Drug # 3: Baclofen (Lioresal) - Conc: 42.5 mcg/mL - Total Dose / 24 hours: 9.226 mcg    Diluent: NS    Infusion Rate: 0.2171 mL/24 hrs  Pump Reservoir Volume: 40 mL  Outside Clinic & Provider: [facility] [phone]  Last Refill Date: 07/12/2024  Next Refill Date: 01/01/2025  Low [facility] Alarm Date: 01/01/2025  Pump : Medtronic Synchromed II    Please consider consulting the pain team if the patient is admitted with an IT pump.       methocarbamol (ROBAXIN) 750 MG tablet Take 750 mg by mouth 4 times daily.       metoprolol succinate ER (TOPROL XL) 100 MG 24 hr tablet Take 1 tablet (100 mg) by mouth 2 times daily 180 tablet 3     naloxone (NARCAN) 4 MG/0.1ML nasal spray Spray 1 spray (4 mg) into one nostril alternating nostrils as needed for opioid reversal. every 2-3 minutes until assistance arrives 2 each 2     nitroGLYcerin (NITROSTAT) 0.4 MG sublingual tablet For chest pain place 1 tablet under the tongue every 5 minutes for 3 doses. If symptoms persist 5 minutes after 1st dose call 911. 30 tablet 1     ondansetron (ZOFRAN ODT) 4 MG ODT tab Take 1 tablet (4 mg) by mouth every 8 hours as needed for nausea 100 tablet 1     polyethylene glycol (MIRALAX) 17 GM/Dose powder Take 17 g by mouth daily.       rosuvastatin (CRESTOR) 40 MG tablet Take 1 tablet (40 mg) by mouth daily. 90 tablet 0     senna-docusate (SENOKOT-S/PERICOLACE) 8.6-50 MG tablet Take 2 tablets by mouth 2 times daily. Hold for  loose stools.       sotalol (BETAPACE) 80 MG tablet Take 1 tablet (80 mg) by mouth 2 times daily 180 tablet 3     traZODone (DESYREL) 100 MG tablet Take 2 tablets (200 mg) by mouth at bedtime TAKE 2 TABLETS (200MG TOTAL) BY MOUTH AT BEDTIME Strength: 100 mg 180 tablet 3     No current facility-administered medications for this visit.       ROS:  10 point ROS of systems including Constitutional, Eyes, Respiratory, Cardiovascular, Gastroenterology, Genitourinary, Integumentary, Musculoskeletal, Psychiatric were all negative except for pertinent positives noted in my HPI.    Vitals:  /60   Pulse 60   Temp 98  F (36.7  C)   Resp 16   Ht 1.829 m (6')   Wt 122.9 kg (271 lb)   SpO2 96%   BMI 36.75 kg/m    Exam:  GENERAL APPEARANCE:  Alert, in no distress  RESP:  respiratory effort and palpation of chest normal, lungs clear to auscultation , no respiratory distress  CV:  regular rate and rhythm, no murmur, rub, or gallop, trace edema in BLE  ABDOMEN:  normal bowel sounds, soft, nontender, no hepatosplenomegaly or other masses  M/S:   Gait and station abnormal transfers with assist, wheelchair for mobility  SKIN:  Inspection of skin and subcutaneous tissue baseline, Palpation of skin and subcutaneous tissue baseline  NEURO:   hilton freely  PSYCH:  oriented X 3, affect and mood normal    Lab/Diagnostic data:  [facility] Please refer to them using [facility] Everywhere. and Recent labs in [facility] reviewed by me today.     ASSESSMENT/PLAN:    (S32.000A) Closed compression fracture of lumbosacral spine, initial encounter (H)  (primary encounter diagnosis)  (G89.4) Chronic pain syndrome  (F11.90) Chronic, continuous use of opioids  (R53.81) Physical deconditioning  Comment: chronic pain, history of lumbar spinal fusion in 1990s, indwelling intrathecal pump x 20+ years, replaced 4/2023. Now with acute pathologic fracture. Pain poorly controlled after not receiving pain medications for nearly 12  hours. Management reviewed with PT, noted extreme spasticity, will schedule methocarbamol. Dilaudid Q6H> Q4H.   Plan: Continue tylenol 975 mg Q4H, calcitonin 1 spray daily through 11/15, topical diclofenac QID, duloxetine 60 mg daily, gabapentin 200 mg TID, lidocaine patch. Update dilaudid to 2-4 mg Q4H PRN. Naloxone PRN. Zofran PRN for nausea. PT/OT. SNF for assist with ADLs, medication management, meals, activities    (N17.9) CAROLYN (acute kidney injury) (H)  Comment: noted inpatient, improved prior to discharge  Creatinine   Date Value Ref Range Status   11/02/2024 0.94 0.67 - 1.17 mg/dL Final   Plan: ensure adequate hydration, repeat BMP 11/9    (C91.10) CLL (chronic lymphocytic leukemia) (H)  (D72.829) Leukocytosis, unspecified type  Comment: chronic, baseline WBC 16k+  Plan: monitor for s/sx infection, repeat CBC 11/9, follow-up with oncology as directed    (I25.10) Coronary arteriosclerosis  (Z95.1) S/P CABG (coronary artery bypass graft)  Comment: chronic, s/p CRYSTAL to right RCA 2021, CABG x 1 1/2022  Plan: continue ASA, statin, SL NTG PRN, monitor symptoms    (I48.0) PAF (paroxysmal atrial fibrillation) (H)  Comment: chronic, with history of left atrial appendage closure procedure 1/2022  Plan: continue sotalol 80 mg BID, monitor HR    (I50.32) Chronic diastolic heart failure (H)  (I47.29) Paroxysmal ventricular tachycardia (H)  Comment: s/p ICD placement 1/2022. TTE 3/2024 with LVEF 57%, trace MR, trace TR  Plan: continue lasix 40 mg BID, BB, monitor weights, exam. Follow-up with cardiology as directed    (G47.33) MARLEEN (doesn't tolerate CPAP)  Comment: chronic  Plan: monitor SPO2 PRN    (Z98.84) History of Lora-en-Y gastric bypass  Comment: by history  Plan: RD to follow in TCU    (I10) Essential hypertension  Comment: chronic  BP Readings from Last 3 Encounters:   11/07/24 111/60   11/06/24 105/66   10/31/24 120/71   Plan: continue sotalol 80 mg BID, lasix 40 mg bid, monitor BP per facility protocol     (E78.5)  Dyslipidemia  Comment: chronic  Plan: continue statin    (D64.9) Acute on chronic anemia  Comment: baseline hgb 12-14, hgb trended down during hospitalization  Plan: monitor for s/sx bleeding, repeat CBC 11/8, continue PTA iron    K59.01 Constipation  Comment: acute on chronic, opiates and immobility contributing  Plan: Bisacodyl supp today now, and give 1 daily PRN for no BM x 2 days. Continue miralax 17 g Bid and may take 17 G daily PRN, senna-s 2 tabs BID, monitor bowel habits per nursing      Orders:  CBC, BMP 11/8  Increase dilaudid to 2-4 mg Q4H PRN  Schedule methocarbamol 750 mg Q6H  Add miralax 17 G daily PRN  Bisacodyl suppository 10 mg MO daily PRN if no BM x 2 days    Total time spent with patient visit at the skilled nursing facility was 55 minutes including patient visit, review of past records, reviewing and writing orders and review of management with nursing facility staff, PT, patient's spouse.       Electronically signed by:  YAMILETH Melissa CNP                   Sincerely,        YAMILETH Melissa CNP

## 2024-11-08 ENCOUNTER — DOCUMENTATION ONLY (OUTPATIENT)
Dept: GERIATRICS | Facility: CLINIC | Age: 69
End: 2024-11-08
Payer: COMMERCIAL

## 2024-11-08 ENCOUNTER — TELEPHONE (OUTPATIENT)
Dept: GERIATRICS | Facility: CLINIC | Age: 69
End: 2024-11-08
Payer: COMMERCIAL

## 2024-11-08 DIAGNOSIS — F11.90 CHRONIC, CONTINUOUS USE OF OPIOIDS: ICD-10-CM

## 2024-11-08 DIAGNOSIS — S32.000A CLOSED COMPRESSION FRACTURE OF LUMBOSACRAL SPINE, INITIAL ENCOUNTER (H): Primary | ICD-10-CM

## 2024-11-08 DIAGNOSIS — S32.000A CLOSED COMPRESSION FRACTURE OF LUMBOSACRAL SPINE, INITIAL ENCOUNTER (H): ICD-10-CM

## 2024-11-08 LAB
GAMMA INTERFERON BACKGROUND BLD IA-ACNC: 0 IU/ML
M TB IFN-G BLD-IMP: NEGATIVE
M TB IFN-G CD4+ BCKGRND COR BLD-ACNC: 4.45 IU/ML
MITOGEN IGNF BCKGRD COR BLD-ACNC: 0 IU/ML
MITOGEN IGNF BCKGRD COR BLD-ACNC: 0 IU/ML
QUANTIFERON MITOGEN: 4.45 IU/ML
QUANTIFERON NIL TUBE: 0 IU/ML
QUANTIFERON TB1 TUBE: 0 IU/ML
QUANTIFERON TB2 TUBE: 0

## 2024-11-08 RX ORDER — HYDROMORPHONE HYDROCHLORIDE 2 MG/1
2-4 TABLET ORAL EVERY 4 HOURS PRN
Qty: 60 TABLET | Refills: 0 | Status: SHIPPED | OUTPATIENT
Start: 2024-11-08 | End: 2024-11-16

## 2024-11-08 RX ORDER — IBUPROFEN 600 MG/1
600 TABLET, FILM COATED ORAL EVERY 6 HOURS
Qty: 4 TABLET | Refills: 0 | Status: SHIPPED | OUTPATIENT
Start: 2024-11-08 | End: 2024-11-09

## 2024-11-08 RX ORDER — HYDROXYZINE HYDROCHLORIDE 25 MG/1
25 TABLET, FILM COATED ORAL 3 TIMES DAILY PRN
Qty: 30 TABLET | Refills: 2 | Status: SHIPPED | OUTPATIENT
Start: 2024-11-08

## 2024-11-08 NOTE — CONFIDENTIAL NOTE
[facility] Geriatrics Telephone Encounter    HPI: 70 yo with chronic back pain in TCU following acute L5 pathologic fracture.     Reason for Call: Nursing report patient still having severe pain with mobility. More comfortable at rest. Moved his bowels yesterday and was much more comfortable after that. Wondering about a msucle relaxer. Patient also declined labs today, wondering if those can be repeated Monday.    Onset: waxing and waning     Interventions Tried: Methocarbamol 750 mg Q6H, Tylenol 975 mg Q6H, lidaocine patch, calcitonin, diclofenac QID, dilaudid 2-4 mg Q4H PRN, implanted pain pump    A/P: Ongoing pain, with muscle spasms. Started methocarbamol scheduled yesterday and he is receiving baclofen through intrathecal pump. He is fairly comfortable at rest and not using PRN diluadid to max frequency, could decrease to Q2-3 hours PRN. Will add PRN hydroxyzine now. Will also add a short course of ibuprofen, consider continuing PRN if helpful. Continue to monitor bowel habits, bowel regimen.    Imaging Ordered: No    Labs Ordered: yes: Labs to be drawn: CBC, BMP date11/11    Medication Ordered:  Hydroxyzine, ibuprofen.     Sent to ED:  No    Orders  Onur Barr  1955     Ibuprofen 600 mg Q6H x 4 doses  Hydroxyzine 25 mg TID PRN for pain  CBC, BMP 11/11 dx: CLL, HTN      YAMILETH Melissa CNP on 11/8/2024 at 1:20 PM

## 2024-11-08 NOTE — PROGRESS NOTES
[facility]  INITIAL VISIT NOTE  November 11, 2024    PRIMARY CARE PROVIDER AND CLINIC:  Cunningham, Jeffery L [address] [facility] [address]    Chief Complaint   Patient presents with    Hospital F/U       HPI:    Onur Barr is a 69 year old  (1955) male who [facility], [facility] for an initial visit.     Medical history is notable for severe chronic back pain (s/p intrathecal pain pump), ischemic cardiomyopathy, V. tach, CAD, PAD, hypertension, dyslipidemia, PAF, CLL, CKD, DM type II, nephrolithiasis, osteoarthritis, depression, morbid obesity, and MARLEEN.    Summary of hospital course:  Patient was hospitalized at [facility] from October 31 through November 6, 2024 for acute on chronic nontraumatic low back pain.  Imaging studies revealed previous anterior and posterior bony fusion from L4 down to S1 and recent appearing nondisplaced fracture through the fused spine, involving the vertebral body and posterior elements of the mid L5 level, acute to subacute L1 anterior compression deformity, and chronic T10, T11, L2, L3, L4 compression deformities.  Laboratory evaluation was significant for sodium 142, potassium 3.9, creatinine 1.29, glucose 124, white count 30.9, hemoglobin 12.2, and platelets 186,000.  He was seen by neurosurgery service and they recommended conservative management.  Pain service was consulted and pain control was managed with calcitonin nasal spray, Voltaren gel, gabapentin, hydromorphone, lidocaine, acetaminophen, and muscle relaxants.  He was hydrated with IV fluids and renal function improved.  PTA lisinopril was discontinued.  TCU was recommended for rehab.    Patient is admitted to this facility for medical management, nursing care, and subacute rehab.     Of note, history was obtained from patient, facility staff, and extensive review of the chart including hospital admission note, consult notes, and  discharge summary.    Today's visit:  Patient was seen in his room, lying in bed.  His back pain is better controlled.  He denies fever, chills, chest pain, palpitation, nausea, vomiting, abdominal pain, urinary symptoms, or new complaints.  He had a BM 2 days ago.      CODE STATUS:   CPR/Full code     PAST MEDICAL HISTORY:   Acute L1 and L5 compression fractures  Chronic T10, T11, L2, L3, and L4 compression fractures  Severe chronic back pain  Chronic pain syndrome, s/p intrathecal pain pump in 1990s, replaced in April 2023  Ischemic cardiomyopathy/heart failure with recovered EF (LVEF 55-60%, per echo in March 2024)  CAD, s/p stent to LAD in 2012, stent to proximal and distal RCA in September 2021, and CABG x 1 in January 2022 (BROWN to LAD)  V. tach, s/p ICD in January 2022  PAD with claudication  Mildly dilated aortic root  Hypertension  Dyslipidemia  PAF, s/p pulmonary vein isolation and atrial appendage clipping using AtriaClip in January 2022  CLL  Anemia  Thrombocytopenia  CKD stage II, baseline creatinine 0.9-1.1  DM type II, now diet controlled after gastric bypass in June 2008  Nephrolithiasis  Osteoarthritis  Depression  Morbid obesity, s/p bariatric surgery in June 2008  MARLEEN (intolerant of CPAP)     Past Medical History:   Diagnosis Date    Bariatric surgery status 06/23/2008    Formatting of this note might be different from the original. Created by Conversion    Bilateral leg edema 07/13/2021    Chronic Back Pain (IT Pump w Morphine, Clonidine, Baclofen) 01/16/2022    Chronic diastolic heart failure (H) 07/26/2021    Claudication of lower extremity (H) 11/20/2019    CLL (chronic lymphocytic leukemia) (H)     Coronary artery disease     CABG 1-    Depressive disorder     Essential hypertension     Created by Conversion [facility] Annotation: Apr 6 2012 10:16AM - Zack Gutierrez: Lisinipril,  coreg  Replacement Utility updated for latest IMO load    Gastroesophageal reflux disease without  esophagitis 09/11/2021    H/O gastric bypass 2008    History of blood transfusion 2004    ICD (implantable cardioverter-defibrillator) in place 01/25/2022    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    Ischemic cardiomyopathy     Lumbago     Created by [facility] Annotation: Apr 6 2012 10:20AM - Anh Dickson: Morphine pump     Mixed hyperlipidemia 06/23/2008    Morbid obesity (H) 08/01/2018    Nephrolithiasis     NSTEMI (non-ST elevated myocardial infarction) (H)     MARLEEN (doesn't tolerate CPAP) 07/26/2021    Osteoarthritis     Created by [facility] Annotation: Jun 26 2009  9:53AM - Zack Gutierrez: failed lumbar  fusion/morphine pump dr putnam  Replacement Utility updated for latest IMO load    Paroxysmal atrial fib -- S/P Pulm Vein Ablation 1/19/22 09/11/2021    Formatting of this note might be different from the original. Created by Conversion    Paroxysmal ventricular tachycardia (H)     dual chamber ICD 1/24/2022    Peripheral vascular disease (H) 05/03/2022    Post-laminectomy syndrome 11/25/2015    S/P CABG (coronary artery bypass graft) 01/25/2022    Type 2 diabetes mellitus (H)     Diet controlled after Gastric Bypass in 2008       PAST SURGICAL HISTORY:   Past Surgical History:   Procedure Laterality Date    BACK SURGERY      BYPASS GASTRIC DUODENAL SWITCH      BYPASS GRAFT ARTERY CORONARY N/A 01/19/2022    Procedure: CORONARY ARTERY BYPASS GRAFT (CABG) X1; LIMA -LAD.  PULMONARY VEIN ISOLATION, AND ATRIAL APPENDAGE CLIPPING USING 45MM ACTICURE CLIP.;  Surgeon: William Dumont MD;  Location: [facility] OR    COLONOSCOPY      COSMETIC SURGERY      pannicullectomy    CV CORONARY ANGIOGRAM N/A 09/11/2021    Procedure: Coronary Angiogram;  Surgeon: Noris Ozuna MD;  Location: [facility] CATH LAB CV    CV HEART CATHETERIZATION WITH POSSIBLE INTERVENTION N/A 01/13/2022    Procedure: Heart Catheterization with Possible Intervention;  Surgeon:  Liz Pearl MD;  Location:  HEART CARDIAC CATH LAB    CV LEFT HEART CATH N/A 09/11/2021    Procedure: Left Heart Cath;  Surgeon: Noris Ozuna MD;  Location: [facility] CV    CV PCI N/A 09/11/2021    Procedure: Percutaneous Coronary Intervention;  Surgeon: Noris Ozuna MD;  Location: [facility] CV    CV PCI N/A 10/07/2021    Procedure: Percutaneous Coronary Intervention;  Surgeon: Mckayla Dan MD;  Location: [facility] CV    CV PCI ASPIRATION THROMECTOMY N/A 09/11/2021    Procedure: Percutaneous Coronary Intervention Aspiration Thrombectomy;  Surgeon: Noris Ozuna MD;  Location: [facility]    ENT SURGERY      tonsillectomy    EP ICD N/A 01/24/2022    Procedure: EP ICD;  Surgeon: Jannette Crook MD;  Location:  HEART CARDIAC CATH LAB    EXTRACORPOREAL SHOCK WAVE LITHOTRIPSY, CYSTOSCOPY, INSERT STENT URETER(S), COMBINED  08/23/2011    HC REMOVAL OF TONSILS,<11 Y/O      Description: Tonsillectomy;  Recorded: 03/23/2012;  Comments: for obstructive sleep apnea    HERNIA REPAIR      IR MISCELLANEOUS PROCEDURE  07/29/2011    IR MISCELLANEOUS PROCEDURE  08/05/2011    IR MISCELLANEOUS PROCEDURE  08/23/2011    IR NEPHROSTOMY TUBE CHANGE BILATERAL  08/05/2011    ORTHOPEDIC SURGERY      arthrodesis ant discectomy, lumbar    FL ARTHRODESIS ANT INTERBODY MIN DISCECTOMY,LUMBAR      Description: Lumbar Vertebral Fusion;  Recorded: 06/26/2009;  Comments: before 200 see [facility] consult under old records    FL EXCISE EXCESS SKIN TISSUE,ABDOMEN  11/13/2012    Description: Panniculectomy;  Proc Date: 11/13/2012;  Comments: 3.5 Lb Pannus was removed at the [facility] By Dr. Shon Green    REPLACE INTRATHECAL PAIN PUMP N/A 04/25/2017    Procedure: REPLACE INTRATHECAL PAIN PUMP;  INTRATHECAL PAIN PUMP CATHETER REPLACEMENT AND PUMP REPLACEMENT;  Surgeon: Anthony Maloney MD;  Location: [facility]    REPLACE INTRATHECAL PAIN PUMP N/A 4/20/2023    Procedure: Pump Replacement and intrathecal  catheter replacement;  Surgeon: Anthony Dick MD;  Location:  OR    REVISE CATHETER INTRATHECAL N/A 04/25/2017    Procedure: REVISE CATHETER INTRATHECAL;;  Surgeon: Anthony Maloney MD;  Location:  [facility]    SHOULDER SURGERY      C GASTRIC BYPASS,OBESE<100CM LORA-EN-Y  01/01/2008    Description: Gastric Surgery For Morbid Obesity Bypass With Lora-en-Y;  Recorded: 06/26/2009;  Comments: dr green 2008    [facility] REPAIR INCISIONAL HERNIA,REDUCIBLE  11/13/2012    Description: Incisional Hernia Repair;  Proc Date: 11/13/2012;  Comments: incisional hernia repair and abdominal panniculectomy by Dr Shon Green at the [facility].       FAMILY HISTORY:   Family History   Problem Relation Age of Onset    Cerebrovascular Disease Mother     Heart Disease Father     Coronary Artery Disease Father     Hypertension Father     Hyperlipidemia Father     Hodgkin's lymphoma Brother     Breast Cancer Sister     Other Cancer Sister     Other Cancer Daughter         Carcinoide Tumor 2021       SOCIAL HISTORY:  Social History     Tobacco Use    Smoking status: Former     Types: Cigars     Passive exposure: Never    Smokeless tobacco: Never    Tobacco comments:     Haven't smoked in years   Substance Use Topics    Alcohol use: No       MEDICATIONS:  Current Outpatient Medications   Medication Sig Dispense Refill    acetaminophen (TYLENOL) 325 MG tablet Take 975 mg by mouth every 6 hours.      aspirin (ASA) 81 MG chewable tablet Take 81 mg by mouth daily      bisacodyl (DULCOLAX) 10 MG suppository Place 1 suppository (10 mg) rectally daily as needed for constipation.      calcitonin, salmon, (MIACALCIN) 200 UNIT/ACT nasal spray Spray 1 spray into one nostril alternating nostrils daily for 8 days. Alternate nostril each day.Alternate nostrils daily.  Right nostril on even days.  Left nostril on odd days.      diclofenac (VOLTAREN) 1 % topical gel Apply 2 g topically 4 times daily.      DULoxetine (CYMBALTA) 30 MG capsule Take 1  capsule (30mg) every morning and 2 capsules (60mg) every evening 270 capsule 3    FERATE 240 (27 Fe) MG TABS TAKE 1 TABLET BY MOUTH EVERY DAY 90 tablet 2    furosemide (LASIX) 40 MG tablet Take 1 tablet (40 mg) by mouth 2 times daily 180 tablet 3    gabapentin (NEURONTIN) 100 MG capsule Take 2 capsules (200 mg) by mouth 3 times daily. Hold sedation, confusion      HYDROmorphone (DILAUDID) 2 MG tablet Take 1-2 tablets (2-4 mg) by mouth every 4 hours as needed for pain (take 2 mg for pain 4-6/10 and take 4 mg for pain 7-10/10). 60 tablet 0    hydrOXYzine HCl (ATARAX) 25 MG tablet Take 1 tablet (25 mg) by mouth 3 times daily as needed for other (pain). 30 tablet 2    ketoconazole (NIZORAL) 2 % external cream Apply topically 2 times daily. to affected area 60 g 4    Lidocaine (LIDOCARE) 4 % Patch Place 2 patches over 12 hours onto the skin every 24 hours for 7 days. To prevent lidocaine toxicity, patient should be patch free for 12 hrs daily.Apply patch(s) to lower back To prevent lidocaine toxicity, patient should be patch free for 12 hrs daily. Patches may be cut to smaller size prior to removing release liner. Reminder: Remove previous patch before applying new patch.  NEVER APPLY HEAT OVER PATCH which increases absorption and may lead to local anesthetic toxicity. Do not apply over area where liposomal bupivacaine was injected for 96 hours post injection. 14 patch 0    medication given by implanted intrathecal pump continuously. Updated by PharmD 10/31/24    Drug # 1: Morphine (Duramorph or Infumorph)  - Conc:20 mg/mL - Total Dose / 24 hours: 4.342 mg    Drug # 2: Clonidine (Duraclon)  - Conc:21.1 mcg/mL - Total Dose / 24 hours: 4.580 mcg    Drug # 3: Baclofen (Lioresal) - Conc: 42.5 mcg/mL - Total Dose / 24 hours: 9.226 mcg    Diluent: NS    Infusion Rate: 0.2171 mL/24 hrs  Pump Reservoir Volume: 40 mL  Outside Clinic & Provider: [facility] [phone]  Last Refill Date: 07/12/2024  Next Refill Date:  01/01/2025  Low [place] Alarm Date: 01/01/2025  Pump : Medtronic Synchromed II    Please consider consulting the pain team if the patient is admitted with an IT pump.      methocarbamol (ROBAXIN) 750 MG tablet Take 750 mg by mouth 4 times daily.      metoprolol succinate ER (TOPROL XL) 100 MG 24 hr tablet Take 1 tablet (100 mg) by mouth 2 times daily 180 tablet 3    naloxone (NARCAN) 4 MG/0.1ML nasal spray Spray 1 spray (4 mg) into one nostril alternating nostrils as needed for opioid reversal. every 2-3 minutes until assistance arrives 2 each 2    nitroGLYcerin (NITROSTAT) 0.4 MG sublingual tablet For chest pain place 1 tablet under the tongue every 5 minutes for 3 doses. If symptoms persist 5 minutes after 1st dose call 911. 30 tablet 1    ondansetron (ZOFRAN ODT) 4 MG ODT tab Take 1 tablet (4 mg) by mouth every 8 hours as needed for nausea 100 tablet 1    polyethylene glycol (MIRALAX) 17 GM/Dose powder Take 17 g by mouth daily.      rosuvastatin (CRESTOR) 40 MG tablet Take 1 tablet (40 mg) by mouth daily. 90 tablet 0    senna-docusate (SENOKOT-S/PERICOLACE) 8.6-50 MG tablet Take 2 tablets by mouth 2 times daily. Hold for loose stools.      sotalol (BETAPACE) 80 MG tablet Take 1 tablet (80 mg) by mouth 2 times daily 180 tablet 3    traZODone (DESYREL) 100 MG tablet Take 2 tablets (200 mg) by mouth at bedtime TAKE 2 TABLETS (200MG TOTAL) BY MOUTH AT BEDTIME Strength: 100 mg 180 tablet 3       MED REC REQUIRED  Post Medication Reconciliation Status: medication reconcilation previously completed during another office visit      ALLERGIES:  Allergies   Allergen Reactions    Hydrocodone-Acetaminophen Other (See Comments)     sneezing       ROS:  10 point ROS were negative other than the symptoms noted above in the HPI.    PHYSICAL EXAM:  Vital signs were reviewed in the chart.  Vital Signs: /63   Pulse 60   Temp 97.5  F (36.4  C)   Resp 18   Ht 1.829 m (6')   Wt 122.9 kg (271 lb)   SpO2 94%    BMI 36.75 kg/m     General: Comfortable and in no acute distress  HEENT: Mild conjunctival pallor, no scleral icterus or injection, moist oral mucosa  Cardiovascular: Normal S1, S2, RRR  Respiratory: Lungs clear to auscultation bilaterally  GI: Abdomen soft, non-tender, non-distended, +BS  Extremities: Very trace bilateral LE edema  Neuro: CX II-XII grossly intact; ROM in all four extremities grossly intact  Psych: Alert and oriented x3; normal affect  Skin: No acute rash    LABORATORY/IMAGING DATA:  All relevant labs and imaging data in [facility] and/or Care Everywhere were personally reviewed today.    Most Recent 3 CBC's:  Recent Labs   Lab Test 11/11/24  0608 11/02/24  1012 11/01/24  1104   WBC 18.8* 21.6* 21.8*   HGB 11.0* 11.0* 11.6*   MCV 88 88 85    137* 141*     Most Recent 3 BMP's:  Recent Labs   Lab Test 11/11/24  0608 11/02/24  1012 11/01/24  1104    142 141   POTASSIUM 3.3* 3.6 3.8   CHLORIDE 101 106 105   CO2 28 22 20*   BUN 14.0 17.8 23.6*   CR 0.92 0.94 0.96   ANIONGAP 11 14 16*   RATNA 8.5* 8.6* 9.2   GLC 90 145* 107*     Most Recent 2 LFT's:  Recent Labs   Lab Test 10/14/24  0918 04/17/24  0913   AST 30 20   ALT 15 <5   ALKPHOS 76 65   BILITOTAL 0.5 0.6     Most Recent Cholesterol Panel:  Recent Labs   Lab Test 10/14/24  0918   CHOL 104   LDL 29   HDL 34*   TRIG 203*     Most Recent TSH and T4:  Recent Labs   Lab Test 06/09/23  1045   TSH 1.38     Most Recent Hemoglobin A1c:  Recent Labs   Lab Test 08/14/24  1410   A1C 5.4         ASSESSMENT/PLAN:  Acute pathologic L1 and L5 compression fractures, subsequent encounter,  Chronic T10, T11, L2, L3, and L4 compression fractures,  Acute on chronic back pain,  Chronic pain syndrome, s/p intrathecal pain pump in 1990s, replaced in April 2023,  Physical deconditioning.  Patient received 4 doses of ibuprofen and over the weekend for increased pain.  His pain is much better controlled this morning.  Plan:  Continue acetaminophen 975 mg every 6 hours,  hydromorphone 2-4 mg every 4 hours PRN, calcitonin 1 spray daily, gabapentin 200 mg 3 times daily, hydroxyzine 25 mg 3 times daily as needed, topical diclofenac gel, and lidocaine patch  Continue duloxetine 30 mg in the morning and 60 mg in the evening  Continue methocarbamol 750 mg every 6 hours  Monitor pain level  Mobilize with PT/OT  Repeat CT lumbar spine on November 21, 2024 as scheduled  Follow-up with neurosurgery and pain clinic as directed    CAD, s/p stent to LAD in 2012, stent to proximal and distal RCA in September 2021, and CABG x 1 in January 2022 (LIMA to LAD),  PAD with claudication,  History of ischemic cardiomyopathy (heart failure with recovered EF, LVEF 55-60%, per echo in March 2024),  History of V. tach, s/p ICD in January 2022,  Mildly dilated aortic root,  Hypertension,  Dyslipidemia.  PTA lisinopril was discontinued in the hospital.  Blood pressure is fairly controlled.  Plan:  Continue aspirin 81 mg daily  Continue rosuvastatin 40 mg daily  Continue furosemide 40 mg twice daily  Continue metoprolol  mg twice daily  Continue nitroglycerin sublingual as needed for angina  Monitor blood pressure, volume, weight, and cardiac status  Follow-up with cardiology as directed    PAF, s/p pulmonary vein isolation and atrial appendage clipping using AtriaClip in January 2022.  Patient is not on chronic anticoagulation.  He is on both metoprolol and sotalol.  Plan:  Continue aspirin 81 mg daily  Continue sotalol 80 mg twice daily  Continue metoprolol  mg twice daily  Monitor heart rate  Follow-up with cardiology as directed    CLL,  Anemia,  Thrombocytopenia.  CBC on November 2 with white count 21.6, hemoglobin 11, and platelets 137,000.  CBC from today shows 18.8, hemoglobin 11, and platelets 213,000.  Plan:   Continue ferrous gluconate 240 mg daily  Monitor hematology profile periodically  Follow-up with hematology oncology as directed    CKD stage II.  Baseline creatinine  0.9-1.1.  Today's creatinine is 0.92.  Plan:  Avoid nephrotoxins   Limit the use of NSAIDs  Monitor renal function and electrolytes periodically    Hypokalemia.  Today's potassium is slightly low at 3.3.  Likely due to diuretic therapy.  Plan:  Start Klor-Con 40 mEq daily    DM type II.  Reportedly diet controlled after gastric bypass in June 2008.  Last A1c 5.4% on August 14, 2024.  Plan:  Monitor blood glucose PRN    Depression,  Insomnia.  Plan:  Continue duloxetine 30 mg daily in the morning and 60 mg in the evening  Continue trazodone 200 mg at bedtime  Monitor symptoms  Refer to ACP PRN    Slow transit constipation.  Plan:  Continue the bowel regimen     Morbid obesity, s/p bariatric surgery in June 2008,  MARLEEN (intolerant of CPAP).   BMI 36.75.  Plan:  Staff to assist with daily care and mobility  Monitor O2 sats      Orders written by provider at facility:  1) Weight every Monday and Thursday, DX: CHF  2) Klor-Con 40 mEq daily, DX: Hypokalemia  3) BMP on November 14, DX: CHF, CKD, hypokalemia  4) CBC on November 18, DX: CLL, anemia      Total time: 55 minutes including face to face time with the patient, reviewing the notes, labs, and imaging in EPIC and PCC, initiating potassium replacement, ordering new labs including CBC and BMP, and documenting all information electronically.       Disclaimer: This note contains text created using speech-recognition software and may have unintended word substitutions.      Electronically signed by:  Cory Fontenot MD

## 2024-11-10 ENCOUNTER — LAB REQUISITION (OUTPATIENT)
Dept: LAB | Facility: CLINIC | Age: 69
End: 2024-11-10
Payer: COMMERCIAL

## 2024-11-10 DIAGNOSIS — N18.9 CHRONIC KIDNEY DISEASE, UNSPECIFIED: ICD-10-CM

## 2024-11-11 ENCOUNTER — TRANSITIONAL CARE UNIT VISIT (OUTPATIENT)
Dept: GERIATRICS | Facility: CLINIC | Age: 69
End: 2024-11-11
Payer: COMMERCIAL

## 2024-11-11 VITALS
SYSTOLIC BLOOD PRESSURE: 114 MMHG | BODY MASS INDEX: 36.7 KG/M2 | DIASTOLIC BLOOD PRESSURE: 63 MMHG | HEART RATE: 60 BPM | WEIGHT: 271 LBS | OXYGEN SATURATION: 94 % | TEMPERATURE: 97.5 F | RESPIRATION RATE: 18 BRPM | HEIGHT: 72 IN

## 2024-11-11 DIAGNOSIS — G47.00 INSOMNIA, UNSPECIFIED TYPE: ICD-10-CM

## 2024-11-11 DIAGNOSIS — R53.81 PHYSICAL DECONDITIONING: ICD-10-CM

## 2024-11-11 DIAGNOSIS — S32.050D COMPRESSION FRACTURE OF L5 VERTEBRA WITH ROUTINE HEALING, SUBSEQUENT ENCOUNTER: ICD-10-CM

## 2024-11-11 DIAGNOSIS — I25.10 CORONARY ARTERY DISEASE INVOLVING NATIVE CORONARY ARTERY OF NATIVE HEART WITHOUT ANGINA PECTORIS: ICD-10-CM

## 2024-11-11 DIAGNOSIS — F32.A DEPRESSION, UNSPECIFIED DEPRESSION TYPE: ICD-10-CM

## 2024-11-11 DIAGNOSIS — M54.50 ACUTE LOW BACK PAIN, UNSPECIFIED BACK PAIN LATERALITY, UNSPECIFIED WHETHER SCIATICA PRESENT: ICD-10-CM

## 2024-11-11 DIAGNOSIS — E78.5 DYSLIPIDEMIA: ICD-10-CM

## 2024-11-11 DIAGNOSIS — C91.10 CLL (CHRONIC LYMPHOCYTIC LEUKEMIA) (H): ICD-10-CM

## 2024-11-11 DIAGNOSIS — Z86.39 HISTORY OF DIABETES MELLITUS: ICD-10-CM

## 2024-11-11 DIAGNOSIS — G47.33 OSA (OBSTRUCTIVE SLEEP APNEA): ICD-10-CM

## 2024-11-11 DIAGNOSIS — E66.01 MORBID OBESITY (H): ICD-10-CM

## 2024-11-11 DIAGNOSIS — K59.01 SLOW TRANSIT CONSTIPATION: ICD-10-CM

## 2024-11-11 DIAGNOSIS — E87.6 HYPOKALEMIA: ICD-10-CM

## 2024-11-11 DIAGNOSIS — D69.6 THROMBOCYTOPENIA (H): ICD-10-CM

## 2024-11-11 DIAGNOSIS — Z86.79 HISTORY OF VENTRICULAR TACHYCARDIA: ICD-10-CM

## 2024-11-11 DIAGNOSIS — G89.4 CHRONIC PAIN SYNDROME: ICD-10-CM

## 2024-11-11 DIAGNOSIS — Z95.810 ICD (IMPLANTABLE CARDIOVERTER-DEFIBRILLATOR) IN PLACE: ICD-10-CM

## 2024-11-11 DIAGNOSIS — I48.0 PAROXYSMAL ATRIAL FIBRILLATION (H): ICD-10-CM

## 2024-11-11 DIAGNOSIS — I10 ESSENTIAL HYPERTENSION: ICD-10-CM

## 2024-11-11 DIAGNOSIS — M48.56XD NON-TRAUMATIC COMPRESSION FRACTURE OF L1 LUMBAR VERTEBRA WITH ROUTINE HEALING, SUBSEQUENT ENCOUNTER: Primary | ICD-10-CM

## 2024-11-11 DIAGNOSIS — Z97.8 PRESENCE OF INTRATHECAL PUMP: ICD-10-CM

## 2024-11-11 DIAGNOSIS — I73.9 PAD (PERIPHERAL ARTERY DISEASE) (H): ICD-10-CM

## 2024-11-11 DIAGNOSIS — Z86.79 HISTORY OF CARDIOMYOPATHY: ICD-10-CM

## 2024-11-11 DIAGNOSIS — N18.2 STAGE 2 CHRONIC KIDNEY DISEASE: ICD-10-CM

## 2024-11-11 DIAGNOSIS — D64.9 ANEMIA, UNSPECIFIED TYPE: ICD-10-CM

## 2024-11-11 LAB
ANION GAP SERPL CALCULATED.3IONS-SCNC: 11 MMOL/L (ref 7–15)
BUN SERPL-MCNC: 14 MG/DL (ref 8–23)
CALCIUM SERPL-MCNC: 8.5 MG/DL (ref 8.8–10.4)
CHLORIDE SERPL-SCNC: 101 MMOL/L (ref 98–107)
CREAT SERPL-MCNC: 0.92 MG/DL (ref 0.67–1.17)
EGFRCR SERPLBLD CKD-EPI 2021: >90 ML/MIN/1.73M2
ERYTHROCYTE [DISTWIDTH] IN BLOOD BY AUTOMATED COUNT: 13 % (ref 10–15)
GLUCOSE SERPL-MCNC: 90 MG/DL (ref 70–99)
HCO3 SERPL-SCNC: 28 MMOL/L (ref 22–29)
HCT VFR BLD AUTO: 33.2 % (ref 40–53)
HGB BLD-MCNC: 11 G/DL (ref 13.3–17.7)
MCH RBC QN AUTO: 29.3 PG (ref 26.5–33)
MCHC RBC AUTO-ENTMCNC: 33.1 G/DL (ref 31.5–36.5)
MCV RBC AUTO: 88 FL (ref 78–100)
PLATELET # BLD AUTO: 213 10E3/UL (ref 150–450)
POTASSIUM SERPL-SCNC: 3.3 MMOL/L (ref 3.4–5.3)
RBC # BLD AUTO: 3.76 10E6/UL (ref 4.4–5.9)
SODIUM SERPL-SCNC: 140 MMOL/L (ref 135–145)
WBC # BLD AUTO: 18.8 10E3/UL (ref 4–11)

## 2024-11-11 PROCEDURE — P9603 ONE-WAY ALLOW PRORATED MILES: HCPCS

## 2024-11-11 PROCEDURE — 85014 HEMATOCRIT: CPT

## 2024-11-11 PROCEDURE — 84520 ASSAY OF UREA NITROGEN: CPT

## 2024-11-11 PROCEDURE — 99306 1ST NF CARE HIGH MDM 50: CPT | Performed by: INTERNAL MEDICINE

## 2024-11-11 PROCEDURE — 82565 ASSAY OF CREATININE: CPT

## 2024-11-11 PROCEDURE — 80048 BASIC METABOLIC PNL TOTAL CA: CPT

## 2024-11-11 PROCEDURE — 36415 COLL VENOUS BLD VENIPUNCTURE: CPT

## 2024-11-11 PROCEDURE — 82435 ASSAY OF BLOOD CHLORIDE: CPT

## 2024-11-11 RX ORDER — POTASSIUM CHLORIDE 1500 MG/1
40 TABLET, EXTENDED RELEASE ORAL DAILY
COMMUNITY

## 2024-11-11 NOTE — PATIENT INSTRUCTIONS
Orders for Onur Barr (1955), MR# [id number]:    1) Weight every Monday and Thursday, DX: CHF  2) Klor-Con 40 mEq daily, DX: Hypokalemia  3) BMP on November 14, DX: CHF, CKD, hypokalemia  4) CBC on November 18, DX: CLL, anemia      Cory Fontenot MD  [facility]

## 2024-11-11 NOTE — LETTER
11/11/2024      Onur Barr  [address] [facility] [address]        [facility]  INITIAL VISIT NOTE  November 11, 2024    PRIMARY CARE PROVIDER AND CLINIC:  Cunningham, Jeffery L [address] [facility] [address]    Chief Complaint   Patient presents with     Hospital F/U       HPI:    Onur Barr is a 69 year old  (1955) male who [facility], [facility] for an initial visit.     Medical history is notable for severe chronic back pain (s/p intrathecal pain pump), ischemic cardiomyopathy, V. tach, CAD, PAD, hypertension, dyslipidemia, PAF, CLL, CKD, DM type II, nephrolithiasis, osteoarthritis, depression, morbid obesity, and MARLEEN.    Summary of hospital course:  Patient was hospitalized at [facility] from October 31 through November 6, 2024 for acute on chronic nontraumatic low back pain.  Imaging studies revealed previous anterior and posterior bony fusion from L4 down to S1 and recent appearing nondisplaced fracture through the fused spine: Involving the vertebral body and posterior elements of the mid L5 level, acute to subacute L1 anterior compression deformity, and chronic T10, T11, L2, L3, L4 compression deformities.  Laboratory evaluation was significant for sodium 142, potassium 3.9, creatinine 1.29, glucose 124, white count 30.9, hemoglobin 12.2, and platelets 186,000.  He was seen by neurosurgery service and they recommended conservative management.  Pain service was consulted and pain control was managed with calcitonin nasal spray, Voltaren gel, gabapentin, hydromorphone, lidocaine, acetaminophen, and muscle relaxants.  He was hydrated with IV fluids and renal function improved.  PTA lisinopril was discontinued.  TCU was recommended for rehab.    Patient is admitted to this facility for medical management, nursing care, and subacute rehab.     Of note, history was obtained from patient, facility staff, and  extensive review of the chart including hospital admission note, consult notes, and discharge summary.    Today's visit:  Patient was seen in his room, lying in bed.  His back pain is better controlled.  He denies fever, chills, chest pain, palpitation, nausea, vomiting, abdominal pain, urinary symptoms, or new complaints.  He had a BM 2 days ago.      CODE STATUS:   CPR/Full code     PAST MEDICAL HISTORY:   Acute L1 and L5 compression fractures  Chronic T10, T11, L2, L3, and L4 compression fractures  Severe chronic back pain  Chronic pain syndrome, s/p intrathecal pain pump in 1990s, replaced in April 2023  Ischemic cardiomyopathy/heart failure with recovered EF (LVEF 55-60%, per echo in March 2024)  CAD, s/p stent to LAD in 2012, stent to proximal and distal RCA in September 2021, and CABG x 1 in January 2022 (BROWN to LAD)  V. tach, s/p ICD in January 2022  PAD with claudication  Mildly dilated aortic root  Hypertension  Dyslipidemia  PAF, s/p pulmonary vein isolation and atrial appendage clipping using AtriaClip in January 2022  CLL  Anemia  Thrombocytopenia  CKD stage II, baseline creatinine 0.9-1.1  DM type II, now diet controlled after gastric bypass in June 2008  Nephrolithiasis  Osteoarthritis  Depression  Morbid obesity, s/p bariatric surgery in June 2008  MARLEEN (intolerant of CPAP)     Past Medical History:   Diagnosis Date     Bariatric surgery status 06/23/2008    Formatting of this note might be different from the original. Created by Conversion     Bilateral leg edema 07/13/2021     Chronic Back Pain (IT Pump w Morphine, Clonidine, Baclofen) 01/16/2022     Chronic diastolic heart failure (H) 07/26/2021     Claudication of lower extremity (H) 11/20/2019     CLL (chronic lymphocytic leukemia) (H)      Coronary artery disease     CABG 1-     Depressive disorder      Essential hypertension     Created by Conversion [facility] Annotation: Apr 6 2012 10:16Zack Harris: Lisinipril,  coreg   Replacement Utility updated for latest IMO load     Gastroesophageal reflux disease without esophagitis 09/11/2021     H/O gastric bypass 2008     History of blood transfusion 2004     ICD (implantable cardioverter-defibrillator) in place 01/25/2022     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     Ischemic cardiomyopathy      Lumbago     Created by [facility] Annotation: Apr 6 2012 10:20AM - Anh Dickson: Morphine pump      Mixed hyperlipidemia 06/23/2008     Morbid obesity (H) 08/01/2018     Nephrolithiasis      NSTEMI (non-ST elevated myocardial infarction) (H)      MARLEEN (doesn't tolerate CPAP) 07/26/2021     Osteoarthritis     Created by [facility] Annotation: Jun 26 2009  9:53AM - Zack Gutierrez: failed lumbar  fusion/morphine pump dr putnam  Replacement Utility updated for latest IMO load     Paroxysmal atrial fib -- S/P Pulm Vein Ablation 1/19/22 09/11/2021    Formatting of this note might be different from the original. Created by Conversion     Paroxysmal ventricular tachycardia (H)     dual chamber ICD 1/24/2022     Peripheral vascular disease (H) 05/03/2022     Post-laminectomy syndrome 11/25/2015     S/P CABG (coronary artery bypass graft) 01/25/2022     Type 2 diabetes mellitus (H)     Diet controlled after Gastric Bypass in 2008       PAST SURGICAL HISTORY:   Past Surgical History:   Procedure Laterality Date     BACK SURGERY       BYPASS GASTRIC DUODENAL SWITCH       BYPASS GRAFT ARTERY CORONARY N/A 01/19/2022    Procedure: CORONARY ARTERY BYPASS GRAFT (CABG) X1; LIMA -LAD.  PULMONARY VEIN ISOLATION, AND ATRIAL APPENDAGE CLIPPING USING 45MM ACTICURE CLIP.;  Surgeon: William Dumont MD;  Location: [facility] OR     COLONOSCOPY       COSMETIC SURGERY      pannicullectomy     CV CORONARY ANGIOGRAM N/A 09/11/2021    Procedure: Coronary Angiogram;  Surgeon: Noris Ozuna MD;  Location: [facility] CATH LAB CV     CV HEART CATHETERIZATION WITH  POSSIBLE INTERVENTION N/A 01/13/2022    Procedure: Heart Catheterization with Possible Intervention;  Surgeon: Liz Pearl MD;  Location:  HEART CARDIAC CATH LAB     CV LEFT HEART CATH N/A 09/11/2021    Procedure: Left Heart Cath;  Surgeon: Noris Ozuna MD;  Location: [facility] LAB CV     CV PCI N/A 09/11/2021    Procedure: Percutaneous Coronary Intervention;  Surgeon: Noris Ozuna MD;  Location: [facility] LAB CV     CV PCI N/A 10/07/2021    Procedure: Percutaneous Coronary Intervention;  Surgeon: Mckayla Dan MD;  Location: [facility] CV     CV PCI ASPIRATION THROMECTOMY N/A 09/11/2021    Procedure: Percutaneous Coronary Intervention Aspiration Thrombectomy;  Surgeon: Noris Ozuna MD;  Location: [facility]     ENT SURGERY      tonsillectomy     EP ICD N/A 01/24/2022    Procedure: EP ICD;  Surgeon: Jannette Crook MD;  Location:  HEART CARDIAC CATH LAB     EXTRACORPOREAL SHOCK WAVE LITHOTRIPSY, CYSTOSCOPY, INSERT STENT URETER(S), COMBINED  08/23/2011     HC REMOVAL OF TONSILS,<13 Y/O      Description: Tonsillectomy;  Recorded: 03/23/2012;  Comments: for obstructive sleep apnea     HERNIA REPAIR       IR MISCELLANEOUS PROCEDURE  07/29/2011     IR MISCELLANEOUS PROCEDURE  08/05/2011     IR MISCELLANEOUS PROCEDURE  08/23/2011     IR NEPHROSTOMY TUBE CHANGE BILATERAL  08/05/2011     ORTHOPEDIC SURGERY      arthrodesis ant discectomy, lumbar     AR ARTHRODESIS ANT INTERBODY MIN DISCECTOMY,LUMBAR      Description: Lumbar Vertebral Fusion;  Recorded: 06/26/2009;  Comments: before 200 see [facility] consult under old records     AR EXCISE EXCESS SKIN TISSUE,ABDOMEN  11/13/2012    Description: Panniculectomy;  Proc Date: 11/13/2012;  Comments: 3.5 Lb Pannus was removed at the [facility] By Dr. Shon Green     REPLACE INTRATHECAL PAIN PUMP N/A 04/25/2017    Procedure: REPLACE INTRATHECAL PAIN PUMP;  INTRATHECAL PAIN PUMP CATHETER REPLACEMENT AND PUMP REPLACEMENT;  Surgeon: Haider  Anthony WEBER MD;  Location:  [facility]     REPLACE INTRATHECAL PAIN PUMP N/A 4/20/2023    Procedure: Pump Replacement and intrathecal catheter replacement;  Surgeon: Anthony Dick MD;  Location:  OR     REVISE CATHETER INTRATHECAL N/A 04/25/2017    Procedure: REVISE CATHETER INTRATHECAL;;  Surgeon: Anthony Maloney MD;  Location:  [facility]     SHOULDER SURGERY       [facility] GASTRIC BYPASS,OBESE<100CM LORA-EN-Y  01/01/2008    Description: Gastric Surgery For Morbid Obesity Bypass With Lora-en-Y;  Recorded: 06/26/2009;  Comments: dr green 2008     [facility] REPAIR INCISIONAL HERNIA,REDUCIBLE  11/13/2012    Description: Incisional Hernia Repair;  Proc Date: 11/13/2012;  Comments: incisional hernia repair and abdominal panniculectomy by Dr Shon Green at the [facility].       FAMILY HISTORY:   Family History   Problem Relation Age of Onset     Cerebrovascular Disease Mother      Heart Disease Father      Coronary Artery Disease Father      Hypertension Father      Hyperlipidemia Father      Hodgkin's lymphoma Brother      Breast Cancer Sister      Other Cancer Sister      Other Cancer Daughter         Carcinoide Tumor 2021       SOCIAL HISTORY:  Social History     Tobacco Use     Smoking status: Former     Types: Cigars     Passive exposure: Never     Smokeless tobacco: Never     Tobacco comments:     Haven't smoked in years   Substance Use Topics     Alcohol use: No       MEDICATIONS:  Current Outpatient Medications   Medication Sig Dispense Refill     acetaminophen (TYLENOL) 325 MG tablet Take 975 mg by mouth every 6 hours.       aspirin (ASA) 81 MG chewable tablet Take 81 mg by mouth daily       bisacodyl (DULCOLAX) 10 MG suppository Place 1 suppository (10 mg) rectally daily as needed for constipation.       calcitonin, salmon, (MIACALCIN) 200 UNIT/ACT nasal spray Spray 1 spray into one nostril alternating nostrils daily for 8 days. Alternate nostril each day.Alternate nostrils daily.  Right nostril on even days.  Left  nostril on odd days.       diclofenac (VOLTAREN) 1 % topical gel Apply 2 g topically 4 times daily.       DULoxetine (CYMBALTA) 30 MG capsule Take 1 capsule (30mg) every morning and 2 capsules (60mg) every evening 270 capsule 3     FERATE 240 (27 Fe) MG TABS TAKE 1 TABLET BY MOUTH EVERY DAY 90 tablet 2     furosemide (LASIX) 40 MG tablet Take 1 tablet (40 mg) by mouth 2 times daily 180 tablet 3     gabapentin (NEURONTIN) 100 MG capsule Take 2 capsules (200 mg) by mouth 3 times daily. Hold sedation, confusion       HYDROmorphone (DILAUDID) 2 MG tablet Take 1-2 tablets (2-4 mg) by mouth every 4 hours as needed for pain (take 2 mg for pain 4-6/10 and take 4 mg for pain 7-10/10). 60 tablet 0     hydrOXYzine HCl (ATARAX) 25 MG tablet Take 1 tablet (25 mg) by mouth 3 times daily as needed for other (pain). 30 tablet 2     ketoconazole (NIZORAL) 2 % external cream Apply topically 2 times daily. to affected area 60 g 4     Lidocaine (LIDOCARE) 4 % Patch Place 2 patches over 12 hours onto the skin every 24 hours for 7 days. To prevent lidocaine toxicity, patient should be patch free for 12 hrs daily.Apply patch(s) to lower back To prevent lidocaine toxicity, patient should be patch free for 12 hrs daily. Patches may be cut to smaller size prior to removing release liner. Reminder: Remove previous patch before applying new patch.  NEVER APPLY HEAT OVER PATCH which increases absorption and may lead to local anesthetic toxicity. Do not apply over area where liposomal bupivacaine was injected for 96 hours post injection. 14 patch 0     medication given by implanted intrathecal pump continuously. Updated by PharmD 10/31/24    Drug # 1: Morphine (Duramorph or Infumorph)  - Conc:20 mg/mL - Total Dose / 24 hours: 4.342 mg    Drug # 2: Clonidine (Duraclon)  - Conc:21.1 mcg/mL - Total Dose / 24 hours: 4.580 mcg    Drug # 3: Baclofen (Lioresal) - Conc: 42.5 mcg/mL - Total Dose / 24 hours: 9.226 mcg    Diluent: NS    Infusion Rate:  0.2171 mL/24 hrs  Pump Reservoir Volume: 40 mL  Outside Clinic & Provider: [facility] [phone]  Last Refill Date: 07/12/2024  Next Refill Date: 01/01/2025  [place] Alarm Date: 01/01/2025  Pump : Medtronic Synchromed II    Please consider consulting the pain team if the patient is admitted with an IT pump.       methocarbamol (ROBAXIN) 750 MG tablet Take 750 mg by mouth 4 times daily.       metoprolol succinate ER (TOPROL XL) 100 MG 24 hr tablet Take 1 tablet (100 mg) by mouth 2 times daily 180 tablet 3     naloxone (NARCAN) 4 MG/0.1ML nasal spray Spray 1 spray (4 mg) into one nostril alternating nostrils as needed for opioid reversal. every 2-3 minutes until assistance arrives 2 each 2     nitroGLYcerin (NITROSTAT) 0.4 MG sublingual tablet For chest pain place 1 tablet under the tongue every 5 minutes for 3 doses. If symptoms persist 5 minutes after 1st dose call 911. 30 tablet 1     ondansetron (ZOFRAN ODT) 4 MG ODT tab Take 1 tablet (4 mg) by mouth every 8 hours as needed for nausea 100 tablet 1     polyethylene glycol (MIRALAX) 17 GM/Dose powder Take 17 g by mouth daily.       rosuvastatin (CRESTOR) 40 MG tablet Take 1 tablet (40 mg) by mouth daily. 90 tablet 0     senna-docusate (SENOKOT-S/PERICOLACE) 8.6-50 MG tablet Take 2 tablets by mouth 2 times daily. Hold for loose stools.       sotalol (BETAPACE) 80 MG tablet Take 1 tablet (80 mg) by mouth 2 times daily 180 tablet 3     traZODone (DESYREL) 100 MG tablet Take 2 tablets (200 mg) by mouth at bedtime TAKE 2 TABLETS (200MG TOTAL) BY MOUTH AT BEDTIME Strength: 100 mg 180 tablet 3       MED REC REQUIRED  Post Medication Reconciliation Status: medication reconcilation previously completed during another office visit      ALLERGIES:  Allergies   Allergen Reactions     Hydrocodone-Acetaminophen Other (See Comments)     sneezing       ROS:  10 point ROS were negative other than the symptoms noted above in the HPI.    PHYSICAL  EXAM:  Vital signs were reviewed in the chart.  Vital Signs: /63   Pulse 60   Temp 97.5  F (36.4  C)   Resp 18   Ht 1.829 m (6')   Wt 122.9 kg (271 lb)   SpO2 94%   BMI 36.75 kg/m     General: Comfortable and in no acute distress  HEENT: Mild conjunctival pallor, no scleral icterus or injection, moist oral mucosa  Cardiovascular: Normal S1, S2, RRR  Respiratory: Lungs clear to auscultation bilaterally  GI: Abdomen soft, non-tender, non-distended, +BS  Extremities: Very trace bilateral LE edema  Neuro: CX II-XII grossly intact; ROM in all four extremities grossly intact  Psych: Alert and oriented x3; normal affect  Skin: No acute rash    LABORATORY/IMAGING DATA:  All relevant labs and imaging data in [facility] and/or Care Everywhere were personally reviewed today.    Most Recent 3 CBC's:  Recent Labs   Lab Test 11/11/24  0608 11/02/24  1012 11/01/24  1104   WBC 18.8* 21.6* 21.8*   HGB 11.0* 11.0* 11.6*   MCV 88 88 85    137* 141*     Most Recent 3 BMP's:  Recent Labs   Lab Test 11/11/24  0608 11/02/24  1012 11/01/24  1104    142 141   POTASSIUM 3.3* 3.6 3.8   CHLORIDE 101 106 105   CO2 28 22 20*   BUN 14.0 17.8 23.6*   CR 0.92 0.94 0.96   ANIONGAP 11 14 16*   RATNA 8.5* 8.6* 9.2   GLC 90 145* 107*     Most Recent 2 LFT's:  Recent Labs   Lab Test 10/14/24  0918 04/17/24  0913   AST 30 20   ALT 15 <5   ALKPHOS 76 65   BILITOTAL 0.5 0.6     Most Recent Cholesterol Panel:  Recent Labs   Lab Test 10/14/24  0918   CHOL 104   LDL 29   HDL 34*   TRIG 203*     Most Recent TSH and T4:  Recent Labs   Lab Test 06/09/23  1045   TSH 1.38     Most Recent Hemoglobin A1c:  Recent Labs   Lab Test 08/14/24  1410   A1C 5.4         ASSESSMENT/PLAN:  Acute pathologic L1 and L5 compression fractures, subsequent encounter,  Chronic T10, T11, L2, L3, and L4 compression fractures,  Acute on chronic back pain,  Chronic pain syndrome, s/p intrathecal pain pump in 1990s, replaced in April 2023,  Physical  deconditioning.  Patient received 4 doses of ibuprofen and over the weekend for increased pain.  His pain is much better controlled this morning.  Plan:  Continue acetaminophen 975 mg every 6 hours, hydromorphone 2-4 mg every 4 hours PRN, calcitonin 1 spray daily, gabapentin 200 mg 3 times daily, hydroxyzine 25 mg 3 times daily as needed, topical diclofenac gel, and lidocaine patch  Continue duloxetine 30 mg in the morning and 60 mg in the evening  Continue methocarbamol 750 mg every 6 hours  Monitor pain level  Mobilize with PT/OT  Repeat CT lumbar spine on November 21, 2024 as scheduled  Follow-up with neurosurgery and pain clinic as directed    CAD, s/p stent to LAD in 2012, stent to proximal and distal RCA in September 2021, and CABG x 1 in January 2022 (LIMA to LAD),  PAD with claudication,  History of ischemic cardiomyopathy (heart failure with recovered EF, LVEF 55-60%, per echo in March 2024),  History of V. tach, s/p ICD in January 2022,  Mildly dilated aortic root,  Hypertension,  Dyslipidemia.  PTA lisinopril was discontinued in the hospital.  Blood pressure is fairly controlled.  Plan:  Continue aspirin 81 mg daily  Continue rosuvastatin 40 mg daily  Continue furosemide 40 mg twice daily  Continue metoprolol  mg twice daily  Continue nitroglycerin sublingual as needed for angina  Monitor blood pressure, volume, weight, and cardiac status  Follow-up with cardiology as directed    PAF, s/p pulmonary vein isolation and atrial appendage clipping using AtriaClip in January 2022.  Patient is not on chronic anticoagulation.  He is on both metoprolol and sotalol.  Plan:  Continue aspirin 81 mg daily  Continue sotalol 80 mg twice daily  Continue metoprolol  mg twice daily  Monitor heart rate  Follow-up with cardiology as directed    CLL,  Anemia,  Thrombocytopenia.  CBC on November 2 with white count 21.6, hemoglobin 11, and platelets 137,000.  CBC from today shows 18.8, hemoglobin 11, and platelets  213,000.  Plan:   Continue ferrous gluconate 240 mg daily  Monitor hematology profile periodically  Follow-up with hematology oncology as directed    CKD stage II.  Baseline creatinine 0.9-1.1.  Today's creatinine is 0.92.  Plan:  Avoid nephrotoxins   Limit the use of NSAIDs  Monitor renal function and electrolytes periodically    Hypokalemia.  Today's potassium is slightly low at 3.3.  Likely due to diuretic therapy  Plan:  Start Klor-Con 40 mEq daily    DM type II.  Reportedly diet controlled after gastric bypass in June 2008.  Last A1c 5.4% on August 14, 2024.  Plan:  Monitor blood glucose PRN    Depression,  Insomnia.  Plan:  Continue duloxetine 30 mg daily in the morning and 60 mg in the evening  Continue trazodone 200 mg at bedtime  Monitor symptoms  Refer to ACP PRN    Slow transit constipation.  Plan:  Continue the bowel regimen     Morbid obesity, s/p bariatric surgery in June 2008,  MARLEEN (intolerant of CPAP).   BMI 36.75.  Plan:  Staff to assist with daily care and mobility  Monitor O2 sats      Orders written by provider at facility:  1) Weight every Monday and Thursday, DX: CHF  2) Klor-Con 40 mEq daily, DX: Hypokalemia  3) BMP on over 14, DX: CHF, CKD, hypokalemia  4) CBC on November 18, DX: CLL, anemia      Total time: 55 minutes including face to face time with the patient, reviewing the notes, labs, and imaging in [facility], initiating potassium replacement, ordering new labs including CBC and BMP, and documenting all information electronically.       Disclaimer: This note contains text created using speech-recognition software and may have unintended word substitutions.      Electronically signed by:  Cory Fontenot MD                       Sincerely,        Cory Fontenot MD

## 2024-11-14 ENCOUNTER — TRANSITIONAL CARE UNIT VISIT (OUTPATIENT)
Dept: GERIATRICS | Facility: CLINIC | Age: 69
End: 2024-11-14
Payer: COMMERCIAL

## 2024-11-14 VITALS
HEIGHT: 72 IN | BODY MASS INDEX: 36.98 KG/M2 | TEMPERATURE: 98.1 F | SYSTOLIC BLOOD PRESSURE: 106 MMHG | WEIGHT: 273 LBS | OXYGEN SATURATION: 94 % | HEART RATE: 62 BPM | DIASTOLIC BLOOD PRESSURE: 66 MMHG | RESPIRATION RATE: 18 BRPM

## 2024-11-14 DIAGNOSIS — I48.0 PAROXYSMAL ATRIAL FIBRILLATION (H): ICD-10-CM

## 2024-11-14 DIAGNOSIS — Z98.84 HISTORY OF ROUX-EN-Y GASTRIC BYPASS: ICD-10-CM

## 2024-11-14 DIAGNOSIS — R41.89 COGNITIVE IMPAIRMENT: ICD-10-CM

## 2024-11-14 DIAGNOSIS — N17.9 AKI (ACUTE KIDNEY INJURY) (H): ICD-10-CM

## 2024-11-14 DIAGNOSIS — C91.10 CLL (CHRONIC LYMPHOCYTIC LEUKEMIA) (H): ICD-10-CM

## 2024-11-14 DIAGNOSIS — I25.10 CORONARY ARTERIOSCLEROSIS: ICD-10-CM

## 2024-11-14 DIAGNOSIS — S32.000A CLOSED COMPRESSION FRACTURE OF LUMBOSACRAL SPINE, INITIAL ENCOUNTER (H): Primary | ICD-10-CM

## 2024-11-14 DIAGNOSIS — I10 ESSENTIAL HYPERTENSION: ICD-10-CM

## 2024-11-14 DIAGNOSIS — I50.32 CHRONIC DIASTOLIC HEART FAILURE (H): ICD-10-CM

## 2024-11-14 DIAGNOSIS — D72.829 LEUKOCYTOSIS, UNSPECIFIED TYPE: ICD-10-CM

## 2024-11-14 DIAGNOSIS — Z95.1 S/P CABG (CORONARY ARTERY BYPASS GRAFT): ICD-10-CM

## 2024-11-14 DIAGNOSIS — F32.A DEPRESSION, UNSPECIFIED DEPRESSION TYPE: ICD-10-CM

## 2024-11-14 DIAGNOSIS — Z86.39 HISTORY OF DIABETES MELLITUS: ICD-10-CM

## 2024-11-14 DIAGNOSIS — D64.9 ACUTE ON CHRONIC ANEMIA: ICD-10-CM

## 2024-11-14 DIAGNOSIS — F11.90 CHRONIC, CONTINUOUS USE OF OPIOIDS: ICD-10-CM

## 2024-11-14 DIAGNOSIS — G47.33 OSA (OBSTRUCTIVE SLEEP APNEA): ICD-10-CM

## 2024-11-14 DIAGNOSIS — K59.01 SLOW TRANSIT CONSTIPATION: ICD-10-CM

## 2024-11-14 DIAGNOSIS — I47.29 PAROXYSMAL VENTRICULAR TACHYCARDIA (H): ICD-10-CM

## 2024-11-14 DIAGNOSIS — N18.2 STAGE 2 CHRONIC KIDNEY DISEASE: ICD-10-CM

## 2024-11-14 DIAGNOSIS — G89.4 CHRONIC PAIN SYNDROME: ICD-10-CM

## 2024-11-14 DIAGNOSIS — G47.00 INSOMNIA, UNSPECIFIED TYPE: ICD-10-CM

## 2024-11-14 DIAGNOSIS — R53.81 PHYSICAL DECONDITIONING: ICD-10-CM

## 2024-11-14 NOTE — LETTER
11/14/2024      Onur WEBER Skaar  [address] [facility] [address]        No notes on file      Sincerely,        YAMILETH Melissa CNP       Ht 1.829 m (6')   Wt 123.8 kg (273 lb)   SpO2 94%   BMI 37.03 kg/m    GENERAL APPEARANCE:  Alert, in no distress  RESP:  respiratory effort and palpation of chest normal, lungs clear to auscultation , no respiratory distress  CV:  regular rate and rhythm, no murmur, rub, or gallop, no edema  ABDOMEN:  normal bowel sounds, soft, nontender, no hepatosplenomegaly or other masses  M/S:   Gait and station abnormal transfers with assist, up in WC, TLSO brace on  SKIN:  Inspection of skin and subcutaneous tissue baseline, Palpation of skin and subcutaneous tissue baseline  NEURO:   lida ye  PSYCH:  affect and mood normal    Labs done in SNF are in [facility]. Please refer to them using [employer]Care Everywhere. and Recent labs in [facility] reviewed by me today.     Assessment/Plan:  (S32.000A) Closed compression fracture of lumbosacral spine, initial encounter (H)  (primary encounter diagnosis)  (G89.4) Chronic pain syndrome  (F11.90) Chronic, continuous use of opioids  (R53.81) Physical deconditioning  Comment: chronic pain, history of lumbar spinal fusion in 1990s, indwelling intrathecal pump x 20+ years, replaced 4/2023. Now with acute pathologic fracture.   -methocarbamol was scheduled QID  -Increased dilaudid Q6>Q4 hours. Using 4-6 times daily in TCU. Will reduce at discharge with a short course of NSAID.   Plan: Continue calcitonin through 11/15, tylenol 975 mg Q4H, topical diclofenac QID, duloxetine 60 mg daily, gabapentin 200 mg TID, lidocaine patch, dilaudid to 2-4 mg Q4H PRN. Naloxone PRN. Zofran PRN for nausea. Home care PT/OT.      (N17.9) CAROLYN (acute kidney injury) (H)  (N18.2) Stage 2 chronic kidney disease  Comment: noted inpatient, improved prior to discharge  Plan: ensure adequate hydration, limit NSAIDs, avoid additional nephrotoxins. Renally dose medications as appropriate. Monitor BMP periodically      (C91.10) CLL (chronic lymphocytic leukemia) (H)  (D72.829) Leukocytosis, unspecified type  Comment:  chronic, baseline WBC 16k+  Plan: monitor for s/sx infection, monitor CBC periodically, follow-up with oncology as directed     (I25.10) Coronary arteriosclerosis  (Z95.1) S/P CABG (coronary artery bypass graft)  Comment: chronic, s/p CRYSTAL to right RCA 2021, CABG x 1 1/2022  Plan: continue ASA, statin, SL NTG PRN, monitor symptoms     (I48.0) PAF (paroxysmal atrial fibrillation) (H)  Comment: chronic, with history of left atrial appendage closure procedure 1/2022. No longer on anticoagulation.  Plan: continue sotalol 80 mg BID, monitor HR, metoprolol  mg BID, ASA. Follow-up with cardiology as directed     (I50.32) Chronic diastolic heart failure (H)  (I47.29) Paroxysmal ventricular tachycardia (H)  Comment: s/p ICD placement 1/2022. TTE 3/2024 with LVEF 57%, mildly dilated aortic root, trace MR, trace TR  Plan: continue lasix 40 mg BID, BB, monitor weights, exam. Follow-up with cardiology as directed     (G47.33) MARLEEN (doesn't tolerate CPAP)  Comment: chronic  Plan: monitor SPO2 PRN     (Z98.84) History of Lora-en-Y gastric bypass  Comment: by history  Plan: RD to follow in TCU     (I10) Essential hypertension  Comment: chronic  BP Readings from Last 3 Encounters:   11/14/24 106/66   11/11/24 114/63   11/07/24 111/60   Plan: continue sotalol 80 mg BID, lasix 40 mg bid, monitor BP per facility protocol      (E78.5) Dyslipidemia  Comment: chronic  Plan: continue statin     (D64.9) Acute on chronic anemia  Comment: baseline hgb 12-14, hgb trended down during hospitalization. No acute s/sx bleeding  Plan: monitor for s/sx bleeding, repeat CBC 11/8, continue PTA iron     (Z86.39) History of diabetes mellitus  Comment: by history, currently diet controlled  Plan: follow-up outpatient     (F32.A) Depression, unspecified depression type  (G47.00) Insomnia, unspecified type  Comment: chronic  Plan: continue duloxetine 30 mg daily, 60 mg evening, trazodone 200 mg at bedtime, monitor mood, symptoms. ACP PRN.      (K59.01)  Constipation  Comment: acute on chronic, opiates and immobility contributing  Plan: Continue miralax 17 g Bid and may take 17 G daily PRN, senna-s 2 tabs BID, monitor bowel habits per nursing     (R41.89) Cognitive impairment    Comment: SLUMS 16/30 in Tcu   Plan: OT following for formal cognitive testing and safe discharge planning.    MED REC REQUIRED  Post Medication Reconciliation Status: medication reconcilation previously completed during another office visit        Electronically signed by: YAMILETH Melissa CNP         Sincerely,        YAMILETH Melissa CNP

## 2024-11-14 NOTE — PROGRESS NOTES
[facility] GERIATRICS    Chief Complaint   Patient presents with    Nursing Home Acute     HPI:  Onur Barr is a 69 year old  (1955), who is being seen today for an episodic care visit at: [facility]  ([facility]) [[id number]]. Today's concern is: ***    Allergies, and PMH/PSH reviewed in [facility] today.  REVIEW OF SYSTEMS:  {gkylix53:458401}    Objective:   /66   Pulse 62   Temp 98.1  F (36.7  C)   Resp 18   Ht 1.829 m (6')   Wt 123.8 kg (273 lb)   SpO2 94%   BMI 37.03 kg/m    {Nursing home physical exam :315825}    {fgslab:827245}    Assessment/Plan:  {S DX2:224229}    MED REC REQUIRED{TIP  Click the link below to document or use med rec list, use list to pull in response :781123}  Post Medication Reconciliation Status: {MED REC LIST:577200}      Orders:  {fgsorders:067942}  ***    Electronically signed by: Linda Correia CNA ***       APPEARANCE:  Alert, in no distress  RESP:  respiratory effort and palpation of chest normal, lungs clear to auscultation , no respiratory distress  CV:  regular rate and rhythm, no murmur, rub, or gallop, no edema  ABDOMEN:  normal bowel sounds, soft, nontender, no hepatosplenomegaly or other masses  M/S:   Gait and station abnormal transfers with assist, up in WC, TLSO brace on  SKIN:  Inspection of skin and subcutaneous tissue baseline, Palpation of skin and subcutaneous tissue baseline  NEURO:   hilton freely  PSYCH:  affect and mood normal    Labs done in SNF are in [facility]. Please refer to them using [employer]/Care Everywhere. and Recent labs in [facility] reviewed by me today.     Assessment/Plan:  (S32.000A) Closed compression fracture of lumbosacral spine, initial encounter (H)  (primary encounter diagnosis)  (G89.4) Chronic pain syndrome  (F11.90) Chronic, continuous use of opioids  (R53.81) Physical deconditioning  Comment: chronic pain, history of lumbar spinal fusion in 1990s, indwelling intrathecal pump x 20+ years, replaced 4/2023. Now with acute pathologic fracture.   -methocarbamol was scheduled QID  -Increased dilaudid Q6>Q4 hours. Using 4-6 times daily in TCU. Will reduce at discharge with a short course of NSAID.   Plan: Continue calcitonin through 11/15, tylenol 975 mg Q4H, topical diclofenac QID, duloxetine 60 mg daily, gabapentin 200 mg TID, lidocaine patch, dilaudid to 2-4 mg Q4H PRN. Naloxone PRN. Zofran PRN for nausea. Home care PT/OT.      (N17.9) CAROLYN (acute kidney injury) (H)  (N18.2) Stage 2 chronic kidney disease  Comment: noted inpatient, improved prior to discharge  Plan: ensure adequate hydration, limit NSAIDs, avoid additional nephrotoxins. Renally dose medications as appropriate. Monitor BMP periodically      (C91.10) CLL (chronic lymphocytic leukemia) (H)  (D72.829) Leukocytosis, unspecified type  Comment: chronic, baseline WBC 16k+  Plan: monitor for s/sx infection, monitor CBC  periodically, follow-up with oncology as directed     (I25.10) Coronary arteriosclerosis  (Z95.1) S/P CABG (coronary artery bypass graft)  Comment: chronic, s/p CRYSTAL to right RCA 2021, CABG x 1 1/2022  Plan: continue ASA, statin, SL NTG PRN, monitor symptoms     (I48.0) PAF (paroxysmal atrial fibrillation) (H)  Comment: chronic, with history of left atrial appendage closure procedure 1/2022. No longer on anticoagulation.  Plan: continue sotalol 80 mg BID, monitor HR, metoprolol  mg BID, ASA. Follow-up with cardiology as directed     (I50.32) Chronic diastolic heart failure (H)  (I47.29) Paroxysmal ventricular tachycardia (H)  Comment: s/p ICD placement 1/2022. TTE 3/2024 with LVEF 57%, mildly dilated aortic root, trace MR, trace TR  Plan: continue lasix 40 mg BID, BB, monitor weights, exam. Follow-up with cardiology as directed     (G47.33) MARLEEN (doesn't tolerate CPAP)  Comment: chronic  Plan: monitor SPO2 PRN     (Z98.84) History of Lora-en-Y gastric bypass  Comment: by history  Plan: RD to follow in TCU     (I10) Essential hypertension  Comment: chronic  BP Readings from Last 3 Encounters:   11/14/24 106/66   11/11/24 114/63   11/07/24 111/60   Plan: continue sotalol 80 mg BID, lasix 40 mg bid, monitor BP per facility protocol      (E78.5) Dyslipidemia  Comment: chronic  Plan: continue statin     (D64.9) Acute on chronic anemia  Comment: baseline hgb 12-14, hgb trended down during hospitalization. No acute s/sx bleeding  Plan: monitor for s/sx bleeding, repeat CBC 11/8, continue PTA iron     (Z86.39) History of diabetes mellitus  Comment: by history, currently diet controlled  Plan: follow-up outpatient     (F32.A) Depression, unspecified depression type  (G47.00) Insomnia, unspecified type  Comment: chronic  Plan: continue duloxetine 30 mg daily, 60 mg evening, trazodone 200 mg at bedtime, monitor mood, symptoms. ACP PRN.      (K59.01) Constipation  Comment: acute on chronic, opiates and immobility  contributing  Plan: Continue miralax 17 g Bid and may take 17 G daily PRN, senna-s 2 tabs BID, monitor bowel habits per nursing     (R41.89) Cognitive impairment    Comment: SLUMS 16/30 in Tcu   Plan: OT following for formal cognitive testing and safe discharge planning.    MED REC REQUIRED  Post Medication Reconciliation Status: medication reconcilation previously completed during another office visit        Electronically signed by: YAMILETH Melissa CNP

## 2024-11-15 NOTE — PROGRESS NOTES
[facility]  November 18, 2024      CHIEF COMPLAINT:  Episodic/follow-up visit    HPI:    Onur Barr is a 69 year old  (1955), who is being seen today for an episodic care visit at [facility].     Medical history is notable for severe chronic back pain (s/p intrathecal pain pump), ischemic cardiomyopathy, V. tach, CAD, PAD, hypertension, dyslipidemia, PAF, CLL, CKD, DM type II, nephrolithiasis, osteoarthritis, depression, morbid obesity, and MARLEEN.     Summary of hospital course:  Patient was hospitalized at [facility] from October 31 through November 6, 2024 for acute on chronic nontraumatic low back pain.  Imaging studies revealed previous anterior and posterior bony fusion from L4 down to S1 and recent appearing nondisplaced fracture through the fused spine, involving the vertebral body and posterior elements of the mid L5 level, acute to subacute L1 anterior compression deformity, and chronic T10, T11, L2, L3, L4 compression deformities.  Laboratory evaluation was significant for sodium 142, potassium 3.9, creatinine 1.29, glucose 124, white count 30.9, hemoglobin 12.2, and platelets 186,000.  He was seen by neurosurgery service and they recommended conservative management.  Pain service was consulted and pain control was managed with calcitonin nasal spray, Voltaren gel, gabapentin, hydromorphone, lidocaine, acetaminophen, and muscle relaxants.  He was hydrated with IV fluids and renal function improved.  PTA lisinopril was discontinued.  TCU was recommended for rehab. He was then admitted to this facility for medical management, nursing care, and subacute rehab.     Today's visit:  Patient was seen in his room, lying in bed.  He looks comfortable.  He continues to have low back pain radiating to his right anterior thigh.  He reports no fever, chills, chest pain, palpitation, dyspnea, nausea, vomiting, abdominal pain, urinary symptoms, or new  complaints.  He had a BM yesterday.  His blood pressure at times is running low normal.      CODE STATUS:   CPR/Full code     Past Medical, Surgical, Family, and Social History were reviewed in [facility].    Current Outpatient Medications   Medication Sig Dispense Refill    acetaminophen (TYLENOL) 325 MG tablet Take 975 mg by mouth every 6 hours.      aspirin (ASA) 81 MG chewable tablet Take 81 mg by mouth daily      bisacodyl (DULCOLAX) 10 MG suppository Place 1 suppository (10 mg) rectally daily as needed for constipation.      calcitonin, salmon, (MIACALCIN) 200 UNIT/ACT nasal spray Spray 1 spray into one nostril alternating nostrils daily for 8 days. Alternate nostril each day.Alternate nostrils daily.  Right nostril on even days.  Left nostril on odd days.      diclofenac (VOLTAREN) 1 % topical gel Apply 2 g topically 4 times daily.      DULoxetine (CYMBALTA) 30 MG capsule Take 1 capsule (30mg) every morning and 2 capsules (60mg) every evening 270 capsule 3    FERATE 240 (27 Fe) MG TABS TAKE 1 TABLET BY MOUTH EVERY DAY 90 tablet 2    furosemide (LASIX) 40 MG tablet Take 1 tablet (40 mg) by mouth 2 times daily 180 tablet 3    gabapentin (NEURONTIN) 100 MG capsule Take 2 capsules (200 mg) by mouth 3 times daily. Hold sedation, confusion      HYDROmorphone (DILAUDID) 2 MG tablet Take 1-2 tablets (2-4 mg) by mouth every 4 hours as needed for pain (take 2 mg for pain 4-6/10 and take 4 mg for pain 7-10/10). 60 tablet 0    hydrOXYzine HCl (ATARAX) 25 MG tablet Take 1 tablet (25 mg) by mouth 3 times daily as needed for other (pain). 30 tablet 2    ketoconazole (NIZORAL) 2 % external cream Apply topically 2 times daily. to affected area 60 g 4    medication given by implanted intrathecal pump continuously. Updated by PharmD 10/31/24    Drug # 1: Morphine (Duramorph or Infumorph)  - Conc:20 mg/mL - Total Dose / 24 hours: 4.342 mg    Drug # 2: Clonidine (Duraclon)  - Conc:21.1 mcg/mL - Total Dose / 24 hours: 4.580 mcg    Drug  # 3: Baclofen (Lioresal) - Conc: 42.5 mcg/mL - Total Dose / 24 hours: 9.226 mcg    Diluent: NS    Infusion Rate: 0.2171 mL/24 hrs  Pump Reservoir Volume: 40 mL  Outside Clinic & Provider: [facility] [phone]  Last Refill Date: 07/12/2024  Next Refill Date: 01/01/2025  [place] Alarm Date: 01/01/2025  Pump : Medtronic Synchromed II    Please consider consulting the pain team if the patient is admitted with an IT pump.      methocarbamol (ROBAXIN) 750 MG tablet Take 750 mg by mouth 4 times daily.      metoprolol succinate ER (TOPROL XL) 100 MG 24 hr tablet Take 1 tablet (100 mg) by mouth 2 times daily 180 tablet 3    naloxone (NARCAN) 4 MG/0.1ML nasal spray Spray 1 spray (4 mg) into one nostril alternating nostrils as needed for opioid reversal. every 2-3 minutes until assistance arrives 2 each 2    nitroGLYcerin (NITROSTAT) 0.4 MG sublingual tablet For chest pain place 1 tablet under the tongue every 5 minutes for 3 doses. If symptoms persist 5 minutes after 1st dose call 911. 30 tablet 1    ondansetron (ZOFRAN ODT) 4 MG ODT tab Take 1 tablet (4 mg) by mouth every 8 hours as needed for nausea 100 tablet 1    polyethylene glycol (MIRALAX) 17 GM/Dose powder Take 17 g by mouth daily.      potassium chloride isai ER (KLOR-CON M20) 20 MEQ CR tablet Take 40 mEq by mouth daily.      rosuvastatin (CRESTOR) 40 MG tablet Take 1 tablet (40 mg) by mouth daily. 90 tablet 0    senna-docusate (SENOKOT-S/PERICOLACE) 8.6-50 MG tablet Take 2 tablets by mouth 2 times daily. Hold for loose stools.      sotalol (BETAPACE) 80 MG tablet Take 1 tablet (80 mg) by mouth 2 times daily 180 tablet 3    traZODone (DESYREL) 100 MG tablet Take 2 tablets (200 mg) by mouth at bedtime TAKE 2 TABLETS (200MG TOTAL) BY MOUTH AT BEDTIME Strength: 100 mg 180 tablet 3       MED REC REQUIRED  Post Medication Reconciliation Status: medication reconcilation previously completed during another office visit      ALLERGIES:  Hydrocodone-acetaminophen    REVIEW OF SYSTEMS:  10 point ROS were negative other than the symptoms noted above in the HPI.    PHYSICAL EXAM:  Vital signs were reviewed in the chart.  Vital Signs:  BP 95/59   Pulse 60   Temp 97.9  F (36.6  C)   Resp 18   Ht 1.829 m (6')   Wt 125.1 kg (275 lb 12.8 oz)   SpO2 93%   BMI 37.41 kg/m     General: Comfortable and in no acute distress  HEENT: Mild conjunctival pallor, no scleral icterus or injection, moist oral mucosa  Cardiovascular: Normal S1, S2, RRR  Respiratory: Lungs clear to auscultation bilaterally  GI: Abdomen soft, non-tender, non-distended, +BS  Extremities: No significant LE edema  Neuro: CX II-XII grossly intact; ROM in all four extremities grossly intact  Psych: Alert and oriented x3; normal affect  Skin: No acute rash    LABORATORY/IMAGING DATA:  All relevant labs and imaging data in [facility] and/or Care Everywhere were personally reviewed today.    Most Recent 3 CBC's:  Recent Labs   Lab Test 11/11/24  0608 11/02/24  1012 11/01/24  1104   WBC 18.8* 21.6* 21.8*   HGB 11.0* 11.0* 11.6*   MCV 88 88 85    137* 141*     Most Recent 3 BMP's:  Recent Labs   Lab Test 11/11/24  0608 11/02/24  1012 11/01/24  1104    142 141   POTASSIUM 3.3* 3.6 3.8   CHLORIDE 101 106 105   CO2 28 22 20*   BUN 14.0 17.8 23.6*   CR 0.92 0.94 0.96   ANIONGAP 11 14 16*   RATNA 8.5* 8.6* 9.2   GLC 90 145* 107*     Most Recent 2 LFT's:  Recent Labs   Lab Test 10/14/24  0918 04/17/24  0913   AST 30 20   ALT 15 <5   ALKPHOS 76 65   BILITOTAL 0.5 0.6     Most Recent Cholesterol Panel:  Recent Labs   Lab Test 10/14/24  0918   CHOL 104   LDL 29   HDL 34*   TRIG 203*     Most Recent TSH and T4:  Recent Labs   Lab Test 06/09/23  1045   TSH 1.38     Most Recent Hemoglobin A1c:  Recent Labs   Lab Test 08/14/24  1410   A1C 5.4       ASSESSMENT/PLAN:  Acute pathologic L1 and L5 compression fractures, subsequent encounter,  Chronic T10, T11, L2, L3, and L4 compression fractures,  Acute on  chronic back pain,  Chronic pain syndrome, s/p intrathecal pain pump in 1990s, replaced in April 2023,  Physical deconditioning.  Patient continues to have pain with radiculopathy.  Plan:  Continue acetaminophen 975 mg every 6 hours, hydromorphone 2-4 mg every 4 hours PRN, gabapentin 200 mg 3 times daily, hydroxyzine 25 mg 3 times daily as needed, topical diclofenac gel, and lidocaine patch  Continue duloxetine 30 mg in the morning and 60 mg in the evening  Continue methocarbamol 750 mg every 6 hours  Continue PT/OT evaluation and therapy  Repeat CT lumbar spine on December 4, 2024 as scheduled  Follow-up with [facility] on January 1, 2025 as scheduled  Follow-up with neurosurgery and pain clinic as directed     CAD, s/p stent to LAD in 2012, stent to proximal and distal RCA in September 2021, and CABG x 1 in January 2022 (LIMA to LAD),  PAD with claudication,  History of ischemic cardiomyopathy (heart failure with recovered EF, LVEF 55-60%, per echo in March 2024),  History of V. tach, s/p ICD in January 2022,  Mildly dilated aortic root,  Hypertension,  Dyslipidemia.  PTA lisinopril was discontinued in the hospital.  Patient currently weighs 276 LBS and has no significant lower extremity edema.  Blood pressure at times is running low normal.  Plan:  Continue aspirin 81 mg daily  Continue rosuvastatin 40 mg daily  Continue furosemide 40 mg twice daily  Continue metoprolol  mg twice daily  Continue nitroglycerin sublingual as needed for angina  Monitor blood pressure, volume, weight, and cardiac status  Follow-up with cardiology as directed     PAF, s/p pulmonary vein isolation and atrial appendage clipping using [employer] in January 2022.  Patient is not on chronic anticoagulation.  He is on both metoprolol and sotalol.  Plan:  Continue aspirin 81 mg daily  Continue sotalol 80 mg twice daily  Continue metoprolol  mg twice daily  Monitor heart rate  Follow-up with cardiology as directed      CLL,  Anemia,  Thrombocytopenia..  CBC from November 11, 2024 with white count 18.8, hemoglobin 11, and platelets 213,000.  Plan:   Continue ferrous gluconate 240 mg daily  Monitor hematology profile periodically  Follow-up with hematology oncology as directed     CKD stage II.  Baseline creatinine 0.9-1.1.  Last creatinine 0.92 on November 11.  Plan:  Avoid nephrotoxins   Limit the use of NSAIDs  Monitor renal function and electrolytes periodically     Hypokalemia.  Potassium low at 3.3 on November 11, likely due to diuretic therapy.  Started on potassium supplement.  Plan:  Continue Klor-Con 40 mEq daily     History of DM type II.  Reportedly diet controlled after gastric bypass in June 2008.  Last A1c 5.4% on August 14, 2024.  Plan:  Monitor blood glucose PRN     Depression,  Insomnia.  Plan:  Continue duloxetine 30 mg daily in the morning and 60 mg in the evening  Continue trazodone 200 mg at bedtime  Monitor symptoms  Refer to ACP PRN     Slow transit constipation.  Plan:  Continue the bowel regimen      Morbid obesity, s/p bariatric surgery in June 2008,  MARLEEN (intolerant of CPAP).   BMI 37..  Plan:  Staff to assist with daily care and mobility  Monitor O2 sats    Cognitive testing:  SLUMS score 1/30  and CPT 5.4/5.6 score  this TCU stay.         Orders written by provider at facility:  BMP on November 19, DX: Hypokalemia, CKD   CBC on November 19, DX: Anemia, CLL         Disclaimer: This note contains text created using speech-recognition software and may have unintended word substitutions.      Electronically signed by  Cory Fontenot MD

## 2024-11-16 DIAGNOSIS — S32.000A CLOSED COMPRESSION FRACTURE OF LUMBOSACRAL SPINE, INITIAL ENCOUNTER (H): ICD-10-CM

## 2024-11-16 DIAGNOSIS — F11.90 CHRONIC, CONTINUOUS USE OF OPIOIDS: ICD-10-CM

## 2024-11-16 DIAGNOSIS — D64.9 ANEMIA, UNSPECIFIED TYPE: ICD-10-CM

## 2024-11-16 RX ORDER — HYDROMORPHONE HYDROCHLORIDE 2 MG/1
2-4 TABLET ORAL EVERY 4 HOURS PRN
Qty: 30 TABLET | Refills: 0 | Status: SHIPPED | OUTPATIENT
Start: 2024-11-16 | End: 2024-11-18

## 2024-11-18 ENCOUNTER — LAB REQUISITION (OUTPATIENT)
Dept: LAB | Facility: CLINIC | Age: 69
End: 2024-11-18
Payer: COMMERCIAL

## 2024-11-18 ENCOUNTER — TELEPHONE (OUTPATIENT)
Dept: NEUROSURGERY | Facility: CLINIC | Age: 69
End: 2024-11-18

## 2024-11-18 ENCOUNTER — TRANSITIONAL CARE UNIT VISIT (OUTPATIENT)
Dept: GERIATRICS | Facility: CLINIC | Age: 69
End: 2024-11-18
Payer: COMMERCIAL

## 2024-11-18 VITALS
HEART RATE: 60 BPM | BODY MASS INDEX: 37.36 KG/M2 | HEIGHT: 72 IN | SYSTOLIC BLOOD PRESSURE: 95 MMHG | OXYGEN SATURATION: 93 % | TEMPERATURE: 97.9 F | WEIGHT: 275.8 LBS | DIASTOLIC BLOOD PRESSURE: 59 MMHG | RESPIRATION RATE: 18 BRPM

## 2024-11-18 DIAGNOSIS — G47.33 OSA (OBSTRUCTIVE SLEEP APNEA): ICD-10-CM

## 2024-11-18 DIAGNOSIS — N18.2 STAGE 2 CHRONIC KIDNEY DISEASE: ICD-10-CM

## 2024-11-18 DIAGNOSIS — K59.01 SLOW TRANSIT CONSTIPATION: ICD-10-CM

## 2024-11-18 DIAGNOSIS — I73.9 PAD (PERIPHERAL ARTERY DISEASE) (H): ICD-10-CM

## 2024-11-18 DIAGNOSIS — E87.6 HYPOKALEMIA: ICD-10-CM

## 2024-11-18 DIAGNOSIS — G89.4 CHRONIC PAIN SYNDROME: ICD-10-CM

## 2024-11-18 DIAGNOSIS — R53.81 PHYSICAL DECONDITIONING: ICD-10-CM

## 2024-11-18 DIAGNOSIS — E78.5 DYSLIPIDEMIA: ICD-10-CM

## 2024-11-18 DIAGNOSIS — D64.9 ANEMIA, UNSPECIFIED TYPE: ICD-10-CM

## 2024-11-18 DIAGNOSIS — Z86.79 HISTORY OF VENTRICULAR TACHYCARDIA: ICD-10-CM

## 2024-11-18 DIAGNOSIS — I25.10 CORONARY ARTERY DISEASE INVOLVING NATIVE CORONARY ARTERY OF NATIVE HEART WITHOUT ANGINA PECTORIS: ICD-10-CM

## 2024-11-18 DIAGNOSIS — Z86.79 HISTORY OF CARDIOMYOPATHY: ICD-10-CM

## 2024-11-18 DIAGNOSIS — Z86.39 HISTORY OF DIABETES MELLITUS: ICD-10-CM

## 2024-11-18 DIAGNOSIS — M48.56XD NON-TRAUMATIC COMPRESSION FRACTURE OF L1 LUMBAR VERTEBRA WITH ROUTINE HEALING, SUBSEQUENT ENCOUNTER: Primary | ICD-10-CM

## 2024-11-18 DIAGNOSIS — M54.50 ACUTE LOW BACK PAIN, UNSPECIFIED BACK PAIN LATERALITY, UNSPECIFIED WHETHER SCIATICA PRESENT: ICD-10-CM

## 2024-11-18 DIAGNOSIS — I10 ESSENTIAL HYPERTENSION: ICD-10-CM

## 2024-11-18 DIAGNOSIS — D64.9 ANEMIA, UNSPECIFIED: ICD-10-CM

## 2024-11-18 DIAGNOSIS — Z97.8 PRESENCE OF INTRATHECAL PUMP: ICD-10-CM

## 2024-11-18 DIAGNOSIS — F32.A DEPRESSION, UNSPECIFIED DEPRESSION TYPE: ICD-10-CM

## 2024-11-18 DIAGNOSIS — I48.0 PAROXYSMAL ATRIAL FIBRILLATION (H): ICD-10-CM

## 2024-11-18 DIAGNOSIS — S32.000A CLOSED COMPRESSION FRACTURE OF LUMBOSACRAL SPINE, INITIAL ENCOUNTER (H): ICD-10-CM

## 2024-11-18 DIAGNOSIS — S32.050D COMPRESSION FRACTURE OF L5 VERTEBRA WITH ROUTINE HEALING, SUBSEQUENT ENCOUNTER: ICD-10-CM

## 2024-11-18 DIAGNOSIS — Z95.810 ICD (IMPLANTABLE CARDIOVERTER-DEFIBRILLATOR) IN PLACE: ICD-10-CM

## 2024-11-18 DIAGNOSIS — C91.10 CLL (CHRONIC LYMPHOCYTIC LEUKEMIA) (H): ICD-10-CM

## 2024-11-18 DIAGNOSIS — F11.90 CHRONIC, CONTINUOUS USE OF OPIOIDS: ICD-10-CM

## 2024-11-18 DIAGNOSIS — G47.00 INSOMNIA, UNSPECIFIED TYPE: ICD-10-CM

## 2024-11-18 DIAGNOSIS — E66.01 SEVERE OBESITY (BMI 35.0-39.9) WITH COMORBIDITY (H): ICD-10-CM

## 2024-11-18 PROCEDURE — 99309 SBSQ NF CARE MODERATE MDM 30: CPT | Performed by: INTERNAL MEDICINE

## 2024-11-18 RX ORDER — DIAPER,BRIEF,INFANT-TODD,DISP
1 EACH MISCELLANEOUS DAILY
Qty: 90 TABLET | Refills: 2 | Status: SHIPPED | OUTPATIENT
Start: 2024-11-18 | End: 2024-11-19

## 2024-11-18 NOTE — LETTER
11/18/2024      Onur Barr  [id number] [facility] [address]        [facility] SERVICES  November 18, 2024      CHIEF COMPLAINT:  Episodic/follow-up visit    HPI:    Onur Barr is a 69 year old  (1955), who is being seen today for an episodic care visit at [facility].     Medical history is notable for severe chronic back pain (s/p intrathecal pain pump), ischemic cardiomyopathy, V. tach, CAD, PAD, hypertension, dyslipidemia, PAF, CLL, CKD, DM type II, nephrolithiasis, osteoarthritis, depression, morbid obesity, and MARLEEN.     Summary of hospital course:  Patient was hospitalized at [facility] from October 31 through November 6, 2024 for acute on chronic nontraumatic low back pain.  Imaging studies revealed previous anterior and posterior bony fusion from L4 down to S1 and recent appearing nondisplaced fracture through the fused spine, involving the vertebral body and posterior elements of the mid L5 level, acute to subacute L1 anterior compression deformity, and chronic T10, T11, L2, L3, L4 compression deformities.  Laboratory evaluation was significant for sodium 142, potassium 3.9, creatinine 1.29, glucose 124, white count 30.9, hemoglobin 12.2, and platelets 186,000.  He was seen by neurosurgery service and they recommended conservative management.  Pain service was consulted and pain control was managed with calcitonin nasal spray, Voltaren gel, gabapentin, hydromorphone, lidocaine, acetaminophen, and muscle relaxants.  He was hydrated with IV fluids and renal function improved.  PTA lisinopril was discontinued.  TCU was recommended for rehab. He was then admitted to this facility for medical management, nursing care, and subacute rehab.     Today's visit:  Patient was seen in his room, lying in bed.  He looks comfortable.  He continues to have low back pain radiating to his right anterior thigh.  He reports no fever, chills, chest  pain, palpitation, dyspnea, nausea, vomiting, abdominal pain, urinary symptoms, or new complaints.  He had a BM yesterday.  His blood pressure at times is running low normal.      CODE STATUS:   CPR/Full code     Past Medical, Surgical, Family, and Social History were reviewed in [facility].    Current Outpatient Medications   Medication Sig Dispense Refill     acetaminophen (TYLENOL) 325 MG tablet Take 975 mg by mouth every 6 hours.       aspirin (ASA) 81 MG chewable tablet Take 81 mg by mouth daily       bisacodyl (DULCOLAX) 10 MG suppository Place 1 suppository (10 mg) rectally daily as needed for constipation.       calcitonin, salmon, (MIACALCIN) 200 UNIT/ACT nasal spray Spray 1 spray into one nostril alternating nostrils daily for 8 days. Alternate nostril each day.Alternate nostrils daily.  Right nostril on even days.  Left nostril on odd days.       diclofenac (VOLTAREN) 1 % topical gel Apply 2 g topically 4 times daily.       DULoxetine (CYMBALTA) 30 MG capsule Take 1 capsule (30mg) every morning and 2 capsules (60mg) every evening 270 capsule 3     FERATE 240 (27 Fe) MG TABS TAKE 1 TABLET BY MOUTH EVERY DAY 90 tablet 2     furosemide (LASIX) 40 MG tablet Take 1 tablet (40 mg) by mouth 2 times daily 180 tablet 3     gabapentin (NEURONTIN) 100 MG capsule Take 2 capsules (200 mg) by mouth 3 times daily. Hold sedation, confusion       HYDROmorphone (DILAUDID) 2 MG tablet Take 1-2 tablets (2-4 mg) by mouth every 4 hours as needed for pain (take 2 mg for pain 4-6/10 and take 4 mg for pain 7-10/10). 60 tablet 0     hydrOXYzine HCl (ATARAX) 25 MG tablet Take 1 tablet (25 mg) by mouth 3 times daily as needed for other (pain). 30 tablet 2     ketoconazole (NIZORAL) 2 % external cream Apply topically 2 times daily. to affected area 60 g 4     medication given by implanted intrathecal pump continuously. Updated by PharmD 10/31/24    Drug # 1: Morphine (Duramorph or Infumorph)  - Conc:20 mg/mL - Total Dose / 24 hours: 4.342  mg    Drug # 2: Clonidine (Duraclon)  - Conc:21.1 mcg/mL - Total Dose / 24 hours: 4.580 mcg    Drug # 3: Baclofen (Lioresal) - Conc: 42.5 mcg/mL - Total Dose / 24 hours: 9.226 mcg    Diluent: NS    Infusion Rate: 0.2171 mL/24 hrs  Pump Reservoir Volume: 40 mL  Outside Clinic & Provider: [facility] [phone]  Last Refill Date: 07/12/2024  Next Refill Date: 01/01/2025  Low [place] Alarm Date: 01/01/2025  Pump : MedNeuroNascent Synchromed II    Please consider consulting the pain team if the patient is admitted with an IT pump.       methocarbamol (ROBAXIN) 750 MG tablet Take 750 mg by mouth 4 times daily.       metoprolol succinate ER (TOPROL XL) 100 MG 24 hr tablet Take 1 tablet (100 mg) by mouth 2 times daily 180 tablet 3     naloxone (NARCAN) 4 MG/0.1ML nasal spray Spray 1 spray (4 mg) into one nostril alternating nostrils as needed for opioid reversal. every 2-3 minutes until assistance arrives 2 each 2     nitroGLYcerin (NITROSTAT) 0.4 MG sublingual tablet For chest pain place 1 tablet under the tongue every 5 minutes for 3 doses. If symptoms persist 5 minutes after 1st dose call 911. 30 tablet 1     ondansetron (ZOFRAN ODT) 4 MG ODT tab Take 1 tablet (4 mg) by mouth every 8 hours as needed for nausea 100 tablet 1     polyethylene glycol (MIRALAX) 17 GM/Dose powder Take 17 g by mouth daily.       potassium chloride isai ER (KLOR-CON M20) 20 MEQ CR tablet Take 40 mEq by mouth daily.       rosuvastatin (CRESTOR) 40 MG tablet Take 1 tablet (40 mg) by mouth daily. 90 tablet 0     senna-docusate (SENOKOT-S/PERICOLACE) 8.6-50 MG tablet Take 2 tablets by mouth 2 times daily. Hold for loose stools.       sotalol (BETAPACE) 80 MG tablet Take 1 tablet (80 mg) by mouth 2 times daily 180 tablet 3     traZODone (DESYREL) 100 MG tablet Take 2 tablets (200 mg) by mouth at bedtime TAKE 2 TABLETS (200MG TOTAL) BY MOUTH AT BEDTIME Strength: 100 mg 180 tablet 3       MED REC REQUIRED  Post Medication  Reconciliation Status: medication reconcilation previously completed during another office visit      ALLERGIES: Hydrocodone-acetaminophen    REVIEW OF SYSTEMS:  10 point ROS were negative other than the symptoms noted above in the HPI.    PHYSICAL EXAM:  Vital signs were reviewed in the chart.  Vital Signs:  BP 95/59   Pulse 60   Temp 97.9  F (36.6  C)   Resp 18   Ht 1.829 m (6')   Wt 125.1 kg (275 lb 12.8 oz)   SpO2 93%   BMI 37.41 kg/m     General: Comfortable and in no acute distress  HEENT: Mild conjunctival pallor, no scleral icterus or injection, moist oral mucosa  Cardiovascular: Normal S1, S2, RRR  Respiratory: Lungs clear to auscultation bilaterally  GI: Abdomen soft, non-tender, non-distended, +BS  Extremities: No significant LE edema  Neuro: CX II-XII grossly intact; ROM in all four extremities grossly intact  Psych: Alert and oriented x3; normal affect  Skin: No acute rash    LABORATORY/IMAGING DATA:  All relevant labs and imaging data in [facility] and/or Care Everywhere were personally reviewed today.    Most Recent 3 CBC's:  Recent Labs   Lab Test 11/11/24  0608 11/02/24  1012 11/01/24  1104   WBC 18.8* 21.6* 21.8*   HGB 11.0* 11.0* 11.6*   MCV 88 88 85    137* 141*     Most Recent 3 BMP's:  Recent Labs   Lab Test 11/11/24  0608 11/02/24  1012 11/01/24  1104    142 141   POTASSIUM 3.3* 3.6 3.8   CHLORIDE 101 106 105   CO2 28 22 20*   BUN 14.0 17.8 23.6*   CR 0.92 0.94 0.96   ANIONGAP 11 14 16*   RATNA 8.5* 8.6* 9.2   GLC 90 145* 107*     Most Recent 2 LFT's:  Recent Labs   Lab Test 10/14/24  0918 04/17/24  0913   AST 30 20   ALT 15 <5   ALKPHOS 76 65   BILITOTAL 0.5 0.6     Most Recent Cholesterol Panel:  Recent Labs   Lab Test 10/14/24  0918   CHOL 104   LDL 29   HDL 34*   TRIG 203*     Most Recent TSH and T4:  Recent Labs   Lab Test 06/09/23  1045   TSH 1.38     Most Recent Hemoglobin A1c:  Recent Labs   Lab Test 08/14/24  1410   A1C 5.4       ASSESSMENT/PLAN:  Acute pathologic L1 and  L5 compression fractures, subsequent encounter,  Chronic T10, T11, L2, L3, and L4 compression fractures,  Acute on chronic back pain,  Chronic pain syndrome, s/p intrathecal pain pump in 1990s, replaced in April 2023,  Physical deconditioning.  Patient continues to have pain with radiculopathy.  Plan:  Continue acetaminophen 975 mg every 6 hours, hydromorphone 2-4 mg every 4 hours PRN, gabapentin 200 mg 3 times daily, hydroxyzine 25 mg 3 times daily as needed, topical diclofenac gel, and lidocaine patch  Continue duloxetine 30 mg in the morning and 60 mg in the evening  Continue methocarbamol 750 mg every 6 hours  Continue PT/OT evaluation and therapy  Repeat CT lumbar spine on December 4, 2024 as scheduled  Follow-up with [facility] on January 1, 2025 as scheduled  Follow-up with neurosurgery and pain clinic as directed     CAD, s/p stent to LAD in 2012, stent to proximal and distal RCA in September 2021, and CABG x 1 in January 2022 (LIMA to LAD),  PAD with claudication,  History of ischemic cardiomyopathy (heart failure with recovered EF, LVEF 55-60%, per echo in March 2024),  History of V. tach, s/p ICD in January 2022,  Mildly dilated aortic root,  Hypertension,  Dyslipidemia.  PTA lisinopril was discontinued in the hospital.  Patient currently weighs 276 LBS and has no significant lower extremity edema.  Blood pressure at times is running low normal.  Plan:  Continue aspirin 81 mg daily  Continue rosuvastatin 40 mg daily  Continue furosemide 40 mg twice daily  Continue metoprolol  mg twice daily  Continue nitroglycerin sublingual as needed for angina  Monitor blood pressure, volume, weight, and cardiac status  Follow-up with cardiology as directed     PAF, s/p pulmonary vein isolation and atrial appendage clipping using AtriaClip in January 2022.  Patient is not on chronic anticoagulation.  He is on both metoprolol and sotalol.  Plan:  Continue aspirin 81 mg daily  Continue sotalol 80 mg  twice daily  Continue metoprolol  mg twice daily  Monitor heart rate  Follow-up with cardiology as directed     CLL,  Anemia,  Thrombocytopenia..  CBC from November 11, 2024 with white count 18.8, hemoglobin 11, and platelets 213,000.  Plan:   Continue ferrous gluconate 240 mg daily  Monitor hematology profile periodically  Follow-up with hematology oncology as directed     CKD stage II.  Baseline creatinine 0.9-1.1.  Last creatinine 0.92 on November 11.  Plan:  Avoid nephrotoxins   Limit the use of NSAIDs  Monitor renal function and electrolytes periodically     Hypokalemia.  Potassium low at 3.3 on November 11, likely due to diuretic therapy.  Started on potassium supplement.  Plan:  Continue Klor-Con 40 mEq daily     History of DM type II.  Reportedly diet controlled after gastric bypass in June 2008.  Last A1c 5.4% on August 14, 2024.  Plan:  Monitor blood glucose PRN     Depression,  Insomnia.  Plan:  Continue duloxetine 30 mg daily in the morning and 60 mg in the evening  Continue trazodone 200 mg at bedtime  Monitor symptoms  Refer to ACP PRN     Slow transit constipation.  Plan:  Continue the bowel regimen      Morbid obesity, s/p bariatric surgery in June 2008,  MARLEEN (intolerant of CPAP).   BMI 37..  Plan:  Staff to assist with daily care and mobility  Monitor O2 sats    Cognitive testing:  SLUMS score 1/30  and CPT 5.4/5.6 score  this TCU stay.         Orders written by provider at facility:  BMP on November 19, DX: Hypokalemia, CKD   CBC on November 19, DX: Anemia, CLL         Disclaimer: This note contains text created using speech-recognition software and may have unintended word substitutions.      Electronically signed by  Cory Fontenot MD                       Sincerely,        Cory Fontenot MD

## 2024-11-18 NOTE — PATIENT INSTRUCTIONS
Orders for Onur Barr (1955), MR# [id number]:    1) BMP on November 19, DX: Hypokalemia, CKD   2) CBC on November 19, DX: Anemia, CLL     Cory Fontenot MD  [facility]

## 2024-11-18 NOTE — TELEPHONE ENCOUNTER
Currently, patient scheduled his CT on 12/04. Left voice message to call clinic to reschedule CT scan prior to the telephone visit on 11/26. Provided imaging scheduling number [phone].

## 2024-11-19 ENCOUNTER — DISCHARGE SUMMARY NURSING HOME (OUTPATIENT)
Dept: GERIATRICS | Facility: CLINIC | Age: 69
End: 2024-11-19
Payer: COMMERCIAL

## 2024-11-19 VITALS — HEIGHT: 72 IN | WEIGHT: 275 LBS | BODY MASS INDEX: 37.25 KG/M2

## 2024-11-19 DIAGNOSIS — R11.2 NAUSEA AND VOMITING, UNSPECIFIED VOMITING TYPE: ICD-10-CM

## 2024-11-19 DIAGNOSIS — K59.01 SLOW TRANSIT CONSTIPATION: ICD-10-CM

## 2024-11-19 DIAGNOSIS — S32.000A CLOSED COMPRESSION FRACTURE OF LUMBOSACRAL SPINE, INITIAL ENCOUNTER (H): ICD-10-CM

## 2024-11-19 DIAGNOSIS — I50.32 CHRONIC DIASTOLIC HEART FAILURE (H): ICD-10-CM

## 2024-11-19 DIAGNOSIS — M48.56XD NON-TRAUMATIC COMPRESSION FRACTURE OF L1 LUMBAR VERTEBRA WITH ROUTINE HEALING, SUBSEQUENT ENCOUNTER: ICD-10-CM

## 2024-11-19 DIAGNOSIS — F11.90 CHRONIC, CONTINUOUS USE OF OPIOIDS: ICD-10-CM

## 2024-11-19 DIAGNOSIS — D64.9 ANEMIA, UNSPECIFIED TYPE: ICD-10-CM

## 2024-11-19 DIAGNOSIS — Z95.1 S/P CABG (CORONARY ARTERY BYPASS GRAFT): ICD-10-CM

## 2024-11-19 DIAGNOSIS — I73.9 PAD (PERIPHERAL ARTERY DISEASE) (H): ICD-10-CM

## 2024-11-19 DIAGNOSIS — Z95.810 ICD (IMPLANTABLE CARDIOVERTER-DEFIBRILLATOR) IN PLACE: ICD-10-CM

## 2024-11-19 DIAGNOSIS — E78.5 DYSLIPIDEMIA: ICD-10-CM

## 2024-11-19 DIAGNOSIS — I50.22 CHRONIC SYSTOLIC CONGESTIVE HEART FAILURE (H): ICD-10-CM

## 2024-11-19 DIAGNOSIS — E87.6 HYPOKALEMIA: ICD-10-CM

## 2024-11-19 DIAGNOSIS — S32.050D COMPRESSION FRACTURE OF L5 VERTEBRA WITH ROUTINE HEALING, SUBSEQUENT ENCOUNTER: ICD-10-CM

## 2024-11-19 DIAGNOSIS — G89.4 CHRONIC PAIN SYNDROME: ICD-10-CM

## 2024-11-19 DIAGNOSIS — I10 ESSENTIAL HYPERTENSION: ICD-10-CM

## 2024-11-19 DIAGNOSIS — G47.00 INSOMNIA, UNSPECIFIED TYPE: ICD-10-CM

## 2024-11-19 DIAGNOSIS — N18.2 STAGE 2 CHRONIC KIDNEY DISEASE: ICD-10-CM

## 2024-11-19 DIAGNOSIS — B37.2 CANDIDAL INTERTRIGO: ICD-10-CM

## 2024-11-19 DIAGNOSIS — I25.10 CORONARY ARTERY DISEASE INVOLVING NATIVE CORONARY ARTERY OF NATIVE HEART WITHOUT ANGINA PECTORIS: ICD-10-CM

## 2024-11-19 DIAGNOSIS — I25.119 CORONARY ARTERY DISEASE INVOLVING NATIVE CORONARY ARTERY OF NATIVE HEART WITH ANGINA PECTORIS (H): ICD-10-CM

## 2024-11-19 DIAGNOSIS — F32.A DEPRESSION, UNSPECIFIED DEPRESSION TYPE: ICD-10-CM

## 2024-11-19 DIAGNOSIS — I47.29 PAROXYSMAL VENTRICULAR TACHYCARDIA (H): ICD-10-CM

## 2024-11-19 DIAGNOSIS — R41.89 COGNITIVE IMPAIRMENT: Primary | ICD-10-CM

## 2024-11-19 DIAGNOSIS — Z86.39 HISTORY OF DIABETES MELLITUS: ICD-10-CM

## 2024-11-19 LAB
ANION GAP SERPL CALCULATED.3IONS-SCNC: 12 MMOL/L (ref 7–15)
BUN SERPL-MCNC: 13.5 MG/DL (ref 8–23)
CALCIUM SERPL-MCNC: 9 MG/DL (ref 8.8–10.4)
CHLORIDE SERPL-SCNC: 103 MMOL/L (ref 98–107)
CREAT SERPL-MCNC: 0.93 MG/DL (ref 0.67–1.17)
EGFRCR SERPLBLD CKD-EPI 2021: 89 ML/MIN/1.73M2
ERYTHROCYTE [DISTWIDTH] IN BLOOD BY AUTOMATED COUNT: 13.9 % (ref 10–15)
GLUCOSE SERPL-MCNC: 85 MG/DL (ref 70–99)
HCO3 SERPL-SCNC: 28 MMOL/L (ref 22–29)
HCT VFR BLD AUTO: 34.2 % (ref 40–53)
HGB BLD-MCNC: 11.1 G/DL (ref 13.3–17.7)
MCH RBC QN AUTO: 29 PG (ref 26.5–33)
MCHC RBC AUTO-ENTMCNC: 32.5 G/DL (ref 31.5–36.5)
MCV RBC AUTO: 89 FL (ref 78–100)
PLATELET # BLD AUTO: 209 10E3/UL (ref 150–450)
POTASSIUM SERPL-SCNC: 3.5 MMOL/L (ref 3.4–5.3)
RBC # BLD AUTO: 3.83 10E6/UL (ref 4.4–5.9)
SODIUM SERPL-SCNC: 143 MMOL/L (ref 135–145)
WBC # BLD AUTO: 16.8 10E3/UL (ref 4–11)

## 2024-11-19 PROCEDURE — 36415 COLL VENOUS BLD VENIPUNCTURE: CPT

## 2024-11-19 PROCEDURE — P9604 ONE-WAY ALLOW PRORATED TRIP: HCPCS

## 2024-11-19 PROCEDURE — 85018 HEMOGLOBIN: CPT

## 2024-11-19 PROCEDURE — 80048 BASIC METABOLIC PNL TOTAL CA: CPT

## 2024-11-19 RX ORDER — HYDROMORPHONE HYDROCHLORIDE 2 MG/1
2-4 TABLET ORAL EVERY 4 HOURS PRN
Qty: 16 TABLET | Refills: 0 | Status: SHIPPED | OUTPATIENT
Start: 2024-11-19 | End: 2024-11-19

## 2024-11-19 RX ORDER — ONDANSETRON 4 MG/1
4 TABLET, ORALLY DISINTEGRATING ORAL EVERY 8 HOURS PRN
Qty: 12 TABLET | Refills: 0 | Status: SHIPPED | OUTPATIENT
Start: 2024-11-19

## 2024-11-19 RX ORDER — HYDROMORPHONE HYDROCHLORIDE 2 MG/1
2-4 TABLET ORAL EVERY 6 HOURS PRN
Qty: 80 TABLET | Refills: 0 | Status: SHIPPED | OUTPATIENT
Start: 2024-11-19

## 2024-11-19 RX ORDER — METHOCARBAMOL 750 MG/1
750 TABLET, FILM COATED ORAL 4 TIMES DAILY
Qty: 120 TABLET | Refills: 0 | Status: SHIPPED | OUTPATIENT
Start: 2024-11-19

## 2024-11-19 RX ORDER — HYDROXYZINE HYDROCHLORIDE 25 MG/1
25 TABLET, FILM COATED ORAL 3 TIMES DAILY PRN
Qty: 60 TABLET | Refills: 0 | Status: SHIPPED | OUTPATIENT
Start: 2024-11-19

## 2024-11-19 RX ORDER — SOTALOL HYDROCHLORIDE 80 MG/1
80 TABLET ORAL 2 TIMES DAILY
Qty: 60 TABLET | Refills: 0 | Status: SHIPPED | OUTPATIENT
Start: 2024-11-19

## 2024-11-19 RX ORDER — AMOXICILLIN 250 MG
2 CAPSULE ORAL 2 TIMES DAILY
Qty: 120 TABLET | Refills: 0 | Status: SHIPPED | OUTPATIENT
Start: 2024-11-19

## 2024-11-19 RX ORDER — GABAPENTIN 100 MG/1
200 CAPSULE ORAL 3 TIMES DAILY
Qty: 180 CAPSULE | Refills: 0 | Status: SHIPPED | OUTPATIENT
Start: 2024-11-19

## 2024-11-19 RX ORDER — KETOCONAZOLE 20 MG/G
CREAM TOPICAL 2 TIMES DAILY
Qty: 30 G | Refills: 0 | Status: SHIPPED | OUTPATIENT
Start: 2024-11-19

## 2024-11-19 RX ORDER — NITROGLYCERIN 0.4 MG/1
TABLET SUBLINGUAL
Qty: 9 TABLET | Refills: 0 | Status: SHIPPED | OUTPATIENT
Start: 2024-11-19

## 2024-11-19 RX ORDER — METOPROLOL SUCCINATE 100 MG/1
100 TABLET, EXTENDED RELEASE ORAL 2 TIMES DAILY
Qty: 60 TABLET | Refills: 0 | Status: SHIPPED | OUTPATIENT
Start: 2024-11-19

## 2024-11-19 RX ORDER — ASPIRIN 81 MG/1
81 TABLET, CHEWABLE ORAL DAILY
Qty: 30 TABLET | Refills: 0 | Status: SHIPPED | OUTPATIENT
Start: 2024-11-19

## 2024-11-19 RX ORDER — HYDROMORPHONE HYDROCHLORIDE 2 MG/1
2-4 TABLET ORAL EVERY 4 HOURS PRN
Qty: 60 TABLET | Refills: 0 | Status: SHIPPED | OUTPATIENT
Start: 2024-11-19 | End: 2024-11-19

## 2024-11-19 RX ORDER — TRAZODONE HYDROCHLORIDE 100 MG/1
200 TABLET ORAL AT BEDTIME
Qty: 60 TABLET | Refills: 0 | Status: SHIPPED | OUTPATIENT
Start: 2024-11-19

## 2024-11-19 RX ORDER — DULOXETIN HYDROCHLORIDE 30 MG/1
CAPSULE, DELAYED RELEASE ORAL
Qty: 90 CAPSULE | Refills: 0 | Status: SHIPPED | OUTPATIENT
Start: 2024-11-19

## 2024-11-19 RX ORDER — POTASSIUM CHLORIDE 1500 MG/1
40 TABLET, EXTENDED RELEASE ORAL DAILY
Qty: 30 TABLET | Refills: 0 | Status: SHIPPED | OUTPATIENT
Start: 2024-11-19

## 2024-11-19 RX ORDER — ACETAMINOPHEN 325 MG/1
975 TABLET ORAL EVERY 6 HOURS
Qty: 360 TABLET | Refills: 0 | Status: SHIPPED | OUTPATIENT
Start: 2024-11-19

## 2024-11-19 RX ORDER — POLYETHYLENE GLYCOL 3350 17 G/17G
17 POWDER, FOR SOLUTION ORAL DAILY
Qty: 510 G | Refills: 0 | Status: SHIPPED | OUTPATIENT
Start: 2024-11-19

## 2024-11-19 RX ORDER — HYDROMORPHONE HYDROCHLORIDE 2 MG/1
2-4 TABLET ORAL EVERY 4 HOURS PRN
Qty: 30 TABLET | Refills: 0 | OUTPATIENT
Start: 2024-11-19

## 2024-11-19 RX ORDER — DIAPER,BRIEF,INFANT-TODD,DISP
1 EACH MISCELLANEOUS DAILY
Qty: 30 TABLET | Refills: 0 | Status: SHIPPED | OUTPATIENT
Start: 2024-11-19

## 2024-11-19 RX ORDER — FUROSEMIDE 40 MG/1
40 TABLET ORAL 2 TIMES DAILY
Qty: 60 TABLET | Refills: 0 | Status: SHIPPED | OUTPATIENT
Start: 2024-11-19

## 2024-11-19 RX ORDER — ROSUVASTATIN CALCIUM 40 MG/1
40 TABLET, COATED ORAL DAILY
Qty: 30 TABLET | Refills: 0 | Status: SHIPPED | OUTPATIENT
Start: 2024-11-19

## 2024-11-19 RX ORDER — BISACODYL 10 MG
10 SUPPOSITORY, RECTAL RECTAL DAILY PRN
Qty: 6 SUPPOSITORY | Refills: 0 | Status: SHIPPED | OUTPATIENT
Start: 2024-11-19

## 2024-11-19 RX ORDER — IBUPROFEN 400 MG/1
400 TABLET, FILM COATED ORAL 2 TIMES DAILY PRN
Qty: 28 TABLET | Refills: 0 | Status: SHIPPED | OUTPATIENT
Start: 2024-11-19 | End: 2024-12-03

## 2024-11-19 NOTE — PROGRESS NOTES
[facility] GERIATRICS DISCHARGE SUMMARY  PATIENT'S NAME: Onur Barr  YOB: 1955  MEDICAL RECORD NUMBER:  [id number]  Place of Service where encounter took place:  [facility]  (U) [[id number]]    PRIMARY CARE PROVIDER AND CLINIC RESPONSIBLE AFTER TRANSFER:   Roge Feliciano MD, [address] / [facility] [address]    Non-FMG Provider     Transferring providers: YAMILETH Melissa CNP, Cory Fontenot MD  Recent Hospitalization/ED:  [facility] stay 10/31/24 to 11/6/24.  Date of SNF Admission: November 06, 2024  Date of SNF (anticipated) Discharge: November 20, 2024  Discharged to: previous independent home  Cognitive Scores: SLUMS: 16/30 and CPT: 5.4/5.6  Physical Function: Wheelchair dependent and Pivot transfers  DME: Wheelchair    CODE STATUS/ADVANCE DIRECTIVES DISCUSSION:  Prior   ALLERGIES: Hydrocodone-acetaminophen    NURSING FACILITY COURSE   By chart review, patient was admitted to [facility] 10/31- 11/6/24 with an acute pathologic lumbar spine fracture. In ED, labs remarkable for WBC 30.9k, hgb 12.2, Cr 1.29. CXR was negative for acute cardiopulmonary findings. CT lumbar spine demonstrated a previous anterior and posterior bony fusion from L4-S1 and a recent appearing nondisplaced fracture at the mid L5 level, chronic compression deformities of L1, L2, L3, without high grade spinal canal stenosis. NSY and Pain team were consulted. MRI of the spine demonstrated acute marrow edema at L1 and L5, L5 fracture without displaced ligamentous injury or significant prevertebral edema or epidural fluid collection. Pain ws managed with tylenol, PRN oxycodone and IV dilaudid, cymbalta, PRN muscle relaxants, lidocaine. He was started on calcitriol for pain. Repeat CT 11/3 was stable. He was recommended TCU at discharge, admitted to current facility for ongoing medical management, rehab, nursing care.     In TCU, pain has been fairly  controlled. Patient is transferring with Jessica/CGA. Ambulating up to 35 ft with FWW CGA. Dressing Pranav upper body, Jessica supine lower body, toileting modA with therapy. Patient and family are facilitating discharge to home prior to LCD. Reviewed with IDT, per therapies patient has not been optimized but should be able transfer with spouse assist and wheelchair, have not completed stairs which patient must complete daily to access all ADLs. Patient has not been amendable to staying any longer than tomorrow; per CPT able to safely live independently and make his own decisions. Seen today up on the unit in TCU. Patient is agitated with nursing staff after waiting for cares and would like to leave today. He does not offer additional history. Per social work, [facility] home care is willing and able to follow patient at discharge.    Summary of nursing facility stay:   (S32.000A) Closed compression fracture of lumbosacral spine, initial encounter (H)  (primary encounter diagnosis)  (G89.4) Chronic pain syndrome  (F11.90) Chronic, continuous use of opioids  (R53.81) Physical deconditioning  Comment: chronic pain, history of lumbar spinal fusion in 1990s, indwelling intrathecal pump x 20+ years, replaced 4/2023. Now with acute pathologic fracture.   -methocarbamol was scheduled QID  -Increased dilaudid Q6>Q4 hours. Using 4-6 times daily in TCU. Will reduce at discharge with a short course of NSAID.   -completed calcitonin 11/15/24  Plan: Continue tylenol 975 mg Q4H, topical diclofenac QID, duloxetine 60 mg daily, gabapentin 200 mg TID, lidocaine patch. Update dilaudid to 2-4 mg Q6H PRN. Ibuprofen BID PRN x 14 days. Naloxone PRN. Zofran PRN for nausea. Home care PT/OT.      (N17.9) CAROLYN (acute kidney injury) (H)  (N18.2) Stage 2 chronic kidney disease  Comment: noted inpatient, improved prior to discharge  Creatinine   Date Value Ref Range Status   11/19/2024 0.93 0.67 - 1.17 mg/dL Final   Plan: ensure adequate hydration, limit  NSAIDs, avoid additional nephrotoxins. Renally dose medications as appropriate. Monitor BMP periodically      (C91.10) CLL (chronic lymphocytic leukemia) (H)  (D72.829) Leukocytosis, unspecified type  Comment: chronic, baseline WBC 16k+  WBC Count   Date Value Ref Range Status   11/19/2024 16.8 (H) 4.0 - 11.0 10e3/uL Final   Plan: monitor for s/sx infection, monitor CBC periodically, follow-up with oncology as directed     (I25.10) Coronary arteriosclerosis  (Z95.1) S/P CABG (coronary artery bypass graft)  Comment: chronic, s/p CRYSTAL to right RCA 2021, CABG x 1 1/2022  Plan: continue ASA, statin, SL NTG PRN, monitor symptoms     (I48.0) PAF (paroxysmal atrial fibrillation) (H)  Comment: chronic, with history of left atrial appendage closure procedure 1/2022. No longer on anticoagulation.  Plan: continue sotalol 80 mg BID, monitor HR, metoprolol  mg BID, ASA. Follow-up with cardiology as directed     (I50.32) Chronic diastolic heart failure (H)  (I47.29) Paroxysmal ventricular tachycardia (H)  Comment: s/p ICD placement 1/2022. TTE 3/2024 with LVEF 57%, mildly dilated aortic root, trace MR, trace TR  Plan: continue lasix 40 mg BID, BB, monitor weights, exam. Follow-up with cardiology as directed     (G47.33) MARLEEN (doesn't tolerate CPAP)  Comment: chronic  Plan: monitor SPO2 PRN     (Z98.84) History of Lora-en-Y gastric bypass  Comment: by history  Plan: RD to follow in TCU     (I10) Essential hypertension  Comment: chronic  BP Readings from Last 3 Encounters:   11/18/24 95/59   11/14/24 106/66   11/11/24 114/63   Plan: continue sotalol 80 mg BID, lasix 40 mg bid, monitor BP per facility protocol      (E78.5) Dyslipidemia  Comment: chronic  Plan: continue statin     (D64.9) Acute on chronic anemia  Comment: baseline hgb 12-14, hgb trended down during hospitalization  Hemoglobin   Date Value Ref Range Status   11/19/2024 11.1 (L) 13.3 - 17.7 g/dL Final   ]Plan: monitor for s/sx bleeding, repeat CBC 11/8, continue  PTA iron    (Z86.39) History of diabetes mellitus  Comment: by history, currently diet controlled  Plan: follow-up outpatient    (F32.A) Depression, unspecified depression type  (G47.00) Insomnia, unspecified type  Comment: chronic  Plan: continue duloxetine 30 mg daily, 60 mg evening, trazodone 200 mg at bedtime, monitor mood, symptoms. ACP PRN.     K59.01 Constipation  Comment: acute on chronic, opiates and immobility contributing  Plan: Bisacodyl supp today now, and give 1 daily PRN for no BM x 2 days. Continue miralax 17 g Bid and may take 17 G daily PRN, senna-s 2 tabs BID, monitor bowel habits per nursing    (R41.89) Cognitive impairment  (primary encounter diagnosis)  Comment: SLUMS 16/30 in Tcu consistent with dementia, has been disruptive in TCU with inappropriate language and can be verbally aggressive, accusatory. Per CPT, able to live independently safely. Spouse assists with medical care.   Plan: OT home care.       Discharge Medications:  MED REC REQUIRED  Post Medication Reconciliation Status: discharge medications reconciled and changed, per note/orders       Current Outpatient Medications   Medication Sig Dispense Refill    acetaminophen (TYLENOL) 325 MG tablet Take 3 tablets (975 mg) by mouth every 6 hours. 360 tablet 0    aspirin (ASA) 81 MG chewable tablet Take 1 tablet (81 mg) by mouth daily. 30 tablet 0    bisacodyl (DULCOLAX) 10 MG suppository Place 1 suppository (10 mg) rectally daily as needed for constipation. 6 suppository 0    [employer] IRON 240 (27 Fe) MG TABS Take 1 tablet by mouth daily. 30 tablet 0    diclofenac (VOLTAREN) 1 % topical gel Apply 2 g topically 4 times daily. 300 g 0    DULoxetine (CYMBALTA) 30 MG capsule Take 1 capsule (30mg) every morning and 2 capsules (60mg) every evening 90 capsule 0    furosemide (LASIX) 40 MG tablet Take 1 tablet (40 mg) by mouth 2 times daily. 60 tablet 0    gabapentin (NEURONTIN) 100 MG capsule Take 2 capsules (200 mg) by mouth 3 times daily. Hold  sedation, confusion 180 capsule 0    HYDROmorphone (DILAUDID) 2 MG tablet Take 1-2 tablets (2-4 mg) by mouth every 6 hours as needed for severe pain. Take 1-2 tablets (2-4 mg) by mouth every 4 hours as needed for pain (take 2 mg for pain 4-6/10 and take 4 mg for pain 7-10/10) 80 tablet 0    hydrOXYzine HCl (ATARAX) 25 MG tablet Take 1 tablet (25 mg) by mouth 3 times daily as needed for other (pain). 60 tablet 0    ibuprofen (ADVIL/MOTRIN) 400 MG tablet Take 1 tablet (400 mg) by mouth 2 times daily as needed for moderate pain. 28 tablet 0    ketoconazole (NIZORAL) 2 % external cream Apply topically 2 times daily. to affected area 30 g 0    methocarbamol (ROBAXIN) 750 MG tablet Take 1 tablet (750 mg) by mouth 4 times daily. 120 tablet 0    metoprolol succinate ER (TOPROL XL) 100 MG 24 hr tablet Take 1 tablet (100 mg) by mouth 2 times daily. 60 tablet 0    naloxone (NARCAN) 4 MG/0.1ML nasal spray Spray 1 spray (4 mg) into one nostril alternating nostrils as needed for opioid reversal. every 2-3 minutes until assistance arrives 2 each 2    nitroGLYcerin (NITROSTAT) 0.4 MG sublingual tablet For chest pain place 1 tablet under the tongue every 5 minutes for 3 doses. If symptoms persist 5 minutes after 1st dose call 911. 9 tablet 0    ondansetron (ZOFRAN ODT) 4 MG ODT tab Take 1 tablet (4 mg) by mouth every 8 hours as needed for nausea. 12 tablet 0    polyethylene glycol (MIRALAX) 17 GM/Dose powder Take 17 g by mouth daily. 510 g 0    potassium chloride isai ER (KLOR-CON M20) 20 MEQ CR tablet Take 2 tablets (40 mEq) by mouth daily. 30 tablet 0    rosuvastatin (CRESTOR) 40 MG tablet Take 1 tablet (40 mg) by mouth daily. 30 tablet 0    senna-docusate (SENOKOT-S/PERICOLACE) 8.6-50 MG tablet Take 2 tablets by mouth 2 times daily. Hold for loose stools. 120 tablet 0    sotalol (BETAPACE) 80 MG tablet Take 1 tablet (80 mg) by mouth 2 times daily. 60 tablet 0    traZODone (DESYREL) 100 MG tablet Take 2 tablets (200 mg) by mouth  at bedtime. TAKE 2 TABLETS (200MG TOTAL) BY MOUTH AT BEDTIME Strength: 100 mg 60 tablet 0    medication given by implanted intrathecal pump continuously. Updated by PharmD 10/31/24    Drug # 1: Morphine (Duramorph or Infumorph)  - Conc:20 mg/mL - Total Dose / 24 hours: 4.342 mg    Drug # 2: Clonidine (Duraclon)  - Conc:21.1 mcg/mL - Total Dose / 24 hours: 4.580 mcg    Drug # 3: Baclofen (Lioresal) - Conc: 42.5 mcg/mL - Total Dose / 24 hours: 9.226 mcg    Diluent: NS    Infusion Rate: 0.2171 mL/24 hrs  Pump Reservoir Volume: 40 mL  Outside Clinic & Provider: [facility] [phone]  Last Refill Date: 07/12/2024  Next Refill Date: 01/01/2025  Low [place] Alarm Date: 01/01/2025  Pump : Medtronic Synchromed II    Please consider consulting the pain team if the patient is admitted with an IT pump.             Past Medical History:   Past Medical History:   Diagnosis Date    Bariatric surgery status 06/23/2008    Formatting of this note might be different from the original. Created by Conversion    Bilateral leg edema 07/13/2021    Chronic Back Pain (IT Pump w Morphine, Clonidine, Baclofen) 01/16/2022    Chronic diastolic heart failure (H) 07/26/2021    Claudication of lower extremity (H) 11/20/2019    CLL (chronic lymphocytic leukemia) (H)     Coronary artery disease     CABG 1-    Depressive disorder     Essential hypertension     Created by [facility] Annotation: Apr 6 2012 10:16Zack Harris: Lisinipril,  coreg  Replacement Utility updated for latest IMO load    Gastroesophageal reflux disease without esophagitis 09/11/2021    H/O gastric bypass 2008    History of blood transfusion 2004    ICD (implantable cardioverter-defibrillator) in place 01/25/2022    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    Ischemic cardiomyopathy     Lumbago     Created by [facility] Annotation: Apr 6 2012 10:20Anh Ford:  Morphine pump     Mixed hyperlipidemia 06/23/2008    Morbid obesity (H) 08/01/2018    Nephrolithiasis     NSTEMI (non-ST elevated myocardial infarction) (H)     MARLEEN (doesn't tolerate CPAP) 07/26/2021    Osteoarthritis     Created by [facility] Annotation: Jun 26 2009  [id number] - Zack Gutierrez: failed lumbar  fusion/morphine pump dr putnam  Replacement Utility updated for latest IMO load    Paroxysmal atrial fib -- S/P Pulm Vein Ablation 1/19/22 09/11/2021    Formatting of this note might be different from the original. Created by Conversion    Paroxysmal ventricular tachycardia (H)     dual chamber ICD 1/24/2022    Peripheral vascular disease (H) 05/03/2022    Post-laminectomy syndrome 11/25/2015    S/P CABG (coronary artery bypass graft) 01/25/2022    Type 2 diabetes mellitus (H)     Diet controlled after Gastric Bypass in 2008     Physical Exam:   Vitals: Ht 1.829 m (6')   Wt 124.7 kg (275 lb)   BMI 37.30 kg/m    BMI: Body mass index is 37.3 kg/m .  GENERAL APPEARANCE:  Alert  RESP:  respiratory effort and palpation of chest normal, lungs clear to auscultation , no respiratory distress  CV:  no edema  ABDOMEN:  abdomen soft, obese  M/S:   Gait and station abnormal transfers with assist, wheelchair for mobility  SKIN:  Inspection of skin and subcutaneous tissue baseline, Palpation of skin and subcutaneous tissue baseline  NEURO:   hilton freely  PSYCH:  memory impaired , irritable     SNF labs: Labs done in SNF are in [facility]. Please refer to them using [facility] Everywhere. and Recent labs in [facility] reviewed by me today.     DISCHARGE PLAN:  Follow up labs: No labs orders/due  Medical Follow Up:      Follow up with primary care provider in 1 week   Follow up with pain clinic 1/1 as scheduled, sooner if able   Follow up with neurosurgery 11/26  Current [place] scheduled appointments:  Appointments in Next Year      Nov 26, 2024 1:30 PM  (Arrive by 1:15 PM)  Return Adult Neurosurg with Coleen Brewster  APRN CNP  [facility] [place] ([facility] ) [phone]     Dec 04, 2024 9:20 AM  (Arrive by 9:05 AM)  CT LUMBAR SPINE W/O CONTRAST with RSCCCT1  [facility] ([facility] ) [phone]     Dec 20, 2024 10:00 AM  (Arrive by 9:55 AM)  Return Cardiology with Liz Pearl MD  [facility] ([facility] ) [phone]           Discharge Services: Home Care:  Occupational Therapy, Physical Therapy, Registered Nurse, and Home Health Aide  Discharge Instructions :   Continue to follow your diet:  regular.   Notify your primary care provider if you have a fever greater than 100.5 degrees.   DO NOT DRIVE while taking narcotic pain medications.   Driving is not recommended until cleared by your providers to resume.   Compression sleeves on AM/off PM  Weigh yourself twice weekly. Notify your provider if you have weight gain >2 lbs/day or >5 lbs/week    TOTAL DISCHARGE TIME:   Greater than 30 minutes  Electronically signed by:  YAMILETH Melissa CNP     Home care Face to Face documentation done in EPIC attached to Home care orders for [facility].     [facility] GERIATRICS  Face to Face and Medical Necessity Statement for DME Provider visit    Patient: Onur Barr  Gender: male  YOB: 1955  [place] Medical Record Number: [id number]  Demographics:  [address] [place] [id number]  [phone] (home)   Social Security Number: [id number]  Primary Care Provider: Roge Feliciano  Insurance: Payor: BCBS / Plan: BCBS FEDERAL EMPLOYEE PROGRAM / Product Type: PPO /     HPI: Onur Barr is a 69 year old (1955), who is being seen today for a face to face provider visit at [facility]  ([facility]) [[id number]]. Medical necessity statement for DME included.     This patient requires the following:  DME Ordered and Medical Necessity Statement   Wheelchair    Wheelchair Documentation  Size: 22 x 18  Corresponding cushion: Yes: for skin protection  Standard foot rests: Yes  Elevating leg rests: Yes  Arm rests: No  Lap tray: No  Dose the patient use oxygen? No     Patient requires a wheelchair due to mobility limitations and standing tolerance impairment due to pain, imbalance and weakness related to spinal fractures and stenosis. Patient is able to propel a wheelchair. Patient's mobility limitations impair participation in mrADLs. Patient's home is suitable for wheelchair use. Mobility limitations cannot be resolved with a cane or walker due to weakness and imbalance with risk for falls. Manual wheelchair will improve participation in mrADLs. Patient is willing to use a wheelchair and has adequate upper body strength and cognitive capacity to use a wheelchair.     Reason for Type of Wheelchair  Patient weight:   Wt Readings from Last 2 Encounters:   11/19/24 124.7 kg (275 lb)   11/18/24 125.1 kg (275 lb 12.8 oz)   Heavy Duty Wheelchair: Patient is over the 250lb weight limit for other wheelchairs.      Pt needing above DME with expected length of need of 6  months due to medical necessity associated with following diagnosis:     Cognitive impairment  Compression fracture of L5 vertebra with routine healing, subsequent encounter  Non-traumatic compression fracture of L1 lumbar vertebra with routine healing, subsequent encounter  Chronic pain syndrome  Chronic, continuous use of opioids  Coronary artery disease involving native coronary artery of native heart without angina pectoris  S/P CABG (coronary artery bypass graft)  PAD (peripheral artery disease) (H)  Chronic diastolic heart failure (H)  ICD (implantable cardioverter-defibrillator) in place  Essential hypertension  Dyslipidemia  Stage 2 chronic kidney disease  History of diabetes mellitus  Depression, unspecified depression type  Insomnia, unspecified  type  Slow transit constipation  Closed compression fracture of lumbosacral spine, initial encounter (H)  Anemia, unspecified type  Chronic systolic congestive heart failure (H)  Candidal intertrigo  Coronary artery disease involving native coronary artery of native heart with angina pectoris (H)  Nausea and vomiting, unspecified vomiting type  Paroxysmal ventricular tachycardia (H)  Hypokalemia      Past Medical History:   has a past medical history of Bariatric surgery status (06/23/2008), Bilateral leg edema (07/13/2021), Chronic Back Pain (IT Pump w Morphine, Clonidine, Baclofen) (01/16/2022), Chronic diastolic heart failure (H) (07/26/2021), Claudication of lower extremity (H) (11/20/2019), CLL (chronic lymphocytic leukemia) (H), Coronary artery disease, Depressive disorder, Essential hypertension, Gastroesophageal reflux disease without esophagitis (09/11/2021), H/O gastric bypass (2008), History of blood transfusion (2004), ICD (implantable cardioverter-defibrillator) in place (01/25/2022), Intestinal disaccharidase deficiencies and disaccharide malabsorption (06/23/2008), Ischemic cardiomyopathy, Lumbago, Mixed hyperlipidemia (06/23/2008), Morbid obesity (H) (08/01/2018), Nephrolithiasis, NSTEMI (non-ST elevated myocardial infarction) (H), MARLEEN (doesn't tolerate CPAP) (07/26/2021), Osteoarthritis, Paroxysmal atrial fib -- S/P Pulm Vein Ablation 1/19/22 (09/11/2021), Paroxysmal ventricular tachycardia (H), Peripheral vascular disease (H) (05/03/2022), Post-laminectomy syndrome (11/25/2015), S/P CABG (coronary artery bypass graft) (01/25/2022), and Type 2 diabetes mellitus (H).    He has no past medical history of Cancer (H), Cerebral infarction (H), COPD (chronic obstructive pulmonary disease) (H), Thyroid disease, or Uncomplicated asthma.    Review of Systems:  As above    Exam:  Vitals: Ht 1.829 m (6')   Wt 124.7 kg (275 lb)   BMI 37.30 kg/m    BMI: Body mass index is 37.3 kg/m .  As  above    Assessment/Plan:  1. Cognitive impairment    2. Compression fracture of L5 vertebra with routine healing, subsequent encounter    3. Non-traumatic compression fracture of L1 lumbar vertebra with routine healing, subsequent encounter    4. Chronic pain syndrome    5. Chronic, continuous use of opioids    6. Coronary artery disease involving native coronary artery of native heart without angina pectoris    7. S/P CABG (coronary artery bypass graft)    8. PAD (peripheral artery disease) (H)    9. Chronic diastolic heart failure (H)    10. ICD (implantable cardioverter-defibrillator) in place    11. Essential hypertension    12. Dyslipidemia    13. Stage 2 chronic kidney disease    14. History of diabetes mellitus    15. Depression, unspecified depression type    16. Insomnia, unspecified type    17. Slow transit constipation    18. Closed compression fracture of lumbosacral spine, initial encounter (H)    19. Anemia, unspecified type    20. Chronic systolic congestive heart failure (H)    21. Candidal intertrigo    22. Coronary artery disease involving native coronary artery of native heart with angina pectoris (H)    23. Nausea and vomiting, unspecified vomiting type    24. Paroxysmal ventricular tachycardia (H)    25. Hypokalemia        Orders:  1. Facility staff/TC to contact DME company to get their order form for provider to fill out    ELECTRONICALLY SIGNED BY [place] CERTIFIED PROVIDER: YAMILETH Melissa CNP   NPI: [id number]  [facility]  [address] [place] [address]

## 2024-11-19 NOTE — PATIENT INSTRUCTIONS
Onur Barr  YOB: 1955                                 SSN: [id number]  [facility] MRN#:[id number]  Medical Center Admission Date: 11/6/24 Anticipated Discharge Date: 11/20/24  PHYSICIAN's DISCHARGE SUMMARY / ORDER SHEET  1. Patient-driven discharge to: previous independent home with spouse  2. Medications:      Current Outpatient Medications   Medication Sig Dispense Refill    acetaminophen (TYLENOL) 325 MG tablet Take 3 tablets (975 mg) by mouth every 6 hours. 360 tablet 0    aspirin (ASA) 81 MG chewable tablet Take 1 tablet (81 mg) by mouth daily. 30 tablet 0    bisacodyl (DULCOLAX) 10 MG suppository Place 1 suppository (10 mg) rectally daily as needed for constipation. 6 suppository 0    [facility] IRON 240 (27 Fe) MG TABS Take 1 tablet by mouth daily. 30 tablet 0    diclofenac (VOLTAREN) 1 % topical gel Apply 2 g topically 4 times daily. 300 g 0    DULoxetine (CYMBALTA) 30 MG capsule Take 1 capsule (30mg) every morning and 2 capsules (60mg) every evening 90 capsule 0    furosemide (LASIX) 40 MG tablet Take 1 tablet (40 mg) by mouth 2 times daily. 60 tablet 0    gabapentin (NEURONTIN) 100 MG capsule Take 2 capsules (200 mg) by mouth 3 times daily. Hold sedation, confusion 180 capsule 0    HYDROmorphone (DILAUDID) 2 MG tablet Take 1-2 tablets (2-4 mg) by mouth every 6 hours as needed for severe pain. Take 1-2 tablets (2-4 mg) by mouth every 4 hours as needed for pain (take 2 mg for pain 4-6/10 and take 4 mg for pain 7-10/10) 80 tablet 0    hydrOXYzine HCl (ATARAX) 25 MG tablet Take 1 tablet (25 mg) by mouth 3 times daily as needed for other (pain). 60 tablet 0    ibuprofen (ADVIL/MOTRIN) 400 MG tablet Take 1 tablet (400 mg) by mouth 2 times daily as needed for moderate pain. 28 tablet 0    ketoconazole (NIZORAL) 2 % external cream Apply topically 2 times daily. to affected area 30 g 0    methocarbamol (ROBAXIN) 750 MG tablet Take 1 tablet (750 mg) by mouth 4 times daily. 120 tablet 0    metoprolol succinate  ER (TOPROL XL) 100 MG 24 hr tablet Take 1 tablet (100 mg) by mouth 2 times daily. 60 tablet 0    naloxone (NARCAN) 4 MG/0.1ML nasal spray Spray 1 spray (4 mg) into one nostril alternating nostrils as needed for opioid reversal. every 2-3 minutes until assistance arrives 2 each 2    nitroGLYcerin (NITROSTAT) 0.4 MG sublingual tablet For chest pain place 1 tablet under the tongue every 5 minutes for 3 doses. If symptoms persist 5 minutes after 1st dose call 911. 9 tablet 0    ondansetron (ZOFRAN ODT) 4 MG ODT tab Take 1 tablet (4 mg) by mouth every 8 hours as needed for nausea. 12 tablet 0    polyethylene glycol (MIRALAX) 17 GM/Dose powder Take 17 g by mouth daily. 510 g 0    potassium chloride isai ER (KLOR-CON M20) 20 MEQ CR tablet Take 2 tablets (40 mEq) by mouth daily. 30 tablet 0    rosuvastatin (CRESTOR) 40 MG tablet Take 1 tablet (40 mg) by mouth daily. 30 tablet 0    senna-docusate (SENOKOT-S/PERICOLACE) 8.6-50 MG tablet Take 2 tablets by mouth 2 times daily. Hold for loose stools. 120 tablet 0    sotalol (BETAPACE) 80 MG tablet Take 1 tablet (80 mg) by mouth 2 times daily. 60 tablet 0    traZODone (DESYREL) 100 MG tablet Take 2 tablets (200 mg) by mouth at bedtime. TAKE 2 TABLETS (200MG TOTAL) BY MOUTH AT BEDTIME Strength: 100 mg 60 tablet 0    medication given by implanted intrathecal pump continuously. Updated by PharmD 10/31/24    Drug # 1: Morphine (Duramorph or Infumorph)  - Conc:20 mg/mL - Total Dose / 24 hours: 4.342 mg    Drug # 2: Clonidine (Duraclon)  - Conc:21.1 mcg/mL - Total Dose / 24 hours: 4.580 mcg    Drug # 3: Baclofen (Lioresal) - Conc: 42.5 mcg/mL - Total Dose / 24 hours: 9.226 mcg    Diluent: NS    Infusion Rate: 0.2171 mL/24 hrs  Pump Reservoir Volume: 40 mL  Outside Clinic & Provider: [facility] [phone]  Last Refill Date: 07/12/2024  Next Refill Date: 01/01/2025  Low [place] Alarm Date: 01/01/2025  Pump : Medtronic Synchromed II    Please consider  consulting the pain team if the patient is admitted with an IT pump.        DISCHARGE PLAN:  Follow up labs: No labs orders/due  Medical Follow Up:      Follow up with primary care provider in 1 week   Follow up with pain clinic 1/1 as scheduled, sooner if able   Follow up with neurosurgery 11/26  [facility] scheduled appointments:  Appointments in Next Year      Nov 26, 2024 1:30 PM  (Arrive by 1:15 PM)  Return Adult Neurosurg with YAMILETH Krishnan CNP  [facility] ([facility] ) [phone]     Dec 04, 2024 9:20 AM  (Arrive by 9:05 AM)  CT LUMBAR SPINE W/O CONTRAST with RSCCCT1  [facility] ([facility] ) [phone]     Dec 20, 2024 10:00 AM  (Arrive by 9:55 AM)  Return Cardiology with Liz Pearl MD  [facility] [place] ([facility] ) [phone]           Discharge Services: Home Care:  Occupational Therapy, Physical Therapy, Registered Nurse, and Home Health Aide  Discharge Instructions :   Continue to follow your diet:  regular.   Notify your primary care provider if you have a fever greater than 100.5 degrees.   DO NOT DRIVE while taking narcotic pain medications.   Driving is not recommended until cleared by your providers to resume.   Compression sleeves on AM/off PM  Weigh yourself twice weekly. Notify your provider if you have weight gain >2 lbs/day or >5 lbs/week    Discharge patient to home with current medications and treatments  Discharge Home with Home care as above  - Patient's prescriptions were e-prescribed to his home pharmacy  - please send home with original of these discharge instructions and copy for chart.       ______________________________  Electronically signed:  YAMILETH Melissa CNP   Department [facility]                   11/19/2024

## 2024-11-19 NOTE — LETTER
11/19/2024      Onur Barr  [address] [facility] [address]        [facility] GERIATRICS DISCHARGE SUMMARY  PATIENT'S NAME: Onur Barr  YOB: 1955  MEDICAL RECORD NUMBER:  [id number]  Place of Service where encounter took place:  [facility]  (TCU) [[id number]]    PRIMARY CARE PROVIDER AND CLINIC RESPONSIBLE AFTER TRANSFER:   Roge Feliciano MD, [address] / [facility] [address]    Non-FMG Provider     Transferring providers: YAMILETH Melissa CNP, Cory Fontenot MD  Recent Hospitalization/ED:  [facility] Hospital stay 10/31/24 to 11/6/24.  Date of SNF Admission: November 06, 2024  Date of SNF (anticipated) Discharge: November 20, 2024  Discharged to: previous independent home  Cognitive Scores: SLUMS: 16/30 and CPT: 5.4/5.6  Physical Function: Wheelchair dependent and Pivot transfers  DME: Wheelchair    CODE STATUS/ADVANCE DIRECTIVES DISCUSSION:  Prior   ALLERGIES: Hydrocodone-acetaminophen    NURSING FACILITY COURSE   By chart review, patient was admitted to [facility] 10/31- 11/6/24 with an acute pathologic lumbar spine fracture. In ED, labs remarkable for WBC 30.9k, hgb 12.2, Cr 1.29. CXR was negative for acute cardiopulmonary findings. CT lumbar spine demonstrated a previous anterior and posterior bony fusion from L4-S1 and a recent appearing nondisplaced fracture at the mid L5 level, chronic compression deformities of L1, L2, L3, without high grade spinal canal stenosis. NSY and Pain team were consulted. MRI of the spine demonstrated acute marrow edema at L1 and L5, L5 fracture without displaced ligamentous injury or significant prevertebral edema or epidural fluid collection. Pain ws managed with tylenol, PRN oxycodone and IV dilaudid, cymbalta, PRN muscle relaxants, lidocaine. He was started on calcitriol for pain. Repeat CT 11/3 was stable. He was recommended TCU at discharge, admitted to current facility for ongoing  medical management, rehab, nursing care.     In TCU, pain has been fairly controlled. Patient is transferring with Jessica/CGA. Ambulating up to 35 ft with FWW CGA. Dressing Pranav upper body, Jessica supine lower body, toileting modA with therapy. Patient and family are facilitating discharge to home prior to LCD. Reviewed with IDT, per therapies patient has not been optimized but should be able transfer with spouse assist and wheelchair, have not completed stairs which patient must complete daily to access all ADLs. Patient has not been amendable to staying any longer than tomorrow; per CPT able to safely live independently and make his own decisions. Seen today up on the unit in TCU. Patient is agitated with nursing staff after waiting for cares and would like to leave today. He does not offer additional history. Per social work, [facility] is willing and able to follow patient at discharge.    Summary of nursing facility stay:   (S32.000A) Closed compression fracture of lumbosacral spine, initial encounter (H)  (primary encounter diagnosis)  (G89.4) Chronic pain syndrome  (F11.90) Chronic, continuous use of opioids  (R53.81) Physical deconditioning  Comment: chronic pain, history of lumbar spinal fusion in 1990s, indwelling intrathecal pump x 20+ years, replaced 4/2023. Now with acute pathologic fracture.   -methocarbamol was scheduled QID  -Increased dilaudid Q6>Q4 hours. Using 4-6 times daily in TCU. Will reduce at discharge with a short course of NSAID.   -completed calcitonin 11/15/24  Plan: Continue tylenol 975 mg Q4H, topical diclofenac QID, duloxetine 60 mg daily, gabapentin 200 mg TID, lidocaine patch. Update dilaudid to 2-4 mg Q6H PRN. Ibuprofen BID PRN x 14 days. Naloxone PRN. Zofran PRN for nausea. Home care PT/OT.      (N17.9) CAROLYN (acute kidney injury) (H)  (N18.2) Stage 2 chronic kidney disease  Comment: noted inpatient, improved prior to discharge  Creatinine   Date Value Ref Range Status   11/19/2024  0.93 0.67 - 1.17 mg/dL Final   Plan: ensure adequate hydration, limit NSAIDs, avoid additional nephrotoxins. Renally dose medications as appropriate. Monitor BMP periodically      (C91.10) CLL (chronic lymphocytic leukemia) (H)  (D72.829) Leukocytosis, unspecified type  Comment: chronic, baseline WBC 16k+  WBC Count   Date Value Ref Range Status   11/19/2024 16.8 (H) 4.0 - 11.0 10e3/uL Final   Plan: monitor for s/sx infection, monitor CBC periodically, follow-up with oncology as directed     (I25.10) Coronary arteriosclerosis  (Z95.1) S/P CABG (coronary artery bypass graft)  Comment: chronic, s/p CRYSTAL to right RCA 2021, CABG x 1 1/2022  Plan: continue ASA, statin, SL NTG PRN, monitor symptoms     (I48.0) PAF (paroxysmal atrial fibrillation) (H)  Comment: chronic, with history of left atrial appendage closure procedure 1/2022. No longer on anticoagulation.  Plan: continue sotalol 80 mg BID, monitor HR, metoprolol  mg BID, ASA. Follow-up with cardiology as directed     (I50.32) Chronic diastolic heart failure (H)  (I47.29) Paroxysmal ventricular tachycardia (H)  Comment: s/p ICD placement 1/2022. TTE 3/2024 with LVEF 57%, mildly dilated aortic root, trace MR, trace TR  Plan: continue lasix 40 mg BID, BB, monitor weights, exam. Follow-up with cardiology as directed     (G47.33) MARLEEN (doesn't tolerate CPAP)  Comment: chronic  Plan: monitor SPO2 PRN     (Z98.84) History of Lora-en-Y gastric bypass  Comment: by history  Plan: RD to follow in TCU     (I10) Essential hypertension  Comment: chronic  BP Readings from Last 3 Encounters:   11/18/24 95/59   11/14/24 106/66   11/11/24 114/63   Plan: continue sotalol 80 mg BID, lasix 40 mg bid, monitor BP per facility protocol      (E78.5) Dyslipidemia  Comment: chronic  Plan: continue statin     (D64.9) Acute on chronic anemia  Comment: baseline hgb 12-14, hgb trended down during hospitalization  Hemoglobin   Date Value Ref Range Status   11/19/2024 11.1 (L) 13.3 - 17.7 g/dL  Final   ]Plan: monitor for s/sx bleeding, repeat CBC 11/8, continue PTA iron    (Z86.39) History of diabetes mellitus  Comment: by history, currently diet controlled  Plan: follow-up outpatient    (F32.A) Depression, unspecified depression type  (G47.00) Insomnia, unspecified type  Comment: chronic  Plan: continue duloxetine 30 mg daily, 60 mg evening, trazodone 200 mg at bedtime, monitor mood, symptoms. ACP PRN.     K59.01 Constipation  Comment: acute on chronic, opiates and immobility contributing  Plan: Bisacodyl supp today now, and give 1 daily PRN for no BM x 2 days. Continue miralax 17 g Bid and may take 17 G daily PRN, senna-s 2 tabs BID, monitor bowel habits per nursing    (R41.89) Cognitive impairment  (primary encounter diagnosis)  Comment: SLUMS 16/30 in Tcu consistent with dementia, has been disruptive in TCU with inappropriate language and can be verbally aggressive, accusatory. Per CPT, able to live independently safely. Spouse assists with medical care.   Plan: OT home care.       Discharge Medications:  MED REC REQUIRED  Post Medication Reconciliation Status: discharge medications reconciled and changed, per note/orders       Current Outpatient Medications   Medication Sig Dispense Refill     acetaminophen (TYLENOL) 325 MG tablet Take 3 tablets (975 mg) by mouth every 6 hours. 360 tablet 0     aspirin (ASA) 81 MG chewable tablet Take 1 tablet (81 mg) by mouth daily. 30 tablet 0     bisacodyl (DULCOLAX) 10 MG suppository Place 1 suppository (10 mg) rectally daily as needed for constipation. 6 suppository 0     [employer] IRON 240 (27 Fe) MG TABS Take 1 tablet by mouth daily. 30 tablet 0     diclofenac (VOLTAREN) 1 % topical gel Apply 2 g topically 4 times daily. 300 g 0     DULoxetine (CYMBALTA) 30 MG capsule Take 1 capsule (30mg) every morning and 2 capsules (60mg) every evening 90 capsule 0     furosemide (LASIX) 40 MG tablet Take 1 tablet (40 mg) by mouth 2 times daily. 60 tablet 0     gabapentin  (NEURONTIN) 100 MG capsule Take 2 capsules (200 mg) by mouth 3 times daily. Hold sedation, confusion 180 capsule 0     HYDROmorphone (DILAUDID) 2 MG tablet Take 1-2 tablets (2-4 mg) by mouth every 6 hours as needed for severe pain. Take 1-2 tablets (2-4 mg) by mouth every 4 hours as needed for pain (take 2 mg for pain 4-6/10 and take 4 mg for pain 7-10/10) 80 tablet 0     hydrOXYzine HCl (ATARAX) 25 MG tablet Take 1 tablet (25 mg) by mouth 3 times daily as needed for other (pain). 60 tablet 0     ibuprofen (ADVIL/MOTRIN) 400 MG tablet Take 1 tablet (400 mg) by mouth 2 times daily as needed for moderate pain. 28 tablet 0     ketoconazole (NIZORAL) 2 % external cream Apply topically 2 times daily. to affected area 30 g 0     methocarbamol (ROBAXIN) 750 MG tablet Take 1 tablet (750 mg) by mouth 4 times daily. 120 tablet 0     metoprolol succinate ER (TOPROL XL) 100 MG 24 hr tablet Take 1 tablet (100 mg) by mouth 2 times daily. 60 tablet 0     naloxone (NARCAN) 4 MG/0.1ML nasal spray Spray 1 spray (4 mg) into one nostril alternating nostrils as needed for opioid reversal. every 2-3 minutes until assistance arrives 2 each 2     nitroGLYcerin (NITROSTAT) 0.4 MG sublingual tablet For chest pain place 1 tablet under the tongue every 5 minutes for 3 doses. If symptoms persist 5 minutes after 1st dose call 911. 9 tablet 0     ondansetron (ZOFRAN ODT) 4 MG ODT tab Take 1 tablet (4 mg) by mouth every 8 hours as needed for nausea. 12 tablet 0     polyethylene glycol (MIRALAX) 17 GM/Dose powder Take 17 g by mouth daily. 510 g 0     potassium chloride isai ER (KLOR-CON M20) 20 MEQ CR tablet Take 2 tablets (40 mEq) by mouth daily. 30 tablet 0     rosuvastatin (CRESTOR) 40 MG tablet Take 1 tablet (40 mg) by mouth daily. 30 tablet 0     senna-docusate (SENOKOT-S/PERICOLACE) 8.6-50 MG tablet Take 2 tablets by mouth 2 times daily. Hold for loose stools. 120 tablet 0     sotalol (BETAPACE) 80 MG tablet Take 1 tablet (80 mg) by mouth 2  times daily. 60 tablet 0     traZODone (DESYREL) 100 MG tablet Take 2 tablets (200 mg) by mouth at bedtime. TAKE 2 TABLETS (200MG TOTAL) BY MOUTH AT BEDTIME Strength: 100 mg 60 tablet 0     medication given by implanted intrathecal pump continuously. Updated by PharmD 10/31/24    Drug # 1: Morphine (Duramorph or Infumorph)  - Conc:20 mg/mL - Total Dose / 24 hours: 4.342 mg    Drug # 2: Clonidine (Duraclon)  - Conc:21.1 mcg/mL - Total Dose / 24 hours: 4.580 mcg    Drug # 3: Baclofen (Lioresal) - Conc: 42.5 mcg/mL - Total Dose / 24 hours: 9.226 mcg    Diluent: NS    Infusion Rate: 0.2171 mL/24 hrs  Pump Reservoir Volume: 40 mL  Outside Clinic & Provider: [facility] [phone]  Last Refill Date: 07/12/2024  Next Refill Date: 01/01/2025  Low [place] Alarm Date: 01/01/2025  Pump : Medtronic Synchromed II    Please consider consulting the pain team if the patient is admitted with an IT pump.             Past Medical History:   Past Medical History:   Diagnosis Date     Bariatric surgery status 06/23/2008    Formatting of this note might be different from the original. Created by Conversion     Bilateral leg edema 07/13/2021     Chronic Back Pain (IT Pump w Morphine, Clonidine, Baclofen) 01/16/2022     Chronic diastolic heart failure (H) 07/26/2021     Claudication of lower extremity (H) 11/20/2019     CLL (chronic lymphocytic leukemia) (H)      Coronary artery disease     CABG 1-     Depressive disorder      Essential hypertension     Created by [facility] Annotation: Apr 6 2012 10:16Zack Harris: Lisinipril,  coreg  Replacement Utility updated for latest IMO load     Gastroesophageal reflux disease without esophagitis 09/11/2021     H/O gastric bypass 2008     History of blood transfusion 2004     ICD (implantable cardioverter-defibrillator) in place 01/25/2022     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     Ischemic  cardiomyopathy      Lumbago     Created by [facility] Annotation: Apr 6 2012 10:20AM - Anh Dickson: Morphine pump      Mixed hyperlipidemia 06/23/2008     Morbid obesity (H) 08/01/2018     Nephrolithiasis      NSTEMI (non-ST elevated myocardial infarction) (H)      MARLEEN (doesn't tolerate CPAP) 07/26/2021     Osteoarthritis     Created by [facility] Annotation: Jun 26 2009  9:53AM - Zack Gutierrez: failed lumbar  fusion/morphine pump dr putnam  Replacement Utility updated for latest IMO load     Paroxysmal atrial fib -- S/P Pulm Vein Ablation 1/19/22 09/11/2021    Formatting of this note might be different from the original. Created by Conversion     Paroxysmal ventricular tachycardia (H)     dual chamber ICD 1/24/2022     Peripheral vascular disease (H) 05/03/2022     Post-laminectomy syndrome 11/25/2015     S/P CABG (coronary artery bypass graft) 01/25/2022     Type 2 diabetes mellitus (H)     Diet controlled after Gastric Bypass in 2008     Physical Exam:   Vitals: Ht 1.829 m (6')   Wt 124.7 kg (275 lb)   BMI 37.30 kg/m    BMI: Body mass index is 37.3 kg/m .  GENERAL APPEARANCE:  Alert  RESP:  respiratory effort and palpation of chest normal, lungs clear to auscultation , no respiratory distress  CV:  no edema  ABDOMEN:  abdomen soft, obese  M/S:   Gait and station abnormal transfers with assist, wheelchair for mobility  SKIN:  Inspection of skin and subcutaneous tissue baseline, Palpation of skin and subcutaneous tissue baseline  NEURO:   hilton freely  PSYCH:  memory impaired , irritable     SNF labs: Labs done in SNF are in [facility]. Please refer to them using [facility] Everywhere. and Recent labs in EPIC reviewed by me today.     DISCHARGE PLAN:  Follow up labs: No labs orders/due  Medical Follow Up:      Follow up with primary care provider in 1 week   Follow up with pain clinic 1/1 as scheduled, sooner if able   Follow up with neurosurgery 11/26  Current [place] scheduled  appointments:  Appointments in Next Year      Nov 26, 2024 1:30 PM  (Arrive by 1:15 PM)  Return Adult Neurosurg with YAMILETH Krishnan CNP  [facility] Neurology [facility] ([facility] ) [phone]     Dec 04, 2024 9:20 AM  (Arrive by 9:05 AM)  CT LUMBAR SPINE W/O CONTRAST with RSCCCT1  [facility] ([facility] ) [phone]     Dec 20, 2024 10:00 AM  (Arrive by 9:55 AM)  Return Cardiology with Liz Pearl MD  [facility] ([facility] ) [phone]           Discharge Services: Home Care:  Occupational Therapy, Physical Therapy, Registered Nurse, and Home Health Aide  Discharge Instructions :   Continue to follow your diet:  regular.   Notify your primary care provider if you have a fever greater than 100.5 degrees.   DO NOT DRIVE while taking narcotic pain medications.   Driving is not recommended until cleared by your providers to resume.   Compression sleeves on AM/off PM  Weigh yourself twice weekly. Notify your provider if you have weight gain >2 lbs/day or >5 lbs/week    TOTAL DISCHARGE TIME:   Greater than 30 minutes  Electronically signed by:  YAMILETH Melissa CNP     Home care Face to Face documentation done in EPIC attached to Home care orders for [facility].     [facility] GERIATRICS  Face to Face and Medical Necessity Statement for DME Provider visit    Patient: Onur Barr  Gender: male  YOB: 1955  [place] Medical Record Number: [id number]  Demographics:  [address] [facility] [address]  [phone] (home)   Social Security Number: [id number]  Primary Care Provider: Roge Feliciano  Insurance: Payor: [facility] / Plan: [employer] [facility] FEDERAL [employer]OYEE PROGRAM / Product Type: PPO /     HPI: Onur Barr is a 69 year old (1955), who is being seen today for a face to face provider visit  at [facility]  ([facility]) [[id number]]. Medical necessity statement for DME included.     This patient requires the following: DME Ordered and Medical Necessity Statement   Wheelchair    Wheelchair Documentation  Size: 20 x 16  Corresponding cushion: Yes: for skin protection  Standard foot rests: Yes  Elevating leg rests: Yes  Arm rests: No  Lap tray: No  Dose the patient use oxygen? No     Patient requires a wheelchair due to mobility limitations, pain, imbalance and weakness related to spinal fractures. Patient is able to propel a wheelchair. Patient's mobility limitations impair participation in mrADLs. Patient's home is suitable for wheelchair use. Mobility limitations cannot be resolved with a cane or walker due to weakness and imbalance with risk for falls. Manual wheelchair will improve participation in mrADLs. Patient is willing to use a wheelchair and has adequate upper body strength and cognitive capacity to use a wheelchair.     Reason for Type of Wheelchair  Patient weight:   Wt Readings from Last 2 Encounters:   11/19/24 124.7 kg (275 lb)   11/18/24 125.1 kg (275 lb 12.8 oz)   Heavy Duty Wheelchair: Patient is over the 250lb weight limit for other wheelchairs.      Pt needing above DME with expected length of need of 6  months due to medical necessity associated with following diagnosis:     Cognitive impairment  Compression fracture of L5 vertebra with routine healing, subsequent encounter  Non-traumatic compression fracture of L1 lumbar vertebra with routine healing, subsequent encounter  Chronic pain syndrome  Chronic, continuous use of opioids  Coronary artery disease involving native coronary artery of native heart without angina pectoris  S/P CABG (coronary artery bypass graft)  PAD (peripheral artery disease) (H)  Chronic diastolic heart failure (H)  ICD (implantable cardioverter-defibrillator) in place  Essential hypertension  Dyslipidemia  Stage 2 chronic kidney disease  History of  diabetes mellitus  Depression, unspecified depression type  Insomnia, unspecified type  Slow transit constipation  Closed compression fracture of lumbosacral spine, initial encounter (H)  Anemia, unspecified type  Chronic systolic congestive heart failure (H)  Candidal intertrigo  Coronary artery disease involving native coronary artery of native heart with angina pectoris (H)  Nausea and vomiting, unspecified vomiting type  Paroxysmal ventricular tachycardia (H)  Hypokalemia      Past Medical History:   has a past medical history of Bariatric surgery status (06/23/2008), Bilateral leg edema (07/13/2021), Chronic Back Pain (IT Pump w Morphine, Clonidine, Baclofen) (01/16/2022), Chronic diastolic heart failure (H) (07/26/2021), Claudication of lower extremity (H) (11/20/2019), CLL (chronic lymphocytic leukemia) (H), Coronary artery disease, Depressive disorder, Essential hypertension, Gastroesophageal reflux disease without esophagitis (09/11/2021), H/O gastric bypass (2008), History of blood transfusion (2004), ICD (implantable cardioverter-defibrillator) in place (01/25/2022), Intestinal disaccharidase deficiencies and disaccharide malabsorption (06/23/2008), Ischemic cardiomyopathy, Lumbago, Mixed hyperlipidemia (06/23/2008), Morbid obesity (H) (08/01/2018), Nephrolithiasis, NSTEMI (non-ST elevated myocardial infarction) (H), MARLEEN (doesn't tolerate CPAP) (07/26/2021), Osteoarthritis, Paroxysmal atrial fib -- S/P Pulm Vein Ablation 1/19/22 (09/11/2021), Paroxysmal ventricular tachycardia (H), Peripheral vascular disease (H) (05/03/2022), Post-laminectomy syndrome (11/25/2015), S/P CABG (coronary artery bypass graft) (01/25/2022), and Type 2 diabetes mellitus (H).    He has no past medical history of Cancer (H), Cerebral infarction (H), COPD (chronic obstructive pulmonary disease) (H), Thyroid disease, or Uncomplicated asthma.    Review of Systems:  As above    Exam:  Vitals: Ht 1.829 m (6')   Wt 124.7 kg (275 lb)    BMI 37.30 kg/m    BMI: Body mass index is 37.3 kg/m .  As above    Assessment/Plan:  1. Cognitive impairment    2. Compression fracture of L5 vertebra with routine healing, subsequent encounter    3. Non-traumatic compression fracture of L1 lumbar vertebra with routine healing, subsequent encounter    4. Chronic pain syndrome    5. Chronic, continuous use of opioids    6. Coronary artery disease involving native coronary artery of native heart without angina pectoris    7. S/P CABG (coronary artery bypass graft)    8. PAD (peripheral artery disease) (H)    9. Chronic diastolic heart failure (H)    10. ICD (implantable cardioverter-defibrillator) in place    11. Essential hypertension    12. Dyslipidemia    13. Stage 2 chronic kidney disease    14. History of diabetes mellitus    15. Depression, unspecified depression type    16. Insomnia, unspecified type    17. Slow transit constipation    18. Closed compression fracture of lumbosacral spine, initial encounter (H)    19. Anemia, unspecified type    20. Chronic systolic congestive heart failure (H)    21. Candidal intertrigo    22. Coronary artery disease involving native coronary artery of native heart with angina pectoris (H)    23. Nausea and vomiting, unspecified vomiting type    24. Paroxysmal ventricular tachycardia (H)    25. Hypokalemia        Orders:  1. Facility staff/TC to contact DME company to get their order form for provider to fill out    ELECTRONICALLY SIGNED BY MARLEY CERTIFIED PROVIDER: YAMILETH Melissa CNP   NPI: [id number]  [facility]  [address] [place] [address]            Sincerely,        YAMILETH Melissa CNP

## 2024-11-20 ENCOUNTER — MYC MEDICAL ADVICE (OUTPATIENT)
Dept: INTERNAL MEDICINE | Facility: CLINIC | Age: 69
End: 2024-11-20

## 2024-11-20 ENCOUNTER — ANCILLARY PROCEDURE (OUTPATIENT)
Dept: CARDIOLOGY | Facility: CLINIC | Age: 69
End: 2024-11-20
Attending: INTERNAL MEDICINE
Payer: COMMERCIAL

## 2024-11-20 DIAGNOSIS — Z95.810 ICD (IMPLANTABLE CARDIOVERTER-DEFIBRILLATOR) IN PLACE: ICD-10-CM

## 2024-11-20 DIAGNOSIS — I25.5 ISCHEMIC CARDIOMYOPATHY: ICD-10-CM

## 2024-11-20 DIAGNOSIS — I47.29 PAROXYSMAL VENTRICULAR TACHYCARDIA (H): ICD-10-CM

## 2024-11-20 PROCEDURE — 93296 REM INTERROG EVL PM/IDS: CPT | Performed by: INTERNAL MEDICINE

## 2024-11-20 PROCEDURE — 93295 DEV INTERROG REMOTE 1/2/MLT: CPT | Performed by: INTERNAL MEDICINE

## 2024-11-21 DIAGNOSIS — Z09 HOSPITAL DISCHARGE FOLLOW-UP: ICD-10-CM

## 2024-11-23 LAB
MDC_IDC_EPISODE_DTM: NORMAL
MDC_IDC_EPISODE_DURATION: 1695 S
MDC_IDC_EPISODE_ID: NORMAL
MDC_IDC_EPISODE_TYPE: NORMAL
MDC_IDC_LEAD_CONNECTION_STATUS: NORMAL
MDC_IDC_LEAD_CONNECTION_STATUS: NORMAL
MDC_IDC_LEAD_IMPLANT_DT: NORMAL
MDC_IDC_LEAD_IMPLANT_DT: NORMAL
MDC_IDC_LEAD_LOCATION: NORMAL
MDC_IDC_LEAD_LOCATION: NORMAL
MDC_IDC_LEAD_LOCATION_DETAIL_1: NORMAL
MDC_IDC_LEAD_LOCATION_DETAIL_1: NORMAL
MDC_IDC_LEAD_MFG: NORMAL
MDC_IDC_LEAD_MFG: NORMAL
MDC_IDC_LEAD_MODEL: NORMAL
MDC_IDC_LEAD_MODEL: NORMAL
MDC_IDC_LEAD_POLARITY_TYPE: NORMAL
MDC_IDC_LEAD_POLARITY_TYPE: NORMAL
MDC_IDC_LEAD_SERIAL: NORMAL
MDC_IDC_LEAD_SERIAL: NORMAL
MDC_IDC_MSMT_BATTERY_DTM: NORMAL
MDC_IDC_MSMT_BATTERY_REMAINING_LONGEVITY: 138 MO
MDC_IDC_MSMT_BATTERY_REMAINING_PERCENTAGE: 100 %
MDC_IDC_MSMT_BATTERY_STATUS: NORMAL
MDC_IDC_MSMT_CAP_CHARGE_DTM: NORMAL
MDC_IDC_MSMT_CAP_CHARGE_TIME: 10.3 S
MDC_IDC_MSMT_CAP_CHARGE_TYPE: NORMAL
MDC_IDC_MSMT_LEADCHNL_RA_IMPEDANCE_VALUE: 662 OHM
MDC_IDC_MSMT_LEADCHNL_RA_PACING_THRESHOLD_AMPLITUDE: 0.4 V
MDC_IDC_MSMT_LEADCHNL_RA_PACING_THRESHOLD_PULSEWIDTH: 0.4 MS
MDC_IDC_MSMT_LEADCHNL_RV_IMPEDANCE_VALUE: 391 OHM
MDC_IDC_MSMT_LEADCHNL_RV_PACING_THRESHOLD_AMPLITUDE: 1 V
MDC_IDC_MSMT_LEADCHNL_RV_PACING_THRESHOLD_PULSEWIDTH: 0.4 MS
MDC_IDC_PG_IMPLANT_DTM: NORMAL
MDC_IDC_PG_MFG: NORMAL
MDC_IDC_PG_MODEL: NORMAL
MDC_IDC_PG_SERIAL: NORMAL
MDC_IDC_PG_TYPE: NORMAL
MDC_IDC_SESS_CLINIC_NAME: NORMAL
MDC_IDC_SESS_DTM: NORMAL
MDC_IDC_SESS_TYPE: NORMAL
MDC_IDC_SET_BRADY_AT_MODE_SWITCH_MODE: NORMAL
MDC_IDC_SET_BRADY_AT_MODE_SWITCH_RATE: 170 {BEATS}/MIN
MDC_IDC_SET_BRADY_LOWRATE: 60 {BEATS}/MIN
MDC_IDC_SET_BRADY_MAX_SENSOR_RATE: 130 {BEATS}/MIN
MDC_IDC_SET_BRADY_MAX_TRACKING_RATE: 130 {BEATS}/MIN
MDC_IDC_SET_BRADY_MODE: NORMAL
MDC_IDC_SET_BRADY_PAV_DELAY_HIGH: 200 MS
MDC_IDC_SET_BRADY_PAV_DELAY_LOW: 300 MS
MDC_IDC_SET_BRADY_SAV_DELAY_HIGH: 200 MS
MDC_IDC_SET_BRADY_SAV_DELAY_LOW: 300 MS
MDC_IDC_SET_LEADCHNL_RA_PACING_AMPLITUDE: 2 V
MDC_IDC_SET_LEADCHNL_RA_PACING_CAPTURE_MODE: NORMAL
MDC_IDC_SET_LEADCHNL_RA_PACING_POLARITY: NORMAL
MDC_IDC_SET_LEADCHNL_RA_PACING_PULSEWIDTH: 0.4 MS
MDC_IDC_SET_LEADCHNL_RA_SENSING_ADAPTATION_MODE: NORMAL
MDC_IDC_SET_LEADCHNL_RA_SENSING_POLARITY: NORMAL
MDC_IDC_SET_LEADCHNL_RA_SENSING_SENSITIVITY: 0.25 MV
MDC_IDC_SET_LEADCHNL_RV_PACING_AMPLITUDE: 2 V
MDC_IDC_SET_LEADCHNL_RV_PACING_CAPTURE_MODE: NORMAL
MDC_IDC_SET_LEADCHNL_RV_PACING_POLARITY: NORMAL
MDC_IDC_SET_LEADCHNL_RV_PACING_PULSEWIDTH: 0.4 MS
MDC_IDC_SET_LEADCHNL_RV_SENSING_ADAPTATION_MODE: NORMAL
MDC_IDC_SET_LEADCHNL_RV_SENSING_POLARITY: NORMAL
MDC_IDC_SET_LEADCHNL_RV_SENSING_SENSITIVITY: 0.6 MV
MDC_IDC_SET_ZONE_DETECTION_INTERVAL: 250 MS
MDC_IDC_SET_ZONE_DETECTION_INTERVAL: 300 MS
MDC_IDC_SET_ZONE_DETECTION_INTERVAL: 353 MS
MDC_IDC_SET_ZONE_STATUS: NORMAL
MDC_IDC_SET_ZONE_TYPE: NORMAL
MDC_IDC_SET_ZONE_VENDOR_TYPE: NORMAL
MDC_IDC_STAT_AT_BURDEN_PERCENT: 0 %
MDC_IDC_STAT_AT_DTM_END: NORMAL
MDC_IDC_STAT_AT_DTM_START: NORMAL
MDC_IDC_STAT_BRADY_DTM_END: NORMAL
MDC_IDC_STAT_BRADY_DTM_START: NORMAL
MDC_IDC_STAT_BRADY_RA_PERCENT_PACED: 94 %
MDC_IDC_STAT_BRADY_RV_PERCENT_PACED: 5 %
MDC_IDC_STAT_EPISODE_RECENT_COUNT: 0
MDC_IDC_STAT_EPISODE_RECENT_COUNT: 1
MDC_IDC_STAT_EPISODE_RECENT_COUNT_DTM_END: NORMAL
MDC_IDC_STAT_EPISODE_RECENT_COUNT_DTM_START: NORMAL
MDC_IDC_STAT_EPISODE_TYPE: NORMAL
MDC_IDC_STAT_EPISODE_VENDOR_TYPE: NORMAL
MDC_IDC_STAT_TACHYTHERAPY_ATP_DELIVERED_RECENT: 0
MDC_IDC_STAT_TACHYTHERAPY_ATP_DELIVERED_TOTAL: 0
MDC_IDC_STAT_TACHYTHERAPY_RECENT_DTM_END: NORMAL
MDC_IDC_STAT_TACHYTHERAPY_RECENT_DTM_START: NORMAL
MDC_IDC_STAT_TACHYTHERAPY_SHOCKS_ABORTED_RECENT: 0
MDC_IDC_STAT_TACHYTHERAPY_SHOCKS_ABORTED_TOTAL: 0
MDC_IDC_STAT_TACHYTHERAPY_SHOCKS_DELIVERED_RECENT: 0
MDC_IDC_STAT_TACHYTHERAPY_SHOCKS_DELIVERED_TOTAL: 0
MDC_IDC_STAT_TACHYTHERAPY_TOTAL_DTM_END: NORMAL
MDC_IDC_STAT_TACHYTHERAPY_TOTAL_DTM_START: NORMAL

## 2024-11-25 ENCOUNTER — TELEPHONE (OUTPATIENT)
Dept: INTERNAL MEDICINE | Facility: CLINIC | Age: 69
End: 2024-11-25
Payer: COMMERCIAL

## 2024-11-25 NOTE — TELEPHONE ENCOUNTER
MTM referral from: Transitions of Care (recent hospital discharge, TCU discharge, or ED visit)    MTM referral outreach attempt #2 on November 25, 2024 at 8:31 AM      Outcome: Spoke with patient seeing pcp on Friday and feels MTM is not needed     Use acosta for the carrier/Plan on the flowsheet      Leila Jansen CMA  MTM

## 2024-11-25 NOTE — TELEPHONE ENCOUNTER
Johanne physical therapist calling from [facility] looking for orders.     1 x week for 1 week  2 x week for 2 weeks  1 x week for 3 weeks  For strengthening, transfer/gait training and balance    Call back at [phone] to del

## 2024-11-25 NOTE — TELEPHONE ENCOUNTER
Advised Johanne of provider recommendation via telephone.     Summer RN 4:03 PM November 25, 2024   [facility]

## 2024-11-27 ENCOUNTER — TELEPHONE (OUTPATIENT)
Dept: INTERNAL MEDICINE | Facility: CLINIC | Age: 69
End: 2024-11-27
Payer: COMMERCIAL

## 2024-11-27 NOTE — TELEPHONE ENCOUNTER
Call to April and left detailed message as requested.     Verbal order given to approve visits until certification received for MD signature.

## 2024-11-27 NOTE — TELEPHONE ENCOUNTER
Home Care is calling regarding an established patient with [facility].       Requesting orders from: Roge Feliciano  Provider is following patient: Yes  Is this a 60-day recertification request?  No    Orders Requested    Occupational Therapy  Request for initial certification (first set of orders)   Frequency:  1x/wk for 1 wks  then 2x/wk for 1 wks  Then 1x/wk x 5 wks      Confirmed ok to leave a detailed message with call back.  Contact information confirmed and updated as needed.    Christina Nam RN    (April requests the message be sent with high priority.)

## 2024-12-01 ENCOUNTER — MYC MEDICAL ADVICE (OUTPATIENT)
Dept: INTERNAL MEDICINE | Facility: CLINIC | Age: 69
End: 2024-12-01
Payer: COMMERCIAL

## 2024-12-01 DIAGNOSIS — S32.000A CLOSED COMPRESSION FRACTURE OF LUMBOSACRAL SPINE, INITIAL ENCOUNTER (H): ICD-10-CM

## 2024-12-01 DIAGNOSIS — F11.90 CHRONIC, CONTINUOUS USE OF OPIOIDS: ICD-10-CM

## 2024-12-02 ENCOUNTER — TELEPHONE (OUTPATIENT)
Dept: INTERNAL MEDICINE | Facility: CLINIC | Age: 69
End: 2024-12-02
Payer: COMMERCIAL

## 2024-12-02 NOTE — TELEPHONE ENCOUNTER
Forms/Letter Request    Type of form/letter: occupational therapy plan    Do we have the form/letter: Yes:     Who is the form from? [facility]     Where did/will the form come from? form was faxed in    When is form/letter needed by:  5days     How would you like the form/letter returned: Fax :      Patient Notified form requests are processed in 5-7 business days:    [facility] or would you prefer to receive a phone call?:

## 2024-12-03 ENCOUNTER — TELEPHONE (OUTPATIENT)
Dept: NEUROSURGERY | Facility: CLINIC | Age: 69
End: 2024-12-03
Payer: COMMERCIAL

## 2024-12-03 RX ORDER — HYDROMORPHONE HYDROCHLORIDE 2 MG/1
2-4 TABLET ORAL EVERY 8 HOURS PRN
Qty: 90 TABLET | Refills: 0 | Status: SHIPPED | OUTPATIENT
Start: 2024-12-03

## 2024-12-03 RX ORDER — HYDROXYZINE HYDROCHLORIDE 25 MG/1
25 TABLET, FILM COATED ORAL 3 TIMES DAILY PRN
Qty: 60 TABLET | Refills: 0 | Status: SHIPPED | OUTPATIENT
Start: 2024-12-03

## 2024-12-03 NOTE — TELEPHONE ENCOUNTER
Phone appointment on 12-10-24 at 2:30p has been rescheduled to 12-11-24 at 2:30p due to change in provider schedule.     Patient to call clinic if new date does not work for patient schedule.

## 2024-12-03 NOTE — TELEPHONE ENCOUNTER
Call to patient. Spouse was with patient. States patient is currently taking 6 pills per day to help with pain due to in home physical therapy. Patient takes 2 in the am and 2 in the afternoon and 2 at night. Physical therapy suggested patient get another refill of the medication. Patient has enough medication left for 1 more week at this time.

## 2024-12-03 NOTE — TELEPHONE ENCOUNTER
He had 80 pills of hydromorphone 2 mg filled on the 19th and 60 pills on 19 November    I am a little confused as to how much she is taking and how much he is asking for.  Dr. Feliciano has not filled this in the past  Can we please clarify this

## 2024-12-04 ENCOUNTER — HOSPITAL ENCOUNTER (OUTPATIENT)
Dept: CT IMAGING | Facility: CLINIC | Age: 69
Discharge: HOME | End: 2024-12-04
Attending: PHYSICIAN ASSISTANT
Payer: OTHER MISCELLANEOUS

## 2024-12-04 DIAGNOSIS — S32.050A CLOSED WEDGE COMPRESSION FRACTURE OF L5 VERTEBRA, INITIAL ENCOUNTER (H): ICD-10-CM

## 2024-12-04 PROCEDURE — 72131 CT LUMBAR SPINE W/O DYE: CPT

## 2024-12-04 NOTE — TELEPHONE ENCOUNTER
Writer confirmed with patient that they are aware of the date change/time for phone visit follow up to discuss results of recent CT scan.    Patient agreed to new schedule.

## 2024-12-09 ENCOUNTER — MYC MEDICAL ADVICE (OUTPATIENT)
Dept: INTERNAL MEDICINE | Facility: CLINIC | Age: 69
End: 2024-12-09
Payer: COMMERCIAL

## 2024-12-09 DIAGNOSIS — E87.6 HYPOKALEMIA: ICD-10-CM

## 2024-12-09 RX ORDER — POTASSIUM CHLORIDE 1500 MG/1
40 TABLET, EXTENDED RELEASE ORAL DAILY
Qty: 90 TABLET | Refills: 0 | Status: SHIPPED | OUTPATIENT
Start: 2024-12-09

## 2024-12-10 ENCOUNTER — TELEPHONE (OUTPATIENT)
Dept: INTERNAL MEDICINE | Facility: CLINIC | Age: 69
End: 2024-12-10

## 2024-12-10 NOTE — TELEPHONE ENCOUNTER
Forms/Letter Request    Type of form/letter: Home Health Certification 11/22/24 to 1/20/25      Do we have the form/letter: Yes: placed in provider mailbox for signature    [id number]    Where did/will the form come from? form was faxed in    When is form/letter needed by: 5-7    How would you like the form/letter returned: Fax : [phone]    Patient Notified form requests are processed in 5-7 business days:Yes    Could we send this information to you in [employer] or would you prefer to receive a phone call?:   NA

## 2024-12-11 ENCOUNTER — VIRTUAL VISIT (OUTPATIENT)
Dept: NEUROSURGERY | Facility: CLINIC | Age: 69
End: 2024-12-11
Payer: COMMERCIAL

## 2024-12-11 DIAGNOSIS — S32.001A LUMBAR BURST FRACTURE, CLOSED, INITIAL ENCOUNTER (H): Primary | ICD-10-CM

## 2024-12-11 PROCEDURE — 99441 PR PHYSICIAN TELEPHONE EVALUATION 5-10 MIN: CPT | Mod: 93

## 2024-12-11 NOTE — PROGRESS NOTES
HPI: Onur Barr is a 69 year old male with L1 burst fracture.     States he is still having back and leg pain, but they are improving. He has continued to wear his brace when he is out of bed or when he is sat upright. Discussed with patient the need for follow up in 6 weeks and the process of fracture healing.     Medical, surgical, family, and social history unchanged since prior exam.    ROS: 10 point ROS neg other than the symptoms noted above in the HPI.    Imaging: CT LUMBAR SPINE WITHOUT CONTRAST  12/4/2024 9:18 AM                                                                 IMPRESSION:    1. Comminuted burst type fracture involving the L5 vertebral body and  posterior elements. Lucency around the fracture appears increased  since prior without definite bony fusion around the fracture. No new  loss of vertebral body height appreciated.  2. Several additional fractures throughout the lumbar spine and lower  thoracic spine which appears similar to 10/31/2024.  3. Bones appear demineralized which limits evaluation for fractures.  4. Marked degenerative changes in the lower lumbar spine with fusion  hardware at L4-5 and L5-S1.    Assessment/Plan:  69M with routine healing of his burst L1 fracture. We will need to see him again in 6 weeks with upright XR to follow healing of is fracture. At his 12 week appointment we will discuss if he can start to wean his brace.  - Follow up in 6 weeks with upright lumbar XR prior.    - Call the clinic at [phone] for increased pain or any other questions and concerns.    Phone call start: 1230  Phone call end: 1238  Phone call duration: 8 minutes    Patient location: Home  Provider location: [facility] - Hayley Brewster CNP  [facility]  [address] [place] [id number]  Tel [phone]  Fax [phone]

## 2024-12-11 NOTE — LETTER
12/11/2024      Onur Barr  [address] [address] [facility] [address]      Dear Colleague,    Thank you for referring your patient, Onur Barr, to the [facility] [place]. Please see a copy of my visit note below.    HPI: Onur Barr is a 69 year old male with L1 burst fracture.     States he is still having back and leg pain, but they are improving. He has continued to wear his brace when he is out of bed or when he is sat upright. Discussed with patient the need for follow up in 6 weeks and the process of fracture healing.     Medical, surgical, family, and social history unchanged since prior exam.    ROS: 10 point ROS neg other than the symptoms noted above in the HPI.    Imaging: CT LUMBAR SPINE WITHOUT CONTRAST  12/4/2024 9:18 AM                                                                 IMPRESSION:    1. Comminuted burst type fracture involving the L5 vertebral body and  posterior elements. Lucency around the fracture appears increased  since prior without definite bony fusion around the fracture. No new  loss of vertebral body height appreciated.  2. Several additional fractures throughout the lumbar spine and lower  thoracic spine which appears similar to 10/31/2024.  3. Bones appear demineralized which limits evaluation for fractures.  4. Marked degenerative changes in the lower lumbar spine with fusion  hardware at L4-5 and L5-S1.    Assessment/Plan:  69M with routine healing of his burst L1 fracture. We will need to see him again in 6 weeks with upright XR to follow healing of is fracture. At his 12 week appointment we will discuss if he can start to wean his brace.  - Follow up in 6 weeks with upright lumbar XR prior.    - Call the clinic at [phone] for increased pain or any other questions and concerns.    Phone call start: 1230  Phone call end: 1238  Phone call duration: 8 minutes    Patient location: Home  Provider location: [facility]  Neurosurgery Clinic [facility]    Coleen Brewster CNP  [facility]  [address]  [address]  [place] [address]  Tel [phone]  Fax [phone]          Again, thank you for allowing me to participate in the care of your patient.        Sincerely,        YAMILETH Krishnan CNP

## 2024-12-11 NOTE — NURSING NOTE
Baudilio is a 69 year old who is being evaluated via a billable telephone visit.    What phone number would you like to be contacted at? [phone]  How would you like to obtain your AVS? Etelvinahart  Originating Location (pt. Location): Home    Distant Location (provider location):  On-site

## 2024-12-12 DIAGNOSIS — I25.10 CORONARY ARTERY DISEASE INVOLVING NATIVE CORONARY ARTERY OF NATIVE HEART WITHOUT ANGINA PECTORIS: ICD-10-CM

## 2024-12-12 DIAGNOSIS — G89.4 CHRONIC PAIN SYNDROME: ICD-10-CM

## 2024-12-12 DIAGNOSIS — Z95.1 S/P CABG (CORONARY ARTERY BYPASS GRAFT): ICD-10-CM

## 2024-12-12 DIAGNOSIS — I73.9 PAD (PERIPHERAL ARTERY DISEASE) (H): ICD-10-CM

## 2024-12-12 RX ORDER — ASPIRIN 81 MG
81 TABLET,CHEWABLE ORAL DAILY
Qty: 90 TABLET | Refills: 1 | OUTPATIENT
Start: 2024-12-12

## 2024-12-12 RX ORDER — DULOXETIN HYDROCHLORIDE 30 MG/1
CAPSULE, DELAYED RELEASE ORAL
Qty: 270 CAPSULE | Refills: 1 | OUTPATIENT
Start: 2024-12-12

## 2024-12-19 ENCOUNTER — TELEPHONE (OUTPATIENT)
Dept: INTERNAL MEDICINE | Facility: CLINIC | Age: 69
End: 2024-12-19
Payer: COMMERCIAL

## 2024-12-19 ENCOUNTER — MEDICAL CORRESPONDENCE (OUTPATIENT)
Dept: HEALTH INFORMATION MANAGEMENT | Facility: CLINIC | Age: 69
End: 2024-12-19

## 2024-12-24 ENCOUNTER — HOSPITAL ENCOUNTER (EMERGENCY)
Facility: CLINIC | Age: 69
Discharge: HOME | End: 2024-12-24
Attending: EMERGENCY MEDICINE
Payer: COMMERCIAL

## 2024-12-24 ENCOUNTER — APPOINTMENT (OUTPATIENT)
Dept: CT IMAGING | Facility: CLINIC | Age: 69
End: 2024-12-24
Attending: EMERGENCY MEDICINE
Payer: COMMERCIAL

## 2024-12-24 VITALS
BODY MASS INDEX: 36.62 KG/M2 | RESPIRATION RATE: 20 BRPM | DIASTOLIC BLOOD PRESSURE: 83 MMHG | WEIGHT: 270 LBS | HEART RATE: 71 BPM | SYSTOLIC BLOOD PRESSURE: 139 MMHG | TEMPERATURE: 98.4 F | OXYGEN SATURATION: 100 %

## 2024-12-24 DIAGNOSIS — J06.9 ACUTE URI: ICD-10-CM

## 2024-12-24 LAB
FLUAV RNA SPEC QL NAA+PROBE: NEGATIVE
FLUBV RNA RESP QL NAA+PROBE: NEGATIVE
RSV RNA SPEC NAA+PROBE: NEGATIVE
SARS-COV-2 RNA RESP QL NAA+PROBE: NEGATIVE

## 2024-12-24 PROCEDURE — 250N000013 HC RX MED GY IP 250 OP 250 PS 637: Performed by: EMERGENCY MEDICINE

## 2024-12-24 PROCEDURE — 70450 CT HEAD/BRAIN W/O DYE: CPT

## 2024-12-24 PROCEDURE — 87637 SARSCOV2&INF A&B&RSV AMP PRB: CPT | Performed by: EMERGENCY MEDICINE

## 2024-12-24 PROCEDURE — 99284 EMERGENCY DEPT VISIT MOD MDM: CPT | Mod: 25

## 2024-12-24 RX ORDER — HYDROMORPHONE HYDROCHLORIDE 2 MG/1
2 TABLET ORAL ONCE
Status: COMPLETED | OUTPATIENT
Start: 2024-12-24 | End: 2024-12-24

## 2024-12-24 RX ORDER — OLANZAPINE 5 MG/1
10 TABLET, ORALLY DISINTEGRATING ORAL ONCE
Status: COMPLETED | OUTPATIENT
Start: 2024-12-24 | End: 2024-12-24

## 2024-12-24 RX ADMIN — OLANZAPINE 10 MG: 5 TABLET, ORALLY DISINTEGRATING ORAL at 17:41

## 2024-12-24 RX ADMIN — HYDROMORPHONE HYDROCHLORIDE 2 MG: 2 TABLET ORAL at 18:12

## 2024-12-24 ASSESSMENT — ACTIVITIES OF DAILY LIVING (ADL)
ADLS_ACUITY_SCORE: 59

## 2024-12-24 NOTE — ED TRIAGE NOTES
"  Pt arrives from home for \"menthol smell\" in his nose that has been there since this AM and will not go away. He also endorses headache and body aches. Of note patient has a fractured spine. Pt is visibly agitated in triage.     /75   Pulse 70   Temp 98.1  F (36.7  C) (Temporal)   Resp 20   Wt 122.5 kg (270 lb)   SpO2 100%   BMI 36.62 kg/m       Triage Assessment (Adult)       Row Name 12/24/24 5450          Triage Assessment    Airway WDL WDL        Respiratory WDL    Respiratory WDL WDL        Cardiac WDL    Cardiac WDL WDL        Cognitive/Neuro/Behavioral WDL    Cognitive/Neuro/Behavioral WDL WDL                     "

## 2024-12-24 NOTE — ED PROVIDER NOTES
"  Emergency Department Note      History of Present Illness     Chief Complaint   Flu Symptoms      HPI   Onur Barr is a 69 year old male with a history of congestive heart failure, type 2 diabetes, hypertension, coronary artery disease, hyperlipidemia, current compression fracture of L1 lumbar vertebra who presents with flu symptoms. Patient's wife states that the patient woke up this morning with a notable \"menthol,\" smell. Patient states that he initially went to an emergency dentist and states that there was a negative dental work-up. He endorses a headache and body aches. He endorses some back pain and left lower extremity pain from his current compression fracture. He denies any fevers.     Independent Historian   Wife as detailed above.    Review of External Notes   Reviewed office visit from 12/20/2024    Past Medical History     Medical History and Problem List   Bariatric surgery status  Bilateral leg edema  Chronic Back Pain (IT Pump w Morphine, Clonidine, Baclofen)  Chronic diastolic heart failure (H)  Claudication of lower extremity (H)  CLL (chronic lymphocytic leukemia) (H)  Coronary artery disease  Depressive disorder  Essential hypertension  Gastroesophageal reflux disease without esophagitis  H/O gastric bypass  History of blood transfusion  ICD (implantable cardioverter-defibrillator) in place  Intestinal disaccharidase deficiencies and disaccharide malabsorption  Ischemic cardiomyopathy  Lumbago  Mixed hyperlipidemia  Morbid obesity (H)  Nephrolithiasis  NSTEMI (non-ST elevated myocardial infarction) (H)  MARLEEN (doesn't tolerate CPAP)  Osteoarthritis  Paroxysmal atrial fib -- S/P Pulm Vein Ablation 1/19/22  Paroxysmal ventricular tachycardia (H)  Peripheral vascular disease (H)  Post-laminectomy syndrome  S/P CABG (coronary artery bypass graft)  Type 2 diabetes mellitus (H)    Medications   aspirin (ASA) 81 MG chewable tablet  DULoxetine (CYMBALTA) 30 MG capsule  furosemide (LASIX) 40 MG " tablet  gabapentin (NEURONTIN) 100 MG capsule  HYDROmorphone (DILAUDID) 2 MG tablet  hydrOXYzine HCl (ATARAX) 25 MG tablet  methocarbamol (ROBAXIN) 750 MG tablet  metoprolol succinate ER (TOPROL XL) 100 MG 24 hr tablet  naloxone (NARCAN) 4 MG/0.1ML nasal spray  nitroGLYcerin (NITROSTAT) 0.4 MG sublingual tablet  ondansetron (ZOFRAN ODT) 4 MG ODT tab  polyethylene glycol (MIRALAX) 17 GM/Dose powder  potassium chloride isai ER (KLOR-CON M20) 20 MEQ CR tablet  rosuvastatin (CRESTOR) 20 MG tablet  senna-docusate (SENOKOT-S/PERICOLACE) 8.6-50 MG tablet  sotalol (BETAPACE) 80 MG tablet  traZODone (DESYREL) 100 MG tablet      Surgical History   Past Surgical History:   Procedure Laterality Date    BACK SURGERY      BYPASS GASTRIC DUODENAL SWITCH      BYPASS GRAFT ARTERY CORONARY N/A 01/19/2022    Procedure: CORONARY ARTERY BYPASS GRAFT (CABG) X1; LIMA -LAD.  PULMONARY VEIN ISOLATION, AND ATRIAL APPENDAGE CLIPPING USING 45MM ACTICURE CLIP.;  Surgeon: William Dumont MD;  Location:  OR    COLONOSCOPY      COSMETIC SURGERY      pannicullectomy    CV CORONARY ANGIOGRAM N/A 09/11/2021    Procedure: Coronary Angiogram;  Surgeon: Noris Ozuna MD;  Location: [facility] LAB CV    CV HEART CATHETERIZATION WITH POSSIBLE INTERVENTION N/A 01/13/2022    Procedure: Heart Catheterization with Possible Intervention;  Surgeon: Liz Pearl MD;  Location:  HEART CARDIAC CATH LAB    CV LEFT HEART CATH N/A 09/11/2021    Procedure: Left Heart Cath;  Surgeon: Noris Ozuna MD;  Location: [facility] LAB CV    CV PCI N/A 09/11/2021    Procedure: Percutaneous Coronary Intervention;  Surgeon: Noris Ozuna MD;  Location: [facility] CATH LAB CV    CV PCI N/A 10/07/2021    Procedure: Percutaneous Coronary Intervention;  Surgeon: Mckayla Dan MD;  Location: [facility] LAB CV    CV PCI ASPIRATION THROMECTOMY N/A 09/11/2021    Procedure: Percutaneous Coronary Intervention Aspiration Thrombectomy;  Surgeon: Sulma  MD Noris;  Location: [facility] CATH LAB CV    ENT SURGERY      tonsillectomy    EP ICD N/A 01/24/2022    Procedure: EP ICD;  Surgeon: Jannette Crook MD;  Location:  HEART CARDIAC CATH LAB    EXTRACORPOREAL SHOCK WAVE LITHOTRIPSY, CYSTOSCOPY, INSERT STENT URETER(S), COMBINED  08/23/2011    HC REMOVAL OF TONSILS,<13 Y/O      Description: Tonsillectomy;  Recorded: 03/23/2012;  Comments: for obstructive sleep apnea    HERNIA REPAIR      IR MISCELLANEOUS PROCEDURE  07/29/2011    IR MISCELLANEOUS PROCEDURE  08/05/2011    IR MISCELLANEOUS PROCEDURE  08/23/2011    IR NEPHROSTOMY TUBE CHANGE BILATERAL  08/05/2011    ORTHOPEDIC SURGERY      arthrodesis ant discectomy, lumbar    MS ARTHRODESIS ANT INTERBODY MIN DISCECTOMY,LUMBAR      Description: Lumbar Vertebral Fusion;  Recorded: 06/26/2009;  Comments: before 200 see [facility] consult under old records    MS EXCISE EXCESS SKIN TISSUE,ABDOMEN  11/13/2012    Description: Panniculectomy;  Proc Date: 11/13/2012;  Comments: 3.5 Lb Pannus was removed at the [facility] By Dr. Shon Green    REPLACE INTRATHECAL PAIN PUMP N/A 04/25/2017    Procedure: REPLACE INTRATHECAL PAIN PUMP;  INTRATHECAL PAIN PUMP CATHETER REPLACEMENT AND PUMP REPLACEMENT;  Surgeon: Anthony Maloney MD;  Location: [facility]    REPLACE INTRATHECAL PAIN PUMP N/A 4/20/2023    Procedure: Pump Replacement and intrathecal catheter replacement;  Surgeon: Anthony Dick MD;  Location:  OR    REVISE CATHETER INTRATHECAL N/A 04/25/2017    Procedure: REVISE CATHETER INTRATHECAL;;  Surgeon: Anthony Maloney MD;  Location: [facility]    SHOULDER SURGERY      ZZC GASTRIC BYPASS,OBESE<100CM SUSY-EN-Y  01/01/2008    Description: Gastric Surgery For Morbid Obesity Bypass With Susy-en-Y;  Recorded: 06/26/2009;  Comments: dr green 2008    [facility] REPAIR INCISIONAL HERNIA,REDUCIBLE  11/13/2012    Description: Incisional Hernia Repair;  Proc Date: 11/13/2012;  Comments: incisional hernia repair and abdominal panniculectomy by   Shon Green at the [facility].       Physical Exam     Patient Vitals for the past 24 hrs:   BP Temp Temp src Pulse Resp SpO2 Weight   12/24/24 1721 139/83 98.4  F (36.9  C) Oral 71 -- 100 % --   12/24/24 1712 130/75 98.1  F (36.7  C) Temporal 70 20 100 % 122.5 kg (270 lb)     Physical Exam  Constitutional: Alert, attentive, frequently yelling at staff regarding his rhinorrhea and menthol smell that he describes  HENT:    Nose: Nose normal.    Mouth/Throat: Oropharynx is clear, mucous membranes are moist   Eyes: EOM are normal.   CV: regular rate and rhythm; no murmurs, rubs or gallups  Chest: Effort normal and breath sounds normal.   GI:  There is no tenderness. No distension. Normal bowel sounds  MSK: Normal range of motion.   Neurological: Alert, attentive  Skin: Skin is warm and dry.      Diagnostics     Lab Results   Labs Ordered and Resulted from Time of ED Arrival to Time of ED Departure   INFLUENZA A/B, RSV AND SARS-COV2 PCR - Normal       Result Value    Influenza A PCR Negative      Influenza B PCR Negative      RSV PCR Negative      SARS CoV2 PCR Negative         Imaging   CT Head w/o Contrast   Final Result   IMPRESSION:   1.  No CT evidence for acute intracranial process.   2.  Brain atrophy and presumed chronic microvascular ischemic changes as above.        Independent Interpretation   None    ED Course      Medications Administered   Medications   OLANZapine zydis (zyPREXA) ODT tab 10 mg (10 mg Oral $Given 12/24/24 1741)   HYDROmorphone (DILAUDID) tablet 2 mg (2 mg Oral $Given 12/24/24 1812)       Procedures   Procedures     Discussion of Management   None    ED Course   ED Course as of 12/25/24 0052   Tue Dec 24, 2024   1730 I obtained history and examined the patient as noted above       Additional Documentation  None    Medical Decision Making / Diagnosis     CMS Diagnoses: None    MIPS       None    [facility]   Onur Barr is a 69 year old male with multiple medical problems who presents for  "evaluation of URI symptoms including prominent rhinorrhea and a \"menthol\" smell.  He has a normal cardiopulmonary exam and ENT exam.  He describes anxiety regarding the symptoms and his wife describes him as having \"agitation\" regarding the smell.  He has no difficulty breathing.  CT head shows no acute intracranial or other abnormality.  Viral panel is negative.  He is given oral Dilaudid for chronic low back pain which is stable.  I described the unclear nature of his symptoms.  A variety of homeopathic measures were undertaken to potentially assist with the \"menthol smell\" without success.  The patient and his wife would like to be discharged on the this is very reasonable given apparent underlying URI.  Return precaution for any concerns and primary care follow-up in 3 to 5 days.    Disposition   The patient was discharged.     Diagnosis     ICD-10-CM    1. Acute URI  J06.9            Discharge Medications   Discharge Medication List as of 12/24/2024  8:17 PM            Scribe Disclosure:  I, Shon Mora, am serving as a scribe at 5:19 PM on 12/24/2024 to document services personally performed by Davy Saavedra MD based on my observations and the provider's statements to me.        Davy Saavedra MD  12/25/24 0054    "

## 2024-12-25 NOTE — DISCHARGE INSTRUCTIONS
It was a pleasure taking care of you today. I hope you feel much better soon.  Your evaluation was reassuring.  Please follow-up with your primary care doctor in 3-5 days.  Return immediately for any concerns.

## 2024-12-30 DIAGNOSIS — D64.9 ANEMIA, UNSPECIFIED TYPE: ICD-10-CM

## 2024-12-30 RX ORDER — DIAPER,BRIEF,INFANT-TODD,DISP
1 EACH MISCELLANEOUS DAILY
Qty: 90 TABLET | Refills: 1 | OUTPATIENT
Start: 2024-12-30

## 2025-01-03 ENCOUNTER — MEDICAL CORRESPONDENCE (OUTPATIENT)
Dept: HEALTH INFORMATION MANAGEMENT | Facility: CLINIC | Age: 70
End: 2025-01-03

## 2025-01-03 ENCOUNTER — TELEPHONE (OUTPATIENT)
Dept: INTERNAL MEDICINE | Facility: CLINIC | Age: 70
End: 2025-01-03
Payer: COMMERCIAL

## 2025-01-07 ENCOUNTER — TELEPHONE (OUTPATIENT)
Dept: INTERNAL MEDICINE | Facility: CLINIC | Age: 70
End: 2025-01-07
Payer: COMMERCIAL

## 2025-01-07 NOTE — TELEPHONE ENCOUNTER
Forms/Letter Request    Type of form/letter: Therapy Plan 1/02/25      Do we have the form/letter: Yes: placed in provider mailbox for signature    Who is the form from? [facility] # [id number] & [id number]    Where did/will the form come from? form was faxed in    When is form/letter needed by: 5-7    How would you like the form/letter returned: Fax : [phone]    Patient Notified form requests are processed in 5-7 business days:Yes    Could we send this information to you in [employer] or would you prefer to receive a phone call?:   NA

## 2025-01-08 ENCOUNTER — TRANSFERRED RECORDS (OUTPATIENT)
Dept: HEALTH INFORMATION MANAGEMENT | Facility: CLINIC | Age: 70
End: 2025-01-08
Payer: COMMERCIAL

## 2025-01-08 DIAGNOSIS — R11.2 NAUSEA AND VOMITING, UNSPECIFIED VOMITING TYPE: ICD-10-CM

## 2025-01-08 RX ORDER — ONDANSETRON 4 MG/1
4 TABLET, ORALLY DISINTEGRATING ORAL EVERY 8 HOURS PRN
Qty: 12 TABLET | Refills: 0 | Status: SHIPPED | OUTPATIENT
Start: 2025-01-08

## 2025-01-09 ENCOUNTER — TELEPHONE (OUTPATIENT)
Dept: INTERNAL MEDICINE | Facility: CLINIC | Age: 70
End: 2025-01-09
Payer: COMMERCIAL

## 2025-01-09 ENCOUNTER — MEDICAL CORRESPONDENCE (OUTPATIENT)
Dept: HEALTH INFORMATION MANAGEMENT | Facility: CLINIC | Age: 70
End: 2025-01-09

## 2025-01-13 ENCOUNTER — MEDICAL CORRESPONDENCE (OUTPATIENT)
Dept: HEALTH INFORMATION MANAGEMENT | Facility: CLINIC | Age: 70
End: 2025-01-13

## 2025-01-14 ENCOUNTER — MYC MEDICAL ADVICE (OUTPATIENT)
Dept: INTERNAL MEDICINE | Facility: CLINIC | Age: 70
End: 2025-01-14
Payer: COMMERCIAL

## 2025-01-14 DIAGNOSIS — S32.000A CLOSED COMPRESSION FRACTURE OF LUMBOSACRAL SPINE, INITIAL ENCOUNTER (H): ICD-10-CM

## 2025-01-14 DIAGNOSIS — D64.9 ANEMIA, UNSPECIFIED TYPE: ICD-10-CM

## 2025-01-14 DIAGNOSIS — G89.4 CHRONIC PAIN SYNDROME: Primary | ICD-10-CM

## 2025-01-14 DIAGNOSIS — E87.6 HYPOKALEMIA: ICD-10-CM

## 2025-01-15 RX ORDER — GABAPENTIN 300 MG/1
300 CAPSULE ORAL 3 TIMES DAILY
Qty: 270 CAPSULE | Refills: 0 | Status: SHIPPED | OUTPATIENT
Start: 2025-01-15

## 2025-01-15 RX ORDER — POTASSIUM CHLORIDE 1500 MG/1
40 TABLET, EXTENDED RELEASE ORAL DAILY
Qty: 90 TABLET | Refills: 0 | Status: SHIPPED | OUTPATIENT
Start: 2025-01-15

## 2025-01-15 RX ORDER — GABAPENTIN 100 MG/1
300 CAPSULE ORAL 3 TIMES DAILY
Status: CANCELLED | OUTPATIENT
Start: 2025-01-15

## 2025-01-15 RX ORDER — DIAPER,BRIEF,INFANT-TODD,DISP
1 EACH MISCELLANEOUS DAILY
Qty: 90 TABLET | Refills: 2 | Status: SHIPPED | OUTPATIENT
Start: 2025-01-15

## 2025-01-15 RX ORDER — METHOCARBAMOL 750 MG/1
750 TABLET, FILM COATED ORAL 4 TIMES DAILY PRN
Qty: 90 TABLET | Refills: 0 | Status: SHIPPED | OUTPATIENT
Start: 2025-01-15

## 2025-01-17 NOTE — TELEPHONE ENCOUNTER
LVM for pt to call back regarding remote episode. Gave direct number for call back [facility]      HEMATOLOGY/ONCOLOGY PROGRESS NOTE      Reason for admission:  AML in relapse    History Of Present Illness  Sydnie is a 68 year old female who presents for management of relapsed AML.  She was initially diagnosed in Feb 2023 with intermediate risk disease (normal cytogenetics, FLT3 with low allelic burden).  She had 7+3+Midostaurin induction followed by HiDAC+ Midostaurin consolidation.  She had very poor tolerance of chemotherapy, resulted in two ICU admissions and eventually drainage of perirectal abscess.  She then proceeded to Midostaurin maintenance.  She continued this until relapse of AML in Feb 2024.  She was given second line Decitabine/Venetoclax but had only a brief response to therapy.  She was found to have IDH1 mutation on NGS and initiated on Ivosidenib 5/2024.  She continued this until October 2024 when she was found to have disease relapse.  She was evaluated by transplant department at [facility] and the [facility] and donor search was unsuccessful.  She was initiated on MEC 10/2024.  Day 14 BM biopsy showed no evidence of leukemia; however at count recovery she developed peripheral blasts consistent with disease progression/relapse.  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  She was re-evaluated by transplant team here at [facility], Dr. Paulson, and 2 donor options were identified.  She is being admitted for bridging therapy to allo-SCT with Clofarabine/Cytarabine.    Today she is feeling generally well although endorses fatigue.  She denies any bleeding.  She denies any fevers/chills, drenching sweats, nausea/vomiting.    1/2/2025: Day 3.  No acute events.  She is feeling generally well.  She denies any nausea/vomiting, diarrhea.  No fevers/chills.  Appetite remains good.  Ambulating around room without issue.    1/3/2024: No acute events.  Day 4 chemotherapy.  Reports some decline in appetite, denies nausea/vomiting.  She has fatigue.  No other complaints.  Labs notable for rise in LFTs.  Posaconazole  held.    1/4/2025: Day 5. No new complaints. Feeling well today. LFTs stable.    1/5/2025: Day 6. No new complaints other than fatigue. Remains positive about this cycle of chemotherapy. Appetite is fair. Energy is up and down. Ambulating around room. Tolerating PO. Normal bowel movements. LFTs stable. No abdominal pain.    1/6/2025: Day 7.  No acute events.  Feels well.  Denies any bleeding.  Leg swelling resolved.  Appetite is fair.  No nausea/vomiting.     1/7/2024: Day 8.  No acute events. Feeling tired otherwise well.  No bleeding.  No fevers.  Requiring blood and platelet transfusion today.  LFTs improving.    1/8/2025: Day 9.  Diarrhea overnight, somewhat better this morning.  Denies any abdominal pain, nausea/vomiting.  Feels tired but otherwise well.  NO bleeding.  LFTs continue to improve.    1/9/2025: Day 10.  C.diff positive, started on oral vanc.  Diarrhea improving.  Denies any other complaints.  No fevers, no bleeding.  LFTs improving.    1/10/2025: Day 11.  No acute events.  No fevers.  Feeling well.  Denies any new complaints.  Loose stool but overall improving.  No bleeding.    1/11/25: Day 12 today. No issues overnight. Diarrhea is improving.     1/12/25: Day 13 today. Reports some small blood clots in her gingival area yesterday afternoon. No overt bleeding or clotting noted this AM. Otherwise she feels well. Received 1 unit of platelets this AM.     1/13/2025: Day 14.  No fevers.  Diarrhea resolved.  NO further bleeding.  No oral pain.  PLT count 19 today.  Ambulating well.  Good appetite.     1/14/2025: Day 15.  No complaints.  Diarrhea resolved.  Remains afebrile.  Ambulating well, appetite is good.  Denies any complaints this morning.    1/15/2025: Day 16.  Continues to feel well.  Denies any complaints.  No bleeding, afebrile.  Ambulating and eating well.    1/16/2025: Day 17.  NO acute events.  She is feeling well.  She denies any complaints today.  No transfusion requirement  today.    2025: Day 18.  NO acute events.  Continues to feel well.  Denies any complaints today.    Past Medical History  Past Medical History:   Diagnosis Date    Acne vulgaris 2013    Alopecia     AML (acute myeloblastic leukemia)  (CMD)     Bacteremia due to Klebsiella pneumoniae 2023    Dental abscess     Flexural atopic dermatitis 2019    Neutropenic fever (CMD) 2023    Non-insertional Achilles tendinopathy     from Levofloxacin    Perirectal abscess 2023    s/p drainage    Rosacea         Surgical History  Past Surgical History:   Procedure Laterality Date     delivery only       section, low transverse      Cholecystectomy      Dilation and curettage      Hernia repair      Hysterectomy      I&d perirectal abscess  2023    with seton placement          Medications  Medications Prior to Admission   Medication Sig Dispense Refill    valACYclovir (VALTREX) 500 MG tablet TAKE 1 TABLET BY MOUTH EVERY 12 HOURS 180 tablet 1    cefpodoxime (VANTIN) 200 MG tablet Take 2 tablets by mouth every 12 hours. 120 tablet 1    posaconazole (NOXAFIL) 100 MG tablet Take 3 tablets by mouth daily (with breakfast). Begin taking on 2024. Take with food. 97 tablet 0    amoxicillin (AMOXIL) 500 MG capsule Take4 capsules 1 hour prior to dental appointment. 4 capsule 1    allopurinol (ZYLOPRIM) 300 MG tablet TAKE 1 TABLET BY MOUTH EVERY DAY 90 tablet 1    cyanocobalamin (Vitamin B-12) 100 MCG tablet Take 50 mcg by mouth every other day.      Multiple Minerals (Calcium-Magnesium-Zinc) Tab Take 1 tablet by mouth every other day.      VITAMIN D, CHOLECALCIFEROL, PO Take 1 capsule by mouth every other day.         Review of Systems  10 systems reviewed, negative except noted in HPI     Last Recorded Vitals  Blood pressure 110/56, pulse 66, temperature 99.5 °F (37.5 °C), temperature source Oral, resp. rate 18, height 5' 9\" (1.753 m), weight 101.7 kg (224 lb 3.3 oz), SpO2  98%.    Physical Exam  General:  Alert and oriented. No acute distress.  Head:  Normocephalic, without obvious abnormality.  Eyes:  Conjunctiva clear, No icterus.   Neck: No thyromegaly.  No enlarged cervical, supraclavicular lymphadenopathy.  Lungs:   Clear to ascultation; symmetrical breath sounds.   Heart:  Regular rate and rhythm. S1 and S2 normal.   Abdomen:  Soft, non-tender, bowel sounds normal.  No hepatomegaly.  No splenomegaly.  Extremities:   No swelling or edema.  Lymph Nodes:  Cervical, supraclavicular or axillary nodes normal.  Musculoskeletal: Symmetrical strength; no focal weakness  Skin:  No ecchymosis.  No rash.   Neurologic:  Alert and oriented x 3.  Normal affect.  No focal motor defects.       Labs     WBC (K/mcL)   Date Value   01/17/2025 0.1 (LL)     RBC (mil/mcL)   Date Value   01/17/2025 2.69 (L)     HCT (%)   Date Value   01/17/2025 23.4 (L)     HGB (g/dL)   Date Value   01/17/2025 7.5 (L)     PLT (K/mcL)   Date Value   01/17/2025 15 (LL)     GOT/AST (Units/L)   Date Value   01/17/2025 25     GPT/ALT (Units/L)   Date Value   01/17/2025 48     No results found for: \"GGTP\"  Alkaline Phosphatase (Units/L)   Date Value   01/17/2025 89     Bilirubin, Total (mg/dL)   Date Value   01/17/2025 0.3     Sodium (mmol/L)   Date Value   01/17/2025 143     Potassium (mmol/L)   Date Value   01/17/2025 4.0     Chloride (mmol/L)   Date Value   01/17/2025 105     Carbon Dioxide (mmol/L)   Date Value   01/17/2025 28     BUN (mg/dL)   Date Value   01/17/2025 14     Creatinine (mg/dL)   Date Value   01/17/2025 0.68       Lab Services on 12/30/2024   Component Date Value Ref Range Status    Sodium 12/30/2024 144  135 - 145 mmol/L Final    Potassium 12/30/2024 2.8 (L)  3.4 - 5.1 mmol/L Final    Result confirmed by repeat testing.    Chloride 12/30/2024 109  97 - 110 mmol/L Final    Carbon Dioxide 12/30/2024 29  21 - 32 mmol/L Final    Anion Gap 12/30/2024 9  7 - 19 mmol/L Final    Glucose 12/30/2024 134 (H)  70 -  99 mg/dL Final    BUN 12/30/2024 11  6 - 20 mg/dL Final    Creatinine 12/30/2024 0.82  0.51 - 0.95 mg/dL Final    Glomerular Filtration Rate 12/30/2024 79  >=60 Final    eGFR results = or >60 mL/min/1.73m2 = Normal kidney function. Estimated GFR calculated using the CKD-EPI-R (2021) equation that does not include race in the creatinine calculation.    BUN/Cr 12/30/2024 13  7 - 25 Final    Calcium 12/30/2024 9.0  8.4 - 10.2 mg/dL Final    Bilirubin, Total 12/30/2024 0.4  0.2 - 1.0 mg/dL Final    GOT/AST 12/30/2024 45 (H)  <=37 Units/L Final    GPT/ALT 12/30/2024 69 (H)  <64 Units/L Final    Alkaline Phosphatase 12/30/2024 61  45 - 117 Units/L Final    Albumin 12/30/2024 3.1 (L)  3.4 - 5.0 g/dL Final    Protein, Total 12/30/2024 7.0  6.4 - 8.2 g/dL Final    Globulin 12/30/2024 3.9  2.0 - 4.0 g/dL Final    A/G Ratio 12/30/2024 0.8 (L)  1.0 - 2.4 Final    ABO/RH(D) 12/30/2024 A Rh Positive   Final    ANTIBODY SCREEN 12/30/2024 Negative   Final    TYPE AND SCREEN EXPIRATION DATE 12/30/2024 01/02/2025 23:59   Final    WBC 12/30/2024 27.2 (H)  4.2 - 11.0 K/mcL Final    RBC 12/30/2024 2.28 (L)  4.00 - 5.20 mil/mcL Final    HGB 12/30/2024 7.2 (L)  12.0 - 15.5 g/dL Final    HCT 12/30/2024 21.5 (L)  36.0 - 46.5 % Final    MCV 12/30/2024 94.3  78.0 - 100.0 fl Final    MCH 12/30/2024 31.6  26.0 - 34.0 pg Final    MCHC 12/30/2024 33.5  32.0 - 36.5 g/dL Final    RDW-CV 12/30/2024 19.4 (H)  11.0 - 15.0 % Final    RDW-SD 12/30/2024 61.4 (H)  39.0 - 50.0 fL Final    PLT 12/30/2024 99 (L)  140 - 450 K/mcL Final    Neutrophil, Percent 12/30/2024 5  % Final    Lymphocytes, Percent 12/30/2024 35  % Final    Mono, Percent 12/30/2024 6  % Final    Eosinophils, Percent 12/30/2024 3  % Final    Bands, Percent 12/30/2024 3  0 - 10 % Final    Metamyelocytes, Percent  12/30/2024 2  0 - 2 % Final    Myelocytes, Percent 12/30/2024 14 (H)  <=0 % Final    Reactive Lymphocytes, Percent 12/30/2024 1  0 - 5 % Final    Blast, Percent 12/30/2024 31 (H)   <=0 % Final    Absolute Neutrophil 12/30/2024 2.2  1.8 - 7.7 K/mcL Final    Absolute Lymphocytes 12/30/2024 9.8 (H)  1.0 - 4.0 K/mcL Final    Absolute Monocytes 12/30/2024 1.6 (H)  0.3 - 0.9 K/mcL Final    Absolute Eosinophils 12/30/2024 0.8 (H)  0.0 - 0.5 K/mcL Final    WBC Morphology 12/30/2024 Abnormal (A)  Normal Final    Macrocytosis 12/30/2024 Few   Final    Microcytosis 12/30/2024 Few   Final    Large Platelets 12/30/2024 Present   Final    Polychromasia 12/30/2024 Few   Final           ASSESSMENT/PLAN:  66 y/o female with  AML, in relapse, after multiple lines of therapy  Pancytopenia due to AML and chemotherapy   Fatigue secondary to above  Hypokalemia, POA  Transaminitis, likely medication induced - high dose cytarabine, antifungal    Clofarabine/Cytarabine - Bridge Trial protocol - to transplant - initiated 12/31/2024 - DAY 18  BM biopsy day ~21 to assess disease status prior to transplant - Monday 1/20/2025  BMT service consulted - appreciate input - awaiting update from donor status  Transfusion goal hgb >7 and PLT >10  Monitor for TLS, Allopurinol 300mg/day  Prophylactic antimicrobial therapy: Cefpodoxime, valacyclovir, posaconazole    Refractory thrombocytopenia 2/2 above  Transfuse matched platelets when available  No further bleeding  PLT goal >10    C.diff - oral vanc initiated 1/8/2025  Once treatment course completed, transition to prophylactic dosing - anticipate tomorrow  Diarrhea resolved    Monitor electrolytes, supplement PRN    NO DVT prophylaxis due to severe thrombocytopenia    Remainder of plan per resident note    Osman Roth MD

## 2025-01-19 ENCOUNTER — MEDICAL CORRESPONDENCE (OUTPATIENT)
Dept: HEALTH INFORMATION MANAGEMENT | Facility: CLINIC | Age: 70
End: 2025-01-19

## 2025-01-21 ENCOUNTER — ANCILLARY PROCEDURE (OUTPATIENT)
Dept: GENERAL RADIOLOGY | Facility: CLINIC | Age: 70
End: 2025-01-21
Attending: NURSE PRACTITIONER
Payer: COMMERCIAL

## 2025-01-21 ENCOUNTER — OFFICE VISIT (OUTPATIENT)
Dept: NEUROSURGERY | Facility: CLINIC | Age: 70
End: 2025-01-21
Payer: OTHER MISCELLANEOUS

## 2025-01-21 VITALS — DIASTOLIC BLOOD PRESSURE: 62 MMHG | SYSTOLIC BLOOD PRESSURE: 110 MMHG | HEART RATE: 61 BPM | OXYGEN SATURATION: 100 %

## 2025-01-21 DIAGNOSIS — S32.001A LUMBAR BURST FRACTURE, CLOSED, INITIAL ENCOUNTER (H): ICD-10-CM

## 2025-01-21 DIAGNOSIS — S32.001A LUMBAR BURST FRACTURE, CLOSED, INITIAL ENCOUNTER (H): Primary | ICD-10-CM

## 2025-01-21 DIAGNOSIS — S32.001D OPEN BURST FRACTURE OF LUMBAR VERTEBRA WITH ROUTINE HEALING, SUBSEQUENT ENCOUNTER: ICD-10-CM

## 2025-01-21 PROCEDURE — 72100 X-RAY EXAM L-S SPINE 2/3 VWS: CPT | Mod: TC | Performed by: RADIOLOGY

## 2025-01-21 PROCEDURE — 99213 OFFICE O/P EST LOW 20 MIN: CPT | Performed by: NURSE PRACTITIONER

## 2025-01-21 PROCEDURE — 99214 OFFICE O/P EST MOD 30 MIN: CPT | Performed by: NURSE PRACTITIONER

## 2025-01-21 ASSESSMENT — PAIN SCALES - GENERAL: PAINLEVEL_OUTOF10: SEVERE PAIN (7)

## 2025-01-21 NOTE — LETTER
1/21/2025      Onur Barr  [address] [address] [address] [facility] [address]      Dear Colleague,    Thank you for referring your patient, Onur Barr, to the [facility]UROS[facility]ERY [facility] [place]. Please see a copy of my visit note below.    Neurosurgery Clinic 12 week follow up    Assessment:  Baudilio presents to clinic today for further evaluation of his L1 burst fracture.  He was last seen by my colleague on December 11, 2024.  Baudilio also has a history of previous lumbar surgery and chronic lumbar compression fractures.  Approximately 6 weeks ago P was unable to present to clinic in person because his pain was so severe.  I am happy to report that patient is now ambulatory with the assistance of a walker and his symptoms of low back pain seem to be improving.  Baudilio is still having pain but his pain pump has been turned off for almost 2 months at this point which could be contributing to his lingering symptoms.  Baudilio also endorses what sounds to be some right lower extremity radicular symptoms with unknown time onset.  Unclear if the symptoms are the result of his fracture or from his pain pump being turned off.  His most recent MRI was motion degraded and unable to determine if there is an area of stenosis correlating to his symptoms.  Baudilio says that his pain is improving and gets much better when he takes Aleve.  He reports that he has not been wearing his brace routinely.  X-ray reviewed and stable    Exam:  Patient is alert and oriented ambulating with the use of a walker.  He does have low back pain but states it is improved over the last 12 weeks.  5 out of 5 strength with hip flexion.  4 out of 5 strength with knee flexion.  5 out of 5 strength with plantar and dorsi flexion.  Decreased sensation in right foot compared to left at his baseline.  Not tender to palpation of lumbar spine.    Plan:  -Patient has already started weaning from brace, can continue process  -No additional follow-up  needed for L1 burst fracture  -Advised patient to follow-up with his pain team about restarting his pain pump and seeing if his symptoms improve.  If patient's right lower extremity symptoms do not improve he can inquire with his pain team about conservative treatment or he can make an appointment with our service to see if there is anything that can be done from a surgical standpoint.    Tod Celaya DNP  [address] [address]    Tel [phone]    Dragon Disclosure   This was created at least in part with a voice recognition software. Mistakes/typos may be present.          Again, thank you for allowing me to participate in the care of your patient.        Sincerely,        Tod Celaya CNP    Electronically signed

## 2025-01-21 NOTE — NURSING NOTE
Onur Barr is a 69 year old male who presents for:  Chief Complaint   Patient presents with    RECHECK     Follow-up for fracture        Vitals:    Vitals:    01/21/25 1055   BP: 110/62   Pulse: 61   SpO2: 100%       BMI:  Estimated body mass index is 36.62 kg/m  as calculated from the following:    Height as of 11/19/24: 6' (1.829 m).    Weight as of 12/24/24: 270 lb (122.5 kg).    Pain Score:  Severe Pain (7)        Raquel Scherer

## 2025-01-21 NOTE — PROGRESS NOTES
Neurosurgery Clinic 12 week follow up    Assessment:  Baudilio presents to clinic today for further evaluation of his L1 burst fracture.  He was last seen by my colleague on December 11, 2024.  Baudilio also has a history of previous lumbar surgery and chronic lumbar compression fractures.  Approximately 6 weeks ago P was unable to present to clinic in person because his pain was so severe.  I am happy to report that patient is now ambulatory with the assistance of a walker and his symptoms of low back pain seem to be improving.  Baudilio is still having pain but his pain pump has been turned off for almost 2 months at this point which could be contributing to his lingering symptoms.  Baudilio also endorses what sounds to be some right lower extremity radicular symptoms with unknown time onset.  Unclear if the symptoms are the result of his fracture or from his pain pump being turned off.  His most recent MRI was motion degraded and unable to determine if there is an area of stenosis correlating to his symptoms.  Baudilio says that his pain is improving and gets much better when he takes Aleve.  He reports that he has not been wearing his brace routinely.  X-ray reviewed and stable    Exam:  Patient is alert and oriented ambulating with the use of a walker.  He does have low back pain but states it is improved over the last 12 weeks.  5 out of 5 strength with hip flexion.  4 out of 5 strength with knee flexion.  5 out of 5 strength with plantar and dorsi flexion.  Decreased sensation in right foot compared to left at his baseline.  Not tender to palpation of lumbar spine.    Plan:  -Patient has already started weaning from brace, can continue process  -No additional follow-up needed for L1 burst fracture  -Advised patient to follow-up with his pain team about restarting his pain pump and seeing if his symptoms improve.  If patient's right lower extremity symptoms do not improve he can inquire with his pain team about conservative  treatment or he can make an appointment with our service to see if there is anything that can be done from a surgical standpoint.    Tod Celaya, RACHEL  [address] [address]    Tel [phone]    Dragon Disclosure   This was created at least in part with a voice recognition software. Mistakes/typos may be present.

## 2025-01-24 DIAGNOSIS — G89.4 CHRONIC PAIN SYNDROME: ICD-10-CM

## 2025-01-27 RX ORDER — DULOXETIN HYDROCHLORIDE 30 MG/1
CAPSULE, DELAYED RELEASE ORAL
Qty: 90 CAPSULE | Refills: 0 | OUTPATIENT
Start: 2025-01-27

## 2025-01-27 NOTE — PROGRESS NOTES
Oncology/Hematology Visit Note  Jan 28, 2025    Reason for Visit: Follow up of CLL    History of Present Illness: Onur Barr is a 69 year old male with PMH CHF, MARLEEN, NSTEMI s/p CABG Jan 2022, paroxysmal afib, ICD placement, HTN, hyperlipidemia, GERD, chronic back pain with pain pump, post-laminectomy syndrome, severe kyphosis, DM, obesity with hx gastric surgery, PVD, osteoarthritis, nephrolithiasis who we are following for CLL.    He was found to have leukocytosis in April 2023. Flow cytometry with CD5-positive kappa-monotypic B cells consistent with CLL. The monotypic B cell population is positive for CD38 and negative for CD49d.    He has been on observation for CLL.     Interval History:  Baudilio accompanied by wife Krystal in clinic today. Overall reports that he has been feeling well. Appetite is good and bowels are moving. Reports that he has been urinating more frequently. Mobility is improving s/p lumbar burst fx (followed by neurosurg). Reports that his intrathecal pain pump has not been restarted and has been managing pain w/ PO morphine and Aleve. Denies constitutional symptoms including fevers, weight loss, or night sweats.     Review of Systems:  Patient denies fevers, chills, night sweats, unexplained weight changes, headaches, dizziness, vision or hearing changes, new lumps or bumps, chest pain, shortness of breath, cough, abdominal pain, nausea, vomiting, changes to bowel or bladder, swelling of extremities, bleeding issues, or rash.    Current Outpatient Medications   Medication Sig Dispense Refill    acetaminophen (TYLENOL) 325 MG tablet Take 3 tablets (975 mg) by mouth every 6 hours. 360 tablet 0    aspirin (ASA) 81 MG chewable tablet Take 1 tablet (81 mg) by mouth daily. 30 tablet 0    bisacodyl (DULCOLAX) 10 MG suppository Place 1 suppository (10 mg) rectally daily as needed for constipation. 6 suppository 0    [facility] IRON 240 (27 Fe) MG TABS Take 1 tablet by mouth daily. 90 tablet 2    [facility] IRON  240 (27 Fe) MG TABS Take 1 tablet by mouth daily. 30 tablet 0    diclofenac (VOLTAREN) 1 % topical gel Apply 2 g topically 4 times daily. 300 g 0    DULoxetine (CYMBALTA) 30 MG capsule Take 1 capsule (30mg) every morning and 2 capsules (60mg) every evening 90 capsule 0    furosemide (LASIX) 40 MG tablet Take 1 tablet (40 mg) by mouth 2 times daily. 180 tablet 3    gabapentin (NEURONTIN) 100 MG capsule Take 3 capsules (300 mg) by mouth 3 times daily. Hold sedation, confusion      gabapentin (NEURONTIN) 300 MG capsule Take 1 capsule (300 mg) by mouth 3 times daily. 270 capsule 0    HYDROmorphone (DILAUDID) 2 MG tablet Take 1-2 tablets (2-4 mg) by mouth every 8 hours as needed for severe pain. 90 tablet 0    hydrOXYzine HCl (ATARAX) 25 MG tablet Take 1 tablet (25 mg) by mouth 3 times daily as needed for other (pain). 60 tablet 0    ketoconazole (NIZORAL) 2 % external cream Apply topically 2 times daily. to affected area 30 g 0    medication given by implanted intrathecal pump continuously. Updated by PharmD 10/31/24    Drug # 1: Morphine (Duramorph or Infumorph)  - Conc:20 mg/mL - Total Dose / 24 hours: 4.342 mg    Drug # 2: Clonidine (Duraclon)  - Conc:21.1 mcg/mL - Total Dose / 24 hours: 4.580 mcg    Drug # 3: Baclofen (Lioresal) - Conc: 42.5 mcg/mL - Total Dose / 24 hours: 9.226 mcg    Diluent: NS    Infusion Rate: 0.2171 mL/24 hrs  Pump Reservoir Volume: 40 mL  Outside Clinic & Provider: [facility] [phone]  Last Refill Date: 07/12/2024  Next Refill Date: 01/01/2025  Low [place] Alarm Date: 01/01/2025  Pump : [employer] Synchromed II    Please consider consulting the pain team if the patient is admitted with an IT pump.      methocarbamol (ROBAXIN) 750 MG tablet Take 1 tablet (750 mg) by mouth 4 times daily as needed for muscle spasms. 90 tablet 0    methocarbamol (ROBAXIN) 750 MG tablet Take 1 tablet (750 mg) by mouth 4 times daily. 120 tablet 0    metoprolol succinate ER (TOPROL XL)  100 MG 24 hr tablet Take 1 tablet (100 mg) by mouth 2 times daily. 90 tablet 3    naloxone (NARCAN) 4 MG/0.1ML nasal spray Spray 1 spray (4 mg) into one nostril alternating nostrils as needed for opioid reversal. every 2-3 minutes until assistance arrives 2 each 2    nitroGLYcerin (NITROSTAT) 0.4 MG sublingual tablet For chest pain place 1 tablet under the tongue every 5 minutes for 3 doses. If symptoms persist 5 minutes after 1st dose call 911. 9 tablet 0    ondansetron (ZOFRAN ODT) 4 MG ODT tab Take 1 tablet (4 mg) by mouth every 8 hours as needed for nausea. 12 tablet 0    polyethylene glycol (MIRALAX) 17 GM/Dose powder Take 17 g by mouth daily. 510 g 0    potassium chloride isai ER (KLOR-CON M20) 20 MEQ CR tablet Take 2 tablets (40 mEq) by mouth daily. 90 tablet 0    rosuvastatin (CRESTOR) 20 MG tablet Take 1 tablet (20 mg) by mouth daily. 90 tablet 3    senna-docusate (SENOKOT-S/PERICOLACE) 8.6-50 MG tablet Take 2 tablets by mouth 2 times daily. Hold for loose stools. 120 tablet 0    sotalol (BETAPACE) 80 MG tablet Take 1 tablet (80 mg) by mouth 2 times daily. 180 tablet 3    traZODone (DESYREL) 100 MG tablet Take 2 tablets (200 mg) by mouth at bedtime. TAKE 2 TABLETS (200MG TOTAL) BY MOUTH AT BEDTIME Strength: 100 mg 60 tablet 0       Past Medical History  Past Medical History:   Diagnosis Date    Bariatric surgery status 06/23/2008    Formatting of this note might be different from the original. Created by Conversion    Bilateral leg edema 07/13/2021    Chronic Back Pain (IT Pump w Morphine, Clonidine, Baclofen) 01/16/2022    Chronic diastolic heart failure (H) 07/26/2021    Claudication of lower extremity 11/20/2019    CLL (chronic lymphocytic leukemia) (H)     Coronary artery disease     CABG 1-    Depressive disorder     Essential hypertension     Created by Conversion [facility] Annotation: Apr 6 2012 10:16KINSEY - Zack Gutierrez: Lisinipril,  coreg  Replacement Utility updated for latest IMO load     Gastroesophageal reflux disease without esophagitis 09/11/2021    H/O gastric bypass 2008    History of blood transfusion 2004    ICD (implantable cardioverter-defibrillator) in place 01/25/2022    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    Ischemic cardiomyopathy     Lumbago     Created by [facility] [employer] Annotation: Apr 6 2012 10:20AM - Anh Dicskon: Morphine pump     Mixed hyperlipidemia 06/23/2008    Morbid obesity (H) 08/01/2018    Nephrolithiasis     NSTEMI (non-ST elevated myocardial infarction) (H)     MARLEEN (doesn't tolerate CPAP) 07/26/2021    Osteoarthritis     Created by [facility] Annotation: Jun 26 2009  9:53AM - Zack Gutierrez: failed lumbar  fusion/morphine pump dr putnam  Replacement Utility updated for latest IMO load    Paroxysmal atrial fib -- S/P Pulm Vein Ablation 1/19/22 09/11/2021    Formatting of this note might be different from the original. Created by Conversion    Paroxysmal ventricular tachycardia (H)     dual chamber ICD 1/24/2022    Peripheral vascular disease 05/03/2022    Post-laminectomy syndrome 11/25/2015    S/P CABG (coronary artery bypass graft) 01/25/2022    Type 2 diabetes mellitus (H)     Diet controlled after Gastric Bypass in 2008     Past Surgical History:   Procedure Laterality Date    BACK SURGERY      BYPASS GASTRIC DUODENAL SWITCH      BYPASS GRAFT ARTERY CORONARY N/A 01/19/2022    Procedure: CORONARY ARTERY BYPASS GRAFT (CABG) X1; LIMA -LAD.  PULMONARY VEIN ISOLATION, AND ATRIAL APPENDAGE CLIPPING USING 45MM ACTICURE CLIP.;  Surgeon: William Dumont MD;  Location: [facility] OR    COLONOSCOPY      COSMETIC SURGERY      pannicullectomy    CV CORONARY ANGIOGRAM N/A 09/11/2021    Procedure: Coronary Angiogram;  Surgeon: Noris Ozuna MD;  Location: [facility] CATH LAB CV    CV HEART CATHETERIZATION WITH POSSIBLE INTERVENTION N/A 01/13/2022    Procedure: Heart Catheterization with Possible Intervention;   Surgeon: Liz Pearl MD;  Location:  HEART CARDIAC CATH LAB    CV LEFT HEART CATH N/A 09/11/2021    Procedure: Left Heart Cath;  Surgeon: Noris Ozuna MD;  Location: [facility] CV    CV PCI N/A 09/11/2021    Procedure: Percutaneous Coronary Intervention;  Surgeon: Noris Ozuna MD;  Location: [facility] CV    CV PCI N/A 10/07/2021    Procedure: Percutaneous Coronary Intervention;  Surgeon: Mckayla Dan MD;  Location: [facility] CV    CV PCI ASPIRATION THROMECTOMY N/A 09/11/2021    Procedure: Percutaneous Coronary Intervention Aspiration Thrombectomy;  Surgeon: Noris Ozuna MD;  Location: [facility]    ENT SURGERY      tonsillectomy    EP ICD N/A 01/24/2022    Procedure: EP ICD;  Surgeon: Jannette Crook MD;  Location:  HEART CARDIAC CATH LAB    EXTRACORPOREAL SHOCK WAVE LITHOTRIPSY, CYSTOSCOPY, INSERT STENT URETER(S), COMBINED  08/23/2011    HC REMOVAL OF TONSILS,<12 Y/O      Description: Tonsillectomy;  Recorded: 03/23/2012;  Comments: for obstructive sleep apnea    HERNIA REPAIR      IR MISCELLANEOUS PROCEDURE  07/29/2011    IR MISCELLANEOUS PROCEDURE  08/05/2011    IR MISCELLANEOUS PROCEDURE  08/23/2011    IR NEPHROSTOMY TUBE CHANGE BILATERAL  08/05/2011    ORTHOPEDIC SURGERY      arthrodesis ant discectomy, lumbar    OR ARTHRODESIS ANT INTERBODY MIN DISCECTOMY,LUMBAR      Description: Lumbar Vertebral Fusion;  Recorded: 06/26/2009;  Comments: before 200 see [facility] consult under old records    OR EXCISE EXCESS SKIN TISSUE,ABDOMEN  11/13/2012    Description: Panniculectomy;  Proc Date: 11/13/2012;  Comments: 3.5 Lb Pannus was removed at the [facility] By Dr. Shon Green    REPLACE INTRATHECAL PAIN PUMP N/A 04/25/2017    Procedure: REPLACE INTRATHECAL PAIN PUMP;  INTRATHECAL PAIN PUMP CATHETER REPLACEMENT AND PUMP REPLACEMENT;  Surgeon: Anthony Maloney MD;  Location: [facility]    REPLACE INTRATHECAL PAIN PUMP N/A 4/20/2023    Procedure: Pump Replacement and  intrathecal catheter replacement;  Surgeon: Anthony Dick MD;  Location:  OR    REVISE CATHETER INTRATHECAL N/A 04/25/2017    Procedure: REVISE CATHETER INTRATHECAL;;  Surgeon: Anthony Maloney MD;  Location: [facility]    SHOULDER SURGERY      ZZC GASTRIC BYPASS,OBESE<100CM LORA-EN-Y  01/01/2008    Description: Gastric Surgery For Morbid Obesity Bypass With Lora-en-Y;  Recorded: 06/26/2009;  Comments: dr green 2008    [facility] REPAIR INCISIONAL HERNIA,REDUCIBLE  11/13/2012    Description: Incisional Hernia Repair;  Proc Date: 11/13/2012;  Comments: incisional hernia repair and abdominal panniculectomy by Dr Shon Green at the [facility].     Allergies   Allergen Reactions    Hydrocodone-Acetaminophen Other (See Comments)     sneezing     Social History   Social History     Tobacco Use    Smoking status: Former     Types: Cigars     Passive exposure: Never    Smokeless tobacco: Never    Tobacco comments:     Haven't smoked in years   Vaping Use    Vaping status: Never Used   Substance Use Topics    Alcohol use: No    Drug use: No     Comment: Drug use: formerly used      Past medical history and social history were reviewed.    Physical Examination:  /62   Pulse 59   Temp 97.5  F (36.4  C) (Tympanic)   Resp 20   Wt 125.3 kg (276 lb 3.2 oz)   SpO2 96%   BMI 37.46 kg/m    Wt Readings from Last 10 Encounters:   01/28/25 125.3 kg (276 lb 3.2 oz)   12/24/24 122.5 kg (270 lb)   11/19/24 124.7 kg (275 lb)   11/18/24 125.1 kg (275 lb 12.8 oz)   11/14/24 123.8 kg (273 lb)   11/11/24 122.9 kg (271 lb)   11/07/24 122.9 kg (271 lb)   10/31/24 123.2 kg (271 lb 9.7 oz)   10/30/24 121.6 kg (268 lb)   10/14/24 124.4 kg (274 lb 3.2 oz)     Constitutional: Well-appearing male in no acute distress.  Eyes: EOMI, PERRL. No scleral icterus.  ENT: Oral mucosa is moist without lesions or thrush.   Lymphatic: Neck is supple without cervical or supraclavicular lymphadenopathy.   Cardiovascular: Regular rate and rhythm. No  murmurs, gallops, or rubs. No peripheral edema.  Respiratory: Clear to auscultation bilaterally. No wheezes or crackles.  Gastrointestinal: Bowel sounds present. Abdomen soft, non-tender. Difficult to assess splenomegaly.   Neurologic: Cranial nerves II through XII are grossly intact.  Skin: No rashes, petechiae, or bruising noted on exposed skin.  Musk: significant kyphosis     Laboratory Data:   Latest Reference Range & Units 01/28/25 13:02   Sodium 135 - 145 mmol/L 141   Potassium 3.4 - 5.3 mmol/L 4.4   Chloride 98 - 107 mmol/L 102   Carbon Dioxide (CO2) 22 - 29 mmol/L 26   Urea Nitrogen 8.0 - 23.0 mg/dL 24.0 (H)   Creatinine 0.67 - 1.17 mg/dL 1.40 (H)   GFR Estimate >60 mL/min/1.73m2 55 (L)   Calcium 8.8 - 10.4 mg/dL 8.7 (L)   Anion Gap 7 - 15 mmol/L 13   Albumin 3.5 - 5.2 g/dL 3.8   Protein Total 6.4 - 8.3 g/dL 7.1   Alkaline Phosphatase 40 - 150 U/L 110   ALT 0 - 70 U/L 21   AST 0 - 45 U/L 24   Bilirubin Total <=1.2 mg/dL 0.4   Glucose 70 - 99 mg/dL 80   WBC 4.0 - 11.0 10e3/uL 26.0 (H) (P)   Hemoglobin 13.3 - 17.7 g/dL 11.6 (L) (P)   Hematocrit 40.0 - 53.0 % 38.7 (L) (P)   Platelet Count 150 - 450 10e3/uL 266 (P)   RBC Count 4.40 - 5.90 10e6/uL 4.26 (L) (P)   MCV 78 - 100 fL 91 (P)   MCH 26.5 - 33.0 pg 27.2 (P)   MCHC 31.5 - 36.5 g/dL 30.0 (L) (P)   RDW 10.0 - 15.0 % 14.3 (P)   % Neutrophils % 20 (P)   % Lymphocytes % 67 (P)   % Monocytes % 11 (P)   % Eosinophils % 2 (P)   % Basophils % 0 (P)   Absolute Basophils 0.0 - 0.2 10e3/uL 0.0 (P)   Absolute Neutrophil 1.6 - 8.3 10e3/uL 5.2 (P)   Absolute Lymphocytes 0.8 - 5.3 10e3/uL 17.4 (H) (P)   Absolute Monocytes 0.0 - 1.3 10e3/uL 2.9 (H) (P)   Absolute Eosinophils 0.0 - 0.7 10e3/uL 0.5 (P)   RBC Morphology  Confirmed RBC Indices (P)   Platelet Morphology Automated Count Confirmed. Platelet morphology is normal.  Automated Count Confirmed. Platelet morphology is normal. (P)   Reactive Lymphs None Seen  Present ! (P)   (H): Data is abnormally high  (L): Data is  abnormally low  !: Data is abnormal  (P): Preliminary      Assessment and Plan:  #CLL  - Negative for signs/symptoms that would be concerning for disease progression/need for treatment including fevers, night sweats, weight loss, or lymphadenopathy.   - CBC reviewed. WBC stable at 26. Hgb is slightly low but improving from recent hospitalization. Plt WNL.  - No indication to start CLL treatment at this time  - Plan to continue w/ q3mo appointments to evaluate for disease progression + CBC, CMP    #CAROLYN  - Creatine 1.4 in clinic today. Most likely differential dx renal toxicity from naproxen as pt reports taking consistently for pain control. Less likely diagnoses include UTI as pt reports increased urination but denies any pain or burning and dehydration although pt reports adequate fluid intake.   - Educated pt to stop taking Aleve for pain management and utilize Tylenol instead. Provided education that pt may take up to 3000 mg/day.  - Pt unable to produce urine sample so unable to r/o UTI. Educated pt to notify clinic if s/s such as increased urgency, pain, or burning w/ urination develops.  - Pt has appointment w/ MD Braden on 2/28. Will reach out to provider for follow up re: CAROLYN.    Jannet Reyes, NP Student    I saw the patient in conjunction with Jannet Reyes NP student and agree with her assessment and documentation.     25 minutes spent on the date of the encounter doing chart review, review of test results, interpretation of tests, patient visit, and documentation     Zuhair Prather PA-C  Department of Hematology and Oncology  [facility]

## 2025-01-28 ENCOUNTER — LAB (OUTPATIENT)
Dept: ONCOLOGY | Facility: CLINIC | Age: 70
End: 2025-01-28
Attending: PHYSICIAN ASSISTANT
Payer: COMMERCIAL

## 2025-01-28 VITALS
TEMPERATURE: 97.5 F | WEIGHT: 276.2 LBS | HEART RATE: 59 BPM | BODY MASS INDEX: 37.46 KG/M2 | SYSTOLIC BLOOD PRESSURE: 101 MMHG | OXYGEN SATURATION: 96 % | DIASTOLIC BLOOD PRESSURE: 62 MMHG | RESPIRATION RATE: 20 BRPM

## 2025-01-28 DIAGNOSIS — C91.10 CLL (CHRONIC LYMPHOCYTIC LEUKEMIA) (H): ICD-10-CM

## 2025-01-28 DIAGNOSIS — C91.10 CLL (CHRONIC LYMPHOCYTIC LEUKEMIA) (H): Primary | ICD-10-CM

## 2025-01-28 DIAGNOSIS — N28.9 RENAL INSUFFICIENCY: ICD-10-CM

## 2025-01-28 LAB
ALBUMIN SERPL BCG-MCNC: 3.8 G/DL (ref 3.5–5.2)
ALP SERPL-CCNC: 110 U/L (ref 40–150)
ALT SERPL W P-5'-P-CCNC: 21 U/L (ref 0–70)
ANION GAP SERPL CALCULATED.3IONS-SCNC: 13 MMOL/L (ref 7–15)
AST SERPL W P-5'-P-CCNC: 24 U/L (ref 0–45)
BILIRUB SERPL-MCNC: 0.4 MG/DL
BUN SERPL-MCNC: 24 MG/DL (ref 8–23)
CALCIUM SERPL-MCNC: 8.7 MG/DL (ref 8.8–10.4)
CHLORIDE SERPL-SCNC: 102 MMOL/L (ref 98–107)
CREAT SERPL-MCNC: 1.4 MG/DL (ref 0.67–1.17)
EGFRCR SERPLBLD CKD-EPI 2021: 55 ML/MIN/1.73M2
GLUCOSE SERPL-MCNC: 80 MG/DL (ref 70–99)
HCO3 SERPL-SCNC: 26 MMOL/L (ref 22–29)
POTASSIUM SERPL-SCNC: 4.4 MMOL/L (ref 3.4–5.3)
PROT SERPL-MCNC: 7.1 G/DL (ref 6.4–8.3)
SODIUM SERPL-SCNC: 141 MMOL/L (ref 135–145)

## 2025-01-28 PROCEDURE — 80048 BASIC METABOLIC PNL TOTAL CA: CPT | Performed by: INTERNAL MEDICINE

## 2025-01-28 PROCEDURE — 36415 COLL VENOUS BLD VENIPUNCTURE: CPT

## 2025-01-28 PROCEDURE — 85041 AUTOMATED RBC COUNT: CPT | Performed by: INTERNAL MEDICINE

## 2025-01-28 PROCEDURE — 85007 BL SMEAR W/DIFF WBC COUNT: CPT | Performed by: INTERNAL MEDICINE

## 2025-01-28 PROCEDURE — 99214 OFFICE O/P EST MOD 30 MIN: CPT | Performed by: PHYSICIAN ASSISTANT

## 2025-01-28 PROCEDURE — 99213 OFFICE O/P EST LOW 20 MIN: CPT | Performed by: PHYSICIAN ASSISTANT

## 2025-01-28 PROCEDURE — 85014 HEMATOCRIT: CPT | Performed by: INTERNAL MEDICINE

## 2025-01-28 PROCEDURE — 82040 ASSAY OF SERUM ALBUMIN: CPT | Performed by: INTERNAL MEDICINE

## 2025-01-28 ASSESSMENT — PAIN SCALES - GENERAL: PAINLEVEL_OUTOF10: SEVERE PAIN (7)

## 2025-01-28 NOTE — LETTER
1/28/2025      Onur Barr  [id number] [facility] [id number]      Dear Colleague,    Thank you for referring your patient, Onur Barr, to the [facility]. Please see a copy of my visit note below.    Oncology/Hematology Visit Note  Jan 28, 2025    Reason for Visit: Follow up of CLL    History of Present Illness: Onur Barr is a 69 year old male with PMH CHF, MARLEEN, NSTEMI s/p CABG Jan 2022, paroxysmal afib, ICD placement, HTN, hyperlipidemia, GERD, chronic back pain with pain pump, post-laminectomy syndrome, severe kyphosis, DM, obesity with hx gastric surgery, PVD, osteoarthritis, nephrolithiasis who we are following for CLL.    He was found to have leukocytosis in April 2023. Flow cytometry with CD5-positive kappa-monotypic B cells consistent with CLL. The monotypic B cell population is positive for CD38 and negative for CD49d.    He has been on observation for CLL.     Interval History:  Baudilio accompanied by wife Krystal in clinic today. Overall reports that he has been feeling well. Appetite is good and bowels are moving. Reports that he has been urinating more frequently. Mobility is improving s/p lumbar burst fx (followed by neurosurg). Reports that his intrathecal pain pump has not been restarted and has been managing pain w/ PO morphine and Aleve. Denies constitutional symptoms including fevers, weight loss, or night sweats.     Review of Systems:  Patient denies fevers, chills, night sweats, unexplained weight changes, headaches, dizziness, vision or hearing changes, new lumps or bumps, chest pain, shortness of breath, cough, abdominal pain, nausea, vomiting, changes to bowel or bladder, swelling of extremities, bleeding issues, or rash.    Current Outpatient Medications   Medication Sig Dispense Refill     acetaminophen (TYLENOL) 325 MG tablet Take 3 tablets (975 mg) by mouth every 6 hours. 360 tablet 0     aspirin (ASA) 81 MG chewable tablet Take 1 tablet (81  mg) by mouth daily. 30 tablet 0     bisacodyl (DULCOLAX) 10 MG suppository Place 1 suppository (10 mg) rectally daily as needed for constipation. 6 suppository 0     [facility] IRON 240 (27 Fe) MG TABS Take 1 tablet by mouth daily. 90 tablet 2     [facility] IRON 240 (27 Fe) MG TABS Take 1 tablet by mouth daily. 30 tablet 0     diclofenac (VOLTAREN) 1 % topical gel Apply 2 g topically 4 times daily. 300 g 0     DULoxetine (CYMBALTA) 30 MG capsule Take 1 capsule (30mg) every morning and 2 capsules (60mg) every evening 90 capsule 0     furosemide (LASIX) 40 MG tablet Take 1 tablet (40 mg) by mouth 2 times daily. 180 tablet 3     gabapentin (NEURONTIN) 100 MG capsule Take 3 capsules (300 mg) by mouth 3 times daily. Hold sedation, confusion       gabapentin (NEURONTIN) 300 MG capsule Take 1 capsule (300 mg) by mouth 3 times daily. 270 capsule 0     HYDROmorphone (DILAUDID) 2 MG tablet Take 1-2 tablets (2-4 mg) by mouth every 8 hours as needed for severe pain. 90 tablet 0     hydrOXYzine HCl (ATARAX) 25 MG tablet Take 1 tablet (25 mg) by mouth 3 times daily as needed for other (pain). 60 tablet 0     ketoconazole (NIZORAL) 2 % external cream Apply topically 2 times daily. to affected area 30 g 0     medication given by implanted intrathecal pump continuously. Updated by PharmD 10/31/24    Drug # 1: Morphine (Duramorph or Infumorph)  - Conc:20 mg/mL - Total Dose / 24 hours: 4.342 mg    Drug # 2: Clonidine (Duraclon)  - Conc:21.1 mcg/mL - Total Dose / 24 hours: 4.580 mcg    Drug # 3: Baclofen (Lioresal) - Conc: 42.5 mcg/mL - Total Dose / 24 hours: 9.226 mcg    Diluent: NS    Infusion Rate: 0.2171 mL/24 hrs  Pump Reservoir Volume: 40 mL  Outside Clinic & Provider: [facility] [phone]  Last Refill Date: 07/12/2024  Next Refill Date: 01/01/2025  Low [place] Alarm Date: 01/01/2025  Pump : Medtronic Synchromed II    Please consider consulting the pain team if the patient is admitted with an IT pump.        methocarbamol (ROBAXIN) 750 MG tablet Take 1 tablet (750 mg) by mouth 4 times daily as needed for muscle spasms. 90 tablet 0     methocarbamol (ROBAXIN) 750 MG tablet Take 1 tablet (750 mg) by mouth 4 times daily. 120 tablet 0     metoprolol succinate ER (TOPROL XL) 100 MG 24 hr tablet Take 1 tablet (100 mg) by mouth 2 times daily. 90 tablet 3     naloxone (NARCAN) 4 MG/0.1ML nasal spray Spray 1 spray (4 mg) into one nostril alternating nostrils as needed for opioid reversal. every 2-3 minutes until assistance arrives 2 each 2     nitroGLYcerin (NITROSTAT) 0.4 MG sublingual tablet For chest pain place 1 tablet under the tongue every 5 minutes for 3 doses. If symptoms persist 5 minutes after 1st dose call 911. 9 tablet 0     ondansetron (ZOFRAN ODT) 4 MG ODT tab Take 1 tablet (4 mg) by mouth every 8 hours as needed for nausea. 12 tablet 0     polyethylene glycol (MIRALAX) 17 GM/Dose powder Take 17 g by mouth daily. 510 g 0     potassium chloride isai ER (KLOR-CON M20) 20 MEQ CR tablet Take 2 tablets (40 mEq) by mouth daily. 90 tablet 0     rosuvastatin (CRESTOR) 20 MG tablet Take 1 tablet (20 mg) by mouth daily. 90 tablet 3     senna-docusate (SENOKOT-S/PERICOLACE) 8.6-50 MG tablet Take 2 tablets by mouth 2 times daily. Hold for loose stools. 120 tablet 0     sotalol (BETAPACE) 80 MG tablet Take 1 tablet (80 mg) by mouth 2 times daily. 180 tablet 3     traZODone (DESYREL) 100 MG tablet Take 2 tablets (200 mg) by mouth at bedtime. TAKE 2 TABLETS (200MG TOTAL) BY MOUTH AT BEDTIME Strength: 100 mg 60 tablet 0       Past Medical History  Past Medical History:   Diagnosis Date     Bariatric surgery status 06/23/2008    Formatting of this note might be different from the original. Created by Conversion     Bilateral leg edema 07/13/2021     Chronic Back Pain (IT Pump w Morphine, Clonidine, Baclofen) 01/16/2022     Chronic diastolic heart failure (H) 07/26/2021     Claudication of lower extremity 11/20/2019     CLL (chronic  lymphocytic leukemia) (H)      Coronary artery disease     CABG 1-     Depressive disorder      Essential hypertension     Created by [employer] [facility] Annotation: Apr 6 2012 10:16Zack Harris: Lisinipril,  coreg  Replacement Utility updated for latest IMO load     Gastroesophageal reflux disease without esophagitis 09/11/2021     H/O gastric bypass 2008     History of blood transfusion 2004     ICD (implantable cardioverter-defibrillator) in place 01/25/2022     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     Ischemic cardiomyopathy      Lumbago     Created by [employer] [facility] Annotation: Apr 6 2012 10:20Anh Ford: Morphine pump      Mixed hyperlipidemia 06/23/2008     Morbid obesity (H) 08/01/2018     Nephrolithiasis      NSTEMI (non-ST elevated myocardial infarction) (H)      MARLEEN (doesn't tolerate CPAP) 07/26/2021     Osteoarthritis     Created by [employer] [facility] Annotation: Jun 26 2009  9:53Zack Harris: failed lumbar  fusion/morphine pump dr putnam  Replacement Utility updated for latest IMO load     Paroxysmal atrial fib -- S/P Pulm Vein Ablation 1/19/22 09/11/2021    Formatting of this note might be different from the original. Created by Conversion     Paroxysmal ventricular tachycardia (H)     dual chamber ICD 1/24/2022     Peripheral vascular disease 05/03/2022     Post-laminectomy syndrome 11/25/2015     S/P CABG (coronary artery bypass graft) 01/25/2022     Type 2 diabetes mellitus (H)     Diet controlled after Gastric Bypass in 2008     Past Surgical History:   Procedure Laterality Date     BACK SURGERY       BYPASS GASTRIC DUODENAL SWITCH       BYPASS GRAFT ARTERY CORONARY N/A 01/19/2022    Procedure: CORONARY ARTERY BYPASS GRAFT (CABG) X1; LIMA -LAD.  PULMONARY VEIN ISOLATION, AND ATRIAL APPENDAGE CLIPPING USING 45MM ACTICURE CLIP.;  Surgeon: William Dumont MD;  Location: [facility] OR     COLONOSCOPY        COSMETIC SURGERY      pannicullectomy     CV CORONARY ANGIOGRAM N/A 09/11/2021    Procedure: Coronary Angiogram;  Surgeon: Noris Ozuna MD;  Location: [facility] CV     CV HEART CATHETERIZATION WITH POSSIBLE INTERVENTION N/A 01/13/2022    Procedure: Heart Catheterization with Possible Intervention;  Surgeon: Liz Pearl MD;  Location:  HEART CARDIAC CATH LAB     CV LEFT HEART CATH N/A 09/11/2021    Procedure: Left Heart Cath;  Surgeon: Noris Ozuna MD;  Location: [facility] CV     CV PCI N/A 09/11/2021    Procedure: Percutaneous Coronary Intervention;  Surgeon: Noris Ozuna MD;  Location: [facility] CATH LAB CV     CV PCI N/A 10/07/2021    Procedure: Percutaneous Coronary Intervention;  Surgeon: Mckayla Dan MD;  Location: [facility] CV     CV PCI ASPIRATION THROMECTOMY N/A 09/11/2021    Procedure: Percutaneous Coronary Intervention Aspiration Thrombectomy;  Surgeon: Noris Ozuna MD;  Location: [facility]     ENT SURGERY      tonsillectomy     EP ICD N/A 01/24/2022    Procedure: EP ICD;  Surgeon: Jannette Crook MD;  Location:  HEART CARDIAC CATH LAB     EXTRACORPOREAL SHOCK WAVE LITHOTRIPSY, CYSTOSCOPY, INSERT STENT URETER(S), COMBINED  08/23/2011     HC REMOVAL OF TONSILS,<11 Y/O      Description: Tonsillectomy;  Recorded: 03/23/2012;  Comments: for obstructive sleep apnea     HERNIA REPAIR       IR MISCELLANEOUS PROCEDURE  07/29/2011     IR MISCELLANEOUS PROCEDURE  08/05/2011     IR MISCELLANEOUS PROCEDURE  08/23/2011     IR NEPHROSTOMY TUBE CHANGE BILATERAL  08/05/2011     ORTHOPEDIC SURGERY      arthrodesis ant discectomy, lumbar     MT ARTHRODESIS ANT INTERBODY MIN DISCECTOMY,LUMBAR      Description: Lumbar Vertebral Fusion;  Recorded: 06/26/2009;  Comments: before 200 see [facility] consult under old records     MT EXCISE EXCESS SKIN TISSUE,ABDOMEN  11/13/2012    Description: Panniculectomy;  Proc Date: 11/13/2012;  Comments: 3.5 Lb Pannus was removed at the  [facility] By Dr. Shon Green     REPLACE INTRATHECAL PAIN PUMP N/A 04/25/2017    Procedure: REPLACE INTRATHECAL PAIN PUMP;  INTRATHECAL PAIN PUMP CATHETER REPLACEMENT AND PUMP REPLACEMENT;  Surgeon: Anthony Maloney MD;  Location: [facility]     REPLACE INTRATHECAL PAIN PUMP N/A 4/20/2023    Procedure: Pump Replacement and intrathecal catheter replacement;  Surgeon: Anthony Dick MD;  Location:  OR     REVISE CATHETER INTRATHECAL N/A 04/25/2017    Procedure: REVISE CATHETER INTRATHECAL;;  Surgeon: Anthony Maloney MD;  Location:  [facility]     SHOULDER SURGERY       ZZC GASTRIC BYPASS,OBESE<100CM LORA-EN-Y  01/01/2008    Description: Gastric Surgery For Morbid Obesity Bypass With Lora-en-Y;  Recorded: 06/26/2009;  Comments: dr green 2008     [facility] REPAIR INCISIONAL HERNIA,REDUCIBLE  11/13/2012    Description: Incisional Hernia Repair;  Proc Date: 11/13/2012;  Comments: incisional hernia repair and abdominal panniculectomy by Dr Shon Green at the [facility].     Allergies   Allergen Reactions     Hydrocodone-Acetaminophen Other (See Comments)     sneezing     Social History   Social History     Tobacco Use     Smoking status: Former     Types: Cigars     Passive exposure: Never     Smokeless tobacco: Never     Tobacco comments:     Haven't smoked in years   Vaping Use     Vaping status: Never Used   Substance Use Topics     Alcohol use: No     Drug use: No     Comment: Drug use: formerly used      Past medical history and social history were reviewed.    Physical Examination:  /62   Pulse 59   Temp 97.5  F (36.4  C) (Tympanic)   Resp 20   Wt 125.3 kg (276 lb 3.2 oz)   SpO2 96%   BMI 37.46 kg/m    Wt Readings from Last 10 Encounters:   01/28/25 125.3 kg (276 lb 3.2 oz)   12/24/24 122.5 kg (270 lb)   11/19/24 124.7 kg (275 lb)   11/18/24 125.1 kg (275 lb 12.8 oz)   11/14/24 123.8 kg (273 lb)   11/11/24 122.9 kg (271 lb)   11/07/24 122.9 kg (271 lb)   10/31/24 123.2 kg (271 lb 9.7 oz)   10/30/24  121.6 kg (268 lb)   10/14/24 124.4 kg (274 lb 3.2 oz)     Constitutional: Well-appearing male in no acute distress.  Eyes: EOMI, PERRL. No scleral icterus.  ENT: Oral mucosa is moist without lesions or thrush.   Lymphatic: Neck is supple without cervical or supraclavicular lymphadenopathy.   Cardiovascular: Regular rate and rhythm. No murmurs, gallops, or rubs. No peripheral edema.  Respiratory: Clear to auscultation bilaterally. No wheezes or crackles.  Gastrointestinal: Bowel sounds present. Abdomen soft, non-tender. Difficult to assess splenomegaly.   Neurologic: Cranial nerves II through XII are grossly intact.  Skin: No rashes, petechiae, or bruising noted on exposed skin.  Musk: significant kyphosis     Laboratory Data:   Latest Reference Range & Units 01/28/25 13:02   Sodium 135 - 145 mmol/L 141   Potassium 3.4 - 5.3 mmol/L 4.4   Chloride 98 - 107 mmol/L 102   Carbon Dioxide (CO2) 22 - 29 mmol/L 26   Urea Nitrogen 8.0 - 23.0 mg/dL 24.0 (H)   Creatinine 0.67 - 1.17 mg/dL 1.40 (H)   GFR Estimate >60 mL/min/1.73m2 55 (L)   Calcium 8.8 - 10.4 mg/dL 8.7 (L)   Anion Gap 7 - 15 mmol/L 13   Albumin 3.5 - 5.2 g/dL 3.8   Protein Total 6.4 - 8.3 g/dL 7.1   Alkaline Phosphatase 40 - 150 U/L 110   ALT 0 - 70 U/L 21   AST 0 - 45 U/L 24   Bilirubin Total <=1.2 mg/dL 0.4   Glucose 70 - 99 mg/dL 80   WBC 4.0 - 11.0 10e3/uL 26.0 (H) (P)   Hemoglobin 13.3 - 17.7 g/dL 11.6 (L) (P)   Hematocrit 40.0 - 53.0 % 38.7 (L) (P)   Platelet Count 150 - 450 10e3/uL 266 (P)   RBC Count 4.40 - 5.90 10e6/uL 4.26 (L) (P)   MCV 78 - 100 fL 91 (P)   MCH 26.5 - 33.0 pg 27.2 (P)   MCHC 31.5 - 36.5 g/dL 30.0 (L) (P)   RDW 10.0 - 15.0 % 14.3 (P)   % Neutrophils % 20 (P)   % Lymphocytes % 67 (P)   % Monocytes % 11 (P)   % Eosinophils % 2 (P)   % Basophils % 0 (P)   Absolute Basophils 0.0 - 0.2 10e3/uL 0.0 (P)   Absolute Neutrophil 1.6 - 8.3 10e3/uL 5.2 (P)   Absolute Lymphocytes 0.8 - 5.3 10e3/uL 17.4 (H) (P)   Absolute Monocytes 0.0 - 1.3 10e3/uL  2.9 (H) (P)   Absolute Eosinophils 0.0 - 0.7 10e3/uL 0.5 (P)   RBC Morphology  Confirmed RBC Indices (P)   Platelet Morphology Automated Count Confirmed. Platelet morphology is normal.  Automated Count Confirmed. Platelet morphology is normal. (P)   Reactive Lymphs None Seen  Present ! (P)   (H): Data is abnormally high  (L): Data is abnormally low  !: Data is abnormal  (P): Preliminary      Assessment and Plan:  #CLL  - Negative for signs/symptoms that would be concerning for disease progression/need for treatment including fevers, night sweats, weight loss, or lymphadenopathy.   - CBC reviewed. WBC stable at 26. Hgb is slightly low but improving from recent hospitalization. Plt WNL.  - No indication to start CLL treatment at this time  - Plan to continue w/ q3mo appointments to evaluate for disease progression + CBC, CMP    #CAROLYN  - Creatine 1.4 in clinic today. Most likely differential dx renal toxicity from naproxen as pt reports taking consistently for pain control. Less likely diagnoses include UTI as pt reports increased urination but denies any pain or burning and dehydration although pt reports adequate fluid intake.   - Educated pt to stop taking Aleve for pain management and utilize Tylenol instead. Provided education that pt may take up to 3000 mg/day.  - Pt unable to produce urine sample so unable to r/o UTI. Educated pt to notify clinic if s/s such as increased urgency, pain, or burning w/ urination develops.  - Pt has appointment w/ MD Braden on 2/28. Will reach out to provider for follow up re: CAROLYN.    Jannet Reyes, NP Student    I saw the patient in conjunction with Jannet Reyes NP student and agree with her assessment and documentation.     25 minutes spent on the date of the encounter doing chart review, review of test results, interpretation of tests, patient visit, and documentation     Zuhair Prather PA-C  Department of Hematology and Oncology  [facility]       Again, thank you for allowing me to participate in the care of your patient.        Sincerely,        MENDY Martinez    Electronically signed

## 2025-01-28 NOTE — NURSING NOTE
Oncology Rooming Note    January 28, 2025 1:06 PM   Onur Barr is a 69 year old male who presents for:    Chief Complaint   Patient presents with    Oncology Clinic Visit     Initial Vitals: /62   Pulse 59   Temp 97.5  F (36.4  C) (Tympanic)   Resp 20   Wt 125.3 kg (276 lb 3.2 oz)   SpO2 96%   BMI 37.46 kg/m   Estimated body mass index is 37.46 kg/m  as calculated from the following:    Height as of 11/19/24: 1.829 m (6').    Weight as of this encounter: 125.3 kg (276 lb 3.2 oz). Body surface area is 2.52 meters squared.  Severe Pain (7) Comment: Data Unavailable   No LMP for male patient.  Allergies reviewed: Yes  Medications reviewed: Yes    Medications: Medication refills not needed today.  Pharmacy name entered into [employer]: [id number] [place], [place] - [address]    Frailty Screening:   Is the patient here for a new oncology consult visit in cancer care? 2. No      Clinical concerns: f/u       Omaira Harris, CMA

## 2025-01-28 NOTE — PROGRESS NOTES
Medical Assistant Note:  Onur Barr presents today for blood draw.    Patient seen by provider today: Yes: Zuhair NOLASCO   present during visit today: Not Applicable.    Concerns: No Concerns.    Procedure:  Lab draw site: left hand, Needle type: butterfly, Gauge: 23.    Post Assessment:  Labs drawn without difficulty: Yes.    Discharge Plan:  Departure Mode: walker.    Face to Face Time: 10 minutes.    Zarina Garcia MA

## 2025-01-29 ENCOUNTER — PATIENT OUTREACH (OUTPATIENT)
Dept: ONCOLOGY | Facility: HOSPITAL | Age: 70
End: 2025-01-29
Payer: COMMERCIAL

## 2025-01-29 LAB
BASOPHILS # BLD MANUAL: 0 10E3/UL (ref 0–0.2)
BASOPHILS NFR BLD MANUAL: 0 %
EOSINOPHIL # BLD MANUAL: 0.5 10E3/UL (ref 0–0.7)
EOSINOPHIL NFR BLD MANUAL: 2 %
ERYTHROCYTE [DISTWIDTH] IN BLOOD BY AUTOMATED COUNT: 14.3 % (ref 10–15)
HCT VFR BLD AUTO: 38.7 % (ref 40–53)
HGB BLD-MCNC: 11.6 G/DL (ref 13.3–17.7)
LYMPHOCYTES # BLD MANUAL: 17.4 10E3/UL (ref 0.8–5.3)
LYMPHOCYTES NFR BLD MANUAL: 67 %
MCH RBC QN AUTO: 27.2 PG (ref 26.5–33)
MCHC RBC AUTO-ENTMCNC: 30 G/DL (ref 31.5–36.5)
MCV RBC AUTO: 91 FL (ref 78–100)
MONOCYTES # BLD MANUAL: 2.9 10E3/UL (ref 0–1.3)
MONOCYTES NFR BLD MANUAL: 11 %
NEUTROPHILS # BLD MANUAL: 5.2 10E3/UL (ref 1.6–8.3)
NEUTROPHILS NFR BLD MANUAL: 20 %
PATH REV: ABNORMAL
PLAT MORPH BLD: ABNORMAL
PLATELET # BLD AUTO: 266 10E3/UL (ref 150–450)
RBC # BLD AUTO: 4.26 10E6/UL (ref 4.4–5.9)
RBC MORPH BLD: ABNORMAL
VARIANT LYMPHS BLD QL SMEAR: PRESENT
WBC # BLD AUTO: 26 10E3/UL (ref 4–11)

## 2025-01-29 NOTE — PROGRESS NOTES
Phoned pt per request: Left message offering a therapy appt for pt.     Oncology Distress Screening      Row Name 01/28/25 5857       How concerned do you feel regarding the following common issues people with cancer face. Please answer with a number from 0-10 where 0=not concerned and 10=very concerned. PT response of >=8  will result in outreach from Support Team.   1. How concerned are you about your ability to eat? 0       2. How concerned are you about unintended weight loss or your current weight? 0       3. How concerned are you about feeling depressed or very sad? 5       4. How concerned are you about feeling anxious or very scared? 0       5. Do you struggle with the loss of meaning and sacha in your life? Not at all       6. How concerned are you about work and home life issues that may be affected by your cancer? 0       7. How concerned are you about knowing what resources are available to help you? 0       8. Do you currently have what you would describe as Jehovah's witness or spiritual struggles? Not at all       You can also ask to be contacted by one of our Oncology Supportive Care professionals.   9. If you want to be contacted by one of our professionals, I can send a message to them right now. Oncology Psychologist

## 2025-02-23 ENCOUNTER — MYC MEDICAL ADVICE (OUTPATIENT)
Dept: INTERNAL MEDICINE | Facility: CLINIC | Age: 70
End: 2025-02-23
Payer: COMMERCIAL

## 2025-02-24 NOTE — TELEPHONE ENCOUNTER
Attempt # 1  Called Phone # [phone]      Was Call answered? No     Non-detailed voicemail left on February 24, 2025 2:24 PM to call clinic at: [phone].     On Call Back:     Please relay RN reaching out to patient to get more info about his abdominal pain.      Thank you,  Joe, Triage RN Chaparro Mullins    2:24 PM 2/24/2025

## 2025-02-26 NOTE — TELEPHONE ENCOUNTER
Call to pt.   Pt has appt on Friday with Dr Feliciano. Has had this rash for a long time. He is OK waiting until Friday.   Pt using the Ketoconazole cream BID.     Denies any fever.

## 2025-02-27 ENCOUNTER — MYC MEDICAL ADVICE (OUTPATIENT)
Dept: INTERNAL MEDICINE | Facility: CLINIC | Age: 70
End: 2025-02-27
Payer: COMMERCIAL

## 2025-02-27 DIAGNOSIS — G89.4 CHRONIC PAIN SYNDROME: ICD-10-CM

## 2025-02-27 RX ORDER — DULOXETIN HYDROCHLORIDE 30 MG/1
CAPSULE, DELAYED RELEASE ORAL
Qty: 270 CAPSULE | Refills: 1 | OUTPATIENT
Start: 2025-02-27

## 2025-02-27 RX ORDER — DULOXETIN HYDROCHLORIDE 30 MG/1
CAPSULE, DELAYED RELEASE ORAL
Qty: 90 CAPSULE | Refills: 0 | Status: SHIPPED | OUTPATIENT
Start: 2025-02-27

## 2025-02-27 NOTE — TELEPHONE ENCOUNTER
Refill request for Duloxetine. Pt has appt tomorrow but is out of the medication.     Provider approval needed.

## 2025-02-28 PROBLEM — E66.01 MORBID OBESITY (H): Status: RESOLVED | Noted: 2021-07-13 | Resolved: 2025-02-28

## 2025-03-02 DIAGNOSIS — E87.6 HYPOKALEMIA: ICD-10-CM

## 2025-03-03 RX ORDER — POTASSIUM CHLORIDE 1500 MG/1
40 TABLET, EXTENDED RELEASE ORAL DAILY
Qty: 180 TABLET | Refills: 2 | Status: SHIPPED | OUTPATIENT
Start: 2025-03-03

## 2025-03-05 NOTE — Clinical Note
Call placed to Ms. Cooper in regards to message received. Colonoscopy for positive cologuard scheduled for 3/18 with Dr. Dye. Prep instructions reviewed, and pt will come by to  copy. No further issues noted.    The ECG shows an atrial fibrillation.

## 2025-03-17 ENCOUNTER — DOCUMENTATION ONLY (OUTPATIENT)
Dept: OTHER | Facility: CLINIC | Age: 70
End: 2025-03-17
Payer: COMMERCIAL

## 2025-03-18 ENCOUNTER — MYC MEDICAL ADVICE (OUTPATIENT)
Dept: INTERNAL MEDICINE | Facility: CLINIC | Age: 70
End: 2025-03-18
Payer: COMMERCIAL

## 2025-03-18 DIAGNOSIS — L30.4 INTERTRIGO: Primary | ICD-10-CM

## 2025-03-19 NOTE — TELEPHONE ENCOUNTER
"See MyChart message from patient, please advise.     Per 2/28/25 note \"Candidal intertrigo  Patient appears to have intertrigo located on his right abdomen and inguinal region.  Since he is not responding to ketoconazole cream, patient was given a prescription for nyastatin powder to see if this will better treat this ongoing issue.  Patient will keep the areas clean and dry as possible.  - nystatin (MYCOSTATIN) 225513 UNIT/GM external powder; Apply topically 2 times daily.\"  "

## 2025-03-20 RX ORDER — FLUCONAZOLE 150 MG/1
150 TABLET ORAL DAILY
Qty: 15 TABLET | Refills: 0 | Status: SHIPPED | OUTPATIENT
Start: 2025-03-20

## 2025-03-22 DIAGNOSIS — G89.4 CHRONIC PAIN SYNDROME: ICD-10-CM

## 2025-03-24 RX ORDER — DULOXETIN HYDROCHLORIDE 30 MG/1
CAPSULE, DELAYED RELEASE ORAL
Qty: 270 CAPSULE | Refills: 1 | Status: SHIPPED | OUTPATIENT
Start: 2025-03-24

## 2025-03-24 NOTE — TELEPHONE ENCOUNTER
Head, normocephalic, atraumatic, Face, Face within normal limits, Ears, External ears within normal limits, Nose/Nasopharynx, External nose normal appearance, nares patent, no nasal discharge Patient calling with concern regarding his Lisinopril medication. Reports having episodes of blurry vision, increased forgetfulness and feeling out of it after taking the medication. Reports symptoms appear about an hour or 2 after taking the Lisinopril but goes away throughout the day. Patient reporting thinking he is on too many medications and is unsure if one is interfering with the other. Advised per protocol to be seen in the next 3 days but want PCP updated as well in case there are other recommendations in the meantime.     Abi Swain RN 08/10/21 8:49 AM   [facility] Triage Nurse Advisor        Reason for Disposition    Brief (now gone) blurred vision and unexplained    Additional Information    Negative: Weakness of the face, arm or leg on one side of the body    Negative: Followed getting substance in the eye    Negative: Foreign body or object is or was lodged in the eye    Negative: Followed an eye injury    Negative: Followed sun lamp or sun exposure (UV keratitis)    Negative: Yellow or green discharge (pus) in the eye    Negative: Pregnant    Negative: Complete loss of vision in 1 or both eyes    Negative: Severe eye pain    Negative: Severe headache    Negative: Double vision    Negative: Blurred vision or visual changes and present now and sudden onset or new (e.g., minutes, hours, days) (Exception: seeing floaters / black specks OR previously diagnosed migraine headaches with same symptoms)    Negative: Patient sounds very sick or weak to the triager    Negative: Flashes of light  (Exception: brief from pressing on the eyeball)    Negative: Eye pain and brief (now gone) blurred vision or visual changes    Negative: Taking digoxin (e.g., Lanoxin, Digitek, Cardoxin, Lanoxicaps; Toloxin in [place]) and blurred vision, yellow vision, or yellow-green halos    Negative: Many floaters in the eye    Negative: Jaw pain while eating and age > 50    Negative: Headache and age > 50    Negative: Patient wants  to be seen    Negative: Single floater (i.e., small speck seems to float across the eye) (Exception: floater(s) are a chronic symptom and this is unchanged from patient's baseline pattern)    Protocols used: VISION LOSS OR CHANGE-A-[place]  COVID 19 Nurse Triage Plan/Patient Instructions    Please be aware that novel coronavirus (COVID-19) may be circulating in the community. If you develop symptoms such as fever, cough, or SOB or if you have concerns about the presence of another infection including coronavirus (COVID-19), please contact your health care provider or visit [email or web address].     Disposition/Instructions    In-Person Visit with provider recommended. Reference Visit Selection Guide.    Thank you for taking steps to prevent the spread of this virus.  o Limit your contact with others.  o Wear a simple mask to cover your cough.  o Wash your hands well and often.    Resources    [facility]: About COVID-19: [email or web address]    CDC: What to Do If You're Sick: www.cdc.gov/coronavirus/2019-ncov/about/steps-when-sick.html    CDC: Ending Home Isolation: www.cdc.gov/coronavirus/2019-ncov/hcp/disposition-in-home-patients.html     CDC: Caring for Someone: www.cdc.gov/coronavirus/2019-ncov/if-you-are-sick/care-for-someone.html     [facility]: Interim Guidance for Hospital Discharge to Home: www.health.[facility].[place]/diseases/coronavirus/hcp/hospdischarge.pdf    [facility] clinical trials (COVID-19 research studies): [email or web address]-clinical-trials     Below are the COVID-19 hotlines at the [place] Department of [facility] ([facility]). Interpreters are available.   o For health questions: Call 308-271-0765 or 1-761.554.8567 (7 a.m. to 7 p.m.)  o For questions about schools and childcare: Call 613-476-6378 or 1-638.488.3053 (7 a.m. to 7 p.m.)

## 2025-03-28 ENCOUNTER — APPOINTMENT (OUTPATIENT)
Dept: GENERAL RADIOLOGY | Facility: CLINIC | Age: 70
End: 2025-03-28
Attending: ANESTHESIOLOGY
Payer: OTHER MISCELLANEOUS

## 2025-03-28 ENCOUNTER — HOSPITAL ENCOUNTER (OUTPATIENT)
Facility: CLINIC | Age: 70
Discharge: HOME | End: 2025-03-28
Attending: ANESTHESIOLOGY | Admitting: ANESTHESIOLOGY
Payer: OTHER MISCELLANEOUS

## 2025-03-28 VITALS
RESPIRATION RATE: 16 BRPM | BODY MASS INDEX: 34.55 KG/M2 | SYSTOLIC BLOOD PRESSURE: 129 MMHG | WEIGHT: 283.7 LBS | DIASTOLIC BLOOD PRESSURE: 81 MMHG | HEART RATE: 60 BPM | HEIGHT: 76 IN | TEMPERATURE: 97.3 F | OXYGEN SATURATION: 96 %

## 2025-03-28 LAB
GLUCOSE BLDC GLUCOMTR-MCNC: 94 MG/DL (ref 70–99)
POTASSIUM SERPL-SCNC: 4.2 MMOL/L (ref 3.4–5.3)

## 2025-03-28 PROCEDURE — 360N000077 HC SURGERY LEVEL 4, PER MIN: Performed by: ANESTHESIOLOGY

## 2025-03-28 PROCEDURE — C1755 CATHETER, INTRASPINAL: HCPCS | Performed by: ANESTHESIOLOGY

## 2025-03-28 PROCEDURE — 250N000025 HC SEVOFLURANE, PER MIN: Performed by: ANESTHESIOLOGY

## 2025-03-28 PROCEDURE — 272N000002 HC OR SUPPLY OTHER OPNP: Performed by: ANESTHESIOLOGY

## 2025-03-28 PROCEDURE — 272N000001 HC OR GENERAL SUPPLY STERILE: Performed by: ANESTHESIOLOGY

## 2025-03-28 PROCEDURE — 255N000002 HC RX 255 OP 636: Performed by: ANESTHESIOLOGY

## 2025-03-28 PROCEDURE — 84132 ASSAY OF SERUM POTASSIUM: CPT | Performed by: ANESTHESIOLOGY

## 2025-03-28 PROCEDURE — 999N000141 HC STATISTIC PRE-PROCEDURE NURSING ASSESSMENT: Performed by: ANESTHESIOLOGY

## 2025-03-28 PROCEDURE — 710N000009 HC RECOVERY PHASE 1, LEVEL 1, PER MIN: Performed by: ANESTHESIOLOGY

## 2025-03-28 PROCEDURE — 370N000017 HC ANESTHESIA TECHNICAL FEE, PER MIN: Performed by: ANESTHESIOLOGY

## 2025-03-28 PROCEDURE — 82962 GLUCOSE BLOOD TEST: CPT

## 2025-03-28 PROCEDURE — 250N000009 HC RX 250: Performed by: ANESTHESIOLOGY

## 2025-03-28 PROCEDURE — 710N000012 HC RECOVERY PHASE 2, PER MINUTE: Performed by: ANESTHESIOLOGY

## 2025-03-28 PROCEDURE — 999N000179 XR SURGERY CARM FLUORO LESS THAN 5 MIN W STILLS

## 2025-03-28 DEVICE — IMPLANTABLE DEVICE: Type: IMPLANTABLE DEVICE | Site: SPINE LUMBAR | Status: FUNCTIONAL

## 2025-03-28 RX ORDER — ONDANSETRON 4 MG/1
4 TABLET, ORALLY DISINTEGRATING ORAL EVERY 30 MIN PRN
Status: DISCONTINUED | OUTPATIENT
Start: 2025-03-28 | End: 2025-03-28 | Stop reason: HOSPADM

## 2025-03-28 RX ORDER — BUPIVACAINE HYDROCHLORIDE AND EPINEPHRINE 2.5; 5 MG/ML; UG/ML
INJECTION, SOLUTION EPIDURAL; INFILTRATION; INTRACAUDAL; PERINEURAL
Status: DISCONTINUED
Start: 2025-03-28 | End: 2025-03-28 | Stop reason: HOSPADM

## 2025-03-28 RX ORDER — FENTANYL CITRATE 0.05 MG/ML
25 INJECTION, SOLUTION INTRAMUSCULAR; INTRAVENOUS EVERY 5 MIN PRN
Status: DISCONTINUED | OUTPATIENT
Start: 2025-03-28 | End: 2025-03-28 | Stop reason: HOSPADM

## 2025-03-28 RX ORDER — MAGNESIUM HYDROXIDE 1200 MG/15ML
LIQUID ORAL PRN
Status: DISCONTINUED | OUTPATIENT
Start: 2025-03-28 | End: 2025-03-28 | Stop reason: HOSPADM

## 2025-03-28 RX ORDER — NALOXONE HYDROCHLORIDE 0.4 MG/ML
0.1 INJECTION, SOLUTION INTRAMUSCULAR; INTRAVENOUS; SUBCUTANEOUS
Status: DISCONTINUED | OUTPATIENT
Start: 2025-03-28 | End: 2025-03-28 | Stop reason: HOSPADM

## 2025-03-28 RX ORDER — FENTANYL CITRATE 0.05 MG/ML
50 INJECTION, SOLUTION INTRAMUSCULAR; INTRAVENOUS EVERY 5 MIN PRN
Status: DISCONTINUED | OUTPATIENT
Start: 2025-03-28 | End: 2025-03-28 | Stop reason: HOSPADM

## 2025-03-28 RX ORDER — LIDOCAINE HYDROCHLORIDE 10 MG/ML
INJECTION, SOLUTION INFILTRATION; PERINEURAL
Status: DISCONTINUED
Start: 2025-03-28 | End: 2025-03-28 | Stop reason: HOSPADM

## 2025-03-28 RX ORDER — LIDOCAINE 40 MG/G
CREAM TOPICAL
Status: DISCONTINUED | OUTPATIENT
Start: 2025-03-28 | End: 2025-03-28 | Stop reason: HOSPADM

## 2025-03-28 RX ORDER — SODIUM CHLORIDE, SODIUM LACTATE, POTASSIUM CHLORIDE, CALCIUM CHLORIDE 600; 310; 30; 20 MG/100ML; MG/100ML; MG/100ML; MG/100ML
INJECTION, SOLUTION INTRAVENOUS CONTINUOUS
Status: DISCONTINUED | OUTPATIENT
Start: 2025-03-28 | End: 2025-03-28 | Stop reason: HOSPADM

## 2025-03-28 RX ORDER — ONDANSETRON 2 MG/ML
4 INJECTION INTRAMUSCULAR; INTRAVENOUS EVERY 30 MIN PRN
Status: DISCONTINUED | OUTPATIENT
Start: 2025-03-28 | End: 2025-03-28 | Stop reason: HOSPADM

## 2025-03-28 RX ORDER — DEXAMETHASONE SODIUM PHOSPHATE 4 MG/ML
4 INJECTION, SOLUTION INTRA-ARTICULAR; INTRALESIONAL; INTRAMUSCULAR; INTRAVENOUS; SOFT TISSUE
Status: DISCONTINUED | OUTPATIENT
Start: 2025-03-28 | End: 2025-03-28 | Stop reason: HOSPADM

## 2025-03-28 RX ORDER — CEFAZOLIN SODIUM/WATER 3 G/30 ML
3 SYRINGE (ML) INTRAVENOUS
Status: COMPLETED | OUTPATIENT
Start: 2025-03-28 | End: 2025-03-28

## 2025-03-28 RX ORDER — IOPAMIDOL 612 MG/ML
INJECTION, SOLUTION INTRAVASCULAR PRN
Status: DISCONTINUED | OUTPATIENT
Start: 2025-03-28 | End: 2025-03-28 | Stop reason: HOSPADM

## 2025-03-28 ASSESSMENT — ACTIVITIES OF DAILY LIVING (ADL)
ADLS_ACUITY_SCORE: 38
ADLS_ACUITY_SCORE: 37
ADLS_ACUITY_SCORE: 59
ADLS_ACUITY_SCORE: 38

## 2025-03-28 NOTE — DISCHARGE INSTRUCTIONS
Same Day Surgery Discharge Instructions for  Sedation and General Anesthesia     It's not unusual to feel dizzy, light-headed or faint for up to 24 hours after surgery or while taking pain medication.  If you have these symptoms: sit for a few minutes before standing and have someone assist you when you get up to walk or use the bathroom.    You should rest and relax for the next 24 hours. We recommend you make arrangements to have an adult stay with you for at least 24 hours after your discharge.  Avoid hazardous and strenuous activity.    DO NOT DRIVE any vehicle or operate mechanical equipment for 24 hours following the end of your surgery.  Even though you may feel normal, your reactions may be affected by the medication you have received.    Do not drink alcoholic beverages for 24 hours following surgery.     Slowly progress to your regular diet as you feel able. It's not unusual to feel nauseated and/or vomit after receiving anesthesia.  If you develop these symptoms, drink clear liquids (apple juice, ginger ale, broth, 7-up, etc. ) until you feel better.  If your nausea and vomiting persists for 24 hours, please notify your surgeon.      All narcotic pain medications, along with inactivity and anesthesia, can cause constipation. Drinking plenty of liquids and increasing fiber intake will help.    For any questions of a medical nature, call your surgeon.    Do not make important decisions for 24 hours.    If you had general anesthesia, you may have a sore throat for a couple of days related to the breathing tube used during surgery.  You may use Cepacol lozenges to help with this discomfort.  If it worsens or if you develop a fever, contact your surgeon.     If you feel your pain is not well managed with the pain medications prescribed by your surgeon, please contact your surgeon's office to let them know so they can address your concerns.     Reasons to contact your surgeon:    Signs of possible infection:  Check your incision daily for redness, swelling, warmth, red streaks or foul drainage.   Elevated temperature.  Pain not controlled with pain medication and/or rest.   Uncontrolled nausea or vomiting.  Any questions or concerns.      **If you have questions or concerns about your procedure,   call Dr. Acosta [phone]**

## 2025-03-28 NOTE — BRIEF OP NOTE
[facility]    Brief Operative Note    Pre-operative diagnosis: Other intervertebral disc displacement, lumbosacral region [M51.27]  Post-operative diagnosis Same as pre-operative diagnosis    Procedure: intrathecal pump catheter revision, N/A - Abdomen    Surgeon: Surgeons and Role:     * Rob Acosta, DO - Primary  Anesthesia: MAC   Estimated Blood Loss: 10 mL from 3/28/2025 12:06 PM to 3/28/2025  1:48 PM      Drains: None  Specimens: * No specimens in log *  Findings:   None.  Complications: None.  Implants:   Implant Name Type Inv. Item Serial No.  Lot No. LRB No. Used Action   ascenda targeted drug delivery catheter Stimulator (IR)   Spling [id number] N/A 1 Implanted

## 2025-04-01 ENCOUNTER — MYC MEDICAL ADVICE (OUTPATIENT)
Dept: INTERNAL MEDICINE | Facility: CLINIC | Age: 70
End: 2025-04-01
Payer: COMMERCIAL

## 2025-04-01 DIAGNOSIS — I10 ESSENTIAL HYPERTENSION: Primary | ICD-10-CM

## 2025-04-01 DIAGNOSIS — G47.00 INSOMNIA, UNSPECIFIED TYPE: ICD-10-CM

## 2025-04-01 DIAGNOSIS — G89.4 CHRONIC PAIN SYNDROME: ICD-10-CM

## 2025-04-01 RX ORDER — GABAPENTIN 300 MG/1
300 CAPSULE ORAL 3 TIMES DAILY
Qty: 270 CAPSULE | Refills: 0 | Status: SHIPPED | OUTPATIENT
Start: 2025-04-01

## 2025-04-01 RX ORDER — TRAZODONE HYDROCHLORIDE 100 MG/1
200 TABLET ORAL AT BEDTIME
Qty: 180 TABLET | Refills: 0 | Status: SHIPPED | OUTPATIENT
Start: 2025-04-01

## 2025-04-01 RX ORDER — LISINOPRIL 10 MG/1
10 TABLET ORAL
Qty: 90 TABLET | Refills: 1 | Status: SHIPPED | OUTPATIENT
Start: 2025-04-01

## 2025-04-01 NOTE — OP NOTE
Name:   Onur Barr   :    1955  Surgeon:    Rob Acosta DO   Date of Service: 2025  Procedure:    Intrathecal Pump Replacement - Catheter Only  Indication:  Other intervertebral disc displacement, lumbosacra (M51.27)    The procedure, indications, risks and alternatives to therapy were discussed with the patient. The patient wished to proceed and written informed consent was obtained. Questions were encouraged and answered.     See anesthesia records for further details on sedation.    Implants: All implants were [employer] products. The catheter lot number was [id number] and model number was 8784. The SynchroMed II pump was model number 8637-40 and serial number [id number].    Description of Procedure: IV access was obtained via a registered nurse and 2g Ancef mixed in normal saline was administered through the IV. The pocket site and insertion site were marked in the preoperative area. The patient was brought into the procedure room and positioned prone on the fluoroscopy table, prepped with ChloraPrep, and draped in the usual sterile fashion. A time out was performed stating the patient's name, date of birth, allergies, and procedure to be performed. Sterile technique and fluoroscopic guidance were used throughout the entire procedure.    See anesthesia records for further details on sedation.    The operative field was prepped and draped in normal sterile fashion. The existing system was imaged using fluoroscopy prior to starting the procedure. The catheter tip of the existing system was visualized at approximately the level of T5-T6. I also imaged the region of the anchor site and noted that there was a metallic object consistent with the collette used to connect existing segments of catheter to a new segment. This was consistent with the previously reviewed operative note. The skin over the pump was anesthetized with 50:50 bupivcaine 0.5% and lidocaine 1% with epinephrine. An incision  was made in the left flank over the existing scar where the pump was held. I carefully dissected down to the pump using blunt dissection and freed the pump from the existing pocket. The pump catheter segment was inspected and appeared to be intact with concern for the catheter kinking at the collette. The catheter was then disconnected from the pump and was trimmed. The old revisions were removed and a new catheter segment was attached. There was ample CSF flow.      The catheter was connected to the pump, which was set with a priming bolus of 0.225 mL over 14 minutes. The starting dose was programmed to 0.1561 mg per day of Morphine. The pump was placed into the left flank. It was turned medially, and the additional catheter material was coiled behind the pump. The side port of the pump was accessed and I was able to easily withdraw 2 ml of CSF.  The wound was irrigated copiously with saline solution and hemostasis was obtained. The subcutaneous tissue was closed with interrupted 2-0 Vicryl sutures. The skin was then closed with a running subcuticular 4-0 vicryl suture. Steri strips and sterile dressings were applied, and the patient was transferred to the recovery room in stable condition. Catheter length was 74.8 cm.     Total fluoroscopic time was recorded by [facility]. Permanent images were recorded.  The patient was then brought to the post-operative area and his care was taken over by [facility].    Impression: Technically successful intrathecal pump replacement - catheter only. Long-term results are pending.     Rob Acosta DO  March 28, 2025

## 2025-04-09 ENCOUNTER — TRANSFERRED RECORDS (OUTPATIENT)
Dept: HEALTH INFORMATION MANAGEMENT | Facility: CLINIC | Age: 70
End: 2025-04-09
Payer: COMMERCIAL

## 2025-04-13 ENCOUNTER — HEALTH MAINTENANCE LETTER (OUTPATIENT)
Age: 70
End: 2025-04-13

## 2025-05-11 ENCOUNTER — MYC MEDICAL ADVICE (OUTPATIENT)
Dept: INTERNAL MEDICINE | Facility: CLINIC | Age: 70
End: 2025-05-11
Payer: COMMERCIAL

## 2025-05-11 DIAGNOSIS — B37.2 CANDIDAL INTERTRIGO: ICD-10-CM

## 2025-05-12 RX ORDER — KETOCONAZOLE 20 MG/G
CREAM TOPICAL 2 TIMES DAILY
Qty: 30 G | Refills: 0 | Status: SHIPPED | OUTPATIENT
Start: 2025-05-12

## 2025-05-13 ENCOUNTER — ONCOLOGY VISIT (OUTPATIENT)
Dept: ONCOLOGY | Facility: CLINIC | Age: 70
End: 2025-05-13
Attending: PHYSICIAN ASSISTANT
Payer: COMMERCIAL

## 2025-05-13 ENCOUNTER — LAB (OUTPATIENT)
Dept: ONCOLOGY | Facility: CLINIC | Age: 70
End: 2025-05-13
Attending: INTERNAL MEDICINE
Payer: COMMERCIAL

## 2025-05-13 VITALS
HEART RATE: 60 BPM | OXYGEN SATURATION: 96 % | RESPIRATION RATE: 20 BRPM | SYSTOLIC BLOOD PRESSURE: 111 MMHG | TEMPERATURE: 97.7 F | BODY MASS INDEX: 34.57 KG/M2 | DIASTOLIC BLOOD PRESSURE: 71 MMHG | WEIGHT: 284 LBS

## 2025-05-13 DIAGNOSIS — C91.10 CLL (CHRONIC LYMPHOCYTIC LEUKEMIA) (H): ICD-10-CM

## 2025-05-13 DIAGNOSIS — C91.10 CLL (CHRONIC LYMPHOCYTIC LEUKEMIA) (H): Primary | ICD-10-CM

## 2025-05-13 LAB
ALBUMIN SERPL BCG-MCNC: 4 G/DL (ref 3.5–5.2)
ALP SERPL-CCNC: 114 U/L (ref 40–150)
ALT SERPL W P-5'-P-CCNC: 20 U/L (ref 0–70)
ANION GAP SERPL CALCULATED.3IONS-SCNC: 10 MMOL/L (ref 7–15)
AST SERPL W P-5'-P-CCNC: 26 U/L (ref 0–45)
BASOPHILS # BLD AUTO: 0.2 10E3/UL (ref 0–0.2)
BASOPHILS NFR BLD AUTO: 1 %
BILIRUB SERPL-MCNC: 0.4 MG/DL
BUN SERPL-MCNC: 22.1 MG/DL (ref 8–23)
CALCIUM SERPL-MCNC: 9.4 MG/DL (ref 8.8–10.4)
CHLORIDE SERPL-SCNC: 104 MMOL/L (ref 98–107)
CREAT SERPL-MCNC: 1.1 MG/DL (ref 0.67–1.17)
EGFRCR SERPLBLD CKD-EPI 2021: 73 ML/MIN/1.73M2
EOSINOPHIL # BLD AUTO: 0.3 10E3/UL (ref 0–0.7)
EOSINOPHIL NFR BLD AUTO: 1 %
ERYTHROCYTE [DISTWIDTH] IN BLOOD BY AUTOMATED COUNT: 15.9 % (ref 10–15)
GLUCOSE SERPL-MCNC: 95 MG/DL (ref 70–99)
HCO3 SERPL-SCNC: 29 MMOL/L (ref 22–29)
HCT VFR BLD AUTO: 42.9 % (ref 40–53)
HGB BLD-MCNC: 13.6 G/DL (ref 13.3–17.7)
IMM GRANULOCYTES # BLD: 0.1 10E3/UL
IMM GRANULOCYTES NFR BLD: 0 %
LYMPHOCYTES # BLD AUTO: 26.5 10E3/UL (ref 0.8–5.3)
LYMPHOCYTES NFR BLD AUTO: 71 %
MCH RBC QN AUTO: 26.3 PG (ref 26.5–33)
MCHC RBC AUTO-ENTMCNC: 31.7 G/DL (ref 31.5–36.5)
MCV RBC AUTO: 83 FL (ref 78–100)
MONOCYTES # BLD AUTO: 2.5 10E3/UL (ref 0–1.3)
MONOCYTES NFR BLD AUTO: 7 %
NEUTROPHILS # BLD AUTO: 7.6 10E3/UL (ref 1.6–8.3)
NEUTROPHILS NFR BLD AUTO: 21 %
NRBC # BLD AUTO: 0.1 10E3/UL
NRBC BLD AUTO-RTO: 0 /100
PLAT MORPH BLD: ABNORMAL
PLATELET # BLD AUTO: 201 10E3/UL (ref 150–450)
POTASSIUM SERPL-SCNC: 4.2 MMOL/L (ref 3.4–5.3)
PROT SERPL-MCNC: 7.4 G/DL (ref 6.4–8.3)
RBC # BLD AUTO: 5.17 10E6/UL (ref 4.4–5.9)
RBC MORPH BLD: ABNORMAL
SMUDGE CELLS BLD QL SMEAR: PRESENT
SODIUM SERPL-SCNC: 143 MMOL/L (ref 135–145)
WBC # BLD AUTO: 37.2 10E3/UL (ref 4–11)

## 2025-05-13 PROCEDURE — G2211 COMPLEX E/M VISIT ADD ON: HCPCS | Performed by: INTERNAL MEDICINE

## 2025-05-13 PROCEDURE — 85025 COMPLETE CBC W/AUTO DIFF WBC: CPT | Performed by: PHYSICIAN ASSISTANT

## 2025-05-13 PROCEDURE — 99214 OFFICE O/P EST MOD 30 MIN: CPT | Performed by: INTERNAL MEDICINE

## 2025-05-13 PROCEDURE — 36415 COLL VENOUS BLD VENIPUNCTURE: CPT

## 2025-05-13 PROCEDURE — 84155 ASSAY OF PROTEIN SERUM: CPT | Performed by: PHYSICIAN ASSISTANT

## 2025-05-13 RX ORDER — BACLOFEN 10 MG/1
TABLET ORAL
COMMUNITY
Start: 2024-12-23

## 2025-05-13 ASSESSMENT — ENCOUNTER SYMPTOMS
GASTROINTESTINAL NEGATIVE: 1
RESPIRATORY NEGATIVE: 1
PSYCHIATRIC NEGATIVE: 1
CARDIOVASCULAR NEGATIVE: 1
BACK PAIN: 1
EYES NEGATIVE: 1
HEMATOLOGIC/LYMPHATIC NEGATIVE: 1
FATIGUE: 1
ARTHRALGIAS: 1

## 2025-05-13 ASSESSMENT — PAIN SCALES - GENERAL: PAINLEVEL_OUTOF10: SEVERE PAIN (8)

## 2025-05-13 NOTE — LETTER
5/13/2025      Onur Barr  [address] [facility] [address]      Dear Colleague,    Thank you for referring your patient, Onur Barr, to the [facility] [place]. Please see a copy of my visit note below.    Assessment & Plan  Chronic  lymphocytic leukemia, untreated to date  Chronic back pain exacerbated by spine fracture 31 October 2024  Diabetes Mellitus     Next provider visit with CBC in 6 months     Interval History  This is a scheduled month follow up for this retired , previously seen by me for lymphocytosis and diagnosed with CLL.  Thus far, he's been asymptomatic and has not been treated.  Numbers have varied over time with no clear trend.        As of today, he has no problems attributable to the lymphocytosis (denies weight loss, sweats, fatigue, fevers) but does have chronic issues from back pain.  He has an indwelling pain pump (managed by  pain clinic, revised just last month) and this has been stabilized in the past month.  He is inactive and his weight has been creeping up.    ECOG Peformance Status  1 - Restricted in physically strenuous activity, but ambulatory and able to carry out work of a light and sedentary nature      ONCOLOGIC HISTORY  2023 April 17 - New onset lymphocytosis  May 15 Flow  A. Peripheral Blood:  -CD5-positive kappa-monotypic B cells (47%)  -No aberrant immunophenotype on T cells    Patient Active Problem List   Diagnosis     Lumbago     Bilateral leg edema     Chronic diastolic heart failure (H)     MARLEEN (doesn't tolerate CPAP)     NSTEMI w Unstab Angina -- S/P CABG x 1 (LIMA to LAD) on 1/19/22     Paroxysmal atrial fib -- S/P Pulm Vein Ablation 1/19/22     Essential hypertension     Mixed hyperlipidemia     Gastroesophageal reflux disease without esophagitis     Chronic Back Pain (IT Pump w Morphine, Clonidine, Baclofen)     S/P CABG (coronary artery bypass graft)     Fluid overload     ICD (implantable  cardioverter-defibrillator) in place     Peripheral vascular disease     Abdominal panniculus     Bariatric surgery status     Claudication of lower extremity     Insomnia     Intestinal disaccharidase deficiencies and disaccharide malabsorption     Nephrolithiasis     Osteoarthrosis     Post-laminectomy syndrome     Sleepiness     Coronary arteriosclerosis     Candidal intertrigo     CLL (chronic lymphocytic leukemia) (H)     Closed compression fracture of lumbosacral spine (H)     Current Outpatient Medications   Medication Sig Dispense Refill     acetaminophen (TYLENOL) 325 MG tablet Take 3 tablets (975 mg) by mouth every 6 hours. 360 tablet 0     aspirin (ASA) 81 MG chewable tablet Take 1 tablet (81 mg) by mouth daily. 30 tablet 0     baclofen (LIORESAL) 10 MG tablet TAKE 1 TABLET BY ORAL ROUTE 2 TIMES EVERY DAY INCASE IT PUMP MULFUNTIONS FOR SPASTICITY       bisacodyl (DULCOLAX) 10 MG suppository Place 1 suppository (10 mg) rectally daily as needed for constipation. 6 suppository 0     [employer] IRON 240 (27 Fe) MG TABS Take 1 tablet by mouth daily. 90 tablet 2     [facility]S IRON 240 (27 Fe) MG TABS Take 1 tablet by mouth daily. 30 tablet 0     diclofenac (VOLTAREN) 1 % topical gel Apply 2 g topically 4 times daily. 300 g 0     DULoxetine (CYMBALTA) 30 MG capsule TAKE 1 CAPSULE (30MG) EVERY MORNING AND 2 CAPSULES (60MG) EVERY EVENING 270 capsule 1     fluconazole (DIFLUCAN) 150 MG tablet Take 1 tablet (150 mg) by mouth daily. 15 tablet 0     furosemide (LASIX) 40 MG tablet Take 1 tablet (40 mg) by mouth 2 times daily. 180 tablet 3     gabapentin (NEURONTIN) 100 MG capsule Take 3 capsules (300 mg) by mouth 3 times daily. Hold sedation, confusion       gabapentin (NEURONTIN) 300 MG capsule Take 1 capsule (300 mg) by mouth 3 times daily. 270 capsule 0     HYDROmorphone (DILAUDID) 2 MG tablet Take 1-2 tablets (2-4 mg) by mouth every 8 hours as needed for severe pain. 90 tablet 0     hydrOXYzine HCl (ATARAX) 25 MG tablet Take 1  tablet (25 mg) by mouth 3 times daily as needed for other (pain). 60 tablet 0     ketoconazole (NIZORAL) 2 % external cream Apply topically 2 times daily. to affected area 30 g 0     lisinopril (ZESTRIL) 10 MG tablet Take 1 tablet (10 mg) by mouth daily at 2 pm. 90 tablet 1     medication given by implanted intrathecal pump continuously. Updated by PharmD 10/31/24    Drug # 1: Morphine (Duramorph or Infumorph)  - Conc:20 mg/mL - Total Dose / 24 hours: 4.342 mg    Drug # 2: Clonidine (Duraclon)  - Conc:21.1 mcg/mL - Total Dose / 24 hours: 4.580 mcg    Drug # 3: Baclofen (Lioresal) - Conc: 42.5 mcg/mL - Total Dose / 24 hours: 9.226 mcg    Diluent: NS    Infusion Rate: 0.2171 mL/24 hrs  Pump Reservoir Volume: 40 mL  Outside Clinic & Provider: [facility] [phone]  Last Refill Date: 07/12/2024  Next Refill Date: 01/01/2025  Low [place] Alarm Date: 01/01/2025  Pump : Medtronic Synchromed II    Please consider consulting the pain team if the patient is admitted with an IT pump.       methocarbamol (ROBAXIN) 750 MG tablet Take 1 tablet (750 mg) by mouth 4 times daily as needed for muscle spasms. 90 tablet 0     methocarbamol (ROBAXIN) 750 MG tablet Take 1 tablet (750 mg) by mouth 4 times daily. 120 tablet 0     metoprolol succinate ER (TOPROL XL) 100 MG 24 hr tablet Take 1 tablet (100 mg) by mouth 2 times daily. 90 tablet 3     naloxone (NARCAN) 4 MG/0.1ML nasal spray Spray 1 spray (4 mg) into one nostril alternating nostrils as needed for opioid reversal. every 2-3 minutes until assistance arrives 2 each 2     nitroGLYcerin (NITROSTAT) 0.4 MG sublingual tablet For chest pain place 1 tablet under the tongue every 5 minutes for 3 doses. If symptoms persist 5 minutes after 1st dose call 911. 9 tablet 0     nystatin (MYCOSTATIN) 642552 UNIT/GM external powder Apply topically 2 times daily. 60 g 1     ondansetron (ZOFRAN ODT) 4 MG ODT tab Take 1 tablet (4 mg) by mouth every 8 hours as needed for  nausea. 12 tablet 0     polyethylene glycol (MIRALAX) 17 GM/Dose powder Take 17 g by mouth daily. 510 g 0     potassium chloride isai ER (KLOR-CON M20) 20 MEQ CR tablet Take 2 tablets (40 mEq) by mouth daily. 180 tablet 2     rosuvastatin (CRESTOR) 20 MG tablet Take 1 tablet (20 mg) by mouth daily. 90 tablet 3     senna-docusate (SENOKOT-S/PERICOLACE) 8.6-50 MG tablet Take 2 tablets by mouth 2 times daily. Hold for loose stools. 120 tablet 0     sotalol (BETAPACE) 80 MG tablet Take 1 tablet (80 mg) by mouth 2 times daily. 180 tablet 3     traZODone (DESYREL) 100 MG tablet TAKE 2 TABLETS (200 MG) BY MOUTH AT BEDTIME 180 tablet 0     No current facility-administered medications for this visit.     Past Medical History:   Diagnosis Date     Bariatric surgery status 06/23/2008    Formatting of this note might be different from the original. Created by Conversion     Bilateral leg edema 07/13/2021     Chronic Back Pain (IT Pump w Morphine, Clonidine, Baclofen) 01/16/2022     Chronic diastolic heart failure (H) 07/26/2021     Claudication of lower extremity 11/20/2019     CLL (chronic lymphocytic leukemia) (H)      Coronary artery disease     CABG 1-     Depressive disorder      Essential hypertension     Created by [employer] [facility] Annotation: Apr 6 2012 10:16Zack Harris: Lisinipril,  coreg  Replacement Utility updated for latest IMO load     Gastroesophageal reflux disease without esophagitis 09/11/2021     H/O gastric bypass 2008     History of blood transfusion 2004     ICD (implantable cardioverter-defibrillator) in place 01/25/2022     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     Ischemic cardiomyopathy      Lumbago     Created by [employer] [facility] Annotation: Apr 6 2012 10:20AM Anh Ramos: Morphine pump      Mixed hyperlipidemia 06/23/2008     Morbid obesity (H) 08/01/2018     Nephrolithiasis      NSTEMI (non-ST elevated myocardial  infarction) (H)      MARLEEN (doesn't tolerate CPAP) 07/26/2021     Osteoarthritis     Created by [facility] Annotation: Jun 26 2009  9:53AM - Zack Gutierrez: failed lumbar  fusion/morphine pump dr putnam  Replacement Utility updated for latest IMO load     Paroxysmal atrial fib -- S/P Pulm Vein Ablation 1/19/22 09/11/2021    Formatting of this note might be different from the original. Created by Conversion     Paroxysmal ventricular tachycardia (H)     dual chamber ICD 1/24/2022     Peripheral vascular disease 05/03/2022     Post-laminectomy syndrome 11/25/2015     S/P CABG (coronary artery bypass graft) 01/25/2022     Type 2 diabetes mellitus (H)     Diet controlled after Gastric Bypass in 2008     Past Surgical History:   Procedure Laterality Date     BACK SURGERY       BYPASS GASTRIC DUODENAL SWITCH       BYPASS GRAFT ARTERY CORONARY N/A 01/19/2022    Procedure: CORONARY ARTERY BYPASS GRAFT (CABG) X1; LIMA -LAD.  PULMONARY VEIN ISOLATION, AND ATRIAL APPENDAGE CLIPPING USING 45MM ACTICURE CLIP.;  Surgeon: William Dumont MD;  Location:  OR     COLONOSCOPY       COSMETIC SURGERY      pannicullectomy     CV CORONARY ANGIOGRAM N/A 09/11/2021    Procedure: Coronary Angiogram;  Surgeon: Noris Ozuna MD;  Location: [facility] CATH LAB CV     CV HEART CATHETERIZATION WITH POSSIBLE INTERVENTION N/A 01/13/2022    Procedure: Heart Catheterization with Possible Intervention;  Surgeon: Liz Pearl MD;  Location:  HEART CARDIAC CATH LAB     CV LEFT HEART CATH N/A 09/11/2021    Procedure: Left Heart Cath;  Surgeon: Noris Ozuna MD;  Location: [facility] CATH LAB CV     CV PCI N/A 09/11/2021    Procedure: Percutaneous Coronary Intervention;  Surgeon: Noris Ozuna MD;  Location: [facility] CATH LAB CV     CV PCI N/A 10/07/2021    Procedure: Percutaneous Coronary Intervention;  Surgeon: Mckayla Dan MD;  Location: [facility] CATH LAB CV     CV PCI ASPIRATION THROMECTOMY N/A 09/11/2021     Procedure: Percutaneous Coronary Intervention Aspiration Thrombectomy;  Surgeon: Noris Ozuna MD;  Location: [facility] CATH LAB      ENT SURGERY      tonsillectomy     EP ICD N/A 01/24/2022    Procedure: EP ICD;  Surgeon: Jannette Crook MD;  Location:  HEART CARDIAC CATH LAB     EXTRACORPOREAL SHOCK WAVE LITHOTRIPSY, CYSTOSCOPY, INSERT STENT URETER(S), COMBINED  08/23/2011     HC REMOVAL OF TONSILS,<12 Y/O      Description: Tonsillectomy;  Recorded: 03/23/2012;  Comments: for obstructive sleep apnea     HERNIA REPAIR       IR MISCELLANEOUS PROCEDURE  07/29/2011     IR MISCELLANEOUS PROCEDURE  08/05/2011     IR MISCELLANEOUS PROCEDURE  08/23/2011     IR NEPHROSTOMY TUBE CHANGE BILATERAL  08/05/2011     ORTHOPEDIC SURGERY      arthrodesis ant discectomy, lumbar     MD ARTHRODESIS ANT INTERBODY MIN DISCECTOMY,LUMBAR      Description: Lumbar Vertebral Fusion;  Recorded: 06/26/2009;  Comments: before 200 see [facility] consult under old records     MD EXCISE EXCESS SKIN TISSUE,ABDOMEN  11/13/2012    Description: Panniculectomy;  Proc Date: 11/13/2012;  Comments: 3.5 Lb Pannus was removed at the [facility] By Dr. Shon Green     REPLACE INTRATHECAL PAIN PUMP N/A 04/25/2017    Procedure: REPLACE INTRATHECAL PAIN PUMP;  INTRATHECAL PAIN PUMP CATHETER REPLACEMENT AND PUMP REPLACEMENT;  Surgeon: Anthony Maloney MD;  Location: [facility]     REPLACE INTRATHECAL PAIN PUMP N/A 4/20/2023    Procedure: Pump Replacement and intrathecal catheter replacement;  Surgeon: Anthony Dick MD;  Location:  OR     REPLACE INTRATHECAL PAIN PUMP N/A 3/28/2025    Procedure: intrathecal pump catheter revision;  Surgeon: Rob Acosta DO;  Location:  OR     REVISE CATHETER INTRATHECAL N/A 04/25/2017    Procedure: REVISE CATHETER INTRATHECAL;;  Surgeon: Anthony Maloney MD;  Location: [facility]     SHOULDER SURGERY       ZZC GASTRIC BYPASS,OBESE<100CM SUSY-EN-Y  01/01/2008    Description: Gastric Surgery For Morbid Obesity Bypass  With Lora-en-Y;  Recorded: 06/26/2009;  Comments: dr green 2008     [facility] REPAIR INCISIONAL HERNIA,REDUCIBLE  11/13/2012    Description: Incisional Hernia Repair;  Proc Date: 11/13/2012;  Comments: incisional hernia repair and abdominal panniculectomy by Dr Shon Green at the [facility].     Socioeconomic History     Marital status:      Social Drivers of Health     Social Connections: Unknown (2/27/2024)    Social Connection and Isolation Panel [NHANES]      Frequency of Social Gatherings with Friends and Family: Once a week   Interpersonal Safety: Low Risk  (3/28/2025)    Interpersonal Safety      Do you feel physically and emotionally safe where you currently live?: Yes      Within the past 12 months, have you been hit, slapped, kicked or otherwise physically hurt by someone?: No      Within the past 12 months, have you been humiliated or emotionally abused in other ways by your partner or ex-partner?: No     Review of Systems   Constitutional:  Positive for fatigue.   HENT:  Negative.     Eyes: Negative.    Respiratory: Negative.     Cardiovascular: Negative.    Gastrointestinal: Negative.    Endocrine:        Diabetes improved since bariatric surgery   Genitourinary: Negative.     Musculoskeletal:  Positive for arthralgias (right shoulder, hip, and knee), back pain and gait problem.   Neurological:  Positive for gait problem.   Hematological: Negative.    Psychiatric/Behavioral: Negative.     All other systems reviewed and are negative.      /71   Pulse 60   Temp 97.7  F (36.5  C) (Oral)   Resp 20   Wt 128.8 kg (284 lb)   SpO2 96%   BMI 34.57 kg/m      Physical Exam  Vitals and nursing note reviewed.   Constitutional:       Appearance: He is well-developed. He is obese.      Comments: Pleasant obese man bent nearly 90 degrees from back problems, seems tired   HENT:      Head: Normocephalic and atraumatic.      Mouth/Throat:      Mouth: Mucous membranes are moist.      Dentition: Abnormal  dentition. No dental caries.   Eyes:      Extraocular Movements: Extraocular movements intact.      Conjunctiva/sclera: Conjunctivae normal.      Pupils: Pupils are equal, round, and reactive to light.   Cardiovascular:      Rate and Rhythm: Normal rate and regular rhythm.      Pulses: Normal pulses.      Heart sounds: Normal heart sounds.   Pulmonary:      Effort: Pulmonary effort is normal.      Breath sounds: Normal breath sounds.   Chest:          Comments: Sternotomy scar  Abdominal:      General: There is no distension.      Palpations: Abdomen is soft. There is no mass.      Tenderness: There is no abdominal tenderness. There is no guarding.   Musculoskeletal:         General: Swelling and deformity (back) present.      Cervical back: Neck supple.        Back:       Comments: Scar from prior surgery and subcutaneous pump   Lymphadenopathy:      Cervical: No cervical adenopathy.   Skin:     General: Skin is warm.      Findings: Lesion (superficial excoriated lesions on neck and ears) and rash present.             Comments: Bilateral stasis changes   Neurological:      General: No focal deficit present.      Mental Status: He is alert and oriented to person, place, and time.      Cranial Nerves: No cranial nerve deficit.      Gait: Gait abnormal.      Deep Tendon Reflexes: Reflexes normal.   Psychiatric:         Mood and Affect: Mood normal.         Behavior: Behavior normal. Behavior is cooperative.         Thought Content: Thought content normal.         Judgment: Judgment normal.       Recent Results (from the past 720 hours)   Comprehensive metabolic panel    Collection Time: 05/13/25 11:07 AM   Result Value Ref Range    Sodium 143 135 - 145 mmol/L    Potassium 4.2 3.4 - 5.3 mmol/L    Carbon Dioxide (CO2) 29 22 - 29 mmol/L    Anion Gap 10 7 - 15 mmol/L    Urea Nitrogen 22.1 8.0 - 23.0 mg/dL    Creatinine 1.10 0.67 - 1.17 mg/dL    GFR Estimate 73 >60 mL/min/1.73m2    Calcium 9.4 8.8 - 10.4 mg/dL    Chloride  104 98 - 107 mmol/L    Glucose 95 70 - 99 mg/dL    Alkaline Phosphatase 114 40 - 150 U/L    AST 26 0 - 45 U/L    ALT 20 0 - 70 U/L    Protein Total 7.4 6.4 - 8.3 g/dL    Albumin 4.0 3.5 - 5.2 g/dL    Bilirubin Total 0.4 <=1.2 mg/dL   CBC with platelets and differential    Collection Time: 05/13/25 11:07 AM   Result Value Ref Range    WBC Count 37.2 (H) 4.0 - 11.0 10e3/uL    RBC Count 5.17 4.40 - 5.90 10e6/uL    Hemoglobin 13.6 13.3 - 17.7 g/dL    Hematocrit 42.9 40.0 - 53.0 %    MCV 83 78 - 100 fL    MCH 26.3 (L) 26.5 - 33.0 pg    MCHC 31.7 31.5 - 36.5 g/dL    RDW 15.9 (H) 10.0 - 15.0 %    Platelet Count 201 150 - 450 10e3/uL     No results found for this or any previous visit (from the past 744 hours).        Again, thank you for allowing me to participate in the care of your patient.        Sincerely,        Melissa Peguero MD    Electronically signed

## 2025-05-13 NOTE — PROGRESS NOTES
Assessment & Plan  Chronic  lymphocytic leukemia, untreated to date  Chronic back pain exacerbated by spine fracture 31 October 2024  Diabetes Mellitus     Next provider visit with CBC in 6 months     Interval History  This is a scheduled month follow up for this retired , previously seen by me for lymphocytosis and diagnosed with CLL.  Thus far, he's been asymptomatic and has not been treated.  Numbers have varied over time with no clear trend.        As of today, he has no problems attributable to the lymphocytosis (denies weight loss, sweats, fatigue, fevers) but does have chronic issues from back pain.  He has an indwelling pain pump (managed by  pain clinic, revised just last month) and this has been stabilized in the past month.  He is inactive and his weight has been creeping up.    ECOG Peformance Status  1 - Restricted in physically strenuous activity, but ambulatory and able to carry out work of a light and sedentary nature      ONCOLOGIC HISTORY  2023 April 17 - New onset lymphocytosis  May 15 Flow  A. Peripheral Blood:  -CD5-positive kappa-monotypic B cells (47%)  -No aberrant immunophenotype on T cells    Patient Active Problem List   Diagnosis    Lumbago    Bilateral leg edema    Chronic diastolic heart failure (H)    MARLEEN (doesn't tolerate CPAP)    NSTEMI w Unstab Angina -- S/P CABG x 1 (LIMA to LAD) on 1/19/22    Paroxysmal atrial fib -- S/P Pulm Vein Ablation 1/19/22    Essential hypertension    Mixed hyperlipidemia    Gastroesophageal reflux disease without esophagitis    Chronic Back Pain (IT Pump w Morphine, Clonidine, Baclofen)    S/P CABG (coronary artery bypass graft)    Fluid overload    ICD (implantable cardioverter-defibrillator) in place    Peripheral vascular disease    Abdominal panniculus    Bariatric surgery status    Claudication of lower extremity    Insomnia    Intestinal disaccharidase deficiencies and disaccharide malabsorption    Nephrolithiasis    Osteoarthrosis     Post-laminectomy syndrome    Sleepiness    Coronary arteriosclerosis    Candidal intertrigo    CLL (chronic lymphocytic leukemia) (H)    Closed compression fracture of lumbosacral spine (H)     Current Outpatient Medications   Medication Sig Dispense Refill    acetaminophen (TYLENOL) 325 MG tablet Take 3 tablets (975 mg) by mouth every 6 hours. 360 tablet 0    aspirin (ASA) 81 MG chewable tablet Take 1 tablet (81 mg) by mouth daily. 30 tablet 0    baclofen (LIORESAL) 10 MG tablet TAKE 1 TABLET BY ORAL ROUTE 2 TIMES EVERY DAY INCASE IT PUMP MULFUNTIONS FOR SPASTICITY      bisacodyl (DULCOLAX) 10 MG suppository Place 1 suppository (10 mg) rectally daily as needed for constipation. 6 suppository 0    [facility] IRON 240 (27 Fe) MG TABS Take 1 tablet by mouth daily. 90 tablet 2    [facility] IRON 240 (27 Fe) MG TABS Take 1 tablet by mouth daily. 30 tablet 0    diclofenac (VOLTAREN) 1 % topical gel Apply 2 g topically 4 times daily. 300 g 0    DULoxetine (CYMBALTA) 30 MG capsule TAKE 1 CAPSULE (30MG) EVERY MORNING AND 2 CAPSULES (60MG) EVERY EVENING 270 capsule 1    fluconazole (DIFLUCAN) 150 MG tablet Take 1 tablet (150 mg) by mouth daily. 15 tablet 0    furosemide (LASIX) 40 MG tablet Take 1 tablet (40 mg) by mouth 2 times daily. 180 tablet 3    gabapentin (NEURONTIN) 100 MG capsule Take 3 capsules (300 mg) by mouth 3 times daily. Hold sedation, confusion      gabapentin (NEURONTIN) 300 MG capsule Take 1 capsule (300 mg) by mouth 3 times daily. 270 capsule 0    HYDROmorphone (DILAUDID) 2 MG tablet Take 1-2 tablets (2-4 mg) by mouth every 8 hours as needed for severe pain. 90 tablet 0    hydrOXYzine HCl (ATARAX) 25 MG tablet Take 1 tablet (25 mg) by mouth 3 times daily as needed for other (pain). 60 tablet 0    ketoconazole (NIZORAL) 2 % external cream Apply topically 2 times daily. to affected area 30 g 0    lisinopril (ZESTRIL) 10 MG tablet Take 1 tablet (10 mg) by mouth daily at 2 pm. 90 tablet 1    medication given by  implanted intrathecal pump continuously. Updated by PharmD 10/31/24    Drug # 1: Morphine (Duramorph or Infumorph)  - Conc:20 mg/mL - Total Dose / 24 hours: 4.342 mg    Drug # 2: Clonidine (Duraclon)  - Conc:21.1 mcg/mL - Total Dose / 24 hours: 4.580 mcg    Drug # 3: Baclofen (Lioresal) - Conc: 42.5 mcg/mL - Total Dose / 24 hours: 9.226 mcg    Diluent: NS    Infusion Rate: 0.2171 mL/24 hrs  Pump Reservoir Volume: 40 mL  Outside Clinic & Provider: [facility] [phone]  Last Refill Date: 07/12/2024  Next Refill Date: 01/01/2025  [place] Alarm Date: 01/01/2025  Pump : Markafoni Synchromed II    Please consider consulting the pain team if the patient is admitted with an IT pump.      methocarbamol (ROBAXIN) 750 MG tablet Take 1 tablet (750 mg) by mouth 4 times daily as needed for muscle spasms. 90 tablet 0    methocarbamol (ROBAXIN) 750 MG tablet Take 1 tablet (750 mg) by mouth 4 times daily. 120 tablet 0    metoprolol succinate ER (TOPROL XL) 100 MG 24 hr tablet Take 1 tablet (100 mg) by mouth 2 times daily. 90 tablet 3    naloxone (NARCAN) 4 MG/0.1ML nasal spray Spray 1 spray (4 mg) into one nostril alternating nostrils as needed for opioid reversal. every 2-3 minutes until assistance arrives 2 each 2    nitroGLYcerin (NITROSTAT) 0.4 MG sublingual tablet For chest pain place 1 tablet under the tongue every 5 minutes for 3 doses. If symptoms persist 5 minutes after 1st dose call 911. 9 tablet 0    nystatin (MYCOSTATIN) 031051 UNIT/GM external powder Apply topically 2 times daily. 60 g 1    ondansetron (ZOFRAN ODT) 4 MG ODT tab Take 1 tablet (4 mg) by mouth every 8 hours as needed for nausea. 12 tablet 0    polyethylene glycol (MIRALAX) 17 GM/Dose powder Take 17 g by mouth daily. 510 g 0    potassium chloride isai ER (KLOR-CON M20) 20 MEQ CR tablet Take 2 tablets (40 mEq) by mouth daily. 180 tablet 2    rosuvastatin (CRESTOR) 20 MG tablet Take 1 tablet (20 mg) by mouth daily. 90 tablet  3    senna-docusate (SENOKOT-S/PERICOLACE) 8.6-50 MG tablet Take 2 tablets by mouth 2 times daily. Hold for loose stools. 120 tablet 0    sotalol (BETAPACE) 80 MG tablet Take 1 tablet (80 mg) by mouth 2 times daily. 180 tablet 3    traZODone (DESYREL) 100 MG tablet TAKE 2 TABLETS (200 MG) BY MOUTH AT BEDTIME 180 tablet 0     No current facility-administered medications for this visit.     Past Medical History:   Diagnosis Date    Bariatric surgery status 06/23/2008    Formatting of this note might be different from the original. Created by Conversion    Bilateral leg edema 07/13/2021    Chronic Back Pain (IT Pump w Morphine, Clonidine, Baclofen) 01/16/2022    Chronic diastolic heart failure (H) 07/26/2021    Claudication of lower extremity 11/20/2019    CLL (chronic lymphocytic leukemia) (H)     Coronary artery disease     CABG 1-    Depressive disorder     Essential hypertension     Created by [facility] Annotation: Apr 6 2012 10:16Zack Harris: Lisinipril,  coreg  Replacement Utility updated for latest IMO load    Gastroesophageal reflux disease without esophagitis 09/11/2021    H/O gastric bypass 2008    History of blood transfusion 2004    ICD (implantable cardioverter-defibrillator) in place 01/25/2022    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    Ischemic cardiomyopathy     Lumbago     Created by [facility] Annotation: Apr 6 2012 10:20Anh Ford: Morphine pump     Mixed hyperlipidemia 06/23/2008    Morbid obesity (H) 08/01/2018    Nephrolithiasis     NSTEMI (non-ST elevated myocardial infarction) (H)     MARLEEN (doesn't tolerate CPAP) 07/26/2021    Osteoarthritis     Created by [facility] Annotation: Jun 26 2009  9:53Zack Harris: failed lumbar  fusion/morphine pump dr putnam  Replacement Utility updated for latest IMO load    Paroxysmal atrial fib -- S/P Pulm Vein Ablation 1/19/22 09/11/2021     Formatting of this note might be different from the original. Created by Conversion    Paroxysmal ventricular tachycardia (H)     dual chamber ICD 1/24/2022    Peripheral vascular disease 05/03/2022    Post-laminectomy syndrome 11/25/2015    S/P CABG (coronary artery bypass graft) 01/25/2022    Type 2 diabetes mellitus (H)     Diet controlled after Gastric Bypass in 2008     Past Surgical History:   Procedure Laterality Date    BACK SURGERY      BYPASS GASTRIC DUODENAL SWITCH      BYPASS GRAFT ARTERY CORONARY N/A 01/19/2022    Procedure: CORONARY ARTERY BYPASS GRAFT (CABG) X1; LIMA -LAD.  PULMONARY VEIN ISOLATION, AND ATRIAL APPENDAGE CLIPPING USING 45MM ACTICURE CLIP.;  Surgeon: William Dumont MD;  Location:  OR    COLONOSCOPY      COSMETIC SURGERY      pannicullectomy    CV CORONARY ANGIOGRAM N/A 09/11/2021    Procedure: Coronary Angiogram;  Surgeon: Noris Ozuna MD;  Location: [facility] LAB CV    CV HEART CATHETERIZATION WITH POSSIBLE INTERVENTION N/A 01/13/2022    Procedure: Heart Catheterization with Possible Intervention;  Surgeon: Liz Pearl MD;  Location:  HEART CARDIAC CATH LAB    CV LEFT HEART CATH N/A 09/11/2021    Procedure: Left Heart Cath;  Surgeon: Noris Ozuna MD;  Location: [facility] CATH LAB CV    CV PCI N/A 09/11/2021    Procedure: Percutaneous Coronary Intervention;  Surgeon: Noris Ozuna MD;  Location: [facility] CATH LAB CV    CV PCI N/A 10/07/2021    Procedure: Percutaneous Coronary Intervention;  Surgeon: Mckayla Dan MD;  Location: [facility] LAB CV    CV PCI ASPIRATION THROMECTOMY N/A 09/11/2021    Procedure: Percutaneous Coronary Intervention Aspiration Thrombectomy;  Surgeon: Noris Ozuna MD;  Location: [facility] LAB CV    ENT SURGERY      tonsillectomy    EP ICD N/A 01/24/2022    Procedure: EP ICD;  Surgeon: Jannette Crook MD;  Location:  HEART CARDIAC CATH LAB    EXTRACORPOREAL SHOCK WAVE LITHOTRIPSY, CYSTOSCOPY, INSERT STENT URETER(S),  COMBINED  08/23/2011    HC REMOVAL OF TONSILS,<12 Y/O      Description: Tonsillectomy;  Recorded: 03/23/2012;  Comments: for obstructive sleep apnea    HERNIA REPAIR      IR MISCELLANEOUS PROCEDURE  07/29/2011    IR MISCELLANEOUS PROCEDURE  08/05/2011    IR MISCELLANEOUS PROCEDURE  08/23/2011    IR NEPHROSTOMY TUBE CHANGE BILATERAL  08/05/2011    ORTHOPEDIC SURGERY      arthrodesis ant discectomy, lumbar    GA ARTHRODESIS ANT INTERBODY MIN DISCECTOMY,LUMBAR      Description: Lumbar Vertebral Fusion;  Recorded: 06/26/2009;  Comments: before 200 see [facility] consult under old records    GA EXCISE EXCESS SKIN TISSUE,ABDOMEN  11/13/2012    Description: Panniculectomy;  Proc Date: 11/13/2012;  Comments: 3.5 Lb Pannus was removed at the [facility] By Dr. Shon Green    REPLACE INTRATHECAL PAIN PUMP N/A 04/25/2017    Procedure: REPLACE INTRATHECAL PAIN PUMP;  INTRATHECAL PAIN PUMP CATHETER REPLACEMENT AND PUMP REPLACEMENT;  Surgeon: Anthony Maloney MD;  Location:  SD    REPLACE INTRATHECAL PAIN PUMP N/A 4/20/2023    Procedure: Pump Replacement and intrathecal catheter replacement;  Surgeon: Anthony Dick MD;  Location:  OR    REPLACE INTRATHECAL PAIN PUMP N/A 3/28/2025    Procedure: intrathecal pump catheter revision;  Surgeon: Rob Acosta DO;  Location:  OR    REVISE CATHETER INTRATHECAL N/A 04/25/2017    Procedure: REVISE CATHETER INTRATHECAL;;  Surgeon: Anthony Maloney MD;  Location:  SD    SHOULDER SURGERY      ZZC GASTRIC BYPASS,OBESE<100CM SUSY-EN-Y  01/01/2008    Description: Gastric Surgery For Morbid Obesity Bypass With Susy-en-Y;  Recorded: 06/26/2009;  Comments: dr green 2008    ZZ REPAIR INCISIONAL HERNIA,REDUCIBLE  11/13/2012    Description: Incisional Hernia Repair;  Proc Date: 11/13/2012;  Comments: incisional hernia repair and abdominal panniculectomy by Dr Shon Green at the [facility].     Socioeconomic History    Marital status:      Social Drivers of Health      Social Connections: Unknown (2/27/2024)    Social Connection and Isolation Panel [NHANES]     Frequency of Social Gatherings with Friends and Family: Once a week   Interpersonal Safety: Low Risk  (3/28/2025)    Interpersonal Safety     Do you feel physically and emotionally safe where you currently live?: Yes     Within the past 12 months, have you been hit, slapped, kicked or otherwise physically hurt by someone?: No     Within the past 12 months, have you been humiliated or emotionally abused in other ways by your partner or ex-partner?: No     Review of Systems   Constitutional:  Positive for fatigue.   HENT:  Negative.     Eyes: Negative.    Respiratory: Negative.     Cardiovascular: Negative.    Gastrointestinal: Negative.    Endocrine:        Diabetes improved since bariatric surgery   Genitourinary: Negative.     Musculoskeletal:  Positive for arthralgias (right shoulder, hip, and knee), back pain and gait problem.   Neurological:  Positive for gait problem.   Hematological: Negative.    Psychiatric/Behavioral: Negative.     All other systems reviewed and are negative.      /71   Pulse 60   Temp 97.7  F (36.5  C) (Oral)   Resp 20   Wt 128.8 kg (284 lb)   SpO2 96%   BMI 34.57 kg/m      Physical Exam  Vitals and nursing note reviewed.   Constitutional:       Appearance: He is well-developed. He is obese.      Comments: Pleasant obese man bent nearly 90 degrees from back problems, seems tired   HENT:      Head: Normocephalic and atraumatic.      Mouth/Throat:      Mouth: Mucous membranes are moist.      Dentition: Abnormal dentition. No dental caries.   Eyes:      Extraocular Movements: Extraocular movements intact.      Conjunctiva/sclera: Conjunctivae normal.      Pupils: Pupils are equal, round, and reactive to light.   Cardiovascular:      Rate and Rhythm: Normal rate and regular rhythm.      Pulses: Normal pulses.      Heart sounds: Normal heart sounds.   Pulmonary:      Effort: Pulmonary  effort is normal.      Breath sounds: Normal breath sounds.   Chest:          Comments: Sternotomy scar  Abdominal:      General: There is no distension.      Palpations: Abdomen is soft. There is no mass.      Tenderness: There is no abdominal tenderness. There is no guarding.   Musculoskeletal:         General: Swelling and deformity (back) present.      Cervical back: Neck supple.        Back:       Comments: Scar from prior surgery and subcutaneous pump   Lymphadenopathy:      Cervical: No cervical adenopathy.   Skin:     General: Skin is warm.      Findings: Lesion (superficial excoriated lesions on neck and ears) and rash present.             Comments: Bilateral stasis changes   Neurological:      General: No focal deficit present.      Mental Status: He is alert and oriented to person, place, and time.      Cranial Nerves: No cranial nerve deficit.      Gait: Gait abnormal.      Deep Tendon Reflexes: Reflexes normal.   Psychiatric:         Mood and Affect: Mood normal.         Behavior: Behavior normal. Behavior is cooperative.         Thought Content: Thought content normal.         Judgment: Judgment normal.       Recent Results (from the past 720 hours)   Comprehensive metabolic panel    Collection Time: 05/13/25 11:07 AM   Result Value Ref Range    Sodium 143 135 - 145 mmol/L    Potassium 4.2 3.4 - 5.3 mmol/L    Carbon Dioxide (CO2) 29 22 - 29 mmol/L    Anion Gap 10 7 - 15 mmol/L    Urea Nitrogen 22.1 8.0 - 23.0 mg/dL    Creatinine 1.10 0.67 - 1.17 mg/dL    GFR Estimate 73 >60 mL/min/1.73m2    Calcium 9.4 8.8 - 10.4 mg/dL    Chloride 104 98 - 107 mmol/L    Glucose 95 70 - 99 mg/dL    Alkaline Phosphatase 114 40 - 150 U/L    AST 26 0 - 45 U/L    ALT 20 0 - 70 U/L    Protein Total 7.4 6.4 - 8.3 g/dL    Albumin 4.0 3.5 - 5.2 g/dL    Bilirubin Total 0.4 <=1.2 mg/dL   CBC with platelets and differential    Collection Time: 05/13/25 11:07 AM   Result Value Ref Range    WBC Count 37.2 (H) 4.0 - 11.0 10e3/uL     RBC Count 5.17 4.40 - 5.90 10e6/uL    Hemoglobin 13.6 13.3 - 17.7 g/dL    Hematocrit 42.9 40.0 - 53.0 %    MCV 83 78 - 100 fL    MCH 26.3 (L) 26.5 - 33.0 pg    MCHC 31.7 31.5 - 36.5 g/dL    RDW 15.9 (H) 10.0 - 15.0 %    Platelet Count 201 150 - 450 10e3/uL     No results found for this or any previous visit (from the past 744 hours).

## 2025-05-13 NOTE — NURSING NOTE
"Oncology Rooming Note    May 13, 2025 11:12 AM   Onur Barr is a 69 year old male who presents for:    Chief Complaint   Patient presents with    Oncology Clinic Visit     Initial Vitals: /71   Pulse 60   Temp 97.7  F (36.5  C) (Oral)   Resp 20   Wt 128.8 kg (284 lb)   SpO2 96%   BMI 34.57 kg/m   Estimated body mass index is 34.57 kg/m  as calculated from the following:    Height as of 3/28/25: 1.93 m (6' 4\").    Weight as of this encounter: 128.8 kg (284 lb). Body surface area is 2.63 meters squared.  Severe Pain (8) Comment: Data Unavailable   No LMP for male patient.  Allergies reviewed: Yes  Medications reviewed: Yes    Medications: Medication refills not needed today.  Pharmacy name entered into [facility]: [id number] [place], [place] - [id number]    Frailty Screening:   Is the patient here for a new oncology consult visit in cancer care? 2. No    PHQ9:  Did this patient require a PHQ9?: No      Clinical concerns: follow up        Mayela Gallo            "

## 2025-05-13 NOTE — PROGRESS NOTES
Medical Assistant Note:  Onur Barr presents today for blood draw.    Patient seen by provider today: Yes: Dr. Pillai.   present during visit today: Not Applicable.    Concerns: No Concerns.    Procedure:  Lab draw site: right antecub, Needle type: butterfly, Gauge: 23 g.    Post Assessment:  Labs drawn without difficulty: Yes.    Discharge Plan:  Departure Mode: Ambulatory.    Face to Face Time: 10.    Lindsey Schneider CMA

## 2025-06-09 ENCOUNTER — ANCILLARY PROCEDURE (OUTPATIENT)
Dept: CARDIOLOGY | Facility: CLINIC | Age: 70
End: 2025-06-09
Attending: INTERNAL MEDICINE
Payer: COMMERCIAL

## 2025-06-09 DIAGNOSIS — Z95.810 ICD (IMPLANTABLE CARDIOVERTER-DEFIBRILLATOR) IN PLACE: ICD-10-CM

## 2025-06-09 DIAGNOSIS — I47.20 PAROXYSMAL VENTRICULAR TACHYCARDIA (H): ICD-10-CM

## 2025-06-09 DIAGNOSIS — I25.5 ISCHEMIC CARDIOMYOPATHY: ICD-10-CM

## 2025-06-09 LAB
MDC_IDC_EPISODE_DTM: NORMAL
MDC_IDC_EPISODE_DURATION: 1 S
MDC_IDC_EPISODE_DURATION: 1 S
MDC_IDC_EPISODE_DURATION: 14 S
MDC_IDC_EPISODE_DURATION: 20 S
MDC_IDC_EPISODE_DURATION: 3 S
MDC_IDC_EPISODE_DURATION: 36 S
MDC_IDC_EPISODE_DURATION: 8 S
MDC_IDC_EPISODE_ID: NORMAL
MDC_IDC_EPISODE_TYPE: NORMAL
MDC_IDC_LEAD_CONNECTION_STATUS: NORMAL
MDC_IDC_LEAD_CONNECTION_STATUS: NORMAL
MDC_IDC_LEAD_IMPLANT_DT: NORMAL
MDC_IDC_LEAD_IMPLANT_DT: NORMAL
MDC_IDC_LEAD_LOCATION: NORMAL
MDC_IDC_LEAD_LOCATION: NORMAL
MDC_IDC_LEAD_LOCATION_DETAIL_1: NORMAL
MDC_IDC_LEAD_LOCATION_DETAIL_1: NORMAL
MDC_IDC_LEAD_MFG: NORMAL
MDC_IDC_LEAD_MFG: NORMAL
MDC_IDC_LEAD_MODEL: NORMAL
MDC_IDC_LEAD_MODEL: NORMAL
MDC_IDC_LEAD_POLARITY_TYPE: NORMAL
MDC_IDC_LEAD_POLARITY_TYPE: NORMAL
MDC_IDC_LEAD_SERIAL: NORMAL
MDC_IDC_LEAD_SERIAL: NORMAL
MDC_IDC_MSMT_BATTERY_DTM: NORMAL
MDC_IDC_MSMT_BATTERY_REMAINING_LONGEVITY: 126 MO
MDC_IDC_MSMT_BATTERY_REMAINING_PERCENTAGE: 100 %
MDC_IDC_MSMT_BATTERY_STATUS: NORMAL
MDC_IDC_MSMT_CAP_CHARGE_DTM: NORMAL
MDC_IDC_MSMT_CAP_CHARGE_TIME: 10.4 S
MDC_IDC_MSMT_CAP_CHARGE_TYPE: NORMAL
MDC_IDC_MSMT_LEADCHNL_RA_IMPEDANCE_VALUE: 621 OHM
MDC_IDC_MSMT_LEADCHNL_RA_PACING_THRESHOLD_AMPLITUDE: 0.4 V
MDC_IDC_MSMT_LEADCHNL_RA_PACING_THRESHOLD_PULSEWIDTH: 0.4 MS
MDC_IDC_MSMT_LEADCHNL_RV_IMPEDANCE_VALUE: 397 OHM
MDC_IDC_MSMT_LEADCHNL_RV_PACING_THRESHOLD_AMPLITUDE: 1 V
MDC_IDC_MSMT_LEADCHNL_RV_PACING_THRESHOLD_PULSEWIDTH: 0.4 MS
MDC_IDC_PG_IMPLANT_DTM: NORMAL
MDC_IDC_PG_MFG: NORMAL
MDC_IDC_PG_MODEL: NORMAL
MDC_IDC_PG_SERIAL: NORMAL
MDC_IDC_PG_TYPE: NORMAL
MDC_IDC_SESS_CLINIC_NAME: NORMAL
MDC_IDC_SESS_DTM: NORMAL
MDC_IDC_SESS_TYPE: NORMAL
MDC_IDC_SET_BRADY_AT_MODE_SWITCH_MODE: NORMAL
MDC_IDC_SET_BRADY_AT_MODE_SWITCH_RATE: 170 {BEATS}/MIN
MDC_IDC_SET_BRADY_LOWRATE: 60 {BEATS}/MIN
MDC_IDC_SET_BRADY_MAX_SENSOR_RATE: 130 {BEATS}/MIN
MDC_IDC_SET_BRADY_MAX_TRACKING_RATE: 130 {BEATS}/MIN
MDC_IDC_SET_BRADY_MODE: NORMAL
MDC_IDC_SET_BRADY_PAV_DELAY_HIGH: 200 MS
MDC_IDC_SET_BRADY_PAV_DELAY_LOW: 300 MS
MDC_IDC_SET_BRADY_SAV_DELAY_HIGH: 200 MS
MDC_IDC_SET_BRADY_SAV_DELAY_LOW: 300 MS
MDC_IDC_SET_LEADCHNL_RA_PACING_AMPLITUDE: 2 V
MDC_IDC_SET_LEADCHNL_RA_PACING_CAPTURE_MODE: NORMAL
MDC_IDC_SET_LEADCHNL_RA_PACING_POLARITY: NORMAL
MDC_IDC_SET_LEADCHNL_RA_PACING_PULSEWIDTH: 0.4 MS
MDC_IDC_SET_LEADCHNL_RA_SENSING_ADAPTATION_MODE: NORMAL
MDC_IDC_SET_LEADCHNL_RA_SENSING_POLARITY: NORMAL
MDC_IDC_SET_LEADCHNL_RA_SENSING_SENSITIVITY: 0.25 MV
MDC_IDC_SET_LEADCHNL_RV_PACING_AMPLITUDE: 2 V
MDC_IDC_SET_LEADCHNL_RV_PACING_CAPTURE_MODE: NORMAL
MDC_IDC_SET_LEADCHNL_RV_PACING_POLARITY: NORMAL
MDC_IDC_SET_LEADCHNL_RV_PACING_PULSEWIDTH: 0.4 MS
MDC_IDC_SET_LEADCHNL_RV_SENSING_ADAPTATION_MODE: NORMAL
MDC_IDC_SET_LEADCHNL_RV_SENSING_POLARITY: NORMAL
MDC_IDC_SET_LEADCHNL_RV_SENSING_SENSITIVITY: 0.6 MV
MDC_IDC_SET_ZONE_DETECTION_INTERVAL: 250 MS
MDC_IDC_SET_ZONE_DETECTION_INTERVAL: 300 MS
MDC_IDC_SET_ZONE_DETECTION_INTERVAL: 353 MS
MDC_IDC_SET_ZONE_STATUS: NORMAL
MDC_IDC_SET_ZONE_TYPE: NORMAL
MDC_IDC_SET_ZONE_VENDOR_TYPE: NORMAL
MDC_IDC_STAT_AT_BURDEN_PERCENT: 1 %
MDC_IDC_STAT_AT_DTM_END: NORMAL
MDC_IDC_STAT_AT_DTM_START: NORMAL
MDC_IDC_STAT_BRADY_DTM_END: NORMAL
MDC_IDC_STAT_BRADY_DTM_START: NORMAL
MDC_IDC_STAT_BRADY_RA_PERCENT_PACED: 85 %
MDC_IDC_STAT_BRADY_RV_PERCENT_PACED: 7 %
MDC_IDC_STAT_EPISODE_RECENT_COUNT: 0
MDC_IDC_STAT_EPISODE_RECENT_COUNT: 1
MDC_IDC_STAT_EPISODE_RECENT_COUNT_DTM_END: NORMAL
MDC_IDC_STAT_EPISODE_RECENT_COUNT_DTM_START: NORMAL
MDC_IDC_STAT_EPISODE_TYPE: NORMAL
MDC_IDC_STAT_EPISODE_VENDOR_TYPE: NORMAL
MDC_IDC_STAT_TACHYTHERAPY_ATP_DELIVERED_RECENT: 0
MDC_IDC_STAT_TACHYTHERAPY_ATP_DELIVERED_TOTAL: 0
MDC_IDC_STAT_TACHYTHERAPY_RECENT_DTM_END: NORMAL
MDC_IDC_STAT_TACHYTHERAPY_RECENT_DTM_START: NORMAL
MDC_IDC_STAT_TACHYTHERAPY_SHOCKS_ABORTED_RECENT: 0
MDC_IDC_STAT_TACHYTHERAPY_SHOCKS_ABORTED_TOTAL: 0
MDC_IDC_STAT_TACHYTHERAPY_SHOCKS_DELIVERED_RECENT: 0
MDC_IDC_STAT_TACHYTHERAPY_SHOCKS_DELIVERED_TOTAL: 0
MDC_IDC_STAT_TACHYTHERAPY_TOTAL_DTM_END: NORMAL
MDC_IDC_STAT_TACHYTHERAPY_TOTAL_DTM_START: NORMAL

## 2025-06-09 PROCEDURE — 93295 DEV INTERROG REMOTE 1/2/MLT: CPT | Performed by: INTERNAL MEDICINE

## 2025-06-09 PROCEDURE — 93296 REM INTERROG EVL PM/IDS: CPT | Performed by: INTERNAL MEDICINE

## 2025-06-22 DIAGNOSIS — B37.2 CANDIDAL INTERTRIGO: ICD-10-CM

## 2025-06-23 RX ORDER — KETOCONAZOLE 20 MG/G
CREAM TOPICAL
Qty: 30 G | Refills: 0 | Status: SHIPPED | OUTPATIENT
Start: 2025-06-23

## 2025-06-28 DIAGNOSIS — G47.00 INSOMNIA, UNSPECIFIED TYPE: ICD-10-CM

## 2025-06-30 RX ORDER — TRAZODONE HYDROCHLORIDE 100 MG/1
200 TABLET ORAL AT BEDTIME
Qty: 180 TABLET | Refills: 1 | Status: SHIPPED | OUTPATIENT
Start: 2025-06-30

## 2025-07-02 ENCOUNTER — MYC MEDICAL ADVICE (OUTPATIENT)
Dept: CARDIOLOGY | Facility: CLINIC | Age: 70
End: 2025-07-02
Payer: COMMERCIAL

## 2025-07-02 DIAGNOSIS — Z95.1 S/P CABG (CORONARY ARTERY BYPASS GRAFT): ICD-10-CM

## 2025-07-02 DIAGNOSIS — I50.22 CHRONIC SYSTOLIC CONGESTIVE HEART FAILURE (H): ICD-10-CM

## 2025-07-02 RX ORDER — METOPROLOL SUCCINATE 100 MG/1
100 TABLET, EXTENDED RELEASE ORAL 2 TIMES DAILY
Qty: 90 TABLET | Refills: 3 | Status: SHIPPED | OUTPATIENT
Start: 2025-07-02

## 2025-07-09 ENCOUNTER — TRANSFERRED RECORDS (OUTPATIENT)
Dept: HEALTH INFORMATION MANAGEMENT | Facility: CLINIC | Age: 70
End: 2025-07-09
Payer: COMMERCIAL

## 2025-07-15 DIAGNOSIS — G89.4 CHRONIC PAIN SYNDROME: ICD-10-CM

## 2025-07-15 RX ORDER — GABAPENTIN 300 MG/1
300 CAPSULE ORAL 3 TIMES DAILY
Qty: 270 CAPSULE | Refills: 0 | Status: SHIPPED | OUTPATIENT
Start: 2025-07-15

## 2025-07-16 ENCOUNTER — PATIENT OUTREACH (OUTPATIENT)
Dept: ONCOLOGY | Facility: CLINIC | Age: 70
End: 2025-07-16
Payer: COMMERCIAL

## 2025-07-30 DIAGNOSIS — D64.9 ANEMIA, UNSPECIFIED TYPE: ICD-10-CM

## 2025-07-30 RX ORDER — DIAPER,BRIEF,INFANT-TODD,DISP
1 EACH MISCELLANEOUS DAILY
Qty: 100 TABLET | Refills: 2 | Status: SHIPPED | OUTPATIENT
Start: 2025-07-30

## 2025-08-14 ENCOUNTER — MYC MEDICAL ADVICE (OUTPATIENT)
Dept: INTERNAL MEDICINE | Facility: CLINIC | Age: 70
End: 2025-08-14
Payer: COMMERCIAL

## 2025-08-14 DIAGNOSIS — L30.4 INTERTRIGO: ICD-10-CM

## 2025-08-14 RX ORDER — FLUCONAZOLE 150 MG/1
150 TABLET ORAL DAILY
Qty: 15 TABLET | Refills: 0 | Status: SHIPPED | OUTPATIENT
Start: 2025-08-14

## 2025-08-24 ENCOUNTER — MYC MEDICAL ADVICE (OUTPATIENT)
Dept: INTERNAL MEDICINE | Facility: CLINIC | Age: 70
End: 2025-08-24
Payer: COMMERCIAL

## 2025-08-24 DIAGNOSIS — B37.2 CANDIDAL INTERTRIGO: ICD-10-CM

## 2025-08-27 RX ORDER — KETOCONAZOLE 20 MG/G
CREAM TOPICAL 2 TIMES DAILY
Qty: 60 G | Refills: 0 | Status: SHIPPED | OUTPATIENT
Start: 2025-08-27

## (undated) DEVICE — SU PROLENE 6-0 C-1DA 30" M8706

## (undated) DEVICE — DRAPE SHEET REV FOLD 3/4 9349

## (undated) DEVICE — PACK TUBING MINI VAC CUSTOM 1/2X3/8T BB9J78R4

## (undated) DEVICE — DRSG STERI STRIP 1/2X4" B1557

## (undated) DEVICE — CATH GUIDELINER 6FR 5571

## (undated) DEVICE — DRSG GAUZE 4X4" 3033

## (undated) DEVICE — DRSG TELFA ISLAND 4X10"

## (undated) DEVICE — LINEN TOWEL PACK X5 5464

## (undated) DEVICE — SU ETHIBOND 2-0 SHDA 30" X563H

## (undated) DEVICE — PREP CHLORAPREP W/ORANGE TINT 10.5ML 930715

## (undated) DEVICE — CONTRAST MEDIA ISOVUE 300 61% IOPAMIDOL  CHRG PER 1ML 131535

## (undated) DEVICE — ESU PENCIL W/SMOKE EVAC NEPTUNE STRYKER 0703-046-000

## (undated) DEVICE — PROGRAMMER PATIENT EXTERNAL MEDT PTM 8835

## (undated) DEVICE — COVER CAMERA ENDOVIDEO

## (undated) DEVICE — PREP DURAPREP 26ML APL 8630

## (undated) DEVICE — SUCTION CANISTER MEDIVAC LINER 3000ML W/LID 65651-530

## (undated) DEVICE — CUSHION INSERT LG PRONE VIEW JACKSON TABLE

## (undated) DEVICE — KIT HAND CONTROL ACIST 014644 AR-P54

## (undated) DEVICE — LINE MONITOR NASAL SMART CAPNOLINE ADULT LONG 12463

## (undated) DEVICE — MANIFOLD KIT ANGIO AUTOMATED 014613

## (undated) DEVICE — CANNULA PERFUSION ARTERIAL 22FR 12" 77422

## (undated) DEVICE — INTRO SHEATH 5FRX10CM PINNACLE RSS502

## (undated) DEVICE — VALVE HEMOSTASIS .096" COPILOT MECH 1003331

## (undated) DEVICE — SYR 07ML EPIDURAL LOSS OF RESISTANCE PULSATOR 4905

## (undated) DEVICE — NDL PERC ENTRY 21GA 4CM G00280

## (undated) DEVICE — CONNECTOR DRAIN CHEST Y EXTENSION SET 19909

## (undated) DEVICE — MANIFOLD NEPTUNE 4 PORT 700-20

## (undated) DEVICE — SU PROLENE 4-0 SHDA 36" 8521H

## (undated) DEVICE — GLOVE PROTEXIS W/NEU-THERA 7.5  2D73TE75

## (undated) DEVICE — CABLE PACING ALLIGATOR CLIP 301-CG

## (undated) DEVICE — DRAPE COVER C-ARM SEAMLESS SNAP-KAP 03-KP26 LATEX FREE

## (undated) DEVICE — DEFIB PRO-PADZ LVP LQD GEL ADULT 8900-2105-01

## (undated) DEVICE — SU STEEL 6 CCS 4X18" M654G

## (undated) DEVICE — CANISTER WOUND VAC W/GEL 500ML M8275063/5

## (undated) DEVICE — PUNCH AORTIC 4.0MMX8" RCB40

## (undated) DEVICE — SU VICRYL 0 CT-2 CR 8X18" J727D

## (undated) DEVICE — SYR ANGIOGRAPHY MULTIUSE KIT ACIST 014612

## (undated) DEVICE — CUSTOM PACK CORONARY SAN5BCRHEA

## (undated) DEVICE — SLEEVE TR BAND RADIAL COMPRESSION DEVICE 29CM XX-RF06L

## (undated) DEVICE — SYSTEM PANNUS RETENTION 4 PAD 2 STRAP CZ-PRS-04

## (undated) DEVICE — SYR 20ML LL W/O NDL

## (undated) DEVICE — DRSG KERLIX FLUFFS X5

## (undated) DEVICE — SOL RINGERS LACTATED 1000ML BAG 2B2324X

## (undated) DEVICE — SHEATH PRELUDE SNAP 13CM 6FR

## (undated) DEVICE — PACK MINOR SBA15MIFSE

## (undated) DEVICE — HANDPIECE ABLATION SHORT STRAIGHT 6.5CM OSL2

## (undated) DEVICE — DRAPE IOBAN INCISE 36X23" 6651EZ

## (undated) DEVICE — CANNULA PERFUSION VENOUS 2 STAGE 34-46FR

## (undated) DEVICE — CATH IVUS OPTICROSS HD 6 3.6FR 1.18MM DIA 135CML H7493935408

## (undated) DEVICE — PACK OPEN HEART PV12OH524

## (undated) DEVICE — CABLE MYO/LEAD PACING WHITE DISP 019-530

## (undated) DEVICE — DRSG STERI STRIP 1/2X4" R1547

## (undated) DEVICE — SYR BULB IRRIG DOVER 60 ML LATEX FREE 67000

## (undated) DEVICE — NDL 19GA 1.5"

## (undated) DEVICE — CANISTER ENGINE FOR INDIGO SYSTEM

## (undated) DEVICE — DEVICE INFLATION W/KIT & 6IN TUBING

## (undated) DEVICE — SU PROLENE 7-0 BV175-6 24' M8737

## (undated) DEVICE — SPONGE RAY-TEC 4X8" 7318

## (undated) DEVICE — DRAIN CHEST TUBE 32FR STR 8032

## (undated) DEVICE — Device

## (undated) DEVICE — SU PROLENE 4-0 RB-1DA 36" 8557H

## (undated) DEVICE — PACK PCMKR PERM SRG PROC LF SAN32PC573

## (undated) DEVICE — SU VICRYL 0 CTX 36" J370H

## (undated) DEVICE — CATH BALLOON NC EMERGE 4.00X8MM H7493926708400

## (undated) DEVICE — INTRO MICRO MINI STICK 4FR STIFF NITINOL 45-753

## (undated) DEVICE — CATH ANGIO INFINITI JR4 4FRX100CM 538421

## (undated) DEVICE — CATH GUIDING 5FR X 100CM LA5PAPA1

## (undated) DEVICE — DRSG TELFA ISLAND 4X8" 7541

## (undated) DEVICE — SYR EAR BULB 3OZ 0035830

## (undated) DEVICE — CATH BALLOON NC EMERGE 3.50X20MM H7493926720350

## (undated) DEVICE — EXCHANGE WIRE .035 260 STAR/JFC/035/260/ M001491681

## (undated) DEVICE — SU PROLENE 3-0 SHDA 36" 8522H

## (undated) DEVICE — BONE WAX OSTENE 3.5GM CT410

## (undated) DEVICE — LINEN LEG ROLL 5489

## (undated) DEVICE — SU VICRYL 4-0 PS-2 18" UND J496H

## (undated) DEVICE — WIRE GUIDE 0.035"X260CM AMPTLAZ XSTIFF CVD THSCF-35-260-3-A

## (undated) DEVICE — DRAPE IOBAN INCISE 23X17" 6650EZ

## (undated) DEVICE — PREP CHLORAPREP 26ML TINTED HI-LITE ORANGE 930815

## (undated) DEVICE — SU PROLENE 7-0 BV-1DA 4X30" M8703

## (undated) DEVICE — PROGRAMMER

## (undated) DEVICE — SU SILK 2-0 SH CR 8X18" C012D

## (undated) DEVICE — CATH BALLOON NC EMERGE 3.00X15MM H7493926715300

## (undated) DEVICE — SU MONOCRYL 4-0 PS-2 18" UND Y496G

## (undated) DEVICE — SURGICEL HEMOSTAT 2X14" 1951

## (undated) DEVICE — DRSG TEGADERM 4X4 3/4" 1626

## (undated) DEVICE — TUNNELING TOOL AND OBTURATOR

## (undated) DEVICE — SOMASENSOR CEREBRAL OXIMETER ADULT SAFB-SM

## (undated) DEVICE — DEVICE TISSUE STABILIZATION OCTOBASE 28707

## (undated) DEVICE — PACK UNIVERSAL SPLIT 29131

## (undated) DEVICE — GUIDEWIRE VASCULAR 0.035IN DIA 145CML 15CML/6CML STAINLESS

## (undated) DEVICE — CATH BALLOON CUTTING WOLVERINE 3.25X10MH74939401103250

## (undated) DEVICE — INFL DVC KIT W/10CC NITRO IN4530

## (undated) DEVICE — CONNECTOR PERFUSION Y 3/8X3/8" W/O LL 6031

## (undated) DEVICE — GLOVE PROTEXIS W/NEU-THERA 8.0  2D73TE80

## (undated) DEVICE — SU ETHIBOND 0 CT-1 CR 8X18" CX21D

## (undated) DEVICE — SU ETHIBOND 3-0 BBDA 36" X588H

## (undated) DEVICE — CATH BALLOON NC EMERGE 3.50X12MM H7493926712350

## (undated) DEVICE — POSITIONER PT PRONESAFE HEAD REST W/DERMAPROX INSERT 40599

## (undated) DEVICE — SU SILK 1 TIE 10X30" SA87G

## (undated) DEVICE — SHEATH GLIDE RADIAL 6FR 25CM 0.021

## (undated) DEVICE — RAD G/W INQWIRE .035X260CM J-TIP EXCHANGE IQ35F260J1O5RS

## (undated) DEVICE — BLOWER/MISTER CLEARVIEW 22150

## (undated) DEVICE — RESERVOIR CELL SAVING BLOOD COLLECTION EL2120

## (undated) DEVICE — ESU CORD BIPOLAR 12' E0512

## (undated) DEVICE — SHEATH GUIDING R2P DESTINATION 6FR 85CM GS-R6ST1C85W

## (undated) DEVICE — CATH ANGIO JUDKINS R4 6FRX100CM INFINITI 534621T

## (undated) DEVICE — KIT ENDO VASOVIEW HEMOPRO 2 VH-4000

## (undated) DEVICE — GUIDEWIRE VASC 0.014INX180CM RUNTHROUGH 25-1011

## (undated) DEVICE — ANTENNA MEDTRONIC RSTR 37092

## (undated) DEVICE — TOTE ANGIO CORP PC15AT SAN32CC83O

## (undated) DEVICE — CATH BALLOON NC EMERGE 4.00X12MM H7493926712400

## (undated) DEVICE — LINEN TOWEL PACK X30 5481

## (undated) DEVICE — INTRO MICRO MINI STICK 4FR STD NITINOL

## (undated) DEVICE — PACK MINI VAC CUSTOM CARDOPULMONARY BB5Z97R15

## (undated) DEVICE — DEVICE ASSEMBLY SUCTION/ANTI COAG BTC93

## (undated) DEVICE — ELECTRODE ADULT PACING MULTI P-211-M1

## (undated) DEVICE — SHTH INTRO 0.021IN ID 6FR DIA

## (undated) DEVICE — KIT WASH CELL SAVING ATL2001

## (undated) DEVICE — LINEN TOWEL PACK X6 WHITE 5487

## (undated) DEVICE — SU VICRYL 3-0 FS-1 27" J442H

## (undated) DEVICE — SU VICRYL 2-0 CT-1 27" UND J259H

## (undated) DEVICE — GUIDEWIRE FORTE FLOPPY J TOP 34949-05J

## (undated) DEVICE — KIT HAND CONTROL ANGIOTOUCH ACIST 65CM AT-P65

## (undated) DEVICE — DRAPE LAP W/ARMBOARD 29410

## (undated) DEVICE — SOL NACL 0.9% IRRIG 1000ML BOTTLE 2F7124

## (undated) DEVICE — GOWN XLG DISP 9545

## (undated) DEVICE — PROTECTOR ARM ONE-STEP TRENDELENBURG 40418

## (undated) DEVICE — SU PLEDGET SOFT TFE 3/8"X3/26"X1/16" PCP40

## (undated) DEVICE — DRAPE STERI TOWEL LG 1010

## (undated) DEVICE — CATH ANGIO JUDKINS JL4 6FRX100CM INFINITI 534620T

## (undated) DEVICE — SUCTION CATH AIRLIFE TRI-FLO W/CONTROL PORT 14FR  T60C

## (undated) DEVICE — DRAPE C-ARMOR 5 SIDED 5523

## (undated) DEVICE — GLOVE BIOGEL PI MICRO SZ 7.0 48570

## (undated) DEVICE — BLADE KNIFE BEAVER 6900

## (undated) DEVICE — CATH TRAY FOLEY SURESTEP 16FR W/TMP PRB STLK LATEX A319416AM

## (undated) DEVICE — CATH JACKY 5FR 3.5 CURVE 40-5023

## (undated) DEVICE — CATH LAUNCHER 6FR LA6EBU375

## (undated) DEVICE — DRSG KERLIX 4 1/2"X4YDS ROLL 6715

## (undated) DEVICE — CATH LAUNCHER 6FR EBU 3.5 LA6EBU35

## (undated) DEVICE — CATH LAUNCHER 6FR EBU 4.0 LA6EBU40

## (undated) DEVICE — KIT CATHETER INDIGO RX 140CM WITH LG LUMEN ASP TUBING

## (undated) DEVICE — GLOVE BIOGEL PI MICRO INDICATOR UNDERGLOVE SZ 7.5 48975

## (undated) DEVICE — CONNECTOR PERFUSION STR 1/2X1/2" W/O LL 6025

## (undated) DEVICE — INTRO GLIDESHEATH SLENDER 6FR 10X45CM 60-1060

## (undated) DEVICE — CANNULA PERFUSION AORTIC ROOT 22FR 20012

## (undated) DEVICE — GLIDEWIRE TERUMO .035X180CM 1.5,, J-TIP GR3525

## (undated) DEVICE — SOL WATER IRRIG 1000ML BOTTLE 2F7114

## (undated) DEVICE — WIRE GLIDE 0.035"X150CM 3CM ANG REG UWR6035

## (undated) DEVICE — TUBING SUCTION SOFT 20'X3/16" 0036570

## (undated) DEVICE — COVER TABLE POLY 65X90" 8186

## (undated) DEVICE — POSITIONER ASSIST ESSTECH 3S T401210S

## (undated) DEVICE — GLOVE PROTEXIS POWDER FREE 7.0 ORTHOPEDIC 2D73ET70

## (undated) DEVICE — SHEATH PRELUDE SNAP 13CM 9FR

## (undated) DEVICE — GLOVE PROTEXIS MICRO 8.0  2D73PM80

## (undated) DEVICE — CATH LAUNCHER 6FR JR 4.0 LA6JR40

## (undated) DEVICE — DECANTER BAG 2002S

## (undated) DEVICE — DRSG WOUND VAC SPONGE MED BLACK M8275052/5

## (undated) RX ORDER — DEXAMETHASONE SODIUM PHOSPHATE 4 MG/ML
INJECTION, SOLUTION INTRA-ARTICULAR; INTRALESIONAL; INTRAMUSCULAR; INTRAVENOUS; SOFT TISSUE
Status: DISPENSED
Start: 2025-03-28

## (undated) RX ORDER — DEXMEDETOMIDINE HYDROCHLORIDE 4 UG/ML
INJECTION, SOLUTION INTRAVENOUS
Status: DISPENSED
Start: 2022-01-19

## (undated) RX ORDER — FENTANYL CITRATE 50 UG/ML
INJECTION, SOLUTION INTRAMUSCULAR; INTRAVENOUS
Status: DISPENSED
Start: 2023-04-20

## (undated) RX ORDER — ASPIRIN 81 MG/1
TABLET, CHEWABLE ORAL
Status: DISPENSED
Start: 2021-10-07

## (undated) RX ORDER — CEFAZOLIN SODIUM 2 G/100ML
INJECTION, SOLUTION INTRAVENOUS
Status: DISPENSED
Start: 2022-01-24

## (undated) RX ORDER — PROPOFOL 10 MG/ML
INJECTION, EMULSION INTRAVENOUS
Status: DISPENSED
Start: 2017-04-25

## (undated) RX ORDER — FENTANYL CITRATE 50 UG/ML
INJECTION, SOLUTION INTRAMUSCULAR; INTRAVENOUS
Status: DISPENSED
Start: 2025-03-28

## (undated) RX ORDER — ACETAMINOPHEN 325 MG/1
TABLET ORAL
Status: DISPENSED
Start: 2021-10-07

## (undated) RX ORDER — CLOPIDOGREL 300 MG/1
TABLET, FILM COATED ORAL
Status: DISPENSED
Start: 2022-01-13

## (undated) RX ORDER — HEPARIN SODIUM 1000 [USP'U]/ML
INJECTION, SOLUTION INTRAVENOUS; SUBCUTANEOUS
Status: DISPENSED
Start: 2022-01-13

## (undated) RX ORDER — LIDOCAINE HYDROCHLORIDE 20 MG/ML
INJECTION, SOLUTION EPIDURAL; INFILTRATION; INTRACAUDAL; PERINEURAL
Status: DISPENSED
Start: 2017-04-25

## (undated) RX ORDER — VANCOMYCIN HYDROCHLORIDE 1 G/20ML
INJECTION, POWDER, LYOPHILIZED, FOR SOLUTION INTRAVENOUS
Status: DISPENSED
Start: 2022-01-19

## (undated) RX ORDER — ONDANSETRON 2 MG/ML
INJECTION INTRAMUSCULAR; INTRAVENOUS
Status: DISPENSED
Start: 2025-03-28

## (undated) RX ORDER — HEPARIN SODIUM 1000 [USP'U]/ML
INJECTION, SOLUTION INTRAVENOUS; SUBCUTANEOUS
Status: DISPENSED
Start: 2022-01-19

## (undated) RX ORDER — PAPAVERINE HYDROCHLORIDE 30 MG/ML
INJECTION INTRAMUSCULAR; INTRAVENOUS
Status: DISPENSED
Start: 2022-01-19

## (undated) RX ORDER — FENTANYL CITRATE 50 UG/ML
INJECTION, SOLUTION INTRAMUSCULAR; INTRAVENOUS
Status: DISPENSED
Start: 2021-10-07

## (undated) RX ORDER — BUPIVACAINE HYDROCHLORIDE 2.5 MG/ML
INJECTION, SOLUTION EPIDURAL; INFILTRATION; INTRACAUDAL
Status: DISPENSED
Start: 2022-01-24

## (undated) RX ORDER — GLYCOPYRROLATE 0.2 MG/ML
INJECTION, SOLUTION INTRAMUSCULAR; INTRAVENOUS
Status: DISPENSED
Start: 2017-04-25

## (undated) RX ORDER — PROPOFOL 10 MG/ML
INJECTION, EMULSION INTRAVENOUS
Status: DISPENSED
Start: 2023-04-20

## (undated) RX ORDER — NITROGLYCERIN 0.4 MG/1
TABLET SUBLINGUAL
Status: DISPENSED
Start: 2021-10-07

## (undated) RX ORDER — GLYCOPYRROLATE 0.2 MG/ML
INJECTION, SOLUTION INTRAMUSCULAR; INTRAVENOUS
Status: DISPENSED
Start: 2022-01-19

## (undated) RX ORDER — FENTANYL CITRATE 50 UG/ML
INJECTION, SOLUTION INTRAMUSCULAR; INTRAVENOUS
Status: DISPENSED
Start: 2022-01-24

## (undated) RX ORDER — PROPOFOL 10 MG/ML
INJECTION, EMULSION INTRAVENOUS
Status: DISPENSED
Start: 2022-01-19

## (undated) RX ORDER — PROPOFOL 10 MG/ML
INJECTION, EMULSION INTRAVENOUS
Status: DISPENSED
Start: 2025-03-28

## (undated) RX ORDER — ONDANSETRON 2 MG/ML
INJECTION INTRAMUSCULAR; INTRAVENOUS
Status: DISPENSED
Start: 2023-04-20

## (undated) RX ORDER — FENTANYL CITRATE 50 UG/ML
INJECTION, SOLUTION INTRAMUSCULAR; INTRAVENOUS
Status: DISPENSED
Start: 2022-01-13

## (undated) RX ORDER — NITROGLYCERIN 5 MG/ML
VIAL (ML) INTRAVENOUS
Status: DISPENSED
Start: 2022-01-13

## (undated) RX ORDER — DEXAMETHASONE SODIUM PHOSPHATE 4 MG/ML
INJECTION, SOLUTION INTRA-ARTICULAR; INTRALESIONAL; INTRAMUSCULAR; INTRAVENOUS; SOFT TISSUE
Status: DISPENSED
Start: 2017-04-25

## (undated) RX ORDER — FENTANYL CITRATE 50 UG/ML
INJECTION, SOLUTION INTRAMUSCULAR; INTRAVENOUS
Status: DISPENSED
Start: 2021-09-11

## (undated) RX ORDER — CEFAZOLIN SODIUM 1 G/3ML
INJECTION, POWDER, FOR SOLUTION INTRAMUSCULAR; INTRAVENOUS
Status: DISPENSED
Start: 2017-04-25

## (undated) RX ORDER — ACETAMINOPHEN 325 MG/1
TABLET ORAL
Status: DISPENSED
Start: 2023-04-20

## (undated) RX ORDER — VERAPAMIL HYDROCHLORIDE 2.5 MG/ML
INJECTION, SOLUTION INTRAVENOUS
Status: DISPENSED
Start: 2022-01-13

## (undated) RX ORDER — LIDOCAINE HYDROCHLORIDE 20 MG/ML
INJECTION, SOLUTION EPIDURAL; INFILTRATION; INTRACAUDAL; PERINEURAL
Status: DISPENSED
Start: 2022-01-19

## (undated) RX ORDER — FENTANYL CITRATE 0.05 MG/ML
INJECTION, SOLUTION INTRAMUSCULAR; INTRAVENOUS
Status: DISPENSED
Start: 2023-04-20

## (undated) RX ORDER — HEPARIN SODIUM 200 [USP'U]/100ML
INJECTION, SOLUTION INTRAVENOUS
Status: DISPENSED
Start: 2022-01-13

## (undated) RX ORDER — LIDOCAINE HYDROCHLORIDE 10 MG/ML
INJECTION, SOLUTION EPIDURAL; INFILTRATION; INTRACAUDAL; PERINEURAL
Status: DISPENSED
Start: 2022-01-24

## (undated) RX ORDER — CEFAZOLIN SODIUM 1 G/3ML
INJECTION, POWDER, FOR SOLUTION INTRAMUSCULAR; INTRAVENOUS
Status: DISPENSED
Start: 2022-01-19

## (undated) RX ORDER — CEFAZOLIN SODIUM/WATER 3 G/30 ML
SYRINGE (ML) INTRAVENOUS
Status: DISPENSED
Start: 2023-04-20

## (undated) RX ORDER — FENTANYL CITRATE 50 UG/ML
INJECTION, SOLUTION INTRAMUSCULAR; INTRAVENOUS
Status: DISPENSED
Start: 2022-01-19

## (undated) RX ORDER — EPHEDRINE SULFATE 50 MG/ML
INJECTION, SOLUTION INTRAMUSCULAR; INTRAVENOUS; SUBCUTANEOUS
Status: DISPENSED
Start: 2023-04-20

## (undated) RX ORDER — DIAZEPAM 5 MG
TABLET ORAL
Status: DISPENSED
Start: 2021-10-07

## (undated) RX ORDER — METOPROLOL TARTRATE 1 MG/ML
INJECTION, SOLUTION INTRAVENOUS
Status: DISPENSED
Start: 2022-01-13

## (undated) RX ORDER — PROTAMINE SULFATE 10 MG/ML
INJECTION, SOLUTION INTRAVENOUS
Status: DISPENSED
Start: 2022-01-19

## (undated) RX ORDER — FENTANYL CITRATE 50 UG/ML
INJECTION, SOLUTION INTRAMUSCULAR; INTRAVENOUS
Status: DISPENSED
Start: 2017-04-25

## (undated) RX ORDER — LIDOCAINE HYDROCHLORIDE 10 MG/ML
INJECTION, SOLUTION EPIDURAL; INFILTRATION; INTRACAUDAL; PERINEURAL
Status: DISPENSED
Start: 2022-01-13

## (undated) RX ORDER — CLOPIDOGREL 300 MG/1
TABLET, FILM COATED ORAL
Status: DISPENSED
Start: 2021-10-07